# Patient Record
Sex: FEMALE | Race: ASIAN | NOT HISPANIC OR LATINO | Employment: FULL TIME | URBAN - METROPOLITAN AREA
[De-identification: names, ages, dates, MRNs, and addresses within clinical notes are randomized per-mention and may not be internally consistent; named-entity substitution may affect disease eponyms.]

---

## 2017-01-02 ENCOUNTER — THERAPY VISIT (OUTPATIENT)
Dept: PHYSICAL THERAPY | Facility: CLINIC | Age: 37
End: 2017-01-02
Payer: COMMERCIAL

## 2017-01-02 DIAGNOSIS — E10.21 TYPE 1 DIABETES MELLITUS WITH NEPHROPATHY (H): ICD-10-CM

## 2017-01-02 DIAGNOSIS — E83.51 LOW CALCIUM LEVELS: ICD-10-CM

## 2017-01-02 DIAGNOSIS — M25.561 PAIN IN BOTH KNEES, UNSPECIFIED CHRONICITY: Primary | ICD-10-CM

## 2017-01-02 DIAGNOSIS — E78.00 PURE HYPERCHOLESTEROLEMIA: ICD-10-CM

## 2017-01-02 DIAGNOSIS — M25.562 PAIN IN BOTH KNEES, UNSPECIFIED CHRONICITY: Primary | ICD-10-CM

## 2017-01-02 DIAGNOSIS — I10 ESSENTIAL HYPERTENSION: ICD-10-CM

## 2017-01-02 LAB
ANION GAP SERPL CALCULATED.3IONS-SCNC: 12 MMOL/L (ref 3–14)
BUN SERPL-MCNC: 13 MG/DL (ref 7–30)
CALCIUM SERPL-MCNC: 8.5 MG/DL (ref 8.5–10.1)
CHLORIDE SERPL-SCNC: 103 MMOL/L (ref 94–109)
CHOLEST SERPL-MCNC: 302 MG/DL
CO2 SERPL-SCNC: 19 MMOL/L (ref 20–32)
CREAT SERPL-MCNC: 0.5 MG/DL (ref 0.52–1.04)
CREAT UR-MCNC: 69 MG/DL
DEPRECATED CALCIDIOL+CALCIFEROL SERPL-MC: ABNORMAL UG/L (ref 20–75)
GFR SERPL CREATININE-BSD FRML MDRD: ABNORMAL ML/MIN/1.7M2
GLUCOSE SERPL-MCNC: 282 MG/DL (ref 70–99)
HBA1C MFR BLD: 12.8 % (ref 4.3–6)
HDLC SERPL-MCNC: 29 MG/DL
LDLC SERPL CALC-MCNC: ABNORMAL MG/DL
LDLC SERPL DIRECT ASSAY-MCNC: 97 MG/DL
MICROALBUMIN UR-MCNC: 522 MG/L
MICROALBUMIN/CREAT UR: 752.16 MG/G CR (ref 0–25)
NONHDLC SERPL-MCNC: 274 MG/DL
POTASSIUM SERPL-SCNC: 3.5 MMOL/L (ref 3.4–5.3)
SODIUM SERPL-SCNC: 134 MMOL/L (ref 133–144)
TRIGL SERPL-MCNC: 1656 MG/DL

## 2017-01-02 PROCEDURE — T1013 SIGN LANG/ORAL INTERPRETER: HCPCS | Mod: U3 | Performed by: PHYSICAL THERAPIST

## 2017-01-02 PROCEDURE — 97530 THERAPEUTIC ACTIVITIES: CPT | Mod: GP | Performed by: PHYSICAL THERAPIST

## 2017-01-02 PROCEDURE — 97161 PT EVAL LOW COMPLEX 20 MIN: CPT | Mod: GP | Performed by: PHYSICAL THERAPIST

## 2017-01-02 PROCEDURE — 97110 THERAPEUTIC EXERCISES: CPT | Mod: GP | Performed by: PHYSICAL THERAPIST

## 2017-01-02 ASSESSMENT — ACTIVITIES OF DAILY LIVING (ADL)
STAND: ACTIVITY IS NOT DIFFICULT
STIFFNESS: THE SYMPTOM PREVENTS ME FROM ALL DAILY ACTIVITIES
SQUAT: ACTIVITY IS SOMEWHAT DIFFICULT
SIT WITH YOUR KNEE BENT: ACTIVITY IS NOT DIFFICULT
SWELLING: THE SYMPTOM PREVENTS ME FROM ALL DAILY ACTIVITIES
KNEE_ACTIVITY_OF_DAILY_LIVING_SCORE: 34.29
PAIN: THE SYMPTOM PREVENTS ME FROM ALL DAILY ACTIVITIES
GO DOWN STAIRS: I AM UNABLE TO DO THE ACTIVITY
KNEEL ON THE FRONT OF YOUR KNEE: ACTIVITY IS SOMEWHAT DIFFICULT
RISE FROM A CHAIR: ACTIVITY IS NOT DIFFICULT
GIVING WAY, BUCKLING OR SHIFTING OF KNEE: THE SYMPTOM PREVENTS ME FROM ALL DAILY ACTIVITIES
KNEE_ACTIVITY_OF_DAILY_LIVING_SUM: 24
WEAKNESS: THE SYMPTOM PREVENTS ME FROM ALL DAILY ACTIVITIES
WALK: ACTIVITY IS SOMEWHAT DIFFICULT
GO UP STAIRS: I AM UNABLE TO DO THE ACTIVITY
LIMPING: THE SYMPTOM PREVENTS ME FROM ALL DAILY ACTIVITIES
RAW_SCORE: 24

## 2017-01-02 NOTE — PROGRESS NOTES
KEY PT FINDINGS:  1) Weakness of glut med and glut max  2) Balance deficits with ambulation, steps - questionable neurological involvement or related to deafness  3) Joint line tenderness - medial and lateral on the left     Physical Therapy Initial Evaluation: Subjective History     Injury/Condition Details:  Presenting Complaint Bilateral knee pain   Onset Timing/Date 3 years - episodic pain   Mechanism Active in high school - she questions arthritis, has history of falls onto her knees     Symptom Behavior Details    Primary Symptoms Constant symptoms; worsen with activity, pain (Location: anterior knee, bilateral aspects of the patella, reports the pain at times can be deep in the joint, denies pain medial or lateral or posterior, Quality: Sharp and Aching/Throbbing), weakness   Worst Pain 5/10 (with stairs)   Symptom Provocators Stairs, walking, sleeping    Best Pain 0/10 (with injections)   Symptom Relievers Nothing besides injections   Time of day dependent? No   Recent symptom change? symptoms worsening     Prior Testing/Intervention for current condition:  Prior Tests  x-ray and MRI   Prior Treatment Injections: Right only (very helpful but only for 6 months), physical therapy     Lifestyle & General Medical History:  Employment Unemployed   Usual physical activities  (within past year) Daily activities - does not exercise on a regular basis   Orthopaedic history See Epic Chart   Notable medical history See Epic Chart     Knee Examination:    Dynamic Movement Screen:  2 leg stance: Pes planus bilaterally, mild genu valgus bilaterally, mild off loading of left limb  2 leg squat: Poor trunk control, poor proprioception, unable to move through full depth due to pain    1 leg stance: Right: Poor proprioception, decreased trunk control, occasional UE touchdown; Left: Poor proprioception, significant dependence on UE, decreased trunk control, increased pain reported  1 leg squat: Not tested due to pain    Gait:  mild off balance tendencies towards the left with ambulation - difficulty ambulating in a straight line, antalgic gait pattern, slow gait pattern.     Range of Motion  Knee ROM  Left: 3-0-130 Degrees  Right: 4-0-128 Degrees    Hip PROM  IR (L/R): 10 deg/15 deg  ER (L/R): 35 deg/30 deg    Basic Muscle Activation  Quad Setting Ability:  Left: Poor, impaired intensity and delayed activation, co-contraction with gluts  Right: Poor (stronger than left side), impaired intensity, co-contraction with gluts    Straight leg raise:  Left: 0 Lag  Right: 0 Lag    L/E Strength  Hip Rotators: -/5 Left, -/5 Right  Glut Med: 2+/5 Left, 3+/5 Right  Glut Max: 3/5 Left, 3+/5 Right  Hamstrings: 3/5 Left, 3/5 Right    L/E Flexibility Screen  Gastroc/Soleus Complex: ++ bilaterally  Hamstrings (SLR): ++ left  Quadriceps (prone): - bilaterally  Hip Flexors (Navid): - bilaterally  ITB (Yandel): - bilaterally    Palpation  Patient is moderately tender to palpation at the following left knee structures: lateral joint line, medial joint line, inferior pole of patella, medial and lateral boarders of the patella, superior boarder of the patella, distal quadriceps - muscle belly.    Ligamentous Screen  Negative bilaterally             HPI                        System    Physical Exam    General     ROS    Assessment/Plan:      Patient is a 36 year old female with both sides knee complaints.    Patient has the following significant findings with corresponding treatment plan.                Diagnosis 1:  Bilateral knee pain - OA  Pain -  hot/cold therapy, manual therapy, STS, splint/taping/bracing/orthotics, self management and education  Decreased ROM/flexibility - manual therapy, therapeutic exercise and home program  Decreased strength - therapeutic exercise, therapeutic activities and home program  Impaired balance - neuro re-education, therapeutic activities and home program  Decreased proprioception - neuro re-education, therapeutic activities  and home program  Impaired gait - gait training and home program  Impaired muscle performance - neuro re-education and home program  Decreased function - therapeutic activities and home program    Therapy Evaluation Codes:   1) History comprised of:   Personal factors that impact the plan of care:      Cognition, Language and Time since onset of symptoms.    Comorbidity factors that impact the plan of care are:      Diabetes, Overweight, Weakness and deafness.     Medications impacting care: Muscle relaxant and Pain.  2) Examination of Body Systems comprised of:   Body structures and functions that impact the plan of care:      Knee.   Activity limitations that impact the plan of care are:      Sitting, Squatting/kneeling, Standing, Walking and Sleeping.   Clinical presentation characteristics are:    Stable/Uncomplicated.  3) Presentation comprised of:   Presentation scored as Low complexity with uncomplicated characteristics..  4) Decision-Making    Moderate complexity using standardized patient assessment instrument and/or measureable assessment of functional outcome.  Cumulative Therapy Evaluation is: Low complexity.    Previous and current functional limitations:  (See Goal Flow Sheet for this information)    Short term and Long term goals: (See Goal Flow Sheet for this information)     Communication ability:  Patient has an  for communication clarity.  Treatment Explanation - The following has been discussed with the patient:   RX ordered/plan of care  Anticipated outcomes  Possible risks and side effects  This patient would benefit from PT intervention to resume normal activities.   Rehab potential is good.    Frequency:  1 X week, once daily  Duration:  for 4 weeks then move to every other week for 4 weeks  Discharge Plan:  Achieve all LTG.  Independent in home treatment program.  Reach maximal therapeutic benefit.    Please refer to the daily flowsheet for treatment today, total treatment time  and time spent performing 1:1 timed codes.

## 2017-01-11 ENCOUNTER — THERAPY VISIT (OUTPATIENT)
Dept: PHYSICAL THERAPY | Facility: CLINIC | Age: 37
End: 2017-01-11
Payer: COMMERCIAL

## 2017-01-11 DIAGNOSIS — M25.562 PAIN IN BOTH KNEES, UNSPECIFIED CHRONICITY: Primary | ICD-10-CM

## 2017-01-11 DIAGNOSIS — M25.561 PAIN IN BOTH KNEES, UNSPECIFIED CHRONICITY: Primary | ICD-10-CM

## 2017-01-11 PROCEDURE — 97110 THERAPEUTIC EXERCISES: CPT | Mod: GP | Performed by: PHYSICAL THERAPY ASSISTANT

## 2017-01-11 PROCEDURE — 97530 THERAPEUTIC ACTIVITIES: CPT | Mod: GP | Performed by: PHYSICAL THERAPY ASSISTANT

## 2017-01-11 PROCEDURE — T1013 SIGN LANG/ORAL INTERPRETER: HCPCS | Mod: U3 | Performed by: PHYSICAL THERAPY ASSISTANT

## 2017-01-11 PROCEDURE — 97140 MANUAL THERAPY 1/> REGIONS: CPT | Mod: GP | Performed by: PHYSICAL THERAPY ASSISTANT

## 2017-01-12 DIAGNOSIS — E78.2 ELEVATED TRIGLYCERIDES WITH HIGH CHOLESTEROL: ICD-10-CM

## 2017-01-12 DIAGNOSIS — E78.00 PURE HYPERCHOLESTEROLEMIA: ICD-10-CM

## 2017-01-12 DIAGNOSIS — E56.9 VITAMIN DEFICIENCY: Primary | ICD-10-CM

## 2017-01-12 DIAGNOSIS — E10.21 TYPE 1 DIABETES MELLITUS WITH NEPHROPATHY (H): ICD-10-CM

## 2017-01-12 RX ORDER — ERGOCALCIFEROL 1.25 MG/1
50000 CAPSULE, LIQUID FILLED ORAL
Qty: 12 CAPSULE | Refills: 0 | Status: SHIPPED | OUTPATIENT
Start: 2017-01-12 | End: 2017-05-17

## 2017-01-24 ENCOUNTER — OFFICE VISIT (OUTPATIENT)
Dept: INTERNAL MEDICINE | Facility: CLINIC | Age: 37
End: 2017-01-24

## 2017-01-24 VITALS
HEART RATE: 108 BPM | BODY MASS INDEX: 31.76 KG/M2 | SYSTOLIC BLOOD PRESSURE: 130 MMHG | WEIGHT: 168 LBS | DIASTOLIC BLOOD PRESSURE: 63 MMHG

## 2017-01-24 DIAGNOSIS — Z30.430 ENCOUNTER FOR IUD INSERTION: Primary | ICD-10-CM

## 2017-01-24 DIAGNOSIS — B37.49 CANDIDIASIS OF PERINEUM: ICD-10-CM

## 2017-01-24 DIAGNOSIS — Z79.4 TYPE 2 DIABETES MELLITUS WITH COMPLICATION, WITH LONG-TERM CURRENT USE OF INSULIN (H): ICD-10-CM

## 2017-01-24 DIAGNOSIS — E11.8 TYPE 2 DIABETES MELLITUS WITH COMPLICATION, WITH LONG-TERM CURRENT USE OF INSULIN (H): ICD-10-CM

## 2017-01-24 LAB — HCG UR QL: NEGATIVE

## 2017-01-24 RX ORDER — INSULIN GLARGINE 100 [IU]/ML
INJECTION, SOLUTION SUBCUTANEOUS
Qty: 21 ML | Refills: 3 | Status: SHIPPED
Start: 2017-02-01 | End: 2017-02-04

## 2017-01-24 RX ORDER — OMEGA-3 FATTY ACIDS/FISH OIL 300-1000MG
600 CAPSULE ORAL ONCE
Qty: 3 CAPSULE | Refills: 0
Start: 2017-01-24 | End: 2017-01-24

## 2017-01-24 RX ORDER — KETOCONAZOLE 20 MG/G
CREAM TOPICAL 2 TIMES DAILY
Qty: 30 G | Refills: 1 | Status: SHIPPED | OUTPATIENT
Start: 2017-01-24 | End: 2018-02-23

## 2017-01-24 ASSESSMENT — PAIN SCALES - GENERAL: PAINLEVEL: NO PAIN (0)

## 2017-01-24 NOTE — MR AVS SNAPSHOT
After Visit Summary   1/24/2017    Nayeli Caal    MRN: [id number]           Patient Information     Date Of Birth          1980        Visit Information        Provider Department      1/24/2017 9:10 AM Sia Briceño; Mariya Mohamud, APRN CNP [facility]        Today's Diagnoses     Encounter for IUD insertion    -  1     Type 2 diabetes mellitus with complication, with long-term current use of insulin (H)         Candidiasis of perineum           Care Instructions    Primary Care Center Medication Refill Request Information:  * Please contact your pharmacy regarding ANY request for medication refills.  ** [facility] Prescription Fax = [phone]  * Please allow 3 business days for routine medication refills.  * Please allow 5 business days for controlled substance medication refills.     Primary Care Center Test Result notification information:  *You will be notified with in 7-10 days of your appointment day regarding the results of your test.  If you are on MyChart you will be notified as soon as the provider has reviewed the results and signed off on them.          Follow-ups after your visit        Additional Services     ENDOCRINOLOGY ADULT REFERRAL       Your provider has referred you to: [facility]: Endocrinology and Diabetes Clinic [facility] [phone]   [email or web address]    Poor blood sugar control.  Please consult, maybe alternative treatment?       Please be aware that coverage of these services is subject to the terms and limitations of your health insurance plan.  Call member services at your health plan with any benefit or coverage questions.      Please bring the following to your appointment:    >>   Any x-rays, CTs or MRIs which have been performed.  Contact the facility where they were done to arrange for  prior to your scheduled appointment.    >>   List of current medications   >>   This  referral request   >>   Any documents/labs given to you for this referral                  Your next 10 appointments already scheduled     Jan 25, 2017  4:00 PM   DEJAN Extremity with Tori Johnson PT, ASL IS   [facility] Physical Therapy DEJAN ([facility])    [address] [place] [address]  [address]th [facility] [id number] [phone]            Feb 01, 2017  4:00 PM   DEJAN Extremity with Tori Johnson PT, Greyson Schroeder   [facility] Physical Therapy DEJAN ([facility])    [address] [place] [address]  5th [facility] [id number] [phone]            Feb 20, 2017  9:30 AM   (Arrive by 9:15 AM)   RETURN DIABETES with Jimena Bragg MD   [facility] Endocrinology ([facility])    [address] [place] [address]  3rd [facility] [id number] [phone]            Mar 21, 2017  8:25 AM   (Arrive by 8:10 AM)   Return Visit with SABI Ceja CNP   [facility] ([facility])    [address] [place] [id number] [phone]              Who to contact     Please call your clinic at [phone] to:    Ask questions about your health    Make or cancel appointments    Discuss your medicines    Learn about your test results    Speak to your doctor   If you have compliments or concerns about an experience at your clinic, or if you wish to file a complaint, please contact [facility] Patient Relations at [phone] or email us at [email or web address]         Additional Information About Your Visit        MyChart Information     [employer] gives you secure access to your electronic health record. If you see a primary care provider, you can also send messages to your care team and make appointments. If you have questions, please call your primary care clinic.  If you do not have a primary care provider, please call [phone] and they will  assist you.      Get In is an electronic gateway that provides easy, online access to your medical records. With Get In, you can request a clinic appointment, read your test results, renew a prescription or communicate with your care team.     To access your existing account, please contact your [facility] or call [phone] for assistance.        Care EveryWhere ID     This is your Care EveryWhere ID. This could be used by other organizations to access your [facility] medical records  [id number]        Your Vitals Were     Pulse Last Period                108 01/12/2017           Blood Pressure from Last 3 Encounters:   01/24/17 130/63   12/08/16 142/93   08/11/16 137/86    Weight from Last 3 Encounters:   01/24/17 76.204 kg (168 lb)   12/08/16 78.019 kg (172 lb)   08/11/16 75.206 kg (165 lb 12.8 oz)              We Performed the Following     ENDOCRINOLOGY ADULT REFERRAL     HCG qualitative urine          Today's Medication Changes          These changes are accurate as of: 1/24/17 10:43 AM.  If you have any questions, ask your nurse or doctor.               Start taking these medicines.        Dose/Directions    ketoconazole 2 % cream   Commonly known as:  NIZORAL   Used for:  Candidiasis of perineum   Started by:  Mariya Mohamud APRN CNP        Apply topically 2 times daily Apply externally thin amt bid   Quantity:  30 g   Refills:  1         These medicines have changed or have updated prescriptions.        Dose/Directions    * ibuprofen 400 MG tablet   Commonly known as:  ADVIL/MOTRIN   This may have changed:  Another medication with the same name was added. Make sure you understand how and when to take each.   Used for:  Headache(784.0), Pain in joint, lower leg, right   Changed by:  Mariya Mohamud APRN CNP        Dose:  400 mg   Take 1 tablet (400 mg) by mouth every 6 hours as needed for pain   Quantity:  200 tablet   Refills:  1       * ibuprofen 600 MG tablet    Commonly known as:  ADVIL/MOTRIN   This may have changed:  Another medication with the same name was added. Make sure you understand how and when to take each.   Used for:  Pelvic pain in female   Changed by:  Mariya Mohamud APRN CNP        Dose:  600 mg   Take 1 tablet (600 mg) by mouth every 6 hours as needed for moderate pain   Quantity:  1 tablet   Refills:  0       * ibuprofen 200 MG capsule   This may have changed:  You were already taking a medication with the same name, and this prescription was added. Make sure you understand how and when to take each.   Used for:  Encounter for IUD insertion   Changed by:  Mariya Mohamud APRN CNP        Dose:  600 mg   Take 600 mg by mouth once for 1 dose   Quantity:  3 capsule   Refills:  0       * Notice:  This list has 3 medication(s) that are the same as other medications prescribed for you. Read the directions carefully, and ask your doctor or other care provider to review them with you.         Where to get your medicines      These medications were sent to [place], [place] - [facility] [address]  [address] [place] [address] [address] [place], [place] [address]    Hours:  TRANSPLANT PHONE NUMBER [phone] Phone:  [phone]    - ketoconazole 2 % cream      Some of these will need a paper prescription and others can be bought over the counter.  Ask your nurse if you have questions.     You don't need a prescription for these medications    - ibuprofen 200 MG capsule             Primary Care Provider Office Phone # Fax #    SABI Ceja [phone]       [facility] [address] [place] [id number]        Thank you!     Thank you for choosing [facility]  for your care. Our goal is always to provide you with excellent care. Hearing back from our patients is one way we can continue to improve our services. Please take a few minutes to complete the written  survey that you may receive in the mail after your visit with us. Thank you!             Your Updated Medication List - Protect others around you: Learn how to safely use, store and throw away your medicines at www.disposemymeds.org.          This list is accurate as of: 1/24/17 10:43 AM.  Always use your most recent med list.                   Brand Name Dispense Instructions for use    Alcohol Prep Pad 70 % Pads     100 each    1 each 3 times daily       aspirin 81 MG tablet     100 tablet    Take 1 tablet (81 mg) by mouth daily       atorvastatin 40 MG tablet    LIPITOR    90 tablet    Take 1 tablet (40 mg) by mouth daily       BD ULTRA FINE PEN NEEDLES     200 Units    Inject 1 dose * Subcutaneous 2 times daily BD ultra-fine insulin syringe, 30 ga. X 1/2'' short needle 1/2 cc. Use as directed.       blood glucose monitoring lancets     2 Box    Use to test blood sugar 6 times daily or as directed. 3 month supply       blood glucose monitoring test strip    ACCU-CHEK LAYA PLUS    600 each    Laya Plus test strips for use in Accucheck Expert meter.  Use to test blood sugar 6 times daily. 3 month supply.  Send PA's to Dr. Mohamud.       fenofibrate 160 MG tablet     90 tablet    Take 1 tablet (160 mg) by mouth daily       fluconazole 150 MG tablet    DIFLUCAN    3 tablet    Take 1 tablet per infection.       * ibuprofen 400 MG tablet    ADVIL/MOTRIN    200 tablet    Take 1 tablet (400 mg) by mouth every 6 hours as needed for pain       * ibuprofen 600 MG tablet    ADVIL/MOTRIN    1 tablet    Take 1 tablet (600 mg) by mouth every 6 hours as needed for moderate pain       * ibuprofen 200 MG capsule     3 capsule    Take 600 mg by mouth once for 1 dose       insulin aspart 100 UNIT/ML injection    NovoLOG PEN    3 Month    Admin Instructions: Before meals and bedtime correction sliding scale 120 - 160 - 1 U  161 - 200 - 2 U  201 - 240 - 3 U  241 - 280 - 4 U  281 - 320 - 5 U  321 - 360 - 6 U ...       insulin  glargine 100 UNIT/ML injection    LANTUS SOLOSTAR    15 mL    Inject 25 units under the skin at bed time  daily.       ketoconazole 2 % cream    NIZORAL    30 g    Apply topically 2 times daily Apply externally thin amt bid       levonorgestrel 20 MCG/24HR IUD    MIRENA    1 each    1 each (20 mcg) by Intrauterine route once for 1 dose       lisinopril 5 MG tablet    PRINIVIL/ZESTRIL    90 tablet    Take 1 tablet (5 mg) by mouth daily       * insulin pen needle 31G X 8 MM     200 each    Use bid       * NOVOFINE 31 31G X 6 MM   Generic drug:  insulin pen needle      Use as directed.       ondansetron 4 MG tablet    ZOFRAN    10 tablet    Take 1 tablet (4 mg) by mouth every 8 hours as needed for nausea       rizatriptan 10 MG tablet    MAXALT    30 tablet    Take 1 tab at onset of headache.  May repeat after 2 hours.       terbinafine 1 % cream    lamISIL AT    30 g    Apply topically At Bedtime For fungal infection not resolved with other antifungals (e.g. Clotrimazole)       vitamin D 15235 UNIT capsule    ERGOCALCIFEROL    12 capsule    Take 1 capsule (50,000 Units) by mouth every 7 days       * Notice:  This list has 5 medication(s) that are the same as other medications prescribed for you. Read the directions carefully, and ask your doctor or other care provider to review them with you.

## 2017-01-24 NOTE — PATIENT INSTRUCTIONS
Primary Care Center Medication Refill Request Information:  * Please contact your pharmacy regarding ANY request for medication refills.  ** [facility] Prescription Fax = [phone]  * Please allow 3 business days for routine medication refills.  * Please allow 5 business days for controlled substance medication refills.     Primary Care Center Test Result notification information:  *You will be notified with in 7-10 days of your appointment day regarding the results of your test.  If you are on MyChart you will be notified as soon as the provider has reviewed the results and signed off on them.    IUD AFTERCARE INSTRUCTIONS     1. Uterine cramping is common after IUD placement. You can help relieve the discomfort with heating pads, Tylenol (acetaminophen), Aspirin or Advil (ibuprofen). If your cramping becomes very painful, please call the clinic.     2. Irregular bleeding and spotting is normal for the first few months after the IUD is placed. In some cases, women may experience irregular bleeding or spotting for up to six months after the IUD is placed. This bleeding can be annoying at first but usually will become lighter with the Mirena IUD quickly. Call the clinic if your bleeding is excessive and not getting better.     3. Your period will likely be shorter and lighter with a Mirena IUD. Approximately 40% of women will stop having periods altogether with the Mirena IUD. Your period may be heavier and longer with the Paragard IUD.     4. IUDs do not protect against sexually transmitted infections including the AIDS virus (HIV), warts (HPV), gonorrhea, Chlamydia, and herpes. Condoms should be used to decrease the risk sexually transmitted infections. If you think that you have been exposed to a sexually transmitted infection, please call the clinic.     5. If you had the IUD placed for birth control, the Paragard IUD is effective immediately. The Mirena IUD is effective immediately if it was inserted within seven  days after the start of your period. If you have Mirena inserted at any other time during your menstrual cycle, use another method of birth control, like condoms for at least 7 days.     6. It is possible for the IUD to come out of the uterus. If it does slip out of place, it is most likely to happen in the first few months after being put in. To make sure your IUD is in place, you can feel for the IUD strings between periods. To check for strings, wash your hands. Then, sit or squat down. Place one finger into your vagina until you feel your cervix. It will feel hard and rubbery, like the end of your nose. The string ends should be coming through your cervix. Do not pull on the strings. If the strings feel much longer than before, if you feel the hard plastic part of the IUD, or if you cannot feel the strings at all, the IUD may have moved out of place. Please call the clinic and consider using a back up form of birth control until you are seen.     7. Keep your follow-up appointment for 4-6 weeks after the IUD has been placed.     8. Pregnancy is unlikely after IUD placement, but can happen. If you have early pregnancy symptoms like nausea and vomiting, breast tenderness, frequent urination or abdominal pain, you can take a pregnancy test. Please call the clinic if you have any concerns or if your pregnancy test is positive.     9. The IUD should only be removed by a healthcare provider.     The Mirena IUD should be removed and/or replaced after 5 years.     The Paragard IUD should be removed and/or replaced after 10 years.     Warning Signs   Call the clinic if any of the following occurs:       Severe abdominal pain or cramping       Unusual bleeding       Fever or chills       Foul smelling vaginal discharge       Painful intercourse       Positive pregnancy test.

## 2017-01-24 NOTE — PROGRESS NOTES
"She is concerned about her obsession with keeping her perineum clean when showering.  She thinks she might be over-cleaning exteriorly.     She has not yet scheduled an appt in Endocrine Clinic to discuss her diabetes.  She has not been successful in controlling her sugars.  She often forgets to check her blood sugars, take her insulin, and forgets to come in for tests. 1/2/2017 A1c was 12.8.    States she has had a \"sensation\" in her right pelvic area after passing urine.  Not a pain but hard to explain feeling.  Not sure if it correlates with her cycle.  On the days she takes her diabetes meds she urinates approx. 5 x a day.  On days she does not take it she voids 11-12 x a day.    Objective:  Her perineum is red with bright red creases on her inner thigh folds with some fissuring and desquamation of more posterior tissue.  Bimanual exam shows uterus mid-line and ovaries are non-tender to palpation. Nayeli was seen today for IUD.    Diagnoses and all orders for this visit:    Encounter for IUD insertion  -     HCG qualitative urine  -     ibuprofen 200 MG capsule; Take 600 mg by mouth once for 1 dose  -     INSERTION INTRAUTERINE DEVICE    Type 2 diabetes mellitus with complication, with long-term current use of insulin (H)  -     ENDOCRINOLOGY ADULT REFERRAL.  She agrees to seek consultation regarding DM meds.   -     insulin glargine (BASAGLAR KWIKPEN) 100 UNIT/ML injection; 25 units at HS.          Continue Novolog insulin before meals and at bedtime correction.    Candidiasis of perineum  -     ketoconazole (NIZORAL) 2 % cream; Apply topically 2 times daily Apply externally thin amt [facility]  IUD Insertion Note    Nayeli APURVA Caal is a patient of Mariya Mohamud here for an IUD insertion   Indication: birth control    She would desire a lighter menses or none at all.  She is also desiring the contraception aspect of it.      Counseling: Discussed potential side effects of " Paraguard, including increased bleeding and cramping, as well as the Mirena, including unpredictable spotting and amenorrhea.  Patient aware to check for strings every month.    Consent: Affirmation of informed consent was signed and scanned into the medical record. Risks, benefits and alternatives were discussed including risk of infection, bleeding and small risk of uterine perforation.  Patient's questions were elicited and answered.   Procedure safety checklist was completed:  Yes  Time Out (Pause for the Cause) completed: Yes    Labs: UPT negative    Technique:   Patient was premedicated with ibuprofen:   Yes  Uterine position was confirmed by bimanual exam: Yes   Using a sterile speculum and equipment, the cervix was examined.     The cervix was cleansed with Betadine prep. A tenaculum was applied to the cervix with tension to straighten the cervical canal.  The uterus was sounded to 6cm.  A Mirena IUD was inserted in the usual fashion and strings trimmed 2cm below the cervix.  EBL: minimal  Complications: No  Tolerance: Pt tolerated procedure well and was in stable condition. She denied charlie cramping.  She was observed in the clinic for 15 min.     Follow up: Pt was instructed to call if fever, chills, heavy bleeding, severe cramping or foul smelling discharge. May take 600 mg ibuprofen QID prn for mild cramping.  She was advised to check the IUD strings monthly. We will check for strings on her next RTC visit.     Mariya Mohamud, SABI, CNP

## 2017-01-24 NOTE — NURSING NOTE
Chief Complaint   Patient presents with     IUD     Pt is here for an IUD.      Desirae Gonzalez LPN January 24, 2017 9:16 AM

## 2017-01-25 ENCOUNTER — THERAPY VISIT (OUTPATIENT)
Dept: PHYSICAL THERAPY | Facility: CLINIC | Age: 37
End: 2017-01-25
Payer: COMMERCIAL

## 2017-01-25 DIAGNOSIS — M25.561 PAIN IN BOTH KNEES, UNSPECIFIED CHRONICITY: Primary | ICD-10-CM

## 2017-01-25 DIAGNOSIS — M25.562 PAIN IN BOTH KNEES, UNSPECIFIED CHRONICITY: Primary | ICD-10-CM

## 2017-01-25 PROCEDURE — 97110 THERAPEUTIC EXERCISES: CPT | Mod: GP | Performed by: PHYSICAL THERAPIST

## 2017-01-25 PROCEDURE — 97530 THERAPEUTIC ACTIVITIES: CPT | Mod: GP | Performed by: PHYSICAL THERAPIST

## 2017-01-25 PROCEDURE — 97140 MANUAL THERAPY 1/> REGIONS: CPT | Mod: GP | Performed by: PHYSICAL THERAPIST

## 2017-02-01 ENCOUNTER — THERAPY VISIT (OUTPATIENT)
Dept: PHYSICAL THERAPY | Facility: CLINIC | Age: 37
End: 2017-02-01
Payer: COMMERCIAL

## 2017-02-01 DIAGNOSIS — M25.561 PAIN IN BOTH KNEES, UNSPECIFIED CHRONICITY: Primary | ICD-10-CM

## 2017-02-01 DIAGNOSIS — M25.562 PAIN IN BOTH KNEES, UNSPECIFIED CHRONICITY: Primary | ICD-10-CM

## 2017-02-01 PROCEDURE — 97110 THERAPEUTIC EXERCISES: CPT | Mod: GP | Performed by: PHYSICAL THERAPIST

## 2017-02-01 PROCEDURE — 97530 THERAPEUTIC ACTIVITIES: CPT | Mod: GP | Performed by: PHYSICAL THERAPIST

## 2017-02-01 PROCEDURE — 97112 NEUROMUSCULAR REEDUCATION: CPT | Mod: GP | Performed by: PHYSICAL THERAPIST

## 2017-02-04 DIAGNOSIS — Z79.4 TYPE 2 DIABETES MELLITUS WITH COMPLICATION, WITH LONG-TERM CURRENT USE OF INSULIN (H): Primary | ICD-10-CM

## 2017-02-04 DIAGNOSIS — E11.8 TYPE 2 DIABETES MELLITUS WITH COMPLICATION, WITH LONG-TERM CURRENT USE OF INSULIN (H): Primary | ICD-10-CM

## 2017-02-04 RX ORDER — INSULIN GLARGINE 100 [IU]/ML
INJECTION, SOLUTION SUBCUTANEOUS
Qty: 21 ML | Refills: 3 | Status: SHIPPED
Start: 2017-02-04 | End: 2017-03-21

## 2017-02-14 DIAGNOSIS — E10.21 TYPE 1 DIABETES MELLITUS WITH NEPHROPATHY (H): ICD-10-CM

## 2017-02-14 NOTE — TELEPHONE ENCOUNTER
Pt calling and lost all diabetic monitor, test strips and lancets.   Sent new order to [facility].  Anushka Fraser RN 11:36 AM on 2/14/2017.

## 2017-02-20 ENCOUNTER — MEDICAL CORRESPONDENCE (OUTPATIENT)
Dept: HEALTH INFORMATION MANAGEMENT | Facility: CLINIC | Age: 37
End: 2017-02-20

## 2017-02-20 ENCOUNTER — OFFICE VISIT (OUTPATIENT)
Dept: ENDOCRINOLOGY | Facility: CLINIC | Age: 37
End: 2017-02-20

## 2017-02-20 VITALS
DIASTOLIC BLOOD PRESSURE: 86 MMHG | SYSTOLIC BLOOD PRESSURE: 132 MMHG | HEART RATE: 91 BPM | BODY MASS INDEX: 32.45 KG/M2 | WEIGHT: 171.9 LBS | HEIGHT: 61 IN

## 2017-02-20 DIAGNOSIS — E78.2 MIXED HYPERLIPIDEMIA: Primary | ICD-10-CM

## 2017-02-20 DIAGNOSIS — Z79.4 TYPE 2 DIABETES MELLITUS WITH HYPERGLYCEMIA, WITH LONG-TERM CURRENT USE OF INSULIN (H): ICD-10-CM

## 2017-02-20 DIAGNOSIS — E11.65 TYPE 2 DIABETES MELLITUS WITH HYPERGLYCEMIA, WITH LONG-TERM CURRENT USE OF INSULIN (H): ICD-10-CM

## 2017-02-20 DIAGNOSIS — I10 BENIGN ESSENTIAL HYPERTENSION: ICD-10-CM

## 2017-02-20 RX ORDER — FENOFIBRATE 160 MG/1
160 TABLET ORAL DAILY
Qty: 90 TABLET | Refills: 3 | Status: SHIPPED | OUTPATIENT
Start: 2017-02-20 | End: 2017-03-21

## 2017-02-20 ASSESSMENT — PAIN SCALES - GENERAL: PAINLEVEL: NO PAIN (0)

## 2017-02-20 NOTE — NURSING NOTE
"Chief Complaint   Patient presents with     RECHECK     f/u for DM type 1        Initial /86 (BP Location: Right arm, Patient Position: Chair, Cuff Size: Adult Regular)  Pulse 91  Ht 1.549 m (5' 1\")  Wt 78 kg (171 lb 14.4 oz)  LMP 01/12/2017  BMI 32.48 kg/m2 Estimated body mass index is 32.48 kg/(m^2) as calculated from the following:    Height as of this encounter: 1.549 m (5' 1\").    Weight as of this encounter: 78 kg (171 lb 14.4 oz).  Medication Reconciliation: complete         Jackeline Chaney, MOISE     "

## 2017-02-20 NOTE — MR AVS SNAPSHOT
After Visit Summary   2/20/2017    Nayeli Caal    MRN: [id number]           Patient Information     Date Of Birth          1980        Visit Information        Provider Department      2/20/2017 9:15 AM Bing Justice; Jimena Bragg MD  [facility] End[facility]        Today's Diagnoses     Mixed hyperlipidemia    -  1    Type 2 diabetes mellitus with hyperglycemia, with long-term current use of insulin (H)        Benign essential hypertension           Follow-ups after your visit        Follow-up notes from your care team     Return in about 3 months (around 5/20/2017).      Your next 10 appointments already scheduled     Mar 21, 2017  8:25 AM CDT   (Arrive by 8:10 AM)   Return Visit with SABI Ceja [facility] ([facility])    [address] [place] [address] [phone]            May 15, 2017  4:00 PM CDT   (Arrive by 3:45 PM)   RETURN DIABETES with Jimena Bragg MD   [facility] Endocrinology ([facility])    [address] [place] [address] [phone]              Who to contact     Please call your clinic at [phone] to:    Ask questions about your health    Make or cancel appointments    Discuss your medicines    Learn about your test results    Speak to your doctor   If you have compliments or concerns about an experience at your clinic, or if you wish to file a complaint, please contact [facility] Patient Relations at [phone] or email us at [email or web address]         Additional Information About Your Visit        MyChart Information     Avacenhart gives you secure access to your electronic health record. If you see a primary care provider, you can also send messages to your care team and make appointments. If you have questions, please call your primary care clinic.  If you  "do not have a primary care provider, please call [phone] and they will assist you.      Rocketboom is an electronic gateway that provides easy, online access to your medical records. With Rocketboom, you can request a clinic appointment, read your test results, renew a prescription or communicate with your care team.     To access your existing account, please contact your [facility] or call [phone] for assistance.        Care EveryWhere ID     This is your Care EveryWhere ID. This could be used by other organizations to access your [facility] medical records  [id number]        Your Vitals Were     Pulse Height Last Period BMI (Body Mass Index)          91 1.549 m (5' 1\") 01/12/2017 32.48 kg/m2         Blood Pressure from Last 3 Encounters:   02/20/17 132/86   01/24/17 130/63   12/08/16 (!) 142/93    Weight from Last 3 Encounters:   02/20/17 78 kg (171 lb 14.4 oz)   01/24/17 76.2 kg (168 lb)   12/08/16 78 kg (172 lb)              Today, you had the following     No orders found for display         Where to get your medicines      These medications were sent to [facility] [address] [facility] [address]  [address] [place] [address] [address] [id number]    Hours:  TRANSPLANT PHONE NUMBER [phone] Phone:  [phone]     fenofibrate 160 MG tablet          Primary Care Provider Office Phone # Fax #    Mariya SABI Moon [phone]       PRIMARY [facility]ARE CENTER [address] [place] [address] [address] [place] [id number]        Thank you!     Thank you for choosing [facility] ENDOCRINOLOG[facility]  for your care. Our goal is always to provide you with excellent care. Hearing back from our patients is one way we can continue to improve our services. Please take a few minutes to complete the written survey that you may receive in the mail after your visit with us. Thank you!             Your Updated Medication List - Protect others around you: Learn " how to safely use, store and throw away your medicines at www.disposemymeds.org.          This list is accurate as of: 2/20/17 11:43 AM.  Always use your most recent med list.                   Brand Name Dispense Instructions for use    Alcohol Prep Pad 70 % Pads     100 each    1 each 3 times daily       aspirin 81 MG tablet     100 tablet    Take 1 tablet (81 mg) by mouth daily       atorvastatin 40 MG tablet    LIPITOR    90 tablet    Take 1 tablet (40 mg) by mouth daily       BD ULTRA FINE PEN NEEDLES     200 Units    Inject 1 dose * Subcutaneous 2 times daily BD ultra-fine insulin syringe, 30 ga. X 1/2'' short needle 1/2 cc. Use as directed.       blood glucose monitoring lancets     2 Box    Use to test blood sugar 6 times daily or as directed. 3 month supply       blood glucose monitoring test strip    ACCU-CHEK LAYA PLUS    600 each    Laya Plus test strips for use in Accucheck Expert meter.  Use to test blood sugar 6 times daily. 3 month supply.  Send PA's to Dr. Mohamud.       fenofibrate 160 MG tablet     90 tablet    Take 1 tablet (160 mg) by mouth daily       fluconazole 150 MG tablet    DIFLUCAN    3 tablet    Take 1 tablet per infection.       * ibuprofen 400 MG tablet    ADVIL/MOTRIN    200 tablet    Take 1 tablet (400 mg) by mouth every 6 hours as needed for pain       * ibuprofen 600 MG tablet    ADVIL/MOTRIN    1 tablet    Take 1 tablet (600 mg) by mouth every 6 hours as needed for moderate pain       insulin aspart 100 UNIT/ML injection    NovoLOG PEN    3 Month    Admin Instructions: Before meals and bedtime correction sliding scale 120 - 160 - 1 U  161 - 200 - 2 U  201 - 240 - 3 U  241 - 280 - 4 U  281 - 320 - 5 U  321 - 360 - 6 U ...       insulin glargine 100 UNIT/ML injection     21 mL    30 units at HS.   Continue Novolog insulin before meals and at bedtime correction.       ketoconazole 2 % cream    NIZORAL    30 g    Apply topically 2 times daily Apply externally thin amt bid        levonorgestrel 20 MCG/24HR IUD    MIRENA    1 each    1 each (20 mcg) by Intrauterine route once for 1 dose       lisinopril 5 MG tablet    PRINIVIL/ZESTRIL    90 tablet    Take 1 tablet (5 mg) by mouth daily       * insulin pen needle 31G X 8 MM     200 each    Use bid       * NOVOFINE 31 31G X 6 MM   Generic drug:  insulin pen needle      Use as directed.       ondansetron 4 MG tablet    ZOFRAN    10 tablet    Take 1 tablet (4 mg) by mouth every 8 hours as needed for nausea       rizatriptan 10 MG tablet    MAXALT    30 tablet    Take 1 tab at onset of headache.  May repeat after 2 hours.       terbinafine 1 % cream    lamISIL AT    30 g    Apply topically At Bedtime For fungal infection not resolved with other antifungals (e.g. Clotrimazole)       vitamin D 58297 UNIT capsule    ERGOCALCIFEROL    12 capsule    Take 1 capsule (50,000 Units) by mouth every 7 days       * Notice:  This list has 4 medication(s) that are the same as other medications prescribed for you. Read the directions carefully, and ask your doctor or other care provider to review them with you.

## 2017-02-24 PROBLEM — E11.9 TYPE 2 DIABETES MELLITUS (H): Status: ACTIVE | Noted: 2017-02-24

## 2017-03-21 ENCOUNTER — OFFICE VISIT (OUTPATIENT)
Dept: INTERNAL MEDICINE | Facility: CLINIC | Age: 37
End: 2017-03-21

## 2017-03-21 VITALS
OXYGEN SATURATION: 97 % | WEIGHT: 172 LBS | BODY MASS INDEX: 32.5 KG/M2 | DIASTOLIC BLOOD PRESSURE: 80 MMHG | HEART RATE: 90 BPM | SYSTOLIC BLOOD PRESSURE: 120 MMHG | RESPIRATION RATE: 20 BRPM

## 2017-03-21 DIAGNOSIS — G89.29 CHRONIC PAIN OF BOTH KNEES: Primary | ICD-10-CM

## 2017-03-21 DIAGNOSIS — Z13.5 SCREENING FOR DIABETIC RETINOPATHY: ICD-10-CM

## 2017-03-21 DIAGNOSIS — E56.9 VITAMIN DEFICIENCY: ICD-10-CM

## 2017-03-21 DIAGNOSIS — E10.21 TYPE 1 DIABETES MELLITUS WITH NEPHROPATHY (H): ICD-10-CM

## 2017-03-21 DIAGNOSIS — E78.2 MIXED HYPERLIPIDEMIA: ICD-10-CM

## 2017-03-21 DIAGNOSIS — M25.562 CHRONIC PAIN OF BOTH KNEES: Primary | ICD-10-CM

## 2017-03-21 DIAGNOSIS — Z79.4 TYPE 2 DIABETES MELLITUS WITH COMPLICATION, WITH LONG-TERM CURRENT USE OF INSULIN (H): ICD-10-CM

## 2017-03-21 DIAGNOSIS — I10 ESSENTIAL HYPERTENSION, BENIGN: ICD-10-CM

## 2017-03-21 DIAGNOSIS — E11.8 TYPE 2 DIABETES MELLITUS WITH COMPLICATION, WITH LONG-TERM CURRENT USE OF INSULIN (H): ICD-10-CM

## 2017-03-21 DIAGNOSIS — M25.561 CHRONIC PAIN OF BOTH KNEES: Primary | ICD-10-CM

## 2017-03-21 DIAGNOSIS — E10.8 TYPE 1 DIABETES MELLITUS WITH COMPLICATIONS (H): ICD-10-CM

## 2017-03-21 DIAGNOSIS — E11.59 TYPE 2 DIABETES MELLITUS WITH OTHER CIRCULATORY COMPLICATIONS (H): ICD-10-CM

## 2017-03-21 DIAGNOSIS — Z13.89 SCREENING FOR DIABETIC PERIPHERAL NEUROPATHY: ICD-10-CM

## 2017-03-21 RX ORDER — INSULIN GLARGINE 100 [IU]/ML
INJECTION, SOLUTION SUBCUTANEOUS
Qty: 21 ML | Refills: 3 | Status: SHIPPED
Start: 2017-03-21 | End: 2017-03-21

## 2017-03-21 RX ORDER — INSULIN GLARGINE 100 [IU]/ML
INJECTION, SOLUTION SUBCUTANEOUS
Qty: 21 ML | Refills: 3 | Status: SHIPPED
Start: 2017-03-21 | End: 2017-05-16

## 2017-03-21 RX ORDER — LISINOPRIL 5 MG/1
5 TABLET ORAL DAILY
Qty: 90 TABLET | Refills: 3 | Status: SHIPPED | OUTPATIENT
Start: 2017-03-21 | End: 2017-10-17 | Stop reason: SINTOL

## 2017-03-21 RX ORDER — INSULIN GLARGINE 100 [IU]/ML
25 INJECTION, SOLUTION SUBCUTANEOUS DAILY
Qty: 15 ML | Refills: 3 | Status: SHIPPED | OUTPATIENT
Start: 2017-03-21 | End: 2017-03-21

## 2017-03-21 RX ORDER — ATORVASTATIN CALCIUM 40 MG/1
40 TABLET, FILM COATED ORAL DAILY
Qty: 90 TABLET | Refills: 3 | Status: SHIPPED | OUTPATIENT
Start: 2017-03-21 | End: 2018-06-20

## 2017-03-21 RX ORDER — FENOFIBRATE 160 MG/1
160 TABLET ORAL DAILY
Qty: 90 TABLET | Refills: 3 | Status: SHIPPED | OUTPATIENT
Start: 2017-03-21 | End: 2018-01-18

## 2017-03-21 ASSESSMENT — ANXIETY QUESTIONNAIRES
1. FEELING NERVOUS, ANXIOUS, OR ON EDGE: NOT AT ALL
5. BEING SO RESTLESS THAT IT IS HARD TO SIT STILL: NOT AT ALL
6. BECOMING EASILY ANNOYED OR IRRITABLE: NOT AT ALL
GAD7 TOTAL SCORE: 0
7. FEELING AFRAID AS IF SOMETHING AWFUL MIGHT HAPPEN: NOT AT ALL
3. WORRYING TOO MUCH ABOUT DIFFERENT THINGS: NOT AT ALL
2. NOT BEING ABLE TO STOP OR CONTROL WORRYING: NOT AT ALL

## 2017-03-21 ASSESSMENT — PAIN SCALES - GENERAL: PAINLEVEL: NO PAIN (0)

## 2017-03-21 ASSESSMENT — PATIENT HEALTH QUESTIONNAIRE - PHQ9: 5. POOR APPETITE OR OVEREATING: NOT AT ALL

## 2017-03-21 NOTE — NURSING NOTE
Chief Complaint   Patient presents with     Diabetes     3 month follow up on diabetes   Kelley Palacios LPN 8:09 AM on 3/21/2017

## 2017-03-21 NOTE — PATIENT INSTRUCTIONS
Primary Care Center Medication Refill Request Information:  * Please contact your pharmacy regarding ANY request for medication refills.  ** [facility] Prescription Fax = [phone]  * Please allow 3 business days for routine medication refills.  * Please allow 5 business days for controlled substance medication refills.     Primary Care Center Test Result notification information:  *You will be notified with in 7-10 days of your appointment day regarding the results of your test.  If you are on MyChart you will be notified as soon as the provider has reviewed the results and signed off on them.    Please schedule the following appointments:  Ophthalmology (Eye) [phone]  ([facility] 4th floor)  Sports Medicine [phone]  ([facility] 4th floor)

## 2017-03-21 NOTE — MR AVS SNAPSHOT
After Visit Summary   3/21/2017    Nayeli Caal    MRN: [id number]           Patient Information     Date Of Birth          1980        Visit Information        Provider Department      3/21/2017 8:10 AM Greyson Schroeder; Mariya Mohamud, SABI [facility]        Today's Diagnoses     Chronic pain of both knees    -  1    Screening for diabetic retinopathy        Screening for diabetic peripheral neuropathy        Type 2 diabetes mellitus with complication, with long-term current use of insulin (H)        Vitamin deficiency        Type 1 diabetes mellitus with complications (H)        Type 2 diabetes mellitus with other circulatory complications (H)        Type 1 diabetes mellitus with nephropathy (H)        Mixed hyperlipidemia        Essential hypertension, benign          Care Instructions    Primary Care Center Medication Refill Request Information:  * Please contact your pharmacy regarding ANY request for medication refills.  ** [facility] Prescription Fax = [phone]  * Please allow 3 business days for routine medication refills.  * Please allow 5 business days for controlled substance medication refills.     Primary Care Center Test Result notification information:  *You will be notified with in 7-10 days of your appointment day regarding the results of your test.  If you are on MyChart you will be notified as soon as the provider has reviewed the results and signed off on them.    Please schedule the following appointments:  Ophthalmology (Eye) [phone]  ([facility])  Sports Medicine [phone]  ([facility] 4th [facility])          Follow-ups after your visit        Additional Services     OPHTHALMOLOGY ADULT REFERRAL       Your provider has referred you to: [facility]: Eye Clinic [facility] [phone]   [email or web address]    Please be aware that coverage of these services is subject to the terms and limitations of your health insurance  plan.  Call member services at your health plan with any benefit or coverage questions.      Please bring the following with you to your appointment:    (1) Any X-Rays, CTs or MRIs which have been performed.  Contact the facility where they were done to arrange for  prior to your scheduled appointment.    (2) List of current medications  (3) This referral request   (4) Any documents/labs given to you for this referral            SPORTS MEDICINE REFERRAL       Your provider has referred you to: Sports Medicine  Dr. Bradley here for repeat steroid injection knee    Please be aware that coverage of these services is subject to the terms and limitations of your health insurance plan.  Call member services at your health plan with any benefit or coverage questions.      Please bring the following to your appointment:    >>   Any x-rays, CTs or MRIs which have been performed.  Contact the facility where they were done to arrange for  prior to your scheduled appointment.    >>   List of current medications   >>   This referral request   >>   Any documents/labs given to you for this referral                  Follow-up notes from your care team     Return in about 3 months (around 6/21/2017).      Your next 10 appointments already scheduled     May 15, 2017  3:00 PM CDT   LAB with  LAB   [facility] ([facility])    [address] [place] [address] [phone]           Patient must bring picture ID.  Patient should be prepared to give a urine specimen  Please do not eat 10-12 hours before your appointment if you are coming in fasting for labs on lipids, cholesterol, or glucose (sugar).  Pregnant women should follow their Care Team instructions. Water with medications is okay. Do not drink coffee or other fluids.   If you have concerns about taking  your medications, please ask at office or if scheduling via InvestingNote, send a message by clicking on Secure  Messaging, Message Your Care Team.            May 15, 2017  4:00 PM CDT   (Arrive by 3:45 PM)   RETURN DIABETES with Jimena Bragg MD   [facility] Endocrinology ([facility])    [address] [place] [address]  3rd [facility] [address]   [phone]            Jun 05, 2017  4:30 PM CDT   (Arrive by 4:15 PM)   Return Visit with Sebas Bradley MD   [facility] Sports Medicine ([facility])    [address] [place] [address]  5th [facility] [address]   [phone]            Jun 22, 2017  3:20 PM CDT   (Arrive by 3:05 PM)   Return Visit with SABI Ceja CNP   [facility] ([facility])    [address] [place] [address]  4th [facility] [id number] [phone]              Future tests that were ordered for you today     Open Future Orders        Priority Expected Expires Ordered    Vitamin D Deficiency Routine 4/24/2017 3/21/2018 3/21/2017    HEMOGLOBIN A1C -(Today) Routine 3/21/2017 3/21/2018 3/21/2017            Who to contact     Please call your clinic at [phone] to:    Ask questions about your health    Make or cancel appointments    Discuss your medicines    Learn about your test results    Speak to your doctor   If you have compliments or concerns about an experience at your clinic, or if you wish to file a complaint, please contact [facility] Patient Relations at [phone] or email us at [email or web address]         Additional Information About Your Visit        JetSuitehart Information     Ninja Blocks gives you secure access to your electronic health record. If you see a primary care provider, you can also send messages to your care team and make appointments. If you have questions, please call your primary care clinic.  If you do not have a primary care provider, please call [phone] and they will assist you.      Ninja Blocks is an electronic gateway that  provides easy, online access to your medical records. With Beezik, you can request a clinic appointment, read your test results, renew a prescription or communicate with your care team.     To access your existing account, please contact your [facility] or call [phone] for assistance.        Care EveryWhere ID     This is your Care EveryWhere ID. This could be used by other organizations to access your [facility] medical records  [id number]        Your Vitals Were     Pulse Respirations Pulse Oximetry BMI (Body Mass Index)          90 20 97% 32.5 kg/m2         Blood Pressure from Last 3 Encounters:   03/21/17 120/80   02/20/17 132/86   01/24/17 130/63    Weight from Last 3 Encounters:   03/21/17 78 kg (172 lb)   02/20/17 78 kg (171 lb 14.4 oz)   01/24/17 76.2 kg (168 lb)              We Performed the Following     OPHTHALMOLOGY ADULT REFERRAL     SPORTS MEDICINE REFERRAL          Today's Medication Changes          These changes are accurate as of: 3/21/17  9:10 AM.  If you have any questions, ask your nurse or doctor.               These medicines have changed or have updated prescriptions.        Dose/Directions    insulin glargine 100 UNIT/ML injection   This may have changed:  additional instructions   Used for:  Type 2 diabetes mellitus with complication, with long-term current use of insulin (H)   Changed by:  Mariya Mohamud APRN CNP        25 units at HS.   Continue Basaglar insulin before meals and at bedtime correction.   Quantity:  21 mL   Refills:  3            Where to get your medicines      These medications were sent to [facility] - [place], [place] - [address] AT [facility] to [facility] Sites  [address], [facility] [address]     Phone:  [phone]     insulin glargine 100 UNIT/ML injection         These medications were sent to [phone]  [facility] [address], [facility] [address]    Hours:  TRANSPLANT PHONE NUMBER [phone] Phone:  [phone]     atorvastatin 40 MG tablet    fenofibrate 160 MG tablet    lisinopril 5 MG tablet                Primary Care Provider Office Phone # Fax #    Mariya GRACE OviSABI benavidez [phone]       PRIMARY CARE CENTER [address] [place] [address] [address]  [facility] [address]        Thank you!     Thank you for choosing [facility] PRIMARY CARE CLINIC  for your care. Our goal is always to provide you with excellent care. Hearing back from our patients is one way we can continue to improve our services. Please take a few minutes to complete the written survey that you may receive in the mail after your visit with us. Thank you!             Your Updated Medication List - Protect others around you: Learn how to safely use, store and throw away your medicines at www.disposemymeds.org.          This list is accurate as of: 3/21/17  9:10 AM.  Always use your most recent med list.                   Brand Name Dispense Instructions for use    Alcohol Prep Pad 70 % Pads     100 each    1 each 3 times daily       aspirin 81 MG tablet     100 tablet    Take 1 tablet (81 mg) by mouth daily       atorvastatin 40 MG tablet    LIPITOR    90 tablet    Take 1 tablet (40 mg) by mouth daily       BD ULTRA FINE PEN NEEDLES     200 Units    Inject 1 dose * Subcutaneous 2 times daily BD ultra-fine insulin syringe, 30 ga. X 1/2'' short needle 1/2 cc. Use as directed.       blood glucose monitoring lancets     2 Box    Use to test blood sugar 6 times daily or as directed. 3 month supply       blood glucose monitoring test strip    ACCU-CHEK ANALI PLUS    600 each    Anali Plus test strips for use in Accucheck Expert meter.  Use to test blood sugar 6 times daily. 3 month supply.  Send PA's to Dr. Mohamud.       fenofibrate 160 MG tablet     90 tablet    Take 1 tablet (160 mg) by mouth daily       * ibuprofen 400 MG tablet     ADVIL/MOTRIN    200 tablet    Take 1 tablet (400 mg) by mouth every 6 hours as needed for pain       * ibuprofen 600 MG tablet    ADVIL/MOTRIN    1 tablet    Take 1 tablet (600 mg) by mouth every 6 hours as needed for moderate pain       insulin glargine 100 UNIT/ML injection     21 mL    25 units at HS.   Continue Basaglar insulin before meals and at bedtime correction.       ketoconazole 2 % cream    NIZORAL    30 g    Apply topically 2 times daily Apply externally thin amt bid       levonorgestrel 20 MCG/24HR IUD    MIRENA    1 each    1 each (20 mcg) by Intrauterine route once for 1 dose       lisinopril 5 MG tablet    PRINIVIL/ZESTRIL    90 tablet    Take 1 tablet (5 mg) by mouth daily       rizatriptan 10 MG tablet    MAXALT    30 tablet    Take 1 tab at onset of headache.  May repeat after 2 hours.       vitamin D 35166 UNIT capsule    ERGOCALCIFEROL    12 capsule    Take 1 capsule (50,000 Units) by mouth every 7 days       * Notice:  This list has 2 medication(s) that are the same as other medications prescribed for you. Read the directions carefully, and ask your doctor or other care provider to review them with you.

## 2017-03-21 NOTE — PROGRESS NOTES
HPI: Nayeli Caal is a 36 year old female who comes in with  for follow-up of her diabetes and string check of her Mirena IUD.   SHe has been having dysfunctional vaginal uterine bleeding and some cramping but understands this is to be expected for some months after IUD insertion.      She was seen in Endocrine clinic and was advised to keep her daily insulin at 25 units. She sent a download of her glucose readings and is waiting to hear.  Her averages are still high but around 250 now.     She is having bilateral knee pain and is planning on visiting family in [place] in June so would like to see Dr. Bradley for possible cortisone injections prior to that trip.  She does a lot of stair climbing and walking when she goes there.   She believes the last time she had an injection was 12/2015.    She knows she needs to schedule with Opthalmology.       She has four weeks left of Vitamin D 50,000 units a week.      Patient Active Problem List   Diagnosis     Pure hypercholesterolemia     Headache     Cervicalgia     Essential hypertension     Hypersomnia, organic     Other chronic nonalcoholic liver disease     Diabetic retinopathy of both eyes (H)     Xerosis cutis     Obesity     Usher Syndrome: congenital deafness, retinitis pigmentosa     Macular degeneration, age related, nonexudative     Benign neoplasm of iris     Dry eye syndrome     Pemphigus erythematosus     Tenosynovitis of ankle     Pain in joint, shoulder region, impingment     Chondromalacia of patella, right, steroid injection 6/12/2015     Bilateral knee pain     Type 2 diabetes mellitus (H)         Current Outpatient Prescriptions   Medication Sig Dispense Refill     insulin glargine (BASAGLAR KWIKPEN) 100 UNIT/ML injection 25 units at HS.    Continue Basaglar insulin before meals and at bedtime correction. 21 mL 3     fenofibrate 160 MG tablet Take 1 tablet (160 mg) by mouth daily 90 tablet 3     atorvastatin  (LIPITOR) 40 MG tablet Take 1 tablet (40 mg) by mouth daily 90 tablet 3     lisinopril (PRINIVIL/ZESTRIL) 5 MG tablet Take 1 tablet (5 mg) by mouth daily 90 tablet 3     insulin aspart (NOVOLOG PEN) 100 UNIT/ML injection Admin Instructions: Before meals and bedtime correction sliding scale  120 - 160 - 1 U   161 - 200 - 2 U   201 - 240 - 3 U   241 - 280 - 4 U   281 - 320 - 5 U   321 - 360 - 6 U ... 21 mL 3     blood glucose monitoring (ACCU-CHEK LAYA PLUS) test strip Laya Plus test strips for use in Accucheck Expert meter.  Use to test blood sugar 6 times daily. 3 month supply.  Send PA's to Dr. Mohamud. 600 each 3     ketoconazole (NIZORAL) 2 % cream Apply topically 2 times daily Apply externally thin amt bid 30 g 1     vitamin D (ERGOCALCIFEROL) 79139 UNIT capsule Take 1 capsule (50,000 Units) by mouth every 7 days 12 capsule 0     ibuprofen (ADVIL,MOTRIN) 600 MG tablet Take 1 tablet (600 mg) by mouth every 6 hours as needed for moderate pain 1 tablet 0     BD ULTRA FINE PEN NEEDLES Inject 1 dose * Subcutaneous 2 times daily BD ultra-fine insulin syringe, 30 ga. X 1/2'' short needle 1/2 cc. Use as directed. 200 Units 11     blood glucose monitoring (ACCU-CHEK FASTCLIX) lancets Use to test blood sugar 6 times daily or as directed. 3 month supply 2 Box 3     aspirin 81 MG tablet Take 1 tablet (81 mg) by mouth daily 100 tablet 3     ibuprofen (ADVIL,MOTRIN) 400 MG tablet Take 1 tablet (400 mg) by mouth every 6 hours as needed for pain 200 tablet 1     rizatriptan (MAXALT) 10 MG tablet Take 1 tab at onset of headache.  May repeat after 2 hours. 30 tablet 3     Alcohol Swabs (ALCOHOL PREP PAD) 70 % PADS 1 each 3 times daily 100 each 3     [DISCONTINUED] insulin glargine (BASAGLAR KWIKPEN) 100 UNIT/ML injection Admin Instructions: Before meals and bedtime correction sliding scale  120 - 160 - 1 U   161 - 200 - 2 U   201 - 240 - 3 U   241 - 280 - 4 U   281 - 320 - 5 U   321 - 360 - 6 U ... 21 mL 3     [DISCONTINUED]  insulin glargine (BASAGLAR KWIKPEN) 100 UNIT/ML injection Inject 25 Units Subcutaneous daily 15 mL 3     [DISCONTINUED] fenofibrate 160 MG tablet Take 1 tablet (160 mg) by mouth daily 90 tablet 3     [DISCONTINUED] insulin glargine (BASAGLAR KWIKPEN) 100 UNIT/ML injection 30 units at HS.    Continue Novolog insulin before meals and at bedtime correction. 21 mL 3     levonorgestrel (MIRENA) 20 MCG/24HR IUD 1 each (20 mcg) by Intrauterine route once for 1 dose 1 each 0     [DISCONTINUED] insulin aspart (NOVOLOG PEN) 100 UNIT/ML soln Admin Instructions: Before meals and bedtime correction sliding scale  120 - 160 - 1 U   161 - 200 - 2 U   201 - 240 - 3 U   241 - 280 - 4 U   281 - 320 - 5 U   321 - 360 - 6 U ... 3 Month 3     [DISCONTINUED] atorvastatin (LIPITOR) 40 MG tablet Take 1 tablet (40 mg) by mouth daily 90 tablet 3     [DISCONTINUED] lisinopril (PRINIVIL,ZESTRIL) 5 MG tablet Take 1 tablet (5 mg) by mouth daily 90 tablet 3     [DISCONTINUED] BD ULTRA FINE PEN NEEDLES Inject 1 dose Subcutaneous 2 times daily BD ultra-fine insulin syringe, 30 ga. X 1/2'' short needle 1/2 cc. Use as directed. (Patient taking differently: Inject 1 dose * Subcutaneous 2 times daily BD ultra-fine insulin syringe, 30 ga. X 1/2'' short needle 1/2 cc. Use as directed.) 200 Units 11         ALLERGIES: Review of patient's allergies indicates no known allergies.    PAST MEDICAL HX:   Past Medical History   Diagnosis Date     Diabetes mellitus (H)      Hypertension      Migraines        PAST SURGICAL HX: No past surgical history on file.    IMMUNIZATION HX:   Immunization History   Administered Date(s) Administered     Hepatitis A Vac Ped/Adol-2 Dose 05/18/2007, 10/07/2008     Hepatitis B 08/06/2001, 01/03/2003, 04/09/2013     Influenza (IIV3) 10/09/2011, 11/12/2013, 10/23/2014, 11/19/2015     MMR 05/21/2007     Pneumococcal (PCV 13) 10/23/2014     Pneumococcal 23 valent 07/18/2006     TD (ADULT, 7+) 11/01/1999     Tdap (Adacel,Boostrix)  01/09/2009       SOCIAL HX:   Social History     Social History Narrative     ROS: 7 system ROS reviewed w/o changes except for above      OBJECTIVE:  /80 (BP Location: Right arm, Patient Position: Chair, Cuff Size: Adult Regular)  Pulse 90  Resp 20  Wt 78 kg (172 lb)  SpO2 97%  BMI 32.5 kg/m2   Wt Readings from Last 1 Encounters:   03/21/17 78 kg (172 lb)     Constitutional: no distress, comfortable, pleasant   Eyes: anicteric  Neck: no thyromegaly.   Cardiovascular: regular rate and rhythm, normal S1 and S2, no murmurs, rubs or gallops   Respiratory: clear to auscultation, no wheezes or crackles, normal breath sounds   Musculoskeletal: full range of motion, no edema   Skin: no concerning lesions, no jaundice, temp normal   GYN:  Pelvic exam reveals blood in vaginal vault and presence of IUD strings.  Neurological:  normal gait,normal speech, no tremor   Psychological: appropriate mood       ASSESSMENT/PLAN:    Diagnoses and all orders for this visit:    Chronic pain of both knees  -     SPORTS MEDICINE REFERRAL Bilateral osteoarthritis of the knees.    Screening for diabetic retinopathy  -     OPHTHALMOLOGY ADULT REFERRAL    Screening for diabetic peripheral neuropathy  -     HEMOGLOBIN A1C -(Today); Future    Type 2 diabetes mellitus with complication, with long-term current use of insulin (H)  We discussed continuous glucose monitoring and she is interested.   -     insulin glargine (BASAGLAR KWIKPEN) 100 UNIT/ML injection; 25 units at HS.      -     HEMOGLOBIN A1C -(Today); Future        Urine albumin today    -     Continue Novolog insulin before meals and at bedtime correction.  120 - 160 - 1 U   161 - 200 - 2 U   201 - 240 - 3 U   241 - 280 - 4 U   281 - 320 - 5 U   321 - 360 - 6 U ...      Vitamin deficiency  -     Vitamin D Deficiency; Future Check in 4 weeks.    Mixed hyperlipidemia  -     fenofibrate 160 MG tablet; Take 1 tablet (160 mg) by mouth daily              Lipids    Essential hypertension,  benign  -     lisinopril (PRINIVIL/ZESTRIL) 5 MG tablet; Take 1 tablet (5 mg) by mouth daily               BMP    RTC 3 months.     Total time spent 30 minute.  More than 50% of the time spent with MsMatthew Stantonjo ann Cordovaon on counseling / coordinating her care    Mariya CELESTIN, CNP

## 2017-03-22 ASSESSMENT — ANXIETY QUESTIONNAIRES: GAD7 TOTAL SCORE: 0

## 2017-03-22 ASSESSMENT — PATIENT HEALTH QUESTIONNAIRE - PHQ9: SUM OF ALL RESPONSES TO PHQ QUESTIONS 1-9: 5

## 2017-03-27 NOTE — PROGRESS NOTES
Reviewed the meter records - downloaded in the clinic. She has been checking her blood sugar more frequently, 1-3 times daily. She has been bolusing with dosages variable between 6 and 10 units, mainly for breakfast and lunch, occasional before dinner. Average blood glucose is 269 with a standard deviation of 66 and a range variable between 106 and 397. Even during the days when she does takes 30 units and NovoLog, her blood sugar is persistently elevated, above 200. Occasionally, her blood sugar spontaneously decreases in the morning and I suspect this may be associated with increased physical activity.  Please see my chart message for recommendations.

## 2017-03-30 DIAGNOSIS — E10.21 TYPE 1 DIABETES MELLITUS WITH NEPHROPATHY (H): ICD-10-CM

## 2017-03-30 RX ORDER — LANCETS
EACH MISCELLANEOUS
Qty: 6 BOX | Refills: 3 | Status: SHIPPED | OUTPATIENT
Start: 2017-03-30 | End: 2021-08-17

## 2017-04-01 DIAGNOSIS — E11.3319: Primary | ICD-10-CM

## 2017-04-01 NOTE — TELEPHONE ENCOUNTER
aspirin 81 MG tablet  Last Written Prescription Date:  11/19/15  Last Fill Quantity: 100,   # refills: 3  Last Office Visit with G, P or [facility] prescribing provider: 3/21/17  Future Office visit:   6/22/17    BP Readings from Last 3 Encounters:   03/21/17 120/80   02/20/17 132/86   01/24/17 130/63     Lab Results   Component Value Date    AST 15 07/25/2016     Lab Results   Component Value Date    ALT 29 07/25/2016

## 2017-05-15 ENCOUNTER — OFFICE VISIT (OUTPATIENT)
Dept: ENDOCRINOLOGY | Facility: CLINIC | Age: 37
End: 2017-05-15
Attending: NURSE PRACTITIONER

## 2017-05-15 ENCOUNTER — MYC MEDICAL ADVICE (OUTPATIENT)
Dept: INTERNAL MEDICINE | Facility: CLINIC | Age: 37
End: 2017-05-15

## 2017-05-15 VITALS
HEIGHT: 61 IN | BODY MASS INDEX: 33.46 KG/M2 | HEART RATE: 84 BPM | WEIGHT: 177.2 LBS | DIASTOLIC BLOOD PRESSURE: 95 MMHG | SYSTOLIC BLOOD PRESSURE: 151 MMHG

## 2017-05-15 DIAGNOSIS — R11.0 NAUSEA: ICD-10-CM

## 2017-05-15 DIAGNOSIS — E78.2 MIXED HYPERLIPIDEMIA: ICD-10-CM

## 2017-05-15 DIAGNOSIS — E56.9 VITAMIN DEFICIENCY: ICD-10-CM

## 2017-05-15 DIAGNOSIS — E55.9 VITAMIN D DEFICIENCY: Primary | ICD-10-CM

## 2017-05-15 DIAGNOSIS — Z13.5 SCREENING FOR DIABETIC RETINOPATHY: ICD-10-CM

## 2017-05-15 DIAGNOSIS — E11.65 UNCONTROLLED TYPE 2 DIABETES MELLITUS WITH HYPERGLYCEMIA, WITH LONG-TERM CURRENT USE OF INSULIN (H): ICD-10-CM

## 2017-05-15 DIAGNOSIS — Z79.4 UNCONTROLLED TYPE 2 DIABETES MELLITUS WITH HYPERGLYCEMIA, WITH LONG-TERM CURRENT USE OF INSULIN (H): ICD-10-CM

## 2017-05-15 DIAGNOSIS — Z13.89 SCREENING FOR DIABETIC PERIPHERAL NEUROPATHY: ICD-10-CM

## 2017-05-15 DIAGNOSIS — R10.13 ABDOMINAL PAIN, EPIGASTRIC: ICD-10-CM

## 2017-05-15 PROBLEM — E11.9 TYPE 2 DIABETES MELLITUS (H): Status: RESOLVED | Noted: 2017-02-24 | Resolved: 2017-05-15

## 2017-05-15 LAB
AMYLASE SERPL-CCNC: 16 U/L (ref 30–110)
DEPRECATED CALCIDIOL+CALCIFEROL SERPL-MC: 13 UG/L (ref 20–75)
HBA1C MFR BLD: 10.9 % (ref 4.3–6)
LIPASE SERPL-CCNC: 118 U/L (ref 73–393)

## 2017-05-15 ASSESSMENT — PAIN SCALES - GENERAL: PAINLEVEL: NO PAIN (0)

## 2017-05-15 NOTE — PROGRESS NOTES
The patient is seen for evaluation of diabetes. She is seen with the help of a .     Nayeli Caal is 36 year old female diagnosed with diabetes in the fall of the year 2000, while being investigated for headaches and increased urination.  The patient is adopted and she doesn't know her parents. She was treated with metformin until 2003, when she was started on insulin. In [id number], she was treated with Bydureon for a period of time. Over the years, she complied poorly with the recommended insulin regimen.   She had no prior episodes of diabetes ketoacidosis, despite completely discontinuing insulin for months at a time. Hemoglobin A1c has been variable between 8 and 12% over the years. In 2005, she had a C-peptide of 1.3 for a glucose level of 221. Over the last 2 years, her hemoglobin A1c has been around 12%.     She is now using an Accucheck Expert meter to help her taking Novolog for food intake and correction. I reviewed the meter settings:  Time Block: 12:00am to 12:00am   Insulin to Carb Ratio: 15  Correction Factor: 50  BG Target: 110-150     Insulin units for calcuation: 1  Offset Time: 2 hours  Acting Time: 3 hours  Snack Size: 15 grams  Meal rise: 50 mg/dL     Since her last visit here, she's been doing a better job checking her blood sugar and administering insulin. Hemoglobin A1c today was 10.9%, down from 12.8% at her last visit here.    The glucometer readings reveal that she checks her blood sugar 2 times daily. Average blood glucose is 247 with a standard deviation of 67 and a range variable between 95 and 415. As per meter records, she administers approximately 6-7 units of NovoLog per meal. Most of the days, she does take short-acting insulin once or twice. There are periods of a few consecutive days when she doesn't check her blood sugar at all or take insulin. Intermittently, she reports taking 2 units NovoLog before snacks like [employer] coffee.    Current  dose of Basaglar is 35 units at bedtime.   At her last appointment here, I recommended an insulin to carbohydrate ratio of 1 unit per 10 g but the patient was not able to change the meter settings. As a result, she continues to take 1 unit NovoLog per 15 g carbohydrates. The correction factor is still 50, with a target of 100-150.     In a regular day, she has 2-3 meals (2 days a week she skips breakfast), and an evening snack, approximately 2 hours after dinner.    Diabetes complications:  Retinopathy: last eye exam - 2016; no DR, no treatments as per patient   Nephropathy: h/o proteinuria (most recent urine microalbumin positive in January 2017); normal GFR  Neuropathy: no numbness or tingling sensation in her feet, no pain   She is symptomatic for hypoglycemia and she developed symptoms when her blood glucose is below 80 ( shakiness, dizziness).  The lowest blood glucose numbers that she remembers is 59.  She denies prior episodes of loss of consciousness due to hypoglycemia. She does have glucagon at home.    Nayeli has a history of dyslipidemia, currently medicated with 40 mg atorvastatin and 160 mg fenofibrate daily (added in 2015). The most recent triglycerides level was elevated at 1656 (she wasn't taking the meds at the time she had the labwork done). This is the highest triglycerides levels she has ever had. She has no prior history of pancreatitis.  Vitamin D3 level today was less than 13. High-dose vitamin D2 at 50,000 units weekly was prescribed by her primary care provider, for 3 months, and she completed the treatment the second week of April.     Today, she complains of recurrent episodes of nausea, associated with stomach pain and, sometimes diarrhea. Recently, she's been having loose or watery bowel movements on a daily basis. The nausea is long-standing, for as long as she can remember. Prior screening test for celiac disease in 2007 was negative.    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    Current Medications     Current Outpatient Prescriptions:      aspirin 81 MG tablet, Take 1 tablet (81 mg) by mouth daily, Disp: 100 tablet, Rfl: 3     blood glucose monitoring (ACCU-CHEK FASTCLIX) lancets, Use to test blood sugar 6 times daily or as directed, Disp: 6 Box, Rfl: 3     insulin glargine (BASAGLAR KWIKPEN) 100 UNIT/ML injection, 25 units at HS.   Continue Basaglar insulin before meals and at bedtime correction. (Patient taking differently: 35 units at HS.   Continue Basaglar insulin before meals and at bedtime correction.), Disp: 21 mL, Rfl: 3     fenofibrate 160 MG tablet, Take 1 tablet (160 mg) by mouth daily, Disp: 90 tablet, Rfl: 3     atorvastatin (LIPITOR) 40 MG tablet, Take 1 tablet (40 mg) by mouth daily, Disp: 90 tablet, Rfl: 3     lisinopril (PRINIVIL/ZESTRIL) 5 MG tablet, Take 1 tablet (5 mg) by mouth daily, Disp: 90 tablet, Rfl: 3     insulin aspart (NOVOLOG PEN) 100 UNIT/ML injection, Admin Instructions: Before meals and bedtime correction sliding scale 120 - 160 - 1 U  161 - 200 - 2 U  201 - 240 - 3 U  241 - 280 - 4 U  281 - 320 - 5 U  321 - 360 - 6 U ..., Disp: 21 mL, Rfl: 3     blood glucose monitoring (ACCU-CHEK LAYA PLUS) test strip, Laya Plus test strips for use in Accucheck Expert meter.  Use to test blood sugar 6 times daily. 3 month supply.  Send PA's to Dr. Mohamud., Disp: 600 each, Rfl: 3     ketoconazole (NIZORAL) 2 % cream, Apply topically 2 times daily Apply externally thin amt bid, Disp: 30 g, Rfl: 1     vitamin D (ERGOCALCIFEROL) 75153 UNIT capsule, Take 1 capsule (50,000 Units) by mouth every 7 days, Disp: 12 capsule, Rfl: 0     ibuprofen (ADVIL,MOTRIN) 600 MG tablet, Take 1 tablet (600 mg) by mouth every 6 hours as needed for moderate pain, Disp: 1 tablet, Rfl:  0     BD ULTRA FINE PEN NEEDLES, Inject 1 dose * Subcutaneous 2 times daily BD ultra-fine insulin syringe, 30 ga. X 1/2'' short needle 1/2 cc. Use as directed., Disp: 200 Units, Rfl: 11     ibuprofen (ADVIL,MOTRIN) 400 MG tablet, Take 1 tablet (400 mg) by mouth every 6 hours as needed for pain, Disp: 200 tablet, Rfl: 1     rizatriptan (MAXALT) 10 MG tablet, Take 1 tab at onset of headache.  May repeat after 2 hours., Disp: 30 tablet, Rfl: 3     Alcohol Swabs (ALCOHOL PREP PAD) 70 % PADS, 1 each 3 times daily, Disp: 100 each, Rfl: 3     levonorgestrel (MIRENA) 20 MCG/24HR IUD, 1 each (20 mcg) by Intrauterine route once for 1 dose, Disp: 1 each, Rfl: 0     [DISCONTINUED] BD ULTRA FINE PEN NEEDLES, Inject 1 dose Subcutaneous 2 times daily BD ultra-fine insulin syringe, 30 ga. X 1/2'' short needle 1/2 cc. Use as directed. (Patient taking differently: Inject 1 dose * Subcutaneous 2 times daily BD ultra-fine insulin syringe, 30 ga. X 1/2'' short needle 1/2 cc. Use as directed.), Disp: 200 Units, Rfl: 11    Family History   Problem Relation Age of Onset     Unknown/Adopted Other      Social History  Single. No children. Lives with a roommate. She smoked for almost 10 years, on and off, up to 1 PPD; quit smoking 2000. She only drinks alcohol occasionally. Denies using illicit drugs. Occupation: engineering department.      Review of Systems   12 point review of systems negative unless specified   Occasional fatigue ; sleeps 5-8 hrs a night   Menstrual periods, irregular, 3-5 weeks apart - IUD 1/2017  B/L knee pain     Vital Signs     Previous Weights:    Wt Readings from Last 5 Encounters:   05/15/17 80.4 kg (177 lb 3.2 oz)   03/21/17 78 kg (172 lb)   02/20/17 78 kg (171 lb 14.4 oz)   01/24/17 76.2 kg (168 lb)   12/08/16 78 kg (172 lb)                   BP Readings from Last 3 Encounters:   05/15/17 (!) 151/95   03/21/17 120/80   02/20/17 132/86     Vitals:    05/15/17 1626   BP: (!) 151/95   Pulse: 84   Weight: 80.4 kg  "(177 lb 3.2 oz)   Height: 1.549 m (5' 1\")       Physical Exam  General Appearance:  she is well developed, well nourished and in no distress     HEENT:   oropharynx clear and moist, no JVD, no bruits      no thyromegaly, no palpable nodules   Cardiovascular:  regular rhythm, systolic murmur, distal pulse palpable, no edema  Respiratory:       chest clear, no rales, no rhonchi   Cardiovascular:  regular rhythm, no murmurs, distal pulse palpable, no edema  Respiratory:       chest clear, no rales, no rhonchi   Gastrointestinal: abdomen soft, non-tender, non-distended, normal bowel sounds,   no organomegaly   Musculoskeletal: normal tone and strength  Psychiatric: affect and judgment normal  Skin: warm, no lesions   Neurological: reflexes normal and symmetric, no resting tremor    Lab Results  Lab Results   Component Value Date    A1C 10.9 (H) 05/15/2017    A1C 12.8 (H) 01/02/2017    A1C 13.0 (H) 05/11/2016    A1C 12.7 (H) 02/11/2016    A1C 13.5 (H) 11/19/2015    HEMOGLOBINA1 12.0 (A) 02/03/2014    HEMOGLOBINA1 10.3 (A) 10/28/2013    HEMOGLOBINA1 11.3 (A) 08/06/2013    HEMOGLOBINA1 13.1 (H) 08/12/2011    HEMOGLOBINA1 8.9 (H) 05/21/2010       Hemoglobin   Date Value Ref Range Status   07/25/2016 14.0 11.7 - 15.7 g/dL Final     Hematocrit   Date Value Ref Range Status   07/25/2016 42.4 35.0 - 47.0 % Final     Cholesterol   Date Value Ref Range Status   01/02/2017 302 (H) <200 mg/dL Final     Comment:     Desirable:       <200 mg/dl     Cholesterol/HDL Ratio   Date Value Ref Range Status   09/16/2013 5.8 (H) 0.0 - 5.0 Final     HDL Cholesterol   Date Value Ref Range Status   01/02/2017 29 (L) >49 mg/dL Final     LDL Cholesterol Calculated   Date Value Ref Range Status   01/02/2017  <100 mg/dL Final    Cannot estimate LDL when triglyceride exceeds 400 mg/dL     LDL Cholesterol Direct   Date Value Ref Range Status   01/02/2017 97 <100 mg/dL Final     Comment:     Desirable:       <100 mg/dl     VLDL-Cholesterol   Date Value " Ref Range Status   09/16/2013 44 (H) 0 - 30 mg/dL Final     Triglycerides   Date Value Ref Range Status   01/02/2017 1656 (H) <150 mg/dL Final     Comment:     Borderline high:  150-199 mg/dl   High:             200-499 mg/dl   Very high:       >499 mg/dl       Albumin Urine mg/L   Date Value Ref Range Status   01/02/2017 522 mg/L Final     TSH   Date Value Ref Range Status   05/11/2016 1.85 0.40 - 4.00 mU/L Final         Last Basic Metabolic Panel:    Sodium   Date Value Ref Range Status   01/02/2017 134 133 - 144 mmol/L Final     Potassium   Date Value Ref Range Status   01/02/2017 3.5 3.4 - 5.3 mmol/L Final     Chloride   Date Value Ref Range Status   01/02/2017 103 94 - 109 mmol/L Final     Calcium   Date Value Ref Range Status   01/02/2017 8.5 8.5 - 10.1 mg/dL Final     Carbon Dioxide   Date Value Ref Range Status   01/02/2017 19 (L) 20 - 32 mmol/L Final     Urea Nitrogen   Date Value Ref Range Status   01/02/2017 13 7 - 30 mg/dL Final     Creatinine   Date Value Ref Range Status   01/02/2017 0.50 (L) 0.52 - 1.04 mg/dL Final     GFR Estimate   Date Value Ref Range Status   01/02/2017 >90  Non  GFR Calc   >60 mL/min/1.7m2 Final     Glucose   Date Value Ref Range Status   01/02/2017 282 (H) 70 - 99 mg/dL Final       AST   Date Value Ref Range Status   07/25/2016 15 0 - 45 U/L Final     ALT   Date Value Ref Range Status   07/25/2016 29 0 - 50 U/L Final     Albumin   Date Value Ref Range Status   07/25/2016 3.5 3.4 - 5.0 g/dL Final     Assessment     1. Diabetes, complicated by diabetic nephropathy, with poor glycemic control, due to noncompliance. I suspect she has type 2 diabetes or PRAVIN.   Recommendations:  Check blood sugar more consistently, 4 times daily, before meals and at bedtime  Use an insulin to carbohydrate ratio of 1 unit per 10 g for all meals and snacks - the AccuCheck meter was set up in the clinic.   Try to cover all of meals and snacks with short-acting insulin   Have the meter  downloaded in our clinic in 2 weeks     2. Dyslipidemia   Expect the TG level to improve with better glycemic control. Counseled the patient on the importance of taking both atorvastatin and fenofibrate as instructed. Once the A1c is well controlled, we can consider discontinuing the fenofibrate. F/up lipid panel, fasting.     3. Vitamin D deficiency  Recommended to have the vitamin D2 level checked today, as she was treated with ergocalciferol.     4. Nausea and abd pain   GO consult.     RTC 3 months.     Orders Placed This Encounter   Procedures     25 Hydroxyvitamin D2 and D3     GASTROENTEROLOGY ADULT REF CONSULT ONLY

## 2017-05-15 NOTE — LETTER
5/15/2017       RE: Nayeli Caal  [address]  [facility] [place] [address]     Dear Colleague,    Thank you for referring your patient, Nayeli Caal, to the [facility] ENDOCRINOLOGY at [facility]. Please see a copy of my visit note below.      The patient is seen for evaluation of diabetes. She is seen with the help of a .     Nayeli Caal is 36 year old female diagnosed with diabetes in the fall of the year 2000, while being investigated for headaches and increased urination.  The patient is adopted and she doesn't know her parents. She was treated with metformin until 2003, when she was started on insulin. In [id number], she was treated with Bydureon for a period of time. Over the years, she complied poorly with the recommended insulin regimen.   She had no prior episodes of diabetes ketoacidosis, despite completely discontinuing insulin for months at a time. Hemoglobin A1c has been variable between 8 and 12% over the years. In 2005, she had a C-peptide of 1.3 for a glucose level of 221. Over the last 2 years, her hemoglobin A1c has been around 12%.     She is now using an Accucheck Expert meter to help her taking Novolog for food intake and correction. I reviewed the meter settings:  Time Block: 12:00am to 12:00am   Insulin to Carb Ratio: 15  Correction Factor: 50  BG Target: 110-150     Insulin units for calcuation: 1  Offset Time: 2 hours  Acting Time: 3 hours  Snack Size: 15 grams  Meal rise: 50 mg/dL     Since her last visit here, she's been doing a better job checking her blood sugar and administering insulin. Hemoglobin A1c today was 10.9%, down from 12.8% at her last visit here.    The glucometer readings reveal that she checks her blood sugar 2 times daily. Average blood glucose is 247 with a standard deviation of 67 and a range variable between 95 and 415. As per meter records, she administers approximately 6-7  units of NovoLog per meal. Most of the days, she does take short-acting insulin once or twice. There are periods of a few consecutive days when she doesn't check her blood sugar at all or take insulin. Intermittently, she reports taking 2 units NovoLog before snacks like [employer] coffee.    Current dose of Basaglar is 35 units at bedtime.   At her last appointment here, I recommended an insulin to carbohydrate ratio of 1 unit per 10 g but the patient was not able to change the meter settings. As a result, she continues to take 1 unit NovoLog per 15 g carbohydrates. The correction factor is still 50, with a target of 100-150.     In a regular day, she has 2-3 meals (2 days a week she skips breakfast), and an evening snack, approximately 2 hours after dinner.    Diabetes complications:  Retinopathy: last eye exam - 2016; no DR, no treatments as per patient   Nephropathy: h/o proteinuria (most recent urine microalbumin positive in January 2017); normal GFR  Neuropathy: no numbness or tingling sensation in her feet, no pain   She is symptomatic for hypoglycemia and she developed symptoms when her blood glucose is below 80 ( shakiness, dizziness).  The lowest blood glucose numbers that she remembers is 59.  She denies prior episodes of loss of consciousness due to hypoglycemia. She does have glucagon at home.    Nayeli has a history of dyslipidemia, currently medicated with 40 mg atorvastatin and 160 mg fenofibrate daily (added in 2015). The most recent triglycerides level was elevated at 1656 (she wasn't taking the meds at the time she had the labwork done). This is the highest triglycerides levels she has ever had. She has no prior history of pancreatitis.  Vitamin D3 level today was less than 13. High-dose vitamin D2 at 50,000 units weekly was prescribed by her primary care provider, for 3 months, and she completed the treatment the second week of April.     Today, she complains of recurrent episodes of nausea,  associated with stomach pain and, sometimes diarrhea. Recently, she's been having loose or watery bowel movements on a daily basis. The nausea is long-standing, for as long as she can remember. Prior screening test for celiac disease in 2007 was negative.    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    Current Medications     Current Outpatient Prescriptions:      aspirin 81 MG tablet, Take 1 tablet (81 mg) by mouth daily, Disp: 100 tablet, Rfl: 3     blood glucose monitoring (ACCU-CHEK FASTCLIX) lancets, Use to test blood sugar 6 times daily or as directed, Disp: 6 Box, Rfl: 3     insulin glargine (BASAGLAR KWIKPEN) 100 UNIT/ML injection, 25 units at HS.   Continue Basaglar insulin before meals and at bedtime correction. (Patient taking differently: 35 units at HS.   Continue Basaglar insulin before meals and at bedtime correction.), Disp: 21 mL, Rfl: 3     fenofibrate 160 MG tablet, Take 1 tablet (160 mg) by mouth daily, Disp: 90 tablet, Rfl: 3     atorvastatin (LIPITOR) 40 MG tablet, Take 1 tablet (40 mg) by mouth daily, Disp: 90 tablet, Rfl: 3     lisinopril (PRINIVIL/ZESTRIL) 5 MG tablet, Take 1 tablet (5 mg) by mouth daily, Disp: 90 tablet, Rfl: 3     insulin aspart (NOVOLOG PEN) 100 UNIT/ML injection, Admin Instructions: Before meals and bedtime correction sliding scale 120 - 160 - 1 U  161 - 200 - 2 U  201 - 240 - 3 U  241 - 280 - 4 U  281 - 320 - 5 U  321 - 360 - 6 U ..., Disp: 21 mL, Rfl: 3     blood glucose monitoring (ACCU-CHEK LAYA PLUS) test strip, Laya Plus test strips for use in Accucheck Expert meter.  Use to test blood sugar 6 times daily. 3 month supply.  Send PA's to Dr. Mohamud., Disp: 600 each, Rfl: 3     ketoconazole (NIZORAL) 2 % cream, Apply topically 2 times daily Apply externally  thin amt bid, Disp: 30 g, Rfl: 1     vitamin D (ERGOCALCIFEROL) 29822 UNIT capsule, Take 1 capsule (50,000 Units) by mouth every 7 days, Disp: 12 capsule, Rfl: 0     ibuprofen (ADVIL,MOTRIN) 600 MG tablet, Take 1 tablet (600 mg) by mouth every 6 hours as needed for moderate pain, Disp: 1 tablet, Rfl: 0     BD ULTRA FINE PEN NEEDLES, Inject 1 dose * Subcutaneous 2 times daily BD ultra-fine insulin syringe, 30 ga. X 1/2'' short needle 1/2 cc. Use as directed., Disp: 200 Units, Rfl: 11     ibuprofen (ADVIL,MOTRIN) 400 MG tablet, Take 1 tablet (400 mg) by mouth every 6 hours as needed for pain, Disp: 200 tablet, Rfl: 1     rizatriptan (MAXALT) 10 MG tablet, Take 1 tab at onset of headache.  May repeat after 2 hours., Disp: 30 tablet, Rfl: 3     Alcohol Swabs (ALCOHOL PREP PAD) 70 % PADS, 1 each 3 times daily, Disp: 100 each, Rfl: 3     levonorgestrel (MIRENA) 20 MCG/24HR IUD, 1 each (20 mcg) by Intrauterine route once for 1 dose, Disp: 1 each, Rfl: 0     [DISCONTINUED] BD ULTRA FINE PEN NEEDLES, Inject 1 dose Subcutaneous 2 times daily BD ultra-fine insulin syringe, 30 ga. X 1/2'' short needle 1/2 cc. Use as directed. (Patient taking differently: Inject 1 dose * Subcutaneous 2 times daily BD ultra-fine insulin syringe, 30 ga. X 1/2'' short needle 1/2 cc. Use as directed.), Disp: 200 Units, Rfl: 11    Family History   Problem Relation Age of Onset     Unknown/Adopted Other      Social History  Single. No children. Lives with a roommate. She smoked for almost 10 years, on and off, up to 1 PPD; quit smoking 2000. She only drinks alcohol occasionally. Denies using illicit drugs. Occupation: engineering department.      Review of Systems   12 point review of systems negative unless specified   Occasional fatigue ; sleeps 5-8 hrs a night   Menstrual periods, irregular, 3-5 weeks apart - IUD 1/2017  B/L knee pain     Vital Signs     Previous Weights:    Wt Readings from Last 5 Encounters:   05/15/17 80.4 kg (177 lb 3.2 oz)  "  03/21/17 78 kg (172 lb)   02/20/17 78 kg (171 lb 14.4 oz)   01/24/17 76.2 kg (168 lb)   12/08/16 78 kg (172 lb)                   BP Readings from Last 3 Encounters:   05/15/17 (!) 151/95   03/21/17 120/80   02/20/17 132/86     Vitals:    05/15/17 1626   BP: (!) 151/95   Pulse: 84   Weight: 80.4 kg (177 lb 3.2 oz)   Height: 1.549 m (5' 1\")       Physical Exam  General Appearance:  she is well developed, well nourished and in no distress     HEENT:   oropharynx clear and moist, no JVD, no bruits      no thyromegaly, no palpable nodules   Cardiovascular:  regular rhythm, systolic murmur, distal pulse palpable, no edema  Respiratory:       chest clear, no rales, no rhonchi   Cardiovascular:  regular rhythm, no murmurs, distal pulse palpable, no edema  Respiratory:       chest clear, no rales, no rhonchi   Gastrointestinal: abdomen soft, non-tender, non-distended, normal bowel sounds,   no organomegaly   Musculoskeletal: normal tone and strength  Psychiatric: affect and judgment normal  Skin: warm, no lesions   Neurological: reflexes normal and symmetric, no resting tremor    Lab Results  Lab Results   Component Value Date    A1C 10.9 (H) 05/15/2017    A1C 12.8 (H) 01/02/2017    A1C 13.0 (H) 05/11/2016    A1C 12.7 (H) 02/11/2016    A1C 13.5 (H) 11/19/2015    HEMOGLOBINA1 12.0 (A) 02/03/2014    HEMOGLOBINA1 10.3 (A) 10/28/2013    HEMOGLOBINA1 11.3 (A) 08/06/2013    HEMOGLOBINA1 13.1 (H) 08/12/2011    HEMOGLOBINA1 8.9 (H) 05/21/2010       Hemoglobin   Date Value Ref Range Status   07/25/2016 14.0 11.7 - 15.7 g/dL Final     Hematocrit   Date Value Ref Range Status   07/25/2016 42.4 35.0 - 47.0 % Final     Cholesterol   Date Value Ref Range Status   01/02/2017 302 (H) <200 mg/dL Final     Comment:     Desirable:       <200 mg/dl     Cholesterol/HDL Ratio   Date Value Ref Range Status   09/16/2013 5.8 (H) 0.0 - 5.0 Final     HDL Cholesterol   Date Value Ref Range Status   01/02/2017 29 (L) >49 mg/dL Final     LDL " Cholesterol Calculated   Date Value Ref Range Status   01/02/2017  <100 mg/dL Final    Cannot estimate LDL when triglyceride exceeds 400 mg/dL     LDL Cholesterol Direct   Date Value Ref Range Status   01/02/2017 97 <100 mg/dL Final     Comment:     Desirable:       <100 mg/dl     VLDL-Cholesterol   Date Value Ref Range Status   09/16/2013 44 (H) 0 - 30 mg/dL Final     Triglycerides   Date Value Ref Range Status   01/02/2017 1656 (H) <150 mg/dL Final     Comment:     Borderline high:  150-199 mg/dl   High:             200-499 mg/dl   Very high:       >499 mg/dl       Albumin Urine mg/L   Date Value Ref Range Status   01/02/2017 522 mg/L Final     TSH   Date Value Ref Range Status   05/11/2016 1.85 0.40 - 4.00 mU/L Final         Last Basic Metabolic Panel:    Sodium   Date Value Ref Range Status   01/02/2017 134 133 - 144 mmol/L Final     Potassium   Date Value Ref Range Status   01/02/2017 3.5 3.4 - 5.3 mmol/L Final     Chloride   Date Value Ref Range Status   01/02/2017 103 94 - 109 mmol/L Final     Calcium   Date Value Ref Range Status   01/02/2017 8.5 8.5 - 10.1 mg/dL Final     Carbon Dioxide   Date Value Ref Range Status   01/02/2017 19 (L) 20 - 32 mmol/L Final     Urea Nitrogen   Date Value Ref Range Status   01/02/2017 13 7 - 30 mg/dL Final     Creatinine   Date Value Ref Range Status   01/02/2017 0.50 (L) 0.52 - 1.04 mg/dL Final     GFR Estimate   Date Value Ref Range Status   01/02/2017 >90  Non  GFR Calc   >60 mL/min/1.7m2 Final     Glucose   Date Value Ref Range Status   01/02/2017 282 (H) 70 - 99 mg/dL Final       AST   Date Value Ref Range Status   07/25/2016 15 0 - 45 U/L Final     ALT   Date Value Ref Range Status   07/25/2016 29 0 - 50 U/L Final     Albumin   Date Value Ref Range Status   07/25/2016 3.5 3.4 - 5.0 g/dL Final     Assessment     1. Diabetes, complicated by diabetic nephropathy, with poor glycemic control, due to noncompliance. I suspect she has type 2 diabetes or PRAVIN.    Recommendations:  Check blood sugar more consistently, 4 times daily, before meals and at bedtime  Use an insulin to carbohydrate ratio of 1 unit per 10 g for all meals and snacks - the AccuCheck meter was set up in the clinic.   Try to cover all of meals and snacks with short-acting insulin   Have the meter downloaded in our clinic in 2 weeks     2. Dyslipidemia   Expect the TG level to improve with better glycemic control. Counseled the patient on the importance of taking both atorvastatin and fenofibrate as instructed. Once the A1c is well controlled, we can consider discontinuing the fenofibrate. F/up lipid panel, fasting.     3. Vitamin D deficiency  Recommended to have the vitamin D2 level checked today, as she was treated with ergocalciferol.     4. Nausea and abd pain   GO consult.     RTC 3 months.     Orders Placed This Encounter   Procedures     25 Hydroxyvitamin D2 and D3     GASTROENTEROLOGY ADULT REF CONSULT ONLY           Again, thank you for allowing me to participate in the care of your patient.      Sincerely,    Jimena Bragg MD

## 2017-05-15 NOTE — MR AVS SNAPSHOT
After Visit Summary   5/15/2017    Nayeli Caal    MRN: [id number]           Patient Information     Date Of Birth          1980        Visit Information        Provider Department      5/15/2017 3:45 PM Reshma Cerna; Jimena Bragg MD [facility]        Today's Diagnoses     Vitamin D deficiency    -  1    Uncontrolled type 2 diabetes mellitus with hyperglycemia, with long-term current use of insulin (H)        Mixed hyperlipidemia        Nausea        Abdominal pain, epigastric           Follow-ups after your visit        Additional Services     GASTROENTEROLOGY ADULT REF CONSULT ONLY       Preferred Location: [facility] [phone]      Please be aware that coverage of these services is subject to the terms and limitations of your health insurance plan.  Call member services at your health plan with any benefit or coverage questions.  Any procedures must be performed at a [facility] facility OR coordinated by your clinic's referral office.    Please bring the following with you to your appointment:    (1) Any X-Rays, CTs or MRIs which have been performed.  Contact the facility where they were done to arrange for  prior to your scheduled appointment.    (2) List of current medications   (3) This referral request   (4) Any documents/labs given to you for this referral                  Follow-up notes from your care team     Return in about 3 months (around 8/15/2017).      Your next 10 appointments already scheduled     Jun 05, 2017  4:30 PM CDT   (Arrive by 4:15 PM)   Return Visit with Sebas Bradley MD   [facility]    [address] [place] [address]  [address] [facility] [address]   [phone]            Jun 22, 2017  3:20 PM CDT   (Arrive by 3:05 PM)   Return Visit with SABI Ceja CNP   [facility] ([facility])    [address] [place] [address]  "Se  4th Floor  [facility] [address] [phone]            Aug 21, 2017  9:30 AM CDT   (Arrive by 9:15 AM)   RETURN DIABETES with Jimena Bragg MD   [facility] ([facility])    [address] [facility]  3rd [facility] [id number] [phone]              Who to contact     Please call your clinic at [phone] to:    Ask questions about your health    Make or cancel appointments    Discuss your medicines    Learn about your test results    Speak to your doctor   If you have compliments or concerns about an experience at your clinic, or if you wish to file a complaint, please contact [facility] Patient Relations at [phone] or email us at [email or web address]         Additional Information About Your Visit        [employer] Information     [employer] gives you secure access to your electronic health record. If you see a primary care provider, you can also send messages to your care team and make appointments. If you have questions, please call your primary care clinic.  If you do not have a primary care provider, please call [phone] and they will assist you.      [employer] is an electronic gateway that provides easy, online access to your medical records. With [employer], you can request a clinic appointment, read your test results, renew a prescription or communicate with your care team.     To access your existing account, please contact your [facility] Clinic or call [phone] for assistance.        Care EveryWhere ID     This is your Care EveryWhere ID. This could be used by other organizations to access your [facility] medical records  [id number]        Your Vitals Were     Pulse Height BMI (Body Mass Index)             84 1.549 m (5' 1\") 33.48 kg/m2          Blood Pressure from Last 3 Encounters:   05/15/17 (!) 151/95   03/21/17 120/80   02/20/17 132/86    Weight from Last 3 " Encounters:   05/15/17 80.4 kg (177 lb 3.2 oz)   03/21/17 78 kg (172 lb)   02/20/17 78 kg (171 lb 14.4 oz)              We Performed the Following     25 Hydroxyvitamin D2 and D3     Amylase     GASTROENTEROLOGY ADULT REF CONSULT ONLY     Lipase          Today's Medication Changes          These changes are accurate as of: 5/15/17  8:52 PM.  If you have any questions, ask your nurse or doctor.               These medicines have changed or have updated prescriptions.        Dose/Directions    insulin glargine 100 UNIT/ML injection   This may have changed:  additional instructions   Used for:  Type 2 diabetes mellitus with complication, with long-term current use of insulin (H)        25 units at HS.   Continue Basaglar insulin before meals and at bedtime correction.   Quantity:  21 mL   Refills:  3                Primary Care Provider Office Phone # Fax #    Mariya GRACE SABI Mohamud [phone]       [facility] [address] [place] [phone]  [facility] [address]        Thank you!     Thank you for choosing [facility] ENDOCRINOLOGY  for your care. Our goal is always to provide you with excellent care. Hearing back from our patients is one way we can continue to improve our services. Please take a few minutes to complete the written survey that you may receive in the mail after your visit with us. Thank you!             Your Updated Medication List - Protect others around you: Learn how to safely use, store and throw away your medicines at www.disposemymeds.org.          This list is accurate as of: 5/15/17  8:52 PM.  Always use your most recent med list.                   Brand Name Dispense Instructions for use    Alcohol Prep Pad 70 % Pads     100 each    1 each 3 times daily       aspirin 81 MG tablet     100 tablet    Take 1 tablet (81 mg) by mouth daily       atorvastatin 40 MG tablet    LIPITOR    90 tablet    Take 1 tablet (40 mg) by mouth daily       BD ULTRA FINE PEN NEEDLES     200 Units     Inject 1 dose * Subcutaneous 2 times daily BD ultra-fine insulin syringe, 30 ga. X 1/2'' short needle 1/2 cc. Use as directed.       blood glucose monitoring lancets     6 Box    Use to test blood sugar 6 times daily or as directed       blood glucose monitoring test strip    ACCU-CHEK LAYA PLUS    600 each    Laya Plus test strips for use in Accucheck Expert meter.  Use to test blood sugar 6 times daily. 3 month supply.  Send PA's to Dr. Mohamud.       fenofibrate 160 MG tablet     90 tablet    Take 1 tablet (160 mg) by mouth daily       * ibuprofen 400 MG tablet    ADVIL/MOTRIN    200 tablet    Take 1 tablet (400 mg) by mouth every 6 hours as needed for pain       * ibuprofen 600 MG tablet    ADVIL/MOTRIN    1 tablet    Take 1 tablet (600 mg) by mouth every 6 hours as needed for moderate pain       insulin aspart 100 UNIT/ML injection    NovoLOG PEN    21 mL    Admin Instructions: Before meals and bedtime correction sliding scale 120 - 160 - 1 U  161 - 200 - 2 U  201 - 240 - 3 U  241 - 280 - 4 U  281 - 320 - 5 U  321 - 360 - 6 U ...       insulin glargine 100 UNIT/ML injection     21 mL    25 units at HS.   Continue Basaglar insulin before meals and at bedtime correction.       ketoconazole 2 % cream    NIZORAL    30 g    Apply topically 2 times daily Apply externally thin amt bid       levonorgestrel 20 MCG/24HR IUD    MIRENA    1 each    1 each (20 mcg) by Intrauterine route once for 1 dose       lisinopril 5 MG tablet    PRINIVIL/ZESTRIL    90 tablet    Take 1 tablet (5 mg) by mouth daily       rizatriptan 10 MG tablet    MAXALT    30 tablet    Take 1 tab at onset of headache.  May repeat after 2 hours.       vitamin D 80911 UNIT capsule    ERGOCALCIFEROL    12 capsule    Take 1 capsule (50,000 Units) by mouth every 7 days       * Notice:  This list has 2 medication(s) that are the same as other medications prescribed for you. Read the directions carefully, and ask your doctor or other care provider to  review them with you.

## 2017-05-16 DIAGNOSIS — E11.8 TYPE 2 DIABETES MELLITUS WITH COMPLICATION, WITH LONG-TERM CURRENT USE OF INSULIN (H): ICD-10-CM

## 2017-05-16 DIAGNOSIS — Z79.4 TYPE 2 DIABETES MELLITUS WITH COMPLICATION, WITH LONG-TERM CURRENT USE OF INSULIN (H): ICD-10-CM

## 2017-05-16 LAB
DEPRECATED CALCIDIOL+CALCIFEROL SERPL-MC: 39 UG/L (ref 20–75)
VITAMIN D2 SERPL-MCNC: 34 UG/L
VITAMIN D3 SERPL-MCNC: 5 UG/L

## 2017-05-16 RX ORDER — INSULIN GLARGINE 100 [IU]/ML
38 INJECTION, SOLUTION SUBCUTANEOUS DAILY
Qty: 30 ML | Refills: 3 | Status: SHIPPED | OUTPATIENT
Start: 2017-05-16 | End: 2017-07-13

## 2017-05-17 DIAGNOSIS — E56.9 VITAMIN DEFICIENCY: ICD-10-CM

## 2017-05-17 RX ORDER — ERGOCALCIFEROL 1.25 MG/1
50000 CAPSULE, LIQUID FILLED ORAL
Qty: 12 CAPSULE | Refills: 3 | Status: SHIPPED | OUTPATIENT
Start: 2017-05-17 | End: 2017-07-13

## 2017-06-05 ENCOUNTER — OFFICE VISIT (OUTPATIENT)
Dept: ORTHOPEDICS | Facility: CLINIC | Age: 37
End: 2017-06-05
Attending: NURSE PRACTITIONER

## 2017-06-05 VITALS — BODY MASS INDEX: 33.42 KG/M2 | RESPIRATION RATE: 16 BRPM | HEIGHT: 61 IN | WEIGHT: 177 LBS

## 2017-06-05 DIAGNOSIS — M25.561 CHRONIC PAIN OF BOTH KNEES: Primary | ICD-10-CM

## 2017-06-05 DIAGNOSIS — G89.29 CHRONIC PAIN OF BOTH KNEES: Primary | ICD-10-CM

## 2017-06-05 DIAGNOSIS — M25.562 CHRONIC PAIN OF BOTH KNEES: Primary | ICD-10-CM

## 2017-06-05 DIAGNOSIS — M22.40 CHONDROMALACIA OF PATELLA, UNSPECIFIED LATERALITY: Primary | ICD-10-CM

## 2017-06-05 RX ORDER — TRIAMCINOLONE ACETONIDE 40 MG/ML
80 INJECTION, SUSPENSION INTRA-ARTICULAR; INTRAMUSCULAR ONCE
Qty: 2 ML | Refills: 0 | OUTPATIENT
Start: 2017-06-05 | End: 2017-06-05

## 2017-06-05 NOTE — NURSING NOTE
[address] [address]  Dept: [phone]  ______________________________________________________________________________    Patient: Nayeli Caal   : 1980   MRN: [id number]   2017    INVASIVE PROCEDURE SAFETY CHECKLIST    Date: 2017   Procedure:Bilateral knee injections  Patient Name: Nayeli Caal  MRN: [id number]  YOB: 1980    Action: Complete sections as appropriate. Any discrepancy results in a HARD COPY until resolved.     PRE PROCEDURE:  Patient ID verified with 2 identifiers (name and  or MRN): Yes  Procedure and site verified with patient/designee (when able): Yes  Accurate consent documentation in medical record: Yes  H&P (or appropriate assessment) documented in medical record: Yes  H&P must be up to 20 days prior to procedure and updates within 24 hours of procedure as applicable: NA  Relevant diagnostic and radiology test results appropriately labeled and displayed as applicable: Yes  Procedure site(s) marked with provider initials: NA    TIMEOUT:  Time-Out performed immediately prior to starting procedure, including verbal and active participation of all team members addressing the following:Yes  * Correct patient identify  * Confirmed that the correct side and site are marked  * An accurate procedure consent form  * Agreement on the procedure to be done  * Correct patient position  * Relevant images and results are properly labeled and appropriately displayed  * The need to administer antibiotics or fluids for irrigation purposes during the procedure as applicable   * Safety precautions based on patient history or medication use    DURING PROCEDURE: Verification of correct person, site, and procedures any time the responsibility for care of the patient is transferred to another member of the care team.

## 2017-06-05 NOTE — MR AVS SNAPSHOT
After Visit Summary   6/5/2017    Nayeli Caal    MRN: [id number]           Patient Information     Date Of Birth          1980        Visit Information        Provider Department      6/5/2017 4:15 PM Sebas Bradley MD; ASL IS [facility]        Today's Diagnoses     Chondromalacia of patella, unspecified laterality    -  1       Follow-ups after your visit        Your next 10 appointments already scheduled     Jun 22, 2017  3:20 PM CDT   (Arrive by 3:05 PM)   Return Visit with SABI Ceja CNP   [facility] ([facility])    [address] [place] [id number] [phone]            Aug 21, 2017  9:30 AM CDT   (Arrive by 9:15 AM)   RETURN DIABETES with Jimena Bragg MD   [facility] ([facility])    [address] [place] [id number] [phone]              Who to contact     Please call your clinic at [phone] to:    Ask questions about your health    Make or cancel appointments    Discuss your medicines    Learn about your test results    Speak to your doctor   If you have compliments or concerns about an experience at your clinic, or if you wish to file a complaint, please contact [facility] Patient Relations at [phone] or email us at [email or web address]         Additional Information About Your Visit        MyChart Information     Emergency [employer] gives you secure access to your electronic health record. If you see a primary care provider, you can also send messages to your care team and make appointments. If you have questions, please call your primary care clinic.  If you do not have a primary care provider, please call [phone] and they will assist you.      [employer] is an electronic gateway that provides easy, online access to your medical records. With [employer], you  "can request a clinic appointment, read your test results, renew a prescription or communicate with your care team.     To access your existing account, please contact your [facility] or call [phone] for assistance.        Care EveryWhere ID     This is your Care EveryWhere ID. This could be used by other organizations to access your [facility] medical records  [id number]        Your Vitals Were     Respirations Height BMI (Body Mass Index)             16 5' 1\" (1.549 m) 33.44 kg/m2          Blood Pressure from Last 3 Encounters:   05/15/17 (!) 151/95   03/21/17 120/80   02/20/17 132/86    Weight from Last 3 Encounters:   06/05/17 177 lb (80.3 kg)   05/15/17 177 lb 3.2 oz (80.4 kg)   03/21/17 172 lb (78 kg)              We Performed the Following     Large Joint/Bursa injection and/or drainage - Bilateral (Shoulder, Knee) [32425] [50 Mod]     TRIAMCINOLONE ACET INJ NOS          Today's Medication Changes          These changes are accurate as of: 6/5/17 11:59 PM.  If you have any questions, ask your nurse or doctor.               Start taking these medicines.        Dose/Directions    triamcinolone acetonide 40 MG/ML injection   Commonly known as:  KENALOG   Used for:  Chondromalacia of patella, unspecified laterality   Started by:  Sebas Bradley MD        Dose:  80 mg   2 mLs (80 mg) by INTRA-ARTICULAR route once for 1 dose   Quantity:  2 mL   Refills:  0            Where to get your medicines      Some of these will need a paper prescription and others can be bought over the counter.  Ask your nurse if you have questions.     You don't need a prescription for these medications     triamcinolone acetonide 40 MG/ML injection                Primary Care Provider Office Phone # Fax #    SABI Ceja [phone]       [facility] [address] [place] [address]  [facility] [address]        Thank you!     Thank you for choosing [facility]  for your care. Our goal is always to provide you with excellent care. Hearing back from our patients is one way we can continue to improve our services. Please take a few minutes to complete the written survey that you may receive in the mail after your visit with us. Thank you!             Your Updated Medication List - Protect others around you: Learn how to safely use, store and throw away your medicines at www.disposemymeds.org.          This list is accurate as of: 6/5/17 11:59 PM.  Always use your most recent med list.                   Brand Name Dispense Instructions for use    Alcohol Prep Pad 70 % Pads     100 each    1 each 3 times daily       aspirin 81 MG tablet     100 tablet    Take 1 tablet (81 mg) by mouth daily       atorvastatin 40 MG tablet    LIPITOR    90 tablet    Take 1 tablet (40 mg) by mouth daily       BD ULTRA FINE PEN NEEDLES     200 Units    Inject 1 dose * Subcutaneous 2 times daily BD ultra-fine insulin syringe, 30 ga. X 1/2'' short needle 1/2 cc. Use as directed.       blood glucose monitoring lancets     6 Box    Use to test blood sugar 6 times daily or as directed       blood glucose monitoring test strip    ACCU-CHEK LAYA PLUS    600 each    Laya Plus test strips for use in Acc[employer]ck Expert meter.  Use to test blood sugar 6 times daily. 3 month supply.  Send PA's to Dr. Mohamud.       fenofibrate 160 MG tablet     90 tablet    Take 1 tablet (160 mg) by mouth daily       * ibuprofen 400 MG tablet    ADVIL/MOTRIN    200 tablet    Take 1 tablet (400 mg) by mouth every 6 hours as needed for pain       * ibuprofen 600 MG tablet    ADVIL/MOTRIN    1 tablet    Take 1 tablet (600 mg) by mouth every 6 hours as needed for moderate pain       insulin aspart 100 UNIT/ML injection    NovoLOG PEN    21 mL    Admin Instructions: Before meals and bedtime correction sliding scale 120 - 160 - 1 U  161 - 200 - 2 U  201 - 240 - 3 U  241 - 280 - 4 U  281 - 320 - 5 U  321 - 360 - 6 U ...        insulin glargine 100 UNIT/ML injection     30 mL    Inject 38 Units Subcutaneous daily       ketoconazole 2 % cream    NIZORAL    30 g    Apply topically 2 times daily Apply externally thin amt bid       levonorgestrel 20 MCG/24HR IUD    MIRENA    1 each    1 each (20 mcg) by Intrauterine route once for 1 dose       lisinopril 5 MG tablet    PRINIVIL/ZESTRIL    90 tablet    Take 1 tablet (5 mg) by mouth daily       rizatriptan 10 MG tablet    MAXALT    30 tablet    Take 1 tab at onset of headache.  May repeat after 2 hours.       triamcinolone acetonide 40 MG/ML injection    KENALOG    2 mL    2 mLs (80 mg) by INTRA-ARTICULAR route once for 1 dose       vitamin D 98805 UNIT capsule    ERGOCALCIFEROL    12 capsule    Take 1 capsule (50,000 Units) by mouth every 7 days       * Notice:  This list has 2 medication(s) that are the same as other medications prescribed for you. Read the directions carefully, and ask your doctor or other care provider to review them with you.

## 2017-06-05 NOTE — PROGRESS NOTES
June 5, 2017: Nayeli Caal is a 36 year old female who is seen in f/u up for bilateral knee pain. She is requesting bilateral knee injections.   Last injection 12/2015.  They were helpful previously and she is taking a trip to [facility] next week and knows the walking and the stairs will bother her.   Diabetic on insulin.  PT notes from 2/2017 reviewed.  Pt says she has been doing the exercises and it did help somewhat.        PMH:  Past Medical History:   Diagnosis Date     Diabetes mellitus (H)      Hypertension      Migraines        Active problem list:  Patient Active Problem List   Diagnosis     Headache     Cervicalgia     Essential hypertension     Hypersomnia, organic     Other chronic nonalcoholic liver disease     Diabetic retinopathy of both eyes (H)     Xerosis cutis     Obesity     Usher Syndrome: congenital deafness, retinitis pigmentosa     Macular degeneration, age related, nonexudative     Benign neoplasm of iris     Dry eye syndrome     Pemphigus erythematosus     Tenosynovitis of ankle     Pain in joint, shoulder region, impingment     Chondromalacia of patella, right, steroid injection 6/12/2015     Bilateral knee pain     Uncontrolled type 2 diabetes mellitus with hyperglycemia, with long-term current use of insulin (H)       FH:  Family History   Problem Relation Age of Onset     Unknown/Adopted Other        SH:  Social History     Social History     Marital status: Single     Spouse name: N/A     Number of children: N/A     Years of education: N/A     Occupational History     Not on file.     Social History Main Topics     Smoking status: Former Smoker     Types: Cigarettes     Quit date: 12/1/2010     Smokeless tobacco: Never Used      Comment: stopped 2 yrs ago     Alcohol use No     Drug use: No     Sexual activity: Not Currently     Partners: Male     Other Topics Concern      Service No     Blood Transfusions No     Caffeine Concern No     Occupational Exposure No     Hobby  Hazards No     Sleep Concern No     Stress Concern No     Weight Concern Yes     Special Diet No     Back Care No     Exercise Yes     4-5 x a week     Bike Helmet No     Seat Belt Yes     Self-Exams Yes     Social History Narrative       MEDS:  See EMR, reviewed  ALL:  See EMR, reviewed    REVIEW OF SYSTEMS:  CONSTITUTIONAL:NEGATIVE for fever, chills, change in weight  INTEGUMENTARY/SKIN: NEGATIVE for worrisome rashes, moles or lesions  EYES: NEGATIVE for vision changes or irritation  ENT/MOUTH: NEGATIVE for ear, mouth and throat problems  RESP:NEGATIVE for significant cough or SOB  BREAST: NEGATIVE for masses, tenderness or discharge  CV: NEGATIVE for chest pain, palpitations or peripheral edema  GI: NEGATIVE for nausea, abdominal pain, heartburn, or change in bowel habits  :NEGATIVE for frequency, dysuria, or hematuria  :NEGATIVE for frequency, dysuria, or hematuria  NEURO: NEGATIVE for weakness, dizziness or paresthesias  ENDOCRINE: NEGATIVE for temperature intolerance, skin/hair changes  HEME/ALLERGY/IMMUNE: NEGATIVE for bleeding problems  PSYCHIATRIC: NEGATIVE for changes in mood or affect      ADDENDUM:  She would like a hinged knee brace to help with some of her left-sided knee discomfort.  She has minimal degenerative change.  She has tolerated the injection but had some discomfort with the left knee after the injection.  The brace seemed to help.  She was given a hinged knee brace to assist with her knee.  She will look for improvements with both injections over the next week.         OBJECTIVE:  The bilateral knees reveal no effusion.  I can flex and extend them fully.  She is nontender about the patellar facets.  Nontender over the knee or lateral joint line.  Nontender at the pes anserine bursa.  Overlying skin is intact.  She has good range of motion at the bilateral hips.  She is appropriate in conversation and affect.  Conversation was through a .        IMAGING:  Repeat  x-rays of the knees show no significant degenerative change compared to previous standing x-rays.  She has perhaps a mild amount of medial joint space narrowing and no significant patellofemoral joint DJD changes.      ASSESSMENT:  Patellofemoral chondromalacia of the bilateral knees.      PLAN:  Through the , I explained again the importance of optimizing physical therapy exercises and trying to limit these cortisone shots.  We talked again about the option of Synvisc injections.  She would need to check with her insurance to see if they would cover it.  If she has ongoing pain and would like an alternative of cortisone shots, these could be considered as long as her insurance pays for it.  A handout was given that she could call her insurance about and read about the injection.  After informed consent about bleeding, infection or steroid flare and after prepping with surgical scrub, she was injected in the left knee from a lateral approach in the seated position with 1 cc of Kenalog-40 and 5 cc of 1% lidocaine and injected in the opposite knee with the same combination of medicines.  She knows that if this is helpful for pain relief that she should try to optimize her physical therapy protocol as she has been taught.  The possibility this raise her blood sugars for 3 days was explained.  She will follow up as needed.

## 2017-06-05 NOTE — LETTER
6/5/2017      RE: Nayeli Caal  [address]  [facility] [id number] June 5, 2017: Nayeli Caal is a 36 year old female who is seen in f/u up for bilateral knee pain. She is requesting bilateral knee injections.   Last injection 12/2015.  They were helpful previously and she is taking a trip to [facility] next week and knows the walking and the stairs will bother her.   Diabetic on insulin.  PT notes from 2/2017 reviewed.  Pt says she has been doing the exercises and it did help somewhat.        PMH:  Past Medical History:   Diagnosis Date     Diabetes mellitus (H)      Hypertension      Migraines        Active problem list:  Patient Active Problem List   Diagnosis     Headache     Cervicalgia     Essential hypertension     Hypersomnia, organic     Other chronic nonalcoholic liver disease     Diabetic retinopathy of both eyes (H)     Xerosis cutis     Obesity     Usher Syndrome: congenital deafness, retinitis pigmentosa     Macular degeneration, age related, nonexudative     Benign neoplasm of iris     Dry eye syndrome     Pemphigus erythematosus     Tenosynovitis of ankle     Pain in joint, shoulder region, impingment     Chondromalacia of patella, right, steroid injection 6/12/2015     Bilateral knee pain     Uncontrolled type 2 diabetes mellitus with hyperglycemia, with long-term current use of insulin (H)       FH:  Family History   Problem Relation Age of Onset     Unknown/Adopted Other        SH:  Social History     Social History     Marital status: Single     Spouse name: N/A     Number of children: N/A     Years of education: N/A     Occupational History     Not on file.     Social History Main Topics     Smoking status: Former Smoker     Types: Cigarettes     Quit date: 12/1/2010     Smokeless tobacco: Never Used      Comment: stopped 2 yrs ago     Alcohol use No     Drug use: No     Sexual activity: Not Currently     Partners: Male     Other Topics Concern      Service  No     Blood Transfusions No     Caffeine Concern No     Occupational Exposure No     Hobby Hazards No     Sleep Concern No     Stress Concern No     Weight Concern Yes     Special Diet No     Back Care No     Exercise Yes     4-5 x a week     Bike Helmet No     Seat Belt Yes     Self-Exams Yes     Social History Narrative       MEDS:  See EMR, reviewed  ALL:  See EMR, reviewed    REVIEW OF SYSTEMS:  CONSTITUTIONAL:NEGATIVE for fever, chills, change in weight  INTEGUMENTARY/SKIN: NEGATIVE for worrisome rashes, moles or lesions  EYES: NEGATIVE for vision changes or irritation  ENT/MOUTH: NEGATIVE for ear, mouth and throat problems  RESP:NEGATIVE for significant cough or SOB  BREAST: NEGATIVE for masses, tenderness or discharge  CV: NEGATIVE for chest pain, palpitations or peripheral edema  GI: NEGATIVE for nausea, abdominal pain, heartburn, or change in bowel habits  :NEGATIVE for frequency, dysuria, or hematuria  :NEGATIVE for frequency, dysuria, or hematuria  NEURO: NEGATIVE for weakness, dizziness or paresthesias  ENDOCRINE: NEGATIVE for temperature intolerance, skin/hair changes  HEME/ALLERGY/IMMUNE: NEGATIVE for bleeding problems  PSYCHIATRIC: NEGATIVE for changes in mood or affect      ADDENDUM:  She would like a hinged knee brace to help with some of her left-sided knee discomfort.  She has minimal degenerative change.  She has tolerated the injection but had some discomfort with the left knee after the injection.  The brace seemed to help.  She was given a hinged knee brace to assist with her knee.  She will look for improvements with both injections over the next week.         OBJECTIVE:  The bilateral knees reveal no effusion.  I can flex and extend them fully.  She is nontender about the patellar facets.  Nontender over the knee or lateral joint line.  Nontender at the pes anserine bursa.  Overlying skin is intact.  She has good range of motion at the bilateral hips.  She is appropriate in conversation  and affect.  Conversation was through a .        IMAGING:  Repeat x-rays of the knees show no significant degenerative change compared to previous standing x-rays.  She has perhaps a mild amount of medial joint space narrowing and no significant patellofemoral joint DJD changes.      ASSESSMENT:  Patellofemoral chondromalacia of the bilateral knees.      PLAN:  Through the , I explained again the importance of optimizing physical therapy exercises and trying to limit these cortisone shots.  We talked again about the option of Synvisc injections.  She would need to check with her insurance to see if they would cover it.  If she has ongoing pain and would like an alternative of cortisone shots, these could be considered as long as her insurance pays for it.  A handout was given that she could call her insurance about and read about the injection.  After informed consent about bleeding, infection or steroid flare and after prepping with surgical scrub, she was injected in the left knee from a lateral approach in the seated position with 1 cc of Kenalog-40 and 5 cc of 1% lidocaine and injected in the opposite knee with the same combination of medicines.  She knows that if this is helpful for pain relief that she should try to optimize her physical therapy protocol as she has been taught.  The possibility this raise her blood sugars for 3 days was explained.  She will follow up as needed.             Sebas Bradley MD

## 2017-07-13 ENCOUNTER — MYC MEDICAL ADVICE (OUTPATIENT)
Dept: INTERNAL MEDICINE | Facility: CLINIC | Age: 37
End: 2017-07-13

## 2017-07-13 ENCOUNTER — OFFICE VISIT (OUTPATIENT)
Dept: INTERNAL MEDICINE | Facility: CLINIC | Age: 37
End: 2017-07-13

## 2017-07-13 VITALS
BODY MASS INDEX: 34.2 KG/M2 | SYSTOLIC BLOOD PRESSURE: 135 MMHG | DIASTOLIC BLOOD PRESSURE: 86 MMHG | WEIGHT: 181 LBS | HEART RATE: 79 BPM

## 2017-07-13 DIAGNOSIS — R25.2 CRAMP OF LIMB: ICD-10-CM

## 2017-07-13 DIAGNOSIS — Z79.4 TYPE 2 DIABETES MELLITUS WITH COMPLICATION, WITH LONG-TERM CURRENT USE OF INSULIN (H): ICD-10-CM

## 2017-07-13 DIAGNOSIS — E56.9 VITAMIN DEFICIENCY: ICD-10-CM

## 2017-07-13 DIAGNOSIS — R25.2 CRAMP OF LIMB: Primary | ICD-10-CM

## 2017-07-13 DIAGNOSIS — E11.8 TYPE 2 DIABETES MELLITUS WITH COMPLICATION, WITH LONG-TERM CURRENT USE OF INSULIN (H): ICD-10-CM

## 2017-07-13 PROBLEM — M25.561 BILATERAL KNEE PAIN: Status: RESOLVED | Noted: 2017-01-02 | Resolved: 2017-07-13

## 2017-07-13 PROBLEM — M25.562 BILATERAL KNEE PAIN: Status: RESOLVED | Noted: 2017-01-02 | Resolved: 2017-07-13

## 2017-07-13 LAB
DEPRECATED CALCIDIOL+CALCIFEROL SERPL-MC: 21 UG/L (ref 20–75)
HBA1C MFR BLD: 11 % (ref 4.3–6)
MAGNESIUM SERPL-MCNC: 2.2 MG/DL (ref 1.6–2.3)

## 2017-07-13 RX ORDER — ERGOCALCIFEROL 1.25 MG/1
50000 CAPSULE, LIQUID FILLED ORAL
Qty: 12 CAPSULE | Refills: 3 | Status: SHIPPED | OUTPATIENT
Start: 2017-07-13 | End: 2018-05-17

## 2017-07-13 RX ORDER — ERGOCALCIFEROL 1.25 MG/1
50000 CAPSULE, LIQUID FILLED ORAL
Qty: 12 CAPSULE | Refills: 3 | Status: SHIPPED | OUTPATIENT
Start: 2017-07-13 | End: 2017-08-21

## 2017-07-13 RX ORDER — INSULIN GLARGINE 100 [IU]/ML
42 INJECTION, SOLUTION SUBCUTANEOUS DAILY
Qty: 30 ML | Refills: 3 | Status: SHIPPED | OUTPATIENT
Start: 2017-07-13 | End: 2017-08-31

## 2017-07-13 ASSESSMENT — PAIN SCALES - GENERAL: PAINLEVEL: NO PAIN (0)

## 2017-07-13 NOTE — PATIENT INSTRUCTIONS
[facility] Medication Refill Request Information:  * Please contact your pharmacy regarding ANY request for medication refills.  ** [facility] Prescription Fax = [phone]  * Please allow 3 business days for routine medication refills.  * Please allow 5 business days for controlled substance medication refills.     [facility] Test Result notification information:  *You will be notified within 7-10 days of your appointment day regarding the results of your test.  If you are on MyChart you will be notified as soon as the provider has reviewed the results and signed off on them.  .    Increase Glargine (daily) insulin to 48 units.

## 2017-07-13 NOTE — MR AVS SNAPSHOT
After Visit Summary   7/13/2017    Nayeli Caal    MRN: [id number]           Patient Information     Date Of Birth          1980        Visit Information        Provider Department      7/13/2017 8:10 AM Greyson Schroeder; Mariya Mohamud APRN CNP [facility]        Today's Diagnoses     Cramp of limb    -  1    Vitamin deficiency        Type 2 diabetes mellitus with complication, with long-term current use of insulin (H)          Care Instructions    Primary Care Center Medication Refill Request Information:  * Please contact your pharmacy regarding ANY request for medication refills.  ** [facility] Prescription Fax = [phone]  * Please allow 3 business days for routine medication refills.  * Please allow 5 business days for controlled substance medication refills.     Primary Care Center Test Result notification information:  *You will be notified within 7-10 days of your appointment day regarding the results of your test.  If you are on MyChart you will be notified as soon as the provider has reviewed the results and signed off on them.  .    Increase Glargine (daily) insulin to 48 units.          Follow-ups after your visit        Follow-up notes from your care team     Return in about 3 months (around 10/13/2017).      Your next 10 appointments already scheduled     Aug 21, 2017  9:30 AM CDT   (Arrive by 9:15 AM)   RETURN DIABETES with Jimena Bragg MD   [facility] ([facility])    [address] [place] [id number] [phone]            Oct 12, 2017  8:25 AM CDT   (Arrive by 8:10 AM)   Return Visit with SABI Ceja CNP   [facility] ([facility])    [address] [place] [id number] [phone]              Who to contact     Please call your clinic at [phone] to:    Ask questions about your health    Make or  cancel appointments    Discuss your medicines    Learn about your test results    Speak to your doctor   If you have compliments or concerns about an experience at your clinic, or if you wish to file a complaint, please contact [facility] Patient Relations at [phone] or email us at [email or web address]         Additional Information About Your Visit        Simracewayhart Information     [employer] gives you secure access to your electronic health record. If you see a primary care provider, you can also send messages to your care team and make appointments. If you have questions, please call your primary care clinic.  If you do not have a primary care provider, please call [phone] and they will assist you.      ISORG is an electronic gateway that provides easy, online access to your medical records. With ISORG, you can request a clinic appointment, read your test results, renew a prescription or communicate with your care team.     To access your existing account, please contact your [facility] or call [phone] for assistance.        Care EveryWhere ID     This is your Care EveryWhere ID. This could be used by other organizations to access your [place] medical records  [id number]        Your Vitals Were     Pulse BMI (Body Mass Index)                79 34.2 kg/m2           Blood Pressure from Last 3 Encounters:   07/13/17 135/86   05/15/17 (!) 151/95   03/21/17 120/80    Weight from Last 3 Encounters:   07/13/17 82.1 kg (181 lb)   06/05/17 80.3 kg (177 lb)   05/15/17 80.4 kg (177 lb 3.2 oz)                 Today's Medication Changes          These changes are accurate as of: 7/13/17 11:59 PM.  If you have any questions, ask your nurse or doctor.               Start taking these medicines.        Dose/Directions    calcium-magnesium 500-250 MG Tabs per tablet   Commonly known as:  VERO-MAG   Used for:  Cramp of limb   Started by:  Overkamp,  SABI Carpenter CNP        Dose:  1 tablet   Take 1 tablet by mouth daily   Quantity:  90 tablet   Refills:  3       * vitamin D 79414 UNIT capsule   Commonly known as:  ERGOCALCIFEROL   Used for:  Vitamin deficiency   Started by:  Mariya Mohamud APRN CNP        Dose:  67105 Units   Take 1 capsule (50,000 Units) by mouth every 7 days   Quantity:  12 capsule   Refills:  3       * vitamin D 91622 UNIT capsule   Commonly known as:  ERGOCALCIFEROL   Used for:  Vitamin deficiency   Started by:  Mariya Mohamud APRN CNP        Dose:  05251 Units   Take 1 capsule (50,000 Units) by mouth every 7 days   Quantity:  12 capsule   Refills:  3       * Notice:  This list has 2 medication(s) that are the same as other medications prescribed for you. Read the directions carefully, and ask your doctor or other care provider to review them with you.      These medicines have changed or have updated prescriptions.        Dose/Directions    insulin glargine 100 UNIT/ML injection   This may have changed:  how much to take   Used for:  Type 2 diabetes mellitus with complication, with long-term current use of insulin (H)   Changed by:  Mariya Mohamud APRN CNP        Dose:  42 Units   Inject 42 Units Subcutaneous daily   Quantity:  30 mL   Refills:  3            Where to get your medicines      These medications were sent to [place] [id number] [facility] [id number]     Phone:  [phone]     calcium-magnesium 500-250 MG Tabs per tablet         These medications were sent to [address] [place] [address] [address] [place] [address]    Hours:  TRANSPLANT PHONE NUMBER [phone] Phone:  [phone]     insulin glargine 100 UNIT/ML injection    vitamin D 49858 UNIT capsule    vitamin D 77680 UNIT capsule                Primary Care Provider Office Phone # Fax #    SABI Ceja CNP  [phone]       PRIMARY CARE CENTER [address] [place] [phone]  [facility] [address]        Equal Access to Services     SULAIMAN MARTINEZ : Hadii aad ku hadsoniadarshana Gimenez, perlita dallas, melissalyndsey lemacatherinecarlee shresthateddy, marc weiner fadyjame west kyra bernal. So [facility] [phone].    ATENCIÓN: Si habla español, tiene a dykes disposición servicios gratuitos de asistencia lingüística. Llame al [phone].    We comply with applicable federal civil rights laws and [place] laws. We do not discriminate on the basis of race, color, national origin, age, disability sex, sexual orientation or gender identity.            Thank you!     Thank you for choosing [facility]  for your care. Our goal is always to provide you with excellent care. Hearing back from our patients is one way we can continue to improve our services. Please take a few minutes to complete the written survey that you may receive in the mail after your visit with us. Thank you!             Your Updated Medication List - Protect others around you: Learn how to safely use, store and throw away your medicines at www.disposemymeds.org.          This list is accurate as of: 7/13/17 11:59 PM.  Always use your most recent med list.                   Brand Name Dispense Instructions for use Diagnosis    Alcohol Prep Pad 70 % Pads     100 each    1 each 3 times daily    Type 2 diabetes mellitus with other diabetic ophthalmic complication (H)       aspirin 81 MG tablet     100 tablet    Take 1 tablet (81 mg) by mouth daily    Type 2 diabetes mellitus with moderate nonproliferative diabetic retinopathy with macular edema, unspecified eye (H)       atorvastatin 40 MG tablet    LIPITOR    90 tablet    Take 1 tablet (40 mg) by mouth daily    Type 1 diabetes mellitus with complications (H)       BD ULTRA FINE PEN NEEDLES     200 Units    Inject 1 dose * Subcutaneous 2 times daily BD ultra-fine insulin syringe, 30 ga. X 1/2'' short needle 1/2 cc.  Use as directed.    Type 2 diabetes mellitus with other circulatory complications (H)       blood glucose monitoring lancets     6 Box    Use to test blood sugar 6 times daily or as directed    Type 1 diabetes mellitus with nephropathy (H)       blood glucose monitoring test strip    ACCU-CHEK LAYA PLUS    600 each    Laya Plus test strips for use in Accucheck Expert meter.  Use to test blood sugar 6 times daily. 3 month supply.  Send PA's to Dr. Mohamud.    Type 1 diabetes mellitus with nephropathy (H)       calcium-magnesium 500-250 MG Tabs per tablet    VERO-MAG    90 tablet    Take 1 tablet by mouth daily    Cramp of limb       fenofibrate 160 MG tablet     90 tablet    Take 1 tablet (160 mg) by mouth daily    Mixed hyperlipidemia       * ibuprofen 400 MG tablet    ADVIL/MOTRIN    200 tablet    Take 1 tablet (400 mg) by mouth every 6 hours as needed for pain    Headache(784.0), Pain in joint, lower leg, right       * ibuprofen 600 MG tablet    ADVIL/MOTRIN    1 tablet    Take 1 tablet (600 mg) by mouth every 6 hours as needed for moderate pain    Pelvic pain in female       insulin aspart 100 UNIT/ML injection    NovoLOG PEN    21 mL    Admin Instructions: Before meals and bedtime correction sliding scale 120 - 160 - 1 U  161 - 200 - 2 U  201 - 240 - 3 U  241 - 280 - 4 U  281 - 320 - 5 U  321 - 360 - 6 U ...    Type 1 diabetes mellitus with complications (H)       insulin glargine 100 UNIT/ML injection     30 mL    Inject 42 Units Subcutaneous daily    Type 2 diabetes mellitus with complication, with long-term current use of insulin (H)       ketoconazole 2 % cream    NIZORAL    30 g    Apply topically 2 times daily Apply externally thin amt bid    Candidiasis of perineum       levonorgestrel 20 MCG/24HR IUD    MIRENA    1 each    1 each (20 mcg) by Intrauterine route once for 1 dose    Encounter for insertion of intrauterine contraceptive device       lisinopril 5 MG tablet    PRINIVIL/ZESTRIL    90 tablet     Take 1 tablet (5 mg) by mouth daily    Essential hypertension, benign       rizatriptan 10 MG tablet    MAXALT    30 tablet    Take 1 tab at onset of headache.  May repeat after 2 hours.    Headache(784.0)       * vitamin D 37639 UNIT capsule    ERGOCALCIFEROL    12 capsule    Take 1 capsule (50,000 Units) by mouth every 7 days    Vitamin deficiency       * vitamin D 18638 UNIT capsule    ERGOCALCIFEROL    12 capsule    Take 1 capsule (50,000 Units) by mouth every 7 days    Vitamin deficiency       * Notice:  This list has 4 medication(s) that are the same as other medications prescribed for you. Read the directions carefully, and ask your doctor or other care provider to review them with you.

## 2017-07-13 NOTE — NURSING NOTE
Chief Complaint   Patient presents with     Recheck Medication     Pt is here to follow up on her medications.      Desirae Gonzalez LPN July 13, 2017 8:09 AM

## 2017-07-13 NOTE — PROGRESS NOTES
"HPI: Nayeli Caal is a 37 year old female who comes in 7/13 with an  for  follow-up of her diabetes.     Blood sugars running\" pretty good (averaging 225)\". Steroid injections June 5 in her knees caused some elevations.       Patient Active Problem List   Diagnosis     Headache     Cervicalgia     Essential hypertension     Hypersomnia, organic     Other chronic nonalcoholic liver disease     Diabetic retinopathy of both eyes (H)     Xerosis cutis     Obesity     Usher Syndrome: congenital deafness, retinitis pigmentosa     Macular degeneration, age related, nonexudative     Benign neoplasm of iris     Dry eye syndrome     Pemphigus erythematosus     Tenosynovitis of ankle     Pain in joint, shoulder region, impingment     Chondromalacia of patella, right, steroid injection 6/12/2015     Bilateral knee pain     Uncontrolled type 2 diabetes mellitus with hyperglycemia, with long-term current use of insulin (H)       She has a medical hx of  does not have any pertinent problems on file.    Current Outpatient Prescriptions   Medication Sig Dispense Refill     vitamin D (ERGOCALCIFEROL) 61603 UNIT capsule Take 1 capsule (50,000 Units) by mouth every 7 days 12 capsule 3     insulin glargine (BASAGLAR KWIKPEN) 100 UNIT/ML injection Inject 38 Units Subcutaneous daily 30 mL 3     aspirin 81 MG tablet Take 1 tablet (81 mg) by mouth daily 100 tablet 3     blood glucose monitoring (ACCU-CHEK FASTCLIX) lancets Use to test blood sugar 6 times daily or as directed 6 Box 3     fenofibrate 160 MG tablet Take 1 tablet (160 mg) by mouth daily 90 tablet 3     atorvastatin (LIPITOR) 40 MG tablet Take 1 tablet (40 mg) by mouth daily 90 tablet 3     lisinopril (PRINIVIL/ZESTRIL) 5 MG tablet Take 1 tablet (5 mg) by mouth daily 90 tablet 3     insulin aspart (NOVOLOG PEN) 100 UNIT/ML injection Admin Instructions: Before meals and bedtime correction sliding scale  120 - 160 - 1 U   161 - 200 - 2 U   201 - 240 - 3 U "   241 - 280 - 4 U   281 - 320 - 5 U   321 - 360 - 6 U ... 21 mL 3     blood glucose monitoring (ACCU-CHEK LAYA PLUS) test strip Laya Plus test strips for use in Accucheck Expert meter.  Use to test blood sugar 6 times daily. 3 month supply.  Send PA's to Dr. Mohamud. 600 each 3     ketoconazole (NIZORAL) 2 % cream Apply topically 2 times daily Apply externally thin amt bid 30 g 1     ibuprofen (ADVIL,MOTRIN) 600 MG tablet Take 1 tablet (600 mg) by mouth every 6 hours as needed for moderate pain 1 tablet 0     BD ULTRA FINE PEN NEEDLES Inject 1 dose * Subcutaneous 2 times daily BD ultra-fine insulin syringe, 30 ga. X 1/2'' short needle 1/2 cc. Use as directed. 200 Units 11     ibuprofen (ADVIL,MOTRIN) 400 MG tablet Take 1 tablet (400 mg) by mouth every 6 hours as needed for pain 200 tablet 1     rizatriptan (MAXALT) 10 MG tablet Take 1 tab at onset of headache.  May repeat after 2 hours. 30 tablet 3     Alcohol Swabs (ALCOHOL PREP PAD) 70 % PADS 1 each 3 times daily 100 each 3     levonorgestrel (MIRENA) 20 MCG/24HR IUD 1 each (20 mcg) by Intrauterine route once for 1 dose 1 each 0     [DISCONTINUED] BD ULTRA FINE PEN NEEDLES Inject 1 dose Subcutaneous 2 times daily BD ultra-fine insulin syringe, 30 ga. X 1/2'' short needle 1/2 cc. Use as directed. (Patient taking differently: Inject 1 dose * Subcutaneous 2 times daily BD ultra-fine insulin syringe, 30 ga. X 1/2'' short needle 1/2 cc. Use as directed.) 200 Units 11         ALLERGIES: Review of patient's allergies indicates no known allergies.    PAST MEDICAL HX:   Past Medical History:   Diagnosis Date     Diabetes mellitus (H)      Hypertension      Migraines        PAST SURGICAL HX: No past surgical history on file.    FAMILY HX:  @[facility]@    IMMUNIZATION HX:   Immunization History   Administered Date(s) Administered     HepB-Peds 08/06/2001, 01/03/2003, 04/09/2013     Hepatitis A Vac Ped/Adol-2 Dose 05/18/2007, 10/07/2008     Influenza (IIV3) 10/09/2011,  11/12/2013, 10/23/2014, 11/19/2015     MMR 05/21/2007     Pneumococcal (PCV 13) 10/23/2014     Pneumococcal 23 valent 07/18/2006     TD (ADULT, 7+) 11/01/1999     Tdap (Adacel,Boostrix) 01/09/2009       SOCIAL HX:   Social History     Social History Narrative       ROS:   CONSTITUTIONAL: no fatigue, no unexpected change in weight  SKIN: no worrisome rashes, no worrisome moles, no worrisome lesions  EYES: nShe saw Dr. Dixon, her ophthalmologist a few weeks ago.   ENT: no ear problems, no mouth problems, no throat problems  RESP: no significant cough, no shortness of breath  CV: no chest pain, no palpitations, no new or worsening peripheral edema  GI: no nausea, no vomiting, no constipation, no diarrhea  : no frequency, no dysuria, no hematuria  MS: her knees feel much better after the cortisone injections.  She has been having leg cramps  NEURO: no weakness, no dizziness, no syncope, no headaches  ENDOCRINE: no temperature intolerance, no skin/hair changes  HEME: no easy bruising, no bleeding problems  PSYCHIATRIC: NEGATIVE for changes in mood or affect    OBJECTIVE:  /86  Pulse 79  Wt 82.1 kg (181 lb)  BMI 34.2 kg/m2   Wt Readings from Last 1 Encounters:   07/13/17 82.1 kg (181 lb)     Constitutional: no distress, comfortable, pleasant   Eyes: anicteric,conjunctiva pink  Ears, Nose and Throat: tympanic membranes clear, throat clear.   Neck: supple with full range of motion, no thyromegaly.   Cardiovascular: regular rate and rhythm, normal S1 and S2, no murmurs, rubs or gallops, peripheral pulses full and symmetric   Respiratory: clear to auscultation, no wheezes or crackles, normal breath sounds   Gastrointestinal: positive bowel sounds, nontender, no hepatosplenomegaly, no masses   Musculoskeletal: full range of motion, no edema   Skin: no concerning lesions, no jaundice, temp normal .  She has some abrasions on her knees from a fall she took while visiting relatives in [place].  Neurological: no  changes,  no tremor   Psychological: appropriate mood       ASSESSMENT/PLAN:  Nayeli was seen today for recheck medication.    Diagnoses and all orders for this visit:    Cramp of limb  -     Magnesium; Future  -     calcium-magnesium (VERO-MAG) 500-250 MG TABS per tablet; Take 1 tablet by mouth daily    Vitamin deficiency  -     vitamin D (ERGOCALCIFEROL) 26678 UNIT capsule; Take 1 capsule (50,000 Units) by mouth every 7 days  -     Vitamin D Deficiency; Future    Type 2 diabetes mellitus with complication, with long-term current use of insulin (H)  -     insulin glargine (BASAGLAR KWIKPEN) 100 UNIT/ML injection; Inject 42 Units Subcutaneous daily.  This is an increase from 38 units a day.   -     Hemoglobin A1c; Future    Total time spent 40 minutes.  More than 50% of the time spent with Ms. Ingrid Caal  And  on counseling / coordinating her care    Mariya CELESTIN, CNP

## 2017-08-21 ENCOUNTER — OFFICE VISIT (OUTPATIENT)
Dept: ENDOCRINOLOGY | Facility: CLINIC | Age: 37
End: 2017-08-21

## 2017-08-21 VITALS
WEIGHT: 186 LBS | HEART RATE: 87 BPM | BODY MASS INDEX: 35.12 KG/M2 | DIASTOLIC BLOOD PRESSURE: 87 MMHG | HEIGHT: 61 IN | SYSTOLIC BLOOD PRESSURE: 136 MMHG

## 2017-08-21 DIAGNOSIS — R63.5 WEIGHT GAIN: ICD-10-CM

## 2017-08-21 DIAGNOSIS — Z79.4 UNCONTROLLED TYPE 2 DIABETES MELLITUS WITH HYPERGLYCEMIA, WITH LONG-TERM CURRENT USE OF INSULIN (H): Primary | ICD-10-CM

## 2017-08-21 DIAGNOSIS — E66.09 NON MORBID OBESITY DUE TO EXCESS CALORIES: ICD-10-CM

## 2017-08-21 DIAGNOSIS — E55.9 VITAMIN D DEFICIENCY: ICD-10-CM

## 2017-08-21 DIAGNOSIS — E11.65 UNCONTROLLED TYPE 2 DIABETES MELLITUS WITH HYPERGLYCEMIA, WITH LONG-TERM CURRENT USE OF INSULIN (H): Primary | ICD-10-CM

## 2017-08-21 ASSESSMENT — PAIN SCALES - GENERAL: PAINLEVEL: NO PAIN (0)

## 2017-08-21 NOTE — Clinical Note
Please schedule the patient with a dietician. At the time of the visit, we discussed about this but I just noticed she forgot to schedule. She did see Madie in the past but I placed a new referral, since it has been a while.

## 2017-08-21 NOTE — NURSING NOTE
Chief Complaint   Patient presents with     RECHECK     F/U DIABETES     Jesika Perry, [facility]  Endocrinology & Diabetes 3G

## 2017-08-21 NOTE — MR AVS SNAPSHOT
After Visit Summary   8/21/2017    Nayeli Caal    MRN: [id number]           Patient Information     Date Of Birth          1980        Visit Information        Provider Department      8/21/2017 9:15 AM Bing Justice; Jimena Bragg MD [facility]        Today's Diagnoses     Uncontrolled type 2 diabetes mellitus with hyperglycemia, with long-term current use of insulin (H)    -  1      Care Instructions    MEDISAFE           Follow-ups after your visit        Follow-up notes from your care team     Return in about 3 months (around 11/21/2017).      Your next 10 appointments already scheduled     Oct 17, 2017  9:05 AM CDT   (Arrive by 8:50 AM)   Return Visit with SABI Ceja CNP   [facility] ([facility])    [address] [place] [address]  4th [facility] [address] [phone]            Nov 27, 2017  7:00 AM CST   LAB with  LAB   [facility] ([facility])    [address] [place] [address] [phone]           Patient must bring picture ID. Patient should be prepared to give a urine specimen  Please do not eat 10-12 hours before your appointment if you are coming in fasting for labs on lipids, cholesterol, or glucose (sugar). Pregnant women should follow their Care Team instructions. Water with medications is okay. Do not drink coffee or other fluids. If you have concerns about taking  your medications, please ask at office or if scheduling via [employer], send a message by clicking on Secure Messaging, Message Your Care Team.            Nov 27, 2017  8:00 AM CST   (Arrive by 7:45 AM)   RETURN DIABETES with Jimena Bragg MD   [facility] ([facility])    [address] [place] [address]  3rd [facility] [address] [phone]              Future tests that were ordered for you today     Open Future Orders   "      Priority Expected Expires Ordered    Comprehensive metabolic panel Routine 11/21/2017 8/21/2018 8/21/2017    TSH with free T4 reflex Routine 11/21/2017 8/21/2018 8/21/2017    Albumin Random Urine Quantitative Routine 11/21/2017 8/21/2018 8/21/2017    Hematocrit Routine 11/21/2017 8/21/2018 8/21/2017    Lipid panel reflex to direct LDL Routine 11/21/2017 8/21/2018 8/21/2017            Who to contact     Please call your clinic at [phone] to:    Ask questions about your health    Make or cancel appointments    Discuss your medicines    Learn about your test results    Speak to your doctor   If you have compliments or concerns about an experience at your clinic, or if you wish to file a complaint, please contact [facility] Patient Relations at [phone] or email us at [email or web address]         Additional Information About Your Visit        [employer] Information     [employer] gives you secure access to your electronic health record. If you see a primary care provider, you can also send messages to your care team and make appointments. If you have questions, please call your primary care clinic.  If you do not have a primary care provider, please call [phone] and they will assist you.      [employer] is an electronic gateway that provides easy, online access to your medical records. With [employer], you can request a clinic appointment, read your test results, renew a prescription or communicate with your care team.     To access your existing account, please contact your [facility] or call [phone] for assistance.        Care EveryWhere ID     This is your Care EveryWhere ID. This could be used by other organizations to access your [facility] medical records  [id number]        Your Vitals Were     Pulse Height BMI (Body Mass Index)             87 1.549 m (5' 1\") 35.14 kg/m2          Blood Pressure from Last 3 Encounters:   08/21/17 " 136/87   07/13/17 135/86   05/15/17 (!) 151/95    Weight from Last 3 Encounters:   08/21/17 84.4 kg (186 lb)   07/13/17 82.1 kg (181 lb)   06/05/17 80.3 kg (177 lb)                 Today's Medication Changes          These changes are accurate as of: 8/21/17 10:37 AM.  If you have any questions, ask your nurse or doctor.               These medicines have changed or have updated prescriptions.        Dose/Directions    vitamin D 72472 UNIT capsule   Commonly known as:  ERGOCALCIFEROL   This may have changed:  Another medication with the same name was removed. Continue taking this medication, and follow the directions you see here.   Used for:  Vitamin deficiency   Changed by:  Mariya Mohamud APRN CNP        Dose:  78098 Units   Take 1 capsule (50,000 Units) by mouth every 7 days   Quantity:  12 capsule   Refills:  3                Primary Care Provider Office Phone # Fax #    SABI Ceja [phone]       [address] [place] [address] [address] [place] [id number]        Equal Access to Services     [facility]: Hadii oneida ku hadasho Soomaali, waaxda luqadaha, qaybta kaalmada adeteddy, marc rae . So [facility] [phone].    ATENCIÓN: Si habla español, tiene a dykes disposición servicios gratuitos de asistencia lingüística. LlACMC [facility] [phone].    We comply with applicable federal civil rights laws and [place] laws. We do not discriminate on the basis of race, color, national origin, age, disability sex, sexual orientation or gender identity.            Thank you!     Thank you for choosing [facility]  for your care. Our goal is always to provide you with excellent care. Hearing back from our patients is one way we can continue to improve our services. Please take a few minutes to complete the written survey that you may receive in the mail after your visit with us. Thank you!             Your Updated Medication List - Protect others around you: Learn  how to safely use, store and throw away your medicines at www.disposemymeds.org.          This list is accurate as of: 8/21/17 10:37 AM.  Always use your most recent med list.                   Brand Name Dispense Instructions for use Diagnosis    Alcohol Prep Pad 70 % Pads     100 each    1 each 3 times daily    Type 2 diabetes mellitus with other diabetic ophthalmic complication (H)       aspirin 81 MG tablet     100 tablet    Take 1 tablet (81 mg) by mouth daily    Type 2 diabetes mellitus with moderate nonproliferative diabetic retinopathy with macular edema, unspecified eye (H)       atorvastatin 40 MG tablet    LIPITOR    90 tablet    Take 1 tablet (40 mg) by mouth daily    Type 1 diabetes mellitus with complications (H)       BD ULTRA FINE PEN NEEDLES     200 Units    Inject 1 dose * Subcutaneous 2 times daily BD ultra-fine insulin syringe, 30 ga. X 1/2'' short needle 1/2 cc. Use as directed.    Type 2 diabetes mellitus with other circulatory complications (H)       blood glucose monitoring lancets     6 Box    Use to test blood sugar 6 times daily or as directed    Type 1 diabetes mellitus with nephropathy (H)       blood glucose monitoring test strip    ACCU-CHEK LAYA PLUS    600 each    Laya Plus test strips for use in Accucheck Expert meter.  Use to test blood sugar 6 times daily. 3 month supply.  Send PA's to Dr. Mohamud.    Type 1 diabetes mellitus with nephropathy (H)       calcium-magnesium 500-250 MG Tabs per tablet    VERO-MAG    90 tablet    Take 1 tablet by mouth daily    Cramp of limb       fenofibrate 160 MG tablet     90 tablet    Take 1 tablet (160 mg) by mouth daily    Mixed hyperlipidemia       * ibuprofen 400 MG tablet    ADVIL/MOTRIN    200 tablet    Take 1 tablet (400 mg) by mouth every 6 hours as needed for pain    Headache(784.0), Pain in joint, lower leg, right       * ibuprofen 600 MG tablet    ADVIL/MOTRIN    1 tablet    Take 1 tablet (600 mg) by mouth every 6 hours as needed for  moderate pain    Pelvic pain in female       insulin aspart 100 UNIT/ML injection    NovoLOG PEN    21 mL    Admin Instructions: Before meals and bedtime correction sliding scale 120 - 160 - 1 U  161 - 200 - 2 U  201 - 240 - 3 U  241 - 280 - 4 U  281 - 320 - 5 U  321 - 360 - 6 U ...    Type 1 diabetes mellitus with complications (H)       insulin glargine 100 UNIT/ML injection     30 mL    Inject 42 Units Subcutaneous daily    Type 2 diabetes mellitus with complication, with long-term current use of insulin (H)       ketoconazole 2 % cream    NIZORAL    30 g    Apply topically 2 times daily Apply externally thin amt bid    Candidiasis of perineum       levonorgestrel 20 MCG/24HR IUD    MIRENA    1 each    1 each (20 mcg) by Intrauterine route once for 1 dose    Encounter for insertion of intrauterine contraceptive device       lisinopril 5 MG tablet    PRINIVIL/ZESTRIL    90 tablet    Take 1 tablet (5 mg) by mouth daily    Essential hypertension, benign       rizatriptan 10 MG tablet    MAXALT    30 tablet    Take 1 tab at onset of headache.  May repeat after 2 hours.    Headache(784.0)       vitamin D 80498 UNIT capsule    ERGOCALCIFEROL    12 capsule    Take 1 capsule (50,000 Units) by mouth every 7 days    Vitamin deficiency       * Notice:  This list has 2 medication(s) that are the same as other medications prescribed for you. Read the directions carefully, and ask your doctor or other care provider to review them with you.

## 2017-08-21 NOTE — LETTER
8/21/2017       RE: Nayeli Caal  [address]  [facility] [address]     Dear Colleague,    Thank you for referring your patient, Nayeli Caal, to the [place] [facility] at [facility]. Please see a copy of my visit note below.      The patient is seen for evaluation of diabetes. She is seen with the help of a .     Nayeli Caal is 37 year old female diagnosed with diabetes in the fall of the year 2000, while being investigated for headaches and increased urination.  The patient is adopted and she doesn't know her parents. She was treated with metformin until 2003, when she was started on insulin. In 4248-6976, she was treated with Bydureon for a period of time. Although she did report that the medication was effective in curbing her appetite, it didn't cause a significant improvement of her blood sugar numbers or weight loss. Over the years, she complied poorly with the recommended insulin regimen.   She had no prior episodes of diabetes ketoacidosis, despite completely discontinuing insulin for months at a time. Hemoglobin A1c has been variable between 8 and 12% over the years. In 2005, she had a C-peptide of 1.3 for a glucose level of 221. Over the last 2 years, her hemoglobin A1c has been around 12%. Most recent hemoglobin A1c was 11%, on 7/13/17.    She is now using an Accucheck Expert meter to help her taking Novolog for food intake and correction. I reviewed the meter settings:  Time Block: 12:00am to 12:00am   Insulin to Carb Ratio: 15  Correction Factor: 50  BG Target: 110-150     Insulin units for calcuation: 1  Offset Time: 2 hours  Acting Time: 3 hours  Snack Size: 15 grams  Meal rise: 50 mg/dL     I recommended via a phone call to change the insulin to carbohydrate ratio of 1 unit per 8 g.  The patient hasn't contact us to let us know that she cannot change the meter settings.  Subsequently, she had  continued to use an insulin to carbohydrate ratio of 1 unit per 10 g.    The glucometer readings reveal that she checks her blood sugar 1.7 times daily.  Average blood glucose is 216 with a standard deviation of 73 and a range variable between 79 and 433.  There are no hypoglycemic episodes documented.  The patient reports feeling symptomatic with dizziness, when her blood sugar is in the 70s or 80s. In general, this rarely happens.  Her BG has a tendency to be better controlled during daytime and it is significantly higher at bedtime, with some decrease noted during the night.  The morning blood sugar is quite variable, with an average around 180.  She boluses before breakfast with 4-8 units, almost on a daily basis ( she states that the days when she doesn't take insulin before breakfast, she doesn't have breakfast).  She misses the prelunch Novolog dose approximately 3 times a week and she only rarely administers insulin for dinner, maybe once or twice a week.  Occasionally, she checks the bedtime blood sugar and she does use of the correction appropriately.  The dose of Basaglar was increased by her primary care provider to 42 U.  She had a hard time remembering to take it at bedtime and she admits that there are days when she forgets to take it at night and she administers it in the morning, when she wakes up.  She continues to struggle remembering to take lisinopril and fenofibrate on a consistent basis.    She is concerned about the fact that she gained a significant amount of weight. Currently, she doesn't exercise and she doesn't follow a diet.  Besides meals, she also reports having a bedtime snack, for which she doesn't take insulin, consistent mainly in chips and crackers.    Diabetes complications:  Retinopathy: last eye exam - June 2017. No  Usher's syndrome.   Nephropathy: h/o proteinuria (most recent urine microalbumin positive in January 2017); normal GFR  Neuropathy: no numbness or tingling  sensation in her feet, no pain   She is symptomatic for hypoglycemia and she developed symptoms when her blood glucose is below 80 ( shakiness, dizziness).  The lowest blood glucose numbers that she remembers is 59.  She denies prior episodes of loss of consciousness due to hypoglycemia. She does have glucagon at home.    Nayeli has a history of dyslipidemia, currently medicated with 40 mg atorvastatin and 160 mg fenofibrate daily (added in 2015). The most recent triglycerides level was elevated at 1656 (she wasn't taking the meds at the time she had the labwork done). This is the highest triglycerides levels she has ever had. She has no prior history of pancreatitis.  She reports being compliant in taking 50,000 units vitamin D weekly.    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    Current Medications     Current Outpatient Prescriptions:      insulin glargine (BASAGLAR KWIKPEN) 100 UNIT/ML injection, Inject 42 Units Subcutaneous daily, Disp: 30 mL, Rfl: 3     vitamin D (ERGOCALCIFEROL) 18887 UNIT capsule, Take 1 capsule (50,000 Units) by mouth every 7 days, Disp: 12 capsule, Rfl: 3     calcium-magnesium (VERO-MAG) 500-250 MG TABS per tablet, Take 1 tablet by mouth daily, Disp: 90 tablet, Rfl: 3     aspirin 81 MG tablet, Take 1 tablet (81 mg) by mouth daily, Disp: 100 tablet, Rfl: 3     blood glucose monitoring (ACCU-CHEK FASTCLIX) lancets, Use to test blood sugar 6 times daily or as directed, Disp: 6 Box, Rfl: 3     fenofibrate 160 MG tablet, Take 1 tablet (160 mg) by mouth daily, Disp: 90 tablet, Rfl: 3     atorvastatin (LIPITOR) 40 MG tablet, Take 1 tablet (40 mg) by mouth daily, Disp: 90 tablet, Rfl: 3     lisinopril (PRINIVIL/ZESTRIL) 5 MG tablet, Take 1 tablet (5 mg) by mouth daily,  Disp: 90 tablet, Rfl: 3     insulin aspart (NOVOLOG PEN) 100 UNIT/ML injection, Admin Instructions: Before meals and bedtime correction sliding scale 120 - 160 - 1 U  161 - 200 - 2 U  201 - 240 - 3 U  241 - 280 - 4 U  281 - 320 - 5 U  321 - 360 - 6 U ..., Disp: 21 mL, Rfl: 3     blood glucose monitoring (ACCU-CHEK LAYA PLUS) test strip, Laya Plus test strips for use in Accucheck Expert meter.  Use to test blood sugar 6 times daily. 3 month supply.  Send PA's to Dr. Mohamud., Disp: 600 each, Rfl: 3     ketoconazole (NIZORAL) 2 % cream, Apply topically 2 times daily Apply externally thin amt bid, Disp: 30 g, Rfl: 1     ibuprofen (ADVIL,MOTRIN) 600 MG tablet, Take 1 tablet (600 mg) by mouth every 6 hours as needed for moderate pain, Disp: 1 tablet, Rfl: 0     BD ULTRA FINE PEN NEEDLES, Inject 1 dose * Subcutaneous 2 times daily BD ultra-fine insulin syringe, 30 ga. X 1/2'' short needle 1/2 cc. Use as directed., Disp: 200 Units, Rfl: 11     ibuprofen (ADVIL,MOTRIN) 400 MG tablet, Take 1 tablet (400 mg) by mouth every 6 hours as needed for pain, Disp: 200 tablet, Rfl: 1     rizatriptan (MAXALT) 10 MG tablet, Take 1 tab at onset of headache.  May repeat after 2 hours., Disp: 30 tablet, Rfl: 3     Alcohol Swabs (ALCOHOL PREP PAD) 70 % PADS, 1 each 3 times daily, Disp: 100 each, Rfl: 3     levonorgestrel (MIRENA) 20 MCG/24HR IUD, 1 each (20 mcg) by Intrauterine route once for 1 dose, Disp: 1 each, Rfl: 0     [DISCONTINUED] BD ULTRA FINE PEN NEEDLES, Inject 1 dose Subcutaneous 2 times daily BD ultra-fine insulin syringe, 30 ga. X 1/2'' short needle 1/2 cc. Use as directed. (Patient taking differently: Inject 1 dose * Subcutaneous 2 times daily BD ultra-fine insulin syringe, 30 ga. X 1/2'' short needle 1/2 cc. Use as directed.), Disp: 200 Units, Rfl: 11    Family History   Problem Relation Age of Onset     Unknown/Adopted Other      Social History  Single. No children. Lives with a roommate. She smoked for almost 10 years,  "on and off, up to 1 PPD; quit smoking 2000. She only drinks alcohol occasionally. Denies using illicit drugs. Occupation: engineering department.      Review of Systems   10 point review of systems negative unless specified below  Weight up 16 pounds in the last year  Occasional fatigue ; sleeps 5-8 hrs a night   Menstrual periods, irregular, and light, 3-5 weeks apart - IUD 1/2017  B/L knee pain - significantly improved since her last visit here  Long-standing episodes of nausea, presents now only occasionally.  Episodes of diarrhea, mainly triggered by coffee intake.    Vital Signs     Previous Weights:    Wt Readings from Last 10 Encounters:   08/21/17 84.4 kg (186 lb)   07/13/17 82.1 kg (181 lb)   06/05/17 80.3 kg (177 lb)   05/15/17 80.4 kg (177 lb 3.2 oz)   03/21/17 78 kg (172 lb)   02/20/17 78 kg (171 lb 14.4 oz)   01/24/17 76.2 kg (168 lb)   12/08/16 78 kg (172 lb)   08/11/16 75.2 kg (165 lb 12.8 oz)   07/28/16 76.8 kg (169 lb 6.4 oz)       Vitals:    08/21/17 0928   BP: 136/87   Pulse: 87   Weight: 84.4 kg (186 lb)   Height: 1.549 m (5' 1\")       Physical Exam  General Appearance:  she is well developed, well nourished and in no distress     HEENT:   oropharynx clear and moist, no JVD, no bruits      no thyromegaly, no palpable nodules   Cardiovascular:  regular rhythm, systolic murmur, distal pulse palpable, no edema  Respiratory:       chest clear, no rales, no rhonchi   Cardiovascular:  regular rhythm, no murmurs, distal pulse palpable, no edema  Respiratory:       chest clear, no rales, no rhonchi   Gastrointestinal: abdomen soft, non-tender, non-distended, normal bowel sounds,   no organomegaly   Musculoskeletal: normal tone and strength  Psychiatric: affect and judgment normal  Skin: warm, no lesions   Neurological: reflexes normal and symmetric, no resting tremor  Feet:                         Sensation preserved to monofilament testing, skin intact    Lab Results  Lab Results   Component Value Date "    A1C 11.0 (H) 07/13/2017    A1C 10.9 (H) 05/15/2017    A1C 12.8 (H) 01/02/2017    A1C 13.0 (H) 05/11/2016    A1C 12.7 (H) 02/11/2016    HEMOGLOBINA1 12.0 (A) 02/03/2014    HEMOGLOBINA1 10.3 (A) 10/28/2013    HEMOGLOBINA1 11.3 (A) 08/06/2013    HEMOGLOBINA1 13.1 (H) 08/12/2011    HEMOGLOBINA1 8.9 (H) 05/21/2010       Hemoglobin   Date Value Ref Range Status   07/25/2016 14.0 11.7 - 15.7 g/dL Final     Hematocrit   Date Value Ref Range Status   07/25/2016 42.4 35.0 - 47.0 % Final     Cholesterol   Date Value Ref Range Status   01/02/2017 302 (H) <200 mg/dL Final     Comment:     Desirable:       <200 mg/dl     Cholesterol/HDL Ratio   Date Value Ref Range Status   09/16/2013 5.8 (H) 0.0 - 5.0 Final     HDL Cholesterol   Date Value Ref Range Status   01/02/2017 29 (L) >49 mg/dL Final     LDL Cholesterol Calculated   Date Value Ref Range Status   01/02/2017  <100 mg/dL Final    Cannot estimate LDL when triglyceride exceeds 400 mg/dL     LDL Cholesterol Direct   Date Value Ref Range Status   01/02/2017 97 <100 mg/dL Final     Comment:     Desirable:       <100 mg/dl     VLDL-Cholesterol   Date Value Ref Range Status   09/16/2013 44 (H) 0 - 30 mg/dL Final     Triglycerides   Date Value Ref Range Status   01/02/2017 1656 (H) <150 mg/dL Final     Comment:     Borderline high:  150-199 mg/dl   High:             200-499 mg/dl   Very high:       >499 mg/dl       Albumin Urine mg/L   Date Value Ref Range Status   01/02/2017 522 mg/L Final     TSH   Date Value Ref Range Status   05/11/2016 1.85 0.40 - 4.00 mU/L Final         Last Basic Metabolic Panel:    Sodium   Date Value Ref Range Status   01/02/2017 134 133 - 144 mmol/L Final     Potassium   Date Value Ref Range Status   01/02/2017 3.5 3.4 - 5.3 mmol/L Final     Chloride   Date Value Ref Range Status   01/02/2017 103 94 - 109 mmol/L Final     Calcium   Date Value Ref Range Status   01/02/2017 8.5 8.5 - 10.1 mg/dL Final     Carbon Dioxide   Date Value Ref Range Status    01/02/2017 19 (L) 20 - 32 mmol/L Final     Urea Nitrogen   Date Value Ref Range Status   01/02/2017 13 7 - 30 mg/dL Final     Creatinine   Date Value Ref Range Status   01/02/2017 0.50 (L) 0.52 - 1.04 mg/dL Final     GFR Estimate   Date Value Ref Range Status   01/02/2017 >90  Non  GFR Calc   >60 mL/min/1.7m2 Final     Glucose   Date Value Ref Range Status   01/02/2017 282 (H) 70 - 99 mg/dL Final       AST   Date Value Ref Range Status   07/25/2016 15 0 - 45 U/L Final     ALT   Date Value Ref Range Status   07/25/2016 29 0 - 50 U/L Final     Albumin   Date Value Ref Range Status   07/25/2016 3.5 3.4 - 5.0 g/dL Final     Assessment     1. Diabetes, complicated by diabetic nephropathy and retinopathy, with poor glycemic control, due to noncompliance. I suspect she has type 2 diabetes or PRAVIN.   Recommendations:  Check blood sugar more consistently, 4 times daily, before meals and at bedtime  Use an insulin to carbohydrate ratio of 1 unit per 8 g for all meals and snacks - the AccuCheck meter was set up in the clinic. I also changed the correction to 1 U per 40 mg above 150.   Decrease the dose of Basaglar to 40 U and administer it at the same time of the day. She prefers to take it in the morning, when it's easier for her to remember.   Always checked blood glucose at bedtime and use the correction as indicated by the meter.  Have the meter downloaded in our clinic in 2 weeks   Avoid snacking in the evening    In the future, we may discuss trying a different GLP-1 analog, in view of the weight gain over the last year.  I also had a long discussion with the patient regarding the importance of trying to reduce the overall caloric intake in order to lose weight, and also, establish a regular exercise routine.  I recommended to try to restrict her caloric intake to less than 1400 cecilia a day.  I encouraged her to see a dietitian and discussed this in detail and she was very open to schedule an  appointment.    2. Dyslipidemia   Expect the TG level to improve with better glycemic control. Counseled the patient on the importance of taking both atorvastatin and fenofibrate as instructed, on a daily basis. I recommended to use the Syrmo yamileth on her phone, to remind her to check BG, take insulin and the oral medications.  F/up lipid panel, fasting.     3. Vitamin [place], [place] [address] U QW   F/up vitamin D level.     4. Proteinuria  The patient was counseled on the importance of taking the lisinopril on a daily basis, in order to prevent further deterioration in kidney function.    RTC 3 months with labs.     Orders Placed This Encounter   Procedures     Comprehensive metabolic panel     TSH with free T4 reflex     Albumin Random Urine Quantitative     Hematocrit     Lipid panel reflex to direct LDL

## 2017-08-21 NOTE — PROGRESS NOTES
The patient is seen for evaluation of diabetes. She is seen with the help of a .     Nayeli Caal is 37 year old female diagnosed with diabetes in the fall of the year 2000, while being investigated for headaches and increased urination.  The patient is adopted and she doesn't know her parents. She was treated with metformin until 2003, when she was started on insulin. In [id number], she was treated with Bydureon for a period of time. Although she did report that the medication was effective in curbing her appetite, it didn't cause a significant improvement of her blood sugar numbers or weight loss. Over the years, she complied poorly with the recommended insulin regimen.   She had no prior episodes of diabetes ketoacidosis, despite completely discontinuing insulin for months at a time. Hemoglobin A1c has been variable between 8 and 12% over the years. In 2005, she had a C-peptide of 1.3 for a glucose level of 221. Over the last 2 years, her hemoglobin A1c has been around 12%. Most recent hemoglobin A1c was 11%, on 7/13/17.    She is now using an Accucheck Expert meter to help her taking Novolog for food intake and correction. I reviewed the meter settings:  Time Block: 12:00am to 12:00am   Insulin to Carb Ratio: 15  Correction Factor: 50  BG Target: 110-150     Insulin units for calcuation: 1  Offset Time: 2 hours  Acting Time: 3 hours  Snack Size: 15 grams  Meal rise: 50 mg/dL     I recommended via a phone call to change the insulin to carbohydrate ratio of 1 unit per 8 g.  The patient hasn't contact us to let us know that she cannot change the meter settings.  Subsequently, she had continued to use an insulin to carbohydrate ratio of 1 unit per 10 g.    The glucometer readings reveal that she checks her blood sugar 1.7 times daily.  Average blood glucose is 216 with a standard deviation of 73 and a range variable between 79 and 433.  There are no hypoglycemic episodes  documented.  The patient reports feeling symptomatic with dizziness, when her blood sugar is in the 70s or 80s. In general, this rarely happens.  Her BG has a tendency to be better controlled during daytime and it is significantly higher at bedtime, with some decrease noted during the night.  The morning blood sugar is quite variable, with an average around 180.  She boluses before breakfast with 4-8 units, almost on a daily basis ( she states that the days when she doesn't take insulin before breakfast, she doesn't have breakfast).  She misses the prelunch Novolog dose approximately 3 times a week and she only rarely administers insulin for dinner, maybe once or twice a week.  Occasionally, she checks the bedtime blood sugar and she does use of the correction appropriately.  The dose of Basaglar was increased by her primary care provider to 42 U.  She had a hard time remembering to take it at bedtime and she admits that there are days when she forgets to take it at night and she administers it in the morning, when she wakes up.  She continues to struggle remembering to take lisinopril and fenofibrate on a consistent basis.    She is concerned about the fact that she gained a significant amount of weight. Currently, she doesn't exercise and she doesn't follow a diet.  Besides meals, she also reports having a bedtime snack, for which she doesn't take insulin, consistent mainly in chips and crackers.    Diabetes complications:  Retinopathy: last eye exam - June 2017. 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   Nephropathy: h/o proteinuria (most recent urine microalbumin positive in January 2017); normal GFR  Neuropathy: no numbness or tingling sensation in her feet, no pain   She is symptomatic for hypoglycemia and she developed symptoms when her blood glucose is below 80 ( shakiness, dizziness).  The lowest blood glucose numbers that she remembers is 59.  She denies prior episodes of loss of consciousness due to hypoglycemia. She  does have glucagon at home.    Nayeli has a history of dyslipidemia, currently medicated with 40 mg atorvastatin and 160 mg fenofibrate daily (added in 2015). The most recent triglycerides level was elevated at 1656 (she wasn't taking the meds at the time she had the labwork done). This is the highest triglycerides levels she has ever had. She has no prior history of pancreatitis.  She reports being compliant in taking 50,000 units vitamin D weekly.    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    Current Medications     Current Outpatient Prescriptions:      insulin glargine (BASAGLAR KWIKPEN) 100 UNIT/ML injection, Inject 42 Units Subcutaneous daily, Disp: 30 mL, Rfl: 3     vitamin D (ERGOCALCIFEROL) 10359 UNIT capsule, Take 1 capsule (50,000 Units) by mouth every 7 days, Disp: 12 capsule, Rfl: 3     calcium-magnesium (VERO-MAG) 500-250 MG TABS per tablet, Take 1 tablet by mouth daily, Disp: 90 tablet, Rfl: 3     aspirin 81 MG tablet, Take 1 tablet (81 mg) by mouth daily, Disp: 100 tablet, Rfl: 3     blood glucose monitoring (ACCU-CHEK FASTCLIX) lancets, Use to test blood sugar 6 times daily or as directed, Disp: 6 Box, Rfl: 3     fenofibrate 160 MG tablet, Take 1 tablet (160 mg) by mouth daily, Disp: 90 tablet, Rfl: 3     atorvastatin (LIPITOR) 40 MG tablet, Take 1 tablet (40 mg) by mouth daily, Disp: 90 tablet, Rfl: 3     lisinopril (PRINIVIL/ZESTRIL) 5 MG tablet, Take 1 tablet (5 mg) by mouth daily, Disp: 90 tablet, Rfl: 3     insulin aspart (NOVOLOG PEN) 100 UNIT/ML injection, Admin Instructions: Before meals and bedtime correction sliding scale 120 - 160 - 1 U  161 - 200 - 2 U  201 - 240 - 3 U  241 - 280 - 4 U  281 - 320 - 5 U  321 - 360 - 6 U ..., Disp: 21 mL, Rfl: 3     blood glucose  monitoring (ACCU-CHEK LAYA PLUS) test strip, Laya Plus test strips for use in Accucheck Expert meter.  Use to test blood sugar 6 times daily. 3 month supply.  Send PA's to Dr. Mohamud., Disp: 600 each, Rfl: 3     ketoconazole (NIZORAL) 2 % cream, Apply topically 2 times daily Apply externally thin amt bid, Disp: 30 g, Rfl: 1     ibuprofen (ADVIL,MOTRIN) 600 MG tablet, Take 1 tablet (600 mg) by mouth every 6 hours as needed for moderate pain, Disp: 1 tablet, Rfl: 0     BD ULTRA FINE PEN NEEDLES, Inject 1 dose * Subcutaneous 2 times daily BD ultra-fine insulin syringe, 30 ga. X 1/2'' short needle 1/2 cc. Use as directed., Disp: 200 Units, Rfl: 11     ibuprofen (ADVIL,MOTRIN) 400 MG tablet, Take 1 tablet (400 mg) by mouth every 6 hours as needed for pain, Disp: 200 tablet, Rfl: 1     rizatriptan (MAXALT) 10 MG tablet, Take 1 tab at onset of headache.  May repeat after 2 hours., Disp: 30 tablet, Rfl: 3     Alcohol Swabs (ALCOHOL PREP PAD) 70 % PADS, 1 each 3 times daily, Disp: 100 each, Rfl: 3     levonorgestrel (MIRENA) 20 MCG/24HR IUD, 1 each (20 mcg) by Intrauterine route once for 1 dose, Disp: 1 each, Rfl: 0     [DISCONTINUED] BD ULTRA FINE PEN NEEDLES, Inject 1 dose Subcutaneous 2 times daily BD ultra-fine insulin syringe, 30 ga. X 1/2'' short needle 1/2 cc. Use as directed. (Patient taking differently: Inject 1 dose * Subcutaneous 2 times daily BD ultra-fine insulin syringe, 30 ga. X 1/2'' short needle 1/2 cc. Use as directed.), Disp: 200 Units, Rfl: 11    Family History   Problem Relation Age of Onset     Unknown/Adopted Other      Social History  Single. No children. Lives with a roommate. She smoked for almost 10 years, on and off, up to 1 PPD; quit smoking 2000. She only drinks alcohol occasionally. Denies using illicit drugs. Occupation: engineering department.      Review of Systems   10 point review of systems negative unless specified below  Weight up 16 pounds in the last year  Occasional fatigue ;  "sleeps 5-8 hrs a night   Menstrual periods, irregular, and light, 3-5 weeks apart - IUD 1/2017  B/L knee pain - significantly improved since her last visit here  Long-standing episodes of nausea, presents now only occasionally.  Episodes of diarrhea, mainly triggered by coffee intake.    Vital Signs     Previous Weights:    Wt Readings from Last 10 Encounters:   08/21/17 84.4 kg (186 lb)   07/13/17 82.1 kg (181 lb)   06/05/17 80.3 kg (177 lb)   05/15/17 80.4 kg (177 lb 3.2 oz)   03/21/17 78 kg (172 lb)   02/20/17 78 kg (171 lb 14.4 oz)   01/24/17 76.2 kg (168 lb)   12/08/16 78 kg (172 lb)   08/11/16 75.2 kg (165 lb 12.8 oz)   07/28/16 76.8 kg (169 lb 6.4 oz)       Vitals:    08/21/17 0928   BP: 136/87   Pulse: 87   Weight: 84.4 kg (186 lb)   Height: 1.549 m (5' 1\")       Physical Exam  General Appearance:  she is well developed, well nourished and in no distress     HEENT:   oropharynx clear and moist, no JVD, no bruits      no thyromegaly, no palpable nodules   Cardiovascular:  regular rhythm, systolic murmur, distal pulse palpable, no edema  Respiratory:       chest clear, no rales, no rhonchi   Cardiovascular:  regular rhythm, no murmurs, distal pulse palpable, no edema  Respiratory:       chest clear, no rales, no rhonchi   Gastrointestinal: abdomen soft, non-tender, non-distended, normal bowel sounds,   no organomegaly   Musculoskeletal: normal tone and strength  Psychiatric: affect and judgment normal  Skin: warm, no lesions   Neurological: reflexes normal and symmetric, no resting tremor  Feet:                         Sensation preserved to monofilament testing, skin intact    Lab Results  Lab Results   Component Value Date    A1C 11.0 (H) 07/13/2017    A1C 10.9 (H) 05/15/2017    A1C 12.8 (H) 01/02/2017    A1C 13.0 (H) 05/11/2016    A1C 12.7 (H) 02/11/2016    HEMOGLOBINA1 12.0 (A) 02/03/2014    HEMOGLOBINA1 10.3 (A) 10/28/2013    HEMOGLOBINA1 11.3 (A) 08/06/2013    HEMOGLOBINA1 13.1 (H) 08/12/2011    " HEMOGLOBINA1 8.9 (H) 05/21/2010       Hemoglobin   Date Value Ref Range Status   07/25/2016 14.0 11.7 - 15.7 g/dL Final     Hematocrit   Date Value Ref Range Status   07/25/2016 42.4 35.0 - 47.0 % Final     Cholesterol   Date Value Ref Range Status   01/02/2017 302 (H) <200 mg/dL Final     Comment:     Desirable:       <200 mg/dl     Cholesterol/HDL Ratio   Date Value Ref Range Status   09/16/2013 5.8 (H) 0.0 - 5.0 Final     HDL Cholesterol   Date Value Ref Range Status   01/02/2017 29 (L) >49 mg/dL Final     LDL Cholesterol Calculated   Date Value Ref Range Status   01/02/2017  <100 mg/dL Final    Cannot estimate LDL when triglyceride exceeds 400 mg/dL     LDL Cholesterol Direct   Date Value Ref Range Status   01/02/2017 97 <100 mg/dL Final     Comment:     Desirable:       <100 mg/dl     VLDL-Cholesterol   Date Value Ref Range Status   09/16/2013 44 (H) 0 - 30 mg/dL Final     Triglycerides   Date Value Ref Range Status   01/02/2017 1656 (H) <150 mg/dL Final     Comment:     Borderline high:  150-199 mg/dl   High:             200-499 mg/dl   Very high:       >499 mg/dl       Albumin Urine mg/L   Date Value Ref Range Status   01/02/2017 522 mg/L Final     TSH   Date Value Ref Range Status   05/11/2016 1.85 0.40 - 4.00 mU/L Final         Last Basic Metabolic Panel:    Sodium   Date Value Ref Range Status   01/02/2017 134 133 - 144 mmol/L Final     Potassium   Date Value Ref Range Status   01/02/2017 3.5 3.4 - 5.3 mmol/L Final     Chloride   Date Value Ref Range Status   01/02/2017 103 94 - 109 mmol/L Final     Calcium   Date Value Ref Range Status   01/02/2017 8.5 8.5 - 10.1 mg/dL Final     Carbon Dioxide   Date Value Ref Range Status   01/02/2017 19 (L) 20 - 32 mmol/L Final     Urea Nitrogen   Date Value Ref Range Status   01/02/2017 13 7 - 30 mg/dL Final     Creatinine   Date Value Ref Range Status   01/02/2017 0.50 (L) 0.52 - 1.04 mg/dL Final     GFR Estimate   Date Value Ref Range Status   01/02/2017 >90  Non   GFR Calc   >60 mL/min/1.7m2 Final     Glucose   Date Value Ref Range Status   01/02/2017 282 (H) 70 - 99 mg/dL Final       AST   Date Value Ref Range Status   07/25/2016 15 0 - 45 U/L Final     ALT   Date Value Ref Range Status   07/25/2016 29 0 - 50 U/L Final     Albumin   Date Value Ref Range Status   07/25/2016 3.5 3.4 - 5.0 g/dL Final     Assessment     1. Diabetes, complicated by diabetic nephropathy and retinopathy, with poor glycemic control, due to noncompliance. I suspect she has type 2 diabetes or PRAVIN.   Recommendations:  Check blood sugar more consistently, 4 times daily, before meals and at bedtime  Use an insulin to carbohydrate ratio of 1 unit per 8 g for all meals and snacks - the AccuCheck meter was set up in the clinic. I also changed the correction to 1 U per 40 mg above 150.   Decrease the dose of Basaglar to 40 U and administer it at the same time of the day. She prefers to take it in the morning, when it's easier for her to remember.   Always checked blood glucose at bedtime and use the correction as indicated by the meter.  Have the meter downloaded in our clinic in 2 weeks   Avoid snacking in the evening    In the future, we may discuss trying a different GLP-1 analog, in view of the weight gain over the last year.  I also had a long discussion with the patient regarding the importance of trying to reduce the overall caloric intake in order to lose weight, and also, establish a regular exercise routine.  I recommended to try to restrict her caloric intake to less than 1400 cecilia a day.  I encouraged her to see a dietitian and discussed this in detail and she was very open to schedule an appointment.    2. Dyslipidemia   Expect the TG level to improve with better glycemic control. Counseled the patient on the importance of taking both atorvastatin and fenofibrate as instructed, on a daily basis. I recommended to use the [employer] yamileth on her phone, to remind her to check BG, take  insulin and the oral medications.  F/up lipid panel, fasting.     3. Vitamin D deficiency, on 65266 U QW   F/up vitamin D level.     4. Proteinuria  The patient was counseled on the importance of taking the lisinopril on a daily basis, in order to prevent further deterioration in kidney function.    RTC 3 months with labs.     Orders Placed This Encounter   Procedures     Comprehensive metabolic panel     TSH with free T4 reflex     Albumin Random Urine Quantitative     Hematocrit     Lipid panel reflex to direct LDL

## 2017-08-28 ENCOUNTER — MYC MEDICAL ADVICE (OUTPATIENT)
Dept: INTERNAL MEDICINE | Facility: CLINIC | Age: 37
End: 2017-08-28

## 2017-08-28 DIAGNOSIS — Z79.4 TYPE 2 DIABETES MELLITUS WITH COMPLICATION, WITH LONG-TERM CURRENT USE OF INSULIN (H): ICD-10-CM

## 2017-08-28 DIAGNOSIS — E11.8 TYPE 2 DIABETES MELLITUS WITH COMPLICATION, WITH LONG-TERM CURRENT USE OF INSULIN (H): ICD-10-CM

## 2017-08-28 DIAGNOSIS — E10.8 TYPE 1 DIABETES MELLITUS WITH COMPLICATIONS (H): ICD-10-CM

## 2017-08-31 RX ORDER — INSULIN GLARGINE 100 [IU]/ML
42 INJECTION, SOLUTION SUBCUTANEOUS DAILY
Qty: 30 ML | Refills: 3 | Status: SHIPPED | OUTPATIENT
Start: 2017-08-31 | End: 2018-01-18 | Stop reason: DRUGHIGH

## 2017-10-17 ENCOUNTER — OFFICE VISIT (OUTPATIENT)
Dept: INTERNAL MEDICINE | Facility: CLINIC | Age: 37
End: 2017-10-17

## 2017-10-17 VITALS
SYSTOLIC BLOOD PRESSURE: 110 MMHG | RESPIRATION RATE: 16 BRPM | DIASTOLIC BLOOD PRESSURE: 75 MMHG | BODY MASS INDEX: 34.77 KG/M2 | WEIGHT: 184 LBS | HEART RATE: 88 BPM

## 2017-10-17 DIAGNOSIS — I10 ESSENTIAL HYPERTENSION: Primary | ICD-10-CM

## 2017-10-17 DIAGNOSIS — R35.0 URINARY FREQUENCY: ICD-10-CM

## 2017-10-17 DIAGNOSIS — Z23 NEED FOR PROPHYLACTIC VACCINATION AND INOCULATION AGAINST INFLUENZA: ICD-10-CM

## 2017-10-17 DIAGNOSIS — E10.319 DIABETIC RETINOPATHY OF BOTH EYES ASSOCIATED WITH TYPE 1 DIABETES MELLITUS, MACULAR EDEMA PRESENCE UNSPECIFIED, UNSPECIFIED RETINOPATHY SEVERITY (H): ICD-10-CM

## 2017-10-17 RX ORDER — DILTIAZEM HYDROCHLORIDE 120 MG/1
120 CAPSULE, EXTENDED RELEASE ORAL DAILY
Qty: 90 CAPSULE | Refills: 1 | Status: SHIPPED | OUTPATIENT
Start: 2017-10-17 | End: 2017-11-27

## 2017-10-17 ASSESSMENT — PAIN SCALES - GENERAL: PAINLEVEL: NO PAIN (0)

## 2017-10-17 NOTE — PROGRESS NOTES
HPI: Nayeli Caal is a 37 year old female who comes in with  for her 3 month  Follow-up.  She has been very busy at work and stressed although she likes her work, and has not been checking her blood sugars regularly.  The last time she checked was either yesterday or the day before and her BS was 210.  She is taking her insulin and carb counting for the Novolog before meals.   She saw an Opthalmologist summer 2017.     She has noted some random coughing without being sick and wonders if it is from her medication. States the cough arises from the throat area and is non-productive.     She has a Endocrine appt 11/27/2017 and wants to do her labs then.    She has noted a light pink on her panty liner and she thinks it is from her vagina.  It is not her period.  This has occurred since she had an IUD placed. No urinary dysuria.    She is sleeping well.     Patient Active Problem List   Diagnosis     Headache     Cervicalgia     Essential hypertension     Hypersomnia, organic     Other chronic nonalcoholic liver disease     Diabetic retinopathy of both eyes (H)     Xerosis cutis     Obesity     Usher Syndrome: congenital deafness, retinitis pigmentosa     Macular degeneration, age related, nonexudative     Benign neoplasm of iris     Dry eye syndrome     Pemphigus erythematosus     Tenosynovitis of ankle     Pain in joint, shoulder region, impingment     Chondromalacia of patella, right, steroid injection 6/12/2015     Uncontrolled type 2 diabetes mellitus with hyperglycemia, with long-term current use of insulin (H)     Non morbid obesity due to excess calories         Current Outpatient Prescriptions   Medication Sig Dispense Refill     insulin glargine (BASAGLAR KWIKPEN) 100 UNIT/ML injection Inject 42 Units Subcutaneous daily 30 mL 3     insulin aspart (NOVOLOG PEN) 100 UNIT/ML injection Admin Instructions: Before meals and bedtime correction sliding scale  120 - 160 - 1 U   161 -  200 - 2 U   201 - 240 - 3 U   241 - 280 - 4 U   281 - 320 - 5 U   321 - 360 - 6 U ... 21 mL 3     vitamin D (ERGOCALCIFEROL) 35342 UNIT capsule Take 1 capsule (50,000 Units) by mouth every 7 days 12 capsule 3     aspirin 81 MG tablet Take 1 tablet (81 mg) by mouth daily 100 tablet 3     blood glucose monitoring (ACCU-CHEK FASTCLIX) lancets Use to test blood sugar 6 times daily or as directed 6 Box 3     fenofibrate 160 MG tablet Take 1 tablet (160 mg) by mouth daily 90 tablet 3     atorvastatin (LIPITOR) 40 MG tablet Take 1 tablet (40 mg) by mouth daily 90 tablet 3     lisinopril (PRINIVIL/ZESTRIL) 5 MG tablet Take 1 tablet (5 mg) by mouth daily 90 tablet 3     blood glucose monitoring (ACCU-CHEK LAYA PLUS) test strip Laya Plus test strips for use in Accucheck Expert meter.  Use to test blood sugar 6 times daily. 3 month supply.  Send PA's to Dr. Mohamud. 600 each 3     ibuprofen (ADVIL,MOTRIN) 600 MG tablet Take 1 tablet (600 mg) by mouth every 6 hours as needed for moderate pain 1 tablet 0     BD ULTRA FINE PEN NEEDLES Inject 1 dose * Subcutaneous 2 times daily BD ultra-fine insulin syringe, 30 ga. X 1/2'' short needle 1/2 cc. Use as directed. 200 Units 11     ibuprofen (ADVIL,MOTRIN) 400 MG tablet Take 1 tablet (400 mg) by mouth every 6 hours as needed for pain 200 tablet 1     rizatriptan (MAXALT) 10 MG tablet Take 1 tab at onset of headache.  May repeat after 2 hours. 30 tablet 3     Alcohol Swabs (ALCOHOL PREP PAD) 70 % PADS 1 each 3 times daily 100 each 3     calcium-magnesium (VERO-MAG) 500-250 MG TABS per tablet Take 1 tablet by mouth daily (Patient not taking: Reported on 10/17/2017) 90 tablet 3     ketoconazole (NIZORAL) 2 % cream Apply topically 2 times daily Apply externally thin amt bid (Patient not taking: Reported on 10/17/2017) 30 g 1     levonorgestrel (MIRENA) 20 MCG/24HR IUD 1 each (20 mcg) by Intrauterine route once for 1 dose 1 each 0     [DISCONTINUED] BD ULTRA FINE PEN NEEDLES Inject 1 dose  Subcutaneous 2 times daily BD ultra-fine insulin syringe, 30 ga. X 1/2'' short needle 1/2 cc. Use as directed. (Patient taking differently: Inject 1 dose * Subcutaneous 2 times daily BD ultra-fine insulin syringe, 30 ga. X 1/2'' short needle 1/2 cc. Use as directed.) 200 Units 11         ALLERGIES: Review of patient's allergies indicates no known allergies.    PAST MEDICAL HX:   Past Medical History:   Diagnosis Date     Diabetes mellitus (H)      Hypertension      Migraines        PAST SURGICAL HX: No past surgical history on file.    FAMILY HX:    Family History   Problem Relation Age of Onset     Unknown/Adopted Other        IMMUNIZATION HX:   Immunization History   Administered Date(s) Administered     HEPA 05/18/2007, 10/07/2008     HepB 08/06/2001, 01/03/2003, 04/09/2013     Influenza (IIV3) 10/09/2011, 11/12/2013, 10/23/2014, 11/19/2015     MMR 05/21/2007     Pneumococcal (PCV 13) 10/23/2014     Pneumococcal 23 valent 07/18/2006     TD (ADULT, 7+) 11/01/1999     Tdap (Adacel,Boostrix) 01/09/2009       SOCIAL HX:   Social History     Social History Narrative       ROS: 7 system ROS reviewed w/o changes except for above      OBJECTIVE:  /75 (BP Location: Left arm, Patient Position: Chair, Cuff Size: Adult Regular)  Pulse 88  Resp 16  Wt 83.5 kg (184 lb)  Breastfeeding? No  BMI 34.77 kg/m2   Wt Readings from Last 1 Encounters:   10/17/17 83.5 kg (184 lb)     Constitutional: no distress, comfortable, pleasant   Eyes: anicteric  Cardiovascular: regular rate and rhythm, normal S1 and S2, no murmurs, rubs or gallops, peripheral pulses full and symmetric   Respiratory: clear to auscultation, no wheezes or crackles, normal breath sounds   Musculoskeletal: ambulatory, no edema   Skin: no concerning lesions, no jaundice, temp normal   Neurological: normal gait,normal speech, no tremor   Psychological: appropriate mood     ASSESSMENT/PLAN:  Nayeli was seen today for diabetes.    Diagnoses and all orders for  this visit:    Essential hypertension  -     diltiazem (DILACOR XR) 120 MG 24 hr capsule; Take 1 capsule (120 mg) by mouth daily        - stopped  Lisinopril.    Diabetic retinopathy of both eyes associated with type 1 diabetes mellitus, macular edema presence unspecified, unspecified retinopathy severity (H)  -     Hemoglobin A1c; Future  -     Vitamin D Deficiency; Future    Urinary frequency  -     UA with Micro reflex to Culture; Future    Need for prophylactic vaccination and inoculation against influenza  -     FLU VACCINE, AGE >= 3 YR    Total time spent 25 minutes.  More than 50% of the time spent with Ms. Ingrid Caal on counseling / coordinating her care    Mariya CELESTIN, CNP

## 2017-10-17 NOTE — NURSING NOTE
"Injectable Influenza Immunization Documentation    1.  Has the patient received the information for the injectable influenza vaccine? YES     2. Is the patient 6 months of age or older? YES     3. Does the patient have any of the following contraindications?         Severe allergy to eggs? No     Severe allergic reaction to previous influenza vaccines? No   Severe allergy to latex? No       History of Guillain-[place] syndrome? No     Currently have a temperature greater than 100.4F? No        4.  Severely egg allergic patients should have flu vaccine eligibility assessed by an MD, RN, or pharmacist, and those who received flu vaccine should be observed for 15 min by an MD, RN, Pharmacist, Medical Technician, or member of clinic staff.\": YES    5. Latex-allergic patients should be given latex-free influenza vaccine Yes. Please reference the Vaccine latex table to determine if your clinic s product is latex-containing.       Administered Influenza Fluzone Quadrivalent (see Immunizations in Chart Review). Patient tolerated well.        Dilan Conn CMA at 10:03 AM on 10/17/2017         "

## 2017-10-17 NOTE — MR AVS SNAPSHOT
After Visit Summary   10/17/2017    Nayeli Caal    MRN: [id number]           Patient Information     Date Of Birth          1980        Visit Information        Provider Department      10/17/2017 8:50 AM Greyson Schroeder; Mariya Mohamud APRN CNP [facility]        Today's Diagnoses     Essential hypertension    -  1    Diabetic retinopathy of both eyes associated with type 1 diabetes mellitus, macular edema presence unspecified, unspecified retinopathy severity (H)        Urinary frequency           Follow-ups after your visit        Follow-up notes from your care team     Return in about 3 months (around 1/17/2018) for Lab Work, Routine Visit.      Your next 10 appointments already scheduled     Nov 27, 2017  7:00 AM CST   LAB with  LAB   [facility] ([facility])    [address] [place] [address] [phone]           Patient must bring picture ID. Patient should be prepared to give a urine specimen  Please do not eat 10-12 hours before your appointment if you are coming in fasting for labs on lipids, cholesterol, or glucose (sugar). Pregnant women should follow their Care Team instructions. Water with medications is okay. Do not drink coffee or other fluids. If you have concerns about taking  your medications, please ask at office or if scheduling via Yekrahart, send a message by clicking on Secure Messaging, Message Your Care Team.            Nov 27, 2017  8:00 AM CST   (Arrive by 7:45 AM)   RETURN DIABETES with Jimena Bragg MD   [facility] ([facility])    [address] [place] [address]  3rd [facility] [address] [phone]            Jan 18, 2018 11:00 AM CST   (Arrive by 10:45 AM)   Return Visit with SABI Ceja CNP   [facility] ([facility])    [address] [place] [address]  4th [facility]  [address]   [phone]              Future tests that were ordered for you today     Open Future Orders        Priority Expected Expires Ordered    UA with Micro reflex to Culture Routine 10/17/2017 10/17/2018 10/17/2017    Vitamin D Deficiency Routine 10/17/2017 10/17/2018 10/17/2017    Hemoglobin A1c Routine  10/17/2018 10/17/2017            Who to contact     Please call your clinic at [phone] to:    Ask questions about your health    Make or cancel appointments    Discuss your medicines    Learn about your test results    Speak to your doctor   If you have compliments or concerns about an experience at your clinic, or if you wish to file a complaint, please contact [facility] Patient Relations at [phone] or email us at [email or web address]         Additional Information About Your Visit        ClasesDhart Information     Omn[employer] gives you secure access to your electronic health record. If you see a primary care provider, you can also send messages to your care team and make appointments. If you have questions, please call your primary care clinic.  If you do not have a primary care provider, please call [phone] and they will assist you.      Mobilisafe is an electronic gateway that provides easy, online access to your medical records. With [employer], you can request a clinic appointment, read your test results, renew a prescription or communicate with your care team.     To access your existing account, please contact your [facility] or call [phone] for assistance.        Care EveryWhere ID     This is your Care EveryWhere ID. This could be used by other organizations to access your [facility] medical records  [id number]        Your Vitals Were     Pulse Respirations Breastfeeding? BMI (Body Mass Index)          88 16 No 34.77 kg/m2         Blood Pressure from Last 3 Encounters:   10/17/17 110/75   08/21/17 136/87   07/13/17  135/86    Weight from Last 3 Encounters:   10/17/17 83.5 kg (184 lb)   08/21/17 84.4 kg (186 lb)   07/13/17 82.1 kg (181 lb)                 Today's Medication Changes          These changes are accurate as of: 10/17/17  9:56 AM.  If you have any questions, ask your nurse or doctor.               Start taking these medicines.        Dose/Directions    diltiazem 120 MG 24 hr capsule   Commonly known as:  DILACOR XR   Used for:  Essential hypertension   Started by:  Mariya Mohamud APRN CNP        Dose:  120 mg   Take 1 capsule (120 mg) by mouth daily   Quantity:  90 capsule   Refills:  1         These medicines have changed or have updated prescriptions.        Dose/Directions    ibuprofen 400 MG tablet   Commonly known as:  ADVIL/MOTRIN   This may have changed:  Another medication with the same name was removed. Continue taking this medication, and follow the directions you see here.   Used for:  Headache(784.0), Pain in joint, lower leg, right   Changed by:  Mariya Mohamud APRN CNP        Dose:  400 mg   Take 1 tablet (400 mg) by mouth every 6 hours as needed for pain   Quantity:  200 tablet   Refills:  1         Stop taking these medicines if you haven't already. Please contact your care team if you have questions.     lisinopril 5 MG tablet   Commonly known as:  PRINIVIL/ZESTRIL   Stopped by:  Mariya Mohamud APRN CNP                Where to get your medicines      These medications were sent to [facility] [phone]  [address] [address]    Hours:  TRANSPLANT PHONE NUMBER [phone] Phone:  [phone]     diltiazem 120 MG 24 hr capsule                Primary Care Provider Office Phone # Fax #    SABI Ceja [phone]       [address] [place] [address] [address] [place] [address]        Equal Access to Services     SULAIMAN MARTINEZ AH: perlita Shah qaybta  marc lobato pedrodavid shresthajame butleraadavid ah. [facility] [phone].    ATENCIÓN: Si boy esparza, tiene a dykes disposición servicios gratuitos de asistencia lingüística. Hector al [phone].    We comply with applicable federal civil rights laws and [place] laws. We do not discriminate on the basis of race, color, national origin, age, disability, sex, sexual orientation, or gender identity.            Thank you!     Thank you for choosing [facility]  for your care. Our goal is always to provide you with excellent care. Hearing back from our patients is one way we can continue to improve our services. Please take a few minutes to complete the written survey that you may receive in the mail after your visit with us. Thank you!             Your Updated Medication List - Protect others around you: Learn how to safely use, store and throw away your medicines at www.disposemymeds.org.          This list is accurate as of: 10/17/17  9:56 AM.  Always use your most recent med list.                   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 X 1/2'' short needle 1/2 cc. Use as directed.    Type 2  diabetes mellitus with other circulatory complications       blood glucose monitoring lancets     6 Box    Use to test blood sugar 6 times daily or as directed    Type 1 diabetes mellitus with nephropathy (H)       blood glucose monitoring test strip    ACCU-CHEK LAYA PLUS    600 each    Laya Plus test strips for use in Accucheck Expert meter.  Use to test blood sugar 6 times daily. 3 month supply.  Send PA's to Dr. Mohamud.    Type 1 diabetes mellitus with nephropathy (H)       calcium-magnesium 500-250 MG Tabs per tablet    VERO-MAG    90 tablet    Take 1 tablet by mouth daily    Cramp of limb       diltiazem 120 MG 24 hr capsule    DILACOR XR    90 capsule    Take 1 capsule (120 mg) by mouth daily    Essential hypertension       fenofibrate 160 MG tablet     90 tablet    Take 1 tablet (160 mg) by mouth daily    Mixed hyperlipidemia       ibuprofen 400 MG tablet    ADVIL/MOTRIN    200 tablet    Take 1 tablet (400 mg) by mouth every 6 hours as needed for pain    Headache(784.0), Pain in joint, lower leg, right       insulin aspart 100 UNIT/ML injection    NovoLOG PEN    21 mL    Admin Instructions: Before meals and bedtime correction sliding scale 120 - 160 - 1 U  161 - 200 - 2 U  201 - 240 - 3 U  241 - 280 - 4 U  281 - 320 - 5 U  321 - 360 - 6 U ...    Type 1 diabetes mellitus with complications (H)       ketoconazole 2 % cream    NIZORAL    30 g    Apply topically 2 times daily Apply externally thin amt bid    Candidiasis of perineum       levonorgestrel 20 MCG/24HR IUD    MIRENA    1 each    1 each (20 mcg) by Intrauterine route once for 1 dose    Encounter for insertion of intrauterine contraceptive device       rizatriptan 10 MG tablet    MAXALT    30 tablet    Take 1 tab at onset of headache.  May repeat after 2 hours.    Headache(784.0)       vitamin D 52167 UNIT capsule    ERGOCALCIFEROL    12 capsule    Take 1 capsule (50,000 Units) by mouth every 7 days    Vitamin deficiency

## 2017-10-17 NOTE — NURSING NOTE
Chief Complaint   Patient presents with     Diabetes     Here for diabetes follow up     Dilan Conn CMA (AAMA) at 9:32 AM on 10/17/2017

## 2017-10-26 ENCOUNTER — HOSPITAL ENCOUNTER (EMERGENCY)
Facility: CLINIC | Age: 37
Discharge: HOME | End: 2017-10-26
Attending: EMERGENCY MEDICINE | Admitting: EMERGENCY MEDICINE
Payer: COMMERCIAL

## 2017-10-26 ENCOUNTER — APPOINTMENT (OUTPATIENT)
Dept: GENERAL RADIOLOGY | Facility: CLINIC | Age: 37
End: 2017-10-26
Attending: EMERGENCY MEDICINE
Payer: COMMERCIAL

## 2017-10-26 VITALS
OXYGEN SATURATION: 100 % | RESPIRATION RATE: 16 BRPM | TEMPERATURE: 98.2 F | HEART RATE: 90 BPM | DIASTOLIC BLOOD PRESSURE: 88 MMHG | SYSTOLIC BLOOD PRESSURE: 136 MMHG
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
## 2017-10-26 DIAGNOSIS — M25.561 ACUTE PAIN OF RIGHT KNEE: ICD-10-CM

## 2017-10-26 DIAGNOSIS — S46.911A RIGHT SHOULDER STRAIN, INITIAL ENCOUNTER: ICD-10-CM

## 2017-10-26 PROCEDURE — 25000132 ZZH RX MED GY IP 250 OP 250 PS 637: Performed by: EMERGENCY MEDICINE

## 2017-10-26 PROCEDURE — 73560 X-RAY EXAM OF KNEE 1 OR 2: CPT | Mod: RT

## 2017-10-26 PROCEDURE — 99283 EMERGENCY DEPT VISIT LOW MDM: CPT | Mod: Z6 | Performed by: EMERGENCY MEDICINE

## 2017-10-26 PROCEDURE — 99283 EMERGENCY DEPT VISIT LOW MDM: CPT | Performed by: EMERGENCY MEDICINE

## 2017-10-26 RX ORDER — IBUPROFEN 600 MG/1
600 TABLET, FILM COATED ORAL ONCE
Status: COMPLETED | OUTPATIENT
Start: 2017-10-26 | End: 2017-10-26

## 2017-10-26 RX ORDER — ACETAMINOPHEN 325 MG/1
975 TABLET ORAL ONCE
Status: DISCONTINUED | OUTPATIENT
Start: 2017-10-26 | End: 2017-10-26 | Stop reason: HOSPADM

## 2017-10-26 RX ADMIN — IBUPROFEN 600 MG: 600 TABLET ORAL at 16:55

## 2017-10-26 NOTE — LETTER
To Whom it may concern:      Nayeli Caal was seen in our Emergency Department today, 10/26/17.  I expect her condition to improve over the next 2 days.  She may return to work/school when improved on Monday, 10/30.    Sincerely,        Claude Montague MD

## 2017-10-26 NOTE — DISCHARGE INSTRUCTIONS
Knee Pain  Knee pain is very common. It s especially common in active people who put a lot of pressure on their knees, like runners. It affects women more often than men.  Your kneecap (patella) is a thick, round bone. It covers and protects the front portion of your knee joint. It moves along a groove in your thighbone (femur) as part of the patellofemoral joint. A layer of cartilage surrounds the underside of your kneecap. This layer protects it from grinding against your femur.  When this cartilage softens and breaks down, it can cause knee pain. This is partly because of repetitive stress. The stress irritates the lining of the joint. This causes pain in the underlying bone.  What causes knee pain?  Many things can cause knee pain. You may have more than one cause. Some of these include:    Overuse of the knee joint    The kneecap doesn t line up with the tissue around it    Damage to small nerves in the area    Damage to the ligament-like structure that holds the kneecap in place (retinaculum)    Breakdown of the bone under the cartilage    Swelling in the soft tissues around the kneecap    Injury  You might be more likely to have knee pain if you:    Exercise a lot    Recently increased the intensity of your workouts    Have a body mass index (BMI) greater than 25    Have poor alignment of your kneecap    Walk with your feet turned overly outward or inward    Have weakness in surrounding muscle groups (inner quad or hip adductor muscles)    Have too much tightness in surrounding muscle groups (hamstrings or iliotibial band)    Have a recent history of injury to the area    Are female  Symptoms of knee pain  This type of knee pain is a dull, aching pain in the front of the knee in the area under and around the kneecap. This pain may start quickly or slowly. Your pain might be worse when you squat, run, or sit for a long time. You might also sometimes feel like your knee is giving out. You may have symptoms in  one or both of your knees.  Diagnosing knee pain  Your healthcare provider will ask about your medical history and your symptoms. Be sure to describe any activities that make your knee pain worse. He or she will look at your knee. This will include tests of your range of motion, strength, and areas of pain of your knee. Your knee alignment will be checked.  Your healthcare provider will need to rule out other causes of your knee pain, such as arthritis. You may need an imaging test, such as an X-ray or MRI.  Treatment for knee pain  Treatments that can help ease your symptoms may include:    Avoiding activities for a while that make your pain worse, returning to activity over time    Icing the outside of your knee when it causes you pain    Taking over-the-counter pain medicine    Wearing a knee brace or taping your knee to support it    Wearing special shoe inserts to help keep your feet in the proper alignment    Doing special exercises to stretch and strengthen the muscles around your hip and your knee  These steps help most people manage knee pain. But some cases of knee pain need to be treated with surgery. You may need surgery right away. Or you may need it later if other treatments don t work. Your healthcare provider may refer you to an orthopedic surgeon. He or she will talk with you about your choices.  Preventing knee pain  Losing weight and correcting excess muscle tightness or muscle weakness may help lower your risk.  In some cases, you can prevent knee pain. To help prevent a flare-up of knee pain, you do these things:    Regularly do all the exercises your doctor or physical therapist advises    Support your knee as advised by your doctor or physical therapist    Increase training gradually, and ease up on training when needed    Have an expert check your gait for running or other sporting activities    Stretch properly before and after exercise    Replace your running shoes regularly    Lose excess  weight     When to call your healthcare provider  Call your healthcare provider right away if:    Your symptoms don t get better after a few weeks of treatment    You have any new symptoms   Date Last Reviewed: 4/1/2017 2000-2017 [employer]. [address] [place] [address], [place] [address]. All rights reserved. This information is not intended as a substitute for professional medical care. Always follow your healthcare professional's instructions.          Muscle Strain in the Extremities  A muscle strain is a stretching and tearing of muscle fibers. This causes pain, especially when you move that muscle. There may also be some swelling and bruising.  Home care    Keep the hurt area raised to reduce pain and swelling. This is especially important during the first 48 hours.    Apply an ice pack over the injured area for 15 to 20 minutes every 3 to 6 hours. You should do this for the first 24 to 48 hours. You can make an ice pack by filling a plastic bag that seals at the top with ice cubes and then wrapping it with a thin towel. Be careful not to injure your skin with the ice treatments. Ice should never be applied directly to skin. Continue the use of ice packs for relief of pain and swelling as needed. After 48 hours, apply heat (warm shower or warm bath) for 15 to 20 minutes several times a day, or alternate ice and heat.    You may use over-the-counter pain medicine to control pain, unless another medicine was prescribed. If you have chronic liver or kidney disease or ever had a stomach ulcer or GI bleeding, talk with your healthcare provider before using these medicines.    For leg strains: If crutches have been recommended, don t put full weight on the hurt leg until you can do so without pain. You can return to sports when you are able to hop and run on the injured leg without pain.  Follow-up care  Follow up with your healthcare provider, or as advised.  When to seek medical advice  Call your  healthcare provider right away if any of these occur:    The toes of the injured leg become swollen, cold, blue, numb, or tingly    Pain or swelling increases  Date Last Reviewed: 11/19/2015 2000-2017 [employer]. [address] [place] [address], [place] [address]. All rights reserved. This information is not intended as a substitute for professional medical care. Always follow your healthcare professional's instructions.          Self-Care for Strains and Sprains  Most minor strains and sprains can be treated with self-care. Recovering from a strain or sprain may take 6 to 8 weeks. Your self-care goal is to reduce pain and immobilize the injury to speed healing.     A sprain injures ligaments (tissue that connects bones to bones).        A strain injures muscles or tendons (tissue that connects muscles to bones).   Support the injured area  Wrapping the injured area provides support for short, necessary activities. Be careful not to wrap the area too tightly. This could cut off the blood supply.    Support a wrist, elbow, or shoulder with a sling.    Wrap an ankle or knee with an elastic bandage.    Tape a finger or toe to the one next to it.  Use cold and heat  Cold reduces swelling. Both cold and heat reduce pain. Heat should not be used in the initial treatment of the injury. When using cold or heat, always place a towel between the pack and your skin.    Apply ice or a cold pack 10 to 15 minutes every hour you re awake for the first 2 days.    After the swelling goes down, use cold or heat to control pain. Don t use heat late in the day, since it can cause swelling when you re not active.  Rest and elevate  Rest and elevation help your injury heal faster.    Raise the injured area above your heart level.    Keep the injured area from moving.    Limit the use of the joint or limb.  Use medicine    Aspirin reduces pain and swelling. (Note: Don t give aspirin to a child 18 or younger unless prescribed by  the doctor.)    Aspirin substitutes, such as ibuprofen, can reduce pain. Some substitutes reduce swelling, too. Ask your pharmacist which substitutes you can use.  Call your doctor if:    The injured joint won t move, or bones make a grating sound when they move.    You can t put weight on the injured area, even after 24 hours.    The injured body part is cold, blue, or numb.    The joint or limb appears bent or crooked.    Pain increases or doesn t improve in 4 days.    When pressing along the injured area, you notice a spot that is especially painful.   Date Last Reviewed: 9/29/2015 2000-2017 [employer] [address] [place] [address], [place] [address]. All rights reserved. This information is not intended as a substitute for professional medical care. Always follow your healthcare professional's instructions.
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
## 2017-10-26 NOTE — ED AVS SNAPSHOT
[facility], Emergency Department    [address] [facility] [address]    Phone:  [phone]                                       Nayeli Caal   MRN: [id number]    Department:  [facility], Emergency Department   Date of Visit:  10/26/2017           Patient Information     Date Of Birth          1980        Your diagnoses for this visit were:     Acute pain of right knee     Right shoulder strain, initial encounter        You were seen by Vamshi Arce MD.        Discharge Instructions         Knee Pain  Knee pain is very common. It s especially common in active people who put a lot of pressure on their knees, like runners. It affects women more often than men.  Your kneecap (patella) is a thick, round bone. It covers and protects the front portion of your knee joint. It moves along a groove in your thighbone (femur) as part of the patellofemoral joint. A layer of cartilage surrounds the underside of your kneecap. This layer protects it from grinding against your femur.  When this cartilage softens and breaks down, it can cause knee pain. This is partly because of repetitive stress. The stress irritates the lining of the joint. This causes pain in the underlying bone.  What causes knee pain?  Many things can cause knee pain. You may have more than one cause. Some of these include:    Overuse of the knee joint    The kneecap doesn t line up with the tissue around it    Damage to small nerves in the area    Damage to the ligament-like structure that holds the kneecap in place (retinaculum)    Breakdown of the bone under the cartilage    Swelling in the soft tissues around the kneecap    Injury  You might be more likely to have knee pain if you:    Exercise a lot    Recently increased the intensity of your workouts    Have a body mass index (BMI) greater than 25    Have poor alignment of your kneecap    Walk with your feet turned overly outward or inward    Have weakness in surrounding  muscle groups (inner quad or hip adductor muscles)    Have too much tightness in surrounding muscle groups (hamstrings or iliotibial band)    Have a recent history of injury to the area    Are female  Symptoms of knee pain  This type of knee pain is a dull, aching pain in the front of the knee in the area under and around the kneecap. This pain may start quickly or slowly. Your pain might be worse when you squat, run, or sit for a long time. You might also sometimes feel like your knee is giving out. You may have symptoms in one or both of your knees.  Diagnosing knee pain  Your healthcare provider will ask about your medical history and your symptoms. Be sure to describe any activities that make your knee pain worse. He or she will look at your knee. This will include tests of your range of motion, strength, and areas of pain of your knee. Your knee alignment will be checked.  Your healthcare provider will need to rule out other causes of your knee pain, such as arthritis. You may need an imaging test, such as an X-ray or MRI.  Treatment for knee pain  Treatments that can help ease your symptoms may include:    Avoiding activities for a while that make your pain worse, returning to activity over time    Icing the outside of your knee when it causes you pain    Taking over-the-counter pain medicine    Wearing a knee brace or taping your knee to support it    Wearing special shoe inserts to help keep your feet in the proper alignment    Doing special exercises to stretch and strengthen the muscles around your hip and your knee  These steps help most people manage knee pain. But some cases of knee pain need to be treated with surgery. You may need surgery right away. Or you may need it later if other treatments don t work. Your healthcare provider may refer you to an orthopedic surgeon. He or she will talk with you about your choices.  Preventing knee pain  Losing weight and correcting excess muscle tightness or  muscle weakness may help lower your risk.  In some cases, you can prevent knee pain. To help prevent a flare-up of knee pain, you do these things:    Regularly do all the exercises your doctor or physical therapist advises    Support your knee as advised by your doctor or physical therapist    Increase training gradually, and ease up on training when needed    Have an expert check your gait for running or other sporting activities    Stretch properly before and after exercise    Replace your running shoes regularly    Lose excess weight     When to call your healthcare provider  Call your healthcare provider right away if:    Your symptoms don t get better after a few weeks of treatment    You have any new symptoms   Date Last Reviewed: 4/1/2017 2000-2017 [employer]. [address] [place] [address], [place] [id number]. All rights reserved. This information is not intended as a substitute for professional medical care. Always follow your healthcare professional's instructions.          Muscle Strain in the Extremities  A muscle strain is a stretching and tearing of muscle fibers. This causes pain, especially when you move that muscle. There may also be some swelling and bruising.  Home care    Keep the hurt area raised to reduce pain and swelling. This is especially important during the first 48 hours.    Apply an ice pack over the injured area for 15 to 20 minutes every 3 to 6 hours. You should do this for the first 24 to 48 hours. You can make an ice pack by filling a plastic bag that seals at the top with ice cubes and then wrapping it with a thin towel. Be careful not to injure your skin with the ice treatments. Ice should never be applied directly to skin. Continue the use of ice packs for relief of pain and swelling as needed. After 48 hours, apply heat (warm shower or warm bath) for 15 to 20 minutes several times a day, or alternate ice and heat.    You may use over-the-counter pain medicine to control  pain, unless another medicine was prescribed. If you have chronic liver or kidney disease or ever had a stomach ulcer or GI bleeding, talk with your healthcare provider before using these medicines.    For leg strains: If crutches have been recommended, don t put full weight on the hurt leg until you can do so without pain. You can return to sports when you are able to hop and run on the injured leg without pain.  Follow-up care  Follow up with your healthcare provider, or as advised.  When to seek medical advice  Call your healthcare provider right away if any of these occur:    The toes of the injured leg become swollen, cold, blue, numb, or tingly    Pain or swelling increases  Date Last Reviewed: 11/19/2015 2000-2017 [employer]. [address] [place] [address]. All rights reserved. This information is not intended as a substitute for professional medical care. Always follow your healthcare professional's instructions.          Self-Care for Strains and Sprains  Most minor strains and sprains can be treated with self-care. Recovering from a strain or sprain may take 6 to 8 weeks. Your self-care goal is to reduce pain and immobilize the injury to speed healing.     A sprain injures ligaments (tissue that connects bones to bones).        A strain injures muscles or tendons (tissue that connects muscles to bones).   Support the injured area  Wrapping the injured area provides support for short, necessary activities. Be careful not to wrap the area too tightly. This could cut off the blood supply.    Support a wrist, elbow, or shoulder with a sling.    Wrap an ankle or knee with an elastic bandage.    Tape a finger or toe to the one next to it.  Use cold and heat  Cold reduces swelling. Both cold and heat reduce pain. Heat should not be used in the initial treatment of the injury. When using cold or heat, always place a towel between the pack and your skin.    Apply ice or a cold pack 10  to 15 minutes every hour you re awake for the first 2 days.    After the swelling goes down, use cold or heat to control pain. Don t use heat late in the day, since it can cause swelling when you re not active.  Rest and elevate  Rest and elevation help your injury heal faster.    Raise the injured area above your heart level.    Keep the injured area from moving.    Limit the use of the joint or limb.  Use medicine    Aspirin reduces pain and swelling. (Note: Don t give aspirin to a child 18 or younger unless prescribed by the doctor.)    Aspirin substitutes, such as ibuprofen, can reduce pain. Some substitutes reduce swelling, too. Ask your pharmacist which substitutes you can use.  Call your doctor if:    The injured joint won t move, or bones make a grating sound when they move.    You can t put weight on the injured area, even after 24 hours.    The injured body part is cold, blue, or numb.    The joint or limb appears bent or crooked.    Pain increases or doesn t improve in 4 days.    When pressing along the injured area, you notice a spot that is especially painful.   Date Last Reviewed: 9/29/2015 2000-2017 [employer]. [address] [place] [address]. All rights reserved. This information is not intended as a substitute for professional medical care. Always follow your healthcare professional's instructions.          Future Appointments        Provider Department Dept Phone Center    11/27/2017 7:00 AM Lab [facility] Lab [phone] [facility]    11/27/2017 8:00 AM Jimena Bragg MD [facility] Endocrinology [phone] [facility]    1/18/2018 11:00 AM SABI Morrison [facility] [phone] [facility]      24 Hour Appointment Hotline       To make an appointment at any [facility], call [phone] ([phone]). If you don't have a family doctor or clinic, we will help you find one. [facility] are conveniently located to serve the needs  of you and your family.             Review of your medicines      Our records show that you are taking the medicines listed below. If these are incorrect, please call your family doctor or clinic.        Dose / Directions Last dose taken    Alcohol Prep Pad 70 % Pads   Dose:  1 each   Quantity:  100 each        1 each 3 times daily   Refills:  3        aspirin 81 MG tablet   Dose:  81 mg   Quantity:  100 tablet        Take 1 tablet (81 mg) by mouth daily   Refills:  3        atorvastatin 40 MG tablet   Commonly known as:  LIPITOR   Dose:  40 mg   Quantity:  90 tablet        Take 1 tablet (40 mg) by mouth daily   Refills:  3        BASAGLAR 100 UNIT/ML injection   Dose:  42 Units   Quantity:  30 mL        Inject 42 Units Subcutaneous daily   Refills:  3        BD ULTRA FINE PEN NEEDLES   Dose:  1 dose.   Quantity:  200 Units        Inject 1 dose * Subcutaneous 2 times daily BD ultra-fine insulin syringe, 30 ga. X 1/2'' short needle 1/2 cc. Use as directed.   Refills:  11        blood glucose monitoring lancets   Quantity:  6 Box        Use to test blood sugar 6 times daily or as directed   Refills:  3        blood glucose monitoring test strip   Commonly known as:  ACCU-CHEK LAYA PLUS   Quantity:  600 each        Laya Plus test strips for use in [employer] Expert meter.  Use to test blood sugar 6 times daily. 3 month supply.  Send PA's to Dr. Mohamud.   Refills:  3        calcium-magnesium 500-250 MG Tabs per tablet   Commonly known as:  VERO-MAG   Dose:  1 tablet   Quantity:  90 tablet        Take 1 tablet by mouth daily   Refills:  3        diltiazem 120 MG 24 hr capsule   Commonly known as:  DILACOR XR   Dose:  120 mg   Quantity:  90 capsule        Take 1 capsule (120 mg) by mouth daily   Refills:  1        fenofibrate 160 MG tablet   Dose:  160 mg   Quantity:  90 tablet        Take 1 tablet (160 mg) by mouth daily   Refills:  3        ibuprofen 400 MG tablet   Commonly known as:  ADVIL/MOTRIN   Dose:  400 mg    Quantity:  200 tablet        Take 1 tablet (400 mg) by mouth every 6 hours as needed for pain   Refills:  1        insulin aspart 100 UNIT/ML injection   Commonly known as:  NovoLOG PEN   Quantity:  21 mL        Admin Instructions: Before meals and bedtime correction sliding scale 120 - 160 - 1 U  161 - 200 - 2 U  201 - 240 - 3 U  241 - 280 - 4 U  281 - 320 - 5 U  321 - 360 - 6 U ...   Refills:  3        ketoconazole 2 % cream   Commonly known as:  NIZORAL   Quantity:  30 g        Apply topically 2 times daily Apply externally thin amt bid   Refills:  1        levonorgestrel 20 MCG/24HR IUD   Commonly known as:  MIRENA   Dose:  1 each   Quantity:  1 each        1 each (20 mcg) by Intrauterine route once for 1 dose   Refills:  0        rizatriptan 10 MG tablet   Commonly known as:  MAXALT   Quantity:  30 tablet        Take 1 tab at onset of headache.  May repeat after 2 hours.   Refills:  3        vitamin D 51112 UNIT capsule   Commonly known as:  ERGOCALCIFEROL   Dose:  44754 Units   Quantity:  12 capsule        Take 1 capsule (50,000 Units) by mouth every 7 days   Refills:  3                Procedures and tests performed during your visit     XR Knee Right 1/2 Views      Orders Needing Specimen Collection     None      Pending Results     Date and Time Order Name Status Description    10/26/2017 1555 XR Knee Right 1/2 Views Preliminary             Pending Culture Results     No orders found from 10/24/2017 to 10/27/2017.            Pending Results Instructions     If you had any lab results that were not finalized at the time of your Discharge, you can call the ED Lab Result RN at [phone]. You will be contacted by this team for any positive Lab results or changes in treatment. The nurses are available 7 days a week from 10A to 6:30P.  You can leave a message 24 hours per day and they will return your call.        Thank you for choosing [facility]       Thank you for choosing [facility] for your care. Our goal is  always to provide you with excellent care. Hearing back from our patients is one way we can continue to improve our services. Please take a few minutes to complete the written survey that you may receive in the mail after you visit with us. Thank you!        [employer] Information     [employer] gives you secure access to your electronic health record. If you see a primary care provider, you can also send messages to your care team and make appointments. If you have questions, please call your primary care clinic.  If you do not have a primary care provider, please call [phone] and they will assist you.        Care EveryWhere ID     This is your Care EveryWhere ID. This could be used by other organizations to access your [facility] medical records  [id number]        Equal Access to Services     SULAIMAN MARTINEZ : Chica Gimenez, perlita dallas, zack hurd, marc bernal. So [facility] [phone].    ATENCIÓN: Si habla español, tiene a dykes disposición servicios gratuitos de asistencia lingüística. Llame al [phone].    We comply with applicable federal civil rights laws and [place] laws. We do not discriminate on the basis of race, color, national origin, age, disability, sex, sexual orientation, or gender identity.            After Visit Summary       This is your record. Keep this with you and show to your community pharmacist(s) and doctor(s) at your next visit.

## 2017-10-26 NOTE — ED AVS SNAPSHOT
[facility], [place], Emergency Department    [address] [place] [address]    Phone:  [phone]                                       Nayeli Caal   MRN: [id number]    Department:  [facility], Emergency Department   Date of Visit:  10/26/2017           After Visit Summary Signature Page     I have received my discharge instructions, and my questions have been answered. I have discussed any challenges I see with this plan with the nurse or doctor.    ..........................................................................................................................................  Patient/Patient Representative Signature      ..........................................................................................................................................  Patient Representative Print Name and Relationship to Patient    ..................................................               ................................................  Date                                            Time    ..........................................................................................................................................  Reviewed by Signature/Title    ...................................................              ..............................................  Date                                                            Time

## 2017-11-02 DIAGNOSIS — E10.8 TYPE 1 DIABETES MELLITUS WITH COMPLICATIONS (H): ICD-10-CM

## 2017-11-02 DIAGNOSIS — Z79.4 TYPE 2 DIABETES MELLITUS WITH COMPLICATION, WITH LONG-TERM CURRENT USE OF INSULIN (H): ICD-10-CM

## 2017-11-02 DIAGNOSIS — E11.8 TYPE 2 DIABETES MELLITUS WITH COMPLICATION, WITH LONG-TERM CURRENT USE OF INSULIN (H): ICD-10-CM

## 2017-11-27 ENCOUNTER — OFFICE VISIT (OUTPATIENT)
Dept: ENDOCRINOLOGY | Facility: CLINIC | Age: 37
End: 2017-11-27

## 2017-11-27 VITALS
BODY MASS INDEX: 34.44 KG/M2 | HEART RATE: 69 BPM | WEIGHT: 182.4 LBS | HEIGHT: 61 IN | DIASTOLIC BLOOD PRESSURE: 84 MMHG | SYSTOLIC BLOOD PRESSURE: 129 MMHG

## 2017-11-27 DIAGNOSIS — E10.319 DIABETIC RETINOPATHY OF BOTH EYES ASSOCIATED WITH TYPE 1 DIABETES MELLITUS, MACULAR EDEMA PRESENCE UNSPECIFIED, UNSPECIFIED RETINOPATHY SEVERITY (H): ICD-10-CM

## 2017-11-27 DIAGNOSIS — E55.9 VITAMIN D DEFICIENCY: ICD-10-CM

## 2017-11-27 DIAGNOSIS — R35.0 URINARY FREQUENCY: ICD-10-CM

## 2017-11-27 DIAGNOSIS — E78.2 MIXED HYPERLIPIDEMIA: ICD-10-CM

## 2017-11-27 DIAGNOSIS — I10 BENIGN ESSENTIAL HYPERTENSION: ICD-10-CM

## 2017-11-27 DIAGNOSIS — E11.65 UNCONTROLLED TYPE 2 DIABETES MELLITUS WITH HYPERGLYCEMIA, WITH LONG-TERM CURRENT USE OF INSULIN (H): ICD-10-CM

## 2017-11-27 DIAGNOSIS — Z79.4 UNCONTROLLED TYPE 2 DIABETES MELLITUS WITH HYPERGLYCEMIA, WITH LONG-TERM CURRENT USE OF INSULIN (H): ICD-10-CM

## 2017-11-27 DIAGNOSIS — Z79.4 UNCONTROLLED TYPE 2 DIABETES MELLITUS WITH HYPERGLYCEMIA, WITH LONG-TERM CURRENT USE OF INSULIN (H): Primary | ICD-10-CM

## 2017-11-27 DIAGNOSIS — E11.65 UNCONTROLLED TYPE 2 DIABETES MELLITUS WITH HYPERGLYCEMIA, WITH LONG-TERM CURRENT USE OF INSULIN (H): Primary | ICD-10-CM

## 2017-11-27 LAB
ALBUMIN SERPL-MCNC: 3.5 G/DL (ref 3.4–5)
ALBUMIN UR-MCNC: 30 MG/DL
ALP SERPL-CCNC: 114 U/L (ref 40–150)
ALT SERPL W P-5'-P-CCNC: 47 U/L (ref 0–50)
ANION GAP SERPL CALCULATED.3IONS-SCNC: 8 MMOL/L (ref 3–14)
APPEARANCE UR: CLEAR
AST SERPL W P-5'-P-CCNC: 21 U/L (ref 0–45)
BILIRUB SERPL-MCNC: 0.3 MG/DL (ref 0.2–1.3)
BILIRUB UR QL STRIP: NEGATIVE
BUN SERPL-MCNC: 12 MG/DL (ref 7–30)
CALCIUM SERPL-MCNC: 8.2 MG/DL (ref 8.5–10.1)
CHLORIDE SERPL-SCNC: 105 MMOL/L (ref 94–109)
CHOLEST SERPL-MCNC: 216 MG/DL
CO2 SERPL-SCNC: 23 MMOL/L (ref 20–32)
COLOR UR AUTO: YELLOW
CREAT SERPL-MCNC: 0.51 MG/DL (ref 0.52–1.04)
CREAT UR-MCNC: 85 MG/DL
DEPRECATED CALCIDIOL+CALCIFEROL SERPL-MC: 17 UG/L (ref 20–75)
GFR SERPL CREATININE-BSD FRML MDRD: >90 ML/MIN/1.7M2
GLUCOSE SERPL-MCNC: 270 MG/DL (ref 70–99)
GLUCOSE UR STRIP-MCNC: >499 MG/DL
HBA1C MFR BLD: 13.4 % (ref 4.3–6)
HCT VFR BLD AUTO: 39.6 % (ref 35–47)
HDLC SERPL-MCNC: 29 MG/DL
HGB UR QL STRIP: ABNORMAL
KETONES UR STRIP-MCNC: NEGATIVE MG/DL
LDLC SERPL CALC-MCNC: 114 MG/DL
LEUKOCYTE ESTERASE UR QL STRIP: NEGATIVE
MICROALBUMIN UR-MCNC: 217 MG/L
MICROALBUMIN/CREAT UR: 255.29 MG/G CR (ref 0–25)
MUCOUS THREADS #/AREA URNS LPF: PRESENT /LPF
NITRATE UR QL: NEGATIVE
NONHDLC SERPL-MCNC: 187 MG/DL
PH UR STRIP: 5 PH (ref 5–7)
POTASSIUM SERPL-SCNC: 3.7 MMOL/L (ref 3.4–5.3)
PROT SERPL-MCNC: 7.2 G/DL (ref 6.8–8.8)
RBC #/AREA URNS AUTO: 2 /HPF (ref 0–2)
SODIUM SERPL-SCNC: 136 MMOL/L (ref 133–144)
SOURCE: ABNORMAL
SP GR UR STRIP: 1.03 (ref 1–1.03)
SQUAMOUS #/AREA URNS AUTO: 1 /HPF (ref 0–1)
TRIGL SERPL-MCNC: 367 MG/DL
TSH SERPL DL<=0.005 MIU/L-ACNC: 1.92 MU/L (ref 0.4–4)
UROBILINOGEN UR STRIP-MCNC: 0 MG/DL (ref 0–2)
WBC #/AREA URNS AUTO: 5 /HPF (ref 0–2)

## 2017-11-27 RX ORDER — LOSARTAN POTASSIUM 25 MG/1
25 TABLET ORAL DAILY
Qty: 90 TABLET | Refills: 3 | Status: SHIPPED | OUTPATIENT
Start: 2017-11-27 | End: 2018-10-15

## 2017-11-27 ASSESSMENT — PAIN SCALES - GENERAL: PAINLEVEL: NO PAIN (0)

## 2017-11-27 NOTE — PROGRESS NOTES
The patient is seen for evaluation of diabetes. She is seen with the help of a .     Nayeli Caal is 37 year old female diagnosed with diabetes in the fall of the year 2000, while being investigated for headaches and increased urination.  The patient is adopted and she doesn't know her parents. She was treated with metformin until 2003, when she was started on insulin. In [id number], she was treated with Bydureon for a period of time. Although she did report that the medication was effective in curbing her appetite, it didn't cause a significant improvement of her blood sugar numbers or weight loss. Over the years, she complied poorly with the recommended insulin regimen.   She had no prior episodes of diabetes ketoacidosis, despite completely discontinuing insulin for months at a time. Hemoglobin A1c has been variable between 8 and 12% over the years. In 2005, she had a C-peptide of 1.3 for a glucose level of 221. Over the last 2 years, her hemoglobin A1c has been around 12%. Most recent hemoglobin A1c was 11%, on 7/13/17. Today, it was 13.4%.    She is using an Accucheck Expert meter to help her taking Novolog for food intake and correction. I reviewed the meter settings:  Time Block: 12:00am to 12:00am   Insulin to Carb Ratio: 10  Correction Factor: 50  BG Target: 110-150     Insulin units for calcuation: 1  Offset Time: 2 hours  Acting Time: 3 hours  Snack Size: 15 grams  Meal rise: 50 mg/dL    The glucometer readings revealed that she rarely checks her blood sugar, 4 times in the last month. Average blood glucose is 253 with a standard deviation of 31 and a range variable between 292 and 280. All the BG numbers are checked in the morning, prior to breakfast, when she enters a CHO intake of 60 grams, for which the pump recommends 10 U.   She states that, for a while, she couldn't find the meter. She reports being compliant in taking insulin: 42 units of Basaglar daily and ~10  U Novolog with each meal. On questioning, she cannot state how does she determine the dose of NovoLog she should take for meals, since she hasn't been entering the carbohydrate intake in the meter, to determine the correct amount. She states she has been eating less and she frequently skips breakfast, mainly if she feels nauseated in the morning.  In the beginning of November, she had persistent morning nausea, for one week. She associates the nausea with symptoms of GERD.    Diabetes complications:  Retinopathy: last eye exam - 2017. 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   Nephropathy: h/o proteinuria (most recent urine microalbumin positive in 2017); normal GFR  Neuropathy: occasional numbness or tingling sensation in her hands, no pain   She is symptomatic for hypoglycemia and she developed symptoms when her blood glucose is below 80 ( shakiness, dizziness).  The lowest blood glucose numbers that she remembers is 59.  She denies prior episodes of loss of consciousness due to hypoglycemia. She does have glucagon at home.    Nayeli has a history of dyslipidemia, currently medicated with 40 mg atorvastatin and 160 mg fenofibrate daily (added in ). In the past, the triglycerides were as high as 1656 (she wasn't taking the meds at the time she had the labwork done). She has no prior history of pancreatitis.    Since her last visit here, on 10/17/17, lisinopril was changed to diltiazem, due to a persistent cough, thought to represent a side effect. In September, she ran out of vitamin D. She states that her prescription , although she did have refills.    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    Current Medications     Current Outpatient Prescriptions:      insulin aspart (NOVOLOG  PEN) 100 UNIT/ML injection, Admin Instructions: Before meals and bedtime correction sliding scale 120 - 160 - 1 U  161 - 200 - 2 U  201 - 240 - 3 U  241 - 280 - 4 U  281 - 320 - 5 U  321 - 360 - 6 U ..., Disp: 63 mL, Rfl: 3     diltiazem (DILACOR XR) 120 MG 24 hr capsule, Take 1 capsule (120 mg) by mouth daily, Disp: 90 capsule, Rfl: 1     insulin glargine (BASAGLAR KWIKPEN) 100 UNIT/ML injection, Inject 42 Units Subcutaneous daily, Disp: 30 mL, Rfl: 3     vitamin D (ERGOCALCIFEROL) 60156 UNIT capsule, Take 1 capsule (50,000 Units) by mouth every 7 days, Disp: 12 capsule, Rfl: 3     calcium-magnesium (VERO-MAG) 500-250 MG TABS per tablet, Take 1 tablet by mouth daily (Patient not taking: Reported on 10/17/2017), Disp: 90 tablet, Rfl: 3     aspirin 81 MG tablet, Take 1 tablet (81 mg) by mouth daily, Disp: 100 tablet, Rfl: 3     blood glucose monitoring (ACCU-CHEK FASTCLIX) lancets, Use to test blood sugar 6 times daily or as directed, Disp: 6 Box, Rfl: 3     fenofibrate 160 MG tablet, Take 1 tablet (160 mg) by mouth daily, Disp: 90 tablet, Rfl: 3     atorvastatin (LIPITOR) 40 MG tablet, Take 1 tablet (40 mg) by mouth daily, Disp: 90 tablet, Rfl: 3     blood glucose monitoring (ACCU-CHEK ANALI PLUS) test strip, Anali Plus test strips for use in Accucheck Expert meter.  Use to test blood sugar 6 times daily. 3 month supply.  Send PA's to Dr. Mohamud., Disp: 600 each, Rfl: 3     ketoconazole (NIZORAL) 2 % cream, Apply topically 2 times daily Apply externally thin amt bid (Patient not taking: Reported on 10/17/2017), Disp: 30 g, Rfl: 1     levonorgestrel (MIRENA) 20 MCG/24HR IUD, 1 each (20 mcg) by Intrauterine route once for 1 dose, Disp: 1 each, Rfl: 0     BD ULTRA FINE PEN NEEDLES, Inject 1 dose * Subcutaneous 2 times daily BD ultra-fine insulin syringe, 30 ga. X 1/2'' short needle 1/2 cc. Use as directed., Disp: 200 Units, Rfl: 11     ibuprofen (ADVIL,MOTRIN) 400 MG tablet, Take 1 tablet (400 mg) by mouth every 6  hours as needed for pain, Disp: 200 tablet, Rfl: 1     rizatriptan (MAXALT) 10 MG tablet, Take 1 tab at onset of headache.  May repeat after 2 hours., Disp: 30 tablet, Rfl: 3     Alcohol Swabs (ALCOHOL PREP PAD) 70 % PADS, 1 each 3 times daily, Disp: 100 each, Rfl: 3     [DISCONTINUED] BD ULTRA FINE PEN NEEDLES, Inject 1 dose Subcutaneous 2 times daily BD ultra-fine insulin syringe, 30 ga. X 1/2'' short needle 1/2 cc. Use as directed. (Patient taking differently: Inject 1 dose * Subcutaneous 2 times daily BD ultra-fine insulin syringe, 30 ga. X 1/2'' short needle 1/2 cc. Use as directed.), Disp: 200 Units, Rfl: 11    Family History   Problem Relation Age of Onset     Unknown/Adopted Other      Social History  Single. No children. Lives with a roommate. She smoked for almost 10 years, on and off, up to 1 PPD; quit smoking 2000. She only drinks alcohol occasionally. Denies using illicit drugs. Occupation: engineering department.      Review of Systems   Systemic symptoms: weight up 10 lbs in the last year   Eye symptoms: No eye symptoms.  Otolaryngeal symptoms: deaf   Breast symptoms: No breast symptoms.  Cardiovascular symptoms: No cardiovascular symptoms.    Pulmonary symptoms: No pulmonary symptoms.  Gastrointestinal symptoms: worsening GERD symptoms noticed with certain foods; long-standing episodes of nausea    Genitourinary symptoms: No genitourinary symptoms.  Endocrine symptoms:Menstrual periods, irregular, and light, 3-5 weeks apart - IUD 1/2017   Hematologic symptoms: No hematologic symptoms.  Musculoskeletal symptoms: bilateral knee pain, intermittently  Neurological symptoms: as above  Psychological symptoms: No psychological symptoms.    Skin symptoms: No skin symptoms    Vital Signs     Previous Weights:    Wt Readings from Last 10 Encounters:   11/27/17 82.7 kg (182 lb 6.4 oz)   10/17/17 83.5 kg (184 lb)   08/21/17 84.4 kg (186 lb)   07/13/17 82.1 kg (181 lb)   06/05/17 80.3 kg (177 lb)   05/15/17 80.4  "kg (177 lb 3.2 oz)   03/21/17 78 kg (172 lb)   02/20/17 78 kg (171 lb 14.4 oz)   01/24/17 76.2 kg (168 lb)   12/08/16 78 kg (172 lb)       Vitals:    11/27/17 0753   BP: 129/84   Pulse: 69   Weight: 82.7 kg (182 lb 6.4 oz)   Height: 1.549 m (5' 1\")       Physical Exam  General Appearance:  she is well developed, well nourished and in no distress     HEENT:   oropharynx clear and moist, no JVD, no bruits      no thyromegaly, no palpable nodules   Cardiovascular:  regular rhythm, systolic murmur, distal pulse palpable, no edema  Respiratory:       chest clear, no rales, no rhonchi   Cardiovascular:  regular rhythm, no murmurs, distal pulse palpable, no edema  Respiratory:       chest clear, no rales, no rhonchi   Gastrointestinal: abdomen soft, non-tender, non-distended, normal bowel sounds,   no organomegaly   Musculoskeletal: normal tone and strength  Psychiatric: affect and judgment normal  Skin: warm, no lesions   Neurological: reflexes normal and symmetric, no resting tremor    Lab Results:  I reviewed prior lab results documented in Epic  Lab Results   Component Value Date    A1C 13.4 (H) 11/27/2017    A1C 11.0 (H) 07/13/2017    A1C 10.9 (H) 05/15/2017    A1C 12.8 (H) 01/02/2017    A1C 13.0 (H) 05/11/2016    HEMOGLOBINA1 12.0 (A) 02/03/2014    HEMOGLOBINA1 10.3 (A) 10/28/2013    HEMOGLOBINA1 11.3 (A) 08/06/2013    HEMOGLOBINA1 13.1 (H) 08/12/2011    HEMOGLOBINA1 8.9 (H) 05/21/2010       Hemoglobin   Date Value Ref Range Status   07/25/2016 14.0 11.7 - 15.7 g/dL Final     Hematocrit   Date Value Ref Range Status   11/27/2017 39.6 35.0 - 47.0 % Final     Cholesterol   Date Value Ref Range Status   11/27/2017 216 (H) <200 mg/dL Final     Comment:     Desirable:       <200 mg/dl     Cholesterol/HDL Ratio   Date Value Ref Range Status   09/16/2013 5.8 (H) 0.0 - 5.0 Final     HDL Cholesterol   Date Value Ref Range Status   11/27/2017 29 (L) >49 mg/dL Final     LDL Cholesterol Calculated   Date Value Ref Range Status "   11/27/2017 114 (H) <100 mg/dL Final     Comment:     Above desirable:  100-129 mg/dl  Borderline High:  130-159 mg/dL  High:             160-189 mg/dL  Very high:       >189 mg/dl       VLDL-Cholesterol   Date Value Ref Range Status   09/16/2013 44 (H) 0 - 30 mg/dL Final     Triglycerides   Date Value Ref Range Status   11/27/2017 367 (H) <150 mg/dL Final     Comment:     Borderline high:  150-199 mg/dl  High:             200-499 mg/dl  Very high:       >499 mg/dl       Albumin Urine mg/L   Date Value Ref Range Status   11/27/2017 217 mg/L Final     TSH   Date Value Ref Range Status   11/27/2017 1.92 0.40 - 4.00 mU/L Final         Last Basic Metabolic Panel:    Sodium   Date Value Ref Range Status   11/27/2017 136 133 - 144 mmol/L Final     Potassium   Date Value Ref Range Status   01/02/2017 3.5 3.4 - 5.3 mmol/L Final     Chloride   Date Value Ref Range Status   11/27/2017 105 94 - 109 mmol/L Final     Calcium   Date Value Ref Range Status   11/27/2017 8.2 (L) 8.5 - 10.1 mg/dL Final     Carbon Dioxide   Date Value Ref Range Status   11/27/2017 23 20 - 32 mmol/L Final     Urea Nitrogen   Date Value Ref Range Status   11/27/2017 12 7 - 30 mg/dL Final     Creatinine   Date Value Ref Range Status   11/27/2017 0.51 (L) 0.52 - 1.04 mg/dL Final     GFR Estimate   Date Value Ref Range Status   11/27/2017 >90 >60 mL/min/1.7m2 Final     Comment:     Non  GFR Calc     Glucose   Date Value Ref Range Status   01/02/2017 282 (H) 70 - 99 mg/dL Final       AST   Date Value Ref Range Status   11/27/2017 21 0 - 45 U/L Final     ALT   Date Value Ref Range Status   11/27/2017 47 0 - 50 U/L Final     Albumin   Date Value Ref Range Status   11/27/2017 3.5 3.4 - 5.0 g/dL Final     Assessment     1. Diabetes, complicated by diabetic nephropathy and retinopathy, with poor glycemic control, due to noncompliance. I suspect she has type 2 diabetes or PRAVIN.   Recommendations:  Check blood sugar more consistently, 4 times  daily, before meals and at bedtime  Use an insulin to carbohydrate ratio of 1 unit per 5 g for all meals and snacks - the AccuCheck meter was set up in the clinic.   Increase the dose of Basaglar to 50 U and administer it at the same time of the day. She prefers to take it in the morning, when it's easier for her to remember.   Use the meter to calculate the NovoLog dose she should take for meals and correction  Have the meter downloaded in our clinic in 2 weeks   Start Trulicity, at 0.75 mg weekly. The patient was instructed to let us know if the nausea gets worse with this medication.    2. Dyslipidemia   The triglycerides level has significantly improved on the most recent lab. Counseled the patient on the importance of taking both atorvastatin and fenofibrate as instructed, on a daily basis.    3. Vitamin D deficiency, resume taking 56426 U QW   Suspect the lowish calcium level might be due to vitamin D deficiency.  Advised to take TUMS for the occasional episodes of GERD.    4. Proteinuria/Hypertension  I recommended to discontinue diltiazem and start taking Losartan, at 25 mg daily.    No orders of the defined types were placed in this encounter.

## 2017-11-27 NOTE — LETTER
11/27/2017       RE: Nayeli Caal  [address]  [facility] [address]     Dear Colleague,    Thank you for referring your patient, Nayeli Caal, to the [facility] ENDOCRINOLOGY at [facility]. Please see a copy of my visit note below.      The patient is seen for evaluation of diabetes. She is seen with the help of a .     Nayeli Caal is 37 year old female diagnosed with diabetes in the fall of the year 2000, while being investigated for headaches and increased urination.  The patient is adopted and she doesn't know her parents. She was treated with metformin until 2003, when she was started on insulin. In 3817-9436, she was treated with Bydureon for a period of time. Although she did report that the medication was effective in curbing her appetite, it didn't cause a significant improvement of her blood sugar numbers or weight loss. Over the years, she complied poorly with the recommended insulin regimen.   She had no prior episodes of diabetes ketoacidosis, despite completely discontinuing insulin for months at a time. Hemoglobin A1c has been variable between 8 and 12% over the years. In 2005, she had a C-peptide of 1.3 for a glucose level of 221. Over the last 2 years, her hemoglobin A1c has been around 12%. Most recent hemoglobin A1c was 11%, on 7/13/17. Today, it was 13.4%.    She is using an Accucheck Expert meter to help her taking Novolog for food intake and correction. I reviewed the meter settings:  Time Block: 12:00am to 12:00am   Insulin to Carb Ratio: 10  Correction Factor: 50  BG Target: 110-150     Insulin units for calcuation: 1  Offset Time: 2 hours  Acting Time: 3 hours  Snack Size: 15 grams  Meal rise: 50 mg/dL    The glucometer readings revealed that she rarely checks her blood sugar, 4 times in the last month. Average blood glucose is 253 with a standard deviation of 31 and a range variable between  292 and 280. All the BG numbers are checked in the morning, prior to breakfast, when she enters a CHO intake of 60 grams, for which the pump recommends 10 U.   She states that, for a while, she couldn't find the meter. She reports being compliant in taking insulin: 42 units of Basaglar daily and ~10 U Novolog with each meal. On questioning, she cannot state how does she determine the dose of NovoLog she should take for meals, since she hasn't been entering the carbohydrate intake in the meter, to determine the correct amount. She states she has been eating less and she frequently skips breakfast, mainly if she feels nauseated in the morning.  In the beginning of November, she had persistent morning nausea, for one week. She associates the nausea with symptoms of GERD.    Diabetes complications:  Retinopathy: last eye exam - 2017. 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   Nephropathy: h/o proteinuria (most recent urine microalbumin positive in 2017); normal GFR  Neuropathy: occasional numbness or tingling sensation in her hands, no pain   She is symptomatic for hypoglycemia and she developed symptoms when her blood glucose is below 80 ( shakiness, dizziness).  The lowest blood glucose numbers that she remembers is 59.  She denies prior episodes of loss of consciousness due to hypoglycemia. She does have glucagon at home.    Nayeli has a history of dyslipidemia, currently medicated with 40 mg atorvastatin and 160 mg fenofibrate daily (added in ). In the past, the triglycerides were as high as 1656 (she wasn't taking the meds at the time she had the labwork done). She has no prior history of pancreatitis.    Since her last visit here, on 10/17/17, lisinopril was changed to diltiazem, due to a persistent cough, thought to represent a side effect. In September, she ran out of vitamin D. She states that her prescription , although she did have refills.    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    Current Medications     Current Outpatient Prescriptions:      insulin aspart (NOVOLOG PEN) 100 UNIT/ML injection, Admin Instructions: Before meals and bedtime correction sliding scale 120 - 160 - 1 U  161 - 200 - 2 U  201 - 240 - 3 U  241 - 280 - 4 U  281 - 320 - 5 U  321 - 360 - 6 U ..., Disp: 63 mL, Rfl: 3     diltiazem (DILACOR XR) 120 MG 24 hr capsule, Take 1 capsule (120 mg) by mouth daily, Disp: 90 capsule, Rfl: 1     insulin glargine (BASAGLAR KWIKPEN) 100 UNIT/ML injection, Inject 42 Units Subcutaneous daily, Disp: 30 mL, Rfl: 3     vitamin D (ERGOCALCIFEROL) 47568 UNIT capsule, Take 1 capsule (50,000 Units) by mouth every 7 days, Disp: 12 capsule, Rfl: 3     calcium-magnesium (VERO-MAG) 500-250 MG TABS per tablet, Take 1 tablet by mouth daily (Patient not taking: Reported on 10/17/2017), Disp: 90 tablet, Rfl: 3     aspirin 81 MG tablet, Take 1 tablet (81 mg) by mouth daily, Disp: 100 tablet, Rfl: 3     blood glucose monitoring (ACCU-CHEK FASTCLIX) lancets, Use to test blood sugar 6 times daily or as directed, Disp: 6 Box, Rfl: 3     fenofibrate 160 MG tablet, Take 1 tablet (160 mg) by mouth daily, Disp: 90 tablet, Rfl: 3     atorvastatin (LIPITOR) 40 MG tablet, Take 1 tablet (40 mg) by mouth daily, Disp: 90 tablet, Rfl: 3     blood glucose monitoring (ACCU-CHEK ANALI PLUS) test strip, Anali Plus test strips for use in Accucheck Expert meter.  Use to test blood sugar 6 times daily. 3 month supply.  Send PA's to Dr. Mohamud., Disp: 600 each, Rfl: 3     ketoconazole (NIZORAL) 2 % cream, Apply topically 2 times daily Apply externally thin amt bid (Patient not taking: Reported on 10/17/2017), Disp: 30 g, Rfl: 1     levonorgestrel (MIRENA) 20 MCG/24HR IUD, 1 each (20 mcg) by Intrauterine route once  for 1 dose, Disp: 1 each, Rfl: 0     BD ULTRA FINE PEN NEEDLES, Inject 1 dose * Subcutaneous 2 times daily BD ultra-fine insulin syringe, 30 ga. X 1/2'' short needle 1/2 cc. Use as directed., Disp: 200 Units, Rfl: 11     ibuprofen (ADVIL,MOTRIN) 400 MG tablet, Take 1 tablet (400 mg) by mouth every 6 hours as needed for pain, Disp: 200 tablet, Rfl: 1     rizatriptan (MAXALT) 10 MG tablet, Take 1 tab at onset of headache.  May repeat after 2 hours., Disp: 30 tablet, Rfl: 3     Alcohol Swabs (ALCOHOL PREP PAD) 70 % PADS, 1 each 3 times daily, Disp: 100 each, Rfl: 3     [DISCONTINUED] BD ULTRA FINE PEN NEEDLES, Inject 1 dose Subcutaneous 2 times daily BD ultra-fine insulin syringe, 30 ga. X 1/2'' short needle 1/2 cc. Use as directed. (Patient taking differently: Inject 1 dose * Subcutaneous 2 times daily BD ultra-fine insulin syringe, 30 ga. X 1/2'' short needle 1/2 cc. Use as directed.), Disp: 200 Units, Rfl: 11    Family History   Problem Relation Age of Onset     Unknown/Adopted Other      Social History  Single. No children. Lives with a roommate. She smoked for almost 10 years, on and off, up to 1 PPD; quit smoking 2000. She only drinks alcohol occasionally. Denies using illicit drugs. Occupation: engineering department.      Review of Systems   Systemic symptoms: weight up 10 lbs in the last year   Eye symptoms: No eye symptoms.  Otolaryngeal symptoms: deaf   Breast symptoms: No breast symptoms.  Cardiovascular symptoms: No cardiovascular symptoms.    Pulmonary symptoms: No pulmonary symptoms.  Gastrointestinal symptoms: worsening GERD symptoms noticed with certain foods; long-standing episodes of nausea    Genitourinary symptoms: No genitourinary symptoms.  Endocrine symptoms:Menstrual periods, irregular, and light, 3-5 weeks apart - IUD 1/2017   Hematologic symptoms: No hematologic symptoms.  Musculoskeletal symptoms: bilateral knee pain, intermittently  Neurological symptoms: as above  Psychological symptoms:  "No psychological symptoms.    Skin symptoms: No skin symptoms    Vital Signs     Previous Weights:    Wt Readings from Last 10 Encounters:   11/27/17 82.7 kg (182 lb 6.4 oz)   10/17/17 83.5 kg (184 lb)   08/21/17 84.4 kg (186 lb)   07/13/17 82.1 kg (181 lb)   06/05/17 80.3 kg (177 lb)   05/15/17 80.4 kg (177 lb 3.2 oz)   03/21/17 78 kg (172 lb)   02/20/17 78 kg (171 lb 14.4 oz)   01/24/17 76.2 kg (168 lb)   12/08/16 78 kg (172 lb)       Vitals:    11/27/17 0753   BP: 129/84   Pulse: 69   Weight: 82.7 kg (182 lb 6.4 oz)   Height: 1.549 m (5' 1\")       Physical Exam  General Appearance:  she is well developed, well nourished and in no distress     HEENT:   oropharynx clear and moist, no JVD, no bruits      no thyromegaly, no palpable nodules   Cardiovascular:  regular rhythm, systolic murmur, distal pulse palpable, no edema  Respiratory:       chest clear, no rales, no rhonchi   Cardiovascular:  regular rhythm, no murmurs, distal pulse palpable, no edema  Respiratory:       chest clear, no rales, no rhonchi   Gastrointestinal: abdomen soft, non-tender, non-distended, normal bowel sounds,   no organomegaly   Musculoskeletal: normal tone and strength  Psychiatric: affect and judgment normal  Skin: warm, no lesions   Neurological: reflexes normal and symmetric, no resting tremor    Lab Results:  I reviewed prior lab results documented in Epic  Lab Results   Component Value Date    A1C 13.4 (H) 11/27/2017    A1C 11.0 (H) 07/13/2017    A1C 10.9 (H) 05/15/2017    A1C 12.8 (H) 01/02/2017    A1C 13.0 (H) 05/11/2016    HEMOGLOBINA1 12.0 (A) 02/03/2014    HEMOGLOBINA1 10.3 (A) 10/28/2013    HEMOGLOBINA1 11.3 (A) 08/06/2013    HEMOGLOBINA1 13.1 (H) 08/12/2011    HEMOGLOBINA1 8.9 (H) 05/21/2010       Hemoglobin   Date Value Ref Range Status   07/25/2016 14.0 11.7 - 15.7 g/dL Final     Hematocrit   Date Value Ref Range Status   11/27/2017 39.6 35.0 - 47.0 % Final     Cholesterol   Date Value Ref Range Status   11/27/2017 216 (H) " <200 mg/dL Final     Comment:     Desirable:       <200 mg/dl     Cholesterol/HDL Ratio   Date Value Ref Range Status   09/16/2013 5.8 (H) 0.0 - 5.0 Final     HDL Cholesterol   Date Value Ref Range Status   11/27/2017 29 (L) >49 mg/dL Final     LDL Cholesterol Calculated   Date Value Ref Range Status   11/27/2017 114 (H) <100 mg/dL Final     Comment:     Above desirable:  100-129 mg/dl  Borderline High:  130-159 mg/dL  High:             160-189 mg/dL  Very high:       >189 mg/dl       VLDL-Cholesterol   Date Value Ref Range Status   09/16/2013 44 (H) 0 - 30 mg/dL Final     Triglycerides   Date Value Ref Range Status   11/27/2017 367 (H) <150 mg/dL Final     Comment:     Borderline high:  150-199 mg/dl  High:             200-499 mg/dl  Very high:       >499 mg/dl       Albumin Urine mg/L   Date Value Ref Range Status   11/27/2017 217 mg/L Final     TSH   Date Value Ref Range Status   11/27/2017 1.92 0.40 - 4.00 mU/L Final         Last Basic Metabolic Panel:    Sodium   Date Value Ref Range Status   11/27/2017 136 133 - 144 mmol/L Final     Potassium   Date Value Ref Range Status   01/02/2017 3.5 3.4 - 5.3 mmol/L Final     Chloride   Date Value Ref Range Status   11/27/2017 105 94 - 109 mmol/L Final     Calcium   Date Value Ref Range Status   11/27/2017 8.2 (L) 8.5 - 10.1 mg/dL Final     Carbon Dioxide   Date Value Ref Range Status   11/27/2017 23 20 - 32 mmol/L Final     Urea Nitrogen   Date Value Ref Range Status   11/27/2017 12 7 - 30 mg/dL Final     Creatinine   Date Value Ref Range Status   11/27/2017 0.51 (L) 0.52 - 1.04 mg/dL Final     GFR Estimate   Date Value Ref Range Status   11/27/2017 >90 >60 mL/min/1.7m2 Final     Comment:     Non  GFR Calc     Glucose   Date Value Ref Range Status   01/02/2017 282 (H) 70 - 99 mg/dL Final       AST   Date Value Ref Range Status   11/27/2017 21 0 - 45 U/L Final     ALT   Date Value Ref Range Status   11/27/2017 47 0 - 50 U/L Final     Albumin   Date Value  Ref Range Status   11/27/2017 3.5 3.4 - 5.0 g/dL Final     Assessment     1. Diabetes, complicated by diabetic nephropathy and retinopathy, with poor glycemic control, due to noncompliance. I suspect she has type 2 diabetes or PRAVIN.   Recommendations:  Check blood sugar more consistently, 4 times daily, before meals and at bedtime  Use an insulin to carbohydrate ratio of 1 unit per 5 g for all meals and snacks - the AccuCheck meter was set up in the clinic.   Increase the dose of Basaglar to 50 U and administer it at the same time of the day. She prefers to take it in the morning, when it's easier for her to remember.   Use the meter to calculate the NovoLog dose she should take for meals and correction  Have the meter downloaded in our clinic in 2 weeks   Start Trulicity, at 0.75 mg weekly. The patient was instructed to let us know if the nausea gets worse with this medication.    2. Dyslipidemia   The triglycerides level has significantly improved on the most recent lab. Counseled the patient on the importance of taking both atorvastatin and fenofibrate as instructed, on a daily basis.    3. Vitamin D deficiency, resume taking 41658 U QW   Suspect the lowish calcium level might be due to vitamin D deficiency.  Advised to take TUMS for the occasional episodes of GERD.    4. Proteinuria/Hypertension  I recommended to discontinue diltiazem and start taking Losartan, at 25 mg daily.    No orders of the defined types were placed in this encounter.      Sincerely,    Jimena Bragg MD

## 2017-11-27 NOTE — MR AVS SNAPSHOT
After Visit Summary   11/27/2017    Nayeli Caal    MRN: [id number]           Patient Information     Date Of Birth          1980        Visit Information        Provider Department      11/27/2017 7:45 AM Reshma Cerna Angela Ionela, MD [facility]        Today's Diagnoses     Uncontrolled type 2 diabetes mellitus with hyperglycemia, with long-term current use of insulin (H)    -  1      Care Instructions    Trulicity 0.75 mg weekly   Increase the dose of Basaglar to 50 U   Increase the dose of Novolog to 1 U per 5 grams carbohydrates   Check BG before meals and at bedtime and document the amount of insulin you take   Discontinue diltiazem and lisinopril   Start Losartan at 25 mg daily             Follow-ups after your visit        Follow-up notes from your care team     Return in about 4 weeks (around 12/25/2017).      Your next 10 appointments already scheduled     Jan 02, 2018  8:00 AM CST   (Arrive by 7:45 AM)   RETURN DIABETES with HITESH Morris [facility] ([facility])    [address] [place] [id number] [phone]            Jan 18, 2018 11:00 AM CST   (Arrive by 10:45 AM)   Return Visit with SABI Ceja CNP   [facility] ([facility])    [address] [place] [id number] [phone]            Feb 05, 2018  7:00 AM CST   (Arrive by 6:45 AM)   RETURN DIABETES with Anne Marie Medina PA-C   [facility] Endocrinology ([facility])    [address] [place] [id number] [phone]            Mar 12, 2018  8:00 AM CDT   (Arrive by 7:45 AM)   RETURN DIABETES with Jimena Bragg MD   [facility] End[facility] ([facility])    [address] [place] [id number] [phone]              Who to contact   "   Please call your clinic at [phone] to:    Ask questions about your health    Make or cancel appointments    Discuss your medicines    Learn about your test results    Speak to your doctor   If you have compliments or concerns about an experience at your clinic, or if you wish to file a complaint, please contact [facility] Patient Relations at [phone] or email us at [email or web address]         Additional Information About Your Visit        [employer] Information     [employer] gives you secure access to your electronic health record. If you see a primary care provider, you can also send messages to your care team and make appointments. If you have questions, please call your primary care clinic.  If you do not have a primary care provider, please call [phone] and they will assist you.      Jobaline is an electronic gateway that provides easy, online access to your medical records. With [employer]obaline, you can request a clinic appointment, read your test results, renew a prescription or communicate with your care team.     To access your existing account, please contact your [facility] Clinic or call [phone] for assistance.        Care EveryWhere ID     This is your Care EveryWhere ID. This could be used by other organizations to access your [facility] medical records  [id number]        Your Vitals Were     Pulse Height BMI (Body Mass Index)             69 1.549 m (5' 1\") 34.46 kg/m2          Blood Pressure from Last 3 Encounters:   11/27/17 129/84   10/26/17 136/88   10/17/17 110/75    Weight from Last 3 Encounters:   11/27/17 82.7 kg (182 lb 6.4 oz)   10/17/17 83.5 kg (184 lb)   08/21/17 84.4 kg (186 lb)              Today, you had the following     No orders found for display         Today's Medication Changes          These changes are accurate as of: 11/27/17  9:10 AM.  If you have any questions, ask your nurse or doctor.               Start " taking these medicines.        Dose/Directions    losartan 25 MG tablet   Commonly known as:  COZAAR   Used for:  Uncontrolled type 2 diabetes mellitus with hyperglycemia, with long-term current use of insulin (H)   Started by:  Jimena Bragg MD        Dose:  25 mg   Take 1 tablet (25 mg) by mouth daily   Quantity:  90 tablet   Refills:  3         Stop taking these medicines if you haven't already. Please contact your care team if you have questions.     diltiazem 120 MG 24 hr capsule   Commonly known as:  DILACOR XR   Stopped by:  Jimena Bragg MD                Where to get your medicines      These medications were sent to [place], [place] - [address]  [address], [facility] [address]    Hours:  TRANSPLANT PHONE NUMBER [phone] Phone:  [phone]     losartan 25 MG tablet                Primary Care Provider Office Phone # Fax #    Mariya Mohamud, APRN [phone]       [address] [place] [address]  [facility] [address]        Equal Access to Services     [facility]: Hadii oneida ku hadasho Soomaali, waaxda luqadaha, qaybta kaalmada adeegyada, waxcaleb rae . So [facility] [place] [phone].    ATENCIÓN: Si habla español, tiene a dykes disposición servicios gratuitos de asistencia lingüística. Llame al [phone].    We comply with applicable federal civil rights laws and [place] laws. We do not discriminate on the basis of race, color, national origin, age, disability, sex, sexual orientation, or gender identity.            Thank you!     Thank you for choosing [facility] ENDOCRINOLOGY  for your care. Our goal is always to provide you with excellent care. Hearing back from our patients is one way we can continue to improve our services. Please take a few minutes to complete the written survey that you may receive in the mail after your visit with us. Thank you!             Your  Updated Medication List - Protect others around you: Learn how to safely use, store and throw away your medicines at www.disposemymeds.org.          This list is accurate as of: 11/27/17  9:10 AM.  Always use your most recent med list.                   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 X 1/2'' short needle 1/2 cc. Use as directed.    Type 2 diabetes mellitus with other circulatory complications       blood glucose monitoring lancets     6 Box    Use to test blood sugar 6 times daily or as directed    Type 1 diabetes mellitus with nephropathy (H)       blood glucose monitoring test strip    ACCU-CHEK LAYA PLUS    600 each    Laya Plus test strips for use in Accucheck Expert meter.  Use to test blood sugar 6 times daily. 3 month supply.  Send PA's to Dr. Mohamud.    Type 1 diabetes mellitus with nephropathy (H)       calcium-magnesium 500-250 MG Tabs per tablet    VERO-MAG    90 tablet    Take 1 tablet by mouth daily    Cramp of limb       fenofibrate 160 MG tablet     90 tablet    Take 1 tablet (160 mg) by mouth daily    Mixed hyperlipidemia       ibuprofen 400 MG tablet    ADVIL/MOTRIN    200 tablet    Take 1 tablet  (400 mg) by mouth every 6 hours as needed for pain    Headache(784.0), Pain in joint, lower leg, right       insulin aspart 100 UNIT/ML injection    NovoLOG PEN    63 mL    Admin Instructions: Before meals and bedtime correction sliding scale 120 - 160 - 1 U  161 - 200 - 2 U  201 - 240 - 3 U  241 - 280 - 4 U  281 - 320 - 5 U  321 - 360 - 6 U ...    Type 1 diabetes mellitus with complications (H)       ketoconazole 2 % cream    NIZORAL    30 g    Apply topically 2 times daily Apply externally thin amt bid    Candidiasis of perineum       levonorgestrel 20 MCG/24HR IUD    MIRENA    1 each    1 each (20 mcg) by Intrauterine route once for 1 dose    Encounter for insertion of intrauterine contraceptive device       losartan 25 MG tablet    COZAAR    90 tablet    Take 1 tablet (25 mg) by mouth daily    Uncontrolled type 2 diabetes mellitus with hyperglycemia, with long-term current use of insulin (H)       rizatriptan 10 MG tablet    MAXALT    30 tablet    Take 1 tab at onset of headache.  May repeat after 2 hours.    Headache(784.0)       vitamin D 27111 UNIT capsule    ERGOCALCIFEROL    12 capsule    Take 1 capsule (50,000 Units) by mouth every 7 days    Vitamin deficiency

## 2017-11-27 NOTE — PATIENT INSTRUCTIONS
Trulicity 0.75 mg weekly   Increase the dose of Basaglar to 50 U   Increase the dose of Novolog to 1 U per 5 grams carbohydrates   Check BG before meals and at bedtime and document the amount of insulin you take   Discontinue diltiazem and lisinopril   Start Losartan at 25 mg daily

## 2017-12-14 ENCOUNTER — OFFICE VISIT (OUTPATIENT)
Dept: ORTHOPEDICS | Facility: CLINIC | Age: 37
End: 2017-12-14
Payer: COMMERCIAL

## 2017-12-14 VITALS
HEIGHT: 61 IN | HEART RATE: 87 BPM | DIASTOLIC BLOOD PRESSURE: 84 MMHG | BODY MASS INDEX: 34.55 KG/M2 | WEIGHT: 183 LBS | SYSTOLIC BLOOD PRESSURE: 136 MMHG | RESPIRATION RATE: 18 BRPM

## 2017-12-14 DIAGNOSIS — M25.461 KNEE EFFUSION, RIGHT: Primary | ICD-10-CM

## 2017-12-14 DIAGNOSIS — R29.898 WEAKNESS OF RIGHT HIP: ICD-10-CM

## 2017-12-14 NOTE — LETTER
12/14/2017       RE: Nayeli Caal  [address]  [facility] [address]     Dear Colleague,    Thank you for referring your patient, Nayeli Caal, to the [facility]INIC at [facility]. Please see a copy of my visit note below.     Subjective:   Nayeli Caal is a 37 year old female who is here complaining of right knee pain. She denies any BONNIE. She reports that her knee feels swollen  Hurts with walking, knee saeed.  Worse with walking.  Hurts so much.  Thought she should be seen because a lot worse over past 2 days.  Not exercise.  suprapatella and anterior knee pain- feels bone on bone.  Seen today with signing     Background:   Date of injury: 12/12/14   Duration of symptoms: 2 days  Mechanism of Injury: Insidious Onset; Unknown   Aggravating factors: Walking, stairs  Relieving Factors: rest  Prior Evaluation: None    PAST MEDICAL, SOCIAL, SURGICAL AND FAMILY HISTORY: She  has a past medical history of Diabetes mellitus (H); Hypertension; and Migraines. She also has no past medical history of Amblyopia or Retinal detachment.  She  has no past surgical history on file.  Her family history includes Unknown/Adopted in an other family member.  She reports that she quit smoking about 7 years ago. Her smoking use included Cigarettes. She has never used smokeless tobacco. She reports that she does not drink alcohol or use illicit drugs.      ALLERGIES: She has No Known Allergies.    CURRENT MEDICATIONS: She has a current medication list which includes the following prescription(s): losartan, dulaglutide, insulin aspart, basaglar, vitamin d, calcium-magnesium, aspirin, blood glucose monitoring, fenofibrate, atorvastatin, blood glucose monitoring, ketoconazole, BD ULTRA FINE PEN NEEDLES, ibuprofen, rizatriptan, alcohol prep pad, and levonorgestrel.     REVIEW OF SYSTEMS: 10 point review of systems is  "negative except as noted above.     Exam:   /84 (BP Location: Right arm, Patient Position: Sitting, Cuff Size: Adult Regular)  Pulse 87  Resp 18  Ht 1.549 m (5' 1\")  Wt 83 kg (183 lb)  BMI 34.58 kg/m2           CONSTITUTIONAL: alert, mild distress, cooperative and obese  HEAD: Normocephalic. No masses, lesions, tenderness or abnormalities  SKIN: no suspicious lesions or rashes  GAIT: antalgic and obese pattern  NEUROLOGIC: Non-focal, Normal muscle tone and strength, reflexes normal, sensation grossly normal.  PSYCHIATRIC: affect normal/bright and mentation appears normal.    MUSCULOSKELETAL: right knee pain      Inspection:       AP/lateral alignment normal  Poor balance  Tender: suprapatella, medial aspect, effusion present      Non-tender: patellar facets, MCL, LCL, lateral joint line, medial joint line, IT band, posterior knee   Active Range of Motion: painful in deep flexion  Strength: quad  5-/5, Hamstrings  5-/5, Gastroc  5/5, Tibialis anterior  5-/5, Peroneals  5-/5 and core strength  4/5 hip abductors and other core muscles   Special tests: normal Valgus stress test, normal Varus, negative Lachman's test, no posterolateral corner signs, negative Markie's, no apprehension with lateral stress of the patella.         Assessment/Plan:   Pt is a 36 yo  female with PMHx of migraines, HTN, DM, deafness presenting with right knee swelling  1. Right knee suprapatella bursitis, weakness hip abductors, likely PFS component as well- ice, elevation, has crutches and brace at home, needs strengthening and will likely send to PT  Consider MRI if not improving  Pt to RTC 1 week to see if we can decrease the swelling present      X-RAY INTERPRETATION:   X-Ray of the Right Knee: 3-view, Friend, lateral, sunrise --interpreted in the office today was positive for Impression:   1. No fracture or dislocation in the right knee.  2. Mild joint effusion    Again, thank you for allowing me to participate in the " care of your patient.      Sincerely,    Reshma Ragland MD

## 2017-12-14 NOTE — MR AVS SNAPSHOT
After Visit Summary   12/14/2017    Nayeli Caal    MRN: [id number]           Patient Information     Date Of Birth          1980        Visit Information        Provider Department      12/14/2017 10:30 AM Reshma Ragland MD [facility]         Follow-ups after your visit        Your next 10 appointments already scheduled     Dec 20, 2017  2:45 PM CST   (Arrive by 2:30 PM)   Return Walk In Ortho with Reshma Ragland MD   [facility] ([facility])    [address] [place] [address]  5th [facility] [address]   [phone]            Jan 02, 2018  7:45 AM CST   RETURN DIABETES with Danitza Rios PA-C   [facility] ([facility])    [address] [place] [id number] [phone]            Jan 18, 2018 11:00 AM CST   (Arrive by 10:45 AM)   Return Visit with SABI Ceja CNP   [facility] ([facility])    [address] [place] [id number] [phone]            Feb 05, 2018  7:00 AM CST   (Arrive by 6:45 AM)   RETURN DIABETES with Anne Marie Medina PA-C   [facility] ([facility])    [address] [place] [id number] [phone]            Mar 12, 2018  8:00 AM CDT   (Arrive by 7:45 AM)   RETURN DIABETES with Jimena Bragg MD   [facility] ([facility])    [address] [place] [id number] [phone]              Who to contact     Please call your clinic at [phone] to:    Ask questions about your health    Make or cancel appointments    Discuss your medicines    Learn about your test results    Speak to your doctor   If you have compliments or concerns about an experience at your clinic, or if you wish to file a  "complaint, please contact [facility] Patient Relations at [phone] or email us at [email or web address]         Additional Information About Your Visit        Com[employer] Information     [employer] gives you secure access to your electronic health record. If you see a primary care provider, you can also send messages to your care team and make appointments. If you have questions, please call your primary care clinic.  If you do not have a primary care provider, please call [phone] and they will assist you.      [employer] is an electronic gateway that provides easy, online access to your medical records. With [employer], you can request a clinic appointment, read your test results, renew a prescription or communicate with your care team.     To access your existing account, please contact your [facility] or call [phone] for assistance.        Care EveryWhere ID     This is your Care EveryWhere ID. This could be used by other organizations to access your [facility] medical records  [id number]        Your Vitals Were     Pulse Respirations Height BMI (Body Mass Index)          87 18 1.549 m (5' 1\") 34.58 kg/m2         Blood Pressure from Last 3 Encounters:   12/14/17 136/84   11/27/17 129/84   10/26/17 136/88    Weight from Last 3 Encounters:   12/14/17 83 kg (183 lb)   11/27/17 82.7 kg (182 lb 6.4 oz)   10/17/17 83.5 kg (184 lb)              Today, you had the following     No orders found for display       Primary Care Provider Office Phone # Fax #    Mariya GRACE Oneida, APRN [phone]       [address] [place] [address]  [facility] [address]        Equal Access to Services     SULAIMAN MARTINEZ : Hadii aad carey alvarado Socj, waaxda luqadaha, qaybta kaalmada adeegyada, marc bernal. So [facility] [phone].    ATENCIÓN: Si habla español, tiene a dykes disposición servicios gratuitos de asistencia lingüística. Llame " al [phone].    We comply with applicable federal civil rights laws and [place] laws. We do not discriminate on the basis of race, color, national origin, age, disability, sex, sexual orientation, or gender identity.            Thank you!     Thank you for choosing [facility] ORTH[facility] WALK IN CLINIC  for your care. Our goal is always to provide you with excellent care. Hearing back from our patients is one way we can continue to improve our services. Please take a few minutes to complete the written survey that you may receive in the mail after your visit with us. Thank you!             Your Updated Medication List - Protect others around you: Learn how to safely use, store and throw away your medicines at www.disposemymeds.org.          This list is accurate as of: 12/14/17 11:08 AM.  Always use your most recent med list.                   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 X 1/2'' short needle 1/2 cc. Use as directed.    Type 2 diabetes mellitus with other circulatory complications       blood glucose monitoring lancets     6 Box    Use to test blood sugar 6 times daily or as directed    Type 1 diabetes  mellitus with nephropathy (H)       blood glucose monitoring test strip    ACCU-CHEK LAYA PLUS    600 each    Laya Plus test strips for use in Accucheck Expert meter.  Use to test blood sugar 6 times daily. 3 month supply.  Send PA's to Dr. Mohamud.    Type 1 diabetes mellitus with nephropathy (H)       calcium-magnesium 500-250 MG Tabs per tablet    VERO-MAG    90 tablet    Take 1 tablet by mouth daily    Cramp of limb       dulaglutide 0.75 MG/0.5ML pen    TRULICITY    6 mL    Inject 0.75 mg Subcutaneous every 7 days    Uncontrolled type 2 diabetes mellitus with hyperglycemia, with long-term current use of insulin (H)       fenofibrate 160 MG tablet     90 tablet    Take 1 tablet (160 mg) by mouth daily    Mixed hyperlipidemia       ibuprofen 400 MG tablet    ADVIL/MOTRIN    200 tablet    Take 1 tablet (400 mg) by mouth every 6 hours as needed for pain    Headache(784.0), Pain in joint, lower leg, right       insulin aspart 100 UNIT/ML injection    NovoLOG PEN    63 mL    Admin Instructions: Before meals and bedtime correction sliding scale 120 - 160 - 1 U  161 - 200 - 2 U  201 - 240 - 3 U  241 - 280 - 4 U  281 - 320 - 5 U  321 - 360 - 6 U ...    Type 1 diabetes mellitus with complications (H)       ketoconazole 2 % cream    NIZORAL    30 g    Apply topically 2 times daily Apply externally thin amt bid    Candidiasis of perineum       levonorgestrel 20 MCG/24HR IUD    MIRENA    1 each    1 each (20 mcg) by Intrauterine route once for 1 dose    Encounter for insertion of intrauterine contraceptive device       losartan 25 MG tablet    COZAAR    90 tablet    Take 1 tablet (25 mg) by mouth daily    Uncontrolled type 2 diabetes mellitus with hyperglycemia, with long-term current use of insulin (H)       rizatriptan 10 MG tablet    MAXALT    30 tablet    Take 1 tab at onset of headache.  May repeat after 2 hours.    Headache(784.0)       vitamin D 24892 UNIT capsule    ERGOCALCIFEROL    12 capsule    Take 1 capsule  (50,000 Units) by mouth every 7 days    Vitamin deficiency

## 2017-12-14 NOTE — PROGRESS NOTES
" Subjective:   Nayeli Caal is a 37 year old female who is here complaining of right knee pain. She denies any BONNIE. She reports that her knee feels swollen  Hurts with walking, knee saeed.  Worse with walking.  Hurts so much.  Thought she should be seen because a lot worse over past 2 days.  Not exercise.  suprapatella and anterior knee pain- feels bone on bone.  Seen today with signing     Background:   Date of injury: 12/12/14   Duration of symptoms: 2 days  Mechanism of Injury: Insidious Onset; Unknown   Aggravating factors: Walking, stairs  Relieving Factors: rest  Prior Evaluation: None    PAST MEDICAL, SOCIAL, SURGICAL AND FAMILY HISTORY: She  has a past medical history of Diabetes mellitus (H); Hypertension; and Migraines. She also has no past medical history of Amblyopia or Retinal detachment.  She  has no past surgical history on file.  Her family history includes Unknown/Adopted in an other family member.  She reports that she quit smoking about 7 years ago. Her smoking use included Cigarettes. She has never used smokeless tobacco. She reports that she does not drink alcohol or use illicit drugs.      ALLERGIES: She has No Known Allergies.    CURRENT MEDICATIONS: She has a current medication list which includes the following prescription(s): losartan, dulaglutide, insulin aspart, basaglar, vitamin d, calcium-magnesium, aspirin, blood glucose monitoring, fenofibrate, atorvastatin, blood glucose monitoring, ketoconazole, BD ULTRA FINE PEN NEEDLES, ibuprofen, rizatriptan, alcohol prep pad, and levonorgestrel.     REVIEW OF SYSTEMS: 10 point review of systems is negative except as noted above.     Exam:   /84 (BP Location: Right arm, Patient Position: Sitting, Cuff Size: Adult Regular)  Pulse 87  Resp 18  Ht 1.549 m (5' 1\")  Wt 83 kg (183 lb)  BMI 34.58 kg/m2           CONSTITUTIONAL: alert, mild distress, cooperative and obese  HEAD: Normocephalic. No masses, lesions, " tenderness or abnormalities  SKIN: no suspicious lesions or rashes  GAIT: antalgic and obese pattern  NEUROLOGIC: Non-focal, Normal muscle tone and strength, reflexes normal, sensation grossly normal.  PSYCHIATRIC: affect normal/bright and mentation appears normal.    MUSCULOSKELETAL: right knee pain      Inspection:       AP/lateral alignment normal  Poor balance  Tender: suprapatella, medial aspect, effusion present      Non-tender: patellar facets, MCL, LCL, lateral joint line, medial joint line, IT band, posterior knee   Active Range of Motion: painful in deep flexion  Strength: quad  5-/5, Hamstrings  5-/5, Gastroc  5/5, Tibialis anterior  5-/5, Peroneals  5-/5 and core strength  4/5 hip abductors and other core muscles   Special tests: normal Valgus stress test, normal Varus, negative Lachman's test, no posterolateral corner signs, negative Markie's, no apprehension with lateral stress of the patella.         Assessment/Plan:   Pt is a 36 yo  female with PMHx of migraines, HTN, DM, deafness presenting with right knee swelling  1. Right knee suprapatella bursitis, weakness hip abductors, likely PFS component as well- ice, elevation, has crutches and brace at home, needs strengthening and will likely send to PT  Consider MRI if not improving  Pt to RTC 1 week to see if we can decrease the swelling present      X-RAY INTERPRETATION:   X-Ray of the Right Knee: 3-view, Friend, lateral, sunrise --interpreted in the office today was positive for Impression:   1. No fracture or dislocation in the right knee.  2. Mild joint effusion

## 2017-12-18 DIAGNOSIS — E11.9 DIABETES (H): Primary | ICD-10-CM

## 2017-12-20 NOTE — TELEPHONE ENCOUNTER
Last Clinic Visit: 10/17/2017  [facility]

## 2018-01-02 ENCOUNTER — OFFICE VISIT (OUTPATIENT)
Dept: ENDOCRINOLOGY | Facility: CLINIC | Age: 38
End: 2018-01-02
Payer: COMMERCIAL

## 2018-01-02 VITALS
HEART RATE: 91 BPM | DIASTOLIC BLOOD PRESSURE: 80 MMHG | SYSTOLIC BLOOD PRESSURE: 122 MMHG | WEIGHT: 185 LBS | HEIGHT: 61 IN | BODY MASS INDEX: 34.93 KG/M2

## 2018-01-02 DIAGNOSIS — Z79.4 UNCONTROLLED TYPE 2 DIABETES MELLITUS WITH HYPERGLYCEMIA, WITH LONG-TERM CURRENT USE OF INSULIN (H): Primary | ICD-10-CM

## 2018-01-02 DIAGNOSIS — E11.65 UNCONTROLLED TYPE 2 DIABETES MELLITUS WITH HYPERGLYCEMIA, WITH LONG-TERM CURRENT USE OF INSULIN (H): Primary | ICD-10-CM

## 2018-01-02 ASSESSMENT — PAIN SCALES - GENERAL: PAINLEVEL: NO PAIN (0)

## 2018-01-02 NOTE — MR AVS SNAPSHOT
After Visit Summary   1/2/2018    Nayeli Caal    MRN: [id number]           Patient Information     Date Of Birth          1980        Visit Information        Provider Department      1/2/2018 7:45 AM Greyson Schroeder; Danitza Rios PA-C [facility] Endocrinology        Care Instructions    It is good to meet you!    Today we changed your Novolog dose with meals from 1 unit per 5 grams of carbohydrate to just 1 unit per 8 grams carbohydrate.  Hopefully you will not be low after meals now.  Please continue to check your blood glucose before each meal, so we can be sure this is the right dose.     Also, please check your blood sugar fasting each morning, even if you feel well, so we can see if Basaglar dose is correct.      Please call right away if you are having numbers <70 still.    My best wishes,    Danitza Rios PA-C, [facility]  [facility]  Diabetes, Endocrinology, and Metabolism  [phone] Appointments/Nurse  [phone] nurse line  [phone] URGENTafter hours/weekend Endocrinologist on call              Follow-ups after your visit        Your next 10 appointments already scheduled     Jan 18, 2018 10:45 AM CST   Return Visit with SABI Ceja CNP   [facility] ([facility])    [address] [place] [id number] [phone]            Feb 05, 2018  7:00 AM CST   (Arrive by 6:45 AM)   RETURN DIABETES with Anne Marie Medina PA-C   [facility] ([facility])    [address] [place] [id number] [phone]            Mar 12, 2018  8:00 AM CDT   (Arrive by 7:45 AM)   RETURN DIABETES with Jimena Bragg MD   [facility] ([facility] [place])    [address] [place] [id number] [phone]              Who to contact     Please call your clinic at [phone]  "to:    Ask questions about your health    Make or cancel appointments    Discuss your medicines    Learn about your test results    Speak to your doctor   If you have compliments or concerns about an experience at your clinic, or if you wish to file a complaint, please contact [facility] Patient Relations at [phone] or email us at [email or web address]         Additional Information About Your Visit        [employer] Information     [employer] gives you secure access to your electronic health record. If you see a primary care provider, you can also send messages to your care team and make appointments. If you have questions, please call your primary care clinic.  If you do not have a primary care provider, please call [phone] and they will assist you.      [employer] is an electronic gateway that provides easy, online access to your medical records. With [employer], you can request a clinic appointment, read your test results, renew a prescription or communicate with your care team.     To access your existing account, please contact your [facility] or call [phone] for assistance.        Care EveryWhere ID     This is your Care EveryWhere ID. This could be used by other organizations to access your [place] medical records  [id number]        Your Vitals Were     Pulse Height BMI (Body Mass Index)             91 1.549 m (5' 1\") 34.96 kg/m2          Blood Pressure from Last 3 Encounters:   01/02/18 122/80   12/14/17 136/84   11/27/17 129/84    Weight from Last 3 Encounters:   01/02/18 83.9 kg (185 lb)   12/14/17 83 kg (183 lb)   11/27/17 82.7 kg (182 lb 6.4 oz)              Today, you had the following     No orders found for display         Today's Medication Changes          These changes are accurate as of: 1/2/18  9:37 AM.  If you have any questions, ask your nurse or doctor.               These medicines have changed or have updated " prescriptions.        Dose/Directions    BASAGLAR 100 UNIT/ML injection   This may have changed:  how much to take   Used for:  Type 2 diabetes mellitus with complication, with long-term current use of insulin (H)        Dose:  42 Units   Inject 42 Units Subcutaneous daily   Quantity:  30 mL   Refills:  3                Primary Care Provider Office Phone # Fax SABI Hodges [phone]       [address] [place] [address] [phone]  [facility] [address]        Equal Access to Services     SULAIMAN MARTINEZ AH: Hadii aad ku hadasho Soomaali, waaxda luqadaha, qaybta kaalmada adeegyada, waxay idiin hayaan adeeg kharash la'jordy . So [facility] [phone].    ATENCIÓN: Si habla español, tiene a dykes disposición servicios gratuitos de asistencia lingüística. [facility] [phone].    We comply with applicable federal civil rights laws and [place] laws. We do not discriminate on the basis of race, color, national origin, age, disability, sex, sexual orientation, or gender identity.            Thank you!     Thank you for choosing [facility] ENDOCRINOLOGY  for your care. Our goal is always to provide you with excellent care. Hearing back from our patients is one way we can continue to improve our services. Please take a few minutes to complete the written survey that you may receive in the mail after your visit with us. Thank you!             Your Updated Medication List - Protect others around you: Learn how to safely use, store and throw away your medicines at www.disposemymeds.org.          This list is accurate as of: 1/2/18  9:37 AM.  Always use your most recent med list.                   Brand Name Dispense Instructions for use Diagnosis    Alcohol Prep Pad 70 % Pads     100 each    1 each 3 times daily    Type 2 diabetes mellitus with other diabetic ophthalmic complication       aspirin 81 MG tablet     100 tablet    Take 1 tablet (81 mg) by mouth daily    Type 2 diabetes mellitus with moderate nonproliferative  diabetic retinopathy with macular edema, unspecified eye (H)       atorvastatin 40 MG tablet    LIPITOR    90 tablet    Take 1 tablet (40 mg) by mouth daily    Type 1 diabetes mellitus with complications (H)       BASAGLAR 100 UNIT/ML injection     30 mL    Inject 42 Units Subcutaneous daily    Type 2 diabetes mellitus with complication, with long-term current use of insulin (H)       BD ULTRA FINE PEN NEEDLES     200 Units    Inject 1 dose * Subcutaneous 2 times daily BD ultra-fine insulin syringe, 30 ga. X 1/2'' short needle 1/2 cc. Use as directed.    Type 2 diabetes mellitus with other circulatory complications       blood glucose monitoring lancets     6 Box    Use to test blood sugar 6 times daily or as directed    Type 1 diabetes mellitus with nephropathy (H)       blood glucose monitoring test strip    ACCU-CHEK LAYA PLUS    600 each    Laya Plus test strips for use in [employer] Expert meter.  Use to test blood sugar 6 times daily. 3 month supply.  Send PA's to Dr. Mohamud.    Type 1 diabetes mellitus with nephropathy (H)       calcium-magnesium 500-250 MG Tabs per tablet    VERO-MAG    90 tablet    Take 1 tablet by mouth daily    Cramp of limb       dulaglutide 0.75 MG/0.5ML pen    TRULICITY    6 mL    Inject 0.75 mg Subcutaneous every 7 days    Uncontrolled type 2 diabetes mellitus with hyperglycemia, with long-term current use of insulin (H)       fenofibrate 160 MG tablet     90 tablet    Take 1 tablet (160 mg) by mouth daily    Mixed hyperlipidemia       ibuprofen 400 MG tablet    ADVIL/MOTRIN    200 tablet    Take 1 tablet (400 mg) by mouth every 6 hours as needed for pain    Headache(784.0), Pain in joint, lower leg, right       insulin aspart 100 UNIT/ML injection    NovoLOG PEN    63 mL    Admin Instructions: Before meals and bedtime correction sliding scale 120 - 160 - 1 U  161 - 200 - 2 U  201 - 240 - 3 U  241 - 280 - 4 U  281 - 320 - 5 U  321 - 360 - 6 U ...    Type 1 diabetes mellitus with  complications (H)       insulin pen needle 31G X 8 MM    B-D U/F    200 each    Use 2 times daily or as directed    Diabetes (H)       ketoconazole 2 % cream    NIZORAL    30 g    Apply topically 2 times daily Apply externally thin amt bid    Candidiasis of perineum       levonorgestrel 20 MCG/24HR IUD    MIRENA    1 each    1 each (20 mcg) by Intrauterine route once for 1 dose    Encounter for insertion of intrauterine contraceptive device       losartan 25 MG tablet    COZAAR    90 tablet    Take 1 tablet (25 mg) by mouth daily    Uncontrolled type 2 diabetes mellitus with hyperglycemia, with long-term current use of insulin (H)       rizatriptan 10 MG tablet    MAXALT    30 tablet    Take 1 tab at onset of headache.  May repeat after 2 hours.    Headache(784.0)       vitamin D 87582 UNIT capsule    ERGOCALCIFEROL    12 capsule    Take 1 capsule (50,000 Units) by mouth every 7 days    Vitamin deficiency

## 2018-01-02 NOTE — NURSING NOTE
Chief Complaint   Patient presents with     RECHECK     F/U TYPE I DM     Jesika Perry, [facility]  Endocrinology & Diabetes 3G

## 2018-01-02 NOTE — PATIENT INSTRUCTIONS
It is good to meet you!    Today we changed your Novolog dose with meals from 1 unit per 5 grams of carbohydrate to just 1 unit per 8 grams carbohydrate.  Hopefully you will not be low after meals now.  Please continue to check your blood glucose before each meal, so we can be sure this is the right dose.     Also, please check your blood sugar fasting each morning, even if you feel well, so we can see if Basaglar dose is correct.      Please call right away if you are having numbers <70 still.    My best wishes,    Danitza Rios PA-C, MPAS  [facility]  Diabetes, Endocrinology, and Metabolism  [phone] Appointments/Nurse  [phone] nurse line  [phone] URGENTafter hours/weekend Endocrinologist on call

## 2018-01-02 NOTE — LETTER
1/2/2018       RE: Nayeli Caal  [address]  [facility] [address]     Dear Colleague,    Thank you for referring your patient, Nayeli Caal, to the [facility] ENDOCRINOLOGY at [facility]. Please see a copy of my visit note below.      Nayeli Caal is 37 year old female diagnosed with diabetes in the fall of the year 2000, while being investigated for headaches and increased urination.  The patient is adopted and she doesn't know her parents. She was treated with metformin until 2003, when she was started on insulin. In 9022-1466, she was treated with Bydureon for a period of time. Although she did report that the medication was effective in curbing her appetite, it didn't cause a significant improvement of her blood sugar numbers or weight loss. Over the years, she complied poorly with the recommended insulin regimen.   She had no prior episodes of diabetes ketoacidosis, despite completely discontinuing insulin for months at a time. Hemoglobin A1c has been variable between 8 and 12% over the years. In 2005, she had a C-peptide of 1.3 for a glucose level of 221. Over the last 2 years, her hemoglobin A1c has been around 12%. Most recent hemoglobin A1c was 11%, on 7/13/17. She is here to f/u after seeing Dr Bragg in November when her A1C was 13.4%.    Dr Bragg recommended:   - Check blood sugar consistently, 4 times daily, before meals and at bedtime  Use an insulin to carbohydrate ratio of 1 unit per 5 g for all meals and snacks - the AccuCheck meter was set up in the clinic.    - Increase the dose of Basaglar to 50 U and administer it at the same time of the day in the morning.   - Use the meter to calculate the NovoLog dose she should take for meals and correction  Have the meter downloaded in our clinic in 2 weeks    - Start Trulicity, at 0.75 mg weekly.    -  take both atorvastatin and fenofibrate  Daily.   -  resume taking 88943 U  QW  Vitamin D for deficiency.  Suspect the lowish calcium level might be due to vitamin D deficiency.  Advised to take TUMS for the occasional episodes of GERD.  - discontinue diltiazem and start taking Losartan, at 25 mg daily.      The patient is seen today for f/u of diabetes. She is seen with the help of a .     She report she is taking Trulicity daily without any noted nausea or other stomach upset.  She appreciates the medication and feels that has really limited her appetite which has helped her tremendously over the holidays.  She feels she is eating an appropriate amount and is not feeling urges to overeat and eat a lot of sweets outside of mealtime.   He is hopeful of losing weight in the coming year.      Although initially after seeing Dr. Garsia last 2 she was using her Accu-Chek meter to check her blood sugar 2-4 times a day she is now checking it just once daily in the last week.  She has continued to do carbohydrate counting and any correction mentally.  She does not know how to use the bolus advice.  She feels like the current carbohydrate ratio of 1 unit per 5 g of carb is too much insulin as if she typically has low blood sugar after meals even though she notes that when she was doing a 1-10 ratio her blood sugar was always high.      I reviewed the meter settings:  Time Block: 12:00am to 12:00am   Insulin to Carb Ratio: 5  (changed to 8 )  Correction Factor: 50  BG Target: 110-150     Insulin units for calculation: 1  Offset Time: 2 hours  Acting Time: 3 hours  Snack Size: 15 grams  Meal rise: 50 mg/dL    Review of her glucometer revealed that she 29 tests in the last month with an average blood glucose of 177 and a standard deviation of 68.  Her blood sugars ranged from .    Her fasting blood glucose average 213.  Late morning blood sugars average 140 included a hypoglycemia at 44 and several in the 60's. Blood sugar at the noon hour averaged 139 and she twice  checked blood sugar after dinner and both times it was high 218, to 341.    Diabetes complications:  Retinopathy: last eye exam - June 2017. 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   Nephropathy: H/o proteinuria (most recent urine microalbumin positive in January 2017); normal GFR  Neuropathy: occasional numbness or tingling sensation in her hands, no pain   She is symptomatic for hypoglycemia and she continues to have symptoms when her blood glucose is below 80 ( shakiness, dizziness).  She does have glucagon at home.    Nayeli has a history of dyslipidemia, currently medicated with 40 mg atorvastatin and 160 mg fenofibrate daily (added in 2015).  She has no prior history of pancreatitis.        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    Current Medications     Current Outpatient Prescriptions:      insulin pen needle (B-D U/F) 31G X 8 MM, Use 2 times daily or as directed, Disp: 200 each, Rfl: 3     losartan (COZAAR) 25 MG tablet, Take 1 tablet (25 mg) by mouth daily, Disp: 90 tablet, Rfl: 3     dulaglutide (TRULICITY) 0.75 MG/0.5ML pen, Inject 0.75 mg Subcutaneous every 7 days, Disp: 6 mL, Rfl: 3     insulin aspart (NOVOLOG PEN) 100 UNIT/ML injection, Admin Instructions: Before meals and bedtime correction sliding scale 120 - 160 - 1 U  161 - 200 - 2 U  201 - 240 - 3 U  241 - 280 - 4 U  281 - 320 - 5 U  321 - 360 - 6 U ..., Disp: 63 mL, Rfl: 3     insulin glargine (BASAGLAR KWIKPEN) 100 UNIT/ML injection, Inject 42 Units Subcutaneous daily (Patient taking differently: Inject 50 Units Subcutaneous daily ), Disp: 30 mL, Rfl: 3     vitamin D (ERGOCALCIFEROL) 98969 UNIT capsule, Take 1 capsule (50,000 Units) by mouth every 7 days, Disp: 12 capsule, Rfl: 3     calcium-magnesium (VERO-MAG) 500-250 MG TABS per  tablet, Take 1 tablet by mouth daily, Disp: 90 tablet, Rfl: 3     aspirin 81 MG tablet, Take 1 tablet (81 mg) by mouth daily, Disp: 100 tablet, Rfl: 3     blood glucose monitoring (ACCU-CHEK FASTCLIX) lancets, Use to test blood sugar 6 times daily or as directed, Disp: 6 Box, Rfl: 3     fenofibrate 160 MG tablet, Take 1 tablet (160 mg) by mouth daily, Disp: 90 tablet, Rfl: 3     atorvastatin (LIPITOR) 40 MG tablet, Take 1 tablet (40 mg) by mouth daily, Disp: 90 tablet, Rfl: 3     blood glucose monitoring (ACCU-CHEK LAYA PLUS) test strip, Laya Plus test strips for use in Accucheck Expert meter.  Use to test blood sugar 6 times daily. 3 month supply.  Send PA's to Dr. Mohamud., Disp: 600 each, Rfl: 3     ketoconazole (NIZORAL) 2 % cream, Apply topically 2 times daily Apply externally thin amt bid, Disp: 30 g, Rfl: 1     BD ULTRA FINE PEN NEEDLES, Inject 1 dose * Subcutaneous 2 times daily BD ultra-fine insulin syringe, 30 ga. X 1/2'' short needle 1/2 cc. Use as directed., Disp: 200 Units, Rfl: 11     ibuprofen (ADVIL,MOTRIN) 400 MG tablet, Take 1 tablet (400 mg) by mouth every 6 hours as needed for pain, Disp: 200 tablet, Rfl: 1     rizatriptan (MAXALT) 10 MG tablet, Take 1 tab at onset of headache.  May repeat after 2 hours., Disp: 30 tablet, Rfl: 3     Alcohol Swabs (ALCOHOL PREP PAD) 70 % PADS, 1 each 3 times daily, Disp: 100 each, Rfl: 3     levonorgestrel (MIRENA) 20 MCG/24HR IUD, 1 each (20 mcg) by Intrauterine route once for 1 dose, Disp: 1 each, Rfl: 0     [DISCONTINUED] BD ULTRA FINE PEN NEEDLES, Inject 1 dose Subcutaneous 2 times daily BD ultra-fine insulin syringe, 30 ga. X 1/2'' short needle 1/2 cc. Use as directed. (Patient taking differently: Inject 1 dose * Subcutaneous 2 times daily BD ultra-fine insulin syringe, 30 ga. X 1/2'' short needle 1/2 cc. Use as directed.), Disp: 200 Units, Rfl: 11    Family History   Problem Relation Age of Onset     Unknown/Adopted Other      Social History  Single. No  "children.  She smoked for almost 10 years, on and off, up to 1 PPD; quit smoking 2000. She only drinks alcohol occasionally.      Review of Systems   ROS:   Patient denies any fevers, chills or sweats as well as any changes in vision, problems with floaters or field cuts in vision, pain or problems with dentition, new or different headaches.  Patients denies marked fatigue, cough, shortness of breath, chest pain or pressure.  There has been no pain with or other changes in urination or  itching or pain in genital areas.  Patient denies any noted swelling in feet, ankles or otherwise, loss of sensation or pain  in feet or other areas.    Patient also denies current difficulties with depressed mood, anhedonia or worrying too much.        Vital Signs     Previous Weights:    Wt Readings from Last 10 Encounters:   01/02/18 83.9 kg (185 lb)   12/14/17 83 kg (183 lb)   11/27/17 82.7 kg (182 lb 6.4 oz)   10/17/17 83.5 kg (184 lb)   08/21/17 84.4 kg (186 lb)   07/13/17 82.1 kg (181 lb)   06/05/17 80.3 kg (177 lb)   05/15/17 80.4 kg (177 lb 3.2 oz)   03/21/17 78 kg (172 lb)   02/20/17 78 kg (171 lb 14.4 oz)       Vitals:    01/02/18 0825   BP: 122/80   Pulse: 91   Weight: 83.9 kg (185 lb)   Height: 1.549 m (5' 1\")       Physical Exam  General Appearance:  she is well developed, overweight female in no distress seen with .  HEENT:   oropharynx clear and moist, no JVD or visible masses   Respiratory: Respirations are easy and unlabored  Musculoskeletal: normal tone and strength  Psychiatric: affect and judgment normal.  Thought form and content of fluid and coherent  Skin: warm, no lesions   Neurological: reflexes normal and symmetric, no resting tremor    Lab Results:  I reviewed prior lab results documented in Epic  Lab Results   Component Value Date    A1C 13.4 (H) 11/27/2017    A1C 11.0 (H) 07/13/2017    A1C 10.9 (H) 05/15/2017    A1C 12.8 (H) 01/02/2017    A1C 13.0 (H) 05/11/2016    HEMOGLOBINA1 " 12.0 (A) 02/03/2014    HEMOGLOBINA1 10.3 (A) 10/28/2013    HEMOGLOBINA1 11.3 (A) 08/06/2013    HEMOGLOBINA1 13.1 (H) 08/12/2011    HEMOGLOBINA1 8.9 (H) 05/21/2010       Hemoglobin   Date Value Ref Range Status   07/25/2016 14.0 11.7 - 15.7 g/dL Final     Hematocrit   Date Value Ref Range Status   11/27/2017 39.6 35.0 - 47.0 % Final     Cholesterol   Date Value Ref Range Status   11/27/2017 216 (H) <200 mg/dL Final     Comment:     Desirable:       <200 mg/dl     Cholesterol/HDL Ratio   Date Value Ref Range Status   09/16/2013 5.8 (H) 0.0 - 5.0 Final     HDL Cholesterol   Date Value Ref Range Status   11/27/2017 29 (L) >49 mg/dL Final     LDL Cholesterol Calculated   Date Value Ref Range Status   11/27/2017 114 (H) <100 mg/dL Final     Comment:     Above desirable:  100-129 mg/dl  Borderline High:  130-159 mg/dL  High:             160-189 mg/dL  Very high:       >189 mg/dl       VLDL-Cholesterol   Date Value Ref Range Status   09/16/2013 44 (H) 0 - 30 mg/dL Final     Triglycerides   Date Value Ref Range Status   11/27/2017 367 (H) <150 mg/dL Final     Comment:     Borderline high:  150-199 mg/dl  High:             200-499 mg/dl  Very high:       >499 mg/dl       Albumin Urine mg/L   Date Value Ref Range Status   11/27/2017 217 mg/L Final     TSH   Date Value Ref Range Status   11/27/2017 1.92 0.40 - 4.00 mU/L Final         Last Basic Metabolic Panel:    Sodium   Date Value Ref Range Status   11/27/2017 136 133 - 144 mmol/L Final     Potassium   Date Value Ref Range Status   11/27/2017 3.7 3.4 - 5.3 mmol/L Final     Chloride   Date Value Ref Range Status   11/27/2017 105 94 - 109 mmol/L Final     Calcium   Date Value Ref Range Status   11/27/2017 8.2 (L) 8.5 - 10.1 mg/dL Final     Carbon Dioxide   Date Value Ref Range Status   11/27/2017 23 20 - 32 mmol/L Final     Urea Nitrogen   Date Value Ref Range Status   11/27/2017 12 7 - 30 mg/dL Final     Creatinine   Date Value Ref Range Status   11/27/2017 0.51 (L) 0.52 -  1.04 mg/dL Final     GFR Estimate   Date Value Ref Range Status   11/27/2017 >90 >60 mL/min/1.7m2 Final     Comment:     Non  GFR Calc     Glucose   Date Value Ref Range Status   11/27/2017 270 (H) 70 - 99 mg/dL Final       AST   Date Value Ref Range Status   11/27/2017 21 0 - 45 U/L Final     ALT   Date Value Ref Range Status   11/27/2017 47 0 - 50 U/L Final     Albumin   Date Value Ref Range Status   11/27/2017 3.5 3.4 - 5.0 g/dL Final     Lab Results   Component Value Date    MICROL 217 11/27/2017     No results found for: MICROALBUMIN      Assessment and plan:  Mildly is a 37-year-old poorly controlled type II diabetic who is now attempting to be on multiple daily insulin injections together with recently initiated Trulicity.    1. 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   We spent most of today's visit reviewing how to use bolus advice on her Accu-Chek meter.  We agreed to try a 1 unit per 8 g of carbohydrate ratio and spent a good deal of time reviewing how this would work and should be calculated again with and without bolus advice calculator from her meter.  We also reviewed recognition and treatment of hypoglycemia reviewing how and why 4 times daily checking and correcting blood sugar works well and very briefly reviewed correction.  I do think she will benefit from further education.    At this point we will continue 50 units daily of Basaglar and she agrees to check her fasting blood sugar regularly and call clinic if it is regularly outside of the 100-160 range.  She will review its effectiveness when she sees Ms. Medina early next month.    She will continue Trulicity, at 0.75 mg weekly.     She reported understanding today's instructions and denied having any further questions at the end of the visit whereupon she was able to verbalize how to calculate carbohydrate coverage for meals with the new 1 unit per 8 g of carb ratio.   She agrees to call clinic if she continues to have any low blood sugars.    2. Dyslipidemia   Suggest review adherence to therapy at return to clinic.    3. Vitamin D deficiency.:  Medication adherence can be reviewed and levels rechecked at her return to clinic next month.    4. Proteinuria/Hypertension  Her microalbuminuria was elevated in November and she is now taking Losartan, at 25 mg daily.  Blood pressure is at goal GFR and creatinine are both stable.  One might consider recheck of microalbuminuria return to clinic.    5.  Weight:   Patient is encouraged in being mindful about her eating over eating less and maintaining her weight over the holidays.  Continue to follow and offer support.    >50% of 45 minute visit today was spent in counseling and education as described above.    It is my privilege to be involved in the care of the above patient.     Danitza Rios PA-C, MPAS  [facility]  Diabetes, Endocrinology, and Metabolism  [phone] Appointments/Nurse  [phone] pager  [phone] nurse line    This note was completed in part using Dragon voice recognition, and may contain word and grammatical errors.

## 2018-01-02 NOTE — PROGRESS NOTES
Nayeli Caal is 37 year old female diagnosed with diabetes in the fall of the year 2000, while being investigated for headaches and increased urination.  The patient is adopted and she doesn't know her parents. She was treated with metformin until 2003, when she was started on insulin. In 2623-3904, she was treated with Bydureon for a period of time. Although she did report that the medication was effective in curbing her appetite, it didn't cause a significant improvement of her blood sugar numbers or weight loss. Over the years, she complied poorly with the recommended insulin regimen.   She had no prior episodes of diabetes ketoacidosis, despite completely discontinuing insulin for months at a time. Hemoglobin A1c has been variable between 8 and 12% over the years. In 2005, she had a C-peptide of 1.3 for a glucose level of 221. Over the last 2 years, her hemoglobin A1c has been around 12%. Most recent hemoglobin A1c was 11%, on 7/13/17. She is here to f/u after seeing Dr Bragg in November when her A1C was 13.4%.    Dr Bragg recommended:   - Check blood sugar consistently, 4 times daily, before meals and at bedtime  Use an insulin to carbohydrate ratio of 1 unit per 5 g for all meals and snacks - the AccuCheck meter was set up in the clinic.    - Increase the dose of Basaglar to 50 U and administer it at the same time of the day in the morning.   - Use the meter to calculate the NovoLog dose she should take for meals and correction  Have the meter downloaded in our clinic in 2 weeks    - Start Trulicity, at 0.75 mg weekly.    -  take both atorvastatin and fenofibrate  Daily.   -  resume taking 42979 U QW  Vitamin D for deficiency.  Suspect the lowish calcium level might be due to vitamin D deficiency.  Advised to take TUMS for the occasional episodes of GERD.  - discontinue diltiazem and start taking Losartan, at 25 mg daily.      The patient is seen today for f/u of diabetes. She is seen with  the help of a .     She report she is taking Trulicity daily without any noted nausea or other stomach upset.  She appreciates the medication and feels that has really limited her appetite which has helped her tremendously over the holidays.  She feels she is eating an appropriate amount and is not feeling urges to overeat and eat a lot of sweets outside of mealtime.   He is hopeful of losing weight in the coming year.      Although initially after seeing Dr. Garsia last 2 she was using her Accu-Chek meter to check her blood sugar 2-4 times a day she is now checking it just once daily in the last week.  She has continued to do carbohydrate counting and any correction mentally.  She does not know how to use the bolus advice.  She feels like the current carbohydrate ratio of 1 unit per 5 g of carb is too much insulin as if she typically has low blood sugar after meals even though she notes that when she was doing a 1-10 ratio her blood sugar was always high.      I reviewed the meter settings:  Time Block: 12:00am to 12:00am   Insulin to Carb Ratio: 5  (changed to 8 )  Correction Factor: 50  BG Target: 110-150     Insulin units for calculation: 1  Offset Time: 2 hours  Acting Time: 3 hours  Snack Size: 15 grams  Meal rise: 50 mg/dL    Review of her glucometer revealed that she 29 tests in the last month with an average blood glucose of 177 and a standard deviation of 68.  Her blood sugars ranged from .    Her fasting blood glucose average 213.  Late morning blood sugars average 140 included a hypoglycemia at 44 and several in the 60's. Blood sugar at the noon hour averaged 139 and she twice checked blood sugar after dinner and both times it was high 218, to 341.    Diabetes complications:  Retinopathy: last eye exam - June 2017. 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   Nephropathy: H/o proteinuria (most recent urine microalbumin positive in January 2017); normal GFR  Neuropathy: occasional numbness  or tingling sensation in her hands, no pain   She is symptomatic for hypoglycemia and she continues to have symptoms when her blood glucose is below 80 ( shakiness, dizziness).  She does have glucagon at home.    Nayeli has a history of dyslipidemia, currently medicated with 40 mg atorvastatin and 160 mg fenofibrate daily (added in 2015).  She has no prior history of pancreatitis.        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    Current Medications     Current Outpatient Prescriptions:      insulin pen needle (B-D U/F) 31G X 8 MM, Use 2 times daily or as directed, Disp: 200 each, Rfl: 3     losartan (COZAAR) 25 MG tablet, Take 1 tablet (25 mg) by mouth daily, Disp: 90 tablet, Rfl: 3     dulaglutide (TRULICITY) 0.75 MG/0.5ML pen, Inject 0.75 mg Subcutaneous every 7 days, Disp: 6 mL, Rfl: 3     insulin aspart (NOVOLOG PEN) 100 UNIT/ML injection, Admin Instructions: Before meals and bedtime correction sliding scale 120 - 160 - 1 U  161 - 200 - 2 U  201 - 240 - 3 U  241 - 280 - 4 U  281 - 320 - 5 U  321 - 360 - 6 U ..., Disp: 63 mL, Rfl: 3     insulin glargine (BASAGLAR KWIKPEN) 100 UNIT/ML injection, Inject 42 Units Subcutaneous daily (Patient taking differently: Inject 50 Units Subcutaneous daily ), Disp: 30 mL, Rfl: 3     vitamin D (ERGOCALCIFEROL) 47239 UNIT capsule, Take 1 capsule (50,000 Units) by mouth every 7 days, Disp: 12 capsule, Rfl: 3     calcium-magnesium (VERO-MAG) 500-250 MG TABS per tablet, Take 1 tablet by mouth daily, Disp: 90 tablet, Rfl: 3     aspirin 81 MG tablet, Take 1 tablet (81 mg) by mouth daily, Disp: 100 tablet, Rfl: 3     blood glucose monitoring (ACCU-CHEK FASTCLIX) lancets, Use to test blood sugar 6 times daily or as directed, Disp: 6 Box, Rfl: 3     fenofibrate  160 MG tablet, Take 1 tablet (160 mg) by mouth daily, Disp: 90 tablet, Rfl: 3     atorvastatin (LIPITOR) 40 MG tablet, Take 1 tablet (40 mg) by mouth daily, Disp: 90 tablet, Rfl: 3     blood glucose monitoring (ACCU-CHEK LAYA PLUS) test strip, Laya Plus test strips for use in Accucheck Expert meter.  Use to test blood sugar 6 times daily. 3 month supply.  Send PA's to Dr. Mohamud., Disp: 600 each, Rfl: 3     ketoconazole (NIZORAL) 2 % cream, Apply topically 2 times daily Apply externally thin amt bid, Disp: 30 g, Rfl: 1     BD ULTRA FINE PEN NEEDLES, Inject 1 dose * Subcutaneous 2 times daily BD ultra-fine insulin syringe, 30 ga. X 1/2'' short needle 1/2 cc. Use as directed., Disp: 200 Units, Rfl: 11     ibuprofen (ADVIL,MOTRIN) 400 MG tablet, Take 1 tablet (400 mg) by mouth every 6 hours as needed for pain, Disp: 200 tablet, Rfl: 1     rizatriptan (MAXALT) 10 MG tablet, Take 1 tab at onset of headache.  May repeat after 2 hours., Disp: 30 tablet, Rfl: 3     Alcohol Swabs (ALCOHOL PREP PAD) 70 % PADS, 1 each 3 times daily, Disp: 100 each, Rfl: 3     levonorgestrel (MIRENA) 20 MCG/24HR IUD, 1 each (20 mcg) by Intrauterine route once for 1 dose, Disp: 1 each, Rfl: 0     [DISCONTINUED] BD ULTRA FINE PEN NEEDLES, Inject 1 dose Subcutaneous 2 times daily BD ultra-fine insulin syringe, 30 ga. X 1/2'' short needle 1/2 cc. Use as directed. (Patient taking differently: Inject 1 dose * Subcutaneous 2 times daily BD ultra-fine insulin syringe, 30 ga. X 1/2'' short needle 1/2 cc. Use as directed.), Disp: 200 Units, Rfl: 11    Family History   Problem Relation Age of Onset     Unknown/Adopted Other      Social History  Single. No children.  She smoked for almost 10 years, on and off, up to 1 PPD; quit smoking 2000. She only drinks alcohol occasionally.      Review of Systems   ROS:   Patient denies any fevers, chills or sweats as well as any changes in vision, problems with floaters or field cuts in vision, pain or problems  "with dentition, new or different headaches.  Patients denies marked fatigue, cough, shortness of breath, chest pain or pressure.  There has been no pain with or other changes in urination or  itching or pain in genital areas.  Patient denies any noted swelling in feet, ankles or otherwise, loss of sensation or pain  in feet or other areas.    Patient also denies current difficulties with depressed mood, anhedonia or worrying too much.        Vital Signs     Previous Weights:    Wt Readings from Last 10 Encounters:   01/02/18 83.9 kg (185 lb)   12/14/17 83 kg (183 lb)   11/27/17 82.7 kg (182 lb 6.4 oz)   10/17/17 83.5 kg (184 lb)   08/21/17 84.4 kg (186 lb)   07/13/17 82.1 kg (181 lb)   06/05/17 80.3 kg (177 lb)   05/15/17 80.4 kg (177 lb 3.2 oz)   03/21/17 78 kg (172 lb)   02/20/17 78 kg (171 lb 14.4 oz)       Vitals:    01/02/18 0825   BP: 122/80   Pulse: 91   Weight: 83.9 kg (185 lb)   Height: 1.549 m (5' 1\")       Physical Exam  General Appearance:  she is well developed, overweight female in no distress seen with .  HEENT:   oropharynx clear and moist, no JVD or visible masses   Respiratory: Respirations are easy and unlabored  Musculoskeletal: normal tone and strength  Psychiatric: affect and judgment normal.  Thought form and content of fluid and coherent  Skin: warm, no lesions   Neurological: reflexes normal and symmetric, no resting tremor    Lab Results:  I reviewed prior lab results documented in Epic  Lab Results   Component Value Date    A1C 13.4 (H) 11/27/2017    A1C 11.0 (H) 07/13/2017    A1C 10.9 (H) 05/15/2017    A1C 12.8 (H) 01/02/2017    A1C 13.0 (H) 05/11/2016    HEMOGLOBINA1 12.0 (A) 02/03/2014    HEMOGLOBINA1 10.3 (A) 10/28/2013    HEMOGLOBINA1 11.3 (A) 08/06/2013    HEMOGLOBINA1 13.1 (H) 08/12/2011    HEMOGLOBINA1 8.9 (H) 05/21/2010       Hemoglobin   Date Value Ref Range Status   07/25/2016 14.0 11.7 - 15.7 g/dL Final     Hematocrit   Date Value Ref Range Status "   11/27/2017 39.6 35.0 - 47.0 % Final     Cholesterol   Date Value Ref Range Status   11/27/2017 216 (H) <200 mg/dL Final     Comment:     Desirable:       <200 mg/dl     Cholesterol/HDL Ratio   Date Value Ref Range Status   09/16/2013 5.8 (H) 0.0 - 5.0 Final     HDL Cholesterol   Date Value Ref Range Status   11/27/2017 29 (L) >49 mg/dL Final     LDL Cholesterol Calculated   Date Value Ref Range Status   11/27/2017 114 (H) <100 mg/dL Final     Comment:     Above desirable:  100-129 mg/dl  Borderline High:  130-159 mg/dL  High:             160-189 mg/dL  Very high:       >189 mg/dl       VLDL-Cholesterol   Date Value Ref Range Status   09/16/2013 44 (H) 0 - 30 mg/dL Final     Triglycerides   Date Value Ref Range Status   11/27/2017 367 (H) <150 mg/dL Final     Comment:     Borderline high:  150-199 mg/dl  High:             200-499 mg/dl  Very high:       >499 mg/dl       Albumin Urine mg/L   Date Value Ref Range Status   11/27/2017 217 mg/L Final     TSH   Date Value Ref Range Status   11/27/2017 1.92 0.40 - 4.00 mU/L Final         Last Basic Metabolic Panel:    Sodium   Date Value Ref Range Status   11/27/2017 136 133 - 144 mmol/L Final     Potassium   Date Value Ref Range Status   11/27/2017 3.7 3.4 - 5.3 mmol/L Final     Chloride   Date Value Ref Range Status   11/27/2017 105 94 - 109 mmol/L Final     Calcium   Date Value Ref Range Status   11/27/2017 8.2 (L) 8.5 - 10.1 mg/dL Final     Carbon Dioxide   Date Value Ref Range Status   11/27/2017 23 20 - 32 mmol/L Final     Urea Nitrogen   Date Value Ref Range Status   11/27/2017 12 7 - 30 mg/dL Final     Creatinine   Date Value Ref Range Status   11/27/2017 0.51 (L) 0.52 - 1.04 mg/dL Final     GFR Estimate   Date Value Ref Range Status   11/27/2017 >90 >60 mL/min/1.7m2 Final     Comment:     Non  GFR Calc     Glucose   Date Value Ref Range Status   11/27/2017 270 (H) 70 - 99 mg/dL Final       AST   Date Value Ref Range Status   11/27/2017 21 0 - 45  U/L Final     ALT   Date Value Ref Range Status   11/27/2017 47 0 - 50 U/L Final     Albumin   Date Value Ref Range Status   11/27/2017 3.5 3.4 - 5.0 g/dL Final     Lab Results   Component Value Date    MICROL 217 11/27/2017     No results found for: MICROALBUMIN      Assessment and plan:  Mildly is a 37-year-old poorly controlled type II diabetic who is now attempting to be on multiple daily insulin injections together with recently initiated Trulicity.    1. 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   We spent most of today's visit reviewing how to use bolus advice on her Accu-Chek meter.  We agreed to try a 1 unit per 8 g of carbohydrate ratio and spent a good deal of time reviewing how this would work and should be calculated again with and without bolus advice calculator from her meter.  We also reviewed recognition and treatment of hypoglycemia reviewing how and why 4 times daily checking and correcting blood sugar works well and very briefly reviewed correction.  I do think she will benefit from further education.    At this point we will continue 50 units daily of Basaglar and she agrees to check her fasting blood sugar regularly and call clinic if it is regularly outside of the 100-160 range.  She will review its effectiveness when she sees Ms. Medina early next month.    She will continue Trulicity, at 0.75 mg weekly.     She reported understanding today's instructions and denied having any further questions at the end of the visit whereupon she was able to verbalize how to calculate carbohydrate coverage for meals with the new 1 unit per 8 g of carb ratio.  She agrees to call clinic if she continues to have any low blood sugars.    2. Dyslipidemia   Suggest review adherence to therapy at return to clinic.    3. Vitamin D deficiency.:  Medication adherence can be reviewed and levels rechecked at her return to clinic next month.    4.  Proteinuria/Hypertension  Her microalbuminuria was elevated in November and she is now taking Losartan, at 25 mg daily.  Blood pressure is at goal GFR and creatinine are both stable.  One might consider recheck of microalbuminuria return to clinic.    5.  Weight:   Patient is encouraged in being mindful about her eating over eating less and maintaining her weight over the holidays.  Continue to follow and offer support.    >50% of 45 minute visit today was spent in counseling and education as described above.    It is my privilege to be involved in the care of the above patient.     Danitza Rios PA-C, MPAS  [facility]  Diabetes, Endocrinology, and Metabolism  [phone] Appointments/Nurse  [phone] pager  [phone] nurse line    This note was completed in part using Dragon voice recognition, and may contain word and grammatical errors.

## 2018-01-18 ENCOUNTER — OFFICE VISIT (OUTPATIENT)
Dept: INTERNAL MEDICINE | Facility: CLINIC | Age: 38
End: 2018-01-18
Payer: COMMERCIAL

## 2018-01-18 VITALS
WEIGHT: 183.9 LBS | HEART RATE: 80 BPM | SYSTOLIC BLOOD PRESSURE: 117 MMHG | DIASTOLIC BLOOD PRESSURE: 79 MMHG | BODY MASS INDEX: 34.75 KG/M2

## 2018-01-18 DIAGNOSIS — E10.319 DIABETIC RETINOPATHY OF BOTH EYES ASSOCIATED WITH TYPE 1 DIABETES MELLITUS, MACULAR EDEMA PRESENCE UNSPECIFIED, UNSPECIFIED RETINOPATHY SEVERITY (H): ICD-10-CM

## 2018-01-18 DIAGNOSIS — E78.2 MIXED HYPERLIPIDEMIA: ICD-10-CM

## 2018-01-18 DIAGNOSIS — R31.9 HEMATURIA, UNSPECIFIED TYPE: ICD-10-CM

## 2018-01-18 DIAGNOSIS — N76.0 VAGINOSIS: ICD-10-CM

## 2018-01-18 DIAGNOSIS — E11.8 TYPE 2 DIABETES MELLITUS WITH COMPLICATION, WITH LONG-TERM CURRENT USE OF INSULIN (H): ICD-10-CM

## 2018-01-18 DIAGNOSIS — Z79.4 TYPE 2 DIABETES MELLITUS WITH COMPLICATION, WITH LONG-TERM CURRENT USE OF INSULIN (H): ICD-10-CM

## 2018-01-18 DIAGNOSIS — I10 ESSENTIAL HYPERTENSION: Primary | ICD-10-CM

## 2018-01-18 LAB
ALBUMIN UR-MCNC: NEGATIVE MG/DL
APPEARANCE UR: CLEAR
BILIRUB UR QL STRIP: NEGATIVE
COLOR UR AUTO: ABNORMAL
GLUCOSE UR STRIP-MCNC: NEGATIVE MG/DL
HGB UR QL STRIP: ABNORMAL
KETONES UR STRIP-MCNC: NEGATIVE MG/DL
LEUKOCYTE ESTERASE UR QL STRIP: NEGATIVE
M PNEUMO DNA SPEC QL NAA+PROBE: NORMAL
NITRATE UR QL: NEGATIVE
PH UR STRIP: 5 PH (ref 5–7)
RBC #/AREA URNS AUTO: 1 /HPF (ref 0–2)
SOURCE: ABNORMAL
SP GR UR STRIP: 1.01 (ref 1–1.03)
SPECIMEN SOURCE: NORMAL
SPECIMEN SOURCE: NORMAL
SQUAMOUS #/AREA URNS AUTO: <1 /HPF (ref 0–1)
UROBILINOGEN UR STRIP-MCNC: 0 MG/DL (ref 0–2)
WBC #/AREA URNS AUTO: 1 /HPF (ref 0–2)
WET PREP SPEC: NORMAL

## 2018-01-18 RX ORDER — FENOFIBRATE 160 MG/1
160 TABLET ORAL DAILY
Qty: 90 TABLET | Refills: 3 | Status: SHIPPED | OUTPATIENT
Start: 2018-01-18 | End: 2019-04-22

## 2018-01-18 RX ORDER — INSULIN GLARGINE 100 [IU]/ML
50 INJECTION, SOLUTION SUBCUTANEOUS DAILY
Qty: 15 ML | Refills: 3 | Status: SHIPPED | OUTPATIENT
Start: 2018-01-18 | End: 2018-03-02

## 2018-01-18 ASSESSMENT — PAIN SCALES - GENERAL: PAINLEVEL: NO PAIN (0)

## 2018-01-18 NOTE — NURSING NOTE
Chief Complaint   Patient presents with     Hypertension     Follow up.      Diabetes     Follow up.        Rooming Note  Health Maintenance   There are no preventive care reminders to display for this patient. All health maintenance items UP TO DATE

## 2018-01-18 NOTE — MR AVS SNAPSHOT
After Visit Summary   1/18/2018    Nayeli Caal    MRN: [id number]           Patient Information     Date Of Birth          1980        Visit Information        Provider Department      1/18/2018 10:45 AM Mariya Mohamud APRN CNP; ASL IS [facility]        Today's Diagnoses     Essential hypertension    -  1    Type 2 diabetes mellitus with complication, with long-term current use of insulin (H)        Diabetic retinopathy of both eyes associated with type 1 diabetes mellitus, macular edema presence unspecified, unspecified retinopathy severity (H)        Hematuria, unspecified type        Vaginosis          Care Instructions    I will send you a My Chart message with results and whether you need treatment.          Follow-ups after your visit        Follow-up notes from your care team     Return in about 4 months (around 5/18/2018).      Your next 10 appointments already scheduled     Feb 05, 2018  6:45 AM CST   RETURN DIABETES with Anne Marie Medina PA-C   [facility] ([facility])    [address] [place] [address]  3rd [facility] [id number] [phone]            Mar 12, 2018  8:00 AM CDT   (Arrive by 7:45 AM)   RETURN DIABETES with Jimena Bragg MD   [facility] ([facility])    [address] [place] [id number] [phone]            May 17, 2018  2:40 PM CDT   (Arrive by 2:25 PM)   Return Visit with SABI Ceja CNP   [facility] ([facility])    [address] [place] [id number]   [phone]              Future tests that were ordered for you today     Open Future Orders        Priority Expected Expires Ordered    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 Patient Relations at [phone] or email us at [email or web address]         Additional Information About Your Visit        [employer] Information     [employer] gives you secure access to your electronic health record. If you see a primary care provider, you can also send messages to your care team and make appointments. If you have questions, please call your primary care clinic.  If you do not have a primary care provider, please call [phone] and they will assist you.      [employer] is an electronic gateway that provides easy, online access to your medical records. With [employer], you can request a clinic appointment, read your test results, renew a prescription or communicate with your care team.     To access your existing account, please contact your [facility] or call [phone] for assistance.        Care EveryWhere ID     This is your Care EveryWhere ID. This could be used by other organizations to access your [place] medical records  [id number]        Your Vitals Were     Pulse BMI (Body Mass Index)                80 34.75 kg/m2           Blood Pressure from Last 3 Encounters:   01/18/18 117/79   01/02/18 122/80   12/14/17 136/84    Weight from Last 3 Encounters:   01/18/18 83.4 kg (183 lb 14.4 oz)   01/02/18 83.9 kg (185 lb)   12/14/17 83 kg (183 lb)              We Performed the Following     C. trachomatis PCR - Endocervical Swab, Clinic Collect     N. gonorrhea PCR - Endocervical Swab, Clinic Collect     Wet prep          Today's Medication Changes          These changes are accurate as of: 1/18/18 12:00 PM.   If you have any questions, ask your nurse or doctor.               These medicines have changed or have updated prescriptions.        Dose/Directions    BASAGLAR 100 UNIT/ML injection   This may have changed:  how much to take   Used for:  Type 2 diabetes mellitus with complication, with long-term current use of insulin (H)        Dose:  42 Units   Inject 42 Units Subcutaneous daily   Quantity:  30 mL   Refills:  3            Where to get your medicines      These medications were sent to [place], [place] - [address]  [address], [facility] [address]    Hours:  TRANSPLANT PHONE NUMBER [phone] Phone:  [phone]     BD ULTRA FINE PEN NEEDLES                Primary Care Provider Office Phone # Fax #    Mariya Mohamud, APRN [phone]       [address] [place] [address] [address]  [facility] [address]        Equal Access to Services     SULAIMAN MARTINEZ : Hadii oneida patino hadasho Socj, waaxda luqadaha, qaybta kaalmada adeegyada, marc fullerin hayjordy rae . So [facility] [phone].    ATENCIÓN: Si habla español, tiene a dykes disposición servicios gratuitos de asistencia lingüística. Llame al [phone].    We comply with applicable federal civil rights laws and [place] laws. We do not discriminate on the basis of race, color, national origin, age, disability, sex, sexual orientation, or gender identity.            Thank you!     Thank you for choosing [facility]  for your care. Our goal is always to provide you with excellent care. Hearing back from our patients is one way we can continue to improve our services. Please take a few minutes to complete the written survey that you may receive in the mail after your visit with us. Thank you!             Your Updated Medication List - Protect others around you: Learn how to safely use, store and throw away your medicines at www.disposemymeds.org.          This list  is accurate as of: 1/18/18 12:00 PM.  Always use your most recent med list.                   Brand Name Dispense Instructions for use Diagnosis    Alcohol Prep Pad 70 % Pads     100 each    1 each 3 times daily    Type 2 diabetes mellitus with other diabetic ophthalmic complication       aspirin 81 MG tablet     100 tablet    Take 1 tablet (81 mg) by mouth daily    Type 2 diabetes mellitus with moderate nonproliferative diabetic retinopathy with macular edema, unspecified eye (H)       atorvastatin 40 MG tablet    LIPITOR    90 tablet    Take 1 tablet (40 mg) by mouth daily    Type 1 diabetes mellitus with complications (H)       BASAGLAR 100 UNIT/ML injection     30 mL    Inject 42 Units Subcutaneous daily    Type 2 diabetes mellitus with complication, with long-term current use of insulin (H)       BD ULTRA FINE PEN NEEDLES     200 Units    Inject 1 dose. Subcutaneous 2 times daily BD ultra-fine insulin syringe, 30 ga. X 1/2'' short needle 1/2 cc. Use as directed.    Type 2 diabetes mellitus with complication, with long-term current use of insulin (H)       blood glucose monitoring lancets     6 Box    Use to test blood sugar 6 times daily or as directed    Type 1 diabetes mellitus with nephropathy (H)       blood glucose monitoring test strip    ACCU-CHEK LAYA PLUS    600 each    Laya Plus test strips for use in Accucheck Expert meter.  Use to test blood sugar 6 times daily. 3 month supply.  Send PA's to Dr. Mohamud.    Type 1 diabetes mellitus with nephropathy (H)       calcium-magnesium 500-250 MG Tabs per tablet    VERO-MAG    90 tablet    Take 1 tablet by mouth daily    Cramp of limb       dulaglutide 0.75 MG/0.5ML pen    TRULICITY    6 mL    Inject 0.75 mg Subcutaneous every 7 days    Uncontrolled type 2 diabetes mellitus with hyperglycemia, with long-term current use of insulin (H)       fenofibrate 160 MG tablet     90 tablet    Take 1 tablet (160 mg) by mouth daily    Mixed hyperlipidemia        ibuprofen 400 MG tablet    ADVIL/MOTRIN    200 tablet    Take 1 tablet (400 mg) by mouth every 6 hours as needed for pain    Headache(784.0), Pain in joint, lower leg, right       insulin aspart 100 UNIT/ML injection    NovoLOG PEN    63 mL    Admin Instructions: Before meals and bedtime correction sliding scale 120 - 160 - 1 U  161 - 200 - 2 U  201 - 240 - 3 U  241 - 280 - 4 U  281 - 320 - 5 U  321 - 360 - 6 U ...    Type 1 diabetes mellitus with complications (H)       insulin pen needle 31G X 8 MM    B-D U/F    200 each    Use 2 times daily or as directed    Diabetes (H)       ketoconazole 2 % cream    NIZORAL    30 g    Apply topically 2 times daily Apply externally thin amt bid    Candidiasis of perineum       levonorgestrel 20 MCG/24HR IUD    MIRENA    1 each    1 each (20 mcg) by Intrauterine route once for 1 dose    Encounter for insertion of intrauterine contraceptive device       losartan 25 MG tablet    COZAAR    90 tablet    Take 1 tablet (25 mg) by mouth daily    Uncontrolled type 2 diabetes mellitus with hyperglycemia, with long-term current use of insulin (H)       rizatriptan 10 MG tablet    MAXALT    30 tablet    Take 1 tab at onset of headache.  May repeat after 2 hours.    Headache(784.0)       vitamin D 02735 UNIT capsule    ERGOCALCIFEROL    12 capsule    Take 1 capsule (50,000 Units) by mouth every 7 days    Vitamin deficiency

## 2018-01-18 NOTE — PROGRESS NOTES
[facility]  Primary Care Center   Mariya Mohamud APRMARCY CNP  1/18/2018     Chief Complaint:   Hypertension (Follow up. Lisinopril was stopped and Diltiazem started.) and Diabetes (Follow up.)       History of Present Illness:   Nayeli Caal is a 37 year old female with a history of uncontrolled type 2 diabetes, diagnosed in Fall 2000, who presents for evaluation of multiple concerns. She is seen today with the help of an .     The patient was last seen in the Endocrinology clinic on 1/2/2018 with Danitza Rios PA-C, MPAS, at which time they discussed insulin and bolus treatment strategies. The patient was recommended to use a 1 Unit Novolog per 8 g carbohydrate ratio. They reviewed how to treat hypoglycemia and plan to check four times daily. She was continued on 50 Units Basaglar daily, Trulicity 0.75 mg weekly, and call the clinic if she has any fasting blood sugars outside the 100-160 range or any low blood sugars. She has a follow up appointment scheduled in Endocrinology with Anne Marie Medina PA-C, on 2/5/2018. Today, she reports that she feels much better today. She has been taking her medications the way she is supposed to and reports that she has been checking her blood sugars more frequently. She notes that now her fasting blood sugars have been ranging either 150 or under. Reviewed the risks and benefits of trying a continuous glucose monitor, but the patient reports that she has been very good about checking her blood sugars regularly and feels like this has been working very well for her. She is confident that her next A1c test will be lower than previously.     Regarding her hypertension, she reports that she has been compliant on her medications. She is currently taking Losartan 25 mg daily. BP today is 117/79 mmHg. She reports that seeing her laboratory results and how out of range they were from what the normal numbers are and that it was explained to  her that she was at increased risk of stroke and heart attack. She states that she wants to take care of herself and she has felt better since she has been more compliant on her medications. She reports compliance on her cholesterol medications.     Other concerns discussed:  Patient is requesting medication review and refills.     She reports that she is sleeping well, but she normally only averages around six hours and finds it hard to sleep a complete eight hours due to work and other activities.     She is currently working full time and reports that she has been unable to begin any regular exercise routine. She notes that she sits all day long and her knees hurt from this. She spoke to her supervisor who allows her to raise and lower the desk throughout the day so that she can switch how she sits and stands, so that she is changing positions throughout the day. She feels like this helps her knee pain. She was last seen by Dr. Ragland from Sports Medicine on 12/14/2017 and was noted to have a right knee suprapatella bursitis and weakness. A right knee x-ray showed mild joint effusion. She was recommended ice and elevation with plan to send to Physical Therapy if not improving. The patient reports that her knee pain is much improved compared to when she was seen by Sports Medicine and states that the pain is intermittent since she was able to make the changes at work.     She is concerned about her IUD and is complaining if vaginal odor. She is concerned that the IUD is causing this odor. She denies any vaginal pain. She also endorses some vaginal discharge and has been wearing a pad for this. She last had a urinalysis on 11/27/2017 which showed some glucose overflow and proteinuria, as well as a small amount of blood and WBC's. She feels that since the IUD was placed, she has noticed increased vaginal odor.   She has been sexually active in the past few months.     Review of Systems:   Pertinent items are noted  in HPI.  All other systems are negative.    Active Medications:   Current Outpatient Prescriptions:      losartan (COZAAR) 25 MG tablet, Take 1 tablet (25 mg) by mouth daily, Disp: 90 tablet, Rfl: 3     dulaglutide (TRULICITY) 0.75 MG/0.5ML pen, Inject 0.75 mg Subcutaneous every 7 days, Disp: 6 mL, Rfl: 3     insulin aspart (NOVOLOG PEN) 100 UNIT/ML injection, Admin Instructions: Before meals and bedtime correction sliding scale 120 - 160 - 1 U  161 - 200 - 2 U  201 - 240 - 3 U  241 - 280 - 4 U  281 - 320 - 5 U  321 - 360 - 6 U ..., Disp: 63 mL, Rfl: 3     insulin glargine (BASAGLAR KWIKPEN) 100 UNIT/ML injection, Inject 42 Units Subcutaneous daily (Patient taking differently: Inject 50 Units Subcutaneous daily ), Disp: 30 mL, Rfl: 3     vitamin D (ERGOCALCIFEROL) 40463 UNIT capsule, Take 1 capsule (50,000 Units) by mouth every 7 days, Disp: 12 capsule, Rfl: 3     calcium-magnesium (VERO-MAG) 500-250 MG TABS per tablet, Take 1 tablet by mouth daily, Disp: 90 tablet, Rfl: 3     aspirin 81 MG tablet, Take 1 tablet (81 mg) by mouth daily, Disp: 100 tablet, Rfl: 3     fenofibrate 160 MG tablet, Take 1 tablet (160 mg) by mouth daily, Disp: 90 tablet, Rfl: 3     atorvastatin (LIPITOR) 40 MG tablet, Take 1 tablet (40 mg) by mouth daily, Disp: 90 tablet, Rfl: 3     ketoconazole (NIZORAL) 2 % cream, Apply topically 2 times daily Apply externally thin amt bid, Disp: 30 g, Rfl: 1     levonorgestrel (MIRENA) 20 MCG/24HR IUD, 1 each (20 mcg) by Intrauterine route once for 1 dose, Disp: 1 each, Rfl: 0     ibuprofen (ADVIL,MOTRIN) 400 MG tablet, Take 1 tablet (400 mg) by mouth every 6 hours as needed for pain, Disp: 200 tablet, Rfl: 1     rizatriptan (MAXALT) 10 MG tablet, Take 1 tab at onset of headache.  May repeat after 2 hours., Disp: 30 tablet, Rfl: 3     Allergies:   Review of patient's allergies indicates no known allergies.      Past Medical History:  Deaf  Diabetes  mellitus  Hypertension  Migraines  Headache  Cervicalgia  Organic hypersomnia  Other chronic nonalcoholic liver disease  Diabetic retinopathy of both eyes  Xerosis cutis  Obesity  Usher syndrome, congenital deafness, retinitis pigmentosa  Macular degeneration, age related, nonexudative  Benign neoplasm of iris  Dry eye syndrome  Pemphigus erythematosus  Tenosynovitis of ankle  Pain in joint, shoulder region, impingement  Chondromalacia of patella, right, steroid injection 6/12/2015  Uncontrolled type 2 diabetes mellitus with hyperglycemia, with long-term current use of insulin  Non morbid obesity due to excess calories     Past Surgical History:  The patient does not have any past pertinent surgical history.     Family History:   Family history is unknown. The patient is adopted.       Social History:   Marital Status: single  The patient uses an .  Presents to the clinic alone.  Tobacco Use: Former smoker, cigarettes, quit 12/1/2010.  Alcohol Use: No  PCP: Mariya Mohamud    Physical Exam:   /79  Pulse 80  Wt 83.4 kg (183 lb 14.4 oz)  BMI 34.75 kg/m2     Wt Readings from Last 1 Encounters:   01/18/18 83.4 kg (183 lb 14.4 oz)     Constitutional: no distress, comfortable, pleasant   Eyes: anicteric, conjunctiva pink,.  Ears, Nose and Throat: tympanic membranes clear,  throat clear.   Neck: supple with full range of motion, no thyromegaly.   Cardiovascular: regular rate and rhythm, normal S1 and S2, no murmurs, rubs or gallops, peripheral pulses full and symmetric   Respiratory: clear to auscultation, no wheezes or crackles, normal breath sounds   Gastrointestinal: positive bowel sounds, nontender, no hepatosplenomegaly, no masses   Pelvic:Pelvic Exam:  Vulva: No external lesions, normal hair distribution, no adenopathy  Vagina: Moist, pink, no abnormal discharge, well rugated, no lesions  Cervix:  smooth, pink, no visible lesions  Musculoskeletal: full range of motion, no edema   Skin: no  concerning lesions, no jaundice, temp normal   Neurological:  normal gait, normal speech, no tremor. A and O x 3, good historian.  Psychological: appropriate mood, good eye contact, normal affect   Lymph: no axillary, cervical,  supraclavicular, infraclavicular or inguinal nodes.       Assessment and Plan:  Type 2 diabetes mellitus with complication, with long-term current use of insulin (H)  Refills provided for pen needles. Encouraged the patient to keep her appointment on 2/5/2018 with Endocrinology.   - BD ULTRA FINE PEN NEEDLES  Dispense: 200 Units; Refill: 11    Essential hypertension  Controlled. Continue current medications.    Hematuria, unspecified type  The patient's last urinalysis showed mild hematuria. Repeat urinalysis done today and the patient will be contacted with results.   - UA with Micro reflex to Culture  Results for orders placed or performed in visit on 01/18/18   UA with Microscopic reflex to Culture   Result Value Ref Range    Color Urine Straw     Appearance Urine Clear     Glucose Urine Negative NEG^Negative mg/dL    Bilirubin Urine Negative NEG^Negative    Ketones Urine Negative NEG^Negative mg/dL    Specific Gravity Urine 1.008 1.003 - 1.035    Blood Urine Moderate (A) NEG^Negative    pH Urine 5.0 5.0 - 7.0 pH    Protein Albumin Urine Negative NEG^Negative mg/dL    Urobilinogen mg/dL 0.0 0.0 - 2.0 mg/dL    Nitrite Urine Negative NEG^Negative    Leukocyte Esterase Urine Negative NEG^Negative    Source Midstream Urine     WBC Urine 1 0 - 2 /HPF    RBC Urine 1 0 - 2 /HPF    Squamous Epithelial /HPF Urine <1 0 - 1 /HPF   Wet prep   Result Value Ref Range    Specimen Description Vagina     Wet Prep PMNs seen  Few       Wet Prep No yeast seen     Wet Prep No Trichomonas seen     Wet Prep No clue cells seen    N. gonorrhea PCR - Endocervical Swab, Clinic Collect   Result Value Ref Range    Specimen Descrip Vagina     N Gonorrhea PCR Negative NEG^Negative   C. trachomatis PCR - Endocervical  Swab, Clinic Collect   Result Value Ref Range    Specimen Description Vagina     Chlamydia Trachomatis PCR Negative NEG^Negative   Mycoplasma pheumoniae by PCR   Result Value Ref Range    Myco pneumo PCR Specimen Vagina     MPPC Canceled via EPIC interface    Mycoplasma large colony culture   Result Value Ref Range    Specimen Description Vagina     Culture Micro Culture negative monitoring continues          Vaginosis  Given her vaginal symptoms, samples were sent for Mycoplasma, Wet Prep, as well as Gonorrhea and Chlamydia testing. Patient will be contacted with results and recommended plan for treatment.    - Mycoplasma pheumoniae by PCR  - Wet prep  - N. gonorrhea PCR - Endocervical Swab, Clinic Collect  - C. trachomatis PCR - Endocervical Swab, Clinic Collect        Follow-up: Return in about 4 months (around 5/18/2018).         Scribe Disclosure:   I, Ann-Marie Cifuentes, am serving as a scribe to document services personally performed by SABI Morrison CNP at this visit, based upon the provider's statements to me. All documentation has been reviewed by the aforementioned provider prior to being entered into the official medical record.     Portions of this medical record were completed by a scribe. UPON MY REVIEW AND AUTHENTICATION BY ELECTRONIC SIGNATURE, this confirms (a) I performed the applicable clinical services, and (b) the record is accurate.

## 2018-01-19 LAB
C TRACH DNA SPEC QL NAA+PROBE: NEGATIVE
N GONORRHOEA DNA SPEC QL NAA+PROBE: NEGATIVE
SPECIMEN SOURCE: NORMAL
SPECIMEN SOURCE: NORMAL

## 2018-01-20 ENCOUNTER — MYC MEDICAL ADVICE (OUTPATIENT)
Dept: INTERNAL MEDICINE | Facility: CLINIC | Age: 38
End: 2018-01-20

## 2018-01-24 ENCOUNTER — ALLIED HEALTH/NURSE VISIT (OUTPATIENT)
Dept: INTERPRETER SERVICES | Facility: CLINIC | Age: 38
End: 2018-01-24

## 2018-01-26 LAB
BACTERIA SPEC CULT: ABNORMAL
BACTERIA SPEC CULT: ABNORMAL
SPECIMEN SOURCE: ABNORMAL

## 2018-01-31 ENCOUNTER — MYC MEDICAL ADVICE (OUTPATIENT)
Dept: INTERNAL MEDICINE | Facility: CLINIC | Age: 38
End: 2018-01-31

## 2018-02-02 ENCOUNTER — MYC MEDICAL ADVICE (OUTPATIENT)
Dept: INTERNAL MEDICINE | Facility: CLINIC | Age: 38
End: 2018-02-02

## 2018-02-05 ENCOUNTER — OFFICE VISIT (OUTPATIENT)
Dept: ENDOCRINOLOGY | Facility: CLINIC | Age: 38
End: 2018-02-05
Payer: COMMERCIAL

## 2018-02-05 ENCOUNTER — TELEPHONE (OUTPATIENT)
Dept: ENDOCRINOLOGY | Facility: CLINIC | Age: 38
End: 2018-02-05

## 2018-02-05 ENCOUNTER — MYC MEDICAL ADVICE (OUTPATIENT)
Dept: ENDOCRINOLOGY | Facility: CLINIC | Age: 38
End: 2018-02-05

## 2018-02-05 VITALS
BODY MASS INDEX: 34.82 KG/M2 | WEIGHT: 184.3 LBS | SYSTOLIC BLOOD PRESSURE: 113 MMHG | DIASTOLIC BLOOD PRESSURE: 77 MMHG | OXYGEN SATURATION: 97 % | HEART RATE: 79 BPM

## 2018-02-05 DIAGNOSIS — E11.65 POORLY CONTROLLED TYPE 2 DIABETES MELLITUS (H): Primary | ICD-10-CM

## 2018-02-05 ASSESSMENT — PAIN SCALES - GENERAL: PAINLEVEL: NO PAIN (0)

## 2018-02-05 NOTE — MR AVS SNAPSHOT
After Visit Summary   2/5/2018    Nayeli Caal    MRN: [id number]           Patient Information     Date Of Birth          1980        Visit Information        Provider Department      2/5/2018 6:45 AM Amarilis Solano; Anne Marie Medina PA-C [facility]        Care Instructions    Test your blood sugar before each meal and bedtime x 1 week.      Send Anne Marie Medina an update in 1 week, sooner if you have more than 2 readings less than 70 in a week.      Goal is to have most of your readings under 140 mg/dL.     Keep up the great work!     Try stretching before bed.             Follow-ups after your visit        Your next 10 appointments already scheduled     Mar 12, 2018  8:00 AM CDT   (Arrive by 7:45 AM)   RETURN DIABETES with Jimena Bragg MD   [facility] ([facility])    [address] [place] [address] [phone]            May 17, 2018  2:40 PM CDT   (Arrive by 2:25 PM)   Return Visit with SABI Ceja [facility] ([facility])    [address] [place] [address] [phone]              Who to contact     Please call your clinic at [phone] to:    Ask questions about your health    Make or cancel appointments    Discuss your medicines    Learn about your test results    Speak to your doctor   If you have compliments or concerns about an experience at your clinic, or if you wish to file a complaint, please contact [facility] Patient Relations at [phone] or email us at [email or web address]         Additional Information About Your Visit        MyChart Information     [employer] gives you secure access to your electronic health record. If you see a primary care provider, you can also send messages to your care team and make appointments. If you have questions, please call  your primary care clinic.  If you do not have a primary care provider, please call [phone] and they will assist you.      [employer] is an electronic gateway that provides easy, online access to your medical records. With [employer], you can request a clinic appointment, read your test results, renew a prescription or communicate with your care team.     To access your existing account, please contact your [facility] or call [phone] for assistance.        Care EveryWhere ID     This is your Care EveryWhere ID. This could be used by other organizations to access your [facility] medical records  [id number]        Your Vitals Were     Pulse Pulse Oximetry BMI (Body Mass Index)             79 97% 34.82 kg/m2          Blood Pressure from Last 3 Encounters:   02/05/18 113/77   01/18/18 117/79   01/02/18 122/80    Weight from Last 3 Encounters:   02/05/18 83.6 kg (184 lb 4.8 oz)   01/18/18 83.4 kg (183 lb 14.4 oz)   01/02/18 83.9 kg (185 lb)              Today, you had the following     No orders found for display       Primary Care Provider Office Phone # Fax #    Mariya Mohamud, APRN [phone]       [address] [place] [address] [address] [place] [address]        Equal Access to Services     SULAIMAN MARTINEZ : Hadii aad ku hadasho Soomaali, waaxda luqadaha, qaybta kaalmada adeegyada, waxay idiin hayaan radha rae . So [facility] [phone].    ATENCIÓN: Si habla español, tiene a dykes disposición servicios gratuitos de asistencia lingüística. Llame al [phone].    We comply with applicable federal civil rights laws and [place] laws. We do not discriminate on the basis of race, color, national origin, age, disability, sex, sexual orientation, or gender identity.            Thank you!     Thank you for choosing [facility]  for your care. Our goal is always to provide you with excellent care. Hearing back from our patients is one way we can continue to improve  our services. Please take a few minutes to complete the written survey that you may receive in the mail after your visit with us. Thank you!             Your Updated Medication List - Protect others around you: Learn how to safely use, store and throw away your medicines at www.disposemymeds.org.          This list is accurate as of 2/5/18  7:55 AM.  Always use your most recent med list.                   Brand Name Dispense Instructions for use Diagnosis    Alcohol Prep Pad 70 % Pads     100 each    1 each 3 times daily    Type 2 diabetes mellitus with other diabetic ophthalmic complication       aspirin 81 MG tablet     100 tablet    Take 1 tablet (81 mg) by mouth daily    Type 2 diabetes mellitus with moderate nonproliferative diabetic retinopathy with macular edema, unspecified eye (H)       atorvastatin 40 MG tablet    LIPITOR    90 tablet    Take 1 tablet (40 mg) by mouth daily    Type 1 diabetes mellitus with complications (H)       BASAGLAR 100 UNIT/ML injection     15 mL    Inject 50 Units Subcutaneous daily    Type 2 diabetes mellitus with complication, with long-term current use of insulin (H)       BD ULTRA FINE PEN NEEDLES     200 Units    Inject 1 dose. Subcutaneous 2 times daily BD ultra-fine insulin syringe, 30 ga. X 1/2'' short needle 1/2 cc. Use as directed.    Type 2 diabetes mellitus with complication, with long-term current use of insulin (H)       blood glucose monitoring lancets     6 Box    Use to test blood sugar 6 times daily or as directed    Type 1 diabetes mellitus with nephropathy (H)       blood glucose monitoring test strip    ACCU-CHEK LAYA PLUS    600 each    Laya Plus test strips for use in [employer] Expert meter.  Use to test blood sugar 6 times daily. 3 month supply.  Send PA's to Dr. Mohamud.    Type 1 diabetes mellitus with nephropathy (H)       calcium-magnesium 500-250 MG Tabs per tablet    VERO-MAG    90 tablet    Take 1 tablet by mouth daily    Cramp of limb        dulaglutide 0.75 MG/0.5ML pen    TRULICITY    6 mL    Inject 0.75 mg Subcutaneous every 7 days    Uncontrolled type 2 diabetes mellitus with hyperglycemia, with long-term current use of insulin (H)       fenofibrate 160 MG tablet     90 tablet    Take 1 tablet (160 mg) by mouth daily    Mixed hyperlipidemia       ibuprofen 400 MG tablet    ADVIL/MOTRIN    200 tablet    Take 1 tablet (400 mg) by mouth every 6 hours as needed for pain    Headache(784.0), Pain in joint, lower leg, right       insulin aspart 100 UNIT/ML injection    NovoLOG PEN    63 mL    Admin Instructions: Before meals and bedtime correction sliding scale 120 - 160 - 1 U  161 - 200 - 2 U  201 - 240 - 3 U  241 - 280 - 4 U  281 - 320 - 5 U  321 - 360 - 6 U ...    Type 1 diabetes mellitus with complications (H)       insulin pen needle 31G X 8 MM    B-D U/F    200 each    Use 2 times daily or as directed    Diabetes (H)       ketoconazole 2 % cream    NIZORAL    30 g    Apply topically 2 times daily Apply externally thin amt bid    Candidiasis of perineum       levonorgestrel 20 MCG/24HR IUD    MIRENA    1 each    1 each (20 mcg) by Intrauterine route once for 1 dose    Encounter for insertion of intrauterine contraceptive device       losartan 25 MG tablet    COZAAR    90 tablet    Take 1 tablet (25 mg) by mouth daily    Uncontrolled type 2 diabetes mellitus with hyperglycemia, with long-term current use of insulin (H)       rizatriptan 10 MG tablet    MAXALT    30 tablet    Take 1 tab at onset of headache.  May repeat after 2 hours.    Headache(784.0)       vitamin D 95386 UNIT capsule    ERGOCALCIFEROL    12 capsule    Take 1 capsule (50,000 Units) by mouth every 7 days    Vitamin deficiency

## 2018-02-05 NOTE — NURSING NOTE
"Chief Complaint   Patient presents with     Diabetes     Diabetes Type 2       Initial /77 (BP Location: Right arm, Patient Position: Sitting)  Pulse 79  Wt 83.6 kg (184 lb 4.8 oz)  SpO2 97%  BMI 34.82 kg/m2 Estimated body mass index is 34.82 kg/(m^2) as calculated from the following:    Height as of 1/2/18: 1.549 m (5' 1\").    Weight as of this encounter: 83.6 kg (184 lb 4.8 oz).  Medication Reconciliation: complete    "

## 2018-02-05 NOTE — LETTER
2/5/2018       RE: Nayeli Caal  [address]  [facility] [address]     Dear Colleague,    Thank you for referring your patient, Nayeli Caal, to the [facility] ENDO[facility] at [facility]. Please see a copy of my visit note below.    HPI:   Nayeli is a 38 yo woman here with a  for follow up of type 2 diabetes since the early 2000's.  She usually sees Dr. Bragg.  She started on insulin in 2003 after failing Metformin treatment.  She has struggled with high blood sugars for many years and is now highly motivated to improve her control.  She has been having a lot of tooth pain and her dentist will not work on her until her a1c is less than 8.5%.  She is frustrated by this because in the dental office, her a1c had come down from 13.4% to 10.5% and her recent blood sugars have been quite good.  She is testing twice daily with an overall average of 168 mg/dL this month.  She has been working on more walking and eating less carbohydrates.  She is feeling much better now that her sugars are lower.   She is taking Lantus 50 units and Novolog according to the Accu-chek expert meter below:    Insulin to Carb Ratio: 8  Correction Factor: 50  BG Target: 110-150    If she does not test, she does the carb counting herself and takes her Novolog.  She has no other concerns today.       Past Medical History:   Diagnosis Date     Deaf      Diabetes mellitus (H)      Hypertension      Migraines        No past surgical history on file.    Family History   Problem Relation Age of Onset     Unknown/Adopted Other        Social History     Social Hx: She is adopted.  Works for [employer].   Social History     Marital status: Single     Spouse name: N/A     Number of children: N/A     Years of education: N/A     Social History Main Topics     Smoking status: Former Smoker     Types: Cigarettes     Quit date: 12/1/2010     Smokeless  tobacco: Never Used      Comment: stopped 2 yrs ago     Alcohol use No     Drug use: No     Sexual activity: Not Currently     Partners: Male     Other Topics Concern      Service No     Blood Transfusions No     Caffeine Concern No     Occupational Exposure No     Hobby Hazards No     Sleep Concern No     Stress Concern No     Weight Concern Yes     Special Diet No     Back Care No     Exercise Yes     4-5 x a week     Bike Helmet No     Seat Belt Yes     Self-Exams Yes     Social History Narrative       Current Outpatient Prescriptions   Medication     losartan (COZAAR) 25 MG tablet     dulaglutide (TRULICITY) 0.75 MG/0.5ML pen     insulin aspart (NOVOLOG PEN) 100 UNIT/ML injection     vitamin D (ERGOCALCIFEROL) 31154 UNIT capsule     aspirin 81 MG tablet     atorvastatin (LIPITOR) 40 MG tablet     ibuprofen (ADVIL,MOTRIN) 400 MG tablet     rizatriptan (MAXALT) 10 MG tablet     BD ULTRA FINE PEN NEEDLES     BASAGLAR 100 UNIT/ML injection     fenofibrate 160 MG tablet     insulin pen needle (B-D U/F) 31G X 8 MM     calcium-magnesium (VERO-MAG) 500-250 MG TABS per tablet     blood glucose monitoring (ACCU-CHEK FASTCLIX) lancets     blood glucose monitoring (ACCU-CHEK LAYA PLUS) test strip     ketoconazole (NIZORAL) 2 % cream     levonorgestrel (MIRENA) 20 MCG/24HR IUD     Alcohol Swabs (ALCOHOL PREP PAD) 70 % PADS     [DISCONTINUED] BD ULTRA FINE PEN NEEDLES     No current facility-administered medications for this visit.         No Known Allergies    Physical Exam  /77 (BP Location: Right arm, Patient Position: Sitting)  Pulse 79  Wt 83.6 kg (184 lb 4.8 oz)  SpO2 97%  BMI 34.82 kg/m2  GENERAL:  Alert and oriented X3, NAD, well dressed, answering questions appropriately, appears stated age.    RESULTS  Lab Results   Component Value Date    A1C 13.4 (H) 11/27/2017    A1C 11.0 (H) 07/13/2017    A1C 10.9 (H) 05/15/2017    A1C 12.8 (H) 01/02/2017    A1C 13.0 (H) 05/11/2016    HEMOGLOBINA1 12.0 (A)  02/03/2014    HEMOGLOBINA1 10.3 (A) 10/28/2013    HEMOGLOBINA1 11.3 (A) 08/06/2013    HEMOGLOBINA1 13.1 (H) 08/12/2011    HEMOGLOBINA1 8.9 (H) 05/21/2010       TSH   Date Value Ref Range Status   11/27/2017 1.92 0.40 - 4.00 mU/L Final   05/11/2016 1.85 0.40 - 4.00 mU/L Final   02/11/2016 1.14 0.40 - 4.00 mU/L Final   04/10/2012 1.35 0.4 - 5.0 mU/L Final   03/12/2010 0.92 0.4 - 5.0 mU/L Final       ALT   Date Value Ref Range Status   11/27/2017 47 0 - 50 U/L Final   07/25/2016 29 0 - 50 U/L Final   ]    Recent Labs   Lab Test  11/27/17   0725  01/02/17   0838   09/16/13   0753  08/14/12   0739   CHOL  216*  302*   < >  197  262*   HDL  29*  29*   < >  34*  38*   LDL  114*  Cannot estimate LDL when triglyceride exceeds 400 mg/dL  97   < >  119  185*   TRIG  367*  1656*   < >  221*  191*   CHOLHDLRATIO   --    --    --   5.8*  6.9*    < > = values in this interval not displayed.       Lab Results   Component Value Date     11/27/2017      Lab Results   Component Value Date    POTASSIUM 3.7 11/27/2017     Lab Results   Component Value Date    CHLORIDE 105 11/27/2017     Lab Results   Component Value Date    VERO 8.2 11/27/2017     Lab Results   Component Value Date    CO2 23 11/27/2017     Lab Results   Component Value Date    BUN 12 11/27/2017     Lab Results   Component Value Date    CR 0.51 11/27/2017       GFR Estimate   Date Value Ref Range Status   11/27/2017 >90 >60 mL/min/1.7m2 Final     Comment:     Non  GFR Calc   01/02/2017 >90  Non  GFR Calc   >60 mL/min/1.7m2 Final   07/26/2016 >90  Non  GFR Calc   >60 mL/min/1.7m2 Final     GFR Estimate If Black   Date Value Ref Range Status   11/27/2017 >90 >60 mL/min/1.7m2 Final     Comment:      GFR Calc   01/02/2017 >90   GFR Calc   >60 mL/min/1.7m2 Final   07/26/2016 >90   GFR Calc   >60 mL/min/1.7m2 Final       Lab Results   Component Value Date    MICROL 217  11/27/2017     No results found for: MICROALBUMIN  Lab Results   Component Value Date    CPEPT 1.3 03/25/2005       No results found for: B12]    Most recent eye exam date: : 10/17/2017     Assessment/Plan:     1.  Type 2 diabetes-  Blood sugars have dramatically improved and her overall average is 168 mg/dL.  Asked her to test before each meal and HS x 1 week and send an update next week. I anticipate that she may need a tighter IC ratio.  I will send a letter to her dentist stating that her diabetes is currently under good control and she can proceed with her dental work.  Much encouragement given for lots of improvement.     2.  F/U in 1 month as planned with Dr. Bragg.  I would also be happy to see her in the future as needed.     I spent 45 minutes with this patient face to face and explained the conditions and plans (more than 50% of time was counseling/coordination of care, diabetes management, follow up plan for worsening hyper and hypoglycemia) . The patient understood and is satisfied with today's visit.      Anne Marie Medina PA-C, MPAS   [facility]  Department of Medicine  Division of Endocrinology and Diabetes

## 2018-02-05 NOTE — PATIENT INSTRUCTIONS
Test your blood sugar before each meal and bedtime x 1 week.      Send Anne Marie Medina an update in 1 week, sooner if you have more than 2 readings less than 70 in a week.      Goal is to have most of your readings under 140 mg/dL.     Keep up the great work!     Try stretching before bed.

## 2018-02-05 NOTE — PROGRESS NOTES
HPI:   Nayeli is a 36 yo woman here with a  for follow up of type 2 diabetes since the early 2000's.  She usually sees Dr. Bragg.  She started on insulin in 2003 after failing Metformin treatment.  She has struggled with high blood sugars for many years and is now highly motivated to improve her control.  She has been having a lot of tooth pain and her dentist will not work on her until her a1c is less than 8.5%.  She is frustrated by this because in the dental office, her a1c had come down from 13.4% to 10.5% and her recent blood sugars have been quite good.  She is testing twice daily with an overall average of 168 mg/dL this month.  She has been working on more walking and eating less carbohydrates.  She is feeling much better now that her sugars are lower.   She is taking Lantus 50 units and Novolog according to the Accu-chek expert meter below:    Insulin to Carb Ratio: 8  Correction Factor: 50  BG Target: 110-150    If she does not test, she does the carb counting herself and takes her Novolog.  She has no other concerns today.       Past Medical History:   Diagnosis Date     Deaf      Diabetes mellitus (H)      Hypertension      Migraines        No past surgical history on file.    Family History   Problem Relation Age of Onset     Unknown/Adopted Other        Social History     Social Hx: She is adopted.  Works for [employer].   Social History     Marital status: Single     Spouse name: N/A     Number of children: N/A     Years of education: N/A     Social History Main Topics     Smoking status: Former Smoker     Types: Cigarettes     Quit date: 12/1/2010     Smokeless tobacco: Never Used      Comment: stopped 2 yrs ago     Alcohol use No     Drug use: No     Sexual activity: Not Currently     Partners: Male     Other Topics Concern      Service No     Blood Transfusions No     Caffeine Concern No     Occupational Exposure No     Hobby Hazards No     Sleep  Concern No     Stress Concern No     Weight Concern Yes     Special Diet No     Back Care No     Exercise Yes     4-5 x a week     Bike Helmet No     Seat Belt Yes     Self-Exams Yes     Social History Narrative       Current Outpatient Prescriptions   Medication     losartan (COZAAR) 25 MG tablet     dulaglutide (TRULICITY) 0.75 MG/0.5ML pen     insulin aspart (NOVOLOG PEN) 100 UNIT/ML injection     vitamin D (ERGOCALCIFEROL) 89408 UNIT capsule     aspirin 81 MG tablet     atorvastatin (LIPITOR) 40 MG tablet     ibuprofen (ADVIL,MOTRIN) 400 MG tablet     rizatriptan (MAXALT) 10 MG tablet     BD ULTRA FINE PEN NEEDLES     BASAGLAR 100 UNIT/ML injection     fenofibrate 160 MG tablet     insulin pen needle (B-D U/F) 31G X 8 MM     calcium-magnesium (VERO-MAG) 500-250 MG TABS per tablet     blood glucose monitoring (ACCU-CHEK FASTCLIX) lancets     blood glucose monitoring (ACCU-CHEK LAYA PLUS) test strip     ketoconazole (NIZORAL) 2 % cream     levonorgestrel (MIRENA) 20 MCG/24HR IUD     Alcohol Swabs (ALCOHOL PREP PAD) 70 % PADS     [DISCONTINUED] BD ULTRA FINE PEN NEEDLES     No current facility-administered medications for this visit.         No Known Allergies    Physical Exam  /77 (BP Location: Right arm, Patient Position: Sitting)  Pulse 79  Wt 83.6 kg (184 lb 4.8 oz)  SpO2 97%  BMI 34.82 kg/m2  GENERAL:  Alert and oriented X3, NAD, well dressed, answering questions appropriately, appears stated age.    RESULTS  Lab Results   Component Value Date    A1C 13.4 (H) 11/27/2017    A1C 11.0 (H) 07/13/2017    A1C 10.9 (H) 05/15/2017    A1C 12.8 (H) 01/02/2017    A1C 13.0 (H) 05/11/2016    HEMOGLOBINA1 12.0 (A) 02/03/2014    HEMOGLOBINA1 10.3 (A) 10/28/2013    HEMOGLOBINA1 11.3 (A) 08/06/2013    HEMOGLOBINA1 13.1 (H) 08/12/2011    HEMOGLOBINA1 8.9 (H) 05/21/2010       TSH   Date Value Ref Range Status   11/27/2017 1.92 0.40 - 4.00 mU/L Final   05/11/2016 1.85 0.40 - 4.00 mU/L Final   02/11/2016 1.14 0.40 - 4.00  mU/L Final   04/10/2012 1.35 0.4 - 5.0 mU/L Final   03/12/2010 0.92 0.4 - 5.0 mU/L Final       ALT   Date Value Ref Range Status   11/27/2017 47 0 - 50 U/L Final   07/25/2016 29 0 - 50 U/L Final   ]    Recent Labs   Lab Test  11/27/17   0725  01/02/17   0838   09/16/13   0753  08/14/12   0739   CHOL  216*  302*   < >  197  262*   HDL  29*  29*   < >  34*  38*   LDL  114*  Cannot estimate LDL when triglyceride exceeds 400 mg/dL  97   < >  119  185*   TRIG  367*  1656*   < >  221*  191*   CHOLHDLRATIO   --    --    --   5.8*  6.9*    < > = values in this interval not displayed.       Lab Results   Component Value Date     11/27/2017      Lab Results   Component Value Date    POTASSIUM 3.7 11/27/2017     Lab Results   Component Value Date    CHLORIDE 105 11/27/2017     Lab Results   Component Value Date    VERO 8.2 11/27/2017     Lab Results   Component Value Date    CO2 23 11/27/2017     Lab Results   Component Value Date    BUN 12 11/27/2017     Lab Results   Component Value Date    CR 0.51 11/27/2017       GFR Estimate   Date Value Ref Range Status   11/27/2017 >90 >60 mL/min/1.7m2 Final     Comment:     Non  GFR Calc   01/02/2017 >90  Non  GFR Calc   >60 mL/min/1.7m2 Final   07/26/2016 >90  Non  GFR Calc   >60 mL/min/1.7m2 Final     GFR Estimate If Black   Date Value Ref Range Status   11/27/2017 >90 >60 mL/min/1.7m2 Final     Comment:      GFR Calc   01/02/2017 >90   GFR Calc   >60 mL/min/1.7m2 Final   07/26/2016 >90   GFR Calc   >60 mL/min/1.7m2 Final       Lab Results   Component Value Date    MICROL 217 11/27/2017     No results found for: MICROALBUMIN  Lab Results   Component Value Date    CPEPT 1.3 03/25/2005       No results found for: B12]    Most recent eye exam date: : 10/17/2017     Assessment/Plan:     1.  Type 2 diabetes-  Blood sugars have dramatically improved and her overall average is 168 mg/dL.   Asked her to test before each meal and HS x 1 week and send an update next week. I anticipate that she may need a tighter IC ratio.  I will send a letter to her dentist stating that her diabetes is currently under good control and she can proceed with her dental work.  Much encouragement given for lots of improvement.     2.  F/U in 1 month as planned with Dr. Bragg.  I would also be happy to see her in the future as needed.     I spent 45 minutes with this patient face to face and explained the conditions and plans (more than 50% of time was counseling/coordination of care, diabetes management, follow up plan for worsening hyper and hypoglycemia) . The patient understood and is satisfied with today's visit.      Anne Marie Medina PA-C, MPAS   [facility]  Department of Medicine  Division of Endocrinology and Diabetes

## 2018-02-05 NOTE — TELEPHONE ENCOUNTER
Spoke w/ Smita at dental clinic. Stated A1c needs to come to 8.5 or lower. Dental Clinic does not have a form to complete - just need a letter stating it is okay to proceed at this point.     Please address letter to Odessa Alexander DDS

## 2018-02-05 NOTE — LETTER
Patient:  Nayeli Caal  :   1980  MRN:     [id number]        Odessa Alexander, [place]      2018    Dear Dr. Alexander,     I am writing in regard to my patient, Nayeli Caal.  Nayeli is under my care for type 2 diabetes and she also requires dental work under your care.  Although her a1c remains elevated, her diabetes control has improved considerably over the past month and her overall average glucose has come down to 168 mg/dL this month, which is excellent.  I believe this level of control is acceptable for dental procedures.  Please proceed with her care.      Please do not hesitate to contact me if you have any questions.     Sincerely,       Anne Marie Medina PA-C

## 2018-02-21 ENCOUNTER — MYC MEDICAL ADVICE (OUTPATIENT)
Dept: ORTHOPEDICS | Facility: CLINIC | Age: 38
End: 2018-02-21

## 2018-02-23 ENCOUNTER — MYC MEDICAL ADVICE (OUTPATIENT)
Dept: INTERNAL MEDICINE | Facility: CLINIC | Age: 38
End: 2018-02-23

## 2018-02-23 DIAGNOSIS — E10.21 TYPE 1 DIABETES MELLITUS WITH NEPHROPATHY (H): ICD-10-CM

## 2018-02-23 NOTE — TELEPHONE ENCOUNTER
Patient send [employer] message about calling insurance to get approval for synvisc. [employer] was called and they said it is covered and recommended we complete and fax pre-authorization request form which was completed. This message was sent back to patient via [employer] and let us know when she wants to come in to see Dr. Bradley again for synvisc injection in knees.

## 2018-03-01 ENCOUNTER — MYC MEDICAL ADVICE (OUTPATIENT)
Dept: INTERNAL MEDICINE | Facility: CLINIC | Age: 38
End: 2018-03-01

## 2018-03-01 DIAGNOSIS — Z79.4 TYPE 2 DIABETES MELLITUS WITH COMPLICATION, WITH LONG-TERM CURRENT USE OF INSULIN (H): ICD-10-CM

## 2018-03-01 DIAGNOSIS — E11.8 TYPE 2 DIABETES MELLITUS WITH COMPLICATION, WITH LONG-TERM CURRENT USE OF INSULIN (H): ICD-10-CM

## 2018-03-02 RX ORDER — INSULIN GLARGINE 100 [IU]/ML
50 INJECTION, SOLUTION SUBCUTANEOUS DAILY
Qty: 12 ML | Refills: 1 | Status: SHIPPED | OUTPATIENT
Start: 2018-03-02 | End: 2018-05-17

## 2018-03-08 ENCOUNTER — TELEPHONE (OUTPATIENT)
Dept: INTERNAL MEDICINE | Facility: CLINIC | Age: 38
End: 2018-03-08

## 2018-03-08 DIAGNOSIS — R51.9 HEADACHE: ICD-10-CM

## 2018-03-08 DIAGNOSIS — M54.2 CERVICALGIA: Primary | ICD-10-CM

## 2018-03-08 DIAGNOSIS — M25.519 PAIN IN JOINT, SHOULDER REGION: ICD-10-CM

## 2018-03-08 RX ORDER — IBUPROFEN 600 MG/1
600 TABLET, FILM COATED ORAL EVERY 6 HOURS PRN
Qty: 120 TABLET | Refills: 1 | Status: SHIPPED | OUTPATIENT
Start: 2018-03-08 | End: 2023-04-04

## 2018-03-08 NOTE — TELEPHONE ENCOUNTER
----- Message from Jayy Soto RN sent at 3/7/2018  3:51 PM CST -----  Pt is requesting refill of ibuprofen 600 mg

## 2018-03-09 ENCOUNTER — APPOINTMENT (OUTPATIENT)
Dept: ULTRASOUND IMAGING | Facility: CLINIC | Age: 38
End: 2018-03-09
Attending: EMERGENCY MEDICINE
Payer: COMMERCIAL

## 2018-03-09 ENCOUNTER — HOSPITAL ENCOUNTER (OUTPATIENT)
Facility: CLINIC | Age: 38
Setting detail: OBSERVATION
Discharge: HOME | End: 2018-03-10
Attending: EMERGENCY MEDICINE | Admitting: SURGERY
Payer: COMMERCIAL

## 2018-03-09 ENCOUNTER — HOSPITAL ENCOUNTER (EMERGENCY)
Facility: CLINIC | Age: 38
Discharge: STILL ADMITTED OR ADMINISTRATIVE | End: 2018-03-09
Attending: EMERGENCY MEDICINE
Payer: COMMERCIAL

## 2018-03-09 DIAGNOSIS — K83.8 BILIARY SLUDGE DETERMINED BY ULTRASOUND: ICD-10-CM

## 2018-03-09 DIAGNOSIS — R10.13 ABDOMINAL PAIN, EPIGASTRIC: ICD-10-CM

## 2018-03-09 PROBLEM — K80.20 SYMPTOMATIC CHOLELITHIASIS: Status: ACTIVE | Noted: 2018-03-09

## 2018-03-09 LAB
ABO + RH BLD: NORMAL
ABO + RH BLD: NORMAL
ALBUMIN SERPL-MCNC: 3.4 G/DL (ref 3.4–5)
ALBUMIN UR-MCNC: 10 MG/DL
ALP SERPL-CCNC: 69 U/L (ref 40–150)
ALT SERPL W P-5'-P-CCNC: 49 U/L (ref 0–50)
ANION GAP SERPL CALCULATED.3IONS-SCNC: 13 MMOL/L (ref 3–14)
APPEARANCE UR: CLEAR
AST SERPL W P-5'-P-CCNC: 34 U/L (ref 0–45)
BASOPHILS # BLD AUTO: 0 10E9/L (ref 0–0.2)
BASOPHILS NFR BLD AUTO: 0.2 %
BILIRUB SERPL-MCNC: 0.3 MG/DL (ref 0.2–1.3)
BILIRUB UR QL STRIP: NEGATIVE
BLD GP AB SCN SERPL QL: NORMAL
BLOOD BANK CMNT PATIENT-IMP: NORMAL
BUN SERPL-MCNC: 17 MG/DL (ref 7–30)
CALCIUM SERPL-MCNC: 7.9 MG/DL (ref 8.5–10.1)
CHLORIDE SERPL-SCNC: 107 MMOL/L (ref 94–109)
CO2 SERPL-SCNC: 21 MMOL/L (ref 20–32)
COLOR UR AUTO: YELLOW
CREAT SERPL-MCNC: 0.57 MG/DL (ref 0.52–1.04)
DIFFERENTIAL METHOD BLD: NORMAL
EOSINOPHIL # BLD AUTO: 0.3 10E9/L (ref 0–0.7)
EOSINOPHIL NFR BLD AUTO: 3.7 %
ERYTHROCYTE [DISTWIDTH] IN BLOOD BY AUTOMATED COUNT: 13 % (ref 10–15)
GFR SERPL CREATININE-BSD FRML MDRD: >90 ML/MIN/1.7M2
GLUCOSE BLDC GLUCOMTR-MCNC: 121 MG/DL (ref 70–99)
GLUCOSE BLDC GLUCOMTR-MCNC: 122 MG/DL (ref 70–99)
GLUCOSE SERPL-MCNC: 127 MG/DL (ref 70–99)
GLUCOSE UR STRIP-MCNC: 30 MG/DL
HCG UR QL: NEGATIVE
HCT VFR BLD AUTO: 38.1 % (ref 35–47)
HGB BLD-MCNC: 12.5 G/DL (ref 11.7–15.7)
HGB UR QL STRIP: NEGATIVE
IMM GRANULOCYTES # BLD: 0 10E9/L (ref 0–0.4)
IMM GRANULOCYTES NFR BLD: 0.2 %
INTERNAL QC OK POCT: YES
KETONES UR STRIP-MCNC: NEGATIVE MG/DL
LACTATE BLD-SCNC: 1.3 MMOL/L (ref 0.7–2)
LEUKOCYTE ESTERASE UR QL STRIP: ABNORMAL
LIPASE SERPL-CCNC: 104 U/L (ref 73–393)
LYMPHOCYTES # BLD AUTO: 2.7 10E9/L (ref 0.8–5.3)
LYMPHOCYTES NFR BLD AUTO: 31.1 %
MCH RBC QN AUTO: 28.6 PG (ref 26.5–33)
MCHC RBC AUTO-ENTMCNC: 32.8 G/DL (ref 31.5–36.5)
MCV RBC AUTO: 87 FL (ref 78–100)
MONOCYTES # BLD AUTO: 0.5 10E9/L (ref 0–1.3)
MONOCYTES NFR BLD AUTO: 5.9 %
MUCOUS THREADS #/AREA URNS LPF: PRESENT /LPF
NEUTROPHILS # BLD AUTO: 5.2 10E9/L (ref 1.6–8.3)
NEUTROPHILS NFR BLD AUTO: 58.9 %
NITRATE UR QL: NEGATIVE
NRBC # BLD AUTO: 0 10*3/UL
NRBC BLD AUTO-RTO: 0 /100
PH UR STRIP: 6 PH (ref 5–7)
PLATELET # BLD AUTO: 371 10E9/L (ref 150–450)
POTASSIUM SERPL-SCNC: 3.9 MMOL/L (ref 3.4–5.3)
PROT SERPL-MCNC: 7 G/DL (ref 6.8–8.8)
RBC # BLD AUTO: 4.37 10E12/L (ref 3.8–5.2)
RBC #/AREA URNS AUTO: 2 /HPF (ref 0–2)
SODIUM SERPL-SCNC: 141 MMOL/L (ref 133–144)
SOURCE: ABNORMAL
SP GR UR STRIP: 1.02 (ref 1–1.03)
SPECIMEN EXP DATE BLD: NORMAL
SQUAMOUS #/AREA URNS AUTO: 2 /HPF (ref 0–1)
TROPONIN I SERPL-MCNC: <0.015 UG/L (ref 0–0.04)
UROBILINOGEN UR STRIP-MCNC: 2 MG/DL (ref 0–2)
WBC # BLD AUTO: 8.8 10E9/L (ref 4–11)
WBC #/AREA URNS AUTO: 6 /HPF (ref 0–5)

## 2018-03-09 PROCEDURE — 25000128 H RX IP 250 OP 636: Performed by: SURGERY

## 2018-03-09 PROCEDURE — 86901 BLOOD TYPING SEROLOGIC RH(D): CPT | Performed by: STUDENT IN AN ORGANIZED HEALTH CARE EDUCATION/TRAINING PROGRAM

## 2018-03-09 PROCEDURE — 80053 COMPREHEN METABOLIC PANEL: CPT | Performed by: EMERGENCY MEDICINE

## 2018-03-09 PROCEDURE — 96365 THER/PROPH/DIAG IV INF INIT: CPT

## 2018-03-09 PROCEDURE — 96367 TX/PROPH/DG ADDL SEQ IV INF: CPT

## 2018-03-09 PROCEDURE — 81001 URINALYSIS AUTO W/SCOPE: CPT | Performed by: EMERGENCY MEDICINE

## 2018-03-09 PROCEDURE — 83690 ASSAY OF LIPASE: CPT | Performed by: EMERGENCY MEDICINE

## 2018-03-09 PROCEDURE — 93005 ELECTROCARDIOGRAM TRACING: CPT | Performed by: EMERGENCY MEDICINE

## 2018-03-09 PROCEDURE — 81025 URINE PREGNANCY TEST: CPT | Performed by: EMERGENCY MEDICINE

## 2018-03-09 PROCEDURE — 93010 ELECTROCARDIOGRAM REPORT: CPT | Mod: Z6 | Performed by: EMERGENCY MEDICINE

## 2018-03-09 PROCEDURE — 25000128 H RX IP 250 OP 636: Performed by: STUDENT IN AN ORGANIZED HEALTH CARE EDUCATION/TRAINING PROGRAM

## 2018-03-09 PROCEDURE — 25000132 ZZH RX MED GY IP 250 OP 250 PS 637: Performed by: EMERGENCY MEDICINE

## 2018-03-09 PROCEDURE — 25000125 ZZHC RX 250: Performed by: STUDENT IN AN ORGANIZED HEALTH CARE EDUCATION/TRAINING PROGRAM

## 2018-03-09 PROCEDURE — 87086 URINE CULTURE/COLONY COUNT: CPT | Performed by: EMERGENCY MEDICINE

## 2018-03-09 PROCEDURE — G0378 HOSPITAL OBSERVATION PER HR: HCPCS

## 2018-03-09 PROCEDURE — 25000125 ZZHC RX 250: Performed by: EMERGENCY MEDICINE

## 2018-03-09 PROCEDURE — 25000128 H RX IP 250 OP 636: Performed by: EMERGENCY MEDICINE

## 2018-03-09 PROCEDURE — 96375 TX/PRO/DX INJ NEW DRUG ADDON: CPT

## 2018-03-09 PROCEDURE — 96375 TX/PRO/DX INJ NEW DRUG ADDON: CPT | Performed by: EMERGENCY MEDICINE

## 2018-03-09 PROCEDURE — 85025 COMPLETE CBC W/AUTO DIFF WBC: CPT | Performed by: EMERGENCY MEDICINE

## 2018-03-09 PROCEDURE — 76705 ECHO EXAM OF ABDOMEN: CPT

## 2018-03-09 PROCEDURE — 86850 RBC ANTIBODY SCREEN: CPT | Performed by: STUDENT IN AN ORGANIZED HEALTH CARE EDUCATION/TRAINING PROGRAM

## 2018-03-09 PROCEDURE — 84484 ASSAY OF TROPONIN QUANT: CPT | Performed by: EMERGENCY MEDICINE

## 2018-03-09 PROCEDURE — 99285 EMERGENCY DEPT VISIT HI MDM: CPT | Mod: 25 | Performed by: EMERGENCY MEDICINE

## 2018-03-09 PROCEDURE — 96374 THER/PROPH/DIAG INJ IV PUSH: CPT | Performed by: EMERGENCY MEDICINE

## 2018-03-09 PROCEDURE — 96361 HYDRATE IV INFUSION ADD-ON: CPT | Performed by: EMERGENCY MEDICINE

## 2018-03-09 PROCEDURE — 83605 ASSAY OF LACTIC ACID: CPT | Performed by: EMERGENCY MEDICINE

## 2018-03-09 PROCEDURE — 00000146 ZZHCL STATISTIC GLUCOSE BY METER IP

## 2018-03-09 PROCEDURE — 86900 BLOOD TYPING SEROLOGIC ABO: CPT | Performed by: STUDENT IN AN ORGANIZED HEALTH CARE EDUCATION/TRAINING PROGRAM

## 2018-03-09 RX ORDER — SODIUM CHLORIDE 9 MG/ML
INJECTION, SOLUTION INTRAVENOUS CONTINUOUS
Status: ACTIVE | OUTPATIENT
Start: 2018-03-09 | End: 2018-03-10

## 2018-03-09 RX ORDER — MORPHINE SULFATE 4 MG/ML
4 INJECTION, SOLUTION INTRAMUSCULAR; INTRAVENOUS
Status: COMPLETED | OUTPATIENT
Start: 2018-03-09 | End: 2018-03-09

## 2018-03-09 RX ORDER — ACETAMINOPHEN 325 MG/1
650 TABLET ORAL EVERY 4 HOURS PRN
Status: DISCONTINUED | OUTPATIENT
Start: 2018-03-09 | End: 2018-03-09

## 2018-03-09 RX ORDER — NICOTINE POLACRILEX 4 MG
15-30 LOZENGE BUCCAL
Status: DISCONTINUED | OUTPATIENT
Start: 2018-03-09 | End: 2018-03-11 | Stop reason: HOSPADM

## 2018-03-09 RX ORDER — DEXTROSE MONOHYDRATE 25 G/50ML
25-50 INJECTION, SOLUTION INTRAVENOUS
Status: DISCONTINUED | OUTPATIENT
Start: 2018-03-09 | End: 2018-03-11 | Stop reason: HOSPADM

## 2018-03-09 RX ORDER — CIPROFLOXACIN 2 MG/ML
400 INJECTION, SOLUTION INTRAVENOUS EVERY 12 HOURS
Status: DISCONTINUED | OUTPATIENT
Start: 2018-03-09 | End: 2018-03-11 | Stop reason: HOSPADM

## 2018-03-09 RX ORDER — HYDROMORPHONE HCL/0.9% NACL/PF 0.2MG/0.2
0.2 SYRINGE (ML) INTRAVENOUS
Status: DISCONTINUED | OUTPATIENT
Start: 2018-03-09 | End: 2018-03-09

## 2018-03-09 RX ORDER — ONDANSETRON 4 MG/1
4 TABLET, ORALLY DISINTEGRATING ORAL EVERY 8 HOURS PRN
Qty: 6 TABLET | Refills: 0 | Status: SHIPPED | OUTPATIENT
Start: 2018-03-09 | End: 2018-03-12

## 2018-03-09 RX ORDER — ONDANSETRON 4 MG/1
4 TABLET, ORALLY DISINTEGRATING ORAL ONCE
Status: COMPLETED | OUTPATIENT
Start: 2018-03-09 | End: 2018-03-09

## 2018-03-09 RX ORDER — SODIUM CHLORIDE 9 MG/ML
INJECTION, SOLUTION INTRAVENOUS CONTINUOUS
Status: DISCONTINUED | OUTPATIENT
Start: 2018-03-09 | End: 2018-03-09

## 2018-03-09 RX ORDER — ONDANSETRON 4 MG/1
4 TABLET, ORALLY DISINTEGRATING ORAL ONCE
Status: DISCONTINUED | OUTPATIENT
Start: 2018-03-09 | End: 2018-03-11 | Stop reason: HOSPADM

## 2018-03-09 RX ORDER — ACETAMINOPHEN 325 MG/1
650 TABLET ORAL EVERY 4 HOURS PRN
Status: DISCONTINUED | OUTPATIENT
Start: 2018-03-09 | End: 2018-03-11 | Stop reason: HOSPADM

## 2018-03-09 RX ORDER — HYDROMORPHONE HCL/0.9% NACL/PF 0.2MG/0.2
0.2 SYRINGE (ML) INTRAVENOUS
Status: DISCONTINUED | OUTPATIENT
Start: 2018-03-09 | End: 2018-03-11 | Stop reason: HOSPADM

## 2018-03-09 RX ORDER — CEFAZOLIN SODIUM 1 G
1 VIAL (EA) INJECTION SEE ADMIN INSTRUCTIONS
Status: CANCELLED | OUTPATIENT
Start: 2018-03-09

## 2018-03-09 RX ORDER — OXYCODONE HYDROCHLORIDE 5 MG/1
5 TABLET ORAL EVERY 6 HOURS PRN
Qty: 10 TABLET | Refills: 0 | Status: SHIPPED | OUTPATIENT
Start: 2018-03-09 | End: 2018-05-17

## 2018-03-09 RX ORDER — ONDANSETRON 4 MG/1
4 TABLET, ORALLY DISINTEGRATING ORAL EVERY 6 HOURS PRN
Status: DISCONTINUED | OUTPATIENT
Start: 2018-03-09 | End: 2018-03-11 | Stop reason: HOSPADM

## 2018-03-09 RX ORDER — NALOXONE HYDROCHLORIDE 0.4 MG/ML
.1-.4 INJECTION, SOLUTION INTRAMUSCULAR; INTRAVENOUS; SUBCUTANEOUS
Status: DISCONTINUED | OUTPATIENT
Start: 2018-03-09 | End: 2018-03-11 | Stop reason: HOSPADM

## 2018-03-09 RX ORDER — ONDANSETRON 2 MG/ML
4 INJECTION INTRAMUSCULAR; INTRAVENOUS EVERY 6 HOURS PRN
Status: DISCONTINUED | OUTPATIENT
Start: 2018-03-09 | End: 2018-03-11 | Stop reason: HOSPADM

## 2018-03-09 RX ORDER — SODIUM CHLORIDE 9 MG/ML
1000 INJECTION, SOLUTION INTRAVENOUS CONTINUOUS
Status: DISCONTINUED | OUTPATIENT
Start: 2018-03-09 | End: 2018-03-09 | Stop reason: HOSPADM

## 2018-03-09 RX ORDER — DEXTROSE MONOHYDRATE, SODIUM CHLORIDE, AND POTASSIUM CHLORIDE 50; 1.49; 9 G/1000ML; G/1000ML; G/1000ML
INJECTION, SOLUTION INTRAVENOUS CONTINUOUS
Status: DISCONTINUED | OUTPATIENT
Start: 2018-03-09 | End: 2018-03-11 | Stop reason: HOSPADM

## 2018-03-09 RX ORDER — HYDROMORPHONE HYDROCHLORIDE 1 MG/ML
0.2 INJECTION, SOLUTION INTRAMUSCULAR; INTRAVENOUS; SUBCUTANEOUS
Status: DISCONTINUED | OUTPATIENT
Start: 2018-03-09 | End: 2018-03-09

## 2018-03-09 RX ORDER — CEFAZOLIN SODIUM 2 G/100ML
2 INJECTION, SOLUTION INTRAVENOUS
Status: CANCELLED | OUTPATIENT
Start: 2018-03-09

## 2018-03-09 RX ORDER — ONDANSETRON 2 MG/ML
4 INJECTION INTRAMUSCULAR; INTRAVENOUS ONCE
Status: COMPLETED | OUTPATIENT
Start: 2018-03-09 | End: 2018-03-09

## 2018-03-09 RX ORDER — INSULIN GLARGINE 100 [IU]/ML
25 INJECTION, SOLUTION SUBCUTANEOUS
Status: DISCONTINUED | OUTPATIENT
Start: 2018-03-10 | End: 2018-03-11 | Stop reason: HOSPADM

## 2018-03-09 RX ADMIN — CIPROFLOXACIN 400 MG: 2 INJECTION, SOLUTION INTRAVENOUS at 19:01

## 2018-03-09 RX ADMIN — METRONIDAZOLE 500 MG: 500 INJECTION, SOLUTION INTRAVENOUS at 22:27

## 2018-03-09 RX ADMIN — SODIUM CHLORIDE 1000 ML: 9 INJECTION, SOLUTION INTRAVENOUS at 05:43

## 2018-03-09 RX ADMIN — MORPHINE SULFATE 4 MG: 4 INJECTION, SOLUTION INTRAMUSCULAR; INTRAVENOUS at 05:43

## 2018-03-09 RX ADMIN — SODIUM CHLORIDE: 9 INJECTION, SOLUTION INTRAVENOUS at 18:59

## 2018-03-09 RX ADMIN — Medication 0.2 MG: at 18:52

## 2018-03-09 RX ADMIN — ONDANSETRON 4 MG: 4 TABLET, ORALLY DISINTEGRATING ORAL at 10:35

## 2018-03-09 RX ADMIN — ACETAMINOPHEN 650 MG: 325 TABLET, FILM COATED ORAL at 12:26

## 2018-03-09 RX ADMIN — ACETAMINOPHEN 650 MG: 325 TABLET, FILM COATED ORAL at 23:08

## 2018-03-09 RX ADMIN — ONDANSETRON 4 MG: 2 INJECTION INTRAMUSCULAR; INTRAVENOUS at 05:46

## 2018-03-09 RX ADMIN — LIDOCAINE HYDROCHLORIDE 30 ML: 20 SOLUTION ORAL; TOPICAL at 06:04

## 2018-03-09 ASSESSMENT — ENCOUNTER SYMPTOMS
DIFFICULTY URINATING: 0
DYSURIA: 0
DIARRHEA: 0
FLANK PAIN: 0
CONFUSION: 0
BACK PAIN: 0
EYE REDNESS: 0
CHILLS: 0
SHORTNESS OF BREATH: 0
FEVER: 0
ABDOMINAL PAIN: 1
COLOR CHANGE: 0
HEADACHES: 0
ARTHRALGIAS: 0
VOMITING: 0
NECK STIFFNESS: 0
NAUSEA: 1
FREQUENCY: 0
BLOOD IN STOOL: 0
COUGH: 0

## 2018-03-09 ASSESSMENT — PAIN DESCRIPTION - DESCRIPTORS
DESCRIPTORS: ACHING
DESCRIPTORS: CRAMPING;SQUEEZING

## 2018-03-09 NOTE — ED NOTES
Patient woke up this morning at 0320 with extreme abdominal pain. She is a diabetic and checked her sugar. It was 120 which is normal for her. This is not the first time this has happened this week. She says it will not go away.

## 2018-03-09 NOTE — ED PROVIDER NOTES
History     Chief Complaint   Patient presents with     Abdominal Pain     HPI  Nayeli Caal is a 37 year old female with a history of diabetes mellitus, hypertension, migraines and deafness who presents with epigastric abdominal pain. The patient's history is provided via an ALS . The patient was seen in the ED earlier this morning by Dr. Acevedo for severe epigastric abdominal pain. Gallbladder ultrasound showed biliary sludge and her pain thought secondary to biliary colic resolved with IV fluids, antiemetics, GI cocktail, and analgesics. She was discharged with prescriptions for Zofran and oxycodone along with being initiated on ranitidine and with instructions to follow up with surgery clinic to discuss cholecystectomy. The patient returns stating after leaving the ED she went to the pharmacy to  her prescriptions, but en route ate part of a breakfast burrito and experienced acute reoccurrence of severe abdominal pain/cramping with nausea causing her to return to the ED. She states her pain has improved somewhat, but she continues to have cramping discomfort. She did not take any of the prescribed medications.     Past Medical History:   Diagnosis Date     Deaf      Diabetes mellitus (H)      Hypertension      Migraines        History reviewed. No pertinent surgical history.    Family History   Problem Relation Age of Onset     Unknown/Adopted Other        Social History   Substance Use Topics     Smoking status: Former Smoker     Types: Cigarettes     Quit date: 12/1/2010     Smokeless tobacco: Never Used      Comment: stopped 2 yrs ago     Alcohol use No       Current Facility-Administered Medications   Medication     acetaminophen (TYLENOL) tablet 650 mg     ondansetron (ZOFRAN-ODT) ODT tab 4 mg     sodium chloride 0.9% infusion     Current Outpatient Prescriptions   Medication     ondansetron (ZOFRAN ODT) 4 MG ODT tab     oxyCODONE IR (ROXICODONE) 5 MG tablet     ranitidine  (ZANTAC) 150 MG tablet     ibuprofen (ADVIL/MOTRIN) 600 MG tablet     BASAGLAR 100 UNIT/ML injection     blood glucose monitoring (ACCU-CHEK LAYA PLUS) test strip     BD ULTRA FINE PEN NEEDLES     fenofibrate 160 MG tablet     losartan (COZAAR) 25 MG tablet     dulaglutide (TRULICITY) 0.75 MG/0.5ML pen     insulin aspart (NOVOLOG PEN) 100 UNIT/ML injection     vitamin D (ERGOCALCIFEROL) 57338 UNIT capsule     aspirin 81 MG tablet     blood glucose monitoring (ACCU-CHEK FASTCLIX) lancets     atorvastatin (LIPITOR) 40 MG tablet     levonorgestrel (MIRENA) 20 MCG/24HR IUD     Alcohol Swabs (ALCOHOL PREP PAD) 70 % PADS     [DISCONTINUED] BD ULTRA FINE PEN NEEDLES      No Known Allergies      I have reviewed the Medications, Allergies, Past Medical and Surgical History, and Social History in the Epic system.    Review of Systems   Constitutional: Negative for fever.   HENT: Negative for congestion.    Eyes: Negative for redness.   Respiratory: Negative for shortness of breath.    Cardiovascular: Negative for chest pain.   Gastrointestinal: Positive for abdominal pain (epigastric cramping) and nausea. Negative for vomiting.   Genitourinary: Negative for difficulty urinating.   Musculoskeletal: Negative for arthralgias and neck stiffness.   Skin: Negative for color change.   Neurological: Negative for headaches.   Psychiatric/Behavioral: Negative for confusion.   All other systems reviewed and are negative.      Physical Exam   BP: 174/82  Pulse: 80  Temp: 97.6  F (36.4  C)  Resp: 16  Weight: 83.6 kg (184 lb 4.9 oz)  SpO2: 98 %      Physical Exam   Constitutional: No distress.   HENT:   Head: Atraumatic.   Mouth/Throat: Oropharynx is clear and moist. No oropharyngeal exudate.   Eyes: Pupils are equal, round, and reactive to light. No scleral icterus.   Cardiovascular: Normal heart sounds and intact distal pulses.    Pulmonary/Chest: Breath sounds normal. No respiratory distress.   Abdominal: Soft. Bowel sounds are normal.  There is tenderness in the right upper quadrant.   Musculoskeletal: She exhibits no edema or tenderness.   Skin: Skin is warm. No rash noted. She is not diaphoretic.       ED Course     ED Course     Procedures        Results for orders placed or performed during the hospital encounter of 03/09/18 (from the past 24 hour(s))   hCG qual urine POCT   Result Value Ref Range    HCG Qual Urine Negative neg    Internal QC OK Yes    UA with Microscopic   Result Value Ref Range    Color Urine Yellow     Appearance Urine Clear     Glucose Urine 30 (A) NEG^Negative mg/dL    Bilirubin Urine Negative NEG^Negative    Ketones Urine Negative NEG^Negative mg/dL    Specific Gravity Urine 1.025 1.003 - 1.035    Blood Urine Negative NEG^Negative    pH Urine 6.0 5.0 - 7.0 pH    Protein Albumin Urine 10 (A) NEG^Negative mg/dL    Urobilinogen mg/dL 2.0 0.0 - 2.0 mg/dL    Nitrite Urine Negative NEG^Negative    Leukocyte Esterase Urine Small (A) NEG^Negative    Source Midstream Urine     WBC Urine 6 (H) 0 - 5 /HPF    RBC Urine 2 0 - 2 /HPF    Squamous Epithelial /HPF Urine 2 (H) 0 - 1 /HPF    Mucous Urine Present (A) NEG^Negative /LPF   CBC with platelets differential   Result Value Ref Range    WBC 8.8 4.0 - 11.0 10e9/L    RBC Count 4.37 3.8 - 5.2 10e12/L    Hemoglobin 12.5 11.7 - 15.7 g/dL    Hematocrit 38.1 35.0 - 47.0 %    MCV 87 78 - 100 fl    MCH 28.6 26.5 - 33.0 pg    MCHC 32.8 31.5 - 36.5 g/dL    RDW 13.0 10.0 - 15.0 %    Platelet Count 371 150 - 450 10e9/L    Diff Method Automated Method     % Neutrophils 58.9 %    % Lymphocytes 31.1 %    % Monocytes 5.9 %    % Eosinophils 3.7 %    % Basophils 0.2 %    % Immature Granulocytes 0.2 %    Nucleated RBCs 0 0 /100    Absolute Neutrophil 5.2 1.6 - 8.3 10e9/L    Absolute Lymphocytes 2.7 0.8 - 5.3 10e9/L    Absolute Monocytes 0.5 0.0 - 1.3 10e9/L    Absolute Eosinophils 0.3 0.0 - 0.7 10e9/L    Absolute Basophils 0.0 0.0 - 0.2 10e9/L    Abs Immature Granulocytes 0.0 0 - 0.4 10e9/L     Absolute Nucleated RBC 0.0    Comprehensive metabolic panel   Result Value Ref Range    Sodium 141 133 - 144 mmol/L    Potassium 3.9 3.4 - 5.3 mmol/L    Chloride 107 94 - 109 mmol/L    Carbon Dioxide 21 20 - 32 mmol/L    Anion Gap 13 3 - 14 mmol/L    Glucose 127 (H) 70 - 99 mg/dL    Urea Nitrogen 17 7 - 30 mg/dL    Creatinine 0.57 0.52 - 1.04 mg/dL    GFR Estimate >90 >60 mL/min/1.7m2    GFR Estimate If Black >90 >60 mL/min/1.7m2    Calcium 7.9 (L) 8.5 - 10.1 mg/dL    Bilirubin Total 0.3 0.2 - 1.3 mg/dL    Albumin 3.4 3.4 - 5.0 g/dL    Protein Total 7.0 6.8 - 8.8 g/dL    Alkaline Phosphatase 69 40 - 150 U/L    ALT 49 0 - 50 U/L    AST 34 0 - 45 U/L   Lipase   Result Value Ref Range    Lipase 104 73 - 393 U/L   Lactic acid whole blood   Result Value Ref Range    Lactic Acid 1.3 0.7 - 2.0 mmol/L   Troponin I   Result Value Ref Range    Troponin I ES <0.015 0.000 - 0.045 ug/L   US Abdomen Limited    Narrative    EXAMINATION: Limited Abdominal Ultrasound, 3/9/2018 6:07 AM     COMPARISON: 5/4/2007    HISTORY: Right upper quadrant pain    FINDINGS: Limited evaluation due to overlying bowel gas and patient  habitus.    Fluid: No evidence of ascites or pleural effusions.    Liver: The liver demonstrates normal increased echogenicity, measuring  11.8 cm in craniocaudal dimension. There is no focal mass.     Gallbladder: Sludge within the gallbladder. There is no wall  thickening or pericholecystic fluid. Negative sonographic Gutierres's  sign however the patient was recently medicated.    Bile Ducts: Both the intra- and extrahepatic biliary system are of  normal caliber.  The common bile duct is not identified.    Pancreas: Obscured.    Kidney: The right kidney measures 10.0 cm long. There is no  hydronephrosis or hydroureter, no shadowing renal calculi, cystic  lesion or mass.       Impression    IMPRESSION:   1.  Hepatic steatosis.  2.  Biliary sludge with no sonographic evidence for acute  cholecystitis.    I have personally  reviewed the examination and initial interpretation  and I agree with the findings.    COLE COATS MD   EKG 12-lead, tracing only   Result Value Ref Range    Interpretation ECG Click View Image link to view waveform and result                  Assessments & Plan (with Medical Decision Making)   37-year-old female with history of diabetes presents for evaluation of epigastric and right upper quadrant pain.  Differential included gastritis, gastroparesis, cholecystitis, other.  Patient had been seen earlier today with evaluation including laboratories and ultrasound revealing biliary sludge.  She had been discharged and ate a breakfast burrito with recurrence of pain within 5 minutes of ingestion.  In the emergency room she was noted to have right upper quadrant tenderness.  She was given Zofran and Tylenol with subsequent reduction in her pain.  The case was discussed with general surgery who advised admission for elective cholecystectomy in the next 24 hours.  Patient was agreeable to this plan.    I have reviewed the nursing notes.    I have reviewed the findings, diagnosis, plan and need for follow up with the patient.    I, Zev Rodriguez, am serving as a trained medical scribe to document services personally performed by Drake Vazquez MD, based on the provider's statements to me.      IDrake MD, was physically present and have reviewed and verified the accuracy of this note documented by Zev Rodriguez.   3/9/2018   [facility], [place], EMERGENCY DEPARTMENT     Paul Vazquez MD  03/09/18 1242

## 2018-03-09 NOTE — ED AVS SNAPSHOT
[facility], [place], Emergency Department    [address] [place] [address]    Phone:  [phone]                                       Nayeli Caal   MRN: [id number]    Department:  [facility], Emergency Department   Date of Visit:  3/9/2018           After Visit Summary Signature Page     I have received my discharge instructions, and my questions have been answered. I have discussed any challenges I see with this plan with the nurse or doctor.    ..........................................................................................................................................  Patient/Patient Representative Signature      ..........................................................................................................................................  Patient Representative Print Name and Relationship to Patient    ..................................................               ................................................  Date                                            Time    ..........................................................................................................................................  Reviewed by Signature/Title    ...................................................              ..............................................  Date                                                            Time

## 2018-03-09 NOTE — LETTER
March 9, 2018      To Whom It May Concern:      Nayeli Caal was seen in our Emergency Department today, 03/09/18.  She may return to work  On 3/10/18.    Sincerely,        Suyapa Acevedo MD

## 2018-03-09 NOTE — IP AVS SNAPSHOT
Unit [address] Observation [address] [address]    Phone:  [phone]    Fax:  [phone]                                       After Visit Summary   3/9/2018    Nayeli Caal    MRN: [id number]           After Visit Summary Signature Page     I have received my discharge instructions, and my questions have been answered. I have discussed any challenges I see with this plan with the nurse or doctor.    ..........................................................................................................................................  Patient/Patient Representative Signature      ..........................................................................................................................................  Patient Representative Print Name and Relationship to Patient    ..................................................               ................................................  Date                                            Time    ..........................................................................................................................................  Reviewed by Signature/Title    ...................................................              ..............................................  Date                                                            Time

## 2018-03-09 NOTE — LETTER
Nayeli Caal  [address] [facility] [address]      March 10, 2018    Date of Exam: [unfilled]      To Whom It May Concern,     Ms Ingrid Caal was treated at [facility] on 3.10 and will require several days of rest, at her discretion. It would be reasonable for her to return to light duty by 3.18. She should not lift more than 10-15 pounds for 4 weeks.    Sincerely,    Ana Lilia Khanna MD  PGY 1

## 2018-03-09 NOTE — PHARMACY
Admission medication history interview status for the 3/9/2018 admission is complete. See Epic admission navigator for allergy information, pharmacy, prior to admission medications and immunization status.     Medication history interview sources:  Patient, Med list from ED this morning (3/9) and Clinic note from 2/5/18    Changes made to PTA medication list (reason)  Added: ondansetron, oxycodone, ranitidine  Deleted: none  Changed: none    Additional medication history information (including reliability of information, actions taken by pharmacist):  - Patient and medication list from this morning were reliable sources.  was not present but patient and I communicated via written messages.  - Patient communicated that the only medication taken this morning was her basaglar. All other daily medications were taken yesterday (3/8) in the morning.  - Patient's last dose of dulaglutide and vitamin D were this past Monday (3/5).  - Patient was prescribed ondansetron, oxycodone, and ranitidine in the ED this morning but has not started taking them.  - Was unable to confirm is if she is still using the Mirena IUD.   - Patient received an influenza vaccine this season (10/17/17).      Prior to Admission medications    Medication Sig Last Dose Taking? Auth Provider   ondansetron (ZOFRAN ODT) 4 MG ODT tab Take 1 tablet (4 mg) by mouth every 8 hours as needed for nausea  Yes Suyapa Acevedo MD   oxyCODONE IR (ROXICODONE) 5 MG tablet Take 1 tablet (5 mg) by mouth every 6 hours as needed for pain  Yes Suyapa Acevedo MD   ranitidine (ZANTAC) 150 MG tablet Take 1 tablet (150 mg) by mouth 2 times daily for 15 days  Yes Suyapa Acevedo MD   ibuprofen (ADVIL/MOTRIN) 600 MG tablet Take 1 tablet (600 mg) by mouth every 6 hours as needed for moderate pain PRN at PRN Yes Mariya Mohamud APRN CNP   BASAGLAR 100 UNIT/ML injection Inject 50 Units Subcutaneous daily Please provide 3 months supply 3/9/2018  at am Yes Mariya Mohamud APRN CNP   fenofibrate 160 MG tablet Take 1 tablet (160 mg) by mouth daily 3/8/2018 at am Yes Mariya Mohamud APRN CNP   losartan (COZAAR) 25 MG tablet Take 1 tablet (25 mg) by mouth daily 3/8/2018 at am Yes Jimena Bragg MD   dulaglutide (TRULICITY) 0.75 MG/0.5ML pen Inject 0.75 mg Subcutaneous every 7 days 3/5/2018 at Unknown time Yes Jimena Bragg MD   insulin aspart (NOVOLOG PEN) 100 UNIT/ML injection Admin Instructions: Before meals and bedtime correction sliding scale  120 - 160 - 1 U   161 - 200 - 2 U   201 - 240 - 3 U   241 - 280 - 4 U   281 - 320 - 5 U   321 - 360 - 6 U ... 3/8/2018 at pm Yes Mariya Mohamud APRN CNP   vitamin D (ERGOCALCIFEROL) 92081 UNIT capsule Take 1 capsule (50,000 Units) by mouth every 7 days 3/5/2018 at Unknown time Yes Mariya Mohamud APRN CNP   aspirin 81 MG tablet Take 1 tablet (81 mg) by mouth daily 3/8/2018 at am Yes Mariya Mohamud APRN CNP   atorvastatin (LIPITOR) 40 MG tablet Take 1 tablet (40 mg) by mouth daily 3/8/2018 at am Yes Mariya Mohamud APRN CNP   ONDANSETRON PO Take 4 mg by mouth every 8 hours as needed for nausea Not taken yet  Unknown, Entered By History   OXYCODONE HCL PO Take 5 mg by mouth every 6 hours as needed (moderate to severe pain) Not taken yet  Unknown, Entered By History   RANITIDINE HCL PO Take 150 mg by mouth 2 times daily Not taken yet  Unknown, Entered By History   blood glucose monitoring (ACCU-CHEK LAYA PLUS) test strip Laya Plus test strips for use in Accucheck Expert meter.  Use to test blood sugar 6 times daily. 3 month supply.  Send PA's to Dr. Mohamud. DME at [facility]  Mariya Mohamud APRN CNP   BD ULTRA FINE PEN NEEDLES Inject 1 dose. Subcutaneous 2 times daily BD ultra-fine insulin syringe, 30 ga. X 1/2'' short needle 1/2 cc. Use as directed. DME at [facility]  Mariya Mohamud APRN CNP   blood glucose monitoring (ACCU-CHEK FASTCLIX) lancets Use to test blood sugar 6  times daily or as directed DME at [facility]  Mariya Mohamud APRN CNP   levonorgestrel (MIRENA) 20 MCG/24HR IUD 1 each (20 mcg) by Intrauterine route once for 1 dose   Mariya Mohamud APRN CNP   Alcohol Swabs (ALCOHOL PREP PAD) 70 % PADS 1 each 3 times daily DME at [facility]  Mariya Mohamud APRN CNP         Medication history completed by: Sergei Espana, Student Pharmacist

## 2018-03-09 NOTE — ED PROVIDER NOTES
History     Chief Complaint   Patient presents with     Abdominal Pain     HPI  Nayeli Caal is a 37 year old female with a history of diabetes mellitus, hypertension, migraines and deafness who presents with epigastric abdominal pain.  She reports having epigastric pain approximately 1 week ago for a few hours which subsequently resolved on its own.  This evening around 3 AM she woke with recurrent severe epigastric and right upper quadrant pain.  She denies any radiation of her pain.  It has been fairly constant since onset.  She does have associated nausea but denies any vomiting.  She reports her last bowel movement was this evening and was normal without any melena or hematochezia.  She denies any recent fevers.  She has not had any chest pain, shortness of breath or cough.  She denies significant NSAID use, alcohol use or recent ill contacts.  She has no previous abdominal surgeries.  She denies any urinary symptoms including dysuria or urinary frequency.  Last menstrual cycle was about a week ago.  She is diabetic and reports that her sugars have been in the mid 100s which is her baseline.    I have reviewed the Medications, Allergies, Past Medical and Surgical History, and Social History in the Epic system.    Review of Systems   Constitutional: Negative for chills and fever.   HENT: Negative for congestion.    Respiratory: Negative for cough and shortness of breath.    Cardiovascular: Negative for chest pain.   Gastrointestinal: Positive for abdominal pain and nausea. Negative for blood in stool, diarrhea and vomiting.   Genitourinary: Negative for dysuria, flank pain and frequency.   Musculoskeletal: Negative for back pain.   Skin: Negative for rash.   Neurological: Negative for headaches.   All other systems reviewed and are negative.      Physical Exam   BP: 174/82  Pulse: 80  Temp: 97.6  F (36.4  C)  Weight: 83.6 kg (184 lb 4.9 oz)  SpO2: 98 %      Physical Exam   General: patient is alert  and oriented and in no acute distress, uncomfortable appearing  Head: atraumatic and normocephalic   EENT: moist mucus membranes without tonsillar erythema or exudates, pupils round and reactive, sclera anicteric  Neck: supple   Cardiovascular: regular rate and rhythm, extremities warm and well perfused, no lower extremity edema  Pulmonary: lungs clear to auscultation bilaterally   Abdomen: soft, epigastric and right upper quadrant tenderness to palpation without guarding, no CVA tenderness  Musculoskeletal: normal range of motion   Neurological: alert and oriented, moving all extremities symmetrically, gait normal   Skin: warm, dry       ED Course     ED Course     Procedures                EKG Interpretation:      Interpreted by Suyapa Acevedo  Time reviewed:0715   Symptoms at time of EKG: epigastric pain   Rhythm: Normal sinus   Rate: Normal  Axis: Normal  Ectopy: None  Conduction: Normal  ST Segments/ T Waves: No acute ischemic changes  Q Waves: None and Nonspecific  Comparison to prior: Unchanged    Clinical Impression: no acute changes        Critical Care time:  none             Labs Ordered and Resulted from Time of ED Arrival Up to the Time of Departure from the ED   HCG QUAL URINE POCT - Normal   CBC WITH PLATELETS DIFFERENTIAL   COMPREHENSIVE METABOLIC PANEL   LIPASE   LACTIC ACID WHOLE BLOOD   ROUTINE UA WITH MICROSCOPIC            Assessments & Plan (with Medical Decision Making)   Ms. Ingrid Caal is a 37 year old female with a history of diabetes mellitus, hypertension, migraines and deafness who presents with epigastric abdominal pain.  She is initially hypertensive but not tachycardic and afebrile.  Differential diagnosis includes but is not limited to: Gastritis, peptic ulcer disease, pancreatitis, biliary colic, cholelithiasis, cholecystitis, hepatitis, early gastroenteritis, less likely DKA.  An IV was established and she was given IV fluids, antiemetics and analgesics as well as a GI  cocktail.  Low suspicion for ACS and both EKG and troponin were within normal limits.  Labs including CBC, CMP, lipase and lactate were obtained.  She has no leukocytosis with a white count of 8.8.  Hemoglobin is normal at 12.5.  LFTs as well as lipase are all within normal limits.  She has no electrolyte abnormalities.  Glucose is 127 without any evidence of DKA.  Urine shows a small amount of leukocyte esterase and 6 white blood cells.  This was sent for culture.  Low suspicion for UTI currently based on clinical exam and will wait for culture to return.  Urine pregnancy is negative.  She was taken for an ultrasound of the gallbladder which shows biliary sludge.  On reevaluation her pain has resolved and tolerating PO.  Suspect that her symptoms are most likely due to biliary colic as she did eat Chinese last night and then had worsening epigastric pain.  She does not have any evidence of cholecystitis or obstruction.  At this point will plan to have patient follow-up in surgery clinic.  She was given Zofran and a small dose of oxycodone to use at home as needed.  She is also initiated on ranitidine for possible gastritis component.  She was instructed to avoid fatty foods and other triggers and given close return instructions for the emergency department if she should develop any worsening abdominal pain, fevers, intractable vomiting or other concerns and voiced understanding.    I have reviewed the nursing notes.    I have reviewed the findings, diagnosis, plan and need for follow up with the patient.    New Prescriptions    ONDANSETRON (ZOFRAN ODT) 4 MG ODT TAB    Take 1 tablet (4 mg) by mouth every 8 hours as needed for nausea    OXYCODONE IR (ROXICODONE) 5 MG TABLET    Take 1 tablet (5 mg) by mouth every 6 hours as needed for pain    RANITIDINE (ZANTAC) 150 MG TABLET    Take 1 tablet (150 mg) by mouth 2 times daily for 15 days       Final diagnoses:   Abdominal pain, epigastric       3/9/2018   [facility]     Suyapa Acevedo MD  03/09/18 0703       Suyapa Acevedo MD  03/09/18 5323

## 2018-03-09 NOTE — IP AVS SNAPSHOT
MRN:[id number]                      After Visit Summary   3/9/2018    Nayeli Caal    MRN: [id number]           Thank you!     Thank you for choosing [facility] for your care. Our goal is always to provide you with excellent care. Hearing back from our patients is one way we can continue to improve our services. Please take a few minutes to complete the written survey that you may receive in the mail after you visit with us. Thank you!        Patient Information     Date Of Birth          1980        About your hospital stay     You were admitted on:  March 9, 2018 You last received care in the:  Unit [facility] Observation [facility]    You were discharged on:  March 10, 2018        Reason for your hospital stay       Gall bladder removed                  Who to Call     For medical emergencies, please call 911.  For non-urgent questions about your medical care, please call your primary care provider or clinic, [phone]  For questions related to your surgery, please call your surgery clinic        Attending Provider     Provider Specialty    Suyapa Acevedo MD Emergency Medicine    George, Paul FISHMAN MD Emergency Medicine    Jovon, Kuldeep Jimenes MD General Surgery       Primary Care Provider Office Phone # Fax #    Mariya Mohamud, APRMARCY [phone]       When to contact your care team       Fevers over 101. Chills. Nausea/vomiting. Increasing pain. Drainage or redness worsening at incisions                  After Care Instructions     Activity       No lifting more than 10 pounds for four weeks            Diet       Follow this diet upon discharge: Orders Placed This Encounter      Advance Diet as Tolerated: Regular Diet Adult    Low fat diets can be helpful for reducing gas after cholecystectomy            Wound care and dressings       Shower, pat dry. No tubs/soaking for 4 weeks                  Follow-up Appointments     Adult [facility] Follow-up and  recommended labs and tests       Follow up with Dr. Ibanez , at (location with clinic name or city) [facility], 2-3 weeks    Appointments on [place] and/or [facility] (with [facility] or [facility] provider or service). Call [phone] if you haven't heard regarding these appointments within 7 days of discharge.                  Your next 10 appointments already scheduled     Mar 12, 2018  7:45 AM CDT   RETURN DIABETES with Jimena Bragg MD   [facility] ([facility])    [address] [place] [id number] [phone]            May 17, 2018  2:40 PM CDT   (Arrive by 2:25 PM)   Return Visit with SABI Ceja CNP   [facility] ([facility])    [address] [place] [id number] [phone]              Additional Information     If you use hormonal birth control (such as the pill, patch, ring or implants): You'll need a second form of birth control for 7 days (condoms, a diaphragm or contraceptive foam). While in the hospital, you received a medicine called Bridion. Your normal birth control will not work as well for a week after taking this medicine.          Pending Results     Date and Time Order Name Status Description    3/9/2018 0649 EKG 12-lead, tracing only Preliminary             Statement of Approval     Ordered          03/10/18 2120  I have reviewed and agree with all the recommendations and orders detailed in this document.  EFFECTIVE NOW     Approved and electronically signed by:  Ana Lilia Khanna MD             Admission Information     Date & Time Provider Department Dept. Phone    3/9/2018 Kuldeep Sigala MD Unit 6D Observation [facility] [place] [phone]      Your Vitals Were     Blood Pressure Pulse Temperature Respirations Weight       150/72 88 99.4  F (37.4  C) (Oral) 24 83.6 kg (184 lb 4.9 oz)     Pulse Oximetry BMI (Body Mass Index)                 98% 34.82 kg/m2          [employer] Information     [employer] gives you secure access to your electronic health record. If you see a primary care provider, you can also send messages to your care team and make appointments. If you have questions, please call your primary care clinic.  If you do not have a primary care provider, please call [phone] and they will assist you.        Care EveryWhere ID     This is your Care EveryWhere ID. This could be used by other organizations to access your [facility] medical records  [id number]        Equal Access to Services     SULAIMAN MARTINEZ : Hadii oneida patino hadasho Soomaali, waaxda luqadaha, qaybta kaalmada adejameyacarlee, marc rae . So [facility] [phone].    ATENCIÓN: Si habla español, tiene a dykes disposición servicios gratuitos de asistencia lingüística. Llame al [phone].    We comply with applicable federal civil rights laws and [place] laws. We do not discriminate on the basis of race, color, national origin, age, disability, sex, sexual orientation, or gender identity.               Review of your medicines      START taking        Dose / Directions    docusate sodium 100 MG tablet   Commonly known as:  COLACE   Used for:  Abdominal pain, epigastric        Stool softener for use while taking oxycodone   Quantity:  30 tablet   Refills:  1         CONTINUE these medicines which may have CHANGED, or have new prescriptions. If we are uncertain of the size of tablets/capsules you have at home, strength may be listed as something that might have changed.        Dose / Directions    * ONDANSETRON PO   This may have changed:  Another medication with the same name was added. Make sure you understand how and when to take each.        Dose:  4 mg   Take 4 mg by mouth every 8 hours as needed for nausea   Refills:  0       * ondansetron 4 MG ODT tab   Commonly known as:  ZOFRAN ODT   This may have changed:  You were already taking a medication with  the same name, and this prescription was added. Make sure you understand how and when to take each.        Dose:  4 mg   Take 1 tablet (4 mg) by mouth every 8 hours as needed for nausea   Quantity:  6 tablet   Refills:  0       * OXYCODONE HCL PO   This may have changed:  Another medication with the same name was added. Make sure you understand how and when to take each.        Dose:  5 mg   Take 5 mg by mouth every 6 hours as needed (moderate to severe pain)   Refills:  0       * oxyCODONE IR 5 MG tablet   Commonly known as:  ROXICODONE   This may have changed:  You were already taking a medication with the same name, and this prescription was added. Make sure you understand how and when to take each.        Dose:  5 mg   Take 1 tablet (5 mg) by mouth every 6 hours as needed for pain   Quantity:  10 tablet   Refills:  0       * oxyCODONE IR 10 MG tablet   Commonly known as:  ROXICODONE   This may have changed:  You were already taking a medication with the same name, and this prescription was added. Make sure you understand how and when to take each.   Used for:  Abdominal pain, epigastric        Dose:  5 mg   Take 0.5 tablets (5 mg) by mouth every 4 hours as needed for moderate to severe pain   Quantity:  20 tablet   Refills:  0       * RANITIDINE HCL PO   This may have changed:  Another medication with the same name was added. Make sure you understand how and when to take each.        Dose:  150 mg   Take 150 mg by mouth 2 times daily   Refills:  0       * ranitidine 150 MG tablet   Commonly known as:  ZANTAC   This may have changed:  You were already taking a medication with the same name, and this prescription was added. Make sure you understand how and when to take each.        Dose:  150 mg   Take 1 tablet (150 mg) by mouth 2 times daily for 15 days   Quantity:  30 tablet   Refills:  0       * Notice:  This list has 7 medication(s) that are the same as other medications prescribed for you. Read the directions  carefully, and ask your doctor or other care provider to review them with you.      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, unspecified eye (H)        Dose:  81 mg   Take 1 tablet (81 mg) by mouth daily   Quantity:  100 tablet   Refills:  3       atorvastatin 40 MG tablet   Commonly known as:  LIPITOR   Used for:  Type 1 diabetes mellitus with complications (H)        Dose:  40 mg   Take 1 tablet (40 mg) by mouth daily   Quantity:  90 tablet   Refills:  3       BASAGLAR 100 UNIT/ML injection   Used for:  Type 2 diabetes mellitus with complication, with long-term current use of insulin (H)        Dose:  50 Units   Inject 50 Units Subcutaneous daily Please provide 3 months supply   Quantity:  12 mL   Refills:  1       BD ULTRA FINE PEN NEEDLES   Used for:  Type 2 diabetes mellitus with complication, with long-term current use of insulin (H)        Dose:  1 dose.   Inject 1 dose. Subcutaneous 2 times daily BD ultra-fine insulin syringe, 30 ga. X 1/2'' short needle 1/2 cc. Use as directed.   Quantity:  200 Units   Refills:  11       blood glucose monitoring lancets   Used for:  Type 1 diabetes mellitus with nephropathy (H)        Use to test blood sugar 6 times daily or as directed   Quantity:  6 Box   Refills:  3       blood glucose monitoring test strip   Commonly known as:  ACCU-CHEK LAYA PLUS   Used for:  Type 1 diabetes mellitus with nephropathy (H)        Laya Plus test strips for use in Accucheck Expert meter.  Use to test blood sugar 6 times daily. 3 month supply.  Send PA's to Dr. Mohamud.   Quantity:  600 each   Refills:  3       dulaglutide 0.75  MG/0.5ML pen   Commonly known as:  TRULICITY   Used for:  Uncontrolled type 2 diabetes mellitus with hyperglycemia, with long-term current use of insulin (H)        Dose:  0.75 mg   Inject 0.75 mg Subcutaneous every 7 days   Quantity:  6 mL   Refills:  3       fenofibrate 160 MG tablet   Used for:  Mixed hyperlipidemia        Dose:  160 mg   Take 1 tablet (160 mg) by mouth daily   Quantity:  90 tablet   Refills:  3       ibuprofen 600 MG tablet   Commonly known as:  ADVIL/MOTRIN   Used for:  Cervicalgia, Headache, Pain in joint, shoulder region        Dose:  600 mg   Take 1 tablet (600 mg) by mouth every 6 hours as needed for moderate pain   Quantity:  120 tablet   Refills:  1       insulin aspart 100 UNIT/ML injection   Commonly known as:  NovoLOG PEN   Used for:  Type 1 diabetes mellitus with complications (H)        Admin Instructions: Before meals and bedtime correction sliding scale 120 - 160 - 1 U  161 - 200 - 2 U  201 - 240 - 3 U  241 - 280 - 4 U  281 - 320 - 5 U  321 - 360 - 6 U ...   Quantity:  63 mL   Refills:  3       levonorgestrel 20 MCG/24HR IUD   Commonly known as:  MIRENA   Used for:  Encounter for insertion of intrauterine contraceptive device        Dose:  1 each   1 each (20 mcg) by Intrauterine route once for 1 dose   Quantity:  1 each   Refills:  0       losartan 25 MG tablet   Commonly known as:  COZAAR   Used for:  Uncontrolled type 2 diabetes mellitus with hyperglycemia, with long-term current use of insulin (H)        Dose:  25 mg   Take 1 tablet (25 mg) by mouth daily   Quantity:  90 tablet   Refills:  3       vitamin D 85402 UNIT capsule   Commonly known as:  ERGOCALCIFEROL   Used for:  Vitamin deficiency        Dose:  15843 Units   Take 1 capsule (50,000 Units) by mouth every 7 days   Quantity:  12 capsule   Refills:  3            Where to get your medicines      These medications were sent to [place], [place] - [address] [place] [address] [phone]  [address] [place] [address]  [address], [facility] [address]    Hours:  TRANSPLANT PHONE NUMBER [phone] Phone:  [phone]     docusate sodium 100 MG tablet         Some of these will need a paper prescription and others can be bought over the counter. Ask your nurse if you have questions.     Bring a paper prescription for each of these medications     ondansetron 4 MG ODT tab    oxyCODONE IR 10 MG tablet    oxyCODONE IR 5 MG tablet    ranitidine 150 MG tablet                Protect others around you: Learn how to safely use, store and throw away your medicines at www.disposemymeds.org.        Information about OPIOIDS     PRESCRIPTION OPIOIDS: WHAT YOU NEED TO KNOW    Prescription opioids can be used to help relieve moderate to severe pain and are often prescribed following a surgery or injury, or for certain health conditions. These medications can be an important part of treatment but also come with serious risks. It is important to work with your health care provider to make sure you are getting the safest, most effective care.    WHAT ARE THE RISKS AND SIDE EFFECTS OF OPIOID USE?  Prescription opioids carry serious risks of addiction and overdose, especially with prolonged use. An opioid overdose, often marked by slowed breathing can cause sudden death. The use of prescription opioids can have a number of side effects as well, even when taken as directed:      Tolerance - meaning you might need to take more of a medication for the same pain relief    Physical dependence - meaning you have symptoms of withdrawal when a medication is stopped    Increased sensitivity to pain    Constipation    Nausea, vomiting, and dry mouth    Sleepiness and dizziness    Confusion    Depression    Low levels of testosterone that can result in lower sex drive, energy, and strength    Itching and sweating    RISKS ARE GREATER WITH:    History of drug misuse, substance use disorder, or overdose    Mental health conditions (such as depression or  anxiety)    Sleep apnea    Older age (65 years or older)    Pregnancy    Avoid alcohol while taking prescription opioids.   Also, unless specifically advised by your health care provider, medications to avoid include:    Benzodiazepines (such as Xanax or Valium)    Muscle relaxants (such as Soma or Flexeril)    Hypnotics (such as Ambien or Lunesta)    Other prescription opioids    KNOW YOUR OPTIONS:  Talk to your health care provider about ways to manage your pain that do not involve prescription opioids. Some of these options may actually work better and have fewer risks and side effects:    Pain relievers such as acetaminophen, ibuprofen, and naproxen    Some medications that are also used for depression or seizures    Physical therapy and exercise    Cognitive behavioral therapy, a psychological, goal-directed approach, in which patients learn how to modify physical, behavioral, and emotional triggers of pain and stress    IF YOU ARE PRESCRIBED OPIOIDS FOR PAIN:    Never take opioids in greater amounts or more often than prescribed    Follow up with your primary health care provider and work together to create a plan on how to manage your pain.    Talk about ways to help manage your pain that do not involve prescription opioids    Talk about all concerns and side effects    Help prevent misuse and abuse    Never sell or share prescription opioids    Never use another person's prescription opioids    Store prescription opioids in a secure place and out of reach of others (this may include visitors, children, friends, and family)    Visit www.cdc.gov/drugoverdose to learn about risks of opioid abuse and overdose    If you believe you may be struggling with addiction, tell your health care provider and ask for guidance or call [facility] National Helpline at 4-585-643-HELP    LEARN MORE / www.cdc.gov/drugoverdose/prescribing/guideline.html    Safely dispose of unused prescription opioids: Find your local drug  take-back programs and more information about the importance of safe disposal at www.doseofreality.[place].gov             Medication List: This is a list of all your medications and when to take them. Check marks below indicate your daily home schedule. Keep this list as a reference.      Medications           Morning Afternoon Evening Bedtime As Needed    Alcohol Prep Pad 70 % Pads   1 each 3 times daily                                aspirin 81 MG tablet   Take 1 tablet (81 mg) by mouth daily                                atorvastatin 40 MG tablet   Commonly known as:  LIPITOR   Take 1 tablet (40 mg) by mouth daily                                BASAGLAR 100 UNIT/ML injection   Inject 50 Units Subcutaneous daily Please provide 3 months supply   Last time this was given:  25 Units on 3/10/2018  1:31 PM                                BD ULTRA FINE PEN NEEDLES   Inject 1 dose. Subcutaneous 2 times daily BD ultra-fine insulin syringe, 30 ga. X 1/2'' short needle 1/2 cc. Use as directed.                                blood glucose monitoring lancets   Use to test blood sugar 6 times daily or as directed                                blood glucose monitoring test strip   Commonly known as:  ACCU-CHEK LAYA PLUS   Laya Plus test strips for use in Accucheck Expert meter.  Use to test blood sugar 6 times daily. 3 month supply.  Send PA's to Dr. Mohamud.                                docusate sodium 100 MG tablet   Commonly known as:  COLACE   Stool softener for use while taking oxycodone                                dulaglutide 0.75 MG/0.5ML pen   Commonly known as:  TRULICITY   Inject 0.75 mg Subcutaneous every 7 days                                fenofibrate 160 MG tablet   Take 1 tablet (160 mg) by mouth daily                                ibuprofen 600 MG tablet   Commonly known as:  ADVIL/MOTRIN   Take 1 tablet (600 mg) by mouth every 6 hours as needed for moderate pain                                insulin  aspart 100 UNIT/ML injection   Commonly known as:  NovoLOG PEN   Admin Instructions: Before meals and bedtime correction sliding scale 120 - 160 - 1 U  161 - 200 - 2 U  201 - 240 - 3 U  241 - 280 - 4 U  281 - 320 - 5 U  321 - 360 - 6 U ...   Last time this was given:  3 Units on 3/10/2018  4:46 PM                                levonorgestrel 20 MCG/24HR IUD   Commonly known as:  MIRENA   1 each (20 mcg) by Intrauterine route once for 1 dose                                losartan 25 MG tablet   Commonly known as:  COZAAR   Take 1 tablet (25 mg) by mouth daily                                * ONDANSETRON PO   Take 4 mg by mouth every 8 hours as needed for nausea   Last time this was given:  4 mg on 3/9/2018 10:35 AM                                * ondansetron 4 MG ODT tab   Commonly known as:  ZOFRAN ODT   Take 1 tablet (4 mg) by mouth every 8 hours as needed for nausea   Last time this was given:  4 mg on 3/9/2018 10:35 AM                                * OXYCODONE HCL PO   Take 5 mg by mouth every 6 hours as needed (moderate to severe pain)   Last time this was given:  10 mg on 3/10/2018  7:30 PM                                * oxyCODONE IR 5 MG tablet   Commonly known as:  ROXICODONE   Take 1 tablet (5 mg) by mouth every 6 hours as needed for pain   Last time this was given:  10 mg on 3/10/2018  7:30 PM                                * oxyCODONE IR 10 MG tablet   Commonly known as:  ROXICODONE   Take 0.5 tablets (5 mg) by mouth every 4 hours as needed for moderate to severe pain   Last time this was given:  10 mg on 3/10/2018  7:30 PM                                * RANITIDINE HCL PO   Take 150 mg by mouth 2 times daily                                * ranitidine 150 MG tablet   Commonly known as:  ZANTAC   Take 1 tablet (150 mg) by mouth 2 times daily for 15 days                                vitamin D 46589 UNIT capsule   Commonly known as:  ERGOCALCIFEROL   Take 1 capsule (50,000 Units) by mouth every 7  days                                * Notice:  This list has 7 medication(s) that are the same as other medications prescribed for you. Read the directions carefully, and ask your doctor or other care provider to review them with you.

## 2018-03-09 NOTE — CONSULTS
General Surgery Consult    Nayeli Caal MRN# [id number]   YOB: 1980 Age: 37 year old      Date of Admission: 3/9/2018        Consult for:    Consulting physician/team: ED        Assessment:      37 year old female who presents with PMH of DM, HTN, Migraines and deafness who presents with 1 week of episodic epigastric pain 2/2 symptomatic cholelithiasis         Plan:        Admit to Surgery  (obs)    Reg diet ,NPO after midnight, MIVF, PRN pain medications    IV cipro/flagyl ( discussed with pharmacist), will give preop abx tomorrow prior to procedue    Will give 1/2 dose of glargine in AM as per Dr. Sigala , on SSI and hypoglycemia protocol     OR on 3/9 for laparoscopic cholecystectomy, possible open         History of Present Illness:     37 year old female who presents with PMH of DM, HTN, Migraines and deafness who presents with 1 week of episodic epigastric pain.  She reports having epigastric pain approximately 1 week ago for a few hours which subsequently resolved on its own.  She now has had around 12 hours severe epigastric and right upper quadrant pain, without any radiation, fairly constant since onset.with  associated nausea. She denies any vomiting, melena or hematochezia,  Fevers, chest pain, shortness of breath or cough, altered bowel or bladder habits.  She denies significant NSAID use, alcohol use or recent ill contacts. She denies any FH of cancer or bleeding disorders     Workup in ED revealed a WNL WBC, Hb 12.5, Cr 0.57, WNL troponin , UA with small LE (Ucx pending) and US demonstrating biliary sludge without cholecystitis or CBD obstruction    Past Medical History:  Past Medical History:   Diagnosis Date     Deaf      Diabetes mellitus (H)      Hypertension      Migraines        Past Surgical History:  History reviewed. No pertinent surgical history.    Allergies:   No Known Allergies    Medications:    No current facility-administered medications on file prior to  encounter.   Current Outpatient Prescriptions on File Prior to Encounter:  ibuprofen (ADVIL/MOTRIN) 600 MG tablet Take 1 tablet (600 mg) by mouth every 6 hours as needed for moderate pain   BASAGLAR 100 UNIT/ML injection Inject 50 Units Subcutaneous daily Please provide 3 months supply   fenofibrate 160 MG tablet Take 1 tablet (160 mg) by mouth daily   losartan (COZAAR) 25 MG tablet Take 1 tablet (25 mg) by mouth daily   dulaglutide (TRULICITY) 0.75 MG/0.5ML pen Inject 0.75 mg Subcutaneous every 7 days   insulin aspart (NOVOLOG PEN) 100 UNIT/ML injection Admin Instructions: Before meals and bedtime correction sliding [id number] - 160 - 1 U 161 - 200 - 2 U 201 - 240 - 3 U 241 - 280 - 4 U 281 - 320 - 5 U 321 - 360 - 6 U ...   vitamin D (ERGOCALCIFEROL) 20738 UNIT capsule Take 1 capsule (50,000 Units) by mouth every 7 days   aspirin 81 MG tablet Take 1 tablet (81 mg) by mouth daily   atorvastatin (LIPITOR) 40 MG tablet Take 1 tablet (40 mg) by mouth daily   blood glucose monitoring (ACCU-CHEK LAYA PLUS) test strip Laya Plus test strips for use in Accucheck Expert meter.  Use to test blood sugar 6 times daily. 3 month supply.  Send PA's to Dr. Mohamud.   BD ULTRA FINE PEN NEEDLES Inject 1 dose. Subcutaneous 2 times daily BD ultra-fine insulin syringe, 30 ga. X 1/2'' short needle 1/2 cc. Use as directed.   blood glucose monitoring (ACCU-CHEK FASTCLIX) lancets Use to test blood sugar 6 times daily or as directed   levonorgestrel (MIRENA) 20 MCG/24HR IUD 1 each (20 mcg) by Intrauterine route once for 1 dose   Alcohol Swabs (ALCOHOL PREP PAD) 70 % PADS 1 each 3 times daily   [DISCONTINUED] BD ULTRA FINE PEN NEEDLES Inject 1 dose Subcutaneous 2 times daily BD ultra-fine insulin syringe, 30 ga. X 1/2'' short needle 1/2 cc. Use as directed. (Patient taking differently: Inject 1 dose * Subcutaneous 2 times daily BD ultra-fine insulin syringe, 30 ga. X 1/2'' short needle 1/2 cc. Use as directed.)       Social History:  Social  History     Social History     Marital status: Single     Spouse name: N/A     Number of children: N/A     Years of education: N/A     Occupational History     Not on file.     Social History Main Topics     Smoking status: Former Smoker     Types: Cigarettes     Quit date: 12/1/2010     Smokeless tobacco: Never Used      Comment: stopped 2 yrs ago     Alcohol use No     Drug use: No     Sexual activity: Not Currently     Partners: Male     Other Topics Concern      Service No     Blood Transfusions No     Caffeine Concern No     Occupational Exposure No     Hobby Hazards No     Sleep Concern No     Stress Concern No     Weight Concern Yes     Special Diet No     Back Care No     Exercise Yes     4-5 x a week     Bike Helmet No     Seat Belt Yes     Self-Exams Yes     Social History Narrative       Family History:  Family History   Problem Relation Age of Onset     Unknown/Adopted Other        ROS:  C: NEGATIVE for fever, chills, change in weight, vomiting, diarrhea, constipation.   E/M: NEGATIVE for changes in vision, hearing, voice, or swallowing. No visual irritation, epistaxis, rhinorrhea, or other ear, mouth and throat problems.  R: NEGATIVE for significant cough, SOB, difficulty breathing.  CV: NEGATIVE for chest pain, palpitations or peripheral edema   GI: NEGATIVE for melena, hematochezia, heartburn  : NEGATIVE for frequency, dysuria, or hematuria   M: NEGATIVE for significant arthralgias or myalgia.  N: NEGATIVE for weakness, dizziness or paresthesias   H/I: NEGATIVE for bleeding problems, worrisome rashes, moles or lesions.  P: NEGATIVE for changes in mood or affect  The remainder of the complete ROS was negative unless noted in the HPI.    Exam:  /80  Pulse 68  Temp 97.6  F (36.4  C) (Oral)  Resp 16  Wt 83.6 kg (184 lb 4.9 oz)  SpO2 98%  BMI 34.82 kg/m2  General: Alert and oriented with appropriate responses to questions, in NAD.  HEENT: NC/AT  Neck: Supple  Resp: non labored  breathing   Cardiac: regular rate and rhythm, no murmur.  Abdomen: Soft, tender to palpation over RUQ, nondistended. No rebound or guarding.  Extremities: No LE edema, 5/5 strength, 2+ radial and dp pulses.   Skin: Warm and dry, no jaundice or rash  Neuro: Cn II-XII intact, moves all extremities equally    Labs:  Most Recent CBC:   Recent Labs   Lab Test  03/09/18   0536   WBC  8.8   RBC  4.37   HGB  12.5   HCT  38.1   MCV  87   MCH  28.6   MCHC  32.8   RDW  13.0   PLT  371     Most Recent BMP:   Recent Labs   Lab Test  03/09/18   0536   07/13/17   0921   NA  141   < >   --    POTASSIUM  3.9   < >   --    CHLORIDE  107   < >   --    CO2  21   < >   --    BUN  17   < >   --    CR  0.57   < >   --    GLC  127*   < >   --    MAG   --    --   2.2    < > = values in this interval not displayed.           Imaging:  Recent Results (from the past 24 hour(s))   US Abdomen Limited    Narrative    EXAMINATION: Limited Abdominal Ultrasound, 3/9/2018 6:07 AM     COMPARISON: 5/4/2007    HISTORY: Right upper quadrant pain    FINDINGS: Limited evaluation due to overlying bowel gas and patient  habitus.    Fluid: No evidence of ascites or pleural effusions.    Liver: The liver demonstrates normal increased echogenicity, measuring  11.8 cm in craniocaudal dimension. There is no focal mass.     Gallbladder: Sludge within the gallbladder. There is no wall  thickening or pericholecystic fluid. Negative sonographic Gutierres's  sign however the patient was recently medicated.    Bile Ducts: Both the intra- and extrahepatic biliary system are of  normal caliber.  The common bile duct is not identified.    Pancreas: Obscured.    Kidney: The right kidney measures 10.0 cm long. There is no  hydronephrosis or hydroureter, no shadowing renal calculi, cystic  lesion or mass.       Impression    IMPRESSION:   1.  Hepatic steatosis.  2.  Biliary sludge with no sonographic evidence for acute  cholecystitis.    I have personally reviewed the  examination and initial interpretation  and I agree with the findings.    COLE COATS MD       Assessment/ Plan: See above.     Kerry Loera MD   General Surgery Resident PGY-2  Pager: [phone]    Pt reviewed with Dr. Sigala.

## 2018-03-10 ENCOUNTER — ANESTHESIA EVENT (OUTPATIENT)
Dept: SURGERY | Facility: CLINIC | Age: 38
End: 2018-03-10
Payer: COMMERCIAL

## 2018-03-10 VITALS
DIASTOLIC BLOOD PRESSURE: 65 MMHG | HEART RATE: 94 BPM | WEIGHT: 184.3 LBS | BODY MASS INDEX: 34.82 KG/M2 | RESPIRATION RATE: 24 BRPM | TEMPERATURE: 99.4 F | OXYGEN SATURATION: 97 % | SYSTOLIC BLOOD PRESSURE: 122 MMHG

## 2018-03-10 LAB
BACTERIA SPEC CULT: NORMAL
GLUCOSE BLDC GLUCOMTR-MCNC: 104 MG/DL (ref 70–99)
GLUCOSE BLDC GLUCOMTR-MCNC: 107 MG/DL (ref 70–99)
GLUCOSE BLDC GLUCOMTR-MCNC: 166 MG/DL (ref 70–99)
GLUCOSE BLDC GLUCOMTR-MCNC: 269 MG/DL (ref 70–99)
GLUCOSE BLDC GLUCOMTR-MCNC: 286 MG/DL (ref 70–99)
GLUCOSE BLDC GLUCOMTR-MCNC: 79 MG/DL (ref 70–99)
GLUCOSE BLDC GLUCOMTR-MCNC: 90 MG/DL (ref 70–99)
GLUCOSE BLDC GLUCOMTR-MCNC: 96 MG/DL (ref 70–99)
Lab: NORMAL
SPECIMEN SOURCE: NORMAL

## 2018-03-10 PROCEDURE — 25000125 ZZHC RX 250: Performed by: STUDENT IN AN ORGANIZED HEALTH CARE EDUCATION/TRAINING PROGRAM

## 2018-03-10 PROCEDURE — 25000128 H RX IP 250 OP 636: Performed by: SURGERY

## 2018-03-10 PROCEDURE — 25000128 H RX IP 250 OP 636: Performed by: STUDENT IN AN ORGANIZED HEALTH CARE EDUCATION/TRAINING PROGRAM

## 2018-03-10 PROCEDURE — 25000132 ZZH RX MED GY IP 250 OP 250 PS 637: Performed by: STUDENT IN AN ORGANIZED HEALTH CARE EDUCATION/TRAINING PROGRAM

## 2018-03-10 PROCEDURE — 96376 TX/PRO/DX INJ SAME DRUG ADON: CPT

## 2018-03-10 PROCEDURE — 96366 THER/PROPH/DIAG IV INF ADDON: CPT

## 2018-03-10 PROCEDURE — 88304 TISSUE EXAM BY PATHOLOGIST: CPT | Performed by: SURGERY

## 2018-03-10 PROCEDURE — 40000170 ZZH STATISTIC PRE-PROCEDURE ASSESSMENT II: Performed by: SURGERY

## 2018-03-10 PROCEDURE — 96361 HYDRATE IV INFUSION ADD-ON: CPT

## 2018-03-10 PROCEDURE — 37000008 ZZH ANESTHESIA TECHNICAL FEE, 1ST 30 MIN: Performed by: SURGERY

## 2018-03-10 PROCEDURE — 96372 THER/PROPH/DIAG INJ SC/IM: CPT

## 2018-03-10 PROCEDURE — 25800025 ZZH RX 258: Performed by: STUDENT IN AN ORGANIZED HEALTH CARE EDUCATION/TRAINING PROGRAM

## 2018-03-10 PROCEDURE — 25000132 ZZH RX MED GY IP 250 OP 250 PS 637: Performed by: EMERGENCY MEDICINE

## 2018-03-10 PROCEDURE — 36000059 ZZH SURGERY LEVEL 3 EA 15 ADDTL MIN UMMC: Performed by: SURGERY

## 2018-03-10 PROCEDURE — 37000009 ZZH ANESTHESIA TECHNICAL FEE, EACH ADDTL 15 MIN: Performed by: SURGERY

## 2018-03-10 PROCEDURE — 36000057 ZZH SURGERY LEVEL 3 1ST 30 MIN - UMMC: Performed by: SURGERY

## 2018-03-10 PROCEDURE — G0378 HOSPITAL OBSERVATION PER HR: HCPCS

## 2018-03-10 PROCEDURE — 27210794 ZZH OR GENERAL SUPPLY STERILE: Performed by: SURGERY

## 2018-03-10 PROCEDURE — 00000146 ZZHCL STATISTIC GLUCOSE BY METER IP

## 2018-03-10 PROCEDURE — C9399 UNCLASSIFIED DRUGS OR BIOLOG: HCPCS | Performed by: STUDENT IN AN ORGANIZED HEALTH CARE EDUCATION/TRAINING PROGRAM

## 2018-03-10 PROCEDURE — 25000125 ZZHC RX 250: Performed by: SURGERY

## 2018-03-10 PROCEDURE — 25000566 ZZH SEVOFLURANE, EA 15 MIN: Performed by: SURGERY

## 2018-03-10 PROCEDURE — 71000014 ZZH RECOVERY PHASE 1 LEVEL 2 FIRST HR: Performed by: SURGERY

## 2018-03-10 RX ORDER — DEXAMETHASONE SODIUM PHOSPHATE 4 MG/ML
INJECTION, SOLUTION INTRA-ARTICULAR; INTRALESIONAL; INTRAMUSCULAR; INTRAVENOUS; SOFT TISSUE PRN
Status: DISCONTINUED | OUTPATIENT
Start: 2018-03-10 | End: 2018-03-10

## 2018-03-10 RX ORDER — ONDANSETRON 4 MG/1
4 TABLET, ORALLY DISINTEGRATING ORAL EVERY 30 MIN PRN
Status: DISCONTINUED | OUTPATIENT
Start: 2018-03-10 | End: 2018-03-10 | Stop reason: HOSPADM

## 2018-03-10 RX ORDER — KETAMINE HYDROCHLORIDE 10 MG/ML
50 INJECTION, SOLUTION INTRAMUSCULAR; INTRAVENOUS ONCE
Status: COMPLETED | OUTPATIENT
Start: 2018-03-10 | End: 2018-03-10

## 2018-03-10 RX ORDER — PROPOFOL 10 MG/ML
INJECTION, EMULSION INTRAVENOUS CONTINUOUS PRN
Status: DISCONTINUED | OUTPATIENT
Start: 2018-03-10 | End: 2018-03-10

## 2018-03-10 RX ORDER — BUPIVACAINE HYDROCHLORIDE 5 MG/ML
INJECTION, SOLUTION PERINEURAL PRN
Status: DISCONTINUED | OUTPATIENT
Start: 2018-03-10 | End: 2018-03-10 | Stop reason: HOSPADM

## 2018-03-10 RX ORDER — METOPROLOL TARTRATE 1 MG/ML
INJECTION, SOLUTION INTRAVENOUS PRN
Status: DISCONTINUED | OUTPATIENT
Start: 2018-03-10 | End: 2018-03-10

## 2018-03-10 RX ORDER — LABETALOL HYDROCHLORIDE 5 MG/ML
10 INJECTION, SOLUTION INTRAVENOUS
Status: DISCONTINUED | OUTPATIENT
Start: 2018-03-10 | End: 2018-03-10 | Stop reason: HOSPADM

## 2018-03-10 RX ORDER — FENTANYL CITRATE 50 UG/ML
25-50 INJECTION, SOLUTION INTRAMUSCULAR; INTRAVENOUS
Status: DISCONTINUED | OUTPATIENT
Start: 2018-03-10 | End: 2018-03-10 | Stop reason: HOSPADM

## 2018-03-10 RX ORDER — EPHEDRINE SULFATE 50 MG/ML
INJECTION, SOLUTION INTRAMUSCULAR; INTRAVENOUS; SUBCUTANEOUS PRN
Status: DISCONTINUED | OUTPATIENT
Start: 2018-03-10 | End: 2018-03-10

## 2018-03-10 RX ORDER — ASPIRIN 81 MG
TABLET, DELAYED RELEASE (ENTERIC COATED) ORAL
Qty: 30 TABLET | Refills: 1 | Status: SHIPPED | OUTPATIENT
Start: 2018-03-10 | End: 2018-10-02

## 2018-03-10 RX ORDER — SODIUM CHLORIDE, SODIUM LACTATE, POTASSIUM CHLORIDE, CALCIUM CHLORIDE 600; 310; 30; 20 MG/100ML; MG/100ML; MG/100ML; MG/100ML
INJECTION, SOLUTION INTRAVENOUS CONTINUOUS
Status: DISCONTINUED | OUTPATIENT
Start: 2018-03-10 | End: 2018-03-10 | Stop reason: HOSPADM

## 2018-03-10 RX ORDER — ONDANSETRON 2 MG/ML
4 INJECTION INTRAMUSCULAR; INTRAVENOUS EVERY 30 MIN PRN
Status: DISCONTINUED | OUTPATIENT
Start: 2018-03-10 | End: 2018-03-10 | Stop reason: HOSPADM

## 2018-03-10 RX ORDER — CEFAZOLIN SODIUM 1 G/3ML
INJECTION, POWDER, FOR SOLUTION INTRAMUSCULAR; INTRAVENOUS PRN
Status: DISCONTINUED | OUTPATIENT
Start: 2018-03-10 | End: 2018-03-10

## 2018-03-10 RX ORDER — METOCLOPRAMIDE HYDROCHLORIDE 5 MG/ML
INJECTION INTRAMUSCULAR; INTRAVENOUS PRN
Status: DISCONTINUED | OUTPATIENT
Start: 2018-03-10 | End: 2018-03-10

## 2018-03-10 RX ORDER — OXYCODONE HYDROCHLORIDE 10 MG/1
5 TABLET ORAL EVERY 4 HOURS PRN
Qty: 20 TABLET | Refills: 0 | Status: SHIPPED | OUTPATIENT
Start: 2018-03-10 | End: 2018-05-17

## 2018-03-10 RX ORDER — FENTANYL CITRATE 50 UG/ML
INJECTION, SOLUTION INTRAMUSCULAR; INTRAVENOUS PRN
Status: DISCONTINUED | OUTPATIENT
Start: 2018-03-10 | End: 2018-03-10

## 2018-03-10 RX ORDER — NALOXONE HYDROCHLORIDE 0.4 MG/ML
.1-.4 INJECTION, SOLUTION INTRAMUSCULAR; INTRAVENOUS; SUBCUTANEOUS
Status: DISCONTINUED | OUTPATIENT
Start: 2018-03-10 | End: 2018-03-10

## 2018-03-10 RX ORDER — PROPOFOL 10 MG/ML
INJECTION, EMULSION INTRAVENOUS PRN
Status: DISCONTINUED | OUTPATIENT
Start: 2018-03-10 | End: 2018-03-10

## 2018-03-10 RX ORDER — OXYCODONE HYDROCHLORIDE 10 MG/1
10 TABLET ORAL EVERY 4 HOURS PRN
Status: DISCONTINUED | OUTPATIENT
Start: 2018-03-10 | End: 2018-03-11 | Stop reason: HOSPADM

## 2018-03-10 RX ORDER — ONDANSETRON 2 MG/ML
INJECTION INTRAMUSCULAR; INTRAVENOUS PRN
Status: DISCONTINUED | OUTPATIENT
Start: 2018-03-10 | End: 2018-03-10

## 2018-03-10 RX ORDER — LIDOCAINE HYDROCHLORIDE 20 MG/ML
INJECTION, SOLUTION INFILTRATION; PERINEURAL PRN
Status: DISCONTINUED | OUTPATIENT
Start: 2018-03-10 | End: 2018-03-10

## 2018-03-10 RX ORDER — SODIUM CHLORIDE, SODIUM LACTATE, POTASSIUM CHLORIDE, CALCIUM CHLORIDE 600; 310; 30; 20 MG/100ML; MG/100ML; MG/100ML; MG/100ML
INJECTION, SOLUTION INTRAVENOUS CONTINUOUS PRN
Status: DISCONTINUED | OUTPATIENT
Start: 2018-03-10 | End: 2018-03-10

## 2018-03-10 RX ADMIN — Medication 50 MG: at 08:52

## 2018-03-10 RX ADMIN — METOPROLOL TARTRATE 5 MG: 5 INJECTION INTRAVENOUS at 09:41

## 2018-03-10 RX ADMIN — METOPROLOL TARTRATE 5 MG: 5 INJECTION INTRAVENOUS at 09:34

## 2018-03-10 RX ADMIN — SUGAMMADEX 200 MG: 100 INJECTION, SOLUTION INTRAVENOUS at 09:51

## 2018-03-10 RX ADMIN — METRONIDAZOLE 500 MG: 500 INJECTION, SOLUTION INTRAVENOUS at 04:21

## 2018-03-10 RX ADMIN — ONDANSETRON 4 MG: 2 INJECTION INTRAMUSCULAR; INTRAVENOUS at 13:09

## 2018-03-10 RX ADMIN — POTASSIUM CHLORIDE, DEXTROSE MONOHYDRATE AND SODIUM CHLORIDE: 150; 5; 900 INJECTION, SOLUTION INTRAVENOUS at 12:08

## 2018-03-10 RX ADMIN — ROCURONIUM BROMIDE 40 MG: 10 INJECTION INTRAVENOUS at 08:59

## 2018-03-10 RX ADMIN — ACETAMINOPHEN 650 MG: 325 TABLET, FILM COATED ORAL at 17:43

## 2018-03-10 RX ADMIN — FENTANYL CITRATE 50 MCG: 50 INJECTION, SOLUTION INTRAMUSCULAR; INTRAVENOUS at 09:29

## 2018-03-10 RX ADMIN — DEXAMETHASONE SODIUM PHOSPHATE 4 MG: 4 INJECTION, SOLUTION INTRA-ARTICULAR; INTRALESIONAL; INTRAMUSCULAR; INTRAVENOUS; SOFT TISSUE at 08:59

## 2018-03-10 RX ADMIN — CIPROFLOXACIN 400 MG: 2 INJECTION, SOLUTION INTRAVENOUS at 05:44

## 2018-03-10 RX ADMIN — FENTANYL CITRATE 25 MCG: 50 INJECTION, SOLUTION INTRAMUSCULAR; INTRAVENOUS at 11:09

## 2018-03-10 RX ADMIN — CIPROFLOXACIN 400 MG: 2 INJECTION, SOLUTION INTRAVENOUS at 16:51

## 2018-03-10 RX ADMIN — POTASSIUM CHLORIDE, DEXTROSE MONOHYDRATE AND SODIUM CHLORIDE: 150; 5; 900 INJECTION, SOLUTION INTRAVENOUS at 00:35

## 2018-03-10 RX ADMIN — Medication 0.2 MG: at 04:21

## 2018-03-10 RX ADMIN — FENTANYL CITRATE 75 MCG: 50 INJECTION, SOLUTION INTRAMUSCULAR; INTRAVENOUS at 08:52

## 2018-03-10 RX ADMIN — SODIUM CHLORIDE, POTASSIUM CHLORIDE, SODIUM LACTATE AND CALCIUM CHLORIDE: 600; 310; 30; 20 INJECTION, SOLUTION INTRAVENOUS at 08:46

## 2018-03-10 RX ADMIN — OXYCODONE HYDROCHLORIDE 10 MG: 10 TABLET ORAL at 19:30

## 2018-03-10 RX ADMIN — CEFAZOLIN 2 G: 1 INJECTION, POWDER, FOR SOLUTION INTRAMUSCULAR; INTRAVENOUS at 08:59

## 2018-03-10 RX ADMIN — LIDOCAINE HYDROCHLORIDE 100 MG: 20 INJECTION, SOLUTION INFILTRATION; PERINEURAL at 08:52

## 2018-03-10 RX ADMIN — PROPOFOL 80 MG: 10 INJECTION, EMULSION INTRAVENOUS at 08:52

## 2018-03-10 RX ADMIN — Medication 5 MG: at 09:09

## 2018-03-10 RX ADMIN — FENTANYL CITRATE 25 MCG: 50 INJECTION, SOLUTION INTRAMUSCULAR; INTRAVENOUS at 10:33

## 2018-03-10 RX ADMIN — ONDANSETRON 4 MG: 2 INJECTION INTRAMUSCULAR; INTRAVENOUS at 09:54

## 2018-03-10 RX ADMIN — PROPOFOL 75 MCG/KG/MIN: 10 INJECTION, EMULSION INTRAVENOUS at 09:20

## 2018-03-10 RX ADMIN — FENTANYL CITRATE 25 MCG: 50 INJECTION, SOLUTION INTRAMUSCULAR; INTRAVENOUS at 08:46

## 2018-03-10 RX ADMIN — MIDAZOLAM 0.5 MG: 1 INJECTION INTRAMUSCULAR; INTRAVENOUS at 08:52

## 2018-03-10 RX ADMIN — KETAMINE HCL-NACL SOLN PREF SY 50 MG/5ML-0.9% (10MG/ML) 20 MG: 10 SOLUTION PREFILLED SYRINGE at 08:52

## 2018-03-10 RX ADMIN — KETAMINE HCL-NACL SOLN PREF SY 50 MG/5ML-0.9% (10MG/ML) 10 MG: 10 SOLUTION PREFILLED SYRINGE at 09:29

## 2018-03-10 RX ADMIN — PHENYLEPHRINE HYDROCHLORIDE 100 MCG: 10 INJECTION, SOLUTION INTRAMUSCULAR; INTRAVENOUS; SUBCUTANEOUS at 09:08

## 2018-03-10 RX ADMIN — Medication 0.2 MG: at 01:31

## 2018-03-10 RX ADMIN — INSULIN GLARGINE 25 UNITS: 100 INJECTION, SOLUTION SUBCUTANEOUS at 13:31

## 2018-03-10 RX ADMIN — FAMOTIDINE 20 MG: 10 INJECTION, SOLUTION INTRAVENOUS at 09:00

## 2018-03-10 RX ADMIN — METOCLOPRAMIDE HYDROCHLORIDE 10 MG: 5 INJECTION INTRAMUSCULAR; INTRAVENOUS at 09:39

## 2018-03-10 RX ADMIN — MIDAZOLAM 0.5 MG: 1 INJECTION INTRAMUSCULAR; INTRAVENOUS at 08:46

## 2018-03-10 ASSESSMENT — PAIN DESCRIPTION - DESCRIPTORS
DESCRIPTORS: ACHING
DESCRIPTORS: TENDER
DESCRIPTORS: TENDER
DESCRIPTORS: OTHER (COMMENT)
DESCRIPTORS: ACHING;DISCOMFORT

## 2018-03-10 NOTE — PROVIDER NOTIFICATION
Text paged Gen Surg provider #[phone] with requests: Patient only has Tylenol or IV Dilaudid. Can we get an order for stronger oral medication? Pt also counts carbs, no current order for Novolog coverage for carbs. Pls advise.  Thanks, Corina DEVINE [phone]

## 2018-03-10 NOTE — ANESTHESIA CARE TRANSFER NOTE
Patient: Nayeli Caal    Procedure(s):  Laparoscopic Cholecystectomy  - Wound Class: II-Clean Contaminated    Diagnosis: symtamatic cholelithisis  Diagnosis Additional Information: No value filed.    Anesthesia Type:   General, ETT     Note:  Airway :Oral Airway and Face Mask  Patient transferred to:PACU  Comments: Vss, sats 100% on facemask, No pain on extubation, No adverse anesthesia events.          Vitals: (Last set prior to Anesthesia Care Transfer)    CRNA VITALS  3/10/2018 0933 - 3/10/2018 1014      3/10/2018             Resp Rate (observed): (!)  6                Electronically Signed By: Latasha Arnold MD  March 10, 2018  10:14 AM

## 2018-03-10 NOTE — PROVIDER NOTIFICATION
Text Paged Gen Surg  Please claify IVF NS @ 75/hr or D5 NS (rec'd stat fluid bag with no order)  Thanks, Corina DEVINE [phone]

## 2018-03-10 NOTE — PLAN OF CARE
Problem: Patient Care Overview  Goal: Plan of Care/Patient Progress Review  Outcome: No Change  Outpatient/Observation goals to be met before discharge home:    - s/p laparoscopic cholecystectomy - NO, Surgery schedule in am  - Tolerating regular diet - NO, pt remains NPO for surgery tomorrow am, took Tylenol with sip of water at 2300  - Pain controlled with oral pain medications - NO, pt received prn IV Dilaudid as well as Tylenol po for HA  - Voiding spontaneously - YES    Patient ambulated to bathroom with SBA. Voiding spontaneously. Shower and shampoo completed this evening. PIV infusing NS @ 75/hr, pt received IV Cipro and now Flaggly  BP (!) 141/103  Pulse 68  Temp 97.6  F (36.4  C) (Oral)  Resp 16  Wt 83.6 kg (184 lb 4.9 oz)  SpO2 (!) 87%  BMI 34.82 kg/m2

## 2018-03-10 NOTE — PROVIDER NOTIFICATION
Text paged and called (3 stars, [phone]) for 3 pagers: [phone], [phone], [phone] over the past hour, awaiting response. Nothing updated in Provider Sticky notes.

## 2018-03-10 NOTE — ANESTHESIA POSTPROCEDURE EVALUATION
Patient: Nayeli Caal    Procedure(s):  Laparoscopic Cholecystectomy  - Wound Class: II-Clean Contaminated    Diagnosis:symtamatic cholelithisis  Diagnosis Additional Information: No value filed.    Anesthesia Type:  General, ETT, RSI    Note:  Anesthesia Post Evaluation    Patient location during evaluation: PACU  Patient participation: Able to fully participate in evaluation  Level of consciousness: awake and alert  Pain management: adequate  Airway patency: patent  Cardiovascular status: acceptable  Respiratory status: acceptable  Hydration status: acceptable  PONV: none     Anesthetic complications: None          Last vitals:  Vitals:    03/10/18 0405 03/10/18 0822 03/10/18 1000   BP: 138/81     Pulse:   70   Resp: 16 22 18   Temp: 36.6  C (97.9  F)  36  C (96.8  F)   SpO2: 98% 94%          Electronically Signed By: Kofi Hernández MD  March 10, 2018  10:23 AM

## 2018-03-10 NOTE — PLAN OF CARE
Problem: Patient Care Overview  Goal: Plan of Care/Patient Progress Review  Outcome: No Change  - s/p laparoscopic cholecystectomy - NO, Surgery schedule in am  - Tolerating regular diet - NO, pt remains NPO for surgery tomorrow AM  - Pain controlled with oral pain medications - NO, pt received prn IV Dilaudid  - Voiding spontaneously - YES

## 2018-03-10 NOTE — PLAN OF CARE
Problem: Patient Care Overview  Goal: Plan of Care/Patient Progress Review  Outcome: No Change  - s/p laparoscopic cholecystectomy - NO, Surgery scheduled at 9 am, transport on their way  - Tolerating regular diet - NO, pt remains NPO for surgery this AM  - Pain controlled with oral pain medications - NO, pt received prn IV Dilaudid overnight  - Voiding spontaneously - YES

## 2018-03-10 NOTE — PLAN OF CARE
Problem: Patient Care Overview  Goal: Plan of Care/Patient Progress Review  Outcome: No Change  - s/p laparoscopic cholecystectomy - YES, Surgery at 9 am, pt returned to unit, 3 lap sites CDI  - Tolerating regular diet - NO, pt remains returned from surgery, on clear liquids, offered water and apple juice, will advance as tolerated  - Pain controlled with oral pain medications - NO, pt received prn IV Dilaudid with relief  - Voiding spontaneously - YES, patient ambulating to bathroom with SBA, requires A1 to get out of bed. Voiding without difficulty.

## 2018-03-10 NOTE — PLAN OF CARE
Problem: Patient Care Overview  Goal: Plan of Care/Patient Progress Review  Outcome: No Change  - s/p laparoscopic cholecystectomy - NO, Surgery schedule in am  - Tolerating regular diet - NO, pt remains NPO for surgery tomorrow AM  - Pain controlled with oral pain medications - NO, pt received prn IV Dilaudid  - Voiding spontaneously - YES    Pt is A&Ox4, VSS, and is NPO prior to procedure in the AM. Pt ambulates to the bathroom independently and has D5NS with KCl running through a patent IV at 50 mL/hr. Pt is deaf and legally blind (blurred vision) and has a 24/7 . Pt able to make needs known. Pt has had 1 pre-procedure shower prior to my shift and will get one again in the AM. Nurse will continue to monitor.

## 2018-03-10 NOTE — OR NURSING
Limited pre-op contact secondary to team at bedside when patient arrived to pre-op area. Unable to fully complete pre-op assessment.  at bedside throughout pre-op time. Pain medication and anti-anxiety medication given per anesthesia. Attached to monitoring.      Patient arrived to PACU with oral airway. Removed when patient was able to maintain own airway effectively.  at bedside throughout PACU stay. Patient needs were communicated and met. Incision sites CDI. Covered with small Primapores. No output thus far into PACU stay. AVSS. Pain tolerable.

## 2018-03-10 NOTE — PROGRESS NOTES
Interpretor at bedside, patient showered & shampooed this am, friend arrived, patient off to surgery. Interpretor services called for replacement at 0930

## 2018-03-10 NOTE — ANESTHESIA PREPROCEDURE EVALUATION
Anesthesia Evaluation     .             ROS/MED HX    ENT/Pulmonary:     (+)other ENT- Deaf, , . .    Neurologic:     (+)migraines,     Cardiovascular:     (+) hypertension----. : . . . :. . Previous cardiac testing date:results:Stress Testdate:3/9/2018 results:Normal sinus date: results: date: results:          METS/Exercise Tolerance:     Hematologic:         Musculoskeletal:   (+) , , other musculoskeletal (Tenosynovitis of ankle, Pain in joint, shoulder region, impingment )-       GI/Hepatic:     (+) cholecystitis/cholelithiasis, liver disease (chronic nonalcoholic liver disease),       Renal/Genitourinary:         Endo:     (+) type II DM Diabetic complications: retinopathy, Obesity, .   (-) Type I DM   Psychiatric:         Infectious Disease:         Malignancy:         Other:                     Physical Exam  Normal systems: cardiovascular and pulmonary    Airway   Mallampati: III  TM distance: >3 FB  Neck ROM: full    Dental     Cardiovascular   Rhythm and rate: regular and normal      Pulmonary    breath sounds clear to auscultation                    Anesthesia Plan      History & Physical Review  History and physical reviewed and following examination; no interval change.    ASA Status:  2 emergent.    NPO Status:  > 8 hours    Plan for General, ETT and RSI with Propofol and Intravenous induction. Maintenance will be Balanced.    PONV prophylaxis:  Ondansetron (or other 5HT-3) and Dexamethasone or Solumedrol  Additional equipment: Videolaryngoscope and 2nd IV      Postoperative Care  Postoperative pain management:  Multi-modal analgesia and IV analgesics.  Plan for postoperative opioid use.    Consents  Anesthetic plan, risks, benefits and alternatives discussed with:  Patient.  Use of blood products discussed: Yes.   Use of blood products discussed with Patient.  Consented to blood products.  .        Ozzy Benitez Jr., MD  Anesthesia Resident - CA2  Pager: [phone]  3/10/2018  7:00 AM

## 2018-03-10 NOTE — PLAN OF CARE
Problem: Patient Care Overview  Goal: Plan of Care/Patient Progress Review  Outcome: No Change  - s/p laparoscopic cholecystectomy - YES, Surgery at 9 am, pt returned to unit, 3 lap sites CDI  - Tolerating regular diet - NO, pt remains returned from surgery, on clear liquids, offered water and apple juice, will advance as tolerated  - Pain controlled with oral pain medications - NO, pt received prn IV Dilaudid upon returning to unit  - Voiding spontaneously - NO, patient requested bedside commode

## 2018-03-10 NOTE — PLAN OF CARE
Problem: Patient Care Overview  Goal: Plan of Care/Patient Progress Review  Outcome: No Change  Outpatient/Observation goals to be met before discharge home:     - s/p laparoscopic cholecystectomy - NO, Surgery schedule in am  - Tolerating regular diet - NO, pt remains NPO for surgery tomorrow am  - Pain controlled with oral pain medications - NO, pt received prn IV Dilaudid with improvement.  - Voiding spontaneously - YES

## 2018-03-11 ENCOUNTER — ANESTHESIA (OUTPATIENT)
Dept: SURGERY | Facility: CLINIC | Age: 38
End: 2018-03-11
Payer: COMMERCIAL

## 2018-03-11 NOTE — PROGRESS NOTES
Pt given her discharge instructions and answered her questions.Her novolog pen was missing and she will cover her insulin when she gets home tonight.Pharmacy here would not send because she is observation pt and it was high cost.Pt was fine with this.

## 2018-03-11 NOTE — PROGRESS NOTES
/65 (BP Location: Left arm)  Pulse 94  Temp 99.4  F (37.4  C) (Oral)  Resp 24  Wt 83.6 kg (184 lb 4.9 oz)  LMP   SpO2 97%  BMI 34.82 kg/m2RA.Pt is ready for discharge and has her instructions ,discharge perscription for pain and note from MD for work.Pt had  here until she left with her friend that gave her a ride home.She has her novolog pen at home to cover her blood glucose and knows how to manage her diabetes well.Asked if she wanted any pain meds before she left but didn't need any.Lap sites D&I.Up moving well in room.Left in wheelchair with her belongings and instructions.

## 2018-03-11 NOTE — DISCHARGE SUMMARY
[facility]   Surgery Discharge Summary    Date of Admission: 3/9/2018  Date of Discharge: 3/11/2018  Attending Physician: Dr Ibanez  Discharging Physician: Same    Admission Diagnosis:  Biliary colic    Discharge Diagnosis:  Acute cholecystitis    Consultations:  None    Procedures:  Laparoscopic cholecystectomy by Dr Jaspreet Ibanez    Brief HPI:  37 year old female with symptoms of biliary colic in past who presented to the ED with ongoing pain, inability to tolerate a meal.     Hospital Course:  The patient was admitted to the surgery service. She underwent uncomplicated laparoscopic cholecystectomy the following morning, 3/10/2018. The procedure was uncomplicated and she tolerated the procedure well. Her post operative course was unremarkable. Later on the day of surgery she was able to ambulate, void, tolerate a diet, and had adequate pain control on oral analgesics. Accordingly she was discharged to home.     Meds:  Discharge Medication List as of 3/10/2018  9:37 PM      START taking these medications    Details   !! oxyCODONE IR (ROXICODONE) 10 MG tablet Take 0.5 tablets (5 mg) by mouth every 4 hours as needed for moderate to severe pain, Disp-20 tablet, R-0, Local Print      docusate sodium (COLACE) 100 MG tablet Stool softener for use while taking oxycodone, Disp-30 tablet, R-1, E-Prescribe      !! ondansetron (ZOFRAN ODT) 4 MG ODT tab Take 1 tablet (4 mg) by mouth every 8 hours as needed for nausea, Disp-6 tablet, R-0, Local Print      !! oxyCODONE IR (ROXICODONE) 5 MG tablet Take 1 tablet (5 mg) by mouth every 6 hours as needed for pain, Disp-10 tablet, R-0, Local Print      !! ranitidine (ZANTAC) 150 MG tablet Take 1 tablet (150 mg) by mouth 2 times daily for 15 days, Disp-30 tablet, R-0, Local Print       !! - Potential duplicate medications found. Please discuss with provider.      CONTINUE these medications which have NOT CHANGED    Details   !! ONDANSETRON PO Take 4  mg by mouth every 8 hours as needed for nausea, Historical      !! OXYCODONE HCL PO Take 5 mg by mouth every 6 hours as needed (moderate to severe pain), Historical      !! RANITIDINE HCL PO Take 150 mg by mouth 2 times daily, Historical      ibuprofen (ADVIL/MOTRIN) 600 MG tablet Take 1 tablet (600 mg) by mouth every 6 hours as needed for moderate pain, Disp-120 tablet, R-1, E-Prescribe      BASAGLAR 100 UNIT/ML injection Inject 50 Units Subcutaneous daily Please provide 3 months supply, Disp-12 mL, R-1, E-Prescribe      blood glucose monitoring (ACCU-CHEK LAYA PLUS) test strip Laya Plus test strips for use in Accucheck Expert meter.  Use to test blood sugar 6 times daily. 3 month supply.  Send PA's to Dr. Mohamud., Disp-600 each, R-3, Local Print      BD ULTRA FINE PEN NEEDLES Inject 1 dose. Subcutaneous 2 times daily BD ultra-fine insulin syringe, 30 ga. X 1/2'' short needle 1/2 cc. Use as directed., Disp-200 Units, R-11, E-Prescribe      fenofibrate 160 MG tablet Take 1 tablet (160 mg) by mouth daily, Disp-90 tablet, R-3, E-Prescribe      losartan (COZAAR) 25 MG tablet Take 1 tablet (25 mg) by mouth daily, Disp-90 tablet, R-3, E-Prescribe      dulaglutide (TRULICITY) 0.75 MG/0.5ML pen Inject 0.75 mg Subcutaneous every 7 days, Disp-6 mL, R-3, E-Prescribe      insulin aspart (NOVOLOG PEN) 100 UNIT/ML injection Admin Instructions: Before meals and bedtime correction sliding scale  120 - 160 - 1 U   161 - 200 - 2 U   201 - 240 - 3 U   241 - 280 - 4 U   281 - 320 - 5 U   321 - 360 - 6 U ..., Disp-63 mL, R-3, Local Print3 month supply      vitamin D (ERGOCALCIFEROL) 83580 UNIT capsule Take 1 capsule (50,000 Units) by mouth every 7 days, Disp-12 capsule, R-3, E-Prescribe      aspirin 81 MG tablet Take 1 tablet (81 mg) by mouth daily, Disp-100 tablet, R-3, E-Prescribe      blood glucose monitoring (ACCU-CHEK FASTCLIX) lancets Use to test blood sugar 6 times daily or as directed, Disp-6 Box, R-3, E-Prescribe       atorvastatin (LIPITOR) 40 MG tablet Take 1 tablet (40 mg) by mouth daily, Disp-90 tablet, R-3, E-Prescribe      levonorgestrel (MIRENA) 20 MCG/24HR IUD 1 each (20 mcg) by Intrauterine route once for 1 doseDisp-1 each, R-0Injection      Alcohol Swabs (ALCOHOL PREP PAD) 70 % PADS 1 each 3 times daily, Disp-100 each, R-3, E-Prescribe       !! - Potential duplicate medications found. Please discuss with provider.          Discharge Instructions and Follow up:    Discharge Procedure Orders  Reason for your hospital stay   Order Comments: Gall bladder removed     Adult [facility] Follow-up and recommended labs and tests   Order Comments: Follow up with Dr. Ibanez , at (location with clinic name or city) [facility], 2-3 weeks    Appointments on [place] and/or [facility] (with [facility] or [facility] provider or service). Call [phone] if you haven't heard regarding these appointments within 7 days of discharge.     Activity   Order Comments: No lifting more than 10 pounds for four weeks   Order Specific Question Answer Comments   Is discharge order? Yes      When to contact your care team   Order Comments: Fevers over 101. Chills. Nausea/vomiting. Increasing pain. Drainage or redness worsening at incisions     Wound care and dressings   Order Comments: Shower, pat dry. No tubs/soaking for 4 weeks     Full Code     Diet   Order Comments: Follow this diet upon discharge: Orders Placed This Encounter     Advance Diet as Tolerated: Regular Diet Adult    Low fat diets can be helpful for reducing gas after cholecystectomy   Order Specific Question Answer Comments   Is discharge order? Yes        Karl Rea MD, PhD  PGY-8, [facility] Surgery  [phone]

## 2018-03-11 NOTE — PROGRESS NOTES
MD was notified as pt requested a note for her work and restrictions.Dr Khanna  [phone] wrote note for pt before discharge.

## 2018-03-11 NOTE — PLAN OF CARE
Problem: Patient Care Overview  Goal: Plan of Care/Patient Progress Review  Outcome: Improving  s/p laparoscopic cholecystectomy - YES, Surgery at 9 am, pt returned to unit, 3 lap sites CDI  - Tolerating regular diet - NO, pt remains returned from surgery, on clear liquids, offered water and apple juice, will advance as tolerated  - Pain controlled with oral pain medications - NO, pt received prn IV Dilaudid with relief  - Voiding spontaneously - YES, patient ambulating to bathroom with SBA, requires A1 to get out of bed. Voiding without difficulty.

## 2018-03-11 NOTE — PROGRESS NOTES
Discharge instruction reviewed.  Patient verbalized understanding. PIV removed, patient transported to the main lobby. Family awaiting at main lobby. Patient discharged

## 2018-03-11 NOTE — PROVIDER NOTIFICATION
Text paged Gen Surg  Re: Pt would like to be d/c tonight and would like to seen by provider before d/c. Pt has low grade temp and tachy. Please advise.

## 2018-03-11 NOTE — OP NOTE
Procedure Date: 03/10/2018      PREOPERATIVE DIAGNOSIS:  Biliary colic with biliary sludge on ultrasound.      POSTOPERATIVE DIAGNOSIS:  Biliary colic with biliary sludge on ultrasound.      PROCEDURE:  Laparoscopic cholecystectomy.      SURGEON:  Jaspreet Ibanez MD      FIRST ASSISTANT:  Karl Rea MD, surgery chief resident.      ANESTHESIA:  General endotracheal.      INDICATIONS FOR PROCEDURE:  The patient presents with continuing biliary colic and ultrasound showing biliary sludge.  Informed consent was obtained.      OPERATIVE FINDINGS:  Normal cystic duct, cystic artery anatomy.      DESCRIPTION OF PROCEDURE:  The patient was brought to the operating room, put under general anesthesia, abdomen widely prepped and draped in the usual sterile fashion.  Infraumbilical skin incision made, open technique used, Lu port placed, abdomen insufflated, 5 ports placed in routine position and technique.  There were adhesions to the gallbladder and liver, and these were taken down.  The gallbladder was then pulled up.  Cystic duct was readily identified.  It was cleared anteriorly, and during dissection, there was a bit of bleeding behind, but once we placed 5 mm clips proximally and distally, there was no further bleeding.  The cystic duct was then cut.  We then removed the gallbladder from the liver bed using electrocautery.  Good hemostasis was noted throughout.  The gallbladder was then grasped at its duct end, and 20 mL of 0.5% Marcaine was placed in the liver bed with postop pain control.  The gallbladder was delivered through the infraumbilical port site without spillage of bile or stones.  The abdomen was deflated.  Ports removed.  Fascial defect closed with 0 Vicryl in skin and subcuticular.  Steri-Strips were applied.  Estimated blood loss was minimal.  The patient tolerated the procedure well and was taken to the recovery room without difficulty or apparent complication.         JASPREET IBANEZ MD              D: 03/10/2018   T: 03/10/2018   MT: NTS      Name:     JUANA KIM   MRN:      [id number]        Account:        [id number]   :      1980           Procedure Date: 03/10/2018      Document: [id number]       cc: [facility] Surgery Billing

## 2018-03-11 NOTE — PLAN OF CARE
Problem: Patient Care Overview  Goal: Plan of Care/Patient Progress Review  Outcome: No Change  s/p laparoscopic cholecystectomy - YES, Surgery at 9 am, pt returned to unit, 3 lap sites CDI  - Tolerating regular diet - , pt remains returned from surgery, on clear liquids, offered water and apple juice, will advance as tolerated.Drinking well ate sandwich now.  - Pain controlled with oral pain medications - , pt received prn  with relief.  - Voiding spontaneously - YES, patient ambulating to bathroom with SBA, requires A1 to get out of bed. Voiding without difficulty.

## 2018-03-12 ENCOUNTER — CARE COORDINATION (OUTPATIENT)
Dept: SURGERY | Facility: CLINIC | Age: 38
End: 2018-03-12

## 2018-03-12 ENCOUNTER — OFFICE VISIT (OUTPATIENT)
Dept: ENDOCRINOLOGY | Facility: CLINIC | Age: 38
End: 2018-03-12
Payer: COMMERCIAL

## 2018-03-12 VITALS
SYSTOLIC BLOOD PRESSURE: 124 MMHG | HEART RATE: 69 BPM | WEIGHT: 182.7 LBS | BODY MASS INDEX: 34.52 KG/M2 | DIASTOLIC BLOOD PRESSURE: 82 MMHG

## 2018-03-12 DIAGNOSIS — E83.51 HYPOCALCEMIA: ICD-10-CM

## 2018-03-12 DIAGNOSIS — I10 ESSENTIAL HYPERTENSION: ICD-10-CM

## 2018-03-12 DIAGNOSIS — Z79.4 UNCONTROLLED TYPE 2 DIABETES MELLITUS WITH HYPERGLYCEMIA, WITH LONG-TERM CURRENT USE OF INSULIN (H): Primary | ICD-10-CM

## 2018-03-12 DIAGNOSIS — E78.2 MIXED HYPERLIPIDEMIA: ICD-10-CM

## 2018-03-12 DIAGNOSIS — E11.65 UNCONTROLLED TYPE 2 DIABETES MELLITUS WITH HYPERGLYCEMIA, WITH LONG-TERM CURRENT USE OF INSULIN (H): Primary | ICD-10-CM

## 2018-03-12 LAB
HBA1C MFR BLD: 8.7 % (ref 4.3–6)
INTERPRETATION ECG - MUSE: NORMAL

## 2018-03-12 ASSESSMENT — PAIN SCALES - GENERAL: PAINLEVEL: SEVERE PAIN (6)

## 2018-03-12 NOTE — PROGRESS NOTES
Nayeli APURVA Ingrid Caal is a patient of Dr. Jaspreet Ibanez that underwent a laparoscopic cholecystectomy with recent discharge from the hospital.  Attempted to contact patient via telephone for a status update and review post op teaching (sign language).  LM on VM to call office.  Await return call.      Of note:  Pathology:  Pending  Wound:  Steri-strips  Follow-up:  Does not meet protocol (elevated A1c).  Will need post op appointment.  Restrictions:  - No strenuous exercise for 3-4 weeks  New medications:  Oxycodone, Colace    Met with Diabetes clinic today regarding elevated A1c, BS

## 2018-03-12 NOTE — MR AVS SNAPSHOT
After Visit Summary   3/12/2018    Nayeli Caal    MRN: [id number]           Patient Information     Date Of Birth          1980        Visit Information        Provider Department      3/12/2018 7:45 AM Reshma Cerna Angela Ionela, MD [facility]        Today's Diagnoses     Uncontrolled type 2 diabetes mellitus with hyperglycemia, with long-term current use of insulin (H)    -  1    Hypocalcemia        Essential hypertension          Care Instructions    Decrease the dose of Basaglar to 46 U   Take Novolog for all meals and snacks  Start taking a calcium supplement daily, 500 - 1000 mg daily   Take vitamin D consistently, once a week   Once you finish the Trulicity pens,  the new prescription for Trulicity 1.5 mg weekly           Follow-ups after your visit        Follow-up notes from your care team     Return in about 3 months (around 6/12/2018) for fasting labs in 3 months.      Your next 10 appointments already scheduled     May 17, 2018  7:00 AM CDT   LAB with [facility] ([facility])    [address] [place] [address] [phone]           Please do not eat 10-12 hours before your appointment if you are coming in fasting for labs on lipids, cholesterol, or glucose (sugar). This does not apply to pregnant women. Water, hot tea and black coffee (with nothing added) are okay. Do not drink other fluids, diet soda or chew gum.            May 17, 2018  2:40 PM CDT   (Arrive by 2:25 PM)   Return Visit with SABI Ceja [facility] ([facility])    [address] [place] [address]  [address] [facility] [address] [phone]            Jul 16, 2018  7:30 AM CDT   (Arrive by 7:15 AM)   RETURN DIABETES with Jimena Bragg MD   [facility] ([facility])    [address] [place] [address]  [facility] [address] [phone]              Future tests that were ordered for you today     Open Future Orders        Priority Expected Expires Ordered    Albumin Random Urine Quantitative with Creat Ratio Routine 6/10/2018 3/12/2019 3/12/2018    Comprehensive metabolic panel Routine 6/10/2018 3/12/2019 3/12/2018    CK total Routine 6/10/2018 3/12/2019 3/12/2018    Lipid panel reflex to direct LDL Fasting Routine 6/10/2018 3/12/2019 3/12/2018    TSH with free T4 reflex Routine 6/10/2018 3/12/2019 3/12/2018    25 Hydroxyvitamin D2 and D3 Routine 6/12/2018 3/12/2019 3/12/2018            Who to contact     Please call your clinic at [phone] to:    Ask questions about your health    Make or cancel appointments    Discuss your medicines    Learn about your test results    Speak to your doctor            Additional Information About Your Visit        [employer] Information     [employer] gives you secure access to your electronic health record. If you see a primary care provider, you can also send messages to your care team and make appointments. If you have questions, please call your primary care clinic.  If you do not have a primary care provider, please call [phone] and they will assist you.      [employer] is an electronic gateway that provides easy, online access to your medical records. With [employer], you can request a clinic appointment, read your test results, renew a prescription or communicate with your care team.     To access your existing account, please contact your [facility] or call [phone] for assistance.        Care EveryWhere ID     This is your Care EveryWhere ID. This could be used by other organizations to access your [facility] medical records  [id number]        Your Vitals Were     Pulse BMI (Body Mass Index)                69 34.52 kg/m2           Blood Pressure from Last 3 Encounters:   03/12/18 124/82   03/10/18 122/65   02/05/18 113/77     Weight from Last 3 Encounters:   03/12/18 82.9 kg (182 lb 11.2 oz)   03/09/18 83.6 kg (184 lb 4.9 oz)   02/05/18 83.6 kg (184 lb 4.8 oz)                 Today's Medication Changes          These changes are accurate as of 3/12/18  8:43 AM.  If you have any questions, ask your nurse or doctor.               Start taking these medicines.        Dose/Directions    dulaglutide 1.5 MG/0.5ML pen   Commonly known as:  TRULICITY   Used for:  Uncontrolled type 2 diabetes mellitus with hyperglycemia, with long-term current use of insulin (H)   Replaces:  dulaglutide 0.75 MG/0.5ML pen   Started by:  Jimena Bragg MD        Dose:  1.5 mg   Inject 1.5 mg Subcutaneous every 7 days   Quantity:  6 mL   Refills:  3         Stop taking these medicines if you haven't already. Please contact your care team if you have questions.     dulaglutide 0.75 MG/0.5ML pen   Commonly known as:  TRULICITY   Replaced by:  dulaglutide 1.5 MG/0.5ML pen   Stopped by:  Jimena Bragg MD                Where to get your medicines      These medications were sent to [place] [id number] [facility] [address]  [address] [place] [address] [address] [place], [place] [id number]    Hours:  TRANSPLANT PHONE NUMBER [phone] Phone:  [phone]     dulaglutide 1.5 MG/0.5ML pen                Primary Care Provider Office Phone # Fax #    Mariya GRACE Oneida, APRN [phone]       [address] [place] [address]  [facility] [address]        Equal Access to Services     SULAIMAN MARTINEZ AH: Chica Gimenez, wacortneyda luqadaha, qaybta kaalmacarlee hurd, marc bernal. So [facility] [phone].    ATENCIÓN: Si habla español, tiene a dykes disposición servicios gratuitos de asistencia lingüística. Llame al [phone].    We comply with applicable federal civil rights laws and [place] laws. We do not discriminate on the basis of race, color, national origin, age, disability, sex,  sexual orientation, or gender identity.            Thank you!     Thank you for choosing [facility]  for your care. Our goal is always to provide you with excellent care. Hearing back from our patients is one way we can continue to improve our services. Please take a few minutes to complete the written survey that you may receive in the mail after your visit with us. Thank you!             Your Updated Medication List - Protect others around you: Learn how to safely use, store and throw away your medicines at www.disposemymeds.org.          This list is accurate as of 3/12/18  8:43 AM.  Always use your most recent med list.                   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 Subcutaneous 2 times daily BD ultra-fine insulin syringe, 30 ga. X 1/2'' short needle 1/2 cc. Use as directed.    Type 2 diabetes mellitus with complication, with long-term current use of insulin (H)       blood glucose monitoring lancets     6 Box    Use to test blood sugar 6 times daily or as directed    Type 1 diabetes mellitus with nephropathy (H)       blood glucose monitoring test strip    ACCU-CHEK LAYA PLUS    600 each    Laya Plus test strips for use in Accucheck  Expert meter.  Use to test blood sugar 6 times daily. 3 month supply.  Send PA's to Dr. Mohamud.    Type 1 diabetes mellitus with nephropathy (H)       docusate sodium 100 MG tablet    COLACE    30 tablet    Stool softener for use while taking oxycodone    Abdominal pain, epigastric       dulaglutide 1.5 MG/0.5ML pen    TRULICITY    6 mL    Inject 1.5 mg Subcutaneous every 7 days    Uncontrolled type 2 diabetes mellitus with hyperglycemia, with long-term current use of insulin (H)       fenofibrate 160 MG tablet     90 tablet    Take 1 tablet (160 mg) by mouth daily    Mixed hyperlipidemia       ibuprofen 600 MG tablet    ADVIL/MOTRIN    120 tablet    Take 1 tablet (600 mg) by mouth every 6 hours as needed for moderate pain    Cervicalgia, Headache, Pain in joint, shoulder region       insulin aspart 100 UNIT/ML injection    NovoLOG PEN    63 mL    Admin Instructions: Before meals and bedtime correction sliding scale 120 - 160 - 1 U  161 - 200 - 2 U  201 - 240 - 3 U  241 - 280 - 4 U  281 - 320 - 5 U  321 - 360 - 6 U ...    Type 1 diabetes mellitus with complications (H)       levonorgestrel 20 MCG/24HR IUD    MIRENA    1 each    1 each (20 mcg) by Intrauterine route once for 1 dose    Encounter for insertion of intrauterine contraceptive device       losartan 25 MG tablet    COZAAR    90 tablet    Take 1 tablet (25 mg) by mouth daily    Uncontrolled type 2 diabetes mellitus with hyperglycemia, with long-term current use of insulin (H)       * ONDANSETRON PO      Take 4 mg by mouth every 8 hours as needed for nausea        * ondansetron 4 MG ODT tab    ZOFRAN ODT    6 tablet    Take 1 tablet (4 mg) by mouth every 8 hours as needed for nausea        * OXYCODONE HCL PO      Take 5 mg by mouth every 6 hours as needed (moderate to severe pain)        * oxyCODONE IR 5 MG tablet    ROXICODONE    10 tablet    Take 1 tablet (5 mg) by mouth every 6 hours as needed for pain        * oxyCODONE IR 10 MG tablet    ROXICODONE     20 tablet    Take 0.5 tablets (5 mg) by mouth every 4 hours as needed for moderate to severe pain    Abdominal pain, epigastric       * RANITIDINE HCL PO      Take 150 mg by mouth 2 times daily        * ranitidine 150 MG tablet    ZANTAC    30 tablet    Take 1 tablet (150 mg) by mouth 2 times daily for 15 days        vitamin D 57924 UNIT capsule    ERGOCALCIFEROL    12 capsule    Take 1 capsule (50,000 Units) by mouth every 7 days    Vitamin deficiency       * Notice:  This list has 7 medication(s) that are the same as other medications prescribed for you. Read the directions carefully, and ask your doctor or other care provider to review them with you.

## 2018-03-12 NOTE — NURSING NOTE
"No chief complaint on file.      Initial There were no vitals taken for this visit. Estimated body mass index is 34.82 kg/(m^2) as calculated from the following:    Height as of 1/2/18: 1.549 m (5' 1\").    Weight as of 3/9/18: 83.6 kg (184 lb 4.9 oz).  Medication Reconciliation: complete]  "

## 2018-03-12 NOTE — LETTER
3/12/2018       RE: Nayeli Caal  [address]  [facility] [address]     Dear Colleague,    Thank you for referring your patient, Nayeli Caal, to the [facility] ENDOCRINOLOGY at [facility]. Please see a copy of my visit note below.       The patient is seen for evaluation of diabetes. She is seen with the help of a .     Nayeli Caal is 37 year old female diagnosed with diabetes in the fall of the year 2000, while being investigated for headaches and increased urination.  The patient is adopted and she doesn't know her parents. She was treated with metformin until 2003, when she was started on insulin. In [id number], she was treated with Bydureon for a period of time. Although she did report that the medication was effective in curbing her appetite, it didn't cause a significant improvement of her blood sugar numbers or weight loss. Over the years, she complied poorly with the recommended insulin regimen.   She had no prior episodes of diabetes ketoacidosis, despite completely discontinuing insulin for months at a time. Hemoglobin A1c has been variable between 8 and 12% over the years. In 2005, she had a C-peptide of 1.3 for a glucose level of 221. Over the last 2 years, her hemoglobin A1c has been around 12%. Most recent hemoglobin A1c was 13.4%, on 11/27/17.    Since her last visit here, she has been paying close attention to blood glucose control.  She has been checking her blood sugar at least twice daily and taking insulin for food intake.  Hemoglobin A1c today was 8.7%.    She is taking Lantus 50 units and Novolog according to the Accu-chek Expert meter below (I reviewed the meter settings):  Insulin to Carb Ratio: 8  Correction Factor: 50  BG Target: 110-150  Offset time 2 hrs   Acting time 3 hrs     The glucometer readings reveal that she checks her blood glucose 2.2 times daily, mainly prior to dinner, in the  morning and during the night (around 2-3 AM).  The average blood glucose is 153 with a standard deviation of 57 and a range variable between 59 and 287.  Quite frequently, she skips breakfast.  For lunch, she tries to have a no or low carbohydrate meal (frequently lettuce wrap).  During the night, she frequently wakes up hungry, around 2 or 3 AM, and she has a snack of 60 g, for which she does take the right amount of insulin.  Dinner is around 8 PM.  Average dose of NovoLog documented by the meter is around 20 units, variable between 7 and 35 units.  Most of the days, she ends up taking 10-15 units for food intake and correction.  The lowest blood glucose documented by the meter in the last month was 59 and occurred at 2 AM, after taking 5 units NovoLog for dinner.  This is the only hypoglycemic episodes documented during the night.    In December 2017, she was started on Trulicity, at 0.75 mg daily. Her weight is stable. The patient has noticed a decrease in appetite since starting Trulicity.   2 days ago, she underwent uncomplicated laparoscopic cholecystectomy. With the exception of the pain at the site of surgery, the nausea resolved.     She reports being compliant in taking the vitamin D. The dental work was completed since her last visit here.     Diabetes complications:  Retinopathy: last eye exam - June 2017. 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   Nephropathy: h/o proteinuria (most recent urine microalbumin positive in January 2017); normal GFR. On 25 mg losartan daily (tx with lisinopril was associated with a dry cough).   Neuropathy: occasional numbness or tingling sensation in her hands, no pain   She is symptomatic for hypoglycemia and she developed symptoms when her blood glucose is below 80 ( shakiness, dizziness).  The lowest blood glucose numbers that she remembers is 59.  She denies prior episodes of loss of consciousness due to hypoglycemia. She does have glucagon at home.    Nayeli has a history of  dyslipidemia, currently medicated with 40 mg atorvastatin and 160 mg fenofibrate daily (added in 2015). In the past, the triglycerides were as high as 1656 (she wasn't taking the meds at the time she had the labwork done). She has no prior history of pancreatitis.    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    Current Medications     Current Outpatient Prescriptions:      oxyCODONE IR (ROXICODONE) 10 MG tablet, Take 0.5 tablets (5 mg) by mouth every 4 hours as needed for moderate to severe pain, Disp: 20 tablet, Rfl: 0     docusate sodium (COLACE) 100 MG tablet, Stool softener for use while taking oxycodone, Disp: 30 tablet, Rfl: 1     ondansetron (ZOFRAN ODT) 4 MG ODT tab, Take 1 tablet (4 mg) by mouth every 8 hours as needed for nausea, Disp: 6 tablet, Rfl: 0     oxyCODONE IR (ROXICODONE) 5 MG tablet, Take 1 tablet (5 mg) by mouth every 6 hours as needed for pain, Disp: 10 tablet, Rfl: 0     ranitidine (ZANTAC) 150 MG tablet, Take 1 tablet (150 mg) by mouth 2 times daily for 15 days, Disp: 30 tablet, Rfl: 0     ONDANSETRON PO, Take 4 mg by mouth every 8 hours as needed for nausea, Disp: , Rfl:      OXYCODONE HCL PO, Take 5 mg by mouth every 6 hours as needed (moderate to severe pain), Disp: , Rfl:      RANITIDINE HCL PO, Take 150 mg by mouth 2 times daily, Disp: , Rfl:      ibuprofen (ADVIL/MOTRIN) 600 MG tablet, Take 1 tablet (600 mg) by mouth every 6 hours as needed for moderate pain, Disp: 120 tablet, Rfl: 1     BASAGLAR 100 UNIT/ML injection, Inject 50 Units Subcutaneous daily Please provide 3 months supply, Disp: 12 mL, Rfl: 1     blood glucose monitoring (ACCU-CHEK LAYA PLUS) test strip, Laya Plus test strips for use in Accucheck Expert meter.  Use to test blood sugar 6 times  daily. 3 month supply.  Send PA's to Dr. Mohamud., Disp: 600 each, Rfl: 3     BD ULTRA FINE PEN NEEDLES, Inject 1 dose. Subcutaneous 2 times daily BD ultra-fine insulin syringe, 30 ga. X 1/2'' short needle 1/2 cc. Use as directed., Disp: 200 Units, Rfl: 11     fenofibrate 160 MG tablet, Take 1 tablet (160 mg) by mouth daily, Disp: 90 tablet, Rfl: 3     losartan (COZAAR) 25 MG tablet, Take 1 tablet (25 mg) by mouth daily, Disp: 90 tablet, Rfl: 3     dulaglutide (TRULICITY) 0.75 MG/0.5ML pen, Inject 0.75 mg Subcutaneous every 7 days, Disp: 6 mL, Rfl: 3     insulin aspart (NOVOLOG PEN) 100 UNIT/ML injection, Admin Instructions: Before meals and bedtime correction sliding scale 120 - 160 - 1 U  161 - 200 - 2 U  201 - 240 - 3 U  241 - 280 - 4 U  281 - 320 - 5 U  321 - 360 - 6 U ..., Disp: 63 mL, Rfl: 3     vitamin D (ERGOCALCIFEROL) 68427 UNIT capsule, Take 1 capsule (50,000 Units) by mouth every 7 days, Disp: 12 capsule, Rfl: 3     aspirin 81 MG tablet, Take 1 tablet (81 mg) by mouth daily, Disp: 100 tablet, Rfl: 3     blood glucose monitoring (ACCU-CHEK FASTCLIX) lancets, Use to test blood sugar 6 times daily or as directed, Disp: 6 Box, Rfl: 3     atorvastatin (LIPITOR) 40 MG tablet, Take 1 tablet (40 mg) by mouth daily, Disp: 90 tablet, Rfl: 3     levonorgestrel (MIRENA) 20 MCG/24HR IUD, 1 each (20 mcg) by Intrauterine route once for 1 dose, Disp: 1 each, Rfl: 0     Alcohol Swabs (ALCOHOL PREP PAD) 70 % PADS, 1 each 3 times daily, Disp: 100 each, Rfl: 3     [DISCONTINUED] BD ULTRA FINE PEN NEEDLES, Inject 1 dose Subcutaneous 2 times daily BD ultra-fine insulin syringe, 30 ga. X 1/2'' short needle 1/2 cc. Use as directed. (Patient taking differently: Inject 1 dose * Subcutaneous 2 times daily BD ultra-fine insulin syringe, 30 ga. X 1/2'' short needle 1/2 cc. Use as directed.), Disp: 200 Units, Rfl: 11    Family History   Problem Relation Age of Onset     Unknown/Adopted Other      Social History  Single. No children.  Lives with a roommate. She smoked for almost 10 years, on and off, up to 1 PPD; quit smoking 2000. She only drinks alcohol occasionally. Denies using illicit drugs. Occupation: engineering department.      Review of Systems   Systemic symptoms: weight stable  Eye symptoms: No eye symptoms.  Otolaryngeal symptoms: deaf   Breast symptoms: No breast symptoms.  Cardiovascular symptoms: No cardiovascular symptoms.    Pulmonary symptoms: No pulmonary symptoms.  Gastrointestinal symptoms: worsening GERD symptoms noticed with certain foods; long-standing episodes of nausea    Genitourinary symptoms: No genitourinary symptoms.  Endocrine symptoms:Menstrual periods, irregular, and light, 3-5 weeks apart - IUD 1/2017   Hematologic symptoms: No hematologic symptoms.  Musculoskeletal symptoms: bilateral knee pain, intermittently  Neurological symptoms: as above  Psychological symptoms: No psychological symptoms.    Skin symptoms: No skin symptoms    Vital Signs     Previous Weights:    Wt Readings from Last 10 Encounters:   03/09/18 83.6 kg (184 lb 4.9 oz)   02/05/18 83.6 kg (184 lb 4.8 oz)   01/18/18 83.4 kg (183 lb 14.4 oz)   01/02/18 83.9 kg (185 lb)   12/14/17 83 kg (183 lb)   11/27/17 82.7 kg (182 lb 6.4 oz)   10/17/17 83.5 kg (184 lb)   08/21/17 84.4 kg (186 lb)   07/13/17 82.1 kg (181 lb)   06/05/17 80.3 kg (177 lb)     /82  Pulse 69  Wt 82.9 kg (182 lb 11.2 oz)  BMI 34.52 kg/m2    Physical Exam  General Appearance:  she is well developed, well nourished and in no distress     HEENT:   oropharynx clear and moist, no JVD, no bruits      no thyromegaly, no palpable nodules   Cardiovascular:  regular rhythm, systolic murmur, distal pulse palpable, no edema  Respiratory:       chest clear, no rales, no rhonchi   Cardiovascular:  regular rhythm, no murmurs, distal pulse palpable, no edema  Respiratory:       chest clear, no rales, no rhonchi   Gastrointestinal: abdomen soft, non-tender, non-distended, normal bowel  sounds,   no organomegaly   Musculoskeletal: normal tone and strength  Psychiatric: affect and judgment normal  Skin: warm, no lesions   Neurological: reflexes normal and symmetric, no resting tremor    Lab Results:  I reviewed prior lab results documented in Epic  Lab Results   Component Value Date    A1C 13.4 (H) 11/27/2017    A1C 11.0 (H) 07/13/2017    A1C 10.9 (H) 05/15/2017    A1C 12.8 (H) 01/02/2017    A1C 13.0 (H) 05/11/2016    HEMOGLOBINA1 12.0 (A) 02/03/2014    HEMOGLOBINA1 10.3 (A) 10/28/2013    HEMOGLOBINA1 11.3 (A) 08/06/2013    HEMOGLOBINA1 13.1 (H) 08/12/2011    HEMOGLOBINA1 8.9 (H) 05/21/2010       Hemoglobin   Date Value Ref Range Status   03/09/2018 12.5 11.7 - 15.7 g/dL Final     Hematocrit   Date Value Ref Range Status   03/09/2018 38.1 35.0 - 47.0 % Final     Cholesterol   Date Value Ref Range Status   11/27/2017 216 (H) <200 mg/dL Final     Comment:     Desirable:       <200 mg/dl     Cholesterol/HDL Ratio   Date Value Ref Range Status   09/16/2013 5.8 (H) 0.0 - 5.0 Final     HDL Cholesterol   Date Value Ref Range Status   11/27/2017 29 (L) >49 mg/dL Final     LDL Cholesterol Calculated   Date Value Ref Range Status   11/27/2017 114 (H) <100 mg/dL Final     Comment:     Above desirable:  100-129 mg/dl  Borderline High:  130-159 mg/dL  High:             160-189 mg/dL  Very high:       >189 mg/dl       VLDL-Cholesterol   Date Value Ref Range Status   09/16/2013 44 (H) 0 - 30 mg/dL Final     Triglycerides   Date Value Ref Range Status   11/27/2017 367 (H) <150 mg/dL Final     Comment:     Borderline high:  150-199 mg/dl  High:             200-499 mg/dl  Very high:       >499 mg/dl       Albumin Urine mg/L   Date Value Ref Range Status   11/27/2017 217 mg/L Final     TSH   Date Value Ref Range Status   11/27/2017 1.92 0.40 - 4.00 mU/L Final         Last Basic Metabolic Panel:    Sodium   Date Value Ref Range Status   03/09/2018 141 133 - 144 mmol/L Final     Potassium   Date Value Ref Range Status    03/09/2018 3.9 3.4 - 5.3 mmol/L Final     Chloride   Date Value Ref Range Status   03/09/2018 107 94 - 109 mmol/L Final     Calcium   Date Value Ref Range Status   03/09/2018 7.9 (L) 8.5 - 10.1 mg/dL Final     Carbon Dioxide   Date Value Ref Range Status   03/09/2018 21 20 - 32 mmol/L Final     Urea Nitrogen   Date Value Ref Range Status   03/09/2018 17 7 - 30 mg/dL Final     Creatinine   Date Value Ref Range Status   03/09/2018 0.57 0.52 - 1.04 mg/dL Final     GFR Estimate   Date Value Ref Range Status   03/09/2018 >90 >60 mL/min/1.7m2 Final     Comment:     Non  GFR Calc     Glucose   Date Value Ref Range Status   03/09/2018 127 (H) 70 - 99 mg/dL Final       AST   Date Value Ref Range Status   03/09/2018 34 0 - 45 U/L Final     ALT   Date Value Ref Range Status   03/09/2018 49 0 - 50 U/L Final     Albumin   Date Value Ref Range Status   03/09/2018 3.4 3.4 - 5.0 g/dL Final     Assessment     1. 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  Recommendations:  Decrease the dose of Basaglar to 46 U, in order to address the nocturnal lower blood glucose numbers.  Take Novolog for all meals and snacks. Continue to use the same insulin to carbohydrate ratio of 1 unit per 8 g.  Once she finishes the Trulicity pens (she just picked up the prescription), she can increase the dose to 1.5 mg weekly.    Return to clinic in 3 months with fasting lab work:  CMP, urine microalbumin, hematocrit.    Start taking a calcium supplement daily, 500 - 1000 mg daily   Take vitamin D consistently, once a week   Once you finish the Trulicity pens,  the new prescription for Trulicity 1.5 mg weekly    2. Dyslipidemia   Expect the triglycerides level to significantly improve with better blood glucose control.  Follow-up fasting lipid panel and CK.     3. Vitamin D  deficiency  Lowish calcium was noted on the most recent lab work.  I advised the patient to start taking a calcium supplement daily, anywhere between 500 and 1000 mg per day.  We are going to check her calcium and vitamin D level at her next appointment.    4. Proteinuria/Hypertension  She has been tolerating losartan with no side effects.  The blood pressure is well controlled.  Recent GFR and potassium normal.  Follow-up urine microalbumin and consider increasing the dose of losartan.    Orders Placed This Encounter   Procedures     Albumin Random Urine Quantitative with Creat Ratio     Comprehensive metabolic panel     CK total     Lipid panel reflex to direct LDL Fasting     TSH with free T4 reflex     25 Hydroxyvitamin D2 and D3       Sincerely,    Jimena Bragg MD

## 2018-03-12 NOTE — PROGRESS NOTES
The patient is seen for evaluation of diabetes. She is seen with the help of a .     Nayeli Caal is 37 year old female diagnosed with diabetes in the fall of the year 2000, while being investigated for headaches and increased urination.  The patient is adopted and she doesn't know her parents. She was treated with metformin until 2003, when she was started on insulin. In [id number], she was treated with Bydureon for a period of time. Although she did report that the medication was effective in curbing her appetite, it didn't cause a significant improvement of her blood sugar numbers or weight loss. Over the years, she complied poorly with the recommended insulin regimen.   She had no prior episodes of diabetes ketoacidosis, despite completely discontinuing insulin for months at a time. Hemoglobin A1c has been variable between 8 and 12% over the years. In 2005, she had a C-peptide of 1.3 for a glucose level of 221. Over the last 2 years, her hemoglobin A1c has been around 12%. Most recent hemoglobin A1c was 13.4%, on 11/27/17.    Since her last visit here, she has been paying close attention to blood glucose control.  She has been checking her blood sugar at least twice daily and taking insulin for food intake.  Hemoglobin A1c today was 8.7%.    She is taking Lantus 50 units and Novolog according to the Accu-chek Expert meter below (I reviewed the meter settings):  Insulin to Carb Ratio: 8  Correction Factor: 50  BG Target: 110-150  Offset time 2 hrs   Acting time 3 hrs     The glucometer readings reveal that she checks her blood glucose 2.2 times daily, mainly prior to dinner, in the morning and during the night (around 2-3 AM).  The average blood glucose is 153 with a standard deviation of 57 and a range variable between 59 and 287.  Quite frequently, she skips breakfast.  For lunch, she tries to have a no or low carbohydrate meal (frequently lettuce wrap).  During the night,  she frequently wakes up hungry, around 2 or 3 AM, and she has a snack of 60 g, for which she does take the right amount of insulin.  Dinner is around 8 PM.  Average dose of NovoLog documented by the meter is around 20 units, variable between 7 and 35 units.  Most of the days, she ends up taking 10-15 units for food intake and correction.  The lowest blood glucose documented by the meter in the last month was 59 and occurred at 2 AM, after taking 5 units NovoLog for dinner.  This is the only hypoglycemic episodes documented during the night.    In December 2017, she was started on Trulicity, at 0.75 mg daily. Her weight is stable. The patient has noticed a decrease in appetite since starting Trulicity.   2 days ago, she underwent uncomplicated laparoscopic cholecystectomy. With the exception of the pain at the site of surgery, the nausea resolved.     She reports being compliant in taking the vitamin D. The dental work was completed since her last visit here.     Diabetes complications:  Retinopathy: last eye exam - June 2017. 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   Nephropathy: h/o proteinuria (most recent urine microalbumin positive in January 2017); normal GFR. On 25 mg losartan daily (tx with lisinopril was associated with a dry cough).   Neuropathy: occasional numbness or tingling sensation in her hands, no pain   She is symptomatic for hypoglycemia and she developed symptoms when her blood glucose is below 80 ( shakiness, dizziness).  The lowest blood glucose numbers that she remembers is 59.  She denies prior episodes of loss of consciousness due to hypoglycemia. She does have glucagon at home.    Nayeli has a history of dyslipidemia, currently medicated with 40 mg atorvastatin and 160 mg fenofibrate daily (added in 2015). In the past, the triglycerides were as high as 1656 (she wasn't taking the meds at the time she had the labwork done). She has no prior history of pancreatitis.    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    Current Medications     Current Outpatient Prescriptions:      oxyCODONE IR (ROXICODONE) 10 MG tablet, Take 0.5 tablets (5 mg) by mouth every 4 hours as needed for moderate to severe pain, Disp: 20 tablet, Rfl: 0     docusate sodium (COLACE) 100 MG tablet, Stool softener for use while taking oxycodone, Disp: 30 tablet, Rfl: 1     ondansetron (ZOFRAN ODT) 4 MG ODT tab, Take 1 tablet (4 mg) by mouth every 8 hours as needed for nausea, Disp: 6 tablet, Rfl: 0     oxyCODONE IR (ROXICODONE) 5 MG tablet, Take 1 tablet (5 mg) by mouth every 6 hours as needed for pain, Disp: 10 tablet, Rfl: 0     ranitidine (ZANTAC) 150 MG tablet, Take 1 tablet (150 mg) by mouth 2 times daily for 15 days, Disp: 30 tablet, Rfl: 0     ONDANSETRON PO, Take 4 mg by mouth every 8 hours as needed for nausea, Disp: , Rfl:      OXYCODONE HCL PO, Take 5 mg by mouth every 6 hours as needed (moderate to severe pain), Disp: , Rfl:      RANITIDINE HCL PO, Take 150 mg by mouth 2 times daily, Disp: , Rfl:      ibuprofen (ADVIL/MOTRIN) 600 MG tablet, Take 1 tablet (600 mg) by mouth every 6 hours as needed for moderate pain, Disp: 120 tablet, Rfl: 1     BASAGLAR 100 UNIT/ML injection, Inject 50 Units Subcutaneous daily Please provide 3 months supply, Disp: 12 mL, Rfl: 1     blood glucose monitoring (ACCU-CHEK LAYA PLUS) test strip, Laya Plus test strips for use in Accucheck Expert meter.  Use to test blood sugar 6 times daily. 3 month supply.  Send PA's to Dr. Mohamud., Disp: 600 each, Rfl: 3     BD ULTRA FINE PEN NEEDLES, Inject 1 dose. Subcutaneous 2 times daily BD ultra-fine insulin syringe, 30 ga. X 1/2'' short needle 1/2 cc. Use as directed., Disp: 200 Units, Rfl: 11     fenofibrate 160 MG tablet, Take 1 tablet (160 mg)  by mouth daily, Disp: 90 tablet, Rfl: 3     losartan (COZAAR) 25 MG tablet, Take 1 tablet (25 mg) by mouth daily, Disp: 90 tablet, Rfl: 3     dulaglutide (TRULICITY) 0.75 MG/0.5ML pen, Inject 0.75 mg Subcutaneous every 7 days, Disp: 6 mL, Rfl: 3     insulin aspart (NOVOLOG PEN) 100 UNIT/ML injection, Admin Instructions: Before meals and bedtime correction sliding scale 120 - 160 - 1 U  161 - 200 - 2 U  201 - 240 - 3 U  241 - 280 - 4 U  281 - 320 - 5 U  321 - 360 - 6 U ..., Disp: 63 mL, Rfl: 3     vitamin D (ERGOCALCIFEROL) 92296 UNIT capsule, Take 1 capsule (50,000 Units) by mouth every 7 days, Disp: 12 capsule, Rfl: 3     aspirin 81 MG tablet, Take 1 tablet (81 mg) by mouth daily, Disp: 100 tablet, Rfl: 3     blood glucose monitoring (ACCU-CHEK FASTCLIX) lancets, Use to test blood sugar 6 times daily or as directed, Disp: 6 Box, Rfl: 3     atorvastatin (LIPITOR) 40 MG tablet, Take 1 tablet (40 mg) by mouth daily, Disp: 90 tablet, Rfl: 3     levonorgestrel (MIRENA) 20 MCG/24HR IUD, 1 each (20 mcg) by Intrauterine route once for 1 dose, Disp: 1 each, Rfl: 0     Alcohol Swabs (ALCOHOL PREP PAD) 70 % PADS, 1 each 3 times daily, Disp: 100 each, Rfl: 3     [DISCONTINUED] BD ULTRA FINE PEN NEEDLES, Inject 1 dose Subcutaneous 2 times daily BD ultra-fine insulin syringe, 30 ga. X 1/2'' short needle 1/2 cc. Use as directed. (Patient taking differently: Inject 1 dose * Subcutaneous 2 times daily BD ultra-fine insulin syringe, 30 ga. X 1/2'' short needle 1/2 cc. Use as directed.), Disp: 200 Units, Rfl: 11    Family History   Problem Relation Age of Onset     Unknown/Adopted Other      Social History  Single. No children. Lives with a roommate. She smoked for almost 10 years, on and off, up to 1 PPD; quit smoking 2000. She only drinks alcohol occasionally. Denies using illicit drugs. Occupation: engineering department.      Review of Systems   Systemic symptoms: weight stable  Eye symptoms: No eye symptoms.  Otolaryngeal  symptoms: deaf   Breast symptoms: No breast symptoms.  Cardiovascular symptoms: No cardiovascular symptoms.    Pulmonary symptoms: No pulmonary symptoms.  Gastrointestinal symptoms: worsening GERD symptoms noticed with certain foods; long-standing episodes of nausea    Genitourinary symptoms: No genitourinary symptoms.  Endocrine symptoms:Menstrual periods, irregular, and light, 3-5 weeks apart - IUD 1/2017   Hematologic symptoms: No hematologic symptoms.  Musculoskeletal symptoms: bilateral knee pain, intermittently  Neurological symptoms: as above  Psychological symptoms: No psychological symptoms.    Skin symptoms: No skin symptoms    Vital Signs     Previous Weights:    Wt Readings from Last 10 Encounters:   03/09/18 83.6 kg (184 lb 4.9 oz)   02/05/18 83.6 kg (184 lb 4.8 oz)   01/18/18 83.4 kg (183 lb 14.4 oz)   01/02/18 83.9 kg (185 lb)   12/14/17 83 kg (183 lb)   11/27/17 82.7 kg (182 lb 6.4 oz)   10/17/17 83.5 kg (184 lb)   08/21/17 84.4 kg (186 lb)   07/13/17 82.1 kg (181 lb)   06/05/17 80.3 kg (177 lb)     /82  Pulse 69  Wt 82.9 kg (182 lb 11.2 oz)  BMI 34.52 kg/m2    Physical Exam  General Appearance:  she is well developed, well nourished and in no distress     HEENT:   oropharynx clear and moist, no JVD, no bruits      no thyromegaly, no palpable nodules   Cardiovascular:  regular rhythm, systolic murmur, distal pulse palpable, no edema  Respiratory:       chest clear, no rales, no rhonchi   Cardiovascular:  regular rhythm, no murmurs, distal pulse palpable, no edema  Respiratory:       chest clear, no rales, no rhonchi   Gastrointestinal: abdomen soft, non-tender, non-distended, normal bowel sounds,   no organomegaly   Musculoskeletal: normal tone and strength  Psychiatric: affect and judgment normal  Skin: warm, no lesions   Neurological: reflexes normal and symmetric, no resting tremor    Lab Results:  I reviewed prior lab results documented in Epic  Lab Results   Component Value Date    A1C  13.4 (H) 11/27/2017    A1C 11.0 (H) 07/13/2017    A1C 10.9 (H) 05/15/2017    A1C 12.8 (H) 01/02/2017    A1C 13.0 (H) 05/11/2016    HEMOGLOBINA1 12.0 (A) 02/03/2014    HEMOGLOBINA1 10.3 (A) 10/28/2013    HEMOGLOBINA1 11.3 (A) 08/06/2013    HEMOGLOBINA1 13.1 (H) 08/12/2011    HEMOGLOBINA1 8.9 (H) 05/21/2010       Hemoglobin   Date Value Ref Range Status   03/09/2018 12.5 11.7 - 15.7 g/dL Final     Hematocrit   Date Value Ref Range Status   03/09/2018 38.1 35.0 - 47.0 % Final     Cholesterol   Date Value Ref Range Status   11/27/2017 216 (H) <200 mg/dL Final     Comment:     Desirable:       <200 mg/dl     Cholesterol/HDL Ratio   Date Value Ref Range Status   09/16/2013 5.8 (H) 0.0 - 5.0 Final     HDL Cholesterol   Date Value Ref Range Status   11/27/2017 29 (L) >49 mg/dL Final     LDL Cholesterol Calculated   Date Value Ref Range Status   11/27/2017 114 (H) <100 mg/dL Final     Comment:     Above desirable:  100-129 mg/dl  Borderline High:  130-159 mg/dL  High:             160-189 mg/dL  Very high:       >189 mg/dl       VLDL-Cholesterol   Date Value Ref Range Status   09/16/2013 44 (H) 0 - 30 mg/dL Final     Triglycerides   Date Value Ref Range Status   11/27/2017 367 (H) <150 mg/dL Final     Comment:     Borderline high:  150-199 mg/dl  High:             200-499 mg/dl  Very high:       >499 mg/dl       Albumin Urine mg/L   Date Value Ref Range Status   11/27/2017 217 mg/L Final     TSH   Date Value Ref Range Status   11/27/2017 1.92 0.40 - 4.00 mU/L Final         Last Basic Metabolic Panel:    Sodium   Date Value Ref Range Status   03/09/2018 141 133 - 144 mmol/L Final     Potassium   Date Value Ref Range Status   03/09/2018 3.9 3.4 - 5.3 mmol/L Final     Chloride   Date Value Ref Range Status   03/09/2018 107 94 - 109 mmol/L Final     Calcium   Date Value Ref Range Status   03/09/2018 7.9 (L) 8.5 - 10.1 mg/dL Final     Carbon Dioxide   Date Value Ref Range Status   03/09/2018 21 20 - 32 mmol/L Final     Urea  Nitrogen   Date Value Ref Range Status   03/09/2018 17 7 - 30 mg/dL Final     Creatinine   Date Value Ref Range Status   03/09/2018 0.57 0.52 - 1.04 mg/dL Final     GFR Estimate   Date Value Ref Range Status   03/09/2018 >90 >60 mL/min/1.7m2 Final     Comment:     Non  GFR Calc     Glucose   Date Value Ref Range Status   03/09/2018 127 (H) 70 - 99 mg/dL Final       AST   Date Value Ref Range Status   03/09/2018 34 0 - 45 U/L Final     ALT   Date Value Ref Range Status   03/09/2018 49 0 - 50 U/L Final     Albumin   Date Value Ref Range Status   03/09/2018 3.4 3.4 - 5.0 g/dL Final     Assessment     1. 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  Recommendations:  Decrease the dose of Basaglar to 46 U, in order to address the nocturnal lower blood glucose numbers.  Take Novolog for all meals and snacks. Continue to use the same insulin to carbohydrate ratio of 1 unit per 8 g.  Once she finishes the Trulicity pens (she just picked up the prescription), she can increase the dose to 1.5 mg weekly.    Return to clinic in 3 months with fasting lab work:  CMP, urine microalbumin, hematocrit.    Start taking a calcium supplement daily, 500 - 1000 mg daily   Take vitamin D consistently, once a week   Once you finish the Trulicity pens,  the new prescription for Trulicity 1.5 mg weekly    2. Dyslipidemia   Expect the triglycerides level to significantly improve with better blood glucose control.  Follow-up fasting lipid panel and CK.     3. Vitamin D deficiency  Lowish calcium was noted on the most recent lab work.  I advised the patient to start taking a calcium supplement daily, anywhere between 500 and 1000 mg per day.  We are going to check her calcium and vitamin D level at her next appointment.    4. Proteinuria/Hypertension  She has been tolerating  losartan with no side effects.  The blood pressure is well controlled.  Recent GFR and potassium normal.  Follow-up urine microalbumin and consider increasing the dose of losartan.    Orders Placed This Encounter   Procedures     Albumin Random Urine Quantitative with Creat Ratio     Comprehensive metabolic panel     CK total     Lipid panel reflex to direct LDL Fasting     TSH with free T4 reflex     25 Hydroxyvitamin D2 and D3

## 2018-03-12 NOTE — PATIENT INSTRUCTIONS
Decrease the dose of Basaglar to 46 U   Take Novolog for all meals and snacks  Start taking a calcium supplement daily, 500 - 1000 mg daily   Take vitamin D consistently, once a week   Once you finish the Trulicity pens,  the new prescription for Trulicity 1.5 mg weekly

## 2018-03-13 ENCOUNTER — APPOINTMENT (OUTPATIENT)
Dept: ULTRASOUND IMAGING | Facility: CLINIC | Age: 38
End: 2018-03-13
Attending: EMERGENCY MEDICINE
Payer: COMMERCIAL

## 2018-03-13 ENCOUNTER — HOSPITAL ENCOUNTER (EMERGENCY)
Facility: CLINIC | Age: 38
Discharge: HOME | End: 2018-03-14
Attending: EMERGENCY MEDICINE | Admitting: EMERGENCY MEDICINE
Payer: COMMERCIAL

## 2018-03-13 ENCOUNTER — APPOINTMENT (OUTPATIENT)
Dept: GENERAL RADIOLOGY | Facility: CLINIC | Age: 38
End: 2018-03-13
Payer: COMMERCIAL

## 2018-03-13 DIAGNOSIS — G89.18 ACUTE POST-OPERATIVE PAIN: ICD-10-CM

## 2018-03-13 LAB
ALBUMIN SERPL-MCNC: 3.6 G/DL (ref 3.4–5)
ALP SERPL-CCNC: 98 U/L (ref 40–150)
ALT SERPL W P-5'-P-CCNC: 127 U/L (ref 0–50)
ANION GAP SERPL CALCULATED.3IONS-SCNC: 6 MMOL/L (ref 3–14)
AST SERPL W P-5'-P-CCNC: 86 U/L (ref 0–45)
BASOPHILS # BLD AUTO: 0 10E9/L (ref 0–0.2)
BASOPHILS NFR BLD AUTO: 0.3 %
BILIRUB SERPL-MCNC: 0.3 MG/DL (ref 0.2–1.3)
BUN SERPL-MCNC: 18 MG/DL (ref 7–30)
CALCIUM SERPL-MCNC: 8.9 MG/DL (ref 8.5–10.1)
CHLORIDE SERPL-SCNC: 104 MMOL/L (ref 94–109)
CO2 SERPL-SCNC: 27 MMOL/L (ref 20–32)
CREAT SERPL-MCNC: 0.85 MG/DL (ref 0.52–1.04)
DIFFERENTIAL METHOD BLD: ABNORMAL
EOSINOPHIL # BLD AUTO: 0.3 10E9/L (ref 0–0.7)
EOSINOPHIL NFR BLD AUTO: 2.5 %
ERYTHROCYTE [DISTWIDTH] IN BLOOD BY AUTOMATED COUNT: 12.8 % (ref 10–15)
GFR SERPL CREATININE-BSD FRML MDRD: 75 ML/MIN/1.7M2
GLUCOSE SERPL-MCNC: 171 MG/DL (ref 70–99)
HCT VFR BLD AUTO: 42.3 % (ref 35–47)
HGB BLD-MCNC: 14.2 G/DL (ref 11.7–15.7)
IMM GRANULOCYTES # BLD: 0.1 10E9/L (ref 0–0.4)
IMM GRANULOCYTES NFR BLD: 0.4 %
LIPASE SERPL-CCNC: 147 U/L (ref 73–393)
LYMPHOCYTES # BLD AUTO: 3.8 10E9/L (ref 0.8–5.3)
LYMPHOCYTES NFR BLD AUTO: 30.1 %
MCH RBC QN AUTO: 29 PG (ref 26.5–33)
MCHC RBC AUTO-ENTMCNC: 33.6 G/DL (ref 31.5–36.5)
MCV RBC AUTO: 87 FL (ref 78–100)
MONOCYTES # BLD AUTO: 0.7 10E9/L (ref 0–1.3)
MONOCYTES NFR BLD AUTO: 5.5 %
NEUTROPHILS # BLD AUTO: 7.7 10E9/L (ref 1.6–8.3)
NEUTROPHILS NFR BLD AUTO: 61.2 %
NRBC # BLD AUTO: 0 10*3/UL
NRBC BLD AUTO-RTO: 0 /100
PLATELET # BLD AUTO: 421 10E9/L (ref 150–450)
POTASSIUM SERPL-SCNC: 4.3 MMOL/L (ref 3.4–5.3)
PROT SERPL-MCNC: 7.8 G/DL (ref 6.8–8.8)
RBC # BLD AUTO: 4.89 10E12/L (ref 3.8–5.2)
SODIUM SERPL-SCNC: 137 MMOL/L (ref 133–144)
WBC # BLD AUTO: 12.6 10E9/L (ref 4–11)

## 2018-03-13 PROCEDURE — 80053 COMPREHEN METABOLIC PANEL: CPT | Performed by: EMERGENCY MEDICINE

## 2018-03-13 PROCEDURE — 85025 COMPLETE CBC W/AUTO DIFF WBC: CPT | Performed by: EMERGENCY MEDICINE

## 2018-03-13 PROCEDURE — 99285 EMERGENCY DEPT VISIT HI MDM: CPT | Mod: 25 | Performed by: EMERGENCY MEDICINE

## 2018-03-13 PROCEDURE — 76705 ECHO EXAM OF ABDOMEN: CPT

## 2018-03-13 PROCEDURE — 96375 TX/PRO/DX INJ NEW DRUG ADDON: CPT | Performed by: EMERGENCY MEDICINE

## 2018-03-13 PROCEDURE — 25000128 H RX IP 250 OP 636: Performed by: EMERGENCY MEDICINE

## 2018-03-13 PROCEDURE — 99285 EMERGENCY DEPT VISIT HI MDM: CPT | Mod: Z6 | Performed by: EMERGENCY MEDICINE

## 2018-03-13 PROCEDURE — 83690 ASSAY OF LIPASE: CPT | Performed by: EMERGENCY MEDICINE

## 2018-03-13 PROCEDURE — 81025 URINE PREGNANCY TEST: CPT | Performed by: EMERGENCY MEDICINE

## 2018-03-13 PROCEDURE — 96374 THER/PROPH/DIAG INJ IV PUSH: CPT | Performed by: EMERGENCY MEDICINE

## 2018-03-13 PROCEDURE — 74018 RADEX ABDOMEN 1 VIEW: CPT

## 2018-03-13 RX ORDER — ONDANSETRON 2 MG/ML
4 INJECTION INTRAMUSCULAR; INTRAVENOUS ONCE
Status: COMPLETED | OUTPATIENT
Start: 2018-03-13 | End: 2018-03-13

## 2018-03-13 RX ORDER — MORPHINE SULFATE 4 MG/ML
4 INJECTION, SOLUTION INTRAMUSCULAR; INTRAVENOUS ONCE
Status: COMPLETED | OUTPATIENT
Start: 2018-03-13 | End: 2018-03-13

## 2018-03-13 RX ADMIN — MORPHINE SULFATE 4 MG: 4 INJECTION, SOLUTION INTRAMUSCULAR; INTRAVENOUS at 22:26

## 2018-03-13 RX ADMIN — ONDANSETRON 4 MG: 2 INJECTION INTRAMUSCULAR; INTRAVENOUS at 22:24

## 2018-03-13 ASSESSMENT — ENCOUNTER SYMPTOMS
CHILLS: 0
VOMITING: 0
NAUSEA: 1
DYSURIA: 0
ABDOMINAL PAIN: 1
FEVER: 0
FLANK PAIN: 1
ABDOMINAL DISTENTION: 1

## 2018-03-13 NOTE — ED AVS SNAPSHOT
[facility], Emergency Department    [address] [facility] [address]    Phone:  [phone]                                       Nayeli Caal   MRN: [id number]    Department:  [facility], Emergency Department   Date of Visit:  3/13/2018           Patient Information     Date Of Birth          1980        Your diagnoses for this visit were:     Acute post-operative pain        You were seen by Florence Pearce MD.      Follow-up Information     Follow up with [facility], Emergency Department.    Specialty:  EMERGENCY MEDICINE    Why:  If symptoms worsen    Contact information:    [address] [address] [phone]    Additional information:    The [facility] is located on the corner of [facility] and [address] on the [facility]. It is easily accessible from virtually any point in the [facility] area, via [address].        Discharge Instructions       Please make an appointment to follow up with your surgeon as previously recommended.        Future Appointments        Provider Department Dept Phone Center    3/14/2018 1:00 PM Greyson Schroeder  Services Department [phone] [place]    3/20/2018 4:00 PM anamaria Lutz MD  Services Department [phone] [place]    5/17/2018 7:00 AM Lab [facility] Lab [phone] [facility]    5/17/2018 2:40 PM SABI Morrison [facility] [phone] [facility]    7/16/2018 7:30 AM Jimena Bragg MD [facility] Endocrinology [phone] [facility]      24 Hour Appointment Hotline       To make an appointment at any [facility], call [phone] ([phone]). If you don't have a family doctor or clinic, we will help you find one. [facility] are conveniently located to serve the needs of you and your family.             Review of your medicines      START taking        Dose / Directions Last dose taken     senna-docusate 8.6-50 MG per tablet   Commonly known as:  SENOKOT-S;PERICOLACE   Dose:  2 tablet   Quantity:  120 tablet        Take 2 tablets by mouth 2 times daily as needed for constipation   Refills:  0          Our records show that you are taking the medicines listed below. If these are incorrect, please call your family doctor or clinic.        Dose / Directions Last dose taken    Alcohol Prep Pad 70 % Pads   Dose:  1 each   Quantity:  100 each        1 each 3 times daily   Refills:  3        aspirin 81 MG tablet   Dose:  81 mg   Quantity:  100 tablet        Take 1 tablet (81 mg) by mouth daily   Refills:  3        atorvastatin 40 MG tablet   Commonly known as:  LIPITOR   Dose:  40 mg   Quantity:  90 tablet        Take 1 tablet (40 mg) by mouth daily   Refills:  3        BASAGLAR 100 UNIT/ML injection   Dose:  50 Units   Quantity:  12 mL        Inject 50 Units Subcutaneous daily Please provide 3 months supply   Refills:  1        BD ULTRA FINE PEN NEEDLES   Dose:  1 dose.   Quantity:  200 Units        Inject 1 dose. Subcutaneous 2 times daily BD ultra-fine insulin syringe, 30 ga. X 1/2'' short needle 1/2 cc. Use as directed.   Refills:  11        blood glucose monitoring lancets   Quantity:  6 Box        Use to test blood sugar 6 times daily or as directed   Refills:  3        blood glucose monitoring test strip   Commonly known as:  ACCU-CHEK LAYA PLUS   Quantity:  600 each        Laya Plus test strips for use in Accucheck Expert meter.  Use to test blood sugar 6 times daily. 3 month supply.  Send PA's to Dr. Mohamud.   Refills:  3        docusate sodium 100 MG tablet   Commonly known as:  COLACE   Quantity:  30 tablet        Stool softener for use while taking oxycodone   Refills:  1        dulaglutide 1.5 MG/0.5ML pen   Commonly known as:  TRULICITY   Dose:  1.5 mg   Quantity:  6 mL        Inject 1.5 mg Subcutaneous every 7 days   Refills:  3        fenofibrate 160 MG tablet   Dose:  160 mg   Quantity:   90 tablet        Take 1 tablet (160 mg) by mouth daily   Refills:  3        ibuprofen 600 MG tablet   Commonly known as:  ADVIL/MOTRIN   Dose:  600 mg   Quantity:  120 tablet        Take 1 tablet (600 mg) by mouth every 6 hours as needed for moderate pain   Refills:  1        insulin aspart 100 UNIT/ML injection   Commonly known as:  NovoLOG PEN   Quantity:  63 mL        Admin Instructions: Before meals and bedtime correction sliding scale 120 - 160 - 1 U  161 - 200 - 2 U  201 - 240 - 3 U  241 - 280 - 4 U  281 - 320 - 5 U  321 - 360 - 6 U ...   Refills:  3        levonorgestrel 20 MCG/24HR IUD   Commonly known as:  MIRENA   Dose:  1 each   Quantity:  1 each        1 each (20 mcg) by Intrauterine route once for 1 dose   Refills:  0        losartan 25 MG tablet   Commonly known as:  COZAAR   Dose:  25 mg   Quantity:  90 tablet        Take 1 tablet (25 mg) by mouth daily   Refills:  3        * ONDANSETRON PO   Dose:  4 mg        Take 4 mg by mouth every 8 hours as needed for nausea   Refills:  0        * OXYCODONE HCL PO   Dose:  5 mg        Take 5 mg by mouth every 6 hours as needed (moderate to severe pain)   Refills:  0        * oxyCODONE IR 5 MG tablet   Commonly known as:  ROXICODONE   Dose:  5 mg   Quantity:  10 tablet        Take 1 tablet (5 mg) by mouth every 6 hours as needed for pain   Refills:  0        * oxyCODONE IR 10 MG tablet   Commonly known as:  ROXICODONE   Dose:  5 mg   Quantity:  20 tablet        Take 0.5 tablets (5 mg) by mouth every 4 hours as needed for moderate to severe pain   Refills:  0        * RANITIDINE HCL PO   Dose:  150 mg        Take 150 mg by mouth 2 times daily   Refills:  0        * ranitidine 150 MG tablet   Commonly known as:  ZANTAC   Dose:  150 mg   Quantity:  30 tablet        Take 1 tablet (150 mg) by mouth 2 times daily for 15 days   Refills:  0        vitamin D 26578 UNIT capsule   Commonly known as:  ERGOCALCIFEROL   Dose:  30675 Units   Quantity:  12 capsule        Take 1  capsule (50,000 Units) by mouth every 7 days   Refills:  3        * Notice:  This list has 6 medication(s) that are the same as other medications prescribed for you. Read the directions carefully, and ask your doctor or other care provider to review them with you.      ASK your doctor about these medications        Dose / Directions Last dose taken    * ondansetron 4 MG ODT tab   Commonly known as:  ZOFRAN ODT   Dose:  4 mg   Quantity:  6 tablet   Ask about: Should I take this medication?        Take 1 tablet (4 mg) by mouth every 8 hours as needed for nausea   Refills:  0        * Notice:  This list has 1 medication(s) that are the same as other medications prescribed for you. Read the directions carefully, and ask your doctor or other care provider to review them with you.            Prescriptions were sent or printed at these locations (1 Prescription)                   [place], [place] - [address] [place] [address] [address] [address]    Telephone:  [phone]   Fax:  [phone]   Hours:  TRANSPLANT PHONE NUMBER [phone]                E-Prescribed (1 of 1)         senna-docusate (SENOKOT-S;PERICOLACE) 8.6-50 MG per tablet                Procedures and tests performed during your visit     Abdomen US, limited (RUQ only)    CBC with platelets differential    CT Abdomen Pelvis w/o Contrast    Comprehensive metabolic panel    Lipase    Peripheral IV catheter    UA with Microscopic reflex to Culture    XR Abdomen 1 View    hCG qual urine POCT      Orders Needing Specimen Collection     None      Pending Results     Date and Time Order Name Status Description    3/14/2018 0207 CT Abdomen Pelvis w/o Contrast Preliminary     3/13/2018 2347 XR Abdomen 1 View Preliminary     3/13/2018 2206 Abdomen US, limited (RUQ only) Preliminary             Pending Culture Results     No orders found for last 3 day(s).            Pending Results Instructions      If you had any lab results that were not finalized at the time of your Discharge, you can call the ED Lab Result RN at [phone]. You will be contacted by this team for any positive Lab results or changes in treatment. The nurses are available 7 days a week from 10A to 6:30P.  You can leave a message 24 hours per day and they will return your call.        Thank you for choosing [facility]       Thank you for choosing [facility] for your care. Our goal is always to provide you with excellent care. Hearing back from our patients is one way we can continue to improve our services. Please take a few minutes to complete the written survey that you may receive in the mail after you visit with us. Thank you!        [employer] Information     [employer] gives you secure access to your electronic health record. If you see a primary care provider, you can also send messages to your care team and make appointments. If you have questions, please call your primary care clinic.  If you do not have a primary care provider, please call [phone] and they will assist you.        Care EveryWhere ID     This is your Care EveryWhere ID. This could be used by other organizations to access your [facility] medical records  [id number]        Equal Access to Services     SULAIMAN MARTINEZ : Hadmaribell Gimenez, perlita dallas, zack hurd, marc bernal. So [facility] [phone].    ATENCIÓN: Si habla español, tiene a dykes disposición servicios gratuitos de asistencia lingüística. Llame al [phone].    We comply with applicable federal civil rights laws and [place] laws. We do not discriminate on the basis of race, color, national origin, age, disability, sex, sexual orientation, or gender identity.            After Visit Summary       This is your record. Keep this with you and show to your community pharmacist(s) and doctor(s) at your next visit.

## 2018-03-13 NOTE — ED AVS SNAPSHOT
[facility] [place], Emergency Department    [address] [place] [address]    Phone:  [phone]                                       Nayeli Caal   MRN: [id number]    Department:  [facility], Emergency Department   Date of Visit:  3/13/2018           After Visit Summary Signature Page     I have received my discharge instructions, and my questions have been answered. I have discussed any challenges I see with this plan with the nurse or doctor.    ..........................................................................................................................................  Patient/Patient Representative Signature      ..........................................................................................................................................  Patient Representative Print Name and Relationship to Patient    ..................................................               ................................................  Date                                            Time    ..........................................................................................................................................  Reviewed by Signature/Title    ...................................................              ..............................................  Date                                                            Time

## 2018-03-14 ENCOUNTER — CARE COORDINATION (OUTPATIENT)
Dept: SURGERY | Facility: CLINIC | Age: 38
End: 2018-03-14

## 2018-03-14 ENCOUNTER — APPOINTMENT (OUTPATIENT)
Dept: CT IMAGING | Facility: CLINIC | Age: 38
End: 2018-03-14
Attending: EMERGENCY MEDICINE
Payer: COMMERCIAL

## 2018-03-14 VITALS
BODY MASS INDEX: 34.62 KG/M2 | SYSTOLIC BLOOD PRESSURE: 95 MMHG | WEIGHT: 183.2 LBS | DIASTOLIC BLOOD PRESSURE: 66 MMHG | TEMPERATURE: 97.6 F | OXYGEN SATURATION: 97 % | RESPIRATION RATE: 18 BRPM | HEART RATE: 75 BPM

## 2018-03-14 LAB
ALBUMIN UR-MCNC: 10 MG/DL
APPEARANCE UR: CLEAR
BILIRUB UR QL STRIP: NEGATIVE
COLOR UR AUTO: YELLOW
GLUCOSE UR STRIP-MCNC: >1000 MG/DL
HCG UR QL: NEGATIVE
HGB UR QL STRIP: NEGATIVE
INTERNAL QC OK POCT: YES
KETONES UR STRIP-MCNC: NEGATIVE MG/DL
LEUKOCYTE ESTERASE UR QL STRIP: NEGATIVE
MUCOUS THREADS #/AREA URNS LPF: PRESENT /LPF
NITRATE UR QL: NEGATIVE
PH UR STRIP: 6 PH (ref 5–7)
RBC #/AREA URNS AUTO: 1 /HPF (ref 0–2)
SOURCE: ABNORMAL
SP GR UR STRIP: 1.02 (ref 1–1.03)
SQUAMOUS #/AREA URNS AUTO: 1 /HPF (ref 0–1)
UROBILINOGEN UR STRIP-MCNC: NORMAL MG/DL (ref 0–2)
WBC #/AREA URNS AUTO: 2 /HPF (ref 0–5)

## 2018-03-14 PROCEDURE — 25000128 H RX IP 250 OP 636: Performed by: EMERGENCY MEDICINE

## 2018-03-14 PROCEDURE — 96376 TX/PRO/DX INJ SAME DRUG ADON: CPT | Performed by: EMERGENCY MEDICINE

## 2018-03-14 PROCEDURE — 81001 URINALYSIS AUTO W/SCOPE: CPT | Performed by: EMERGENCY MEDICINE

## 2018-03-14 PROCEDURE — 74176 CT ABD & PELVIS W/O CONTRAST: CPT

## 2018-03-14 RX ORDER — IOPAMIDOL 755 MG/ML
112 INJECTION, SOLUTION INTRAVASCULAR ONCE
Status: DISCONTINUED | OUTPATIENT
Start: 2018-03-14 | End: 2018-03-14

## 2018-03-14 RX ORDER — MORPHINE SULFATE 4 MG/ML
4 INJECTION, SOLUTION INTRAMUSCULAR; INTRAVENOUS ONCE
Status: COMPLETED | OUTPATIENT
Start: 2018-03-14 | End: 2018-03-14

## 2018-03-14 RX ORDER — AMOXICILLIN 250 MG
2 CAPSULE ORAL 2 TIMES DAILY PRN
Qty: 120 TABLET | Refills: 0 | Status: SHIPPED | OUTPATIENT
Start: 2018-03-14 | End: 2018-10-15

## 2018-03-14 RX ADMIN — MORPHINE SULFATE 4 MG: 4 INJECTION, SOLUTION INTRAMUSCULAR; INTRAVENOUS at 00:54

## 2018-03-14 NOTE — ED NOTES
Patient had her gallbladder removed on Saturday. Today presents with increased right side pain. Patient is deaf,  paged.

## 2018-03-14 NOTE — ED PROVIDER NOTES
History     Chief Complaint   Patient presents with     Abdominal Pain     HPI  Nayeli Caal is a 37 year old female with a history of Usher syndrome, chronic nonalcoholic liver disease, hypertension, and cholelithiasis, who is day 4 status post laparoscopic cholecystectomy performed here, presenting to the ER now for evaluation of abdominal pain. The patient states that she was doing well since her recent surgery and essentially pain-free until today, when after eating she acutely developed epigastric abdominal pain, which has since migrated into the right upper quadrant and right side. The patient reports associated nausea but denies vomiting. She also feels distended. She denies fevers but states that she has had chills. The patient reports that she has not utilized her prescribed oxycodone for postprocedural pain in the past few days, including today, as she has been constipated in that time period; she has been taking Milk of Magnesium and ultimately did pass a normal bowel movement today.     I have reviewed the Medications, Allergies, Past Medical and Surgical History, and Social History in the [employer] system.    Past Medical History:   Diagnosis Date     Deaf      Diabetes mellitus (H)      Hypertension      Migraines        Past Surgical History:   Procedure Laterality Date     LAPAROSCOPIC CHOLECYSTECTOMY N/A 3/10/2018    Procedure: LAPAROSCOPIC CHOLECYSTECTOMY;  Laparoscopic Cholecystectomy ;  Surgeon: Kuldeep Sigala MD;  Location: [facility] OR       Family History   Problem Relation Age of Onset     Unknown/Adopted Other        Social History   Substance Use Topics     Smoking status: Former Smoker     Types: Cigarettes     Quit date: 12/1/2010     Smokeless tobacco: Never Used      Comment: stopped 2 yrs ago     Alcohol use No         Review of Systems   Constitutional: Negative for chills and fever.   HENT: Positive for hearing loss.    Gastrointestinal: Positive for abdominal distention,  abdominal pain and nausea. Negative for vomiting.   Genitourinary: Positive for flank pain. Negative for dysuria.   All other systems reviewed and are negative.         Physical Exam   BP: 139/87  Pulse: 81  Heart Rate: 73  Temp: 97.6  F (36.4  C)  Resp: 18  Weight: 83.1 kg (183 lb 3.2 oz)  SpO2: 99 %      Physical Exam   Gen:A&Ox3, uncomfortable.   HEENT:PERRL, no facial tenderness or wounds, head atraumatic, oropharynx clear, mucous membranes moist  Back: right CVA tenderness, no midline bony tenderness  CV:RRR without murmurs  PULM:Clear to auscultation bilaterally  Abd:soft, mildly distended, incisions clean and dry, without erythema. Tender in epigastrium, RUQ and right flank.   UE:No traumatic injuries, skin normal  LE:no traumatic injuries, skin normal, no LE edema  Neuro:CN II-XII intact, strength 5/5 throughout, gait stable.   Skin: no rashes or ecchymoses      ED Course     ED Course     Procedures    Critical Care time:  none       Labs Ordered and Resulted from Time of ED Arrival Up to the Time of Departure from the ED   CBC WITH PLATELETS DIFFERENTIAL - Abnormal; Notable for the following:        Result Value    WBC 12.6 (*)     All other components within normal limits   COMPREHENSIVE METABOLIC PANEL - Abnormal; Notable for the following:     Glucose 171 (*)      (*)     AST 86 (*)     All other components within normal limits   ROUTINE UA WITH MICROSCOPIC REFLEX TO CULTURE - Abnormal; Notable for the following:     Glucose Urine >1000 (*)     Protein Albumin Urine 10 (*)     Mucous Urine Present (*)     All other components within normal limits   HCG QUAL URINE POCT - Normal   LIPASE   PERIPHERAL IV CATHETER            Assessments & Plan (with Medical Decision Making)   37-year-old female with a history of deafness, DM presenting with postoperative pain.  She is postop day #3 from a lap scopic cholecystectomy for biliary colic.   Presenting with epigastric and right flank pain.  Vital  stable.  Increased pain today after not taking any oral opiates.  Patient is reticent to continue taking opiates because it has been causing constipation.  She did pass a bowel movement today but this with the first first time since surgery.    Differential diagnosis includes, bile duct injury with bile leak, pyelonephritis, expected postoperative pain under treated with pain medication, choledocholithiasis or pancreatitis.  The access obtained laboratory testing done.    10:08 PM   Discussed with the surgery resident on-call who recommended performing right upper quadrant ultrasound to assess for signs of fluid collection or common bile duct abnormalities.     CBC with increased WBC of 12.6. Stable hgb and plts.   CMP with new increase in LFTs with AST 86 and .   Lipase normal.   UA negative for UTI.      Treated with IV zofran for nausea and morphine for pain.     RUQ US performed and provided minimal additional information. Poor visualization due to bowel gas.      Surgery consulted. Awaiting recommendations. Signed out to oncoming provider.     I have reviewed the nursing notes.    I have reviewed the findings, diagnosis, plan and need for follow up with the patient.    Discharge Medication List as of 3/14/2018  4:53 AM      START taking these medications    Details   senna-docusate (SENOKOT-S;PERICOLACE) 8.6-50 MG per tablet Take 2 tablets by mouth 2 times daily as needed for constipation, Disp-120 tablet, R-0, E-Prescribe             Final diagnoses:   Acute post-operative pain     IVin, am serving as a trained medical scribe to document services personally performed by Florence Pearce MD, based on the provider's statements to me.      Florence MONREAL MD, was physically present and have reviewed and verified the accuracy of this note documented by Vin Woods.    3/13/2018   [facility], EMERGENCY DEPARTMENT    MD LASHAUN Butler Katrina Anne, MD  03/20/18 0120

## 2018-03-14 NOTE — CONSULTS
General Surgery Consult    Nayeli Caal MRN# [id number]   YOB: 1980 Age: 37 year old      Date of Admission:  3/13/2018        Consult for:    Consulting physician/team: ED        Assessment:      37 year old female s/p laparoscopic cholecystectomy for acute cholecystitis now POD 4 who presents with RUQ pain , nausea and slightly elevated WBC. Differentials include renal calculus, postoperative pain, constipation (AXR shows moderate stool burden), biloma vs hematoma . CT abdomen/pelvis shows no fluid collection around gallbladder fossa or signs of renal calculi.         Plan:        CT abdomen and pelvis with IV contrast to rule out intra-abdominal (including biliary ) pathology    If WNL, discharge with pain meds and stool softeners         History of Present Illness:     Ms. Nayeli Caal is a37 year old female s/p laparoscopic cholecystectomy for acute cholecystitis now POD 4 who presents with RUQ pain , nausea and slightly elevated WBC.     She reports well controlled pain after her procedure but reports noticing RUQ and right flank pain aroun d7 pm on 3.13. She reports sharp pain in RLQ and flank with no clear inciting, aggravating or relieving factors. She reports some nausea and chills  but denies any emesis, fevers, chest pain , SOB, dysuria or diarrhea. She was constipated for first couple of days after procedure but her last BM was yesterday (she took stool softeners yesterday) and she reports passing gas.     Workup in ED revealed WBC 12.6, Bilirubin 0.3, Alkp 98, ALt 127, ASt 86, Cr 0.85, Hb 14.2 and plts 421.RUQ US was incolclusive due to poor visualization. AXR showed non obstructive pattern with moderate stool burden in colon     Past Medical History:  Past Medical History:   Diagnosis Date     Deaf      Diabetes mellitus (H)      Hypertension      Migraines        Past Surgical History:  Past Surgical History:   Procedure Laterality Date     LAPAROSCOPIC  CHOLECYSTECTOMY N/A 3/10/2018    Procedure: LAPAROSCOPIC CHOLECYSTECTOMY;  Laparoscopic Cholecystectomy ;  Surgeon: Kuldeep Sigala MD;  Location: [facility] OR       Allergies:   No Known Allergies    Medications:    No current facility-administered medications on file prior to encounter.   Current Outpatient Prescriptions on File Prior to Encounter:  dulaglutide (TRULICITY) 1.5 MG/0.5ML pen Inject 1.5 mg Subcutaneous every 7 days   oxyCODONE IR (ROXICODONE) 10 MG tablet Take 0.5 tablets (5 mg) by mouth every 4 hours as needed for moderate to severe pain   docusate sodium (COLACE) 100 MG tablet Stool softener for use while taking oxycodone   oxyCODONE IR (ROXICODONE) 5 MG tablet Take 1 tablet (5 mg) by mouth every 6 hours as needed for pain   ranitidine (ZANTAC) 150 MG tablet Take 1 tablet (150 mg) by mouth 2 times daily for 15 days (Patient not taking: Reported on 3/12/2018)   ONDANSETRON PO Take 4 mg by mouth every 8 hours as needed for nausea   OXYCODONE HCL PO Take 5 mg by mouth every 6 hours as needed (moderate to severe pain)   RANITIDINE HCL PO Take 150 mg by mouth 2 times daily   ibuprofen (ADVIL/MOTRIN) 600 MG tablet Take 1 tablet (600 mg) by mouth every 6 hours as needed for moderate pain   BASAGLAR 100 UNIT/ML injection Inject 50 Units Subcutaneous daily Please provide 3 months supply   blood glucose monitoring (ACCU-CHEK LAYA PLUS) test strip Laya Plus test strips for use in Accucheck Expert meter.  Use to test blood sugar 6 times daily. 3 month supply.  Send PA's to Dr. Mohamud.   BD ULTRA FINE PEN NEEDLES Inject 1 dose. Subcutaneous 2 times daily BD ultra-fine insulin syringe, 30 ga. X 1/2'' short needle 1/2 cc. Use as directed.   fenofibrate 160 MG tablet Take 1 tablet (160 mg) by mouth daily   losartan (COZAAR) 25 MG tablet Take 1 tablet (25 mg) by mouth daily   insulin aspart (NOVOLOG PEN) 100 UNIT/ML injection Admin Instructions: Before meals and bedtime correction sliding [id number] - 160 - 1 U 161  - 200 - 2 U 201 - 240 - 3 U 241 - 280 - 4 U 281 - 320 - 5 U 321 - 360 - 6 U ...   vitamin D (ERGOCALCIFEROL) 80230 UNIT capsule Take 1 capsule (50,000 Units) by mouth every 7 days   aspirin 81 MG tablet Take 1 tablet (81 mg) by mouth daily   blood glucose monitoring (ACCU-CHEK FASTCLIX) lancets Use to test blood sugar 6 times daily or as directed   atorvastatin (LIPITOR) 40 MG tablet Take 1 tablet (40 mg) by mouth daily   levonorgestrel (MIRENA) 20 MCG/24HR IUD 1 each (20 mcg) by Intrauterine route once for 1 dose   Alcohol Swabs (ALCOHOL PREP PAD) 70 % PADS 1 each 3 times daily   [DISCONTINUED] BD ULTRA FINE PEN NEEDLES Inject 1 dose Subcutaneous 2 times daily BD ultra-fine insulin syringe, 30 ga. X 1/2'' short needle 1/2 cc. Use as directed. (Patient taking differently: Inject 1 dose * Subcutaneous 2 times daily BD ultra-fine insulin syringe, 30 ga. X 1/2'' short needle 1/2 cc. Use as directed.)       Social History:  Social History     Social History     Marital status: Single     Spouse name: N/A     Number of children: N/A     Years of education: N/A     Occupational History     Not on file.     Social History Main Topics     Smoking status: Former Smoker     Types: Cigarettes     Quit date: 12/1/2010     Smokeless tobacco: Never Used      Comment: stopped 2 yrs ago     Alcohol use No     Drug use: No     Sexual activity: Not Currently     Partners: Male     Other Topics Concern      Service No     Blood Transfusions No     Caffeine Concern No     Occupational Exposure No     Hobby Hazards No     Sleep Concern No     Stress Concern No     Weight Concern Yes     Special Diet No     Back Care No     Exercise Yes     4-5 x a week     Bike Helmet No     Seat Belt Yes     Self-Exams Yes     Social History Narrative       Family History:  Family History   Problem Relation Age of Onset     Unknown/Adopted Other        ROS:  C: NEGATIVE for fever, chills, change in weight, nausea, vomiting, diarrhea,  constipation.   E/M: NEGATIVE for changes in vision, hearing, voice, or swallowing. No visual irritation, epistaxis, rhinorrhea, or other ear, mouth and throat problems.  R: NEGATIVE for significant cough, SOB, difficulty breathing.  CV: NEGATIVE for chest pain, palpitations or peripheral edema   GI: NEGATIVE for abdominal pain, melena, hematochezia, heartburn, or other changes in bowel habits.  : NEGATIVE for frequency, dysuria, or hematuria   M: NEGATIVE for significant arthralgias or myalgia.  N: NEGATIVE for weakness, dizziness or paresthesias   H/I: NEGATIVE for bleeding problems, worrisome rashes, moles or lesions.  P: NEGATIVE for changes in mood or affect  The remainder of the complete ROS was negative unless noted in the HPI.    Exam:  /75  Pulse 75  Temp 97.6  F (36.4  C) (Oral)  Resp 18  Wt 83.1 kg (183 lb 3.2 oz)  SpO2 96%  BMI 34.62 kg/m2  General: * Alert and oriented with appropriate responses to questions, in NAD.  HEENT: NC/AT, sclera anicteric  Resp: NLB  Cardiac: regular rate and rhythm, no murmur.  Abdomen: soft, tender to palpation over right subcostal region (extending to lateral aspect of RUQ) with no evidence of guarding or rebound tenderness. Has some pain on palpation around incision but incisions are C/D.  Extremities: No LE edema  Skin: Warm and dry, no jaundice or rash  Neuro:  moves all extremities equally    Labs:  Most Recent CBC:   Recent Labs   Lab Test  03/13/18 2210   WBC  12.6*   RBC  4.89   HGB  14.2   HCT  42.3   MCV  87   MCH  29.0   MCHC  33.6   RDW  12.8   PLT  421     Most Recent BMP:   Recent Labs   Lab Test  03/13/18 2210   07/13/17   0921   NA  137   < >   --    POTASSIUM  4.3   < >   --    CHLORIDE  104   < >   --    CO2  27   < >   --    BUN  18   < >   --    CR  0.85   < >   --    GLC  171*   < >   --    MAG   --    --   2.2    < > = values in this interval not displayed.         Imaging:  Recent Results (from the past 24 hour(s))   Abdomen US,  limited (RUQ only)    Narrative    PRELIMINARY REPORT - The following report is a preliminary  interpretation.   1. Limited ultrasound evaluation of the right upper quadrant secondary  to body habitus and possibly from residual intraperitoneal gas from  recent laparoscopic surgery. No postoperative fluid collection or  biloma identified.  2. Hepatic steatosis.    Findings of limited evaluation not only due to body habitus and  echogenic hepatic parenchyma, but also possibly from intraperitoneal  gas related to recent arthroscopic surgery were discussed with Dr. Pearce by telephone at3/13/2018 11:11 PMby Dr. Lai.    XR Abdomen 1 View    Narrative    PRELIMINARY REPORT - The following report is a preliminary  interpretation.   Nonobstructive bowel gas pattern with moderate stool burden.       Assessment/ Plan: See above.     Kerry Loera MD   General Surgery Resident PGY-2   Pager: [phone]    Pt reviewed with Dr. Brito

## 2018-03-14 NOTE — PROGRESS NOTES
Attempted to contact patient x 2 for post discharge telephone call/status update.  Of note, patient was seen in the ED yesterday for post op pain- see Surgery Consult.  LM on VM (with the assistance of an ) to call office-contact information provided.

## 2018-03-14 NOTE — PROGRESS NOTES
RN Post-Op/Post-Discharge Care Coordination Note    Ms. Nayeli Caal is a 37 year old female who underwent laparoscopic cholecystectomy on 3/10 with  Dr. Kuldeep Sigala.  Spoke with Patient with use of a .    Support  Patient able to care for self independently     Health Status  Fevers/chills: Patient denies any fever or chills.  Nausea/Vomiting: Patient denies nausea/vomiting.  Eating/drinking: Patient is able to eat and drink without any complaints.  Bowel habits: Patient reports having a normal bowel movement. Discussed to continue to use the stool softener until she is having normal bowel movements.  Urinary: Voiding normally  Drains (CHERRIE): N/A  Incisions: Patient denies any signs and symptoms of infection..  Wound closure:  Steri-strips   Pain: patient is using Tylenol Q4-6H and Oxycodone prn. Discussed that she can alternate between Tylenol and Ibuprofen Q6H.    Activity/Restrictions  No lifting in excess of 15-20 pounds for 3-4 weeks    Equipment  None    Pathology reviewed with patient:  No: pending    All of her questions were answered including reviewing restrictions, pathology, and wound care.  She will call this office if she has any further questions and/or concerns.      Patient is scheduled for f/u with Dr. Ibanez 3/22 at 8 am.    Whom and When to Call  Patient acknowledges understanding of how to manage any medication changes and   when to seek medical care.     Patient advised that if after hour medical concerns arise to please call [phone] and choose option 4 to speak to the physician on call.     A copy of this note was routed to the primary surgeon.

## 2018-03-16 LAB — COPATH REPORT: NORMAL

## 2018-03-22 ENCOUNTER — OFFICE VISIT (OUTPATIENT)
Dept: SURGERY | Facility: CLINIC | Age: 38
End: 2018-03-22
Payer: COMMERCIAL

## 2018-03-22 VITALS
SYSTOLIC BLOOD PRESSURE: 113 MMHG | HEART RATE: 86 BPM | OXYGEN SATURATION: 97 % | WEIGHT: 183.6 LBS | BODY MASS INDEX: 33.79 KG/M2 | DIASTOLIC BLOOD PRESSURE: 68 MMHG | TEMPERATURE: 98.4 F | HEIGHT: 62 IN

## 2018-03-22 DIAGNOSIS — Z98.890 POSTOPERATIVE STATE: Primary | ICD-10-CM

## 2018-03-22 ASSESSMENT — PAIN SCALES - GENERAL: PAINLEVEL: NO PAIN (0)

## 2018-03-22 NOTE — PATIENT INSTRUCTIONS
Return to the Surgery Clinic on an as needed basis.  Please call [phone], option #3, with questions.

## 2018-03-22 NOTE — MR AVS SNAPSHOT
After Visit Summary   3/22/2018    Nayeli Caal    MRN: [id number]           Patient Information     Date Of Birth          1980        Visit Information        Provider Department      3/22/2018 7:45 AM Le Schroeder Jorge Alfredo, MD [facility] General Surgery        Today's Diagnoses     Postoperative state    -  1       Follow-ups after your visit        Your next 10 appointments already scheduled     May 17, 2018  7:00 AM CDT   LAB with  LAB   [facility] ([facility])    [address] [place] [address]  [address] [facility] [address] [phone]           Please do not eat 10-12 hours before your appointment if you are coming in fasting for labs on lipids, cholesterol, or glucose (sugar). This does not apply to pregnant women. Water, hot tea and black coffee (with nothing added) are okay. Do not drink other fluids, diet soda or chew gum.            May 17, 2018  2:40 PM CDT   (Arrive by 2:25 PM)   Return Visit with SABI Ceja [facility] ([facility])    [address] [place] [address]  [address] [facility] [address] [phone]            Jul 16, 2018  7:30 AM CDT   (Arrive by 7:15 AM)   RETURN DIABETES with Jimena Bragg MD   [facility] ([facility])    [address] [place] [address] [phone]              Who to contact     Please call your clinic at [phone] to:    Ask questions about your health    Make or cancel appointments    Discuss your medicines    Learn about your test results    Speak to your doctor            Additional Information About Your Visit        MyChart Information     [employer] gives you secure access to your electronic health record. If you see a primary care provider, you can also send messages to your care team and make appointments. If you have questions, please call your primary  "care clinic.  If you do not have a primary care provider, please call [phone] and they will assist you.      [employer] is an electronic gateway that provides easy, online access to your medical records. With [employer], you can request a clinic appointment, read your test results, renew a prescription or communicate with your care team.     To access your existing account, please contact your [facility] or call [phone] for assistance.        Care EveryWhere ID     This is your Care EveryWhere ID. This could be used by other organizations to access your [facility] medical records  [id number]        Your Vitals Were     Pulse Temperature Height Pulse Oximetry BMI (Body Mass Index)       86 98.4  F (36.9  C) (Oral) 5' 1.75\" 97% 33.85 kg/m2        Blood Pressure from Last 3 Encounters:   03/22/18 113/68   03/14/18 95/66   03/12/18 124/82    Weight from Last 3 Encounters:   03/22/18 183 lb 9.6 oz   03/13/18 183 lb 3.2 oz   03/12/18 182 lb 11.2 oz              Today, you had the following     No orders found for display       Primary Care Provider Office Phone # Fax #    Mariya GRACE Oneida, APRN [phone]       [address] [place] [address]  [facility] [address]        Equal Access to Services     SULAIMAN MARTINEZ : Hadii aad ku hadasho Soomaali, waaxda luqadaha, qaybta kaalmada adeegyada, waxay servando hayjordy rae . So [facility] [phone].    ATENCIÓN: Si habla español, tiene a dykes disposición servicios gratuitos de asistencia lingüística. Llame al [phone].    We comply with applicable federal civil rights laws and [place] laws. We do not discriminate on the basis of race, color, national origin, age, disability, sex, sexual orientation, or gender identity.            Thank you!     Thank you for choosing [facility]  for your care. Our goal is always to provide you with excellent care. Hearing back from our patients is one way we can continue " to improve our services. Please take a few minutes to complete the written survey that you may receive in the mail after your visit with us. Thank you!             Your Updated Medication List - Protect others around you: Learn how to safely use, store and throw away your medicines at www.disposemymeds.org.          This list is accurate as of 3/22/18  8:24 AM.  Always use your most recent med list.                   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 Subcutaneous 2 times daily BD ultra-fine insulin syringe, 30 ga. X 1/2'' short needle 1/2 cc. Use as directed.    Type 2 diabetes mellitus with complication, with long-term current use of insulin (H)       blood glucose monitoring lancets     6 Box    Use to test blood sugar 6 times daily or as directed    Type 1 diabetes mellitus with nephropathy (H)       blood glucose monitoring test strip    ACCU-CHEK LAYA PLUS    600 each    Laya Plus test strips for use in Accucheck Expert meter.  Use to test blood sugar 6 times daily. 3 month supply.  Send PA's to Dr. Mohamud.    Type 1 diabetes mellitus with nephropathy (H)       docusate sodium 100 MG tablet    COLACE    30 tablet    Stool softener for use while  taking oxycodone    Abdominal pain, epigastric       dulaglutide 1.5 MG/0.5ML pen    TRULICITY    6 mL    Inject 1.5 mg Subcutaneous every 7 days    Uncontrolled type 2 diabetes mellitus with hyperglycemia, with long-term current use of insulin (H)       fenofibrate 160 MG tablet     90 tablet    Take 1 tablet (160 mg) by mouth daily    Mixed hyperlipidemia       ibuprofen 600 MG tablet    ADVIL/MOTRIN    120 tablet    Take 1 tablet (600 mg) by mouth every 6 hours as needed for moderate pain    Cervicalgia, Headache, Pain in joint, shoulder region       insulin aspart 100 UNIT/ML injection    NovoLOG PEN    63 mL    Admin Instructions: Before meals and bedtime correction sliding scale 120 - 160 - 1 U  161 - 200 - 2 U  201 - 240 - 3 U  241 - 280 - 4 U  281 - 320 - 5 U  321 - 360 - 6 U ...    Type 1 diabetes mellitus with complications (H)       levonorgestrel 20 MCG/24HR IUD    MIRENA    1 each    1 each (20 mcg) by Intrauterine route once for 1 dose    Encounter for insertion of intrauterine contraceptive device       losartan 25 MG tablet    COZAAR    90 tablet    Take 1 tablet (25 mg) by mouth daily    Uncontrolled type 2 diabetes mellitus with hyperglycemia, with long-term current use of insulin (H)       ONDANSETRON PO      Take 4 mg by mouth every 8 hours as needed for nausea        * OXYCODONE HCL PO      Take 5 mg by mouth every 6 hours as needed (moderate to severe pain)        * oxyCODONE IR 5 MG tablet    ROXICODONE    10 tablet    Take 1 tablet (5 mg) by mouth every 6 hours as needed for pain        * oxyCODONE IR 10 MG tablet    ROXICODONE    20 tablet    Take 0.5 tablets (5 mg) by mouth every 4 hours as needed for moderate to severe pain    Abdominal pain, epigastric       * RANITIDINE HCL PO      Take 150 mg by mouth 2 times daily        * ranitidine 150 MG tablet    ZANTAC    30 tablet    Take 1 tablet (150 mg) by mouth 2 times daily for 15 days        senna-docusate 8.6-50 MG per tablet     SENOKOT-S;PERICOLACE    120 tablet    Take 2 tablets by mouth 2 times daily as needed for constipation        vitamin D 47068 UNIT capsule    ERGOCALCIFEROL    12 capsule    Take 1 capsule (50,000 Units) by mouth every 7 days    Vitamin deficiency       * Notice:  This list has 5 medication(s) that are the same as other medications prescribed for you. Read the directions carefully, and ask your doctor or other care provider to review them with you.

## 2018-03-22 NOTE — LETTER
3/22/2018       RE: Nayeli Caal  [address]  [facility] [address]     Dear Colleague,    Thank you for referring your patient, Nayeli Caal, to the [facility] GENERAL SURGERY at [facility]. Please see a copy of my visit note below.    Nayeli Caal is status post:  Lap ashley  Surgery without complications.  Post-op course without complications.  Incisions examined, no signs of infection.  All of the patients questions were answered.  Standard post-op instructions and restrictions given.  Follow-up with me on a PRN basis.      Again, thank you for allowing me to participate in the care of your patient.      Sincerely,    Jaspreet Ibanez MD

## 2018-03-22 NOTE — NURSING NOTE
"Chief Complaint   Patient presents with     Surgical Followup     Post op visit.       Vitals:    03/22/18 0815   BP: 113/68   BP Location: Left arm   Patient Position: Chair   Cuff Size: Adult Large   Pulse: 86   Temp: 98.4  F (36.9  C)   TempSrc: Oral   SpO2: 97%   Weight: 183 lb 9.6 oz   Height: 5' 1.75\"       Body mass index is 33.85 kg/(m^2).    Alex HAYWOOD LPN                  "

## 2018-03-22 NOTE — PROGRESS NOTES
Nayeli Caal is status post:  Lap sahley  Surgery without complications.  Post-op course without complications.  Incisions examined, no signs of infection.  All of the patients questions were answered.  Standard post-op instructions and restrictions given.  Follow-up with me on a PRN basis.

## 2018-04-18 DIAGNOSIS — E11.3319: ICD-10-CM

## 2018-04-18 RX ORDER — ASPIRIN 81 MG/1
TABLET, CHEWABLE ORAL
Qty: 90 TABLET | Refills: 2 | Status: SHIPPED | OUTPATIENT
Start: 2018-04-18 | End: 2018-05-17

## 2018-05-17 ENCOUNTER — OFFICE VISIT (OUTPATIENT)
Dept: INTERNAL MEDICINE | Facility: CLINIC | Age: 38
End: 2018-05-17
Payer: COMMERCIAL

## 2018-05-17 VITALS
RESPIRATION RATE: 20 BRPM | DIASTOLIC BLOOD PRESSURE: 85 MMHG | WEIGHT: 180.7 LBS | BODY MASS INDEX: 33.32 KG/M2 | OXYGEN SATURATION: 20 % | SYSTOLIC BLOOD PRESSURE: 136 MMHG | HEART RATE: 92 BPM

## 2018-05-17 DIAGNOSIS — Z79.4 UNCONTROLLED TYPE 2 DIABETES MELLITUS WITH HYPERGLYCEMIA, WITH LONG-TERM CURRENT USE OF INSULIN (H): ICD-10-CM

## 2018-05-17 DIAGNOSIS — Z79.4 TYPE 2 DIABETES MELLITUS WITH COMPLICATION, WITH LONG-TERM CURRENT USE OF INSULIN (H): ICD-10-CM

## 2018-05-17 DIAGNOSIS — E83.51 HYPOCALCEMIA: ICD-10-CM

## 2018-05-17 DIAGNOSIS — E11.8 TYPE 2 DIABETES MELLITUS WITH COMPLICATION, WITH LONG-TERM CURRENT USE OF INSULIN (H): ICD-10-CM

## 2018-05-17 DIAGNOSIS — E11.65 UNCONTROLLED TYPE 2 DIABETES MELLITUS WITH HYPERGLYCEMIA, WITH LONG-TERM CURRENT USE OF INSULIN (H): ICD-10-CM

## 2018-05-17 DIAGNOSIS — E10.8 TYPE 1 DIABETES MELLITUS WITH COMPLICATIONS (H): ICD-10-CM

## 2018-05-17 LAB
ALBUMIN SERPL-MCNC: 3.9 G/DL (ref 3.4–5)
ALP SERPL-CCNC: 109 U/L (ref 40–150)
ALT SERPL W P-5'-P-CCNC: 56 U/L (ref 0–50)
ANION GAP SERPL CALCULATED.3IONS-SCNC: 7 MMOL/L (ref 3–14)
AST SERPL W P-5'-P-CCNC: 30 U/L (ref 0–45)
BILIRUB SERPL-MCNC: 0.3 MG/DL (ref 0.2–1.3)
BUN SERPL-MCNC: 13 MG/DL (ref 7–30)
CALCIUM SERPL-MCNC: 8.7 MG/DL (ref 8.5–10.1)
CHLORIDE SERPL-SCNC: 106 MMOL/L (ref 94–109)
CHOLEST SERPL-MCNC: 212 MG/DL
CK SERPL-CCNC: 98 U/L (ref 30–225)
CO2 SERPL-SCNC: 25 MMOL/L (ref 20–32)
CREAT SERPL-MCNC: 0.56 MG/DL (ref 0.52–1.04)
CREAT UR-MCNC: 60 MG/DL
GFR SERPL CREATININE-BSD FRML MDRD: >90 ML/MIN/1.7M2
GLUCOSE SERPL-MCNC: 286 MG/DL (ref 70–99)
HDLC SERPL-MCNC: 25 MG/DL
LDLC SERPL CALC-MCNC: 117 MG/DL
MICROALBUMIN UR-MCNC: 134 MG/L
MICROALBUMIN/CREAT UR: 225.21 MG/G CR (ref 0–25)
NONHDLC SERPL-MCNC: 187 MG/DL
POTASSIUM SERPL-SCNC: 4.3 MMOL/L (ref 3.4–5.3)
PROT SERPL-MCNC: 7.6 G/DL (ref 6.8–8.8)
SODIUM SERPL-SCNC: 138 MMOL/L (ref 133–144)
TRIGL SERPL-MCNC: 354 MG/DL
TSH SERPL DL<=0.005 MIU/L-ACNC: 1.84 MU/L (ref 0.4–4)

## 2018-05-17 RX ORDER — INSULIN GLARGINE 100 [IU]/ML
50 INJECTION, SOLUTION SUBCUTANEOUS DAILY
Qty: 12 ML | Refills: 1 | Status: SHIPPED | OUTPATIENT
Start: 2018-05-17 | End: 2018-10-02

## 2018-05-17 RX ORDER — ASPIRIN 81 MG/1
TABLET, CHEWABLE ORAL
Qty: 90 TABLET | Refills: 2 | Status: SHIPPED | OUTPATIENT
Start: 2018-05-17 | End: 2018-10-02

## 2018-05-17 RX ORDER — INSULIN GLARGINE 100 [IU]/ML
50 INJECTION, SOLUTION SUBCUTANEOUS DAILY
Qty: 12 ML | Refills: 1 | Status: SHIPPED | OUTPATIENT
Start: 2018-05-17 | End: 2018-05-17

## 2018-05-17 ASSESSMENT — PAIN SCALES - GENERAL: PAINLEVEL: MODERATE PAIN (4)

## 2018-05-17 NOTE — NURSING NOTE
Chief Complaint   Patient presents with     Diabetes     follow up diabetes     Pain     pain in both hands   Kelley Palacios LPN 2:53 PM on 5/17/2018

## 2018-05-17 NOTE — PROGRESS NOTES
"[facility] [place]   Mariya GRACEMatthew Ovipramod, SABI CNP  05/17/2018      Chief Complaint:   Diabetes and Pain       History of Present Illness:   Nayeli Caal is a 37 year old female with a history of recent laparoscopic cholecystectomy on 3/10/2018 who presents with an [facility]  for evaluation of diabetes and pain. She just came back from [place], [place], and was visiting family. She was there for 5 days and was visiting her mother for Mother's Day. She endorses fatigue, but states this is from her travelling. Today she endorses occasional hand pain that can make it hard to sign. She is especially concerned about pain to her right thumb joint. The hand pain has been present off and on for the last 4 months. Every morning she feels as if her hands are stiff with increased difficulty to close them. This stiffness makes it difficult to write or sign.    Regarding her diabetes, Nayeli feels that she has been too stressed to really take care of her diabetes. She has not been checking her blood sugars since then, but does report compliance on her medications. She did undergo fasting labs today at 7 am and would like to go over these results. She has a feeling that her blood sugars have been high, but probably \"not nathan high.\" She has been trying to eat less food since her cholecystectomy. She reports that she has been avoiding frozen foods, such as pizza, and has also been avoiding fatty foods. She is eating less fried foods and has been avoiding french fries. She does love to eat cheese, but wonders if she should cut down on this. She does sometimes eat ice cream and yogurt, but does not drink milk because she does not like the taste. She has also been working to remove fat from he meats she eats and is not eating the skin on the chicken. She is happy that she is losing weight and is down 6 pounds.      Review of Systems:   Pertinent items are noted in HPI, remainder of complete ROS is negative.      Active " Medications:    Current Outpatient Prescriptions:      Alcohol Swabs (ALCOHOL PREP PAD) 70 % PADS, 1 each 3 times daily, Disp: 100 each, Rfl: 3     aspirin 81 MG chewable tablet, CHEW AND SWALLOW ONE TABLET BY MOUTH EVERY DAY, Disp: 90 tablet, Rfl: 2     atorvastatin (LIPITOR) 40 MG tablet, Take 1 tablet (40 mg) by mouth daily, Disp: 90 tablet, Rfl: 3     BASAGLAR 100 UNIT/ML injection, Inject 50 Units Subcutaneous daily Please provide 3 months supply, Disp: 12 mL, Rfl: 1     BD ULTRA FINE PEN NEEDLES, Inject 1 dose. Subcutaneous 2 times daily BD ultra-fine insulin syringe, 30 ga. X 1/2'' short needle 1/2 cc. Use as directed., Disp: 200 Units, Rfl: 11     blood glucose monitoring (ACCU-CHEK LAYA PLUS) test strip, Laya Plus test strips for use in Accucheck Expert meter.  Use to test blood sugar 6 times daily. 3 month supply.  Send PA's to Dr. Mohamud., Disp: 600 each, Rfl: 3     blood glucose monitoring (ACCU-CHEK FASTCLIX) lancets, Use to test blood sugar 6 times daily or as directed, Disp: 6 Box, Rfl: 3     docusate sodium (COLACE) 100 MG tablet, Stool softener for use while taking oxycodone, Disp: 30 tablet, Rfl: 1     dulaglutide (TRULICITY) 1.5 MG/0.5ML pen, Inject 1.5 mg Subcutaneous every 7 days, Disp: 6 mL, Rfl: 3     fenofibrate 160 MG tablet, Take 1 tablet (160 mg) by mouth daily, Disp: 90 tablet, Rfl: 3     ibuprofen (ADVIL/MOTRIN) 600 MG tablet, Take 1 tablet (600 mg) by mouth every 6 hours as needed for moderate pain, Disp: 120 tablet, Rfl: 1     insulin aspart (NOVOLOG PEN) 100 UNIT/ML injection, Before meals and bedtime correction sliding scale 120 - 160 - 1 U  161 - 200 - 2 U  201 - 240 - 3 U  241 - 280 - 4 U   281 - 320 - 5 U  321 - 360 - 6 U ..., Disp: 63 mL, Rfl: 3     losartan (COZAAR) 25 MG tablet, Take 1 tablet (25 mg) by mouth daily, Disp: 90 tablet, Rfl: 3     RANITIDINE HCL PO, Take 150 mg by mouth 2 times daily, Disp: , Rfl:      senna-docusate (SENOKOT-S;PERICOLACE) 8.6-50 MG per tablet,  Take 2 tablets by mouth 2 times daily as needed for constipation, Disp: 120 tablet, Rfl: 0     vitamin D (ERGOCALCIFEROL) 65902 UNIT capsule, Take 1 capsule (50,000 Units) by mouth every 7 days, Disp: 12 capsule, Rfl: 3     levonorgestrel (MIRENA) 20 MCG/24HR IUD, 1 each (20 mcg) by Intrauterine route once for 1 dose, Disp: 1 each, Rfl: 0     [DISCONTINUED] BASAGLAR 100 UNIT/ML injection, Inject 50 Units Subcutaneous daily Please provide 3 months supply, Disp: 12 mL, Rfl: 1     [DISCONTINUED] BD ULTRA FINE PEN NEEDLES, Inject 1 dose Subcutaneous 2 times daily BD ultra-fine insulin syringe, 30 ga. X 1/2'' short needle 1/2 cc. Use as directed. (Patient taking differently: Inject 1 dose * Subcutaneous 2 times daily BD ultra-fine insulin syringe, 30 ga. X 1/2'' short needle 1/2 cc. Use as directed.), Disp: 200 Units, Rfl: 11     [DISCONTINUED] insulin aspart (NOVOLOG PEN) 100 UNIT/ML injection, Admin Instructions: Before meals and bedtime correction sliding scale 120 - 160 - 1 U  161 - 200 - 2 U  201 - 240 - 3 U  241 - 280 - 4 U  281 - 320 - 5 U  321 - 360 - 6 U ..., Disp: 63 mL, Rfl: 3      Allergies:   No known drug allergies       Past Medical History:  Usher Syndrome: congenital deafness, retinitis pigmentosa  Macular degeneration, age related, nonexudative  Benign neoplasm of iris  Dry eye syndrome   Uncontrolled type 2 diabetes mellitus with bilateral diabetic retinopathy and with long-term current use of insulin  Essential hypertension  Other chronic nonalcoholic liver disease  Symptomatic cholelithiasis  Migraines   Organic hypersomnia   Cervicalgia  Xerosis cutis  Non morbid obesity due to excess calories  Tenosynovitis of ankle  Pain in joint, shoulder region, impingement  Chondromalacia of patella, right     Past Surgical History:  Laparoscopic cholecystectomy     Family History:   Unknown       Social History:   Marital Status: single  Presents to the clinic with a professional ALS .  Tobacco Use:  former smoker, quit 12/1/2010  Alcohol Use: no  PCP: Mariya Mohamud      Physical Exam:   /85 (BP Location: Right arm, Patient Position: Sitting, Cuff Size: Adult Regular)  Pulse 92  Resp 20  Wt 82 kg (180 lb 11.2 oz)  SpO2 (!) 20%  BMI 33.32 kg/m2   Wt Readings from Last 1 Encounters:   05/17/18 82 kg (180 lb 11.2 oz)     Constitutional: no distress, comfortable, pleasant   Eyes: anicteric, conjunctiva pink  Neck: supple with full range of motion, no thyromegaly.   Cardiovascular: regular rate and rhythm, normal S1 and S2, no murmurs, rubs or gallops, peripheral pulses full and symmetric   Respiratory: clear to auscultation, no wheezes or crackles, normal breath sounds   Musculoskeletal: full range of motion, no edema.  She has pain with palpation of her right MCP joint.   Skin: no concerning lesions, no jaundice, temp normal   Neurological:  She signs, no tremor. A and O x 3, good historian.  Psychological: appropriate mood, good eye contact, normal affect     Laboratory results reviewed today:  Component      Latest Ref Rng & Units 11/27/2017 5/17/2018   Sodium      133 - 144 mmol/L  138   Potassium      3.4 - 5.3 mmol/L  4.3   Chloride      94 - 109 mmol/L  106   Carbon Dioxide      20 - 32 mmol/L  25   Anion Gap      3 - 14 mmol/L  7   Glucose      70 - 99 mg/dL  286 (H)   Urea Nitrogen      7 - 30 mg/dL  13   Creatinine      0.52 - 1.04 mg/dL  0.56   GFR Estimate      >60 mL/min/1.7m2  >90   GFR Estimate If Black      >60 mL/min/1.7m2  >90   Calcium      8.5 - 10.1 mg/dL  8.7   Bilirubin Total      0.2 - 1.3 mg/dL  0.3   Albumin      3.4 - 5.0 g/dL  3.9   Protein Total      6.8 - 8.8 g/dL  7.6   Alkaline Phosphatase      40 - 150 U/L  109   ALT      0 - 50 U/L  56 (H)   AST      0 - 45 U/L  30   Cholesterol      <200 mg/dL 216 (H) 212 (H)   Triglycerides      <150 mg/dL 367 (H) 354 (H)   HDL Cholesterol      >49 mg/dL 29 (L) 25 (L)   LDL Cholesterol Calculated      <100 mg/dL 114 (H) 117 (H)    Non HDL Cholesterol      <130 mg/dL 187 (H) 187 (H)   Creatinine Urine      mg/dL  60   Albumin Urine mg/L      mg/L  134   Albumin Urine mg/g Cr      0 - 25 mg/g Cr  225.21 (H)   CK Total      30 - 225 U/L  98   TSH      0.40 - 4.00 mU/L  1.84       Vitamin D level and TSH level are still pending.    Assessment and Plan:  Uncontrolled type 2 diabetes mellitus with hyperglycemia, with long-term current use of insulin   Type 2 diabetes mellitus with complication, with long-term current use of insulin   She will start to work on checking her blood sugars again. Her medication list was reviewed and refills provided as needed. She has an upcoming appointment in Endocrinology on 7/16/2018, at which time they will likely check her HGB A1c.   - insulin aspart (NOVOLOG PEN) 100 UNIT/ML injection  Dispense: 63 mL; Refill: 3  - BASAGLAR 100 UNIT/ML injection  Dispense: 12 mL; Refill: 1  - dulaglutide (TRULICITY) 1.5 MG/0.5ML pen  Dispense: 6 mL; Refill: 3  - aspirin 81 MG chewable tablet  Dispense: 90 tablet; Refill: 2    Hypertriglyceridemia   A list of foods to avoid was included in her AVS to help decrease Triglycerides. Will work on progressive exercise.     Thumb Pain  She declines an xray at this time.      Follow-up: Return in about 3 months (around 8/17/2018).         Scribe Disclosure:   I, Ann-Marie Cifuentes, am serving as a scribe to document services personally performed by SABI Morrison CNP at this visit, based upon the provider's statements to me. All documentation has been reviewed by the aforementioned provider prior to being entered into the official medical record.     Portions of this medical record were completed by a scribe. UPON MY REVIEW AND AUTHENTICATION BY ELECTRONIC SIGNATURE, this confirms (a) I performed the applicable clinical services, and (b) the record is accurate.  Total time spent 25 minutes.  More than 50% of the time spent with Ms. Ingrid Caal on counseling / coordinating  her care        Mariya CELESTIN, CNP

## 2018-05-17 NOTE — PATIENT INSTRUCTIONS
[facility]: [phone]     [facility] Medication Refill Request Information:  * Please contact your pharmacy regarding ANY request for medication refills.  ** [facility] Prescription Fax = [phone]  * Please allow 3 business days for routine medication refills.  * Please allow 5 business days for controlled substance medication refills.     [facility] Test Result notification information:  *You will be notified with in 7-10 days of your appointment day regarding the results of your test.  If you are on MyChart you will be notified as soon as the provider has reviewed the results and signed off on them.

## 2018-05-17 NOTE — MR AVS SNAPSHOT
After Visit Summary   5/17/2018    Nayeli Caal    MRN: [id number]           Patient Information     Date Of Birth          1980        Visit Information        Provider Department      5/17/2018 2:25 PM Mariya Mohamud APRN CNP; ASL IS [facility]        Today's Diagnoses     Type 1 diabetes mellitus with complications (H)        Type 2 diabetes mellitus with complication, with long-term current use of insulin (H)        Uncontrolled type 2 diabetes mellitus with hyperglycemia, with long-term current use of insulin (H)          Care Instructions    Primary Care Center: [phone]     [facility] Center Medication Refill Request Information:  * Please contact your pharmacy regarding ANY request for medication refills.  ** [facility] Prescription Fax = [phone]  * Please allow 3 business days for routine medication refills.  * Please allow 5 business days for controlled substance medication refills.     Primary [facility] Center Test Result notification information:  *You will be notified with in 7-10 days of your appointment day regarding the results of your test.  If you are on MyChart you will be notified as soon as the provider has reviewed the results and signed off on them.          Follow-ups after your visit        Follow-up notes from your care team     Return in about 3 months (around 8/17/2018).      Your next 10 appointments already scheduled     Jul 16, 2018  7:30 AM CDT   (Arrive by 7:15 AM)   RETURN DIABETES with Jimena Bragg MD   [facility] ([facility] [place])    [address]  [address] [facility] [address] [phone]            Aug 21, 2018  9:00 AM CDT   (Arrive by 8:45 AM)   Return Visit with SABI Ceja CNP   [facility] ([facility])    [address]  [address] [facility] [address] [phone]              Who to contact      Please call your clinic at [phone] to:    Ask questions about your health    Make or cancel appointments    Discuss your medicines    Learn about your test results    Speak to your doctor            Additional Information About Your Visit        [employer] Information     [employer] gives you secure access to your electronic health record. If you see a primary care provider, you can also send messages to your care team and make appointments. If you have questions, please call your primary care clinic.  If you do not have a primary care provider, please call [phone] and they will assist you.      [employer] is an electronic gateway that provides easy, online access to your medical records. With [employer], you can request a clinic appointment, read your test results, renew a prescription or communicate with your care team.     To access your existing account, please contact your [facility] or call [phone] for assistance.        Care EveryWhere ID     This is your Care EveryWhere ID. This could be used by other organizations to access your [place] medical records  [id number]        Your Vitals Were     Pulse Respirations Pulse Oximetry BMI (Body Mass Index)          92 20 20% 33.32 kg/m2         Blood Pressure from Last 3 Encounters:   05/17/18 136/85   03/22/18 113/68   03/14/18 95/66    Weight from Last 3 Encounters:   05/17/18 82 kg (180 lb 11.2 oz)   03/22/18 83.3 kg (183 lb 9.6 oz)   03/13/18 83.1 kg (183 lb 3.2 oz)              Today, you had the following     No orders found for display         Today's Medication Changes          These changes are accurate as of 5/17/18  3:52 PM.  If you have any questions, ask your nurse or doctor.               These medicines have changed or have updated prescriptions.        Dose/Directions    aspirin 81 MG chewable tablet   This may have changed:  See the new instructions.   Used for:  Type 1 diabetes mellitus with complications (H)    Changed by:  Mariya Mohamud APRN CNP        CHEW AND SWALLOW ONE TABLET BY MOUTH EVERY DAY   Quantity:  90 tablet   Refills:  2       insulin aspart 100 UNIT/ML injection   Commonly known as:  NovoLOG PEN   This may have changed:  additional instructions   Used for:  Type 1 diabetes mellitus with complications (H)   Changed by:  Mariya Mohamud APRN CNP        Before meals and bedtime correction sliding scale 120 - 160 - 1 U  161 - 200 - 2 U  201 - 240 - 3 U  241 - 280 - 4 U   281 - 320 - 5 U  321 - 360 - 6 U ...   Quantity:  63 mL   Refills:  3            Where to get your medicines      These medications were sent to [facility] [address] [facility] [address]     Phone:  [phone]     aspirin 81 MG chewable tablet    BASAGLAR 100 UNIT/ML injection    dulaglutide 1.5 MG/0.5ML pen         Some of these will need a paper prescription and others can be bought over the counter.  Ask your nurse if you have questions.     Bring a paper prescription for each of these medications     insulin aspart 100 UNIT/ML injection                Primary Care Provider Office Phone # Fax #    SABI Ceja [phone]       [address] [place] [id number]        Equal Access to Services     SULAIMAN MARTINEZ : Hadii oneida ku hadasho Soomaali, waaxda luqadaha, qaybta kaalmada adeegyada, waxcaleb bernal. So [facility] [phone].    ATENCIÓN: Si habla español, tiene a dykes disposición servicios gratuitos de asistencia lingüística. Hector al [phone].    We comply with applicable federal civil rights laws and [place] laws. We do not discriminate on the basis of race, color, national origin, age, disability, sex, sexual orientation, or gender identity.            Thank you!     Thank you for choosing [facility]  for your care. Our goal is always  to provide you with excellent care. Hearing back from our patients is one way we can continue to improve our services. Please take a few minutes to complete the written survey that you may receive in the mail after your visit with us. Thank you!             Your Updated Medication List - Protect others around you: Learn how to safely use, store and throw away your medicines at www.disposemymeds.org.          This list is accurate as of 5/17/18  3:52 PM.  Always use your most recent med list.                   Brand Name Dispense Instructions for use Diagnosis    Alcohol Prep Pad 70 % Pads     100 each    1 each 3 times daily    Type 2 diabetes mellitus with other diabetic ophthalmic complication       aspirin 81 MG chewable tablet     90 tablet    CHEW AND SWALLOW ONE TABLET BY MOUTH EVERY DAY    Type 1 diabetes mellitus with complications (H)       atorvastatin 40 MG tablet    LIPITOR    90 tablet    Take 1 tablet (40 mg) by mouth daily    Type 1 diabetes mellitus with complications (H)       BASAGLAR 100 UNIT/ML injection     12 mL    Inject 50 Units Subcutaneous daily Please provide 3 months supply    Type 1 diabetes mellitus with complications (H)       BD ULTRA FINE PEN NEEDLES     200 Units    Inject 1 dose. Subcutaneous 2 times daily BD ultra-fine insulin syringe, 30 ga. X 1/2'' short needle 1/2 cc. Use as directed.    Type 2 diabetes mellitus with complication, with long-term current use of insulin (H)       blood glucose monitoring lancets     6 Box    Use to test blood sugar 6 times daily or as directed    Type 1 diabetes mellitus with nephropathy (H)       blood glucose monitoring test strip    ACCU-CHEK LAYA PLUS    600 each    Laya Plus test strips for use in Accucheck Expert meter.  Use to test blood sugar 6 times daily. 3 month supply.  Send PA's to Dr. Mohamud.    Type 1 diabetes mellitus with nephropathy (H)       docusate sodium 100 MG tablet    COLACE    30 tablet    Stool softener for use  while taking oxycodone    Abdominal pain, epigastric       dulaglutide 1.5 MG/0.5ML pen    TRULICITY    6 mL    Inject 1.5 mg Subcutaneous every 7 days    Type 1 diabetes mellitus with complications (H)       fenofibrate 160 MG tablet     90 tablet    Take 1 tablet (160 mg) by mouth daily    Mixed hyperlipidemia       ibuprofen 600 MG tablet    ADVIL/MOTRIN    120 tablet    Take 1 tablet (600 mg) by mouth every 6 hours as needed for moderate pain    Cervicalgia, Headache, Pain in joint, shoulder region       insulin aspart 100 UNIT/ML injection    NovoLOG PEN    63 mL    Before meals and bedtime correction sliding scale 120 - 160 - 1 U  161 - 200 - 2 U  201 - 240 - 3 U  241 - 280 - 4 U   281 - 320 - 5 U  321 - 360 - 6 U ...    Type 1 diabetes mellitus with complications (H)       levonorgestrel 20 MCG/24HR IUD    MIRENA    1 each    1 each (20 mcg) by Intrauterine route once for 1 dose    Encounter for insertion of intrauterine contraceptive device       losartan 25 MG tablet    COZAAR    90 tablet    Take 1 tablet (25 mg) by mouth daily    Uncontrolled type 2 diabetes mellitus with hyperglycemia, with long-term current use of insulin (H)       RANITIDINE HCL PO      Take 150 mg by mouth 2 times daily        senna-docusate 8.6-50 MG per tablet    SENOKOT-S;PERICOLACE    120 tablet    Take 2 tablets by mouth 2 times daily as needed for constipation        vitamin D 70420 UNIT capsule    ERGOCALCIFEROL    12 capsule    Take 1 capsule (50,000 Units) by mouth every 7 days    Vitamin deficiency

## 2018-05-18 ENCOUNTER — TELEPHONE (OUTPATIENT)
Dept: INTERNAL MEDICINE | Facility: CLINIC | Age: 38
End: 2018-05-18

## 2018-05-18 NOTE — TELEPHONE ENCOUNTER
[facility] Call Center    Phone Message    May a detailed message be left on voicemail: yes    Reason for Call: Medication Question or concern regarding medication   Prescription Clarification  Name of Medication: BD Ultra Fine Pen  Prescribing Provider: Mariya Mohamud   Pharmacy: [facility]   What on the order needs clarification? Clarification on directions and if it should be syringe or pens? Please follow up at phone [phone] reference #[id number]      Action Taken: Message routed to:  [facility] EMEKA

## 2018-05-18 NOTE — TELEPHONE ENCOUNTER
I called and verified needles per Mariya Mohamud instructions.  MARCOS EUCEDA at 1:52 PM on 5/18/2018.

## 2018-05-21 LAB
DEPRECATED CALCIDIOL+CALCIFEROL SERPL-MC: 48 UG/L (ref 20–75)
VITAMIN D2 SERPL-MCNC: 41 UG/L
VITAMIN D3 SERPL-MCNC: 7 UG/L

## 2018-06-20 ENCOUNTER — TELEPHONE (OUTPATIENT)
Dept: INTERNAL MEDICINE | Facility: CLINIC | Age: 38
End: 2018-06-20

## 2018-06-20 DIAGNOSIS — E10.8 TYPE 1 DIABETES MELLITUS WITH COMPLICATIONS (H): ICD-10-CM

## 2018-06-20 RX ORDER — ATORVASTATIN CALCIUM 40 MG/1
40 TABLET, FILM COATED ORAL DAILY
Qty: 90 TABLET | Refills: 3 | Status: SHIPPED | OUTPATIENT
Start: 2018-06-20 | End: 2019-08-16

## 2018-06-20 RX ORDER — ATORVASTATIN CALCIUM 40 MG/1
TABLET, FILM COATED ORAL
Qty: 90 TABLET | Refills: 3 | Status: CANCELLED | OUTPATIENT
Start: 2018-06-20

## 2018-06-20 NOTE — TELEPHONE ENCOUNTER
[facility] Call Center    Phone Message    May a detailed message be left on voicemail: yes    Reason for Call: Other: RX: atorvastatin (LIPITOR) 40 MG tablet.  Per Pharmacy, this med is out of refills.  Please have doctor send new RX to [facility] at [facility] and then follow up with pt.  Pt is going out of town on 6/26/18 and needs meds by then.      Action Taken: Other: P Primary Care [facility]

## 2018-06-26 ENCOUNTER — OFFICE VISIT (OUTPATIENT)
Dept: ORTHOPEDICS | Facility: CLINIC | Age: 38
End: 2018-06-26
Payer: COMMERCIAL

## 2018-06-26 VITALS
DIASTOLIC BLOOD PRESSURE: 82 MMHG | HEART RATE: 85 BPM | SYSTOLIC BLOOD PRESSURE: 127 MMHG | HEIGHT: 61 IN | BODY MASS INDEX: 33.99 KG/M2 | WEIGHT: 180 LBS

## 2018-06-26 DIAGNOSIS — M22.40 CHONDROMALACIA OF PATELLA, UNSPECIFIED LATERALITY: Primary | ICD-10-CM

## 2018-06-26 NOTE — MR AVS SNAPSHOT
After Visit Summary   6/26/2018    Nayeli Caal    MRN: [id number]           Patient Information     Date Of Birth          1980        Visit Information        Provider Department      6/26/2018 7:10 AM Rosas Rodriguez DO [facility]         Follow-ups after your visit        Your next 10 appointments already scheduled     Jul 16, 2018  7:30 AM CDT   (Arrive by 7:15 AM)   RETURN DIABETES with Jimena Bragg MD   [facility] ([facility])    [address] [place] [id number] [phone]            Aug 21, 2018  9:00 AM CDT   (Arrive by 8:45 AM)   Return Visit with SABI Ceja CNP   [facility] ([facility])    [address] [place] [id number] [phone]              Who to contact     Please call your clinic at [phone] to:    Ask questions about your health    Make or cancel appointments    Discuss your medicines    Learn about your test results    Speak to your doctor            Additional Information About Your Visit        MyChart Information     Captimo gives you secure access to your electronic health record. If you see a primary care provider, you can also send messages to your care team and make appointments. If you have questions, please call your primary care clinic.  If you do not have a primary care provider, please call [phone] and they will assist you.      Captimo is an electronic gateway that provides easy, online access to your medical records. With Captimo, you can request a clinic appointment, read your test results, renew a prescription or communicate with your care team.     To access your existing account, please contact your [facility] or call [phone] for assistance.        Care EveryWhere ID     This is your Care EveryWhere ID. This  "could be used by other organizations to access your [place] medical records  [id number]        Your Vitals Were     Pulse Height BMI (Body Mass Index)             85 1.549 m (5' 1\") 34.01 kg/m2          Blood Pressure from Last 3 Encounters:   06/26/18 127/82   05/17/18 136/85   03/22/18 113/68    Weight from Last 3 Encounters:   06/26/18 81.6 kg (180 lb)   05/17/18 82 kg (180 lb 11.2 oz)   03/22/18 83.3 kg (183 lb 9.6 oz)              Today, you had the following     No orders found for display       Primary Care Provider Office Phone # Fax #    Mariya Mohamud, APRN [phone]       [address] [place] [phone]  [facility] [id number]        Equal Access to Services     SULAIMAN MARTINEZ : Hadii aad ku hadasho Socj, waaxda luqadaha, qaybta kaalmada adejameyada, marc rae . So [facility] [phone].    ATENCIÓN: Si habla español, tiene a dykes disposición servicios gratuitos de asistencia lingüística. Hector al [phone].    We comply with applicable federal civil rights laws and [place] laws. We do not discriminate on the basis of race, color, national origin, age, disability, sex, sexual orientation, or gender identity.            Thank you!     Thank you for choosing [facility]  for your care. Our goal is always to provide you with excellent care. Hearing back from our patients is one way we can continue to improve our services. Please take a few minutes to complete the written survey that you may receive in the mail after your visit with us. Thank you!             Your Updated Medication List - Protect others around you: Learn how to safely use, store and throw away your medicines at www.disposemymeds.org.          This list is accurate as of 6/26/18  8:06 AM.  Always use your most recent med list.                   Brand Name Dispense Instructions for use Diagnosis    Alcohol Prep Pad 70 % Pads     100 each    1 each 3 times daily    " Type 2 diabetes mellitus with other diabetic ophthalmic complication       aspirin 81 MG chewable tablet     90 tablet    CHEW AND SWALLOW ONE TABLET BY MOUTH EVERY DAY    Type 1 diabetes mellitus with complications (H)       atorvastatin 40 MG tablet    LIPITOR    90 tablet    Take 1 tablet (40 mg) by mouth daily    Type 1 diabetes mellitus with complications (H)       BASAGLAR 100 UNIT/ML injection     12 mL    Inject 50 Units Subcutaneous daily Please provide 3 months supply    Type 1 diabetes mellitus with complications (H)       BD ULTRA FINE PEN NEEDLES     400 Units    Inject 1 dose. Subcutaneous 2 times daily BD ultra-fine insulin syringe, 30 ga. X 1/2'' short needle 1/2 cc. Use as directed.    Type 2 diabetes mellitus with complication, with long-term current use of insulin (H)       blood glucose monitoring lancets     6 Box    Use to test blood sugar 6 times daily or as directed    Type 1 diabetes mellitus with nephropathy (H)       blood glucose monitoring test strip    ACCU-CHEK LAYA PLUS    600 each    Laya Plus test strips for use in Accucheck Expert meter.  Use to test blood sugar 6 times daily. 3 month supply.  Send PA's to Dr. Mohamud.    Type 1 diabetes mellitus with nephropathy (H)       docusate sodium 100 MG tablet    COLACE    30 tablet    Stool softener for use while taking oxycodone    Abdominal pain, epigastric       dulaglutide 1.5 MG/0.5ML pen    TRULICITY    6 mL    Inject 1.5 mg Subcutaneous every 7 days    Type 1 diabetes mellitus with complications (H)       fenofibrate 160 MG tablet     90 tablet    Take 1 tablet (160 mg) by mouth daily    Mixed hyperlipidemia       ibuprofen 600 MG tablet    ADVIL/MOTRIN    120 tablet    Take 1 tablet (600 mg) by mouth every 6 hours as needed for moderate pain    Cervicalgia, Headache, Pain in joint, shoulder region       insulin aspart 100 UNIT/ML injection    NovoLOG PEN    63 mL    Before meals and bedtime correction sliding scale 120 - 160 -  1 U  161 - 200 - 2 U  201 - 240 - 3 U  241 - 280 - 4 U   281 - 320 - 5 U  321 - 360 - 6 U ...    Type 1 diabetes mellitus with complications (H)       levonorgestrel 20 MCG/24HR IUD    MIRENA    1 each    1 each (20 mcg) by Intrauterine route once for 1 dose    Encounter for insertion of intrauterine contraceptive device       losartan 25 MG tablet    COZAAR    90 tablet    Take 1 tablet (25 mg) by mouth daily    Uncontrolled type 2 diabetes mellitus with hyperglycemia, with long-term current use of insulin (H)       RANITIDINE HCL PO      Take 150 mg by mouth 2 times daily        senna-docusate 8.6-50 MG per tablet    SENOKOT-S;PERICOLACE    120 tablet    Take 2 tablets by mouth 2 times daily as needed for constipation

## 2018-06-26 NOTE — PROGRESS NOTES
SPORTS & ORTHOPEDIC WALK-IN VISIT 6/26/2018    Primary Care Physician: Dr. Mohamud  B/L knee pain since about Feb. 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 Would like to discuss Synvisc injection today as that was discussed in visit with . Has not had one before. Leaving for [facility] tonight and will be doing a lot of walking.  Reason for visit:     What part of your body is injured / painful?  bilateral knee    What caused the injury /pain? No inciting event     How long ago did your injury occur or pain begin? problem is longstanding    What are your most bothersome symptoms? Pain    How would you characterize your symptom?  aching and dull    What makes your symptoms better? Rest    What makes your symptoms worse? Standing and Walking    Have you been previously seen for this problem? Yes,     Medical History:    Any recent changes to your medical history? No    Any new medication prescribed since last visit? No    Have you had surgery on this body part before? No    Social History:    Occupation:     Handedness: Left    Exercise: Walking    Review of Systems:    Do you have fever, chills, weight loss? No    Do you have any vision problems? No    Do you have any chest pain or edema? No    Do you have any shortness of breath or wheezing?  No    Do you have stomach problems? No    Do you have any numbness or focal weakness? No    Do you have diabetes? Yes, Type 2    Do you have problems with bleeding or clotting? No    Do you have an rashes or other skin lesions? No

## 2018-06-26 NOTE — PROGRESS NOTES
CHIEF COMPLAINT:  Pain of the Left Knee and Pain of the Right Knee       HISTORY OF PRESENT ILLNESS  Ms. Ingrid Caal is a pleasant 38 year old year old female who presents to clinic today with persisting bilateral knee pain.  Nayeli has been treated by Dr. Bradley and Dr. Ragland in the past for bilateral patellofemoral pain.  S/p PT with little improvement. Synvisc process initiated at discretion of Dr. Bradley.  Patient is here today with approval for bilateral HA injections from insurance.  She was unable to f/u Dr. Bradley in time, as she will be leaving for [place] to visit family this week.    Onset: chronic  Location: bilateral knee anterior  Quality:  dull and sharp  Duration: 1 years   Severity: moderate/10 at worst  Timing:constant  Modifying factors:  resting and non-use makes it better, movement and use makes it worse  Associated signs & symptoms: none  Previous similar pain: Yes  Treatments to date: PT, corticosteroid injections 6/2017 with good improvement    Additional history: as documented    MEDICAL HISTORY  Patient Active Problem List   Diagnosis     Headache     Cervicalgia     Essential hypertension     Hypersomnia, organic     Other chronic nonalcoholic liver disease     Diabetic retinopathy of both eyes (H)     Xerosis cutis     Obesity     Usher Syndrome: congenital deafness, retinitis pigmentosa     Macular degeneration, age related, nonexudative     Benign neoplasm of iris     Dry eye syndrome     Pemphigus erythematosus     Tenosynovitis of ankle     Pain in joint, shoulder region, impingment     Chondromalacia of patella, right, steroid injection 6/12/2015     Uncontrolled type 2 diabetes mellitus with hyperglycemia, with long-term current use of insulin (H)     Non morbid obesity due to excess calories     Symptomatic cholelithiasis       Current Outpatient Prescriptions   Medication Sig Dispense Refill     Alcohol Swabs (ALCOHOL PREP PAD) 70 % PADS 1 each 3 times daily 100 each 3      aspirin 81 MG chewable tablet CHEW AND SWALLOW ONE TABLET BY MOUTH EVERY DAY 90 tablet 2     atorvastatin (LIPITOR) 40 MG tablet Take 1 tablet (40 mg) by mouth daily 90 tablet 3     BASAGLAR 100 UNIT/ML injection Inject 50 Units Subcutaneous daily Please provide 3 months supply 12 mL 1     BD ULTRA FINE PEN NEEDLES Inject 1 dose. Subcutaneous 2 times daily BD ultra-fine insulin syringe, 30 ga. X 1/2'' short needle 1/2 cc. Use as directed. 400 Units 11     blood glucose monitoring (ACCU-CHEK LAYA PLUS) test strip Laya Plus test strips for use in Accucheck Expert meter.  Use to test blood sugar 6 times daily. 3 month supply.  Send PA's to Dr. Mohamud. 600 each 3     blood glucose monitoring (ACCU-CHEK FASTCLIX) lancets Use to test blood sugar 6 times daily or as directed 6 Box 3     docusate sodium (COLACE) 100 MG tablet Stool softener for use while taking oxycodone 30 tablet 1     dulaglutide (TRULICITY) 1.5 MG/0.5ML pen Inject 1.5 mg Subcutaneous every 7 days 6 mL 3     fenofibrate 160 MG tablet Take 1 tablet (160 mg) by mouth daily 90 tablet 3     ibuprofen (ADVIL/MOTRIN) 600 MG tablet Take 1 tablet (600 mg) by mouth every 6 hours as needed for moderate pain 120 tablet 1     insulin aspart (NOVOLOG PEN) 100 UNIT/ML injection Before meals and bedtime correction sliding scale  120 - 160 - 1 U   161 - 200 - 2 U   201 - 240 - 3 U   241 - 280 - 4 U    281 - 320 - 5 U   321 - 360 - 6 U ... 63 mL 3     levonorgestrel (MIRENA) 20 MCG/24HR IUD 1 each (20 mcg) by Intrauterine route once for 1 dose 1 each 0     losartan (COZAAR) 25 MG tablet Take 1 tablet (25 mg) by mouth daily 90 tablet 3     RANITIDINE HCL PO Take 150 mg by mouth 2 times daily       senna-docusate (SENOKOT-S;PERICOLACE) 8.6-50 MG per tablet Take 2 tablets by mouth 2 times daily as needed for constipation 120 tablet 0     [DISCONTINUED] BD ULTRA FINE PEN NEEDLES Inject 1 dose Subcutaneous 2 times daily BD ultra-fine insulin syringe, 30 ga. X 1/2'' short  "needle 1/2 cc. Use as directed. (Patient taking differently: Inject 1 dose * Subcutaneous 2 times daily BD ultra-fine insulin syringe, 30 ga. X 1/2'' short needle 1/2 cc. Use as directed.) 200 Units 11       No Known Allergies    Family History   Problem Relation Age of Onset     Unknown/Adopted Other        Additional medical/Social/Surgical histories reviewed in [facility] and updated as appropriate.     REVIEW OF SYSTEMS (6/26/2018)  CONSTITUTIONAL: Denies fever and weight loss  EYES: Denies acute vision changes  ENT: Denies hearing changes or difficulty swallowing  CARDIAC: Denies chest pain or edema  RESPIRATORY: Denies dyspnea, cough or wheeze  GASTROINTESTINAL: Denies abdominal pain, nausea, vomiting  MUSCULOSKELETAL: See HPI  SKIN: Denies any recent rash or lesion  NEUROLOGICAL: Denies numbness or focal weakness  PSYCHIATRIC: No history of psychiatric symptoms or problems  ENDOCRINE: Diagnosis of diabetes:DM 2 poorly controlled per labs in chart  HEMATOLOGY: Denies episodes of easy bleeding      PHYSICAL EXAM  /82  Pulse 85  Ht 1.549 m (5' 1\")  Wt 81.6 kg (180 lb)  BMI 34.01 kg/m2    General  - normal appearance, in no obvious distress  CV  - normal popliteal pulse  Pulm  - normal respiratory pattern, non-labored  Musculoskeletal - bilateral knee  - stance: normal gait without limp, no obvious leg length discrepancy, single-leg squat exhibits knee valgus, internal rotation of the hip, contralateral hip drop bilaterally  - inspection: no swelling or effusion, normal muscle tone, normal bone and joint alignment, no obvious deformity  - palpation: no joint line tenderness, patellar tendon non-tender, tender medial patellar facets bilaterally  - ROM: 135 degrees flexion, -5 degrees extension, not painful, crepitus with weight-bearing flexion bilaterally  - strength: 5/5 in flexion, 5/5 in extension  - neuro: no sensory or motor deficit  - special tests:  (-) Lachman  (-) anterior drawer  (-) Markie  (-) " Thessaly  (-) varus at 0 and 30 degrees flexion  (-) valgus at 0 and 30 degrees flexion  (+) Trent s compression test bilaterally  (+) patellar grind bilaterally  (-) patellar apprehension  Neuro  - no sensory or motor deficit, grossly normal coordination, normal muscle tone  Skin  - no ecchymosis, erythema, warmth, or induration, no obvious rash  Psych  - interactive, appropriate, normal mood and affect     ASSESSMENT & PLAN  Ms. Ingrid Caal is a 38 year old year old female who presents to clinic today with persisting patellofemoral pain and likely chondromalacia/cartilage wear not visible on XR.    Diagnosis: Bilateral anterior knee pain, patellofemoral pain syndrome.    -Hyaluronic acid injections  -Continue HEP  -Ice/analgesics/rest next 24-48h  -Follow up 6 weeks Dr. Bradley    Procedure:  Large Joint Injection/Arthocentesis  Date/Time: 6/26/2018 9:00 AM  Performed by: ISIDORO TORRES  Authorized by: ISIDORO TORRES     Indications:  Pain and osteoarthritis  Needle Size:  22 G  Location:  Knee  Laterality:  Bilateral  Site:  Bilateral knee  Medications (Right):  48 mg hylan 48 MG/6ML  Medications (Left):  48 mg hylan 48 MG/6ML  Procedure discussed: discussed risks, benefits, and alternatives    Consent Given by:  Patient        PROCEDURE    Synvisc One Knee Injection - Intraarticular  The patient was informed of the risks and the benefits of the procedure and a written consent was signed.  The patient s left knee was prepped with chlorhexidine in sterile fashion.   Synvisc One syringe on 22 gauge needle.  Injection was performed using sterile technique.  A 1.5-inch 22-gauge needle was used to enter the lateral aspect of the left knee.  Injection performed successfully without difficulty.  There were no complications. The patient tolerated the procedure well. There was negligible bleeding.   Precise procedure above repeated for right knee without complication. Negligible bleeding.  The patient was instructed to  ice the knee upon leaving clinic and refrain from overuse over the next 3 days.   The patient was instructed to call or go to the emergency room with any unusual pain, swelling, redness, or if otherwise concerned.  It was a pleasure seeing Nayeli today.    Rosas Rodriguez DO, [facility]  Primary Care Sports Medicine

## 2018-06-26 NOTE — LETTER
6/26/2018       RE: Nayeli Caal  [address]  [facility] [address]     Dear Colleague,    Thank you for referring your patient, Nayeli Caal, to the [facility] SPORTS AND ORTHOPAEDIC WALK IN CLINIC at [facility]. Please see a copy of my visit note below.          SPORTS & ORTHOPEDIC WALK-IN VISIT 6/26/2018    Primary Care Physician: Dr. Mohamud  B/L knee pain since about Feb. 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 Would like to discuss Synvisc injection today as that was discussed in visit with . Has not had one before. Leaving for [facility] and will be doing a lot of walking.  Reason for visit:     What part of your body is injured / painful?  bilateral knee    What caused the injury /pain? No inciting event     How long ago did your injury occur or pain begin? problem is longstanding    What are your most bothersome symptoms? Pain    How would you characterize your symptom?  aching and dull    What makes your symptoms better? Rest    What makes your symptoms worse? Standing and Walking    Have you been previously seen for this problem? Yes,     Medical History:    Any recent changes to your medical history? No    Any new medication prescribed since last visit? No    Have you had surgery on this body part before? No    Social History:    Occupation:     Handedness: Left    Exercise: Walking    Review of Systems:    Do you have fever, chills, weight loss? No    Do you have any vision problems? No    Do you have any chest pain or edema? No    Do you have any shortness of breath or wheezing?  No    Do you have stomach problems? No    Do you have any numbness or focal weakness? No    Do you have diabetes? Yes, Type 2    Do you have problems with bleeding or clotting? No    Do you have an rashes or other skin lesions? No           CHIEF COMPLAINT:  Pain of the Left Knee and Pain of the Right Knee        HISTORY OF PRESENT ILLNESS  Ms. Ingrid Caal is a pleasant 38 year old year old female who presents to clinic today with persisting bilateral knee pain.  Nayeli has been treated by Dr. Bradley and Dr. Ragland in the past for bilateral patellofemoral pain.  S/p PT with little improvement. Synvisc process initiated at discretion of Dr. Bradley.  Patient is here today with approval for bilateral HA injections from insurance.  She was unable to f/u Dr. Bradley in time, as she will be leaving for [place] to visit family this week.    Onset: chronic  Location: bilateral knee anterior  Quality:  dull and sharp  Duration: 1 years   Severity: moderate/10 at worst  Timing:constant  Modifying factors:  resting and non-use makes it better, movement and use makes it worse  Associated signs & symptoms: none  Previous similar pain: Yes  Treatments to date: PT, corticosteroid injections 6/2017 with good improvement    Additional history: as documented    MEDICAL HISTORY  Patient Active Problem List   Diagnosis     Headache     Cervicalgia     Essential hypertension     Hypersomnia, organic     Other chronic nonalcoholic liver disease     Diabetic retinopathy of both eyes (H)     Xerosis cutis     Obesity     Usher Syndrome: congenital deafness, retinitis pigmentosa     Macular degeneration, age related, nonexudative     Benign neoplasm of iris     Dry eye syndrome     Pemphigus erythematosus     Tenosynovitis of ankle     Pain in joint, shoulder region, impingment     Chondromalacia of patella, right, steroid injection 6/12/2015     Uncontrolled type 2 diabetes mellitus with hyperglycemia, with long-term current use of insulin (H)     Non morbid obesity due to excess calories     Symptomatic cholelithiasis       Current Outpatient Prescriptions   Medication Sig Dispense Refill     Alcohol Swabs (ALCOHOL PREP PAD) 70 % PADS 1 each 3 times daily 100 each 3     aspirin 81 MG chewable tablet CHEW AND SWALLOW ONE TABLET BY MOUTH  EVERY DAY 90 tablet 2     atorvastatin (LIPITOR) 40 MG tablet Take 1 tablet (40 mg) by mouth daily 90 tablet 3     BASAGLAR 100 UNIT/ML injection Inject 50 Units Subcutaneous daily Please provide 3 months supply 12 mL 1     BD ULTRA FINE PEN NEEDLES Inject 1 dose. Subcutaneous 2 times daily BD ultra-fine insulin syringe, 30 ga. X 1/2'' short needle 1/2 cc. Use as directed. 400 Units 11     blood glucose monitoring (ACCU-CHEK LAYA PLUS) test strip Laya Plus test strips for use in Accucheck Expert meter.  Use to test blood sugar 6 times daily. 3 month supply.  Send PA's to Dr. Mohamud. 600 each 3     blood glucose monitoring (ACCU-CHEK FASTCLIX) lancets Use to test blood sugar 6 times daily or as directed 6 Box 3     docusate sodium (COLACE) 100 MG tablet Stool softener for use while taking oxycodone 30 tablet 1     dulaglutide (TRULICITY) 1.5 MG/0.5ML pen Inject 1.5 mg Subcutaneous every 7 days 6 mL 3     fenofibrate 160 MG tablet Take 1 tablet (160 mg) by mouth daily 90 tablet 3     ibuprofen (ADVIL/MOTRIN) 600 MG tablet Take 1 tablet (600 mg) by mouth every 6 hours as needed for moderate pain 120 tablet 1     insulin aspart (NOVOLOG PEN) 100 UNIT/ML injection Before meals and bedtime correction sliding scale  120 - 160 - 1 U   161 - 200 - 2 U   201 - 240 - 3 U   241 - 280 - 4 U    281 - 320 - 5 U   321 - 360 - 6 U ... 63 mL 3     levonorgestrel (MIRENA) 20 MCG/24HR IUD 1 each (20 mcg) by Intrauterine route once for 1 dose 1 each 0     losartan (COZAAR) 25 MG tablet Take 1 tablet (25 mg) by mouth daily 90 tablet 3     RANITIDINE HCL PO Take 150 mg by mouth 2 times daily       senna-docusate (SENOKOT-S;PERICOLACE) 8.6-50 MG per tablet Take 2 tablets by mouth 2 times daily as needed for constipation 120 tablet 0     [DISCONTINUED] BD ULTRA FINE PEN NEEDLES Inject 1 dose Subcutaneous 2 times daily BD ultra-fine insulin syringe, 30 ga. X 1/2'' short needle 1/2 cc. Use as directed. (Patient taking differently: Inject  "1 dose * Subcutaneous 2 times daily BD ultra-fine insulin syringe, 30 ga. X 1/2'' short needle 1/2 cc. Use as directed.) 200 Units 11       No Known Allergies    Family History   Problem Relation Age of Onset     Unknown/Adopted Other        Additional medical/Social/Surgical histories reviewed in [facility] and updated as appropriate.     REVIEW OF SYSTEMS (6/26/2018)  CONSTITUTIONAL: Denies fever and weight loss  EYES: Denies acute vision changes  ENT: Denies hearing changes or difficulty swallowing  CARDIAC: Denies chest pain or edema  RESPIRATORY: Denies dyspnea, cough or wheeze  GASTROINTESTINAL: Denies abdominal pain, nausea, vomiting  MUSCULOSKELETAL: See HPI  SKIN: Denies any recent rash or lesion  NEUROLOGICAL: Denies numbness or focal weakness  PSYCHIATRIC: No history of psychiatric symptoms or problems  ENDOCRINE: Diagnosis of diabetes:DM 2 poorly controlled per labs in chart  HEMATOLOGY: Denies episodes of easy bleeding      PHYSICAL EXAM  /82  Pulse 85  Ht 1.549 m (5' 1\")  Wt 81.6 kg (180 lb)  BMI 34.01 kg/m2    General  - normal appearance, in no obvious distress  CV  - normal popliteal pulse  Pulm  - normal respiratory pattern, non-labored  Musculoskeletal - bilateral knee  - stance: normal gait without limp, no obvious leg length discrepancy, single-leg squat exhibits knee valgus, internal rotation of the hip, contralateral hip drop bilaterally  - inspection: no swelling or effusion, normal muscle tone, normal bone and joint alignment, no obvious deformity  - palpation: no joint line tenderness, patellar tendon non-tender, tender medial patellar facets bilaterally  - ROM: 135 degrees flexion, -5 degrees extension, not painful, crepitus with weight-bearing flexion bilaterally  - strength: 5/5 in flexion, 5/5 in extension  - neuro: no sensory or motor deficit  - special tests:  (-) Lachman  (-) anterior drawer  (-) Markie  (-) Thessaly  (-) varus at 0 and 30 degrees flexion  (-) valgus at 0 and " 30 degrees flexion  (+) Trent s compression test bilaterally  (+) patellar grind bilaterally  (-) patellar apprehension  Neuro  - no sensory or motor deficit, grossly normal coordination, normal muscle tone  Skin  - no ecchymosis, erythema, warmth, or induration, no obvious rash  Psych  - interactive, appropriate, normal mood and affect     ASSESSMENT & PLAN  Ms. Ingrid Caal is a 38 year old year old female who presents to clinic today with persisting patellofemoral pain and likely chondromalacia/cartilage wear not visible on XR.    Diagnosis: Bilateral anterior knee pain, patellofemoral pain syndrome.    -Hyaluronic acid injections  -Continue HEP  -Ice/analgesics/rest next 24-48h  -Follow up 6 weeks Dr. Bradley    Procedure:  Large Joint Injection/Arthocentesis  Date/Time: 6/26/2018 9:00 AM  Performed by: ISIDORO TORRES  Authorized by: ISIDORO TORRES     Indications:  Pain and osteoarthritis  Needle Size:  22 G  Location:  Knee  Laterality:  Bilateral  Site:  Bilateral knee  Medications (Right):  48 mg hylan 48 MG/6ML  Medications (Left):  48 mg hylan 48 MG/6ML  Procedure discussed: discussed risks, benefits, and alternatives    Consent Given by:  Patient        PROCEDURE    Synvisc One Knee Injection - Intraarticular  The patient was informed of the risks and the benefits of the procedure and a written consent was signed.  The patient s left knee was prepped with chlorhexidine in sterile fashion.   Synvisc One syringe on 22 gauge needle.  Injection was performed using sterile technique.  A 1.5-inch 22-gauge needle was used to enter the lateral aspect of the left knee.  Injection performed successfully without difficulty.  There were no complications. The patient tolerated the procedure well. There was negligible bleeding.   Precise procedure above repeated for right knee without complication. Negligible bleeding.  The patient was instructed to ice the knee upon leaving clinic and refrain from overuse over the  next 3 days.   The patient was instructed to call or go to the emergency room with any unusual pain, swelling, redness, or if otherwise concerned.  It was a pleasure seeing Nayeli today.    Rosas Rodriguez DO, AVTARM  Primary Care Sports Medicine

## 2018-07-16 ENCOUNTER — MYC MEDICAL ADVICE (OUTPATIENT)
Dept: INTERNAL MEDICINE | Facility: CLINIC | Age: 38
End: 2018-07-16

## 2018-07-16 DIAGNOSIS — M79.644 THUMB PAIN, RIGHT: Primary | ICD-10-CM

## 2018-07-23 DIAGNOSIS — M79.642 BILATERAL HAND PAIN: Primary | ICD-10-CM

## 2018-07-23 DIAGNOSIS — M79.641 BILATERAL HAND PAIN: Primary | ICD-10-CM

## 2018-07-23 NOTE — TELEPHONE ENCOUNTER
FUTURE VISIT INFORMATION      FUTURE VISIT INFORMATION:    Date: 7/24/18    Time: 6:15    Location:   REFERRAL INFORMATION:    Referring provider:  Mariya Mohamud    Referring providers clinic: [facility]    Reason for visit/diagnosis: Thumb pain, right    RECORDS STATUS      ALL RECORDS AND IMAGING INTERNAL

## 2018-07-24 ENCOUNTER — PRE VISIT (OUTPATIENT)
Dept: ORTHOPEDICS | Facility: CLINIC | Age: 38
End: 2018-07-24

## 2018-07-24 ENCOUNTER — RADIANT APPOINTMENT (OUTPATIENT)
Dept: GENERAL RADIOLOGY | Facility: CLINIC | Age: 38
End: 2018-07-24
Payer: COMMERCIAL

## 2018-07-24 ENCOUNTER — OFFICE VISIT (OUTPATIENT)
Dept: ORTHOPEDICS | Facility: CLINIC | Age: 38
End: 2018-07-24
Payer: COMMERCIAL

## 2018-07-24 VITALS
SYSTOLIC BLOOD PRESSURE: 127 MMHG | BODY MASS INDEX: 33.99 KG/M2 | WEIGHT: 180 LBS | DIASTOLIC BLOOD PRESSURE: 82 MMHG | HEIGHT: 61 IN

## 2018-07-24 DIAGNOSIS — M79.642 BILATERAL HAND PAIN: ICD-10-CM

## 2018-07-24 DIAGNOSIS — M65.311 TRIGGER FINGER OF RIGHT THUMB: Primary | ICD-10-CM

## 2018-07-24 DIAGNOSIS — Z79.4 UNCONTROLLED TYPE 2 DIABETES MELLITUS WITH HYPERGLYCEMIA, WITH LONG-TERM CURRENT USE OF INSULIN (H): ICD-10-CM

## 2018-07-24 DIAGNOSIS — E11.65 UNCONTROLLED TYPE 2 DIABETES MELLITUS WITH HYPERGLYCEMIA, WITH LONG-TERM CURRENT USE OF INSULIN (H): ICD-10-CM

## 2018-07-24 DIAGNOSIS — M79.641 BILATERAL HAND PAIN: ICD-10-CM

## 2018-07-24 DIAGNOSIS — M65.342 TRIGGER RING FINGER OF LEFT HAND: ICD-10-CM

## 2018-07-24 NOTE — PROGRESS NOTES
Subjective:   Nayeli Caal is a 38 year old female who complains of bilateral hand pain. She states that the pain in the right is along the base of her thumb that has been bothersome for 6 months. The pain in the left is along the base of the 4th finger that started last week. She complains of a small bump on the left hand.     As above, she notes the right thumb and left ring finger have been painful.  She notes that the left ring finger also occasionally will lock in a flexed position and she has to help unlock it.  She is describing a classic trigger thumb and trigger finger.  She does have diabetes mellitus.  She has not checked her blood glucose in the past 2 weeks, but she states it is generally running in the 150-170 range fasting.  She does take her medications.  She notes that her thumb and ring finger pain are inhibiting her from signing.  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     Bilateral hand radiographs, 3 views including AP, oblique and lateral, were obtained today and demonstrate no significant bony change.     Background:   Date of injury: None   Duration of symptoms: 6 months  Mechanism of Injury: Insidious Onset; Unknown   Aggravating factors: Signing, sleeping   Relieving Factors: Nothing   Prior Evaluation: Prior Physician Evalutation: Dr. Mohamud    PAST MEDICAL, SOCIAL, SURGICAL AND FAMILY HISTORY: She  has a past medical history of Chondromalacia of patella, right, steroid injection 6/12/2015 (11/24/2015); Deaf; Diabetes mellitus (H); Diabetic retinopathy of both eyes (H) (8/19/2011); Dry eye syndrome (11/22/2011); Hypertension; Macular degeneration, age related, nonexudative (11/22/2011); Migraines; Other chronic nonalcoholic liver disease (8/19/2011); Pemphigus erythematosus (11/22/2011); Uncontrolled type 2 diabetes mellitus with hyperglycemia, with long-term current use of insulin (H) (5/15/2017); and Usher Syndrome:  "congenital deafness, 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 She also has no past medical history of Amblyopia or Retinal detachment.  She  has a past surgical history that includes Laparoscopic cholecystectomy (N/A, 3/10/2018).  Her family history includes Unknown/Adopted in an other family member.  She reports that she quit smoking about 7 years ago. Her smoking use included Cigarettes. She has never used smokeless tobacco. She reports that she does not drink alcohol or use illicit drugs.    ALLERGIES: She has No Known Allergies.    CURRENT MEDICATIONS: She has a current medication list which includes the following prescription(s): alcohol prep pad, aspirin, atorvastatin, basaglar, BD ULTRA FINE PEN NEEDLES, blood glucose monitoring, blood glucose monitoring, docusate sodium, dulaglutide, fenofibrate, ibuprofen, insulin aspart, losartan, ranitidine hcl, senna-docusate, and levonorgestrel, and the following Facility-Administered Medications: hylan and hylan.     REVIEW OF SYSTEMS: 10 point review of systems is negative except as noted above.     Exam:   /82  Ht 5' 1\" (1.549 m)  Wt 180 lb (81.6 kg)  BMI 34.01 kg/m2      CONSTITUTIONAL: healthy, alert and no distress  HEAD: Normocephalic. No masses, lesions, tenderness or abnormalities  SKIN: no suspicious lesions or rashes  NEUROLOGIC: Non-focal, patient communicates through an interpretor as she is deaf and performs ASL.  PSYCHIATRIC: mentation appears normal. ASL is understood and responses relayed which are coherent and reasonable.    MUSCULOSKELETAL:   RIGHT THUMB:  She is nontender over the CMC or the MCP or IP articulation.  She has a tender nodule of the flexor tendon at the level of the A1 pulley.     LEFT RING FINGER:  She has full range of motion.  She has a tender nodule over the flexor tendon at the level of the A1 pulley.        Assessment/Plan:   (M65.311) Trigger finger of right thumb  (primary encounter diagnosis)  Plan: HAND THERAPY Occupational " Therapy or Physical         Therapy    (M65.342) Trigger ring finger of left hand  Plan: HAND THERAPY Occupational Therapy or Physical         Therapy        Nayeli Caal is a 38-year-old female with a right trigger thumb and left trigger finger.  A relative contraindication to injection therapy is her diabetes mellitus which is insulin-dependent and she is having unknown control, but generally blood sugars above 155 are not going to do well with trigger finger injection of corticosteroid.  To that end, I offered her hand therapy versus surgical release.  She would like to start with hand therapy.  I am also going to place the right thumb in a gamekeeper splint for use during the day and night which might be helpful for her to allow this to calm down a bit.  If she does not have adequate response with hand therapy, and that is certainly possible, then she can proceed directly to Hand Surgery to discuss trigger finger release.       (E11.65,  Z79.4) Uncontrolled type 2 diabetes mellitus with hyperglycemia, with long-term current use of insulin (H)      All questions were answered. Thank you for allowing me to participate in her care.

## 2018-07-24 NOTE — MR AVS SNAPSHOT
After Visit Summary   7/24/2018    Nayeli Caal    MRN: [id number]           Patient Information     Date Of Birth          1980        Visit Information        Provider Department      7/24/2018 6:15 PM Oleg Dias MD; ASL IS [facility]        Today's Diagnoses     Trigger finger of right thumb    -  1    Trigger ring finger of left hand        Uncontrolled type 2 diabetes mellitus with hyperglycemia, with long-term current use of insulin (H)           Follow-ups after your visit        Additional Services     HAND THERAPY Occupational Therapy or Physical Therapy       Hand Therapy Referral                  Your next 10 appointments already scheduled     Aug 21, 2018  9:00 AM CDT   (Arrive by 8:45 AM)   Return Visit with SABI Ceja CNP   [facility] ([facility])    [address] [place] [address] [phone]            Oct 15, 2018  8:00 AM CDT   (Arrive by 7:45 AM)   RETURN DIABETES with Jimena Bragg MD   [facility] ([facility])    [address] [place] [id number] [phone]              Who to contact     Please call your clinic at [phone] to:    Ask questions about your health    Make or cancel appointments    Discuss your medicines    Learn about your test results    Speak to your doctor            Additional Information About Your Visit        MyChart Information     [employer] gives you secure access to your electronic health record. If you see a primary care provider, you can also send messages to your care team and make appointments. If you have questions, please call your primary care clinic.  If you do not have a primary care provider, please call [phone] and they will assist you.      [employer] is an electronic gateway that provides easy, online access to your medical records. With  "MyChart, you can request a clinic appointment, read your test results, renew a prescription or communicate with your care team.     To access your existing account, please contact your [facility] or call [phone] for assistance.        Care EveryWhere ID     This is your Care EveryWhere ID. This could be used by other organizations to access your [facility] medical records  [id number]        Your Vitals Were     Height BMI (Body Mass Index)                5' 1\" (1.549 m) 34.01 kg/m2           Blood Pressure from Last 3 Encounters:   07/24/18 127/82   06/26/18 127/82   05/17/18 136/85    Weight from Last 3 Encounters:   07/24/18 180 lb (81.6 kg)   06/26/18 180 lb (81.6 kg)   05/17/18 180 lb 11.2 oz (82 kg)              We Performed the Following     HAND THERAPY Occupational Therapy or Physical Therapy        Primary Care Provider Office Phone # Fax #    Mariya Diorpramod, APRN [phone]       [address] [place] [address] [address] [place] [address]        Equal Access to Services     [facility] : Hadii oneida ku hadasho Socj, waaxda luqadaha, qaybta kaalmada vickey, marc rae . So [facility] [phone].    ATENCIÓN: Si habla español, tiene a dykes disposición servicios gratuitos de asistencia lingüística. Hector al [phone].    We comply with applicable federal civil rights laws and [place] laws. We do not discriminate on the basis of race, color, national origin, age, disability, sex, sexual orientation, or gender identity.            Thank you!     Thank you for choosing [facility] SPORTS MEDICINE  for your care. Our goal is always to provide you with excellent care. Hearing back from our patients is one way we can continue to improve our services. Please take a few minutes to complete the written survey that you may receive in the mail after your visit with us. Thank you!             Your Updated Medication List - Protect others around " you: Learn how to safely use, store and throw away your medicines at www.disposemymeds.org.          This list is accurate as of 7/24/18 11:59 PM.  Always use your most recent med list.                   Brand Name Dispense Instructions for use Diagnosis    Alcohol Prep Pad 70 % Pads     100 each    1 each 3 times daily    Type 2 diabetes mellitus with other diabetic ophthalmic complication       aspirin 81 MG chewable tablet     90 tablet    CHEW AND SWALLOW ONE TABLET BY MOUTH EVERY DAY    Type 1 diabetes mellitus with complications (H)       atorvastatin 40 MG tablet    LIPITOR    90 tablet    Take 1 tablet (40 mg) by mouth daily    Type 1 diabetes mellitus with complications (H)       BASAGLAR 100 UNIT/ML injection     12 mL    Inject 50 Units Subcutaneous daily Please provide 3 months supply    Type 1 diabetes mellitus with complications (H)       BD ULTRA FINE PEN NEEDLES     400 Units    Inject 1 dose. Subcutaneous 2 times daily BD ultra-fine insulin syringe, 30 ga. X 1/2'' short needle 1/2 cc. Use as directed.    Type 2 diabetes mellitus with complication, with long-term current use of insulin (H)       blood glucose monitoring lancets     6 Box    Use to test blood sugar 6 times daily or as directed    Type 1 diabetes mellitus with nephropathy (H)       blood glucose monitoring test strip    ACCU-CHEK LAYA PLUS    600 each    Laya Plus test strips for use in Accucheck Expert meter.  Use to test blood sugar 6 times daily. 3 month supply.  Send PA's to Dr. Mohamud.    Type 1 diabetes mellitus with nephropathy (H)       docusate sodium 100 MG tablet    COLACE    30 tablet    Stool softener for use while taking oxycodone    Abdominal pain, epigastric       dulaglutide 1.5 MG/0.5ML pen    TRULICITY    6 mL    Inject 1.5 mg Subcutaneous every 7 days    Type 1 diabetes mellitus with complications (H)       fenofibrate 160 MG tablet     90 tablet    Take 1 tablet (160 mg) by mouth daily    Mixed hyperlipidemia        ibuprofen 600 MG tablet    ADVIL/MOTRIN    120 tablet    Take 1 tablet (600 mg) by mouth every 6 hours as needed for moderate pain    Cervicalgia, Headache, Pain in joint, shoulder region       insulin aspart 100 UNIT/ML injection    NovoLOG PEN    63 mL    Before meals and bedtime correction sliding scale 120 - 160 - 1 U  161 - 200 - 2 U  201 - 240 - 3 U  241 - 280 - 4 U   281 - 320 - 5 U  321 - 360 - 6 U ...    Type 1 diabetes mellitus with complications (H)       levonorgestrel 20 MCG/24HR IUD    MIRENA    1 each    1 each (20 mcg) by Intrauterine route once for 1 dose    Encounter for insertion of intrauterine contraceptive device       losartan 25 MG tablet    COZAAR    90 tablet    Take 1 tablet (25 mg) by mouth daily    Uncontrolled type 2 diabetes mellitus with hyperglycemia, with long-term current use of insulin (H)       RANITIDINE HCL PO      Take 150 mg by mouth 2 times daily        senna-docusate 8.6-50 MG per tablet    SENOKOT-S;PERICOLACE    120 tablet    Take 2 tablets by mouth 2 times daily as needed for constipation

## 2018-07-24 NOTE — LETTER
7/24/2018      RE: Nayeli Caal  [address]  [facility] [address]        Subjective:   Nayeli Caal is a 38 year old female who complains of bilateral hand pain. She states that the pain in the right is along the base of her thumb that has been bothersome for 6 months. The pain in the left is along the base of the 4th finger that started last week. She complains of a small bump on the left hand.     As above, she notes the right thumb and left ring finger have been painful.  She notes that the left ring finger also occasionally will lock in a flexed position and she has to help unlock it.  She is describing a classic trigger thumb and trigger finger.  She does have diabetes mellitus.  She has not checked her blood glucose in the past 2 weeks, but she states it is generally running in the 150-170 range fasting.  She does take her medications.  She notes that her thumb and ring finger pain are inhibiting her from signing.  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     Bilateral hand radiographs, 3 views including AP, oblique and lateral, were obtained today and demonstrate no significant bony change.     Background:   Date of injury: None   Duration of symptoms: 6 months  Mechanism of Injury: Insidious Onset; Unknown   Aggravating factors: Signing, sleeping   Relieving Factors: Nothing   Prior Evaluation: Prior Physician Evalutation: Dr. Mohamud    PAST MEDICAL, SOCIAL, SURGICAL AND FAMILY HISTORY: She  has a past medical history of Chondromalacia of patella, right, steroid injection 6/12/2015 (11/24/2015); Deaf; Diabetes mellitus (H); Diabetic retinopathy of both eyes (H) (8/19/2011); Dry eye syndrome (11/22/2011); Hypertension; Macular degeneration, age related, nonexudative (11/22/2011); Migraines; Other chronic nonalcoholic liver disease (8/19/2011); Pemphigus erythematosus (11/22/2011); Uncontrolled type 2 diabetes mellitus with  "hyperglycemia, with long-term current use of insulin (H) (5/15/2017); and Usher Syndrome: congenital deafness, 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 She also has no past medical history of Amblyopia or Retinal detachment.  She  has a past surgical history that includes Laparoscopic cholecystectomy (N/A, 3/10/2018).  Her family history includes Unknown/Adopted in an other family member.  She reports that she quit smoking about 7 years ago. Her smoking use included Cigarettes. She has never used smokeless tobacco. She reports that she does not drink alcohol or use illicit drugs.    ALLERGIES: She has No Known Allergies.    CURRENT MEDICATIONS: She has a current medication list which includes the following prescription(s): alcohol prep pad, aspirin, atorvastatin, basaglar, BD ULTRA FINE PEN NEEDLES, blood glucose monitoring, blood glucose monitoring, docusate sodium, dulaglutide, fenofibrate, ibuprofen, insulin aspart, losartan, ranitidine hcl, senna-docusate, and levonorgestrel, and the following Facility-Administered Medications: hylan and hylan.     REVIEW OF SYSTEMS: 10 point review of systems is negative except as noted above.     Exam:   /82  Ht 5' 1\" (1.549 m)  Wt 180 lb (81.6 kg)  BMI 34.01 kg/m2      CONSTITUTIONAL: healthy, alert and no distress  HEAD: Normocephalic. No masses, lesions, tenderness or abnormalities  SKIN: no suspicious lesions or rashes  NEUROLOGIC: Non-focal, patient communicates through an interpretor as she is deaf and performs ASL.  PSYCHIATRIC: mentation appears normal. ASL is understood and responses relayed which are coherent and reasonable.    MUSCULOSKELETAL:   RIGHT THUMB:  She is nontender over the CMC or the MCP or IP articulation.  She has a tender nodule of the flexor tendon at the level of the A1 pulley.     LEFT RING FINGER:  She has full range of motion.  She has a tender nodule over the flexor tendon at the level of the A1 pulley.        Assessment/Plan:   (M65.311) " Trigger finger of right thumb  (primary encounter diagnosis)  Plan: HAND THERAPY Occupational Therapy or Physical         Therapy    (M65.342) Trigger ring finger of left hand  Plan: HAND THERAPY Occupational Therapy or Physical         Therapy        Nayeli Caal is a 38-year-old female with a right trigger thumb and left trigger finger.  A relative contraindication to injection therapy is her diabetes mellitus which is insulin-dependent and she is having unknown control, but generally blood sugars above 155 are not going to do well with trigger finger injection of corticosteroid.  To that end, I offered her hand therapy versus surgical release.  She would like to start with hand therapy.  I am also going to place the right thumb in a gamekeeper splint for use during the day and night which might be helpful for her to allow this to calm down a bit.  If she does not have adequate response with hand therapy, and that is certainly possible, then she can proceed directly to Hand Surgery to discuss trigger finger release.       (E11.65,  Z79.4) Uncontrolled type 2 diabetes mellitus with hyperglycemia, with long-term current use of insulin (H)      All questions were answered. Thank you for allowing me to participate in her care.      Oleg Dias MD

## 2018-07-30 ENCOUNTER — THERAPY VISIT (OUTPATIENT)
Dept: OCCUPATIONAL THERAPY | Facility: CLINIC | Age: 38
End: 2018-07-30
Attending: FAMILY MEDICINE
Payer: COMMERCIAL

## 2018-07-30 DIAGNOSIS — M65.311 TRIGGER THUMB OF RIGHT HAND: ICD-10-CM

## 2018-07-30 DIAGNOSIS — M79.644 PAIN IN FINGER OF BOTH HANDS: Primary | ICD-10-CM

## 2018-07-30 DIAGNOSIS — M65.342 TRIGGER RING FINGER OF LEFT HAND: ICD-10-CM

## 2018-07-30 DIAGNOSIS — M79.645 PAIN IN FINGER OF BOTH HANDS: Primary | ICD-10-CM

## 2018-07-30 PROCEDURE — 97165 OT EVAL LOW COMPLEX 30 MIN: CPT | Mod: GO | Performed by: OCCUPATIONAL THERAPIST

## 2018-07-30 PROCEDURE — 97140 MANUAL THERAPY 1/> REGIONS: CPT | Mod: GO | Performed by: OCCUPATIONAL THERAPIST

## 2018-07-30 NOTE — MR AVS SNAPSHOT
After Visit Summary   7/30/2018    Nayeli Caal    MRN: [id number]           Patient Information     Date Of Birth          1980        Visit Information        Provider Department      7/30/2018 4:30 PM Tammy Xavier OT; ASL [facility]        Today's Diagnoses     Pain in finger of both hands    -  1    Trigger thumb of right hand        Trigger ring finger of left hand           Follow-ups after your visit        Your next 10 appointments already scheduled     Aug 06, 2018  4:00 PM CDT   DEJAN Hand with Tammy Xavier OT   [facility] ([facility])    [address] [place] [address] [id number] [phone]            Aug 14, 2018  4:30 PM CDT   DEJAN Hand with Maria C Diaz OT   [facility] ([facility])    [address] [place] [address] [id number] [phone]            Aug 20, 2018  3:30 PM CDT   DEJAN Hand with Reshma Atwood OT   [facility] ([facility])    [address] [place] [address] [id number] [phone]            Aug 21, 2018  9:00 AM CDT   (Arrive by 8:45 AM)   Return Visit with SABI Ceja CNP   [facility] ([facility])    [address] [place] [address] [id number] [phone]            Oct 15, 2018  8:00 AM CDT   (Arrive by 7:45 AM)   RETURN DIABETES with Jimena Bragg MD   [facility] ([facility])    [address] [place] [address] [id number] [phone]              Who to contact     If you have questions or need follow up information about today's clinic visit or your schedule please contact [facility] directly at [phone].  Normal or non-critical lab and imaging results will be communicated to you by MyChart, letter or phone within 4 business  days after the clinic has received the results. If you do not hear from us within 7 days, please contact the clinic through [employer] or phone. If you have a critical or abnormal lab result, we will notify you by phone as soon as possible.  Submit refill requests through [employer] or call your pharmacy and they will forward the refill request to us. Please allow 3 business days for your refill to be completed.          Additional Information About Your Visit        Notify [employer] Information     [employer] gives you secure access to your electronic health record. If you see a primary care provider, you can also send messages to your care team and make appointments. If you have questions, please call your primary care clinic.  If you do not have a primary care provider, please call [phone] and they will assist you.        Care EveryWhere ID     This is your Care EveryWhere ID. This could be used by other organizations to access your [place] medical records  [id number]         Blood Pressure from Last 3 Encounters:   07/24/18 127/82   06/26/18 127/82   05/17/18 136/85    Weight from Last 3 Encounters:   07/24/18 81.6 kg (180 lb)   06/26/18 81.6 kg (180 lb)   05/17/18 82 kg (180 lb 11.2 oz)              We Performed the Following     HC OT EVAL, LOW COMPLEXITY     MANUAL THER TECH,1+REGIONS,EA 15 MIN        Primary Care Provider Office Phone # Fax #    SABI Ceja [phone]       [address] [facility] [address]  [facility] [address]        Equal Access to Services     SULAIMAN MARTINEZ : Hadii aad ku hadasho Soomaali, waaxda luqadaha, qaybta kaalmada adeegyada, marc fullerin regine rae . So [facility] [phone].    ATENCIÓN: Si habla español, tiene a dykes disposición servicios gratuitos de asistencia lingüística. Llame al [phone].    We comply with applicable federal civil rights laws and [place] laws. We do not discriminate on the basis of race, color, national origin, age, disability,  sex, sexual orientation, or gender identity.            Thank you!     Thank you for choosing [facility]  for your care. Our goal is always to provide you with excellent care. Hearing back from our patients is one way we can continue to improve our services. Please take a few minutes to complete the written survey that you may receive in the mail after your visit with us. Thank you!             Your Updated Medication List - Protect others around you: Learn how to safely use, store and throw away your medicines at www.disposemymeds.org.          This list is accurate as of 7/30/18  6:22 PM.  Always use your most recent med list.                   Brand Name Dispense Instructions for use Diagnosis    Alcohol Prep Pad 70 % Pads     100 each    1 each 3 times daily    Type 2 diabetes mellitus with other diabetic ophthalmic complication       aspirin 81 MG chewable tablet     90 tablet    CHEW AND SWALLOW ONE TABLET BY MOUTH EVERY DAY    Type 1 diabetes mellitus with complications (H)       atorvastatin 40 MG tablet    LIPITOR    90 tablet    Take 1 tablet (40 mg) by mouth daily    Type 1 diabetes mellitus with complications (H)       BASAGLAR 100 UNIT/ML injection     12 mL    Inject 50 Units Subcutaneous daily Please provide 3 months supply    Type 1 diabetes mellitus with complications (H)       BD ULTRA FINE PEN NEEDLES     400 Units    Inject 1 dose. Subcutaneous 2 times daily BD ultra-fine insulin syringe, 30 ga. X 1/2'' short needle 1/2 cc. Use as directed.    Type 2 diabetes mellitus with complication, with long-term current use of insulin (H)       blood glucose monitoring lancets     6 Box    Use to test blood sugar 6 times daily or as directed    Type 1 diabetes mellitus with nephropathy (H)       blood glucose monitoring test strip    ACCU-CHEK LAYA PLUS    600 each    Laya Plus test strips for use in Accucheck Expert meter.  Use to test blood sugar 6 times daily. 3 month supply.  Send PA's to  Dr. Mohamud.    Type 1 diabetes mellitus with nephropathy (H)       docusate sodium 100 MG tablet    COLACE    30 tablet    Stool softener for use while taking oxycodone    Abdominal pain, epigastric       dulaglutide 1.5 MG/0.5ML pen    TRULICITY    6 mL    Inject 1.5 mg Subcutaneous every 7 days    Type 1 diabetes mellitus with complications (H)       fenofibrate 160 MG tablet     90 tablet    Take 1 tablet (160 mg) by mouth daily    Mixed hyperlipidemia       ibuprofen 600 MG tablet    ADVIL/MOTRIN    120 tablet    Take 1 tablet (600 mg) by mouth every 6 hours as needed for moderate pain    Cervicalgia, Headache, Pain in joint, shoulder region       insulin aspart 100 UNIT/ML injection    NovoLOG PEN    63 mL    Before meals and bedtime correction sliding scale 120 - 160 - 1 U  161 - 200 - 2 U  201 - 240 - 3 U  241 - 280 - 4 U   281 - 320 - 5 U  321 - 360 - 6 U ...    Type 1 diabetes mellitus with complications (H)       levonorgestrel 20 MCG/24HR IUD    MIRENA    1 each    1 each (20 mcg) by Intrauterine route once for 1 dose    Encounter for insertion of intrauterine contraceptive device       losartan 25 MG tablet    COZAAR    90 tablet    Take 1 tablet (25 mg) by mouth daily    Uncontrolled type 2 diabetes mellitus with hyperglycemia, with long-term current use of insulin (H)       RANITIDINE HCL PO      Take 150 mg by mouth 2 times daily        senna-docusate 8.6-50 MG per tablet    SENOKOT-S;PERICOLACE    120 tablet    Take 2 tablets by mouth 2 times daily as needed for constipation

## 2018-07-30 NOTE — PROGRESS NOTES
Hand Therapy Initial Evaluation    Current Date:  7/30/2018    Diagnosis:  R thumb and L RF triggers  Onset:  > 6 months  MD order:  7/24/18    Patient has  present at time of evaluation.    Subjective:  Nayeli Caal is a 38 year old Left hand dominant female.    Patient reports symptoms of pain, stiffness/loss of motion, weakness/loss of strength, and edema  of the bilateral hands which occurred due to unknown etiology. Since onset symptoms are changing, goes up and down.  Special tests:  x-ray.  Previous treatment: none.    General health as reported by patient is fair.  Pertinent medical history includes:Diabetes, High Blood Pressure, Overweight.  Medical allergies:none.  Surgical history: other: gallbladder.  Medication history: High Blood Pressure, diabetes, cholesterol.    Occupational Profile Information:  Current occupation is   Currently working in normal job without restrictions  Job Tasks: Computer Work.  Does have sit/stand desk  Prior functional level:  independent-shared household chores  Barriers include:transportation  Mobility: No difficulty  Transportation: public transportation  Leisure activities/hobbies: running, being outdoors    Identified Performance Deficits:  bathing/showering, feeding, functional mobility, communication management, home establishment and management, meal preparation and cleanup, shopping, sleep, work and leisure activities    Red flags:  none    Functional Outcome Measure:  See flow sheet    Objective:  Pain Report:  VAS(0-10) 7/30/2018   At Rest: 3/10   With Use: 6/10   Location:  Left ring finger-A1, R thumb A1  Pain Quality:  Aching  Frequency: constant    Pain is worst:  daytime or nighttime  Exacerbated by:  Pressure to the area, working-computer  Relieved by:  none    ROM:    7/30/2018   AROM(PROM) R thumb L RF   MCP 40 41   PIP 20*/61 92   DIP NA 60   *pain with passive ext, unable to achieve HE    Palpation:  Stage  of Stenosing Tenosynovitis (SST): 7/30/2018   Triggering of Thumb 6 at IP joint   Triggering of Left Ring finger 2-patient states 4 last week   Stage 1:  Normal  Stage 2:  Uneven motion of tendon  Stage 3:  Triggering, clicking, catching  Stage 4:  Locking in extension or flexion; unlocked by active motion  Stage 5:  Locking in extension or flexion; unlocked by passive motion  Stage 6:  Finger locked in extension or flexion    Tenderness:   7/30/2018   A1 pulley of R thumb 7   A1 pulley of L RF 9     Sensation:  WNL throughout all nerve distributions; per patient report will have tingling when blood sugars are low    Strength: (Measured in pounds, pain scale 0-10/10)    Date 7/30/2018     Trials Left Right Left Right Left Right   1 35 51       2         3         Avg         Pain 6 4         3 Point Pinch  Date 7/30/2018     Trials Left Right Left Right Left Right   1 13 3       2         3         Avg         Pain 8 7         Lateral Pinch  Date 7/30/2018     Trials Left Right Left Right Left Right   1 14 12       2         3         Avg         Pain 0 5         Assessment:  Patient presents with symptoms consistent with diagnosis of R thumb and Left RF triggers,  with conservative intervention.     Patient's limitations or Problem List includes:  Pain, Decreased ROM/motion, Increased edema, Weakness, Decreased , Decreased pinch, Tightness in musculature and Adherence in connective tissue of the bilateral hands which interferes with the patient's ability to perform Self Care Tasks, Work Tasks, Sleep Patterns, Recreational Activities and Household Chores as compared to previous level of function.    Rehab Potential:  Good - Return to full activity, some limitations    Patient will benefit from skilled Occupational Therapy to increase ROM, flexibility, motion, overall strength, coordination and dexterity and decrease pain, edema and muscle tension to return to previous activity level and resume normal daily  tasks and to reach their rehab potential.    Assessment of Occupational Performance:  5 or more Performance Deficits  Identified Performance Deficits:  bathing/showering, feeding, functional mobility, communication management, home establishment and management, meal preparation and cleanup, shopping, sleep, work and leisure activities     Clinical Decision Making (Complexity): Low complexity    Barriers to Learning:  Hearing    Communication Issues:  Patient appears to be able to clearly communicate and understand verbal and written communication and follow directions correctly.  Patient has an  for communication clarity.    Treatment Explanation:  The following has been discussed with the patient:  RX ordered/plan of care  Anticipated outcomes  Possible risks and side effects    Plan:  Frequency:  2 X week, once daily  Duration:  for 2 weeks tapering to 1 X a week for 6 weeks    Treatment Plan:    Modalities:  US and Paraffin  Therapeutic Exercise:  AROM, AAROM, PROM, Tendon Gliding, Blocking, Isotonics, Isometrics and Stabilization  Neuromuscular re-education:  Coordination/Dexterity, Kinesthetic Training, Proprioceptive Training, Posture, Kinesiotaping and Stabilization  Manual Techniques:  Joint mobilization, Friction massage, Myofascial release and Manual edema mobilization  Orthotic Fabrication:  Static orthosis  Self Care:  Self Care Tasks, Ergonomic Considerations, Work Tasks and Education    Discharge Plan:  Achieve all LTG.  Independent in home treatment program.  Reach maximal therapeutic benefit.    Home Exercise Program:  Warmth for stiffness  Ice to A1 pulley/palm for inflammation  Decongestive and TFM to palm and A1 pulley  AROM fingers E/F, avoid triggering  Oval 8 orthosis at night and daytime PRN    Next Visit:  US  Progress HEP-motion

## 2018-08-06 ENCOUNTER — THERAPY VISIT (OUTPATIENT)
Dept: OCCUPATIONAL THERAPY | Facility: CLINIC | Age: 38
End: 2018-08-06
Payer: COMMERCIAL

## 2018-08-06 DIAGNOSIS — M65.342 TRIGGER RING FINGER OF LEFT HAND: ICD-10-CM

## 2018-08-06 DIAGNOSIS — M79.644 PAIN IN FINGER OF BOTH HANDS: Primary | ICD-10-CM

## 2018-08-06 DIAGNOSIS — M65.311 TRIGGER THUMB OF RIGHT HAND: ICD-10-CM

## 2018-08-06 DIAGNOSIS — M79.645 PAIN IN FINGER OF BOTH HANDS: Primary | ICD-10-CM

## 2018-08-06 PROCEDURE — 97140 MANUAL THERAPY 1/> REGIONS: CPT | Mod: GO | Performed by: OCCUPATIONAL THERAPIST

## 2018-08-06 PROCEDURE — 97035 APP MDLTY 1+ULTRASOUND EA 15: CPT | Mod: GO | Performed by: OCCUPATIONAL THERAPIST

## 2018-08-06 PROCEDURE — 97110 THERAPEUTIC EXERCISES: CPT | Mod: GO | Performed by: OCCUPATIONAL THERAPIST

## 2018-08-06 PROCEDURE — 97760 ORTHOTIC MGMT&TRAING 1ST ENC: CPT | Mod: GO | Performed by: OCCUPATIONAL THERAPIST

## 2018-08-06 NOTE — MR AVS SNAPSHOT
After Visit Summary   8/6/2018    Nayeli Caal    MRN: [id number]           Patient Information     Date Of Birth          1980        Visit Information        Provider Department      8/6/2018 4:00 PM Tammy Xavier OT; ASL IS [facility]        Today's Diagnoses     Pain in finger of both hands    -  1    Trigger ring finger of left hand        Trigger thumb of right hand           Follow-ups after your visit        Your next 10 appointments already scheduled     Aug 14, 2018  4:30 PM CDT   DEJAN Hand with Maria C Diaz OT   [facility] ([facility])    [address] [place] [address] [id number] [phone]            Aug 20, 2018  3:30 PM CDT   DEJAN Hand with Reshma Atwood OT   [facility] ([facility])    [address] [place] [address] [id number] [phone]            Aug 21, 2018  9:00 AM CDT   (Arrive by 8:45 AM)   Return Visit with SABI Ceja CNP   [facility] ([facility])    [address] [place] [address] [id number] [phone]            Oct 15, 2018  8:00 AM CDT   (Arrive by 7:45 AM)   RETURN DIABETES with Jimena Bragg MD   [facility] ([facility])    [address] [place] [address] [id number] [phone]              Who to contact     If you have questions or need follow up information about today's clinic visit or your schedule please contact [facility] directly at [phone].  Normal or non-critical lab and imaging results will be communicated to you by MyChart, letter or phone within 4 business days after the clinic has received the results. If you do not hear from us within 7 days, please contact the clinic through MyChart or phone. If you have a critical or abnormal lab result, we will notify you by  phone as soon as possible.  Submit refill requests through [employer] or call your pharmacy and they will forward the refill request to us. Please allow 3 business days for your refill to be completed.          Additional Information About Your Visit        [employer] Information     [employer] gives you secure access to your electronic health record. If you see a primary care provider, you can also send messages to your care team and make appointments. If you have questions, please call your primary care clinic.  If you do not have a primary care provider, please call [phone] and they will assist you.        Care EveryWhere ID     This is your Care EveryWhere ID. This could be used by other organizations to access your [facility] [facility] medical records  [id number]         Blood Pressure from Last 3 Encounters:   07/24/18 127/82   06/26/18 127/82   05/17/18 136/85    Weight from Last 3 Encounters:   07/24/18 81.6 kg (180 lb)   06/26/18 81.6 kg (180 lb)   05/17/18 82 kg (180 lb 11.2 oz)              We Performed the Following     MANUAL THER TECH,1+REGIONS,EA 15 MIN     ORTHOTIC MGMT AND TRAINING, EACH 15 MIN     THERAPEUTIC EXERCISES     ULTRASOUND THERAPY        Primary Care Provider Office Phone # Fax #    Mariya Mohamud SABI [phone]       [address] [place] [address] [address] [place] [phone]        Equal Access to Services     SULAIMAN MARTINEZ : Hadii aad ku hadasho Soomaali, waaxda luqadaha, qaybta kaalmada adeegyada, marc bernal. So [facility] [phone].    ATENCIÓN: Si habla español, tiene a dykes disposición servicios gratuitos de asistencia lingüística. Llame al [phone].    We comply with applicable federal civil rights laws and [place] laws. We do not discriminate on the basis of race, color, national origin, age, disability, sex, sexual orientation, or gender identity.            Thank you!     Thank you for choosing [facility]  for your care. Our goal is always  to provide you with excellent care. Hearing back from our patients is one way we can continue to improve our services. Please take a few minutes to complete the written survey that you may receive in the mail after your visit with us. Thank you!             Your Updated Medication List - Protect others around you: Learn how to safely use, store and throw away your medicines at www.disposemymeds.org.          This list is accurate as of 8/6/18  4:59 PM.  Always use your most recent med list.                   Brand Name Dispense Instructions for use Diagnosis    Alcohol Prep Pad 70 % Pads     100 each    1 each 3 times daily    Type 2 diabetes mellitus with other diabetic ophthalmic complication       aspirin 81 MG chewable tablet     90 tablet    CHEW AND SWALLOW ONE TABLET BY MOUTH EVERY DAY    Type 1 diabetes mellitus with complications (H)       atorvastatin 40 MG tablet    LIPITOR    90 tablet    Take 1 tablet (40 mg) by mouth daily    Type 1 diabetes mellitus with complications (H)       BASAGLAR 100 UNIT/ML injection     12 mL    Inject 50 Units Subcutaneous daily Please provide 3 months supply    Type 1 diabetes mellitus with complications (H)       BD ULTRA FINE PEN NEEDLES     400 Units    Inject 1 dose. Subcutaneous 2 times daily BD ultra-fine insulin syringe, 30 ga. X 1/2'' short needle 1/2 cc. Use as directed.    Type 2 diabetes mellitus with complication, with long-term current use of insulin (H)       blood glucose monitoring lancets     6 Box    Use to test blood sugar 6 times daily or as directed    Type 1 diabetes mellitus with nephropathy (H)       blood glucose monitoring test strip    ACCU-CHEK LAYA PLUS    600 each    Laya Plus test strips for use in Accucheck Expert meter.  Use to test blood sugar 6 times daily. 3 month supply.  Send PA's to Dr. Mohamud.    Type 1 diabetes mellitus with nephropathy (H)       docusate sodium 100 MG tablet    COLACE    30 tablet    Stool softener for use while  taking oxycodone    Abdominal pain, epigastric       dulaglutide 1.5 MG/0.5ML pen    TRULICITY    6 mL    Inject 1.5 mg Subcutaneous every 7 days    Type 1 diabetes mellitus with complications (H)       fenofibrate 160 MG tablet     90 tablet    Take 1 tablet (160 mg) by mouth daily    Mixed hyperlipidemia       ibuprofen 600 MG tablet    ADVIL/MOTRIN    120 tablet    Take 1 tablet (600 mg) by mouth every 6 hours as needed for moderate pain    Cervicalgia, Headache, Pain in joint, shoulder region       insulin aspart 100 UNIT/ML injection    NovoLOG PEN    63 mL    Before meals and bedtime correction sliding scale 120 - 160 - 1 U  161 - 200 - 2 U  201 - 240 - 3 U  241 - 280 - 4 U   281 - 320 - 5 U  321 - 360 - 6 U ...    Type 1 diabetes mellitus with complications (H)       levonorgestrel 20 MCG/24HR IUD    MIRENA    1 each    1 each (20 mcg) by Intrauterine route once for 1 dose    Encounter for insertion of intrauterine contraceptive device       losartan 25 MG tablet    COZAAR    90 tablet    Take 1 tablet (25 mg) by mouth daily    Uncontrolled type 2 diabetes mellitus with hyperglycemia, with long-term current use of insulin (H)       RANITIDINE HCL PO      Take 150 mg by mouth 2 times daily        senna-docusate 8.6-50 MG per tablet    SENOKOT-S;PERICOLACE    120 tablet    Take 2 tablets by mouth 2 times daily as needed for constipation

## 2018-08-13 ENCOUNTER — PRE VISIT (OUTPATIENT)
Dept: ORTHOPEDICS | Facility: CLINIC | Age: 38
End: 2018-08-13

## 2018-08-13 NOTE — TELEPHONE ENCOUNTER
FUTURE VISIT INFORMATION      FUTURE VISIT INFORMATION:    Date: 8/15    Time: 10:15    Location: [facility]  REFERRAL INFORMATION:    Referring provider:  Oleg Dias    Referring providers clinic:   [facility]    Reason for visit/diagnosis  Trigger finger, rt thumb, lt ring finger    RECORDS REQUESTED FROM:       Clinic name Comments Records Status Imaging Status                                         RECORDS STATUS      All records internal

## 2018-08-15 ENCOUNTER — HOSPITAL ENCOUNTER (OUTPATIENT)
Facility: AMBULATORY SURGERY CENTER | Age: 38
End: 2018-08-15
Attending: ORTHOPAEDIC SURGERY
Payer: COMMERCIAL

## 2018-08-15 ENCOUNTER — OFFICE VISIT (OUTPATIENT)
Dept: ORTHOPEDICS | Facility: CLINIC | Age: 38
End: 2018-08-15
Payer: COMMERCIAL

## 2018-08-15 VITALS — WEIGHT: 180 LBS | HEIGHT: 61 IN | BODY MASS INDEX: 33.99 KG/M2

## 2018-08-15 DIAGNOSIS — M65.311 TRIGGER FINGER OF RIGHT THUMB: Primary | ICD-10-CM

## 2018-08-15 NOTE — PROGRESS NOTES
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  She thinks that approximately 6 months ago her right thumb started to become stiffer and more painful with movement. Cannot recall any clicking, locking, or other preceding symptoms in her right thumb as she never paid close attention to it before, but she thinks it feels as her left ring finger feels now.  She specifically recalls her left ring finger starting to lock and become painful at the MCP joint on 2018.  This caused her to notice her right thumb more.  Since then, she has had significant difficulty with communicating and using American sign language.  This is very frustrating for her.  Denies any numbness or tingling or motor weakness in her bilateral upper extremities.  Denies any preceding injury or trauma.  Never had difficulty with her hands before 6 months ago.     The patient is very aware of her diabetes.  Her primary care provider has warned her in the past to avoid any corticosteroid injections.    Review of Systems:   A 14-point review of systems was obtained and is negative except for as noted in the HPI.     Medications:   Current Outpatient Prescriptions:      Alcohol Swabs (ALCOHOL PREP PAD) 70 % PADS, 1 each 3 times daily, Disp: 100 each, Rfl: 3     aspirin 81 MG chewable tablet, CHEW AND SWALLOW ONE TABLET BY MOUTH " EVERY DAY, Disp: 90 tablet, Rfl: 2     atorvastatin (LIPITOR) 40 MG tablet, Take 1 tablet (40 mg) by mouth daily, Disp: 90 tablet, Rfl: 3     BASAGLAR 100 UNIT/ML injection, Inject 50 Units Subcutaneous daily Please provide 3 months supply, Disp: 12 mL, Rfl: 1     BD ULTRA FINE PEN NEEDLES, Inject 1 dose. Subcutaneous 2 times daily BD ultra-fine insulin syringe, 30 ga. X 1/2'' short needle 1/2 cc. Use as directed., Disp: 400 Units, Rfl: 11     blood glucose monitoring (ACCU-CHEK LAYA PLUS) test strip, Laya Plus test strips for use in Accucheck Expert meter.  Use to test blood sugar 6 times daily. 3 month supply.  Send PA's to Dr. Mohamud., Disp: 600 each, Rfl: 3     blood glucose monitoring (ACCU-CHEK FASTCLIX) lancets, Use to test blood sugar 6 times daily or as directed, Disp: 6 Box, Rfl: 3     docusate sodium (COLACE) 100 MG tablet, Stool softener for use while taking oxycodone, Disp: 30 tablet, Rfl: 1     dulaglutide (TRULICITY) 1.5 MG/0.5ML pen, Inject 1.5 mg Subcutaneous every 7 days, Disp: 6 mL, Rfl: 3     fenofibrate 160 MG tablet, Take 1 tablet (160 mg) by mouth daily, Disp: 90 tablet, Rfl: 3     ibuprofen (ADVIL/MOTRIN) 600 MG tablet, Take 1 tablet (600 mg) by mouth every 6 hours as needed for moderate pain, Disp: 120 tablet, Rfl: 1     insulin aspart (NOVOLOG PEN) 100 UNIT/ML injection, Before meals and bedtime correction sliding scale 120 - 160 - 1 U  161 - 200 - 2 U  201 - 240 - 3 U  241 - 280 - 4 U   281 - 320 - 5 U  321 - 360 - 6 U ..., Disp: 63 mL, Rfl: 3     levonorgestrel (MIRENA) 20 MCG/24HR IUD, 1 each (20 mcg) by Intrauterine route once for 1 dose, Disp: 1 each, Rfl: 0     losartan (COZAAR) 25 MG tablet, Take 1 tablet (25 mg) by mouth daily, Disp: 90 tablet, Rfl: 3     RANITIDINE HCL PO, Take 150 mg by mouth 2 times daily, Disp: , Rfl:      senna-docusate (SENOKOT-S;PERICOLACE) 8.6-50 MG per tablet, Take 2 tablets by mouth 2 times daily as needed for constipation, Disp: 120 tablet, Rfl: 0      [DISCONTINUED] BD ULTRA FINE PEN NEEDLES, Inject 1 dose Subcutaneous 2 times daily BD ultra-fine insulin syringe, 30 ga. X 1/2'' short needle 1/2 cc. Use as directed. (Patient taking differently: Inject 1 dose * Subcutaneous 2 times daily BD ultra-fine insulin syringe, 30 ga. X 1/2'' short needle 1/2 cc. Use as directed.), Disp: 200 Units, Rfl: 11    Current Facility-Administered Medications:      hylan (SYNVISC ONE) injection 48 mg, 48 mg, , , Supik, Jacob, DO, 48 mg at 06/26/18 0900     hylan (SYNVISC ONE) injection 48 mg, 48 mg, , , Supik, Jacob, DO, 48 mg at 06/26/18 0900    Allergies:  The patient reports no known allergies.    Past Medical History:  Chondromalacia of patella, right, steroid injection   Usher syndrome-congenital deafness, retinitis pigmentosa  Insulin-dependent diabetes    Past Surgical History:  Laparoscopic Cholecystectomy    Social History:  She admits to history of tobacco use, quit date 12/01/2010, and denies alcohol use except for occasional social occasions.  Quit smoking marijuana 10 years ago.  [place] ethnicity.  Adopted by a family in the [place].  Lives with roommates in [place].  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    Family History:  Patient is adopted.     Physical Examination:  GENERAL: Well-developed, well-appearing 38-year-old female.  Alert and oriented appropriately.  No apparent distress.  HEENT: Atraumatic, normocephalic.  RESPIRATORY: Breathing unlabored on room air.  CARDIOVASCULAR: Extremities normal perfused throughout.  2+ radial pulses bilaterally.  MUSCULOSKELETAL: Focused examination of the right upper extremity demonstrates a mildly swollen right thumb held with the IP joint in flexion at 15 degrees.  Unable to fully extend her thumb because of significant pain at the A1 pulley area when she attempts to extend past this point.  She is Able to flex her thumb at the MCP joint with some discomfort.   Palpable tender nodule along the flexor tendon of the thumb in the A1 pulley area.  Focal tenderness over the A1 pulley.  Unable to elicit locking of the right thumb, but motion is quite limited.  Focused examination of the left upper extremity also demonstrates a tender, palpable nodule in the area of the A1 pulley at the base of the left ring finger.  She is able to demonstrate active locking of the left ring finger and can fully flex and extend it, albeit with pain.  Sensation is intact to light touch in the median, radial, and ulnar nerve distributions of both hands.  Except for the right thumb which has pain with active extension, she fIres EPL, FPL, and interossei with 5/5 strength.      Imaging: AP, lateral, and oblique views of the bilateral hands were obtained today and demonstrate no acute bony abnormalities.  Mild degenerative changes within the first CMC joint bilaterally.    I have independently reviewed the above imaging studies; the results were discussed with the patient.     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    Plan:   We discussed at length with Nayeli the diagnoses and treatment options, including observation, steroid injection and surgery.  She does not wish to have any corticosteroid injections because of her diabetes. She would like to go ahead with surgical release of the trigger fingers. She inquired about the possibility of performing bilateral trigger finger releases at the same time, but we explained that this is not recommended because they would prevent her from using both upper extremities for hygiene and self-care.  Instead, we would recommend  a trigger release on her right thumb first followed by her left ring finger shortly  thereafter (or longer if she prefers). She may return to her clerical work as soon as she feels ready. We discussed risks of surgery including but not limited to: Infection, neurovascular damage, recurrence of triggering, pain, scarring, bowstringing, and failure of symptoms to improve. After discussing the risks, benefits, and alternatives, she elected to proceed with surgery. We will work on finding a date that fits her schedule. She describes herself as very anxious and would like to have her procedure done under local with sedation, if possible. She does understand that we'd like her to be awake at the end of the procedure and motion, and she was agreeable to this. In the meantime, she may wear her custom molded thumb spica brace as needed on her right side.  All of her questions were answered in full.    This patient was seen and discussed with Dr. Streeter, agrees with the assessment and plan as above.    [facility]  Orthopaedic PGY4    UPON MY REVIEW AND AUTHENTICATION BY ELECTRONIC SIGNATURE, this confirms (a) I performed the applicable clinical services, and (b) the record is accurate.      Greg Streeter MD

## 2018-08-15 NOTE — NURSING NOTE
"Reason For Visit:   Chief Complaint   Patient presents with     Right Hand - Pain     Thumb  Ongoing       Left Hand - Pain     Ring finger. 7/20 onset         Primary MD: Mraiya Mohamud  Ref. MD: Self     ?  Yes, specify language: American Sign Language    Age: 38 year old      Date of surgery: NA  Type of surgery: NA        Ht 1.549 m (5' 1\")  Wt 81.6 kg (180 lb)  BMI 34.01 kg/m2      Pain Assessment  Patient Currently in Pain: Yes  0-10 Pain Scale: 5  Primary Pain Location: Hand  Pain Orientation: Right, Left  Pain Descriptors:  (Locking)  Alleviating Factors:  (Splint, immobilize)  Aggravating Factors:  (Using hands)    Hand Dominance Evaluation  Hand Dominance: Left          QuickDASH Assessment  No flowsheet data found.       No Known Allergies    Gregor Kwon ATC  "

## 2018-08-15 NOTE — MR AVS SNAPSHOT
After Visit Summary   8/15/2018    Nayeli Caal    MRN: [id number]           Patient Information     Date Of Birth          1980        Visit Information        Provider Department      8/15/2018 10:00 AM Greg Streeter MD; [facility]        Today's Diagnoses     Trigger finger of right thumb    -  1       Follow-ups after your visit        Your next 10 appointments already scheduled     Oct 02, 2018 10:30 AM CDT   (Arrive by 10:15 AM)   Return Visit with SABI Ceja [facility] ([facility])    [address] [place] [id number] [phone]            Oct 15, 2018  8:00 AM CDT   (Arrive by 7:45 AM)   RETURN DIABETES with Jimena Bragg MD   [facility] ([facility])    [address] [place] [id number] [phone]              Who to contact     Please call your clinic at [phone] to:    Ask questions about your health    Make or cancel appointments    Discuss your medicines    Learn about your test results    Speak to your doctor            Additional Information About Your Visit        [employer] Information     [employer] gives you secure access to your electronic health record. If you see a primary care provider, you can also send messages to your care team and make appointments. If you have questions, please call your primary care clinic.  If you do not have a primary care provider, please call [phone] and they will assist you.      [employer] is an electronic gateway that provides easy, online access to your medical records. With [employer], you can request a clinic appointment, read your test results, renew a prescription or communicate with your care team.     To access your existing account, please contact your [facility] or call [phone] for assistance.       "  Care EveryWhere ID     This is your Care EveryWhere ID. This could be used by other organizations to access your [place] medical records  [id number]        Your Vitals Were     Height BMI (Body Mass Index)                1.549 m (5' 1\") 34.01 kg/m2           Blood Pressure from Last 3 Encounters:   07/24/18 127/82   06/26/18 127/82   05/17/18 136/85    Weight from Last 3 Encounters:   08/15/18 81.6 kg (180 lb)   07/24/18 81.6 kg (180 lb)   06/26/18 81.6 kg (180 lb)              We Performed the Following     Radha-Operative Worksheet (Hand)     Radha-Operative Worksheet (Hand)        Primary Care Provider Office Phone # Fax #    Mariya Mohamud, APRN [phone]       [address] [place] [address]  [facility] [address]        Equal Access to Services     SULAIMAN MARTINEZ : Hadii aad ku hadasho Soomaali, waaxda luqadaha, qaybta kaalmada adeegyada, waxay idiin hayrigon radha rae . So [facility] [phone].    ATENCIÓN: Si habla español, tiene a dykes disposición servicios gratuitos de asistencia lingüística. Lopezmarielena al [phone].    We comply with applicable federal civil rights laws and [place] laws. We do not discriminate on the basis of race, color, national origin, age, disability, sex, sexual orientation, or gender identity.            Thank you!     Thank you for choosing [facility]  for your care. Our goal is always to provide you with excellent care. Hearing back from our patients is one way we can continue to improve our services. Please take a few minutes to complete the written survey that you may receive in the mail after your visit with us. Thank you!             Your Updated Medication List - Protect others around you: Learn how to safely use, store and throw away your medicines at www.disposemymeds.org.          This list is accurate as of 8/15/18  2:32 PM.  Always use your most recent med list.                   Brand Name Dispense Instructions for use Diagnosis    " Alcohol Prep Pad 70 % Pads     100 each    1 each 3 times daily    Type 2 diabetes mellitus with other diabetic ophthalmic complication       aspirin 81 MG chewable tablet     90 tablet    CHEW AND SWALLOW ONE TABLET BY MOUTH EVERY DAY    Type 1 diabetes mellitus with complications (H)       atorvastatin 40 MG tablet    LIPITOR    90 tablet    Take 1 tablet (40 mg) by mouth daily    Type 1 diabetes mellitus with complications (H)       BASAGLAR 100 UNIT/ML injection     12 mL    Inject 50 Units Subcutaneous daily Please provide 3 months supply    Type 1 diabetes mellitus with complications (H)       BD ULTRA FINE PEN NEEDLES     400 Units    Inject 1 dose. Subcutaneous 2 times daily BD ultra-fine insulin syringe, 30 ga. X 1/2'' short needle 1/2 cc. Use as directed.    Type 2 diabetes mellitus with complication, with long-term current use of insulin (H)       blood glucose monitoring lancets     6 Box    Use to test blood sugar 6 times daily or as directed    Type 1 diabetes mellitus with nephropathy (H)       blood glucose monitoring test strip    ACCU-CHEK LAYA PLUS    600 each    Laya Plus test strips for use in Accucheck Expert meter.  Use to test blood sugar 6 times daily. 3 month supply.  Send PA's to Dr. Mohamud.    Type 1 diabetes mellitus with nephropathy (H)       docusate sodium 100 MG tablet    COLACE    30 tablet    Stool softener for use while taking oxycodone    Abdominal pain, epigastric       dulaglutide 1.5 MG/0.5ML pen    TRULICITY    6 mL    Inject 1.5 mg Subcutaneous every 7 days    Type 1 diabetes mellitus with complications (H)       fenofibrate 160 MG tablet     90 tablet    Take 1 tablet (160 mg) by mouth daily    Mixed hyperlipidemia       ibuprofen 600 MG tablet    ADVIL/MOTRIN    120 tablet    Take 1 tablet (600 mg) by mouth every 6 hours as needed for moderate pain    Cervicalgia, Headache, Pain in joint, shoulder region       insulin aspart 100 UNIT/ML injection    NovoLOG PEN    63  mL    Before meals and bedtime correction sliding scale 120 - 160 - 1 U  161 - 200 - 2 U  201 - 240 - 3 U  241 - 280 - 4 U   281 - 320 - 5 U  321 - 360 - 6 U ...    Type 1 diabetes mellitus with complications (H)       levonorgestrel 20 MCG/24HR IUD    MIRENA    1 each    1 each (20 mcg) by Intrauterine route once for 1 dose    Encounter for insertion of intrauterine contraceptive device       losartan 25 MG tablet    COZAAR    90 tablet    Take 1 tablet (25 mg) by mouth daily    Uncontrolled type 2 diabetes mellitus with hyperglycemia, with long-term current use of insulin (H)       RANITIDINE HCL PO      Take 150 mg by mouth 2 times daily        senna-docusate 8.6-50 MG per tablet    SENOKOT-S;PERICOLACE    120 tablet    Take 2 tablets by mouth 2 times daily as needed for constipation

## 2018-08-15 NOTE — LETTER
"8/15/2018       RE: Nayeli Caal  [address]  [facility] [address]     Dear Colleague,    Thank you for referring your patient, Nayeli Caal, to [facility]. Please see a copy of my visit note below.    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  She thinks that approximately 6 months ago her right thumb started to become stiffer and more painful with movement. Cannot recall any clicking, locking, or other preceding symptoms in her right thumb as she never paid close attention to it before, but she thinks it feels as her left ring finger feels now.  She specifically recalls her left ring finger starting to lock and become painful at the MCP joint on 2018.  This caused her to notice her right thumb more.  Since then, she has had significant difficulty with communicating and using American sign language.  This is very frustrating for her.  Denies any numbness or tingling or motor weakness in her bilateral upper extremities.  Denies any preceding injury or trauma.  Never had difficulty with her hands before 6 months ago.     The patient is very aware of her diabetes.  Her primary care provider has warned her in the past to avoid any corticosteroid injections.    Review of Systems:   A " 14-point review of systems was obtained and is negative except for as noted in the HPI.     Medications:   Current Outpatient Prescriptions:      Alcohol Swabs (ALCOHOL PREP PAD) 70 % PADS, 1 each 3 times daily, Disp: 100 each, Rfl: 3     aspirin 81 MG chewable tablet, CHEW AND SWALLOW ONE TABLET BY MOUTH EVERY DAY, Disp: 90 tablet, Rfl: 2     atorvastatin (LIPITOR) 40 MG tablet, Take 1 tablet (40 mg) by mouth daily, Disp: 90 tablet, Rfl: 3     BASAGLAR 100 UNIT/ML injection, Inject 50 Units Subcutaneous daily Please provide 3 months supply, Disp: 12 mL, Rfl: 1     BD ULTRA FINE PEN NEEDLES, Inject 1 dose. Subcutaneous 2 times daily BD ultra-fine insulin syringe, 30 ga. X 1/2'' short needle 1/2 cc. Use as directed., Disp: 400 Units, Rfl: 11     blood glucose monitoring (ACCU-CHEK LAYA PLUS) test strip, Laya Plus test strips for use in Accucheck Expert meter.  Use to test blood sugar 6 times daily. 3 month supply.  Send PA's to Dr. Mohamud., Disp: 600 each, Rfl: 3     blood glucose monitoring (ACCU-CHEK FASTCLIX) lancets, Use to test blood sugar 6 times daily or as directed, Disp: 6 Box, Rfl: 3     docusate sodium (COLACE) 100 MG tablet, Stool softener for use while taking oxycodone, Disp: 30 tablet, Rfl: 1     dulaglutide (TRULICITY) 1.5 MG/0.5ML pen, Inject 1.5 mg Subcutaneous every 7 days, Disp: 6 mL, Rfl: 3     fenofibrate 160 MG tablet, Take 1 tablet (160 mg) by mouth daily, Disp: 90 tablet, Rfl: 3     ibuprofen (ADVIL/MOTRIN) 600 MG tablet, Take 1 tablet (600 mg) by mouth every 6 hours as needed for moderate pain, Disp: 120 tablet, Rfl: 1     insulin aspart (NOVOLOG PEN) 100 UNIT/ML injection, Before meals and bedtime correction sliding scale 120 - 160 - 1 U  161 - 200 - 2 U  201 - 240 - 3 U  241 - 280 - 4 U   281 - 320 - 5 U  321 - 360 - 6 U ..., Disp: 63 mL, Rfl: 3     levonorgestrel (MIRENA) 20 MCG/24HR IUD, 1 each (20 mcg) by Intrauterine route once for 1 dose, Disp: 1 each, Rfl: 0     losartan (COZAAR)  25 MG tablet, Take 1 tablet (25 mg) by mouth daily, Disp: 90 tablet, Rfl: 3     RANITIDINE HCL PO, Take 150 mg by mouth 2 times daily, Disp: , Rfl:      senna-docusate (SENOKOT-S;PERICOLACE) 8.6-50 MG per tablet, Take 2 tablets by mouth 2 times daily as needed for constipation, Disp: 120 tablet, Rfl: 0     [DISCONTINUED] BD ULTRA FINE PEN NEEDLES, Inject 1 dose Subcutaneous 2 times daily BD ultra-fine insulin syringe, 30 ga. X 1/2'' short needle 1/2 cc. Use as directed. (Patient taking differently: Inject 1 dose * Subcutaneous 2 times daily BD ultra-fine insulin syringe, 30 ga. X 1/2'' short needle 1/2 cc. Use as directed.), Disp: 200 Units, Rfl: 11    Current Facility-Administered Medications:      hylan (SYNVISC ONE) injection 48 mg, 48 mg, , , Supik, Jacob, DO, 48 mg at 06/26/18 0900     hylan (SYNVISC ONE) injection 48 mg, 48 mg, , , Supik, Jacob, DO, 48 mg at 06/26/18 0900    Allergies:  The patient reports no known allergies.    Past Medical History:  Chondromalacia of patella, right, steroid injection   Usher syndrome-congenital deafness, retinitis pigmentosa  Insulin-dependent diabetes    Past Surgical History:  Laparoscopic Cholecystectomy    Social History:  She admits to history of tobacco use, quit date 12/01/2010, and denies alcohol use except for occasional social occasions.  Quit smoking marijuana 10 years ago.  Sinhala ethnicity.  Adopted by a family in the [place].  Lives with roommates in [place].  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    Family History:  Patient is adopted.     Physical Examination:  GENERAL: Well-developed, well-appearing 38-year-old female.  Alert and oriented appropriately.  No apparent distress.  HEENT: Atraumatic, normocephalic.  RESPIRATORY: Breathing unlabored on room air.  CARDIOVASCULAR: Extremities normal perfused throughout.  2+ radial pulses bilaterally.  MUSCULOSKELETAL: Focused examination of the right  upper extremity demonstrates a mildly swollen right thumb held with the IP joint in flexion at 15 degrees.  Unable to fully extend her thumb because of significant pain at the A1 pulley area when she attempts to extend past this point.  She is Able to flex her thumb at the MCP joint with some discomfort.  Palpable tender nodule along the flexor tendon of the thumb in the A1 pulley area.  Focal tenderness over the A1 pulley.  Unable to elicit locking of the right thumb, but motion is quite limited.  Focused examination of the left upper extremity also demonstrates a tender, palpable nodule in the area of the A1 pulley at the base of the left ring finger.  She is able to demonstrate active locking of the left ring finger and can fully flex and extend it, albeit with pain.  Sensation is intact to light touch in the median, radial, and ulnar nerve distributions of both hands.  Except for the right thumb which has pain with active extension, she fIres EPL, FPL, and interossei with 5/5 strength.      Imaging: AP, lateral, and oblique views of the bilateral hands were obtained today and demonstrate no acute bony abnormalities.  Mild degenerative changes within the first CMC joint bilaterally.    I have independently reviewed the above imaging studies; the results were discussed with the patient.     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    Plan:   We discussed at length with Nayeli the diagnoses and treatment options, including observation, steroid injection and surgery.  She does not wish to have any corticosteroid injections because of her diabetes. She would like to go ahead with surgical release of the trigger fingers. She inquired about the possibility of performing  bilateral trigger finger releases at the same time, but we explained that this is not recommended because they would prevent her from using both upper extremities for hygiene and self-care.  Instead, we would recommend  a trigger release on her right thumb first followed by her left ring finger shortly thereafter (or longer if she prefers). She may return to her clerical work as soon as she feels ready. We discussed risks of surgery including but not limited to: Infection, neurovascular damage, recurrence of triggering, pain, scarring, bowstringing, and failure of symptoms to improve. After discussing the risks, benefits, and alternatives, she elected to proceed with surgery. We will work on finding a date that fits her schedule. She describes herself as very anxious and would like to have her procedure done under local with sedation, if possible. She does understand that we'd like her to be awake at the end of the procedure and motion, and she was agreeable to this. In the meantime, she may wear her custom molded thumb spica brace as needed on her right side.  All of her questions were answered in full.    This patient was seen and discussed with Dr. Streeter, agrees with the assessment and plan as above.    Jack Gottlieb [facility]  Orthopaedic PGY4    UPON MY REVIEW AND AUTHENTICATION BY ELECTRONIC SIGNATURE, this confirms (a) I performed the applicable clinical services, and (b) the record is accurate.      Greg Streeter MD

## 2018-08-15 NOTE — NURSING NOTE
Teaching Flowsheet   Relevant Diagnosis: Right thumb trigger and left ring trigger finger  Teaching Topic: Right thumb trigger release and left ring trigger release     Person(s) involved in teaching:   Patient and , american sign language     Motivation Level:  Asks Questions: Yes  Eager to Learn: Yes  Cooperative: Yes  Receptive (willing/able to accept information): Yes  Any cultural factors/Muslim beliefs that may influence understanding or compliance? No     Patient demonstrates understanding of the following:  Reason for the appointment, diagnosis and treatment plan: Yes  Knowledge of proper use of medications and conditions for which they are ordered (with special attention to potential side effects or drug interactions): Yes  Which situations necessitate calling provider and whom to contact: Yes     Teaching Concerns Addressed:   Comments: Patient understands she will need a  on the day of surgery.        Nutritional needs and diet plan: Yes  Pain management techniques: Yes  Wound Care: Yes  How and/when to access community resources: Yes     Instructional Materials Used/Given: Pre-op packet given and reviewed with patient.  Antiseptic soap given.  She was given tentative dates for right thumb release on 10/4/18 and left ring finger on 11/15/18.  This will be confirmed by the surgery scheduler through [facility].  She has our contact information for any further questions or concerns.      Time spent with patient: 15 minutes.

## 2018-09-28 ENCOUNTER — TELEPHONE (OUTPATIENT)
Dept: ENDOCRINOLOGY | Facility: CLINIC | Age: 38
End: 2018-09-28

## 2018-09-28 DIAGNOSIS — E55.9 VITAMIN D DEFICIENCY: Primary | ICD-10-CM

## 2018-09-28 RX ORDER — ERGOCALCIFEROL 1.25 MG/1
50000 CAPSULE ORAL WEEKLY
Qty: 14 CAPSULE | Refills: 3 | Status: SHIPPED | OUTPATIENT
Start: 2018-09-28 | End: 2019-11-20

## 2018-09-28 NOTE — TELEPHONE ENCOUNTER
[facility] patient.       Luica Tim, RN  Endocrine Care Coordinator  [facility]

## 2018-09-28 NOTE — TELEPHONE ENCOUNTER
Faxed refill request received from [facility] .   Medication Requested: Vitamin D 66855 units  Directions: Take one capsule by mouth every seven days  Quantity: 12  Last Office Visit: 3/12/18  Next Appointment Scheduled for: 10/15/18

## 2018-09-28 NOTE — TELEPHONE ENCOUNTER
Requesting VItamin D 80975 units but I do  not see this on  her medication list. IS she suppose to still be on  This? If so new orders needed

## 2018-10-02 ENCOUNTER — OFFICE VISIT (OUTPATIENT)
Dept: INTERNAL MEDICINE | Facility: CLINIC | Age: 38
End: 2018-10-02
Payer: COMMERCIAL

## 2018-10-02 VITALS
WEIGHT: 176.4 LBS | BODY MASS INDEX: 33.33 KG/M2 | DIASTOLIC BLOOD PRESSURE: 80 MMHG | SYSTOLIC BLOOD PRESSURE: 116 MMHG | HEART RATE: 90 BPM

## 2018-10-02 DIAGNOSIS — E10.8 TYPE 1 DIABETES MELLITUS WITH COMPLICATIONS (H): ICD-10-CM

## 2018-10-02 DIAGNOSIS — Z79.4 UNCONTROLLED TYPE 2 DIABETES MELLITUS WITH HYPERGLYCEMIA, WITH LONG-TERM CURRENT USE OF INSULIN (H): ICD-10-CM

## 2018-10-02 DIAGNOSIS — E78.5 HYPERLIPIDEMIA, UNSPECIFIED HYPERLIPIDEMIA TYPE: ICD-10-CM

## 2018-10-02 DIAGNOSIS — Z23 NEED FOR PROPHYLACTIC VACCINATION AND INOCULATION AGAINST INFLUENZA: ICD-10-CM

## 2018-10-02 DIAGNOSIS — Z13.89 SCREENING FOR DIABETIC PERIPHERAL NEUROPATHY: Primary | ICD-10-CM

## 2018-10-02 DIAGNOSIS — E11.65 UNCONTROLLED TYPE 2 DIABETES MELLITUS WITH HYPERGLYCEMIA, WITH LONG-TERM CURRENT USE OF INSULIN (H): ICD-10-CM

## 2018-10-02 RX ORDER — ASPIRIN 81 MG/1
TABLET, CHEWABLE ORAL
Qty: 90 TABLET | Refills: 3 | Status: SHIPPED | OUTPATIENT
Start: 2018-10-02 | End: 2019-10-17

## 2018-10-02 RX ORDER — FLURBIPROFEN SODIUM 0.3 MG/ML
SOLUTION/ DROPS OPHTHALMIC
COMMUNITY
Start: 2018-05-18 | End: 2024-03-12

## 2018-10-02 RX ORDER — DULAGLUTIDE 0.75 MG/.5ML
INJECTION, SOLUTION SUBCUTANEOUS
COMMUNITY
Start: 2018-02-19 | End: 2020-01-13

## 2018-10-02 RX ORDER — INSULIN GLARGINE 100 [IU]/ML
50 INJECTION, SOLUTION SUBCUTANEOUS DAILY
Qty: 12 ML | Refills: 3 | Status: SHIPPED | OUTPATIENT
Start: 2018-10-02 | End: 2019-01-14

## 2018-10-02 ASSESSMENT — PAIN SCALES - GENERAL: PAINLEVEL: NO PAIN (0)

## 2018-10-02 NOTE — NURSING NOTE
Nayeli Caal      1.  Has the patient received the information for the influenza vaccine? YES    2.  Does the patient have any of the following contraindications?     Allergy to eggs? No     Allergic reaction to previous influenza vaccines? No     Any other problems to previous influenza vaccines? No     Paralyzed by Guillain-[place] syndrome? No     Currently pregnant? NO     Current moderate or severe illness? No     Allergy to contact lens solution? No    3.  The vaccine has been administered in the usual fashion and the patient was instructed to wait 20 minutes before leaving the building in the event of an allergic reaction: YES    Vaccination given by ALEX Bourne 10:54 AM on 10/2/2018.  Recorded by Sebas Woodard

## 2018-10-02 NOTE — MR AVS SNAPSHOT
After Visit Summary   10/2/2018    Nayeli Caal    MRN: [id number]           Patient Information     Date Of Birth          1980        Visit Information        Provider Department      10/2/2018 10:15 AM Reshma Gentile; Mariya Mohamud, APRN CNP [facility]        Today's Diagnoses     Screening for diabetic peripheral neuropathy    -  1    Need for prophylactic vaccination and inoculation against influenza        Hyperlipidemia, unspecified hyperlipidemia type        Uncontrolled type 2 diabetes mellitus with hyperglycemia, with long-term current use of insulin (H)        Type 1 diabetes mellitus with complications (H)          Care Instructions    Primary Care Center Medication Refill Request Information:  * Please contact your pharmacy regarding ANY request for medication refills.  ** [facility] Prescription Fax = [phone]  * Please allow 3 business days for routine medication refills.  * Please allow 5 business days for controlled substance medication refills.     Primary Care Center Test Result notification information:  *You will be notified with in 7-10 days of your appointment day regarding the results of your test.  If you are on MyChart you will be notified as soon as the provider has reviewed the results and signed off on them.    [facility] [place]: [phone]       Please call [phone] to schedule future fasting lab appointment.             Follow-ups after your visit        Follow-up notes from your care team     Return in about 3 months (around 1/2/2019).      Your next 10 appointments already scheduled     Oct 15, 2018  8:00 AM CDT   (Arrive by 7:45 AM)   RETURN DIABETES with Jimena Bragg MD   [facility] End[facility] ([facility])    [address] [place] [address] [phone]            Nov 15, 2018   Procedure with Greg Streeter MD   [facility]  ([facility])    [address] [place] [address]  5th [facility] [address]   [phone]           Located in the [facility] at [address] [place] [address].   parking is very convenient and highly recommended.  is a $6 flat rate fee.  Both  and self parkers should enter the main arrival plaza from [facility]; parking attendants will direct you based on your parking preference.            Nov 28, 2018 11:15 AM CST   (Arrive by 11:00 AM)   RETURN HAND with Greg Streeter MD   [facility] ([facility])    [address] [place] [address]  4th [facility] [address]   [phone]            Dec 06, 2018   Procedure with Greg Streeter MD   [facility] ([facility])    [address] [place] [address]   [phone]           Located in the [facility] at [address] [place] [address].   parking is very convenient and highly recommended.  is a $6 flat rate fee.  Both  and self parkers should enter the main arrival plaza from [facility]; parking attendants will direct you based on your parking preference.            Dec 19, 2018 11:15 AM CST   (Arrive by 11:00 AM)   RETURN HAND with Greg Streeter MD   [facility] ([facility])    [address] [place] [address]   [phone]              Future tests that were ordered for you today     Open Future Orders        Priority Expected Expires Ordered    Hemoglobin A1c Routine  10/2/2019 10/2/2018    Lipid panel reflex to direct LDL Fasting Routine 10/3/2018 10/16/2018 10/2/2018            Who to contact     Please call your clinic at [phone] to:    Ask questions about your health    Make or cancel appointments    Discuss your medicines    Learn about your test  results    Speak to your doctor            Additional Information About Your Visit        [employer] Information     [employer] gives you secure access to your electronic health record. If you see a primary care provider, you can also send messages to your care team and make appointments. If you have questions, please call your primary care clinic.  If you do not have a primary care provider, please call [phone] and they will assist you.      [employer] is an electronic gateway that provides easy, online access to your medical records. With [employer], you can request a clinic appointment, read your test results, renew a prescription or communicate with your care team.     To access your existing account, please contact your [facility] or call [phone] for assistance.        Care EveryWhere ID     This is your Care EveryWhere ID. This could be used by other organizations to access your [facility] medical records  [id number]        Your Vitals Were     Pulse BMI (Body Mass Index)                90 33.33 kg/m2           Blood Pressure from Last 3 Encounters:   10/02/18 116/80   07/24/18 127/82   06/26/18 127/82    Weight from Last 3 Encounters:   10/02/18 80 kg (176 lb 6.4 oz)   08/15/18 81.6 kg (180 lb)   07/24/18 81.6 kg (180 lb)              We Performed the Following     HC FLU VAC PRESRV FREE QUAD SPLIT VIR 3+YRS IM          Where to get your medicines      These medications were sent to [facility] [place], [place] - [phone]  [id number] [place] [address]    Hours:  TRANSPLANT PHONE NUMBER [phone] Phone:  [phone]     aspirin 81 MG chewable tablet    BASAGLAR 100 UNIT/ML injection          Primary Care Provider Office Phone # Fax #    Mariya SABI Moon [phone]       [address] [place] [address] [address] [place] [id number]        Equal Access to Services     SULAIMAN MARTINEZ AH: Chica alvarado  Yeyoali, gigida luqadaha, qalibbyta kaportillo hurd, marc garcia jacoboart laAnajordy dolores. So [facility] [phone].    ATENCIÓN: Si boy esparza, tiene a dykes disposición servicios gratuitos de asistencia lingüística. Hector al [phone].    We comply with applicable federal civil rights laws and [place] laws. We do not discriminate on the basis of race, color, national origin, age, disability, sex, sexual orientation, or gender identity.            Thank you!     Thank you for choosing [facility]  for your care. Our goal is always to provide you with excellent care. Hearing back from our patients is one way we can continue to improve our services. Please take a few minutes to complete the written survey that you may receive in the mail after your visit with us. Thank you!             Your Updated Medication List - Protect others around you: Learn how to safely use, store and throw away your medicines at www.disposemymeds.org.          This list is accurate as of 10/2/18 11:27 AM.  Always use your most recent med list.                   Brand Name Dispense Instructions for use Diagnosis    Alcohol Prep Pad 70 % Pads     100 each    1 each 3 times daily    Type 2 diabetes mellitus with other diabetic ophthalmic complication (H)       aspirin 81 MG chewable tablet     90 tablet    CHEW AND SWALLOW ONE TABLET BY MOUTH EVERY DAY    Type 1 diabetes mellitus with complications (H)       atorvastatin 40 MG tablet    LIPITOR    90 tablet    Take 1 tablet (40 mg) by mouth daily    Type 1 diabetes mellitus with complications (H)       B-D U/F 31G X 5 MM   Generic drug:  insulin pen needle           BASAGLAR 100 UNIT/ML injection     12 mL    Inject 50 Units Subcutaneous daily Please provide 3 months supply    Type 1 diabetes mellitus with complications (H)       BD ULTRA FINE PEN NEEDLES     400 Units    Inject 1 dose. Subcutaneous 2 times daily BD ultra-fine insulin syringe, 30 ga. X 1/2'' short  needle 1/2 cc. Use as directed.    Type 2 diabetes mellitus with complication, with long-term current use of insulin (H)       blood glucose monitoring lancets     6 Box    Use to test blood sugar 6 times daily or as directed    Type 1 diabetes mellitus with nephropathy (H)       blood glucose monitoring test strip    ACCU-CHEK LAYA PLUS    600 each    Laya Plus test strips for use in Accucheck Expert meter.  Use to test blood sugar 6 times daily. 3 month supply.  Send PA's to Dr. Mohamud.    Type 1 diabetes mellitus with nephropathy (H)       * TRULICITY 0.75 MG/0.5ML pen   Generic drug:  dulaglutide           * dulaglutide 1.5 MG/0.5ML pen    TRULICITY    6 mL    Inject 1.5 mg Subcutaneous every 7 days    Type 1 diabetes mellitus with complications (H)       ergocalciferol 12842 units capsule    ERGOCALCIFEROL    14 capsule    Take 1 capsule (50,000 Units) by mouth once a week    Vitamin D deficiency       fenofibrate 160 MG tablet     90 tablet    Take 1 tablet (160 mg) by mouth daily    Mixed hyperlipidemia       ibuprofen 600 MG tablet    ADVIL/MOTRIN    120 tablet    Take 1 tablet (600 mg) by mouth every 6 hours as needed for moderate pain    Cervicalgia, Headache, Pain in joint, shoulder region       insulin aspart 100 UNIT/ML injection    NovoLOG PEN    63 mL    Before meals and bedtime correction sliding scale 120 - 160 - 1 U  161 - 200 - 2 U  201 - 240 - 3 U  241 - 280 - 4 U   281 - 320 - 5 U  321 - 360 - 6 U ...    Type 1 diabetes mellitus with complications (H)       levonorgestrel 20 MCG/24HR IUD    MIRENA    1 each    1 each (20 mcg) by Intrauterine route once for 1 dose    Encounter for insertion of intrauterine contraceptive device       losartan 25 MG tablet    COZAAR    90 tablet    Take 1 tablet (25 mg) by mouth daily    Uncontrolled type 2 diabetes mellitus with hyperglycemia, with long-term current use of insulin (H)       RANITIDINE HCL PO      Take 150 mg by mouth 2 times daily         senna-docusate 8.6-50 MG per tablet    SENOKOT-S;PERICOLACE    120 tablet    Take 2 tablets by mouth 2 times daily as needed for constipation        * Notice:  This list has 2 medication(s) that are the same as other medications prescribed for you. Read the directions carefully, and ask your doctor or other care provider to review them with you.

## 2018-10-02 NOTE — NURSING NOTE
Chief Complaint   Patient presents with     Recheck Medication     diabetes follow up     Burn     left hand, burns on the knuckles, blisters started about two weeks ago       Sebas Woodard, EMT 10:53 AM on 10/2/2018.

## 2018-10-02 NOTE — PROGRESS NOTES
HPI: Nayeli Caal is a 38 year old female who comes in accompanied by   For diabetes follow-up.  She has been working, recently bought and moved into a condo, has been traveling some and has not been checking her blood sugars although she has been diligent about taking her medication. She feels well.  Work is good. She is currently living alone and likes it.       Patient Active Problem List   Diagnosis     Headache     Cervicalgia     Essential hypertension     Hypersomnia, organic     Other chronic nonalcoholic liver disease     Diabetic retinopathy of both eyes (H)     Xerosis cutis     Obesity     Usher Syndrome: congenital deafness, retinitis pigmentosa     Macular degeneration, age related, nonexudative     Benign neoplasm of iris     Dry eye syndrome     Pemphigus erythematosus     Tenosynovitis of ankle     Pain in joint, shoulder region, impingment     Chondromalacia of patella, right, steroid injection 6/12/2015     Uncontrolled type 2 diabetes mellitus with hyperglycemia, with long-term current use of insulin (H)     Non morbid obesity due to excess calories     Symptomatic cholelithiasis     Pain in finger of both hands     Trigger thumb of right hand     Trigger ring finger of left hand         Current Outpatient Prescriptions   Medication Sig Dispense Refill     Alcohol Swabs (ALCOHOL PREP PAD) 70 % PADS 1 each 3 times daily 100 each 3     aspirin 81 MG chewable tablet CHEW AND SWALLOW ONE TABLET BY MOUTH EVERY DAY 90 tablet 3     atorvastatin (LIPITOR) 40 MG tablet Take 1 tablet (40 mg) by mouth daily 90 tablet 3     B-D U/F insulin pen needle        BASAGLAR 100 UNIT/ML injection Inject 50 Units Subcutaneous daily Please provide 3 months supply 12 mL 3     BD ULTRA FINE PEN NEEDLES Inject 1 dose. Subcutaneous 2 times daily BD ultra-fine insulin syringe, 30 ga. X 1/2'' short needle 1/2 cc. Use as directed. 400 Units 11     blood glucose monitoring (ACCU-CHEK LAYA  PLUS) test strip Anali Plus test strips for use in Accucheck Expert meter.  Use to test blood sugar 6 times daily. 3 month supply.  Send PA's to Dr. Mohamud. 600 each 3     blood glucose monitoring (ACCU-CHEK FASTCLIX) lancets Use to test blood sugar 6 times daily or as directed 6 Box 3     dulaglutide (TRULICITY) 1.5 MG/0.5ML pen Inject 1.5 mg Subcutaneous every 7 days 6 mL 3     ergocalciferol (ERGOCALCIFEROL) 46463 units capsule Take 1 capsule (50,000 Units) by mouth once a week 14 capsule 3     fenofibrate 160 MG tablet Take 1 tablet (160 mg) by mouth daily 90 tablet 3     ibuprofen (ADVIL/MOTRIN) 600 MG tablet Take 1 tablet (600 mg) by mouth every 6 hours as needed for moderate pain 120 tablet 1     insulin aspart (NOVOLOG PEN) 100 UNIT/ML injection Before meals and bedtime correction sliding scale  120 - 160 - 1 U   161 - 200 - 2 U   201 - 240 - 3 U   241 - 280 - 4 U    281 - 320 - 5 U   321 - 360 - 6 U ... 63 mL 3     losartan (COZAAR) 25 MG tablet Take 1 tablet (25 mg) by mouth daily 90 tablet 3     RANITIDINE HCL PO Take 150 mg by mouth 2 times daily       senna-docusate (SENOKOT-S;PERICOLACE) 8.6-50 MG per tablet Take 2 tablets by mouth 2 times daily as needed for constipation 120 tablet 0     TRULICITY 0.75 MG/0.5ML pen        levonorgestrel (MIRENA) 20 MCG/24HR IUD 1 each (20 mcg) by Intrauterine route once for 1 dose 1 each 0     [DISCONTINUED] BD ULTRA FINE PEN NEEDLES Inject 1 dose Subcutaneous 2 times daily BD ultra-fine insulin syringe, 30 ga. X 1/2'' short needle 1/2 cc. Use as directed. (Patient taking differently: Inject 1 dose * Subcutaneous 2 times daily BD ultra-fine insulin syringe, 30 ga. X 1/2'' short needle 1/2 cc. Use as directed.) 200 Units 11         ALLERGIES: Review of patient's allergies indicates no known allergies.    PAST MEDICAL HX:   Past Medical History:   Diagnosis Date     Chondromalacia of patella, right, steroid injection 6/12/2015 11/24/2015     Deaf      Diabetes  mellitus (H)      Diabetic retinopathy of both eyes (H) 8/19/2011     Dry eye syndrome 11/22/2011     Hypertension      Macular degeneration, age related, nonexudative 11/22/2011     Migraines      Other chronic nonalcoholic liver disease 8/19/2011     Pemphigus erythematosus 11/22/2011     Uncontrolled type 2 diabetes mellitus with hyperglycemia, with long-term current use of insulin (H) 5/15/2017     Usher Syndrome: congenital deafness, retinitis pigmentosa 8/19/2011       PAST SURGICAL HX:   Past Surgical History:   Procedure Laterality Date     LAPAROSCOPIC CHOLECYSTECTOMY N/A 3/10/2018    Procedure: LAPAROSCOPIC CHOLECYSTECTOMY;  Laparoscopic Cholecystectomy ;  Surgeon: Kuldeep Sigala MD;  Location: [facility]       FAMILY HX:    Family History   Problem Relation Age of Onset     Unknown/Adopted Other        IMMUNIZATION HX:   Immunization History   Administered Date(s) Administered     HEPA 05/18/2007, 10/07/2008     HepB 08/06/2001, 01/03/2003, 04/09/2013     Influenza (IIV3) PF 10/09/2011, 11/12/2013, 10/23/2014, 11/19/2015     Influenza Vaccine IM 3yrs+ 4 Valent IIV4 10/17/2017     MMR 05/21/2007     Pneumo Conj 13-V (2010&after) 10/23/2014     Pneumococcal 23 valent 07/18/2006     TD (ADULT, 7+) 11/01/1999     Tdap (Adacel,Boostrix) 01/09/2009       SOCIAL HX: Living alone [place]       ROS  CONSTITUTIONAL:normal.  SKIN: no worrisome rashes, no worrisome moles, no worrisome lesions  SKIN:no rashes or lesions  EYES: no acute vision problems or changes.  She will call and schedule eye exam.  She does this outside of the .  ENT: no ear problems, no mouth problems, no throat problems.  Dntal UTD.   RESP: no significant cough, no shortness of breath  CV: no chest pain, no palpitations, no new or worsening peripheral edema  GI: no nausea, no vomiting, no constipation, no diarrhea  : nl  Breast exam: no masses.  MS: feet are improved.   Neuro: nl  Psyche: nl    OBJECTIVE:  BP  116/80 (BP Location: Right arm, Patient Position: Sitting, Cuff Size: Adult Large)  Pulse 90  Wt 80 kg (176 lb 6.4 oz)  BMI 33.33 kg/m2   Wt Readings from Last 1 Encounters:   10/02/18 80 kg (176 lb 6.4 oz)     Constitutional: no distress, comfortable, pleasant   Eyes: anicteric  Cardiovascular: regular rate and rhythm, normal S1 and S2, no murmurs, rubs or gallops, peripheral pulses full and symmetric   Respiratory: clear to auscultation, no wheezes or crackles, normal breath sounds   Musculoskeletal: ambulatory, no edema   Skin: no concerning lesions, no jaundice, temp normal   Neurological: normal gait,normal speech, no tremor   Psychological: appropriate mood   ASSESSMENT/PLAN:  Nayeli was seen today for recheck medication and burn.    Diagnoses and all orders for this visit:    Need for prophylactic vaccination and inoculation against influenza  -     HC FLU VAC PRESRV FREE QUAD SPLIT VIR 3+YRS IM    Hyperlipidemia, unspecified hyperlipidemia type  -     Lipid panel reflex to direct LDL Fasting; Future    Uncontrolled type 2 diabetes mellitus with hyperglycemia, with long-term current use of insulin (H)  -     Hemoglobin A1c; Future (before Diabetes Clinic appt.    Type 2 DM        She is still non-compliant regarding testing but has improved in taking her daily meds.  -     aspirin 81 MG chewable tablet; CHEW AND SWALLOW ONE TABLET BY MOUTH EVERY DAY  -     BASAGLAR 100 UNIT/ML injection; Inject 50 Units Subcutaneous daily Please provide 3 months supply.      Total time spent 25 minutes.  More than 50% of the time spent with Ms. Ingrid Caal on counseling / coordinating her care.    Mariya CELESTIN, CNP

## 2018-10-02 NOTE — PATIENT INSTRUCTIONS
[facility] Medication Refill Request Information:  * Please contact your pharmacy regarding ANY request for medication refills.  ** [facility] Prescription Fax = [phone]  * Please allow 3 business days for routine medication refills.  * Please allow 5 business days for controlled substance medication refills.     [facility] Test Result notification information:  *You will be notified with in 7-10 days of your appointment day regarding the results of your test.  If you are on MyChart you will be notified as soon as the provider has reviewed the results and signed off on them.    [facility]): [phone]       Please call [phone] to schedule future fasting lab appointment.

## 2018-10-15 ENCOUNTER — TELEPHONE (OUTPATIENT)
Dept: SURGERY | Facility: CLINIC | Age: 38
End: 2018-10-15

## 2018-10-15 ENCOUNTER — OFFICE VISIT (OUTPATIENT)
Dept: ENDOCRINOLOGY | Facility: CLINIC | Age: 38
End: 2018-10-15
Payer: COMMERCIAL

## 2018-10-15 VITALS
BODY MASS INDEX: 32.91 KG/M2 | WEIGHT: 174.3 LBS | SYSTOLIC BLOOD PRESSURE: 147 MMHG | DIASTOLIC BLOOD PRESSURE: 85 MMHG | HEART RATE: 90 BPM | HEIGHT: 61 IN

## 2018-10-15 DIAGNOSIS — R80.9 PROTEINURIA, UNSPECIFIED TYPE: ICD-10-CM

## 2018-10-15 DIAGNOSIS — E78.1 HYPERTRIGLYCERIDEMIA: ICD-10-CM

## 2018-10-15 DIAGNOSIS — E11.65 UNCONTROLLED TYPE 2 DIABETES MELLITUS WITH HYPERGLYCEMIA, WITH LONG-TERM CURRENT USE OF INSULIN (H): Primary | ICD-10-CM

## 2018-10-15 DIAGNOSIS — Z79.4 UNCONTROLLED TYPE 2 DIABETES MELLITUS WITH HYPERGLYCEMIA, WITH LONG-TERM CURRENT USE OF INSULIN (H): Primary | ICD-10-CM

## 2018-10-15 DIAGNOSIS — E78.5 HYPERLIPIDEMIA, UNSPECIFIED HYPERLIPIDEMIA TYPE: ICD-10-CM

## 2018-10-15 DIAGNOSIS — I10 ESSENTIAL HYPERTENSION: ICD-10-CM

## 2018-10-15 DIAGNOSIS — E11.65 UNCONTROLLED TYPE 2 DIABETES MELLITUS WITH HYPERGLYCEMIA, WITH LONG-TERM CURRENT USE OF INSULIN (H): ICD-10-CM

## 2018-10-15 DIAGNOSIS — Z79.4 UNCONTROLLED TYPE 2 DIABETES MELLITUS WITH HYPERGLYCEMIA, WITH LONG-TERM CURRENT USE OF INSULIN (H): ICD-10-CM

## 2018-10-15 LAB
CHOLEST SERPL-MCNC: 263 MG/DL
HBA1C MFR BLD: 10.3 % (ref 0–5.6)
HDLC SERPL-MCNC: 29 MG/DL
LDLC SERPL CALC-MCNC: ABNORMAL MG/DL
LDLC SERPL DIRECT ASSAY-MCNC: 136 MG/DL
NONHDLC SERPL-MCNC: 234 MG/DL
TRIGL SERPL-MCNC: 560 MG/DL

## 2018-10-15 RX ORDER — LOSARTAN POTASSIUM 50 MG/1
50 TABLET ORAL DAILY
Qty: 90 TABLET | Refills: 3 | Status: SHIPPED | OUTPATIENT
Start: 2018-10-15 | End: 2019-10-17

## 2018-10-15 RX ORDER — FLASH GLUCOSE SENSOR
KIT MISCELLANEOUS
Qty: 3 EACH | Refills: 11 | Status: SHIPPED | OUTPATIENT
Start: 2018-10-15 | End: 2018-11-26

## 2018-10-15 RX ORDER — FLASH GLUCOSE SENSOR
1 KIT MISCELLANEOUS DAILY
Qty: 1 DEVICE | Refills: 0 | Status: SHIPPED | OUTPATIENT
Start: 2018-10-15 | End: 2018-11-20

## 2018-10-15 NOTE — LETTER
10/15/2018       RE: Nayeli Caal  [address]  [facility] [address]     Dear Colleague,    Thank you for referring your patient, Nayeli Caal, to the [facility] ENDOCRINOLOGY at [facility]. Please see a copy of my visit note below.      The patient is seen for evaluation of diabetes. She is seen with the help of a .     Nayeli Caal is 38 year old female diagnosed with diabetes in the fall of the year 2000, while being investigated for headaches and increased urination.  The patient is adopted and she doesn't know her parents.     She was treated with metformin until 2003, when she was started on insulin. In 2396-2357, she was treated with Bydureon for a period of time.  Trulicity was started in December 2017 and the dose was increased to 1.5 mg daily in March 2018.      She had no prior episodes of diabetes ketoacidosis, despite completely discontinuing insulin for months at a time. Hemoglobin A1c has been variable between 8 and 12% over the years. In 2005, she had a C-peptide of 1.3 for a glucose level of 221. Over the last 2 years, her hemoglobin A1c has been around 12%.  A1c today was 10.3%.    As per patient, Trulicity has been effective in controlling her appetite.  The glucometer readings revealed that she checks her blood glucose 0.2 times daily.  Average blood glucose is 261, with a standard deviation of 25 and a range variable between 233 and 299.  She only checks her blood sugar in the morning, rarely before lunch.  Whenever she checks, she does document the carbohydrate intake and insulin dose administered, variable between 5 and 11 units.    She reports going through significant stress, [facility].  She has also been traveling and forgetting to bring her meter or NovoLog insulin with her.  In general, she tries to have 3 meals a day and she denies frequent snacking.  For quite some  time, she did not take any NovoLog and she just resumed taking it, a couple of weeks ago.    She is taking 46 units Basaglar and Novolog according to the Accu-chek Expert meter below (I reviewed the meter settings):  Insulin to Carb Ratio: 8  Correction Factor: 50  BG Target: 110-150  Offset time 2 hrs   Acting time 3 hrs     Diabetes complications:  Retinopathy: last eye exam - June 2017. 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   Nephropathy: h/o proteinuria (most recent urine microalbumin positive in January 2017); normal GFR. On 25 mg losartan daily (tx with lisinopril was associated with a dry cough).   Neuropathy: occasional numbness or tingling sensation in her hands, no pain   She is symptomatic for hypoglycemia and she developed symptoms when her blood glucose is below 80 ( shakiness, dizziness).  The lowest blood glucose numbers that she remembers is 59.  She denies prior episodes of loss of consciousness due to hypoglycemia. She does have glucagon at home.    Nayeli has a history of dyslipidemia, currently medicated with 40 mg atorvastatin and 160 mg fenofibrate daily (added in 2015). In the past, the triglycerides were as high as 1656 (she wasn't taking the meds at the time she had the labwork done). She has no prior history of pancreatitis.  1 week prior to recent lab work, she states she did not take fenofibrate, as she did not have time to  the prescription.    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  Hepatic steatosis   Prior screen for celiac disease negative    Past Surgical History:   Procedure Laterality Date     LAPAROSCOPIC CHOLECYSTECTOMY N/A 3/10/2018    Procedure: LAPAROSCOPIC CHOLECYSTECTOMY;  Laparoscopic Cholecystectomy ;  Surgeon: Kuldeep Sigala MD;  Location: [facility] OR       Current Medications     Current Outpatient Prescriptions:      Alcohol Swabs (ALCOHOL PREP  PAD) 70 % PADS, 1 each 3 times daily, Disp: 100 each, Rfl: 3     aspirin 81 MG chewable tablet, CHEW AND SWALLOW ONE TABLET BY MOUTH EVERY DAY, Disp: 90 tablet, Rfl: 3     atorvastatin (LIPITOR) 40 MG tablet, Take 1 tablet (40 mg) by mouth daily, Disp: 90 tablet, Rfl: 3     B-D U/F insulin pen needle, , Disp: , Rfl:      BASAGLAR 100 UNIT/ML injection, Inject 50 Units Subcutaneous daily Please provide 3 months supply, Disp: 12 mL, Rfl: 3     BD ULTRA FINE PEN NEEDLES, Inject 1 dose. Subcutaneous 2 times daily BD ultra-fine insulin syringe, 30 ga. X 1/2'' short needle 1/2 cc. Use as directed., Disp: 400 Units, Rfl: 11     blood glucose monitoring (ACCU-CHEK LAYA PLUS) test strip, Laya Plus test strips for use in Accucheck Expert meter.  Use to test blood sugar 6 times daily. 3 month supply.  Send PA's to Dr. Mohamud., Disp: 600 each, Rfl: 3     blood glucose monitoring (ACCU-CHEK FASTCLIX) lancets, Use to test blood sugar 6 times daily or as directed, Disp: 6 Box, Rfl: 3     dulaglutide (TRULICITY) 1.5 MG/0.5ML pen, Inject 1.5 mg Subcutaneous every 7 days, Disp: 6 mL, Rfl: 3     ergocalciferol (ERGOCALCIFEROL) 82503 units capsule, Take 1 capsule (50,000 Units) by mouth once a week, Disp: 14 capsule, Rfl: 3     fenofibrate 160 MG tablet, Take 1 tablet (160 mg) by mouth daily, Disp: 90 tablet, Rfl: 3     ibuprofen (ADVIL/MOTRIN) 600 MG tablet, Take 1 tablet (600 mg) by mouth every 6 hours as needed for moderate pain, Disp: 120 tablet, Rfl: 1     insulin aspart (NOVOLOG PEN) 100 UNIT/ML injection, Before meals and bedtime correction sliding scale 120 - 160 - 1 U  161 - 200 - 2 U  201 - 240 - 3 U  241 - 280 - 4 U   281 - 320 - 5 U  321 - 360 - 6 U ..., Disp: 63 mL, Rfl: 3     losartan (COZAAR) 25 MG tablet, Take 1 tablet (25 mg) by mouth daily, Disp: 90 tablet, Rfl: 3     TRULICITY 0.75 MG/0.5ML pen, , Disp: , Rfl:      levonorgestrel (MIRENA) 20 MCG/24HR IUD, 1 each (20 mcg) by Intrauterine route once for 1 dose,  Disp: 1 each, Rfl: 0     [DISCONTINUED] BD ULTRA FINE PEN NEEDLES, Inject 1 dose Subcutaneous 2 times daily BD ultra-fine insulin syringe, 30 ga. X 1/2'' short needle 1/2 cc. Use as directed. (Patient taking differently: Inject 1 dose * Subcutaneous 2 times daily BD ultra-fine insulin syringe, 30 ga. X 1/2'' short needle 1/2 cc. Use as directed.), Disp: 200 Units, Rfl: 11    Current Facility-Administered Medications:      hylan (SYNVISC ONE) injection 48 mg, 48 mg, , , Supik, Jacob, DO, 48 mg at 06/26/18 0900     hylan (SYNVISC ONE) injection 48 mg, 48 mg, , , Supik, Jacob, DO, 48 mg at 06/26/18 0900    Family History   Problem Relation Age of Onset     Unknown/Adopted Other      Social History  Single. No children. Lives with a roommate. She smoked for almost 10 years, on and off, up to 1 PPD; quit smoking 2000. She only drinks alcohol occasionally. Denies using illicit drugs. Occupation: engineering department.      Review of Systems   Systemic symptoms: weight stable  Eye symptoms: No eye symptoms.  Otolaryngeal symptoms: deaf   Breast symptoms: No breast symptoms.  Cardiovascular symptoms: No cardiovascular symptoms.    Pulmonary symptoms: No pulmonary symptoms.  Gastrointestinal symptoms: worsening GERD symptoms noticed with certain foods; long-standing episodes of nausea    Genitourinary symptoms: No genitourinary symptoms.  Endocrine symptoms:Menstrual periods, irregular, and light, 3-5 weeks apart - IUD 1/2017   Hematologic symptoms: No hematologic symptoms.  Musculoskeletal symptoms: bilateral knee pain, intermittently  Neurological symptoms: as above  Psychological symptoms: No psychological symptoms.    Skin symptoms: No skin symptoms    Vital Signs     Previous Weights:    Wt Readings from Last 10 Encounters:   10/15/18 79.1 kg (174 lb 4.8 oz)   10/02/18 80 kg (176 lb 6.4 oz)   08/15/18 81.6 kg (180 lb)   07/24/18 81.6 kg (180 lb)   06/26/18 81.6 kg (180 lb)   05/17/18 82 kg (180 lb 11.2 oz)  "  03/22/18 83.3 kg (183 lb 9.6 oz)   03/13/18 83.1 kg (183 lb 3.2 oz)   03/12/18 82.9 kg (182 lb 11.2 oz)   03/09/18 83.6 kg (184 lb 4.9 oz)     /85  Pulse 90  Ht 1.549 m (5' 1\")  Wt 79.1 kg (174 lb 4.8 oz)  BMI 32.93 kg/m2    Physical Exam  General Appearance:  she is well developed, well nourished and in no distress     HEENT:   oropharynx clear and moist, no JVD, no bruits      no thyromegaly, no palpable nodules   Cardiovascular:  regular rhythm, systolic murmur, distal pulse palpable, no edema  Respiratory:       chest clear, no rales, no rhonchi   Cardiovascular:  regular rhythm, no murmurs, distal pulse palpable, no edema  Respiratory:       chest clear, no rales, no rhonchi   Gastrointestinal: abdomen soft, non-tender, non-distended, normal bowel sounds,   no organomegaly   Musculoskeletal: normal tone and strength  Psychiatric: affect and judgment normal  Skin: warm, no lesions   Neurological: reflexes normal and symmetric, no resting tremor    Lab Results:  I reviewed prior lab results documented in Epic  Lab Results   Component Value Date    A1C 10.3 (H) 10/15/2018    A1C 13.4 (H) 11/27/2017    A1C 11.0 (H) 07/13/2017    A1C 10.9 (H) 05/15/2017    A1C 12.8 (H) 01/02/2017    HEMOGLOBINA1 8.7 (A) 03/12/2018    HEMOGLOBINA1 12.0 (A) 02/03/2014    HEMOGLOBINA1 10.3 (A) 10/28/2013    HEMOGLOBINA1 11.3 (A) 08/06/2013    HEMOGLOBINA1 13.1 (H) 08/12/2011       Hemoglobin   Date Value Ref Range Status   03/13/2018 14.2 11.7 - 15.7 g/dL Final     Hematocrit   Date Value Ref Range Status   03/13/2018 42.3 35.0 - 47.0 % Final     Cholesterol   Date Value Ref Range Status   10/15/2018 263 (H) <200 mg/dL Final     Comment:     Desirable:       <200 mg/dl     Cholesterol/HDL Ratio   Date Value Ref Range Status   09/16/2013 5.8 (H) 0.0 - 5.0 Final     HDL Cholesterol   Date Value Ref Range Status   10/15/2018 29 (L) >49 mg/dL Final     LDL Cholesterol Calculated   Date Value Ref Range Status   10/15/2018  <100 " mg/dL Final    Cannot estimate LDL when triglyceride exceeds 400 mg/dL     VLDL-Cholesterol   Date Value Ref Range Status   09/16/2013 44 (H) 0 - 30 mg/dL Final     Triglycerides   Date Value Ref Range Status   10/15/2018 560 (H) <150 mg/dL Final     Comment:     Borderline high:  150-199 mg/dl  High:             200-499 mg/dl  Very high:       >499 mg/dl  Fasting specimen       Albumin Urine mg/L   Date Value Ref Range Status   05/17/2018 134 mg/L Final     TSH   Date Value Ref Range Status   05/17/2018 1.84 0.40 - 4.00 mU/L Final         Last Basic Metabolic Panel:    Sodium   Date Value Ref Range Status   05/17/2018 138 133 - 144 mmol/L Final     Potassium   Date Value Ref Range Status   05/17/2018 4.3 3.4 - 5.3 mmol/L Final     Chloride   Date Value Ref Range Status   05/17/2018 106 94 - 109 mmol/L Final     Calcium   Date Value Ref Range Status   05/17/2018 8.7 8.5 - 10.1 mg/dL Final     Carbon Dioxide   Date Value Ref Range Status   05/17/2018 25 20 - 32 mmol/L Final     Urea Nitrogen   Date Value Ref Range Status   05/17/2018 13 7 - 30 mg/dL Final     Creatinine   Date Value Ref Range Status   05/17/2018 0.56 0.52 - 1.04 mg/dL Final     GFR Estimate   Date Value Ref Range Status   05/17/2018 >90 >60 mL/min/1.7m2 Final     Comment:     Non  GFR Calc     Glucose   Date Value Ref Range Status   05/17/2018 286 (H) 70 - 99 mg/dL Final       AST   Date Value Ref Range Status   05/17/2018 30 0 - 45 U/L Final     ALT   Date Value Ref Range Status   05/17/2018 56 (H) 0 - 50 U/L Final     Albumin   Date Value Ref Range Status   05/17/2018 3.9 3.4 - 5.0 g/dL Final     Assessment     1. Diabetes, type 2 vs. PRAVIN, complicated by diabetic nephropathy and retinopathy, uncontrolled, mainly due to noncompliance with the prescribed regimen.  Recommendations:  Start empagliflozin, at 10 mg daily.  The patient was counseled on side effects: Genital fungal infection, hyperkalemia, increased cholesterol, polyuria,  the rare risk of DKA and osteoporosis.  The polyuria is transient.  The medication might help with weight loss and improve the blood pressure.  Consider a glucose sensor.  I entered the prescription for freestyle flash mian.  Counseled the patient on the use of the sensor.  I am hopeful that, by using the sensor, she is going to enter the blood glucose numbers in the meter and administer insulin according to the meter settings.  Also, the sensor might help her change her behavior regarding the administration of short-acting insulin, is it makes her aware of postprandial hyperglycemia.  If the sensor is approved by her insurance, the plan is to schedule an appointment with the diabetes educator, to review its use in detail.    2. Dyslipidemia   The triglycerides have been higher on recent lab work, most likely a consequence of not taking fenofibrate.  Counseled on the importance of taking all her medications as instructed.    3.  Proteinuria/Hypertension  She reports being compliant in taking losartan, 25 mg daily.  Advised to increase the dose to 50 mg daily, in view of nephropathy.    No orders of the defined types were placed in this encounter.    Total visit time 40 min/counceling and coordination of care 20 min.      Jimena Bragg MD

## 2018-10-15 NOTE — TELEPHONE ENCOUNTER
Central Prior Authorization Team   Phone: [phone]      PA Initiation    Medication: Jardiance 10MG-PA initiated  Insurance Company: [employer] - Phone [phone] Fax [phone]  Pharmacy Filling the Rx: [facility] [place], [place] - [address] [place] [address] [phone]  Filling Pharmacy Phone: [phone]  Filling Pharmacy Fax:    Start Date: 10/15/2018

## 2018-10-15 NOTE — MR AVS SNAPSHOT
After Visit Summary   10/15/2018    Nayeli Caal    MRN: [id number]           Patient Information     Date Of Birth          1980        Visit Information        Provider Department      10/15/2018 7:45 AM Reshma Cerna Angela Ionela, MD [facility]        Today's Diagnoses     Uncontrolled type 2 diabetes mellitus with hyperglycemia, with long-term current use of insulin (H)    -  1       Follow-ups after your visit        Follow-up notes from your care team     Return in about 1 month (around 11/15/2018) for diabetes educator apt.      Your next 10 appointments already scheduled     Nov 15, 2018   Procedure with Greg Streeter MD   [facility] ([facility])    [address] [place] [address] [id number] [phone]           Located in the Clinics and Surgery Center at [address] [place] [address].   parking is very convenient and highly recommended.  is a $6 flat rate fee.  Both  and self parkers should enter the main arrival plaza from [facility]; parking attendants will direct you based on your parking preference.            Nov 19, 2018 11:00 AM CST   (Arrive by 10:45 AM)   RETURN DIABETES with Jimena Bragg MD   [facility] ([facility])    [address] [place] [address] [id number] [phone]            Nov 19, 2018 12:30 PM CST   (Arrive by 12:15 PM)   Diabetes Education with Leigh Ann Escoto RN   [facility] ([facility])    [address] [place] [address] [id number] [phone]           Please bring to your appointment: 1) 2 - 3 day food journal. Write down everything you eat and drink for 2 - 3 days prior to your appointment. 2) Your insurance card. 3) A blood glucose meter. If you do not have one, contact your pharmacy or clinic  prior to your appointment. Your pharmacist can assist you with choosing a meter suitable to your insurance needs. 4)  A list of your medications, including prescription, over the counter and herbal. Include dosage and frequency where appropriate.            Nov 28, 2018 11:15 AM CST   (Arrive by 11:00 AM)   RETURN HAND with Greg Streeter MD   [facility] ([facility])    [address] [place] [address]  [facility] [id number] [phone]            Dec 06, 2018   Procedure with Greg Streeter MD   [facility] ([facility])    [address] [place] [address]  [facility] [id number] [phone]           Located in the Clinics and Surgery Center at [address] [place] [address].   parking is very convenient and highly recommended.  is a $6 flat rate fee.  Both  and self parkers should enter the main arrival plaza from [facility]; parking attendants will direct you based on your parking preference.            Dec 19, 2018 11:15 AM CST   (Arrive by 11:00 AM)   RETURN HAND with Greg Streeter MD   [facility] ([facility])    [address] [place] [address]  [facility] [id number] [phone]              Who to contact     Please call your clinic at [phone] to:    Ask questions about your health    Make or cancel appointments    Discuss your medicines    Learn about your test results    Speak to your doctor            Additional Information About Your Visit        Tegile System[employer]art Information     [employer]at gives you secure access to your electronic health record. If you see a primary care provider, you can also send messages to your care team and make appointments. If you have questions, please call your primary care clinic.  If you do not have a primary care provider, please call [phone] and they will assist you.      Snapjoy is an  "electronic gateway that provides easy, online access to your medical records. With Dar[employer], you can request a clinic appointment, read your test results, renew a prescription or communicate with your care team.     To access your existing account, please contact your [facility] or call [phone] for assistance.        Care EveryWhere ID     This is your Care EveryWhere ID. This could be used by other organizations to access your [facility] medical records  [id number]        Your Vitals Were     Pulse Height BMI (Body Mass Index)             90 1.549 m (5' 1\") 32.93 kg/m2          Blood Pressure from Last 3 Encounters:   10/15/18 147/85   10/02/18 116/80   07/24/18 127/82    Weight from Last 3 Encounters:   10/15/18 79.1 kg (174 lb 4.8 oz)   10/02/18 80 kg (176 lb 6.4 oz)   08/15/18 81.6 kg (180 lb)              Today, you had the following     No orders found for display         Today's Medication Changes          These changes are accurate as of 10/15/18  8:42 AM.  If you have any questions, ask your nurse or doctor.               Start taking these medicines.        Dose/Directions    continuous blood glucose monitoring sensor   Used for:  Uncontrolled type 2 diabetes mellitus with hyperglycemia, with long-term current use of insulin (H)   Started by:  Jimena Bragg MD        For use with Freestyle Evelia Flash  for continuous monitioring of blood glucose levels. Replace sensor every 10 days.   Quantity:  3 each   Refills:  11       empagliflozin 10 MG Tabs tablet   Commonly known as:  JARDIANCE   Used for:  Uncontrolled type 2 diabetes mellitus with hyperglycemia, with long-term current use of insulin (H)   Started by:  Jimena Bragg MD        Dose:  10 mg   Take 1 tablet (10 mg) by mouth daily   Quantity:  90 tablet   Refills:  3       FREESTYLE EVELIA READER Miriam   Used for:  Uncontrolled type 2 diabetes mellitus with hyperglycemia, with long-term " current use of insulin (H)   Started by:  Jimena Bragg MD        Dose:  1 Device   1 Device daily   Quantity:  1 Device   Refills:  0         These medicines have changed or have updated prescriptions.        Dose/Directions    losartan 50 MG tablet   Commonly known as:  COZAAR   This may have changed:    - medication strength  - how much to take   Used for:  Uncontrolled type 2 diabetes mellitus with hyperglycemia, with long-term current use of insulin (H)   Changed by:  Jimena Bragg MD        Dose:  50 mg   Take 1 tablet (50 mg) by mouth daily   Quantity:  90 tablet   Refills:  3            Where to get your medicines      These medications were sent to [place], [place] - [phone]  [address], [facility] [address]    Hours:  TRANSPLANT PHONE NUMBER [phone] Phone:  [phone]     continuous blood glucose monitoring sensor    empagliflozin 10 MG Tabs tablet    FREESTYLE ZORAIDA READER Miriam    losartan 50 MG tablet                Primary Care Provider Office Phone # Fax #    Mariya GRACE Oneida, APRN [phone]       [address] [place] [address] [phone]  [facility] [address]        Equal Access to Services     MUNDO MARTINEZ AH: Hadii aad carey hadasho Socj, waaxda luqadaha, qaybta kaalmada adeegyada, marc rae . So [facility] [phone].    ATENCIÓN: Si habla español, tiene a dykes disposición servicios gratuitos de asistencia lingüística. Llame al [phone].    We comply with applicable federal civil rights laws and [place] laws. We do not discriminate on the basis of race, color, national origin, age, disability, sex, sexual orientation, or gender identity.            Thank you!     Thank you for choosing [facility]  for your care. Our goal is always to provide you with excellent care. Hearing back from our patients is one way we can continue to improve our services.  Please take a few minutes to complete the written survey that you may receive in the mail after your visit with us. Thank you!             Your Updated Medication List - Protect others around you: Learn how to safely use, store and throw away your medicines at www.disposemymeds.org.          This list is accurate as of 10/15/18  8:42 AM.  Always use your most recent med list.                   Brand Name Dispense Instructions for use Diagnosis    Alcohol Prep Pad 70 % Pads     100 each    1 each 3 times daily    Type 2 diabetes mellitus with other diabetic ophthalmic complication (H)       aspirin 81 MG chewable tablet     90 tablet    CHEW AND SWALLOW ONE TABLET BY MOUTH EVERY DAY    Type 1 diabetes mellitus with complications (H)       atorvastatin 40 MG tablet    LIPITOR    90 tablet    Take 1 tablet (40 mg) by mouth daily    Type 1 diabetes mellitus with complications (H)       B-D U/F 31G X 5 MM   Generic drug:  insulin pen needle           BASAGLAR 100 UNIT/ML injection     12 mL    Inject 50 Units Subcutaneous daily Please provide 3 months supply    Type 1 diabetes mellitus with complications (H)       BD ULTRA FINE PEN NEEDLES     400 Units    Inject 1 dose. Subcutaneous 2 times daily BD ultra-fine insulin syringe, 30 ga. X 1/2'' short needle 1/2 cc. Use as directed.    Type 2 diabetes mellitus with complication, with long-term current use of insulin (H)       blood glucose monitoring lancets     6 Box    Use to test blood sugar 6 times daily or as directed    Type 1 diabetes mellitus with nephropathy (H)       blood glucose monitoring test strip    ACCU-CHEK LAYA PLUS    600 each    Laya Plus test strips for use in Accucheck Expert meter.  Use to test blood sugar 6 times daily. 3 month supply.  Send PA's to Dr. Mohamud.    Type 1 diabetes mellitus with nephropathy (H)       continuous blood glucose monitoring sensor     3 each    For use with Freestyle Evelia Flash  for continuous monitioring of  blood glucose levels. Replace sensor every 10 days.    Uncontrolled type 2 diabetes mellitus with hyperglycemia, with long-term current use of insulin (H)       * TRULICITY 0.75 MG/0.5ML pen   Generic drug:  dulaglutide           * dulaglutide 1.5 MG/0.5ML pen    TRULICITY    6 mL    Inject 1.5 mg Subcutaneous every 7 days    Type 1 diabetes mellitus with complications (H)       empagliflozin 10 MG Tabs tablet    JARDIANCE    90 tablet    Take 1 tablet (10 mg) by mouth daily    Uncontrolled type 2 diabetes mellitus with hyperglycemia, with long-term current use of insulin (H)       ergocalciferol 14192 units capsule    ERGOCALCIFEROL    14 capsule    Take 1 capsule (50,000 Units) by mouth once a week    Vitamin D deficiency       fenofibrate 160 MG tablet     90 tablet    Take 1 tablet (160 mg) by mouth daily    Mixed hyperlipidemia       FREESTYLE ZORAIDA READER Miriam     1 Device    1 Device daily    Uncontrolled type 2 diabetes mellitus with hyperglycemia, with long-term current use of insulin (H)       ibuprofen 600 MG tablet    ADVIL/MOTRIN    120 tablet    Take 1 tablet (600 mg) by mouth every 6 hours as needed for moderate pain    Cervicalgia, Headache, Pain in joint, shoulder region       insulin aspart 100 UNIT/ML injection    NovoLOG PEN    63 mL    Before meals and bedtime correction sliding scale 120 - 160 - 1 U  161 - 200 - 2 U  201 - 240 - 3 U  241 - 280 - 4 U   281 - 320 - 5 U  321 - 360 - 6 U ...    Type 1 diabetes mellitus with complications (H)       levonorgestrel 20 MCG/24HR IUD    MIRENA    1 each    1 each (20 mcg) by Intrauterine route once for 1 dose    Encounter for insertion of intrauterine contraceptive device       losartan 50 MG tablet    COZAAR    90 tablet    Take 1 tablet (50 mg) by mouth daily    Uncontrolled type 2 diabetes mellitus with hyperglycemia, with long-term current use of insulin (H)       * Notice:  This list has 2 medication(s) that are the same as other medications  prescribed for you. Read the directions carefully, and ask your doctor or other care provider to review them with you.

## 2018-10-15 NOTE — TELEPHONE ENCOUNTER
[facility] Prior Authorization Team Request    Medication: Jardiance 10MG  Dosing: Take 1 tablet by mouth every day  NDC (required for Medicaid members):     Insurance   BIN: [id number]  PCN: [id number]  Grp: [id number]  ID: [id number]    CoverMyMeds Key (if applicable):     Additional documentation:     Filling Pharmacy: [facility]  Phone Number: [phone]  Contact: [facility] [employer] DAVID SHORT ([id number]) please send all responses to this pool.  Pharmacy NPI (required for Medicaid members):

## 2018-10-15 NOTE — NURSING NOTE
Chief Complaint   Patient presents with     RECHECK     Type II Diabetes     Brenda Alvarez, CMA

## 2018-10-15 NOTE — PROGRESS NOTES
The patient is seen for evaluation of diabetes. She is seen with the help of a .     Nayeli Caal is 38 year old female diagnosed with diabetes in the fall of the year 2000, while being investigated for headaches and increased urination.  The patient is adopted and she doesn't know her parents.     She was treated with metformin until 2003, when she was started on insulin. In [id number], she was treated with Bydureon for a period of time.  Trulicity was started in December 2017 and the dose was increased to 1.5 mg daily in March 2018.      She had no prior episodes of diabetes ketoacidosis, despite completely discontinuing insulin for months at a time. Hemoglobin A1c has been variable between 8 and 12% over the years. In 2005, she had a C-peptide of 1.3 for a glucose level of 221. Over the last 2 years, her hemoglobin A1c has been around 12%.  A1c today was 10.3%.    As per patient, Trulicity has been effective in controlling her appetite.  The glucometer readings revealed that she checks her blood glucose 0.2 times daily.  Average blood glucose is 261, with a standard deviation of 25 and a range variable between 233 and 299.  She only checks her blood sugar in the morning, rarely before lunch.  Whenever she checks, she does document the carbohydrate intake and insulin dose administered, variable between 5 and 11 units.    She reports going through significant stress, she moved to a new [facility].  She has also been traveling and forgetting to bring her meter or NovoLog insulin with her.  In general, she tries to have 3 meals a day and she denies frequent snacking.  For quite some time, she did not take any NovoLog and she just resumed taking it, a couple of weeks ago.    She is taking 46 units Basaglar and Novolog according to the Accu-chek Expert meter below (I reviewed the meter settings):  Insulin to Carb Ratio: 8  Correction Factor: 50  BG Target: 110-150  Offset time 2 hrs    Acting time 3 hrs     Diabetes complications:  Retinopathy: last eye exam - June 2017. No DRMatthew Pimentel's syndrome.   Nephropathy: h/o proteinuria (most recent urine microalbumin positive in January 2017); normal GFR. On 25 mg losartan daily (tx with lisinopril was associated with a dry cough).   Neuropathy: occasional numbness or tingling sensation in her hands, no pain   She is symptomatic for hypoglycemia and she developed symptoms when her blood glucose is below 80 ( shakiness, dizziness).  The lowest blood glucose numbers that she remembers is 59.  She denies prior episodes of loss of consciousness due to hypoglycemia. She does have glucagon at home.    Nayeli has a history of dyslipidemia, currently medicated with 40 mg atorvastatin and 160 mg fenofibrate daily (added in 2015). In the past, the triglycerides were as high as 1656 (she wasn't taking the meds at the time she had the labwork done). She has no prior history of pancreatitis.  1 week prior to recent lab work, she states she did not take fenofibrate, as she did not have time to  the prescription.    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  Hepatic steatosis   Prior screen for celiac disease negative    Past Surgical History:   Procedure Laterality Date     LAPAROSCOPIC CHOLECYSTECTOMY N/A 3/10/2018    Procedure: LAPAROSCOPIC CHOLECYSTECTOMY;  Laparoscopic Cholecystectomy ;  Surgeon: Kuldeep Sigala MD;  Location:  OR       Current Medications     Current Outpatient Prescriptions:      Alcohol Swabs (ALCOHOL PREP PAD) 70 % PADS, 1 each 3 times daily, Disp: 100 each, Rfl: 3     aspirin 81 MG chewable tablet, CHEW AND SWALLOW ONE TABLET BY MOUTH EVERY DAY, Disp: 90 tablet, Rfl: 3     atorvastatin (LIPITOR) 40 MG tablet, Take 1 tablet (40 mg) by mouth daily, Disp: 90 tablet, Rfl: 3     B-D U/F insulin pen needle, ,  Disp: , Rfl:      BASAGLAR 100 UNIT/ML injection, Inject 50 Units Subcutaneous daily Please provide 3 months supply, Disp: 12 mL, Rfl: 3     BD ULTRA FINE PEN NEEDLES, Inject 1 dose. Subcutaneous 2 times daily BD ultra-fine insulin syringe, 30 ga. X 1/2'' short needle 1/2 cc. Use as directed., Disp: 400 Units, Rfl: 11     blood glucose monitoring (ACCU-CHEK LAYA PLUS) test strip, Laya Plus test strips for use in Accucheck Expert meter.  Use to test blood sugar 6 times daily. 3 month supply.  Send PA's to Dr. Mohamud., Disp: 600 each, Rfl: 3     blood glucose monitoring (ACCU-CHEK FASTCLIX) lancets, Use to test blood sugar 6 times daily or as directed, Disp: 6 Box, Rfl: 3     dulaglutide (TRULICITY) 1.5 MG/0.5ML pen, Inject 1.5 mg Subcutaneous every 7 days, Disp: 6 mL, Rfl: 3     ergocalciferol (ERGOCALCIFEROL) 58645 units capsule, Take 1 capsule (50,000 Units) by mouth once a week, Disp: 14 capsule, Rfl: 3     fenofibrate 160 MG tablet, Take 1 tablet (160 mg) by mouth daily, Disp: 90 tablet, Rfl: 3     ibuprofen (ADVIL/MOTRIN) 600 MG tablet, Take 1 tablet (600 mg) by mouth every 6 hours as needed for moderate pain, Disp: 120 tablet, Rfl: 1     insulin aspart (NOVOLOG PEN) 100 UNIT/ML injection, Before meals and bedtime correction sliding scale 120 - 160 - 1 U  161 - 200 - 2 U  201 - 240 - 3 U  241 - 280 - 4 U   281 - 320 - 5 U  321 - 360 - 6 U ..., Disp: 63 mL, Rfl: 3     losartan (COZAAR) 25 MG tablet, Take 1 tablet (25 mg) by mouth daily, Disp: 90 tablet, Rfl: 3     TRULICITY 0.75 MG/0.5ML pen, , Disp: , Rfl:      levonorgestrel (MIRENA) 20 MCG/24HR IUD, 1 each (20 mcg) by Intrauterine route once for 1 dose, Disp: 1 each, Rfl: 0     [DISCONTINUED] BD ULTRA FINE PEN NEEDLES, Inject 1 dose Subcutaneous 2 times daily BD ultra-fine insulin syringe, 30 ga. X 1/2'' short needle 1/2 cc. Use as directed. (Patient taking differently: Inject 1 dose * Subcutaneous 2 times daily BD ultra-fine insulin syringe, 30 ga. X  "1/2'' short needle 1/2 cc. Use as directed.), Disp: 200 Units, Rfl: 11    Current Facility-Administered Medications:      hylan (SYNVISC ONE) injection 48 mg, 48 mg, , , Supik, Jacob, DO, 48 mg at 06/26/18 0900     hylan (SYNVISC ONE) injection 48 mg, 48 mg, , , Supik, Jacob, DO, 48 mg at 06/26/18 0900    Family History   Problem Relation Age of Onset     Unknown/Adopted Other      Social History  Single. No children. Lives with a roommate. She smoked for almost 10 years, on and off, up to 1 PPD; quit smoking 2000. She only drinks alcohol occasionally. Denies using illicit drugs. Occupation: engineering department.      Review of Systems   Systemic symptoms: weight stable  Eye symptoms: No eye symptoms.  Otolaryngeal symptoms: deaf   Breast symptoms: No breast symptoms.  Cardiovascular symptoms: No cardiovascular symptoms.    Pulmonary symptoms: No pulmonary symptoms.  Gastrointestinal symptoms: worsening GERD symptoms noticed with certain foods; long-standing episodes of nausea    Genitourinary symptoms: No genitourinary symptoms.  Endocrine symptoms:Menstrual periods, irregular, and light, 3-5 weeks apart - IUD 1/2017   Hematologic symptoms: No hematologic symptoms.  Musculoskeletal symptoms: bilateral knee pain, intermittently  Neurological symptoms: as above  Psychological symptoms: No psychological symptoms.    Skin symptoms: No skin symptoms    Vital Signs     Previous Weights:    Wt Readings from Last 10 Encounters:   10/15/18 79.1 kg (174 lb 4.8 oz)   10/02/18 80 kg (176 lb 6.4 oz)   08/15/18 81.6 kg (180 lb)   07/24/18 81.6 kg (180 lb)   06/26/18 81.6 kg (180 lb)   05/17/18 82 kg (180 lb 11.2 oz)   03/22/18 83.3 kg (183 lb 9.6 oz)   03/13/18 83.1 kg (183 lb 3.2 oz)   03/12/18 82.9 kg (182 lb 11.2 oz)   03/09/18 83.6 kg (184 lb 4.9 oz)     /85  Pulse 90  Ht 1.549 m (5' 1\")  Wt 79.1 kg (174 lb 4.8 oz)  BMI 32.93 kg/m2    Physical Exam  General Appearance:  she is well developed, well nourished " and in no distress     HEENT:   oropharynx clear and moist, no JVD, no bruits      no thyromegaly, no palpable nodules   Cardiovascular:  regular rhythm, systolic murmur, distal pulse palpable, no edema  Respiratory:       chest clear, no rales, no rhonchi   Cardiovascular:  regular rhythm, no murmurs, distal pulse palpable, no edema  Respiratory:       chest clear, no rales, no rhonchi   Gastrointestinal: abdomen soft, non-tender, non-distended, normal bowel sounds,   no organomegaly   Musculoskeletal: normal tone and strength  Psychiatric: affect and judgment normal  Skin: warm, no lesions   Neurological: reflexes normal and symmetric, no resting tremor    Lab Results:  I reviewed prior lab results documented in Epic  Lab Results   Component Value Date    A1C 10.3 (H) 10/15/2018    A1C 13.4 (H) 11/27/2017    A1C 11.0 (H) 07/13/2017    A1C 10.9 (H) 05/15/2017    A1C 12.8 (H) 01/02/2017    HEMOGLOBINA1 8.7 (A) 03/12/2018    HEMOGLOBINA1 12.0 (A) 02/03/2014    HEMOGLOBINA1 10.3 (A) 10/28/2013    HEMOGLOBINA1 11.3 (A) 08/06/2013    HEMOGLOBINA1 13.1 (H) 08/12/2011       Hemoglobin   Date Value Ref Range Status   03/13/2018 14.2 11.7 - 15.7 g/dL Final     Hematocrit   Date Value Ref Range Status   03/13/2018 42.3 35.0 - 47.0 % Final     Cholesterol   Date Value Ref Range Status   10/15/2018 263 (H) <200 mg/dL Final     Comment:     Desirable:       <200 mg/dl     Cholesterol/HDL Ratio   Date Value Ref Range Status   09/16/2013 5.8 (H) 0.0 - 5.0 Final     HDL Cholesterol   Date Value Ref Range Status   10/15/2018 29 (L) >49 mg/dL Final     LDL Cholesterol Calculated   Date Value Ref Range Status   10/15/2018  <100 mg/dL Final    Cannot estimate LDL when triglyceride exceeds 400 mg/dL     VLDL-Cholesterol   Date Value Ref Range Status   09/16/2013 44 (H) 0 - 30 mg/dL Final     Triglycerides   Date Value Ref Range Status   10/15/2018 560 (H) <150 mg/dL Final     Comment:     Borderline high:  150-199 mg/dl  High:              200-499 mg/dl  Very high:       >499 mg/dl  Fasting specimen       Albumin Urine mg/L   Date Value Ref Range Status   05/17/2018 134 mg/L Final     TSH   Date Value Ref Range Status   05/17/2018 1.84 0.40 - 4.00 mU/L Final         Last Basic Metabolic Panel:    Sodium   Date Value Ref Range Status   05/17/2018 138 133 - 144 mmol/L Final     Potassium   Date Value Ref Range Status   05/17/2018 4.3 3.4 - 5.3 mmol/L Final     Chloride   Date Value Ref Range Status   05/17/2018 106 94 - 109 mmol/L Final     Calcium   Date Value Ref Range Status   05/17/2018 8.7 8.5 - 10.1 mg/dL Final     Carbon Dioxide   Date Value Ref Range Status   05/17/2018 25 20 - 32 mmol/L Final     Urea Nitrogen   Date Value Ref Range Status   05/17/2018 13 7 - 30 mg/dL Final     Creatinine   Date Value Ref Range Status   05/17/2018 0.56 0.52 - 1.04 mg/dL Final     GFR Estimate   Date Value Ref Range Status   05/17/2018 >90 >60 mL/min/1.7m2 Final     Comment:     Non  GFR Calc     Glucose   Date Value Ref Range Status   05/17/2018 286 (H) 70 - 99 mg/dL Final       AST   Date Value Ref Range Status   05/17/2018 30 0 - 45 U/L Final     ALT   Date Value Ref Range Status   05/17/2018 56 (H) 0 - 50 U/L Final     Albumin   Date Value Ref Range Status   05/17/2018 3.9 3.4 - 5.0 g/dL Final     Assessment     1. Diabetes, type 2 vs. PRAVIN, complicated by diabetic nephropathy and retinopathy, uncontrolled, mainly due to noncompliance with the prescribed regimen.  Recommendations:  Start empagliflozin, at 10 mg daily.  The patient was counseled on side effects: Genital fungal infection, hyperkalemia, increased cholesterol, polyuria, the rare risk of DKA and osteoporosis.  The polyuria is transient.  The medication might help with weight loss and improve the blood pressure.  Consider a glucose sensor.  I entered the prescription for freestyle flash mian.  Counseled the patient on the use of the sensor.  I am hopeful that, by using the  sensor, she is going to enter the blood glucose numbers in the meter and administer insulin according to the meter settings.  Also, the sensor might help her change her behavior regarding the administration of short-acting insulin, is it makes her aware of postprandial hyperglycemia.  If the sensor is approved by her insurance, the plan is to schedule an appointment with the diabetes educator, to review its use in detail.    2. Dyslipidemia   The triglycerides have been higher on recent lab work, most likely a consequence of not taking fenofibrate.  Counseled on the importance of taking all her medications as instructed.    3.  Proteinuria/Hypertension  She reports being compliant in taking losartan, 25 mg daily.  Advised to increase the dose to 50 mg daily, in view of nephropathy.    No orders of the defined types were placed in this encounter.    Total visit time 40 min/counceling and coordination of care 20 min.

## 2018-10-16 DIAGNOSIS — Z79.4 UNCONTROLLED TYPE 2 DIABETES MELLITUS WITH HYPERGLYCEMIA, WITH LONG-TERM CURRENT USE OF INSULIN (H): Primary | ICD-10-CM

## 2018-10-16 DIAGNOSIS — E11.65 UNCONTROLLED TYPE 2 DIABETES MELLITUS WITH HYPERGLYCEMIA, WITH LONG-TERM CURRENT USE OF INSULIN (H): Primary | ICD-10-CM

## 2018-10-16 NOTE — TELEPHONE ENCOUNTER
PA for Jardiance  denied because she does not have a GFR greater than 45 ml per minute. ? If you want to appeal  A letter will be needed on why you feel this drug will benefit her.

## 2018-10-16 NOTE — TELEPHONE ENCOUNTER
PRIOR AUTHORIZATION DENIED    Medication: Jardiance 10MG-PA denied    Denial Date: 10/15/2018    Denial Rational:          Appeal Information: MUST BE INITIATED BY MEMBER ONLY

## 2018-10-17 ENCOUNTER — TELEPHONE (OUTPATIENT)
Dept: SURGERY | Facility: CLINIC | Age: 38
End: 2018-10-17

## 2018-10-17 NOTE — TELEPHONE ENCOUNTER
[facility] Prior Authorization Team Request    Medication: Invokana 100MG  Dosing: Take one tablet by mouth every morning before breakfast  NDC (required for Medicaid members):     Insurance   BIN: [id number]  PCN: [id number]  Grp: [id number]  ID: [id number]    CoverMyMeds Key (if applicable):     Additional documentation:     Filling [facility]: [facility]  Phone Number: [phone]  Contact: [facility] ([id number]) please send all responses to this pool.  Pharmacy NPI (required for Medicaid members):

## 2018-10-17 NOTE — TELEPHONE ENCOUNTER
I left a message per [facility] that Jariiance was not approved and invokana was sent to the pharmacy to replace it. I did leave my return number if she has any questions.

## 2018-10-18 NOTE — TELEPHONE ENCOUNTER
DEJA valdez asks the following question:       I don't find any reference to this in the chart. Routing back to nurse for assistance.

## 2018-10-18 NOTE — TELEPHONE ENCOUNTER
Central Prior Authorization Team   Phone: [phone]      PA Initiation    Medication: Invokana 100MG-PA initiated  Insurance Company: [employer] - Phone [phone] Fax [phone]  Pharmacy Filling the Rx: [facility] [place], [place] - [address] [place] [address] [id number]  Filling Pharmacy Phone: [phone]  Filling Pharmacy Fax:    Start Date: 10/18/2018

## 2018-10-19 NOTE — TELEPHONE ENCOUNTER
Prior Authorization Approval    Authorization Effective Date: 9/18/2018  Authorization Expiration Date: 10/18/2019  Medication: Invokana 100MG-PA approved  Approved Dose/Quantity:   Reference #:     Insurance Company: [employer] - Phone [phone] Fax [phone]  Expected CoPay:       CoPay Card Available:      Foundation Assistance Needed:    Which Pharmacy is filling the prescription (Not needed for infusion/clinic administered): [facility] [address] 9-020  Pharmacy Notified: Yes  Patient Notified: Yes-Left msg through . If patient fills at , her cost is $682/3 mo. If she fills through her mail order pharmacy, as she does with her insulin, her cost is $125/3 months. Instructed patient to contact her mail order pharmacy and transfer her Rx to mail order.

## 2018-10-25 ENCOUNTER — RADIANT APPOINTMENT (OUTPATIENT)
Dept: GENERAL RADIOLOGY | Facility: CLINIC | Age: 38
End: 2018-10-25
Payer: COMMERCIAL

## 2018-10-25 ENCOUNTER — OFFICE VISIT (OUTPATIENT)
Dept: INTERNAL MEDICINE | Facility: CLINIC | Age: 38
End: 2018-10-25
Payer: COMMERCIAL

## 2018-10-25 VITALS
BODY MASS INDEX: 34.05 KG/M2 | HEART RATE: 74 BPM | WEIGHT: 180.2 LBS | DIASTOLIC BLOOD PRESSURE: 77 MMHG | SYSTOLIC BLOOD PRESSURE: 115 MMHG

## 2018-10-25 DIAGNOSIS — R07.81 RIB PAIN ON RIGHT SIDE: Primary | ICD-10-CM
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
## 2018-10-25 DIAGNOSIS — R07.81 RIB PAIN ON RIGHT SIDE: ICD-10-CM

## 2018-10-25 DIAGNOSIS — M54.50 ACUTE LEFT-SIDED LOW BACK PAIN WITHOUT SCIATICA: ICD-10-CM

## 2018-10-25 RX ORDER — NAPROXEN 500 MG/1
500 TABLET ORAL 2 TIMES DAILY PRN
Qty: 30 TABLET | Refills: 1 | Status: SHIPPED | OUTPATIENT
Start: 2018-10-25 | End: 2021-01-21

## 2018-10-25 RX ORDER — CYCLOBENZAPRINE HCL 5 MG
5-10 TABLET ORAL 3 TIMES DAILY PRN
Qty: 60 TABLET | Refills: 1 | Status: SHIPPED | OUTPATIENT
Start: 2018-10-25 | End: 2021-06-15

## 2018-10-25 ASSESSMENT — PAIN SCALES - GENERAL: PAINLEVEL: SEVERE PAIN (6)

## 2018-10-25 NOTE — MR AVS SNAPSHOT
After Visit Summary   10/25/2018    Nayeli Caal    MRN: [id number]           Patient Information     Date Of Birth          1980        Visit Information        Provider Department      10/25/2018 12:15 PM Reshma Gentile Jill Marie, MD [facility]        Today's Diagnoses     Rib pain on right side    -  1    Fall, initial encounter        Acute left-sided low back pain without sciatica          Care Instructions    Primary Care Center Medication Refill Request Information:  * Please contact your pharmacy regarding ANY request for medication refills.  ** [facility] Prescription Fax = [phone]  * Please allow 3 business days for routine medication refills.  * Please allow 5 business days for controlled substance medication refills.     Primary Care Center Test Result notification information:  *You will be notified with in 7-10 days of your appointment day regarding the results of your test.  If you are on MyChart you will be notified as soon as the provider has reviewed the results and signed off on them.    Primary [facility]: [phone]             Follow-ups after your visit        Your next 10 appointments already scheduled     Nov 15, 2018   Procedure with Greg Streeter MD   [facility] ([facility])    [address] [place], [place] [id number] [phone]           Located in the Clinics and Surgery Center at [address] [place] [address].   parking is very convenient and highly recommended.  is a $6 flat rate fee.  Both  and self parkers should enter the main arrival plaza from [facility]; parking attendants will direct you based on your parking preference.            Nov 19, 2018 11:00 AM CST   (Arrive by 10:45 AM)   RETURN DIABETES with Jimena Bragg MD   [facility] Endocrinology ([facility])    [facility]  [place] [id number]   [phone]            Nov 19, 2018 12:30 PM CST   (Arrive by 12:15 PM)   Diabetes Education with Leigh Ann Escoto RN   [facility] Diabetes ([facility])    [address] [place], [place] [id number] [phone]           Please bring to your appointment: 1) 2 - 3 day food journal. Write down everything you eat and drink for 2 - 3 days prior to your appointment. 2) Your insurance card. 3) A blood glucose meter. If you do not have one, contact your pharmacy or clinic prior to your appointment. Your pharmacist can assist you with choosing a meter suitable to your insurance needs. 4)  A list of your medications, including prescription, over the counter and herbal. Include dosage and frequency where appropriate.            Nov 28, 2018 11:15 AM CST   (Arrive by 11:00 AM)   RETURN HAND with Greg Streeter MD   [facility] ([facility] [place])    [address] [place] [id number] [phone]            Dec 06, 2018   Procedure with Greg Streeter MD   [facility] ([facility])    [address] [place] [id number] [phone]           Located in the Clinics and Surgery Center at [address] [place] [address].   parking is very convenient and highly recommended.  is a $6 flat rate fee.  Both  and self parkers should enter the main arrival plaza from [facility]; parking attendants will direct you based on your parking preference.            Dec 19, 2018 11:15 AM CST   (Arrive by 11:00 AM)   RETURN HAND with Greg Streeter MD   [facility] ([facility])    [address] [place] [id number] [phone]              Future tests that were ordered for you today     Open Future Orders         Priority Expected Expires Ordered    XR Ribs Right 2 Views Routine 10/25/2018 10/25/2019 10/25/2018            Who to contact     Please call your clinic at [phone] to:    Ask questions about your health    Make or cancel appointments    Discuss your medicines    Learn about your test results    Speak to your doctor            Additional Information About Your Visit        [employer] Information     [employer] gives you secure access to your electronic health record. If you see a primary care provider, you can also send messages to your care team and make appointments. If you have questions, please call your primary care clinic.  If you do not have a primary care provider, please call [phone] and they will assist you.      [employer] is an electronic gateway that provides easy, online access to your medical records. With [employer], you can request a clinic appointment, read your test results, renew a prescription or communicate with your care team.     To access your existing account, please contact your [facility] or call [phone] for assistance.        Care EveryWhere ID     This is your Care EveryWhere ID. This could be used by other organizations to access your [facility] medical records  [id number]        Your Vitals Were     Pulse BMI (Body Mass Index)                74 34.05 kg/m2           Blood Pressure from Last 3 Encounters:   10/25/18 115/77   10/15/18 147/85   10/02/18 116/80    Weight from Last 3 Encounters:   10/25/18 81.7 kg (180 lb 3.2 oz)   10/15/18 79.1 kg (174 lb 4.8 oz)   10/02/18 80 kg (176 lb 6.4 oz)                 Today's Medication Changes          These changes are accurate as of 10/25/18  1:06 PM.  If you have any questions, ask your nurse or doctor.               Start taking these medicines.        Dose/Directions    cyclobenzaprine 5 MG tablet   Commonly known as:  FLEXERIL   Used for:  Acute left-sided low back pain without sciatica, Fall,  initial encounter, Rib pain on right side   Started by:  Latricia Rodriguez MD        Dose:  5-10 mg   Take 1-2 tablets (5-10 mg) by mouth 3 times daily as needed for muscle spasms   Quantity:  60 tablet   Refills:  1       naproxen 500 MG tablet   Commonly known as:  NAPROSYN   Used for:  Acute left-sided low back pain without sciatica, Fall, initial encounter, Rib pain on right side   Started by:  Latricia Rodriguez MD        Dose:  500 mg   Take 1 tablet (500 mg) by mouth 2 times daily as needed for moderate pain   Quantity:  30 tablet   Refills:  1            Where to get your medicines      These medications were sent to [facility] [address]  [address], [facility] [address]    Hours:  TRANSPLANT PHONE NUMBER [phone] Phone:  [phone]     cyclobenzaprine 5 MG tablet    naproxen 500 MG tablet                Primary Care Provider Office Phone # Fax #    Mariya GRACE Oneida, APRN [phone]       [address] [place] [address]  [facility] [address]        Equal Access to Services     [facility]DO [facility] : Hadii aad ku hadasho Sojoeali, waaxda luqadaha, qaybta kaalmada adeegyada, marc rae . So [facility] [phone].    ATENCIÓN: Si habla español, tiene a dykes disposición servicios gratuitos de asistencia lingüística. Llame al [phone].    We comply with applicable federal civil rights laws and [place] laws. We do not discriminate on the basis of race, color, national origin, age, disability, sex, sexual orientation, or gender identity.            Thank you!     Thank you for choosing [facility]  for your care. Our goal is always to provide you with excellent care. Hearing back from our patients is one way we can continue to improve our services. Please take a few minutes to complete the written survey that you may receive in the mail after your visit with us.  Thank you!             Your Updated Medication List - Protect others around you: Learn how to safely use, store and throw away your medicines at www.disposemymeds.org.          This list is accurate as of 10/25/18  1:06 PM.  Always use your most recent med list.                   Brand Name Dispense Instructions for use Diagnosis    Alcohol Prep Pad 70 % Pads     100 each    1 each 3 times daily    Type 2 diabetes mellitus with other diabetic ophthalmic complication (H)       aspirin 81 MG chewable tablet     90 tablet    CHEW AND SWALLOW ONE TABLET BY MOUTH EVERY DAY    Type 1 diabetes mellitus with complications (H)       atorvastatin 40 MG tablet    LIPITOR    90 tablet    Take 1 tablet (40 mg) by mouth daily    Type 1 diabetes mellitus with complications (H)       B-D U/F 31G X 5 MM   Generic drug:  insulin pen needle           BASAGLAR 100 UNIT/ML injection     12 mL    Inject 50 Units Subcutaneous daily Please provide 3 months supply    Type 1 diabetes mellitus with complications (H)       BD ULTRA FINE PEN NEEDLES     400 Units    Inject 1 dose. Subcutaneous 2 times daily BD ultra-fine insulin syringe, 30 ga. X 1/2'' short needle 1/2 cc. Use as directed.    Type 2 diabetes mellitus with complication, with long-term current use of insulin (H)       blood glucose monitoring lancets     6 Box    Use to test blood sugar 6 times daily or as directed    Type 1 diabetes mellitus with nephropathy (H)       blood glucose monitoring test strip    ACCU-CHEK LAYA PLUS    600 each    Laya Plus test strips for use in Accucheck Expert meter.  Use to test blood sugar 6 times daily. 3 month supply.  Send PA's to Dr. Mohamud.    Type 1 diabetes mellitus with nephropathy (H)       canagliflozin 100 MG tablet    INVOKANA    90 tablet    Take 1 tablet (100 mg) by mouth every morning (before breakfast)    Uncontrolled type 2 diabetes mellitus with hyperglycemia, with long-term current use of insulin (H)       continuous blood  glucose monitoring sensor     3 each    For use with Freestyle Evelia Flash  for continuous monitioring of blood glucose levels. Replace sensor every 10 days.    Uncontrolled type 2 diabetes mellitus with hyperglycemia, with long-term current use of insulin (H)       cyclobenzaprine 5 MG tablet    FLEXERIL    60 tablet    Take 1-2 tablets (5-10 mg) by mouth 3 times daily as needed for muscle spasms    Acute left-sided low back pain without sciatica, Fall, initial encounter, Rib pain on right side       * TRULICITY 0.75 MG/0.5ML pen   Generic drug:  dulaglutide           * dulaglutide 1.5 MG/0.5ML pen    TRULICITY    6 mL    Inject 1.5 mg Subcutaneous every 7 days    Type 1 diabetes mellitus with complications (H)       ergocalciferol 56167 units capsule    ERGOCALCIFEROL    14 capsule    Take 1 capsule (50,000 Units) by mouth once a week    Vitamin D deficiency       fenofibrate 160 MG tablet     90 tablet    Take 1 tablet (160 mg) by mouth daily    Mixed hyperlipidemia       FREESTYLE EVELIA READER Miriam     1 Device    1 Device daily    Uncontrolled type 2 diabetes mellitus with hyperglycemia, with long-term current use of insulin (H)       ibuprofen 600 MG tablet    ADVIL/MOTRIN    120 tablet    Take 1 tablet (600 mg) by mouth every 6 hours as needed for moderate pain    Cervicalgia, Headache, Pain in joint, shoulder region       insulin aspart 100 UNIT/ML injection    NovoLOG PEN    63 mL    Before meals and bedtime correction sliding scale 120 - 160 - 1 U  161 - 200 - 2 U  201 - 240 - 3 U  241 - 280 - 4 U   281 - 320 - 5 U  321 - 360 - 6 U ...    Type 1 diabetes mellitus with complications (H)       levonorgestrel 20 MCG/24HR IUD    MIRENA    1 each    1 each (20 mcg) by Intrauterine route once for 1 dose    Encounter for insertion of intrauterine contraceptive device       losartan 50 MG tablet    COZAAR    90 tablet    Take 1 tablet (50 mg) by mouth daily    Uncontrolled type 2 diabetes mellitus with  hyperglycemia, with long-term current use of insulin (H)       naproxen 500 MG tablet    NAPROSYN    30 tablet    Take 1 tablet (500 mg) by mouth 2 times daily as needed for moderate pain    Acute left-sided low back pain without sciatica, Fall, initial encounter, Rib pain on right side       * Notice:  This list has 2 medication(s) that are the same as other medications prescribed for you. Read the directions carefully, and ask your doctor or other care provider to review them with you.

## 2018-10-25 NOTE — PROGRESS NOTES
PRIMARY CARE CENTER       SUBJECTIVE:  Nayeli Caal is a 38 year old female who comes in for evaluation of rib pain and back pain after a fall 4 days ago.  Nayeli reports that she was in [place] when she missed a step.  Her right elbow got pressed into her right rib cage where she had instant pain.  Following her flight home 2 days later, she developed pain in her left lower back.  It is nonradiating, she has no bowel or bladder changes.    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     ROS:   Constitutional, HEENT, cardiovascular, pulmonary, gi and gu systems are negative, except as otherwise noted.    OBJECTIVE:    /77  Pulse 74  Wt 81.7 kg (180 lb 3.2 oz)  BMI 34.05 kg/m2   Wt Readings from Last 1 Encounters:   10/25/18 81.7 kg (180 lb 3.2 oz)     General: Pleasant female, no apparent distress  MSK: Pain with gaining the exam table, tender to palpation over right lower rib cage, tender to palpation over left lumbar paraspinal muscles, no midline tenderness, straight leg raise negative bilaterally.     ASSESSMENT/PLAN:    Nayeli was seen today for fall.  Do not suspect any significant injury following this fall.  We will get a rib x-ray to rule out fracture.  Back pain is likely musculoskeletal in nature.  We will go ahead and give her Flexeril and naproxen for  pain relief.  She can also apply ice as needed.  She has a trip next week that she is not sure if she will be able to make due to the pain.  I advised her to wait a couple days before canceling the trip she may feel better soon.  However if she does need to certification for me saying that she cannot travel, and more than happy to supply this.    Diagnoses and all orders for this visit:    Rib pain on right side  -     XR Ribs Right 2 Views; Future  -     cyclobenzaprine (FLEXERIL) 5 MG tablet; Take 1-2 tablets (5-10 mg) by mouth 3 times daily as needed for muscle spasms  -     naproxen (NAPROSYN) 500 MG tablet; Take 1 tablet (500 mg) by mouth 2 times daily as needed for moderate pain    Fall, initial encounter  Acute left-sided low back pain without sciatica  -     cyclobenzaprine (FLEXERIL) 5 MG tablet; Take 1-2 tablets (5-10 mg) by mouth 3 times daily as needed for muscle spasms  -     naproxen (NAPROSYN) 500 MG tablet; Take 1 tablet (500 mg) by mouth 2 times daily as needed for moderate pain         Pt should return to clinic for f/u with me in PRN      Latricia Caal MD  10/25/18

## 2018-10-25 NOTE — NURSING NOTE
Chief Complaint   Patient presents with     Fall     pt states she fell and hurt her right knee and ribs and lower back        Farhana Sheehan CMA at 12:34 PM on 10/25/2018.

## 2018-10-25 NOTE — PATIENT INSTRUCTIONS
[facility] Medication Refill Request Information:  * Please contact your pharmacy regarding ANY request for medication refills.  ** [facility] Prescription Fax = [phone]  * Please allow 3 business days for routine medication refills.  * Please allow 5 business days for controlled substance medication refills.     [facility] Test Result notification information:  *You will be notified with in 7-10 days of your appointment day regarding the results of your test.  If you are on MyChart you will be notified as soon as the provider has reviewed the results and signed off on them.    [facility]: [phone]

## 2018-11-07 ENCOUNTER — MYC MEDICAL ADVICE (OUTPATIENT)
Dept: ORTHOPEDICS | Facility: CLINIC | Age: 38
End: 2018-11-07

## 2018-11-14 ENCOUNTER — TELEPHONE (OUTPATIENT)
Dept: INTERNAL MEDICINE | Facility: CLINIC | Age: 38
End: 2018-11-14

## 2018-11-14 ENCOUNTER — TELEPHONE (OUTPATIENT)
Dept: ORTHOPEDICS | Facility: CLINIC | Age: 38
End: 2018-11-14

## 2018-11-14 ENCOUNTER — ANESTHESIA EVENT (OUTPATIENT)
Dept: SURGERY | Facility: AMBULATORY SURGERY CENTER | Age: 38
End: 2018-11-14

## 2018-11-14 DIAGNOSIS — E11.8 TYPE 2 DIABETES MELLITUS WITH COMPLICATION, WITHOUT LONG-TERM CURRENT USE OF INSULIN (H): Primary | ICD-10-CM

## 2018-11-14 DIAGNOSIS — M65.30 TRIGGER FINGER, ACQUIRED: Primary | ICD-10-CM

## 2018-11-14 PROBLEM — K80.20 SYMPTOMATIC CHOLELITHIASIS: Status: RESOLVED | Noted: 2018-03-09 | Resolved: 2018-11-14

## 2018-11-14 ASSESSMENT — LIFESTYLE VARIABLES: TOBACCO_USE: 1

## 2018-11-14 NOTE — ANESTHESIA PREPROCEDURE EVALUATION
Anesthesia Pre-Procedure Evaluation    Patient: Nayeli Caal   MRN:     [id number] Gender:   female   Age:    38 year old :      1980        Preoperative Diagnosis: Right Thumb Trigger   Procedure(s):  Right Thumb Trigger Release     Past Medical History:   Diagnosis Date     Combined visual and hearing impairment      Deaf      Diabetic retinopathy of both eyes (H) 2011     Hepatic steatosis 2011     History of tobacco use      Hyperlipidemia      Hypertension      Hypertriglyceridemia      Migraines      Obesity 2011     Problem list name updated by automated process. Provider to review     Uncontrolled type 2 diabetes mellitus with hyperglycemia, with long-term current use of insulin (H) 5/15/2017     Usher Syndrome: congenital deafness, retinitis pigmentosa 2011      Past Surgical History:   Procedure Laterality Date     LAPAROSCOPIC CHOLECYSTECTOMY N/A 3/10/2018    Procedure: LAPAROSCOPIC CHOLECYSTECTOMY;  Laparoscopic Cholecystectomy ;  Surgeon: Kuldeep Sigala MD;  Location: [facility] OR          Anesthesia Evaluation     . Pt has had prior anesthetic. Type: General    No history of anesthetic complications          ROS/MED HX    ENT/Pulmonary:     (+)tobacco use, Past use , . .   (-) recent URI   Neurologic:     (+)migraines,     Cardiovascular:     (+) Dyslipidemia, hypertension----. : . . . :. .      (-) taking anticoagulants/antiplatelets   METS/Exercise Tolerance:     Hematologic:  - neg hematologic  ROS       Musculoskeletal:   (+) , , other musculoskeletal- Right thumb trigger and left ring finger trigger.      GI/Hepatic:     (+) liver disease,       Renal/Genitourinary:  - ROS Renal section negative       Endo:     (+) type II DM Last HgA1c: 10.3 date: 10/15/18 Obesity, .   (-) Type I DM   Psychiatric:  - neg psychiatric ROS       Infectious Disease:  - neg infectious disease ROS       Malignancy:      - no malignancy   Other:    (+) other significant disability  "Deaf and Other (comment)                   [facility] AN PHYSICAL EXAM    Lab Results   Component Value Date    WBC 12.6 (H) 03/13/2018    HGB 14.2 03/13/2018    HCT 42.3 03/13/2018     03/13/2018    CRP 12.0 (H) 03/24/2016    SED 19 03/24/2016     05/17/2018    POTASSIUM 4.3 05/17/2018    CHLORIDE 106 05/17/2018    CO2 25 05/17/2018    BUN 13 05/17/2018    CR 0.56 05/17/2018     (H) 05/17/2018    VERO 8.7 05/17/2018    PHOS 4.2 03/12/2010    MAG 2.2 07/13/2017    ALBUMIN 3.9 05/17/2018    PROTTOTAL 7.6 05/17/2018    ALT 56 (H) 05/17/2018    AST 30 05/17/2018    ALKPHOS 109 05/17/2018    BILITOTAL 0.3 05/17/2018    LIPASE 147 03/13/2018    AMYLASE 16 (L) 05/15/2017    INR 1.03 06/18/2010    TSH 1.84 05/17/2018    HCG Negative 03/14/2018       Preop Vitals  BP Readings from Last 3 Encounters:   10/25/18 115/77   10/15/18 147/85   10/02/18 116/80    Pulse Readings from Last 3 Encounters:   10/25/18 74   10/15/18 90   10/02/18 90      Resp Readings from Last 3 Encounters:   05/17/18 20   03/13/18 18   03/10/18 24    SpO2 Readings from Last 3 Encounters:   05/17/18 (!) 20%   03/22/18 97%   03/14/18 97%      Temp Readings from Last 1 Encounters:   03/22/18 98.4  F (36.9  C) (Oral)    Ht Readings from Last 1 Encounters:   10/15/18 1.549 m (5' 1\")      Wt Readings from Last 1 Encounters:   10/25/18 81.7 kg (180 lb 3.2 oz)    Estimated body mass index is 34.05 kg/(m^2) as calculated from the following:    Height as of 10/15/18: 1.549 m (5' 1\").    Weight as of 10/25/18: 81.7 kg (180 lb 3.2 oz).     LDA:            JSHARATHG [facility] AN PLAN NO PONV RULE                Pamela Escoto NP  "

## 2018-11-14 NOTE — TELEPHONE ENCOUNTER
Left message for patient at her work phone number as well stating that due to her last A1C we will be cancelling her procedure for tomorrow, November 15th.

## 2018-11-14 NOTE — TELEPHONE ENCOUNTER
[facility] Call Center    Phone Message    May a detailed message be left on voicemail: yes    Reason for Call: Other: Patient is calling for 's nurse. She stated they cancelled her surgery for 11/15 and she wants to know why and if she has been approved for the surgery or not. Please follow up with the patient at work at the number given. Thank you.    Action Taken: Message routed to:  [facility] ([facility]): primary care

## 2018-11-15 NOTE — TELEPHONE ENCOUNTER
Spoke to patient via ALS , patient is upset that it was not discussed with her that her A1c needed to be in a specific goal range for her to be able to get surgery, patient states that if she knew that it needed to be 8 or below she would have been more aware of it and watched her blood sugars closer and worked at it hard to keep it at 8 or below. She states that she would have come in for appointments or labs to work on getting it down, but she when she told them she was diabetic and asked about it she was told to not worry about it and that she would be fine. Patient states that she is frustrated that she was never told about this and that they just canceled her surgery and did not even consult with her and tell her why until she asked or discuss with her what she needs to do next. Patient is wondering if she will be able to get it down by 12/6/18 which is when they have her scheduled for the next surgery. Advised patient to go to diabetic Educators appointment on Monday, pt stated that she was for sure going to that appointment. Patient is wondering when she should get her next A1c lab drawn. Jemima Bay LPN 11/15/2018 9:53 AM

## 2018-11-16 NOTE — TELEPHONE ENCOUNTER
Left a message via ALS  to relay providers message that provider was also unaware of A1c needing to be 8 or under and that she can repeat A1c 12/3/18. As well as that she can follow up with diabetic educators on Monday 11/19/18. Jemima Bay LPN 11/16/2018 8:51 AM

## 2018-11-19 ENCOUNTER — ALLIED HEALTH/NURSE VISIT (OUTPATIENT)
Dept: EDUCATION SERVICES | Facility: CLINIC | Age: 38
End: 2018-11-19
Payer: COMMERCIAL

## 2018-11-19 ENCOUNTER — OFFICE VISIT (OUTPATIENT)
Dept: ENDOCRINOLOGY | Facility: CLINIC | Age: 38
End: 2018-11-19
Payer: COMMERCIAL

## 2018-11-19 ENCOUNTER — PATIENT OUTREACH (OUTPATIENT)
Dept: CARE COORDINATION | Facility: CLINIC | Age: 38
End: 2018-11-19

## 2018-11-19 VITALS — DIASTOLIC BLOOD PRESSURE: 80 MMHG | SYSTOLIC BLOOD PRESSURE: 121 MMHG | HEART RATE: 87 BPM

## 2018-11-19 DIAGNOSIS — I10 BENIGN ESSENTIAL HYPERTENSION: ICD-10-CM

## 2018-11-19 DIAGNOSIS — E78.5 DYSLIPIDEMIA: ICD-10-CM

## 2018-11-19 DIAGNOSIS — E11.65 UNCONTROLLED TYPE 2 DIABETES MELLITUS WITH HYPERGLYCEMIA, WITH LONG-TERM CURRENT USE OF INSULIN (H): Primary | ICD-10-CM

## 2018-11-19 DIAGNOSIS — Z79.4 UNCONTROLLED TYPE 2 DIABETES MELLITUS WITH HYPERGLYCEMIA, WITH LONG-TERM CURRENT USE OF INSULIN (H): Primary | ICD-10-CM

## 2018-11-19 DIAGNOSIS — E11.21 TYPE 2 DIABETES MELLITUS WITH DIABETIC NEPHROPATHY, WITH LONG-TERM CURRENT USE OF INSULIN (H): ICD-10-CM

## 2018-11-19 DIAGNOSIS — Z79.4 TYPE 2 DIABETES MELLITUS WITH DIABETIC NEPHROPATHY, WITH LONG-TERM CURRENT USE OF INSULIN (H): ICD-10-CM

## 2018-11-19 RX ORDER — METFORMIN HCL 500 MG
2000 TABLET, EXTENDED RELEASE 24 HR ORAL
Qty: 360 TABLET | Refills: 3 | Status: SHIPPED | OUTPATIENT
Start: 2018-11-19 | End: 2019-01-14

## 2018-11-19 ASSESSMENT — PAIN SCALES - GENERAL: PAINLEVEL: NO PAIN (0)

## 2018-11-19 NOTE — LETTER
11/19/2018       RE: Nayeli Caal  [address]  [facility] [address]     Dear Colleague,    Thank you for referring your patient, Nayeli Caal, to the [facility] ENDOCRINOLOGY at [facility]. Please see a copy of my visit note below.          DM follow up note:    Pt seen with the assistance of an in person .     Pt states that she is primarily here for DM ed appt to learn the Evelia. Evelia has been approved but she needs to have the teaching here before picking up the meter. She does have some questions for us though- see below.     Surgery was cancelled due to elevated A1C- surgery for trigger finger. It is currently rescheduled for early Dec. She is in a lot of pain in relation to her trigger finger this and really wants A1C to come down so that she can get the surgery.     Empaglifozin - co pay was going to be $600 dollars, was switched to canaglifozin but co-pay is still $100 would like to talk about other options. (Per pharmacy, confirmation present that the co-pay would be 125$ though that would be for a 3 month supply).     Still taking the trulicity, 1.5mg weekly.     Has been on metformin in the past- did fine with it without side effects but it was stopped with initiation of insulin. No liver problems.     Taking her insulin as follows:  Glargine 46U per day, takes in the mornings, usually pretty good about remembering, forgets about once per week   Novolog taking 1U/8g, correction. No change since last visit.   Parameters noted last visit:  Insulin to Carb Ratio: 8  Correction Factor: 50  BG Target: 110-150  Offset time 2 hrs   Acting time 3 hrs     The glucometer readings revealed that she checks her blood glucose 0.7 times daily.  Average blood glucose is 190 with a standard deviation of 66 and a range variable between 87 and 328.  In general, her morning blood sugar is consistently elevated, frequently in the 200 range.   She rarely checks her blood sugar and administers insulin for dinner, based on the meter records.    Trying to do less carbs, add vegetables  530AM Eggs/omlet or sandwich, sometimes meat, sometimes skips this meal.. Uses novolog with this meal, never forgets. Orange juice in the morning, small amt.   1130 Lunch Sandwhich with meat but no bread, salad, something small. Does use novolog.   6p Dinner: spaghetti, meat, veggies, ect. Uses novolog, sometimes misses  Doesn't have evening snack  Has a regular soda, one 12 can evenings, not always, sometimes not the whole thing. Doesn't cover the soda  Sometimes misses hs correction scale.     Went up to losartan 50mg per day about a month ago. No lightheadness or dizziness.     Diabetes complications:  Retinopathy: last eye exam - June 2017. 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 States 11/19/18 that she is going to schedule f/u appt with eye doctor.   Nephropathy: h/o proteinuria; normal GFR. Now on 50mg losartan daily (tx with lisinopril was associated with a dry cough).   Neuropathy: No N/T in feet or hands. Just the trigger finger that bothers her.     At last visit, pt reported that 1 week prior to recent lab work, she states she did not take fenofibrate, as she did not have time to  the prescription. 11/19/18 she reports taking the atorvastatin and fenofibrate.     Current Outpatient Prescriptions   Medication     Alcohol Swabs (ALCOHOL PREP PAD) 70 % PADS     aspirin 81 MG chewable tablet     atorvastatin (LIPITOR) 40 MG tablet     B-D U/F insulin pen needle     BASAGLAR 100 UNIT/ML injection     BD ULTRA FINE PEN NEEDLES     blood glucose monitoring (ACCU-CHEK LAYA PLUS) test strip     blood glucose monitoring (ACCU-CHEK FASTCLIX) lancets     canagliflozin (INVOKANA) 100 MG tablet     Continuous Blood Gluc  (FREESTYLE ZORAIDA READER) DAVID     continuous blood glucose monitoring (FREESTYLE ZORAIDA) sensor     cyclobenzaprine (FLEXERIL) 5 MG tablet     dulaglutide  "(TRULICITY) 1.5 MG/0.5ML pen     ergocalciferol (ERGOCALCIFEROL) 74783 units capsule     fenofibrate 160 MG tablet     ibuprofen (ADVIL/MOTRIN) 600 MG tablet     insulin aspart (NOVOLOG PEN) 100 UNIT/ML injection     levonorgestrel (MIRENA) 20 MCG/24HR IUD     losartan (COZAAR) 50 MG tablet     naproxen (NAPROSYN) 500 MG tablet     TRULICITY 0.75 MG/0.5ML pen     [DISCONTINUED] BD ULTRA FINE PEN NEEDLES     Current Facility-Administered Medications   Medication     hylan (SYNVISC ONE) injection 48 mg     hylan (SYNVISC ONE) injection 48 mg     Past Medical History:   Diagnosis Date     Combined visual and hearing impairment      Deaf      Diabetic retinopathy of both eyes (H) 8/19/2011     Hepatic steatosis 08/19/2011     History of tobacco use      Hyperlipidemia      Hypertension      Hypertriglyceridemia      Migraines      Obesity 8/19/2011     Problem list name updated by automated process. 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 Continues working- currently doing secretarial work.     ROS:  Negative except as noted above.     /80  Pulse 87  Ht (P) 1.549 m (5' 0.98\")  Wt (P) 79.6 kg (175 lb 6.4 oz)  BMI (P) 33.16 kg/m2  Gen: No acute distress, comfortable appearing   HEENT: No noted icterus, MMM  CV: Regular rate and rhythm.   Pulm: Breathing comfortably on room air. No discrete adventitious sounds identified.  Ext: No LE edema, no bony deficits noted    Integumentary: No rashes or other lesions identified   Neuro: No focal deficits noted, muscle bulk appears intact  Psych: Mood and affect congruent     Labs:  Reviewed.    A/P:  Diabetes, type 2 vs. PRAVIN, complicated by diabetic nephropathy and retinopathy, uncontrolled, mainly due to noncompliance with the prescribed regimen  At last visit " 10/15/18 plan was to initiate a SGLT2 inhibitor in the setting of persistent hyperglycemia and A1C of 10.3. Pt unable to start empaglifozin due to prohibitive cost. Canaglifozin was substitutied but given that co-pay would still be over $125 for 3 month supply, so she wanted to explore other options. She has been on metformin in the past and tolerated it well per her report. She does not have other contraindications to retrial thus will plan for re-initiation and uptitration to full dose.   Minimal blood glucose meter data is available, but pt appears to have persistent morning hyperglycemia with some improvement pre lunch. Morning hyperglycemia could be due to missed dinner novolog, soda intake before bed, exaggerated harshil phenomenon, long-acting insulin wearing off, etc. Suspect evelia data will assist with differentiation of potential causes and assist with targeting changes. Given significant improvement in glucoses later in the day and continued component of non adherence, will not make any changes to insulin regimen today.   -start metformin ER and titrate up to full dose   -continue glargine 46U per day, aspart 1U/8g and 1U/50 over 150   -work with pt to identify alternative way to help calculate her insulin doses (as pt previously inputting data into her meter to help calculate the aspart dose that was indicated)   -initiate Evelia monitoring (pt to have teaching with DM educator after appt)  -continue Trulicity 1.8mg per week   -pt to contact staff if needed to review glucoses and provide verification that glucoses are appropriate prior to surgery.  She has no definite contraindication to surgery, although we would prefer a tighter blood glucose control.  Counseled the patient on the importance of achieving a better blood glucose control around the time of surgery, in order to decrease the risk of infections, delayed healing.    Proteinuria/HTN  Pt now with BP at goal after losartan increased to 50mg per day  approximately one month ago. No SEs noted.   -continue losartan 50mg per day   -Follow-up urine microalbumin at her next appointment    Dyslipidemia   Suspect further elevation in triglycerides on last eval 10/15/18 was secondary to non adherence with fenofibrate. Pt now reports full adherence.  -continue atorvastatin and fenofibrate    Follow up in clinic in 2 months.     The pt was staffed with Dr. Bragg.     Mary Ann Perdomo MD  Endocrine Fellow   Pager [phone]    I have personally examined the patient, reviewed and edited the fellow's note and agree with the plan of care.    Jimena Bragg MD.

## 2018-11-19 NOTE — PATIENT INSTRUCTIONS
Patient was provided the following education regarding using the [employer] Flash Glucose Monitoring (FGM) system:    1.  Sensor is FDA approved for placement in the upper posterior arm, although people have successfully worn it in other areas, such as the abdomen, upper outer thighs and buttocks.     2.  Sensor will need to be replaced every 10 days.  [place] is warranteed for 3 years.    3.  Sensor is water-proof.  You can shower with it in and swim up to 3 feet for up to 30 minutes.      3. Currently commercial insurance plans do not cover this system, however the company has applied a savings voucher to reduce the cost.  It can only be prescribed through certain pharmacies.  The pharmacy will apply the savings voucher at the time of sale.  Medicare covers this device if certain criteria are met and only through DME providers.  This system is not covered at all by Medical Assistance and there are no savings vouchers that apply to Medical Assistance.      4.  Each time a new sensor is started, it requires a 60 minutes warm up period.  No information will be available for the 60 minute period.  After that, the sensor will update every 5 minutes.  The reader needs to be held directly over the sensor until the reader beeps to let you know it picked up the reading.  The sensor has to be read a minimum of every 8 hours in order for a continuous trend graph to be available.  In other words, if it is not read 3 times a day, there will be gaps in the trend graph or no data at all.     5.  There will be [id number] arrows with each reading that will indicate the approximate speed with which your glucose is changing and the direction that it is heading.     6.  Data from the reader can be viewed by connecting the reader to a USB port in your computer and uploading the device to the [employer] website.     7.  If the sensor is unsure of it's own accuracy a symbol will appear in the reader window cueing you to check a blood glucose.   It is a good idea to check a blood glucose before correcting a high reading or treating a low reading.       Appointment to review the Sensor Report is on Monday, December 3 at 12:30pm.  Call if any questions or Text my cell phone.     JUSTICE EspañaN, RN, CDE  Diabetes   [facility]  Clinics and Surgery Center Room [address]  [address] [place] [address]  Email:  [email or web address]  Phone:  [phone]  Cell    :  [phone]

## 2018-11-19 NOTE — PATIENT INSTRUCTIONS
Start taking metformin at 1 tablet daily for one week, then increase the dose to 2 tablets daily for 1 weeks and then to 4 tablets daily.

## 2018-11-19 NOTE — MR AVS SNAPSHOT
After Visit Summary   11/19/2018    Nayeli Caal    MRN: [id number]           Patient Information     Date Of Birth          1980        Visit Information        Provider Department      11/19/2018 10:45 AM Jimena Bragg MD; ASL IS [facility] Endocrinology        Today's Diagnoses     Uncontrolled type 2 diabetes mellitus with hyperglycemia, with long-term current use of insulin (H)    -  1      Care Instructions    Start taking metformin at 1 tablet daily for one week, then increase the dose to 2 tablets daily for 1 weeks and then to 4 tablets daily.           Follow-ups after your visit        Follow-up notes from your care team     Return in about 2 months (around 1/19/2019).      Your next 10 appointments already scheduled     Nov 28, 2018 11:15 AM CST   (Arrive by 11:00 AM)   RETURN HAND with Greg Streeter MD   [facility] ([facility] [place])    [address] [place], [place] [id number] [phone]            Dec 06, 2018   Procedure with Greg Streeter MD   [facility] ([facility] [place])    [address] [place] [address]  5th [facility] [address]   [phone]           Located in the Clinics and Surgery Center at [address] [place] [address].   parking is very convenient and highly recommended.  is a $6 flat rate fee.  Both  and self parkers should enter the main arrival plaza from [facility]; parking attendants will direct you based on your parking preference.            Dec 19, 2018 11:15 AM CST   (Arrive by 11:00 AM)   RETURN HAND with Greg Streeter MD   [facility] ([facility] [place])    [address] [place] [address]  4th [facility] [address]   [phone]            Jan 14, 2019  9:30 AM CST   (Arrive by 9:15 AM)   RETURN DIABETES with Jimena Bragg MD   [facility]  Endocrinology ([facility] [place])    [address] [facility]  3rd [facility] [address] [phone]              Who to contact     Please call your clinic at [phone] to:    Ask questions about your health    Make or cancel appointments    Discuss your medicines    Learn about your test results    Speak to your doctor            Additional Information About Your Visit        [employer] Information     [employer] gives you secure access to your electronic health record. If you see a primary care provider, you can also send messages to your care team and make appointments. If you have questions, please call your primary care clinic.  If you do not have a primary care provider, please call [phone] and they will assist you.      [employer] is an electronic gateway that provides easy, online access to your medical records. With [employer], you can request a clinic appointment, read your test results, renew a prescription or communicate with your care team.     To access your existing account, please contact your [facility] or call [phone] for assistance.        Care EveryWhere ID     This is your Care EveryWhere ID. This could be used by other organizations to access your [facility] medical records  [id number]        Your Vitals Were     Pulse                   87            Blood Pressure from Last 3 Encounters:   11/19/18 121/80   10/25/18 115/77   10/15/18 147/85    Weight from Last 3 Encounters:   11/19/18 (P) 79.6 kg (175 lb 6.4 oz)   10/25/18 81.7 kg (180 lb 3.2 oz)   10/15/18 79.1 kg (174 lb 4.8 oz)              Today, you had the following     No orders found for display         Today's Medication Changes          These changes are accurate as of 11/19/18 12:27 PM.  If you have any questions, ask your nurse or doctor.               Start taking these medicines.        Dose/Directions    metFORMIN 500 MG 24 hr tablet   Commonly known as:  GLUCOPHAGE-XR    Used for:  Uncontrolled type 2 diabetes mellitus with hyperglycemia, with long-term current use of insulin (H)   Started by:  Jimena Bragg MD        Dose:  2000 mg   Take 4 tablets (2,000 mg) by mouth daily (with dinner)   Quantity:  360 tablet   Refills:  3            Where to get your medicines      These medications were sent to [facility] [place], [place] - [address]  [address], [facility] [address]    Hours:  TRANSPLANT PHONE NUMBER [phone] Phone:  [phone]     metFORMIN 500 MG 24 hr tablet                Primary Care Provider Office Phone # Fax #    Mariya Mohamud, APRN -[phone]       [address] [place] [address] [address]  [facility] [address]        Equal Access to Services     SULAIMAN MARTINEZ AH: Hadii oneida alvarado Socj, waaxda luqadaha, qaybta kaalmada adeegyada, marc bernal. So [facility] [phone].    ATENCIÓN: Si habla español, tiene a dykes disposición servicios gratuitos de asistencia lingüística. Lopezame al [phone].    We comply with applicable federal civil rights laws and [place] laws. We do not discriminate on the basis of race, color, national origin, age, disability, sex, sexual orientation, or gender identity.            Thank you!     Thank you for choosing [facility] ENDOCRINOLOGY  for your care. Our goal is always to provide you with excellent care. Hearing back from our patients is one way we can continue to improve our services. Please take a few minutes to complete the written survey that you may receive in the mail after your visit with us. Thank you!             Your Updated Medication List - Protect others around you: Learn how to safely use, store and throw away your medicines at www.disposemymeds.org.          This list is accurate as of 11/19/18 12:27 PM.  Always use your most recent med list.                   Brand Name Dispense Instructions for use Diagnosis     Alcohol Prep Pad 70 % Pads     100 each    1 each 3 times daily    Type 2 diabetes mellitus with other diabetic ophthalmic complication (H)       aspirin 81 MG chewable tablet     90 tablet    CHEW AND SWALLOW ONE TABLET BY MOUTH EVERY DAY    Type 1 diabetes mellitus with complications (H)       atorvastatin 40 MG tablet    LIPITOR    90 tablet    Take 1 tablet (40 mg) by mouth daily    Type 1 diabetes mellitus with complications (H)       B-D U/F 31G X 5 MM miscellaneous   Generic drug:  insulin pen needle           BD ULTRA FINE PEN NEEDLES     400 Units    Inject 1 dose. Subcutaneous 2 times daily BD ultra-fine insulin syringe, 30 ga. X 1/2'' short needle 1/2 cc. Use as directed.    Type 2 diabetes mellitus with complication, with long-term current use of insulin (H)       blood glucose monitoring lancets     6 Box    Use to test blood sugar 6 times daily or as directed    Type 1 diabetes mellitus with nephropathy (H)       blood glucose monitoring test strip    ACCU-CHEK LAYA PLUS    600 each    Laya Plus test strips for use in [employer] Expert meter.  Use to test blood sugar 6 times daily. 3 month supply.  Send PA's to Dr. Mohamud.    Type 1 diabetes mellitus with nephropathy (H)       canagliflozin 100 MG tablet    INVOKANA    90 tablet    Take 1 tablet (100 mg) by mouth every morning (before breakfast)    Uncontrolled type 2 diabetes mellitus with hyperglycemia, with long-term current use of insulin (H)       continuous blood glucose monitoring sensor     3 each    For use with Freestyle Evelia Flash  for continuous monitioring of blood glucose levels. Replace sensor every 10 days.    Uncontrolled type 2 diabetes mellitus with hyperglycemia, with long-term current use of insulin (H)       cyclobenzaprine 5 MG tablet    FLEXERIL    60 tablet    Take 1-2 tablets (5-10 mg) by mouth 3 times daily as needed for muscle spasms    Acute left-sided low back pain without sciatica, Fall, initial encounter, Rib  pain on right side       * TRULICITY 0.75 MG/0.5ML pen   Generic drug:  dulaglutide           * dulaglutide 1.5 MG/0.5ML pen    TRULICITY    6 mL    Inject 1.5 mg Subcutaneous every 7 days    Type 1 diabetes mellitus with complications (H)       ergocalciferol 54851 units capsule    ERGOCALCIFEROL    14 capsule    Take 1 capsule (50,000 Units) by mouth once a week    Vitamin D deficiency       fenofibrate 160 MG tablet     90 tablet    Take 1 tablet (160 mg) by mouth daily    Mixed hyperlipidemia       FREESTYLE ZORAIDA READER Miriam     1 Device    1 Device daily    Uncontrolled type 2 diabetes mellitus with hyperglycemia, with long-term current use of insulin (H)       ibuprofen 600 MG tablet    ADVIL/MOTRIN    120 tablet    Take 1 tablet (600 mg) by mouth every 6 hours as needed for moderate pain    Cervicalgia, Headache, Pain in joint, shoulder region       insulin aspart 100 UNIT/ML injection    NovoLOG PEN    63 mL    Before meals and bedtime correction sliding scale 120 - 160 - 1 U  161 - 200 - 2 U  201 - 240 - 3 U  241 - 280 - 4 U   281 - 320 - 5 U  321 - 360 - 6 U ...    Type 1 diabetes mellitus with complications (H)       insulin glargine 100 UNIT/ML pen     12 mL    Inject 50 Units Subcutaneous daily Please provide 3 months supply    Type 1 diabetes mellitus with complications (H)       levonorgestrel 20 MCG/24HR IUD    MIRENA    1 each    1 each (20 mcg) by Intrauterine route once for 1 dose    Encounter for insertion of intrauterine contraceptive device       losartan 50 MG tablet    COZAAR    90 tablet    Take 1 tablet (50 mg) by mouth daily    Uncontrolled type 2 diabetes mellitus with hyperglycemia, with long-term current use of insulin (H)       metFORMIN 500 MG 24 hr tablet    GLUCOPHAGE-XR    360 tablet    Take 4 tablets (2,000 mg) by mouth daily (with dinner)    Uncontrolled type 2 diabetes mellitus with hyperglycemia, with long-term current use of insulin (H)       naproxen 500 MG tablet     NAPROSYN    30 tablet    Take 1 tablet (500 mg) by mouth 2 times daily as needed for moderate pain    Acute left-sided low back pain without sciatica, Fall, initial encounter, Rib pain on right side       * Notice:  This list has 2 medication(s) that are the same as other medications prescribed for you. Read the directions carefully, and ask your doctor or other care provider to review them with you.

## 2018-11-19 NOTE — MR AVS SNAPSHOT
After Visit Summary   11/19/2018    Nayeli Caal    MRN: [id number]           Patient Information     Date Of Birth          1980        Visit Information        Provider Department      11/19/2018 12:15 PM Le Schroeder Lesley K, SYBIL M [facility] Diabetes        Care Instructions    Patient was provided the following education regarding using the Evelia Flash Glucose Monitoring (FGM) system:    1.  Sensor is FDA approved for placement in the upper posterior arm, although people have successfully worn it in other areas, such as the abdomen, upper outer thighs and buttocks.     2.  Sensor will need to be replaced every 10 days.  [place] is warranteed for 3 years.    3.  Sensor is water-proof.  You can shower with it in and swim up to 3 feet for up to 30 minutes.      3. Currently commercial insurance plans do not cover this system, however the company has applied a savings voucher to reduce the cost.  It can only be prescribed through certain pharmacies.  The pharmacy will apply the savings voucher at the time of sale.  Medicare covers this device if certain criteria are met and only through DME providers.  This system is not covered at all by Medical Assistance and there are no savings vouchers that apply to Medical Assistance.      4.  Each time a new sensor is started, it requires a 60 minutes warm up period.  No information will be available for the 60 minute period.  After that, the sensor will update every 5 minutes.  The reader needs to be held directly over the sensor until the reader beeps to let you know it picked up the reading.  The sensor has to be read a minimum of every 8 hours in order for a continuous trend graph to be available.  In other words, if it is not read 3 times a day, there will be gaps in the trend graph or no data at all.     5.  There will be gmju-up-dbftqm arrows with each reading that will indicate the approximate speed with which your glucose is  changing and the direction that it is heading.     6.  Data from the reader can be viewed by connecting the reader to a USB port in your computer and uploading the device to the [employer] website.     7.  If the sensor is unsure of it's own accuracy a symbol will appear in the reader window cueing you to check a blood glucose.  It is a good idea to check a blood glucose before correcting a high reading or treating a low reading.       Appointment to review the Sensor Report is on Monday, December 3 at 12:30pm.  Call if any questions or Text my cell phone.     JUSTICE EspañaN, RN, CDE  Diabetes   [facility]  Cl[facility] Room 3-279  [address] [place] [address]  Email:  [email or web address]  Phone:  [phone]  Cell    :  [phone]                   Follow-ups after your visit        Your next 10 appointments already scheduled     Nov 28, 2018 11:15 AM CST   (Arrive by 11:00 AM)   RETURN HAND with Greg Streeter MD   [facility] ([facility])    [address] [place] [id number] [phone]            Dec 06, 2018   Procedure with Greg Streeter MD   [facility] ([facility])    [address] [place] [id number] [phone]           Located in the Clinics and Surgery Center at [address] [place] [address].   parking is very convenient and highly recommended.  is a $6 flat rate fee.  Both  and self parkers should enter the main arrival plaza from [facility]; parking attendants will direct you based on your parking preference.            Dec 19, 2018 11:15 AM CST   (Arrive by 11:00 AM)   RETURN HAND with Greg Streeter MD   [facility] ([facility])    [address] [place] [address]  [id number]   [phone]            Jan 14, 2019  9:30 AM CST   (Arrive by 9:15 AM)   RETURN DIABETES with Jimena Bragg MD   [facility] ([facility])    [address] [id number] [phone]              Who to contact     Please call your clinic at [phone] to:    Ask questions about your health    Make or cancel appointments    Discuss your medicines    Learn about your test results    Speak to your doctor            Additional Information About Your Visit        [employer] Information     [employer] gives you secure access to your electronic health record. If you see a primary care provider, you can also send messages to your care team and make appointments. If you have questions, please call your primary care clinic.  If you do not have a primary care provider, please call [phone] and they will assist you.      Next[employer] is an electronic gateway that provides easy, online access to your medical records. With Next[employer], you can request a clinic appointment, read your test results, renew a prescription or communicate with your care team.     To access your existing account, please contact your [facility] or call [phone] for assistance.        Care EveryWhere ID     This is your Care EveryWhere ID. This could be used by other organizations to access your [place] medical records  [id number]         Blood Pressure from Last 3 Encounters:   11/19/18 121/80   10/25/18 115/77   10/15/18 147/85    Weight from Last 3 Encounters:   11/19/18 (P) 79.6 kg (175 lb 6.4 oz)   10/25/18 81.7 kg (180 lb 3.2 oz)   10/15/18 79.1 kg (174 lb 4.8 oz)              Today, you had the following     No orders found for display         Today's Medication Changes          These changes are accurate as of 11/19/18  1:42 PM.  If you have any questions, ask your nurse or doctor.               Start taking these medicines.         Dose/Directions    metFORMIN 500 MG 24 hr tablet   Commonly known as:  GLUCOPHAGE-XR   Used for:  Uncontrolled type 2 diabetes mellitus with hyperglycemia, with long-term current use of insulin (H)   Started by:  Jimena Bragg MD        Dose:  2000 mg   Take 4 tablets (2,000 mg) by mouth daily (with dinner)   Quantity:  360 tablet   Refills:  3            Where to get your medicines      These medications were sent to [facility] [place], [place] - [address]  [address], [facility] [address]    Hours:  TRANSPLANT PHONE NUMBER [phone] Phone:  [phone]     metFORMIN 500 MG 24 hr tablet                Primary Care Provider Office Phone # Fax #    Mariya GRACE Ovipramod, APRN [phone]       [address] [place] [address]  [facility] [address]        Equal Access to Services     SULAIMAN MARTINEZ : Hadii aad ku hadasho Socj, waaxda luqadaha, qaybta kaalmada adeegyada, marc al hayjordy rae . So [facility] [phone].    ATENCIÓN: Si habla español, tiene a dykes disposición servicios gratuitos de asistencia lingüística. Llame al [phone].    We comply with applicable federal civil rights laws and [place] laws. We do not discriminate on the basis of race, color, national origin, age, disability, sex, sexual orientation, or gender identity.            Thank you!     Thank you for choosing [facility] DIABETES  for your care. Our goal is always to provide you with excellent care. Hearing back from our patients is one way we can continue to improve our services. Please take a few minutes to complete the written survey that you may receive in the mail after your visit with us. Thank you!             Your Updated Medication List - Protect others around you: Learn how to safely use, store and throw away your medicines at www.disposemymeds.org.          This list is accurate as of 11/19/18  1:42 PM.  Always use your most  recent med list.                   Brand Name Dispense Instructions for use Diagnosis    Alcohol Prep Pad 70 % Pads     100 each    1 each 3 times daily    Type 2 diabetes mellitus with other diabetic ophthalmic complication (H)       aspirin 81 MG chewable tablet     90 tablet    CHEW AND SWALLOW ONE TABLET BY MOUTH EVERY DAY    Type 1 diabetes mellitus with complications (H)       atorvastatin 40 MG tablet    LIPITOR    90 tablet    Take 1 tablet (40 mg) by mouth daily    Type 1 diabetes mellitus with complications (H)       B-D U/F 31G X 5 MM miscellaneous   Generic drug:  insulin pen needle           BD ULTRA FINE PEN NEEDLES     400 Units    Inject 1 dose. Subcutaneous 2 times daily BD ultra-fine insulin syringe, 30 ga. X 1/2'' short needle 1/2 cc. Use as directed.    Type 2 diabetes mellitus with complication, with long-term current use of insulin (H)       blood glucose monitoring lancets     6 Box    Use to test blood sugar 6 times daily or as directed    Type 1 diabetes mellitus with nephropathy (H)       blood glucose monitoring test strip    ACCU-CHEK LAYA PLUS    600 each    Laya Plus test strips for use in Accucheck Expert meter.  Use to test blood sugar 6 times daily. 3 month supply.  Send PA's to Dr. Mohamud.    Type 1 diabetes mellitus with nephropathy (H)       canagliflozin 100 MG tablet    INVOKANA    90 tablet    Take 1 tablet (100 mg) by mouth every morning (before breakfast)    Uncontrolled type 2 diabetes mellitus with hyperglycemia, with long-term current use of insulin (H)       continuous blood glucose monitoring sensor     3 each    For use with Freestyle Evelia Flash  for continuous monitioring of blood glucose levels. Replace sensor every 10 days.    Uncontrolled type 2 diabetes mellitus with hyperglycemia, with long-term current use of insulin (H)       cyclobenzaprine 5 MG tablet    FLEXERIL    60 tablet    Take 1-2 tablets (5-10 mg) by mouth 3 times daily as needed for  muscle spasms    Acute left-sided low back pain without sciatica, Fall, initial encounter, Rib pain on right side       * TRULICITY 0.75 MG/0.5ML pen   Generic drug:  dulaglutide           * dulaglutide 1.5 MG/0.5ML pen    TRULICITY    6 mL    Inject 1.5 mg Subcutaneous every 7 days    Type 1 diabetes mellitus with complications (H)       ergocalciferol 57138 units capsule    ERGOCALCIFEROL    14 capsule    Take 1 capsule (50,000 Units) by mouth once a week    Vitamin D deficiency       fenofibrate 160 MG tablet     90 tablet    Take 1 tablet (160 mg) by mouth daily    Mixed hyperlipidemia       FREESTYLE ZORAIDA READER Miriam     1 Device    1 Device daily    Uncontrolled type 2 diabetes mellitus with hyperglycemia, with long-term current use of insulin (H)       ibuprofen 600 MG tablet    ADVIL/MOTRIN    120 tablet    Take 1 tablet (600 mg) by mouth every 6 hours as needed for moderate pain    Cervicalgia, Headache, Pain in joint, shoulder region       insulin aspart 100 UNIT/ML injection    NovoLOG PEN    63 mL    Before meals and bedtime correction sliding scale 120 - 160 - 1 U  161 - 200 - 2 U  201 - 240 - 3 U  241 - 280 - 4 U   281 - 320 - 5 U  321 - 360 - 6 U ...    Type 1 diabetes mellitus with complications (H)       insulin glargine 100 UNIT/ML pen     12 mL    Inject 50 Units Subcutaneous daily Please provide 3 months supply    Type 1 diabetes mellitus with complications (H)       levonorgestrel 20 MCG/24HR IUD    MIRENA    1 each    1 each (20 mcg) by Intrauterine route once for 1 dose    Encounter for insertion of intrauterine contraceptive device       losartan 50 MG tablet    COZAAR    90 tablet    Take 1 tablet (50 mg) by mouth daily    Uncontrolled type 2 diabetes mellitus with hyperglycemia, with long-term current use of insulin (H)       metFORMIN 500 MG 24 hr tablet    GLUCOPHAGE-XR    360 tablet    Take 4 tablets (2,000 mg) by mouth daily (with dinner)    Uncontrolled type 2 diabetes mellitus with  hyperglycemia, with long-term current use of insulin (H)       naproxen 500 MG tablet    NAPROSYN    30 tablet    Take 1 tablet (500 mg) by mouth 2 times daily as needed for moderate pain    Acute left-sided low back pain without sciatica, Fall, initial encounter, Rib pain on right side       * Notice:  This list has 2 medication(s) that are the same as other medications prescribed for you. Read the directions carefully, and ask your doctor or other care provider to review them with you.

## 2018-11-20 RX ORDER — FLASH GLUCOSE SENSOR
1 KIT MISCELLANEOUS
Qty: 2 EACH | Refills: 11 | Status: SHIPPED | OUTPATIENT
Start: 2018-11-20 | End: 2019-04-22

## 2018-11-25 RX ORDER — FLASH GLUCOSE SCANNING READER
EACH MISCELLANEOUS
Refills: 0 | OUTPATIENT
Start: 2018-11-25

## 2018-11-26 NOTE — PROGRESS NOTES
DIABETES EDUCATION NOTE:    Nayeli Caal presents today for education related to Type 2 diabetes    Patient is being treated with:  insulin  She is accompanied by self and     PATIENT CONCERNS RELATED TO DIABETES SELF MANAGEMENT:     Nayeli has had type 2 diabetes since her early 20's.  It has never been well controlled.  She has developed complications of retinopathy.  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  She is currently working full time.  She identifies her biggest challenge as remembering to check her BG and take her Novolog.  She mostly remembers to take her long acting insulin.   She wants to learn how to use the Evelia Flash glucose monitor.  She has also been using an [employer] Expert meter that calculates her insulin dosing.  This meter is not being made any longer, and she needs a program that can help her with this.        ASSESSMENT:  Current Diabetes Management per Patient:  Taking diabetes medications?   yes:     Diabetes Medication(s)     Biguanides Sig    metFORMIN (GLUCOPHAGE-XR) 500 MG 24 hr tablet Take 4 tablets (2,000 mg) by mouth daily (with dinner)    Insulin Sig    BASAGLAR 100 UNIT/ML injection Inject 50 Units Subcutaneous daily Please provide 3 months supply    insulin aspart (NOVOLOG PEN) 100 UNIT/ML injection Before meals and bedtime correction sliding scale  120 - 160 - 1 U   161 - 200 - 2 U   201 - 240 - 3 U   241 - 280 - 4 U    281 - 320 - 5 U   321 - 360 - 6 U ...    Sodium-Glucose Co-Transporter 2 (SGLT2) Inhibitors Sig    canagliflozin (INVOKANA) 100 MG tablet Take 1 tablet (100 mg) by mouth every morning (before breakfast)    Incretin Mimetic Agents (GLP-1 Receptor Agonists) Sig    dulaglutide (TRULICITY) 1.5 MG/0.5ML pen Inject 1.5 mg Subcutaneous every 7 days    TRULICITY 0.75 MG/0.5ML pen         Monitoring  Please see Dr. Desai's note from today which details Nayeli's diabetes management.     BG values  are: Not in goal  Patient's most recent   Lab Results   Component Value Date    A1C 10.3 10/15/2018    is not meeting goal of <7.0     EDUCATION and INSTRUCTION PROVIDED AT THIS VISIT:     Patient was provided the following education regarding using the Evelia Flash Glucose Monitoring (FGM) system:    1.  Sensor is FDA approved for placement in the upper posterior arm, although people have successfully worn it in other areas, such as the abdomen, upper outer thighs and buttocks.     2.  Sensor will need to be replaced every 10 days.  [place] is warranteed for 3 years.    3.  Sensor is water-proof.  You can shower with it in and swim up to 3 feet for up to 30 minutes.      3. Currently commercial insurance plans do not cover this system, however the company has applied a savings voucher to reduce the cost.  It can only be prescribed through certain pharmacies.  The pharmacy will apply the savings voucher at the time of sale.  Medicare covers this device if certain criteria are met and only through DME providers.  This system is not covered at all by Medical Assistance and there are no savings vouchers that apply to Medical Assistance.      4.  Each time a new sensor is started, it requires a 12 hour warm up period.  No information will be available for the 12 hour period.  After that, the sensor will update every 5 minutes.  The reader needs to be held directly over the sensor until the reader beeps to let you know it picked up the reading.  The sensor has to be read a minimum of every 8 hours in order for a continuous trend graph to be available.  In other words, if it is not read 3 times a day, there will be gaps in the trend graph or no data at all.     5.  There will be wate-lh-ktmgva arrows with each reading that will indicate the approximate speed with which your glucose is changing and the direction that it is heading.     6.  Data from the reader can be viewed by connecting the reader to a USB port in your  computer and uploading the device to the Evelia website.     7.  If the sensor is unsure of it's own accuracy a symbol will appear in the reader window cueing you to check a blood glucose.  It is a good idea to check a blood glucose before correcting a high reading or treating a low reading.      There  Is a phone application that is highly rated and commonly used that calculates insulin doses without requiring that the patient use a specific meter.  We reviewed the Diabetes [employer] application, which is free and was available on her cell phone.  She had some difficulty using this.  Written instructions with pictures were provided and she wants to practice using it.  She can continue to use the Expert meter for checking her blood glucose.  Shown how to use the Evelia meter to enter values into her phone application.                Patient-stated goal written and given to Nayeli Caal.  Verbalized and demonstrated understanding of instructions.     PLAN:  See patient instructions  AVS printed and given to patient    FOLLOW-UP:    December 3 appointment to review the Evelia report and consider some other insulin calculating programs.      Time spent with patient at today's visit was 60 minutes.      Any diabetes medication dose changes were made via the CDE Protocol and Collaborative Practice Agreement [facility] [place] [facility].  A copy of this encounter was provided to patient's referring provider.

## 2018-12-03 ENCOUNTER — ALLIED HEALTH/NURSE VISIT (OUTPATIENT)
Dept: EDUCATION SERVICES | Facility: CLINIC | Age: 38
End: 2018-12-03
Payer: COMMERCIAL

## 2018-12-03 DIAGNOSIS — E11.9 DIABETES MELLITUS, TYPE 2 (H): Primary | ICD-10-CM

## 2018-12-03 RX ORDER — ADHESIVE REMOVER
1 PACKET (EA) MISCELLANEOUS
Qty: 50 EACH | Refills: 3 | Status: SHIPPED | OUTPATIENT
Start: 2018-12-03 | End: 2019-01-14

## 2018-12-03 NOTE — PROGRESS NOTES
DIABETES EDUCATION NOTE:    Nayeli Caal presents today for education related to Type 2 diabetes    Patient is being treated with:  oral agents and insulin  She is accompanied by self and     PATIENT CONCERNS RELATED TO DIABETES SELF MANAGEMENT:   The Evelia [place] that she received a couple of weeks ago when she was here to learn how to use it, fell out of her pocket about 3 days after she got it, so there is no data to review today.     I called in a prescription for a reader and gave her another one today as back up, along with a case to keep it in.  She states that she likes using it, but notes that at times the numbers are particularly close and she wonders which device, reader or her blood glucose meter, she should trust.       PROGRESS TOWARD GOALS:    Because of visual problems, Nayeli typically uses a large screen cell phone and loaded Diabetes M onto it, but hasn't used it a lot because she hasn't been able to use her Evelia FGM and she otherwise has difficulty with checking her blood glucose.  She carries her meter, but doesn't always take it out when eating to check.      Todays Blood Glucose result was 170 on Accu-Chek [employer] Expert meter         EDUCATION and INSTRUCTION PROVIDED AT THIS VISIT:       Nayeli brought a Evelia sensor with her today and was assisted with prepping the site and inserting the sensor.  She was given another reader to use, so that she can start monitoring right away.  Reviewed reasonable expectations with the device and cautioned that if her symptoms are not matching up with how she feels that she should always double check the Evelia with a blood glucose reading before correcting a high or treating a low.      We spent quite a bit of time going over the use of the Diabetes M yamileth on her tablet.  I had her do a couple of different scenarios to help her recognize where to enter carbohydrates and her sensor glucose reading in order to get carb coverage and  correction.  She was able to do this, as well as enter it in the logbook.  Explained that recording and saving it is important so that Dr Bragg has this information when she goes in for her visits.      It appears that Nayeli does a fairly good job of counting carbohydrates on recently recall.  We talked about changing her meter, but at this point she has a lot of test strips for her current meter and doesn't really want to change it out, so until she runs out of test strips she wants to continue to use it.     At the conclusion of the visit, Nayeli seemed more comfortable with using the Diabetes M yamileth, and it is fairly easy for her to access on her tablet.  She indicated understanding of the limitations of the Evelia sensor, and states that she is going to talk to her insurance company about whether or not she might be able to get the sensors cheaper at a different pharmacy.      Patient-stated goal written and given to Nayeli Caal.  Verbalized and demonstrated understanding of instructions.     PLAN    Nayeli will call or text with any specific concerns.  Otherwise she has follow up with Dr. Bragg in January.  Instructed to bring both her reader and her tablet with her to that appointment for review.      FOLLOW-UP:        Time spent with patient at today's visit was 60 minutes.      Any diabetes medication dose changes were made via the CDE Protocol and Collaborative Practice Agreement with [place] and  [facility].  A copy of this encounter was provided to patient's referring provider.

## 2019-01-13 NOTE — PROGRESS NOTES
DM follow up note:    Pt seen with the assistance of an in person .     Hemoglobin A1c today was 10.4%, stable since October last year.  She has not been wearing the sensor on a consistent basis, as is frequently falls off.  She ran out of the adhesive wipes.  Today, she is going to meet with the diabetes educator to readdress these issues.  Due to financial issues, she was not able to administer Trulicity for the last 2 weeks but she plans to pick the medication from her pharmacy today, and restart taking it regularly.    She has the FreeStyle Flash Evelia 14 days sensor.  On the sensor, the average blood glucose is 187 with a standard deviation of 98.  35% of the blood sugar numbers are within target of , 51% are above and 10% are below 70.  Some of the hypoglycemic episodes are inaccurate, as they were not confirmed with the meter and the patient was asked symptomatic.  On the sensor, the lowest blood sugar is achieved during the night and, with food intake, her blood sugar increases and remains elevated at bedtime, around 200.  On the meter, she checks her blood sugar approximately once a day.  Average blood glucose is 235, with a standard deviation of 67 and a range variable between 74 and 373.  She almost never checks her blood sugar at bedtime and very rarely prior to dinner.  She continues to take insulin for food intake inconsistently, mainly with breakfast wind lunch, less consistent with dinner.    Taking her insulin as follows:  Basaglar 46U per day, takes in the mornings, usually pretty good about remembering  Novolog taking 1U/8g, correction. No change since last visit.   Parameters noted last visit:  Insulin to Carb Ratio: 8  Correction Factor: 50  BG Target: 110-150  Offset time 2 hrs   Acting time 3 hrs     Diabetes complications:  Retinopathy: last eye exam - 2018. 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  Nephropathy: h/o proteinuria; normal GFR. Now on 50mg losartan daily (tx with lisinopril  was associated with a dry cough).   Neuropathy: No N/T in feet or hands. Just the trigger finger that bothers her.     11/19/18 she reports taking the atorvastatin and fenofibrate.     Current Outpatient Medications   Medication     Alcohol Swabs (ALCOHOL PREP PAD) 70 % PADS     aspirin 81 MG chewable tablet     atorvastatin (LIPITOR) 40 MG tablet     B-D U/F insulin pen needle     BASAGLAR 100 UNIT/ML injection     BD ULTRA FINE PEN NEEDLES     blood glucose monitoring (ACCU-CHEK LAYA PLUS) test strip     blood glucose monitoring (ACCU-CHEK FASTCLIX) lancets     canagliflozin (INVOKANA) 100 MG tablet     Continuous Blood Gluc Sensor (FREESTYLE ZORAIDA 14 DAY SENSOR) MISC     cyclobenzaprine (FLEXERIL) 5 MG tablet     dulaglutide (TRULICITY) 1.5 MG/0.5ML pen     ergocalciferol (ERGOCALCIFEROL) 62164 units capsule     fenofibrate 160 MG tablet     ibuprofen (ADVIL/MOTRIN) 600 MG tablet     insulin aspart (NOVOLOG PEN) 100 UNIT/ML injection     levonorgestrel (MIRENA) 20 MCG/24HR IUD     losartan (COZAAR) 50 MG tablet     metFORMIN (GLUCOPHAGE-XR) 500 MG 24 hr tablet     naproxen (NAPROSYN) 500 MG tablet     Ostomy Supplies (ADHESIVE REMOVER WIPES) MISC     Ostomy Supplies (SKIN TAC ADHESIVE BARRIER WIPE) MISC     TRULICITY 0.75 MG/0.5ML pen     Current Facility-Administered Medications   Medication     hylan (SYNVISC ONE) injection 48 mg     hylan (SYNVISC ONE) injection 48 mg     Past Medical History:   Diagnosis Date     Combined visual and hearing impairment      Deaf      Diabetic retinopathy of both eyes (H) 8/19/2011     Hepatic steatosis 08/19/2011     History of tobacco use      Hyperlipidemia      Hypertension      Hypertriglyceridemia      Migraines      Obesity 8/19/2011     Problem list name updated by automated process. 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 Continues working- currently doing secretarial work.     ROS:  Negative except as noted above.     /90   Pulse 85   Wt 81.2 kg (179 lb 1.6 oz)   BMI (P) 33.86 kg/m    Gen: No acute distress, comfortable appearing   HEENT: No noted icterus, MMM  CV: Regular rate and rhythm, no murmurs.   Pulm: Breathing comfortably on room air.  No rales, no wheezes  Ext: No LE edema, no bony deficits noted    Integumentary: No rashes or other lesions identified   Neuro: No focal deficits noted, muscle bulk appears intact, no tremor, normal tone  Psych: Mood and affect congruent   Feet: Sensation intact to monofilament testing    Labs:  Reviewed.  Lab Results   Component Value Date    A1C 10.3 (H) 10/15/2018    A1C 13.4 (H) 11/27/2017    A1C 11.0 (H) 07/13/2017    A1C 10.9 (H) 05/15/2017    A1C 12.8 (H) 01/02/2017    HEMOGLOBINA1 10.4 (A) 01/14/2019    HEMOGLOBINA1 8.7 (A) 03/12/2018    HEMOGLOBINA1 12.0 (A) 02/03/2014    HEMOGLOBINA1 10.3 (A) 10/28/2013    HEMOGLOBINA1 11.3 (A) 08/06/2013       Hemoglobin   Date Value Ref Range Status   03/13/2018 14.2 11.7 - 15.7 g/dL Final     Hematocrit   Date Value Ref Range Status   03/13/2018 42.3 35.0 - 47.0 % Final     Cholesterol   Date Value Ref Range Status   10/15/2018 263 (H) <200 mg/dL Final     Comment:     Desirable:       <200 mg/dl     Cholesterol/HDL Ratio   Date Value Ref Range Status   09/16/2013 5.8 (H) 0.0 - 5.0 Final     HDL Cholesterol   Date Value Ref Range Status   10/15/2018 29 (L) >49 mg/dL Final     LDL Cholesterol Calculated   Date Value Ref Range Status   10/15/2018  <100 mg/dL Final    Cannot estimate LDL when triglyceride exceeds 400 mg/dL     LDL Cholesterol Direct   Date Value Ref Range Status   10/15/2018 136 (H) <100 mg/dL Final     Comment:     Above desirable:  100-129 mg/dl  Borderline High:  130-159 mg/dL  High:             160-189 mg/dL  Very high:       >189 mg/dl        VLDL-Cholesterol   Date Value Ref Range Status   09/16/2013 44 (H) 0 - 30 mg/dL Final     Triglycerides   Date Value Ref Range Status   10/15/2018 560 (H) <150 mg/dL Final     Comment:     Borderline high:  150-199 mg/dl  High:             200-499 mg/dl  Very high:       >499 mg/dl  Fasting specimen       Albumin Urine mg/L   Date Value Ref Range Status   05/17/2018 134 mg/L Final     TSH   Date Value Ref Range Status   05/17/2018 1.84 0.40 - 4.00 mU/L Final         Last Basic Metabolic Panel:    Sodium   Date Value Ref Range Status   05/17/2018 138 133 - 144 mmol/L Final     Potassium   Date Value Ref Range Status   05/17/2018 4.3 3.4 - 5.3 mmol/L Final     Chloride   Date Value Ref Range Status   05/17/2018 106 94 - 109 mmol/L Final     Calcium   Date Value Ref Range Status   05/17/2018 8.7 8.5 - 10.1 mg/dL Final     Carbon Dioxide   Date Value Ref Range Status   05/17/2018 25 20 - 32 mmol/L Final     Urea Nitrogen   Date Value Ref Range Status   05/17/2018 13 7 - 30 mg/dL Final     Creatinine   Date Value Ref Range Status   05/17/2018 0.56 0.52 - 1.04 mg/dL Final     GFR Estimate   Date Value Ref Range Status   05/17/2018 >90 >60 mL/min/1.7m2 Final     Comment:     Non  GFR Calc     Glucose   Date Value Ref Range Status   05/17/2018 286 (H) 70 - 99 mg/dL Final       AST   Date Value Ref Range Status   05/17/2018 30 0 - 45 U/L Final     ALT   Date Value Ref Range Status   05/17/2018 56 (H) 0 - 50 U/L Final     Albumin Urine mg/g Cr   Date Value Ref Range Status   05/17/2018 225.21 (H) 0 - 25 mg/g Cr Final     A/P:  Diabetes, type 2 vs. PRAVIN, complicated by diabetic nephropathy and retinopathy, uncontrolled, mainly due to noncompliance with the prescribed regimen  Recommendations:  Continue Trulicity  Decrease the dose of Basaglar to 40 units in the morning  Administer NovoLog for all meals and snacks, according to the carbohydrate intake.  Discussed about using phone reminders, in order to take  insulin for dinner.  Use skin tac adhesive wipes  Check with insurance the coverage for either Invokana or empagliflozin.    Proteinuria/HTN  Blood pressure borderline high.  We are going to reevaluate at her next appointment.  -continue losartan 50mg per day   -Follow-up urine microalbumin at her next appointment    Dyslipidemia   Suspect further elevation in triglycerides on last eval 10/15/18 was secondary to non adherence with fenofibrate. Pt now reports full adherence.  -continue atorvastatin and fenofibrate  - F/up lipid panel    Orders Placed This Encounter   Procedures     Albumin Random Urine Quantitative with Creat Ratio     Hematocrit     Lipid panel reflex to direct LDL Fasting     Comprehensive metabolic panel     Hemoglobin A1c POCT

## 2019-01-14 ENCOUNTER — OFFICE VISIT (OUTPATIENT)
Dept: ENDOCRINOLOGY | Facility: CLINIC | Age: 39
End: 2019-01-14
Payer: COMMERCIAL

## 2019-01-14 VITALS
SYSTOLIC BLOOD PRESSURE: 145 MMHG | BODY MASS INDEX: 33.84 KG/M2 | HEART RATE: 85 BPM | DIASTOLIC BLOOD PRESSURE: 90 MMHG | WEIGHT: 179.1 LBS

## 2019-01-14 DIAGNOSIS — I10 BENIGN ESSENTIAL HYPERTENSION: ICD-10-CM

## 2019-01-14 DIAGNOSIS — E78.5 DYSLIPIDEMIA: ICD-10-CM

## 2019-01-14 DIAGNOSIS — E11.65 UNCONTROLLED TYPE 2 DIABETES MELLITUS WITH HYPERGLYCEMIA, WITH LONG-TERM CURRENT USE OF INSULIN (H): Primary | ICD-10-CM

## 2019-01-14 DIAGNOSIS — Z79.4 UNCONTROLLED TYPE 2 DIABETES MELLITUS WITH HYPERGLYCEMIA, WITH LONG-TERM CURRENT USE OF INSULIN (H): Primary | ICD-10-CM

## 2019-01-14 LAB — HBA1C MFR BLD: 10.4 % (ref 4.3–6)

## 2019-01-14 RX ORDER — INSULIN GLARGINE 100 [IU]/ML
46 INJECTION, SOLUTION SUBCUTANEOUS DAILY
Qty: 12 ML | Refills: 3 | Status: SHIPPED | OUTPATIENT
Start: 2019-01-14 | End: 2019-01-14

## 2019-01-14 RX ORDER — INSULIN GLARGINE 100 [IU]/ML
40 INJECTION, SOLUTION SUBCUTANEOUS DAILY
Qty: 12 ML | Refills: 3 | Status: SHIPPED | OUTPATIENT
Start: 2019-01-14 | End: 2019-01-14

## 2019-01-14 RX ORDER — INSULIN GLARGINE 100 [IU]/ML
40 INJECTION, SOLUTION SUBCUTANEOUS DAILY
Qty: 12 ML | Refills: 3 | Status: SHIPPED | OUTPATIENT
Start: 2019-01-14 | End: 2019-04-22

## 2019-01-14 RX ORDER — ADHESIVE REMOVER
1 PACKET (EA) MISCELLANEOUS
Qty: 50 EACH | Refills: 3 | Status: SHIPPED | OUTPATIENT
Start: 2019-01-14 | End: 2024-07-12

## 2019-01-14 NOTE — NURSING NOTE
Chief Complaint   Patient presents with     RECHECK     Type 2 Diabetes     Capillary puncture performed for Hemoglobin A1C test. Patient tolerated well.    Sara Aparicio MA

## 2019-01-14 NOTE — LETTER
1/14/2019       RE: Nayeli Caal  [address]  [place] [place] [address]     Dear Colleague,    Thank you for referring your patient, Nayeli Caal, to the [facility] ENDOCRINOLOGY at [facility]. Please see a copy of my visit note below.    DM follow up note:    Pt seen with the assistance of an in person .     Hemoglobin A1c today was 10.4%, stable since October last year.  She has not been wearing the sensor on a consistent basis, as is frequently falls off.  She ran out of the adhesive wipes.  Today, she is going to meet with the diabetes educator to readdress these issues.  Due to financial issues, she was not able to administer Trulicity for the last 2 weeks but she plans to pick the medication from her pharmacy today, and restart taking it regularly.    She has the FreeStyle Flash Evelia 14 days sensor.  On the sensor, the average blood glucose is 187 with a standard deviation of 98.  35% of the blood sugar numbers are within target of , 51% are above and 10% are below 70.  Some of the hypoglycemic episodes are inaccurate, as they were not confirmed with the meter and the patient was asked symptomatic.  On the sensor, the lowest blood sugar is achieved during the night and, with food intake, her blood sugar increases and remains elevated at bedtime, around 200.  On the meter, she checks her blood sugar approximately once a day.  Average blood glucose is 235, with a standard deviation of 67 and a range variable between 74 and 373.  She almost never checks her blood sugar at bedtime and very rarely prior to dinner.  She continues to take insulin for food intake inconsistently, mainly with breakfast wind lunch, less consistent with dinner.    Taking her insulin as follows:  Basaglar 46U per day, takes in the mornings, usually pretty good about remembering  Novolog taking 1U/8g, correction. No change since last visit.   Parameters  noted last visit:  Insulin to Carb Ratio: 8  Correction Factor: 50  BG Target: 110-150  Offset time 2 hrs   Acting time 3 hrs     Diabetes complications:  Retinopathy: last eye exam - 2018. No  Usher's syndrome.  Nephropathy: h/o proteinuria; normal GFR. Now on 50mg losartan daily (tx with lisinopril was associated with a dry cough).   Neuropathy: No N/T in feet or hands. Just the trigger finger that bothers her.     11/19/18 she reports taking the atorvastatin and fenofibrate.     Current Outpatient Medications   Medication     Alcohol Swabs (ALCOHOL PREP PAD) 70 % PADS     aspirin 81 MG chewable tablet     atorvastatin (LIPITOR) 40 MG tablet     B-D U/F insulin pen needle     BASAGLAR 100 UNIT/ML injection     BD ULTRA FINE PEN NEEDLES     blood glucose monitoring (ACCU-CHEK LAYA PLUS) test strip     blood glucose monitoring (ACCU-CHEK FASTCLIX) lancets     canagliflozin (INVOKANA) 100 MG tablet     Continuous Blood Gluc Sensor (iKure Tech[employer]STYLE ZORAIDA 14 DAY SENSOR) MISC     cyclobenzaprine (FLEXERIL) 5 MG tablet     dulaglutide (TRULICITY) 1.5 MG/0.5ML pen     ergocalciferol (ERGOCALCIFEROL) 68388 units capsule     fenofibrate 160 MG tablet     ibuprofen (ADVIL/MOTRIN) 600 MG tablet     insulin aspart (NOVOLOG PEN) 100 UNIT/ML injection     levonorgestrel (MIRENA) 20 MCG/24HR IUD     losartan (COZAAR) 50 MG tablet     metFORMIN (GLUCOPHAGE-XR) 500 MG 24 hr tablet     naproxen (NAPROSYN) 500 MG tablet     Ostomy Supplies (ADHESIVE REMOVER WIPES) MISC     Ostomy Supplies (SKIN TAC ADHESIVE BARRIER WIPE) MISC     TRULICITY 0.75 MG/0.5ML pen     Current Facility-Administered Medications   Medication     hylan (SYNVISC ONE) injection 48 mg     hylan (SYNVISC ONE) injection 48 mg     Past Medical History:   Diagnosis Date     Combined visual and hearing impairment      Deaf      Diabetic retinopathy of both eyes (H) 8/19/2011     Hepatic steatosis 08/19/2011     History of tobacco use      Hyperlipidemia      Hypertension       Hypertriglyceridemia      Migraines      Obesity 8/19/2011     Problem list name updated by automated process. 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 Continues working- currently doing secretarial work.     ROS:  Negative except as noted above.     /90   Pulse 85   Wt 81.2 kg (179 lb 1.6 oz)   BMI (P) 33.86 kg/m     Gen: No acute distress, comfortable appearing   HEENT: No noted icterus, MMM  CV: Regular rate and rhythm, no murmurs.   Pulm: Breathing comfortably on room air.  No rales, no wheezes  Ext: No LE edema, no bony deficits noted    Integumentary: No rashes or other lesions identified   Neuro: No focal deficits noted, muscle bulk appears intact, no tremor, normal tone  Psych: Mood and affect congruent   Feet: Sensation intact to monofilament testing    Labs:  Reviewed.  Lab Results   Component Value Date    A1C 10.3 (H) 10/15/2018    A1C 13.4 (H) 11/27/2017    A1C 11.0 (H) 07/13/2017    A1C 10.9 (H) 05/15/2017    A1C 12.8 (H) 01/02/2017    HEMOGLOBINA1 10.4 (A) 01/14/2019    HEMOGLOBINA1 8.7 (A) 03/12/2018    HEMOGLOBINA1 12.0 (A) 02/03/2014    HEMOGLOBINA1 10.3 (A) 10/28/2013    HEMOGLOBINA1 11.3 (A) 08/06/2013       Hemoglobin   Date Value Ref Range Status   03/13/2018 14.2 11.7 - 15.7 g/dL Final     Hematocrit   Date Value Ref Range Status   03/13/2018 42.3 35.0 - 47.0 % Final     Cholesterol   Date Value Ref Range Status   10/15/2018 263 (H) <200 mg/dL Final     Comment:     Desirable:       <200 mg/dl     Cholesterol/HDL Ratio   Date Value Ref Range Status   09/16/2013 5.8 (H) 0.0 - 5.0 Final     HDL Cholesterol   Date Value Ref Range Status   10/15/2018 29 (L) >49 mg/dL Final     LDL Cholesterol Calculated   Date Value Ref Range Status   10/15/2018  <100 mg/dL Final    Cannot  estimate LDL when triglyceride exceeds 400 mg/dL     LDL Cholesterol Direct   Date Value Ref Range Status   10/15/2018 136 (H) <100 mg/dL Final     Comment:     Above desirable:  100-129 mg/dl  Borderline High:  130-159 mg/dL  High:             160-189 mg/dL  Very high:       >189 mg/dl       VLDL-Cholesterol   Date Value Ref Range Status   09/16/2013 44 (H) 0 - 30 mg/dL Final     Triglycerides   Date Value Ref Range Status   10/15/2018 560 (H) <150 mg/dL Final     Comment:     Borderline high:  150-199 mg/dl  High:             200-499 mg/dl  Very high:       >499 mg/dl  Fasting specimen       Albumin Urine mg/L   Date Value Ref Range Status   05/17/2018 134 mg/L Final     TSH   Date Value Ref Range Status   05/17/2018 1.84 0.40 - 4.00 mU/L Final         Last Basic Metabolic Panel:    Sodium   Date Value Ref Range Status   05/17/2018 138 133 - 144 mmol/L Final     Potassium   Date Value Ref Range Status   05/17/2018 4.3 3.4 - 5.3 mmol/L Final     Chloride   Date Value Ref Range Status   05/17/2018 106 94 - 109 mmol/L Final     Calcium   Date Value Ref Range Status   05/17/2018 8.7 8.5 - 10.1 mg/dL Final     Carbon Dioxide   Date Value Ref Range Status   05/17/2018 25 20 - 32 mmol/L Final     Urea Nitrogen   Date Value Ref Range Status   05/17/2018 13 7 - 30 mg/dL Final     Creatinine   Date Value Ref Range Status   05/17/2018 0.56 0.52 - 1.04 mg/dL Final     GFR Estimate   Date Value Ref Range Status   05/17/2018 >90 >60 mL/min/1.7m2 Final     Comment:     Non  GFR Calc     Glucose   Date Value Ref Range Status   05/17/2018 286 (H) 70 - 99 mg/dL Final       AST   Date Value Ref Range Status   05/17/2018 30 0 - 45 U/L Final     ALT   Date Value Ref Range Status   05/17/2018 56 (H) 0 - 50 U/L Final     Albumin Urine mg/g Cr   Date Value Ref Range Status   05/17/2018 225.21 (H) 0 - 25 mg/g Cr Final     A/P:  Diabetes, type 2 vs. PRAVIN, complicated by diabetic nephropathy and retinopathy, uncontrolled,  mainly due to noncompliance with the prescribed regimen  Recommendations:  Continue Trulicity  Decrease the dose of Basaglar to 40 units in the morning  Administer NovoLog for all meals and snacks, according to the carbohydrate intake.  Discussed about using phone reminders, in order to take insulin for dinner.  Use skin tac adhesive wipes  Check with insurance the coverage for either Invokana or empagliflozin.    Proteinuria/HTN  Blood pressure borderline high.  We are going to reevaluate at her next appointment.  -continue losartan 50mg per day   -Follow-up urine microalbumin at her next appointment    Dyslipidemia   Suspect further elevation in triglycerides on last eval 10/15/18 was secondary to non adherence with fenofibrate. Pt now reports full adherence.  -continue atorvastatin and fenofibrate  - F/up lipid panel    Orders Placed This Encounter   Procedures     Albumin Random Urine Quantitative with Creat Ratio     Hematocrit     Lipid panel reflex to direct LDL Fasting     Comprehensive metabolic panel     Hemoglobin A1c POCT           Again, thank you for allowing me to participate in the care of your patient.      Sincerely,    Jimena Bragg MD

## 2019-01-15 ENCOUNTER — TELEPHONE (OUTPATIENT)
Dept: ENDOCRINOLOGY | Facility: CLINIC | Age: 39
End: 2019-01-15

## 2019-01-15 NOTE — TELEPHONE ENCOUNTER
[facility] Call Center    Phone Message    May a detailed message be left on voicemail: no    Reason for Call: Other: [facility] needing call back to verify directions on pt's script of novalog flexpen     Action Taken: Message routed to:  [facility] ([facility]): endo

## 2019-01-15 NOTE — TELEPHONE ENCOUNTER
[facility] [phone] Spoke w/ Nida Pharm Tech    Medication Detail      Disp Refills Start End ONOFRE   insulin aspart (NOVOLOG PEN) 100 UNIT/ML pen 40 mL 3 1/14/2019  No   Sig: Administer at 1 U per 8 grams carbohydrates. Total daily dose around 45 U.   Sent to pharmacy as: insulin aspart (NOVOLOG PEN) 100 UNIT/ML pen   Class: E-Prescribe   Order: [id number]   E-Prescribing Status: Receipt confirmed by pharmacy (1/14/2019 10:37 AM CST)   Printout Tracking     External Result Report   Medication Administration Instructions     Administer at 1 U per 8 grams carbohydrates. Total daily dose around 45 U.   [facility] [place], [place] - [address] AT PORTAL TO REGISTERED [facility] SITES     Confirmed instructions as stated above with Pharm D Jose

## 2019-01-25 ENCOUNTER — CARE COORDINATION (OUTPATIENT)
Dept: EDUCATION SERVICES | Facility: CLINIC | Age: 39
End: 2019-01-25

## 2019-01-25 DIAGNOSIS — Z79.4 UNCONTROLLED TYPE 2 DIABETES MELLITUS WITH HYPERGLYCEMIA, WITH LONG-TERM CURRENT USE OF INSULIN (H): Primary | ICD-10-CM

## 2019-01-25 DIAGNOSIS — E11.65 UNCONTROLLED TYPE 2 DIABETES MELLITUS WITH HYPERGLYCEMIA, WITH LONG-TERM CURRENT USE OF INSULIN (H): Primary | ICD-10-CM

## 2019-01-25 RX ORDER — WOUND DRESSING ADHESIVE - LIQUID
LIQUID MISCELLANEOUS
Qty: 48 ML | Refills: 3 | Status: SHIPPED | OUTPATIENT
Start: 2019-01-25 | End: 2020-01-21

## 2019-01-25 NOTE — PROGRESS NOTES
Diabetes Educator Note:    Nayeli called because her Evelia sensor is giving her false positive low readings.  Discussed that this is a known problem with this sensor.  Encouraged her to keep wearing it, however as she seems to stay on track a little better when she is wearing the sensor.  Sent an order to the [facility] for Mastisol.

## 2019-04-03 PROBLEM — M65.311 TRIGGER THUMB OF RIGHT HAND: Status: RESOLVED | Noted: 2018-07-30 | Resolved: 2019-04-03

## 2019-04-03 PROBLEM — M65.342 TRIGGER RING FINGER OF LEFT HAND: Status: RESOLVED | Noted: 2018-07-30 | Resolved: 2019-04-03

## 2019-04-22 ENCOUNTER — OFFICE VISIT (OUTPATIENT)
Dept: ENDOCRINOLOGY | Facility: CLINIC | Age: 39
End: 2019-04-22
Payer: COMMERCIAL

## 2019-04-22 VITALS
BODY MASS INDEX: 34.03 KG/M2 | WEIGHT: 180.1 LBS | DIASTOLIC BLOOD PRESSURE: 75 MMHG | SYSTOLIC BLOOD PRESSURE: 115 MMHG | HEART RATE: 75 BPM

## 2019-04-22 DIAGNOSIS — E11.21 TYPE 2 DIABETES WITH NEPHROPATHY (H): ICD-10-CM

## 2019-04-22 DIAGNOSIS — Z79.4 UNCONTROLLED TYPE 2 DIABETES MELLITUS WITH HYPERGLYCEMIA, WITH LONG-TERM CURRENT USE OF INSULIN (H): Primary | ICD-10-CM

## 2019-04-22 DIAGNOSIS — E78.2 MIXED HYPERLIPIDEMIA: ICD-10-CM

## 2019-04-22 DIAGNOSIS — E11.65 UNCONTROLLED TYPE 2 DIABETES MELLITUS WITH HYPERGLYCEMIA, WITH LONG-TERM CURRENT USE OF INSULIN (H): Primary | ICD-10-CM

## 2019-04-22 LAB — HBA1C MFR BLD: 8.1 % (ref 4.3–6)

## 2019-04-22 RX ORDER — INSULIN GLARGINE 100 [IU]/ML
45 INJECTION, SOLUTION SUBCUTANEOUS DAILY
Qty: 12 ML | Refills: 3 | COMMUNITY
Start: 2019-04-22 | End: 2019-05-11

## 2019-04-22 RX ORDER — OMEGA-3 FATTY ACIDS/FISH OIL 300-1000MG
2 CAPSULE ORAL DAILY
Qty: 180 CAPSULE | Refills: 3 | Status: SHIPPED | OUTPATIENT
Start: 2019-04-22 | End: 2020-07-13

## 2019-04-22 RX ORDER — FENOFIBRATE 160 MG/1
160 TABLET ORAL DAILY
Qty: 90 TABLET | Refills: 1 | Status: SHIPPED | OUTPATIENT
Start: 2019-04-22 | End: 2019-10-17

## 2019-04-22 RX ORDER — OMEGA-3 FATTY ACIDS/FISH OIL 300-1000MG
2 CAPSULE ORAL DAILY
Qty: 60 CAPSULE | Refills: 11 | Status: SHIPPED | OUTPATIENT
Start: 2019-04-22 | End: 2019-04-22

## 2019-04-22 RX ORDER — FLASH GLUCOSE SENSOR
1 KIT MISCELLANEOUS
Qty: 2 EACH | Refills: 11 | Status: SHIPPED | OUTPATIENT
Start: 2019-04-22 | End: 2019-10-07

## 2019-04-22 ASSESSMENT — PAIN SCALES - GENERAL: PAINLEVEL: NO PAIN (0)

## 2019-04-22 NOTE — PATIENT INSTRUCTIONS
Increase your Lantus to 45 units daily.      Consider adding just 1 tablet of Metformin if you would like to rechallenge.  No more than 500 mg.

## 2019-04-22 NOTE — LETTER
4/22/2019       RE: Nayeli Caal  [address]  [facility] [address]     Dear Colleague,    Thank you for referring your patient, Nayeli Caal, to the [facility] ENDOCRINOLOGY at [facility] Please see a copy of my visit note below.    HPI:   Nayeli is a 39 yo woman here with a  for follow up of type 2 diabetes since the early 2000's.  She usually sees Dr. Bragg.  She started on insulin in 2003 after failing Metformin treatment.  She has struggled with high blood sugars for many years and is now highly motivated to improve her control.  She was scheduled to have trigger finger surgery, but it was cancelled due to poorly controlled diabetes.  She reports that her surgeon would like her a1c less than 8% before proceeding with the surgery.  Her last a1c was 10.3%.  She has been working very hard and testing her sugars regularly.  She has been wearing the mian sensor but has been having trouble with it falling off, so she has been getting only one week of wear out of the two week sensor.  She is testing three times daily with an overall average of 134 mg/dL this month (range ).  Low sugar was after miscalculating her carbs.  She has been working on more walking and eating less carbohydrates.   She is taking Lantus 40 units and Novolog according to the Accu-chek expert meter below:    Novolog taking 1U/8g, correction. No change since last visit.   Parameters noted last visit:  Insulin to Carb Ratio: 8  Correction Factor: 50  BG Target: 110-150    She is no longer on Metformin as even the low dose caused intense diarrhea to the point where she could not go out of her home.  She had increased from 500 mg to 1000 mg when she noticed the symptoms intensify. She remains on Trulicity 1.5 mg once weekly and invokana 100 mg daily.      Diabetes complications:  Retinopathy: last eye exam - June 2017. 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  States 11/19/18 that she is going to schedule f/u appt with eye doctor.   Nephropathy: h/o proteinuria; normal GFR. Now on 50mg losartan daily (tx with lisinopril was associated with a dry cough).   Neuropathy: No N/T in feet or hands. Just the trigger finger that bothers her.     She would like me to send a prescription for fish oil and test strips to her pharmacy.  She would also like to renew the mian sensor.  She has no other concerns today.       Past Medical History:   Diagnosis Date     Combined visual and hearing impairment      Deaf      Diabetic retinopathy of both eyes (H) 8/19/2011     Hepatic steatosis 08/19/2011     History of tobacco use      Hyperlipidemia      Hypertension      Hypertriglyceridemia      Migraines      Obesity 8/19/2011     Problem list name updated by automated process. 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  Works for  [employer].   Social History     Marital status: Single     Spouse name: N/A     Number of children: N/A     Years of education: N/A     Social History Main Topics     Smoking status: Former Smoker     Types: Cigarettes     Quit date: 12/1/2010     Smokeless tobacco: Never Used      Comment: stopped 2 yrs ago     Alcohol use No     Drug use: No     Sexual activity: Not Currently     Partners: Male     Other Topics Concern      Service No     Blood Transfusions No     Caffeine Concern No     Occupational Exposure No     Hobby  Hazards No     Sleep Concern No     Stress Concern No     Weight Concern Yes     Special Diet No     Back Care No     Exercise Yes     4-5 x a week     Bike Helmet No     Seat Belt Yes     Self-Exams Yes     Social History Narrative   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     Current Outpatient Medications   Medication     aspirin 81 MG chewable tablet     atorvastatin (LIPITOR) 40 MG tablet     B-D U/F insulin pen needle     BD ULTRA FINE PEN NEEDLES     blood glucose (ACCU-CHEK LAYA PLUS) test strip     blood glucose monitoring (ACCU-CHEK FASTCLIX) lancets     canagliflozin (INVOKANA) 100 MG tablet     dulaglutide (TRULICITY) 1.5 MG/0.5ML pen     ergocalciferol (ERGOCALCIFEROL) 17357 units capsule     fenofibrate 160 MG tablet     ibuprofen (ADVIL/MOTRIN) 600 MG tablet     insulin aspart (NOVOLOG PEN) 100 UNIT/ML pen     insulin glargine (BASAGLAR KWIKPEN) 100 UNIT/ML pen     losartan (COZAAR) 50 MG tablet     Alcohol Swabs (ALCOHOL PREP PAD) 70 % PADS     Continuous Blood Gluc Sensor (GydgetSTYLE ZORAIDA 14 DAY SENSOR) MISC     cyclobenzaprine (FLEXERIL) 5 MG tablet     levonorgestrel (MIRENA) 20 MCG/24HR IUD     naproxen (NAPROSYN) 500 MG tablet     Ostomy Supplies (ADHESIVE REMOVER WIPES) MISC     Ostomy Supplies (SKIN TAC ADHESIVE BARRIER WIPE) MISC     TRULICITY 0.75 MG/0.5ML pen     Wound Dressing Adhesive (MASTISOL ADHESIVE) LIQD     Current Facility-Administered Medications   Medication     hylan (SYNVISC ONE) injection 48 mg     hylan (SYNVISC ONE) injection 48 mg        No Known Allergies    Physical Exam  /75   Pulse 75   Wt 81.7 kg (180 lb 1.6 oz)   BMI (P) 34.05 kg/m     GENERAL:  Alert and oriented X3, NAD, well dressed, answering questions appropriately, appears stated age.  RESULTS  Lab Results   Component Value Date    A1C 10.3 (H) 10/15/2018    A1C 13.4 (H) 11/27/2017    A1C 11.0 (H) 07/13/2017    A1C 10.9 (H) 05/15/2017    A1C 12.8 (H) 01/02/2017    HEMOGLOBINA1 8.1 (A)  04/22/2019    HEMOGLOBINA1 10.4 (A) 01/14/2019    HEMOGLOBINA1 8.7 (A) 03/12/2018    HEMOGLOBINA1 12.0 (A) 02/03/2014    HEMOGLOBINA1 10.3 (A) 10/28/2013       TSH   Date Value Ref Range Status   05/17/2018 1.84 0.40 - 4.00 mU/L Final   11/27/2017 1.92 0.40 - 4.00 mU/L Final   05/11/2016 1.85 0.40 - 4.00 mU/L Final   02/11/2016 1.14 0.40 - 4.00 mU/L Final   04/10/2012 1.35 0.4 - 5.0 mU/L Final       ALT   Date Value Ref Range Status   05/17/2018 56 (H) 0 - 50 U/L Final   03/13/2018 127 (H) 0 - 50 U/L Final   ]    Recent Labs   Lab Test 10/15/18  0656 05/17/18  0723  09/16/13  0753 08/14/12  0739   CHOL 263* 212*   < > 197 262*   HDL 29* 25*   < > 34* 38*   LDL Cannot estimate LDL when triglyceride exceeds 400 mg/dL  136* 117*   < > 119 185*   TRIG 560* 354*   < > 221* 191*   CHOLHDLRATIO  --   --   --  5.8* 6.9*    < > = values in this interval not displayed.       Lab Results   Component Value Date     05/17/2018      Lab Results   Component Value Date    POTASSIUM 4.3 05/17/2018     Lab Results   Component Value Date    CHLORIDE 106 05/17/2018     Lab Results   Component Value Date    VERO 8.7 05/17/2018     Lab Results   Component Value Date    CO2 25 05/17/2018     Lab Results   Component Value Date    BUN 13 05/17/2018     Lab Results   Component Value Date    CR 0.56 05/17/2018       GFR Estimate   Date Value Ref Range Status   05/17/2018 >90 >60 mL/min/1.7m2 Final     Comment:     Non  GFR Calc   03/13/2018 75 >60 mL/min/1.7m2 Final     Comment:     Non  GFR Calc   03/09/2018 >90 >60 mL/min/1.7m2 Final     Comment:     Non  GFR Calc     GFR Estimate If Black   Date Value Ref Range Status   05/17/2018 >90 >60 mL/min/1.7m2 Final     Comment:      GFR Calc   03/13/2018 >90 >60 mL/min/1.7m2 Final     Comment:      GFR Calc   03/09/2018 >90 >60 mL/min/1.7m2 Final     Comment:      GFR Calc       Lab Results    Component Value Date    MICROL 134 05/17/2018     No results found for: MICROALBUMIN  Lab Results   Component Value Date    CPEPT 1.3 03/25/2005       No results found for: B12]    Most recent eye exam date: : 10/17/2017       Assessment/Plan:     1.  Type 2 diabetes-  Blood sugars have dramatically improved and her overall average is 134 mg/dL.  A1c is down from 10.3% to 8.1%.  With her AM sugars slightly higher than target, will increase Lantus to 45 units daily. Could also consider increasing invokana, but will hold for now as she is tolerating this well.  May consider adding back Metformin just 500 mg once daily, bur she is nervous as the diarrhea worsened.  I will send a note to her surgeon stating that her diabetes is currently under good control and she can proceed with her surgery.  Much encouragement given for lots of improvement.     2.  F/U in 1 month as planned with Dr. Bragg.  I would also be happy to see her in the future as needed.     I spent 35 minutes with this patient face to face and explained the conditions and plans (more than 50% of time was counseling/coordination of care, diabetes management, follow up plan for worsening hyper and hypoglycemia) . The patient understood and is satisfied with today's visit.      Anne Marie Medina PA-C, MPAS   [facility]  Department of Medicine

## 2019-04-22 NOTE — Clinical Note
Dr. Streeter, Please note dramatic improvement in glucose control.  A1c is down from 10.3% to 8.1% today. She should be cleared for surgery from a diabetes perspective, as this is the best she has been in years.  Kind regards, Anne Marie

## 2019-04-22 NOTE — PROGRESS NOTES
HPI:   Nayeli is a 37 yo woman here with a  for follow up of type 2 diabetes since the early 2000's.  She usually sees Dr. Bragg.  She started on insulin in 2003 after failing Metformin treatment.  She has struggled with high blood sugars for many years and is now highly motivated to improve her control.  She was scheduled to have trigger finger surgery, but it was cancelled due to poorly controlled diabetes.  She reports that her surgeon would like her a1c less than 8% before proceeding with the surgery.  Her last a1c was 10.3%.  She has been working very hard and testing her sugars regularly.  She has been wearing the mian sensor but has been having trouble with it falling off, so she has been getting only one week of wear out of the two week sensor.  She is testing three times daily with an overall average of 134 mg/dL this month (range ).  Low sugar was after miscalculating her carbs.  She has been working on more walking and eating less carbohydrates.   She is taking Lantus 40 units and Novolog according to the Accu-chek expert meter below:    Novolog taking 1U/8g, correction. No change since last visit.   Parameters noted last visit:  Insulin to Carb Ratio: 8  Correction Factor: 50  BG Target: 110-150    She is no longer on Metformin as even the low dose caused intense diarrhea to the point where she could not go out of her home.  She had increased from 500 mg to 1000 mg when she noticed the symptoms intensify. She remains on Trulicity 1.5 mg once weekly and invokana 100 mg daily.      Diabetes complications:  Retinopathy: last eye exam - June 2017. 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 States 11/19/18 that she is going to schedule f/u appt with eye doctor.   Nephropathy: h/o proteinuria; normal GFR. Now on 50mg losartan daily (tx with lisinopril was associated with a dry cough).   Neuropathy: No N/T in feet or hands. Just the trigger finger that bothers her.     She would like me to send  a prescription for fish oil and test strips to her pharmacy.  She would also like to renew the mian sensor.  She has no other concerns today.       Past Medical History:   Diagnosis Date     Combined visual and hearing impairment      Deaf      Diabetic retinopathy of both eyes (H) 8/19/2011     Hepatic steatosis 08/19/2011     History of tobacco use      Hyperlipidemia      Hypertension      Hypertriglyceridemia      Migraines      Obesity 8/19/2011     Problem list name updated by automated process. 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  Works for [employer].   Social History     Marital status: Single     Spouse name: N/A     Number of children: N/A     Years of education: N/A     Social History Main Topics     Smoking status: Former Smoker     Types: Cigarettes     Quit date: 12/1/2010     Smokeless tobacco: Never Used      Comment: stopped 2 yrs ago     Alcohol use No     Drug use: No     Sexual activity: Not Currently     Partners: Male     Other Topics Concern      Service No     Blood Transfusions No     Caffeine Concern No     Occupational Exposure No     Hobby Hazards No     Sleep Concern No     Stress Concern No     Weight Concern Yes     Special Diet No     Back Care No     Exercise Yes     4-5 x a week     Bike Helmet No     Seat Belt Yes     Self-Exams Yes     Social History Narrative   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     Current  Outpatient Medications   Medication     aspirin 81 MG chewable tablet     atorvastatin (LIPITOR) 40 MG tablet     B-D U/F insulin pen needle     BD ULTRA FINE PEN NEEDLES     blood glucose (ACCU-CHEK LAYA PLUS) test strip     blood glucose monitoring (ACCU-CHEK FASTCLIX) lancets     canagliflozin (INVOKANA) 100 MG tablet     dulaglutide (TRULICITY) 1.5 MG/0.5ML pen     ergocalciferol (ERGOCALCIFEROL) 58999 units capsule     fenofibrate 160 MG tablet     ibuprofen (ADVIL/MOTRIN) 600 MG tablet     insulin aspart (NOVOLOG PEN) 100 UNIT/ML pen     insulin glargine (BASAGLAR KWIKPEN) 100 UNIT/ML pen     losartan (COZAAR) 50 MG tablet     Alcohol Swabs (ALCOHOL PREP PAD) 70 % PADS     Continuous Blood Gluc Sensor (FREESTYLE ZORAIDA 14 DAY SENSOR) MISC     cyclobenzaprine (FLEXERIL) 5 MG tablet     levonorgestrel (MIRENA) 20 MCG/24HR IUD     naproxen (NAPROSYN) 500 MG tablet     Ostomy Supplies (ADHESIVE REMOVER WIPES) MISC     Ostomy Supplies (SKIN TAC ADHESIVE BARRIER WIPE) MISC     TRULICITY 0.75 MG/0.5ML pen     Wound Dressing Adhesive (MASTISOL ADHESIVE) LIQD     Current Facility-Administered Medications   Medication     hylan (SYNVISC ONE) injection 48 mg     hylan (SYNVISC ONE) injection 48 mg        No Known Allergies    Physical Exam  /75   Pulse 75   Wt 81.7 kg (180 lb 1.6 oz)   BMI (P) 34.05 kg/m    GENERAL:  Alert and oriented X3, NAD, well dressed, answering questions appropriately, appears stated age.  RESULTS  Lab Results   Component Value Date    A1C 10.3 (H) 10/15/2018    A1C 13.4 (H) 11/27/2017    A1C 11.0 (H) 07/13/2017    A1C 10.9 (H) 05/15/2017    A1C 12.8 (H) 01/02/2017    HEMOGLOBINA1 8.1 (A) 04/22/2019    HEMOGLOBINA1 10.4 (A) 01/14/2019    HEMOGLOBINA1 8.7 (A) 03/12/2018    HEMOGLOBINA1 12.0 (A) 02/03/2014    HEMOGLOBINA1 10.3 (A) 10/28/2013       TSH   Date Value Ref Range Status   05/17/2018 1.84 0.40 - 4.00 mU/L Final   11/27/2017 1.92 0.40 - 4.00 mU/L Final   05/11/2016 1.85 0.40 - 4.00  mU/L Final   02/11/2016 1.14 0.40 - 4.00 mU/L Final   04/10/2012 1.35 0.4 - 5.0 mU/L Final       ALT   Date Value Ref Range Status   05/17/2018 56 (H) 0 - 50 U/L Final   03/13/2018 127 (H) 0 - 50 U/L Final   ]    Recent Labs   Lab Test 10/15/18  0656 05/17/18  0723  09/16/13  0753 08/14/12  0739   CHOL 263* 212*   < > 197 262*   HDL 29* 25*   < > 34* 38*   LDL Cannot estimate LDL when triglyceride exceeds 400 mg/dL  136* 117*   < > 119 185*   TRIG 560* 354*   < > 221* 191*   CHOLHDLRATIO  --   --   --  5.8* 6.9*    < > = values in this interval not displayed.       Lab Results   Component Value Date     05/17/2018      Lab Results   Component Value Date    POTASSIUM 4.3 05/17/2018     Lab Results   Component Value Date    CHLORIDE 106 05/17/2018     Lab Results   Component Value Date    VERO 8.7 05/17/2018     Lab Results   Component Value Date    CO2 25 05/17/2018     Lab Results   Component Value Date    BUN 13 05/17/2018     Lab Results   Component Value Date    CR 0.56 05/17/2018       GFR Estimate   Date Value Ref Range Status   05/17/2018 >90 >60 mL/min/1.7m2 Final     Comment:     Non  GFR Calc   03/13/2018 75 >60 mL/min/1.7m2 Final     Comment:     Non  GFR Calc   03/09/2018 >90 >60 mL/min/1.7m2 Final     Comment:     Non  GFR Calc     GFR Estimate If Black   Date Value Ref Range Status   05/17/2018 >90 >60 mL/min/1.7m2 Final     Comment:      GFR Calc   03/13/2018 >90 >60 mL/min/1.7m2 Final     Comment:      GFR Calc   03/09/2018 >90 >60 mL/min/1.7m2 Final     Comment:      GFR Calc       Lab Results   Component Value Date    MICROL 134 05/17/2018     No results found for: MICROALBUMIN  Lab Results   Component Value Date    CPEPT 1.3 03/25/2005       No results found for: B12]    Most recent eye exam date: : 10/17/2017       Assessment/Plan:     1.  Type 2 diabetes-  Blood sugars have dramatically improved  and her overall average is 134 mg/dL.  A1c is down from 10.3% to 8.1%.  With her AM sugars slightly higher than target, will increase Lantus to 45 units daily. Could also consider increasing invokana, but will hold for now as she is tolerating this well.  May consider adding back Metformin just 500 mg once daily, bur she is nervous as the diarrhea worsened.  I will send a note to her surgeon stating that her diabetes is currently under good control and she can proceed with her surgery.  Much encouragement given for lots of improvement.     2.  F/U in 1 month as planned with Dr. Bragg.  I would also be happy to see her in the future as needed.     I spent 35 minutes with this patient face to face and explained the conditions and plans (more than 50% of time was counseling/coordination of care, diabetes management, follow up plan for worsening hyper and hypoglycemia) . The patient understood and is satisfied with today's visit.      Anne Marie Medina PA-C, MPAS   [facility]  Department of Medicine

## 2019-04-22 NOTE — TELEPHONE ENCOUNTER
Last Clinic Visit: 10/25/2018  [facility]

## 2019-04-23 ENCOUNTER — TELEPHONE (OUTPATIENT)
Dept: ORTHOPEDICS | Facility: CLINIC | Age: 39
End: 2019-04-23

## 2019-04-23 NOTE — TELEPHONE ENCOUNTER
Patient is scheduled for surgery with Dr. Streeter      Spoke or left message with: Spoke with Nayeli    Date of Surgery: Right 5-2 Left 5/30    Location: ASC    Informed patient they will need an adult  Yes      H&P: Patient to schedule with PCP    Additional imaging/appointments: N/A    Surgery packet: Given in clinic    Additional comments: Patient will await pre admission phone call 1-2 days prior to surgery for arrival time

## 2019-04-23 NOTE — TELEPHONE ENCOUNTER
Attempted to reach out to patient to discuss rescheduling surgery with Dr. Streeter. Left detailed message informing patient that Dr. Streeter does these types of surgeries on Thursdays with the first available being 5/2. Left best call back number of [phone]

## 2019-04-26 ENCOUNTER — OFFICE VISIT (OUTPATIENT)
Dept: INTERNAL MEDICINE | Facility: CLINIC | Age: 39
End: 2019-04-26
Payer: COMMERCIAL

## 2019-04-26 VITALS
HEART RATE: 80 BPM | HEIGHT: 62 IN | WEIGHT: 178.9 LBS | SYSTOLIC BLOOD PRESSURE: 103 MMHG | BODY MASS INDEX: 32.92 KG/M2 | DIASTOLIC BLOOD PRESSURE: 69 MMHG

## 2019-04-26 DIAGNOSIS — E11.21 TYPE 2 DIABETES WITH NEPHROPATHY (H): ICD-10-CM

## 2019-04-26 DIAGNOSIS — E11.65 UNCONTROLLED TYPE 2 DIABETES MELLITUS WITH HYPERGLYCEMIA, WITH LONG-TERM CURRENT USE OF INSULIN (H): ICD-10-CM

## 2019-04-26 DIAGNOSIS — H35.52 USHER SYNDROME: ICD-10-CM

## 2019-04-26 DIAGNOSIS — Z01.810 PRE-OPERATIVE CARDIOVASCULAR EXAMINATION: Primary | ICD-10-CM

## 2019-04-26 DIAGNOSIS — M65.30 TRIGGER FINGER, ACQUIRED: ICD-10-CM

## 2019-04-26 DIAGNOSIS — H90.3 USHER SYNDROME: ICD-10-CM

## 2019-04-26 DIAGNOSIS — I10 ESSENTIAL HYPERTENSION: ICD-10-CM

## 2019-04-26 DIAGNOSIS — Z79.4 UNCONTROLLED TYPE 2 DIABETES MELLITUS WITH HYPERGLYCEMIA, WITH LONG-TERM CURRENT USE OF INSULIN (H): ICD-10-CM

## 2019-04-26 ASSESSMENT — PAIN SCALES - GENERAL: PAINLEVEL: NO PAIN (0)

## 2019-04-26 ASSESSMENT — MIFFLIN-ST. JEOR: SCORE: 1437.99

## 2019-04-26 NOTE — PROGRESS NOTES
CC:  Preop  PCP  Mariya Garcia, APRN, CNP  Formally vision and hearing impaired, here with     HPI:  I am amos Tyler today at the request of Dr.Ariel Al For preoperative cardiovascular and pulmonary risks for trigger finger surgeries.  States gradually worsening trigger fingers right thumb and left ring finger over the last approximately one year.  Interfering with her tactile sign language in particular.  Pain is not so much a problem.  No numbness, tingling, weakness, joint swelling.  Chronic knee pain, unrelated.  No other significant joint problems.  No chance of pregnancy.    Cardiac risks:  None  Pulmonary risks:  None  Exercise tolerance:  No concerns, can walk, do stairs, although knees hurt with walking down stairs  No prior history of anesthesia or bleeding problems.  Is on ASA and Ibuprofen which she has already been instructed to hold  No prior unexpected surgical complications  Adopted    ROS:  10 point ROS otherwise negative for new complaints today.  Can't recall recent blood sugars.  Is compliant with current diabetes regimen.    Patient Active Problem List   Diagnosis     Essential hypertension     Hypersomnia, organic     Other chronic nonalcoholic liver disease     Diabetic retinopathy of both eyes (H)     Obesity     Usher Syndrome: congenital deafness, retinitis pigmentosa     Macular degeneration, age related, nonexudative     Benign neoplasm of iris     Dry eye syndrome     Pemphigus erythematosus     Chondromalacia of patella, right, steroid injection 6/12/2015     Uncontrolled type 2 diabetes mellitus with hyperglycemia, with long-term current use of insulin (H)     Past Medical History:   Diagnosis Date     Combined visual and hearing impairment      Deaf      Diabetic retinopathy of both eyes (H) 8/19/2011     Hepatic steatosis 08/19/2011     History of tobacco use      Hyperlipidemia      Hypertension      Hypertriglyceridemia      Migraines      Obesity  2011     Problem list name updated by automated process. Provider to review     Uncontrolled type 2 diabetes mellitus with hyperglycemia, with long-term current use of insulin (H) 5/15/2017     Usher Syndrome: congenital deafness, retinitis pigmentosa 2011     Past Surgical History:   Procedure Laterality Date     LAPAROSCOPIC CHOLECYSTECTOMY N/A 3/10/2018    Procedure: LAPAROSCOPIC CHOLECYSTECTOMY;  Laparoscopic Cholecystectomy ;  Surgeon: Kuldeep Sigala MD;  Location: [facility] OR     Family History   Problem Relation Age of Onset     Unknown/Adopted Other      Social History     Socioeconomic History     Marital status: Single     Spouse name: Not on file     Number of children: Not on file     Years of education: Not on file     Highest education level: Not on file   Occupational History     Not on file   Social Needs     Financial resource strain: Not on file     Food insecurity:     Worry: Not on file     Inability: Not on file     Transportation needs:     Medical: Not on file     Non-medical: Not on file   Tobacco Use     Smoking status: Former Smoker     Types: Cigarettes     Last attempt to quit: 2010     Years since quittin.4     Smokeless tobacco: Never Used     Tobacco comment: stopped 2 yrs ago   Substance and Sexual Activity     Alcohol use: Yes     Comment: occasionally     Drug use: No     Sexual activity: Not Currently     Partners: Male   Lifestyle     Physical activity:     Days per week: Not on file     Minutes per session: Not on file     Stress: Not on file   Relationships     Social connections:     Talks on phone: Not on file     Gets together: Not on file     Attends Christian service: Not on file     Active member of club or organization: Not on file     Attends meetings of clubs or organizations: Not on file     Relationship status: Not on file     Intimate partner violence:     Fear of current or ex partner: Not on file     Emotionally abused: Not on file     Physically  abused: Not on file     Forced sexual activity: Not on file   Other Topics Concern     Parent/sibling w/ CABG, MI or angioplasty before 65F 55M? Not Asked      Service No     Blood Transfusions No     Caffeine Concern No     Occupational Exposure No     Hobby Hazards No     Sleep Concern No     Stress Concern No     Weight Concern Yes     Special Diet No     Back Care No     Exercise Yes     Comment: 4-5 x a week     Bike Helmet No     Seat Belt Yes     Self-Exams Yes   Social History Narrative     Not on file     Current Outpatient Medications   Medication Sig Dispense Refill     Alcohol Swabs (ALCOHOL PREP PAD) 70 % PADS 1 each 3 times daily 100 each 3     aspirin 81 MG chewable tablet CHEW AND SWALLOW ONE TABLET BY MOUTH EVERY DAY 90 tablet 3     atorvastatin (LIPITOR) 40 MG tablet Take 1 tablet (40 mg) by mouth daily 90 tablet 3     B-D U/F insulin pen needle        BD ULTRA FINE PEN NEEDLES Inject 1 dose. Subcutaneous 2 times daily BD ultra-fine insulin syringe, 30 ga. X 1/2'' short needle 1/2 cc. Use as directed. 400 Units 11     blood glucose (ACCU-CHEK LAYA PLUS) test strip Laya Plus test strips for use in Accucheck Expert meter.  Use to test blood sugar 6 times daily. 3 month supply.  Send PA's to Dr. Mohamud. 600 each 3     blood glucose monitoring (ACCU-CHEK FASTCLIX) lancets Use to test blood sugar 6 times daily or as directed 6 Box 3     canagliflozin (INVOKANA) 100 MG tablet Take 1 tablet (100 mg) by mouth every morning (before breakfast) 90 tablet 3     Continuous Blood Gluc Sensor (FREESTYLE ZORAIDA 14 DAY SENSOR) MISC 1 each every 14 days 2 each 11     cyclobenzaprine (FLEXERIL) 5 MG tablet Take 1-2 tablets (5-10 mg) by mouth 3 times daily as needed for muscle spasms 60 tablet 1     dulaglutide (TRULICITY) 1.5 MG/0.5ML pen Inject 1.5 mg Subcutaneous every 7 days 6 mL 3     ergocalciferol (ERGOCALCIFEROL) 04993 units capsule Take 1 capsule (50,000 Units) by mouth once a week 14 capsule 3      "fenofibrate (TRIGLIDE/LOFIBRA) 160 MG tablet Take 1 tablet (160 mg) by mouth daily 90 tablet 1     ibuprofen (ADVIL/MOTRIN) 600 MG tablet Take 1 tablet (600 mg) by mouth every 6 hours as needed for moderate pain 120 tablet 1     insulin aspart (NOVOLOG PEN) 100 UNIT/ML pen Administer at 1 U per 8 grams carbohydrates. Total daily dose around 45 U. 40 mL 3     insulin glargine (BASAGLAR KWIKPEN) 100 UNIT/ML pen Inject 45 Units Subcutaneous daily Please provide 3 months supply 12 mL 3     losartan (COZAAR) 50 MG tablet Take 1 tablet (50 mg) by mouth daily 90 tablet 3     naproxen (NAPROSYN) 500 MG tablet Take 1 tablet (500 mg) by mouth 2 times daily as needed for moderate pain 30 tablet 1     omega 3 1000 MG CAPS Take 2 g by mouth daily 180 capsule 3     Ostomy Supplies (ADHESIVE REMOVER WIPES) MISC 1 each every 14 days 50 each 3     Ostomy Supplies (SKIN TAC ADHESIVE BARRIER WIPE) MISC 1 each every 14 days 6 each 3     TRULICITY 0.75 MG/0.5ML pen        Wound Dressing Adhesive (MASTISOL ADHESIVE) LIQD Use with Evelia sensor twice monthly. 48 mL 3     levonorgestrel (MIRENA) 20 MCG/24HR IUD 1 each (20 mcg) by Intrauterine route once for 1 dose 1 each 0     No Known Allergies  /69 (BP Location: Right arm, Patient Position: Sitting, Cuff Size: Adult Large)   Pulse 80   Ht 1.564 m (5' 1.58\")   Wt 81.1 kg (178 lb 14.4 oz)   BMI 33.17 kg/m    Physical Examination:    General:  Conversant through , generally healthy appearing, no acute distress  Head: atraumatic  Eyes:  Sclera white, EOM's full, conjunctiva moist, no lid lag    Ears:  TM's normal, EAC's patent, clear of cerumen  Nose:  Nasal passages clear, turbinates not swollen  Throat/Mouth:  No pharyngeal erythema, exudate, ulcers, oral mucosa and tongue moist, normal hard and soft palate  Neck:  Trachea midline, Full AROM, supple, thyroid smooth, symmetric, not enlarged, no nodules, no neck lymphadenopathy  Lungs:  Clear to auscultation " throughout, no wheezes, rhonchi or rales. Normal respiratory effort and no intercostal retractions.  C/V:  Regular rate and rhythm, no murmurs, rubs or gallops.  No JVD, normal carotid upstroke and amplitude, no carotid bruits.  Abdomen:  Not distended.  Bowel sounds active.  No tenderness, no hepatosplenomegaly or masses.  No CVA tenderness or masses.  Lymph:  No cervical lymph nodes.  Neuro: Alert and oriented, face symmetric. No tremor.  M/S: Triger fingers not locking today.  No red, warm or swollen joints  Skin:   Normal temperature., turgor and texture. No rashes, suspicious lesions,  jaundice or ulcers    Extremities:  No peripheral edema, no digital cyanosis  Psych:  Not psychomotor slowed.  No signs of anxiety or agitation.    Nayeli was seen today for pre-op exam.    Diagnoses and all orders for this visit:    Pre-operative cardiovascular examination  Low risk for this low risk surgery.  No contraindications identified for procedure  I instructed her through the  and in writing instructions for perioperative medications    She is scheduled for right thumb followed by left ring finger surgery later in May.  I do not feel it is necessary for her to have another preop for the finger surgery.  Same instructions as today will apply.  She will not have to repeat blood work.    Trigger finger, acquired    Usher Syndrome: congenital deafness, retinitis pigmentosa    Uncontrolled type 2 diabetes mellitus with hyperglycemia, with long-term current use of insulin (H)    Essential hypertension  -     Basic metabolic panel; Future    F/U with PCP, Mariya Garcia per routine.    Bria Sifuentes M.D.  Internal Medicine  [facility]   pager [phone]

## 2019-04-26 NOTE — PATIENT INSTRUCTIONS
[facility] Medication Refill Request Information:  * Please contact your pharmacy regarding ANY request for medication refills.  ** [facility] Prescription Fax = [phone]  * Please allow 3 business days for routine medication refills.  * Please allow 5 business days for controlled substance medication refills.     [facility] Test Result notification information:  *You will be notified with in 7-10 days of your appointment day regarding the results of your test.  If you are on MyChart you will be notified as soon as the provider has reviewed the results and signed off on them.    [facility]: [phone]

## 2019-04-26 NOTE — NURSING NOTE
Chief Complaint   Patient presents with     Pre-Op Exam     pt having trigger finger surgery on 05/02/19 by Dr Greg Streeter at [facility]       Farhana Sheehan CMA at 7:03 AM on 4/26/2019.

## 2019-04-26 NOTE — PROGRESS NOTES
Surgeon (please enter first and last name): Dr Greg Streeter  Fax number for Preop Evaluation:   Location of Surgery: [facility]  Date of Surgery: 05/02/19  Procedure: trigger finger  History of reaction to anesthesia? NO

## 2019-04-26 NOTE — LETTER
4/26/2019    RE: Nayeli Caal  [address]  [facility] [place] [address]     Surgeon (please enter first and last name): Dr Greg Streeter  Fax number for Preop Evaluation:   Location of Surgery: [facility]  Date of Surgery: 05/02/19  Procedure: trigger finger  History of reaction to anesthesia? NO          CC:  Preop  PCP  Mariya Garcia, APRN, CNP  Formally vision and hearing impaired, here with     HPI:  I am amos Tyler today at the request of Dr.Ariel Al For preoperative cardiovascular and pulmonary risks for trigger finger surgeries.  States gradually worsening trigger fingers right thumb and left ring finger over the last approximately one year.  Interfering with her tactile sign language in particular.  Pain is not so much a problem.  No numbness, tingling, weakness, joint swelling.  Chronic knee pain, unrelated.  No other significant joint problems.  No chance of pregnancy.    Cardiac risks:  None  Pulmonary risks:  None  Exercise tolerance:  No concerns, can walk, do stairs, although knees hurt with walking down stairs  No prior history of anesthesia or bleeding problems.  Is on ASA and Ibuprofen which she has already been instructed to hold  No prior unexpected surgical complications  Adopted    ROS:  10 point ROS otherwise negative for new complaints today.  Can't recall recent blood sugars.  Is compliant with current diabetes regimen.    Patient Active Problem List   Diagnosis     Essential hypertension     Hypersomnia, organic     Other chronic nonalcoholic liver disease     Diabetic retinopathy of both eyes (H)     Obesity     Usher Syndrome: congenital deafness, retinitis pigmentosa     Macular degeneration, age related, nonexudative     Benign neoplasm of iris     Dry eye syndrome     Pemphigus erythematosus     Chondromalacia of patella, right, steroid injection 6/12/2015     Uncontrolled type 2 diabetes mellitus with hyperglycemia, with long-term current  use of insulin (H)     Past Medical History:   Diagnosis Date     Combined visual and hearing impairment      Deaf      Diabetic retinopathy of both eyes (H) 2011     Hepatic steatosis 2011     History of tobacco use      Hyperlipidemia      Hypertension      Hypertriglyceridemia      Migraines      Obesity 2011     Problem list name updated by automated process. Provider to review     Uncontrolled type 2 diabetes mellitus with hyperglycemia, with long-term current use of insulin (H) 5/15/2017     Usher Syndrome: congenital deafness, retinitis pigmentosa 2011     Past Surgical History:   Procedure Laterality Date     LAPAROSCOPIC CHOLECYSTECTOMY N/A 3/10/2018    Procedure: LAPAROSCOPIC CHOLECYSTECTOMY;  Laparoscopic Cholecystectomy ;  Surgeon: Kuldeep Sigala MD;  Location:  OR     Family History   Problem Relation Age of Onset     Unknown/Adopted Other      Social History     Socioeconomic History     Marital status: Single     Spouse name: Not on file     Number of children: Not on file     Years of education: Not on file     Highest education level: Not on file   Occupational History     Not on file   Social Needs     Financial resource strain: Not on file     Food insecurity:     Worry: Not on file     Inability: Not on file     Transportation needs:     Medical: Not on file     Non-medical: Not on file   Tobacco Use     Smoking status: Former Smoker     Types: Cigarettes     Last attempt to quit: 2010     Years since quittin.4     Smokeless tobacco: Never Used     Tobacco comment: stopped 2 yrs ago   Substance and Sexual Activity     Alcohol use: Yes     Comment: occasionally     Drug use: No     Sexual activity: Not Currently     Partners: Male   Lifestyle     Physical activity:     Days per week: Not on file     Minutes per session: Not on file     Stress: Not on file   Relationships     Social connections:     Talks on phone: Not on file     Gets together: Not on file      Attends Scientologist service: Not on file     Active member of club or organization: Not on file     Attends meetings of clubs or organizations: Not on file     Relationship status: Not on file     Intimate partner violence:     Fear of current or ex partner: Not on file     Emotionally abused: Not on file     Physically abused: Not on file     Forced sexual activity: Not on file   Other Topics Concern     Parent/sibling w/ CABG, MI or angioplasty before 65F 55M? Not Asked      Service No     Blood Transfusions No     Caffeine Concern No     Occupational Exposure No     Hobby Hazards No     Sleep Concern No     Stress Concern No     Weight Concern Yes     Special Diet No     Back Care No     Exercise Yes     Comment: 4-5 x a week     Bike Helmet No     Seat Belt Yes     Self-Exams Yes   Social History Narrative     Not on file     Current Outpatient Medications   Medication Sig Dispense Refill     Alcohol Swabs (ALCOHOL PREP PAD) 70 % PADS 1 each 3 times daily 100 each 3     aspirin 81 MG chewable tablet CHEW AND SWALLOW ONE TABLET BY MOUTH EVERY DAY 90 tablet 3     atorvastatin (LIPITOR) 40 MG tablet Take 1 tablet (40 mg) by mouth daily 90 tablet 3     B-D U/F insulin pen needle        BD ULTRA FINE PEN NEEDLES Inject 1 dose. Subcutaneous 2 times daily BD ultra-fine insulin syringe, 30 ga. X 1/2'' short needle 1/2 cc. Use as directed. 400 Units 11     blood glucose (ACCU-CHEK LAYA PLUS) test strip Laya Plus test strips for use in Accucheck Expert meter.  Use to test blood sugar 6 times daily. 3 month supply.  Send PA's to Dr. Mohamud. 600 each 3     blood glucose monitoring (ACCU-CHEK FASTCLIX) lancets Use to test blood sugar 6 times daily or as directed 6 Box 3     canagliflozin (INVOKANA) 100 MG tablet Take 1 tablet (100 mg) by mouth every morning (before breakfast) 90 tablet 3     Continuous Blood Gluc Sensor (FREESTYLE ZORAIDA 14 DAY SENSOR) MISC 1 each every 14 days 2 each 11     cyclobenzaprine  "(FLEXERIL) 5 MG tablet Take 1-2 tablets (5-10 mg) by mouth 3 times daily as needed for muscle spasms 60 tablet 1     dulaglutide (TRULICITY) 1.5 MG/0.5ML pen Inject 1.5 mg Subcutaneous every 7 days 6 mL 3     ergocalciferol (ERGOCALCIFEROL) 76630 units capsule Take 1 capsule (50,000 Units) by mouth once a week 14 capsule 3     fenofibrate (TRIGLIDE/LOFIBRA) 160 MG tablet Take 1 tablet (160 mg) by mouth daily 90 tablet 1     ibuprofen (ADVIL/MOTRIN) 600 MG tablet Take 1 tablet (600 mg) by mouth every 6 hours as needed for moderate pain 120 tablet 1     insulin aspart (NOVOLOG PEN) 100 UNIT/ML pen Administer at 1 U per 8 grams carbohydrates. Total daily dose around 45 U. 40 mL 3     insulin glargine (BASAGLAR KWIKPEN) 100 UNIT/ML pen Inject 45 Units Subcutaneous daily Please provide 3 months supply 12 mL 3     losartan (COZAAR) 50 MG tablet Take 1 tablet (50 mg) by mouth daily 90 tablet 3     naproxen (NAPROSYN) 500 MG tablet Take 1 tablet (500 mg) by mouth 2 times daily as needed for moderate pain 30 tablet 1     omega 3 1000 MG CAPS Take 2 g by mouth daily 180 capsule 3     Ostomy Supplies (ADHESIVE REMOVER WIPES) MISC 1 each every 14 days 50 each 3     Ostomy Supplies (SKIN TAC ADHESIVE BARRIER WIPE) MISC 1 each every 14 days 6 each 3     TRULICITY 0.75 MG/0.5ML pen        Wound Dressing Adhesive (MASTISOL ADHESIVE) LIQD Use with Evelia sensor twice monthly. 48 mL 3     levonorgestrel (MIRENA) 20 MCG/24HR IUD 1 each (20 mcg) by Intrauterine route once for 1 dose 1 each 0     No Known Allergies  /69 (BP Location: Right arm, Patient Position: Sitting, Cuff Size: Adult Large)   Pulse 80   Ht 1.564 m (5' 1.58\")   Wt 81.1 kg (178 lb 14.4 oz)   BMI 33.17 kg/m     Physical Examination:    General:  Conversant through , generally healthy appearing, no acute distress  Head: atraumatic  Eyes:  Sclera white, EOM's full, conjunctiva moist, no lid lag    Ears:  TM's normal, EAC's patent, clear " of cerumen  Nose:  Nasal passages clear, turbinates not swollen  Throat/Mouth:  No pharyngeal erythema, exudate, ulcers, oral mucosa and tongue moist, normal hard and soft palate  Neck:  Trachea midline, Full AROM, supple, thyroid smooth, symmetric, not enlarged, no nodules, no neck lymphadenopathy  Lungs:  Clear to auscultation throughout, no wheezes, rhonchi or rales. Normal respiratory effort and no intercostal retractions.  C/V:  Regular rate and rhythm, no murmurs, rubs or gallops.  No JVD, normal carotid upstroke and amplitude, no carotid bruits.  Abdomen:  Not distended.  Bowel sounds active.  No tenderness, no hepatosplenomegaly or masses.  No CVA tenderness or masses.  Lymph:  No cervical lymph nodes.  Neuro: Alert and oriented, face symmetric. No tremor.  M/S: Triger fingers not locking today.  No red, warm or swollen joints  Skin:   Normal temperature., turgor and texture. No rashes, suspicious lesions,  jaundice or ulcers    Extremities:  No peripheral edema, no digital cyanosis  Psych:  Not psychomotor slowed.  No signs of anxiety or agitation.    Nayeli was seen today for pre-op exam.    Diagnoses and all orders for this visit:    Pre-operative cardiovascular examination  Low risk for this low risk surgery.  No contraindications identified for procedure  I instructed her through the  and in writing instructions for perioperative medications    She is scheduled for right thumb followed by left ring finger surgery later in May.  I do not feel it is necessary for her to have another preop for the finger surgery.  Same instructions as today will apply.  She will not have to repeat blood work.    Trigger finger, acquired    Usher Syndrome: congenital deafness, retinitis pigmentosa    Uncontrolled type 2 diabetes mellitus with hyperglycemia, with long-term current use of insulin (H)    Essential hypertension  -     Basic metabolic panel; Future    F/U with PCPMariya per routine.    Bria  GORDO Sifuentes.  Internal Medicine  [facility]   pager [phone]

## 2019-05-01 ENCOUNTER — ANESTHESIA EVENT (OUTPATIENT)
Dept: SURGERY | Facility: AMBULATORY SURGERY CENTER | Age: 39
End: 2019-05-01

## 2019-05-01 ASSESSMENT — LIFESTYLE VARIABLES: TOBACCO_USE: 1

## 2019-05-02 ENCOUNTER — OFFICE VISIT (OUTPATIENT)
Dept: INTERPRETER SERVICES | Facility: CLINIC | Age: 39
End: 2019-05-02
Payer: COMMERCIAL

## 2019-05-02 ENCOUNTER — ANESTHESIA (OUTPATIENT)
Dept: SURGERY | Facility: AMBULATORY SURGERY CENTER | Age: 39
End: 2019-05-02

## 2019-05-02 ENCOUNTER — HOSPITAL ENCOUNTER (OUTPATIENT)
Facility: AMBULATORY SURGERY CENTER | Age: 39
End: 2019-05-02
Attending: ORTHOPAEDIC SURGERY
Payer: COMMERCIAL

## 2019-05-02 VITALS
RESPIRATION RATE: 16 BRPM | TEMPERATURE: 97.8 F | BODY MASS INDEX: 32.78 KG/M2 | WEIGHT: 178.14 LBS | OXYGEN SATURATION: 98 % | SYSTOLIC BLOOD PRESSURE: 103 MMHG | HEIGHT: 62 IN | DIASTOLIC BLOOD PRESSURE: 61 MMHG

## 2019-05-02 DIAGNOSIS — E11.8 TYPE 2 DIABETES MELLITUS WITH COMPLICATION, WITHOUT LONG-TERM CURRENT USE OF INSULIN (H): ICD-10-CM

## 2019-05-02 DIAGNOSIS — Z98.890 POST-OPERATIVE STATE: Primary | ICD-10-CM

## 2019-05-02 DIAGNOSIS — I10 ESSENTIAL HYPERTENSION: ICD-10-CM

## 2019-05-02 DIAGNOSIS — Z79.4 UNCONTROLLED TYPE 2 DIABETES MELLITUS WITH HYPERGLYCEMIA, WITH LONG-TERM CURRENT USE OF INSULIN (H): ICD-10-CM

## 2019-05-02 DIAGNOSIS — E11.65 UNCONTROLLED TYPE 2 DIABETES MELLITUS WITH HYPERGLYCEMIA, WITH LONG-TERM CURRENT USE OF INSULIN (H): ICD-10-CM

## 2019-05-02 DIAGNOSIS — E11.21 TYPE 2 DIABETES WITH NEPHROPATHY (H): ICD-10-CM

## 2019-05-02 LAB
ALBUMIN SERPL-MCNC: 4 G/DL (ref 3.4–5)
ALP SERPL-CCNC: 71 U/L (ref 40–150)
ALT SERPL W P-5'-P-CCNC: 43 U/L (ref 0–50)
ANION GAP SERPL CALCULATED.3IONS-SCNC: 7 MMOL/L (ref 3–14)
AST SERPL W P-5'-P-CCNC: 26 U/L (ref 0–45)
BILIRUB SERPL-MCNC: 0.4 MG/DL (ref 0.2–1.3)
BUN SERPL-MCNC: 13 MG/DL (ref 7–30)
CALCIUM SERPL-MCNC: 8.8 MG/DL (ref 8.5–10.1)
CHLORIDE SERPL-SCNC: 105 MMOL/L (ref 94–109)
CHOLEST SERPL-MCNC: 266 MG/DL
CO2 SERPL-SCNC: 26 MMOL/L (ref 20–32)
CREAT SERPL-MCNC: 0.66 MG/DL (ref 0.52–1.04)
CREAT UR-MCNC: 223 MG/DL
GFR SERPL CREATININE-BSD FRML MDRD: >90 ML/MIN/{1.73_M2}
GLUCOSE SERPL-MCNC: 138 MG/DL (ref 70–99)
HBA1C MFR BLD: 8.2 % (ref 0–5.6)
HCG SERPL QL: NEGATIVE
HCT VFR BLD AUTO: 44.2 % (ref 35–47)
HDLC SERPL-MCNC: 40 MG/DL
LDLC SERPL CALC-MCNC: 183 MG/DL
MICROALBUMIN UR-MCNC: 196 MG/L
MICROALBUMIN/CREAT UR: 87.89 MG/G CR (ref 0–25)
NONHDLC SERPL-MCNC: 226 MG/DL
POTASSIUM SERPL-SCNC: 4 MMOL/L (ref 3.4–5.3)
PROT SERPL-MCNC: 7.8 G/DL (ref 6.8–8.8)
SODIUM SERPL-SCNC: 138 MMOL/L (ref 133–144)
TRIGL SERPL-MCNC: 219 MG/DL

## 2019-05-02 RX ORDER — HYDROCODONE BITARTRATE AND ACETAMINOPHEN 5; 325 MG/1; MG/1
1 TABLET ORAL EVERY 6 HOURS PRN
Qty: 10 TABLET | Refills: 0 | Status: SHIPPED | OUTPATIENT
Start: 2019-05-02 | End: 2019-05-30

## 2019-05-02 RX ORDER — CEFAZOLIN SODIUM 1 G/50ML
1 SOLUTION INTRAVENOUS SEE ADMIN INSTRUCTIONS
Status: DISCONTINUED | OUTPATIENT
Start: 2019-05-02 | End: 2019-05-03 | Stop reason: HOSPADM

## 2019-05-02 RX ORDER — OXYCODONE HYDROCHLORIDE 5 MG/1
5 TABLET ORAL EVERY 4 HOURS PRN
Status: DISCONTINUED | OUTPATIENT
Start: 2019-05-02 | End: 2019-05-03 | Stop reason: HOSPADM

## 2019-05-02 RX ORDER — FENTANYL CITRATE 50 UG/ML
25-50 INJECTION, SOLUTION INTRAMUSCULAR; INTRAVENOUS
Status: DISCONTINUED | OUTPATIENT
Start: 2019-05-02 | End: 2019-05-03 | Stop reason: HOSPADM

## 2019-05-02 RX ORDER — ACETAMINOPHEN 325 MG/1
975 TABLET ORAL ONCE
Status: COMPLETED | OUTPATIENT
Start: 2019-05-02 | End: 2019-05-02

## 2019-05-02 RX ORDER — SODIUM CHLORIDE, SODIUM LACTATE, POTASSIUM CHLORIDE, CALCIUM CHLORIDE 600; 310; 30; 20 MG/100ML; MG/100ML; MG/100ML; MG/100ML
INJECTION, SOLUTION INTRAVENOUS CONTINUOUS
Status: DISCONTINUED | OUTPATIENT
Start: 2019-05-02 | End: 2019-05-03 | Stop reason: HOSPADM

## 2019-05-02 RX ORDER — LIDOCAINE HYDROCHLORIDE AND EPINEPHRINE 10; 10 MG/ML; UG/ML
10 INJECTION, SOLUTION INFILTRATION; PERINEURAL ONCE
Status: COMPLETED | OUTPATIENT
Start: 2019-05-02 | End: 2019-05-02

## 2019-05-02 RX ORDER — CEFAZOLIN SODIUM 2 G/50ML
2 SOLUTION INTRAVENOUS
Status: COMPLETED | OUTPATIENT
Start: 2019-05-02 | End: 2019-05-02

## 2019-05-02 RX ORDER — ONDANSETRON 4 MG/1
4 TABLET, ORALLY DISINTEGRATING ORAL EVERY 30 MIN PRN
Status: DISCONTINUED | OUTPATIENT
Start: 2019-05-02 | End: 2019-05-03 | Stop reason: HOSPADM

## 2019-05-02 RX ORDER — NALOXONE HYDROCHLORIDE 0.4 MG/ML
.1-.4 INJECTION, SOLUTION INTRAMUSCULAR; INTRAVENOUS; SUBCUTANEOUS
Status: DISCONTINUED | OUTPATIENT
Start: 2019-05-02 | End: 2019-05-03 | Stop reason: HOSPADM

## 2019-05-02 RX ORDER — ONDANSETRON 2 MG/ML
4 INJECTION INTRAMUSCULAR; INTRAVENOUS EVERY 30 MIN PRN
Status: DISCONTINUED | OUTPATIENT
Start: 2019-05-02 | End: 2019-05-03 | Stop reason: HOSPADM

## 2019-05-02 RX ORDER — MEPERIDINE HYDROCHLORIDE 25 MG/ML
12.5 INJECTION INTRAMUSCULAR; INTRAVENOUS; SUBCUTANEOUS
Status: DISCONTINUED | OUTPATIENT
Start: 2019-05-02 | End: 2019-05-03 | Stop reason: HOSPADM

## 2019-05-02 RX ORDER — FENTANYL CITRATE 50 UG/ML
INJECTION, SOLUTION INTRAMUSCULAR; INTRAVENOUS PRN
Status: DISCONTINUED | OUTPATIENT
Start: 2019-05-02 | End: 2019-05-02

## 2019-05-02 RX ORDER — ONDANSETRON 2 MG/ML
INJECTION INTRAMUSCULAR; INTRAVENOUS PRN
Status: DISCONTINUED | OUTPATIENT
Start: 2019-05-02 | End: 2019-05-02

## 2019-05-02 RX ORDER — GABAPENTIN 300 MG/1
300 CAPSULE ORAL ONCE
Status: COMPLETED | OUTPATIENT
Start: 2019-05-02 | End: 2019-05-02

## 2019-05-02 RX ADMIN — CEFAZOLIN SODIUM 2 G: 2 SOLUTION INTRAVENOUS at 12:02

## 2019-05-02 RX ADMIN — SODIUM CHLORIDE, SODIUM LACTATE, POTASSIUM CHLORIDE, CALCIUM CHLORIDE: 600; 310; 30; 20 INJECTION, SOLUTION INTRAVENOUS at 11:17

## 2019-05-02 RX ADMIN — FENTANYL CITRATE 25 MCG: 50 INJECTION, SOLUTION INTRAMUSCULAR; INTRAVENOUS at 11:59

## 2019-05-02 RX ADMIN — FENTANYL CITRATE 25 MCG: 50 INJECTION, SOLUTION INTRAMUSCULAR; INTRAVENOUS at 12:15

## 2019-05-02 RX ADMIN — GABAPENTIN 300 MG: 300 CAPSULE ORAL at 11:14

## 2019-05-02 RX ADMIN — ACETAMINOPHEN 975 MG: 325 TABLET ORAL at 11:14

## 2019-05-02 RX ADMIN — LIDOCAINE HYDROCHLORIDE AND EPINEPHRINE 4 ML: 10; 10 INJECTION, SOLUTION INFILTRATION; PERINEURAL at 11:17

## 2019-05-02 RX ADMIN — ONDANSETRON 4 MG: 2 INJECTION INTRAMUSCULAR; INTRAVENOUS at 12:16

## 2019-05-02 ASSESSMENT — MIFFLIN-ST. JEOR: SCORE: 1434.62

## 2019-05-02 NOTE — OP NOTE
PREOPERATIVE DIAGNOSIS: right trigger thumb      POSTOPERATIVE DIAGNOSIS:   right trigger thumb      PROCEDURE PERFORMED:   right trigger thumb      ATTENDING PHYSICIAN:  Greg Streeter MD        ASSISTANT: None      ANESTHESIA:  MAC + Local anesthesia consisting of 4 mL of 1% lidocaine with epinephrine 1:100,000       ESTIMATED BLOOD LOSS:  1 mL        TOURNIQUET TIME: 6 minutes      IMPLANTS:  None.         SPECIMENS: None.      FINDINGS:  Thickened A1 pulley. Substantial longitudinal tearing of FPL tendon with regional tenosynovitis.       INDICATIONS: This patient is a  38 year old year old female who presented to clinic with a chief complaint of right thumb catching and locking. The patient elected to proceed with surgery. Risks of surgery were discussed including but not limited to infection, bleeding, wound healing problems, damage to surrounding structures including nerves, blood vessels, and tendon, recurrence, stiffness, and persistent pain. The patient expressed understanding and wished to proceed with the above surgery. Informed consent was obtained.       PROCEDURE:   The patient was identified in the preoperative area. The surgical site was marked. 4 mL of 1% lidocaine with 1:100,000 epinephrine was injected into the subcutaneous tissues at the surgical site. The patient was brought back to the operating room and positioned with the operative extremity over the hand table. Sedation was administered. A forearm tourniquet was placed. The site was prepped and draped in the usual sterile fashion. A timeout was performed confirming patient, site of surgery, and procedure to be performed. The arm was exsanguinated and the tourniquet inflated. An incision was made overlying the proximal thumb crease. The radial digital nerve to the thumb was identified and protected. Dissection was taken down to the A1 pulley. The A1 pulley exposed and then was released with a knife. Care was taken to divide it fully  including its most proximal and distal extents without getting into the oblique pulley. Following the release, a right angle retractor was used to bring the flexor pollicis longus tendon up into the wound for inspection. It did not catch or lock as this was done. The tendon was released and the patient was awoken and was asked to flex and extend the thumb which she was able to do without triggering or locking. The tourniquet was taken down and hemostasis achieved. The wound was irrigated copiously. Hemostasis was excellent. The wound was closed with 4-0 nylon horizontal mattress sutures. A sterile dressing was applied of Xeroform, 4 x 4's, fluffs, Kerlix, and Coban. Thumb perfusion was excellent with good capillary refill. The patient was brought to the recovery room in stable condition.  All sponge, needle and instrument counts were correct at the end of the case.       PLAN: The dressing will remain clean, dry and intact for 5 days.  After that time the dressing may be removed, the incision may get wet in the shower and a Band-Aid may be used for coverage of the wound.  The patient should return to clinic for suture removal in 10-14 days. The patient should not lift anything heavier than the weight of a coffee cup before this time.

## 2019-05-02 NOTE — ANESTHESIA POSTPROCEDURE EVALUATION
Anesthesia POST Procedure Evaluation    Patient: Nayeli Caal   MRN:     [id number] Gender:   female   Age:    38 year old :      1980        Preoperative Diagnosis: Right Thumb Trigger   Procedure(s):  Right Thumb Trigger Release   Postop Comments: No value filed.       Anesthesia Type:  MAC  No value filed.    Reportable Event: NO     PAIN: Uncomplicated   Sign Out status: Comfortable, Well controlled pain     PONV: No PONV   Sign Out status:  No Nausea or Vomiting     Neuro/Psych: Uneventful perioperative course   Sign Out Status: Preoperative baseline; Age appropriate mentation     Airway/Resp.: Uneventful perioperative course   Sign Out Status: Non labored breathing, age appropriate RR; Resp. Status within EXPECTED Parameters     CV: Uneventful perioperative course   Sign Out status: Appropriate BP and perfusion indices; Appropriate HR/Rhythm     Disposition:   Sign Out in:  Phase II  Disposition:  Home  Recovery Course: Uneventful  Follow-Up: Not required           Last Anesthesia Record Vitals:  CRNA VITALS  2019 1158 - 2019 1257      2019             Ht Rate:  85    Resp Rate (set):  10          Last PACU Vitals:  Vitals Value Taken Time   /59 2019 12:30 PM   Temp 36.5  C (97.7  F) 2019 12:30 PM   Pulse     Resp 16 2019 12:30 PM   SpO2 98 % 2019 12:30 PM   Temp src     NIBP 153/65 2019 12:21 PM   Pulse 80 2019 12:27 PM   SpO2 100 % 2019 12:27 PM   Resp     Temp     Ht Rate 85 2019 12:28 PM   Temp 2           Electronically Signed By: Jarad Durham MD, May 2, 2019, 12:57 PM

## 2019-05-02 NOTE — ANESTHESIA CARE TRANSFER NOTE
Patient: Nayeli Caal    Procedure(s):  Right Thumb Trigger Release    Diagnosis: Right Thumb Trigger  Diagnosis Additional Information: No value filed.    Anesthesia Type:   No value filed.     Note:  Airway :Room Air  Patient transferred to:Phase II  Handoff Report: Identifed the Patient, Identified the Reponsible Provider, Reviewed the pertinent medical history, Discussed the surgical course, Reviewed Intra-OP anesthesia mangement and issues during anesthesia, Set expectations for post-procedure period and Allowed opportunity for questions and acknowledgement of understanding      Vitals: (Last set prior to Anesthesia Care Transfer)    CRNA VITALS  5/2/2019 1158 - 5/2/2019 1232      5/2/2019             Ht Rate:  85    Resp Rate (set):  10                Electronically Signed By: SABI Pascual CRNA  May 2, 2019  12:32 PM

## 2019-05-02 NOTE — DISCHARGE INSTRUCTIONS
Instructions for after your surgery    Remove your dressing after 5 days. At that point, can wash incision with soap and water daily and then pat it dry. Then cover incision with bandaid or tegaderm and gauze. Avoid immersion of incision in water until skin is healed (3-4 weeks)     Keep your operative arm elevated as much as possible. This will help with pain and swelling.     Do not lift anything heavier than a coffee cup with your operative hand. You can use it for light activities like eating and brushing your teeth.    You will have a follow-up appointment scheduled with a nurse or  for stitch removal prior to your 6 week post-op appointment with Dr. Streeter. If you do not know when this appointment is, please call Dr. Streeter's office to find out.     Call Dr. Streeter's office if you experience any of the following:   Fevers, chills, increasing wound drainage, pain that is not controlled with the medications you have been prescribing, swelling that is not controlled with elevation, problems with your splint, or any other questions or concerns.     [facility]  Home Care Following Anesthesia  For 24 hours after surgery:  1. Get plenty of rest.  A responsible adult must stay with you for at least 24 hours after you leave the surgery center.  2. Do not drive or use heavy equipment.  If you have weakness or tingling, don't drive or use heavy equipment until this feeling goes away.   3. Do not drink alcohol.   4. Avoid strenuous or risky activities.  Ask for help when climbing stairs.  5. You may feel lightheaded.  IF so, sit for a few minutes before standing.  Have someone help you get up.   6. If you have nausea (feel sick to your stomach): Drink only clear liquids such as apple juice, ginger ale, broth or 7-Up.  Rest may also help.  Be sure to drink enough fluids.  Move to a regular diet as you feel able.   7. You may have a slight fever.  Call the doctor  "if your fever is over 100 F (37.7 C) (taken under the tongue) or lasts longer than 24 hours.  8. You may have a dry mouth, a sore throat, muscle aches or trouble sleeping. These should go away after 24 hours.  9. Do not make important or legal decisions.        Today you received a Marcaine or bupivacaine block to numb the nerves near your surgery site.  This is a block using local anesthetic or \"numbing\" medication injected around the nerves to anesthetize or \"numb\" the area supplied by those nerves.  This block is injected into the muscle layer near your surgical site.  The medication may numb the location where you had surgery for 6-18 hours, but may last up to 24 hours.  If your surgical site is an arm or leg you should be careful with your affected limb, since it is possible to injure your limb without being aware of it due to the numbing.  Until full feeling returns, you should guard against bumping or hitting your limb, and avoid extreme hot or cold temperatures on the skin.  As the block wears off, the feeling will return as a tingling or prickly sensation near your surgical site.  You will experience more discomfort from your incision as the feeling returns.  You may want to take a pain pill (a narcotic or Tylenol if this was prescribed by your surgeon) when you start to experience mild pain before the pain beccomes more severe.  If your pain medications do not control your pain you should notifiy your surgeon.    Tips for taking pain medications  To get the best pain relief possible, remember these points:    Take pain medications as directed, before pain becomes severe.    Pain medication can upset your stomach: taking it with food may help.    Constipation is a common side effect of pain medication. Drink plenty of  fluids.    Eat foods high in fiber. Take a stool softener if recommended by your doctor or pharmacist.    Do not drink alcohol, drive or operate machinery while taking pain " medications.    Ask about other ways to control pain, such as with heat, ice or relaxation.    Tylenol/Acetaminophen Consumption  To help encourage the safe use of acetaminophen, the makers of TYLENOL  have lowered the maximum daily dose for single-ingredient Extra Strength TYLENOL  (acetaminophen) products sold in the [place] from 8 pills per day (4,000 mg) to 6 pills per day (3,000 mg). The dosing interval has also changed from 2 pills every 4-6 hours to 2 pills every 6 hours.    If you feel your pain relief is insufficient, you may take Tylenol/Acetaminophen in addition to your narcotic pain medication.     Be careful not to exceed 3,000 mg of Tylenol/Acetaminophen in a 24 hour period from all sources.    If you are taking extra strength Tylenol/acetaminophen (500 mg), the maximum dose is 6 tablets in 24 hours.    If you are taking regular strength acetaminophen (325 mg), the maximum dose is 9 tablets in 24 hours.    Call a doctor for any of the followin. Signs of infection (fever, growing tenderness at the surgery site, a large amount of drainage or bleeding, severe pain, foul-smelling drainage, redness, swelling).  2. It has been over 8 to 10 hours since surgery and you are still not able to urinate (pass water).  3. Headache for over 24 hours.  4. Numbness, tingling or weakness the day after surgery (if you had spinal anesthesia).  Your doctor is:       Dr. Greg Streeter, Orthopaedics: [phone]               Or dial [phone] and ask for the resident on call for:  Orthopaedics  For emergency care, call the:  [facility] Emergency Department: [phone] (TTY for hearing impaired: [phone])

## 2019-05-02 NOTE — ANESTHESIA PREPROCEDURE EVALUATION
Anesthesia Pre-Procedure Evaluation    Patient: Nayeli Caal   MRN:     [id number] Gender:   female   Age:    38 year old :      1980        Preoperative Diagnosis: Right Thumb Trigger   Procedure(s):  Right Thumb Trigger Release     Past Medical History:   Diagnosis Date     Combined visual and hearing impairment      Deaf      Diabetic retinopathy of both eyes (H) 2011     Hepatic steatosis 2011     History of tobacco use      Hyperlipidemia      Hypertension      Hypertriglyceridemia      Migraines      Obesity 2011     Problem list name updated by automated process. Provider to review     Uncontrolled type 2 diabetes mellitus with hyperglycemia, with long-term current use of insulin (H) 5/15/2017     Usher Syndrome: congenital deafness, retinitis pigmentosa 2011      Past Surgical History:   Procedure Laterality Date     LAPAROSCOPIC CHOLECYSTECTOMY N/A 3/10/2018    Procedure: LAPAROSCOPIC CHOLECYSTECTOMY;  Laparoscopic Cholecystectomy ;  Surgeon: Kuldeep Sigala MD;  Location: [facility] OR          Anesthesia Evaluation     . Pt has had prior anesthetic. Type: General    No history of anesthetic complications          ROS/MED HX    ENT/Pulmonary:     (+)tobacco use, Past use , . .    Neurologic: Comment: Usher syndrome    (+)migraines,     Cardiovascular:     (+) Dyslipidemia, hypertension----. : . . . :. .       METS/Exercise Tolerance:  >4 METS   Hematologic:  - neg hematologic  ROS       Musculoskeletal:  - neg musculoskeletal ROS       GI/Hepatic:     (+) liver disease,       Renal/Genitourinary:  - ROS Renal section negative       Endo:     (+) type II DM Diabetic complications: retinopathy, Obesity, .      Psychiatric:  - neg psychiatric ROS       Infectious Disease:  - neg infectious disease ROS       Malignancy:         Other:                     [facility] AN PHYSICAL EXAM    Lab Results   Component Value Date    WBC 12.6 (H) 2018    HGB 14.2 2018    HCT 42.3  "03/13/2018     03/13/2018    CRP 12.0 (H) 03/24/2016    SED 19 03/24/2016     05/17/2018    POTASSIUM 4.3 05/17/2018    CHLORIDE 106 05/17/2018    CO2 25 05/17/2018    BUN 13 05/17/2018    CR 0.56 05/17/2018     (H) 05/17/2018    VERO 8.7 05/17/2018    PHOS 4.2 03/12/2010    MAG 2.2 07/13/2017    ALBUMIN 3.9 05/17/2018    PROTTOTAL 7.6 05/17/2018    ALT 56 (H) 05/17/2018    AST 30 05/17/2018    ALKPHOS 109 05/17/2018    BILITOTAL 0.3 05/17/2018    LIPASE 147 03/13/2018    AMYLASE 16 (L) 05/15/2017    INR 1.03 06/18/2010    TSH 1.84 05/17/2018    HCG Negative 03/14/2018       Preop Vitals  BP Readings from Last 3 Encounters:   04/26/19 103/69   04/22/19 115/75   01/14/19 145/90    Pulse Readings from Last 3 Encounters:   04/26/19 80   04/22/19 75   01/14/19 85      Resp Readings from Last 3 Encounters:   05/17/18 20   03/13/18 18   03/10/18 24    SpO2 Readings from Last 3 Encounters:   05/17/18 (!) 20%   03/22/18 97%   03/14/18 97%      Temp Readings from Last 1 Encounters:   03/22/18 36.9  C (98.4  F) (Oral)    Ht Readings from Last 1 Encounters:   04/26/19 1.564 m (5' 1.58\")      Wt Readings from Last 1 Encounters:   04/26/19 81.1 kg (178 lb 14.4 oz)    Estimated body mass index is 33.17 kg/m  as calculated from the following:    Height as of 4/26/19: 1.564 m (5' 1.58\").    Weight as of 4/26/19: 81.1 kg (178 lb 14.4 oz).     LDA:            Assessment:   ASA SCORE: 3    NPO Status: > 6 hours since completed Solid [employer]   Documentation: H&P complete; Preop Testing complete; Consents complete   Proceeding: Proceed without further delay  Tobacco Use:  NO Active use of Tobacco/UNKNOWN Tobacco use status     Plan:   Anes. Type:  MAC   Pre-Induction: Midazolam IV; Acetaminophen PO   Induction:  IV (Standard)   Airway: Native Airway   Access/Monitoring: PIV   Maintenance: Propofol; IV   Emergence: Recovery Site (PACU/ICU)   Logistics: Same Day Surgery     Postop Pain/Sedation " Strategy:  Standard-Options: Opioids PRN     PONV Management:  Adult Risk Factors: Female, Non-Smoker, Postop Opioids  Prevention: Propofol Infusion; Ondansetron     CONSENT: Direct conversation;  Phone   Plan and risks discussed with: Patient   Blood Products: Consent Deferred (Minimal Blood Loss)                         Ozzy Benitez MD

## 2019-05-11 DIAGNOSIS — Z79.4 TYPE 2 DIABETES MELLITUS WITH COMPLICATION, WITH LONG-TERM CURRENT USE OF INSULIN (H): Primary | ICD-10-CM

## 2019-05-11 DIAGNOSIS — E11.8 TYPE 2 DIABETES MELLITUS WITH COMPLICATION, WITH LONG-TERM CURRENT USE OF INSULIN (H): Primary | ICD-10-CM

## 2019-05-11 RX ORDER — INSULIN GLARGINE 100 [IU]/ML
48 INJECTION, SOLUTION SUBCUTANEOUS DAILY
Qty: 12 ML | Refills: 3 | Status: SHIPPED | OUTPATIENT
Start: 2019-05-11 | End: 2019-10-17

## 2019-05-16 ENCOUNTER — ALLIED HEALTH/NURSE VISIT (OUTPATIENT)
Dept: ORTHOPEDICS | Facility: CLINIC | Age: 39
End: 2019-05-16
Payer: COMMERCIAL

## 2019-05-16 DIAGNOSIS — M65.311 TRIGGER FINGER OF RIGHT THUMB: Primary | ICD-10-CM

## 2019-05-16 NOTE — PROGRESS NOTES
Reason for visit:    Nayeli Caal came in to the clinic for a two week post op check.    Her surgery was done 5/2/19 by Dr Streeter.  She had right thumb trigger release.     Assessment:    Nayeli came into the clinic with nothing covering her incision site.     The Surgical wounds were exposed and found to be well-healed; so the sutures were removed.    Plan:     She was placed in band aid.  She was told to keep the area clean and dry, not to soak/submerge her hand in water, not to apply any lotions, ointments, or creams over the area, and not to lift anymore than 5 lbs.     She has an appointment to see Dr. Streeter at that time Dr. Streeter will determine further restrictions.    She has our phone number and will call with questions or problems.

## 2019-05-29 ENCOUNTER — ANESTHESIA EVENT (OUTPATIENT)
Dept: SURGERY | Facility: AMBULATORY SURGERY CENTER | Age: 39
End: 2019-05-29

## 2019-05-30 ENCOUNTER — ANESTHESIA (OUTPATIENT)
Dept: SURGERY | Facility: AMBULATORY SURGERY CENTER | Age: 39
End: 2019-05-30

## 2019-05-30 ENCOUNTER — HOSPITAL ENCOUNTER (OUTPATIENT)
Facility: AMBULATORY SURGERY CENTER | Age: 39
End: 2019-05-30
Attending: ORTHOPAEDIC SURGERY
Payer: COMMERCIAL

## 2019-05-30 VITALS
WEIGHT: 178 LBS | RESPIRATION RATE: 14 BRPM | DIASTOLIC BLOOD PRESSURE: 69 MMHG | HEART RATE: 81 BPM | SYSTOLIC BLOOD PRESSURE: 96 MMHG | OXYGEN SATURATION: 98 % | HEIGHT: 62 IN | TEMPERATURE: 98.3 F | BODY MASS INDEX: 32.76 KG/M2

## 2019-05-30 DIAGNOSIS — Z98.890 POST-OPERATIVE STATE: ICD-10-CM

## 2019-05-30 LAB
GLUCOSE BLDC GLUCOMTR-MCNC: 142 MG/DL (ref 70–99)
GLUCOSE BLDC GLUCOMTR-MCNC: 145 MG/DL (ref 70–99)
HCG UR QL: NEGATIVE
INTERNAL QC OK POCT: YES

## 2019-05-30 RX ORDER — ONDANSETRON 2 MG/ML
INJECTION INTRAMUSCULAR; INTRAVENOUS PRN
Status: DISCONTINUED | OUTPATIENT
Start: 2019-05-30 | End: 2019-05-30

## 2019-05-30 RX ORDER — SODIUM CHLORIDE, SODIUM LACTATE, POTASSIUM CHLORIDE, CALCIUM CHLORIDE 600; 310; 30; 20 MG/100ML; MG/100ML; MG/100ML; MG/100ML
INJECTION, SOLUTION INTRAVENOUS CONTINUOUS
Status: DISCONTINUED | OUTPATIENT
Start: 2019-05-30 | End: 2019-05-31 | Stop reason: HOSPADM

## 2019-05-30 RX ORDER — ONDANSETRON 2 MG/ML
4 INJECTION INTRAMUSCULAR; INTRAVENOUS EVERY 30 MIN PRN
Status: DISCONTINUED | OUTPATIENT
Start: 2019-05-30 | End: 2019-05-31 | Stop reason: HOSPADM

## 2019-05-30 RX ORDER — GABAPENTIN 300 MG/1
300 CAPSULE ORAL ONCE
Status: COMPLETED | OUTPATIENT
Start: 2019-05-30 | End: 2019-05-30

## 2019-05-30 RX ORDER — LIDOCAINE HYDROCHLORIDE AND EPINEPHRINE 10; 10 MG/ML; UG/ML
INJECTION, SOLUTION INFILTRATION; PERINEURAL PRN
Status: DISCONTINUED | OUTPATIENT
Start: 2019-05-30 | End: 2019-05-30 | Stop reason: HOSPADM

## 2019-05-30 RX ORDER — NALOXONE HYDROCHLORIDE 0.4 MG/ML
.1-.4 INJECTION, SOLUTION INTRAMUSCULAR; INTRAVENOUS; SUBCUTANEOUS
Status: DISCONTINUED | OUTPATIENT
Start: 2019-05-30 | End: 2019-05-31 | Stop reason: HOSPADM

## 2019-05-30 RX ORDER — CEFAZOLIN SODIUM 2 G/50ML
2 SOLUTION INTRAVENOUS
Status: COMPLETED | OUTPATIENT
Start: 2019-05-30 | End: 2019-05-30

## 2019-05-30 RX ORDER — HYDROCODONE BITARTRATE AND ACETAMINOPHEN 5; 325 MG/1; MG/1
1 TABLET ORAL EVERY 6 HOURS PRN
Qty: 10 TABLET | Refills: 0 | Status: SHIPPED | OUTPATIENT
Start: 2019-05-30 | End: 2021-01-21

## 2019-05-30 RX ORDER — FLUMAZENIL 0.1 MG/ML
0.2 INJECTION, SOLUTION INTRAVENOUS
Status: DISCONTINUED | OUTPATIENT
Start: 2019-05-30 | End: 2019-05-31 | Stop reason: HOSPADM

## 2019-05-30 RX ORDER — ACETAMINOPHEN 325 MG/1
975 TABLET ORAL ONCE
Status: COMPLETED | OUTPATIENT
Start: 2019-05-30 | End: 2019-05-30

## 2019-05-30 RX ORDER — OXYCODONE HYDROCHLORIDE 5 MG/1
5 TABLET ORAL EVERY 4 HOURS PRN
Status: DISCONTINUED | OUTPATIENT
Start: 2019-05-30 | End: 2019-05-31 | Stop reason: HOSPADM

## 2019-05-30 RX ORDER — FENTANYL CITRATE 50 UG/ML
25-50 INJECTION, SOLUTION INTRAMUSCULAR; INTRAVENOUS
Status: DISCONTINUED | OUTPATIENT
Start: 2019-05-30 | End: 2019-05-31 | Stop reason: HOSPADM

## 2019-05-30 RX ORDER — DEXAMETHASONE SODIUM PHOSPHATE 4 MG/ML
INJECTION, SOLUTION INTRA-ARTICULAR; INTRALESIONAL; INTRAMUSCULAR; INTRAVENOUS; SOFT TISSUE PRN
Status: DISCONTINUED | OUTPATIENT
Start: 2019-05-30 | End: 2019-05-30

## 2019-05-30 RX ORDER — CEFAZOLIN SODIUM 1 G/50ML
1 SOLUTION INTRAVENOUS SEE ADMIN INSTRUCTIONS
Status: DISCONTINUED | OUTPATIENT
Start: 2019-05-30 | End: 2019-05-31 | Stop reason: HOSPADM

## 2019-05-30 RX ORDER — PROPOFOL 10 MG/ML
INJECTION, EMULSION INTRAVENOUS CONTINUOUS PRN
Status: DISCONTINUED | OUTPATIENT
Start: 2019-05-30 | End: 2019-05-30

## 2019-05-30 RX ORDER — MEPERIDINE HYDROCHLORIDE 25 MG/ML
12.5 INJECTION INTRAMUSCULAR; INTRAVENOUS; SUBCUTANEOUS
Status: DISCONTINUED | OUTPATIENT
Start: 2019-05-30 | End: 2019-05-31 | Stop reason: HOSPADM

## 2019-05-30 RX ORDER — ONDANSETRON 4 MG/1
4 TABLET, ORALLY DISINTEGRATING ORAL EVERY 30 MIN PRN
Status: DISCONTINUED | OUTPATIENT
Start: 2019-05-30 | End: 2019-05-31 | Stop reason: HOSPADM

## 2019-05-30 RX ORDER — KETOROLAC TROMETHAMINE 30 MG/ML
INJECTION, SOLUTION INTRAMUSCULAR; INTRAVENOUS PRN
Status: DISCONTINUED | OUTPATIENT
Start: 2019-05-30 | End: 2019-05-30

## 2019-05-30 RX ORDER — LIDOCAINE 40 MG/G
CREAM TOPICAL
Status: DISCONTINUED | OUTPATIENT
Start: 2019-05-30 | End: 2019-05-31 | Stop reason: HOSPADM

## 2019-05-30 RX ADMIN — GABAPENTIN 300 MG: 300 CAPSULE ORAL at 09:31

## 2019-05-30 RX ADMIN — ACETAMINOPHEN 975 MG: 325 TABLET ORAL at 09:31

## 2019-05-30 RX ADMIN — SODIUM CHLORIDE, SODIUM LACTATE, POTASSIUM CHLORIDE, CALCIUM CHLORIDE: 600; 310; 30; 20 INJECTION, SOLUTION INTRAVENOUS at 10:44

## 2019-05-30 RX ADMIN — KETOROLAC TROMETHAMINE 30 MG: 30 INJECTION, SOLUTION INTRAMUSCULAR; INTRAVENOUS at 11:17

## 2019-05-30 RX ADMIN — PROPOFOL 150 MCG/KG/MIN: 10 INJECTION, EMULSION INTRAVENOUS at 10:47

## 2019-05-30 RX ADMIN — DEXAMETHASONE SODIUM PHOSPHATE 4 MG: 4 INJECTION, SOLUTION INTRA-ARTICULAR; INTRALESIONAL; INTRAMUSCULAR; INTRAVENOUS; SOFT TISSUE at 10:48

## 2019-05-30 RX ADMIN — SODIUM CHLORIDE, SODIUM LACTATE, POTASSIUM CHLORIDE, CALCIUM CHLORIDE: 600; 310; 30; 20 INJECTION, SOLUTION INTRAVENOUS at 09:31

## 2019-05-30 RX ADMIN — ONDANSETRON 4 MG: 2 INJECTION INTRAMUSCULAR; INTRAVENOUS at 10:48

## 2019-05-30 RX ADMIN — CEFAZOLIN SODIUM 2 G: 2 SOLUTION INTRAVENOUS at 10:44

## 2019-05-30 ASSESSMENT — MIFFLIN-ST. JEOR: SCORE: 1440.65

## 2019-05-30 NOTE — OP NOTE
PREOPERATIVE DIAGNOSIS: left ring finger trigger finger, retinacular ganglion cyst      POSTOPERATIVE DIAGNOSIS:  left ring finger trigger finger, retinacular ganglion cyst      PROCEDURE PERFORMED:   left ring finger trigger  Release, retinacular ganglion cyst excision      ATTENDING PHYSICIAN:  Greg Streeter MD        ASSISTANT: None      ANESTHESIA:  MAC + local anesthesia consisting of 4 mL of 1% lidocaine with epinephrine 1:100,000       ESTIMATED BLOOD LOSS:  1 mL        TOURNIQUET TIME: 8 minutes      IMPLANTS:  None.         SPECIMENS: None.      FINDINGS:  1 cm multilobulated ganglion cyst arising from A1 pulley.     INDICATIONS: This patient is a  38 year old year old female who presented to clinic with a chief complaint of left ring finger catching and locking. In preop I also noted a palpable mass over the A1 pulley.  She previously underwent a right trigger thumb release with excellent results. For the left ring finger, treatment options were discussed including continued observation, injection, and surgical A1 pulley release. The patient elected to proceed with surgery. Risks of surgery were discussed including but not limited to infection, bleeding, wound healing problems, damage to surrounding structures including nerves, blood vessels, and tendon, recurrence, stiffness, and persistent pain. The patient expressed understanding and wished to proceed with the above surgery. Informed consent was obtained.       PROCEDURE:   The patient was identified in the preoperative area. The surgical site was marked. The patient was brought back to the operating room and positioned with the operative extremity over the hand table. Sedation was administered.  4 mL of 1% lidocaine with 1:100,000 epinephrine was injected into the subcutaneous tissues at the surgical site. A forearm tourniquet was placed. The site was prepped and draped in the usual sterile fashion. A timeout was performed confirming patient, site of  surgery, and procedure to be performed. The arm was exsanguinated and the tourniquet inflated to 250 mmHg. A longitudinal incision was made overlying the A1 pulley. Blunt dissection was performed down to the flexor tendon sheath. Right angle retractors were placed on either side of the flexor sheath throughout the procedure to protect the neurovascular bundles. All soft tissue overlying the flexor tendon sheath was gently freed and retracted.  The ganglion cyst was excised and sent for pathology. The A1 pulley was identified and was longitudinally incised. Tenotomy scissors were used to complete the release proximally and distally to the level of the A2 pulley. Following the release, a right angle retractor was used to deliver the FDS and FDP tendons out of the flexor sheath for inspection. They did not catch or lock as this was done. The tendon was released. We attempted to awaken the patient and ask her to flex and extend the digit but she was not yet sufficiently alert to follow commands. The wound was irrigated copiously. Tourniquet was taken down and hemostasis was achieved. The wound was closed with 4-0 nylon horizontal mattress sutures. A sterile dressing was applied of adaptic, 4 x 4's, fluffs, vijay, and Coban. Digital perfusion was excellent with good capillary refill. The patient was fully awokened and brought to the recovery room in stable condition.  All sponge, needle and instrument counts were correct at the end of the case.       PLAN: The dressing will remain clean, dry and intact for 3 days.  After that time the dressing may be removed, the incision may get wet in the shower and a Band-Aid may be used for coverage of the wound.  The patient should return to clinic for suture removal in 10-14 days. The patient should not lift anything heavier than the weight of a coffee cup before this time.

## 2019-05-30 NOTE — ANESTHESIA PREPROCEDURE EVALUATION
Anesthesia Pre-Procedure Evaluation    Patient: Nayeli Caal   MRN:     [id number] Gender:   female   Age:    38 year old :      1980        Preoperative Diagnosis: Left Ring Trigger Finger   Procedure(s):  Left Ring Trigger Finger Release     Past Medical History:   Diagnosis Date     Combined visual and hearing impairment      Deaf      Diabetic retinopathy of both eyes (H) 2011     Hepatic steatosis 2011     History of tobacco use      Hyperlipidemia      Hypertension      Hypertriglyceridemia      Migraines      Obesity 2011     Problem list name updated by automated process. Provider to review     Uncontrolled type 2 diabetes mellitus with hyperglycemia, with long-term current use of insulin (H) 5/15/2017     Usher Syndrome: congenital deafness, retinitis pigmentosa 2011      Past Surgical History:   Procedure Laterality Date     LAPAROSCOPIC CHOLECYSTECTOMY N/A 3/10/2018    Procedure: LAPAROSCOPIC CHOLECYSTECTOMY;  Laparoscopic Cholecystectomy ;  Surgeon: Kuldeep Sigala MD;  Location: [facility] OR     RELEASE TRIGGER FINGER Right 2019    Procedure: Right Thumb Trigger Release;  Surgeon: Greg Streeter MD;  Location: [facility] OR          Anesthesia Evaluation     . Pt has had prior anesthetic. Type: General           ROS/MED HX    ENT/Pulmonary:  - neg pulmonary ROS     Neurologic:  - neg neurologic ROS     Cardiovascular:     (+) hypertension----. : . . . :. .       METS/Exercise Tolerance:  4 - Raking leaves, gardening   Hematologic:  - neg hematologic  ROS       Musculoskeletal:  - neg musculoskeletal ROS       GI/Hepatic:     (+) liver disease,       Renal/Genitourinary:         Endo:     (+) type II DM .      Psychiatric:  - neg psychiatric ROS       Infectious Disease:         Malignancy:         Other:                         PHYSICAL EXAM:   Mental Status/Neuro: A/A/O   Airway: Facies: Feasible  Mallampati: I  Mouth/Opening: Full  TM distance: > 6 cm  Neck ROM:  "Full   Respiratory: Auscultation: CTAB     Resp. Rate: Normal     Resp. Effort: Normal      CV: Rhythm: Regular  Rate: Age appropriate  Heart: Normal Sounds   Comments:      Dental: Normal                  Lab Results   Component Value Date    WBC 12.6 (H) 03/13/2018    HGB 14.2 03/13/2018    HCT 44.2 05/02/2019     03/13/2018    CRP 12.0 (H) 03/24/2016    SED 19 03/24/2016     05/02/2019    POTASSIUM 4.0 05/02/2019    CHLORIDE 105 05/02/2019    CO2 26 05/02/2019    BUN 13 05/02/2019    CR 0.66 05/02/2019     (H) 05/02/2019    VERO 8.8 05/02/2019    PHOS 4.2 03/12/2010    MAG 2.2 07/13/2017    ALBUMIN 4.0 05/02/2019    PROTTOTAL 7.8 05/02/2019    ALT 43 05/02/2019    AST 26 05/02/2019    ALKPHOS 71 05/02/2019    BILITOTAL 0.4 05/02/2019    LIPASE 147 03/13/2018    AMYLASE 16 (L) 05/15/2017    INR 1.03 06/18/2010    TSH 1.84 05/17/2018    HCG Negative 05/30/2019    HCGS Negative 05/02/2019       Preop Vitals  BP Readings from Last 3 Encounters:   05/30/19 111/77   05/02/19 103/61   04/26/19 103/69    Pulse Readings from Last 3 Encounters:   05/30/19 81   04/26/19 80   04/22/19 75      Resp Readings from Last 3 Encounters:   05/30/19 16   05/02/19 16   05/17/18 20    SpO2 Readings from Last 3 Encounters:   05/30/19 97%   05/02/19 98%   05/17/18 (!) 20%      Temp Readings from Last 1 Encounters:   05/30/19 36.8  C (98.3  F) (Oral)    Ht Readings from Last 1 Encounters:   05/30/19 1.575 m (5' 2\")      Wt Readings from Last 1 Encounters:   05/30/19 80.7 kg (178 lb)    Estimated body mass index is 32.56 kg/m  as calculated from the following:    Height as of this encounter: 1.575 m (5' 2\").    Weight as of this encounter: 80.7 kg (178 lb).     LDA:  Peripheral IV 05/30/19 Right Hand (Active)   Site Assessment WDL 5/30/2019  9:37 AM   Line Status Infusing 5/30/2019  9:37 AM   Phlebitis Scale 0-->no symptoms 5/30/2019  9:37 AM   Number of days: 0            Assessment:   ASA SCORE: 2       Documentation: " H&P complete; Preop Testing complete; Consents complete   Proceeding: Proceed without further delay  Tobacco Use:  NO Active use of Tobacco/UNKNOWN Tobacco use status     Plan:   Anes. Type:  MAC   Pre-Induction: Midazolam IV   Induction:  Not applicable   Airway: Native Airway   Access/Monitoring: PIV   Maintenance: N/a   Emergence: N/a   Logistics: Same Day Surgery     Postop Pain/Sedation Strategy:  Standard-Options: Opioids PRN     PONV Management:  Adult Risk Factors: Female, Non-Smoker, Postop Opioids  Prevention: Ondansetron; Dexamethasone     CONSENT: Direct conversation;  in Room   Plan and risks discussed with: Patient                            Jonny Espana MD, MD

## 2019-05-30 NOTE — ANESTHESIA POSTPROCEDURE EVALUATION
Anesthesia POST Procedure Evaluation    Patient: Nayeli Caal   MRN:     [id number] Gender:   female   Age:    38 year old :      1980        Preoperative Diagnosis: Left Ring Trigger Finger   Procedure(s):  Left Ring Trigger Finger Release.  Ganglion cyst excision.   Postop Comments: No value filed.       Anesthesia Type:  MAC  No value filed.    Reportable Event: NO     PAIN: Uncomplicated   Sign Out status: Comfortable, Well controlled pain     PONV: No PONV   Sign Out status:  No Nausea or Vomiting     Neuro/Psych: Uneventful perioperative course   Sign Out Status: Preoperative baseline; Age appropriate mentation     Airway/Resp.: Uneventful perioperative course   Sign Out Status: Non labored breathing, age appropriate RR; Resp. Status within EXPECTED Parameters     CV: Uneventful perioperative course   Sign Out status: Appropriate BP and perfusion indices; Appropriate HR/Rhythm     Disposition:   Sign Out in:  PACU  Disposition:  Phase II; Home  Recovery Course: Uneventful  Follow-Up: Not required           Last Anesthesia Record Vitals:  CRNA VITALS  2019 1053 - 2019 1153      2019             Pulse:  94    SpO2:  100 %    Resp Rate (set):  10          Last PACU Vitals:  Vitals Value Taken Time   BP 96/69 2019 11:32 AM   Temp     Pulse     Resp 14 2019 11:32 AM   SpO2 98 % 2019 11:32 AM   Temp src Skin 2019 11:18 AM   NIBP 95/56 2019 11:19 AM   Pulse 94 2019 11:22 AM   SpO2 100 % 2019 11:22 AM   Resp     Temp 34.5  C (94.1  F) 2019 11:21 AM   Ht Rate 95 2019 11:20 AM   Temp 2           Electronically Signed By: Jonny Espana MD, MD, May 30, 2019, 12:56 PM

## 2019-05-30 NOTE — DISCHARGE INSTRUCTIONS
"Instructions for after your surgery    Keep the surgical dressing on and dry for 3 days. After 3 days, you may remove the surgical dressing and begin getting the wound wet in the shower and washing it gently with soap and water on a daily basis. Please keep wound covered until your follow-up visit. After the surgical dressing comes off, you may use a bandaid or a \"Coban\"-type dressing with gauze for this.  Avoid prolonged immersion of incision in water (such as tub baths, swimming, hot tubs, and dish washing without gloves) until skin is healed (about 3 weeks)     Keep your operative arm elevated as much as possible. This will help with pain and swelling.     Do not lift anything heavier than a coffee cup with your operative hand. You can use it for light activities like eating and brushing your teeth.    You will have a follow-up appointment scheduled with a nurse or  for stitch removal prior to your 6 week post-op appointment with Dr. Streeter. If you do not know when this appointment is, please call Dr. Streeter's office to find out.   Call Dr. Streeter's office if you experience any of the following:   Fevers, chills, increasing wound drainage, pain that is not controlled with the medications you have been prescribing, swelling that is not controlled with elevation, problems with your splint, or any other questions or concerns.         [facility]  Home Care Following Anesthesia  For 24 hours after surgery:  1. Get plenty of rest.  A responsible adult must stay with you for at least 24 hours after you leave the surgery center.  2. Do not drive or use heavy equipment.  If you have weakness or tingling, don't drive or use heavy equipment until this feeling goes away.   3. Do not drink alcohol.   4. Avoid strenuous or risky activities.  Ask for help when climbing stairs.  5. You may feel lightheaded.  IF so, sit for a few minutes before standing.  Have someone " "help you get up.   6. If you have nausea (feel sick to your stomach): Drink only clear liquids such as apple juice, ginger ale, broth or 7-Up.  Rest may also help.  Be sure to drink enough fluids.  Move to a regular diet as you feel able.   7. You may have a slight fever.  Call the doctor if your fever is over 100 F (37.7 C) (taken under the tongue) or lasts longer than 24 hours.  8. You may have a dry mouth, a sore throat, muscle aches or trouble sleeping. These should go away after 24 hours.  9. Do not make important or legal decisions.        Today you received a Marcaine or bupivacaine block to numb the nerves near your surgery site.  This is a block using local anesthetic or \"numbing\" medication injected around the nerves to anesthetize or \"numb\" the area supplied by those nerves.  This block is injected into the muscle layer near your surgical site.  The medication may numb the location where you had surgery for 6-18 hours, but may last up to 24 hours.  If your surgical site is an arm or leg you should be careful with your affected limb, since it is possible to injure your limb without being aware of it due to the numbing.  Until full feeling returns, you should guard against bumping or hitting your limb, and avoid extreme hot or cold temperatures on the skin.  As the block wears off, the feeling will return as a tingling or prickly sensation near your surgical site.  You will experience more discomfort from your incision as the feeling returns.  You may want to take a pain pill (a narcotic or Tylenol if this was prescribed by your surgeon) when you start to experience mild pain before the pain beccomes more severe.  If your pain medications do not control your pain you should notifiy your surgeon.    Tips for taking pain medications  To get the best pain relief possible, remember these points:    Take pain medications as directed, before pain becomes severe.    Pain medication can upset your stomach: taking " it with food may help.    Constipation is a common side effect of pain medication. Drink plenty of  fluids.    Eat foods high in fiber. Take a stool softener if recommended by your doctor or pharmacist.    Do not drink alcohol, drive or operate machinery while taking pain medications.    Ask about other ways to control pain, such as with heat, ice or relaxation.    Tylenol/Acetaminophen Consumption  To help encourage the safe use of acetaminophen, the makers of TYLENOL  have lowered the maximum daily dose for single-ingredient Extra Strength TYLENOL  (acetaminophen) products sold in the [place] from 8 pills per day (4,000 mg) to 6 pills per day (3,000 mg). The dosing interval has also changed from 2 pills every 4-6 hours to 2 pills every 6 hours.    If you feel your pain relief is insufficient, you may take Tylenol/Acetaminophen in addition to your narcotic pain medication.     Be careful not to exceed 3,000 mg of Tylenol/Acetaminophen in a 24 hour period from all sources.    If you are taking extra strength Tylenol/acetaminophen (500 mg), the maximum dose is 6 tablets in 24 hours.    If you are taking regular strength acetaminophen (325 mg), the maximum dose is 9 tablets in 24 hours.    Call a doctor for any of the followin. Signs of infection (fever, growing tenderness at the surgery site, a large amount of drainage or bleeding, severe pain, foul-smelling drainage, redness, swelling).  2. It has been over 8 to 10 hours since surgery and you are still not able to urinate (pass water).  3. Headache for over 24 hours.  4. Numbness, tingling or weakness the day after surgery (if you had spinal anesthesia).  Your doctor is:       Dr. Greg Streeter, Orthopaedics: [phone]               Or dial [phone] and ask for the resident on call for:  Orthopaedics  For emergency care, call the:  [facility] [place] Emergency Department: [phone] (TTY for hearing impaired: [phone])

## 2019-05-30 NOTE — ANESTHESIA CARE TRANSFER NOTE
Patient: Nayeli Caal    Procedure(s):  Left Ring Trigger Finger Release.  Ganglion cyst excision.    Diagnosis: Left Ring Trigger Finger  Diagnosis Additional Information: No value filed.    Anesthesia Type:   No value filed.     Note:  Airway :Room Air  Patient transferred to:Phase II  Handoff Report: Identifed the Patient, Identified the Reponsible Provider, Reviewed the pertinent medical history, Discussed the surgical course, Reviewed Intra-OP anesthesia mangement and issues during anesthesia, Set expectations for post-procedure period and Allowed opportunity for questions and acknowledgement of understanding      Vitals: (Last set prior to Anesthesia Care Transfer)    CRNA VITALS  5/30/2019 1053 - 5/30/2019 1132      5/30/2019             Pulse:  94    SpO2:  100 %    Resp Rate (set):  10                Electronically Signed By: SABI Childress CRNA  May 30, 2019  11:32 AM

## 2019-05-31 LAB — COPATH REPORT: NORMAL

## 2019-06-04 NOTE — H&P
No changes in sxs. Very happy with results from last surgery. Here for trigger on opposite hand.   NAD  A and o  Reg rate  Br comf.  Plan trigger erlase today

## 2019-06-13 ENCOUNTER — ALLIED HEALTH/NURSE VISIT (OUTPATIENT)
Dept: ORTHOPEDICS | Facility: CLINIC | Age: 39
End: 2019-06-13
Payer: COMMERCIAL

## 2019-06-13 DIAGNOSIS — M65.30 TRIGGER FINGER, ACQUIRED: Primary | ICD-10-CM

## 2019-06-13 NOTE — PROGRESS NOTES
Reason for visit:    Nayeli Caal came in to the clinic for a two week post op check.    Her surgery was done 5/30/19 by Dr Streeter.  She had left ring finger trigger release and ganglion cyst excision.     Assessment:    Nayeli came into the clinic with nothing covering her surgical wounds.     The Surgical wounds were exposed and found to be well-healed; so the sutures were removed.    Plan:     She was placed in a band aid.  She was told to keep the area clean and dry, not to lift any more than 5 lbs, not to soak or submerge her hand in water, and not to apply any lotions, ointments, or creams over the area.      She has an appointment to see Dr. Streeter at that time Dr. Streeter will determine further restrictions.    She has our phone number and will call with questions or problems.

## 2019-07-10 ENCOUNTER — OFFICE VISIT (OUTPATIENT)
Dept: ORTHOPEDICS | Facility: CLINIC | Age: 39
End: 2019-07-10
Payer: COMMERCIAL

## 2019-07-10 DIAGNOSIS — M65.30 TRIGGER FINGER, ACQUIRED: ICD-10-CM

## 2019-07-10 DIAGNOSIS — M65.311 TRIGGER FINGER OF RIGHT THUMB: Primary | ICD-10-CM

## 2019-07-10 NOTE — PROGRESS NOTES
[facility]  Orthopedics  Greg Streeter MD  07/10/2019     Name: Nayeli Caal  MRN: [id number]  Age: 39 year old  : 1980  Referring provider: Greg Streeter     Chief Complaint: RECHECK (DOS 19 right thumb trigger release, DOS 19 left ring finger trigger release)       Date of Surgery: 19    Procedure: left ring finger trigger  Release, retinacular ganglion cyst excision    History of Present Illness:   Nayeli Caal is a 39 year old female 2 weeks status-post left ring trigger release who presents for postoperative evaluation. Today, she reports that she is doing well overall with minimal pain. For the most part, her hands are back to normal function. Triggering has completely resolved and she is able to sign without problems. She does note that her left ring finger the PIP joint cannot extend fully, but this does not bother her. Denies numbness and tingling. She states that her diabetes is well-controlled. No other concerns.    In regards to the previously removed cyst, she does feel a bump over her left palm, but cannot locate a visible mass.     An [facility] interpretor was present throughout today's visit.     Physical Examination:   Well developed, well nourished, in no acute distress. Follow instructions appropriately. Alert and oriented to surroundings.   On examination of bilateral upper extremities:  Both incisions well-healed.  Able to make a full composite fist on the left without triggering.   Can extend the ring finger fully with exception of about a 5 degree flexion contracture of PIP.  Some scar hypertrophy present but no palpable ganglion     On the right upper extremity she has excellent ROM of the thumb.  She is able to flex and extend digit fully except for about 10 degrees less hyperextension at IP joint compared to contralateral side.       Assessment:   39 year old female s/p left ring trigger release on 19. Progressing appropriately. Some scar  formation, discussed this should improve with time, discussed scar massage      Plan:     I offered for the patient to proceed with hand therapy, but she declined noting that signing provides adequate exercise.     Discussed scar massage    She may follow-up prn.     Greg Streeter MD      Scribe Disclosure:  I, Jamir Jaya, am serving as a scribe to document services personally performed by Greg Streeter MD at this visit, based upon the provider's statements to me. All documentation has been reviewed by the aforementioned provider prior to being entered into the official medical record.

## 2019-07-10 NOTE — NURSING NOTE
Reason For Visit:   Chief Complaint   Patient presents with     RECHECK     DOS 5/2/19 right thumb trigger release, DOS 5/30/19 left ring finger trigger release       Primary MD: Mariya Moahmud    ?  No    Age: 39 year old      Date of surgery: 5/2/19, 5/30/19  Type of surgery: Right thumb trigger release, left ring finger trigger release.        There were no vitals taken for this visit.      Pain Assessment  Patient Currently in Pain: Denies               QuickDASH Assessment  No flowsheet data found.       No Known Allergies    Milagro Merrill, ATC

## 2019-07-10 NOTE — LETTER
7/10/2019       RE: Nayeli Caal  [address]  [place] [address]     Dear Colleague,    Thank you for referring your patient, Nayeli Caal, to the [facility] at [facility]. Please see a copy of my visit note below.    [facility]  Orthopedics  Greg Streeter MD  07/10/2019     Name: Nayeli Caal  MRN: [id number]  Age: 39 year old  : 1980  Referring provider: Greg Streeter     Chief Complaint: RECHECK (DOS 19 right thumb trigger release, DOS 19 left ring finger trigger release)       Date of Surgery: 19    Procedure: left ring finger trigger  Release, retinacular ganglion cyst excision    History of Present Illness:   Nayeli Caal is a 39 year old female 2 weeks status-post left ring trigger release who presents for postoperative evaluation. Today, she reports that she is doing well overall with minimal pain. For the most part, her hands are back to normal function. Triggering has completely resolved and she is able to sign without problems. She does note that her left ring finger the PIP joint cannot extend fully, but this does not bother her. Denies numbness and tingling. She states that her diabetes is well-controlled. No other concerns.    In regards to the previously removed cyst, she does feel a bump over her left palm, but cannot locate a visible mass.     An [facility] interpretor was present throughout today's visit.     Physical Examination:   Well developed, well nourished, in no acute distress. Follow instructions appropriately. Alert and oriented to surroundings.   On examination of bilateral upper extremities:  Both incisions well-healed.  Able to make a full composite fist on the left without triggering.   Can extend the ring finger fully with exception of about a 5 degree flexion contracture of PIP.  Some scar hypertrophy present but no palpable ganglion     On the right  upper extremity she has excellent ROM of the thumb.  She is able to flex and extend digit fully except for about 10 degrees less hyperextension at IP joint compared to contralateral side.       Assessment:   39 year old female s/p left ring trigger release on 5/30/19. Progressing appropriately. Some scar formation, discussed this should improve with time, discussed scar massage      Plan:     I offered for the patient to proceed with hand therapy, but she declined noting that signing provides adequate exercise.     Discussed scar massage    She may follow-up prn.     Greg Streeter MD      Scribe Disclosure:  I, Jamir Lake, am serving as a scribe to document services personally performed by Greg Streeter MD at this visit, based upon the provider's statements to me. All documentation has been reviewed by the aforementioned provider prior to being entered into the official medical record.

## 2019-07-15 ENCOUNTER — TELEPHONE (OUTPATIENT)
Dept: ENDOCRINOLOGY | Facility: CLINIC | Age: 39
End: 2019-07-15

## 2019-07-15 ENCOUNTER — OFFICE VISIT (OUTPATIENT)
Dept: ENDOCRINOLOGY | Facility: CLINIC | Age: 39
End: 2019-07-15
Payer: COMMERCIAL

## 2019-07-15 VITALS
BODY MASS INDEX: 31.83 KG/M2 | SYSTOLIC BLOOD PRESSURE: 126 MMHG | WEIGHT: 174 LBS | DIASTOLIC BLOOD PRESSURE: 82 MMHG | HEART RATE: 73 BPM

## 2019-07-15 DIAGNOSIS — Z79.4 UNCONTROLLED TYPE 2 DIABETES MELLITUS WITH HYPERGLYCEMIA, WITH LONG-TERM CURRENT USE OF INSULIN (H): Primary | ICD-10-CM

## 2019-07-15 DIAGNOSIS — E11.65 UNCONTROLLED TYPE 2 DIABETES MELLITUS WITH HYPERGLYCEMIA, WITH LONG-TERM CURRENT USE OF INSULIN (H): Primary | ICD-10-CM

## 2019-07-15 DIAGNOSIS — E78.2 MIXED HYPERLIPIDEMIA: ICD-10-CM

## 2019-07-15 DIAGNOSIS — I10 BENIGN ESSENTIAL HYPERTENSION: ICD-10-CM

## 2019-07-15 RX ORDER — FLASH GLUCOSE SENSOR
KIT MISCELLANEOUS
Qty: 6 EACH | Refills: 3 | Status: SHIPPED | OUTPATIENT
Start: 2019-07-15 | End: 2019-10-07

## 2019-07-15 ASSESSMENT — PAIN SCALES - GENERAL: PAINLEVEL: NO PAIN (0)

## 2019-07-15 NOTE — LETTER
7/15/2019       RE: Nayeli Caal  [address]  [place] [facility] [address]     Dear Colleague,    Thank you for referring your patient, Nayeli Caal, to the [facility] ENDOCRINOLOGY at [facility]. Please see a copy of my visit note below.      Pt seen with the assistance of an in person .     Hemoglobin A1c was 8.2% on 5/2/19.     The trigger finger surgery went well.   Recently, she has not been wearing the mian sensor as it continues to fall off and, at most, she has been wearing it for 6 or 7 days.  She has been trying the Skin Tac wipes but they are fairly expensive.  She reports being compliant in taking Lipitor, fenofibrate, Invokana and Trulicity.     She continues to use the Accu-Chek expert to help her determine the NovoLog dose.   Meter settings:   Insulin to Carb Ratio: 8  Correction Factor: 35  BG Target: 100-150  Offset time 2 hrs   Acting time 3:30 hrs     At her last visit here, with Anne Marie Medina, the dose of Basaglar was increased to 45 units in the morning.  The glucometer readings revealed that she checks her blood glucose sporadically, once a week or so.  At the time she checks her blood sugar, she also administers NovoLog insulin using the meter settings, within average dose of insulin of 4 to 9 units per meal.  On questioning, she admits she continues to struggle in remembering to take NovoLog prior to meals.  She has not been able to tolerate metformin, as even 1 tablet causes same significant diarrhea.  On the glucometer, the average blood glucose is 188, with a standard deviation of 67 and a range variable between 82 and 250.    Diabetes complications:  Retinopathy: last eye exam - 2018; scheduled for 8/2019. 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  Nephropathy: h/o proteinuria; normal GFR. Now on 50mg losartan daily (tx with lisinopril was associated with a dry cough).   Neuropathy: No N/T in feet or hands. Just the trigger  finger that bothers her.     Current Outpatient Medications   Medication     Alcohol Swabs (ALCOHOL PREP PAD) 70 % PADS     aspirin 81 MG chewable tablet     atorvastatin (LIPITOR) 40 MG tablet     B-D U/F insulin pen needle     BD ULTRA FINE PEN NEEDLES     blood glucose (ACCU-CHEK LAYA PLUS) test strip     blood glucose monitoring (ACCU-CHEK FASTCLIX) lancets     canagliflozin (INVOKANA) 100 MG tablet     cyclobenzaprine (FLEXERIL) 5 MG tablet     dulaglutide (TRULICITY) 1.5 MG/0.5ML pen     ergocalciferol (ERGOCALCIFEROL) 11853 units capsule     fenofibrate (TRIGLIDE/LOFIBRA) 160 MG tablet     HYDROcodone-acetaminophen (NORCO) 5-325 MG tablet     ibuprofen (ADVIL/MOTRIN) 600 MG tablet     insulin aspart (NOVOLOG PEN) 100 UNIT/ML pen     insulin glargine (BASAGLAR KWIKPEN) 100 UNIT/ML pen     losartan (COZAAR) 50 MG tablet     naproxen (NAPROSYN) 500 MG tablet     omega 3 1000 MG CAPS     Continuous Blood Gluc Sensor (FREESTYLE ZORAIDA 14 DAY SENSOR) MISC     levonorgestrel (MIRENA) 20 MCG/24HR IUD     Ostomy Supplies (ADHESIVE REMOVER WIPES) MISC     Ostomy Supplies (SKIN TAC ADHESIVE BARRIER WIPE) MISC     TRULICITY 0.75 MG/0.5ML pen     Wound Dressing Adhesive (MASTISOL ADHESIVE) LIQD     Current Facility-Administered Medications   Medication     hylan (SYNVISC ONE) injection 48 mg     hylan (SYNVISC ONE) injection 48 mg     Past Medical History:   Diagnosis Date     Combined visual and hearing impairment      Deaf      Diabetic retinopathy of both eyes (H) 8/19/2011     Hepatic steatosis 08/19/2011     History of tobacco use      Hyperlipidemia      Hypertension      Hypertriglyceridemia      Migraines      Obesity 8/19/2011     Problem list name updated by automated process. 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 She denies  smoking, drinking alcohol or using illicit drugs. Continues working- currently doing secretarial work.     ROS:  Negative except as noted above.     /82   Pulse 73   Wt 78.9 kg (174 lb)   BMI 31.83 kg/m     Gen: No acute distress, comfortable appearing   HEENT: No noted icterus, MMM  CV: Regular rate and rhythm, no murmurs.   Pulm: Breathing comfortably on room air.  No rales, no wheezes  Ext: No LE edema, no bony deficits noted    Integumentary: No rashes or other lesions identified   Neuro: No focal deficits noted, muscle bulk appears intact, no tremor, normal tone  Psych: Mood and affect congruent     Labs:  Reviewed.  Lab Results   Component Value Date    A1C 8.2 (H) 05/02/2019    A1C 10.3 (H) 10/15/2018    A1C 13.4 (H) 11/27/2017    A1C 11.0 (H) 07/13/2017    A1C 10.9 (H) 05/15/2017    HEMOGLOBINA1 8.1 (A) 04/22/2019    HEMOGLOBINA1 10.4 (A) 01/14/2019    HEMOGLOBINA1 8.7 (A) 03/12/2018    HEMOGLOBINA1 12.0 (A) 02/03/2014    HEMOGLOBINA1 10.3 (A) 10/28/2013       Hemoglobin   Date Value Ref Range Status   03/13/2018 14.2 11.7 - 15.7 g/dL Final     Hematocrit   Date Value Ref Range Status   05/02/2019 44.2 35.0 - 47.0 % Final     Cholesterol   Date Value Ref Range Status   05/02/2019 266 (H) <200 mg/dL Final     Comment:     Desirable:       <200 mg/dl     Cholesterol/HDL Ratio   Date Value Ref Range Status   09/16/2013 5.8 (H) 0.0 - 5.0 Final     HDL Cholesterol   Date Value Ref Range Status   05/02/2019 40 (L) >49 mg/dL Final     LDL Cholesterol Calculated   Date Value Ref Range Status   05/02/2019 183 (H) <100 mg/dL Final     Comment:     Above desirable:  100-129 mg/dl  Borderline High:  130-159 mg/dL  High:             160-189 mg/dL  Very high:       >189 mg/dl       VLDL-Cholesterol   Date Value Ref Range Status   09/16/2013 44 (H) 0 - 30 mg/dL Final     Triglycerides   Date Value Ref Range Status   05/02/2019 219 (H) <150 mg/dL Final     Comment:     Borderline high:  150-199 mg/dl  High:              200-499 mg/dl  Very high:       >499 mg/dl       Albumin Urine mg/L   Date Value Ref Range Status   05/02/2019 196 mg/L Final     TSH   Date Value Ref Range Status   05/17/2018 1.84 0.40 - 4.00 mU/L Final         Last Basic Metabolic Panel:    Sodium   Date Value Ref Range Status   05/02/2019 138 133 - 144 mmol/L Final     Potassium   Date Value Ref Range Status   05/02/2019 4.0 3.4 - 5.3 mmol/L Final     Chloride   Date Value Ref Range Status   05/02/2019 105 94 - 109 mmol/L Final     Calcium   Date Value Ref Range Status   05/02/2019 8.8 8.5 - 10.1 mg/dL Final     Carbon Dioxide   Date Value Ref Range Status   05/02/2019 26 20 - 32 mmol/L Final     Urea Nitrogen   Date Value Ref Range Status   05/02/2019 13 7 - 30 mg/dL Final     Creatinine   Date Value Ref Range Status   05/02/2019 0.66 0.52 - 1.04 mg/dL Final     GFR Estimate   Date Value Ref Range Status   05/02/2019 >90 >60 mL/min/[1.73_m2] Final     Comment:     Non  GFR Calc  Starting 12/18/2018, serum creatinine based estimated GFR (eGFR) will be   calculated using the Chronic Kidney Disease Epidemiology Collaboration   (CKD-EPI) equation.       Glucose   Date Value Ref Range Status   05/02/2019 138 (H) 70 - 99 mg/dL Final       AST   Date Value Ref Range Status   05/02/2019 26 0 - 45 U/L Final     ALT   Date Value Ref Range Status   05/02/2019 43 0 - 50 U/L Final     Albumin Urine mg/g Cr   Date Value Ref Range Status   05/02/2019 87.89 (H) 0 - 25 mg/g Cr Final     A/P:    Diabetes, type 2 vs. PRAVIN, complicated by diabetic nephropathy and retinopathy, uncontrolled, mainly due to noncompliance with the prescribed regimen  Recommendations:  Continue Trulicity  Increase the dose of Invokana to 300 mg daily  Administer NovoLog for all meals and snacks, according to the carbohydrate intake.  Reminded the patient about using phone reminders, in order to take insulin prior to meals.  Use Simpatch adhesive bandages which can be applied over the  sensor, and try to use the sensor consistently.  If the morning blood sugar is lowish, she was instructed to decrease the dose of Basaglar to 40 units    Proteinuria/HTN  Blood pressure controlled.   -continue losartan 50mg per day   -Follow-up urine microalbumin at her next appointment    Dyslipidemia   The LDL cholesterol was higher on recent lab.   - F/up lipid panel. If LDL consistently elevated, consider increasing Lipitor dose.     Orders Placed This Encounter   Procedures     Albumin Random Urine Quantitative with Creat Ratio     Lipid panel reflex to direct LDL Fasting           Again, thank you for allowing me to participate in the care of your patient.      Sincerely,    Jimena Bragg MD

## 2019-07-15 NOTE — TELEPHONE ENCOUNTER
[facility] Prior Authorization Team Request    Medication: Invokana 300 mg Tablets  Dosing: Take one tablet by mouth every morning before breakfast  Qty: 90  Day Supply: 90  NDC (required for Medicaid members): [address]-59     Insurance   BIN: [id number]  PCN: [id number]  Grp: [id number]  ID: [id number]    CoverMyMeds Key (if applicable):     Additional documentation:       Filling Pharmacy: [facility]  Phone Number: [phone]  Contact:    Pharmacy NPI (required for Medicaid members): [id number]

## 2019-07-15 NOTE — PATIENT INSTRUCTIONS
SIMPATCH - Evelia, Enlite, Guardian Adhesive Patch (25-Pack) - Waterproof Adhesive, CGM Tape - Multiple Color Options    You might have to decrease the dose of Basaglar to 40 U if the morning BG is lowish   Try to use the Accu Check meter and administer Novolog for all meals and snacks, based on the carbohydrate intake

## 2019-07-19 NOTE — TELEPHONE ENCOUNTER
Central Prior Authorization Team   Phone: [phone]      PA Initiation    Medication: Invokana 300 mg Tablets-PA initiated  Insurance Company: [employer] - Phone [phone] Fax [phone]  Pharmacy Filling the Rx: [facility] [place], [place] - [address] [place] [address] [phone]  Filling Pharmacy Phone: [phone]  Filling Pharmacy Fax:    Start Date: 7/19/2019

## 2019-07-24 NOTE — TELEPHONE ENCOUNTER
I left a message for Nayeli that Jardiance  was sent to the [address] [place] [address] pharmacy to replace the Invokana that was not covered by insurance  Although I see now that  A PA for invokanna was not needed but it may have been at a high giraldo to fill. Olga Lidia Kirk, RN on 7/24/2019 at 3:46 PM  .

## 2019-07-24 NOTE — TELEPHONE ENCOUNTER
Prior Authorization Not Needed per physician. I spoke to Thelma at [facility] and withdrew the PA request.        Medication: Invokana 300 mg Tablets-PA Not Needed  Insurance Company: [employer] - Phone [phone] Fax [phone]  Expected CoPay:      Pharmacy Filling the Rx: [facility] [place], [place] - [id number]  Pharmacy Notified: Yes-Pharmacy has profiled old Rx  Patient Notified: No

## 2019-07-30 ENCOUNTER — ANCILLARY PROCEDURE (OUTPATIENT)
Dept: GENERAL RADIOLOGY | Facility: CLINIC | Age: 39
End: 2019-07-30
Attending: FAMILY MEDICINE
Payer: COMMERCIAL

## 2019-07-30 ENCOUNTER — OFFICE VISIT (OUTPATIENT)
Dept: ORTHOPEDICS | Facility: CLINIC | Age: 39
End: 2019-07-30
Payer: COMMERCIAL

## 2019-07-30 VITALS
SYSTOLIC BLOOD PRESSURE: 126 MMHG | DIASTOLIC BLOOD PRESSURE: 82 MMHG | WEIGHT: 174 LBS | HEIGHT: 62 IN | HEART RATE: 73 BPM | BODY MASS INDEX: 32.02 KG/M2

## 2019-07-30 DIAGNOSIS — M79.674 GREAT TOE PAIN, RIGHT: ICD-10-CM

## 2019-07-30 DIAGNOSIS — M79.674 GREAT TOE PAIN, RIGHT: Primary | ICD-10-CM

## 2019-07-30 ASSESSMENT — MIFFLIN-ST. JEOR: SCORE: 1417.51

## 2019-07-30 NOTE — LETTER
7/30/2019       RE: Nayeli Caal  [address]  [facility] [address]     Dear Colleague,    Thank you for referring your patient, Nayeli Caal, to the [facility] SPORTS AND ORTHOPAEDIC WALK IN CLINIC at [facility]. Please see a copy of my visit note below.    CHIEF COMPLAINT:  No chief complaint on file.     HISTORY OF PRESENT ILLNESS  Ms. Ingrid Caal is a pleasant 39 year old year old female who presents to clinic today with pain of right great toe.  Nayeli explains that she tripped on the stairs yesterday, jamming her right great toe.     She has a history of similar toe pain/injury 2 years ago.  Did not seek care for this at that time.  Pain is worse today and throbs, unable to ambulate d/t pain.    Onset: sudden  Location: right great toe  Quality:  aching and sharp  Duration: 1 days   Severity: moderate at worst  Timing:constant  Modifying factors:  resting and non-use makes it better, movement and use makes it worse  Associated signs & symptoms: pain, tenderness and swelling  Previous similar pain: Yes  Treatments to date: Ice, tylenol    Additional history: as documented    MEDICAL HISTORY  Patient Active Problem List   Diagnosis     Essential hypertension     Hypersomnia, organic     Other chronic nonalcoholic liver disease     Diabetic retinopathy of both eyes (H)     Obesity     Usher Syndrome: congenital deafness, retinitis pigmentosa     Macular degeneration, age related, nonexudative     Benign neoplasm of iris     Dry eye syndrome     Pemphigus erythematosus     Chondromalacia of patella, right, steroid injection 6/12/2015     Uncontrolled type 2 diabetes mellitus with hyperglycemia, with long-term current use of insulin (H)       Current Outpatient Medications   Medication Sig Dispense Refill     Alcohol Swabs (ALCOHOL PREP PAD) 70 % PADS 1 each 3 times daily 100 each 3     aspirin 81 MG chewable tablet CHEW AND SWALLOW ONE TABLET  BY MOUTH EVERY DAY 90 tablet 3     atorvastatin (LIPITOR) 40 MG tablet Take 1 tablet (40 mg) by mouth daily 90 tablet 3     B-D U/F insulin pen needle        BD ULTRA FINE PEN NEEDLES Inject 1 dose. Subcutaneous 2 times daily BD ultra-fine insulin syringe, 30 ga. X 1/2'' short needle 1/2 cc. Use as directed. 400 Units 11     blood glucose (ACCU-CHEK LAYA PLUS) test strip Use with  Accucheck Expert meter.  Test blood sugar 6 times daily. 600 each 3     blood glucose monitoring (ACCU-CHEK FASTCLIX) lancets Use to test blood sugar 6 times daily or as directed 6 Box 3     Continuous Blood Gluc Sensor (FREESTYLE ZORAIDA 14 DAY SENSOR) [facility] 1 each every 14 days (Patient not taking: Reported on 7/15/2019) 2 each 11     continuous blood glucose monitoring (FREESTYLE ZORAIDA) sensor For use with Freestyle Zoraida Flash  for continuous monitioring of blood glucose levels. Replace sensor every 10 days. 6 each 3     cyclobenzaprine (FLEXERIL) 5 MG tablet Take 1-2 tablets (5-10 mg) by mouth 3 times daily as needed for muscle spasms 60 tablet 1     dulaglutide (TRULICITY) 1.5 MG/0.5ML pen Inject 1.5 mg Subcutaneous every 7 days 6 mL 3     empagliflozin (JARDIANCE) 25 MG TABS tablet Take 1 tablet (25 mg) by mouth daily 90 tablet 3     ergocalciferol (ERGOCALCIFEROL) 11584 units capsule Take 1 capsule (50,000 Units) by mouth once a week 14 capsule 3     fenofibrate (TRIGLIDE/LOFIBRA) 160 MG tablet Take 1 tablet (160 mg) by mouth daily 90 tablet 1     HYDROcodone-acetaminophen (NORCO) 5-325 MG tablet Take 1 tablet by mouth every 6 hours as needed for severe pain 10 tablet 0     ibuprofen (ADVIL/MOTRIN) 600 MG tablet Take 1 tablet (600 mg) by mouth every 6 hours as needed for moderate pain 120 tablet 1     insulin aspart (NOVOLOG PEN) 100 UNIT/ML pen Administer at 1 U per 8 grams carbohydrates. Total daily dose around 45 U. 40 mL 3     insulin glargine (BASAGLAR KWIKPEN) 100 UNIT/ML pen Inject 48 Units Subcutaneous daily Please  provide 3 months supply 12 mL 3     levonorgestrel (MIRENA) 20 MCG/24HR IUD 1 each (20 mcg) by Intrauterine route once for 1 dose 1 each 0     losartan (COZAAR) 50 MG tablet Take 1 tablet (50 mg) by mouth daily 90 tablet 3     naproxen (NAPROSYN) 500 MG tablet Take 1 tablet (500 mg) by mouth 2 times daily as needed for moderate pain 30 tablet 1     omega 3 1000 MG CAPS Take 2 g by mouth daily 180 capsule 3     Ostomy Supplies (ADHESIVE REMOVER WIPES) MISC 1 each every 14 days 50 each 3     Ostomy Supplies (SKIN TAC ADHESIVE BARRIER WIPE) MISC 1 each every 14 days 6 each 3     TRULICITY 0.75 MG/0.5ML pen        Wound Dressing Adhesive (MASTISOL ADHESIVE) LIQD Use with Evelia sensor twice monthly. 48 mL 3       No Known Allergies    Family History   Problem Relation Age of Onset     Unknown/Adopted Other        Additional medical/Social/Surgical histories [facility] and updated as appropriate.     REVIEW OF SYSTEMS (7/30/2019)  A 10-point review of systems was obtained and is negative except for as noted in the HPI.      PHYSICAL EXAM  There were no vitals taken for this visit.    General  - normal appearance, in no obvious distress  CV  - normal radial pulse  Pulm  - normal respiratory pattern, non-labored  Musculoskeletal -right great toe  - inspection: Swelling at 1st IP joint and great toe.  Ecchymosis of IP joint dorsally.  - palpation: TTP dorsally, medial and lateral aspect of base of distal phalanx.  TTP proximal phalanx.  - ROM:  MTP Painful active and passively   IP Painful passively and actively, limited  - strength: 5/5 toe flexion, 5/5 toe extension, 5/5 dorsiflexion, 5/5 plantar flexion  - special tests:  (-) varus at IP joints  (-) valgus at IP joints  Neuro  - no numbness, no motor deficit, grossly normal coordination, normal muscle tone  Skin  - no ecchymosis, erythema, warmth, or induration, no obvious rash  Psych  - interactive, appropriate, normal mood and affect    IMAGING : XR right foot.  Final results and radiologist's interpretation, available in the [facility] health record. Images were reviewed with the patient/family members in the office today. My personal interpretation of the performed imaging is irregular articular surface at base of distal phalanx, consistent with toe injury 2 years ago.  Additional fracture seen, comminuted at distal phalanx.     ASSESSMENT & PLAN  Ms. Ingrid Caal is a 39 year old year old female who presents to clinic today with history of great toe injury 2 years ago presenting with acute pain since tripping on stairs last night.  Comminuted distal phalanx fracture of right great toe.    Diagnosis:   Comminuted fracture of distal phalanx of right great toe    -Ice, elevation  -Tylenol and Ibuprofen use  -Post-operative shoe and Crutches  -May WBAT once pain subsides  -Follow up 2 weeks    It was a pleasure seeing Nayeli today.    Rosas Rodriguez DO, [facility]  Primary Care Sports Medicine            SPORTS & ORTHOPEDIC WALK-IN VISIT 7/30/2019    Primary Care Physician: Dr. Mohamud    Tripped on the stairs yesterday and hit her toe.  Was able to use ice and tylenol to help with pain.    Reason for visit:     What part of your body is injured / painful?  right great toe    What caused the injury /pain? Fall    How long ago did your injury occur or pain begin? Yesterday 7/30/19    What are your most bothersome symptoms? Pain and Swelling, tingling    How would you characterize your symptom?  aching, dull and throbing    What makes your symptoms better? Rest, Ice and Tylenol    What makes your symptoms worse? Standing, Walking and Movement    Have you been previously seen for this problem? No    Medical History:    Any recent changes to your medical history? No    Any new medication prescribed since last visit? No    Have you had surgery on this body part before? No    Social History:    Occupation: disabled    Handedness: Left    Exercise: None    Review of Systems:    Do you have  fever, chills, weight loss? No    Do you have any vision problems? No    Do you have any chest pain or edema? No    Do you have any shortness of breath or wheezing?  No    Do you have stomach problems? No    Do you have any numbness or focal weakness? No    Do you have diabetes? Type 2    Do you have problems with bleeding or clotting? No    Do you have an rashes or other skin lesions? No

## 2019-07-30 NOTE — PROGRESS NOTES
SPORTS & ORTHOPEDIC WALK-IN VISIT 7/30/2019    Primary Care Physician: Dr. Mohamud    Tripped on the stairs yesterday and hit her toe.  Was able to use ice and tylenol to help with pain.    Reason for visit:     What part of your body is injured / painful?  right great toe    What caused the injury /pain? Fall    How long ago did your injury occur or pain begin? Yesterday 7/30/19    What are your most bothersome symptoms? Pain and Swelling, tingling    How would you characterize your symptom?  aching, dull and throbing    What makes your symptoms better? Rest, Ice and Tylenol    What makes your symptoms worse? Standing, Walking and Movement    Have you been previously seen for this problem? No    Medical History:    Any recent changes to your medical history? No    Any new medication prescribed since last visit? No    Have you had surgery on this body part before? No    Social History:    Occupation: disabled    Handedness: Left    Exercise: None    Review of Systems:    Do you have fever, chills, weight loss? No    Do you have any vision problems? No    Do you have any chest pain or edema? No    Do you have any shortness of breath or wheezing?  No    Do you have stomach problems? No    Do you have any numbness or focal weakness? No    Do you have diabetes? Type 2    Do you have problems with bleeding or clotting? No    Do you have an rashes or other skin lesions? No

## 2019-07-30 NOTE — PROGRESS NOTES
CHIEF COMPLAINT:  No chief complaint on file.       HISTORY OF PRESENT ILLNESS  Ms. Ingrid Caal is a pleasant 39 year old year old female who presents to clinic today with pain of right great toe.  Nayeli explains that she tripped on the stairs yesterday, jamming her right great toe.     She has a history of similar toe pain/injury 2 years ago.  Did not seek care for this at that time.  Pain is worse today and throbs, unable to ambulate d/t pain.    Onset: sudden  Location: right great toe  Quality:  aching and sharp  Duration: 1 days   Severity: moderate at worst  Timing:constant  Modifying factors:  resting and non-use makes it better, movement and use makes it worse  Associated signs & symptoms: pain, tenderness and swelling  Previous similar pain: Yes  Treatments to date: Ice, tylenol    Additional history: as documented    MEDICAL HISTORY  Patient Active Problem List   Diagnosis     Essential hypertension     Hypersomnia, organic     Other chronic nonalcoholic liver disease     Diabetic retinopathy of both eyes (H)     Obesity     Usher Syndrome: congenital deafness, retinitis pigmentosa     Macular degeneration, age related, nonexudative     Benign neoplasm of iris     Dry eye syndrome     Pemphigus erythematosus     Chondromalacia of patella, right, steroid injection 6/12/2015     Uncontrolled type 2 diabetes mellitus with hyperglycemia, with long-term current use of insulin (H)       Current Outpatient Medications   Medication Sig Dispense Refill     Alcohol Swabs (ALCOHOL PREP PAD) 70 % PADS 1 each 3 times daily 100 each 3     aspirin 81 MG chewable tablet CHEW AND SWALLOW ONE TABLET BY MOUTH EVERY DAY 90 tablet 3     atorvastatin (LIPITOR) 40 MG tablet Take 1 tablet (40 mg) by mouth daily 90 tablet 3     B-D U/F insulin pen needle        BD ULTRA FINE PEN NEEDLES Inject 1 dose. Subcutaneous 2 times daily BD ultra-fine insulin syringe, 30 ga. X 1/2'' short needle 1/2 cc. Use as directed. 400 Units 11      blood glucose (ACCU-CHEK LAYA PLUS) test strip Use with  Accucheck Expert meter.  Test blood sugar 6 times daily. 600 each 3     blood glucose monitoring (ACCU-CHEK FASTCLIX) lancets Use to test blood sugar 6 times daily or as directed 6 Box 3     Continuous Blood Gluc Sensor (FREESTYLE ZORAIDA 14 DAY SENSOR) [facility] 1 each every 14 days (Patient not taking: Reported on 7/15/2019) 2 each 11     continuous blood glucose monitoring (FREESTYLE ZORAIDA) sensor For use with Freestyle Zoraida Flash  for continuous monitioring of blood glucose levels. Replace sensor every 10 days. 6 each 3     cyclobenzaprine (FLEXERIL) 5 MG tablet Take 1-2 tablets (5-10 mg) by mouth 3 times daily as needed for muscle spasms 60 tablet 1     dulaglutide (TRULICITY) 1.5 MG/0.5ML pen Inject 1.5 mg Subcutaneous every 7 days 6 mL 3     empagliflozin (JARDIANCE) 25 MG TABS tablet Take 1 tablet (25 mg) by mouth daily 90 tablet 3     ergocalciferol (ERGOCALCIFEROL) 65806 units capsule Take 1 capsule (50,000 Units) by mouth once a week 14 capsule 3     fenofibrate (TRIGLIDE/LOFIBRA) 160 MG tablet Take 1 tablet (160 mg) by mouth daily 90 tablet 1     HYDROcodone-acetaminophen (NORCO) 5-325 MG tablet Take 1 tablet by mouth every 6 hours as needed for severe pain 10 tablet 0     ibuprofen (ADVIL/MOTRIN) 600 MG tablet Take 1 tablet (600 mg) by mouth every 6 hours as needed for moderate pain 120 tablet 1     insulin aspart (NOVOLOG PEN) 100 UNIT/ML pen Administer at 1 U per 8 grams carbohydrates. Total daily dose around 45 U. 40 mL 3     insulin glargine (BASAGLAR KWIKPEN) 100 UNIT/ML pen Inject 48 Units Subcutaneous daily Please provide 3 months supply 12 mL 3     levonorgestrel (MIRENA) 20 MCG/24HR IUD 1 each (20 mcg) by Intrauterine route once for 1 dose 1 each 0     losartan (COZAAR) 50 MG tablet Take 1 tablet (50 mg) by mouth daily 90 tablet 3     naproxen (NAPROSYN) 500 MG tablet Take 1 tablet (500 mg) by mouth 2 times daily as needed for moderate  pain 30 tablet 1     omega 3 1000 MG CAPS Take 2 g by mouth daily 180 capsule 3     Ostomy Supplies (ADHESIVE REMOVER WIPES) MISC 1 each every 14 days 50 each 3     Ostomy Supplies (SKIN TAC ADHESIVE BARRIER WIPE) MISC 1 each every 14 days 6 each 3     TRULICITY 0.75 MG/0.5ML pen        Wound Dressing Adhesive (MASTISOL ADHESIVE) LIQD Use with Evelia sensor twice monthly. 48 mL 3       No Known Allergies    Family History   Problem Relation Age of Onset     Unknown/Adopted Other        Additional medical/Social/Surgical histories reviewed in [facility] and updated as appropriate.     REVIEW OF SYSTEMS (7/30/2019)  A 10-point review of systems was obtained and is negative except for as noted in the HPI.      PHYSICAL EXAM  There were no vitals taken for this visit.    General  - normal appearance, in no obvious distress  CV  - normal radial pulse  Pulm  - normal respiratory pattern, non-labored  Musculoskeletal -right great toe  - inspection: Swelling at 1st IP joint and great toe.  Ecchymosis of IP joint dorsally.  - palpation: TTP dorsally, medial and lateral aspect of base of distal phalanx.  TTP proximal phalanx.  - ROM:  MTP Painful active and passively   IP Painful passively and actively, limited  - strength: 5/5 toe flexion, 5/5 toe extension, 5/5 dorsiflexion, 5/5 plantar flexion  - special tests:  (-) varus at IP joints  (-) valgus at IP joints  Neuro  - no numbness, no motor deficit, grossly normal coordination, normal muscle tone  Skin  - no ecchymosis, erythema, warmth, or induration, no obvious rash  Psych  - interactive, appropriate, normal mood and affect    IMAGING : XR right foot. Final results and radiologist's interpretation, available in the [facility] health record. Images were reviewed with the patient/family members in the office today. My personal interpretation of the performed imaging is irregular articular surface at base of distal phalanx, consistent with toe injury 2 years ago.  Additional fracture  seen, comminuted at distal phalanx.     ASSESSMENT & PLAN  Ms. Ingrid Caal is a 39 year old year old female who presents to clinic today with history of great toe injury 2 years ago presenting with acute pain since tripping on stairs last night.  Comminuted distal phalanx fracture of right great toe.    Diagnosis:   Comminuted fracture of distal phalanx of right great toe    -Ice, elevation  -Tylenol and Ibuprofen use  -Post-operative shoe and Crutches  -May WBAT once pain subsides  -Follow up 2 weeks    It was a pleasure seeing Nayeli today.    Rosas Rodriguez DO, CAQSM  Primary Care Sports Medicine

## 2019-07-30 NOTE — LETTER
[facility] SPORTS AND ORTHOPAEDIC WALK IN CLINIC  [address]  [address]  [facility] [place] [id number] [phone]          July 30, 2019    RE:  Nayeli Caal                                                                                                                                                       [address]   [place] [facility] [address]            To whom it may concern:    Nayeli Caal is under my professional care for a right foot injury. She should be excused from work for today, 7/30/19. She will be able to return to work tomorrow 7/31/19 without restrictions, although she may require the use of crutches. Please contact our clinic with questions or concerns.       Sincerely,        Rosas Rodriguez DO

## 2019-08-16 DIAGNOSIS — E10.8 TYPE 1 DIABETES MELLITUS WITH COMPLICATIONS (H): ICD-10-CM

## 2019-08-18 RX ORDER — ATORVASTATIN CALCIUM 40 MG/1
40 TABLET, FILM COATED ORAL DAILY
Qty: 90 TABLET | Refills: 2 | Status: SHIPPED | OUTPATIENT
Start: 2019-08-18 | End: 2020-10-29

## 2019-08-21 ENCOUNTER — OFFICE VISIT (OUTPATIENT)
Dept: ORTHOPEDICS | Facility: CLINIC | Age: 39
End: 2019-08-21
Payer: COMMERCIAL

## 2019-08-21 ENCOUNTER — ANCILLARY PROCEDURE (OUTPATIENT)
Dept: GENERAL RADIOLOGY | Facility: CLINIC | Age: 39
End: 2019-08-21
Attending: FAMILY MEDICINE
Payer: COMMERCIAL

## 2019-08-21 VITALS
SYSTOLIC BLOOD PRESSURE: 126 MMHG | HEART RATE: 73 BPM | HEIGHT: 62 IN | DIASTOLIC BLOOD PRESSURE: 82 MMHG | BODY MASS INDEX: 32.02 KG/M2 | WEIGHT: 174 LBS

## 2019-08-21 DIAGNOSIS — S92.421D CLOSED DISPLACED FRACTURE OF DISTAL PHALANX OF RIGHT GREAT TOE WITH ROUTINE HEALING, SUBSEQUENT ENCOUNTER: Primary | ICD-10-CM

## 2019-08-21 DIAGNOSIS — M79.674 GREAT TOE PAIN, RIGHT: Primary | ICD-10-CM

## 2019-08-21 DIAGNOSIS — M79.674 GREAT TOE PAIN, RIGHT: ICD-10-CM

## 2019-08-21 ASSESSMENT — MIFFLIN-ST. JEOR: SCORE: 1417.51

## 2019-08-21 NOTE — LETTER
8/21/2019       RE: Nayeli Caal  [address]  [facility] [facility] [address]     Dear Colleague,    Thank you for referring your patient, Nayeli Caal, to the [facility] SPORTS AND ORTHOPAEDIC WALK IN CLINIC at [facility]. Please see a copy of my visit note below.          SPORTS & ORTHOPEDIC WALK-IN FOLLOW-UP VISIT 8/21/2019    Interval History:     Follow up reason: 2 wk f/u    Date of injury: 7/30/19    Date last seen: 7/30/19    Following Therapeutic Plan: Yes     Pain: Improving    Function: Improving      Medical History:    Any recent changes to your medical history? No    Any new medication prescribed since last visit? No    Review of Systems:    Do you have fever, chills, weight loss? No    Do you have any vision problems? No    Do you have any chest pain or edema? No    Do you have any shortness of breath or wheezing?  No    Do you have stomach problems? No    Do you have any numbness or focal weakness? No    Do you have diabetes? Type 2    Do you have problems with bleeding or clotting? No    Do you have an rashes or other skin lesions? No       ESTABLISHED PATIENT FOLLOW-UP:  Follow Up of the Right Foot     HISTORY OF PRESENT ILLNESS  Ms. Ingrid Caal is a pleasant 39 year old year old female who presents to clinic today for follow-up of right foot pain and distal phalanx fracture of right great toe    Follow up reason: 2 wk f/u  Date of injury: 7/30/19  Date last seen: 7/30/19  Following Therapeutic Plan: Yes   Pain: Improving  Function: Improving    Interval History: Pain has improved greatly. She has weaned her postop shoe and now wearing athletic shoe/sandal.  She states swelling has also improved.      Additional medical/Social/Surgical histories reviewed in [facility] and updated as appropriate.    REVIEW OF SYSTEMS (8/21/2019)  CONSTITUTIONAL: Denies fever and weight loss  GASTROINTESTINAL: Denies abdominal pain, nausea, vomiting  MUSCULOSKELETAL: See  "HPI  SKIN: Denies any recent rash or lesion  NEUROLOGICAL: Denies numbness or focal weakness     PHYSICAL EXAM  /82   Pulse 73   Ht 1.575 m (5' 2\")   Wt 78.9 kg (174 lb)   BMI 31.83 kg/m       General  - normal appearance, in no obvious distress  CV  - normal radial pulse  Pulm  - normal respiratory pattern, non-labored  Musculoskeletal -right great toe  - inspection: Swelling at 1st IP joint and great toe.  Ecchymosis of IP joint dorsally.  - palpation: TTP dorsally, medial and lateral aspect of base of distal phalanx.    - ROM:  MTP Full painless   IP Painful passively and actively, limited  - strength: 5/5 toe flexion, 5/5 toe extension, 5/5 dorsiflexion, 5/5 plantar flexion  - special tests:  (-) varus at IP joints  (-) valgus at IP joints  Neuro  - no numbness, no motor deficit, grossly normal coordination, normal muscle tone  Skin  - no ecchymosis, erythema, warmth, or induration, no obvious rash  Psych  - interactive, appropriate, normal mood and affect    IMAGING : XR right great toe 3V. Final results and radiologist's interpretation, available in the [facility] health record. Images were reviewed with the patient/family members in the office today. My personal interpretation of the performed imaging is redemonstration of comminuted distal phalanx fracture.     ASSESSMENT & PLAN  Ms. Ingrid Caal is a 39 year old year old female who presents to clinic today for follow-up of distal phalanx fracture of right great toe.  Clinically she continues to improve and is no longer using her postoperative shoe.  Radiographs are stable in appearance.    -Encouraged her to continue her firm soled shoe  -Continue ice, Tylenol as needed  -Protective of this digit over the next 2 weeks  -Follow up if pain does not subside in the next 4 weeks    It was a pleasure seeing Nayeli.    Rosas Rodriguez DO, [facility]  Primary Care Sports Medicine        "

## 2019-08-21 NOTE — PROGRESS NOTES
SPORTS & ORTHOPEDIC WALK-IN FOLLOW-UP VISIT 8/21/2019    Interval History:     Follow up reason: 2 wk f/u    Date of injury: 7/30/19    Date last seen: 7/30/19    Following Therapeutic Plan: Yes     Pain: Improving    Function: Improving      Medical History:    Any recent changes to your medical history? No    Any new medication prescribed since last visit? No    Review of Systems:    Do you have fever, chills, weight loss? No    Do you have any vision problems? No    Do you have any chest pain or edema? No    Do you have any shortness of breath or wheezing?  No    Do you have stomach problems? No    Do you have any numbness or focal weakness? No    Do you have diabetes? Type 2    Do you have problems with bleeding or clotting? No    Do you have an rashes or other skin lesions? No

## 2019-08-21 NOTE — PROGRESS NOTES
"ESTABLISHED PATIENT FOLLOW-UP:  Follow Up of the Right Foot       HISTORY OF PRESENT ILLNESS  Ms. Ingrid Caal is a pleasant 39 year old year old female who presents to clinic today for follow-up of right foot pain and distal phalanx fracture of right great toe    Follow up reason: 2 wk f/u  Date of injury: 7/30/19  Date last seen: 7/30/19  Following Therapeutic Plan: Yes   Pain: Improving  Function: Improving    Interval History: Pain has improved greatly. She has weaned her postop shoe and now wearing athletic shoe/sandal.  She states swelling has also improved.      Additional medical/Social/Surgical histories reviewed in [facility] and updated as appropriate.    REVIEW OF SYSTEMS (8/21/2019)  CONSTITUTIONAL: Denies fever and weight loss  GASTROINTESTINAL: Denies abdominal pain, nausea, vomiting  MUSCULOSKELETAL: See HPI  SKIN: Denies any recent rash or lesion  NEUROLOGICAL: Denies numbness or focal weakness     PHYSICAL EXAM  /82   Pulse 73   Ht 1.575 m (5' 2\")   Wt 78.9 kg (174 lb)   BMI 31.83 kg/m        General  - normal appearance, in no obvious distress  CV  - normal radial pulse  Pulm  - normal respiratory pattern, non-labored  Musculoskeletal -right great toe  - inspection: Swelling at 1st IP joint and great toe.  Ecchymosis of IP joint dorsally.  - palpation: TTP dorsally, medial and lateral aspect of base of distal phalanx.    - ROM:  MTP Full painless   IP Painful passively and actively, limited  - strength: 5/5 toe flexion, 5/5 toe extension, 5/5 dorsiflexion, 5/5 plantar flexion  - special tests:  (-) varus at IP joints  (-) valgus at IP joints  Neuro  - no numbness, no motor deficit, grossly normal coordination, normal muscle tone  Skin  - no ecchymosis, erythema, warmth, or induration, no obvious rash  Psych  - interactive, appropriate, normal mood and affect    IMAGING : XR right great toe 3V. Final results and radiologist's interpretation, available in the [facility] health record. Images were " reviewed with the patient/family members in the office today. My personal interpretation of the performed imaging is redemonstration of comminuted distal phalanx fracture.     ASSESSMENT & PLAN  Ms. Ingrid Caal is a 39 year old year old female who presents to clinic today for follow-up of distal phalanx fracture of right great toe.  Clinically she continues to improve and is no longer using her postoperative shoe.  Radiographs are stable in appearance.    -Encouraged her to continue her firm soled shoe  -Continue ice, Tylenol as needed  -Protective of this digit over the next 2 weeks  -Follow up if pain does not subside in the next 4 weeks    It was a pleasure seeing Nayeli.    Rosas Rodriguez DO, CAQSM  Primary Care Sports Medicine

## 2019-09-29 ENCOUNTER — HEALTH MAINTENANCE LETTER (OUTPATIENT)
Age: 39
End: 2019-09-29

## 2019-09-30 ENCOUNTER — TELEPHONE (OUTPATIENT)
Dept: ORTHOPEDICS | Facility: CLINIC | Age: 39
End: 2019-09-30

## 2019-09-30 NOTE — TELEPHONE ENCOUNTER
----- Message from Laura Armstrong sent at 9/30/2019  1:46 PM CDT -----  Regarding: RE: DEJA Alemanvisc One done 6/26/18  Dorian Lombardi,    I touched base with Maureen Beebe, financial counselor.  Unfortunately, she is not sure who would appeal in this situation either.  She did suggest the patient call who is billing her or Patient Relations ([facility] at [phone]) and state she was told prior auth had been obtained and she moved forward in good claudio with the injections based on that.      I spoke with my lead to see if there was anything our department could do but, unfortunately we are too far out from date of service.    I'm sorry I couldn't be of more help,   Laura Armstrong  ----- Message -----  From: Laura Armstrong  Sent: 9/30/2019  12:12 PM CDT  To: Maria C Minaya ATC, Cam   Subject: RE: DEJA Alemanvisc One done 6/26/18                  Dorian Lombardi,    I am going to touch base with the financial counselor see if we can get some help on this.  As far as my department goes, we would only be able to help in the future if the patient is going to receive this moving forward.  I will let you know when I have more information/.    Thank you and have a great day,  Laura Armstrong  ----- Message -----  From: Maria C Minaya ATC  Sent: 9/27/2019  10:37 AM CDT  To: Cam   Subject: PA Synvisc One done 6/26/18                      Hello,     I'm not sure if this is something you can help me with, but I thought I'd ask. Dr. Rodriguez did bilateral Synvisc One injections on 6/26/18 because there was a note from [place] LPN from 2/21/18 saying it was covered. The patient is now calling in stating she was charged for the full amount of the injections because her insurance didn't cover it after all. Is this something you guys can look into for us or is there someone else I should ask?    Maria C Minaya ATC

## 2019-10-01 ENCOUNTER — PRE VISIT (OUTPATIENT)
Dept: INTERNAL MEDICINE | Facility: CLINIC | Age: 39
End: 2019-10-01

## 2019-10-01 DIAGNOSIS — Z79.4 UNCONTROLLED TYPE 2 DIABETES MELLITUS WITH HYPERGLYCEMIA, WITH LONG-TERM CURRENT USE OF INSULIN (H): Primary | ICD-10-CM

## 2019-10-01 DIAGNOSIS — E11.65 UNCONTROLLED TYPE 2 DIABETES MELLITUS WITH HYPERGLYCEMIA, WITH LONG-TERM CURRENT USE OF INSULIN (H): Primary | ICD-10-CM

## 2019-10-01 NOTE — TELEPHONE ENCOUNTER
[facility]  Dept: [phone]     Patient: Nayeli Caal   : 1980  MRN: [id number]  Encounter: 10/01/19       Pre Visit Assessment    Health Maintenance Due   Topic Date Due     PNEUMOCOCCAL IMMUNIZATION 19-64 HIGHEST RISK (3 of 3 - PPSV23) 2014     DIABETIC FOOT EXAM  2018     CARISA ASSESSMENT  2018     PHQ-9  2018     MEDICARE ANNUAL WELLNESS VISIT  2018     EYE EXAM  10/17/2018     DTAP/TDAP/TD IMMUNIZATION (3 - Td) 2019     A1C  2019     INFLUENZA VACCINE (1) 2019     Chart Reviewed for Labs needed/Health Maintenance: Yes   If labs needed, orders placed:  No, Lipid, albumin, and A1C orders were already placed by another provider. TSH was ordered.   Lab appointment scheduled:  No. Patient will get labs done following appointment with provider.     Notes:  Patient confirmed they will attend appointment:  N/A  Appointment rescheduled?  N/A    Cannot contact patient due to her being visually and hearing impaired. We will have her get labs done following the appointment.   Bess Morales at 12:49 PM on 10/1/2019

## 2019-10-07 ENCOUNTER — OFFICE VISIT (OUTPATIENT)
Dept: ENDOCRINOLOGY | Facility: CLINIC | Age: 39
End: 2019-10-07
Payer: COMMERCIAL

## 2019-10-07 VITALS
HEART RATE: 98 BPM | SYSTOLIC BLOOD PRESSURE: 128 MMHG | HEIGHT: 62 IN | BODY MASS INDEX: 32.04 KG/M2 | DIASTOLIC BLOOD PRESSURE: 81 MMHG | WEIGHT: 174.1 LBS

## 2019-10-07 DIAGNOSIS — E78.5 DYSLIPIDEMIA: ICD-10-CM

## 2019-10-07 DIAGNOSIS — E11.65 UNCONTROLLED TYPE 2 DIABETES MELLITUS WITH HYPERGLYCEMIA, WITH LONG-TERM CURRENT USE OF INSULIN (H): Primary | ICD-10-CM

## 2019-10-07 DIAGNOSIS — I10 BENIGN ESSENTIAL HYPERTENSION: ICD-10-CM

## 2019-10-07 DIAGNOSIS — Z79.4 UNCONTROLLED TYPE 2 DIABETES MELLITUS WITH HYPERGLYCEMIA, WITH LONG-TERM CURRENT USE OF INSULIN (H): Primary | ICD-10-CM

## 2019-10-07 LAB — HBA1C MFR BLD: 9.9 % (ref 4.3–6)

## 2019-10-07 RX ORDER — FLASH GLUCOSE SENSOR
KIT MISCELLANEOUS
Qty: 6 EACH | Refills: 3 | Status: SHIPPED | OUTPATIENT
Start: 2019-10-07 | End: 2021-06-15

## 2019-10-07 ASSESSMENT — PAIN SCALES - GENERAL: PAINLEVEL: NO PAIN (0)

## 2019-10-07 ASSESSMENT — MIFFLIN-ST. JEOR: SCORE: 1417.96

## 2019-10-07 NOTE — PATIENT INSTRUCTIONS
Discontinue Novolog   Continue to take Basaglar at 35 U in the morning, at 8 or 9 am (set up an alarm)   Administer Humulin N insulin at 15 U in the morning, 30 minutes before breakfast   Use the sensor Evelia or check BG before meals and at bedtime

## 2019-10-07 NOTE — PROGRESS NOTES
Pt seen with the assistance of an in person .     Hemoglobin A1c was 8.2% on 5/2/19.     The trigger finger surgery went well.   Recently, she has not been wearing the mian sensor as it continues to fall off and, at most, she has been wearing it for 6 or 7 days.  She has been trying the Skin Tac wipes but they are fairly expensive.  She reports being compliant in taking Lipitor, fenofibrate, Invokana and Trulicity.     She continues to use the Accu-Chek expert to help her determine the NovoLog dose.   Meter settings:   Insulin to Carb Ratio: 8  Correction Factor: 35  BG Target: 100-150  Offset time 2 hrs   Acting time 3:30 hrs     At her last visit here, with Anne Marie Medina, the dose of Basaglar was increased to 45 units in the morning.  The glucometer readings revealed that she checks her blood glucose sporadically, once a week or so.  At the time she checks her blood sugar, she also administers NovoLog insulin using the meter settings, within average dose of insulin of 4 to 9 units per meal.  On questioning, she admits she continues to struggle in remembering to take NovoLog prior to meals.  She has not been able to tolerate metformin, as even 1 tablet causes same significant diarrhea.  On the glucometer, the average blood glucose is 188, with a standard deviation of 67 and a range variable between 82 and 250.    Diabetes complications:  Retinopathy: last eye exam - 2018; scheduled for 8/2019. No  Usher's syndrome.  Nephropathy: h/o proteinuria; normal GFR. Now on 50mg losartan daily (tx with lisinopril was associated with a dry cough).   Neuropathy: No N/T in feet or hands. Just the trigger finger that bothers her.     Current Outpatient Medications   Medication     Alcohol Swabs (ALCOHOL PREP PAD) 70 % PADS     aspirin 81 MG chewable tablet     atorvastatin (LIPITOR) 40 MG tablet     B-D U/F insulin pen needle     BD ULTRA FINE PEN NEEDLES     blood glucose (ACCU-CHEK LAYA PLUS) test strip      blood glucose monitoring (ACCU-CHEK FASTCLIX) lancets     Continuous Blood Gluc Sensor (FREESTYLE ZORAIDA 14 DAY SENSOR) MISC     continuous blood glucose monitoring (FREESTYLE ZORAIDA) sensor     cyclobenzaprine (FLEXERIL) 5 MG tablet     dulaglutide (TRULICITY) 1.5 MG/0.5ML pen     empagliflozin (JARDIANCE) 25 MG TABS tablet     ergocalciferol (ERGOCALCIFEROL) 21543 units capsule     fenofibrate (TRIGLIDE/LOFIBRA) 160 MG tablet     HYDROcodone-acetaminophen (NORCO) 5-325 MG tablet     ibuprofen (ADVIL/MOTRIN) 600 MG tablet     insulin aspart (NOVOLOG PEN) 100 UNIT/ML pen     insulin glargine (BASAGLAR KWIKPEN) 100 UNIT/ML pen     levonorgestrel (MIRENA) 20 MCG/24HR IUD     losartan (COZAAR) 50 MG tablet     naproxen (NAPROSYN) 500 MG tablet     omega 3 1000 MG CAPS     Ostomy Supplies (ADHESIVE REMOVER WIPES) MISC     Ostomy Supplies (SKIN TAC ADHESIVE BARRIER WIPE) MISC     TRULICITY 0.75 MG/0.5ML pen     Wound Dressing Adhesive (MASTISOL ADHESIVE) LIQD     Current Facility-Administered Medications   Medication     hylan (SYNVISC ONE) injection 48 mg     hylan (SYNVISC ONE) injection 48 mg     Past Medical History:   Diagnosis Date     Combined visual and hearing impairment      Deaf      Diabetic retinopathy of both eyes (H) 8/19/2011     Hepatic steatosis 08/19/2011     History of tobacco use      Hyperlipidemia      Hypertension      Hypertriglyceridemia      Migraines      Obesity 8/19/2011     Problem list name updated by automated process. 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 She denies smoking, drinking alcohol or using illicit drugs. Continues working- currently doing secretarial work.     ROS:  Negative except as noted above.     There were no vitals taken for this visit.  Gen: No acute distress, comfortable  appearing   HEENT: No noted icterus, MMM  CV: Regular rate and rhythm, no murmurs.   Pulm: Breathing comfortably on room air.  No rales, no wheezes  Ext: No LE edema, no bony deficits noted    Integumentary: No rashes or other lesions identified   Neuro: No focal deficits noted, muscle bulk appears intact, no tremor, normal tone  Psych: Mood and affect congruent     Labs:  Reviewed.  Lab Results   Component Value Date    A1C 8.2 (H) 05/02/2019    A1C 10.3 (H) 10/15/2018    A1C 13.4 (H) 11/27/2017    A1C 11.0 (H) 07/13/2017    A1C 10.9 (H) 05/15/2017    HEMOGLOBINA1 8.1 (A) 04/22/2019    HEMOGLOBINA1 10.4 (A) 01/14/2019    HEMOGLOBINA1 8.7 (A) 03/12/2018    HEMOGLOBINA1 12.0 (A) 02/03/2014    HEMOGLOBINA1 10.3 (A) 10/28/2013       Hemoglobin   Date Value Ref Range Status   03/13/2018 14.2 11.7 - 15.7 g/dL Final     Hematocrit   Date Value Ref Range Status   05/02/2019 44.2 35.0 - 47.0 % Final     Cholesterol   Date Value Ref Range Status   05/02/2019 266 (H) <200 mg/dL Final     Comment:     Desirable:       <200 mg/dl     Cholesterol/HDL Ratio   Date Value Ref Range Status   09/16/2013 5.8 (H) 0.0 - 5.0 Final     HDL Cholesterol   Date Value Ref Range Status   05/02/2019 40 (L) >49 mg/dL Final     LDL Cholesterol Calculated   Date Value Ref Range Status   05/02/2019 183 (H) <100 mg/dL Final     Comment:     Above desirable:  100-129 mg/dl  Borderline High:  130-159 mg/dL  High:             160-189 mg/dL  Very high:       >189 mg/dl       VLDL-Cholesterol   Date Value Ref Range Status   09/16/2013 44 (H) 0 - 30 mg/dL Final     Triglycerides   Date Value Ref Range Status   05/02/2019 219 (H) <150 mg/dL Final     Comment:     Borderline high:  150-199 mg/dl  High:             200-499 mg/dl  Very high:       >499 mg/dl       Albumin Urine mg/L   Date Value Ref Range Status   05/02/2019 196 mg/L Final     TSH   Date Value Ref Range Status   05/17/2018 1.84 0.40 - 4.00 mU/L Final         Last Basic Metabolic  Panel:    Sodium   Date Value Ref Range Status   05/02/2019 138 133 - 144 mmol/L Final     Potassium   Date Value Ref Range Status   05/02/2019 4.0 3.4 - 5.3 mmol/L Final     Chloride   Date Value Ref Range Status   05/02/2019 105 94 - 109 mmol/L Final     Calcium   Date Value Ref Range Status   05/02/2019 8.8 8.5 - 10.1 mg/dL Final     Carbon Dioxide   Date Value Ref Range Status   05/02/2019 26 20 - 32 mmol/L Final     Urea Nitrogen   Date Value Ref Range Status   05/02/2019 13 7 - 30 mg/dL Final     Creatinine   Date Value Ref Range Status   05/02/2019 0.66 0.52 - 1.04 mg/dL Final     GFR Estimate   Date Value Ref Range Status   05/02/2019 >90 >60 mL/min/[1.73_m2] Final     Comment:     Non  GFR Calc  Starting 12/18/2018, serum creatinine based estimated GFR (eGFR) will be   calculated using the Chronic Kidney Disease Epidemiology Collaboration   (CKD-EPI) equation.       Glucose   Date Value Ref Range Status   05/02/2019 138 (H) 70 - 99 mg/dL Final       AST   Date Value Ref Range Status   05/02/2019 26 0 - 45 U/L Final     ALT   Date Value Ref Range Status   05/02/2019 43 0 - 50 U/L Final     Albumin Urine mg/g Cr   Date Value Ref Range Status   05/02/2019 87.89 (H) 0 - 25 mg/g Cr Final     A/P:    Diabetes, type 2 vs. PRAVIN, complicated by diabetic nephropathy and retinopathy, uncontrolled, mainly due to noncompliance with the prescribed regimen  Recommendations:  Continue Trulicity  Increase the dose of Invokana to 300 mg daily  Administer NovoLog for all meals and snacks, according to the carbohydrate intake.  Reminded the patient about using phone reminders, in order to take insulin prior to meals.  Use Simpatch adhesive bandages which can be applied over the sensor, and try to use the sensor consistently.  If the morning blood sugar is lowish, she was instructed to decrease the dose of Basaglar to 40 units    Proteinuria/HTN  Blood pressure controlled.   -continue losartan 50mg per day    -Follow-up urine microalbumin at her next appointment    Dyslipidemia   The LDL cholesterol was higher on recent lab.   - F/up lipid panel. If LDL consistently elevated, consider increasing Lipitor dose.     No orders of the defined types were placed in this encounter.

## 2019-10-07 NOTE — LETTER
10/7/2019       RE: Nayeli Caal  [address]  [facility] [address]     Dear Colleague,    Thank you for referring your patient, Nayeli Caal, to the [facility] ENDOCRINOLOGY at [facility]. Please see a copy of my visit note below.      Pt seen with the assistance of an in person .     Hemoglobin A1c was 8.2% on 5/2/19.     The trigger finger surgery went well.   Recently, she has not been wearing the mian sensor as it continues to fall off and, at most, she has been wearing it for 6 or 7 days.  She has been trying the Skin Tac wipes but they are fairly expensive.  She reports being compliant in taking Lipitor, fenofibrate, Invokana and Trulicity.     She continues to use the Accu-Chek expert to help her determine the NovoLog dose.   Meter settings:   Insulin to Carb Ratio: 8  Correction Factor: 35  BG Target: 100-150  Offset time 2 hrs   Acting time 3:30 hrs     At her last visit here, with Anne Marie Medina, the dose of Basaglar was increased to 45 units in the morning.  The glucometer readings revealed that she checks her blood glucose sporadically, once a week or so.  At the time she checks her blood sugar, she also administers NovoLog insulin using the meter settings, within average dose of insulin of 4 to 9 units per meal.  On questioning, she admits she continues to struggle in remembering to take NovoLog prior to meals.  She has not been able to tolerate metformin, as even 1 tablet causes same significant diarrhea.  On the glucometer, the average blood glucose is 188, with a standard deviation of 67 and a range variable between 82 and 250.    Diabetes complications:  Retinopathy: last eye exam - 2018; scheduled for 8/2019. 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  Nephropathy: h/o proteinuria; normal GFR. Now on 50mg losartan daily (tx with lisinopril was associated with a dry cough).   Neuropathy: No N/T in feet or hands. Just the trigger finger that  bothers her.     Current Outpatient Medications   Medication     Alcohol Swabs (ALCOHOL PREP PAD) 70 % PADS     aspirin 81 MG chewable tablet     atorvastatin (LIPITOR) 40 MG tablet     B-D U/F insulin pen needle     BD ULTRA FINE PEN NEEDLES     blood glucose (ACCU-CHEK LAYA PLUS) test strip     blood glucose monitoring (ACCU-CHEK FASTCLIX) lancets     Continuous Blood Gluc Sensor (FREESTYLE ZORAIDA 14 DAY SENSOR) MISC     continuous blood glucose monitoring (FREESTYLE ZORAIDA) sensor     cyclobenzaprine (FLEXERIL) 5 MG tablet     dulaglutide (TRULICITY) 1.5 MG/0.5ML pen     empagliflozin (JARDIANCE) 25 MG TABS tablet     ergocalciferol (ERGOCALCIFEROL) 28620 units capsule     fenofibrate (TRIGLIDE/LOFIBRA) 160 MG tablet     HYDROcodone-acetaminophen (NORCO) 5-325 MG tablet     ibuprofen (ADVIL/MOTRIN) 600 MG tablet     insulin aspart (NOVOLOG PEN) 100 UNIT/ML pen     insulin glargine (BASAGLAR KWIKPEN) 100 UNIT/ML pen     levonorgestrel (MIRENA) 20 MCG/24HR IUD     losartan (COZAAR) 50 MG tablet     naproxen (NAPROSYN) 500 MG tablet     omega 3 1000 MG CAPS     Ostomy Supplies (ADHESIVE REMOVER WIPES) MISC     Ostomy Supplies (SKIN TAC ADHESIVE BARRIER WIPE) MISC     TRULICITY 0.75 MG/0.5ML pen     Wound Dressing Adhesive (MASTISOL ADHESIVE) LIQD     Current Facility-Administered Medications   Medication     hylan (SYNVISC ONE) injection 48 mg     hylan (SYNVISC ONE) injection 48 mg     Past Medical History:   Diagnosis Date     Combined visual and hearing impairment      Deaf      Diabetic retinopathy of both eyes (H) 8/19/2011     Hepatic steatosis 08/19/2011     History of tobacco use      Hyperlipidemia      Hypertension      Hypertriglyceridemia      Migraines      Obesity 8/19/2011     Problem list name updated by automated process. 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 She denies smoking, drinking alcohol or using illicit drugs. Continues working- currently doing secretarial work.     ROS:  Negative except as noted above.     There were no vitals taken for this visit.  Gen: No acute distress, comfortable appearing   HEENT: No noted icterus, MMM  CV: Regular rate and rhythm, no murmurs.   Pulm: Breathing comfortably on room air.  No rales, no wheezes  Ext: No LE edema, no bony deficits noted    Integumentary: No rashes or other lesions identified   Neuro: No focal deficits noted, muscle bulk appears intact, no tremor, normal tone  Psych: Mood and affect congruent     Labs:  Reviewed.  Lab Results   Component Value Date    A1C 8.2 (H) 05/02/2019    A1C 10.3 (H) 10/15/2018    A1C 13.4 (H) 11/27/2017    A1C 11.0 (H) 07/13/2017    A1C 10.9 (H) 05/15/2017    HEMOGLOBINA1 8.1 (A) 04/22/2019    HEMOGLOBINA1 10.4 (A) 01/14/2019    HEMOGLOBINA1 8.7 (A) 03/12/2018    HEMOGLOBINA1 12.0 (A) 02/03/2014    HEMOGLOBINA1 10.3 (A) 10/28/2013       Hemoglobin   Date Value Ref Range Status   03/13/2018 14.2 11.7 - 15.7 g/dL Final     Hematocrit   Date Value Ref Range Status   05/02/2019 44.2 35.0 - 47.0 % Final     Cholesterol   Date Value Ref Range Status   05/02/2019 266 (H) <200 mg/dL Final     Comment:     Desirable:       <200 mg/dl     Cholesterol/HDL Ratio   Date Value Ref Range Status   09/16/2013 5.8 (H) 0.0 - 5.0 Final     HDL Cholesterol   Date Value Ref Range Status   05/02/2019 40 (L) >49 mg/dL Final     LDL Cholesterol Calculated   Date Value Ref Range Status   05/02/2019 183 (H) <100 mg/dL Final     Comment:     Above desirable:  100-129 mg/dl  Borderline High:  130-159 mg/dL  High:             160-189 mg/dL  Very high:       >189 mg/dl       VLDL-Cholesterol   Date Value Ref Range Status   09/16/2013 44 (H) 0 - 30 mg/dL Final     Triglycerides   Date Value Ref Range Status   05/02/2019 219 (H) <150 mg/dL Final     Comment:      Borderline high:  150-199 mg/dl  High:             200-499 mg/dl  Very high:       >499 mg/dl       Albumin Urine mg/L   Date Value Ref Range Status   05/02/2019 196 mg/L Final     TSH   Date Value Ref Range Status   05/17/2018 1.84 0.40 - 4.00 mU/L Final         Last Basic Metabolic Panel:    Sodium   Date Value Ref Range Status   05/02/2019 138 133 - 144 mmol/L Final     Potassium   Date Value Ref Range Status   05/02/2019 4.0 3.4 - 5.3 mmol/L Final     Chloride   Date Value Ref Range Status   05/02/2019 105 94 - 109 mmol/L Final     Calcium   Date Value Ref Range Status   05/02/2019 8.8 8.5 - 10.1 mg/dL Final     Carbon Dioxide   Date Value Ref Range Status   05/02/2019 26 20 - 32 mmol/L Final     Urea Nitrogen   Date Value Ref Range Status   05/02/2019 13 7 - 30 mg/dL Final     Creatinine   Date Value Ref Range Status   05/02/2019 0.66 0.52 - 1.04 mg/dL Final     GFR Estimate   Date Value Ref Range Status   05/02/2019 >90 >60 mL/min/[1.73_m2] Final     Comment:     Non  GFR Calc  Starting 12/18/2018, serum creatinine based estimated GFR (eGFR) will be   calculated using the Chronic Kidney Disease Epidemiology Collaboration   (CKD-EPI) equation.       Glucose   Date Value Ref Range Status   05/02/2019 138 (H) 70 - 99 mg/dL Final       AST   Date Value Ref Range Status   05/02/2019 26 0 - 45 U/L Final     ALT   Date Value Ref Range Status   05/02/2019 43 0 - 50 U/L Final     Albumin Urine mg/g Cr   Date Value Ref Range Status   05/02/2019 87.89 (H) 0 - 25 mg/g Cr Final     A/P:    Diabetes, type 2 vs. PRAVIN, complicated by diabetic nephropathy and retinopathy, uncontrolled, mainly due to noncompliance with the prescribed regimen  Recommendations:  Continue Trulicity  Increase the dose of Invokana to 300 mg daily  Administer NovoLog for all meals and snacks, according to the carbohydrate intake.  Reminded the patient about using phone reminders, in order to take insulin prior to meals.  Use Simpatch  adhesive bandages which can be applied over the sensor, and try to use the sensor consistently.  If the morning blood sugar is lowish, she was instructed to decrease the dose of Basaglar to 40 units    Proteinuria/HTN  Blood pressure controlled.   -continue losartan 50mg per day   -Follow-up urine microalbumin at her next appointment    Dyslipidemia   The LDL cholesterol was higher on recent lab.   - F/up lipid panel. If LDL consistently elevated, consider increasing Lipitor dose.     No orders of the defined types were placed in this encounter.            Pt seen with the assistance of an in person .     Hemoglobin A1c was 8.2% on 5/2/19.  It was 9.9% today.    The patient states that she has been overwhelmed at work and she does not pay attention to her glucose control.  Current dose of Basaglar is 40 units and she takes it around 8 AM.  She forgets to take Basaglar twice a week.  The option of setting up a reminder alarm was discussed with the patient at the prior visit but she has not done this.    The glucometer readings revealed that she checks her blood sugar once every other day, always before breakfast, rarely before lunch and never before dinner or at bedtime.  On the glucometer, the average blood glucose is 190 with a standard deviation of 47 and a range variable between 117 and 270.  She occasionally enters the carbohydrate intake for breakfast and lunch, generally 30 to 45 g of carbohydrates.  She continues to use the glucometer to administer insulin for both carb intake and correction.  In the evening, she frequently eats out.  With breakfast, she drinks regular Dr. Pepper.    During weekdays, she wakes up around 4:30 in the morning and she has breakfast around 5:15-5:30.  During weekends, she wakes up at 6 or 6:30 in the morning and breakfast is later, around 7-8 AM.    She reports being compliant in taking Lipitor, fenofibrate, Jardiance and Trulicity. She was not been able to tolerate  metformin, as even 1 tablet causes same significant diarrhea.    She continues to use the Accu-Chek expert to help her determine the NovoLog dose.   Meter settings:   Insulin to Carb Ratio: 8  Correction Factor: 35  BG Target: 100-150  Offset time 2 hrs   Acting time 3:30 hrs     Diabetes complications:  Retinopathy: last eye exam - 9/19. 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  Nephropathy: h/o proteinuria; normal GFR. Now on 50mg losartan daily (tx with lisinopril was associated with a dry cough).   Neuropathy: No N/T in feet.     Current Outpatient Medications   Medication     Alcohol Swabs (ALCOHOL PREP PAD) 70 % PADS     aspirin 81 MG chewable tablet     atorvastatin (LIPITOR) 40 MG tablet     B-D U/F insulin pen needle     BD ULTRA FINE PEN NEEDLES     blood glucose (ACCU-CHEK LAYA PLUS) test strip     blood glucose monitoring (ACCU-CHEK FASTCLIX) lancets     continuous blood glucose monitoring (FREESTYLE ZORAIDA) sensor     cyclobenzaprine (FLEXERIL) 5 MG tablet     dulaglutide (TRULICITY) 1.5 MG/0.5ML pen     empagliflozin (JARDIANCE) 25 MG TABS tablet     ergocalciferol (ERGOCALCIFEROL) 43967 units capsule     fenofibrate (TRIGLIDE/LOFIBRA) 160 MG tablet     HYDROcodone-acetaminophen (NORCO) 5-325 MG tablet     ibuprofen (ADVIL/MOTRIN) 600 MG tablet     insulin aspart (NOVOLOG PEN) 100 UNIT/ML pen     insulin glargine (BASAGLAR KWIKPEN) 100 UNIT/ML pen     losartan (COZAAR) 50 MG tablet     naproxen (NAPROSYN) 500 MG tablet     omega 3 1000 MG CAPS     Ostomy Supplies (ADHESIVE REMOVER WIPES) MISC     Ostomy Supplies (SKIN TAC ADHESIVE BARRIER WIPE) MISC     TRULICITY 0.75 MG/0.5ML pen     Wound Dressing Adhesive (MASTISOL ADHESIVE) LIQD     Continuous Blood Gluc Sensor (FREESTYLE ZORAIDA 14 DAY SENSOR) MISC     levonorgestrel (MIRENA) 20 MCG/24HR IUD     Current Facility-Administered Medications   Medication     hylan (SYNVISC ONE) injection 48 mg     hylan (SYNVISC ONE) injection 48 mg     Past Medical History:   Diagnosis  "Date     Combined visual and hearing impairment      Deaf      Diabetic retinopathy of both eyes (H) 8/19/2011     Hepatic steatosis 08/19/2011     History of tobacco use      Hyperlipidemia      Hypertension      Hypertriglyceridemia      Migraines      Obesity 8/19/2011     Problem list name updated by automated process. 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 She denies smoking, drinking alcohol or using illicit drugs. Continues working- currently doing secretarial work.     ROS:  Negative except as noted above.     /81   Pulse 98   Ht 1.575 m (5' 2\")   Wt 79 kg (174 lb 1.6 oz)   BMI 31.84 kg/m     Gen: No acute distress, comfortable appearing   HEENT: No noted icterus, MMM    Labs:  Reviewed.  Lab Results   Component Value Date    A1C 8.2 (H) 05/02/2019    A1C 10.3 (H) 10/15/2018    A1C 13.4 (H) 11/27/2017    A1C 11.0 (H) 07/13/2017    A1C 10.9 (H) 05/15/2017    HEMOGLOBINA1 9.9 (A) 10/07/2019    HEMOGLOBINA1 8.1 (A) 04/22/2019    HEMOGLOBINA1 10.4 (A) 01/14/2019    HEMOGLOBINA1 8.7 (A) 03/12/2018    HEMOGLOBINA1 12.0 (A) 02/03/2014       Hemoglobin   Date Value Ref Range Status   03/13/2018 14.2 11.7 - 15.7 g/dL Final     Hematocrit   Date Value Ref Range Status   05/02/2019 44.2 35.0 - 47.0 % Final     Cholesterol   Date Value Ref Range Status   05/02/2019 266 (H) <200 mg/dL Final     Comment:     Desirable:       <200 mg/dl     Cholesterol/HDL Ratio   Date Value Ref Range Status   09/16/2013 5.8 (H) 0.0 - 5.0 Final     HDL Cholesterol   Date Value Ref Range Status   05/02/2019 40 (L) >49 mg/dL Final     LDL Cholesterol Calculated   Date Value Ref Range Status   05/02/2019 183 (H) <100 mg/dL Final     Comment:     Above desirable:  100-129 mg/dl  Borderline High:  130-159 mg/dL  High:             160-189 " mg/dL  Very high:       >189 mg/dl       VLDL-Cholesterol   Date Value Ref Range Status   09/16/2013 44 (H) 0 - 30 mg/dL Final     Triglycerides   Date Value Ref Range Status   05/02/2019 219 (H) <150 mg/dL Final     Comment:     Borderline high:  150-199 mg/dl  High:             200-499 mg/dl  Very high:       >499 mg/dl       Albumin Urine mg/L   Date Value Ref Range Status   05/02/2019 196 mg/L Final     TSH   Date Value Ref Range Status   05/17/2018 1.84 0.40 - 4.00 mU/L Final         Last Basic Metabolic Panel:    Sodium   Date Value Ref Range Status   05/02/2019 138 133 - 144 mmol/L Final     Potassium   Date Value Ref Range Status   05/02/2019 4.0 3.4 - 5.3 mmol/L Final     Chloride   Date Value Ref Range Status   05/02/2019 105 94 - 109 mmol/L Final     Calcium   Date Value Ref Range Status   05/02/2019 8.8 8.5 - 10.1 mg/dL Final     Carbon Dioxide   Date Value Ref Range Status   05/02/2019 26 20 - 32 mmol/L Final     Urea Nitrogen   Date Value Ref Range Status   05/02/2019 13 7 - 30 mg/dL Final     Creatinine   Date Value Ref Range Status   05/02/2019 0.66 0.52 - 1.04 mg/dL Final     GFR Estimate   Date Value Ref Range Status   05/02/2019 >90 >60 mL/min/[1.73_m2] Final     Comment:     Non  GFR Calc  Starting 12/18/2018, serum creatinine based estimated GFR (eGFR) will be   calculated using the Chronic Kidney Disease Epidemiology Collaboration   (CKD-EPI) equation.       Glucose   Date Value Ref Range Status   05/02/2019 138 (H) 70 - 99 mg/dL Final       AST   Date Value Ref Range Status   05/02/2019 26 0 - 45 U/L Final     ALT   Date Value Ref Range Status   05/02/2019 43 0 - 50 U/L Final     Albumin Urine mg/g Cr   Date Value Ref Range Status   05/02/2019 87.89 (H) 0 - 25 mg/g Cr Final     A/P:    Diabetes, type 2 vs. PRAVIN, complicated by diabetic nephropathy and retinopathy, uncontrolled, mainly due to noncompliance with the prescribed regimen.  Since noncompliance has been a major issue  for years, and the administration of NovoLog remains cumbersome, I recommended to simplify her insulin regimen by taking 1 dose of Humulin N in the morning, instead of NovoLog.  I would be happy to get an A1c below 8% in her case.    Recommendations:  Continue Trulicity and empagliflozin  Continue to take Basaglar 35 units in the morning.  Patient was instructed to set up an alarm for either 8 or 9 AM.  Discontinue NovoLog  Administer Humulin N insulin at 15 units in the morning, 30 minutes prior to breakfast.  Use the sensor mian and have it downloaded in our clinic as needed  Use Simpatch adhesive bandages which can be applied over the sensor, and try to use the sensor consistently.  Schedule an appointment with the PA in 1 month and with me in 3 months    Proteinuria/HTN  Blood pressure controlled.   -continue losartan 50mg per day   -Follow-up urine microalbumin at her next appointment    Dyslipidemia   The LDL cholesterol was higher on recent lab.   - F/up lipid panel.     Orders Placed This Encounter   Procedures     Albumin Random Urine Quantitative with Creat Ratio     Basic metabolic panel     Hematocrit     Lipid panel reflex to direct LDL Fasting     Hemoglobin A1c POCT     Total visit time 25 min/counceling and coordination of care 20 min.        Again, thank you for allowing me to participate in the care of your patient.      Sincerely,    Jimena Bragg MD

## 2019-10-17 ENCOUNTER — OFFICE VISIT (OUTPATIENT)
Dept: INTERNAL MEDICINE | Facility: CLINIC | Age: 39
End: 2019-10-17
Payer: COMMERCIAL

## 2019-10-17 VITALS
WEIGHT: 179 LBS | HEART RATE: 83 BPM | BODY MASS INDEX: 32.74 KG/M2 | SYSTOLIC BLOOD PRESSURE: 119 MMHG | OXYGEN SATURATION: 98 % | DIASTOLIC BLOOD PRESSURE: 76 MMHG

## 2019-10-17 DIAGNOSIS — E11.65 UNCONTROLLED TYPE 2 DIABETES MELLITUS WITH HYPERGLYCEMIA, WITH LONG-TERM CURRENT USE OF INSULIN (H): ICD-10-CM

## 2019-10-17 DIAGNOSIS — Z23 NEED FOR INFLUENZA VACCINATION: Primary | ICD-10-CM

## 2019-10-17 DIAGNOSIS — E11.8 TYPE 2 DIABETES MELLITUS WITH COMPLICATION, WITH LONG-TERM CURRENT USE OF INSULIN (H): ICD-10-CM

## 2019-10-17 DIAGNOSIS — Z79.4 TYPE 2 DIABETES MELLITUS WITH COMPLICATION, WITH LONG-TERM CURRENT USE OF INSULIN (H): ICD-10-CM

## 2019-10-17 DIAGNOSIS — E78.2 MIXED HYPERLIPIDEMIA: ICD-10-CM

## 2019-10-17 DIAGNOSIS — E10.8 TYPE 1 DIABETES MELLITUS WITH COMPLICATIONS (H): ICD-10-CM

## 2019-10-17 DIAGNOSIS — Z79.4 UNCONTROLLED TYPE 2 DIABETES MELLITUS WITH HYPERGLYCEMIA, WITH LONG-TERM CURRENT USE OF INSULIN (H): ICD-10-CM

## 2019-10-17 RX ORDER — INSULIN GLARGINE 100 [IU]/ML
48 INJECTION, SOLUTION SUBCUTANEOUS DAILY
Qty: 12 ML | Refills: 3 | Status: SHIPPED | OUTPATIENT
Start: 2019-10-17 | End: 2019-10-17

## 2019-10-17 RX ORDER — INSULIN GLARGINE 100 [IU]/ML
48 INJECTION, SOLUTION SUBCUTANEOUS DAILY
Qty: 12 ML | Refills: 3 | Status: SHIPPED | OUTPATIENT
Start: 2019-10-17 | End: 2020-01-30

## 2019-10-17 RX ORDER — ASPIRIN 81 MG/1
TABLET, CHEWABLE ORAL
Qty: 90 TABLET | Refills: 3 | Status: SHIPPED | OUTPATIENT
Start: 2019-10-17 | End: 2021-01-05

## 2019-10-17 RX ORDER — FENOFIBRATE 160 MG/1
160 TABLET ORAL DAILY
Qty: 90 TABLET | Refills: 1 | Status: SHIPPED | OUTPATIENT
Start: 2019-10-17 | End: 2020-07-13

## 2019-10-17 RX ORDER — LOSARTAN POTASSIUM 50 MG/1
50 TABLET ORAL DAILY
Qty: 90 TABLET | Refills: 3 | Status: SHIPPED | OUTPATIENT
Start: 2019-10-17 | End: 2020-10-29

## 2019-10-17 ASSESSMENT — ANXIETY QUESTIONNAIRES
3. WORRYING TOO MUCH ABOUT DIFFERENT THINGS: NOT AT ALL
6. BECOMING EASILY ANNOYED OR IRRITABLE: NOT AT ALL
1. FEELING NERVOUS, ANXIOUS, OR ON EDGE: NOT AT ALL
GAD7 TOTAL SCORE: 0
5. BEING SO RESTLESS THAT IT IS HARD TO SIT STILL: NOT AT ALL
IF YOU CHECKED OFF ANY PROBLEMS ON THIS QUESTIONNAIRE, HOW DIFFICULT HAVE THESE PROBLEMS MADE IT FOR YOU TO DO YOUR WORK, TAKE CARE OF THINGS AT HOME, OR GET ALONG WITH OTHER PEOPLE: NOT DIFFICULT AT ALL
7. FEELING AFRAID AS IF SOMETHING AWFUL MIGHT HAPPEN: NOT AT ALL
2. NOT BEING ABLE TO STOP OR CONTROL WORRYING: NOT AT ALL

## 2019-10-17 ASSESSMENT — PAIN SCALES - GENERAL: PAINLEVEL: NO PAIN (0)

## 2019-10-17 ASSESSMENT — PATIENT HEALTH QUESTIONNAIRE - PHQ9
SUM OF ALL RESPONSES TO PHQ QUESTIONS 1-9: 0
5. POOR APPETITE OR OVEREATING: NOT AT ALL

## 2019-10-17 NOTE — NURSING NOTE
Chief Complaint   Patient presents with     Recheck Medication     Pt would like to refill medications      Cecilia Martel, EMT at 5:05 PM sign on 10/17/2019    Pt would like flu vaccine. -RS

## 2019-10-17 NOTE — PROGRESS NOTES
[facility]  Primary Care Center   Mariya GRACEMatthew Carolalyse, SABI CNP  10/17/2019      Chief Complaint:   Recheck Medication     History of Present Illness:   Nayeli Caal is a 39 year old female with a history of Usher syndrome, hypertension, hyperlipidemia, type 2 diabetes, and trigger finger who presents with a  for medication recheck. She has no acute concerns today.     Type 2 Diabetes   She saw Dr. Bragg in Endocrinology on 10/7/19 for follow-up on type 2 diabetes. She is not fasting today and will complete the labs ordered by her next week. She thinks her glucose is elevated and she knows from this point on to measure her BG more often at home and start her new medication regimen, per Dr. Bragg's plan. She had lost her glucometer, but is motivated to find it and restart. She reports her Invokana being denied around 3 months ago by her insurance, so she was started on Jardiance instead. She picked up Humulin yesterday, but has not started this yet. She is still using Trulicity. She feels she does not need any refills on lancets or test strips at this time. Of note, she is following with endocrinology clinic on 11/14/19.     Other concerns discussed:  1. Flu Shot - She is open to receiving this today.   2. Swollen Finger - The pointer finger on her right hand is tender and was swollen and stiff on 10/13/19. It is gradually resolving without intervention.   3. Weight - She is frustrated with her weight, as she gains and loses weight alternatively, despite watching her diet.      Review of Systems:   Pertinent items are noted in HPI or as in patient entered ROS below, remainder of complete ROS is negative.     Active Medications:      aspirin (ASA) 81 MG chewable tablet, CHEW AND SWALLOW ONE TABLET BY MOUTH EVERY DAY, Disp: 90 tablet, Rfl: 3     atorvastatin (LIPITOR) 40 MG tablet, Take 1 tablet (40 mg) by mouth daily, Disp: 90 tablet, Rfl: 2     cyclobenzaprine (FLEXERIL) 5 MG  tablet, Take 1-2 tablets (5-10 mg) by mouth 3 times daily as needed for muscle spasms, Disp: 60 tablet, Rfl: 1     dulaglutide (TRULICITY) 1.5 MG/0.5ML pen, Inject 1.5 mg Subcutaneous every 7 days, Disp: 6 mL, Rfl: 3     empagliflozin (JARDIANCE) 25 MG TABS tablet, Take 1 tablet (25 mg) by mouth daily, Disp: 90 tablet, Rfl: 3     ergocalciferol (ERGOCALCIFEROL) 60384 units capsule, Take 1 capsule (50,000 Units) by mouth once a week, Disp: 14 capsule, Rfl: 3     fenofibrate (TRIGLIDE/LOFIBRA) 160 MG tablet, Take 1 tablet (160 mg) by mouth daily, Disp: 90 tablet, Rfl: 1     HYDROcodone-acetaminophen (NORCO) 5-325 MG tablet, Take 1 tablet by mouth every 6 hours as needed for severe pain, Disp: 10 tablet, Rfl: 0     ibuprofen (ADVIL/MOTRIN) 600 MG tablet, Take 1 tablet (600 mg) by mouth every 6 hours as needed for moderate pain, Disp: 120 tablet, Rfl: 1     insulin glargine (BASAGLAR KWIKPEN) 100 UNIT/ML pen, Inject 48 Units Subcutaneous daily Please provide 3 months supply, Disp: 12 mL, Rfl: 3     insulin isophane human (HUMULIN N KWIKPEN) 100 UNIT/ML injection, Inject 15 Units Subcutaneous every morning (before breakfast), Disp: 20 mL, Rfl: 3     losartan (COZAAR) 50 MG tablet, Take 1 tablet (50 mg) by mouth daily, Disp: 90 tablet, Rfl: 3     naproxen (NAPROSYN) 500 MG tablet, Take 1 tablet (500 mg) by mouth 2 times daily as needed for moderate pain, Disp: 30 tablet, Rfl: 1     omega 3 1000 MG CAPS, Take 2 g by mouth daily, Disp: 180 capsule, Rfl: 3     TRULICITY 0.75 MG/0.5ML pen, , Disp: , Rfl:      levonorgestrel (MIRENA) 20 MCG/24HR IUD, 1 each (20 mcg) by Intrauterine route once for 1 dose, Disp: 1 each, Rfl: 0     Allergies:   Patient has no known allergies.      Past Medical History:  Combined visual and hearing impairment   Diabetic retinopathy of both eyes   Hepatic steatosis   Tobacco use   Hyperlipidemia   Hypertension   Hypertriglyceridemia   Migraines   Obesity   Uncontrolled type 2 diabetes   Usher  syndrome: congenital deafness, retinitis pigmentosa   Benign neoplasm of iris   Pemphigus erythematosus   Chondromalacia of patella, right   Obesity   Dry eye syndrome  Hypersomnia       Past Surgical History:  Laparoscopic cholecystostomy   Release trigger finger x2, bilateral     Family History:    History reviewed. No pertinent family history.     Immunizations:  Immunization History   Administered Date(s) Administered     HEPA 05/18/2007, 10/07/2008     HepB 08/06/2001, 01/03/2003, 04/09/2013     Influenza (IIV3) PF 10/09/2011, 11/12/2013, 10/23/2014, 11/19/2015     Influenza Vaccine IM > 6 months Valent IIV4 10/17/2017, 10/02/2018     MMR 05/21/2007     Pneumo Conj 13-V (2010&after) 10/23/2014     Pneumococcal 23 valent 07/18/2006     TD (ADULT, 7+) 11/01/1999     Tdap (Adacel,Boostrix) 01/09/2009     Social History:   The patient was accompanied to the appointment by:    Smoking Status: Former smoker, quit 2010  Smokeless Tobacco: Never used   Alcohol Use: Yes   Employment status: Employed      Physical Exam:   /76 (BP Location: Right arm, Patient Position: Sitting, Cuff Size: Adult Regular)   Pulse 83   Wt 81.2 kg (179 lb)   SpO2 98%   Breastfeeding? No   BMI 32.74 kg/m       Wt Readings from Last 1 Encounters:   10/17/19 81.2 kg (179 lb)     Constitutional: no distress, comfortable, pleasant   Eyes: anicteric, conjunctiva pink. .   Cardiovascular: regular rate and rhythm, normal S1 and S2, no murmurs, rubs or gallops.   Respiratory: clear to auscultation, no wheezes or crackles, normal breath sounds   Gastrointestinal: positive bowel sounds, nontender, no hepatosplenomegaly, no masses   Musculoskeletal: full range of motion, no edema   Skin: no concerning lesions, no jaundice, temp normal   Neurological: . A and O x 3, good historian.  Psychological: appropriate mood, good eye contact, normal affect      Assessment and Plan:    We reviewed and updated her medications as  appropriate. She will complete labs next week when fasting. Encouraged her to measure BG at home and continue with healthy lifestyle modifications.     Need for influenza vaccination  - FLU VAC PRESRV FREE QUAD SPLIT VIR 3+YRS IM    Uncontrolled type 2 diabetes mellitus with hyperglycemia, with long-term current use of insulin (H)  - losartan (COZAAR) 50 MG tablet  Dispense: 90 tablet; Refill: 3    Type 1 diabetes mellitus with complications (H)  - aspirin (ASA) 81 MG chewable tablet  Dispense: 90 tablet; Refill: 3    Mixed hyperlipidemia  - fenofibrate (TRIGLIDE/LOFIBRA) 160 MG tablet  Dispense: 90 tablet; Refill: 1    Type 2 diabetes mellitus with complication, with long-term current use of insulin (H)  - insulin glargine (BASAGLAR KWIKPEN) 100 UNIT/ML pen  Dispense: 12 mL; Refill: 3     Follow-up: Return in about 3 months (around 1/17/2020).      Total time spent 25 minutes.  More than 50% of the time spent with Ms. Ingrid Caal on counseling / coordinating her care.    Mariya CELESTIN CNP    Scribe Disclosure:  I, Francisca Flores, am serving as a scribe to document services personally performed by SABI Morrison CNP at this visit, based upon the provider's statements to me. All documentation has been reviewed by the aforementioned provider prior to being entered into the official medical record.     Portions of this medical record were completed by a scribe. UPON MY REVIEW AND AUTHENTICATION BY ELECTRONIC SIGNATURE, this confirms (a) I performed the applicable clinical services, and (b) the record is accurate.

## 2019-10-18 ASSESSMENT — ANXIETY QUESTIONNAIRES: GAD7 TOTAL SCORE: 0

## 2019-10-21 RX ORDER — ASPIRIN 81 MG/1
TABLET, CHEWABLE ORAL
Qty: 90 TABLET | Refills: 1 | OUTPATIENT
Start: 2019-10-21

## 2019-11-20 ENCOUNTER — OFFICE VISIT (OUTPATIENT)
Dept: INTERNAL MEDICINE | Facility: CLINIC | Age: 39
End: 2019-11-20
Payer: COMMERCIAL

## 2019-11-20 VITALS
DIASTOLIC BLOOD PRESSURE: 71 MMHG | SYSTOLIC BLOOD PRESSURE: 131 MMHG | BODY MASS INDEX: 33.29 KG/M2 | OXYGEN SATURATION: 98 % | WEIGHT: 182 LBS | HEART RATE: 71 BPM | RESPIRATION RATE: 16 BRPM

## 2019-11-20 DIAGNOSIS — L30.9 DERMATITIS: Primary | ICD-10-CM

## 2019-11-20 DIAGNOSIS — E55.9 VITAMIN D DEFICIENCY: ICD-10-CM

## 2019-11-20 DIAGNOSIS — E11.8 TYPE 2 DIABETES MELLITUS WITH COMPLICATION, WITH LONG-TERM CURRENT USE OF INSULIN (H): Primary | ICD-10-CM

## 2019-11-20 DIAGNOSIS — Z79.4 TYPE 2 DIABETES MELLITUS WITH COMPLICATION, WITH LONG-TERM CURRENT USE OF INSULIN (H): Primary | ICD-10-CM

## 2019-11-20 RX ORDER — ERGOCALCIFEROL 1.25 MG/1
50000 CAPSULE ORAL WEEKLY
Qty: 14 CAPSULE | Refills: 3 | Status: SHIPPED | OUTPATIENT
Start: 2019-11-20 | End: 2021-01-08

## 2019-11-20 RX ORDER — TRIAMCINOLONE ACETONIDE 1 MG/G
CREAM TOPICAL 3 TIMES DAILY
Qty: 60 G | Refills: 0 | Status: SHIPPED | OUTPATIENT
Start: 2019-11-20 | End: 2022-02-08

## 2019-11-20 ASSESSMENT — PAIN SCALES - GENERAL: PAINLEVEL: NO PAIN (0)

## 2019-11-20 NOTE — TELEPHONE ENCOUNTER
[facility] patient. Will send to clinic to review.       Lucia Tim RN  Endocrine Care Coordinator  [facility]

## 2019-11-20 NOTE — TELEPHONE ENCOUNTER
Faxed refill request received from [place], [place] - [address] [place] [address] [phone]     ergocalciferol (ERGOCALCIFEROL) 16971 units capsule 14 capsule 3 9/28/2018  --   Sig - Route: Take 1 capsule (50,000 Units) by mouth once a week - Oral       Last Office Visit: 10/7/19  Next Appointment Scheduled for: 1/13/20    Kirsten Gomez CMA  Adult Endocrinology  [facility]

## 2019-11-21 RX ORDER — PEN NEEDLE, DIABETIC 31 GX5/16"
NEEDLE, DISPOSABLE MISCELLANEOUS
Qty: 100 EACH | Refills: 3 | Status: SHIPPED | OUTPATIENT
Start: 2019-11-21 | End: 2020-12-05

## 2019-11-21 NOTE — TELEPHONE ENCOUNTER
BD ULTRA FINE PEN NEEDLES.     Last Written Prescription Date:  5/17/18  Last Fill Quantity: 400 units,   # refills: 11  Last Office Visit : 10/7/19  Future Office visit:  1/13/20    Refill to pharmacy.

## 2019-11-21 NOTE — PATIENT INSTRUCTIONS
Primary Care Center Medication Refill Request Information:  * Please contact your pharmacy regarding ANY request for medication refills.  ** [facility] Prescription Fax = [phone]  * Please allow 3 business days for routine medication refills.  * Please allow 5 business days for controlled substance medication refills.     [facility] Center Test Result notification information:  *You will be notified with in 7-10 days of your appointment day regarding the results of your test.  If you are on MyChart you will be notified as soon as the provider has reviewed the results and signed off on them.

## 2019-11-21 NOTE — PROGRESS NOTES
HPI  Patient seen today with the assistance of a .  She reports onset today of a scattered red irregular rash on her forearm left hand and a little bit on 1 of her right knuckles.  Does not itch is not associated with pain.  Couple years ago she had a severe rash that involved the entire body.  She also noted some lesions on her buttocks as well.  She is concerned about so she is concerned about some diffuse itching but reports that the rash itself does not itch.  She is never had similar rash in the past.  She has no known allergies at all.  Otherwise she is feeling well  Past Medical History:   Diagnosis Date     Combined visual and hearing impairment      Deaf      Diabetic retinopathy of both eyes (H) 8/19/2011     Hepatic steatosis 08/19/2011     History of tobacco use      Hyperlipidemia      Hypertension      Hypertriglyceridemia      Migraines      Obesity 8/19/2011     Problem list name updated by automated process. 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     Past Surgical History:   Procedure Laterality Date     LAPAROSCOPIC CHOLECYSTECTOMY N/A 3/10/2018    Procedure: LAPAROSCOPIC CHOLECYSTECTOMY;  Laparoscopic Cholecystectomy ;  Surgeon: Kuldeep Sigala MD;  Location: [facility] OR     RELEASE TRIGGER FINGER Right 5/2/2019    Procedure: Right Thumb Trigger Release;  Surgeon: Greg Streeter MD;  Location: [facility] OR     RELEASE TRIGGER FINGER Left 5/30/2019    Procedure: Left Ring Trigger Finger Release.  Ganglion cyst excision.;  Surgeon: Greg Streeter MD;  Location: [facility] OR     Family History   Problem Relation Age of Onset     Unknown/Adopted Other      Social History     Socioeconomic History     Marital status: Single     Spouse name: None     Number of children: None     Years of education: None     Highest  education level: None   Occupational History     None   Social Needs     Financial resource strain: None     Food insecurity:     Worry: None     Inability: None     Transportation needs:     Medical: None     Non-medical: None   Tobacco Use     Smoking status: Former Smoker     Types: Cigarettes     Last attempt to quit: 2010     Years since quittin.9     Smokeless tobacco: Never Used     Tobacco comment: stopped 2 yrs ago   Substance and Sexual Activity     Alcohol use: Yes     Comment: occasionally     Drug use: No     Sexual activity: Not Currently     Partners: Male   Lifestyle     Physical activity:     Days per week: None     Minutes per session: None     Stress: None   Relationships     Social connections:     Talks on phone: None     Gets together: None     Attends Bahai service: None     Active member of club or organization: None     Attends meetings of clubs or organizations: None     Relationship status: None     Intimate partner violence:     Fear of current or ex partner: None     Emotionally abused: None     Physically abused: None     Forced sexual activity: None   Other Topics Concern     Parent/sibling w/ CABG, MI or angioplasty before 65F 55M? Not Asked      Service No     Blood Transfusions No     Caffeine Concern No     Occupational Exposure No     Hobby Hazards No     Sleep Concern No     Stress Concern No     Weight Concern Yes     Special Diet No     Back Care No     Exercise Yes     Comment: 4-5 x a week     Bike Helmet No     Seat Belt Yes     Self-Exams Yes   Social History Narrative     None       Exam:  /71   Pulse 71   Resp 16   Wt 82.6 kg (182 lb)   LMP  (LMP Unknown)   SpO2 98%   BMI 33.29 kg/m    182 lbs 0 oz  Patient alert oriented with a clear sensorium her skin appears to be dry diffusely she has some excoriations on her right buttock and she has some irregular erythematous not raised lesions on her forearm and a little bit of dry scaling over the  knuckles bilaterally    ASSESSMENT  1 xerosis  2 eczema of the left forearm    Plan never use triamcinolone for the forearm have her use lotion for the itching and the dry skin follow-up with dermatology next week if symptoms are not improved    This note was completed using Dragon voice recognition software.  Although reviewed after completion, some word and grammatical errors may occur.    Kuldeep Lakhani MD  General Internal Medicine  [facility] Center  [phone]

## 2019-11-21 NOTE — NURSING NOTE
Chief Complaint   Patient presents with     Derm Problem     Left arm rash.        eBss Morales, EMT

## 2019-12-09 ENCOUNTER — MYC MEDICAL ADVICE (OUTPATIENT)
Dept: INTERNAL MEDICINE | Facility: CLINIC | Age: 39
End: 2019-12-09

## 2019-12-09 DIAGNOSIS — M79.644 PAIN OF FINGER OF RIGHT HAND: Primary | ICD-10-CM

## 2019-12-10 NOTE — TELEPHONE ENCOUNTER
Dhaval Meraz is requesting orthopedic referral for hand surgeon due to the recurring finger problem. OK to place the referral ?    [facility]

## 2019-12-30 ENCOUNTER — OFFICE VISIT (OUTPATIENT)
Dept: ORTHOPEDICS | Facility: CLINIC | Age: 39
End: 2019-12-30
Attending: NURSE PRACTITIONER
Payer: COMMERCIAL

## 2019-12-30 DIAGNOSIS — M65.30 TRIGGER FINGER OF RIGHT HAND, UNSPECIFIED FINGER: Primary | ICD-10-CM

## 2019-12-30 RX ADMIN — BETAMETHASONE SODIUM PHOSPHATE AND BETAMETHASONE ACETATE 6 MG: 3; 3 INJECTION, SUSPENSION INTRA-ARTICULAR; INTRALESIONAL; INTRAMUSCULAR; SOFT TISSUE at 15:09

## 2019-12-30 RX ADMIN — LIDOCAINE HYDROCHLORIDE 1 ML: 10 INJECTION, SOLUTION EPIDURAL; INFILTRATION; INTRACAUDAL; PERINEURAL at 15:00

## 2019-12-30 RX ADMIN — LIDOCAINE HYDROCHLORIDE 1 ML: 10 INJECTION, SOLUTION EPIDURAL; INFILTRATION; INTRACAUDAL; PERINEURAL at 15:09

## 2019-12-30 RX ADMIN — BETAMETHASONE SODIUM PHOSPHATE AND BETAMETHASONE ACETATE 6 MG: 3; 3 INJECTION, SUSPENSION INTRA-ARTICULAR; INTRALESIONAL; INTRAMUSCULAR; SOFT TISSUE at 15:00

## 2019-12-30 NOTE — PROGRESS NOTES
[facility]  Orthopedics  Greg Streeter MD  2019     Name: Nayeli Caal  MRN: [id number]  Age: 39 year old  : 1980  Referring provider: Greg Streeter     Chief Complaint: RECHECK (DOS 19 R thumb trigger release, DOS L 19 L Ring finger trigger release . Discuss L middle and R pointer finger  issues)      Date of Surgery:  19, 19    Procedure: Right thumb trigger release; Left ring finger trigger  Release, retinacular ganglion cyst excision    History of Present Illness:   Nayeli Caal is a 39 year old female with a history of diabetes who is 7 months status-post the above procedure who presents for postoperative evaluation.  Her right thumb and left ring finger are doing great.  No triggering here.  However, patient reports a new onset of pain, swelling, and triggering in her right index finger that began on October. She also reports a more recent onset of left middle finger pain and triggering. She has particular difficulty opening her fingers in the morning. Patient reports that she has difficulty performing signs due to her persistent trigger fingers. She notes that her current A1C is 9.9.     History obtain from ASL interpretor.     Physical Examination:  Well developed, well nourished, in no acute distress. Follow instructions appropriately. Alert and oriented to surroundings.  Communicates via American sign language.  On examination of bilateral upper extremities:.   Skin is intact.  Previous wounds are well-healed.  Fingers are well-perfused.  Right hand:  Tenderness over index A1 pulley. Mild swelling of the digit. No masses or other deformity. Palpable triggering with flexion and extension.  No triggering of right ring finger.  No contracture.    Left upper extremity:  Tenderness over middle finger A1 pulley. Mild swelling of the digit. No masses or other deformity. Palpable triggering with flexion and extension.  No triggering of right thumb.  No  contracture.        Assessment:   39 year old female s/p the above surgeries with new trigger fingers in her right index and left middle fingers.  We had a long discussion.  She is wondering why she keeps getting trigger fingers.  I discussed with her that her poorly controlled diabetes is likely a major contributor to this.  She expressed understanding.  She is familiar with the different treatment options for trigger finger.  We reviewed these.  We discussed observation, steroid injection, and surgery.  Ultimately, she would like to undergo surgery on the right index finger given the duration of her symptoms here.  However, she also understands that we will not be able to do this immediately given her high A1c.  She will work on controlling her A1c and we will plan to go ahead with injections today.  If these are not effective, we will plan for a right index finger trigger release once her A1c is better controlled and at a minimum of 6 weeks from today.  We discussed the effect of her blood on her blood sugar of doing 2 injections at the same time and possibly stating the injections.  SHe says she has had this done with 2 injections on Monday before without problems and so prefers to do everything today.  I did advise her to keep a close watch on her blood sugar today and tomorrow and call her primary if they rise excessively.     Plan:        Patient elects to proceed with a right index and left middle finger corticosteroid injections at this time.    Plan for right index finger trigger release if this is ineffective once her A1c is better controlled, in 6 weeks or greater from today.    Procedure:   After written informed consent was obtained, the patient's left middle finger was prepped with chloraprep.  2 ccs of a 1-1 mixture of 1% lidocaine and 30 mg/5mL betamethasone were injected into the left middle finger at the A1 pulley.  There were no immediate complications.    Procedure:   After written informed  consent was obtained, the patient's right index finger was prepped with chloraprep.  2 ccs of a 1-1 mixture of 1% lidocaine and 30 mg/5mL betamethasone were injected into the right index finger at the A1 pulley.  There were no immediate complications.        Scribe Disclosure:  I, Jamir Lake, am serving as a scribe to document services personally performed by Greg Streeter MD at this visit, based upon the provider's statements to me. All documentation has been reviewed by the aforementioned provider prior to being entered into the official medical record.

## 2019-12-30 NOTE — LETTER
2019       RE: Nayeli Caal  [address]  [facility] [address]     Dear Colleague,    Thank you for referring your patient, Nayeli Caal, to the [facility] at [facility]. Please see a copy of my visit note below.    [facility]  Orthopedics  Greg Streeter MD  2019     Name: Nayeli Caal  MRN: [id number]  Age: 39 year old  : 1980  Referring provider: Greg Streeter     Chief Complaint: RECHECK (DOS 19 R thumb trigger release, DOS L 19 L Ring finger trigger release . Discuss L middle and R pointer finger  issues)      Date of Surgery:   19, 19    Procedure: Right thumb trigger release; Left ring finger trigger  Release, retinacular ganglion cyst excision    History of Present Illness:   Nayeli Caal is a 39 year old female with a history of diabetes who is 7 months status-post the above procedure who presents for postoperative evaluation.  Her right thumb and left ring finger are doing great.  No triggering here.  However, patient reports a new onset of pain, swelling, and triggering in her right index finger that began on October. She also reports a more recent onset of left middle finger pain and triggering. She has particular difficulty opening her fingers in the morning. Patient reports that she has difficulty performing signs due to her persistent trigger fingers. She notes that her current A1C is 9.9.     History obtain from ASL interpretor.     Physical Examination:  Well developed, well nourished, in no acute distress. Follow instructions appropriately. Alert and oriented to surroundings.  Communicates via American sign language.  On examination of bilateral upper extremities:.   Skin is intact.  Previous wounds are well-healed.  Fingers are well-perfused.  Right hand:  Tenderness over index A1 pulley. Mild swelling of the digit. No masses or other deformity. Palpable  triggering with flexion and extension.  No triggering of right ring finger.  No contracture.    Left upper extremity:  Tenderness over middle finger A1 pulley. Mild swelling of the digit. No masses or other deformity. Palpable triggering with flexion and extension.  No triggering of right thumb.  No contracture.      Assessment:   39 year old female s/p  the above surgeries with new trigger fingers in her right index and left middle fingers.  We had a long discussion.  She is wondering why she keeps getting trigger fingers.  I discussed with her that her poorly controlled diabetes is likely a major contributor to this.  She expressed understanding.  She is familiar with the different treatment options for trigger finger.  We reviewed these.  We discussed observation, steroid injection, and surgery.  Ultimately, she would like to undergo surgery on the right index finger given the duration of her symptoms here.  However, she also understands that we will not be able to do this immediately given her high A1c.  She will work on controlling her A1c and we will plan to go ahead with injections today.  If these are not effective, we will plan for a right index finger trigger release once her A1c is better controlled and at a minimum of 6 weeks from today.  We discussed the effect of her blood on her blood sugar of doing 2 injections at the same time and possibly stating the injections.  SHe says she has had this done with 2 injections on Monday before without problems and so prefers to do everything today.  I did advise her to keep a close watch on her blood sugar today and tomorrow and call her primary if they rise excessively.     Plan:        Patient elects to proceed with a right index and left middle finger corticosteroid injections at this time.    Plan for right index finger trigger release if this is ineffective once her A1c is better controlled, in 6 weeks or greater from today.    Procedure:   After written  informed consent was obtained, the patient's left middle finger was prepped with chloraprep.  2 ccs of a 1-1 mixture of 1% lidocaine and 30 mg/5mL betamethasone were injected into the left middle finger at the A1 pulley.  There were no immediate complications.    Procedure:   After written informed consent was obtained, the patient's right index finger was prepped with chloraprep.  2 ccs of a 1-1 mixture of 1% lidocaine and 30 mg/5mL betamethasone were injected into the right index finger at the A1 pulley.  There were no immediate complications.    Scribe Disclosure:  Jamir MONREAL, am serving as a scribe to document services personally performed by Greg Streeter MD at this visit, based upon the provider's statements to me. All documentation has been reviewed by the aforementioned provider prior to being entered into the official medical record.    Hand / Upper Extremity Injection/Arthrocentesis: R index A1  Date/Time: 12/30/2019 3:00 PM  Performed by: Greg Streeter MD  Authorized by: Greg Streeter MD     Needle Size:  27 G  Guidance: landmark    Condition: trigger finger    Location:  Index finger    Site:  R index A1  Medications:  6 mg betamethasone acet & sod phos 6 (3-3) MG/ML; 1 mL lidocaine (PF) 1 %  Outcome:  Tolerated well, no immediate complications  Procedure discussed: discussed risks, benefits, and alternatives    Consent Given by:  Patient  Timeout: timeout called immediately prior to procedure    Prep: patient was prepped and draped in usual sterile fashion      Hand / Upper Extremity Injection/Arthrocentesis: L long A1  Date/Time: 12/30/2019 3:09 PM  Performed by: Greg Streeter MD  Authorized by: Greg Streeter MD     Condition: trigger finger    Location:  Long finger    Site:  L long A1  Medications:  6 mg betamethasone acet & sod phos 6 (3-3) MG/ML; 1 mL lidocaine (PF) 1 %  Outcome:  Tolerated well, no immediate complications  Procedure discussed: discussed  risks, benefits, and alternatives    Consent Given by:  Patient  Timeout: timeout called immediately prior to procedure    Prep: patient was prepped and draped in usual sterile fashion        [facility]  [address] [id number] [phone]  Dept: [phone]  ______________________________________________________________________________    Patient: Nayeli Caal   : 1980   MRN: [id number]   2019    INVASIVE PROCEDURE SAFETY CHECKLIST    Date: 2019   Procedure: Right index and left middle finger steroid injections  Patient Name: Nayeli Caal  MRN: [id number]  YOB: 1980    Action: Complete sections as appropriate. Any discrepancy results in a HARD COPY until resolved.     PRE PROCEDURE:  Patient ID verified with 2 identifiers (name and  or MRN): Yes  Procedure and site verified with patient/designee (when able): Yes  Accurate consent documentation in medical record: Yes  H&P (or appropriate assessment) documented in medical record: Yes  H&P must be up to 20 days prior to procedure and updates within 24 hours of procedure as applicable: Yes  Relevant diagnostic and radiology test results appropriately labeled and displayed as applicable: Yes  Procedure site(s) marked with provider initials: Yes    TIMEOUT:  Time-Out performed immediately prior to starting procedure, including verbal and active participation of all team members addressing the following:Yes  * Correct patient identify  * Confirmed that the correct side and site are marked  * An accurate procedure consent form  * Agreement on the procedure to be done  * Correct patient position  * Relevant images and results are properly labeled and appropriately displayed  * The need to administer antibiotics or fluids for irrigation purposes during the procedure as applicable   * Safety precautions based on patient history or medication use    DURING  PROCEDURE: Verification of correct person, site, and procedures any time the responsibility for care of the patient is transferred to another member of the care team.     The following medications were given:     Prior to injection, verified patient identity using patient's name and date of birth.  Due to injection administration, patient instructed to remain in clinic for 15 minutes  afterwards, and to report any adverse reaction to me immediately.    Tendon sheath injection was performed.   Medication Name: Lidocaine NDC: 31460-993-79  Drug Amount Wasted:  Yes: 3 mg/ml   Vial/Syringe: Single dose vial  Expiration Date:  10/23/19    Medication Name: Celestone NDC: 2750-1126-22  Drug Amount Wasted:  Yes: 3 mg/ml   Vial/Syringe: Single dose vial  Expiration Date:  8/1/20    Milagro Merrill ATC    Again, thank you for allowing me to participate in the care of your patient.      Sincerely,    Greg Streeter MD

## 2019-12-30 NOTE — PROGRESS NOTES
Hand / Upper Extremity Injection/Arthrocentesis: R index A1  Date/Time: 2019 3:00 PM  Performed by: Greg Streeter MD  Authorized by: Greg Streeter MD     Needle Size:  27 G  Guidance: landmark    Condition: trigger finger    Location:  Index finger    Site:  R index A1  Medications:  6 mg betamethasone acet & sod phos 6 (3-3) MG/ML; 1 mL lidocaine (PF) 1 %  Outcome:  Tolerated well, no immediate complications  Procedure discussed: discussed risks, benefits, and alternatives    Consent Given by:  Patient  Timeout: timeout called immediately prior to procedure    Prep: patient was prepped and draped in usual sterile fashion      Hand / Upper Extremity Injection/Arthrocentesis: L long A1  Date/Time: 2019 3:09 PM  Performed by: Greg Streeter MD  Authorized by: Greg Streeter MD     Condition: trigger finger    Location:  Long finger    Site:  L long A1  Medications:  6 mg betamethasone acet & sod phos 6 (3-3) MG/ML; 1 mL lidocaine (PF) 1 %  Outcome:  Tolerated well, no immediate complications  Procedure discussed: discussed risks, benefits, and alternatives    Consent Given by:  Patient  Timeout: timeout called immediately prior to procedure    Prep: patient was prepped and draped in usual sterile fashion        [facility]  [address] [place] [id number]  [phone]  Dept: [phone]  ______________________________________________________________________________    Patient: Nayeli Caal   : 1980   MRN: [id number]   2019    INVASIVE PROCEDURE SAFETY CHECKLIST    Date: 2019   Procedure: Right index and left middle finger steroid injections  Patient Name: Nayeli Caal  MRN: [id number]  YOB: 1980    Action: Complete sections as appropriate. Any discrepancy results in a HARD COPY until resolved.     PRE PROCEDURE:  Patient ID verified with 2 identifiers (name and  or  KYLAH): Yes  Procedure and site verified with patient/designee (when able): Yes  Accurate consent documentation in medical record: Yes  H&P (or appropriate assessment) documented in medical record: Yes  H&P must be up to 20 days prior to procedure and updates within 24 hours of procedure as applicable: Yes  Relevant diagnostic and radiology test results appropriately labeled and displayed as applicable: Yes  Procedure site(s) marked with provider initials: Yes    TIMEOUT:  Time-Out performed immediately prior to starting procedure, including verbal and active participation of all team members addressing the following:Yes  * Correct patient identify  * Confirmed that the correct side and site are marked  * An accurate procedure consent form  * Agreement on the procedure to be done  * Correct patient position  * Relevant images and results are properly labeled and appropriately displayed  * The need to administer antibiotics or fluids for irrigation purposes during the procedure as applicable   * Safety precautions based on patient history or medication use    DURING PROCEDURE: Verification of correct person, site, and procedures any time the responsibility for care of the patient is transferred to another member of the care team.       The following medications were given:         Prior to injection, verified patient identity using patient's name and date of birth.  Due to injection administration, patient instructed to remain in clinic for 15 minutes  afterwards, and to report any adverse reaction to me immediately.    Tendon sheath injection was performed.   Medication Name: Lidocaine NDC: 46476-354-44  Drug Amount Wasted:  Yes: 3 mg/ml   Vial/Syringe: Single dose vial  Expiration Date:  10/23/19    Medication Name: Celestone NDC: 6381-4009-52  Drug Amount Wasted:  Yes: 3 mg/ml   Vial/Syringe: Single dose vial  Expiration Date:  8/1/20    Milagro Merrill ATC

## 2019-12-30 NOTE — NURSING NOTE
Reason For Visit:   Chief Complaint   Patient presents with     RECHECK     DOS 5/02/19 R thumb trigger release, DOS L 5/30/19 L Ring finger trigger release . Discuss L middle and R pointer finger  issues     Primary MD: Mariya Mohamud    Age: 39 year old    ?  Yes, specify language: American Sign Language    There were no vitals taken for this visit.    Pain Assessment  Patient Currently in Pain: Yes  0-10 Pain Scale: 5(5 is L. 6 in R.)    QuickDASH Assessment  No flowsheet data found.     Current Outpatient Medications   Medication Sig Dispense Refill     Alcohol Swabs (ALCOHOL PREP PAD) 70 % PADS 1 each 3 times daily 100 each 3     aspirin (ASA) 81 MG chewable tablet CHEW AND SWALLOW ONE TABLET BY MOUTH EVERY DAY 90 tablet 3     atorvastatin (LIPITOR) 40 MG tablet Take 1 tablet (40 mg) by mouth daily 90 tablet 2     B-D U/F 31G X 8 MM insulin pen needle USE AS DIRECTED 100 each 3     B-D U/F insulin pen needle        BD ULTRA FINE PEN NEEDLES Inject 1 dose. Subcutaneous 2 times daily BD ultra-fine insulin syringe, 30 ga. X 1/2'' short needle 1/2 cc. Use as directed. 400 Units 11     blood glucose (ACCU-CHEK LAYA PLUS) test strip Use with  Accucheck Expert meter.  Test blood sugar 6 times daily. 600 each 3     blood glucose monitoring (ACCU-CHEK FASTCLIX) lancets Use to test blood sugar 6 times daily or as directed 6 Box 3     continuous blood glucose monitoring (FREESTYLE ZORAIDA) sensor For use with Freestyle Zoraida Flash  for continuous monitioring of blood glucose levels. Replace sensor every 14 days. 6 each 3     cyclobenzaprine (FLEXERIL) 5 MG tablet Take 1-2 tablets (5-10 mg) by mouth 3 times daily as needed for muscle spasms 60 tablet 1     dulaglutide (TRULICITY) 1.5 MG/0.5ML pen Inject 1.5 mg Subcutaneous every 7 days 6 mL 3     empagliflozin (JARDIANCE) 25 MG TABS tablet Take 1 tablet (25 mg) by mouth daily 90 tablet 3     ergocalciferol (ERGOCALCIFEROL) 1.25 MG (99425 UT) capsule  Take 1 capsule (50,000 Units) by mouth once a week 14 capsule 3     fenofibrate (TRIGLIDE/LOFIBRA) 160 MG tablet Take 1 tablet (160 mg) by mouth daily 90 tablet 1     HYDROcodone-acetaminophen (NORCO) 5-325 MG tablet Take 1 tablet by mouth every 6 hours as needed for severe pain 10 tablet 0     ibuprofen (ADVIL/MOTRIN) 600 MG tablet Take 1 tablet (600 mg) by mouth every 6 hours as needed for moderate pain 120 tablet 1     insulin glargine (BASAGLAR KWIKPEN) 100 UNIT/ML pen Inject 48 Units Subcutaneous daily Please provide 3 months supply 12 mL 3     insulin isophane human (HUMULIN N KWIKPEN) 100 UNIT/ML injection Inject 15 Units Subcutaneous every morning (before breakfast) 20 mL 3     losartan (COZAAR) 50 MG tablet Take 1 tablet (50 mg) by mouth daily 90 tablet 3     naproxen (NAPROSYN) 500 MG tablet Take 1 tablet (500 mg) by mouth 2 times daily as needed for moderate pain 30 tablet 1     omega 3 1000 MG CAPS Take 2 g by mouth daily 180 capsule 3     Ostomy Supplies (ADHESIVE REMOVER WIPES) MISC 1 each every 14 days 50 each 3     Ostomy Supplies (SKIN TAC ADHESIVE BARRIER WIPE) MISC 1 each every 14 days 6 each 3     triamcinolone (KENALOG) 0.1 % external cream Apply topically 3 times daily To affected areas 60 g 0     TRULICITY 0.75 MG/0.5ML pen        Wound Dressing Adhesive (MASTISOL ADHESIVE) LIQD Use with Evelia sensor twice monthly. 48 mL 3     levonorgestrel (MIRENA) 20 MCG/24HR IUD 1 each (20 mcg) by Intrauterine route once for 1 dose 1 each 0     No Known Allergies    Sara Gutierrez ATC

## 2019-12-30 NOTE — NURSING NOTE
Teaching Flowsheet   Relevant Diagnosis: Index trigger finger of right hand   Teaching Topic: Right index finger trigger release    CSC under local anesthetic with Dr Greg Streeter  BMI 33.29  DM Type 2,  A1C 9.9% on 10-7-19.    Patient hearing impaired, uses ASL/ .  Patient is going to work hard on her diabetes control and call to schedule her surgery after her next A1C in approximately April of 2020.    Person(s) involved in teaching:   Patient and      Motivation Level:  Asks Questions: Yes  Eager to Learn: Yes  Cooperative: Yes  Receptive (willing/able to accept information): Yes  Any cultural factors/Orthodox beliefs that may influence understanding or compliance? No, other than hearing impaired which may impact ability to communicate.     Patient demonstrates understanding of the following:  Reason for the appointment, diagnosis and treatment plan: Yes  Knowledge of proper use of medications and conditions for which they are ordered (with special attention to potential side effects or drug interactions): Yes  Which situations necessitate calling provider and whom to contact: Yes       Teaching Concerns Addressed:        Proper use and care of  (medical equip, care aids, etc.): Yes  Nutritional needs and diet plan: Yes  Pain management techniques: Yes  Wound Care: Yes  How and/when to access community resources: Yes     Instructional Materials Used/Given: Preoperative teaching packet, antibacterial soap  Kristy Scott RN

## 2020-01-01 RX ORDER — LIDOCAINE HYDROCHLORIDE 10 MG/ML
1 INJECTION, SOLUTION EPIDURAL; INFILTRATION; INTRACAUDAL; PERINEURAL
Status: DISCONTINUED | OUTPATIENT
Start: 2019-12-30 | End: 2021-04-05

## 2020-01-01 RX ORDER — BETAMETHASONE SODIUM PHOSPHATE AND BETAMETHASONE ACETATE 3; 3 MG/ML; MG/ML
6 INJECTION, SUSPENSION INTRA-ARTICULAR; INTRALESIONAL; INTRAMUSCULAR; SOFT TISSUE
Status: DISCONTINUED | OUTPATIENT
Start: 2019-12-30 | End: 2021-04-05

## 2020-01-13 ENCOUNTER — OFFICE VISIT (OUTPATIENT)
Dept: ENDOCRINOLOGY | Facility: CLINIC | Age: 40
End: 2020-01-13
Payer: COMMERCIAL

## 2020-01-13 VITALS
SYSTOLIC BLOOD PRESSURE: 116 MMHG | DIASTOLIC BLOOD PRESSURE: 76 MMHG | HEART RATE: 69 BPM | BODY MASS INDEX: 33 KG/M2 | WEIGHT: 180.4 LBS

## 2020-01-13 DIAGNOSIS — E78.5 DYSLIPIDEMIA: ICD-10-CM

## 2020-01-13 DIAGNOSIS — I10 BENIGN ESSENTIAL HYPERTENSION: ICD-10-CM

## 2020-01-13 DIAGNOSIS — E11.65 UNCONTROLLED TYPE 2 DIABETES MELLITUS WITH HYPERGLYCEMIA, WITH LONG-TERM CURRENT USE OF INSULIN (H): Primary | ICD-10-CM

## 2020-01-13 DIAGNOSIS — Z79.4 UNCONTROLLED TYPE 2 DIABETES MELLITUS WITH HYPERGLYCEMIA, WITH LONG-TERM CURRENT USE OF INSULIN (H): Primary | ICD-10-CM

## 2020-01-13 LAB — HBA1C MFR BLD: 10 % (ref 4.3–6)

## 2020-01-13 ASSESSMENT — PAIN SCALES - GENERAL: PAINLEVEL: NO PAIN (0)

## 2020-01-13 NOTE — LETTER
1/13/2020       RE: Nayeli Caal  [address]  [facility] [address]     Dear Colleague,    Thank you for referring your patient, Nayeli Caal, to the [facility] ENDOCRINOLOGY at [facility]. Please see a copy of my visit note below.      Pt seen with the assistance of an in person .     Hemoglobin A1c was 10% today, unchanged since her last visit here.    At her last visit, she was started on treatment with Humulin N, 15 units before breakfast.  Humulin and was added to her regular dose of Basaglar, 36 units at bedtime.  She administers Humulin before breakfast, around 5 to 5:30 AM and Basaglar at bedtime (9-10 PM).  During weekends, she has a later breakfast, around 6 AM.  In general, dinner is around 5 PM and the patient denies having bedtime snacks.  Since her last visit here, she quit drinking Dr. Pepper.  She reports being compliant in taking Lipitor, fenofibrate, Jardiance and Trulicity. She was not been able to tolerate metformin, as even 1 tablet causes same significant diarrhea. On average, she misses the Basaglar dose 2-3 times a month.  Denies hypoglycemic symptoms although last night, she remembers waking up feeling dizzy.  She did not check her blood sugar and she just had some cheese.    The glucometer readings revealed that she checks her blood sugar 1-3 times daily, almost always before breakfast and less frequent before lunch, before dinner or at bedtime.  On the glucometer, the average blood glucose over the last month is 153, with a standard deviation of 45.  52% of the blood sugar numbers are within target and 32% are above.  The patient continues to enter the carbohydrate intake in the glucometer, although inconsistently.  On average, she has 30 to 40 g of carbohydrates with her meals.  The lowest documented blood sugar by her meter is 70.  The evening blood sugar numbers tend to be higher, the morning blood sugar is well  controlled.  She continues to use the Accu-Chek expert to help her determine the NovoLog dose.   Meter settings:   Insulin to Carb Ratio: 8  Correction Factor: 50  BG Target: 100-150  Offset time 2 hrs   Acting time 3:30 hrs     Diabetes complications:  Retinopathy: last eye exam - 9/19. 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  Nephropathy: h/o proteinuria; normal GFR. Now on 50mg losartan daily (tx with lisinopril was associated with a dry cough).   Neuropathy: No N/T in feet.     Current Outpatient Medications   Medication     aspirin (ASA) 81 MG chewable tablet     atorvastatin (LIPITOR) 40 MG tablet     blood glucose (ACCU-CHEK LAYA PLUS) test strip     blood glucose monitoring (ACCU-CHEK FASTCLIX) lancets     dulaglutide (TRULICITY) 1.5 MG/0.5ML pen     empagliflozin (JARDIANCE) 25 MG TABS tablet     ergocalciferol (ERGOCALCIFEROL) 1.25 MG (06268 UT) capsule     fenofibrate (TRIGLIDE/LOFIBRA) 160 MG tablet     ibuprofen (ADVIL/MOTRIN) 600 MG tablet     insulin glargine (BASAGLAR KWIKPEN) 100 UNIT/ML pen     insulin isophane human (HUMULIN N KWIKPEN) 100 UNIT/ML injection     losartan (COZAAR) 50 MG tablet     omega 3 1000 MG CAPS     Alcohol Swabs (ALCOHOL PREP PAD) 70 % PADS     B-D U/F 31G X 8 MM insulin pen needle     B-D U/F insulin pen needle     BD ULTRA FINE PEN NEEDLES     continuous blood glucose monitoring (FREESTYLE ZORAIDA) sensor     cyclobenzaprine (FLEXERIL) 5 MG tablet     HYDROcodone-acetaminophen (NORCO) 5-325 MG tablet     levonorgestrel (MIRENA) 20 MCG/24HR IUD     naproxen (NAPROSYN) 500 MG tablet     Ostomy Supplies (ADHESIVE REMOVER WIPES) MISC     Ostomy Supplies (SKIN TAC ADHESIVE BARRIER WIPE) MISC     triamcinolone (KENALOG) 0.1 % external cream     Wound Dressing Adhesive (MASTISOL ADHESIVE) LIQD     Current Facility-Administered Medications   Medication     betamethasone acet & sod phos (CELESTONE) injection 6 mg     betamethasone acet & sod phos (CELESTONE) injection 6 mg     hylan (SYNVISC ONE)  injection 48 mg     hylan (SYNVISC ONE) injection 48 mg     lidocaine (PF) (XYLOCAINE) 1 % injection 1 mL     lidocaine (PF) (XYLOCAINE) 1 % injection 1 mL     Past Medical History:   Diagnosis Date     Combined visual and hearing impairment      Deaf      Diabetic retinopathy of both eyes (H) 8/19/2011     Hepatic steatosis 08/19/2011     History of tobacco use      Hyperlipidemia      Hypertension      Hypertriglyceridemia      Migraines      Obesity 8/19/2011     Problem list name updated by automated process. 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 She denies smoking, drinking alcohol or using illicit drugs. Continues working- currently doing secretarial work.     ROS:  Review of Systems   Systemic symptoms: weight stable  Eye symptoms: No eye symptoms.  Otolaryngeal symptoms: deaf   Breast symptoms: No breast symptoms.  Cardiovascular symptoms: No cardiovascular symptoms.    Pulmonary symptoms: No pulmonary symptoms.  Gastrointestinal symptoms: no GERD or nausea, no diarrhea or constipation   Genitourinary symptoms: No genitourinary symptoms.  Endocrine symptoms:Menstrual periods, irregular, and light, 3-5 weeks apart - IUD 1/2017   Hematologic symptoms: No hematologic symptoms.  Musculoskeletal symptoms: bilateral knee pain, intermittently  Neurological symptoms: no headaches, no tremor, no dizziness   Psychological symptoms: No psychological symptoms.    Skin symptoms: No skin symptoms    /76   Pulse 69   Wt 81.8 kg (180 lb 6.4 oz)   BMI 33.00 kg/m       Gen: No acute distress, comfortable appearing   HEENT: No noted icterus, MMM  CV: Regular rate and rhythm, no murmurs.   Pulm: Breathing comfortably on room air.  No rales, no wheezes  Ext: No LE edema, no bony deficits noted    Integumentary: No rashes or other  lesions identified   Neuro: No focal deficits noted, muscle bulk appears intact, no tremor, normal tone  Psych: Mood and affect congruent     Labs:  Reviewed.  Lab Results   Component Value Date    A1C 8.2 (H) 05/02/2019    A1C 10.3 (H) 10/15/2018    A1C 13.4 (H) 11/27/2017    A1C 11.0 (H) 07/13/2017    A1C 10.9 (H) 05/15/2017    HEMOGLOBINA1 10.0 (A) 01/13/2020    HEMOGLOBINA1 9.9 (A) 10/07/2019    HEMOGLOBINA1 8.1 (A) 04/22/2019    HEMOGLOBINA1 10.4 (A) 01/14/2019    HEMOGLOBINA1 8.7 (A) 03/12/2018       Hemoglobin   Date Value Ref Range Status   03/13/2018 14.2 11.7 - 15.7 g/dL Final     Hematocrit   Date Value Ref Range Status   05/02/2019 44.2 35.0 - 47.0 % Final     Cholesterol   Date Value Ref Range Status   05/02/2019 266 (H) <200 mg/dL Final     Comment:     Desirable:       <200 mg/dl     Cholesterol/HDL Ratio   Date Value Ref Range Status   09/16/2013 5.8 (H) 0.0 - 5.0 Final     HDL Cholesterol   Date Value Ref Range Status   05/02/2019 40 (L) >49 mg/dL Final     LDL Cholesterol Calculated   Date Value Ref Range Status   05/02/2019 183 (H) <100 mg/dL Final     Comment:     Above desirable:  100-129 mg/dl  Borderline High:  130-159 mg/dL  High:             160-189 mg/dL  Very high:       >189 mg/dl       VLDL-Cholesterol   Date Value Ref Range Status   09/16/2013 44 (H) 0 - 30 mg/dL Final     Triglycerides   Date Value Ref Range Status   05/02/2019 219 (H) <150 mg/dL Final     Comment:     Borderline high:  150-199 mg/dl  High:             200-499 mg/dl  Very high:       >499 mg/dl       Albumin Urine mg/L   Date Value Ref Range Status   05/02/2019 196 mg/L Final     TSH   Date Value Ref Range Status   05/17/2018 1.84 0.40 - 4.00 mU/L Final         Last Basic Metabolic Panel:    Sodium   Date Value Ref Range Status   05/02/2019 138 133 - 144 mmol/L Final     Potassium   Date Value Ref Range Status   05/02/2019 4.0 3.4 - 5.3 mmol/L Final     Chloride   Date Value Ref Range Status   05/02/2019 105 94 - 109  mmol/L Final     Calcium   Date Value Ref Range Status   05/02/2019 8.8 8.5 - 10.1 mg/dL Final     Carbon Dioxide   Date Value Ref Range Status   05/02/2019 26 20 - 32 mmol/L Final     Urea Nitrogen   Date Value Ref Range Status   05/02/2019 13 7 - 30 mg/dL Final     Creatinine   Date Value Ref Range Status   05/02/2019 0.66 0.52 - 1.04 mg/dL Final     GFR Estimate   Date Value Ref Range Status   05/02/2019 >90 >60 mL/min/[1.73_m2] Final     Comment:     Non  GFR Calc  Starting 12/18/2018, serum creatinine based estimated GFR (eGFR) will be   calculated using the Chronic Kidney Disease Epidemiology Collaboration   (CKD-EPI) equation.       Glucose   Date Value Ref Range Status   05/02/2019 138 (H) 70 - 99 mg/dL Final       AST   Date Value Ref Range Status   05/02/2019 26 0 - 45 U/L Final     ALT   Date Value Ref Range Status   05/02/2019 43 0 - 50 U/L Final     Albumin Urine mg/g Cr   Date Value Ref Range Status   05/02/2019 87.89 (H) 0 - 25 mg/g Cr Final     A/P:    Diabetes, type 2 vs. PRAVIN, complicated by diabetic nephropathy and retinopathy, uncontrolled, mainly due to noncompliance with the prescribed regimen.  Recommendations:  Continue Trulicity and empagliflozin  Increase the dose of Humulin N to 20 units  Can administer both Basaglar and Humalog and in the morning  Check blood sugar before meals and at bedtime and have the glucometer downloaded in our clinic in 2 or 3 weeks  Alternatively, she can restart the sensor mian, if approved by her insurance.  Use Simpatch adhesive bandages which can be applied over the sensor  Follow-up lab work: CMP, hematocrit, urine microalbumin    Proteinuria/HTN  Blood pressure controlled.   -continue losartan 50mg per day   -Follow-up urine microalbumin     Dyslipidemia   The LDL cholesterol was higher last lab result.   - F/up lipid panel.     Orders Placed This Encounter   Procedures     Albumin Random Urine Quantitative with Creat Ratio     Comprehensive  metabolic panel     Hematocrit     Lipid panel reflex to direct LDL Fasting     Hemoglobin A1c POCT     Total visit time 25 min/counceling and coordination of care 20 min    Again, thank you for allowing me to participate in the care of your patient.      Sincerely,    Jimena Bragg MD

## 2020-01-13 NOTE — PROGRESS NOTES
Pt seen with the assistance of an in person .     Hemoglobin A1c was 10% today, unchanged since her last visit here.    At her last visit, she was started on treatment with Humulin N, 15 units before breakfast.  Humulin and was added to her regular dose of Basaglar, 36 units at bedtime.  She administers Humulin before breakfast, around 5 to 5:30 AM and Basaglar at bedtime (9-10 PM).  During weekends, she has a later breakfast, around 6 AM.  In general, dinner is around 5 PM and the patient denies having bedtime snacks.  Since her last visit here, she quit drinking Dr. Pepper.  She reports being compliant in taking Lipitor, fenofibrate, Jardiance and Trulicity. She was not been able to tolerate metformin, as even 1 tablet causes same significant diarrhea. On average, she misses the Basaglar dose 2-3 times a month.  Denies hypoglycemic symptoms although last night, she remembers waking up feeling dizzy.  She did not check her blood sugar and she just had some cheese.    The glucometer readings revealed that she checks her blood sugar 1-3 times daily, almost always before breakfast and less frequent before lunch, before dinner or at bedtime.  On the glucometer, the average blood glucose over the last month is 153, with a standard deviation of 45.  52% of the blood sugar numbers are within target and 32% are above.  The patient continues to enter the carbohydrate intake in the glucometer, although inconsistently.  On average, she has 30 to 40 g of carbohydrates with her meals.  The lowest documented blood sugar by her meter is 70.  The evening blood sugar numbers tend to be higher, the morning blood sugar is well controlled.  She continues to use the Accu-Chek expert to help her determine the NovoLog dose.   Meter settings:   Insulin to Carb Ratio: 8  Correction Factor: 50  BG Target: 100-150  Offset time 2 hrs   Acting time 3:30 hrs     Diabetes complications:  Retinopathy: last eye exam - 9/19. No   Usher's syndrome.  Nephropathy: h/o proteinuria; normal GFR. Now on 50mg losartan daily (tx with lisinopril was associated with a dry cough).   Neuropathy: No N/T in feet.     Current Outpatient Medications   Medication     aspirin (ASA) 81 MG chewable tablet     atorvastatin (LIPITOR) 40 MG tablet     blood glucose (ACCU-CHEK LAYA PLUS) test strip     blood glucose monitoring (ACCU-CHEK FASTCLIX) lancets     dulaglutide (TRULICITY) 1.5 MG/0.5ML pen     empagliflozin (JARDIANCE) 25 MG TABS tablet     ergocalciferol (ERGOCALCIFEROL) 1.25 MG (23748 UT) capsule     fenofibrate (TRIGLIDE/LOFIBRA) 160 MG tablet     ibuprofen (ADVIL/MOTRIN) 600 MG tablet     insulin glargine (BASAGLAR KWIKPEN) 100 UNIT/ML pen     insulin isophane human (HUMULIN N KWIKPEN) 100 UNIT/ML injection     losartan (COZAAR) 50 MG tablet     omega 3 1000 MG CAPS     Alcohol Swabs (ALCOHOL PREP PAD) 70 % PADS     B-D U/F 31G X 8 MM insulin pen needle     B-D U/F insulin pen needle     BD ULTRA FINE PEN NEEDLES     continuous blood glucose monitoring (FREESTYLE ZORAIDA) sensor     cyclobenzaprine (FLEXERIL) 5 MG tablet     HYDROcodone-acetaminophen (NORCO) 5-325 MG tablet     levonorgestrel (MIRENA) 20 MCG/24HR IUD     naproxen (NAPROSYN) 500 MG tablet     Ostomy Supplies (ADHESIVE REMOVER WIPES) MISC     Ostomy Supplies (SKIN TAC ADHESIVE BARRIER WIPE) MISC     triamcinolone (KENALOG) 0.1 % external cream     Wound Dressing Adhesive (MASTISOL ADHESIVE) LIQD     Current Facility-Administered Medications   Medication     betamethasone acet & sod phos (CELESTONE) injection 6 mg     betamethasone acet & sod phos (CELESTONE) injection 6 mg     hylan (SYNVISC ONE) injection 48 mg     hylan (SYNVISC ONE) injection 48 mg     lidocaine (PF) (XYLOCAINE) 1 % injection 1 mL     lidocaine (PF) (XYLOCAINE) 1 % injection 1 mL     Past Medical History:   Diagnosis Date     Combined visual and hearing impairment      Deaf      Diabetic retinopathy of both eyes (H)  8/19/2011     Hepatic steatosis 08/19/2011     History of tobacco use      Hyperlipidemia      Hypertension      Hypertriglyceridemia      Migraines      Obesity 8/19/2011     Problem list name updated by automated process. 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 She denies smoking, drinking alcohol or using illicit drugs. Continues working- currently doing secretarial work.     ROS:  Review of Systems   Systemic symptoms: weight stable  Eye symptoms: No eye symptoms.  Otolaryngeal symptoms: deaf   Breast symptoms: No breast symptoms.  Cardiovascular symptoms: No cardiovascular symptoms.    Pulmonary symptoms: No pulmonary symptoms.  Gastrointestinal symptoms: no GERD or nausea, no diarrhea or constipation   Genitourinary symptoms: No genitourinary symptoms.  Endocrine symptoms:Menstrual periods, irregular, and light, 3-5 weeks apart - IUD 1/2017   Hematologic symptoms: No hematologic symptoms.  Musculoskeletal symptoms: bilateral knee pain, intermittently  Neurological symptoms: no headaches, no tremor, no dizziness   Psychological symptoms: No psychological symptoms.    Skin symptoms: No skin symptoms    /76   Pulse 69   Wt 81.8 kg (180 lb 6.4 oz)   BMI 33.00 kg/m      Gen: No acute distress, comfortable appearing   HEENT: No noted icterus, MMM  CV: Regular rate and rhythm, no murmurs.   Pulm: Breathing comfortably on room air.  No rales, no wheezes  Ext: No LE edema, no bony deficits noted    Integumentary: No rashes or other lesions identified   Neuro: No focal deficits noted, muscle bulk appears intact, no tremor, normal tone  Psych: Mood and affect congruent     Labs:  Reviewed.  Lab Results   Component Value Date    A1C 8.2 (H) 05/02/2019    A1C 10.3 (H) 10/15/2018    A1C 13.4 (H) 11/27/2017    A1C 11.0 (H)  07/13/2017    A1C 10.9 (H) 05/15/2017    HEMOGLOBINA1 10.0 (A) 01/13/2020    HEMOGLOBINA1 9.9 (A) 10/07/2019    HEMOGLOBINA1 8.1 (A) 04/22/2019    HEMOGLOBINA1 10.4 (A) 01/14/2019    HEMOGLOBINA1 8.7 (A) 03/12/2018       Hemoglobin   Date Value Ref Range Status   03/13/2018 14.2 11.7 - 15.7 g/dL Final     Hematocrit   Date Value Ref Range Status   05/02/2019 44.2 35.0 - 47.0 % Final     Cholesterol   Date Value Ref Range Status   05/02/2019 266 (H) <200 mg/dL Final     Comment:     Desirable:       <200 mg/dl     Cholesterol/HDL Ratio   Date Value Ref Range Status   09/16/2013 5.8 (H) 0.0 - 5.0 Final     HDL Cholesterol   Date Value Ref Range Status   05/02/2019 40 (L) >49 mg/dL Final     LDL Cholesterol Calculated   Date Value Ref Range Status   05/02/2019 183 (H) <100 mg/dL Final     Comment:     Above desirable:  100-129 mg/dl  Borderline High:  130-159 mg/dL  High:             160-189 mg/dL  Very high:       >189 mg/dl       VLDL-Cholesterol   Date Value Ref Range Status   09/16/2013 44 (H) 0 - 30 mg/dL Final     Triglycerides   Date Value Ref Range Status   05/02/2019 219 (H) <150 mg/dL Final     Comment:     Borderline high:  150-199 mg/dl  High:             200-499 mg/dl  Very high:       >499 mg/dl       Albumin Urine mg/L   Date Value Ref Range Status   05/02/2019 196 mg/L Final     TSH   Date Value Ref Range Status   05/17/2018 1.84 0.40 - 4.00 mU/L Final         Last Basic Metabolic Panel:    Sodium   Date Value Ref Range Status   05/02/2019 138 133 - 144 mmol/L Final     Potassium   Date Value Ref Range Status   05/02/2019 4.0 3.4 - 5.3 mmol/L Final     Chloride   Date Value Ref Range Status   05/02/2019 105 94 - 109 mmol/L Final     Calcium   Date Value Ref Range Status   05/02/2019 8.8 8.5 - 10.1 mg/dL Final     Carbon Dioxide   Date Value Ref Range Status   05/02/2019 26 20 - 32 mmol/L Final     Urea Nitrogen   Date Value Ref Range Status   05/02/2019 13 7 - 30 mg/dL Final     Creatinine   Date Value  Ref Range Status   05/02/2019 0.66 0.52 - 1.04 mg/dL Final     GFR Estimate   Date Value Ref Range Status   05/02/2019 >90 >60 mL/min/[1.73_m2] Final     Comment:     Non  GFR Calc  Starting 12/18/2018, serum creatinine based estimated GFR (eGFR) will be   calculated using the Chronic Kidney Disease Epidemiology Collaboration   (CKD-EPI) equation.       Glucose   Date Value Ref Range Status   05/02/2019 138 (H) 70 - 99 mg/dL Final       AST   Date Value Ref Range Status   05/02/2019 26 0 - 45 U/L Final     ALT   Date Value Ref Range Status   05/02/2019 43 0 - 50 U/L Final     Albumin Urine mg/g Cr   Date Value Ref Range Status   05/02/2019 87.89 (H) 0 - 25 mg/g Cr Final     A/P:    Diabetes, type 2 vs. PRAVIN, complicated by diabetic nephropathy and retinopathy, uncontrolled, mainly due to noncompliance with the prescribed regimen.  Recommendations:  Continue Trulicity and empagliflozin  Increase the dose of Humulin N to 20 units  Can administer both Basaglar and Humalog and in the morning  Check blood sugar before meals and at bedtime and have the glucometer downloaded in our clinic in 2 or 3 weeks  Alternatively, she can restart the sensor mian, if approved by her insurance.  Use Simpatch adhesive bandages which can be applied over the sensor  Follow-up lab work: CMP, hematocrit, urine microalbumin    Proteinuria/HTN  Blood pressure controlled.   -continue losartan 50mg per day   -Follow-up urine microalbumin     Dyslipidemia   The LDL cholesterol was higher last lab result.   - F/up lipid panel.     Orders Placed This Encounter   Procedures     Albumin Random Urine Quantitative with Creat Ratio     Comprehensive metabolic panel     Hematocrit     Lipid panel reflex to direct LDL Fasting     Hemoglobin A1c POCT     Total visit time 25 min/counceling and coordination of care 20 min.

## 2020-01-13 NOTE — PATIENT INSTRUCTIONS
Increase the dose of Humulin N to 20 U in the morning   Have the meter downloaded in our clinic in 3 weeks

## 2020-01-15 ENCOUNTER — PRE VISIT (OUTPATIENT)
Dept: INTERNAL MEDICINE | Facility: CLINIC | Age: 40
End: 2020-01-15

## 2020-01-15 NOTE — TELEPHONE ENCOUNTER
[facility]  Dept: [phone]     Patient: Nayeli Caal   : 1980  MRN: [id number]  Encounter: 1/15/20       Pre Visit Assessment    Health Maintenance Due   Topic Date Due     DIABETIC FOOT EXAM  2018     MEDICARE ANNUAL WELLNESS VISIT  2018     EYE EXAM  10/17/2018     DTAP/TDAP/TD IMMUNIZATION (3 - Td) 2019     Chart Reviewed for Labs needed/Health Maintenance: Yes   If labs needed, orders placed:  No,   Lab appointment scheduled:  N/A    Notes: future labs placed by Dr Bragg  Patient confirmed they will attend appointment:  N/A  Appointment rescheduled?  N/A      Farhana Sheehan at 3:17 PM on 1/15/2020

## 2020-01-21 ENCOUNTER — OFFICE VISIT (OUTPATIENT)
Dept: INTERNAL MEDICINE | Facility: CLINIC | Age: 40
End: 2020-01-21
Payer: COMMERCIAL

## 2020-01-21 VITALS
HEART RATE: 84 BPM | SYSTOLIC BLOOD PRESSURE: 109 MMHG | WEIGHT: 178.7 LBS | DIASTOLIC BLOOD PRESSURE: 73 MMHG | BODY MASS INDEX: 32.68 KG/M2

## 2020-01-21 DIAGNOSIS — E11.21 TYPE 2 DIABETES WITH NEPHROPATHY (H): ICD-10-CM

## 2020-01-21 DIAGNOSIS — Z23 NEED FOR VACCINATION: Primary | ICD-10-CM

## 2020-01-21 ASSESSMENT — PAIN SCALES - GENERAL: PAINLEVEL: NO PAIN (0)

## 2020-01-21 NOTE — PROGRESS NOTES
[facility]  Primary Care Center   Mariya GRACEMatthew Ovipramod, SABI CNP  01/21/2020      Chief Complaint:   Recheck Medication       History of Present Illness:   Nayeli Caal is a 39 year old female with a history of Usher syndrome and type 2 diabetes mellitus who presents with an  for evaluation of follow-up .    Type 2 diabetes mellitus: She saw endocrine last week who increased her dose of Humulin N to 20 unit(s) in the morning. She feels this change has improved her control and fasting blood glucose have been better, around 119. A foot exam was done during that visit and eye exams are up to date. Evelia CGM sensors were falling off early and gave her a rash. It was becoming expensive so she stopped using it. Also she doesn't mind finger sticks. On top of medication management she is working on reducing carb intake by cutting down on sweets.     Hand pain: she tried cortisone which seemed to have worked well. The provider informed her that she may require surgery if the problem comes back or cortisone is not helping. She was told she had to keep blood glucose stable to prevent further hand damage.    Headaches: her headaches have improved, she is unsure what the difference is that led to improvement.     Healthcare maintenance topics:  Pap (females age 21 - 65): every 3 years, every 5 years after age 35 if cotesting done -   Lab Results   Component Value Date    PAP NIL 12/08/2016   Glucose: every 5 years, yearly if risk factors? - Up to Date   Lipid panel: every 5 years, yearly if risk factors - Up to Date, due in May  Immunizations (Tetanus every 10 years, Influenza yearly, Pneumonia PPV-23 and PCV-13 age ? 65 or sooner if risk factors) - due for Tetanus booster   Immunization History   Administered Date(s) Administered     HEPA 05/18/2007, 10/07/2008     HepB 08/06/2001, 01/03/2003, 04/09/2013     Influenza (IIV3) PF 10/09/2011, 11/12/2013, 10/23/2014, 11/19/2015     Influenza Vaccine IM > 6  months Valent IIV4 10/17/2017, 10/02/2018, 10/17/2019     MMR 05/21/2007     Pneumo Conj 13-V (2010&after) 10/23/2014     Pneumococcal 23 valent 07/18/2006     TD (ADULT, 7+) 11/01/1999     TDAP Vaccine (Boostrix) 01/21/2020     Tdap (Adacel,Boostrix) 01/09/2009        The following health maintenance issues were also reviewed and addressed as needed:  Blood pressure (>18 years annually)  Depression - PHQ-2 Score:   PHQ-2 ( 1999 Pfizer) 1/13/2020   Q1: Little interest or pleasure in doing things 0   Q2: Feeling down, depressed or hopeless 0   PHQ-2 Score 0   Lifestyle habits (including exercise, diet, weight)  Health risk behaviors (sexual history, alcohol, tobacco, drug use, partner violence)  Routine eye, dental, hearing, and skin exams    Review of Systems:   Pertinent items are noted in HPI, remainder of complete ROS is negative.    Denies cardiac issues, urinary issues, and upper respiratory symptoms     Active Medications:     Current Outpatient Medications:      Alcohol Swabs (ALCOHOL PREP PAD) 70 % PADS, 1 each 3 times daily, Disp: 100 each, Rfl: 3     aspirin (ASA) 81 MG chewable tablet, CHEW AND SWALLOW ONE TABLET BY MOUTH EVERY DAY, Disp: 90 tablet, Rfl: 3     atorvastatin (LIPITOR) 40 MG tablet, Take 1 tablet (40 mg) by mouth daily, Disp: 90 tablet, Rfl: 2     B-D U/F 31G X 8 MM insulin pen needle, USE AS DIRECTED, Disp: 100 each, Rfl: 3     B-D U/F insulin pen needle, , Disp: , Rfl:      BD ULTRA FINE PEN NEEDLES, Inject 1 dose. Subcutaneous 2 times daily BD ultra-fine insulin syringe, 30 ga. X 1/2'' short needle 1/2 cc. Use as directed., Disp: 400 Units, Rfl: 11     blood glucose (ACCU-CHEK LAYA PLUS) test strip, Use with  Accucheck Expert meter.  Test blood sugar 6 times daily., Disp: 600 each, Rfl: 3     blood glucose monitoring (ACCU-CHEK FASTCLIX) lancets, Use to test blood sugar 6 times daily or as directed, Disp: 6 Box, Rfl: 3     continuous blood glucose monitoring (FREESTYLE ZORAIDA) sensor, For  use with Freestyle Evelia Flash  for continuous monitioring of blood glucose levels. Replace sensor every 14 days., Disp: 6 each, Rfl: 3     cyclobenzaprine (FLEXERIL) 5 MG tablet, Take 1-2 tablets (5-10 mg) by mouth 3 times daily as needed for muscle spasms, Disp: 60 tablet, Rfl: 1     dulaglutide (TRULICITY) 1.5 MG/0.5ML pen, Inject 1.5 mg Subcutaneous every 7 days, Disp: 6 mL, Rfl: 3     empagliflozin (JARDIANCE) 25 MG TABS tablet, Take 1 tablet (25 mg) by mouth daily, Disp: 90 tablet, Rfl: 3     ergocalciferol (ERGOCALCIFEROL) 1.25 MG (32792 UT) capsule, Take 1 capsule (50,000 Units) by mouth once a week, Disp: 14 capsule, Rfl: 3     fenofibrate (TRIGLIDE/LOFIBRA) 160 MG tablet, Take 1 tablet (160 mg) by mouth daily, Disp: 90 tablet, Rfl: 1     ibuprofen (ADVIL/MOTRIN) 600 MG tablet, Take 1 tablet (600 mg) by mouth every 6 hours as needed for moderate pain, Disp: 120 tablet, Rfl: 1     insulin glargine (BASAGLAR KWIKPEN) 100 UNIT/ML pen, Inject 48 Units Subcutaneous daily Please provide 3 months supply, Disp: 12 mL, Rfl: 3     insulin isophane human (HUMULIN N KWIKPEN) 100 UNIT/ML injection, Inject 20 Units Subcutaneous every morning (before breakfast), Disp: 20 mL, Rfl: 3     losartan (COZAAR) 50 MG tablet, Take 1 tablet (50 mg) by mouth daily, Disp: 90 tablet, Rfl: 3     omega 3 1000 MG CAPS, Take 2 g by mouth daily, Disp: 180 capsule, Rfl: 3     Ostomy Supplies (ADHESIVE REMOVER WIPES) MISC, 1 each every 14 days, Disp: 50 each, Rfl: 3     Ostomy Supplies (SKIN TAC ADHESIVE BARRIER WIPE) MISC, 1 each every 14 days, Disp: 6 each, Rfl: 3     HYDROcodone-acetaminophen (NORCO) 5-325 MG tablet, Take 1 tablet by mouth every 6 hours as needed for severe pain (Patient not taking: Reported on 1/13/2020), Disp: 10 tablet, Rfl: 0     levonorgestrel (MIRENA) 20 MCG/24HR IUD, 1 each (20 mcg) by Intrauterine route once for 1 dose, Disp: 1 each, Rfl: 0     naproxen (NAPROSYN) 500 MG tablet, Take 1 tablet (500 mg) by  mouth 2 times daily as needed for moderate pain (Patient not taking: Reported on 1/13/2020), Disp: 30 tablet, Rfl: 1     triamcinolone (KENALOG) 0.1 % external cream, Apply topically 3 times daily To affected areas (Patient not taking: Reported on 1/13/2020), Disp: 60 g, Rfl: 0    Current Facility-Administered Medications:      betamethasone acet & sod phos (CELESTONE) injection 6 mg, 6 mg, , , Greg Streeter MD, 6 mg at 12/30/19 1500     betamethasone acet & sod phos (CELESTONE) injection 6 mg, 6 mg, , , Greg Streeter MD, 6 mg at 12/30/19 1509     hylan (SYNVISC ONE) injection 48 mg, 48 mg, , , Rosas Rodriguez A, DO, 48 mg at 06/26/18 0900     hylan (SYNVISC ONE) injection 48 mg, 48 mg, , , Rosas Rodriguez A, DO, 48 mg at 06/26/18 0900     lidocaine (PF) (XYLOCAINE) 1 % injection 1 mL, 1 mL, , , Greg Streeter MD, 1 mL at 12/30/19 1500     lidocaine (PF) (XYLOCAINE) 1 % injection 1 mL, 1 mL, , , Greg Streeter MD, 1 mL at 12/30/19 1509       Allergies:   Patient has no known allergies.      Past Medical History:  Combined visual and hearing impairment  Diabetic retinopathy of both eyes  Hyperlipidemia   Hypertension  Migraines  Type 2 diabetes mellitus  Usher syndrome  Benign neoplasm of iris     Past Surgical History:  Cholecystectomy   Release right trigger finger, bilateral     Social History:   Unmarried  Works for [employer] as an    Non smoker.  She has a roommate.    Physical Exam:   /73 (BP Location: Right arm, Patient Position: Sitting, Cuff Size: Adult Regular)   Pulse 84   Wt 81.1 kg (178 lb 11.2 oz)   Breastfeeding No   BMI 32.68 kg/m        Wt Readings from Last 1 Encounters:   01/21/20 81.1 kg (178 lb 11.2 oz)     Constitutional: no distress, comfortable, pleasant   Eyes: anicteric.    Cardiovascular: regular rate and rhythm, normal S1 and S2, no murmurs, rubs or gallops.    Respiratory: clear to auscultation, no wheezes or crackles, normal  breath sounds   Gastrointestinal: positive bowel sounds, nontender, no hepatosplenomegaly, no masses   Musculoskeletal: full range of motion, no edema   Skin: no concerning lesions, no jaundice, temp normal   Neurological: Diabetic foot exam performed 1/13/2020.  A and O x 3, good historian.  Psychological: appropriate mood, good eye contact, normal affect       Assessment and Plan:  Need for vaccination  - TDAP VACCINE (BOOSTRIX)    Type 2 diabetes with nephropathy (H)  On an increased dose of insulin. She reports better fasting glucose readings since this change.   - blood glucose (ACCU-CHEK LAYA PLUS) test strip  Dispense: 600 each; Refill: 3    Hand pain  Improved after treated with steroids steroids.      Follow-up: Return in about 6 months (around 7/21/2020).      Total time spent 25 minutes.  More than 50% of the time spent with Ms. Ingrid Caal on counseling / coordinating her care.    Mariya CELESTIN CNP         Scribe Disclosure:  I, Dinesh Mccloud, am serving as a scribe to document services personally performed by SABI Morrison CNP at this visit, based upon the provider's statements to me. All documentation has been reviewed by the aforementioned provider prior to being entered into the official medical record.    Portions of this medical record were completed by a scribe. UPON MY REVIEW AND AUTHENTICATION BY ELECTRONIC SIGNATURE, this confirms (a) I performed the applicable clinical services, and (b) the record is accurate.

## 2020-01-21 NOTE — NURSING NOTE
Chief Complaint   Patient presents with     Recheck Medication     Patient is here for follow up       Dilan Conn CMA ([facility]) at 4:09 PM on 1/21/2020

## 2020-01-27 NOTE — PROGRESS NOTES
I reviewed the glucometer readings. Overall, the BG is well controlled. Average BG is 146, with a SD of 50 and a range variable between 62 and 283. There is only one hypoglycemic episode documented, of 62, at 9 pm. Some of the higher BG numbers occur before lunch but this is not a definite pattern.

## 2020-01-30 DIAGNOSIS — E11.8 TYPE 2 DIABETES MELLITUS WITH COMPLICATION, WITH LONG-TERM CURRENT USE OF INSULIN (H): ICD-10-CM

## 2020-01-30 DIAGNOSIS — Z79.4 UNCONTROLLED TYPE 2 DIABETES MELLITUS WITH HYPERGLYCEMIA, WITH LONG-TERM CURRENT USE OF INSULIN (H): ICD-10-CM

## 2020-01-30 DIAGNOSIS — E11.65 UNCONTROLLED TYPE 2 DIABETES MELLITUS WITH HYPERGLYCEMIA, WITH LONG-TERM CURRENT USE OF INSULIN (H): ICD-10-CM

## 2020-01-30 DIAGNOSIS — Z79.4 TYPE 2 DIABETES MELLITUS WITH COMPLICATION, WITH LONG-TERM CURRENT USE OF INSULIN (H): ICD-10-CM

## 2020-01-30 RX ORDER — INSULIN GLARGINE 100 [IU]/ML
35 INJECTION, SOLUTION SUBCUTANEOUS DAILY
Qty: 40 ML | Refills: 3 | Status: SHIPPED | OUTPATIENT
Start: 2020-01-30 | End: 2020-10-29

## 2020-01-30 NOTE — TELEPHONE ENCOUNTER
"Nayeli Carolina Angela Ionela, MD 59 minutes ago (8:39 AM)    Nayeli Lam!     I am not sure I understood your message...   You should take Novolog using 1 U per 8 grams carbs. If you use the meter and enter the amount of carbs, the meter will calculate the dose for you. If you have a hard time figuring out the amount of carbs for restaurant meals, the best approach is to schedule an apt with the dietician, who should be able to help you count carbs. The dietician can also make suggestions regarding healthy food choices.   Let me know if this answers your question.     Sincerely,   Jimena Bragg      Good morning Dr. Bragg,     I don't like \"MyChart\" I can't find a new message to doctor.     IRefills all my medicines are .     Need to refull     Basaglar 35Unit for 90 days   Trulicity 1.5U for 90 Days   Humanilin 20U for 90 days     Those medicines need to going [employer] mail service (Co-poy cheaper)     I need it ASAP      2020 clinic notes: A/P:     Diabetes, type 2 vs. PRAVIN, complicated by diabetic nephropathy and retinopathy, uncontrolled, mainly due to noncompliance with the prescribed regimen.  Recommendations:  Continue Trulicity and empagliflozin  Increase the dose of Humulin N to 20 units  Can administer both Basaglar and Humalog and in the morning  Check blood sugar before meals and at bedtime and have the glucometer downloaded in our clinic in 2 or 3 weeks  Alternatively, she can restart the sensor mian, if approved by her insurance.  Use Simpatch adhesive bandages which can be applied over the sensor  Follow-up lab work: CMP, hematocrit, urine microalbumin     "

## 2020-01-30 NOTE — TELEPHONE ENCOUNTER
PLEASE HELP SCHEDULE THE FOLLOWING APPT(S): Diabetes Education Appointment with Dietician ONLY    TIME FRAME NEEDED BY: First available.       SCHEDULING COMMENTS (optional):per Dr Bragg.  Hafsa Gutierrez, RN on 1/30/2020 at 9:48 AM

## 2020-03-03 ENCOUNTER — NURSE TRIAGE (OUTPATIENT)
Dept: NURSING | Facility: CLINIC | Age: 40
End: 2020-03-03

## 2020-03-03 ENCOUNTER — OFFICE VISIT (OUTPATIENT)
Dept: FAMILY MEDICINE | Facility: CLINIC | Age: 40
End: 2020-03-03
Payer: COMMERCIAL

## 2020-03-03 VITALS
DIASTOLIC BLOOD PRESSURE: 80 MMHG | HEART RATE: 82 BPM | BODY MASS INDEX: 31.88 KG/M2 | WEIGHT: 174.3 LBS | SYSTOLIC BLOOD PRESSURE: 120 MMHG | OXYGEN SATURATION: 96 %

## 2020-03-03 DIAGNOSIS — G43.909 MIGRAINE WITHOUT STATUS MIGRAINOSUS, NOT INTRACTABLE, UNSPECIFIED MIGRAINE TYPE: Primary | ICD-10-CM

## 2020-03-03 RX ORDER — RIZATRIPTAN BENZOATE 10 MG/1
10 TABLET ORAL
Qty: 30 TABLET | Refills: 1 | Status: SHIPPED | OUTPATIENT
Start: 2020-03-03 | End: 2020-04-02

## 2020-03-03 ASSESSMENT — PAIN SCALES - GENERAL: PAINLEVEL: SEVERE PAIN (7)

## 2020-03-03 NOTE — PROGRESS NOTES
Ms. Ingrid Caal is a 39 year old female with history of Usher syndrome and type 2 diabetes mellitus who presents with an  for evaluation of persistent headache, low energy.    History of Present Illness:    Terrible headache X 2 days, low energy.  Throbbing pain.  Located frontal area, behind her eyes.  Also has ear R pressure.  Nausea yesterday afternoon and evening.  Episode of mild SOB yesterday evening lasting 5-10 minutes, resolved spontaneously and feeling fine with breathing today.  Small amount nasal secretions in the AM, clear.  No sick contacts. Took ibuprofen 600 mg this AM - did not help.  Has had migraine headaches previously, last was about 6 years ago. They have resolved and headaches not been as severe.       Eating and drinking without difficulty.  Works as .  No missed work.  Rare caffeine use.  Last use  2 days ago.        Checks BG 4X daily.  States stable , no hypoglycemic episodes.      Periods intermittent and mild with IUD contraception.        Past Medical History:  Past Medical History:   Diagnosis Date     Combined visual and hearing impairment      Deaf      Diabetic retinopathy of both eyes (H) 8/19/2011     Hepatic steatosis 08/19/2011     History of tobacco use      Hyperlipidemia      Hypertension      Hypertriglyceridemia      Migraines      Obesity 8/19/2011     Problem list name updated by automated process. 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       Active Meds:  Current Outpatient Medications   Medication     Alcohol Swabs (ALCOHOL PREP PAD) 70 % PADS     aspirin (ASA) 81 MG chewable tablet     atorvastatin (LIPITOR) 40 MG tablet     B-D U/F 31G X 8 MM insulin pen needle     B-D U/F insulin pen needle     BD ULTRA FINE PEN NEEDLES     blood glucose (ACCU-CHEK LAYA PLUS) test  strip     blood glucose monitoring (ACCU-CHEK FASTCLIX) lancets     continuous blood glucose monitoring (FREESTYLE ZORAIDA) sensor     cyclobenzaprine (FLEXERIL) 5 MG tablet     dulaglutide (TRULICITY) 1.5 MG/0.5ML pen     empagliflozin (JARDIANCE) 25 MG TABS tablet     ergocalciferol (ERGOCALCIFEROL) 1.25 MG (08198 UT) capsule     fenofibrate (TRIGLIDE/LOFIBRA) 160 MG tablet     HYDROcodone-acetaminophen (NORCO) 5-325 MG tablet     ibuprofen (ADVIL/MOTRIN) 600 MG tablet     insulin glargine (BASAGLAR KWIKPEN) 100 UNIT/ML pen     insulin isophane human (HUMULIN N KWIKPEN) 100 UNIT/ML injection     levonorgestrel (MIRENA) 20 MCG/24HR IUD     losartan (COZAAR) 50 MG tablet     naproxen (NAPROSYN) 500 MG tablet     omega 3 1000 MG CAPS     Ostomy Supplies (ADHESIVE REMOVER WIPES) MISC     Ostomy Supplies (SKIN TAC ADHESIVE BARRIER WIPE) MISC     triamcinolone (KENALOG) 0.1 % external cream     Current Facility-Administered Medications   Medication     betamethasone acet & sod phos (CELESTONE) injection 6 mg     betamethasone acet & sod phos (CELESTONE) injection 6 mg     hylan (SYNVISC ONE) injection 48 mg     hylan (SYNVISC ONE) injection 48 mg     lidocaine (PF) (XYLOCAINE) 1 % injection 1 mL     lidocaine (PF) (XYLOCAINE) 1 % injection 1 mL        Allergies:  Reviewed, refer to EMR    Family History:  Unknown, adopted.      Relevant Social History:  Employment:    Sexual Health:  IUD for contracepption  Diet/Nutrition:  Manages carefully with DM  Caffeine:  Occasional coffee, 1-2/week.  No sodas      A full 10-pt Review of Systems was performed, verified and is negative except as documented in the HPI.  All health questionnaires were reviewed, verified and relevant information documented above.      Physical Exam:  Vitals: /80   Pulse 82   Wt 79.1 kg (174 lb 4.8 oz)   SpO2 96%   BMI 31.88 kg/m    Constitutional: Alert, oriented, pleasant, no acute distress  Head: Normocephalic,  atraumatic  Eyes: Extra-ocular movements intact, pupils equally round and reactive bilaterally, no scleral icterus  ENT: Oropharynx clear, moist mucus membranes, good dentition  Neck: Supple, no lymphadenopathy, no thyromegaly, no JVD  Cardiovascular: Regular rate and rhythm, no murmurs, rubs or gallops, peripheral pulses full/symmetric  Respiratory: Good air movement bilaterally, lungs clear, no wheezes/rales/rhonchi  GI: Abdomen soft, bowel sounds present, nondistended, nontender, no organomegaly or masses, no rebound/guarding  Musculoskeletal: No edema, normal muscle tone, normal gait  Neurologic: Alert and oriented, cranial nerves 2-12 intact, strength 5/5 throughout, sensation to light touch intact, reflexes 2+ bilaterally  Skin: No rashes/lesions  Psychiatric: normal mentation, affect and mood      Assessment and Plan:    1. Migraine without status migrainosus, not intractable, unspecified migraine type  Migraine most likely due to past history.  Exam negative for sinus congestion, hypoglycemia  - rizatriptan (MAXALT) 10 MG tablet; Take 1 tablet (10 mg) by mouth at onset of headache for migraine May repeat in 2 hours. Max 3 tablets/24 hours.  Dispense: 30 tablet; Refill: 1      #Routine Health Maintenence:  Immunizations (zoster, pneumovax, flu, Tdap, Hep A/B):   Most Recent Immunizations   Administered Date(s) Administered     HEPA 10/07/2008     HepB 04/09/2013     Influenza (IIV3) PF 11/19/2015     Influenza Vaccine IM > 6 months Valent IIV4 10/17/2019     MMR 05/21/2007     Pneumo Conj 13-V (2010&after) 10/23/2014     Pneumococcal 23 valent 07/18/2006     TD (ADULT, 7+) 11/01/1999     TDAP Vaccine (Boostrix) 01/21/2020     Tdap (Adacel,Boostrix) 01/09/2009     Lipids:   Recent Labs   Lab Test 05/02/19  0949 10/15/18  0656  09/16/13  0753 08/14/12  0739   CHOL 266* 263*   < > 197 262*   HDL 40* 29*   < > 34* 38*   * Cannot estimate LDL when triglyceride exceeds 400 mg/dL  136*   < > 119 185*   TRIG  219* 560*   < > 221* 191*   CHOLHDLRATIO  --   --   --  5.8* 6.9*    < > = values in this interval not displayed.       Return to clinic:  As needed and with no improvement, worsening, or new symptom development.      Anushka Horan ANP, [facility]  Nurse Practioner

## 2020-03-03 NOTE — PATIENT INSTRUCTIONS

## 2020-03-03 NOTE — TELEPHONE ENCOUNTER
Service contacted:P[facility]( [facility] Mariya Medina 1/21/20)  * REQUIRES , LIVE IS BETTER DUE TO LOW VISION  PMH: Usher syndrome and type 2 diabetes mellitus   Today's concern :  headache x 1.5 day,   fever,Congestion in chest, nasal congestion    Fever: I don't know high,chills.No thermometer  Blood sugar: unknown- states her lancing device  is broken, won't advance.  Using her Basaglar 35 at 8 PM u and humulin 20 unit(s) in AM. She doesn't think she is high or low but acn't check.       Dizziness, negative for weakness unilaterally.  Weakness all over, feels ill.  No visual changes.( low vision secondary to Usher)  Headache: feels like it is squeezing.She has hx of headaches but this feels worse than usual in conjunction with nasal congestion and cough.  Flu shot in October.  Coughing, warm/ chills    Pain:  headache, squeezing, x 1-2 days    Chest pain, Shortness of breath, palpitations:no      [facility] Endocrine 1/13/20>Increase the dose of Humulin N to 20 U in the morning   Have the meter downloaded in our clinic in 3 weeks          Plan: APPT today at 12 N NP clinic with , if sx escalate rapidly meanwhile she knows she may go to ED, however current sx not not require this.         2. Also to Endocrine: patient requests message to Leigh Ann Escoto as she does not have working accucheck pen( per chart accucheck fastclix)- states she has lots of cartridges but doesn't click forward.  Stopped using Evelia CGM as per last Endocrine note    MYChart:     Follow up/ next appt:       Additional Information    Negative: Difficult to awaken or acting confused (e.g., disoriented, slurred speech)    Negative: Pale cold skin and very weak (can't stand)    Negative: Difficulty breathing and bluish (or gray) lips or face    Negative: New onset rash with purple (or blood-colored) spots or dots    Negative: Fever onset within 24 hours of receiving vaccine    Negative: Fever within 21 days of Ebola EXPOSURE     Negative: Headache and stiff neck (can't touch chin to chest)    Negative: Difficulty breathing    Fever > 100.0 F (37.8 C) and diabetes mellitus or a weak immune system (e.g., HIV positive, chemotherapy, splenectomy)    Negative: Difficult to awaken or acting confused (e.g., disoriented, slurred speech)    Negative: Weakness of the face, arm or leg on one side of the body and new onset    Negative: Numbness of the face, arm or leg on one side of the body and new onset    Negative: Passed out (i.e., fainted, collapsed and was not responding)    Negative: Unable to walk without falling    Negative: Stiff neck (can't touch chin to chest)    Negative: Loss of vision or double vision    Negative: Severe pain in one eye    Unexplained headache that is present > 24 hours    Protocols used: FEVER-A-OH, HEADACHE-A-OH

## 2020-03-15 ENCOUNTER — HEALTH MAINTENANCE LETTER (OUTPATIENT)
Age: 40
End: 2020-03-15

## 2020-07-08 ENCOUNTER — OFFICE VISIT (OUTPATIENT)
Dept: ORTHOPEDICS | Facility: CLINIC | Age: 40
End: 2020-07-08
Attending: NURSE PRACTITIONER
Payer: COMMERCIAL

## 2020-07-08 ENCOUNTER — OFFICE VISIT (OUTPATIENT)
Dept: OBGYN | Facility: CLINIC | Age: 40
End: 2020-07-08
Attending: NURSE PRACTITIONER
Payer: COMMERCIAL

## 2020-07-08 ENCOUNTER — ANCILLARY PROCEDURE (OUTPATIENT)
Dept: ULTRASOUND IMAGING | Facility: CLINIC | Age: 40
End: 2020-07-08
Attending: OBSTETRICS & GYNECOLOGY
Payer: COMMERCIAL

## 2020-07-08 VITALS — BODY MASS INDEX: 29.37 KG/M2 | HEIGHT: 62 IN | WEIGHT: 159.6 LBS

## 2020-07-08 DIAGNOSIS — Z79.4 UNCONTROLLED TYPE 2 DIABETES MELLITUS WITH HYPERGLYCEMIA, WITH LONG-TERM CURRENT USE OF INSULIN (H): ICD-10-CM

## 2020-07-08 DIAGNOSIS — E78.2 MIXED HYPERLIPIDEMIA: ICD-10-CM

## 2020-07-08 DIAGNOSIS — M65.30 TRIGGER FINGER OF RIGHT HAND, UNSPECIFIED FINGER: ICD-10-CM

## 2020-07-08 DIAGNOSIS — M65.30 TRIGGER FINGER OF RIGHT HAND, UNSPECIFIED FINGER: Primary | ICD-10-CM

## 2020-07-08 DIAGNOSIS — Z97.5 IUD (INTRAUTERINE DEVICE) IN PLACE: Primary | ICD-10-CM

## 2020-07-08 DIAGNOSIS — Z97.5 IUD (INTRAUTERINE DEVICE) IN PLACE: ICD-10-CM

## 2020-07-08 DIAGNOSIS — E11.65 UNCONTROLLED TYPE 2 DIABETES MELLITUS WITH HYPERGLYCEMIA, WITH LONG-TERM CURRENT USE OF INSULIN (H): ICD-10-CM

## 2020-07-08 LAB
ALBUMIN SERPL-MCNC: 3.9 G/DL (ref 3.4–5)
ALP SERPL-CCNC: 122 U/L (ref 40–150)
ALT SERPL W P-5'-P-CCNC: 29 U/L (ref 0–50)
ANION GAP SERPL CALCULATED.3IONS-SCNC: 5 MMOL/L (ref 3–14)
AST SERPL W P-5'-P-CCNC: 11 U/L (ref 0–45)
BILIRUB SERPL-MCNC: 0.3 MG/DL (ref 0.2–1.3)
BUN SERPL-MCNC: 13 MG/DL (ref 7–30)
CALCIUM SERPL-MCNC: 8.6 MG/DL (ref 8.5–10.1)
CHLORIDE SERPL-SCNC: 101 MMOL/L (ref 94–109)
CHOLEST SERPL-MCNC: 179 MG/DL
CO2 SERPL-SCNC: 30 MMOL/L (ref 20–32)
CREAT SERPL-MCNC: 0.74 MG/DL (ref 0.52–1.04)
GFR SERPL CREATININE-BSD FRML MDRD: >90 ML/MIN/{1.73_M2}
GLUCOSE SERPL-MCNC: 417 MG/DL (ref 70–99)
HBA1C MFR BLD: 12.9 % (ref 0–5.6)
HCT VFR BLD AUTO: 44.2 % (ref 35–47)
HDLC SERPL-MCNC: 41 MG/DL
LDLC SERPL CALC-MCNC: 72 MG/DL
NONHDLC SERPL-MCNC: 139 MG/DL
POTASSIUM SERPL-SCNC: 4 MMOL/L (ref 3.4–5.3)
PROT SERPL-MCNC: 7.7 G/DL (ref 6.8–8.8)
SODIUM SERPL-SCNC: 136 MMOL/L (ref 133–144)
TRIGL SERPL-MCNC: 331 MG/DL
TSH SERPL DL<=0.005 MIU/L-ACNC: 1.34 MU/L (ref 0.4–4)

## 2020-07-08 PROCEDURE — T1013 SIGN LANG/ORAL INTERPRETER: HCPCS | Mod: U3,ZF

## 2020-07-08 ASSESSMENT — MIFFLIN-ST. JEOR: SCORE: 1347.19

## 2020-07-08 NOTE — PROGRESS NOTES
"S: 41 yo P0 ASL using patient is presenting today with an  for a sensation of increased pelvic pressure and new onset vaginal bleeding. She had previously not had any menses since her mirena was placed in January 2017. Last month she experienced bleeding for one week with bleeding that required 6-8 pads per day. She states that she is not currently sexually active. Concurrently she has been experiencing a sensation of right sided pelvic pressure. She describes it as a heaviness that has increased over the last 3 months. She states that this pain is similar to a feeling she had 6 years ago that was worked up by her PCP without resolution. The feeling is beginning to appear on the left side as well. The sensation is global without radiation. She denies vaginal discharge, urinary frequency, dysuria, hematuria, constipation, diarrhea, or other abdominal pain.    O:  Ht 1.575 m (5' 2\")   Wt 72.4 kg (159 lb 9.6 oz)   LMP 06/22/2020   BMI 29.19 kg/m       A/P: Ms. Ingrid Caal is a 40 year old female with new onset vaginal bleeding and pelvic pain.    Transvaginal U/S scheduled for today for investigation of anatomy and Mirena placement. Send results via My Chart    Gregor Garcia MD  OBGYN PGY-1  July 8, 2020 12:45 PM    I have seen and examined the patient with the resident. I have reviewed, edited, and agree with the note.   My findings are:appears well. Needs ultrasound. States understanding.     Total visit time was 10 minutes with 10 minutes spent in counseling and coordination of care for onset of menses with presumed IUD in place. New right sided pain.    Anne Hodge MD      "

## 2020-07-08 NOTE — LETTER
"7/8/2020     RE: Nayeli Caal  [address]  [facility] [address]     Dear Colleague,    Thank you for referring your patient, Nayeli Caal, to the [facility] at [facility]. Please see a copy of my visit note below.    S: 39 yo P0 ASL using patient is presenting today with an  for a sensation of increased pelvic pressure and new onset vaginal bleeding. She had previously not had any menses since her mirena was placed in January 2017. Last month she experienced bleeding for one week with bleeding that required 6-8 pads per day. She states that she is not currently sexually active. Concurrently she has been experiencing a sensation of right sided pelvic pressure. She describes it as a heaviness that has increased over the last 3 months. She states that this pain is similar to a feeling she had 6 years ago that was worked up by her PCP without resolution. The feeling is beginning to appear on the left side as well. The sensation is global without radiation. She denies vaginal discharge, urinary frequency, dysuria, hematuria, constipation, diarrhea, or other abdominal pain.    O:  Ht 1.575 m (5' 2\")   Wt 72.4 kg (159 lb 9.6 oz)   LMP 06/22/2020   BMI 29.19 kg/m       A/P: Ms. Ingrid Caal is a 40 year old female with new onset vaginal bleeding and pelvic pain.    Transvaginal U/S scheduled for today for investigation of anatomy and Mirena placement. Send results via My Chart    Gregor Garcia MD  OBGYN PGY-1  July 8, 2020 12:45 PM    I have seen and examined the patient with the resident. I have reviewed, edited, and agree with the note.   My findings are:appears well. Needs ultrasound. States understanding.     Total visit time was 10 minutes with 10 minutes spent in counseling and coordination of care for onset of menses with presumed IUD in place. New right sided pain.  Anne Hodge MD  "

## 2020-07-08 NOTE — PROGRESS NOTES
Right index left middle   Right index already injected       Left midddl etrigger injection   Right index when hgb a1c done.       Date of Service: Jul 8, 2020    Chief Complaint:   Chief Complaint   Patient presents with     RECHECK     Pain of finger of right hand  & left hand        History of Present Illness: Nayeli Caal is a 40 year old today for follow-up evaluation of the above problem. At the last visit she underwent steroid injection into right index and left middle trigger fingers.  Reports that injections were effective but pain has since come back.  Reports painful clicking and morning locking of right index and left middle finger.  Pain localizes to the A1 pulley area.     Physical examination:  The patient is well-developed, well nourished and in on acute distress. The patient is alert and oriented to the surroundings. Behavior is appropriate to the surroundings. Extra-ocular motions are intact. Respirations appear unlabored.     Examination of the right and left upper extremity reveals skin to be clean, dry and intact. There are no wounds.  Prior surgical incisions well-healed.  Mild swelling of right index and left middle finger.  Tender at respective A1 pulleys.  Palpable clicking.  No paul catching.  Sensation intact throughout.  Fingers well-perfused.      Assessment: 40 year old female with history of poorly controlled diabetes with right index and left middle recurrent trigger fingers following prior injection.    Plan:   Discussed with patient that in light of her diabetes, triggering has high likelihood of recurrence with repeat injection.  She would prefer to proceed with definitive management in the form of right index and left middle trigger releases.  Her last hemoglobin A1c is 10.  We discussed it does need to be lowered to proceed surgically.  We will recheck today.  If it is lower, we will make plans to proceed with surgical release per her preference.  If it is not, she  will come back for injections and we will plan for likely surgery once her A1c is better controlled.

## 2020-07-08 NOTE — LETTER
7/8/2020         RE: Nayeli Caal  [address]  [facility] [address]        Dear Colleague,    Thank you for referring your patient, Nayeli Caal, to the [facility]. Please see a copy of my visit note below.    Right index left middle   Right index already injected       Left midddl etrigger injection   Right index when hgb a1c done.       Date of Service: Jul 8, 2020    Chief Complaint:   Chief Complaint   Patient presents with     RECHECK     Pain of finger of right hand  & left hand        History of Present Illness: Nayeli Caal is a 40 year old today for follow-up evaluation of the above problem. At the last visit she underwent steroid injection into right index and left middle trigger fingers.  Reports that injections were effective but pain has since come back.  Reports painful clicking and morning locking of right index and left middle finger.  Pain localizes to the A1 pulley area.     Physical examination:  The patient is well-developed, well nourished and in on acute distress. The patient is alert and oriented to the surroundings. Behavior is appropriate to the surroundings. Extra-ocular motions are intact. Respirations appear unlabored.     Examination of the right and left upper extremity reveals skin to be clean, dry and intact. There are no wounds.  Prior surgical incisions well-healed.  Mild swelling of right index and left middle finger.  Tender at respective A1 pulleys.  Palpable clicking.  No paul catching.  Sensation intact throughout.  Fingers well-perfused.      Assessment: 40 year old female with history of poorly controlled diabetes with right index and left middle recurrent trigger fingers following prior injection.    Plan:   Discussed with patient that in light of her diabetes, triggering has high likelihood of recurrence with repeat injection.  She would prefer to proceed with definitive management in the form of right index and left  middle trigger releases.  Her last hemoglobin A1c is 10.  We discussed it does need to be lowered to proceed surgically.  We will recheck today.  If it is lower, we will make plans to proceed with surgical release per her preference.  If it is not, she will come back for injections and we will plan for likely surgery once her A1c is better controlled.

## 2020-07-08 NOTE — NURSING NOTE
Reason For Visit:   Chief Complaint   Patient presents with     RECHECK     Pain of finger of right hand  & left hand      Primary MD: Mariya Mohamud    Age: 40 year old    ?  Yes, specify language: American Sign Language    [facility] 06/22/2020     Pain Assessment  Patient Currently in Pain: Yes  0-10 Pain Scale: 7  Primary Pain Location: Hand  Other Pain Locations: hard for pt to use Sign Language or Open Jars    QuickDASH Assessment  No flowsheet data found.     Current Outpatient Medications   Medication Sig Dispense Refill     Alcohol Swabs (ALCOHOL PREP PAD) 70 % PADS 1 each 3 times daily 100 each 3     aspirin (ASA) 81 MG chewable tablet CHEW AND SWALLOW ONE TABLET BY MOUTH EVERY DAY 90 tablet 3     atorvastatin (LIPITOR) 40 MG tablet Take 1 tablet (40 mg) by mouth daily 90 tablet 2     B-D U/F 31G X 8 MM insulin pen needle USE AS DIRECTED 100 each 3     B-D U/F insulin pen needle        BD ULTRA FINE PEN NEEDLES Inject 1 dose. Subcutaneous 2 times daily BD ultra-fine insulin syringe, 30 ga. X 1/2'' short needle 1/2 cc. Use as directed. 400 Units 11     blood glucose (ACCU-CHEK LAYA PLUS) test strip Use with  Accucheck Expert meter.  Test blood sugar 6 times daily. 600 each 3     blood glucose monitoring (ACCU-CHEK FASTCLIX) lancets Use to test blood sugar 6 times daily or as directed 6 Box 3     continuous blood glucose monitoring (FREESTYLE ZORAIDA) sensor For use with Freestyle Zoraida Flash  for continuous monitioring of blood glucose levels. Replace sensor every 14 days. 6 each 3     cyclobenzaprine (FLEXERIL) 5 MG tablet Take 1-2 tablets (5-10 mg) by mouth 3 times daily as needed for muscle spasms 60 tablet 1     dulaglutide (TRULICITY) 1.5 MG/0.5ML pen Inject 1.5 mg Subcutaneous every 7 days 6 mL 3     empagliflozin (JARDIANCE) 25 MG TABS tablet Take 1 tablet (25 mg) by mouth daily 90 tablet 3     ergocalciferol (ERGOCALCIFEROL) 1.25 MG (38557 UT) capsule Take 1 capsule (50,000 Units) by  mouth once a week 14 capsule 3     fenofibrate (TRIGLIDE/LOFIBRA) 160 MG tablet Take 1 tablet (160 mg) by mouth daily 90 tablet 1     HYDROcodone-acetaminophen (NORCO) 5-325 MG tablet Take 1 tablet by mouth every 6 hours as needed for severe pain 10 tablet 0     ibuprofen (ADVIL/MOTRIN) 600 MG tablet Take 1 tablet (600 mg) by mouth every 6 hours as needed for moderate pain 120 tablet 1     insulin glargine (BASAGLAR KWIKPEN) 100 UNIT/ML pen Inject 35 Units Subcutaneous daily Please provide 3 months supply 40 mL 3     insulin isophane human (HUMULIN N KWIKPEN) 100 UNIT/ML injection Inject 20 Units Subcutaneous every morning (before breakfast) 20 mL 3     losartan (COZAAR) 50 MG tablet Take 1 tablet (50 mg) by mouth daily 90 tablet 3     naproxen (NAPROSYN) 500 MG tablet Take 1 tablet (500 mg) by mouth 2 times daily as needed for moderate pain 30 tablet 1     omega 3 1000 MG CAPS Take 2 g by mouth daily 180 capsule 3     Ostomy Supplies (ADHESIVE REMOVER WIPES) MISC 1 each every 14 days 50 each 3     Ostomy Supplies (SKIN TAC ADHESIVE BARRIER WIPE) MISC 1 each every 14 days 6 each 3     triamcinolone (KENALOG) 0.1 % external cream Apply topically 3 times daily To affected areas 60 g 0     levonorgestrel (MIRENA) 20 MCG/24HR IUD 1 each (20 mcg) by Intrauterine route once for 1 dose 1 each 0     No Known Allergies    Sara Gutierrez ATC

## 2020-07-08 NOTE — PROGRESS NOTES
Hand / Upper Extremity Injection/Arthrocentesis: L long A1    Date/Time: 2020 3:16 PM  Performed by: Greg Streeter MD  Authorized by: Greg Streeter MD     Condition: trigger finger    Location:  Long finger    Site:  L long A1  Medications:  6 mg betamethasone acet & sod phos 6 (3-3) MG/ML; 1 mL lidocaine (PF) 1 %  Outcome:  Tolerated well, no immediate complications  Procedure discussed: discussed risks, benefits, and alternatives    Consent Given by:  Patient  Timeout: timeout called immediately prior to procedure    Prep: patient was prepped and draped in usual sterile fashion        [facility] [place] [id number] [address] [id number] [phone]  Dept: [phone]  ______________________________________________________________________________    Patient: Nayeli Caal   : 1980   MRN: [id number]   2020    INVASIVE PROCEDURE SAFETY CHECKLIST    Date: 2020   Procedure: Left middle finger steroid injection  Patient Name: Nayeli Caal  MRN: [id number]  YOB: 1980    Action: Complete sections as appropriate. Any discrepancy results in a HARD COPY until resolved.     PRE PROCEDURE:  Patient ID verified with 2 identifiers (name and  or MRN): Yes  Procedure and site verified with patient/designee (when able): Yes  Accurate consent documentation in medical record: Yes  H&P (or appropriate assessment) documented in medical record: Yes  H&P must be up to 20 days prior to procedure and updates within 24 hours of procedure as applicable: Yes  Relevant diagnostic and radiology test results appropriately labeled and displayed as applicable: Yes  Procedure site(s) marked with provider initials: Yes    TIMEOUT:  Time-Out performed immediately prior to starting procedure, including verbal and active participation of all team members addressing the following:Yes  * Correct patient identify  * Confirmed that the correct  side and site are marked  * An accurate procedure consent form  * Agreement on the procedure to be done  * Correct patient position  * Relevant images and results are properly labeled and appropriately displayed  * The need to administer antibiotics or fluids for irrigation purposes during the procedure as applicable   * Safety precautions based on patient history or medication use    DURING PROCEDURE: Verification of correct person, site, and procedures any time the responsibility for care of the patient is transferred to another member of the care team.       The following medications were given:         Prior to injection, verified patient identity using patient's name and date of birth.  Due to injection administration, patient instructed to remain in clinic for 15 minutes  afterwards, and to report any adverse reaction to me immediately.    Tendon sheath injection was performed.   Medication Name: Celestone NDC: 4526-2942-71  Drug Amount Wasted:  Yes: 4 mg/ml   Vial/Syringe: Single dose vial  Expiration Date:  10/1/2020    Medication Name: Lidocaine NDC: 31500-532-29    Scribed by Milagro Merrill ATC for Dr. Streeter on July 8, 2020 at 3:15 pm based on the provider's statements to me.     Milagro Merrill ATC

## 2020-07-09 ENCOUNTER — TELEPHONE (OUTPATIENT)
Dept: OBGYN | Facility: CLINIC | Age: 40
End: 2020-07-09

## 2020-07-09 NOTE — TELEPHONE ENCOUNTER
Telephone call from Nayeli     Concerned as about a month ago and was seen in clinic 7/8/2020 and wondering about next steps in regards to the uterine fibroid? At some point she is thinking she would like to move forward with surgery to remove the fibroid however has other health concerns that need to be managed before moving forward.  (A1c was 12 and working now with endocrine)     LMP 2 weeks ago normal menses, the menses before this was very heavy    Has a follow up appt next Wed 7/15/2020, wonders if she should wait until then to discuss     Forwarding to Dr. Hodge.

## 2020-07-10 NOTE — TELEPHONE ENCOUNTER
Reviewed by Dr. Hodge will discuss at upcoming appt    Nayeli called with interp and left message will discuss at upcoming appt and call if continues with additional questions at [phone].

## 2020-07-13 RX ORDER — CHLORAL HYDRATE 500 MG
CAPSULE ORAL
Qty: 180 CAPSULE | Refills: 3 | Status: SHIPPED | OUTPATIENT
Start: 2020-07-13

## 2020-07-13 RX ORDER — FENOFIBRATE 160 MG/1
160 TABLET ORAL DAILY
Qty: 90 TABLET | Refills: 1 | Status: SHIPPED | OUTPATIENT
Start: 2020-07-13 | End: 2020-10-29

## 2020-07-13 NOTE — TELEPHONE ENCOUNTER
fenofibrate (TRIGLIDE/LOFIBRA) 160 MG tablet    Take 1 tablet (160 mg) by mouth daily     Last Written Prescription Date:  10/17/19  Last Fill Quantity: 90,   # refills: 1  Last Office Visit : 1/21/20  Future Office visit:  10/27/20    Refill to pharmacy.

## 2020-07-13 NOTE — TELEPHONE ENCOUNTER
omega 3 1000 MG CAPS   Take 2 g by mouth daily       Last Written Prescription Date:  4/22/19  Last Fill Quantity: 180,   # refills: 3  Last Office Visit : 1/13/20  Future Office visit:  none    Routing refill request to provider for review/approval because:  Drug not on the refill protocol.

## 2020-07-15 ENCOUNTER — OFFICE VISIT (OUTPATIENT)
Dept: OBGYN | Facility: CLINIC | Age: 40
End: 2020-07-15
Attending: OBSTETRICS & GYNECOLOGY
Payer: COMMERCIAL

## 2020-07-15 VITALS
HEART RATE: 76 BPM | HEIGHT: 62 IN | DIASTOLIC BLOOD PRESSURE: 81 MMHG | SYSTOLIC BLOOD PRESSURE: 129 MMHG | BODY MASS INDEX: 29.4 KG/M2 | WEIGHT: 159.8 LBS

## 2020-07-15 DIAGNOSIS — D25.2 SUBSEROUS LEIOMYOMA OF UTERUS: Primary | ICD-10-CM

## 2020-07-15 DIAGNOSIS — E78.2 MIXED HYPERLIPIDEMIA: ICD-10-CM

## 2020-07-15 PROCEDURE — G0463 HOSPITAL OUTPT CLINIC VISIT: HCPCS | Mod: ZF

## 2020-07-15 ASSESSMENT — MIFFLIN-ST. JEOR: SCORE: 1348.1

## 2020-07-15 NOTE — LETTER
"7/15/2020       RE: Nayeli Caal  [address]  [facility] [address]     Dear Colleague,    Thank you for referring your patient, Nayeli Caal, to the [facility] at [facility] Please see a copy of my visit note below.    Gyn Clinic Visit Note  7/15/2020    S: Nayeli Caal is a 40 year old  here today for uterine fibroid, pelvic pain, and abnormal vaginal bleeding. She states that her bleeding ended approximately 3 weeks ago but she anticipates her next menstrual cycle starting soon. Otherwise, her pelvic pain has continued and she has noted that the pressure sensation is radiating over to the left side. She was recently told that her HgbA1C was too high for her orthopedic surgery. She otherwise feels well and denies any fever, cough, shortness of breath, nausea, vomiting, constipation, diarrhea, vaginal discharge, dysuria, or lower extremity edema.    Gyn Hx:   Patient's last menstrual period was 2020.  Menses:   Reports prior amenorrhea with Mirena. Recent onset of vaginal bleeding on 2020.  Contraception:   Mirena IUD  Sexual Activity  not at present  Sexually transmitted disease history:  none  PAP smear history:  PAP   Date Value Ref Range Status   2016 NIL  Final   2012 NIL  Final     Last PAP was normal; .         O:   /81   Pulse 76   Ht 1.575 m (5' 2\")   Wt 72.5 kg (159 lb 12.8 oz)   LMP 2020   BMI 29.23 kg/m    LMP 2020      Imaging:  EXAMINATION: US PELVIC COMPLETE WITH TRANSVAGINAL, 2020 4:11 PM      COMPARISON: Ultrasound dated 3/24/2016.     HISTORY: IUD placement.     TECHNIQUE: The pelvis was scanned in standard fashion with  transabdominal and transvaginal transducer(s) using both grey scale  and color Doppler techniques.     FINDINGS:  The uterus measures 10.0 x 4.6 x 5.7 cm. There is a right-sided  subserosal uterine fibroid measuring 5.1 " x 3.9 x 4.0 cm, previously  3.8 x 3.5 x 4.3 cm in 2016 . Intrauterine device positioned within the  fundus of the endometrial canal. The endometrium is within normal  limits and measures 5 mm. No free fluid in the pelvis.     The right ovary measures 3.2 x 2.5 x 3.2 cm and the left ovary  measures 2.9 x 2.1 x 2.9 cm. No adnexal mass. There is normal blood  flow to the ovaries.                                                                      IMPRESSION:   1.  The intrauterine device appears well-positioned within the  endometrial canal.  2.  Right-sided subserosal uterine fibroid measuring 5.1 x 3.9 x 4.0  cm increased from 2016.     I have personally reviewed the examination and initial interpretation  and I agree with the findings.     COLE COATS MD      A:  Nayeli Caal is a 40 year old y/o  here today for abnormal vaginal bleeding and pelvic pain secondary to fibroids.     P:  Subserosal Leiomyoma:  - increased in size  - symptoms refractive to Mirena IUD  - discussed options, risks, benefits  - patient desires to proceed with laparoscopic hysterectomy with bilateral salpingectomy  - discussed need for endometrial biopsy prior to surgery    DM:  -discussed need hgvA1C to be <9 for surgery    Follow up: , 10:30 am    Gregor Garcia MD  OBGYN PGY-1  July 15, 2020 1:44 PM    I have seen and examined the patient with the resident. I have reviewed, edited, and agree with the note.     My findings are:appears well. Understands current condition, options, preoperative goals.   Total visit time was 25 minutes with 15 minutes spent in counseling and coordination of care for options for fibroid management.        Anne Hodge MD

## 2020-07-15 NOTE — PROGRESS NOTES
"Gyn Clinic Visit Note  7/15/2020    S: Nayeli Caal is a 40 year old  here today for uterine fibroid, pelvic pain, and abnormal vaginal bleeding. She states that her bleeding ended approximately 3 weeks ago but she anticipates her next menstrual cycle starting soon. Otherwise, her pelvic pain has continued and she has noted that the pressure sensation is radiating over to the left side. She was recently told that her HgbA1C was too high for her orthopedic surgery. She otherwise feels well and denies any fever, cough, shortness of breath, nausea, vomiting, constipation, diarrhea, vaginal discharge, dysuria, or lower extremity edema.    Gyn Hx:   Patient's last menstrual period was 2020.  Menses:   Reports prior amenorrhea with Mirena. Recent onset of vaginal bleeding on 2020.  Contraception:   Mirena IUD  Sexual Activity  not at present  Sexually transmitted disease history:  none  PAP smear history:  PAP   Date Value Ref Range Status   2016 NIL  Final   2012 NIL  Final     Last PAP was normal; .         O:   /81   Pulse 76   Ht 1.575 m (5' 2\")   Wt 72.5 kg (159 lb 12.8 oz)   LMP 2020   BMI 29.23 kg/m    LMP 2020      Imaging:  EXAMINATION: US PELVIC COMPLETE WITH TRANSVAGINAL, 2020 4:11 PM      COMPARISON: Ultrasound dated 3/24/2016.     HISTORY: IUD placement.     TECHNIQUE: The pelvis was scanned in standard fashion with  transabdominal and transvaginal transducer(s) using both grey scale  and color Doppler techniques.     FINDINGS:  The uterus measures 10.0 x 4.6 x 5.7 cm. There is a right-sided  subserosal uterine fibroid measuring 5.1 x 3.9 x 4.0 cm, previously  3.8 x 3.5 x 4.3 cm in 2016 . Intrauterine device positioned within the  fundus of the endometrial canal. The endometrium is within normal  limits and measures 5 mm. No free fluid in the pelvis.     The right ovary measures 3.2 x 2.5 x 3.2 cm and the left ovary  measures 2.9 x " 2.1 x 2.9 cm. No adnexal mass. There is normal blood  flow to the ovaries.                                                                      IMPRESSION:   1.  The intrauterine device appears well-positioned within the  endometrial canal.  2.  Right-sided subserosal uterine fibroid measuring 5.1 x 3.9 x 4.0  cm increased from 2016.     I have personally reviewed the examination and initial interpretation  and I agree with the findings.     COLE COATS MD      A:  Nayeli Caal is a 40 year old y/o  here today for abnormal vaginal bleeding and pelvic pain secondary to fibroids.     P:  Subserosal Leiomyoma:  - increased in size  - symptoms refractive to Mirena IUD  - discussed options, risks, benefits  - patient desires to proceed with laparoscopic hysterectomy with bilateral salpingectomy  - discussed need for endometrial biopsy prior to surgery    DM:  -discussed need hgvA1C to be <9 for surgery    Follow up: , 10:30 am    Gregor Garcia MD  OBGYN PGY-1  July 15, 2020 1:44 PM    I have seen and examined the patient with the resident. I have reviewed, edited, and agree with the note.     My findings are:appears well. Understands current condition, options, preoperative goals.   Total visit time was 25 minutes with 15 minutes spent in counseling and coordination of care for options for fibroid management.        Anne Hodge MD

## 2020-07-17 RX ORDER — OMEGA-3 FATTY ACIDS/FISH OIL 300-1000MG
2 CAPSULE ORAL DAILY
Qty: 180 CAPSULE | Refills: 3 | Status: SHIPPED | OUTPATIENT
Start: 2020-07-17 | End: 2022-02-08

## 2020-07-17 RX ORDER — CHLORAL HYDRATE 500 MG
2 CAPSULE ORAL DAILY
Qty: 180 CAPSULE | Refills: 3 | OUTPATIENT
Start: 2020-07-17

## 2020-07-20 ENCOUNTER — OFFICE VISIT (OUTPATIENT)
Dept: ORTHOPEDICS | Facility: CLINIC | Age: 40
End: 2020-07-20
Payer: COMMERCIAL

## 2020-07-20 DIAGNOSIS — M65.30 TRIGGER FINGER OF RIGHT HAND, UNSPECIFIED FINGER: Primary | ICD-10-CM

## 2020-07-20 DIAGNOSIS — E11.65 UNCONTROLLED TYPE 2 DIABETES MELLITUS WITH HYPERGLYCEMIA, WITH LONG-TERM CURRENT USE OF INSULIN (H): ICD-10-CM

## 2020-07-20 DIAGNOSIS — Z79.4 UNCONTROLLED TYPE 2 DIABETES MELLITUS WITH HYPERGLYCEMIA, WITH LONG-TERM CURRENT USE OF INSULIN (H): ICD-10-CM

## 2020-07-20 LAB
CREAT UR-MCNC: 56 MG/DL
MICROALBUMIN UR-MCNC: 31 MG/L
MICROALBUMIN/CREAT UR: 54.71 MG/G CR (ref 0–25)

## 2020-07-20 RX ORDER — BETAMETHASONE SODIUM PHOSPHATE AND BETAMETHASONE ACETATE 3; 3 MG/ML; MG/ML
6 INJECTION, SUSPENSION INTRA-ARTICULAR; INTRALESIONAL; INTRAMUSCULAR; SOFT TISSUE
Status: DISCONTINUED | OUTPATIENT
Start: 2020-07-20 | End: 2021-04-05

## 2020-07-20 RX ORDER — LIDOCAINE HYDROCHLORIDE 10 MG/ML
1 INJECTION, SOLUTION EPIDURAL; INFILTRATION; INTRACAUDAL; PERINEURAL
Status: DISCONTINUED | OUTPATIENT
Start: 2020-07-20 | End: 2021-04-05

## 2020-07-20 RX ADMIN — BETAMETHASONE SODIUM PHOSPHATE AND BETAMETHASONE ACETATE 6 MG: 3; 3 INJECTION, SUSPENSION INTRA-ARTICULAR; INTRALESIONAL; INTRAMUSCULAR; SOFT TISSUE at 08:12

## 2020-07-20 RX ADMIN — LIDOCAINE HYDROCHLORIDE 1 ML: 10 INJECTION, SOLUTION EPIDURAL; INFILTRATION; INTRACAUDAL; PERINEURAL at 08:12

## 2020-07-20 NOTE — LETTER
2020         RE: Nayeli Caal  [address]  [facility] [place] [address]        Dear Colleague,    Thank you for referring your patient, Nayeli Caal, to the [facility]. Please see a copy of my visit note below.    Hand / Upper Extremity Injection/Arthrocentesis: R index A1    Date/Time: 2020 8:12 AM  Performed by: Greg Streeter MD  Authorized by: Greg Streeter MD     Needle Size:  27 G  Guidance: landmark    Condition: trigger finger    Location:  Index finger    Site:  R index A1  Medications:  6 mg betamethasone acet & sod phos 6 (3-3) MG/ML; 1 mL lidocaine (PF) 1 %  Outcome:  Tolerated well, no immediate complications  Procedure discussed: discussed risks, benefits, and alternatives    Consent Given by:  Patient  Timeout: timeout called immediately prior to procedure    Prep: patient was prepped and draped in usual sterile fashion    Hand / Upper Extremity Injection/Arthrocentesis: L long A1    Date/Time: 2020 8:12 AM  Performed by: Greg Streeter MD  Authorized by: Greg Streeter MD     Needle Size:  27 G  Guidance: landmark    Condition: trigger finger    Location:  Long finger    Site:  L long A1  Medications:  6 mg betamethasone acet & sod phos 6 (3-3) MG/ML; 1 mL lidocaine (PF) 1 %  Outcome:  Tolerated well, no immediate complications  Procedure discussed: discussed risks, benefits, and alternatives    Consent Given by:  Patient  Timeout: timeout called immediately prior to procedure    Prep: patient was prepped and draped in usual sterile fashion        [facility]  [address] [facility]  [address] [facility] [id number] [phone]  Dept: [phone]  ______________________________________________________________________________    Patient: Nayeli Caal   : 1980   MRN: [id number]   2020    INVASIVE PROCEDURE SAFETY CHECKLIST    Date: 2020   Procedure: Right index and left  long finger steroid injections  Patient Name: Nayeli Caal  MRN: [id number]  YOB: 1980    Action: Complete sections as appropriate. Any discrepancy results in a HARD COPY until resolved.     PRE PROCEDURE:  Patient ID verified with 2 identifiers (name and  or MRN): Yes  Procedure and site verified with patient/designee (when able): Yes  Accurate consent documentation in medical record: Yes  H&P (or appropriate assessment) documented in medical record: Yes  H&P must be up to 20 days prior to procedure and updates within 24 hours of procedure as applicable: Yes  Relevant diagnostic and radiology test results appropriately labeled and displayed as applicable: Yes  Procedure site(s) marked with provider initials: Yes    TIMEOUT:  Time-Out performed immediately prior to starting procedure, including verbal and active participation of all team members addressing the following:Yes  * Correct patient identify  * Confirmed that the correct side and site are marked  * An accurate procedure consent form  * Agreement on the procedure to be done  * Correct patient position  * Relevant images and results are properly labeled and appropriately displayed  * The need to administer antibiotics or fluids for irrigation purposes during the procedure as applicable   * Safety precautions based on patient history or medication use    DURING PROCEDURE: Verification of correct person, site, and procedures any time the responsibility for care of the patient is transferred to another member of the care team.       The following medications were given:         Prior to injection, verified patient identity using patient's name and date of birth.  Due to injection administration, patient instructed to remain in clinic for 15 minutes  afterwards, and to report any adverse reaction to me immediately.    Tendon sheath injection was performed.   Medication Name: Celestone NDC: 1863-0971-19  Drug Amount Wasted:  Yes: 3  mg/ml   Vial/Syringe: Single dose vial  Expiration Date:  3/1/21    Medication Name: Lidocaine NDC: 61104-049-80    Scribed by Milagro Merrill ATC for Dr. Streeter on July 20, 2020 at 8:15 am based on the provider's statements to me.     Milagro Merrill ATC          Procedure note  After informed consent was obtained, the skin was prepped with chloraprep. A mixture of 1 mL of 1% lidocaine and 1 mL (6 mg) of betamethasone was injected into the patients right index finger at the A1 pulley. A bandaid was applied. The skin of the other side was then prepped and a mixture of 1 mL of 1% lidocaine and 1 mL (6 mg) of betamethasone was injected into the patients left middle finger at the A1 pulley. A bandaid was applied.     Post-injection care instructions were given. The patient tolerated the procedure well.     Patient was notified of risk of hyperglycemia and need to monitor sugars closely    SHe is aware risk of recurrence very high in light of her history and diabetes    However she is not surgical candidate right now due to a1c    She will work on a1c and notify us when it is better    Planning on gyn surgery when a1c better- we will try to coordinate per her preference

## 2020-07-20 NOTE — PROGRESS NOTES
Procedure note  After informed consent was obtained, the skin was prepped with chloraprep. A mixture of 1 mL of 1% lidocaine and 1 mL (6 mg) of betamethasone was injected into the patients right index finger at the A1 pulley. A bandaid was applied. The skin of the other side was then prepped and a mixture of 1 mL of 1% lidocaine and 1 mL (6 mg) of betamethasone was injected into the patients left middle finger at the A1 pulley. A bandaid was applied.     Post-injection care instructions were given. The patient tolerated the procedure well.     Patient was notified of risk of hyperglycemia and need to monitor sugars closely    SHe is aware risk of recurrence very high in light of her history and diabetes    However she is not surgical candidate right now due to a1c    She will work on a1c and notify us when it is better    Planning on gyn surgery when a1c better- we will try to coordinate per her preference

## 2020-07-20 NOTE — NURSING NOTE
Reason For Visit:   Chief Complaint   Patient presents with     RECHECK     pt requesting injection     Primary MD: Mariya Mohamud    Age: 40 year old    ?  Yes, American Sign Language    LMP 06/22/2020     Pain Assessment  Patient Currently in Pain: Yes  0-10 Pain Scale: 8  Primary Pain Location: Hand  Other Pain Locations: pt reported it's getting more painful    QuickDASH Assessment  No flowsheet data found.     Current Outpatient Medications   Medication Sig Dispense Refill     Alcohol Swabs (ALCOHOL PREP PAD) 70 % PADS 1 each 3 times daily 100 each 3     aspirin (ASA) 81 MG chewable tablet CHEW AND SWALLOW ONE TABLET BY MOUTH EVERY DAY 90 tablet 3     atorvastatin (LIPITOR) 40 MG tablet Take 1 tablet (40 mg) by mouth daily 90 tablet 2     B-D U/F 31G X 8 MM insulin pen needle USE AS DIRECTED 100 each 3     B-D U/F insulin pen needle        BD ULTRA FINE PEN NEEDLES Inject 1 dose. Subcutaneous 2 times daily BD ultra-fine insulin syringe, 30 ga. X 1/2'' short needle 1/2 cc. Use as directed. 400 Units 11     blood glucose (ACCU-CHEK LAYA PLUS) test strip Use with  Accucheck Expert meter.  Test blood sugar 6 times daily. 600 each 3     blood glucose monitoring (ACCU-CHEK FASTCLIX) lancets Use to test blood sugar 6 times daily or as directed 6 Box 3     continuous blood glucose monitoring (FREESTYLE ZORAIDA) sensor For use with Freestyle Zoraida Flash  for continuous monitioring of blood glucose levels. Replace sensor every 14 days. 6 each 3     cyclobenzaprine (FLEXERIL) 5 MG tablet Take 1-2 tablets (5-10 mg) by mouth 3 times daily as needed for muscle spasms 60 tablet 1     dulaglutide (TRULICITY) 1.5 MG/0.5ML pen Inject 1.5 mg Subcutaneous every 7 days 6 mL 3     empagliflozin (JARDIANCE) 25 MG TABS tablet Take 1 tablet (25 mg) by mouth daily 90 tablet 3     ergocalciferol (ERGOCALCIFEROL) 1.25 MG (37546 UT) capsule Take 1 capsule (50,000 Units) by mouth once a week 14 capsule 3      fenofibrate (TRIGLIDE/LOFIBRA) 160 MG tablet Take 1 tablet (160 mg) by mouth daily 90 tablet 1     fish oil-omega-3 fatty acids 1000 MG capsule TAKE TWO CAPSULES (2 GM) BY MOUTH ONCE DAILY 180 capsule 3     HYDROcodone-acetaminophen (NORCO) 5-325 MG tablet Take 1 tablet by mouth every 6 hours as needed for severe pain 10 tablet 0     ibuprofen (ADVIL/MOTRIN) 600 MG tablet Take 1 tablet (600 mg) by mouth every 6 hours as needed for moderate pain 120 tablet 1     insulin glargine (BASAGLAR KWIKPEN) 100 UNIT/ML pen Inject 35 Units Subcutaneous daily Please provide 3 months supply 40 mL 3     insulin isophane human (HUMULIN N KWIKPEN) 100 UNIT/ML injection Inject 20 Units Subcutaneous every morning (before breakfast) 20 mL 3     losartan (COZAAR) 50 MG tablet Take 1 tablet (50 mg) by mouth daily 90 tablet 3     naproxen (NAPROSYN) 500 MG tablet Take 1 tablet (500 mg) by mouth 2 times daily as needed for moderate pain 30 tablet 1     omega 3 1000 MG CAPS Take 2 g by mouth daily 180 capsule 3     Ostomy Supplies (ADHESIVE REMOVER WIPES) MISC 1 each every 14 days 50 each 3     Ostomy Supplies (SKIN TAC ADHESIVE BARRIER WIPE) MISC 1 each every 14 days 6 each 3     triamcinolone (KENALOG) 0.1 % external cream Apply topically 3 times daily To affected areas 60 g 0     levonorgestrel (MIRENA) 20 MCG/24HR IUD 1 each (20 mcg) by Intrauterine route once for 1 dose 1 each 0     No Known Allergies    Sara Gutierrez ATC

## 2020-07-20 NOTE — PROGRESS NOTES
Hand / Upper Extremity Injection/Arthrocentesis: R index A1    Date/Time: 2020 8:12 AM  Performed by: Greg Streeter MD  Authorized by: Greg Streeter MD     Needle Size:  27 G  Guidance: landmark    Condition: trigger finger    Location:  Index finger    Site:  R index A1  Medications:  6 mg betamethasone acet & sod phos 6 (3-3) MG/ML; 1 mL lidocaine (PF) 1 %  Outcome:  Tolerated well, no immediate complications  Procedure discussed: discussed risks, benefits, and alternatives    Consent Given by:  Patient  Timeout: timeout called immediately prior to procedure    Prep: patient was prepped and draped in usual sterile fashion    Hand / Upper Extremity Injection/Arthrocentesis: L long A1    Date/Time: 2020 8:12 AM  Performed by: Greg Streeter MD  Authorized by: Greg Streeter MD     Needle Size:  27 G  Guidance: landmark    Condition: trigger finger    Location:  Long finger    Site:  L long A1  Medications:  6 mg betamethasone acet & sod phos 6 (3-3) MG/ML; 1 mL lidocaine (PF) 1 %  Outcome:  Tolerated well, no immediate complications  Procedure discussed: discussed risks, benefits, and alternatives    Consent Given by:  Patient  Timeout: timeout called immediately prior to procedure    Prep: patient was prepped and draped in usual sterile fashion        [facility]  [address] [place] [id number] [phone]  Dept: [phone]  ______________________________________________________________________________    Patient: Nayeli Caal   : 1980   MRN: [id number]   2020    INVASIVE PROCEDURE SAFETY CHECKLIST    Date: 2020   Procedure: Right index and left long finger steroid injections  Patient Name: Nayeli Caal  MRN: [id number]  YOB: 1980    Action: Complete sections as appropriate. Any discrepancy results in a HARD COPY until resolved.     PRE PROCEDURE:  Patient ID verified with  2 identifiers (name and  or MRN): Yes  Procedure and site verified with patient/designee (when able): Yes  Accurate consent documentation in medical record: Yes  H&P (or appropriate assessment) documented in medical record: Yes  H&P must be up to 20 days prior to procedure and updates within 24 hours of procedure as applicable: Yes  Relevant diagnostic and radiology test results appropriately labeled and displayed as applicable: Yes  Procedure site(s) marked with provider initials: Yes    TIMEOUT:  Time-Out performed immediately prior to starting procedure, including verbal and active participation of all team members addressing the following:Yes  * Correct patient identify  * Confirmed that the correct side and site are marked  * An accurate procedure consent form  * Agreement on the procedure to be done  * Correct patient position  * Relevant images and results are properly labeled and appropriately displayed  * The need to administer antibiotics or fluids for irrigation purposes during the procedure as applicable   * Safety precautions based on patient history or medication use    DURING PROCEDURE: Verification of correct person, site, and procedures any time the responsibility for care of the patient is transferred to another member of the care team.       The following medications were given:         Prior to injection, verified patient identity using patient's name and date of birth.  Due to injection administration, patient instructed to remain in clinic for 15 minutes  afterwards, and to report any adverse reaction to me immediately.    Tendon sheath injection was performed.   Medication Name: Celestone NDC: 7582-4297-62  Drug Amount Wasted:  Yes: 3 mg/ml   Vial/Syringe: Single dose vial  Expiration Date:  3/1/21    Medication Name: Lidocaine NDC: 75335-218-39    Scribed by Milagro Merrill ATC for Dr. Streeter on 2020 at 8:15 am based on the provider's statements to me.     Milagro Merrill  ATC

## 2020-10-29 ENCOUNTER — OFFICE VISIT (OUTPATIENT)
Dept: INTERNAL MEDICINE | Facility: CLINIC | Age: 40
End: 2020-10-29
Payer: COMMERCIAL

## 2020-10-29 VITALS
SYSTOLIC BLOOD PRESSURE: 123 MMHG | OXYGEN SATURATION: 96 % | DIASTOLIC BLOOD PRESSURE: 84 MMHG | WEIGHT: 161 LBS | BODY MASS INDEX: 29.45 KG/M2 | HEART RATE: 70 BPM

## 2020-10-29 DIAGNOSIS — E10.8 TYPE 1 DIABETES MELLITUS WITH COMPLICATIONS (H): ICD-10-CM

## 2020-10-29 DIAGNOSIS — Z12.31 ENCOUNTER FOR SCREENING MAMMOGRAM FOR BREAST CANCER: ICD-10-CM

## 2020-10-29 DIAGNOSIS — E11.21 TYPE 2 DIABETES WITH NEPHROPATHY (H): ICD-10-CM

## 2020-10-29 DIAGNOSIS — E11.65 UNCONTROLLED TYPE 2 DIABETES MELLITUS WITH HYPERGLYCEMIA, WITH LONG-TERM CURRENT USE OF INSULIN (H): Primary | ICD-10-CM

## 2020-10-29 DIAGNOSIS — E11.8 TYPE 2 DIABETES MELLITUS WITH COMPLICATION, WITH LONG-TERM CURRENT USE OF INSULIN (H): ICD-10-CM

## 2020-10-29 DIAGNOSIS — Z79.4 UNCONTROLLED TYPE 2 DIABETES MELLITUS WITH HYPERGLYCEMIA, WITH LONG-TERM CURRENT USE OF INSULIN (H): Primary | ICD-10-CM

## 2020-10-29 DIAGNOSIS — E11.65 UNCONTROLLED TYPE 2 DIABETES MELLITUS WITH HYPERGLYCEMIA, WITH LONG-TERM CURRENT USE OF INSULIN (H): ICD-10-CM

## 2020-10-29 DIAGNOSIS — Z79.4 UNCONTROLLED TYPE 2 DIABETES MELLITUS WITH HYPERGLYCEMIA, WITH LONG-TERM CURRENT USE OF INSULIN (H): ICD-10-CM

## 2020-10-29 DIAGNOSIS — Z79.4 TYPE 2 DIABETES MELLITUS WITH COMPLICATION, WITH LONG-TERM CURRENT USE OF INSULIN (H): ICD-10-CM

## 2020-10-29 DIAGNOSIS — E78.2 MIXED HYPERLIPIDEMIA: ICD-10-CM

## 2020-10-29 LAB — HBA1C MFR BLD: 12.4 % (ref 0–5.6)

## 2020-10-29 PROCEDURE — 83036 HEMOGLOBIN GLYCOSYLATED A1C: CPT | Performed by: PATHOLOGY

## 2020-10-29 PROCEDURE — 99214 OFFICE O/P EST MOD 30 MIN: CPT | Performed by: NURSE PRACTITIONER

## 2020-10-29 PROCEDURE — 36415 COLL VENOUS BLD VENIPUNCTURE: CPT | Performed by: PATHOLOGY

## 2020-10-29 RX ORDER — INSULIN HUMAN 100 [IU]/ML
20 INJECTION, SUSPENSION SUBCUTANEOUS
Qty: 20 ML | Refills: 3 | Status: SHIPPED | OUTPATIENT
Start: 2020-10-29 | End: 2021-04-05

## 2020-10-29 RX ORDER — FENOFIBRATE 160 MG/1
160 TABLET ORAL DAILY
Qty: 90 TABLET | Refills: 1 | Status: SHIPPED | OUTPATIENT
Start: 2020-10-29 | End: 2020-10-29

## 2020-10-29 RX ORDER — ATORVASTATIN CALCIUM 40 MG/1
40 TABLET, FILM COATED ORAL DAILY
Qty: 90 TABLET | Refills: 2 | Status: SHIPPED | OUTPATIENT
Start: 2020-10-29 | End: 2021-07-22

## 2020-10-29 RX ORDER — BLOOD SUGAR DIAGNOSTIC
STRIP MISCELLANEOUS
Qty: 600 EACH | Refills: 3 | Status: SHIPPED | OUTPATIENT
Start: 2020-10-29

## 2020-10-29 RX ORDER — INSULIN GLARGINE 100 [IU]/ML
35 INJECTION, SOLUTION SUBCUTANEOUS DAILY
Qty: 40 ML | Refills: 3 | Status: SHIPPED | OUTPATIENT
Start: 2020-10-29 | End: 2021-04-05

## 2020-10-29 RX ORDER — FENOFIBRATE 160 MG/1
160 TABLET ORAL DAILY
Qty: 90 TABLET | Refills: 3 | Status: SHIPPED | OUTPATIENT
Start: 2020-10-29 | End: 2021-07-22

## 2020-10-29 RX ORDER — LOSARTAN POTASSIUM 50 MG/1
50 TABLET ORAL DAILY
Qty: 90 TABLET | Refills: 3 | Status: SHIPPED | OUTPATIENT
Start: 2020-10-29 | End: 2021-07-22

## 2020-10-29 ASSESSMENT — PAIN SCALES - GENERAL: PAINLEVEL: NO PAIN (0)

## 2020-10-29 NOTE — NURSING NOTE
Chief Complaint   Patient presents with     Recheck Medication     pt here to follow up       Sloan Aguilar CMA, EMT at 7:58 AM on 10/29/2020.

## 2020-10-30 DIAGNOSIS — E11.65 UNCONTROLLED TYPE 2 DIABETES MELLITUS WITH HYPERGLYCEMIA, WITH LONG-TERM CURRENT USE OF INSULIN (H): Primary | ICD-10-CM

## 2020-10-30 DIAGNOSIS — Z79.4 UNCONTROLLED TYPE 2 DIABETES MELLITUS WITH HYPERGLYCEMIA, WITH LONG-TERM CURRENT USE OF INSULIN (H): Primary | ICD-10-CM

## 2020-10-31 NOTE — PROGRESS NOTES
S:     Nayeli Caal is seen today with  for follow-up for her diabetes.  She states she has not been checking her blood sugars except she did check it today and it was 163.  She has, however, been taking her medication reliably.  She stopped using the Phyllis glucose meter. She is taking Humalin N 20 units in the a.m.    Her dental appt. Was cancelled in April and she hasn't re-scheduled.     She adds that she has been losing hair for the past 2 years.      Patient Active Problem List   Diagnosis     Essential hypertension     Hypersomnia, organic     Other chronic nonalcoholic liver disease     Diabetic retinopathy of both eyes (H)     Obesity     Usher Syndrome: congenital deafness, retinitis pigmentosa     Macular degeneration, age related, nonexudative     Benign neoplasm of iris     Dry eye syndrome     Pemphigus erythematosus     Chondromalacia of patella, right, steroid injection 6/12/2015     Uncontrolled type 2 diabetes mellitus with hyperglycemia, with long-term current use of insulin (H)            Past Medical History:   Diagnosis Date     Combined visual and hearing impairment      Deaf      Diabetic retinopathy of both eyes (H) 8/19/2011     Hepatic steatosis 08/19/2011     History of tobacco use      Hyperlipidemia      Hypertension      Hypertriglyceridemia      Migraines      Obesity 8/19/2011     Problem list name updated by automated process. 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            Past Surgical History:   Procedure Laterality Date     LAPAROSCOPIC CHOLECYSTECTOMY N/A 3/10/2018    Procedure: LAPAROSCOPIC CHOLECYSTECTOMY;  Laparoscopic Cholecystectomy ;  Surgeon: Kuldeep Sigala MD;  Location: [facility] OR     RELEASE TRIGGER FINGER Right 5/2/2019    Procedure: Right Thumb Trigger Release;   Surgeon: Greg Streeter MD;  Location: [facility] OR     RELEASE TRIGGER FINGER Left 2019    Procedure: Left Ring Trigger Finger Release.  Ganglion cyst excision.;  Surgeon: Greg Streeter MD;  Location: [facility] OR     Immunization History   Administered Date(s) Administered     HEPA 2007, 10/07/2008     HepB 2001, 2003, 2013     Influenza (IIV3) PF 10/09/2011, 2013, 10/23/2014, 2015     Influenza Vaccine IM > 6 months Valent IIV4 10/17/2017, 10/02/2018, 10/17/2019, 2020     MMR 2007     Pneumo Conj 13-V (2010&after) 10/23/2014     Pneumococcal 23 valent 2006     TD (ADULT, 7+) 1999     TDAP Vaccine (Boostrix) 2020     Tdap (Adacel,Boostrix) 2009          Social History     Tobacco Use     Smoking status: Former Smoker     Types: Cigarettes     Quit date: 2010     Years since quittin.9     Smokeless tobacco: Never Used     Tobacco comment: stopped 2 yrs ago   Substance Use Topics     Alcohol use: Yes     Comment: occasionally            Family History   Problem Relation Age of Onset     Unknown/Adopted Other              No Known Allergies         Current Outpatient Medications   Medication Sig Dispense Refill     Alcohol Swabs (ALCOHOL PREP PAD) 70 % PADS 1 each 3 times daily 100 each 3     aspirin (ASA) 81 MG chewable tablet CHEW AND SWALLOW ONE TABLET BY MOUTH EVERY DAY 90 tablet 3     atorvastatin (LIPITOR) 40 MG tablet Take 1 tablet (40 mg) by mouth daily 90 tablet 2     B-D U/F 31G X 8 MM insulin pen needle USE AS DIRECTED 100 each 3     B-D U/F insulin pen needle        BD ULTRA FINE PEN NEEDLES Inject 1 dose. Subcutaneous 2 times daily BD ultra-fine insulin syringe, 30 ga. X 1/2'' short needle 1/2 cc. Use as directed. 400 Units 11     blood glucose (ACCU-CHEK LAYA PLUS) test strip Use with  Accucheck Expert meter.  Test blood sugar 6 times daily. 600 each 3     blood glucose monitoring (ACCU-CHEK FASTCLIX) lancets Use to  test blood sugar 6 times daily or as directed 6 Box 3     continuous blood glucose monitoring (FREESTYLE ZORAIDA) sensor For use with Freestyle Zoraida Flash  for continuous monitioring of blood glucose levels. Replace sensor every 14 days. 6 each 3     cyclobenzaprine (FLEXERIL) 5 MG tablet Take 1-2 tablets (5-10 mg) by mouth 3 times daily as needed for muscle spasms 60 tablet 1     empagliflozin (JARDIANCE) 25 MG TABS tablet Take 1 tablet (25 mg) by mouth daily 90 tablet 3     ergocalciferol (ERGOCALCIFEROL) 1.25 MG (98084 UT) capsule Take 1 capsule (50,000 Units) by mouth once a week 14 capsule 3     fenofibrate (TRIGLIDE/LOFIBRA) 160 MG tablet Take 1 tablet (160 mg) by mouth daily 90 tablet 3     fish oil-omega-3 fatty acids 1000 MG capsule TAKE TWO CAPSULES (2 GM) BY MOUTH ONCE DAILY 180 capsule 3     HYDROcodone-acetaminophen (NORCO) 5-325 MG tablet Take 1 tablet by mouth every 6 hours as needed for severe pain 10 tablet 0     ibuprofen (ADVIL/MOTRIN) 600 MG tablet Take 1 tablet (600 mg) by mouth every 6 hours as needed for moderate pain 120 tablet 1     insulin glargine (BASAGLAR KWIKPEN) 100 UNIT/ML pen Inject 35 Units Subcutaneous daily Please provide 3 months supply 40 mL 3     insulin isophane human (HUMULIN N KWIKPEN) 100 UNIT/ML injection Inject 20 Units Subcutaneous every morning (before breakfast) 20 mL 3     losartan (COZAAR) 50 MG tablet Take 1 tablet (50 mg) by mouth daily 90 tablet 3     naproxen (NAPROSYN) 500 MG tablet Take 1 tablet (500 mg) by mouth 2 times daily as needed for moderate pain 30 tablet 1     omega 3 1000 MG CAPS Take 2 g by mouth daily 180 capsule 3     Ostomy Supplies (ADHESIVE REMOVER WIPES) MISC 1 each every 14 days 50 each 3     Ostomy Supplies (SKIN TAC ADHESIVE BARRIER WIPE) MISC 1 each every 14 days 6 each 3     triamcinolone (KENALOG) 0.1 % external cream Apply topically 3 times daily To affected areas 60 g 0     levonorgestrel (MIRENA) 20 MCG/24HR IUD 1 each (20 mcg)  by Intrauterine route once for 1 dose 1 each 0        REVIEW OF SYSTEMS:    ROS x 10 is negative.     O:   /84 (BP Location: Right arm, Patient Position: Sitting, Cuff Size: Adult Regular)   Pulse 70   Wt 73 kg (161 lb)   SpO2 96%   BMI 29.45 kg/m    GENERAL APPEARANCE: healthy, alert and no distress  EYES: EOMI,  PERRL.  RESP: lungs clear to auscultation - no rales, rhonchi or wheezes  CV: regular rates and rhythm, normal S1 S2, no S3 or S4 and no murmur, click or rub   ABDOMEN:  soft, nontender, no HSM or masses and bowel sounds normal  NEURO: grossly normal  MUSK:normal  SKIN: normal  LYMPH:normal  EXT: warm.  Edema NO   Breasts: neg breast exam  PSYCHE: normal.    A/P:  Nayeli was seen today for recheck medication.    Diagnoses and all orders for this visit:    Uncontrolled type 2 diabetes mellitus with hyperglycemia, with long-term current use of insulin (H)  -     Hemoglobin A1c; Future  -     empagliflozin (JARDIANCE) 25 MG TABS tablet; Take 1 tablet (25 mg) by mouth daily  -     insulin isophane human (HUMULIN N KWIKPEN) 100 UNIT/ML injection; Inject 20 Units Subcutaneous every morning (before breakfast)  -     losartan (COZAAR) 50 MG tablet; Take 1 tablet (50 mg) by mouth daily    Encounter for screening mammogram for breast cancer  -     Mammogram, routine screening; Future    Type 1 diabetes mellitus with complications (H)  -     atorvastatin (LIPITOR) 40 MG tablet; Take 1 tablet (40 mg) by mouth daily    Type 2 diabetes mellitus with complication, with long-term current use of insulin (H)  -     insulin glargine (BASAGLAR KWIKPEN) 100 UNIT/ML pen; Inject 35 Units Subcutaneous daily Please provide 3 months supply    Type 2 diabetes with nephropathy (H)  -     blood glucose (ACCU-CHEK LAYA PLUS) test strip; Use with  Accucheck Expert meter.  Test blood sugar 6 times daily.        Mixed hyperlipidemia  -     fenofibrate (TRIGLIDE/LOFIBRA) 160 MG tablet; Take 1 tablet (160 mg) by mouth  daily    Total time spent 25 minutes.  More than 50% of the time spent with Ms. Stantonjo ann Caal on counseling / coordinating her care.    Mariya CELESTIN, CNP

## 2020-11-02 ENCOUNTER — TELEPHONE (OUTPATIENT)
Dept: ENDOCRINOLOGY | Facility: CLINIC | Age: 40
End: 2020-11-02

## 2020-11-02 NOTE — TELEPHONE ENCOUNTER
LVM with video relay  for pt to c/b to schedule return diabetes appt with Dr Bragg, or any of the PAs.

## 2020-11-09 ENCOUNTER — TELEPHONE (OUTPATIENT)
Dept: ENDOCRINOLOGY | Facility: CLINIC | Age: 40
End: 2020-11-09

## 2020-11-09 NOTE — TELEPHONE ENCOUNTER
[facility] Call Center    Phone Message    May a detailed message be left on voicemail: yes     Reason for Call:     Pt has zoom appt tomorrow with S Zheng. Pt called because the instructions for zoom meeting is confusing.     Action Taken: Message routed to:  [facility] endo    Travel Screening: Not Applicable

## 2020-11-13 ENCOUNTER — OFFICE VISIT (OUTPATIENT)
Dept: INTERNAL MEDICINE | Facility: CLINIC | Age: 40
End: 2020-11-13
Payer: COMMERCIAL

## 2020-11-13 ENCOUNTER — TELEPHONE (OUTPATIENT)
Dept: ENDOCRINOLOGY | Facility: CLINIC | Age: 40
End: 2020-11-13

## 2020-11-13 VITALS
DIASTOLIC BLOOD PRESSURE: 77 MMHG | OXYGEN SATURATION: 99 % | HEART RATE: 74 BPM | SYSTOLIC BLOOD PRESSURE: 121 MMHG | BODY MASS INDEX: 29.26 KG/M2 | WEIGHT: 160 LBS

## 2020-11-13 DIAGNOSIS — R05.9 COUGH: Primary | ICD-10-CM

## 2020-11-13 DIAGNOSIS — R05.9 COUGH: ICD-10-CM

## 2020-11-13 PROCEDURE — U0003 INFECTIOUS AGENT DETECTION BY NUCLEIC ACID (DNA OR RNA); SEVERE ACUTE RESPIRATORY SYNDROME CORONAVIRUS 2 (SARS-COV-2) (CORONAVIRUS DISEASE [COVID-19]), AMPLIFIED PROBE TECHNIQUE, MAKING USE OF HIGH THROUGHPUT TECHNOLOGIES AS DESCRIBED BY CMS-2020-01-R: HCPCS | Mod: 90 | Performed by: INTERNAL MEDICINE

## 2020-11-13 PROCEDURE — 99000 SPECIMEN HANDLING OFFICE-LAB: CPT | Performed by: INTERNAL MEDICINE

## 2020-11-13 PROCEDURE — 99214 OFFICE O/P EST MOD 30 MIN: CPT | Mod: CS | Performed by: INTERNAL MEDICINE

## 2020-11-13 RX ORDER — CETIRIZINE HYDROCHLORIDE 10 MG/1
10 TABLET ORAL DAILY
Qty: 30 TABLET | Refills: 1 | Status: SHIPPED | OUTPATIENT
Start: 2020-11-13 | End: 2022-02-08

## 2020-11-13 RX ORDER — FLUTICASONE PROPIONATE 50 MCG
1 SPRAY, SUSPENSION (ML) NASAL DAILY
Qty: 10 ML | Refills: 0 | Status: SHIPPED | OUTPATIENT
Start: 2020-11-13 | End: 2021-06-15

## 2020-11-13 NOTE — NURSING NOTE
Chief Complaint   Patient presents with     Cough     Pt has had a cough. No other symptoms. Pt reports off and on cough since May     Kimberly Nissen, EMT at 8:59 AM on 11/13/2020

## 2020-11-13 NOTE — TELEPHONE ENCOUNTER
Laila Durbin-    Looks like Nayeli wants to reschedule. I will forward to schedulers to do this.    And I will cancel her from your schedule.    Sara SÁNCHEZ MA

## 2020-11-13 NOTE — PROGRESS NOTES
History of Present Illness:  Ms. Ingrid Caal is a 40 year old female here for cough.    Cough ongoing since May.  Roommate was worried.  Wonders about allergies.  Coughing daily. No fevers, no mucus, no loss taste/smell, no diarrhea.  No dypsnea, wheezing. No history of asthma.  Allergic to dust, mold. Some nasal congestion, post nasal gtt.  No itchy eyes or sneezing.  Occasional GERD symptoms.  Nonsmoker.  No COVID in roommates but has 5 roommates.  Works from home. Goes out socialize.    A full 10-pt Review of Systems was performed, verified and is negative except as documented in the HPI.  All health questionnaires were reviewed, verified and relevant information documented above.      Past Medical History:  Past Medical History:   Diagnosis Date     Combined visual and hearing impairment      Deaf      Diabetic retinopathy of both eyes (H) 8/19/2011     Hepatic steatosis 08/19/2011     History of tobacco use      Hyperlipidemia      Hypertension      Hypertriglyceridemia      Migraines      Obesity 8/19/2011     Problem list name updated by automated process. 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       Active Meds:  Current Outpatient Medications   Medication     Alcohol Swabs (ALCOHOL PREP PAD) 70 % PADS     aspirin (ASA) 81 MG chewable tablet     atorvastatin (LIPITOR) 40 MG tablet     B-D U/F 31G X 8 MM insulin pen needle     B-D U/F insulin pen needle     BD ULTRA FINE PEN NEEDLES     blood glucose (ACCU-CHEK LAYA PLUS) test strip     blood glucose monitoring (ACCU-CHEK FASTCLIX) lancets     continuous blood glucose monitoring (FREESTYLE ZORAIDA) sensor     cyclobenzaprine (FLEXERIL) 5 MG tablet     empagliflozin (JARDIANCE) 25 MG TABS tablet     ergocalciferol (ERGOCALCIFEROL) 1.25 MG (66087 UT) capsule     fenofibrate (TRIGLIDE/LOFIBRA) 160  MG tablet     fish oil-omega-3 fatty acids 1000 MG capsule     HYDROcodone-acetaminophen (NORCO) 5-325 MG tablet     ibuprofen (ADVIL/MOTRIN) 600 MG tablet     insulin glargine (BASAGLAR KWIKPEN) 100 UNIT/ML pen     insulin isophane human (HUMULIN N KWIKPEN) 100 UNIT/ML injection     losartan (COZAAR) 50 MG tablet     naproxen (NAPROSYN) 500 MG tablet     omega 3 1000 MG CAPS     Ostomy Supplies (ADHESIVE REMOVER WIPES) MISC     Ostomy Supplies (SKIN TAC ADHESIVE BARRIER WIPE) MISC     triamcinolone (KENALOG) 0.1 % external cream     levonorgestrel (MIRENA) 20 MCG/24HR IUD     Current Facility-Administered Medications   Medication     betamethasone acet & sod phos (CELESTONE) injection 6 mg     betamethasone acet & sod phos (CELESTONE) injection 6 mg     betamethasone acet & sod phos (CELESTONE) injection 6 mg     betamethasone acet & sod phos (CELESTONE) injection 6 mg     hylan (SYNVISC ONE) injection 48 mg     hylan (SYNVISC ONE) injection 48 mg     lidocaine (PF) (XYLOCAINE) 1 % injection 1 mL     lidocaine (PF) (XYLOCAINE) 1 % injection 1 mL     lidocaine (PF) (XYLOCAINE) 1 % injection 1 mL     lidocaine (PF) (XYLOCAINE) 1 % injection 1 mL        Allergies:  Reviewed, refer to EMR    Relevant Social History:  Social History     Tobacco Use     Smoking status: Former Smoker     Types: Cigarettes     Quit date: 2010     Years since quittin.9     Smokeless tobacco: Never Used     Tobacco comment: stopped 2 yrs ago   Substance Use Topics     Alcohol use: Yes     Comment: occasionally     Drug use: No       Physical Exam:  Vitals: /77   Pulse 74   Wt 72.6 kg (160 lb)   SpO2 99%   BMI 29.26 kg/m    Constitutional: Alert, oriented, pleasant, no acute distress, deaf, uses sign   Head: Normocephalic, atraumatic  Eyes: Extra-ocular movements intact, pupils equally round and reactive bilaterally, no scleral icterus  ENT: Oropharynx clear, moist mucus membranes, good dentition  Neck: Supple,  no lymphadenopathy  Cardiovascular: Regular rate and rhythm, no murmurs, rubs or gallops, peripheral pulses full/symmetric  Respiratory: Good air movement bilaterally, lungs clear, no wheezes/rales/rhonchi  GI: Abdomen soft, bowel sounds present, nondistended, nontender, no organomegaly or masses, no rebound/guarding  Musculoskeletal: No edema, normal muscle tone, normal gait  Neurologic: Alert and oriented, cranial nerves 2-12 intact, grossly non-focal  Skin: No rashes/lesions  Psychiatric: normal mentation, affect and mood        Assessment and Plan:  Nayeli was seen today for cough.    Diagnoses and all orders for this visit:    Cough  Chronic, doesn't seem to have symptoms c/w covid. However, given state of pandemic and possible contacts, not unreasonable to screen.  Advised trial of medications below to treat possible allergies/post nasal gtt.  -     fluticasone (FLONASE) 50 MCG/ACT nasal spray; Spray 1 spray into both nostrils daily  -     cetirizine (ZYRTEC) 10 MG tablet; Take 1 tablet (10 mg) by mouth daily  -     Asymptomatic COVID-19 Virus (Coronavirus) by PCR; Future      Return to clinic: if symptoms worsen or do not improve    Jhoana Power MD  Internal Medicine

## 2020-11-14 LAB
SARS-COV-2 RNA SPEC QL NAA+PROBE: NOT DETECTED
SPECIMEN SOURCE: NORMAL

## 2020-11-24 ENCOUNTER — ANCILLARY PROCEDURE (OUTPATIENT)
Dept: MAMMOGRAPHY | Facility: CLINIC | Age: 40
End: 2020-11-24
Attending: NURSE PRACTITIONER
Payer: COMMERCIAL

## 2020-11-24 DIAGNOSIS — Z12.31 ENCOUNTER FOR SCREENING MAMMOGRAM FOR BREAST CANCER: ICD-10-CM

## 2020-11-24 PROCEDURE — 77067 SCR MAMMO BI INCL CAD: CPT | Mod: GC | Performed by: STUDENT IN AN ORGANIZED HEALTH CARE EDUCATION/TRAINING PROGRAM

## 2020-11-24 PROCEDURE — 77063 BREAST TOMOSYNTHESIS BI: CPT | Mod: GC | Performed by: STUDENT IN AN ORGANIZED HEALTH CARE EDUCATION/TRAINING PROGRAM

## 2020-12-02 DIAGNOSIS — Z79.4 TYPE 2 DIABETES MELLITUS WITH COMPLICATION, WITH LONG-TERM CURRENT USE OF INSULIN (H): ICD-10-CM

## 2020-12-02 DIAGNOSIS — E11.8 TYPE 2 DIABETES MELLITUS WITH COMPLICATION, WITH LONG-TERM CURRENT USE OF INSULIN (H): ICD-10-CM

## 2020-12-05 RX ORDER — PEN NEEDLE, DIABETIC 31 GX5/16"
NEEDLE, DISPOSABLE MISCELLANEOUS
Qty: 200 EACH | Refills: 1 | Status: SHIPPED | OUTPATIENT
Start: 2020-12-05 | End: 2021-01-21

## 2020-12-05 NOTE — TELEPHONE ENCOUNTER
1/13/2020  [facility] Endocrinology   Multiple Providers  Endocrinology, Diabetes, and Metabolism  nv: 12/21/20    B-D U/F 31G X 8 MM insulin pen needle

## 2020-12-07 ENCOUNTER — ANCILLARY PROCEDURE (OUTPATIENT)
Dept: MAMMOGRAPHY | Facility: CLINIC | Age: 40
End: 2020-12-07
Attending: NURSE PRACTITIONER
Payer: COMMERCIAL

## 2020-12-07 DIAGNOSIS — R92.8 ABNORMAL FINDING ON BREAST IMAGING: Primary | ICD-10-CM

## 2020-12-07 DIAGNOSIS — R92.8 ABNORMAL MAMMOGRAM OF LEFT BREAST: ICD-10-CM

## 2020-12-07 PROCEDURE — 77065 DX MAMMO INCL CAD UNI: CPT | Mod: LT | Performed by: RADIOLOGY

## 2020-12-08 DIAGNOSIS — R92.8 ABNORMAL FINDING ON BREAST IMAGING: ICD-10-CM

## 2020-12-08 LAB
LABORATORY COMMENT REPORT: NORMAL
SARS-COV-2 RNA SPEC QL NAA+PROBE: NEGATIVE
SARS-COV-2 RNA SPEC QL NAA+PROBE: NORMAL
SPECIMEN SOURCE: NORMAL
SPECIMEN SOURCE: NORMAL

## 2020-12-08 PROCEDURE — U0003 INFECTIOUS AGENT DETECTION BY NUCLEIC ACID (DNA OR RNA); SEVERE ACUTE RESPIRATORY SYNDROME CORONAVIRUS 2 (SARS-COV-2) (CORONAVIRUS DISEASE [COVID-19]), AMPLIFIED PROBE TECHNIQUE, MAKING USE OF HIGH THROUGHPUT TECHNOLOGIES AS DESCRIBED BY CMS-2020-01-R: HCPCS | Mod: 90 | Performed by: PATHOLOGY

## 2020-12-08 PROCEDURE — 99000 SPECIMEN HANDLING OFFICE-LAB: CPT | Performed by: PATHOLOGY

## 2020-12-10 ENCOUNTER — ANCILLARY PROCEDURE (OUTPATIENT)
Dept: MAMMOGRAPHY | Facility: CLINIC | Age: 40
End: 2020-12-10
Attending: NURSE PRACTITIONER
Payer: COMMERCIAL

## 2020-12-10 DIAGNOSIS — R92.8 ABNORMAL MAMMOGRAM OF LEFT BREAST: ICD-10-CM

## 2020-12-10 DIAGNOSIS — R92.1 BREAST CALCIFICATIONS: Primary | ICD-10-CM

## 2020-12-10 PROCEDURE — T1013 SIGN LANG/ORAL INTERPRETER: HCPCS | Mod: U3 | Performed by: STUDENT IN AN ORGANIZED HEALTH CARE EDUCATION/TRAINING PROGRAM

## 2020-12-10 PROCEDURE — 77065 DX MAMMO INCL CAD UNI: CPT | Mod: LT | Performed by: STUDENT IN AN ORGANIZED HEALTH CARE EDUCATION/TRAINING PROGRAM

## 2020-12-10 PROCEDURE — 19081 BX BREAST 1ST LESION STRTCTC: CPT | Mod: LT | Performed by: STUDENT IN AN ORGANIZED HEALTH CARE EDUCATION/TRAINING PROGRAM

## 2020-12-10 PROCEDURE — 88305 TISSUE EXAM BY PATHOLOGIST: CPT | Mod: GC | Performed by: PATHOLOGY

## 2020-12-10 RX ORDER — LIDOCAINE HYDROCHLORIDE AND EPINEPHRINE 10; 10 MG/ML; UG/ML
10 INJECTION, SOLUTION INFILTRATION; PERINEURAL ONCE
Status: COMPLETED | OUTPATIENT
Start: 2020-12-10 | End: 2020-12-10

## 2020-12-10 RX ADMIN — LIDOCAINE HYDROCHLORIDE AND EPINEPHRINE 10 ML: 10; 10 INJECTION, SOLUTION INFILTRATION; PERINEURAL at 13:10

## 2020-12-14 LAB — COPATH REPORT: NORMAL

## 2020-12-16 ENCOUNTER — TELEPHONE (OUTPATIENT)
Dept: MAMMOGRAPHY | Facility: CLINIC | Age: 40
End: 2020-12-16

## 2020-12-16 NOTE — TELEPHONE ENCOUNTER
Spoke to Nayeli via an  about her benign finding of a fibroadenoma found during her breast biopsy done last week.  We discussed the Radiologist's recommendation of continuing on with her yearly screening mammograms.  Nayeli verbalized understanding and all questions and concerns were answered at this time.

## 2020-12-21 ENCOUNTER — VIRTUAL VISIT (OUTPATIENT)
Dept: ENDOCRINOLOGY | Facility: CLINIC | Age: 40
End: 2020-12-21
Payer: COMMERCIAL

## 2020-12-21 DIAGNOSIS — E11.65 POORLY CONTROLLED TYPE 2 DIABETES MELLITUS (H): Primary | ICD-10-CM

## 2020-12-21 PROCEDURE — 99207 PR NO BILLABLE SERVICE THIS VISIT: CPT | Performed by: PHYSICIAN ASSISTANT

## 2020-12-21 NOTE — PROGRESS NOTES
"Nayeli Caal is a 40 year old female who is being evaluated via a billable video visit.      The patient has been notified of following:     \"This video visit will be conducted via a call between you and your physician/provider. We have found that certain health care needs can be provided without the need for an in-person physical exam.  This service lets us provide the care you need with a video conversation.  If a prescription is necessary we can send it directly to your pharmacy.  If lab work is needed we can place an order for that and you can then stop by our lab to have the test done at a later time.    Video visits are billed at different rates depending on your insurance coverage.  Please reach out to your insurance provider with any questions.    If during the course of the call the physician/provider feels a video visit is not appropriate, you will not be charged for this service.\"    Patient has given verbal consent for Video visit? yes  How would you like to obtain your AVS? mychart  If you are dropped from the video visit, the video invite should be resent to: zoom  Will anyone else be joining your video visit?         Video-Visit Details    Type of service:  Video Visit    Video Start Time: 1430-  and I were on the zoom video.  Invitiation was sent to patient via email and [employer].  NO SHOW  Video End Time: 1452    Originating Location (pt. Location): home    Distant Location (provider location):  [facility] END[facility]CRIN[facility] [place]     Platform used for Video Visit: Zoom video    No show  "

## 2020-12-21 NOTE — LETTER
"12/21/2020       RE: Nayeli Caal  [address]  [facility] [address]     Dear Colleague,    Thank you for referring your patient, Nayeli Caal, to the [facility] ENDOCRINOLOGY [facility] [place] at [facility]. Please see a copy of my visit note below.    Nayeli Caal is a 40 year old female who is being evaluated via a billable video visit.      The patient has been notified of following:     \"This video visit will be conducted via a call between you and your physician/provider. We have found that certain health care needs can be provided without the need for an in-person physical exam.  This service lets us provide the care you need with a video conversation.  If a prescription is necessary we can send it directly to your pharmacy.  If lab work is needed we can place an order for that and you can then stop by our lab to have the test done at a later time.    Video visits are billed at different rates depending on your insurance coverage.  Please reach out to your insurance provider with any questions.    If during the course of the call the physician/provider feels a video visit is not appropriate, you will not be charged for this service.\"    Patient has given verbal consent for Video visit? yes  How would you like to obtain your AVS? [facility]  If you are dropped from the video visit, the video invite should be resent to: zoom  Will anyone else be joining your video visit?         Video-Visit Details    Type of service:  Video Visit    Video Start Time: 1430-  and I were on the zoom video.  Invitiation was sent to patient via email and [employer].  NO SHOW  Video End Time: 1452    Originating Location (pt. Location): home    Distant Location (provider location):  [facility] ENDOCRINOLOGY [facility]     Platform used for Video Visit: Zoom video    No show      Again, thank you for allowing me to " participate in the care of your patient.      Sincerely,    Anne Marie Medina PA-C

## 2020-12-21 NOTE — LETTER
Date:December 30, 2020      Provider requested that no letter be sent. Do not send.       [facility] Information

## 2021-01-05 DIAGNOSIS — Z79.4 TYPE 2 DIABETES MELLITUS WITH COMPLICATION, WITH LONG-TERM CURRENT USE OF INSULIN (H): ICD-10-CM

## 2021-01-05 DIAGNOSIS — E11.8 TYPE 2 DIABETES MELLITUS WITH COMPLICATION, WITH LONG-TERM CURRENT USE OF INSULIN (H): ICD-10-CM

## 2021-01-05 DIAGNOSIS — E10.8 TYPE 1 DIABETES MELLITUS WITH COMPLICATIONS (H): ICD-10-CM

## 2021-01-05 RX ORDER — ASPIRIN 81 MG/1
81 TABLET, CHEWABLE ORAL DAILY
Qty: 90 TABLET | Refills: 3 | Status: SHIPPED | OUTPATIENT
Start: 2021-01-05 | End: 2023-02-20

## 2021-01-05 RX ORDER — PEN NEEDLE, DIABETIC 31 GX5/16"
NEEDLE, DISPOSABLE MISCELLANEOUS
Qty: 100 EACH | Refills: 3 | OUTPATIENT
Start: 2021-01-05

## 2021-01-06 DIAGNOSIS — E55.9 VITAMIN D DEFICIENCY: ICD-10-CM

## 2021-01-08 RX ORDER — ERGOCALCIFEROL 1.25 MG/1
50000 CAPSULE ORAL WEEKLY
Qty: 12 CAPSULE | Refills: 3 | Status: SHIPPED | OUTPATIENT
Start: 2021-01-08 | End: 2023-02-20

## 2021-01-08 NOTE — TELEPHONE ENCOUNTER
ergocalciferol (ERGOCALCIFEROL) 1.25 MG (95999 UT) capsule      Last Written Prescription Date:  11/20/19  Last Fill Quantity: 14,   # refills: 3  Last Office Visit : 1/13/20  Future Office visit:  None scheduled  No showed 11/13 and 12/21 virtual visits    Routing refill request to provider for review/approval because:  Drug not on the [facility], P or [facility] refill protocol

## 2021-01-11 NOTE — TELEPHONE ENCOUNTER
Nayeli called writer back to discuss her surgery that was cancelled for 11/15/18.  She is frustrated that it is being cancelled so late.  I explained to her that we were not aware of her A1C being elevated until the PAC center reviewed her chart for surgery.  We are asking for patient to meet with the diabetic education (previously scheduled) on Monday 11/19 and to talk about ways to control her diabetes and decrease A1C.  The goal is for the A1C to be 8 or less for an elective surgery.    I will write her PCP an inbox message to notify her of the situation.  Once A1C is stabilized we will reschedule her procedure and she will need a new pre-op H&P within 30 days of surgery.   
yes...

## 2021-01-14 ENCOUNTER — DOCUMENTATION ONLY (OUTPATIENT)
Dept: CARE COORDINATION | Facility: CLINIC | Age: 41
End: 2021-01-14

## 2021-01-14 ENCOUNTER — HEALTH MAINTENANCE LETTER (OUTPATIENT)
Age: 41
End: 2021-01-14

## 2021-01-21 ENCOUNTER — OFFICE VISIT (OUTPATIENT)
Dept: INTERNAL MEDICINE | Facility: CLINIC | Age: 41
End: 2021-01-21
Payer: COMMERCIAL

## 2021-01-21 VITALS
SYSTOLIC BLOOD PRESSURE: 128 MMHG | HEART RATE: 82 BPM | BODY MASS INDEX: 28.53 KG/M2 | OXYGEN SATURATION: 97 % | WEIGHT: 156 LBS | DIASTOLIC BLOOD PRESSURE: 88 MMHG

## 2021-01-21 DIAGNOSIS — L98.9 FINGER LESION: ICD-10-CM

## 2021-01-21 DIAGNOSIS — G89.29 CHRONIC BILATERAL LOW BACK PAIN WITHOUT SCIATICA: Primary | ICD-10-CM

## 2021-01-21 DIAGNOSIS — M54.50 CHRONIC BILATERAL LOW BACK PAIN WITHOUT SCIATICA: Primary | ICD-10-CM

## 2021-01-21 DIAGNOSIS — E11.8 TYPE 2 DIABETES MELLITUS WITH COMPLICATION, WITH LONG-TERM CURRENT USE OF INSULIN (H): ICD-10-CM

## 2021-01-21 DIAGNOSIS — E78.2 MIXED HYPERLIPIDEMIA: ICD-10-CM

## 2021-01-21 DIAGNOSIS — Z79.4 TYPE 2 DIABETES MELLITUS WITH COMPLICATION, WITH LONG-TERM CURRENT USE OF INSULIN (H): ICD-10-CM

## 2021-01-21 PROCEDURE — T1013 SIGN LANG/ORAL INTERPRETER: HCPCS | Mod: U3

## 2021-01-21 PROCEDURE — 99214 OFFICE O/P EST MOD 30 MIN: CPT | Performed by: NURSE PRACTITIONER

## 2021-01-21 RX ORDER — PEN NEEDLE, DIABETIC 31 GX5/16"
NEEDLE, DISPOSABLE MISCELLANEOUS
Qty: 200 EACH | Refills: 1 | Status: SHIPPED | OUTPATIENT
Start: 2021-01-21 | End: 2022-03-05

## 2021-01-21 RX ORDER — BENZOCAINE/MENTHOL 6 MG-10 MG
LOZENGE MUCOUS MEMBRANE 3 TIMES DAILY
Status: ACTIVE | OUTPATIENT
Start: 2021-01-21

## 2021-01-21 RX ORDER — ACETAMINOPHEN 500 MG
1000 TABLET ORAL EVERY 6 HOURS PRN
Qty: 100 TABLET | Refills: 3 | Status: SHIPPED | OUTPATIENT
Start: 2021-01-21 | End: 2022-03-08

## 2021-01-21 ASSESSMENT — PAIN SCALES - GENERAL: PAINLEVEL: MODERATE PAIN (5)

## 2021-01-21 NOTE — NURSING NOTE
Chief Complaint   Patient presents with     Recheck Medication     pt here to follow up from a few  months ago        Sloan Aguilar CMA, EMT at 1:50 PM on 1/21/2021.

## 2021-01-22 NOTE — PROGRESS NOTES
S:  Nayeli is seen today with her sign . She has had very tumultuous past month with roommates and she and her partner moved out and are living just the two of them so she thinks alot of her stress with resolve. She hasn't been checking her blood glucose levels, and has not been faithfully taking her medications. She missed her appt. with Endocrinology.Dr. Medina on 12/21/20.  She thinks her labs would be very bad if checked right now. She is asking to delay her labs for 3 months and then meet with her Endocrinologist.      She has had some low back pain since all this stress began. She thinks it might be getting better.     Immunization History   Administered Date(s) Administered     HEPA 05/18/2007, 10/07/2008     HepB 08/06/2001, 01/03/2003, 04/09/2013     Influenza (IIV3) PF 10/09/2011, 11/12/2013, 10/23/2014, 11/19/2015     Influenza Vaccine IM > 6 months Valent IIV4 10/17/2017, 10/02/2018, 10/17/2019, 09/19/2020     MMR 05/21/2007     Pneumo Conj 13-V (2010&after) 10/23/2014     Pneumococcal 23 valent 07/18/2006     TD (ADULT, 7+) 11/01/1999     TDAP Vaccine (Boostrix) 01/21/2020     Tdap (Adacel,Boostrix) 01/09/2009            Patient Active Problem List   Diagnosis     Essential hypertension     Hypersomnia, organic     Other chronic nonalcoholic liver disease     Diabetic retinopathy of both eyes (H)     Obesity     Usher Syndrome: congenital deafness, retinitis pigmentosa     Macular degeneration, age related, nonexudative     Benign neoplasm of iris     Dry eye syndrome     Pemphigus erythematosus     Chondromalacia of patella, right, steroid injection 6/12/2015     Uncontrolled type 2 diabetes mellitus with hyperglycemia, with long-term current use of insulin (H)            Past Medical History:   Diagnosis Date     Combined visual and hearing impairment      Deaf      Diabetic retinopathy of both eyes (H) 8/19/2011     Hepatic steatosis 08/19/2011     History of tobacco use      Hyperlipidemia       Hypertension      Hypertriglyceridemia      Migraines      Obesity 8/19/2011     Problem list name updated by automated process. 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          Social History     Tobacco Use     Smoking status: Former Smoker     Types: Cigarettes     Quit date: 12/1/2010     Years since quitting: 10.1     Smokeless tobacco: Never Used     Tobacco comment: stopped 2 yrs ago   Substance Use Topics     Alcohol use: Yes     Comment: occasionally            Family History   Problem Relation Age of Onset     Unknown/Adopted Other              No Known Allergies         Current Outpatient Medications   Medication Sig Dispense Refill     acetaminophen (TYLENOL) 500 MG tablet Take 2 tablets (1,000 mg) by mouth every 6 hours as needed for mild pain 100 tablet 3     Alcohol Swabs (ALCOHOL PREP PAD) 70 % PADS 1 each 3 times daily 100 each 3     aspirin (ASA) 81 MG chewable tablet Take 1 tablet (81 mg) by mouth daily CHEW AND SWALLOW 90 tablet 3     atorvastatin (LIPITOR) 40 MG tablet Take 1 tablet (40 mg) by mouth daily 90 tablet 2     B-D U/F insulin pen needle        BD ULTRA FINE PEN NEEDLES Inject 1 dose. Subcutaneous 2 times daily BD ultra-fine insulin syringe, 30 ga. X 1/2'' short needle 1/2 cc. Use as directed. 400 Units 11     blood glucose (ACCU-CHEK LAYA PLUS) test strip Use with  Accucheck Expert meter.  Test blood sugar 6 times daily. 600 each 3     blood glucose monitoring (ACCU-CHEK FASTCLIX) lancets Use to test blood sugar 6 times daily or as directed 6 Box 3     cetirizine (ZYRTEC) 10 MG tablet Take 1 tablet (10 mg) by mouth daily 30 tablet 1     continuous blood glucose monitoring (FREESTYLE ZORAIDA) sensor For use with Freestyle Zoraida Flash  for continuous monitioring of blood glucose levels. Replace sensor every 14  days. 6 each 3     cyclobenzaprine (FLEXERIL) 5 MG tablet Take 1-2 tablets (5-10 mg) by mouth 3 times daily as needed for muscle spasms 60 tablet 1     empagliflozin (JARDIANCE) 25 MG TABS tablet Take 1 tablet (25 mg) by mouth daily 90 tablet 3     ergocalciferol (ERGOCALCIFEROL) 1.25 MG (54976 UT) capsule Take 1 capsule (50,000 Units) by mouth once a week For additional refills, please schedule a follow-up appointment at [phone] 12 capsule 3     fenofibrate (TRIGLIDE/LOFIBRA) 160 MG tablet Take 1 tablet (160 mg) by mouth daily 90 tablet 3     fish oil-omega-3 fatty acids 1000 MG capsule TAKE TWO CAPSULES (2 GM) BY MOUTH ONCE DAILY 180 capsule 3     fluticasone (FLONASE) 50 MCG/ACT nasal spray Spray 1 spray into both nostrils daily 10 mL 0     HYDROcodone-acetaminophen (NORCO) 5-325 MG tablet Take 1 tablet by mouth every 6 hours as needed for severe pain 10 tablet 0     ibuprofen (ADVIL/MOTRIN) 600 MG tablet Take 1 tablet (600 mg) by mouth every 6 hours as needed for moderate pain 120 tablet 1     insulin glargine (BASAGLAR KWIKPEN) 100 UNIT/ML pen Inject 35 Units Subcutaneous daily Please provide 3 months supply 40 mL 3     insulin isophane human (HUMULIN N KWIKPEN) 100 UNIT/ML injection Inject 20 Units Subcutaneous every morning (before breakfast) 20 mL 3     insulin pen needle (B-D U/F) 31G X 8 MM miscellaneous USE AS DIRECTED.  Please keep appt 12/21/20. 200 each 1     losartan (COZAAR) 50 MG tablet Take 1 tablet (50 mg) by mouth daily 90 tablet 3     naproxen (NAPROSYN) 375 MG tablet Take 1 tablet (375 mg) by mouth 2 times daily (with meals) 60 tablet 3     naproxen (NAPROSYN) 500 MG tablet Take 1 tablet (500 mg) by mouth 2 times daily as needed for moderate pain 30 tablet 1     omega 3 1000 MG CAPS Take 2 g by mouth daily 180 capsule 3     Ostomy Supplies (ADHESIVE REMOVER WIPES) MISC 1 each every 14 days 50 each 3     Ostomy Supplies (SKIN TAC ADHESIVE BARRIER WIPE) MISC 1 each every 14 days 6 each 3      triamcinolone (KENALOG) 0.1 % external cream Apply topically 3 times daily To affected areas 60 g 0     levonorgestrel (MIRENA) 20 MCG/24HR IUD 1 each (20 mcg) by Intrauterine route once for 1 dose 1 each 0        REVIEW OF SYSTEMS:  Neg except for above.   .    O:   /88 (BP Location: Right arm, Patient Position: Sitting, Cuff Size: Adult Regular)   Pulse 82   Wt 70.8 kg (156 lb)   SpO2 97%   BMI 28.53 kg/m    GENERAL APPEARANCE: healthy, alert and no distress  RESP: lungs clear to auscultation - no rales, rhonchi or wheezes  CV: regular rates and rhythm, normal S1 S2, no S3 or S4 and no murmur, click or rub   NEURO: grossly normal.   MUSK: normal.  SKIN:normal. Distal finger pads of thumb, 2nd and 3rd digits have fissures of the skin w/o discharge, swelling  or warmth.   EXT: warm.  Edema NO   PSYCHE: She is somewhat anxious.    A/P:  Nayeli was seen today for recheck medication.    Diagnoses and all orders for this visit:    Chronic bilateral low back pain without sciatica  -     naproxen (NAPROSYN) 375 MG tablet; Take 1 tablet (375 mg) by mouth 2 times daily (with meals)  -     acetaminophen (TYLENOL) 500 MG tablet; Take 2 tablets (1,000 mg) by mouth every 6 hours as needed for mild pain    Type 2 diabetes mellitus with complication, with long-term current use of insulin (H)  -     insulin pen needle (B-D U/F) 31G X 8 MM miscellaneous; USE AS DIRECTED.  Please keep appt 12/21/20.  -     Hemoglobin A1c (3 MO); Future  -     Protein  random urine with Creat Ratio; Future    Finger lesions due to xerosis  -     hydrocortisone (CORTAID) 1 % cream tid/qid.  She was given latex gloves to ear after applying HCT cream.     Mixed hyperlipidemia  -     Lipid panel reflex to direct LDL Fasting; Future  -     Protein  random urine with Creat Ratio; Future    On the day of service 10 min.were spent reviewing records, face to face time spent with patient history and exam 20  minutes and 10  minutes spent on  documentation and further activities as noted above.   Total 35 min.        Mariya Mohamud APRN, CNP

## 2021-03-14 ENCOUNTER — HEALTH MAINTENANCE LETTER (OUTPATIENT)
Age: 41
End: 2021-03-14

## 2021-04-05 ENCOUNTER — OFFICE VISIT (OUTPATIENT)
Dept: ENDOCRINOLOGY | Facility: CLINIC | Age: 41
End: 2021-04-05
Payer: COMMERCIAL

## 2021-04-05 VITALS
BODY MASS INDEX: 29.89 KG/M2 | DIASTOLIC BLOOD PRESSURE: 73 MMHG | SYSTOLIC BLOOD PRESSURE: 109 MMHG | WEIGHT: 163.4 LBS | HEART RATE: 79 BPM

## 2021-04-05 DIAGNOSIS — E78.2 MIXED HYPERLIPIDEMIA: ICD-10-CM

## 2021-04-05 DIAGNOSIS — I10 BENIGN ESSENTIAL HYPERTENSION: ICD-10-CM

## 2021-04-05 DIAGNOSIS — Z79.4 TYPE 2 DIABETES MELLITUS WITH COMPLICATION, WITH LONG-TERM CURRENT USE OF INSULIN (H): ICD-10-CM

## 2021-04-05 DIAGNOSIS — E11.8 TYPE 2 DIABETES MELLITUS WITH COMPLICATION, WITH LONG-TERM CURRENT USE OF INSULIN (H): ICD-10-CM

## 2021-04-05 DIAGNOSIS — E11.65 UNCONTROLLED TYPE 2 DIABETES MELLITUS WITH HYPERGLYCEMIA, WITH LONG-TERM CURRENT USE OF INSULIN (H): Primary | ICD-10-CM

## 2021-04-05 DIAGNOSIS — Z79.4 UNCONTROLLED TYPE 2 DIABETES MELLITUS WITH HYPERGLYCEMIA, WITH LONG-TERM CURRENT USE OF INSULIN (H): Primary | ICD-10-CM

## 2021-04-05 LAB
CREAT UR-MCNC: 48 MG/DL
HBA1C MFR BLD: 11.6 % (ref 0–5.6)
PROT UR-MCNC: 0.1 G/L
PROT/CREAT 24H UR: 0.21 G/G CR (ref 0–0.2)

## 2021-04-05 PROCEDURE — 84156 ASSAY OF PROTEIN URINE: CPT | Performed by: PATHOLOGY

## 2021-04-05 PROCEDURE — 83036 HEMOGLOBIN GLYCOSYLATED A1C: CPT | Performed by: PATHOLOGY

## 2021-04-05 PROCEDURE — 36415 COLL VENOUS BLD VENIPUNCTURE: CPT | Performed by: PATHOLOGY

## 2021-04-05 PROCEDURE — 99214 OFFICE O/P EST MOD 30 MIN: CPT | Performed by: INTERNAL MEDICINE

## 2021-04-05 PROCEDURE — T1013 SIGN LANG/ORAL INTERPRETER: HCPCS | Mod: U3

## 2021-04-05 RX ORDER — INSULIN HUMAN 100 [IU]/ML
40 INJECTION, SUSPENSION SUBCUTANEOUS
Qty: 20 ML | Refills: 3 | Status: SHIPPED | OUTPATIENT
Start: 2021-04-05 | End: 2021-07-19

## 2021-04-05 RX ORDER — INSULIN GLARGINE 100 [IU]/ML
40 INJECTION, SOLUTION SUBCUTANEOUS DAILY
Qty: 40 ML | Refills: 3 | Status: SHIPPED | OUTPATIENT
Start: 2021-04-05 | End: 2021-07-22

## 2021-04-05 ASSESSMENT — PAIN SCALES - GENERAL: PAINLEVEL: NO PAIN (0)

## 2021-04-05 NOTE — PROGRESS NOTES
Pt seen with the assistance of an in person .     Hemoglobin A1c was 11.6% today, down from 12.4% in October 2020.     She reports being compliant in taking Jardiance, Lipitor, fenofibrate.  Trulicity was discontinued, due to high copayment.  Over the last year, she reports missing a routine in terms of food intake and work schedule.  In a regular day, she has 3 meals and 1 snack in the evening, generally before 8 PM.  Denies drinking sweetened beverages.  In the past, she was not able to tolerate the mian sensor due to rash at the application site.  Current insulin regimen consists of 40 units of Basaglar at 8 in the morning and 35 units Humulin before breakfast.  Recently, she has been having breakfast more consistently around 8 or 8:30 in the morning.  On average, she forgets to take both Basaglar and Humulin 2-3 times a week.  Since her last visit here, she reports experiencing 1 possible hypoglycemic episode last week, when she felt dizzy during the night but she did not check her blood sugar using the glucometer, she does had something to eat.  Her weight has remained stable.    On the glucometer, the average glucose is 180, with a range variable between 95 and 294.  She only checks her blood sugar in the morning, prior to breakfast.    She continues to use the Accu-Chek expert meter, which helped her in the past to determine the NovoLog dose.   Meter settings:   Insulin to Carb Ratio: 8  Correction Factor: 50  BG Target: 100-150  Offset time 2 hrs   Acting time 3:30 hrs     Diabetes complications:  Retinopathy: last eye exam - 3/21. 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  Nephropathy: h/o proteinuria; normal GFR. Now on 50mg losartan daily (tx with lisinopril was associated with a dry cough).   Neuropathy: No numbness or tingling sensation in her feet.     Current Outpatient Medications   Medication     acetaminophen (TYLENOL) 500 MG tablet     Alcohol Swabs (ALCOHOL PREP PAD) 70 % PADS     aspirin (ASA)  81 MG chewable tablet     atorvastatin (LIPITOR) 40 MG tablet     B-D U/F insulin pen needle     BD ULTRA FINE PEN NEEDLES     blood glucose (ACCU-CHEK LAYA PLUS) test strip     blood glucose monitoring (ACCU-CHEK FASTCLIX) lancets     cetirizine (ZYRTEC) 10 MG tablet     continuous blood glucose monitoring (FREESTYLE ZORAIDA) sensor     cyclobenzaprine (FLEXERIL) 5 MG tablet     empagliflozin (JARDIANCE) 25 MG TABS tablet     ergocalciferol (ERGOCALCIFEROL) 1.25 MG (54990 UT) capsule     fenofibrate (TRIGLIDE/LOFIBRA) 160 MG tablet     fish oil-omega-3 fatty acids 1000 MG capsule     fluticasone (FLONASE) 50 MCG/ACT nasal spray     ibuprofen (ADVIL/MOTRIN) 600 MG tablet     insulin glargine (BASAGLAR KWIKPEN) 100 UNIT/ML pen     insulin isophane human (HUMULIN N KWIKPEN) 100 UNIT/ML injection     insulin pen needle (B-D U/F) 31G X 8 MM miscellaneous     levonorgestrel (MIRENA) 20 MCG/24HR IUD     losartan (COZAAR) 50 MG tablet     naproxen (NAPROSYN) 375 MG tablet     omega 3 1000 MG CAPS     Ostomy Supplies (ADHESIVE REMOVER WIPES) MISC     Ostomy Supplies (SKIN TAC ADHESIVE BARRIER WIPE) MISC     triamcinolone (KENALOG) 0.1 % external cream     Current Facility-Administered Medications   Medication     betamethasone acet & sod phos (CELESTONE) injection 6 mg     betamethasone acet & sod phos (CELESTONE) injection 6 mg     betamethasone acet & sod phos (CELESTONE) injection 6 mg     betamethasone acet & sod phos (CELESTONE) injection 6 mg     hydrocortisone (CORTAID) 1 % cream     hylan (SYNVISC ONE) injection 48 mg     hylan (SYNVISC ONE) injection 48 mg     lidocaine (PF) (XYLOCAINE) 1 % injection 1 mL     lidocaine (PF) (XYLOCAINE) 1 % injection 1 mL     lidocaine (PF) (XYLOCAINE) 1 % injection 1 mL     lidocaine (PF) (XYLOCAINE) 1 % injection 1 mL     Past Medical History:   Diagnosis Date     Combined visual and hearing impairment      Deaf      Diabetic retinopathy of both eyes (H) 8/19/2011     Hepatic  steatosis 08/19/2011     History of tobacco use      Hyperlipidemia      Hypertension      Hypertriglyceridemia      Migraines      Obesity 8/19/2011     Problem list name updated by automated process. 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 She denies smoking, drinking alcohol or using illicit drugs. Continues working- currently doing secretarial work.     ROS:  Review of Systems   Systemic symptoms: weight stable  Eye symptoms: No eye symptoms.  Otolaryngeal symptoms: deaf   Breast symptoms: No breast symptoms.  Cardiovascular symptoms: No cardiovascular symptoms.    Pulmonary symptoms: No pulmonary symptoms.  Gastrointestinal symptoms: no GERD or nausea, no diarrhea or constipation   Genitourinary symptoms: No genitourinary symptoms.  Endocrine symptoms:Menstrual periods, irregular, and light, 3-5 weeks apart - IUD 1/2017   Hematologic symptoms: No hematologic symptoms.  Musculoskeletal symptoms: bilateral knee pain, intermittently  Neurological symptoms: no headaches, no tremor, no dizziness   Psychological symptoms: No psychological symptoms.    Skin symptoms: No skin symptoms    Wt Readings from Last 10 Encounters:   04/05/21 74.1 kg (163 lb 6.4 oz)   01/21/21 70.8 kg (156 lb)   11/13/20 72.6 kg (160 lb)   10/29/20 73 kg (161 lb)   07/15/20 72.5 kg (159 lb 12.8 oz)   07/08/20 72.4 kg (159 lb 9.6 oz)   03/03/20 79.1 kg (174 lb 4.8 oz)   01/21/20 81.1 kg (178 lb 11.2 oz)   01/13/20 81.8 kg (180 lb 6.4 oz)   11/20/19 82.6 kg (182 lb)     /73   Pulse 79   Wt 74.1 kg (163 lb 6.4 oz)   BMI 29.89 kg/m      General appearance: she is well-developed, well-nourished, and in no distress.  Eyes: conjunctivae and extraocular motions are normal. Pupils are equal, round, and reactive to light. No lid lag, no  luz.  HENT: oropharynx clear and moist; neck no JVD, no bruits, no thyromegaly, no palpable nodules  Cardiovascular: regular rhythm, no murmurs, distal pulses palpable, no edema  Respiratory: chest clear, no rales, no rhonchi  Gastrointestinal: abdomen soft, nontender, nondistended, +BS, no organomegaly  Musculoskeletal: normal tone and strength   Neurologic: reflexes normal and symmetric, no resting tremor  Psychiatric: affect and judgment normal   Skin: warm, no lesions  Feet: Sensation intact to monofilament testing    Labs:  Reviewed.  Lab Results   Component Value Date    A1C 11.6 (H) 04/05/2021    A1C 12.4 (H) 10/29/2020    A1C 12.9 (H) 07/08/2020    A1C 8.2 (H) 05/02/2019    A1C 10.3 (H) 10/15/2018    HEMOGLOBINA1 10.0 (A) 01/13/2020    HEMOGLOBINA1 9.9 (A) 10/07/2019    HEMOGLOBINA1 8.1 (A) 04/22/2019    HEMOGLOBINA1 10.4 (A) 01/14/2019    HEMOGLOBINA1 8.7 (A) 03/12/2018       Hemoglobin   Date Value Ref Range Status   03/13/2018 14.2 11.7 - 15.7 g/dL Final     Hematocrit   Date Value Ref Range Status   07/08/2020 44.2 35.0 - 47.0 % Final     Cholesterol   Date Value Ref Range Status   07/08/2020 179 <200 mg/dL Final     Cholesterol/HDL Ratio   Date Value Ref Range Status   09/16/2013 5.8 (H) 0.0 - 5.0 Final     HDL Cholesterol   Date Value Ref Range Status   07/08/2020 41 (L) >49 mg/dL Final     LDL Cholesterol Calculated   Date Value Ref Range Status   07/08/2020 72 <100 mg/dL Final     Comment:     Desirable:       <100 mg/dl     VLDL-Cholesterol   Date Value Ref Range Status   09/16/2013 44 (H) 0 - 30 mg/dL Final     Triglycerides   Date Value Ref Range Status   07/08/2020 331 (H) <150 mg/dL Final     Comment:     Borderline high:  150-199 mg/dl  High:             200-499 mg/dl  Very high:       >499 mg/dl       Albumin Urine mg/L   Date Value Ref Range Status   07/20/2020 31 mg/L Final     TSH   Date Value Ref Range Status   07/08/2020 1.34 0.40 - 4.00 mU/L Final         Last Basic Metabolic  Panel:    Sodium   Date Value Ref Range Status   07/08/2020 136 133 - 144 mmol/L Final     Potassium   Date Value Ref Range Status   07/08/2020 4.0 3.4 - 5.3 mmol/L Final     Chloride   Date Value Ref Range Status   07/08/2020 101 94 - 109 mmol/L Final     Calcium   Date Value Ref Range Status   07/08/2020 8.6 8.5 - 10.1 mg/dL Final     Carbon Dioxide   Date Value Ref Range Status   07/08/2020 30 20 - 32 mmol/L Final     Urea Nitrogen   Date Value Ref Range Status   07/08/2020 13 7 - 30 mg/dL Final     Creatinine   Date Value Ref Range Status   07/08/2020 0.74 0.52 - 1.04 mg/dL Final     GFR Estimate   Date Value Ref Range Status   07/08/2020 >90 >60 mL/min/[1.73_m2] Final     Comment:     Non  GFR Calc  Starting 12/18/2018, serum creatinine based estimated GFR (eGFR) will be   calculated using the Chronic Kidney Disease Epidemiology Collaboration   (CKD-EPI) equation.       Glucose   Date Value Ref Range Status   07/08/2020 417 (H) 70 - 99 mg/dL Final       AST   Date Value Ref Range Status   07/08/2020 11 0 - 45 U/L Final     ALT   Date Value Ref Range Status   07/08/2020 29 0 - 50 U/L Final     Albumin Urine mg/g Cr   Date Value Ref Range Status   07/20/2020 54.71 (H) 0 - 25 mg/g Cr Final     A/P:    Diabetes, type 2 vs. PRAVIN, complicated by diabetic nephropathy and retinopathy, uncontrolled, mainly due to noncompliance with the prescribed regimen.  In an effort to improve compliance, the insulin regimen was simplified to Basaglar and Humulin.  Treatment with GLP-1 agonist is not an option due to the high copayments.  Recommendations:  Continue empagliflozin  Increase the dose of Humulin N to 40 units  Can administer both Basaglar and Humulin at the same time in the morning, preferably 30 minutes prior to breakfast   Recommended to check the blood sugar more frequently.  The patient agreed to check before breakfast and at bedtime, twice daily.  Have the glucometer downloaded in our clinic in 2 or  3 weeks     Proteinuria/HTN  Blood pressure controlled.   -continue losartan 50mg per day   -Follow-up urine microalbumin when the glycemic control improves    Orders Placed This Encounter   Procedures     Hemoglobin A1c

## 2021-04-05 NOTE — PATIENT INSTRUCTIONS
Administer 40 U Humulin N and 40 U Basaglar in the morning, prior to breakfast (ideally, 30 minutes before breakfast).   Check BG before breakfast and at bedtime   Always check BG with a glucometer at the time you experience symptoms of a low BG   Have the meter downloaded in our clinic in 2-3 weeks.

## 2021-04-05 NOTE — LETTER
4/5/2021       RE: Nayeli Caal  [address]  [facility] [address]     Dear Colleague,    Thank you for referring your patient, Nayeli Caal, to the [facility] [place] [facility]. Please see a copy of my visit note below.      Pt seen with the assistance of an in person .     Hemoglobin A1c was 11.6% today, down from 12.4% in October 2020.     She reports being compliant in taking Jardiance, Lipitor, fenofibrate.  Trulicity was discontinued, due to high copayment.  Over the last year, she reports missing a routine in terms of food intake and work schedule.  In a regular day, she has 3 meals and 1 snack in the evening, generally before 8 PM.  Denies drinking sweetened beverages.  In the past, she was not able to tolerate the mian sensor due to rash at the application site.  Current insulin regimen consists of 40 units of Basaglar at 8 in the morning and 35 units Humulin before breakfast.  Recently, she has been having breakfast more consistently around 8 or 8:30 in the morning.  On average, she forgets to take both Basaglar and Humulin 2-3 times a week.  Since her last visit here, she reports experiencing 1 possible hypoglycemic episode last week, when she felt dizzy during the night but she did not check her blood sugar using the glucometer, she does had something to eat.  Her weight has remained stable.    On the glucometer, the average glucose is 180, with a range variable between 95 and 294.  She only checks her blood sugar in the morning, prior to breakfast.    She continues to use the Accu-Chek expert meter, which helped her in the past to determine the NovoLog dose.   Meter settings:   Insulin to Carb Ratio: 8  Correction Factor: 50  BG Target: 100-150  Offset time 2 hrs   Acting time 3:30 hrs     Diabetes complications:  Retinopathy: last eye exam - 3/21. No DR. Pimentel's  syndrome.  Nephropathy: h/o proteinuria; normal GFR. Now on 50mg losartan daily (tx with lisinopril was associated with a dry cough).   Neuropathy: No numbness or tingling sensation in her feet.     Current Outpatient Medications   Medication     acetaminophen (TYLENOL) 500 MG tablet     Alcohol Swabs (ALCOHOL PREP PAD) 70 % PADS     aspirin (ASA) 81 MG chewable tablet     atorvastatin (LIPITOR) 40 MG tablet     B-D U/F insulin pen needle     BD ULTRA FINE PEN NEEDLES     blood glucose (ACCU-CHEK LAYA PLUS) test strip     blood glucose monitoring (ACCU-CHEK FASTCLIX) lancets     cetirizine (ZYRTEC) 10 MG tablet     continuous blood glucose monitoring (FREESTYLE ZORAIDA) sensor     cyclobenzaprine (FLEXERIL) 5 MG tablet     empagliflozin (JARDIANCE) 25 MG TABS tablet     ergocalciferol (ERGOCALCIFEROL) 1.25 MG (12529 UT) capsule     fenofibrate (TRIGLIDE/LOFIBRA) 160 MG tablet     fish oil-omega-3 fatty acids 1000 MG capsule     fluticasone (FLONASE) 50 MCG/ACT nasal spray     ibuprofen (ADVIL/MOTRIN) 600 MG tablet     insulin glargine (BASAGLAR KWIKPEN) 100 UNIT/ML pen     insulin isophane human (HUMULIN N KWIKPEN) 100 UNIT/ML injection     insulin pen needle (B-D U/F) 31G X 8 MM miscellaneous     levonorgestrel (MIRENA) 20 MCG/24HR IUD     losartan (COZAAR) 50 MG tablet     naproxen (NAPROSYN) 375 MG tablet     omega 3 1000 MG CAPS     Ostomy Supplies (ADHESIVE REMOVER WIPES) MISC     Ostomy Supplies (SKIN TAC ADHESIVE BARRIER WIPE) MISC     triamcinolone (KENALOG) 0.1 % external cream     Current Facility-Administered Medications   Medication     betamethasone acet & sod phos (CELESTONE) injection 6 mg     betamethasone acet & sod phos (CELESTONE) injection 6 mg     betamethasone acet & sod phos (CELESTONE) injection 6 mg     betamethasone acet & sod phos (CELESTONE) injection 6 mg     hydrocortisone (CORTAID) 1 % cream     hylan (SYNVISC ONE) injection 48 mg     hylan (SYNVISC ONE) injection 48 mg     lidocaine  (PF) (XYLOCAINE) 1 % injection 1 mL     lidocaine (PF) (XYLOCAINE) 1 % injection 1 mL     lidocaine (PF) (XYLOCAINE) 1 % injection 1 mL     lidocaine (PF) (XYLOCAINE) 1 % injection 1 mL     Past Medical History:   Diagnosis Date     Combined visual and hearing impairment      Deaf      Diabetic retinopathy of both eyes (H) 8/19/2011     Hepatic steatosis 08/19/2011     History of tobacco use      Hyperlipidemia      Hypertension      Hypertriglyceridemia      Migraines      Obesity 8/19/2011     Problem list name updated by automated process. 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 She denies smoking, drinking alcohol or using illicit drugs. Continues working- currently doing secretarial work.     ROS:  Review of Systems   Systemic symptoms: weight stable  Eye symptoms: No eye symptoms.  Otolaryngeal symptoms: deaf   Breast symptoms: No breast symptoms.  Cardiovascular symptoms: No cardiovascular symptoms.    Pulmonary symptoms: No pulmonary symptoms.  Gastrointestinal symptoms: no GERD or nausea, no diarrhea or constipation   Genitourinary symptoms: No genitourinary symptoms.  Endocrine symptoms:Menstrual periods, irregular, and light, 3-5 weeks apart - IUD 1/2017   Hematologic symptoms: No hematologic symptoms.  Musculoskeletal symptoms: bilateral knee pain, intermittently  Neurological symptoms: no headaches, no tremor, no dizziness   Psychological symptoms: No psychological symptoms.    Skin symptoms: No skin symptoms    Wt Readings from Last 10 Encounters:   04/05/21 74.1 kg (163 lb 6.4 oz)   01/21/21 70.8 kg (156 lb)   11/13/20 72.6 kg (160 lb)   10/29/20 73 kg (161 lb)   07/15/20 72.5 kg (159 lb 12.8 oz)   07/08/20 72.4 kg (159 lb 9.6 oz)   03/03/20 79.1 kg (174 lb 4.8 oz)   01/21/20 81.1 kg (178 lb 11.2  oz)   01/13/20 81.8 kg (180 lb 6.4 oz)   11/20/19 82.6 kg (182 lb)     /73   Pulse 79   Wt 74.1 kg (163 lb 6.4 oz)   BMI 29.89 kg/m      General appearance: she is well-developed, well-nourished, and in no distress.  Eyes: conjunctivae and extraocular motions are normal. Pupils are equal, round, and reactive to light. No lid lag, no stare.  HENT: oropharynx clear and moist; neck no JVD, no bruits, no thyromegaly, no palpable nodules  Cardiovascular: regular rhythm, no murmurs, distal pulses palpable, no edema  Respiratory: chest clear, no rales, no rhonchi  Gastrointestinal: abdomen soft, nontender, nondistended, +BS, no organomegaly  Musculoskeletal: normal tone and strength   Neurologic: reflexes normal and symmetric, no resting tremor  Psychiatric: affect and judgment normal   Skin: warm, no lesions  Feet: Sensation intact to monofilament testing    Labs:  Reviewed.  Lab Results   Component Value Date    A1C 11.6 (H) 04/05/2021    A1C 12.4 (H) 10/29/2020    A1C 12.9 (H) 07/08/2020    A1C 8.2 (H) 05/02/2019    A1C 10.3 (H) 10/15/2018    HEMOGLOBINA1 10.0 (A) 01/13/2020    HEMOGLOBINA1 9.9 (A) 10/07/2019    HEMOGLOBINA1 8.1 (A) 04/22/2019    HEMOGLOBINA1 10.4 (A) 01/14/2019    HEMOGLOBINA1 8.7 (A) 03/12/2018       Hemoglobin   Date Value Ref Range Status   03/13/2018 14.2 11.7 - 15.7 g/dL Final     Hematocrit   Date Value Ref Range Status   07/08/2020 44.2 35.0 - 47.0 % Final     Cholesterol   Date Value Ref Range Status   07/08/2020 179 <200 mg/dL Final     Cholesterol/HDL Ratio   Date Value Ref Range Status   09/16/2013 5.8 (H) 0.0 - 5.0 Final     HDL Cholesterol   Date Value Ref Range Status   07/08/2020 41 (L) >49 mg/dL Final     LDL Cholesterol Calculated   Date Value Ref Range Status   07/08/2020 72 <100 mg/dL Final     Comment:     Desirable:       <100 mg/dl     VLDL-Cholesterol   Date Value Ref Range Status   09/16/2013 44 (H) 0 - 30 mg/dL Final     Triglycerides   Date Value Ref Range Status    07/08/2020 331 (H) <150 mg/dL Final     Comment:     Borderline high:  150-199 mg/dl  High:             200-499 mg/dl  Very high:       >499 mg/dl       Albumin Urine mg/L   Date Value Ref Range Status   07/20/2020 31 mg/L Final     TSH   Date Value Ref Range Status   07/08/2020 1.34 0.40 - 4.00 mU/L Final         Last Basic Metabolic Panel:    Sodium   Date Value Ref Range Status   07/08/2020 136 133 - 144 mmol/L Final     Potassium   Date Value Ref Range Status   07/08/2020 4.0 3.4 - 5.3 mmol/L Final     Chloride   Date Value Ref Range Status   07/08/2020 101 94 - 109 mmol/L Final     Calcium   Date Value Ref Range Status   07/08/2020 8.6 8.5 - 10.1 mg/dL Final     Carbon Dioxide   Date Value Ref Range Status   07/08/2020 30 20 - 32 mmol/L Final     Urea Nitrogen   Date Value Ref Range Status   07/08/2020 13 7 - 30 mg/dL Final     Creatinine   Date Value Ref Range Status   07/08/2020 0.74 0.52 - 1.04 mg/dL Final     GFR Estimate   Date Value Ref Range Status   07/08/2020 >90 >60 mL/min/[1.73_m2] Final     Comment:     Non  GFR Calc  Starting 12/18/2018, serum creatinine based estimated GFR (eGFR) will be   calculated using the Chronic Kidney Disease Epidemiology Collaboration   (CKD-EPI) equation.       Glucose   Date Value Ref Range Status   07/08/2020 417 (H) 70 - 99 mg/dL Final       AST   Date Value Ref Range Status   07/08/2020 11 0 - 45 U/L Final     ALT   Date Value Ref Range Status   07/08/2020 29 0 - 50 U/L Final     Albumin Urine mg/g Cr   Date Value Ref Range Status   07/20/2020 54.71 (H) 0 - 25 mg/g Cr Final     A/P:    Diabetes, type 2 vs. PRAVIN, complicated by diabetic nephropathy and retinopathy, uncontrolled, mainly due to noncompliance with the prescribed regimen.  In an effort to improve compliance, the insulin regimen was simplified to Basaglar and Humulin.  Treatment with GLP-1 agonist is not an option due to the high copayments.  Recommendations:  Continue  empagliflozin  Increase the dose of Humulin N to 40 units  Can administer both Basaglar and Humulin at the same time in the morning, preferably 30 minutes prior to breakfast   Recommended to check the blood sugar more frequently.  The patient agreed to check before breakfast and at bedtime, twice daily.  Have the glucometer downloaded in our clinic in 2 or 3 weeks     Proteinuria/HTN  Blood pressure controlled.   -continue losartan 50mg per day   -Follow-up urine microalbumin when the glycemic control improves    Orders Placed This Encounter   Procedures     Hemoglobin A1c             Chief Complaint   Patient presents with     RECHECK     Type 2 Diabetes     Sara Aparicio MA

## 2021-04-08 ASSESSMENT — ANXIETY QUESTIONNAIRES
4. TROUBLE RELAXING: NOT AT ALL
GAD7 TOTAL SCORE: 0
2. NOT BEING ABLE TO STOP OR CONTROL WORRYING: NOT AT ALL
6. BECOMING EASILY ANNOYED OR IRRITABLE: NOT AT ALL
7. FEELING AFRAID AS IF SOMETHING AWFUL MIGHT HAPPEN: NOT AT ALL
GAD7 TOTAL SCORE: 0
7. FEELING AFRAID AS IF SOMETHING AWFUL MIGHT HAPPEN: NOT AT ALL
5. BEING SO RESTLESS THAT IT IS HARD TO SIT STILL: NOT AT ALL
1. FEELING NERVOUS, ANXIOUS, OR ON EDGE: NOT AT ALL
3. WORRYING TOO MUCH ABOUT DIFFERENT THINGS: NOT AT ALL

## 2021-04-09 ASSESSMENT — ANXIETY QUESTIONNAIRES: GAD7 TOTAL SCORE: 0

## 2021-04-11 DIAGNOSIS — R73.09 ELEVATED HEMOGLOBIN A1C: Primary | ICD-10-CM

## 2021-04-13 ENCOUNTER — OFFICE VISIT (OUTPATIENT)
Dept: FAMILY MEDICINE | Facility: CLINIC | Age: 41
End: 2021-04-13
Payer: COMMERCIAL

## 2021-04-13 VITALS
TEMPERATURE: 98 F | DIASTOLIC BLOOD PRESSURE: 69 MMHG | OXYGEN SATURATION: 98 % | WEIGHT: 163 LBS | SYSTOLIC BLOOD PRESSURE: 109 MMHG | HEART RATE: 83 BPM | BODY MASS INDEX: 29.81 KG/M2

## 2021-04-13 DIAGNOSIS — H90.0 CONDUCTIVE HEARING LOSS, BILATERAL: ICD-10-CM

## 2021-04-13 DIAGNOSIS — Z79.4 TYPE 2 DIABETES MELLITUS TREATED WITH INSULIN (H): Primary | ICD-10-CM

## 2021-04-13 DIAGNOSIS — E11.9 TYPE 2 DIABETES MELLITUS TREATED WITH INSULIN (H): Primary | ICD-10-CM

## 2021-04-13 DIAGNOSIS — H54.8 LEGAL BLINDNESS: ICD-10-CM

## 2021-04-13 DIAGNOSIS — Z91.89 AT RISK FOR FERTILITY PROBLEMS: ICD-10-CM

## 2021-04-13 PROCEDURE — 99213 OFFICE O/P EST LOW 20 MIN: CPT | Performed by: NURSE PRACTITIONER

## 2021-04-13 ASSESSMENT — ENCOUNTER SYMPTOMS
CHILLS: 0
FEVER: 0
FATIGUE: 0

## 2021-04-13 ASSESSMENT — PAIN SCALES - GENERAL: PAINLEVEL: NO PAIN (0)

## 2021-04-13 NOTE — NURSING NOTE
Chief Complaint   Patient presents with     Forms     Discuss disability form     Patient Request     Discuss pregnancy concerns.     SERENITY Marin 7:28 AM  4/13/2021

## 2021-04-13 NOTE — PATIENT INSTRUCTIONS
[facility] Medication Refill Request Information:  * Please contact your pharmacy regarding ANY request for medication refills.  ** [facility] Clinic Prescription Fax = [phone]  * Please allow 3 business days for routine medication refills.  * Please allow 5 business days for controlled substance medication refills.     [facility] Test Result notification information:  *You will be notified with in 7-10 days of your appointment day regarding the results of your test.  If you are on MyChart you will be notified as soon as the provider has reviewed the results and signed off on them.    [facility]: [phone]     If you have questions regarding Covid-19 and the Covid-19 vaccine, please visit this website.    [email or web address]

## 2021-06-04 ENCOUNTER — NURSE TRIAGE (OUTPATIENT)
Dept: NURSING | Facility: CLINIC | Age: 41
End: 2021-06-04

## 2021-06-04 NOTE — TELEPHONE ENCOUNTER
"Agrees to be seen at urgent care today    Reason for Disposition    [1] Vaginal FB AND [2] can't remove at home    Additional Information    Negative: Shock suspected (e.g., cold/pale/clammy skin, too weak to stand, low BP, rapid pulse)    Negative: Sounds like a life-threatening emergency to the triager    Negative: FB is sharp    Negative: Bleeding from the vagina  [Exception: patient denies injury and knows that the blood is from menstrual period]    Negative: FB causes pain or discomfort    Negative: Unable to urinate (can't pass urine)    Negative: Possibility of sexual assault    Negative: [1] Retained tampon AND [2] fever    Negative: [1] Retained tampon AND [2] new rash    Negative: Patient sounds very sick or weak to the triager    Answer Assessment - Initial Assessment Questions  1. OBJECT: \"What do you think it is?\"       tampon  2. SIZE: \"How large is it?\"       Size small  3. PAIN: \"Are you having any pain?\" If so, ask: \"How bad is it?\"  (Scale 1-10; or mild, moderate, severe)      denies  4. ONSET: \"How long do you think the foreign body has been there?\"       Since yesterday  5. MECHANISM: \"Tell me how it happened.\"      Placed tampon thursday  6. OTHER SYMPTOMS: \"Do you have any other symptoms?\" (e.g., vaginal discharge, fever, rash)      Denies fever, having bleeding around tampon  7. PREGNANCY: \"Is there any chance you are pregnant?\" \"When was your last menstrual period?\"     Has period now    Protocols used: VAGINAL FOREIGN BODY-A-AH      "

## 2021-06-08 ENCOUNTER — OFFICE VISIT (OUTPATIENT)
Dept: FAMILY MEDICINE | Facility: CLINIC | Age: 41
End: 2021-06-08
Payer: COMMERCIAL

## 2021-06-08 VITALS
HEART RATE: 83 BPM | OXYGEN SATURATION: 97 % | DIASTOLIC BLOOD PRESSURE: 80 MMHG | TEMPERATURE: 98.4 F | SYSTOLIC BLOOD PRESSURE: 138 MMHG
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
## 2021-06-08 DIAGNOSIS — Z11.51 SCREENING FOR HUMAN PAPILLOMAVIRUS: ICD-10-CM

## 2021-06-08 DIAGNOSIS — T19.2XXD RETAINED TAMPON, SUBSEQUENT ENCOUNTER: Primary | ICD-10-CM

## 2021-06-08 DIAGNOSIS — R10.2 PELVIC PAIN IN FEMALE: ICD-10-CM

## 2021-06-08 PROCEDURE — G0145 SCR C/V CYTO,THINLAYER,RESCR: HCPCS | Mod: 90 | Performed by: PATHOLOGY

## 2021-06-08 PROCEDURE — 87624 HPV HI-RISK TYP POOLED RSLT: CPT | Mod: 90 | Performed by: PATHOLOGY

## 2021-06-08 PROCEDURE — 99214 OFFICE O/P EST MOD 30 MIN: CPT | Performed by: NURSE PRACTITIONER

## 2021-06-08 ASSESSMENT — ANXIETY QUESTIONNAIRES
GAD7 TOTAL SCORE: 0
GAD7 TOTAL SCORE: 0
6. BECOMING EASILY ANNOYED OR IRRITABLE: NOT AT ALL
2. NOT BEING ABLE TO STOP OR CONTROL WORRYING: NOT AT ALL
7. FEELING AFRAID AS IF SOMETHING AWFUL MIGHT HAPPEN: NOT AT ALL
7. FEELING AFRAID AS IF SOMETHING AWFUL MIGHT HAPPEN: NOT AT ALL
5. BEING SO RESTLESS THAT IT IS HARD TO SIT STILL: NOT AT ALL
3. WORRYING TOO MUCH ABOUT DIFFERENT THINGS: NOT AT ALL
4. TROUBLE RELAXING: NOT AT ALL
1. FEELING NERVOUS, ANXIOUS, OR ON EDGE: NOT AT ALL

## 2021-06-08 ASSESSMENT — PAIN SCALES - GENERAL: PAINLEVEL: NO PAIN (0)

## 2021-06-08 NOTE — PATIENT INSTRUCTIONS
[facility] Medication Refill Request Information:  * Please contact your pharmacy regarding ANY request for medication refills.  ** NP Clinic Prescription Fax = [phone]  * Please allow 3 business days for routine medication refills.  * Please allow 5 business days for controlled substance medication refills.     [facility] Test Result notification information:  *You will be notified with in 7-10 days of your appointment day regarding the results of your test.  If you are on MyChart you will be notified as soon as the provider has reviewed the results and signed off on them.    [facility]: [phone]     If you have questions regarding Covid-19 and the Covid-19 vaccine, please visit this website.    [email or web address]

## 2021-06-08 NOTE — NURSING NOTE
40 year old  Chief Complaint   Patient presents with     Vaginal Problem     Discuss tampon stuck in vaginal area x 3 days        Blood pressure 138/80, pulse 83, temperature 98.4  F (36.9  C), SpO2 97 %, not currently breastfeeding. There is no height or weight on file to calculate BMI.  BP completed using cuff size:      Sara Katz, SERENITY  June 8, 2021 3:23 PM

## 2021-06-08 NOTE — PROGRESS NOTES
HPI       Nayeli Caal is a 40 year old woman who presents with a possible retained tampon.    is present with patient as patient is deaf.   Chief Complaint   Patient presents with     Vaginal Problem     Discuss tampon stuck in vaginal area x 3 days    Possible retained tampon/pelvic discomfort: Nayeli was in [place] this past weekend, when she placed a tampon vaginally Friday, June 4 2021. She woke up the next morning and wanted to remove this, however she was unable to locate the strings. She tried again later that day and was unable to locate the tampon so she presented to the nearest . They performed a pelvic exam and told her they did not see anything. She had intermittent pressure and light cramping over the next few days. She presents today for examination and a 2nd opinion.     Problem, Medication and Allergy Lists were reviewed and updated if needed.    Patient is an established patient of this clinic.         Review of Systems:   Review of Systems     Constitutional:  Negative for fever, chills and fatigue.               Physical Exam:     Vitals:    06/08/21 1518   BP: 138/80   Pulse: 83   Temp: 98.4  F (36.9  C)   SpO2: 97%     There is no height or weight on file to calculate BMI.     Vitals were reviewed and were normal.     Physical Exam  Vitals signs reviewed.   Constitutional:       Appearance: Normal appearance.   HENT:      Head: Normocephalic.   Genitourinary:     General: Normal vulva.      Vagina: No vaginal discharge.      Rectum: Normal.      Comments: Upon exam, cervix is abnormal in appearance, bulbous on upper right, from 9 o'clock to 12 o'clock. IUD strings are not visualized. There is a granuloma present at the cervical os. No tampon observed.   Musculoskeletal: Normal range of motion.   Skin:     General: Skin is warm and dry.   Neurological:      General: No focal deficit present.      Mental Status: She is alert and oriented to person, place, and  time.   Psychiatric:         Mood and Affect: Mood normal.         Behavior: Behavior normal.         Results:   Pap/HPV pending.   Assessment and Plan     1. Retained tampon, subsequent encounter      2. Pelvic pain in female    - OB/GYN REFERRAL    3. Screening for human papillomavirus    - Pap imaged thin layer screen with HPV - recommended age 30 - 65 years (select HPV order below)  - HPV High Risk Types DNA Cervical    I spent a total of 30 minutes face-to-face with Nayeli during today's office visit. Over 50% of this time was spent counseling the patient and/or coordinating care regarding their care.  See note for details. Collaboration with Midwife on call regarding appearance of cervix and next steps. An appt with OB/GYN is scheduled.  No further instruction at this time. Pap and HPV pending. Encouraged to call with any questions prior to next appt. Options for treatment and follow-up care were reviewed with the patient. Nayeli Caal  engaged in the decision making process and verbalized understanding of the options discussed and agreed with the final plan.  Astrid Osorio, APRN, CNP

## 2021-06-09 ASSESSMENT — ANXIETY QUESTIONNAIRES: GAD7 TOTAL SCORE: 0

## 2021-06-09 ASSESSMENT — ENCOUNTER SYMPTOMS
CHILLS: 0
FEVER: 0
FATIGUE: 0

## 2021-06-11 LAB
COPATH REPORT: NORMAL
PAP: NORMAL

## 2021-06-14 LAB
FINAL DIAGNOSIS: NORMAL
HPV HR 12 DNA CVX QL NAA+PROBE: NEGATIVE
HPV16 DNA SPEC QL NAA+PROBE: NEGATIVE
HPV18 DNA SPEC QL NAA+PROBE: NEGATIVE
SPECIMEN DESCRIPTION: NORMAL
SPECIMEN SOURCE CVX/VAG CYTO: NORMAL

## 2021-06-15 ENCOUNTER — OFFICE VISIT (OUTPATIENT)
Dept: OBGYN | Facility: CLINIC | Age: 41
End: 2021-06-15
Attending: NURSE PRACTITIONER
Payer: COMMERCIAL

## 2021-06-15 VITALS
WEIGHT: 171 LBS | BODY MASS INDEX: 31.28 KG/M2 | HEART RATE: 88 BPM | DIASTOLIC BLOOD PRESSURE: 72 MMHG | SYSTOLIC BLOOD PRESSURE: 106 MMHG

## 2021-06-15 DIAGNOSIS — N84.1 CERVICAL POLYP: Primary | ICD-10-CM

## 2021-06-15 DIAGNOSIS — Z11.3 SCREENING EXAMINATION FOR SEXUALLY TRANSMITTED DISEASE: ICD-10-CM

## 2021-06-15 PROCEDURE — 87491 CHLMYD TRACH DNA AMP PROBE: CPT | Performed by: NURSE PRACTITIONER

## 2021-06-15 PROCEDURE — 88305 TISSUE EXAM BY PATHOLOGIST: CPT | Mod: TC | Performed by: NURSE PRACTITIONER

## 2021-06-15 PROCEDURE — 87591 N.GONORRHOEAE DNA AMP PROB: CPT | Performed by: NURSE PRACTITIONER

## 2021-06-15 PROCEDURE — T1013 SIGN LANG/ORAL INTERPRETER: HCPCS | Mod: U3

## 2021-06-15 PROCEDURE — G0463 HOSPITAL OUTPT CLINIC VISIT: HCPCS

## 2021-06-15 PROCEDURE — 88305 TISSUE EXAM BY PATHOLOGIST: CPT | Mod: 26 | Performed by: PATHOLOGY

## 2021-06-15 PROCEDURE — 57500 BIOPSY OF CERVIX: CPT | Performed by: NURSE PRACTITIONER

## 2021-06-15 PROCEDURE — 99214 OFFICE O/P EST MOD 30 MIN: CPT | Mod: 25 | Performed by: NURSE PRACTITIONER

## 2021-06-15 ASSESSMENT — PATIENT HEALTH QUESTIONNAIRE - PHQ9
5. POOR APPETITE OR OVEREATING: NOT AT ALL
SUM OF ALL RESPONSES TO PHQ QUESTIONS 1-9: 0

## 2021-06-15 ASSESSMENT — ANXIETY QUESTIONNAIRES
1. FEELING NERVOUS, ANXIOUS, OR ON EDGE: NOT AT ALL
5. BEING SO RESTLESS THAT IT IS HARD TO SIT STILL: NOT AT ALL
6. BECOMING EASILY ANNOYED OR IRRITABLE: NOT AT ALL
GAD7 TOTAL SCORE: 0
7. FEELING AFRAID AS IF SOMETHING AWFUL MIGHT HAPPEN: NOT AT ALL
3. WORRYING TOO MUCH ABOUT DIFFERENT THINGS: NOT AT ALL
IF YOU CHECKED OFF ANY PROBLEMS ON THIS QUESTIONNAIRE, HOW DIFFICULT HAVE THESE PROBLEMS MADE IT FOR YOU TO DO YOUR WORK, TAKE CARE OF THINGS AT HOME, OR GET ALONG WITH OTHER PEOPLE: NOT DIFFICULT AT ALL
2. NOT BEING ABLE TO STOP OR CONTROL WORRYING: NOT AT ALL

## 2021-06-15 ASSESSMENT — PAIN SCALES - GENERAL: PAINLEVEL: NO PAIN (0)

## 2021-06-15 NOTE — PROGRESS NOTES
Subjective:  Nayeli Caal is a 40 y.o. female who presents for evaluation of her cervix after a visit to urgent care. Patient presented to urgent care to discuss a possible retained tampon in the vagina. A tampon was not found, but cervical concerns were noted. Patient was referred for follow-up.    Pap smear done 6/8/2021: NIL, HPV negative  Patient reports all pap results have been normal in the past  Patient has a history of uterine fibroids; pt had a consultation for these with Dr. Hodge in Fall 2020.    Patient denies vaginal symptoms and urinary symptoms. Patient is concerned since she has not had any symptoms and the urgent care provider did not explain what was happening very well per patient.     Menstrual hx:   LMP 6/3/2021  Patient reports a regular monthly cycle with light menstrual flow and no cramping during menses.    Sexual hx: not currently sexually active, last sexually active in February 2021    Contraception: Mirena IUD    Patient does not have any further concerns during this visit. Sign- present for patient visit.    Past Medical History:   Diagnosis Date     Combined visual and hearing impairment      Deaf      Diabetic retinopathy of both eyes (H) 8/19/2011     Hepatic steatosis 08/19/2011     History of tobacco use      Hyperlipidemia      Hypertension      Hypertriglyceridemia      Migraines      Obesity 8/19/2011     Problem list name updated by automated process. 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  Ganglion cyst excision.;  Surgeon: Greg Streeter MD;  Location: [facility] OR     Family History   Problem Relation Age of Onset     Unknown/Adopted Other       No Known Allergies     Current Outpatient Medications   Medication     acetaminophen (TYLENOL) 500 MG tablet     Alcohol Swabs (ALCOHOL PREP PAD) 70 % PADS     aspirin (ASA) 81 MG chewable tablet     atorvastatin (LIPITOR) 40 MG tablet     cetirizine (ZYRTEC) 10 MG tablet     empagliflozin (JARDIANCE) 25 MG TABS tablet     ergocalciferol (ERGOCALCIFEROL) 1.25 MG (81212 UT) capsule     fenofibrate (TRIGLIDE/LOFIBRA) 160 MG tablet     fish oil-omega-3 fatty acids 1000 MG capsule     ibuprofen (ADVIL/MOTRIN) 600 MG tablet     losartan (COZAAR) 50 MG tablet     naproxen (NAPROSYN) 375 MG tablet     omega 3 1000 MG CAPS     triamcinolone (KENALOG) 0.1 % external cream     B-D U/F insulin pen needle     BD ULTRA FINE PEN NEEDLES     blood glucose (ACCU-CHEK LAYA PLUS) test strip     blood glucose monitoring (ACCU-CHEK FASTCLIX) lancets     insulin glargine (BASAGLAR KWIKPEN) 100 UNIT/ML pen     insulin NPH (HUMULIN N KWIKPEN) 100 UNIT/ML injection     insulin pen needle (B-D U/F) 31G X 8 MM miscellaneous     levonorgestrel (MIRENA) 20 MCG/24HR IUD     Ostomy Supplies (ADHESIVE REMOVER WIPES) MISC     Ostomy Supplies (SKIN TAC ADHESIVE BARRIER WIPE) MISC     Current Facility-Administered Medications   Medication     hydrocortisone (CORTAID) 1 % cream       Objective:  /72   Pulse 88   Wt 77.6 kg (171 lb)   Breastfeeding No   BMI 31.28 kg/m    General: well-appearing female in no acute distress  Respiratory: non-labored breathing  HEENT: Patient is deaf    Pelvic Exam:  Vulva: No external lesions, normal hair distribution, no adenopathy  Vagina: Bloody discharge present. Moist, pink, well rugated,  no lesions  Cervix: Cervix is abnormal in appearance, bulbous on upper right, from 9 o'clock to 12 o'clock. IUD strings are visualized. There is a cervical polyp present at the cervical os at the 5 o'clock position. Polyp stalk is not visualized. Cervix is hypervascular and reddened.     Wet prep: no signs of packed WBCs    Assessment:  Encounter Diagnoses   Name Primary?     Cervical polyp Yes     Screening examination for sexually transmitted disease        Plan/ Procedure:  Dr. Torres, ObGyn, was brought into exam room for consultation. Confirmed most likely diagnosis of cervical polyp. Verbal consent was obtained from patient for removal of cervical polyp.  Polyp was visualized, grasped with ring forceps, and easily removed with torsion. Pressure and Silver nitrate applied to the cervix to stop bleeding. EBL <5cc. Patient tolerated the procedure well.     Patient instructed that she may have spotting, and grey discharge from the silver nitrate.   Polyp sent to pathology. Patient informed that results may take 7 days. Patient prefers detailed MyChart message with the results.   Gonorrhea and chlamydia testing. Patient informed that results typically  take 3-5 days.     If testing is negative, patient informed there is no additional follow-up unless she has concerns or develops AUB. Patient accepting of plan and has no additional questions or concerns at conclusion of visit.    I, Janet Rowland, completed the PFSH and ROS. I then acted as a scribe for RAMIRO Ortega, for the remainder of the visit.  Janet BARKLEY, RN, N  [facility] DNP student  Women's Health and Gender-Related specialty    I agree with the PFSH and ROS as completed by the RAMIRO Student, except for changes made by me.  The remainder of the encounter was performed by me and scribed by the RAMIRO Student. The scribed note accurately reflects my personal services and decisions made by me.  Negar Chen DNP, APRN,  WHNP

## 2021-06-15 NOTE — LETTER
6/15/2021       RE: Nayeli Caal  [address]  [facility] [address]     Dear Colleague,    Thank you for referring your patient, Nayeli Caal, to the [facility] [place] [facility]. Please see a copy of my visit note below.    Subjective:  Nayeli Caal is a 40 y.o. female who presents for evaluation of her cervix after a visit to urgent care. Patient presented to urgent care to discuss a possible retained tampon in the vagina. A tampon was not found, but cervical concerns were noted. Patient was referred for follow-up.    Pap smear done 6/8/2021: NIL, HPV negative  Patient reports all pap results have been normal in the past  Patient has a history of uterine fibroids; pt had a consultation for these with Dr. Hodge in Fall 2020.    Patient denies vaginal symptoms and urinary symptoms. Patient is concerned since she has not had any symptoms and the urgent care provider did not explain what was happening very well per patient.     Menstrual hx:   LMP 6/3/2021  Patient reports a regular monthly cycle with light menstrual flow and no cramping during menses.    Sexual hx: not currently sexually active, last sexually active in February 2021    Contraception: Mirena IUD    Patient does not have any further concerns during this visit. Sign- present for patient visit.    Past Medical History:   Diagnosis Date     Combined visual and hearing impairment      Deaf      Diabetic retinopathy of both eyes (H) 8/19/2011     Hepatic steatosis 08/19/2011     History of tobacco use      Hyperlipidemia      Hypertension      Hypertriglyceridemia      Migraines      Obesity 8/19/2011     Problem list name updated by automated process. Provider to review     Uncontrolled type 2 diabetes mellitus with hyperglycemia, with long-term current use of insulin (H) 5/15/2017     Usher Syndrome: congenital deafness,  retinitis pigmentosa 8/19/2011     Past Surgical History:   Procedure Laterality Date     LAPAROSCOPIC CHOLECYSTECTOMY N/A 3/10/2018    Procedure: LAPAROSCOPIC CHOLECYSTECTOMY;  Laparoscopic Cholecystectomy ;  Surgeon: Kuldeep Sigala MD;  Location: [facility] OR     RELEASE TRIGGER FINGER Right 5/2/2019    Procedure: Right Thumb Trigger Release;  Surgeon: Greg Streeter MD;  [facility]     RELEASE TRIGGER FINGER Left 5/30/2019    Procedure: Left Ring Trigger Finger Release.  Ganglion cyst excision.;  Surgeon: Greg Streeter MD;  [facility]     Family History   Problem Relation Age of Onset     Unknown/Adopted Other       No Known Allergies     Current Outpatient Medications   Medication     acetaminophen (TYLENOL) 500 MG tablet     Alcohol Swabs (ALCOHOL PREP PAD) 70 % PADS     aspirin (ASA) 81 MG chewable tablet     atorvastatin (LIPITOR) 40 MG tablet     cetirizine (ZYRTEC) 10 MG tablet     empagliflozin (JARDIANCE) 25 MG TABS tablet     ergocalciferol (ERGOCALCIFEROL) 1.25 MG (70812 UT) capsule     fenofibrate (TRIGLIDE/LOFIBRA) 160 MG tablet     fish oil-omega-3 fatty acids 1000 MG capsule     ibuprofen (ADVIL/MOTRIN) 600 MG tablet     losartan (COZAAR) 50 MG tablet     naproxen (NAPROSYN) 375 MG tablet     omega 3 1000 MG CAPS     triamcinolone (KENALOG) 0.1 % external cream     B-D U/F insulin pen needle     BD ULTRA FINE PEN NEEDLES     blood glucose (ACCU-CHEK LAYA PLUS) test strip     blood glucose monitoring (ACCU-CHEK FASTCLIX) lancets     insulin glargine (BASAGLAR KWIKPEN) 100 UNIT/ML pen     insulin NPH (HUMULIN N KWIKPEN) 100 UNIT/ML injection     insulin pen needle (B-D U/F) 31G X 8 MM miscellaneous     levonorgestrel (MIRENA) 20 MCG/24HR IUD     Ostomy Supplies (ADHESIVE REMOVER WIPES) MISC     Ostomy Supplies (SKIN TAC ADHESIVE BARRIER WIPE) MISC     Current Facility-Administered Medications   Medication     hydrocortisone (CORTAID) 1 % cream       Objective:  /72    Pulse 88   Wt 77.6 kg (171 lb)   Breastfeeding No   BMI 31.28 kg/m    General: well-appearing female in no acute distress  Respiratory: non-labored breathing  HEENT: Patient is deaf    Pelvic Exam:  Vulva: No external lesions, normal hair distribution, no adenopathy  Vagina: Bloody discharge present. Moist, pink, well rugated, no lesions  Cervix: Cervix is abnormal in appearance, bulbous on upper right, from 9 o'clock to 12 o'clock. IUD strings are visualized. There is a cervical polyp present at the cervical os at the 5 o'clock position. Polyp stalk is not visualized. Cervix is hypervascular and reddened.     Wet prep: no signs of packed WBCs    Assessment:  Encounter Diagnoses   Name Primary?     Cervical polyp Yes     Screening examination for sexually transmitted disease        Plan/ Procedure:  Dr. Torres, ObGyn, was brought into exam room for consultation. Confirmed most likely diagnosis of cervical polyp. Verbal consent was obtained from patient for removal of cervical polyp.  Polyp was visualized, grasped with ring forceps, and easily removed with torsion. Pressure and Silver nitrate applied to the cervix to stop bleeding. EBL <5cc. Patient tolerated the procedure well.     Patient instructed that she may have spotting, and grey discharge from the silver nitrate.   Polyp sent to pathology. Patient informed that results may take 7 days. Patient prefers detailed MyChart message with the results.   Gonorrhea and chlamydia testing. Patient informed that results typically  take 3-5 days.     If testing is negative, patient informed there is no additional follow-up unless she has concerns or develops AUB. Patient accepting of plan and has no additional questions or concerns at conclusion of visit.    I, Janet Rowland, completed the PFSH and ROS. I then acted as a scribe for RAMIRO Ortega, for the remainder of the visit.  Janet Rowland BSN, RN, PHN  SANE  [facility] DNP student  Women's  Health and Gender-Related specialty    I agree with the PFSH and ROS as completed by the WHNP Student, except for changes made by me.  The remainder of the encounter was performed by me and scribed by the WHNP Student. The scribed note accurately reflects my personal services and decisions made by me.  Negar Chen, DNP, APRN, WHNP

## 2021-06-16 ASSESSMENT — ANXIETY QUESTIONNAIRES: GAD7 TOTAL SCORE: 0

## 2021-06-17 LAB — COPATH REPORT: NORMAL

## 2021-07-07 ENCOUNTER — OFFICE VISIT (OUTPATIENT)
Dept: FAMILY MEDICINE | Facility: CLINIC | Age: 41
End: 2021-07-07
Payer: COMMERCIAL

## 2021-07-07 ENCOUNTER — ANCILLARY PROCEDURE (OUTPATIENT)
Dept: ULTRASOUND IMAGING | Facility: CLINIC | Age: 41
End: 2021-07-07
Attending: NURSE PRACTITIONER
Payer: COMMERCIAL

## 2021-07-07 ENCOUNTER — ANCILLARY PROCEDURE (OUTPATIENT)
Dept: GENERAL RADIOLOGY | Facility: CLINIC | Age: 41
End: 2021-07-07
Attending: NURSE PRACTITIONER
Payer: COMMERCIAL

## 2021-07-07 VITALS
OXYGEN SATURATION: 96 % | BODY MASS INDEX: 31.28 KG/M2 | DIASTOLIC BLOOD PRESSURE: 81 MMHG | HEART RATE: 75 BPM | TEMPERATURE: 98 F | WEIGHT: 171 LBS | SYSTOLIC BLOOD PRESSURE: 122 MMHG

## 2021-07-07 DIAGNOSIS — R10.2 PELVIC PAIN IN FEMALE: ICD-10-CM

## 2021-07-07 DIAGNOSIS — R10.2 PELVIC PAIN IN FEMALE: Primary | ICD-10-CM

## 2021-07-07 LAB — RADIOLOGIST FLAGS: NORMAL

## 2021-07-07 PROCEDURE — 76856 US EXAM PELVIC COMPLETE: CPT | Mod: GC | Performed by: RADIOLOGY

## 2021-07-07 PROCEDURE — 74019 RADEX ABDOMEN 2 VIEWS: CPT | Mod: GC | Performed by: RADIOLOGY

## 2021-07-07 PROCEDURE — 99213 OFFICE O/P EST LOW 20 MIN: CPT | Performed by: NURSE PRACTITIONER

## 2021-07-07 ASSESSMENT — PAIN SCALES - GENERAL: PAINLEVEL: SEVERE PAIN (6)

## 2021-07-07 NOTE — NURSING NOTE
41 year old  Chief Complaint   Patient presents with     Pain     Discuss pain on low abdomen.       Blood pressure 122/81, pulse 75, temperature 98  F (36.7  C), weight 77.6 kg (171 lb), SpO2 96 %, not currently breastfeeding. Body mass index is 31.28 kg/m .  BP completed using cuff size:      SERENITY Marin  July 7, 2021 2:55 PM

## 2021-07-07 NOTE — PATIENT INSTRUCTIONS
[facility] Medication Refill Request Information:  * Please contact your pharmacy regarding ANY request for medication refills.  ** NP Clinic Prescription Fax = [phone]  * Please allow 3 business days for routine medication refills.  * Please allow 5 business days for controlled substance medication refills.     [facility] Test Result notification information:  *You will be notified with in 7-10 days of your appointment day regarding the results of your test.  If you are on MyChart you will be notified as soon as the provider has reviewed the results and signed off on them.    [facility]: [phone]     If you have questions regarding Covid-19 and the Covid-19 vaccine, please visit this website.    [email or web address]

## 2021-07-09 ASSESSMENT — ENCOUNTER SYMPTOMS
ABDOMINAL PAIN: 1
FLANK PAIN: 0
FATIGUE: 0
FEVER: 0
CHILLS: 0

## 2021-07-09 NOTE — PROGRESS NOTES
HPI       Nayelimari Caal is a 41 year old woman who presents with pelvic pain.   Chief Complaint   Patient presents with     Pain     Discuss pain on low abdomen.   Pelvic pain: The pain started 3 days ago.  The pain is in her lower abdomen/pelvic area.  She denies any urinary symptoms such as burning on urination or flank pain.  She denies fever.  She reports that the pain comes and goes.  She was recently traveling, and did report some constipation while traveling.  She states that since she has been home, for the last 4 to 5 days, she has had daily bowel movements.  She does not feel that she is constipated.  She presents for examination and evaluation.    Problem, Medication and Allergy Lists were reviewed and updated if needed.    Patient is an established patient of this clinic.         Review of Systems:   Review of Systems     Constitutional:  Negative for fever, chills and fatigue.   Gastrointestinal:  Positive for abdominal pain.   Genitourinary:  Negative for urgency and flank pain.               Physical Exam:     Vitals:    07/07/21 1451   BP: 122/81   Pulse: 75   Temp: 98  F (36.7  C)   SpO2: 96%   Weight: 77.6 kg (171 lb)     Body mass index is 31.28 kg/m .  Vitals were reviewed and were normal.     Physical Exam  Vitals signs reviewed.   Constitutional:       Appearance: Normal appearance.   HENT:      Head: Normocephalic.   Abdominal:      General: There is distension.      Palpations: There is no mass.      Tenderness: There is abdominal tenderness.      Hernia: No hernia is present.   Musculoskeletal: Normal range of motion.   Skin:     General: Skin is warm and dry.   Neurological:      General: No focal deficit present.      Mental Status: She is alert and oriented to person, place, and time.   Psychiatric:         Mood and Affect: Mood normal.         Behavior: Behavior normal.         Results:   Pelvic ultrasound and abdominal x-ray pending  Assessment and Plan     1. Pelvic  pain in female    - US Pelvis complete; Future  - XR Abdomen 2 Views; Future  - US Pelvis complete; Future    First, I have abdominal x-ray and pelvic ultrasound.  Next, I will send you a message with the results.  Next, follow-up with worsening or persisting symptoms.  Options for treatment and follow-up care were reviewed with the patient. Nayeli Caal  engaged in the decision making process and verbalized understanding of the options discussed and agreed with the final plan.  SABI Bess, CNP  Addendum 7/9/2021 at 1336:  Information shared via [employer] message.  Recommend follow-up on pelvic ultrasound in 6 weeks per radiology.  The ultrasound is ordered.  IMPRESSION:   Nonobstructive bowel gas pattern. Moderate to large colonic stool  burden.                                                                   IMPRESSION:   1. No evidence of ovarian torsion.  2. Redemonstrated approximately 4.8 cm subserosal fibroid at the  uterine fundus.   3. Approximately 1.7 cm heterogeneous focus in the left ovary,  possibly small hemorrhagic cyst. Consider short-term follow-up in 6-12  weeks given history of pelvic pain.  4. IUD in appropriate position.  SABI Bess, CNP

## 2021-07-15 PROCEDURE — 84156 ASSAY OF PROTEIN URINE: CPT | Performed by: PATHOLOGY

## 2021-07-19 ENCOUNTER — LAB (OUTPATIENT)
Dept: LAB | Facility: CLINIC | Age: 41
End: 2021-07-19
Payer: COMMERCIAL

## 2021-07-19 ENCOUNTER — OFFICE VISIT (OUTPATIENT)
Dept: ENDOCRINOLOGY | Facility: CLINIC | Age: 41
End: 2021-07-19
Attending: NURSE PRACTITIONER
Payer: COMMERCIAL

## 2021-07-19 VITALS
HEIGHT: 62 IN | SYSTOLIC BLOOD PRESSURE: 112 MMHG | BODY MASS INDEX: 31.21 KG/M2 | HEART RATE: 86 BPM | WEIGHT: 169.6 LBS | DIASTOLIC BLOOD PRESSURE: 67 MMHG

## 2021-07-19 DIAGNOSIS — E11.65 UNCONTROLLED TYPE 2 DIABETES MELLITUS WITH HYPERGLYCEMIA, WITH LONG-TERM CURRENT USE OF INSULIN (H): ICD-10-CM

## 2021-07-19 DIAGNOSIS — E78.2 MIXED HYPERLIPIDEMIA: ICD-10-CM

## 2021-07-19 DIAGNOSIS — Z79.4 TYPE 2 DIABETES MELLITUS WITH HYPERGLYCEMIA, WITH LONG-TERM CURRENT USE OF INSULIN (H): Primary | ICD-10-CM

## 2021-07-19 DIAGNOSIS — Z79.4 UNCONTROLLED TYPE 2 DIABETES MELLITUS WITH HYPERGLYCEMIA, WITH LONG-TERM CURRENT USE OF INSULIN (H): ICD-10-CM

## 2021-07-19 DIAGNOSIS — E11.65 TYPE 2 DIABETES MELLITUS WITH HYPERGLYCEMIA, WITH LONG-TERM CURRENT USE OF INSULIN (H): Primary | ICD-10-CM

## 2021-07-19 LAB
CHOLEST SERPL-MCNC: 194 MG/DL
FASTING STATUS PATIENT QL REPORTED: NO
HBA1C MFR BLD: 10.2 % (ref 0–5.6)
HDLC SERPL-MCNC: 43 MG/DL
LDLC SERPL CALC-MCNC: 124 MG/DL
NONHDLC SERPL-MCNC: 151 MG/DL
TRIGL SERPL-MCNC: 133 MG/DL

## 2021-07-19 PROCEDURE — 99215 OFFICE O/P EST HI 40 MIN: CPT | Performed by: PHYSICIAN ASSISTANT

## 2021-07-19 PROCEDURE — 36415 COLL VENOUS BLD VENIPUNCTURE: CPT | Performed by: PATHOLOGY

## 2021-07-19 PROCEDURE — 80061 LIPID PANEL: CPT | Performed by: PATHOLOGY

## 2021-07-19 PROCEDURE — T1013 SIGN LANG/ORAL INTERPRETER: HCPCS | Mod: U3

## 2021-07-19 PROCEDURE — 83036 HEMOGLOBIN GLYCOSYLATED A1C: CPT | Performed by: PATHOLOGY

## 2021-07-19 RX ORDER — INSULIN HUMAN 100 [IU]/ML
50 INJECTION, SUSPENSION SUBCUTANEOUS
Qty: 50 ML | Refills: 3 | Status: SHIPPED | OUTPATIENT
Start: 2021-07-19 | End: 2021-07-22

## 2021-07-19 ASSESSMENT — PAIN SCALES - GENERAL: PAINLEVEL: NO PAIN (0)

## 2021-07-19 ASSESSMENT — MIFFLIN-ST. JEOR: SCORE: 1379.61

## 2021-07-19 NOTE — PROGRESS NOTES
dm 2  Jeniffer Salazar, [facility]      HPI:   Nayeli is a 40 yo woman here with a  for follow up of type 2 diabetes since the early 2000's.  She usually sees Dr. Bragg, last visit was in April, 2021.  I last saw her in 2019.  She started on insulin in 2003 after failing Metformin treatment.  She has struggled with high blood sugars for many years.   She has been working on more walking and eating less carbohydrates.  She feels she is doing better.     At her last visit, Dr. Bragg simplified her insulin regimen as follows:     Continue empagliflozin 25 mg daily.   Increase the dose of Humulin N to 40 units- She reports that she never misses it.   Basaglar- 40 units every AM.  Never misses it.   Can administer both Basaglar and Humulin at the same time in the morning, preferably 30 minutes prior to breakfast.  She has been taking it at the same time, which has been easier for her.     She is no longer on Metformin as even the low dose caused intense diarrhea to the point where she could not go out of her home.     She did not bring her meter for download.  She stopped at the lab this morning, but her a1c was not back for our appointment.  She reports glucose values in the 160's most of the time.  She had a blood sugar of 66, 70 a few times in the morning.  This is rare.   She thinks her glucose goes up after she eats.      Diabetes complications:  Retinopathy: last eye exam - 3/21. 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  Nephropathy: h/o proteinuria; normal GFR. Now on 50mg losartan daily (tx with lisinopril was associated with a dry cough).   Neuropathy: No numbness or tingling sensation in her feet.     Typical diet: Eggs, sandwich, cereal, fruit, salad, vegetables.  She is avoiding processed foods, cutting these out.  Coffee with sweetened creamer. This is hard for her to give up.     She has no other concerns today.       Past Medical History:   Diagnosis Date     Combined visual and hearing impairment       Deaf      Diabetic retinopathy of both eyes (H) 8/19/2011     Hepatic steatosis 08/19/2011     History of tobacco use      Hyperlipidemia      Hypertension      Hypertriglyceridemia      Migraines      Obesity 8/19/2011     Problem list name updated by automated process. 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  Ganglion cyst excision.;  Surgeon: Greg Streeter MD;  Location[facility] OR       Family History   Problem Relation Age of Onset     Unknown/Adopted Other        Social History     Social Hx: She is adopted.  Works for [employer].   Social History     Marital status: Single     Spouse name: N/A     Number of children: N/A     Years of education: N/A     Social History Main Topics     Smoking status: Former Smoker     Types: Cigarettes     Quit date: 12/1/2010     Smokeless tobacco: Never Used      Comment: stopped 2 yrs ago     Alcohol use No     Drug use: No     Sexual activity: Not Currently     Partners: Male     Other Topics Concern      Service No     Blood Transfusions No     Caffeine Concern No     Occupational Exposure No     Hobby Hazards No     Sleep Concern No     Stress Concern No     Weight Concern Yes     Special Diet No     Back Care No     Exercise Yes     4-5 x a week     Bike Helmet No     Seat Belt Yes     Self-Exams Yes     Social History Narrative  "  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     Current Outpatient Medications   Medication     acetaminophen (TYLENOL) 500 MG tablet     Alcohol Swabs (ALCOHOL PREP PAD) 70 % PADS     aspirin (ASA) 81 MG chewable tablet     atorvastatin (LIPITOR) 40 MG tablet     B-D U/F insulin pen needle     BD ULTRA FINE PEN NEEDLES     blood glucose (ACCU-CHEK LAYA PLUS) test strip     blood glucose monitoring (ACCU-CHEK FASTCLIX) lancets     cetirizine (ZYRTEC) 10 MG tablet     empagliflozin (JARDIANCE) 25 MG TABS tablet     ergocalciferol (ERGOCALCIFEROL) 1.25 MG (52057 UT) capsule     fenofibrate (TRIGLIDE/LOFIBRA) 160 MG tablet     fish oil-omega-3 fatty acids 1000 MG capsule     ibuprofen (ADVIL/MOTRIN) 600 MG tablet     insulin glargine (BASAGLAR KWIKPEN) 100 UNIT/ML pen     insulin NPH (HUMULIN N KWIKPEN) 100 UNIT/ML injection     insulin pen needle (B-D U/F) 31G X 8 MM miscellaneous     levonorgestrel (MIRENA) 20 MCG/24HR IUD     losartan (COZAAR) 50 MG tablet     naproxen (NAPROSYN) 375 MG tablet     omega 3 1000 MG CAPS     Ostomy Supplies (ADHESIVE REMOVER WIPES) MISC     Ostomy Supplies (SKIN TAC ADHESIVE BARRIER WIPE) MISC     triamcinolone (KENALOG) 0.1 % external cream     Current Facility-Administered Medications   Medication     hydrocortisone (CORTAID) 1 % cream        No Known Allergies    Physical Exam  /67   Pulse 86   Ht 1.562 m (5' 1.5\")   Wt 76.9 kg (169 lb 9.6 oz)   BMI 31.53 kg/m    GENERAL:  Alert and oriented X3, NAD, well dressed, answering questions appropriately, appears stated age.  HEENT: OP clear, no lymphadenopathy, no thyromegaly, non-tender, no exophthalmus, no proptosis, EOMI, no lid lag, no retraction  EXTREMITIES: no edema, +pulses, no rashes, no lesions  NEUROLOGY: + monofilament, +vibratory sensation, + DTR upper and lower extremity  RESULTS  Lab Results   Component Value Date    A1C 10.2 (H) 07/19/2021    A1C 11.6 (H) 04/05/2021    A1C 12.4 (H) 10/29/2020    A1C " 12.9 (H) 07/08/2020    A1C 8.2 (H) 05/02/2019    A1C 10.3 (H) 10/15/2018    HEMOGLOBINA1 10.0 (A) 01/13/2020    HEMOGLOBINA1 9.9 (A) 10/07/2019    HEMOGLOBINA1 8.1 (A) 04/22/2019    HEMOGLOBINA1 10.4 (A) 01/14/2019    HEMOGLOBINA1 8.7 (A) 03/12/2018       TSH   Date Value Ref Range Status   07/08/2020 1.34 0.40 - 4.00 mU/L Final   05/17/2018 1.84 0.40 - 4.00 mU/L Final   11/27/2017 1.92 0.40 - 4.00 mU/L Final   05/11/2016 1.85 0.40 - 4.00 mU/L Final   02/11/2016 1.14 0.40 - 4.00 mU/L Final       ALT   Date Value Ref Range Status   07/08/2020 29 0 - 50 U/L Final   05/02/2019 43 0 - 50 U/L Final   ]    Recent Labs   Lab Test 07/19/21  0626 07/08/20  1623 09/16/13  0753   CHOL 194 179 197   HDL 43* 41* 34*   * 72 119   TRIG 133 331* 221*   CHOLHDLRATIO  --   --  5.8*       Lab Results   Component Value Date     07/08/2020      Lab Results   Component Value Date    POTASSIUM 4.0 07/08/2020     Lab Results   Component Value Date    CHLORIDE 101 07/08/2020     Lab Results   Component Value Date    VERO 8.6 07/08/2020     Lab Results   Component Value Date    CO2 30 07/08/2020     Lab Results   Component Value Date    BUN 13 07/08/2020     Lab Results   Component Value Date    CR 0.74 07/08/2020       GFR Estimate   Date Value Ref Range Status   07/08/2020 >90 >60 mL/min/[1.73_m2] Final     Comment:     Non  GFR Calc  Starting 12/18/2018, serum creatinine based estimated GFR (eGFR) will be   calculated using the Chronic Kidney Disease Epidemiology Collaboration   (CKD-EPI) equation.     05/02/2019 >90 >60 mL/min/[1.73_m2] Final     Comment:     Non  GFR Calc  Starting 12/18/2018, serum creatinine based estimated GFR (eGFR) will be   calculated using the Chronic Kidney Disease Epidemiology Collaboration   (CKD-EPI) equation.     05/17/2018 >90 >60 mL/min/1.7m2 Final     Comment:     Non  GFR Calc     GFR Estimate If Black   Date Value Ref Range Status   07/08/2020  >90 >60 mL/min/[1.73_m2] Final     Comment:      GFR Calc  Starting 12/18/2018, serum creatinine based estimated GFR (eGFR) will be   calculated using the Chronic Kidney Disease Epidemiology Collaboration   (CKD-EPI) equation.     05/02/2019 >90 >60 mL/min/[1.73_m2] Final     Comment:      GFR Calc  Starting 12/18/2018, serum creatinine based estimated GFR (eGFR) will be   calculated using the Chronic Kidney Disease Epidemiology Collaboration   (CKD-EPI) equation.     05/17/2018 >90 >60 mL/min/1.7m2 Final     Comment:      GFR Calc       Lab Results   Component Value Date    MICROL 31 07/20/2020     No results found for: MICROALBUMIN  Lab Results   Component Value Date    CPEPT 1.3 03/25/2005       No results found for: B12]    Most recent eye exam date: : Not Found     Assessment/Plan:     1.  Type 2 diabetes-  A1c is very high at 10.2%.  She is taking her insulin regularly every day, but I suspect she is going quite high post-prandially.  She agrees.  We spent much time discussing new technology, which might be helpful for her such as the In-pen bluetooth insulin pen for dosing calculations (I suspect she needs to resume Novolog for prandial coverage) and Dexcom G6 sensor for easier glucose monitoring.  Nayeli had skin irritation with mian in the past and she is concerned about the cost of dexcom.  She agreed to schedule with Leigh Ann Escoto in diabetes education to wear a Dexcom diagnostic sensor as a trial, and to also learn about the In-pen.  She is interested in this.  In the meantime, we will increase her Humulin to 50 units every morning.  She may do well in the future on an Omnipod pump, however cost may be prohibitive.  Could discuss in the future.  GLP-1 agonist would be helpful, but is not affordable.     2.  Risk factors-     Retinopathy:  No.  Had eye exam within last year.   Nephropathy:  BP well controlled. No microalbuminuria.  Creatinine stable.    Neuropathy: No.    Feet: OK, no ulcers.   Taking ASA: yes  Lipids:  LDL at target.  Patient taking statin    3.  F/U in 3 months with me, in 6 months with Dr. Bragg, in the next 2-3 weeks in diabetes education.  We will follow with her closely, however she does strongly prefer in-person appointments.      49 minutes spent on the date of the encounter doing chart review, review of test results, patient visit and documentation, counseling/coordination of care, and discussion of follow up plan for worsening hyper and hypoglycemia.  The patient understood and is satisfied with today's visit.        Anne Marie Medina PA-C, MPAS   [facility]  Department of Medicine

## 2021-07-19 NOTE — PATIENT INSTRUCTIONS
Meet with diabetes educator to discuss Dexcom sensor trial and In-pen.      Your a1c came back very high at 10.2%.  I suspect this is because your glucose is climbing after you eat.      Please increase Humulin to 50 units every morning.  Please continue basaglar 40 units in the AM.                  We appreciate your assistance in coordinating your healthcare.     Please upload your insulin pump, blood sugar meter and/or continuous glucose monitor at home 1-2 days before your next diabetes-related appointment.   This will allow your provider to review your  data before your scheduled virtual visit.    To ask a question to your Endocrine care team, please send them a [employer] [employer] message, or reach them by phone at [phone]     To expedite your medication refill(s), please contact your pharmacy and have them   fax a refill request to: [phone].  *Please allow 3 business days for routine medication refills.  *Please allow 5 business days for controlled substance medication refills.    For after-hours urgent Endocrine issues, that do not require 911, please dial [phone], and ask to speak with the Endocrinologist On-Call

## 2021-07-19 NOTE — Clinical Note
Please schedule in 6 months with Dr. Bragg in person (in addition to visit with me in November). Also, please schedule in diabetes education. Thanks!  Anne Marie

## 2021-07-19 NOTE — LETTER
7/19/2021       RE: Nayeli Caal  [address]  [facility] [address]     Dear Colleague,    Thank you for referring your patient, Nayeli Caal, to the [facility] ENDOCRINOLOGY CLINIC [place] at [facility]. Please see a copy of my visit note below.    dm 2  Jeniffer Salazar, [facility]      HPI:   Nayeli is a 40 yo woman here with a  for follow up of type 2 diabetes since the early 2000's.  She usually sees Dr. Bragg, last visit was in April, 2021.  I last saw her in 2019.  She started on insulin in 2003 after failing Metformin treatment.  She has struggled with high blood sugars for many years.   She has been working on more walking and eating less carbohydrates.  She feels she is doing better.     At her last visit, Dr. Bragg simplified her insulin regimen as follows:     Continue empagliflozin 25 mg daily.   Increase the dose of Humulin N to 40 units- She reports that she never misses it.   Basaglar- 40 units every AM.  Never misses it.   Can administer both Basaglar and Humulin at the same time in the morning, preferably 30 minutes prior to breakfast.  She has been taking it at the same time, which has been easier for her.     She is no longer on Metformin as even the low dose caused intense diarrhea to the point where she could not go out of her home.     She did not bring her meter for download.  She stopped at the lab this morning, but her a1c was not back for our appointment.  She reports glucose values in the 160's most of the time.  She had a blood sugar of 66, 70 a few times in the morning.  This is rare.   She thinks her glucose goes up after she eats.      Diabetes complications:  Retinopathy: last eye exam - 3/21. 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  Nephropathy: h/o proteinuria; normal GFR. Now on 50mg losartan daily (tx with lisinopril was associated with a dry cough).   Neuropathy: No numbness or tingling  sensation in her feet.     Typical diet: Eggs, sandwich, cereal, fruit, salad, vegetables.  She is avoiding processed foods, cutting these out.  Coffee with sweetened creamer. This is hard for her to give up.     She has no other concerns today.       Past Medical History:   Diagnosis Date     Combined visual and hearing impairment      Deaf      Diabetic retinopathy of both eyes (H) 8/19/2011     Hepatic steatosis 08/19/2011     History of tobacco use      Hyperlipidemia      Hypertension      Hypertriglyceridemia      Migraines      Obesity 8/19/2011     Problem list name updated by automated process. 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  Ganglion cyst excision.;  Surgeon: Greg Streeter MD;  Location[facility] OR       Family History   Problem Relation Age of Onset     Unknown/Adopted Other        Social History     Social Hx: She is adopted.  Works for [employer].   Social History     Marital status: Single     Spouse name: N/A     Number of children: N/A     Years of education: N/A     Social History Main Topics     Smoking status: Former Smoker     Types: Cigarettes     Quit date: 12/1/2010     Smokeless tobacco: Never Used      Comment: stopped 2 yrs ago     Alcohol use No     Drug use: No     Sexual activity: Not Currently     Partners: Male     Other Topics Concern      " Service No     Blood Transfusions No     Caffeine Concern No     Occupational Exposure No     Hobby Hazards No     Sleep Concern No     Stress Concern No     Weight Concern Yes     Special Diet No     Back Care No     Exercise Yes     4-5 x a week     Bike Helmet No     Seat Belt Yes     Self-Exams Yes     Social History Narrative   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     Current Outpatient Medications   Medication     acetaminophen (TYLENOL) 500 MG tablet     Alcohol Swabs (ALCOHOL PREP PAD) 70 % PADS     aspirin (ASA) 81 MG chewable tablet     atorvastatin (LIPITOR) 40 MG tablet     B-D U/F insulin pen needle     BD ULTRA FINE PEN NEEDLES     blood glucose (ACCU-CHEK LAYA PLUS) test strip     blood glucose monitoring (ACCU-CHEK FASTCLIX) lancets     cetirizine (ZYRTEC) 10 MG tablet     empagliflozin (JARDIANCE) 25 MG TABS tablet     ergocalciferol (ERGOCALCIFEROL) 1.25 MG (73267 UT) capsule     fenofibrate (TRIGLIDE/LOFIBRA) 160 MG tablet     fish oil-omega-3 fatty acids 1000 MG capsule     ibuprofen (ADVIL/MOTRIN) 600 MG tablet     insulin glargine (BASAGLAR KWIKPEN) 100 UNIT/ML pen     insulin NPH (HUMULIN N KWIKPEN) 100 UNIT/ML injection     insulin pen needle (B-D U/F) 31G X 8 MM miscellaneous     levonorgestrel (MIRENA) 20 MCG/24HR IUD     losartan (COZAAR) 50 MG tablet     naproxen (NAPROSYN) 375 MG tablet     omega 3 1000 MG CAPS     Ostomy Supplies (ADHESIVE REMOVER WIPES) MISC     Ostomy Supplies (SKIN TAC ADHESIVE BARRIER WIPE) MISC     triamcinolone (KENALOG) 0.1 % external cream     Current Facility-Administered Medications   Medication     hydrocortisone (CORTAID) 1 % cream        No Known Allergies    Physical Exam  /67   Pulse 86   Ht 1.562 m (5' 1.5\")   Wt 76.9 kg (169 lb 9.6 oz)   BMI 31.53 kg/m    GENERAL:  Alert and oriented X3, NAD, well dressed, answering questions appropriately, appears stated age.  HEENT: OP clear, no lymphadenopathy, no thyromegaly, " non-tender, no exophthalmus, no proptosis, EOMI, no lid lag, no retraction  EXTREMITIES: no edema, +pulses, no rashes, no lesions  NEUROLOGY: + monofilament, +vibratory sensation, + DTR upper and lower extremity  RESULTS  Lab Results   Component Value Date    A1C 10.2 (H) 07/19/2021    A1C 11.6 (H) 04/05/2021    A1C 12.4 (H) 10/29/2020    A1C 12.9 (H) 07/08/2020    A1C 8.2 (H) 05/02/2019    A1C 10.3 (H) 10/15/2018    HEMOGLOBINA1 10.0 (A) 01/13/2020    HEMOGLOBINA1 9.9 (A) 10/07/2019    HEMOGLOBINA1 8.1 (A) 04/22/2019    HEMOGLOBINA1 10.4 (A) 01/14/2019    HEMOGLOBINA1 8.7 (A) 03/12/2018       TSH   Date Value Ref Range Status   07/08/2020 1.34 0.40 - 4.00 mU/L Final   05/17/2018 1.84 0.40 - 4.00 mU/L Final   11/27/2017 1.92 0.40 - 4.00 mU/L Final   05/11/2016 1.85 0.40 - 4.00 mU/L Final   02/11/2016 1.14 0.40 - 4.00 mU/L Final       ALT   Date Value Ref Range Status   07/08/2020 29 0 - 50 U/L Final   05/02/2019 43 0 - 50 U/L Final   ]    Recent Labs   Lab Test 07/19/21  0626 07/08/20  1623 09/16/13  0753   CHOL 194 179 197   HDL 43* 41* 34*   * 72 119   TRIG 133 331* 221*   CHOLHDLRATIO  --   --  5.8*       Lab Results   Component Value Date     07/08/2020      Lab Results   Component Value Date    POTASSIUM 4.0 07/08/2020     Lab Results   Component Value Date    CHLORIDE 101 07/08/2020     Lab Results   Component Value Date    VERO 8.6 07/08/2020     Lab Results   Component Value Date    CO2 30 07/08/2020     Lab Results   Component Value Date    BUN 13 07/08/2020     Lab Results   Component Value Date    CR 0.74 07/08/2020       GFR Estimate   Date Value Ref Range Status   07/08/2020 >90 >60 mL/min/[1.73_m2] Final     Comment:     Non  GFR Calc  Starting 12/18/2018, serum creatinine based estimated GFR (eGFR) will be   calculated using the Chronic Kidney Disease Epidemiology Collaboration   (CKD-EPI) equation.     05/02/2019 >90 >60 mL/min/[1.73_m2] Final     Comment:     Non   American GFR Calc  Starting 12/18/2018, serum creatinine based estimated GFR (eGFR) will be   calculated using the Chronic Kidney Disease Epidemiology Collaboration   (CKD-EPI) equation.     05/17/2018 >90 >60 mL/min/1.7m2 Final     Comment:     Non  GFR Calc     GFR Estimate If Black   Date Value Ref Range Status   07/08/2020 >90 >60 mL/min/[1.73_m2] Final     Comment:      GFR Calc  Starting 12/18/2018, serum creatinine based estimated GFR (eGFR) will be   calculated using the Chronic Kidney Disease Epidemiology Collaboration   (CKD-EPI) equation.     05/02/2019 >90 >60 mL/min/[1.73_m2] Final     Comment:      GFR Calc  Starting 12/18/2018, serum creatinine based estimated GFR (eGFR) will be   calculated using the Chronic Kidney Disease Epidemiology Collaboration   (CKD-EPI) equation.     05/17/2018 >90 >60 mL/min/1.7m2 Final     Comment:      GFR Calc       Lab Results   Component Value Date    MICROL 31 07/20/2020     No results found for: MICROALBUMIN  Lab Results   Component Value Date    CPEPT 1.3 03/25/2005       No results found for: B12]    Most recent eye exam date: : Not Found     Assessment/Plan:     1.  Type 2 diabetes-  A1c is very high at 10.2%.  She is taking her insulin regularly every day, but I suspect she is going quite high post-prandially.  She agrees.  We spent much time discussing new technology, which might be helpful for her such as the In-pen bluetooth insulin pen for dosing calculations (I suspect she needs to resume Novolog for prandial coverage) and Dexcom G6 sensor for easier glucose monitoring.  Nayeli had skin irritation with mian in the past and she is concerned about the cost of dexcom.  She agreed to schedule with Leigh Ann Escoto in diabetes education to wear a Dexcom diagnostic sensor as a trial, and to also learn about the In-pen.  She is interested in this.  In the meantime, we will increase her Humulin to 50 units  every morning.  She may do well in the future on an Omnipod pump, however cost may be prohibitive.  Could discuss in the future.  GLP-1 agonist would be helpful, but is not affordable.     2.  Risk factors-     Retinopathy:  No.  Had eye exam within last year.   Nephropathy:  BP well controlled. No microalbuminuria.  Creatinine stable.   Neuropathy: No.    Feet: OK, no ulcers.   Taking ASA: yes  Lipids:  LDL at target.  Patient taking statin    3.  F/U in 3 months with me, in 6 months with Dr. Bragg, in the next 2-3 weeks in diabetes education.  We will follow with her closely, however she does strongly prefer in-person appointments.      49 minutes spent on the date of the encounter doing chart review, review of test results, patient visit and documentation, counseling/coordination of care, and discussion of follow up plan for worsening hyper and hypoglycemia.  The patient understood and is satisfied with today's visit.        Anne Marie Medina PA-C, MPAS   [facility]  Department of Medicine

## 2021-07-22 ENCOUNTER — LAB (OUTPATIENT)
Dept: LAB | Facility: CLINIC | Age: 41
End: 2021-07-22
Payer: COMMERCIAL

## 2021-07-22 ENCOUNTER — OFFICE VISIT (OUTPATIENT)
Dept: INTERNAL MEDICINE | Facility: CLINIC | Age: 41
End: 2021-07-22
Payer: COMMERCIAL

## 2021-07-22 VITALS
WEIGHT: 170 LBS | HEART RATE: 74 BPM | SYSTOLIC BLOOD PRESSURE: 112 MMHG | OXYGEN SATURATION: 98 % | BODY MASS INDEX: 31.6 KG/M2 | DIASTOLIC BLOOD PRESSURE: 75 MMHG

## 2021-07-22 DIAGNOSIS — B35.3 TINEA PEDIS OF RIGHT FOOT: ICD-10-CM

## 2021-07-22 DIAGNOSIS — Z79.4 TYPE 2 DIABETES MELLITUS WITH COMPLICATION, WITH LONG-TERM CURRENT USE OF INSULIN (H): Primary | ICD-10-CM

## 2021-07-22 DIAGNOSIS — E10.8 TYPE 1 DIABETES MELLITUS WITH COMPLICATIONS (H): ICD-10-CM

## 2021-07-22 DIAGNOSIS — M54.50 CHRONIC BILATERAL LOW BACK PAIN WITHOUT SCIATICA: ICD-10-CM

## 2021-07-22 DIAGNOSIS — E11.65 UNCONTROLLED TYPE 2 DIABETES MELLITUS WITH HYPERGLYCEMIA, WITH LONG-TERM CURRENT USE OF INSULIN (H): ICD-10-CM

## 2021-07-22 DIAGNOSIS — Z79.4 TYPE 2 DIABETES MELLITUS WITH COMPLICATION, WITH LONG-TERM CURRENT USE OF INSULIN (H): ICD-10-CM

## 2021-07-22 DIAGNOSIS — Z79.4 UNCONTROLLED TYPE 2 DIABETES MELLITUS WITH HYPERGLYCEMIA, WITH LONG-TERM CURRENT USE OF INSULIN (H): ICD-10-CM

## 2021-07-22 DIAGNOSIS — L98.9 SKIN LESION OF FOOT: ICD-10-CM

## 2021-07-22 DIAGNOSIS — Z79.4 TYPE 2 DIABETES MELLITUS WITH HYPERGLYCEMIA, WITH LONG-TERM CURRENT USE OF INSULIN (H): ICD-10-CM

## 2021-07-22 DIAGNOSIS — E11.8 TYPE 2 DIABETES MELLITUS WITH COMPLICATION, WITH LONG-TERM CURRENT USE OF INSULIN (H): Primary | ICD-10-CM

## 2021-07-22 DIAGNOSIS — E11.8 TYPE 2 DIABETES MELLITUS WITH COMPLICATION, WITH LONG-TERM CURRENT USE OF INSULIN (H): ICD-10-CM

## 2021-07-22 DIAGNOSIS — E78.2 MIXED HYPERLIPIDEMIA: ICD-10-CM

## 2021-07-22 DIAGNOSIS — G89.29 CHRONIC BILATERAL LOW BACK PAIN WITHOUT SCIATICA: ICD-10-CM

## 2021-07-22 DIAGNOSIS — E11.65 TYPE 2 DIABETES MELLITUS WITH HYPERGLYCEMIA, WITH LONG-TERM CURRENT USE OF INSULIN (H): ICD-10-CM

## 2021-07-22 DIAGNOSIS — B35.3 TINEA PEDIS OF BOTH FEET: ICD-10-CM

## 2021-07-22 LAB
ANION GAP SERPL CALCULATED.3IONS-SCNC: 5 MMOL/L (ref 3–14)
BUN SERPL-MCNC: 17 MG/DL (ref 7–30)
CALCIUM SERPL-MCNC: 8.9 MG/DL (ref 8.5–10.1)
CHLORIDE BLD-SCNC: 105 MMOL/L (ref 94–109)
CO2 SERPL-SCNC: 28 MMOL/L (ref 20–32)
CREAT SERPL-MCNC: 0.72 MG/DL (ref 0.52–1.04)
CREAT UR-MCNC: 18 MG/DL
GFR SERPL CREATININE-BSD FRML MDRD: >90 ML/MIN/1.73M2
GLUCOSE BLD-MCNC: 283 MG/DL (ref 70–99)
POTASSIUM BLD-SCNC: 4.4 MMOL/L (ref 3.4–5.3)
PROT UR-MCNC: <0.05 G/L
PROT/CREAT 24H UR: NORMAL MG/G{CREAT}
SODIUM SERPL-SCNC: 138 MMOL/L (ref 133–144)

## 2021-07-22 PROCEDURE — 36415 COLL VENOUS BLD VENIPUNCTURE: CPT | Performed by: PATHOLOGY

## 2021-07-22 PROCEDURE — 80048 BASIC METABOLIC PNL TOTAL CA: CPT | Performed by: PATHOLOGY

## 2021-07-22 PROCEDURE — 99396 PREV VISIT EST AGE 40-64: CPT | Performed by: NURSE PRACTITIONER

## 2021-07-22 RX ORDER — INSULIN HUMAN 100 [IU]/ML
50 INJECTION, SUSPENSION SUBCUTANEOUS
Qty: 50 ML | Refills: 3 | Status: SHIPPED | OUTPATIENT
Start: 2021-07-22 | End: 2021-09-03

## 2021-07-22 RX ORDER — PRENATAL VIT 91/IRON/FOLIC/DHA 28-975-200
COMBINATION PACKAGE (EA) ORAL AT BEDTIME
Qty: 36 G | Refills: 1 | Status: SHIPPED | OUTPATIENT
Start: 2021-07-22 | End: 2022-02-08

## 2021-07-22 RX ORDER — INSULIN HUMAN 100 [IU]/ML
50 INJECTION, SUSPENSION SUBCUTANEOUS
Qty: 50 ML | Refills: 3 | Status: SHIPPED | OUTPATIENT
Start: 2021-07-22 | End: 2021-07-22

## 2021-07-22 RX ORDER — ATORVASTATIN CALCIUM 40 MG/1
40 TABLET, FILM COATED ORAL DAILY
Qty: 90 TABLET | Refills: 2 | Status: SHIPPED | OUTPATIENT
Start: 2021-07-22 | End: 2023-02-16

## 2021-07-22 RX ORDER — FENOFIBRATE 160 MG/1
160 TABLET ORAL DAILY
Qty: 90 TABLET | Refills: 3 | Status: SHIPPED | OUTPATIENT
Start: 2021-07-22 | End: 2021-12-10

## 2021-07-22 RX ORDER — LOSARTAN POTASSIUM 50 MG/1
50 TABLET ORAL DAILY
Qty: 90 TABLET | Refills: 3 | Status: SHIPPED | OUTPATIENT
Start: 2021-07-22 | End: 2022-03-10

## 2021-07-22 RX ORDER — LOSARTAN POTASSIUM 50 MG/1
50 TABLET ORAL DAILY
Qty: 90 TABLET | Refills: 3 | Status: SHIPPED | OUTPATIENT
Start: 2021-07-22 | End: 2021-07-22

## 2021-07-22 RX ORDER — ATORVASTATIN CALCIUM 40 MG/1
40 TABLET, FILM COATED ORAL DAILY
Qty: 90 TABLET | Refills: 2 | Status: SHIPPED | OUTPATIENT
Start: 2021-07-22 | End: 2021-07-22

## 2021-07-22 ASSESSMENT — PAIN SCALES - GENERAL: PAINLEVEL: NO PAIN (0)

## 2021-07-23 NOTE — PROGRESS NOTES
S:  Nayeli is seen today with the assistance of a virtual .  Nayeli was seen by Dr. Medina in [facility] on 7/19/21.  Her understanding is to have some labs drawn and to see the Diabetic Educator and then increase her Humulin to 50 units/day.    She has been working alot and has not been exercising. Her living situation is improved in that she bought a townhouse and has one roommate with whom she gets along with.     He has had a painful left shoulder for 2 months.  Not sure how it became sore. She denies any activity such as throwing objects of swinging sports equipment.    She has also had pain in the right posterior neck which radiates down the right trapezius.       Patient Active Problem List   Diagnosis     Essential hypertension     Hypersomnia, organic     Other chronic nonalcoholic liver disease     Diabetic retinopathy of both eyes (H)     Obesity     Usher Syndrome: congenital deafness, retinitis pigmentosa     Macular degeneration, age related, nonexudative     Benign neoplasm of iris     Dry eye syndrome     Pemphigus erythematosus     Chondromalacia of patella, right, steroid injection 6/12/2015     Uncontrolled type 2 diabetes mellitus with hyperglycemia, with long-term current use of insulin (H)            Past Medical History:   Diagnosis Date     Combined visual and hearing impairment      Deaf      Diabetic retinopathy of both eyes (H) 8/19/2011     Hepatic steatosis 08/19/2011     History of tobacco use      Hyperlipidemia      Hypertension      Hypertriglyceridemia      Migraines      Obesity 8/19/2011     Problem list name updated by automated process. 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            Past Surgical History:   Procedure Laterality Date     LAPAROSCOPIC CHOLECYSTECTOMY N/A 3/10/2018    Procedure:  LAPAROSCOPIC CHOLECYSTECTOMY;  Laparoscopic Cholecystectomy ;  Surgeon: Kuldeep Sigala MD;  Location: [facility] OR     RELEASE TRIGGER FINGER Right 5/2/2019    Procedure: Right Thumb Trigger Release;  Surgeon: Greg Streeter MD;  Location: [facility] OR     RELEASE TRIGGER FINGER Left 5/30/2019    Procedure: Left Ring Trigger Finger Release.  Ganglion cyst excision.;  Surgeon: Greg Streeter MD;  Location: [facility] OR            Social History     Tobacco Use     Smoking status: Former Smoker     Types: Cigarettes     Quit date: 12/1/2010     Years since quitting: 10.6     Smokeless tobacco: Never Used     Tobacco comment: stopped 10 yrs ago   Substance Use Topics     Alcohol use: Yes     Comment: socially            Family History   Problem Relation Age of Onset     Unknown/Adopted Other              No Known Allergies         Current Outpatient Medications   Medication Sig Dispense Refill     acetaminophen (TYLENOL) 500 MG tablet Take 2 tablets (1,000 mg) by mouth every 6 hours as needed for mild pain 100 tablet 3     Alcohol Swabs (ALCOHOL PREP PAD) 70 % PADS 1 each 3 times daily 100 each 3     aspirin (ASA) 81 MG chewable tablet Take 1 tablet (81 mg) by mouth daily CHEW AND SWALLOW 90 tablet 3     atorvastatin (LIPITOR) 40 MG tablet Take 1 tablet (40 mg) by mouth daily 90 tablet 2     B-D U/F insulin pen needle        BD ULTRA FINE PEN NEEDLES Inject 1 dose. Subcutaneous 2 times daily BD ultra-fine insulin syringe, 30 ga. X 1/2'' short needle 1/2 cc. Use as directed. 400 Units 11     blood glucose (ACCU-CHEK LAYA PLUS) test strip Use with  Accucheck Expert meter.  Test blood sugar 6 times daily. 600 each 3     blood glucose monitoring (ACCU-CHEK FASTCLIX) lancets Use to test blood sugar 6 times daily or as directed 6 Box 3     cetirizine (ZYRTEC) 10 MG tablet Take 1 tablet (10 mg) by mouth daily 30 tablet 1     empagliflozin (JARDIANCE) 25 MG TABS tablet Take 1 tablet (25 mg) by mouth daily 90 tablet 3      ergocalciferol (ERGOCALCIFEROL) 1.25 MG (92806 UT) capsule Take 1 capsule (50,000 Units) by mouth once a week For additional refills, please schedule a follow-up appointment at [phone] 12 capsule 3     fenofibrate (TRIGLIDE/LOFIBRA) 160 MG tablet Take 1 tablet (160 mg) by mouth daily 90 tablet 3     fish oil-omega-3 fatty acids 1000 MG capsule TAKE TWO CAPSULES (2 GM) BY MOUTH ONCE DAILY 180 capsule 3     ibuprofen (ADVIL/MOTRIN) 600 MG tablet Take 1 tablet (600 mg) by mouth every 6 hours as needed for moderate pain 120 tablet 1     insulin NPH (HUMULIN N KWIKPEN) 100 UNIT/ML injection Inject 40 Units Subcutaneous every morning (before breakfast) 50 mL 3     insulin pen needle (B-D U/F) 31G X 8 MM miscellaneous USE AS DIRECTED.  Please keep appt 12/21/20. 200 each 1     losartan (COZAAR) 50 MG tablet Take 1 tablet (50 mg) by mouth daily 90 tablet 3     naproxen (NAPROSYN) 375 MG tablet Take 1 tablet (375 mg) by mouth 2 times daily (with meals) 60 tablet 3     omega 3 1000 MG CAPS Take 2 g by mouth daily 180 capsule 3     Ostomy Supplies (ADHESIVE REMOVER WIPES) MISC 1 each every 14 days 50 each 3     Ostomy Supplies (SKIN TAC ADHESIVE BARRIER WIPE) MISC 1 each every 14 days 6 each 3     terbinafine (LAMISIL) 1 % external cream Apply topically At Bedtime 36 g 1     triamcinolone (KENALOG) 0.1 % external cream Apply topically 3 times daily To affected areas 60 g 0     levonorgestrel (MIRENA) 20 MCG/24HR IUD 1 each (20 mcg) by Intrauterine route once for 1 dose 1 each 0        REVIEW OF SYSTEMS:    See above.      She has Opthalmology exams done at [facility].3/2021.    O:   /75 (BP Location: Right arm, Patient Position: Sitting, Cuff Size: Adult Regular)   Pulse 74   Wt 77.1 kg (170 lb)   SpO2 98%   BMI 31.60 kg/m    GENERAL APPEARANCE: healthy, alert and no distress  EYES: EOMI,  PERRL, conjunctiva pink.  HENT: ear canals and TM's normal  and mouth without ulcers or lesions  RESP: lungs clear  to auscultation - no rales, rhonchi or wheezes  CV: regular rates and rhythm, normal S1 S2, no S3 or S4 and no murmur, click or rub.   BREAST: no masses.  ABDOMEN:  soft, nontender, no HSM or masses and bowel sounds normal  NEURO: Grossly normal. Diabetic foot exam intact to light touch. She has maceration between the toes of the right feet toes two through four. Skin on the soles of her feet is peeling, cracked.   MUSK: left anterior shoulder pain to palpation over the rotator cuff. Limited ROM of the left arm.  Neck tenderness to palpation over the right trapezius insertion site, and trapezius.    SKIN: She has a dark skin lesion measuring 5 mm x 5 mm with uneven borders  .LYMPH: negative  EXT: warm.  Edema NO   PSYCHE: normal.    A/P:  Nayeli was seen today for recheck medication.    Diagnoses and all orders for this visit:    Type 2 diabetes mellitus with complication, with long-term current use of insulin (H)  -     Protein  random urine with Creat Ratio; Future    Uncontrolled type 2 diabetes mellitus with hyperglycemia, with long-term current use of insulin (H)  -     empagliflozin (JARDIANCE) 25 MG TABS tablet; Take 1 tablet (25 mg) by mouth daily    -     losartan (COZAAR) 50 MG tablet; Take 1 tablet (50 mg) by mouth daily        -     The note from Dr. Medina states the plan was to         increase Humalin insulin to 50 units before breakfast.      She is to follow-up with Diabetes Education and obtain        repeat labs.    Mixed hyperlipidemia  -     fenofibrate (TRIGLIDE/LOFIBRA) 160 MG tablet; Take 1 tablet (160 mg) by mouth daily    Type 1 diabetes mellitus with complications (H)  -     atorvastatin (LIPITOR) 40 MG tablet; Take 1 tablet (40 mg) by mouth daily    Skin lesion of foot  -     Adult Dermatology Referral for evaluation of left medial skin lesion.    Chronic bilateral low back pain without sciatica  -     naproxen (NAPROSYN) 375 MG tablet; Take 1 tablet (375 mg) by mouth 2 times daily (with  meals)    Tinea pedis of both feet  -     terbinafine (LAMISIL) 1 % external cream; Apply topically At Bedtime    Left rotator cuff pain  She declines a referral to P.T. but would like to try a home exercise program.    Right trapezius neck painShe declines a referral to P.T. but would like to try a home exercise program.    Total time spent today with this patient including chart review, exam time with patient and documentation :       Mariya CELESTIN, CNP         Stretching and range of motion exercises -- Range of motion exercises are recommended early in the recovery period to help maintain joint mobility and flexibility of the muscles and tendons in the shoulder. Stretching exercises should generally be performed once per day every day.  Exercises should not cause more than a mild level of pain. If you experience pain, decrease the intensity of the stretch or the number of repetitions; anyone who feels sharp or tearing pain should stop exercising immediately and consult with their health care provider      Posterior shoulder capsule stretch    With the elbow bent to 90 degrees, extend your shoulder until your forearm is parallel to the ground and directly in front of you. Using the opposite hand, grasp behind your elbow and gradually pull your arm across your body.  Courtesy of Fritz Kolb MS, PT and the [employer].       Shoulder External Rotation (Flexibility)    1. Lie on your back on a bed or the floor, with your arms at your side. Bend your arms at a 90-degree angle. Hold a stick or cane in front of you with both hands, palms down. Keep your upper arms close to your body.  2. Slowly push the stick or cane to the right with your left hand. Keep holding onto the stick or cane with both hands. Keep your elbows close to your body. Feel your right shoulder stretch. Stop at the point of discomfort. Hold for 5 seconds.  3. Slowly move your arms back. Relax.  4. Repeat on left side if  "instructed.  5. Repeat 5 times, or as instructed.  [employer] last reviewed this educational content on 2/1/2020 2000-2021 [employer]. All rights reserved. This information is not intended as a substitute for professional medical care. Always follow your healthcare professional's instructions.     Pendulum (Flexibility)    6. Lean over next to a table, with your left arm supporting your weight on the table.  7. Relax your right arm and let it hang straight down.  8. Slowly begin to swing your right arm in a small [place]. Gradually make the [place] bigger if you can. Change direction after 1 minute of motion.  9. Next, swing your right arm backward and forward. Then move it side to side. Change direction after 1 minute of motion.  10. Repeat these movements for about 5 minutes.  11. Switch sides and repeat if instructed.  12. Do this exercise 3 times a day, or as often as instructed.  [employer] last reviewed this educational content on 2/1/2020      Hold a dumbbell using a \"full can\" position. Then, over a 2 second count, gradually lift your arms in the plane of the scapula, keeping the elbows straight, until your arms are parallel to the ground. Hold for a second, and then slowly lower your arms to the starting position over a 4 second count.  "

## 2021-08-09 ENCOUNTER — ALLIED HEALTH/NURSE VISIT (OUTPATIENT)
Dept: EDUCATION SERVICES | Facility: CLINIC | Age: 41
End: 2021-08-09
Attending: PHYSICIAN ASSISTANT
Payer: COMMERCIAL

## 2021-08-09 VITALS — WEIGHT: 169.8 LBS | BODY MASS INDEX: 31.56 KG/M2

## 2021-08-09 DIAGNOSIS — E11.65 TYPE 2 DIABETES MELLITUS WITH HYPERGLYCEMIA, WITH LONG-TERM CURRENT USE OF INSULIN (H): ICD-10-CM

## 2021-08-09 DIAGNOSIS — Z79.4 TYPE 2 DIABETES MELLITUS WITH HYPERGLYCEMIA, WITH LONG-TERM CURRENT USE OF INSULIN (H): ICD-10-CM

## 2021-08-09 PROCEDURE — 99207 PR NO BILLABLE SERVICE THIS VISIT: CPT | Mod: U3

## 2021-08-09 PROCEDURE — G0108 DIAB MANAGE TRN  PER INDIV: HCPCS | Performed by: REGISTERED NURSE

## 2021-08-09 NOTE — PROGRESS NOTES
Diabetes Self-Management Education & Support    Nayeli Caal presents today for education related to Type 2 diabetes    Patient is being treated with:  insulin  She is accompanied by self    Nayeli is deaf and also has significant visual deficit.  She attends today with an .    PATIENT CONCERNS RELATED TO DIABETES SELF MANAGEMENT:     It was recommended that Nayeli attend CDE in order to see if the Dexcom CGM causes problems with her skin, which is the reason she can't wear the Evelia sensor.  She is currently not using any mealtime insulin, but is taking NPH insulin in addition to her Lantus insulin in the morning.  Her A1C has dropped from 11.6% to 10.2% but her weight has increased from 156 lbs to 169 lbs over the past six months.        ASSESSMENT:    Taking Medication:     Current Diabetes Management per Patient:  Taking diabetes medications?   yes:     Diabetes Medication(s)     Insulin       insulin NPH (HUMULIN N KWIKPEN) 100 UNIT/ML injection    Inject 50 Units Subcutaneous every morning (before breakfast)    Sodium-Glucose Co-Transporter 2 (SGLT2) Inhibitors       empagliflozin (JARDIANCE) 25 MG TABS tablet    Take 1 tablet (25 mg) by mouth daily          Monitoring    Patient glucose self monitoring as follows: four times daily  BG meter:  Accucheck Expert meter.       Patient's most recent   Lab Results   Component Value Date    A1C 10.2 07/19/2021    A1C 11.6 04/05/2021      Patient's A1C goal: <7.0     EDUCATION and INSTRUCTION PROVIDED AT THIS VISIT:       We attempted to have her download the Dexcom G6 yamileth on her phone so that we can have her try the CGM, however for some reason, we were not able to do this.  She has a very new I-phone and we received a message when trying to download it that the yamileth had not been tested on this phone, and an attempt to move forward with it was unsuccessful.  We went ahead and put a sensor on her with instructions to leave it on for 10 days so we  can determine if she has the same sort of skin reaction that she had with the Evelia sensor.  Gave her the transmitter ID and sensor code so she could enter these if she was somehow able to get the yamileth to work once she got home and was using her own WIFI.      Showed her the InPen and the InPen yamileth for the phone.  She is currently not using any mealtime insulin but it will probably need to be prescribed at some point.  She has been using the Accucheck Expert meter for Novolog dosing, but this meter is no longer being manufactured.  She liked the yamileth and the idea of using the In-pen but has a lot of concern about coverage for both the Dexcom and the In-Pen.  I explained that the In-Pen will likely cost her no more than $35.00 and the cost of the cartridges shouldn't be any more expensive than her current Novolog pens.      Sensor code for sensor placed today:  9311  Transmitter ID for sensor placed today: [id number]    Will go ahead and order the Novolog In-Pen from [facility], [place] and cartridges from her current pharmacy.     Patient-stated goal written and given to Nayeli Caal.  Verbalized and demonstrated understanding of instructions.     PLAN:  See patient instructions  AVS printed and given to patient    FOLLOW-UP:        Time spent with patient at today's visit was 60 minutes.      Any diabetes medication dose changes were made via the CDE Protocol and Collaborative Practice Agreement with Josefa and  Mana.  A copy of this encounter was provided to patient's referring provider.

## 2021-08-17 DIAGNOSIS — E10.21 TYPE 1 DIABETES MELLITUS WITH NEPHROPATHY (H): ICD-10-CM

## 2021-08-17 RX ORDER — LANCETS
EACH MISCELLANEOUS
Qty: 510 EACH | Refills: 3 | Status: SHIPPED | OUTPATIENT
Start: 2021-08-17

## 2021-08-17 NOTE — CONFIDENTIAL NOTE
Lancets    7/22/2021  [facility] [place]     Mariya Mohamud, APRN CNP  Internal Medicine

## 2021-08-20 ENCOUNTER — CARE COORDINATION (OUTPATIENT)
Dept: EDUCATION SERVICES | Facility: CLINIC | Age: 41
End: 2021-08-20

## 2021-08-20 DIAGNOSIS — Z79.4 UNCONTROLLED TYPE 2 DIABETES MELLITUS WITH HYPERGLYCEMIA, WITH LONG-TERM CURRENT USE OF INSULIN (H): Primary | ICD-10-CM

## 2021-08-20 DIAGNOSIS — E11.65 UNCONTROLLED TYPE 2 DIABETES MELLITUS WITH HYPERGLYCEMIA, WITH LONG-TERM CURRENT USE OF INSULIN (H): Primary | ICD-10-CM

## 2021-08-24 ENCOUNTER — TELEPHONE (OUTPATIENT)
Dept: ENDOCRINOLOGY | Facility: CLINIC | Age: 41
End: 2021-08-24

## 2021-08-24 NOTE — TELEPHONE ENCOUNTER
Christine can you help with this request ? I see a in pen was also requested .Olga Lidia Kirk RN on 8/24/2021 at 3:13 PM

## 2021-08-24 NOTE — TELEPHONE ENCOUNTER
Phone call to [facility] to clarify orders.  Advised that Nayeli does not need an Echo device.  The Novolog cartridges are to be used with the Inpen yamileth. Christine Plata RN,CDE

## 2021-08-31 ENCOUNTER — APPOINTMENT (OUTPATIENT)
Dept: GENERAL RADIOLOGY | Facility: CLINIC | Age: 41
End: 2021-08-31
Attending: EMERGENCY MEDICINE
Payer: COMMERCIAL

## 2021-08-31 ENCOUNTER — OFFICE VISIT (OUTPATIENT)
Dept: INTERPRETER SERVICES | Facility: CLINIC | Age: 41
End: 2021-08-31
Payer: COMMERCIAL

## 2021-08-31 ENCOUNTER — APPOINTMENT (OUTPATIENT)
Dept: CT IMAGING | Facility: CLINIC | Age: 41
End: 2021-08-31
Attending: EMERGENCY MEDICINE
Payer: COMMERCIAL

## 2021-08-31 ENCOUNTER — HOSPITAL ENCOUNTER (EMERGENCY)
Facility: CLINIC | Age: 41
Discharge: HOME | End: 2021-08-31
Attending: EMERGENCY MEDICINE | Admitting: EMERGENCY MEDICINE
Payer: COMMERCIAL

## 2021-08-31 VITALS
RESPIRATION RATE: 21 BRPM | OXYGEN SATURATION: 98 % | HEART RATE: 100 BPM | DIASTOLIC BLOOD PRESSURE: 66 MMHG | TEMPERATURE: 98.4 F | SYSTOLIC BLOOD PRESSURE: 113 MMHG

## 2021-08-31 DIAGNOSIS — K52.9 GASTROENTERITIS: ICD-10-CM

## 2021-08-31 DIAGNOSIS — R10.13 ABDOMINAL PAIN, EPIGASTRIC: ICD-10-CM

## 2021-08-31 DIAGNOSIS — N83.201 CYST OF RIGHT OVARY: ICD-10-CM

## 2021-08-31 DIAGNOSIS — Z20.822 COVID-19 RULED OUT BY LABORATORY TESTING: ICD-10-CM

## 2021-08-31 LAB
ALBUMIN SERPL-MCNC: 3.7 G/DL (ref 3.4–5)
ALBUMIN UR-MCNC: 30 MG/DL
ALP SERPL-CCNC: 79 U/L (ref 40–150)
ALT SERPL W P-5'-P-CCNC: 31 U/L (ref 0–50)
ANION GAP SERPL CALCULATED.3IONS-SCNC: 6 MMOL/L (ref 3–14)
APPEARANCE UR: CLEAR
AST SERPL W P-5'-P-CCNC: 13 U/L (ref 0–45)
ATRIAL RATE - MUSE: 89 BPM
BASOPHILS # BLD AUTO: 0.1 10E3/UL (ref 0–0.2)
BASOPHILS NFR BLD AUTO: 0 %
BILIRUB SERPL-MCNC: 0.3 MG/DL (ref 0.2–1.3)
BILIRUB UR QL STRIP: NEGATIVE
BUN SERPL-MCNC: 19 MG/DL (ref 7–30)
CALCIUM SERPL-MCNC: 8.6 MG/DL (ref 8.5–10.1)
CHLORIDE BLD-SCNC: 112 MMOL/L (ref 94–109)
CO2 SERPL-SCNC: 20 MMOL/L (ref 20–32)
COLOR UR AUTO: ABNORMAL
CREAT SERPL-MCNC: 0.7 MG/DL (ref 0.52–1.04)
DIASTOLIC BLOOD PRESSURE - MUSE: NORMAL MMHG
EOSINOPHIL # BLD AUTO: 0.4 10E3/UL (ref 0–0.7)
EOSINOPHIL NFR BLD AUTO: 2 %
ERYTHROCYTE [DISTWIDTH] IN BLOOD BY AUTOMATED COUNT: 12.4 % (ref 10–15)
GFR SERPL CREATININE-BSD FRML MDRD: >90 ML/MIN/1.73M2
GLUCOSE BLD-MCNC: 143 MG/DL (ref 70–99)
GLUCOSE BLDC GLUCOMTR-MCNC: 138 MG/DL (ref 70–99)
GLUCOSE UR STRIP-MCNC: >=1000 MG/DL
HCG UR QL: NEGATIVE
HCO3 BLDV-SCNC: 24 MMOL/L (ref 21–28)
HCT VFR BLD AUTO: 45.2 % (ref 35–47)
HGB BLD-MCNC: 14.9 G/DL (ref 11.7–15.7)
HGB UR QL STRIP: NEGATIVE
HYALINE CASTS: 1 /LPF
IMM GRANULOCYTES # BLD: 0.2 10E3/UL
IMM GRANULOCYTES NFR BLD: 1 %
INTERPRETATION ECG - MUSE: NORMAL
KETONES UR STRIP-MCNC: NEGATIVE MG/DL
LACTATE BLD-SCNC: 0.8 MMOL/L
LEUKOCYTE ESTERASE UR QL STRIP: NEGATIVE
LIPASE SERPL-CCNC: 94 U/L (ref 73–393)
LYMPHOCYTES # BLD AUTO: 1.4 10E3/UL (ref 0.8–5.3)
LYMPHOCYTES NFR BLD AUTO: 6 %
MCH RBC QN AUTO: 29.1 PG (ref 26.5–33)
MCHC RBC AUTO-ENTMCNC: 33 G/DL (ref 31.5–36.5)
MCV RBC AUTO: 88 FL (ref 78–100)
MONOCYTES # BLD AUTO: 0.9 10E3/UL (ref 0–1.3)
MONOCYTES NFR BLD AUTO: 4 %
MUCOUS THREADS #/AREA URNS LPF: PRESENT /LPF
NEUTROPHILS # BLD AUTO: 19.6 10E3/UL (ref 1.6–8.3)
NEUTROPHILS NFR BLD AUTO: 87 %
NITRATE UR QL: NEGATIVE
NRBC # BLD AUTO: 0 10E3/UL
NRBC BLD AUTO-RTO: 0 /100
P AXIS - MUSE: 46 DEGREES
PCO2 BLDV: 40 MM HG (ref 40–50)
PH BLDV: 7.39 [PH] (ref 7.32–7.43)
PH UR STRIP: 5 [PH] (ref 5–7)
PLATELET # BLD AUTO: 348 10E3/UL (ref 150–450)
PO2 BLDV: 43 MM HG (ref 25–47)
POTASSIUM BLD-SCNC: 4 MMOL/L (ref 3.4–5.3)
PR INTERVAL - MUSE: 138 MS
PROT SERPL-MCNC: 7.9 G/DL (ref 6.8–8.8)
QRS DURATION - MUSE: 80 MS
QT - MUSE: 390 MS
QTC - MUSE: 474 MS
R AXIS - MUSE: -28 DEGREES
RBC # BLD AUTO: 5.12 10E6/UL (ref 3.8–5.2)
RBC URINE: 1 /HPF
SAO2 % BLDV: 78 % (ref 94–100)
SARS-COV-2 RNA RESP QL NAA+PROBE: NEGATIVE
SODIUM SERPL-SCNC: 138 MMOL/L (ref 133–144)
SP GR UR STRIP: 1.03 (ref 1–1.03)
SQUAMOUS EPITHELIAL: <1 /HPF
SYSTOLIC BLOOD PRESSURE - MUSE: NORMAL MMHG
T AXIS - MUSE: 17 DEGREES
TROPONIN I SERPL-MCNC: <0.015 UG/L (ref 0–0.04)
UROBILINOGEN UR STRIP-MCNC: NORMAL MG/DL
VENTRICULAR RATE- MUSE: 89 BPM
WBC # BLD AUTO: 22.4 10E3/UL (ref 4–11)
WBC URINE: 1 /HPF

## 2021-08-31 PROCEDURE — 81001 URINALYSIS AUTO W/SCOPE: CPT | Performed by: EMERGENCY MEDICINE

## 2021-08-31 PROCEDURE — 82803 BLOOD GASES ANY COMBINATION: CPT

## 2021-08-31 PROCEDURE — 82040 ASSAY OF SERUM ALBUMIN: CPT | Performed by: EMERGENCY MEDICINE

## 2021-08-31 PROCEDURE — 36415 COLL VENOUS BLD VENIPUNCTURE: CPT | Performed by: EMERGENCY MEDICINE

## 2021-08-31 PROCEDURE — 71046 X-RAY EXAM CHEST 2 VIEWS: CPT

## 2021-08-31 PROCEDURE — 93005 ELECTROCARDIOGRAM TRACING: CPT | Performed by: EMERGENCY MEDICINE

## 2021-08-31 PROCEDURE — 74177 CT ABD & PELVIS W/CONTRAST: CPT | Mod: 26 | Performed by: RADIOLOGY

## 2021-08-31 PROCEDURE — 71046 X-RAY EXAM CHEST 2 VIEWS: CPT | Mod: 26 | Performed by: RADIOLOGY

## 2021-08-31 PROCEDURE — 74177 CT ABD & PELVIS W/CONTRAST: CPT

## 2021-08-31 PROCEDURE — 96361 HYDRATE IV INFUSION ADD-ON: CPT

## 2021-08-31 PROCEDURE — 258N000003 HC RX IP 258 OP 636: Performed by: EMERGENCY MEDICINE

## 2021-08-31 PROCEDURE — 96375 TX/PRO/DX INJ NEW DRUG ADDON: CPT | Performed by: EMERGENCY MEDICINE

## 2021-08-31 PROCEDURE — 81025 URINE PREGNANCY TEST: CPT | Performed by: EMERGENCY MEDICINE

## 2021-08-31 PROCEDURE — 96374 THER/PROPH/DIAG INJ IV PUSH: CPT | Performed by: EMERGENCY MEDICINE

## 2021-08-31 PROCEDURE — 250N000011 HC RX IP 250 OP 636: Performed by: STUDENT IN AN ORGANIZED HEALTH CARE EDUCATION/TRAINING PROGRAM

## 2021-08-31 PROCEDURE — C9803 HOPD COVID-19 SPEC COLLECT: HCPCS | Performed by: EMERGENCY MEDICINE

## 2021-08-31 PROCEDURE — 93010 ELECTROCARDIOGRAM REPORT: CPT | Performed by: EMERGENCY MEDICINE

## 2021-08-31 PROCEDURE — 99285 EMERGENCY DEPT VISIT HI MDM: CPT | Mod: 25 | Performed by: EMERGENCY MEDICINE

## 2021-08-31 PROCEDURE — 250N000013 HC RX MED GY IP 250 OP 250 PS 637: Performed by: EMERGENCY MEDICINE

## 2021-08-31 PROCEDURE — T1013 SIGN LANG/ORAL INTERPRETER: HCPCS | Mod: U3

## 2021-08-31 PROCEDURE — 250N000009 HC RX 250: Performed by: EMERGENCY MEDICINE

## 2021-08-31 PROCEDURE — 84484 ASSAY OF TROPONIN QUANT: CPT | Performed by: EMERGENCY MEDICINE

## 2021-08-31 PROCEDURE — 83690 ASSAY OF LIPASE: CPT | Performed by: EMERGENCY MEDICINE

## 2021-08-31 PROCEDURE — 250N000011 HC RX IP 250 OP 636: Performed by: EMERGENCY MEDICINE

## 2021-08-31 PROCEDURE — 85025 COMPLETE CBC W/AUTO DIFF WBC: CPT | Performed by: EMERGENCY MEDICINE

## 2021-08-31 PROCEDURE — U0003 INFECTIOUS AGENT DETECTION BY NUCLEIC ACID (DNA OR RNA); SEVERE ACUTE RESPIRATORY SYNDROME CORONAVIRUS 2 (SARS-COV-2) (CORONAVIRUS DISEASE [COVID-19]), AMPLIFIED PROBE TECHNIQUE, MAKING USE OF HIGH THROUGHPUT TECHNOLOGIES AS DESCRIBED BY CMS-2020-01-R: HCPCS | Performed by: EMERGENCY MEDICINE

## 2021-08-31 RX ORDER — ONDANSETRON 4 MG/1
4 TABLET, ORALLY DISINTEGRATING ORAL EVERY 8 HOURS PRN
Qty: 10 TABLET | Refills: 0 | Status: SHIPPED | OUTPATIENT
Start: 2021-08-31 | End: 2021-09-03

## 2021-08-31 RX ORDER — KETOROLAC TROMETHAMINE 15 MG/ML
15 INJECTION, SOLUTION INTRAMUSCULAR; INTRAVENOUS ONCE
Status: COMPLETED | OUTPATIENT
Start: 2021-08-31 | End: 2021-08-31

## 2021-08-31 RX ORDER — ASPIRIN 81 MG/1
324 TABLET, CHEWABLE ORAL ONCE
Status: COMPLETED | OUTPATIENT
Start: 2021-08-31 | End: 2021-08-31

## 2021-08-31 RX ORDER — IOPAMIDOL 755 MG/ML
104 INJECTION, SOLUTION INTRAVASCULAR ONCE
Status: COMPLETED | OUTPATIENT
Start: 2021-08-31 | End: 2021-08-31

## 2021-08-31 RX ORDER — ONDANSETRON 2 MG/ML
4 INJECTION INTRAMUSCULAR; INTRAVENOUS ONCE
Status: COMPLETED | OUTPATIENT
Start: 2021-08-31 | End: 2021-08-31

## 2021-08-31 RX ADMIN — IOPAMIDOL 104 ML: 755 INJECTION, SOLUTION INTRAVENOUS at 12:56

## 2021-08-31 RX ADMIN — LIDOCAINE HYDROCHLORIDE 30 ML: 20 SOLUTION ORAL; TOPICAL at 09:53

## 2021-08-31 RX ADMIN — KETOROLAC TROMETHAMINE 15 MG: 15 INJECTION, SOLUTION INTRAMUSCULAR; INTRAVENOUS at 11:29

## 2021-08-31 RX ADMIN — ASPIRIN 324 MG: 81 TABLET, CHEWABLE ORAL at 10:00

## 2021-08-31 RX ADMIN — ONDANSETRON 4 MG: 2 INJECTION INTRAMUSCULAR; INTRAVENOUS at 11:29

## 2021-08-31 RX ADMIN — PROCHLORPERAZINE EDISYLATE 10 MG: 5 INJECTION INTRAMUSCULAR; INTRAVENOUS at 12:19

## 2021-08-31 RX ADMIN — SODIUM CHLORIDE 1000 ML: 9 INJECTION, SOLUTION INTRAVENOUS at 09:49

## 2021-08-31 ASSESSMENT — ENCOUNTER SYMPTOMS
DIARRHEA: 1
ABDOMINAL PAIN: 0
VOMITING: 1
NAUSEA: 1
FEVER: 0
BLOOD IN STOOL: 0

## 2021-08-31 NOTE — ED NOTES
Bed: ED28  Expected date:   Expected time:   Means of arrival:   Comments:  [facility] [place] [facility] with a 40 yo F reports of vomiting. Triaged yellow in 10 mins

## 2021-08-31 NOTE — ED PROVIDER NOTES
[place] EMERGENCY DEPARTMENT ([facility])  8/31/21  History     Chief Complaint   Patient presents with     Nausea, Vomiting, & Diarrhea     The history is provided by the patient and the EMS personnel. The history is limited by a language barrier. A  was used (ASL).     Nayeli Caal is a 41 year old female with a history notable for diabetes, hearing loss requiring  and low vision. She presents to the ED for 2 days of nausea and vomiting as well as diarrhea.  Patient states that her roommate has also been sick with similar symptoms.  She is diabetic but was having hard time using her home monitor so did not know if she is hypoglycemic or not.  Blood sugar was in the 150s when EMS arrived. She describes to me discomfort in her esophagus, some mild left-sided chest pain, no significant abdominal pain and no pelvic pain. She has not noticed blood in her stool or emesis; no reported fever. Has a sick roommate at home. No diarrhea. Last stool was today and formed. LMP normal. Pt has IUD in place. Has a hx of uterine fibroid. Has had previous cholecystectomy. No hx of pancreatitis. No other past abdominal surgeries.     This part of the document was transcribed by Graham Baker Medical Scribe.    Past Medical History:   Diagnosis Date     Combined visual and hearing impairment      Deaf      Diabetic retinopathy of both eyes (H) 8/19/2011     Hepatic steatosis 08/19/2011     History of tobacco use      Hyperlipidemia      Hypertension      Hypertriglyceridemia      Migraines      Obesity 8/19/2011     Problem list name updated by automated process. 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  Ganglion cyst excision.;  Surgeon: Greg Streeter MD;  Location: [facility] OR       Family History   Problem Relation Age of Onset     Unknown/Adopted Other        Social History     Tobacco Use     Smoking status: Former Smoker     Types: Cigarettes     Quit date: 12/1/2010     Years since quitting: 10.7     Smokeless tobacco: Never Used     Tobacco comment: stopped 10 yrs ago   Substance Use Topics     Alcohol use: Yes     Comment: socially     Current Facility-Administered Medications   Medication     hydrocortisone (CORTAID) 1 % cream     Current Outpatient Medications   Medication     ondansetron (ZOFRAN ODT) 4 MG ODT tab     acetaminophen (TYLENOL) 500 MG tablet     Alcohol Swabs (ALCOHOL PREP PAD) 70 % PADS     aspirin (ASA) 81 MG chewable tablet     atorvastatin (LIPITOR) 40 MG tablet     B-D U/F insulin pen needle     BD ULTRA FINE PEN NEEDLES     blood glucose (ACCU-CHEK LAYA PLUS) test strip     blood glucose monitoring (ACCU-CHEK FASTCLIX) lancets     cetirizine (ZYRTEC) 10 MG tablet     empagliflozin (JARDIANCE) 25 MG TABS tablet     ergocalciferol (ERGOCALCIFEROL) 1.25 MG (85368 UT) capsule     fenofibrate (TRIGLIDE/LOFIBRA) 160 MG tablet     fish oil-omega-3 fatty acids 1000 MG capsule     ibuprofen (ADVIL/MOTRIN) 600 MG tablet     Injection Device for insulin (INPEN 100-PINK-SHAYNE) DAVID     insulin aspart (NOVOPEN ECHO) 100 UNIT/ML cartridge     insulin NPH (HUMULIN N KWIKPEN) 100 UNIT/ML injection     insulin pen needle (B-D U/F) 31G X 8 MM miscellaneous     levonorgestrel (MIRENA) 20 MCG/24HR IUD     losartan (COZAAR) 50 MG tablet     naproxen  (NAPROSYN) 375 MG tablet     omega 3 1000 MG CAPS     Ostomy Supplies (ADHESIVE REMOVER WIPES) MISC     Ostomy Supplies (SKIN TAC ADHESIVE BARRIER WIPE) MISC     terbinafine (LAMISIL) 1 % external cream     triamcinolone (KENALOG) 0.1 % external cream      No Known Allergies      Review of Systems   Constitutional: Negative for fever.   Cardiovascular: Positive for chest pain.   Gastrointestinal: Positive for diarrhea, nausea and vomiting. Negative for abdominal pain and blood in stool.     A complete review of systems was performed with pertinent positives and negatives noted in the HPI, and all other systems negative.    Physical Exam   BP: 130/77  Pulse: 86  Temp: 98.4  F (36.9  C)  Resp: 18  SpO2: 98 %  Physical Exam  Gen:A&Ox3, no acute distress  HEENT:PERRL, no facial tenderness or wounds, head atraumatic, oropharynx clear, mucous membranes moist, TMs clear bilaterally  Neck:no bony tenderness or step offs, no JVD, trachea midline  Back: no CVA tenderness, no midline bony tenderness  CV:RRR without murmurs  PULM:Clear to auscultation bilaterally  Abd:soft, minimal epigastric discomfort, mild abdominal distention.   UE:No traumatic injuries, skin normal  LE:no traumatic injuries, skin normal, no LE edema.   Neuro: strength 5/5 throughout, gait stable.   Skin: no rashes or ecchymoses    ED Course     9:34 AM  The patient was seen and examined by Dr. Florence Pearce in Room ED 28.   Procedures            EKG Interpretation:      Interpreted by Florence Pearce MD  Time reviewed: 10:01pm  Symptoms at time of EKG: epigastric discomfort.   Rhythm: normal sinus   Rate: 89  Axis: normal  Ectopy: none  Conduction: normal  ST Segments/ T Waves: No ST-T wave changes  Q Waves: none  Comparison to prior: Unchanged from March 09, 2018    Clinical Impression: normal EKG       Results for orders placed or performed during the hospital encounter of 08/31/21   Chest XR,  PA & LAT     Status: None    Narrative    XR CHEST 2 VW   8/31/2021 11:09 AM      HISTORY: chest pain    COMPARISON: 10/25/2018, 10/20/2011    FINDINGS: PA and lateral views of the chest. The cardiac silhouette is  within normal limits. No acute airspace opacities. No pleural effusion  or pneumothorax. Cholecystectomy clips      Impression    IMPRESSION: No acute airspace opacities.    I have personally reviewed the examination and initial interpretation  and I agree with the findings.    MANDIE LINDSEY MD         SYSTEM ID:  [id number]   CT Abdomen Pelvis w Contrast     Status: None    Narrative    EXAMINATION: CT ABDOMEN PELVIS W CONTRAST, 8/31/2021 1:26 PM    INDICATION: Bowel obstruction suspected; vomiting, abdominal cramping,  eval for SBO    COMPARISON STUDY: Pelvic ultrasound dated 7/7/2021, radiograph of the  abdomen dated 7/7/2021, CT of the abdomen and pelvis dated 3/14/2021    TECHNIQUE: CT scan of the abdomen and pelvis was performed on  multidetector CT scanner using volumetric acquisition technique and  images were reconstructed in multiple planes with variable thickness  and reviewed on dedicated workstations.     CONTRAST: 104 cc of isovue 370 injected IV without oral contrast    CT scan radiation dose is optimized to minimum requisite dose using  automated dose modulation techniques.    FINDINGS:    Lower thorax: No consolidative pulmonary opacities. 3 mm and 2 mm  right lower lobe solid pulmonary nodules (series 5, images 21, 25, and  32), newly appreciated from previous study, which time there was  subsegmental atelectasis in this location. 4 mm subpleural solid  pulmonary nodule of the left lower lobe (series 5, image 32), stable  from at least 3/14/2018. Stable 4 mm right middle lobe nodule (series  5 image 12). No pleural effusions.    Liver: Normal appearance of the hepatic parenchyma. There is a 7 mm  hypodense lesion of segment 7/8, too small to characterize. Focal  fatty infiltration adjacent to the gallbladder fossa. No intrahepatic  biliary  ductal dilation.    Biliary System: Status post cholecystectomy.    Pancreas: No mass or pancreatic ductal dilation.    Adrenal glands: Mild nodular thickening of the left adrenal gland.  Right adrenal gland is within normal limits.    Spleen: Normal.    Kidneys: No suspicious mass, obstructing calculus or hydronephrosis.    Gastrointestinal tract :Normal appendix. Normal caliber small bowel.    Mesentery/peritoneum/retroperitoneum: No mass. No free fluid or air.    Lymph nodes: Prominent mesenteric and inguinal lymph nodes without  pathologic enlargement.    Vasculature: Patent major abdominal vasculature.    Pelvis: Urinary bladder is normal.  Intramural fibroid measuring 4.9 x  4.5 cm arising from the anterior uterine body/fundus. Anterior  cervical fibroid, also seen on recent ultrasound. IUD well positioned  in the endometrial canal. Cystic structure in the right adnexa  measuring 4.2 x 3.8 cm with density measurements of 16 Hounsfield  units.    Osseous structures: No aggressive or acute osseous lesion.      Soft tissues: Within normal limits.      Impression    IMPRESSION:   1. No abnormally dilated loops of bowel to suggest SBO.  2. Right ovarian cyst measuring 4.2 x 3.8 cm. This is new since pelvic  ultrasound dated 7/7/2021. If there is clinical concern for ovarian  torsion, consider further evaluation with ultrasound.  3. 7 mm hypodense lesion of hepatic segment 7/8, likely a cyst but  technically too small to fully characterize.  4. Uterine and cervical fibroids.    I have personally reviewed the examination and initial interpretation  and I agree with the findings.    SHREE TALLEY DO         SYSTEM ID:  [id number]   CBC with platelets differential     Status: Abnormal    Narrative    The following orders were created for panel order CBC with platelets differential.  Procedure                               Abnormality         Status                     ---------                               -----------          ------                     CBC with platelets and d...[[id number]]  Abnormal            Final result                 Please view results for these tests on the individual orders.   Comprehensive metabolic panel     Status: Abnormal   Result Value Ref Range    Sodium 138 133 - 144 mmol/L    Potassium 4.0 3.4 - 5.3 mmol/L    Chloride 112 (H) 94 - 109 mmol/L    Carbon Dioxide (CO2) 20 20 - 32 mmol/L    Anion Gap 6 3 - 14 mmol/L    Urea Nitrogen 19 7 - 30 mg/dL    Creatinine 0.70 0.52 - 1.04 mg/dL    Calcium 8.6 8.5 - 10.1 mg/dL    Glucose 143 (H) 70 - 99 mg/dL    Alkaline Phosphatase 79 40 - 150 U/L    AST 13 0 - 45 U/L    ALT 31 0 - 50 U/L    Protein Total 7.9 6.8 - 8.8 g/dL    Albumin 3.7 3.4 - 5.0 g/dL    Bilirubin Total 0.3 0.2 - 1.3 mg/dL    GFR Estimate >90 >60 mL/min/1.73m2   Lipase     Status: Normal   Result Value Ref Range    Lipase 94 73 - 393 U/L   HCG qualitative urine     Status: Normal   Result Value Ref Range    hCG Urine Qualitative Negative Negative   Troponin I     Status: Normal   Result Value Ref Range    Troponin I <0.015 0.000 - 0.045 ug/L   CBC with platelets and differential     Status: Abnormal   Result Value Ref Range    WBC Count 22.4 (H) 4.0 - 11.0 10e3/uL    RBC Count 5.12 3.80 - 5.20 10e6/uL    Hemoglobin 14.9 11.7 - 15.7 g/dL    Hematocrit 45.2 35.0 - 47.0 %    MCV 88 78 - 100 fL    MCH 29.1 26.5 - 33.0 pg    MCHC 33.0 31.5 - 36.5 g/dL    RDW 12.4 10.0 - 15.0 %    Platelet Count 348 150 - 450 10e3/uL    % Neutrophils 87 %    % Lymphocytes 6 %    % Monocytes 4 %    % Eosinophils 2 %    % Basophils 0 %    % Immature Granulocytes 1 %    NRBCs per 100 WBC 0 <1 /100    Absolute Neutrophils 19.6 (H) 1.6 - 8.3 10e3/uL    Absolute Lymphocytes 1.4 0.8 - 5.3 10e3/uL    Absolute Monocytes 0.9 0.0 - 1.3 10e3/uL    Absolute Eosinophils 0.4 0.0 - 0.7 10e3/uL    Absolute Basophils 0.1 0.0 - 0.2 10e3/uL    Absolute Immature Granulocytes 0.2 (H) <=0.0 10e3/uL    Absolute NRBCs 0.0 10e3/uL   iStat  Gases (lactate) venous, POCT     Status: Abnormal   Result Value Ref Range    Lactic Acid POCT 0.8 <=2.0 mmol/L    Bicarbonate Venous POCT 24 21 - 28 mmol/L    O2 Sat, Venous POCT 78 (L) 94 - 100 %    pCO2V Venous POCT 40 40 - 50 mm Hg    pH Venous POCT 7.39 7.32 - 7.43    pO2 Venous POCT 43 25 - 47 mm Hg   Glucose by meter     Status: Abnormal   Result Value Ref Range    GLUCOSE BY METER POCT 138 (H) 70 - 99 mg/dL   Symptomatic COVID-19 Virus (Coronavirus) by PCR Nasopharyngeal     Status: Normal    Specimen: Nasopharyngeal; Swab   Result Value Ref Range    SARS CoV2 PCR Negative Negative    Narrative    Testing was performed using the [employer] Xpress SARS-CoV-2 Assay on the  [employer]. Additional information about  this Emergency Use Authorization (EUA) assay can be found via the Lab  Guide. This test should be ordered for the detection of SARS-CoV-2 in  individuals who meet SARS-CoV-2 clinical and/or epidemiological  criteria. Test performance is unknown in asymptomatic patients. This  test is for in vitro diagnostic use under the FDA EUA for  laboratories certified under CLIA to perform high complexity testing.  This test has not been FDA cleared or approved. A negative result  does not rule out the presence of PCR inhibitors in the specimen or  target RNA in concentration below the limit of detection for the  assay. The possibility of a false negative should be considered if  the patient's recent exposure or clinical presentation suggests  COVID-19. This test was validated by the [facility]. This laboratory is certified under  the Clinical Laboratory Improvement Amendments of 1988 (CLIA-88) as  qualified to perform high complexity laboratory testing.     UA reflex to Microscopic     Status: Abnormal   Result Value Ref Range    Color Urine Light Yellow Colorless, Straw, Light Yellow, Yellow    Appearance Urine Clear Clear    Glucose Urine  >=1000 (A) Negative mg/dL    Bilirubin Urine Negative Negative    Ketones Urine Negative Negative mg/dL    Specific Gravity Urine 1.034 1.003 - 1.035    Blood Urine Negative Negative    pH Urine 5.0 5.0 - 7.0    Protein Albumin Urine 30  (A) Negative mg/dL    Urobilinogen Urine Normal Normal, 2.0 mg/dL    Nitrite Urine Negative Negative    Leukocyte Esterase Urine Negative Negative    RBC Urine 1 <=2 /HPF    WBC Urine 1 <=5 /HPF    Squamous Epithelials Urine <1 <=1 /HPF    Mucus Urine Present (A) None Seen /LPF    Hyaline Casts Urine 1 <=2 /LPF   EKG 12 lead     Status: None   Result Value Ref Range    Systolic Blood Pressure  mmHg    Diastolic Blood Pressure  mmHg    Ventricular Rate 89 BPM    Atrial Rate 89 BPM    OK Interval 138 ms    QRS Duration 80 ms     ms    QTc 474 ms    P Axis 46 degrees    R AXIS -28 degrees    T Axis 17 degrees    Interpretation ECG       Sinus rhythm  Normal ECG  Unconfirmed report - interpretation of this ECG is computer generated - see medical record for final interpretation  Confirmed by - EMERGENCY ROOM, PHYSICIAN ([id number]),  MANUEL BOBO ([id number]) on 8/31/2021 10:30:24 AM       Medications   0.9% sodium chloride BOLUS (0 mLs Intravenous Stopped 8/31/21 1217)   lidocaine (XYLOCAINE) 2 % 15 mL, alum & mag hydroxide-simethicone (MAALOX) 15 mL GI Cocktail (30 mLs Oral Given 8/31/21 0953)   aspirin (ASA) chewable tablet 324 mg (324 mg Oral Given 8/31/21 1000)   ketorolac (TORADOL) injection 15 mg (15 mg Intravenous Given 8/31/21 1129)   ondansetron (ZOFRAN) injection 4 mg (4 mg Intravenous Given 8/31/21 1129)   prochlorperazine (COMPAZINE) injection 10 mg (10 mg Intravenous Given 8/31/21 1219)   iopamidol (ISOVUE-370) solution 104 mL (104 mLs Intravenous Given 8/31/21 1256)   sodium chloride (PF) 0.9% PF flush 76 mL (76 mLs Intravenous Given 8/31/21 1256)        Assessments & Plan (with Medical Decision Making)   20-year-old female presenting with nausea and vomiting in the  setting of having a sick contact with similar symptoms.  Ddx included gastritis, acid reflux, viral gastroenteritis, pancreatitis, SBO.     Abdominal exam without focal tenderness.  Complains of mild acid reflux symptoms and chest pain.    IV access and laboratory testing done. Vital signs stable.  Laboratory testing included a CBC, CMP, lipase, troponin, and UA. CXR ordered. Pt requested COVID-19 testing, but tiburcio is without fever or respiratory symptoms.     She was monitored on telemetry during her time in the ED and no arrhythmias were noted.      She was treated with 1 L of normal saline, IV zofran, IV compazine, and GI cocktail.    This part of the document was transcribed by Graham Baker Medical Scribe.    HCG negative.   UA without UTI or hematuria.   CMP unremarkable and lipase negative  CBC with significant ABC of 22.4, attributable to acute infectious etiology of today's symptoms.   Troponin negative.   Lactic acid 0.8.   Glucose 143.   CXR with clear lungs, no mediastinal abnormalities.   CT A/P negative for SBO but did show incidental right ovarian cyst of 4.2 x 3.8cm. Stable fibroid.   Pt was re-examined. She continues to be without pelvic pain or tenderness on exam, and has not had these symptoms prior to arrival. We discussed the CT finding, and return precautions in the event of development of symptoms of torsions. Will have her follow up with primary care for US follow up.     Pt is feeling improved after hydration and antiemetics. Tolerated trial of PO liquids.   Discharged home with plan for clear liquid diet, advancing diet as tolerated and Zofran PRN.         I have reviewed the nursing notes. I have reviewed the findings, diagnosis, plan and need for follow up with the patient.    Discharge Medication List as of 8/31/2021  3:53 PM      START taking these medications    Details   ondansetron (ZOFRAN ODT) 4 MG ODT tab Take 1 tablet (4 mg) by mouth every 8 hours as needed for nausea, Disp-10  tablet, R-0, E-Prescribe             Final diagnoses:   Gastroenteritis   Cyst of right ovary       --  Florence Pearce MD [facility] EMERGENCY DEPARTMENT  8/31/2021     Florence Pearce MD  09/02/21 1123

## 2021-08-31 NOTE — DISCHARGE INSTRUCTIONS
Thank you for coming to the [facility] Emergency Department.     COVID-19 test was negative.    Please take a clear liquid diet tonight, then start bland foods tomorrow if you are feeling hungry. Advance to a normal diet when you are feeling better. You may use the medication zofran as needed for nausea.     On CT we incidentally found a large right ovarian cyst. This will need follow up with an Ultrasound. It is important to return to the ER immediately if you have severe pain in the right pelvis. This could be sign of a twisted ovary (ovarian torsion) that is an emergency.

## 2021-08-31 NOTE — ED TRIAGE NOTES
Pt presents via EMS from home with concerns of a 2 day history of nausea, vomitting, diarrhea. Pt's roommate has been sick with same symptoms. Pt is a diabetic and has not been able to check her blood sugar at home due to not being able to find her meter. Pt is deaf and is requesting an ; paged.  by EMS. VSS.

## 2021-09-01 ENCOUNTER — ALLIED HEALTH/NURSE VISIT (OUTPATIENT)
Dept: EDUCATION SERVICES | Facility: CLINIC | Age: 41
End: 2021-09-01
Payer: COMMERCIAL

## 2021-09-01 DIAGNOSIS — E11.65 UNCONTROLLED TYPE 2 DIABETES MELLITUS WITH HYPERGLYCEMIA, WITH LONG-TERM CURRENT USE OF INSULIN (H): Primary | ICD-10-CM

## 2021-09-01 DIAGNOSIS — Z79.4 UNCONTROLLED TYPE 2 DIABETES MELLITUS WITH HYPERGLYCEMIA, WITH LONG-TERM CURRENT USE OF INSULIN (H): Primary | ICD-10-CM

## 2021-09-01 PROCEDURE — T1013 SIGN LANG/ORAL INTERPRETER: HCPCS | Mod: U3 | Performed by: REGISTERED NURSE

## 2021-09-01 PROCEDURE — G0108 DIAB MANAGE TRN  PER INDIV: HCPCS | Performed by: REGISTERED NURSE

## 2021-09-03 RX ORDER — PROCHLORPERAZINE 25 MG/1
SUPPOSITORY RECTAL
Qty: 3 EACH | Refills: 11 | Status: SHIPPED | OUTPATIENT
Start: 2021-09-03 | End: 2021-09-23

## 2021-09-03 RX ORDER — PROCHLORPERAZINE 25 MG/1
SUPPOSITORY RECTAL
Qty: 1 EACH | Refills: 3 | Status: SHIPPED | OUTPATIENT
Start: 2021-09-03 | End: 2021-09-23

## 2021-09-03 RX ORDER — INSULIN GLARGINE 100 [IU]/ML
50 INJECTION, SOLUTION SUBCUTANEOUS DAILY
Qty: 45 ML | Refills: 3 | Status: SHIPPED | OUTPATIENT
Start: 2021-09-03 | End: 2021-09-16

## 2021-09-03 NOTE — PROGRESS NOTES
Diabetes Self-Management Education & Support    Nayeli Caal presents today for education related to Type 2 diabetes    Patient is being treated with:  intensive insulin program  She is accompanied by self    Year of diagnosis:  Diagnosed in 2000, started insulin 2003.  Seen initially at [facility] endocrinology in 2013.   Referring provider:  Anne Marie Medina PA-C, Dr. Bragg.  Living Situation: Lives alone  Employment: Works for the [employer] in [employer].    Nayeli is deaf and also has significant visual deficit.  She attends today with an .    PATIENT CONCERNS RELATED TO DIABETES SELF MANAGEMENT: Here today to start using the In-Pen.  She has been wearing the Dexcom CGM sample from previous visit and not having any problems with skin irritation.  She likes getting the data from it.  We will need to process the order for the Dexcom through DME benefit rather than prescription.  A CMN needs to be signed and the progress notes need to reflect that she is meeting their criteria for coverage, which includes four times daily testing and administering insulin three times daily.    ASSESSMENT:    Taking Medication:     Current Diabetes Management per Patient:  Taking diabetes medications?   yes:     Diabetes Medication(s)     Insulin       insulin aspart (NOVOPEN ECHO) 100 UNIT/ML cartridge    For use with the In-Pen device.  Give 1 unit per 5 grams CHO with meals and snacks as well as correction.  Average daily use is 80 units daily.     insulin NPH (HUMULIN N KWIKPEN) 100 UNIT/ML injection    Inject 50 Units Subcutaneous every morning (before breakfast)    Sodium-Glucose Co-Transporter 2 (SGLT2) Inhibitors       empagliflozin (JARDIANCE) 25 MG TABS tablet    Take 1 tablet (25 mg) by mouth daily          Monitoring    Patient glucose self monitoring as follows: continuously using a continuous glucose monitor (CGM)  BG meter: Dexcom       Patient's most recent   Lab Results    Component Value Date    A1C 10.2 07/19/2021    A1C 11.6 04/05/2021      Patient's A1C goal: <7.0    Activity: not assessed    Healthy Eating:  Not assessed today.  She has gained weight over the past year or so.  She counts carbohydrates, but in choices (15 gram chunks), She thinks that her previous ICR was 1 to 15 grams.      Problem Solving:      Patient is at risk of hypoglycemia?: YES and Frequency is one to two times per month  Hospitalizations for hyper or hypoglycemia: No    EDUCATION and INSTRUCTION PROVIDED AT THIS VISIT:       Explained how the In Pen application works to calculate more accurately the dose of rapid acting insulin to cover carbohydrates eaten and to correct blood glucoses that are over-target.    Explained the following terms:     Insulin to Carbohydrate ratio  Insulin sensitivity factor  Active insulin time  Blood glucose target   Maximum Calculated bolus    Explained the difference between carb counting, meal estimation and fixed dosing.     The following therapy settings were entered:     Start time ICR  1 unit/gm Insulin Sensitivity Factor Target BG    06:00 AM 10 40 120   11:00 AM 10 40 120   05:00 PM 10 40 120   09:30 PM 10 50 140         Active Insulin Time:  3 hours, 30 minutes.  Maximum Bolus:  25  Reminders:  Meal reminders not set up.   Reviewed how to load the cartridge in the In Pen, and how to pair it with the phone application.    Discussed that the pen has a memory and will download data to the phone yamileth whenever it is in pairing range.   Had patient practice doing a meal bolus calculation, a blood glucose correction and a combined calculation.    Discussed how to generate a report to share with health care providers.    We set her up with Dexcom Clarity today and gave her another Dexcom sensor which we got started.  She would like to move forward with getting the Dexcom personal CGM and thinks the In-pen yamileth will be most helpful.   I asked her today to stop the NPH  insulin and increase her Basaglar to 50 units daily.  She'll be doing Novolog injections at meals.    I think that if Nayeli is able to use this yamileth successfully, we should discuss possibly using an Omnipod insulin pump.  I think she would be a good candidate and I don't believe it's really been a viable option prior to the past couple of years.  Her insulin requirements would allow her to go a couple of days on each pod.      Patient-stated goal written and given to Nayeli Caal.  Verbalized and demonstrated understanding of instructions.     PLAN:  See patient instructions  AVS printed and given to patient    FOLLOW-UP:    Appointment scheduled for in person follow up in two weeks.    Time spent with patient at today's visit was 60 minutes.      Any diabetes medication dose changes were made via the CDE Protocol and Collaborative Practice Agreement with Josefa and  Mana.  A copy of this encounter was provided to patient's referring provider.

## 2021-09-15 ENCOUNTER — ALLIED HEALTH/NURSE VISIT (OUTPATIENT)
Dept: EDUCATION SERVICES | Facility: CLINIC | Age: 41
End: 2021-09-15
Payer: COMMERCIAL

## 2021-09-15 DIAGNOSIS — Z79.4 UNCONTROLLED TYPE 2 DIABETES MELLITUS WITH HYPERGLYCEMIA, WITH LONG-TERM CURRENT USE OF INSULIN (H): Primary | ICD-10-CM

## 2021-09-15 DIAGNOSIS — E11.65 UNCONTROLLED TYPE 2 DIABETES MELLITUS WITH HYPERGLYCEMIA, WITH LONG-TERM CURRENT USE OF INSULIN (H): Primary | ICD-10-CM

## 2021-09-15 PROCEDURE — G0108 DIAB MANAGE TRN  PER INDIV: HCPCS | Performed by: REGISTERED NURSE

## 2021-09-15 NOTE — PROGRESS NOTES
Diabetes Self-Management Education & Support    Nayeli Caal presents today for education related to Type 2 diabetes    Patient is being treated with:  oral agents, diet, exercise and intensive insulin program  She is accompanied by self and     Year of diagnosis: 2013 or 2014  Referring provider:  Anne Marie Medina PA-C  Living Situation: Lives with a room mate  Employment: Administrative work for [employer]    PATIENT CONCERNS RELATED TO DIABETES SELF MANAGEMENT:     Nayeli comes in for follow up today after starting the In-Pen system for rapid acting insulin dosing.      ASSESSMENT:    Taking Medication:     Current Diabetes Management per Patient:  Taking diabetes medications?   yes:     Diabetes Medication(s)     Insulin       insulin aspart (NOVOPEN ECHO) 100 UNIT/ML cartridge    For use with the In-Pen device.  Give 1 unit per 5 grams CHO with meals and snacks as well as correction.  Average daily use is 80 units daily.     insulin glargine (BASAGLAR KWIKPEN) 100 UNIT/ML pen    Inject 50 Units Subcutaneous daily    Sodium-Glucose Co-Transporter 2 (SGLT2) Inhibitors       empagliflozin (JARDIANCE) 25 MG TABS tablet    Take 1 tablet (25 mg) by mouth daily          Monitoring    Patient glucose self monitoring as follows: continuously using a continuous glucose monitor (CGM)  BG meter: Dexcom   meter  BG results:        In Pen Report appears below:        Start time ICR  1 unit/gm Insulin Sensitivity Factor Target BG    06:00 AM 10 40 120   11:00 AM 10 40 120   05:00 PM 10 40 120   09:30 PM 10 50 140             Active Insulin Time:  3 hours, 30 minutes.  Maximum Bolus:  25    Patient's most recent   Lab Results   Component Value Date    A1C 10.2 07/19/2021    A1C 11.6 04/05/2021      Patient's A1C goal: <7.0    EDUCATION and INSTRUCTION PROVIDED AT THIS VISIT:       Reviewed her In-Pen report with her today.  She is concerned about her post dinner/overnight glucose, observing  that she is frequently too high when going to bed, and overnight.  Otherwise, she really likes using the Dexcom, is not having skin irritation from the sensor, and is using the In-Pen yamileth appropriately, making carb entries.  She has not done any correcting outside of meal-time.  It looks as if her glucose is rising significantly after dinner in particular.  She feels that her carb counting is not perfect, but fairly accurate.      She has had some symptoms of hypoglycemia, however it doesn't appear that she has experienced any glucoses less than 70 mg/dL.  I suspect that she is experiencing relative hypoglycemia, and I explained this term to her.  We also reviewed how to recognize and treat actual hypoglycemia.     Discussed that she would probably benefit from intensifying her ICR at dinnertime and explained this to her.  Changed the ICR at dinnertime from 10 to 8.  Changed her target at bedtime from 140 to 150 and suggested that if glucose is elevated when she goes to bed, that she consider giving herself a correction using the In-Pen application.      She has an appointment with Anne Marie Medina PA-C tomorrow to review her glucoses.  Nayeli is concerned about the cost of the Dexcom CGM as she has had a lot of out of pocket medical expenses this year.  We will be ordering the Dexcom CGM for her following her visit with Anne Marie tomorrow.  Discussed that because she has [employer], the order will need to be processed through a DME.  She had wanted to have it filled through [facility] but I explained that they likely would not be able to fill the order.  In the end she was OK with sending the order to [facility], as long as they call her first to confirm the price of the order and are able to ship it to her.      In Pen settings following this visit:      Start time ICR  1 unit/gm Insulin Sensitivity Factor Target BG    06:00 AM 10 40 120   11:00 AM 10 40 120   05:00 PM 8 (10) 40 120   09:30 PM 10 50 150 (140)              Active Insulin Time:  3 hours, 30 minutes.  Maximum Bolus:  25    Patient-stated goal written and given to Nayeli Caal.  Verbalized and demonstrated understanding of instructions.     PLAN:  See patient instructions  AVS printed and given to patient.    She is quite concerned about getting her trigger finger surgery and her hysterectomy done as soon as possible.  In order to do this, her A1C needs to be in an acceptable range.  She had a little difficulty understanding that the A1C predicted on her Dexcom report does not mean that if she had an A1C drawn today it would be the same.  Discussion about what the A1C represents ensued.  Suggested that she needs to wait until mid-October to get an accurate A1C.  Encouraged her to continue the good work.  She verbalizes understanding that she is insulin-dependent and it is unlikely that she would be able to be well controlled on oral medications.     I think, with enough training and education, that Nayeli might benefit from an insulin pump.  Suggested that if she is interested in this idea, that we should re-visit again.  Suggested follow up in about a month.      Gave Nayeli two more sensors and transmitters for her Dexcom.      FOLLOW-UP:          Time spent with patient at today's visit was 60 minutes.      Any diabetes medication dose changes were made via the CDE Protocol and Collaborative Practice Agreement with [place] and [facility].  A copy of this encounter was provided to patient's referring provider.

## 2021-09-16 ENCOUNTER — OFFICE VISIT (OUTPATIENT)
Dept: ENDOCRINOLOGY | Facility: CLINIC | Age: 41
End: 2021-09-16
Payer: COMMERCIAL

## 2021-09-16 VITALS
WEIGHT: 172.4 LBS | DIASTOLIC BLOOD PRESSURE: 74 MMHG | SYSTOLIC BLOOD PRESSURE: 116 MMHG | BODY MASS INDEX: 31.73 KG/M2 | HEIGHT: 62 IN

## 2021-09-16 DIAGNOSIS — Z79.4 TYPE 2 DIABETES MELLITUS WITH HYPERGLYCEMIA, WITH LONG-TERM CURRENT USE OF INSULIN (H): Primary | ICD-10-CM

## 2021-09-16 DIAGNOSIS — E11.65 TYPE 2 DIABETES MELLITUS WITH HYPERGLYCEMIA, WITH LONG-TERM CURRENT USE OF INSULIN (H): Primary | ICD-10-CM

## 2021-09-16 DIAGNOSIS — Z79.4 UNCONTROLLED TYPE 2 DIABETES MELLITUS WITH HYPERGLYCEMIA, WITH LONG-TERM CURRENT USE OF INSULIN (H): ICD-10-CM

## 2021-09-16 DIAGNOSIS — E11.65 UNCONTROLLED TYPE 2 DIABETES MELLITUS WITH HYPERGLYCEMIA, WITH LONG-TERM CURRENT USE OF INSULIN (H): ICD-10-CM

## 2021-09-16 PROCEDURE — T1013 SIGN LANG/ORAL INTERPRETER: HCPCS | Mod: U3

## 2021-09-16 PROCEDURE — 99215 OFFICE O/P EST HI 40 MIN: CPT | Performed by: PHYSICIAN ASSISTANT

## 2021-09-16 RX ORDER — INSULIN GLARGINE 100 [IU]/ML
45 INJECTION, SOLUTION SUBCUTANEOUS DAILY
Qty: 45 ML | Refills: 3 | Status: SHIPPED | OUTPATIENT
Start: 2021-09-16 | End: 2022-08-15

## 2021-09-16 ASSESSMENT — MIFFLIN-ST. JEOR: SCORE: 1400.25

## 2021-09-16 NOTE — PATIENT INSTRUCTIONS
Lower basaglar from 50 units to 45 units daily. Start recording this on the In-pen yamileth when you take it.     Change your carb ratio to 1/8g at breakfast (was 1/10g) and at dinner (we made this change today).      Please let me know if you start having low blood sugars.  I anticipate we may need to lower your basaglar further once your post-meal glucose values come down more.     Keep up the great work, Nayeli!  You have made a lot of progress!    Lab [phone].                                            Heart Healthy Diet and Lifestyle    - Eat your veggies and fruits -- and switch to whole grains. An abundance and variety of plant foods should make up the majority of your meals. Strive for seven to 10 servings a day of veggies and fruits. Add in beans and lentils.  Switch to whole-grain bread and cereal, and begin to eat more whole-grain rice and pasta products.    - Go nuts. Keep almonds, cashews, pistachios and walnuts on hand for a quick snack. Choose natural peanut butter or almond butter, rather than the kind with hydrogenated fat added. Try hummus as a dip or spread for bread.    - Pass on the butter. Try olive or canola oil as a healthy replacement for butter or margarine. Use it in cooking. Dip bread in flavored olive oil or lightly spread it on whole-grain bread for a tasty alternative to butter.     - Spice it up. Herbs and spices make food tasty and are also rich in health-promoting substances. Season your meals with herbs and spices rather than salt.    - Go fish. Eat fish twice a week. Fresh or water-packed tuna, salmon, trout, mackerel and herring are healthy choices. Grilled fish tastes good and requires little cleanup. Avoid fried fish, unless it's sauteed in a small amount of canola oil.    - Rein in the red meat. Substitute fish and poultry for red meat. When eaten, make sure it's lean and keep portions small (about the size of a deck of cards). Try to limit sausage, webb and other  high-fat or processed meats.    - Avoid being sedentary.  Decrease the amount of time spent in daily sedentary behavior.  Prolonged sitting should be interrupted every 30 min for blood glucose benefits.     - Be active.  30 minutes of moderate-to-vigorous intensity aerobic exercise at least 5 days a week or a total of 150 minutes spread over 3 days per week.  2-3 sessions/week of resistance exercise on nonconsecutive days. Flexibility training and balance training 2-3 times/week for older adults with diabetes.     Make an appointment with a dietitian for a personalized meal plan/nutrition review.

## 2021-09-16 NOTE — Clinical Note
Genaro Beltrán.  Please let me know if my note is sufficient for the dexcom.  She is doing amazingly well!  Anne Marie

## 2021-09-16 NOTE — LETTER
9/16/2021       RE: Naylei Caal  [address]  [facility] [facility] [address]     Dear Colleague,    Thank you for referring your patient, Nayeli Caal, to the [facility] [place] [facility]. Please see a copy of my visit note below.    dm 2  Jeniffer Salazar, MOISE    HPI:   Nayeli is a 42 yo woman here with a  for follow up of type 2 diabetes since the early 2000's.  She also sees Dr. Bragg, last visit was in April, 2021. She started on insulin in 2003 after failing Metformin treatment.  She has struggled with high blood sugars for many years.   At her last visit, we discussed going back to basal/bolus insulin and stopping the NPH.  We also started her on an In-pen for dosing adjustments.   She also started on a dexcom sensor. She has met with Leigh Ann Escoto and feels like she has made a great improvement in her control. She has adapted well to carb counting.     Her current regimen is:   empagliflozin 25 mg daily.   Basaglar 50 units.  Novolog (dosing on In-pen):  Carb ratio: 1/10g (Leigh Ann just changed this to 1/8g at dinner at her visit yesterday).   Sensitivity: Mid- 50, 6am- 40, 10:30pm- 50      Her overall average glucose is 171 mg/dL (CV 42%), over the past 2 weeks.  Her recent glucose is as follows:             She is checking her glucose 5-7 times a day on her dexcom and is taking bolus insulin 3-5 times a day.      She reports that her glucose routinely climbs after she drinks coffee.  She adds a little soy creamer and sugar. She has been trying to limit carbs and fruit.  She is eating eggs, not buying bread, trying to increase vegetables.  She does eat nuts. Small OJ with medication. Lunch- salad, lunch meat no bread, reducing carbs.  Dinner- trying to avoid processed. She has not been exercising at the gym because of COVID.     She is no longer on Metformin as even the low dose caused  intense diarrhea to the point where she could not go out of her home.     Diabetes complications:  Retinopathy: last eye exam - 3/21. 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  Nephropathy: h/o proteinuria; normal GFR. Now on 50mg losartan daily (tx with lisinopril was associated with a dry cough).   Neuropathy: No numbness or tingling sensation in her feet.     She has no other concerns today.       Past Medical History:   Diagnosis Date     Combined visual and hearing impairment      Deaf      Diabetic retinopathy of both eyes (H) 8/19/2011     Hepatic steatosis 08/19/2011     History of tobacco use      Hyperlipidemia      Hypertension      Hypertriglyceridemia      Migraines      Obesity 8/19/2011     Problem list name updated by automated process. 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  Ganglion cyst excision.;  Surgeon: Greg Streeter MD;  Location: [facility] OR       Family History   Problem Relation Age of Onset     Unknown/Adopted Other        Social History     Social Hx: She is adopted.  Works for [employer].   Social History     Marital status: Single     Spouse name: N/A     Number of children: N/A     Years of education: N/A     Social History Main Topics     Smoking status: Former Smoker     Types: Cigarettes     Quit date: 12/1/2010     Smokeless tobacco: Never Used      Comment: stopped  2 yrs ago     Alcohol use No     Drug use: No     Sexual activity: Not Currently     Partners: Male     Other Topics Concern      Service No     Blood Transfusions No     Caffeine Concern No     Occupational Exposure No     Hobby Hazards No     Sleep Concern No     Stress Concern No     Weight Concern Yes     Special Diet No     Back Care No     Exercise Yes     4-5 x a week     Bike Helmet No     Seat Belt Yes     Self-Exams Yes     Social History Narrative   Social Hx: Lives in a [facility].  Working- secretarial.  Boyfriend is in [place].     Current Outpatient Medications   Medication     acetaminophen (TYLENOL) 500 MG tablet     Alcohol Swabs (ALCOHOL PREP PAD) 70 % PADS     aspirin (ASA) 81 MG chewable tablet     atorvastatin (LIPITOR) 40 MG tablet     B-D U/F insulin pen needle     BD ULTRA FINE PEN NEEDLES     blood glucose (ACCU-CHEK LAYA PLUS) test strip     blood glucose monitoring (ACCU-CHEK FASTCLIX) lancets     cetirizine (ZYRTEC) 10 MG tablet     Continuous Blood Gluc Sensor (DEXCOM G6 SENSOR) MISC     Continuous Blood Gluc Transmit (DEXCOM G6 TRANSMITTER) MISC     empagliflozin (JARDIANCE) 25 MG TABS tablet     ergocalciferol (ERGOCALCIFEROL) 1.25 MG (81548 UT) capsule     fenofibrate (TRIGLIDE/LOFIBRA) 160 MG tablet     fish oil-omega-3 fatty acids 1000 MG capsule     ibuprofen (ADVIL/MOTRIN) 600 MG tablet     Injection Device for insulin (INPEN 100-PINK-SHAYNE) DAVID     insulin aspart (NOVOPEN ECHO) 100 UNIT/ML cartridge     insulin glargine (BASAGLAR KWIKPEN) 100 UNIT/ML pen     insulin pen needle (B-D U/F) 31G X 8 MM miscellaneous     levonorgestrel (MIRENA) 20 MCG/24HR IUD     losartan (COZAAR) 50 MG tablet     naproxen (NAPROSYN) 375 MG tablet     omega 3 1000 MG CAPS     Ostomy Supplies (ADHESIVE REMOVER WIPES) MISC     Ostomy Supplies (SKIN TAC ADHESIVE BARRIER WIPE) MISC     terbinafine (LAMISIL) 1 % external cream     triamcinolone (KENALOG) 0.1 % external cream     Current  "Facility-Administered Medications   Medication     hydrocortisone (CORTAID) 1 % cream        No Known Allergies    Physical Exam  /74   Ht 1.575 m (5' 2\")   Wt 78.2 kg (172 lb 6.4 oz)   BMI 31.53 kg/m    GENERAL:  Alert and oriented X3, NAD, well dressed, answering questions appropriately, appears stated age.  HEENT: OP clear, no lymphadenopathy, no thyromegaly, non-tender, no exophthalmus, no proptosis, EOMI, no lid lag, no retraction  EXTREMITIES: no edema, +pulses, no rashes, no lesions  RESULTS  Lab Results   Component Value Date    A1C 10.2 (H) 07/19/2021    A1C 11.6 (H) 04/05/2021    A1C 12.4 (H) 10/29/2020    A1C 12.9 (H) 07/08/2020    A1C 8.2 (H) 05/02/2019    A1C 10.3 (H) 10/15/2018    HEMOGLOBINA1 10.0 (A) 01/13/2020    HEMOGLOBINA1 9.9 (A) 10/07/2019    HEMOGLOBINA1 8.1 (A) 04/22/2019    HEMOGLOBINA1 10.4 (A) 01/14/2019    HEMOGLOBINA1 8.7 (A) 03/12/2018       TSH   Date Value Ref Range Status   07/08/2020 1.34 0.40 - 4.00 mU/L Final   05/17/2018 1.84 0.40 - 4.00 mU/L Final   11/27/2017 1.92 0.40 - 4.00 mU/L Final   05/11/2016 1.85 0.40 - 4.00 mU/L Final   02/11/2016 1.14 0.40 - 4.00 mU/L Final       ALT   Date Value Ref Range Status   08/31/2021 31 0 - 50 U/L Final   07/08/2020 29 0 - 50 U/L Final   05/02/2019 43 0 - 50 U/L Final   ]    Recent Labs   Lab Test 07/19/21  0626 07/08/20  1623 11/19/15  1330 09/16/13  0753   CHOL 194 179   < > 197   HDL 43* 41*   < > 34*   * 72   < > 119   TRIG 133 331*   < > 221*   CHOLHDLRATIO  --   --   --  5.8*    < > = values in this interval not displayed.       Lab Results   Component Value Date     08/31/2021     07/08/2020      Lab Results   Component Value Date    POTASSIUM 4.0 08/31/2021    POTASSIUM 4.0 07/08/2020     Lab Results   Component Value Date    CHLORIDE 112 08/31/2021    CHLORIDE 101 07/08/2020     Lab Results   Component Value Date    VERO 8.6 08/31/2021    VERO 8.6 07/08/2020     Lab Results   Component Value Date    CO2 20 " 08/31/2021    CO2 30 07/08/2020     Lab Results   Component Value Date    BUN 19 08/31/2021    BUN 13 07/08/2020     Lab Results   Component Value Date    CR 0.70 08/31/2021    CR 0.74 07/08/2020       GFR Estimate   Date Value Ref Range Status   08/31/2021 >90 >60 mL/min/1.73m2 Final     Comment:     As of July 11, 2021, eGFR is calculated by the CKD-EPI creatinine equation, without race adjustment. eGFR can be influenced by muscle mass, exercise, and diet. The reported eGFR is an estimation only and is only applicable if the renal function is stable.   07/22/2021 >90 >60 mL/min/1.73m2 Final     Comment:     As of July 11, 2021, eGFR is calculated by the CKD-EPI creatinine equation, without race adjustment. eGFR can be influenced by muscle mass, exercise, and diet. The reported eGFR is an estimation only and is only applicable if the renal function is stable.   07/08/2020 >90 >60 mL/min/[1.73_m2] Final     Comment:     Non  GFR Calc  Starting 12/18/2018, serum creatinine based estimated GFR (eGFR) will be   calculated using the Chronic Kidney Disease Epidemiology Collaboration   (CKD-EPI) equation.     05/02/2019 >90 >60 mL/min/[1.73_m2] Final     Comment:     Non  GFR Calc  Starting 12/18/2018, serum creatinine based estimated GFR (eGFR) will be   calculated using the Chronic Kidney Disease Epidemiology Collaboration   (CKD-EPI) equation.     05/17/2018 >90 >60 mL/min/1.7m2 Final     Comment:     Non  GFR Calc     GFR Estimate If Black   Date Value Ref Range Status   07/08/2020 >90 >60 mL/min/[1.73_m2] Final     Comment:      GFR Calc  Starting 12/18/2018, serum creatinine based estimated GFR (eGFR) will be   calculated using the Chronic Kidney Disease Epidemiology Collaboration   (CKD-EPI) equation.     05/02/2019 >90 >60 mL/min/[1.73_m2] Final     Comment:      GFR Calc  Starting 12/18/2018, serum creatinine based estimated GFR  (eGFR) will be   calculated using the Chronic Kidney Disease Epidemiology Collaboration   (CKD-EPI) equation.     05/17/2018 >90 >60 mL/min/1.7m2 Final     Comment:      GFR Calc       Lab Results   Component Value Date    MICROL 31 07/20/2020     No results found for: MICROALBUMIN  Lab Results   Component Value Date    CPEPT 1.3 03/25/2005       No results found for: B12]    Most recent eye exam date: : Not Found     Assessment/Plan:     1.  Type 2 diabetes-  Nayeli's glucose control has improved significantly since her last visit.  She has started using the In-pen, Dexcom and has learned carb counting.  Her last a1c was 10.2%, but I suspect this will improve significantly at her next check.  For now, her glucose is a bit high post-breakfast and dinner, but is drifting down overnight.  We made the following plan today (instructions given to patient):     Lower basaglar from 50 units to 45 units daily. Start recording this on the In-pen yamileth when you take it.     Change your carb ratio to 1/8g at breakfast (was 1/10g) and at dinner (we made this change today).      Please let me know if you start having low blood sugars.  I anticipate we may need to lower your basaglar further once your post-meal glucose values come down more.     We also briefly discussed her diet and interest in low carb/keto diet.  I advised her that any diet will cause weight loss, but it must be a diet that is sustainable.  Discussed heart healthy eating ideas and encouraged her to increase consumption of fruits, vegetables and whole grains. I have scheduled her to meet with Jhoana Acuna to review.     I have renewed her Dexcom prescription.     Nayeli meets guidelines for Dexcom G6 of:    The patient has diabetes (T2DM complicated by hyperglycemia);    The patient has been using a home blood glucose monitor (BGM) and performing frequent (four or more times a day) BGM testing;    The patient is insulin-treated with three or more  daily injections (MDI) of insulin ;    The patient's insulin treatment regimen requires frequent adjustments based on therapeutic CGM testing results;    Within six months prior to ordering the CGM, the patient had an in-person visit with the treating practitioner to evaluate their diabetes control and determine that the above criteria have been met; and    Every six months following the initial prescription of the CGM, the patient has an in-person visit with the treating practitioner to assess adherence to their CGM regimen and diabetes treatment plan.      2.  Risk factors-     Retinopathy:  No.  Had eye exam within last year.   Nephropathy:  BP well controlled. No microalbuminuria.  Creatinine stable.   Neuropathy: No.    Feet: OK, no ulcers.   Taking ASA: yes  Lipids:  LDL above target.  Patient taking statin and fibrate. Discussed diet and limiting saturated fats/processed meats. Will review with dietitian.     3.  F/U in 3 months with me, in 6 months with Dr. Bragg, in the next 2-3 months diabetes education to see our dietitan.  We will follow with her closely, however she does strongly prefer in-person appointments.      49 minutes spent on the date of the encounter doing chart review, review of test results, patient visit and documentation, counseling/coordination of care, and discussion of follow up plan for worsening hyper and hypoglycemia.  The patient understood and is satisfied with today's visit.        Anne Marie Medina PA-C, MPAS   [facility]  Department of Medicine

## 2021-09-23 ENCOUNTER — OFFICE VISIT (OUTPATIENT)
Dept: INTERNAL MEDICINE | Facility: CLINIC | Age: 41
End: 2021-09-23
Payer: COMMERCIAL

## 2021-09-23 ENCOUNTER — CARE COORDINATION (OUTPATIENT)
Dept: EDUCATION SERVICES | Facility: CLINIC | Age: 41
End: 2021-09-23

## 2021-09-23 ENCOUNTER — LAB (OUTPATIENT)
Dept: LAB | Facility: CLINIC | Age: 41
End: 2021-09-23
Payer: COMMERCIAL

## 2021-09-23 VITALS
OXYGEN SATURATION: 96 % | HEART RATE: 79 BPM | DIASTOLIC BLOOD PRESSURE: 81 MMHG | SYSTOLIC BLOOD PRESSURE: 127 MMHG | BODY MASS INDEX: 31.28 KG/M2 | WEIGHT: 171 LBS

## 2021-09-23 DIAGNOSIS — E11.65 UNCONTROLLED TYPE 2 DIABETES MELLITUS WITH HYPERGLYCEMIA, WITH LONG-TERM CURRENT USE OF INSULIN (H): Primary | ICD-10-CM

## 2021-09-23 DIAGNOSIS — D72.820 LYMPHOCYTOSIS: ICD-10-CM

## 2021-09-23 DIAGNOSIS — Z23 NEED FOR VACCINATION: ICD-10-CM

## 2021-09-23 DIAGNOSIS — Z79.4 UNCONTROLLED TYPE 2 DIABETES MELLITUS WITH HYPERGLYCEMIA, WITH LONG-TERM CURRENT USE OF INSULIN (H): Primary | ICD-10-CM

## 2021-09-23 DIAGNOSIS — N83.201 CYST OF RIGHT OVARY: Primary | ICD-10-CM

## 2021-09-23 LAB
ERYTHROCYTE [DISTWIDTH] IN BLOOD BY AUTOMATED COUNT: 12.6 % (ref 10–15)
HCT VFR BLD AUTO: 40.8 % (ref 35–47)
HGB BLD-MCNC: 13.1 G/DL (ref 11.7–15.7)
MCH RBC QN AUTO: 28.7 PG (ref 26.5–33)
MCHC RBC AUTO-ENTMCNC: 32.1 G/DL (ref 31.5–36.5)
MCV RBC AUTO: 89 FL (ref 78–100)
PLATELET # BLD AUTO: 358 10E3/UL (ref 150–450)
RBC # BLD AUTO: 4.57 10E6/UL (ref 3.8–5.2)
WBC # BLD AUTO: 10.7 10E3/UL (ref 4–11)

## 2021-09-23 PROCEDURE — 90686 IIV4 VACC NO PRSV 0.5 ML IM: CPT | Performed by: NURSE PRACTITIONER

## 2021-09-23 PROCEDURE — 85027 COMPLETE CBC AUTOMATED: CPT | Performed by: PATHOLOGY

## 2021-09-23 PROCEDURE — 36415 COLL VENOUS BLD VENIPUNCTURE: CPT | Performed by: PATHOLOGY

## 2021-09-23 PROCEDURE — 90471 IMMUNIZATION ADMIN: CPT | Performed by: NURSE PRACTITIONER

## 2021-09-23 PROCEDURE — 99214 OFFICE O/P EST MOD 30 MIN: CPT | Mod: 25 | Performed by: NURSE PRACTITIONER

## 2021-09-23 RX ORDER — PROCHLORPERAZINE 25 MG/1
SUPPOSITORY RECTAL
Qty: 1 EACH | Refills: 3 | Status: SHIPPED | OUTPATIENT
Start: 2021-09-23 | End: 2022-09-30

## 2021-09-23 RX ORDER — PROCHLORPERAZINE 25 MG/1
SUPPOSITORY RECTAL
Qty: 9 EACH | Refills: 3 | Status: SHIPPED | OUTPATIENT
Start: 2021-09-23 | End: 2022-09-30

## 2021-09-23 ASSESSMENT — PAIN SCALES - GENERAL: PAINLEVEL: MILD PAIN (3)

## 2021-09-23 NOTE — PROGRESS NOTES
Diabetes Education Note:    Nayeli is trying to get the Dexcom G6 CGM system.    [facility] but Nayeli requests that we send it to her [facility] pharmacy.  Apparently because the original order was written for a one month suppy instead of 3 months, [facility] would not accept the transfer of the sensor order, and only the transmitter order got transferred.  Call [facility] to clarify and re-sent order for sensors to them for a 3 month supply.  Nayeli informed of this.      Leigh Ann Escoto, JUSTICEN, RN, [facility]  Certified Diabetes Care and   [facility] Endocrinology and Diabetes   [facility]  Clinic [phone]  Phone [phone]

## 2021-09-23 NOTE — NURSING NOTE
Chief Complaint   Patient presents with     Hospital F/U     pt here to follow up for ER       Sloan Aguilar CMA, EMT at 8:55 AM on 9/23/2021.

## 2021-09-24 ENCOUNTER — ANCILLARY PROCEDURE (OUTPATIENT)
Dept: ULTRASOUND IMAGING | Facility: CLINIC | Age: 41
End: 2021-09-24
Attending: NURSE PRACTITIONER
Payer: COMMERCIAL

## 2021-09-24 DIAGNOSIS — N83.201 CYST OF RIGHT OVARY: ICD-10-CM

## 2021-09-24 PROCEDURE — 76830 TRANSVAGINAL US NON-OB: CPT | Mod: GC | Performed by: RADIOLOGY

## 2021-09-24 PROCEDURE — 76856 US EXAM PELVIC COMPLETE: CPT | Mod: GC | Performed by: RADIOLOGY
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
## 2021-10-13 ENCOUNTER — LAB (OUTPATIENT)
Dept: LAB | Facility: CLINIC | Age: 41
End: 2021-10-13
Payer: COMMERCIAL

## 2021-10-13 ENCOUNTER — MYC MEDICAL ADVICE (OUTPATIENT)
Dept: INTERNAL MEDICINE | Facility: CLINIC | Age: 41
End: 2021-10-13

## 2021-10-13 DIAGNOSIS — Z79.4 TYPE 2 DIABETES MELLITUS WITH HYPERGLYCEMIA, WITH LONG-TERM CURRENT USE OF INSULIN (H): ICD-10-CM

## 2021-10-13 DIAGNOSIS — E11.65 TYPE 2 DIABETES MELLITUS WITH HYPERGLYCEMIA, WITH LONG-TERM CURRENT USE OF INSULIN (H): ICD-10-CM

## 2021-10-13 LAB — HBA1C MFR BLD: 9.8 % (ref 0–5.6)

## 2021-10-13 PROCEDURE — 36415 COLL VENOUS BLD VENIPUNCTURE: CPT | Performed by: PATHOLOGY

## 2021-10-13 PROCEDURE — 83036 HEMOGLOBIN GLYCOSYLATED A1C: CPT | Performed by: PATHOLOGY

## 2021-10-19 ENCOUNTER — TELEPHONE (OUTPATIENT)
Dept: OBGYN | Facility: CLINIC | Age: 41
End: 2021-10-19

## 2021-10-19 ENCOUNTER — MYC MEDICAL ADVICE (OUTPATIENT)
Dept: OBGYN | Facility: CLINIC | Age: 41
End: 2021-10-19

## 2021-10-19 DIAGNOSIS — B37.31 YEAST INFECTION OF THE VAGINA: Primary | ICD-10-CM

## 2021-10-19 RX ORDER — FLUCONAZOLE 150 MG/1
150 TABLET ORAL ONCE
Qty: 1 TABLET | Refills: 1 | Status: SHIPPED | OUTPATIENT
Start: 2021-10-19 | End: 2021-10-19

## 2021-10-19 NOTE — TELEPHONE ENCOUNTER
[facility] Call Center    Phone Message    May a detailed message be left on voicemail: yes     Reason for Call: Symptoms or Concerns     If patient has red-flag symptoms, warm transfer to triage line    Current symptom or concern: pt stated she has symptoms of a yeast infection, there are no appts for 2 weeks, pt is wondering if a medication can be sent to [address] [place] [address], she stated she has had this many times in the past, please send pop a [employer] message, thank you    Symptoms have been present for: a few days    Has patient previously been seen for this? Yes    By : Negar    Date: 6/15/2021    Are there any new or worsening symptoms? Yes: worsening       Action Taken: Message routed to:  Other: sonu rn    Travel Screening: Not Applicable

## 2021-10-19 NOTE — TELEPHONE ENCOUNTER
MyChart message received from patient reporting symptoms of vulvar irritation, redness, itching x 3 days. States has been treated for yeast infection in the past, has been over 5 years since last infection.     Prescription for Diflucan sent to preferred pharmacy.     MyChart message sent to patient informing her this was done and administration instructions and instructing her to reach out if symptoms do not improve.

## 2021-10-21 ENCOUNTER — TELEPHONE (OUTPATIENT)
Dept: OBGYN | Facility: CLINIC | Age: 41
End: 2021-10-21

## 2021-10-21 NOTE — TELEPHONE ENCOUNTER
[facility] Call Center    Phone Message    May a detailed message be left on voicemail: no     Reason for Call: Other: Nayeli Puentes calling with - Santos MIXON to call her back     Action Taken: Message routed to:  Other: [facility]    Travel Screening: Not Applicable

## 2021-10-21 NOTE — TELEPHONE ENCOUNTER
[facility] Call Center    Phone Message    May a detailed message be left on voicemail: yes     Reason for Call: Patient would prefer to be contact through [employer].  Patient states she would like to request additional fluconazole and cream.  Patient states she took her last medication yesterday and feels like it's working but needs more.  Please reach out to patient by [employer] if possible.    Action Taken: Message routed to:  [facility]    Travel Screening: Not Applicable

## 2021-11-01 ENCOUNTER — ALLIED HEALTH/NURSE VISIT (OUTPATIENT)
Dept: EDUCATION SERVICES | Facility: CLINIC | Age: 41
End: 2021-11-01
Attending: PHYSICIAN ASSISTANT
Payer: COMMERCIAL

## 2021-11-01 DIAGNOSIS — Z79.4 TYPE 2 DIABETES MELLITUS WITH HYPERGLYCEMIA, WITH LONG-TERM CURRENT USE OF INSULIN (H): ICD-10-CM

## 2021-11-01 DIAGNOSIS — E11.65 TYPE 2 DIABETES MELLITUS WITH HYPERGLYCEMIA, WITH LONG-TERM CURRENT USE OF INSULIN (H): ICD-10-CM

## 2021-11-01 PROCEDURE — T1013 SIGN LANG/ORAL INTERPRETER: HCPCS | Mod: U3

## 2021-11-01 PROCEDURE — 97803 MED NUTRITION INDIV SUBSEQ: CPT | Performed by: NUTRITIONIST

## 2021-11-02 NOTE — PROGRESS NOTES
"Diabetes Self-Management Education & Support    Presents for:  MNT for Type 2 diabetes  Patient is accompanied by a      SUBJECTIVE/OBJECTIVE:  Diabetes education in the past 24mo: (P) Yes  Diabetes type: (P) Type 2  Disease course: (P) Improving  Cultural Influences/Ethnic Background:  Estuardo Tyler has just started using the In Pen and counting carbohydrates. She isn't sure she is doing it correctly.  She also wants to lower her carb intake and needs some suggestions and ideas for how to do that.  She feels lower her carb intake will help improve her glucose.   She is taking 45 units of basaglar and insulin to carb ratio 1:7g     Diabetes Symptoms & Complications:     Autonomic neuropathy: (P) No  CVA: (P) No  Heart disease: (P) No  Nephropathy: (P) No  Peripheral neuropathy: (P) No  Peripheral Vascular Disease: (P) No  Retinopathy: (P) No  Sexual dysfunction: (P) No    Patient Problem List and Family Medical History reviewed for relevant medical history, current medical status, and diabetes risk factors.    Vitals:  There were no vitals taken for this visit.  Estimated body mass index is 31.28 kg/m  as calculated from the following:    Height as of 9/16/21: 1.575 m (5' 2\").    Weight as of 9/23/21: 77.6 kg (171 lb).   Last 3 BP:   BP Readings from Last 3 Encounters:   09/23/21 127/81   09/16/21 116/74   08/31/21 113/66       History   Smoking Status     Former Smoker     Types: Cigarettes     Quit date: 12/1/2010   Smokeless Tobacco     Never Used     Comment: stopped 10 yrs ago       Labs:  Lab Results   Component Value Date    A1C 9.8 10/13/2021    A1C 11.6 04/05/2021     Lab Results   Component Value Date     08/31/2021     08/31/2021     07/08/2020     Lab Results   Component Value Date     07/19/2021    LDL 72 07/08/2020     HDL Cholesterol   Date Value Ref Range Status   07/08/2020 41 (L) >49 mg/dL Final     Direct Measure HDL   Date Value Ref Range " Status   07/19/2021 43 (L) >=50 mg/dL Final     Comment:     0-19 years:       Greater than or equal to 45 mg/dL   Low: Less than 40 mg/dL   Borderline low: 40-44 mg/dL     20 years and older:   Female: Greater than or equal to 50 mg/dL   Male:   Greater than or equal to 40 mg/dL        ]  GFR Estimate   Date Value Ref Range Status   08/31/2021 >90 >60 mL/min/1.73m2 Final     Comment:     As of July 11, 2021, eGFR is calculated by the CKD-EPI creatinine equation, without race adjustment. eGFR can be influenced by muscle mass, exercise, and diet. The reported eGFR is an estimation only and is only applicable if the renal function is stable.   07/08/2020 >90 >60 mL/min/[1.73_m2] Final     Comment:     Non  GFR Calc  Starting 12/18/2018, serum creatinine based estimated GFR (eGFR) will be   calculated using the Chronic Kidney Disease Epidemiology Collaboration   (CKD-EPI) equation.       GFR Estimate If Black   Date Value Ref Range Status   07/08/2020 >90 >60 mL/min/[1.73_m2] Final     Comment:      GFR Calc  Starting 12/18/2018, serum creatinine based estimated GFR (eGFR) will be   calculated using the Chronic Kidney Disease Epidemiology Collaboration   (CKD-EPI) equation.       Lab Results   Component Value Date    CR 0.70 08/31/2021    CR 0.74 07/08/2020     No results found for: MICROALBUMIN    Healthy Eating:  Beverages: (P) Water, Tea, Coffee, Juice  Breakfast is old fashioned dry oats 1 cup with 1/4 cup raisins or eggs with vegetables including peppers onion mushrooms webb, sometimes with 1/2 cup either strawberries, blueberries, raspberries.   To drink she has coffee with 1 tbsp sweetened cream (4g carb)  Patient notices coffee raises her glucose despite decreasing the sweet cream in it drastically.   Lunch - she is trying to eat low carb. A lettuce wrap sandwich with deli meat and cheese and a sandwich with veg and dressing. Or she might have leftovers.  She doesn't really miss  bread and tortilla.  For dinner she has meat, veg and will include carb like rice (rice a luis) or noodle for example.     Being Active:  Barrier to exercise: (P) Time  Not discussed today    Monitoring:  Blood Glucose Meter: (P) CGM  Times checking blood sugar at home (number): (P) 5+  Times checking blood sugar at home (per): (P) Day  Blood glucose trend: (P) Increasing  I was unable to log into [employer] today to review the reports but did view reports on patients phone.  She is having elevated glucose after eating and above goal most of the day.     Taking Medications:  Diabetes Medication(s)     Insulin       insulin aspart (NOVOPEN ECHO) 100 UNIT/ML cartridge    For use with the In-Pen device.  Give 1 unit per 5 grams CHO with meals and snacks as well as correction.  Average daily use is 80 units daily.     insulin glargine (BASAGLAR KWIKPEN) 100 UNIT/ML pen    Inject 45 Units Subcutaneous daily    Sodium-Glucose Co-Transporter 2 (SGLT2) Inhibitors       empagliflozin (JARDIANCE) 25 MG TABS tablet    Take 1 tablet (25 mg) by mouth daily          Current Treatments: (P) Insulin Injections  Dose schedule: (P) Pre-breakfast, Pre-lunch, Pre-dinner, At bedtime  Given by: (P) Patient  Injection/Infusion sites: (P) Abdomen    Reducing Risks:  CAD Risks: (P) No known risk factors  Has dilated eye exam at least once a year?: (P) Yes  Sees dentist every 6 months?: (P) No  Feet checked by healthcare provider in the last year?: (P) Yes    Healthy Coping:  Informal Support system:: (P) Friends  Patient Activation Measure Survey Score:  No flowsheet data found.    Diabetes knowledge and skills assessment:     Patient needs further education on the following diabetes management concepts: Healthy Eating, Carb Counting, Carbohydrate  Based on learning assessment above, most appropriate setting for further diabetes education would be: Individual setting.      INTERVENTIONS:    Education provided today on:  AADE Self-Care  Behaviors:  Healthy Eating: carbohydrate counting and consistency in amount, composition, and timing of food intake  We started by reviewing patients carb counting and we did find errors today.  We went over how to use the nutrition facts label in details. She uses her phone as a magnifier, which seems to work fine for her.   We looked through her common foods and looked up the labels and nutrition facts. We made a list of her common foods with typical serving sizes and carb amounts. She took this sheet home with her today. I also provided her with a book with carb amounts which we also used in education.  Food models were used and measuring cups were also used today.  Patient tells me she has measuring cups and spoons to use at home.   She is mostly underestimating the carbs in her meals. She will make adjustments to improve the accuracy of her carb counting after meeting today.  We also discussed some food items she may want to consider trying such as chickpea pasta which is lower carb, higher fiber, higher protein and some other products to help her avoid high carb meals.  Encouraged her to keep her carb intake at meals under 60 grams and at snacks under 30 grams.     Opportunities for ongoing education and support in diabetes-self management were discussed.    Pt verbalized understanding of concepts discussed and recommendations provided today.       Education Materials Provided:  Carbohydrate Counting      ASSESSMENT:  Patient's most recent   Lab Results   Component Value Date    A1C 9.8 10/13/2021    A1C 11.6 04/05/2021    is not meeting goal of <7.0    PLAN  AVS printed and provided to patient today. See Follow-Up section for recommended follow-up.    We went over carb counting in detail today.  We also discussed some food products she may want to try as well as lower carb diet.  Encouraged to keep meals under 60 grams and snacks under 30 grams.  Patient will follow up on January 4th in person and will  contact me via [employer] if she has any questions before then.     Time Spent: 60 minutes  Encounter Type: Individual    Any diabetes medication dose changes were made via the CDE Protocol and Collaborative Practice Agreement with the patient's referring provider. A copy of this encounter was shared with the provider.

## 2021-12-08 DIAGNOSIS — E78.2 MIXED HYPERLIPIDEMIA: ICD-10-CM

## 2021-12-09 ENCOUNTER — MYC MEDICAL ADVICE (OUTPATIENT)
Dept: OBGYN | Facility: CLINIC | Age: 41
End: 2021-12-09
Payer: COMMERCIAL

## 2021-12-09 DIAGNOSIS — B37.31 YEAST INFECTION OF THE VAGINA: Primary | ICD-10-CM

## 2021-12-09 RX ORDER — FLUCONAZOLE 150 MG/1
150 TABLET ORAL ONCE
Qty: 1 TABLET | Refills: 0 | Status: SHIPPED | OUTPATIENT
Start: 2021-12-09 | End: 2021-12-09

## 2021-12-10 RX ORDER — FENOFIBRATE 160 MG/1
160 TABLET ORAL DAILY
Qty: 90 TABLET | Refills: 3 | Status: SHIPPED | OUTPATIENT
Start: 2021-12-10 | End: 2023-02-16

## 2021-12-10 NOTE — TELEPHONE ENCOUNTER
FENOFIBRATE 160MG TABS  90 Tabs, 3 Refills sent to pharm 12/10/2021      Jimena Balderas RN  Central Triage Red Flags/Med Refills

## 2021-12-15 ENCOUNTER — MYC MEDICAL ADVICE (OUTPATIENT)
Dept: INTERNAL MEDICINE | Facility: CLINIC | Age: 41
End: 2021-12-15
Payer: COMMERCIAL

## 2021-12-21 ENCOUNTER — TELEPHONE (OUTPATIENT)
Dept: INTERNAL MEDICINE | Facility: CLINIC | Age: 41
End: 2021-12-21
Payer: COMMERCIAL

## 2021-12-21 DIAGNOSIS — Z30.433 ENCOUNTER FOR REMOVAL AND REINSERTION OF INTRAUTERINE CONTRACEPTIVE DEVICE (IUD): Primary | ICD-10-CM

## 2021-12-21 NOTE — TELEPHONE ENCOUNTER
Patient is scheduled for an IUD Mirena replacement.    Patient Mirena insertion date: 01/24/17  Due for replacement by 01/24/22      Mirena order pended for provider to sign.  Will get a prior-auth check at earliest January 1st due to insurance.        Dilan Conn CMA ([facility]) at 6:55 AM on 12/21/2021

## 2021-12-29 ENCOUNTER — MYC MEDICAL ADVICE (OUTPATIENT)
Dept: INTERNAL MEDICINE | Facility: CLINIC | Age: 41
End: 2021-12-29
Payer: COMMERCIAL

## 2022-01-04 ENCOUNTER — ALLIED HEALTH/NURSE VISIT (OUTPATIENT)
Dept: EDUCATION SERVICES | Facility: CLINIC | Age: 42
End: 2022-01-04
Payer: COMMERCIAL

## 2022-01-04 DIAGNOSIS — E11.65 UNCONTROLLED TYPE 2 DIABETES MELLITUS WITH HYPERGLYCEMIA, WITH LONG-TERM CURRENT USE OF INSULIN (H): Primary | ICD-10-CM

## 2022-01-04 DIAGNOSIS — Z79.4 UNCONTROLLED TYPE 2 DIABETES MELLITUS WITH HYPERGLYCEMIA, WITH LONG-TERM CURRENT USE OF INSULIN (H): Primary | ICD-10-CM

## 2022-01-04 PROCEDURE — 97803 MED NUTRITION INDIV SUBSEQ: CPT | Performed by: NUTRITIONIST

## 2022-01-04 PROCEDURE — 99207 PR NO BILLABLE SERVICE THIS VISIT: CPT | Mod: U3

## 2022-01-04 NOTE — PROGRESS NOTES
"Diabetes Self-Management Education & Support    Presents for:  Follow up, type 2 diabetes  Referred by Anne Marie SHORT    SUBJECTIVE/OBJECTIVE:     Cultural Influences/Ethnic Background:  Nepali  Patient concerns/statements: Carb counting is going much better, feeling much better, glucose is not to goal but improved    Diabetes Symptoms & Complications:          Patient Problem List and Family Medical History reviewed for relevant medical history, current medical status, and diabetes risk factors.    Vitals:  There were no vitals taken for this visit.  Estimated body mass index is 31.28 kg/m  as calculated from the following:    Height as of 9/16/21: 1.575 m (5' 2\").    Weight as of 9/23/21: 77.6 kg (171 lb).   Last 3 BP:   BP Readings from Last 3 Encounters:   09/23/21 127/81   09/16/21 116/74   08/31/21 113/66       History   Smoking Status     Former Smoker     Types: Cigarettes     Quit date: 12/1/2010   Smokeless Tobacco     Never Used     Comment: stopped 10 yrs ago       Labs:  Lab Results   Component Value Date    A1C 9.8 10/13/2021    A1C 11.6 04/05/2021     Lab Results   Component Value Date     08/31/2021     08/31/2021     07/08/2020     Lab Results   Component Value Date     07/19/2021    LDL 72 07/08/2020     HDL Cholesterol   Date Value Ref Range Status   07/08/2020 41 (L) >49 mg/dL Final     Direct Measure HDL   Date Value Ref Range Status   07/19/2021 43 (L) >=50 mg/dL Final     Comment:     0-19 years:       Greater than or equal to 45 mg/dL   Low: Less than 40 mg/dL   Borderline low: 40-44 mg/dL     20 years and older:   Female: Greater than or equal to 50 mg/dL   Male:   Greater than or equal to 40 mg/dL        ]  GFR Estimate   Date Value Ref Range Status   08/31/2021 >90 >60 mL/min/1.73m2 Final     Comment:     As of July 11, 2021, eGFR is calculated by the CKD-EPI creatinine equation, without race adjustment. eGFR can be influenced by muscle mass, exercise, and " diet. The reported eGFR is an estimation only and is only applicable if the renal function is stable.   07/08/2020 >90 >60 mL/min/[1.73_m2] Final     Comment:     Non  GFR Calc  Starting 12/18/2018, serum creatinine based estimated GFR (eGFR) will be   calculated using the Chronic Kidney Disease Epidemiology Collaboration   (CKD-EPI) equation.       GFR Estimate If Black   Date Value Ref Range Status   07/08/2020 >90 >60 mL/min/[1.73_m2] Final     Comment:      GFR Calc  Starting 12/18/2018, serum creatinine based estimated GFR (eGFR) will be   calculated using the Chronic Kidney Disease Epidemiology Collaboration   (CKD-EPI) equation.       Lab Results   Component Value Date    CR 0.70 08/31/2021    CR 0.74 07/08/2020     No results found for: MICROALBUMIN    Healthy Eating:  Reviewed carb counting in November with patient and found she was often underestimating carbs.  She is counting more accurately now.  She has some specific questions about certain foods, which we went over today (black bean burger, starchy veg, miracle noodles)  She eats two meals and a snack daily  Portion of meat, small serving potato and salad kit portion  Serving chips  Protein and veg  States she is feeling much better    Monitoring:  Unfortunately we were not able to review patient's In Pen report today.  Dexcom Report:   10% very high  28% high  61% in target range    Taking Medications:  Diabetes Medication(s)     Insulin       insulin aspart (NOVOPEN ECHO) 100 UNIT/ML cartridge    For use with the In-Pen device.  Give 1 unit per 5 grams CHO with meals and snacks as well as correction.  Average daily use is 80 units daily.     insulin glargine (BASAGLAR KWIKPEN) 100 UNIT/ML pen    Inject 45 Units Subcutaneous daily    Sodium-Glucose Co-Transporter 2 (SGLT2) Inhibitors       empagliflozin (JARDIANCE) 25 MG TABS tablet    Take 1 tablet (25 mg) by mouth daily          Healthy Coping:     Patient Activation  Measure Survey Score:  No flowsheet data found.    Diabetes knowledge and skills assessment:   Patient is knowledgeable in diabetes management concepts related to: Healthy Eating    Patient needs further education on the following diabetes management concepts: Healthy Eating    Based on learning assessment above, most appropriate setting for further diabetes education would be: Individual setting.      INTERVENTIONS:    Education provided today on:  AADE Self-Care Behaviors:  Healthy Eating: carbohydrate counting and dexcom report    Opportunities for ongoing education and support in diabetes-self management were discussed.    Pt verbalized understanding of concepts discussed and recommendations provided today.       Education Materials Provided:  No new materials provided today      ASSESSMENT:  Nayeli is doing better with carb counting. Her glucose has improved. There is a pattern of high glucose after dinner. Patient tells me she sometimes has a smirnoff ice raspberry flavor which has about 38 g carb. Discussed alternatives versus cutting back. She is very open to doing both. May be having some uncovered snacks at that time too. She takes insulin 10 minutes before eating.  We will plan to review the In Pen report at follow up, she had a lot of nutrition questions and we did not have time today.      Patient's most recent   Lab Results   Component Value Date    A1C 9.8 10/13/2021    A1C 11.6 04/05/2021    is not meeting goal of <7.0    PLAN  Follow up scheduled in four weeks  Time Spent: 60 minutes  Encounter Type: Individual    Any diabetes medication dose changes were made via the CDE Protocol and Collaborative Practice Agreement with the patient's referring provider. A copy of this encounter was shared with the provider.

## 2022-01-10 NOTE — TELEPHONE ENCOUNTER
[facility] Call [facility]    Phone Message    May a detailed message be left on voicemail: yes     Reason for Call: Other: Per Patient is wanting to get a call back in regards to rescheduling the apt today by zoom. Patient states can give a call back to schedule with Patient or a Mychart message, please advise.       Patient is wanting to have the apt today, 11/13/2020 cancelled.     Action Taken: Message routed to:  [facility] End[facility]    Travel Screening: Not Applicable                                                                       Pt provided with address and phone number to 3 different testing sites.

## 2022-01-17 ENCOUNTER — NURSE TRIAGE (OUTPATIENT)
Dept: NURSING | Facility: CLINIC | Age: 42
End: 2022-01-17

## 2022-01-17 ENCOUNTER — LAB (OUTPATIENT)
Dept: URGENT CARE | Facility: URGENT CARE | Age: 42
End: 2022-01-17
Attending: FAMILY MEDICINE
Payer: COMMERCIAL

## 2022-01-17 ENCOUNTER — TELEPHONE (OUTPATIENT)
Dept: INTERNAL MEDICINE | Facility: CLINIC | Age: 42
End: 2022-01-17

## 2022-01-17 DIAGNOSIS — Z20.822 SUSPECTED COVID-19 VIRUS INFECTION: ICD-10-CM

## 2022-01-17 PROCEDURE — U0005 INFEC AGEN DETEC AMPLI PROBE: HCPCS | Mod: 90

## 2022-01-17 PROCEDURE — 99000 SPECIMEN HANDLING OFFICE-LAB: CPT

## 2022-01-17 PROCEDURE — U0003 INFECTIOUS AGENT DETECTION BY NUCLEIC ACID (DNA OR RNA); SEVERE ACUTE RESPIRATORY SYNDROME CORONAVIRUS 2 (SARS-COV-2) (CORONAVIRUS DISEASE [COVID-19]), AMPLIFIED PROBE TECHNIQUE, MAKING USE OF HIGH THROUGHPUT TECHNOLOGIES AS DESCRIBED BY CMS-2020-01-R: HCPCS | Mod: 90

## 2022-01-17 NOTE — TELEPHONE ENCOUNTER
See nurse triage encounter.      Dilan Conn CMA ([facility]) at 1:49 PM on 1/17/2022

## 2022-01-17 NOTE — TELEPHONE ENCOUNTER
[facility] Call Center    Phone Message    May a detailed message be left on voicemail: yes     Reason for Call: Other: Patient calling to report she got positive results from a COVID test last week. Patient was wondering when she should get another COVID test and what is recommended for next steps. Writer gave patient COVID Nurse Line PH# and routed patient to COVID Nurse Line to discuss further.     Action Taken: Message routed to:  [facility]    Travel Screening: Not Applicable

## 2022-01-17 NOTE — TELEPHONE ENCOUNTER
S-(situation): Call from patient with questions about COVID-19    Roommate tested positive but patient tested negative.    Patient asking about isolation and re-testing      B-(background):   Patient was vaccinated and received her booster in earlier this month    A-(assessment): reviewed COVID-19 testing and recommendation      R-(recommendations): Home Care  Reviewed current recommendations.    Caller verbalized understanding.  Patient request transfer to schedule upcoming labs.        Griselda Godoy RN/[facility]    Reason for Disposition    COVID-19 vaccine, fully vaccinated, and exposure to COVID-19, Frequently Asked Questions (FAQs)    Protocols used: CORONAVIRUS (COVID-19) VACCINE QUESTIONS AND [id number] 8.25.2021

## 2022-01-18 LAB — SARS-COV-2 RNA RESP QL NAA+PROBE: NOT DETECTED

## 2022-01-27 ENCOUNTER — LAB (OUTPATIENT)
Dept: LAB | Facility: CLINIC | Age: 42
End: 2022-01-27
Attending: PHYSICIAN ASSISTANT
Payer: COMMERCIAL

## 2022-01-27 DIAGNOSIS — Z79.4 TYPE 2 DIABETES MELLITUS WITH HYPERGLYCEMIA, WITH LONG-TERM CURRENT USE OF INSULIN (H): ICD-10-CM

## 2022-01-27 DIAGNOSIS — E11.65 TYPE 2 DIABETES MELLITUS WITH HYPERGLYCEMIA, WITH LONG-TERM CURRENT USE OF INSULIN (H): ICD-10-CM

## 2022-01-27 LAB — HBA1C MFR BLD: 9.3 % (ref 0–5.6)

## 2022-01-27 PROCEDURE — 36415 COLL VENOUS BLD VENIPUNCTURE: CPT | Performed by: PATHOLOGY

## 2022-01-27 PROCEDURE — 83036 HEMOGLOBIN GLYCOSYLATED A1C: CPT | Performed by: PATHOLOGY

## 2022-02-01 ENCOUNTER — OFFICE VISIT (OUTPATIENT)
Dept: INTERNAL MEDICINE | Facility: CLINIC | Age: 42
End: 2022-02-01
Payer: COMMERCIAL

## 2022-02-01 ENCOUNTER — TELEPHONE (OUTPATIENT)
Dept: INTERNAL MEDICINE | Facility: CLINIC | Age: 42
End: 2022-02-01

## 2022-02-01 ENCOUNTER — ALLIED HEALTH/NURSE VISIT (OUTPATIENT)
Dept: EDUCATION SERVICES | Facility: CLINIC | Age: 42
End: 2022-02-01
Payer: COMMERCIAL

## 2022-02-01 ENCOUNTER — LAB (OUTPATIENT)
Dept: LAB | Facility: CLINIC | Age: 42
End: 2022-02-01
Attending: NURSE PRACTITIONER
Payer: COMMERCIAL

## 2022-02-01 VITALS
OXYGEN SATURATION: 97 % | HEIGHT: 62 IN | DIASTOLIC BLOOD PRESSURE: 81 MMHG | SYSTOLIC BLOOD PRESSURE: 117 MMHG | WEIGHT: 173.4 LBS | BODY MASS INDEX: 31.91 KG/M2 | HEART RATE: 77 BPM

## 2022-02-01 DIAGNOSIS — E11.9 DM TYPE 2, GOAL: SYMPTOM MGMT (H): ICD-10-CM

## 2022-02-01 DIAGNOSIS — D25.9 UTERINE LEIOMYOMA, UNSPECIFIED LOCATION: ICD-10-CM

## 2022-02-01 DIAGNOSIS — E11.65 UNCONTROLLED TYPE 2 DIABETES MELLITUS WITH HYPERGLYCEMIA, WITH LONG-TERM CURRENT USE OF INSULIN (H): Primary | ICD-10-CM

## 2022-02-01 DIAGNOSIS — Z12.31 ENCOUNTER FOR SCREENING MAMMOGRAM FOR BREAST CANCER: ICD-10-CM

## 2022-02-01 DIAGNOSIS — Z79.4 UNCONTROLLED TYPE 2 DIABETES MELLITUS WITH HYPERGLYCEMIA, WITH LONG-TERM CURRENT USE OF INSULIN (H): Primary | ICD-10-CM

## 2022-02-01 DIAGNOSIS — E11.9 DM TYPE 2, GOAL: SYMPTOM MGMT (H): Primary | ICD-10-CM

## 2022-02-01 LAB
CREAT UR-MCNC: 74 MG/DL
PROT UR-MCNC: 0.26 G/L
PROT/CREAT 24H UR: 0.35 G/G CR (ref 0–0.2)

## 2022-02-01 PROCEDURE — 97803 MED NUTRITION INDIV SUBSEQ: CPT | Performed by: NUTRITIONIST

## 2022-02-01 PROCEDURE — 84156 ASSAY OF PROTEIN URINE: CPT | Performed by: PATHOLOGY

## 2022-02-01 PROCEDURE — 99214 OFFICE O/P EST MOD 30 MIN: CPT | Mod: 25 | Performed by: NURSE PRACTITIONER

## 2022-02-01 PROCEDURE — 99207 PR NO BILLABLE SERVICE THIS VISIT: CPT | Mod: U3

## 2022-02-01 PROCEDURE — 58301 REMOVE INTRAUTERINE DEVICE: CPT | Performed by: NURSE PRACTITIONER

## 2022-02-01 ASSESSMENT — MIFFLIN-ST. JEOR: SCORE: 1404.79

## 2022-02-01 NOTE — NURSING NOTE
Nayeli Caal is a 41 year old female patient that presents today in clinic for the following:    Chief Complaint   Patient presents with     RECHECK     having some residual soreness in groin area     The patient's allergies and medications were reviewed as noted. A set of vitals were recorded as noted without incident. The patient does not have any other questions for the provider.    Jose Dey, EMT at 8:36 AM on 2/1/2022

## 2022-02-01 NOTE — PROGRESS NOTES
S: Nayeli Caal is a 41 year old female accompanied by .  She would like to discuss possibility of having her IUD removed.  She and her partner are desiring to's a pregnancy to start a family.  Nayeli understands that her diabetes is a complication in any pregnancy and has been attempting to control her blood sugars better although she has not been able to lose any weight and is frustrated.  She asks if her fibroid will present a problem in pregnancy.  Her last hemoglobin A1c on 2022 was 9.3.         Patient Active Problem List   Diagnosis     Essential hypertension     Hypersomnia, organic     Other chronic nonalcoholic liver disease     Diabetic retinopathy of both eyes (H)     Obesity     Usher Syndrome: congenital deafness, retinitis pigmentosa     Macular degeneration, age related, nonexudative     Benign neoplasm of iris     Dry eye syndrome     Pemphigus erythematosus     Chondromalacia of patella, right, steroid injection 2015     Uncontrolled type 2 diabetes mellitus with hyperglycemia, with long-term current use of insulin (H)          Past Surgical History:   Procedure Laterality Date     LAPAROSCOPIC CHOLECYSTECTOMY N/A 3/10/2018    Procedure: LAPAROSCOPIC CHOLECYSTECTOMY;  Laparoscopic Cholecystectomy ;  Surgeon: Kuldeep Sigala MD;  Location: [facility] OR     RELEASE TRIGGER FINGER Right 2019    Procedure: Right Thumb Trigger Release;  Surgeon: Greg Streeter MD;  Location: [facility] OR     RELEASE TRIGGER FINGER Left 2019    Procedure: Left Ring Trigger Finger Release.  Ganglion cyst excision.;  Surgeon: Greg Streeter MD;  Location: [facility] OR            Social History     Tobacco Use     Smoking status: Former Smoker     Types: Cigarettes     Quit date: 2010     Years since quittin.1     Smokeless tobacco: Never Used     Tobacco comment: stopped 10 yrs ago   Substance Use Topics     Alcohol use: Yes     Comment: socially     She is  employed        Family History   Problem Relation Age of Onset     Unknown/Adopted Other              No Known Allergies         Current Outpatient Medications   Medication Sig Dispense Refill     acetaminophen (TYLENOL) 500 MG tablet Take 2 tablets (1,000 mg) by mouth every 6 hours as needed for mild pain 100 tablet 3     Alcohol Swabs (ALCOHOL PREP PAD) 70 % PADS 1 each 3 times daily 100 each 3     aspirin (ASA) 81 MG chewable tablet Take 1 tablet (81 mg) by mouth daily CHEW AND SWALLOW 90 tablet 3     atorvastatin (LIPITOR) 40 MG tablet Take 1 tablet (40 mg) by mouth daily 90 tablet 2     B-D U/F insulin pen needle        BD ULTRA FINE PEN NEEDLES Inject 1 dose. Subcutaneous 2 times daily BD ultra-fine insulin syringe, 30 ga. X 1/2'' short needle 1/2 cc. Use as directed. 400 Units 11     blood glucose (ACCU-CHEK LAYA PLUS) test strip Use with  Accucheck Expert meter.  Test blood sugar 6 times daily. 600 each 3     blood glucose monitoring (ACCU-CHEK FASTCLIX) lancets Use to test blood sugar 6 times daily or as directed 510 each 3     cetirizine (ZYRTEC) 10 MG tablet Take 1 tablet (10 mg) by mouth daily 30 tablet 1     Continuous Blood Gluc Sensor (DEXCOM G6 SENSOR) MISC Change every 10 days. 3 month supply. 9 each 3     Continuous Blood Gluc Transmit (DEXCOM G6 TRANSMITTER) MISC Change every 3 months. 1 each 3     empagliflozin (JARDIANCE) 25 MG TABS tablet Take 1 tablet (25 mg) by mouth daily 90 tablet 3     ergocalciferol (ERGOCALCIFEROL) 1.25 MG (67912 UT) capsule Take 1 capsule (50,000 Units) by mouth once a week For additional refills, please schedule a follow-up appointment at [phone] 12 capsule 3     fenofibrate (TRIGLIDE/LOFIBRA) 160 MG tablet Take 1 tablet (160 mg) by mouth daily 90 tablet 3     fish oil-omega-3 fatty acids 1000 MG capsule TAKE TWO CAPSULES (2 GM) BY MOUTH ONCE DAILY 180 capsule 3     ibuprofen (ADVIL/MOTRIN) 600 MG tablet Take 1 tablet (600 mg) by mouth every 6 hours as needed for  "moderate pain 120 tablet 1     Injection Device for insulin (INPEN 100-PINK-SHAYNE) DAVID 1 each continuous 2 each 0     insulin aspart (NOVOPEN ECHO) 100 UNIT/ML cartridge For use with the In-Pen device.  Give 1 unit per 5 grams CHO with meals and snacks as well as correction.  Average daily use is 80 units daily. 60 mL 3     insulin glargine (BASAGLAR KWIKPEN) 100 UNIT/ML pen Inject 45 Units Subcutaneous daily 45 mL 3     insulin pen needle (B-D U/F) 31G X 8 MM miscellaneous USE AS DIRECTED.  Please keep appt 12/21/20. 200 each 1     losartan (COZAAR) 50 MG tablet Take 1 tablet (50 mg) by mouth daily 90 tablet 3     naproxen (NAPROSYN) 375 MG tablet Take 1 tablet (375 mg) by mouth 2 times daily (with meals) 60 tablet 3     omega 3 1000 MG CAPS Take 2 g by mouth daily 180 capsule 3     Ostomy Supplies (ADHESIVE REMOVER WIPES) MISC 1 each every 14 days 50 each 3     Ostomy Supplies (SKIN TAC ADHESIVE BARRIER WIPE) MISC 1 each every 14 days 6 each 3     terbinafine (LAMISIL) 1 % external cream Apply topically At Bedtime 36 g 1     triamcinolone (KENALOG) 0.1 % external cream Apply topically 3 times daily To affected areas 60 g 0         REVIEW OF SYSTEMS:  See above.    O:   /81 (BP Location: Right arm, Patient Position: Sitting, Cuff Size: Adult Regular)   Pulse 77   Ht 1.575 m (5' 2\")   Wt 78.7 kg (173 lb 6.4 oz)   SpO2 97%   BMI 31.72 kg/m    GENERAL APPEARANCE: healthy, alert and no distress  RESP: lungs clear to auscultation - no rales, rhonchi or wheezes  CV: regular rates and rhythm, normal S1 S2, no S3 or S4 and no murmur, click or rub   ABDOMEN:  soft, nontender, no HSM or masses and bowel sounds normal  .PELV: Speculum was inserted and IUD was removed without any complication.  EXT: warm.  Edema NO   PSYCHE: normal    A/P:  Nayeli was seen today for recheck.    Diagnoses and all orders for this visit:    DM type 2, goal: symptom mgmt (H)  -     Protein  random urine; Future  -     Med Therapy " Management Referral.  This 42 yo would like to achieve a pregnancy.  She needs better Hemoglobin A1C management. She is willing to work with PharmD for better glucose control.    Uterine leiomyoma, unspecified location  -     Ob/Gyn Referral.   This 42 yo would like to achieve a pregnancy. She has:  Uterine adenomyosis and   2.  Myomatous uterus.    Encounter for screening mammogram for breast cancer  -     Mammogram, routine screening; Future    Other orders  -     Med Therapy Management Referral.  This 42 yo would like to achieve a pregnancy.  She needs better Hemoglobin A1C management.      The patient voiced understanding of the information discussed and all questions were answered.     Total time spent today with this patient including chart review, exam time with patient and documentation : 50 minutes.      Mariya CELESTIN, CNP

## 2022-02-07 NOTE — PROGRESS NOTES
"Diabetes Self-Management Education & Support    Presents for:  Follow up type 2 diabetes  Carb counting    SUBJECTIVE/OBJECTIVE:     Cultural Influences/Ethnic Background:  Japanese    Diabetes Symptoms & Complications:          Patient Problem List and Family Medical History reviewed for relevant medical history, current medical status, and diabetes risk factors.    Vitals:  There were no vitals taken for this visit.  Estimated body mass index is 31.72 kg/m  as calculated from the following:    Height as of an earlier encounter on 2/1/22: 1.575 m (5' 2\").    Weight as of an earlier encounter on 2/1/22: 78.7 kg (173 lb 6.4 oz).   Last 3 BP:   BP Readings from Last 3 Encounters:   02/01/22 117/81   09/23/21 127/81   09/16/21 116/74       History   Smoking Status     Former Smoker     Types: Cigarettes     Quit date: 12/1/2010   Smokeless Tobacco     Never Used     Comment: stopped 10 yrs ago       Labs:  Lab Results   Component Value Date    A1C 9.3 01/27/2022    A1C 11.6 04/05/2021     Lab Results   Component Value Date     08/31/2021     08/31/2021     07/08/2020     Lab Results   Component Value Date     07/19/2021    LDL 72 07/08/2020     HDL Cholesterol   Date Value Ref Range Status   07/08/2020 41 (L) >49 mg/dL Final     Direct Measure HDL   Date Value Ref Range Status   07/19/2021 43 (L) >=50 mg/dL Final     Comment:     0-19 years:       Greater than or equal to 45 mg/dL   Low: Less than 40 mg/dL   Borderline low: 40-44 mg/dL     20 years and older:   Female: Greater than or equal to 50 mg/dL   Male:   Greater than or equal to 40 mg/dL        ]  GFR Estimate   Date Value Ref Range Status   08/31/2021 >90 >60 mL/min/1.73m2 Final     Comment:     As of July 11, 2021, eGFR is calculated by the CKD-EPI creatinine equation, without race adjustment. eGFR can be influenced by muscle mass, exercise, and diet. The reported eGFR is an estimation only and is only applicable if the renal " function is stable.   07/08/2020 >90 >60 mL/min/[1.73_m2] Final     Comment:     Non  GFR Calc  Starting 12/18/2018, serum creatinine based estimated GFR (eGFR) will be   calculated using the Chronic Kidney Disease Epidemiology Collaboration   (CKD-EPI) equation.       GFR Estimate If Black   Date Value Ref Range Status   07/08/2020 >90 >60 mL/min/[1.73_m2] Final     Comment:      GFR Calc  Starting 12/18/2018, serum creatinine based estimated GFR (eGFR) will be   calculated using the Chronic Kidney Disease Epidemiology Collaboration   (CKD-EPI) equation.       Lab Results   Component Value Date    CR 0.70 08/31/2021    CR 0.74 07/08/2020     No results found for: MICROALBUMIN    Healthy Eating:  Carb counting is going much better now for Nayeli.  She doesn't have many questions today.  She was able to stop the sweet alcoholic drinks   Per her in pen report she often uses 60 grams of carb per meal. She is trying to follow a consistent carb diet.    She tells me she is eating more vegetables    Monitoring:  She isn't always taking correction in the evening and this is leading to overnight highs  She also sometimes takes meal insulin after eating, resulting in elevated glucose in the evening    Taking Medications:  Diabetes Medication(s)     Insulin       insulin aspart (NOVOPEN ECHO) 100 UNIT/ML cartridge    For use with the In-Pen device.  Give 1 unit per 5 grams CHO with meals and snacks as well as correction.  Average daily use is 80 units daily.     insulin glargine (BASAGLAR KWIKPEN) 100 UNIT/ML pen    Inject 45 Units Subcutaneous daily    Sodium-Glucose Co-Transporter 2 (SGLT2) Inhibitors       empagliflozin (JARDIANCE) 25 MG TABS tablet    Take 1 tablet (25 mg) by mouth daily            Healthy Coping:     Patient Activation Measure Survey Score:  No flowsheet data found.    INTERVENTIONS:    Education provided today on:  AADE Self-Care Behaviors:  Healthy Eating: carbohydrate  counting  Taking Medication: when to take medications    Opportunities for ongoing education and support in diabetes-self management were discussed.    Pt verbalized understanding of concepts discussed and recommendations provided today.       Education Materials Provided:  No new materials provided today      ASSESSMENT:  Patient's most recent   Lab Results   Component Value Date    A1C 9.3 01/27/2022    A1C 11.6 04/05/2021    is not meeting goal of <7.0    PLAN  Work on taking meal time insulin 10 minutes before the meal.   Use correction as recommended per In Pen   Continue carb counting and follow up with me if any questions or concerns arise.  Continue consistent carb diet  Referral back to Endo/PA. Follow up is scheduled.   Time Spent: 60 minutes  Encounter Type: Individual    Any diabetes medication dose changes were made via the CDE Protocol and Collaborative Practice Agreement with the patient's referring provider. A copy of this encounter was shared with the provider.

## 2022-02-08 ENCOUNTER — OFFICE VISIT (OUTPATIENT)
Dept: PHARMACY | Facility: CLINIC | Age: 42
End: 2022-02-08
Attending: NURSE PRACTITIONER
Payer: COMMERCIAL

## 2022-02-08 ENCOUNTER — ANCILLARY PROCEDURE (OUTPATIENT)
Dept: MAMMOGRAPHY | Facility: CLINIC | Age: 42
End: 2022-02-08
Attending: NURSE PRACTITIONER
Payer: COMMERCIAL

## 2022-02-08 VITALS
WEIGHT: 172.3 LBS | BODY MASS INDEX: 31.51 KG/M2 | DIASTOLIC BLOOD PRESSURE: 73 MMHG | SYSTOLIC BLOOD PRESSURE: 119 MMHG | HEART RATE: 74 BPM

## 2022-02-08 DIAGNOSIS — E55.9 VITAMIN D DEFICIENCY: Primary | ICD-10-CM

## 2022-02-08 DIAGNOSIS — E78.00 HIGH CHOLESTEROL: ICD-10-CM

## 2022-02-08 DIAGNOSIS — E11.65 UNCONTROLLED TYPE 2 DIABETES MELLITUS WITH HYPERGLYCEMIA, WITH LONG-TERM CURRENT USE OF INSULIN (H): ICD-10-CM

## 2022-02-08 DIAGNOSIS — Z79.4 UNCONTROLLED TYPE 2 DIABETES MELLITUS WITH HYPERGLYCEMIA, WITH LONG-TERM CURRENT USE OF INSULIN (H): ICD-10-CM

## 2022-02-08 DIAGNOSIS — I10 ESSENTIAL HYPERTENSION: ICD-10-CM

## 2022-02-08 DIAGNOSIS — R52 PAIN: ICD-10-CM

## 2022-02-08 DIAGNOSIS — Z12.31 VISIT FOR SCREENING MAMMOGRAM: ICD-10-CM

## 2022-02-08 DIAGNOSIS — Z12.31 ENCOUNTER FOR SCREENING MAMMOGRAM FOR BREAST CANCER: ICD-10-CM

## 2022-02-08 PROCEDURE — 77067 SCR MAMMO BI INCL CAD: CPT | Mod: GC | Performed by: RADIOLOGY

## 2022-02-08 PROCEDURE — 77063 BREAST TOMOSYNTHESIS BI: CPT | Mod: GC | Performed by: RADIOLOGY

## 2022-02-08 PROCEDURE — 99605 MTMS BY PHARM NP 15 MIN: CPT | Performed by: PHARMACIST

## 2022-02-08 PROCEDURE — 99607 MTMS BY PHARM ADDL 15 MIN: CPT | Performed by: PHARMACIST

## 2022-02-08 NOTE — PATIENT INSTRUCTIONS
Recommendations from today's MTM visit:                                                    MTM (medication therapy management) is a service provided by a clinical pharmacist designed to help you get the most of out of your medicines.   Today we reviewed what your medicines are for, how to know if they are working, that your medicines are safe and how to make your medicine regimen as easy as possible.      1. Avoid naproxen and ibuprofen if becoming pregnant and stick with Tylenol for pain.   2. Once you start trying to become pregnant stop the atorvastatin and losartan.  3. Will recommend to Mariya to stop the fenofibrate and if so increase fish oil to 4 g daily (could consider Lovaza which is a brand name Fish oil)  4. Will recommend to Mariya Carolalyse to start a folic acid supplement when you start trying to become pregnant.  5. Will also stop Jardiance when you become pregnant and focus on insulin for control.    Follow-up: Return in about 9 weeks (around 4/12/2022).    It was great to speak with you today.  I value your experience and would be very thankful for your time with providing feedback on our clinic survey. You may receive a survey via email or text message in the next few days.     To schedule another MTM appointment, please call the clinic directly or you may call the MTM scheduling line at [phone] or toll-free at [phone].     My Clinical Pharmacist's contact information:                                                      Please feel free to contact me with any questions or concerns you have.      Rosas Greene, Pharm. D., [facility]  Medication Therapy Management Pharmacist  Direct Voicemail: [phone]

## 2022-02-08 NOTE — PROGRESS NOTES
Medication Therapy Management (MTM) Encounter    ASSESSMENT:                            Medication Adherence/Access: No issues identified    Type 2 Diabetes: Patient is not meeting A1c goal of < 7%.  The patient is not meeting goal of greater than 70% time in range for continuous glucose monitoring.  She has made great improvements in her diet in order to increase her time in range over 30 days from 43% to 66% in range over the last 7 days.  Based off this improvement continued improvement in neck in diet including the addition of exercise will likely bring the patient to the goal of greater than 70% in range without adjustments in medication.  However, when she starts trying to become pregnant it would be prudent to discontinue the Jardiance as it is not currently recommended in pregnancy.    Hyperlipidemia: Atorvastatin and fibroids are not commonly recommended to be taken to gather except for in cases of severe hypertriglyceridemia due to the increased risk for rhabdomyolysis and myopathies.  Would recommend to stop the fenofibrate.  If further benefit for triglycerides is desired we could consider increasing the fish oil to 4 g/day or changing atorvastatin to rosuvastatin.  However when she is started trying to become pregnant the statin should be discontinued until after pregnancy due to potential fetal harm.    Hypertension: Patient is meeting blood pressure goal of < 130/80mmHg.  When patient starts trying to become pregnant losartan should also be discontinued since losartan is not recommended in pregnancy to the potential for fetal harm.  Would recommend discontinuing this medication and monitoring blood pressure closely and initiating either labetalol or nifedipine ER if blood pressures rise back above 140/90.  A cog recommends only treating in pregnancy when blood pressures get above 160/110 mmHg, however they do recommend lower for patients with other comorbidities.  ADA 2022 guidelines recommend a goal  of 110-135/85 mmHg.    Pain: NSAIDs such as naproxen and ibuprofen can cause a premature closure of the ductus arteriosus and I recommended to not be started after 30 weeks.  Since she does not have chronic pain I would recommend discontinuing ibuprofen and naproxen and only focus on using Tylenol once she becomes pregnant.    Vitamin D deficiency: Patient's vitamin D at 50,000 units weekly is usually only recommended for 8 weeks to treat vitamin D deficiency and then followed by low-dose vitamin D daily.  It does not seem like she had a vitamin D level drawn since 2018, so this would be good to do as soon as possible.  If her vitamin D comes back well in range we will transition her over to a once daily vitamin D instead of once weekly.    PLAN:                            1. Avoid naproxen and ibuprofen if becoming pregnant and stick with Tylenol for pain.   2. Once you start trying to become pregnant stop the statin and losartan.  3. Will recommend to Mariya to stop the fenofibrate and if so increase fish oil to 4 g daily (could consider Lovaza)  4. Will recommend to Mariya Mohamud to start a folic acid supplement when you start trying to become pregnant.  5. Will also stop Jardiance when you become pregnant and focus on insulin for control.  6.  Get your vitamin D level drawn as soon as you are able.  If you are in range we will decrease your vitamin D down to a low dose for once daily use.    Follow-up: Return in about 9 weeks (around 4/12/2022).    SUBJECTIVE/OBJECTIVE:                          Nayeli Caal is a 41 year old female coming in for an initial visit. She was referred to me from Mariya Mohamud, APRN CNP. Patient seen with .     Reason for visit: diabetes management - also she is potentially planning on trying to become pregnant however she has not decided on the timeline for this yet.  We conducted today's discussion with that in mind, and recommendations will be given  conditionally on her deciding to try to get pregnant.    Allergies/ADRs: Reviewed in chart  Past Medical History: Reviewed in chart  Tobacco: She reports that she quit smoking about 11 years ago. Her smoking use included cigarettes. She has never used smokeless tobacco.  Alcohol: none      Medication Adherence/Access: 2-3 times per month    Type 2 Diabetes:  Currently taking Jardiance 25 mg daily, basaglar 45 units daily, and Novopen Echo or inpen carb counting  Reports an average of about 8 untis per day. No side effects reported. Surgery and pregnancy are her motivations to reduce her A1c. Needs to be under 8% for pregnancy  Blood sugar monitoring: Continuous Glucose Monitor. Ranges (based on glucometer readings):   Average glucose over last 90 days: 192 mg/dL - 50% in range, 30% high, and 19% very high.  Just started being more strict about 3 days ago.    Last 30 days: 204 average, 43% in range, 30% high, 26% very high.    Last 7 days: 158 mg/dL, 66% in range, 28% high and 5% very high    Symptoms of low blood sugar? Sometimes get tingling when blood sugars go low. Glucose was down to 69 at bedtime last night. Not very often but a couple times in the last few weeks.   Symptoms of high blood sugar? none  Eye exam: up to date  Foot exam: up to date  Diet/Exercise: carb counting. Avoiding high carb foods and eating healthier foods within the last few days. Recently worked with diabetes educator. Knows she needs to start exercising but hasnt yet. [place] Has thought about doing a gym but didn't elaborate much more on that.   Aspirin: Taking 81mg daily and denies side effects  Statin: Yes: atorvastatin 40 mg daily   ACEi/ARB: Yes: losartan .   Urine Albumin:  Done on 2/1   Lab Results   Component Value Date    UMALCR 54.71 (H) 07/20/2020      Lab Results   Component Value Date    A1C 9.3 01/27/2022    A1C 9.8 10/13/2021    A1C 10.2 07/19/2021    A1C 11.6 04/05/2021    A1C 12.4  10/29/2020    A1C 12.9 07/08/2020    A1C 8.2 05/02/2019    A1C 10.3 10/15/2018       Hyperlipidemia: Current therapy includes atorvastatin 40mg daily and Fenofibrate 160 mg once daily and fishoil 2000 mg daily (she is actually unsure of the dose of this).  Patient reports no significant myalgias or other side effects.  The 10-year ASCVD risk score (Camille DURÁN Jr., et al., 2013) is: 2.1%    Values used to calculate the score:      Age: 41 years      Sex: Female      Is Non- : No      Diabetic: Yes      Tobacco smoker: No      Systolic Blood Pressure: 119 mmHg      Is BP treated: Yes      HDL Cholesterol: 43 mg/dL      Total Cholesterol: 194 mg/dL  Recent Labs   Lab Test 07/19/21  0626 07/08/20  1623   CHOL 194 179   HDL 43* 41*   * 72   TRIG 133 331*     Hypertension: Current medications include losartan 50 mg daily.  Patient does not self-monitor blood pressure.  Patient reports no current medication side effects.  BP Readings from Last 3 Encounters:   02/08/22 119/73   02/01/22 117/81   09/23/21 127/81     Pain: Currently taking naproxen 375 mg twice a day (reports the last time she used it was last year), ibuprofen 600 mg every 6 hours as needed (takes rarely), and acetaminophen 1000 mg every 6 hours as needed (reports that she doesn't use it too often). No chronic pain just as needed aches and pains.    Vitamin D Deficiency: Currently taking ergocalciferol 50,000 units once weekly.    Vitamin D Deficiency Screening Results:  Lab Results   Component Value Date    VITDT 17 (L) 11/27/2017    VITDT 21 07/13/2017    VITDT 13 (L) 05/15/2017    VITDT (L) 01/02/2017     <13  Season, race, dietary intake, and treatment affect the concentration of   25-hydroxy-Vitamin D. Values may decrease during winter months and increase   during summer months. Values 20-29 ug/L may indicate Vitamin D insufficiency   and values <20 ug/L may indicate Vitamin D deficiency.   Vitamin D determination is routinely  performed by an immunoassay specific for   25 hydroxyvitamin D3.  If an individual is on vitamin D2 (ergocalciferol)   supplementation, please specify 25 OH vitamin D2 and D3 level determination by   LCMSMS test VITD23.      VITDT 19 (L) 01/16/2014     25 OH Vit D2   Date Value Ref Range Status   05/17/2018 41 ug/L Final   05/15/2017 34 ug/L Final     25 OH Vit D3   Date Value Ref Range Status   05/17/2018 7 ug/L Final   05/15/2017 5 ug/L Final     25 OH Vit D total   Date Value Ref Range Status   05/17/2018 48 20 - 75 ug/L Final     Comment:     Season, race, dietary intake, and treatment affect the concentration of   25-hydroxy-Vitamin D. Values may decrease during winter months and increase   during summer months. Values 20-29 ug/L may indicate Vitamin D insufficiency   and values <20 ug/L may indicate Vitamin D deficiency.  This test was developed and its performance characteristics determined by the   [facility],  Special Chemistry Laboratory. It has   not been cleared or approved by the FDA. The laboratory is regulated under   CLIA as qualified to perform high-complexity testing. This test is used for   clinical purposes. It should not be regarded as investigational or for   research.     05/15/2017 39 20 - 75 ug/L Final     Comment:     Season, race, dietary intake, and treatment affect the concentration of   25-hydroxy-Vitamin D. Values may decrease during winter months and increase   during summer months. Values 20-29 ug/L may indicate Vitamin D insufficiency   and values <20 ug/L may indicate Vitamin D deficiency.   This test was developed and its performance characteristics determined by the   [facility],  Special Chemistry Laboratory. It has   not been cleared or approved by the FDA. The laboratory is regulated under   CLIA   as qualified to perform high-complexity testing. This test is used for   clinical   purposes. It should not be regarded as  investigational or for research.           Today's Vitals: /73   Pulse 74   Wt 172 lb 4.8 oz (78.2 kg)   BMI 31.51 kg/m    ----------------      I spent 45 minutes with this patient today. All changes were made via collaborative practice agreement with SABI Morrison CNP. A copy of the visit note was provided to the patient's provider(s).    The patient was given a summary of these recommendations.     Rosas Greene, Pharm. D., [facility]  Medication Therapy Management Pharmacist  Direct Voicemail: [phone]     Medication Therapy Recommendations  High cholesterol    Current Medication: fenofibrate (TRIGLIDE/LOFIBRA) 160 MG tablet   Rationale: Medication interaction - Dosage too high - Safety   Recommendation: Discontinue Medication   Status: Contact Provider - Awaiting Response         Vitamin D deficiency    Current Medication: ergocalciferol (ERGOCALCIFEROL) 1.25 MG (39186 UT) capsule   Rationale: Medication requires monitoring - Needs additional monitoring - Safety   Recommendation: Order Lab   Status: Accepted per CPA

## 2022-03-03 NOTE — PROGRESS NOTES
Outcome for 03/03/22 10:10 AM: Data uploaded on Dexcom   Bing Doran on 3/3/2022 at 10:11 AM    dm 2  Casedayami Marie, MOISE    Start time: 0957  End time: 1034    HPI:   Nayeli is a 40 yo woman here with a  for follow up of type 2 diabetes since the early 2000's.  She also sees Dr. Bragg, last visit was in April, 2021. She started on insulin in 2003 after failing Metformin treatment.  She has struggled with high blood sugars for many years.   Since her last visit, she has been feeling very frustrated because she feels she has been working very hard to manage her diabetes and her last a1c was still high.      Nayeli is going to be out of town next week x 5 months.  Lab draw end of April.  Can she do labs somewhere else?     She wonders if she will have to be on this for the rest of her life.  Insulin previously had a copay of $40 went up to to over $100.      Her current regimen is:   empagliflozin 25 mg daily.   Basaglar 45 units.  Novolog (dosing on In-pen):  Carb ratio: 1/8g Sensitivity: Mid- 50, 6am- 40, 10:30pm- 50    Bedtimes run a little high.  She does not correct at bedtime because her glucose comes down on its own.      Her overall average glucose is 171 mg/dL (CV 42%), over the past 2 weeks.  Her recent glucose is as follows:             She is checking her glucose 5-7 times a day on her dexcom and is taking bolus insulin 3-5 times a day.       She is no longer on Metformin as even the low dose caused intense diarrhea to the point where she could not go out of her home.     Diabetes complications:  Retinopathy: last eye exam - 3/21. 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  Nephropathy: h/o proteinuria; normal GFR. Now on 50mg losartan daily (tx with lisinopril was associated with a dry cough).   Neuropathy: No numbness or tingling sensation in her feet.     She has no other concerns today.       Past Medical History:   Diagnosis Date     Combined visual and hearing impairment      Deaf       Diabetic retinopathy of both eyes (H) 8/19/2011     Hepatic steatosis 08/19/2011     History of tobacco use      Hyperlipidemia      Hypertension      Hypertriglyceridemia      Migraines      Obesity 8/19/2011     Problem list name updated by automated process. 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  Ganglion cyst excision.;  Surgeon: Greg Streeter MD;  Location: [facility] OR       Family History   Problem Relation Age of Onset     Unknown/Adopted Other        Social History     Social Hx: She is adopted.  Works for [employer].   Social History     Marital status: Single     Spouse name: N/A     Number of children: N/A     Years of education: N/A     Social History Main Topics     Smoking status: Former Smoker     Types: Cigarettes     Quit date: 12/1/2010     Smokeless tobacco: Never Used      Comment: stopped 2 yrs ago     Alcohol use No     Drug use: No     Sexual activity: Not Currently     Partners: Male     Other Topics Concern      Service No     Blood Transfusions No     Caffeine Concern No     Occupational Exposure No     Hobby Hazards No     Sleep Concern No     Stress Concern No     Weight Concern Yes     Special Diet No     Back Care No     Exercise Yes     4-5 x a week     Bike Helmet No     Seat Belt Yes     Self-Exams Yes     Social History Narrative   Social Hx:  Lives in a condo.  Working- secretarial.  Boyfriend is in [place].     Current Outpatient Medications   Medication     acetaminophen (TYLENOL) 500 MG tablet     Alcohol Swabs (ALCOHOL PREP PAD) 70 % PADS     aspirin (ASA) 81 MG chewable tablet     atorvastatin (LIPITOR) 40 MG tablet     B-D U/F insulin pen needle     BD ULTRA FINE PEN NEEDLES     blood glucose (ACCU-CHEK LAYA PLUS) test strip     blood glucose monitoring (ACCU-CHEK FASTCLIX) lancets     Continuous Blood Gluc Sensor (DEXCOM G6 SENSOR) MISC     Continuous Blood Gluc Transmit (DEXCOM G6 TRANSMITTER) MISC     empagliflozin (JARDIANCE) 25 MG TABS tablet     ergocalciferol (ERGOCALCIFEROL) 1.25 MG (72171 UT) capsule     fenofibrate (TRIGLIDE/LOFIBRA) 160 MG tablet     fish oil-omega-3 fatty acids 1000 MG capsule     ibuprofen (ADVIL/MOTRIN) 600 MG tablet     Injection Device for insulin (INPEN 100-PINK-SHAYNE) DAVID     insulin aspart (NOVOPEN ECHO) 100 UNIT/ML cartridge     insulin glargine (BASAGLAR KWIKPEN) 100 UNIT/ML pen     insulin pen needle (B-D U/F) 31G X 8 MM miscellaneous     losartan (COZAAR) 50 MG tablet     naproxen (NAPROSYN) 375 MG tablet     Ostomy Supplies (ADHESIVE REMOVER WIPES) MISC     Ostomy Supplies (SKIN TAC ADHESIVE BARRIER WIPE) MISC     Current Facility-Administered Medications   Medication     hydrocortisone (CORTAID) 1 % cream     levonorgestrel (MIRENA) 20 MCG/24HR IUD 20 mcg        No Known Allergies    Physical Exam  There were no vitals taken for this visit.  GENERAL: healthy, alert and no distress  RESP: no audible wheeze, cough, or visible cyanosis.  No visible retractions or increased work of breathing.  Able to speak fully in complete sentences.  PSYCH: mentation appears normal, affect normal/bright, judgement and insight intact, normal speech and appearance well-groomed    RESULTS  Lab Results   Component Value Date    A1C 9.3 (H) 01/27/2022    A1C 9.8 (H) 10/13/2021    A1C 10.2 (H) 07/19/2021    A1C 11.6 (H)  04/05/2021    A1C 12.4 (H) 10/29/2020    A1C 12.9 (H) 07/08/2020    A1C 8.2 (H) 05/02/2019    A1C 10.3 (H) 10/15/2018    HEMOGLOBINA1 10.0 (A) 01/13/2020    HEMOGLOBINA1 9.9 (A) 10/07/2019    HEMOGLOBINA1 8.1 (A) 04/22/2019    HEMOGLOBINA1 10.4 (A) 01/14/2019    HEMOGLOBINA1 8.7 (A) 03/12/2018       TSH   Date Value Ref Range Status   07/08/2020 1.34 0.40 - 4.00 mU/L Final   05/17/2018 1.84 0.40 - 4.00 mU/L Final   11/27/2017 1.92 0.40 - 4.00 mU/L Final   05/11/2016 1.85 0.40 - 4.00 mU/L Final   02/11/2016 1.14 0.40 - 4.00 mU/L Final       ALT   Date Value Ref Range Status   08/31/2021 31 0 - 50 U/L Final   07/08/2020 29 0 - 50 U/L Final   05/02/2019 43 0 - 50 U/L Final   ]    Recent Labs   Lab Test 07/19/21  0626 07/08/20  1623   CHOL 194 179   HDL 43* 41*   * 72   TRIG 133 331*       Lab Results   Component Value Date     08/31/2021     07/08/2020      Lab Results   Component Value Date    POTASSIUM 4.0 08/31/2021    POTASSIUM 4.0 07/08/2020     Lab Results   Component Value Date    CHLORIDE 112 08/31/2021    CHLORIDE 101 07/08/2020     Lab Results   Component Value Date    VERO 8.6 08/31/2021    VERO 8.6 07/08/2020     Lab Results   Component Value Date    CO2 20 08/31/2021    CO2 30 07/08/2020     Lab Results   Component Value Date    BUN 19 08/31/2021    BUN 13 07/08/2020     Lab Results   Component Value Date    CR 0.70 08/31/2021    CR 0.74 07/08/2020       GFR Estimate   Date Value Ref Range Status   08/31/2021 >90 >60 mL/min/1.73m2 Final     Comment:     As of July 11, 2021, eGFR is calculated by the CKD-EPI creatinine equation, without race adjustment. eGFR can be influenced by muscle mass, exercise, and diet. The reported eGFR is an estimation only and is only applicable if the renal function is stable.   07/22/2021 >90 >60 mL/min/1.73m2 Final     Comment:     As of July 11, 2021, eGFR is calculated by the CKD-EPI creatinine equation, without race adjustment. eGFR can be influenced by  muscle mass, exercise, and diet. The reported eGFR is an estimation only and is only applicable if the renal function is stable.   07/08/2020 >90 >60 mL/min/[1.73_m2] Final     Comment:     Non  GFR Calc  Starting 12/18/2018, serum creatinine based estimated GFR (eGFR) will be   calculated using the Chronic Kidney Disease Epidemiology Collaboration   (CKD-EPI) equation.     05/02/2019 >90 >60 mL/min/[1.73_m2] Final     Comment:     Non  GFR Calc  Starting 12/18/2018, serum creatinine based estimated GFR (eGFR) will be   calculated using the Chronic Kidney Disease Epidemiology Collaboration   (CKD-EPI) equation.     05/17/2018 >90 >60 mL/min/1.7m2 Final     Comment:     Non  GFR Calc     GFR Estimate If Black   Date Value Ref Range Status   07/08/2020 >90 >60 mL/min/[1.73_m2] Final     Comment:      GFR Calc  Starting 12/18/2018, serum creatinine based estimated GFR (eGFR) will be   calculated using the Chronic Kidney Disease Epidemiology Collaboration   (CKD-EPI) equation.     05/02/2019 >90 >60 mL/min/[1.73_m2] Final     Comment:      GFR Calc  Starting 12/18/2018, serum creatinine based estimated GFR (eGFR) will be   calculated using the Chronic Kidney Disease Epidemiology Collaboration   (CKD-EPI) equation.     05/17/2018 >90 >60 mL/min/1.7m2 Final     Comment:      GFR Calc       Lab Results   Component Value Date    MICROL 31 07/20/2020     No results found for: MICROALBUMIN  Lab Results   Component Value Date    CPEPT 1.3 03/25/2005       No results found for: B12]    Most recent eye exam date: : Not Found     Assessment/Plan:     1.  Type 2 diabetes-  Nayeli's glucose control has improved.  She is having a significant drop in her glucose overnight, but she is going too high post-meal.  We made the following changes today (instructions given to patient):     Please be sure to send me an In-pen report before each  "appointment.  Please push \"refresh\" before sending.      Change carb ratio to 1/6g     Lower basaglar to 38 units.     Please let me know if you are running over 250 or less than 70 mg/dL.     Please let me know if you are having low blood sugars less than 70 or over 250 mg/dL.      If you have concerns, please send me a Charge Payment message M-Th, call the clinic at [phone], or call [phone] after hours/weekends and ask to speak with the endocrinologist on call.      2.  Risk factors-     Retinopathy:  No.  Had eye exam within last year.   Nephropathy:  BP well controlled. No microalbuminuria.  Creatinine stable.   Neuropathy: No.    Feet: OK, no ulcers.   Taking ASA: yes  Lipids:  LDL above target.  Patient taking statin and fibrate. Discussed diet and limiting saturated fats/processed meats. Will review with dietitian.     3.  F/U in 3 months with me, in 6 months with Dr. Bragg, in the next 2-3 months diabetes education to see our dietitan.  We will follow with her closely, however she does strongly prefer in-person appointments.      49 minutes spent on the date of the encounter doing chart review, review of test results, patient visit and documentation, counseling/coordination of care, and discussion of follow up plan for worsening hyper and hypoglycemia.  The patient understood and is satisfied with today's visit.        Anne Marie Medina PA-C, MPAS   [facility]  Department of Medicine        "

## 2022-03-04 DIAGNOSIS — E11.8 TYPE 2 DIABETES MELLITUS WITH COMPLICATION, WITH LONG-TERM CURRENT USE OF INSULIN (H): ICD-10-CM

## 2022-03-04 DIAGNOSIS — Z79.4 TYPE 2 DIABETES MELLITUS WITH COMPLICATION, WITH LONG-TERM CURRENT USE OF INSULIN (H): ICD-10-CM

## 2022-03-05 RX ORDER — PEN NEEDLE, DIABETIC 31 GX5/16"
NEEDLE, DISPOSABLE MISCELLANEOUS
Qty: 200 EACH | Refills: 1 | Status: SHIPPED | OUTPATIENT
Start: 2022-03-05 | End: 2023-02-16

## 2022-03-05 NOTE — TELEPHONE ENCOUNTER
Pen needle    2/1/2022  [facility] [place]      Mariya Mohamud, APRN CNP

## 2022-03-07 ENCOUNTER — LAB (OUTPATIENT)
Dept: LAB | Facility: CLINIC | Age: 42
End: 2022-03-07
Payer: COMMERCIAL

## 2022-03-07 ENCOUNTER — VIRTUAL VISIT (OUTPATIENT)
Dept: ENDOCRINOLOGY | Facility: CLINIC | Age: 42
End: 2022-03-07
Payer: COMMERCIAL

## 2022-03-07 DIAGNOSIS — E11.65 UNCONTROLLED TYPE 2 DIABETES MELLITUS WITH HYPERGLYCEMIA, WITH LONG-TERM CURRENT USE OF INSULIN (H): ICD-10-CM

## 2022-03-07 DIAGNOSIS — Z79.4 TYPE 2 DIABETES MELLITUS WITH HYPERGLYCEMIA, WITH LONG-TERM CURRENT USE OF INSULIN (H): ICD-10-CM

## 2022-03-07 DIAGNOSIS — E11.65 UNCONTROLLED TYPE 2 DIABETES MELLITUS WITH HYPERGLYCEMIA, WITH LONG-TERM CURRENT USE OF INSULIN (H): Primary | ICD-10-CM

## 2022-03-07 DIAGNOSIS — Z79.4 UNCONTROLLED TYPE 2 DIABETES MELLITUS WITH HYPERGLYCEMIA, WITH LONG-TERM CURRENT USE OF INSULIN (H): Primary | ICD-10-CM

## 2022-03-07 DIAGNOSIS — Z79.4 UNCONTROLLED TYPE 2 DIABETES MELLITUS WITH HYPERGLYCEMIA, WITH LONG-TERM CURRENT USE OF INSULIN (H): ICD-10-CM

## 2022-03-07 DIAGNOSIS — E11.65 TYPE 2 DIABETES MELLITUS WITH HYPERGLYCEMIA, WITH LONG-TERM CURRENT USE OF INSULIN (H): ICD-10-CM

## 2022-03-07 DIAGNOSIS — E55.9 VITAMIN D DEFICIENCY: ICD-10-CM

## 2022-03-07 LAB
CREAT UR-MCNC: 18 MG/DL
HBA1C MFR BLD: 8.9 % (ref 0–5.6)
MICROALBUMIN UR-MCNC: 115 MG/L
MICROALBUMIN/CREAT UR: 638.89 MG/G CR (ref 0–25)

## 2022-03-07 PROCEDURE — 36415 COLL VENOUS BLD VENIPUNCTURE: CPT | Performed by: PATHOLOGY

## 2022-03-07 PROCEDURE — 82306 VITAMIN D 25 HYDROXY: CPT | Mod: 90 | Performed by: PATHOLOGY

## 2022-03-07 PROCEDURE — T1013 SIGN LANG/ORAL INTERPRETER: HCPCS | Mod: U3 | Performed by: PHYSICIAN ASSISTANT

## 2022-03-07 PROCEDURE — 99000 SPECIMEN HANDLING OFFICE-LAB: CPT | Performed by: PATHOLOGY

## 2022-03-07 PROCEDURE — 99215 OFFICE O/P EST HI 40 MIN: CPT | Mod: 95 | Performed by: PHYSICIAN ASSISTANT

## 2022-03-07 PROCEDURE — 83036 HEMOGLOBIN GLYCOSYLATED A1C: CPT | Performed by: PATHOLOGY

## 2022-03-07 PROCEDURE — 82043 UR ALBUMIN QUANTITATIVE: CPT | Performed by: PATHOLOGY

## 2022-03-07 NOTE — LETTER
3/7/2022       RE: Nayeli Caal  [address]  [facility] [address]     Dear Colleague,    Thank you for referring your patient, Nayeli Caal, to the [facility] [place] [facility]. Please see a copy of my visit note below.    Outcome for 03/03/22 10:10 AM: Data uploaded on Dexcom   Bing Espinoza on 3/3/2022 at 10:11 AM    dm 2  Jeniffer Salazar CMA    Start time: 0957  End time: 1034    HPI:   Nayeli is a 40 yo woman here with a  for follow up of type 2 diabetes since the early 2000's.  She also sees Dr. Bragg, last visit was in April, 2021. She started on insulin in 2003 after failing Metformin treatment.  She has struggled with high blood sugars for many years.   Since her last visit, she has been feeling very frustrated because she feels she has been working very hard to manage her diabetes and her last a1c was still high.      Nayeli is going to be out of town next week x 5 months.  Lab draw end of April.  Can she do labs somewhere else?     She wonders if she will have to be on this for the rest of her life.  Insulin previously had a copay of $40 went up to to over $100.      Her current regimen is:   empagliflozin 25 mg daily.   Basaglar 45 units.  Novolog (dosing on In-pen):  Carb ratio: 1/8g Sensitivity: Mid- 50, 6am- 40, 10:30pm- 50    Bedtimes run a little high.  She does not correct at bedtime because her glucose comes down on its own.      Her overall average glucose is 171 mg/dL (CV 42%), over the past 2 weeks.  Her recent glucose is as follows:             She is checking her glucose 5-7 times a day on her dexcom and is taking bolus insulin 3-5 times a day.       She is no longer on Metformin as even the low dose caused intense diarrhea to the point where she could not go out of her home.     Diabetes complications:  Retinopathy: last eye exam - 3/21. No DR. Pimentel's  syndrome.  Nephropathy: h/o proteinuria; normal GFR. Now on 50mg losartan daily (tx with lisinopril was associated with a dry cough).   Neuropathy: No numbness or tingling sensation in her feet.     She has no other concerns today.       Past Medical History:   Diagnosis Date     Combined visual and hearing impairment      Deaf      Diabetic retinopathy of both eyes (H) 8/19/2011     Hepatic steatosis 08/19/2011     History of tobacco use      Hyperlipidemia      Hypertension      Hypertriglyceridemia      Migraines      Obesity 8/19/2011     Problem list name updated by automated process. 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  Ganglion cyst excision.;  Surgeon: Greg Streeter MD;  Location: [facility] OR       Family History   Problem Relation Age of Onset     Unknown/Adopted Other        Social History     Social Hx: She is adopted.  Works for [employer].   Social History     Marital status: Single     Spouse name: N/A     Number of children: N/A     Years of education: N/A     Social History Main Topics     Smoking status: Former Smoker     Types: Cigarettes     Quit date: 12/1/2010     Smokeless tobacco: Never Used      Comment: stopped 2 yrs ago     Alcohol use No     Drug use: No     Sexual activity: Not Currently     Partners: Male     Other Topics Concern      Service No      Blood Transfusions No     Caffeine Concern No     Occupational Exposure No     Hobby Hazards No     Sleep Concern No     Stress Concern No     Weight Concern Yes     Special Diet No     Back Care No     Exercise Yes     4-5 x a week     Bike Helmet No     Seat Belt Yes     Self-Exams Yes     Social History Narrative   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  Boyfriend is in [place].     Current Outpatient Medications   Medication     acetaminophen (TYLENOL) 500 MG tablet     Alcohol Swabs (ALCOHOL PREP PAD) 70 % PADS     aspirin (ASA) 81 MG chewable tablet     atorvastatin (LIPITOR) 40 MG tablet     B-D U/F insulin pen needle     BD ULTRA FINE PEN NEEDLES     blood glucose (ACCU-CHEK LAYA PLUS) test strip     blood glucose monitoring (ACCU-CHEK FASTCLIX) lancets     Continuous Blood Gluc Sensor (DEXCOM G6 SENSOR) MISC     Continuous Blood Gluc Transmit (DEXCOM G6 TRANSMITTER) MISC     empagliflozin (JARDIANCE) 25 MG TABS tablet     ergocalciferol (ERGOCALCIFEROL) 1.25 MG (28807 UT) capsule     fenofibrate (TRIGLIDE/LOFIBRA) 160 MG tablet     fish oil-omega-3 fatty acids 1000 MG capsule     ibuprofen (ADVIL/MOTRIN) 600 MG tablet     Injection Device for insulin (INPEN 100-PINK-SHAYNE) DAVID     insulin aspart (NOVOPEN ECHO) 100 UNIT/ML cartridge     insulin glargine (BASAGLAR KWIKPEN) 100 UNIT/ML pen     insulin pen needle (B-D U/F) 31G X 8 MM miscellaneous     losartan (COZAAR) 50 MG tablet     naproxen (NAPROSYN) 375 MG tablet     Ostomy Supplies (ADHESIVE REMOVER WIPES) MISC     Ostomy Supplies (SKIN TAC ADHESIVE BARRIER WIPE) MISC     Current Facility-Administered Medications   Medication     hydrocortisone (CORTAID) 1 % cream     levonorgestrel (MIRENA) 20 MCG/24HR IUD 20 mcg        No Known Allergies    Physical Exam  There were no vitals taken for this visit.  GENERAL: healthy, alert and no distress  RESP: no audible wheeze, cough, or visible cyanosis.  No visible retractions or increased work  of breathing.  Able to speak fully in complete sentences.  PSYCH: mentation appears normal, affect normal/bright, judgement and insight intact, normal speech and appearance well-groomed    RESULTS  Lab Results   Component Value Date    A1C 9.3 (H) 01/27/2022    A1C 9.8 (H) 10/13/2021    A1C 10.2 (H) 07/19/2021    A1C 11.6 (H) 04/05/2021    A1C 12.4 (H) 10/29/2020    A1C 12.9 (H) 07/08/2020    A1C 8.2 (H) 05/02/2019    A1C 10.3 (H) 10/15/2018    HEMOGLOBINA1 10.0 (A) 01/13/2020    HEMOGLOBINA1 9.9 (A) 10/07/2019    HEMOGLOBINA1 8.1 (A) 04/22/2019    HEMOGLOBINA1 10.4 (A) 01/14/2019    HEMOGLOBINA1 8.7 (A) 03/12/2018       TSH   Date Value Ref Range Status   07/08/2020 1.34 0.40 - 4.00 mU/L Final   05/17/2018 1.84 0.40 - 4.00 mU/L Final   11/27/2017 1.92 0.40 - 4.00 mU/L Final   05/11/2016 1.85 0.40 - 4.00 mU/L Final   02/11/2016 1.14 0.40 - 4.00 mU/L Final       ALT   Date Value Ref Range Status   08/31/2021 31 0 - 50 U/L Final   07/08/2020 29 0 - 50 U/L Final   05/02/2019 43 0 - 50 U/L Final   ]    Recent Labs   Lab Test 07/19/21  0626 07/08/20  1623   CHOL 194 179   HDL 43* 41*   * 72   TRIG 133 331*       Lab Results   Component Value Date     08/31/2021     07/08/2020      Lab Results   Component Value Date    POTASSIUM 4.0 08/31/2021    POTASSIUM 4.0 07/08/2020     Lab Results   Component Value Date    CHLORIDE 112 08/31/2021    CHLORIDE 101 07/08/2020     Lab Results   Component Value Date    VERO 8.6 08/31/2021    VERO 8.6 07/08/2020     Lab Results   Component Value Date    CO2 20 08/31/2021    CO2 30 07/08/2020     Lab Results   Component Value Date    BUN 19 08/31/2021    BUN 13 07/08/2020     Lab Results   Component Value Date    CR 0.70 08/31/2021    CR 0.74 07/08/2020       GFR Estimate   Date Value Ref Range Status   08/31/2021 >90 >60 mL/min/1.73m2 Final     Comment:     As of July 11, 2021, eGFR is calculated by the CKD-EPI creatinine equation, without race adjustment. eGFR can be  influenced by muscle mass, exercise, and diet. The reported eGFR is an estimation only and is only applicable if the renal function is stable.   07/22/2021 >90 >60 mL/min/1.73m2 Final     Comment:     As of July 11, 2021, eGFR is calculated by the CKD-EPI creatinine equation, without race adjustment. eGFR can be influenced by muscle mass, exercise, and diet. The reported eGFR is an estimation only and is only applicable if the renal function is stable.   07/08/2020 >90 >60 mL/min/[1.73_m2] Final     Comment:     Non  GFR Calc  Starting 12/18/2018, serum creatinine based estimated GFR (eGFR) will be   calculated using the Chronic Kidney Disease Epidemiology Collaboration   (CKD-EPI) equation.     05/02/2019 >90 >60 mL/min/[1.73_m2] Final     Comment:     Non  GFR Calc  Starting 12/18/2018, serum creatinine based estimated GFR (eGFR) will be   calculated using the Chronic Kidney Disease Epidemiology Collaboration   (CKD-EPI) equation.     05/17/2018 >90 >60 mL/min/1.7m2 Final     Comment:     Non  GFR Calc     GFR Estimate If Black   Date Value Ref Range Status   07/08/2020 >90 >60 mL/min/[1.73_m2] Final     Comment:      GFR Calc  Starting 12/18/2018, serum creatinine based estimated GFR (eGFR) will be   calculated using the Chronic Kidney Disease Epidemiology Collaboration   (CKD-EPI) equation.     05/02/2019 >90 >60 mL/min/[1.73_m2] Final     Comment:      GFR Calc  Starting 12/18/2018, serum creatinine based estimated GFR (eGFR) will be   calculated using the Chronic Kidney Disease Epidemiology Collaboration   (CKD-EPI) equation.     05/17/2018 >90 >60 mL/min/1.7m2 Final     Comment:      GFR Calc       Lab Results   Component Value Date    MICROL 31 07/20/2020     No results found for: MICROALBUMIN  Lab Results   Component Value Date    CPEPT 1.3 03/25/2005       No results found for: B12]    Most recent eye exam date:  ": Not Found     Assessment/Plan:     1.  Type 2 diabetes-  Nayeli's glucose control has improved.  She is having a significant drop in her glucose overnight, but she is going too high post-meal.  We made the following changes today (instructions given to patient):     Please be sure to send me an In-pen report before each appointment.  Please push \"refresh\" before sending.      Change carb ratio to 1/6g     Lower basaglar to 38 units.     Please let me know if you are running over 250 or less than 70 mg/dL.     Please let me know if you are having low blood sugars less than 70 or over 250 mg/dL.      If you have concerns, please send me a [employer] message M-Th, call the clinic at [phone], or call [phone] after hours/weekends and ask to speak with the endocrinologist on call.      2.  Risk factors-     Retinopathy:  No.  Had eye exam within last year.   Nephropathy:  BP well controlled. No microalbuminuria.  Creatinine stable.   Neuropathy: No.    Feet: OK, no ulcers.   Taking ASA: yes  Lipids:  LDL above target.  Patient taking statin and fibrate. Discussed diet and limiting saturated fats/processed meats. Will review with dietitian.     3.  F/U in 3 months with me, in 6 months with Dr. Bragg, in the next 2-3 months diabetes education to see our dietitan.  We will follow with her closely, however she does strongly prefer in-person appointments.      49 minutes spent on the date of the encounter doing chart review, review of test results, patient visit and documentation, counseling/coordination of care, and discussion of follow up plan for worsening hyper and hypoglycemia.  The patient understood and is satisfied with today's visit.        Anne Marie Medina PA-C, MPAS   [facility]  Department of Medicine          Nayeli is a 41 year old who is being evaluated via a billable video visit.      How would you like to obtain your AVS? Portal [employer]  If the video visit is dropped, the invitation should be " resent by: Send to e-mail at: [email or web address]  Will anyone else be joining your video visit? No

## 2022-03-07 NOTE — PROGRESS NOTES
Nayeli is a 41 year old who is being evaluated via a billable video visit.      How would you like to obtain your AVS? MyChart  If the video visit is dropped, the invitation should be resent by: Send to e-mail at: [email or web address]  Will anyone else be joining your video visit? No

## 2022-03-07 NOTE — PATIENT INSTRUCTIONS
"Please be sure to send me an In-pen report before each appointment.  Please push \"refresh\" before sending.      Change carb ratio to 1/6g     Lower basaglar to 38 units.     Please let me know if you are running over 250 or less than 70 mg/dL.     Please let me know if you are having low blood sugars less than 70 or over 250 mg/dL.      If you have concerns, please send me a [employer] message M-Th, call the clinic at [phone], or call [phone] after hours/weekends and ask to speak with the endocrinologist on call.      "

## 2022-03-08 ENCOUNTER — MYC MEDICAL ADVICE (OUTPATIENT)
Dept: INTERNAL MEDICINE | Facility: CLINIC | Age: 42
End: 2022-03-08
Payer: COMMERCIAL

## 2022-03-08 DIAGNOSIS — M54.50 CHRONIC BILATERAL LOW BACK PAIN WITHOUT SCIATICA: ICD-10-CM

## 2022-03-08 DIAGNOSIS — G89.29 CHRONIC BILATERAL LOW BACK PAIN WITHOUT SCIATICA: ICD-10-CM

## 2022-03-08 RX ORDER — ACETAMINOPHEN 500 MG
1000 TABLET ORAL EVERY 6 HOURS PRN
Qty: 100 TABLET | Refills: 3 | Status: SHIPPED | OUTPATIENT
Start: 2022-03-08 | End: 2023-02-16

## 2022-03-09 ENCOUNTER — LAB (OUTPATIENT)
Dept: LAB | Facility: CLINIC | Age: 42
End: 2022-03-09
Payer: COMMERCIAL

## 2022-03-09 ENCOUNTER — TELEPHONE (OUTPATIENT)
Dept: OBGYN | Facility: CLINIC | Age: 42
End: 2022-03-09

## 2022-03-09 DIAGNOSIS — Z32.00 PREGNANCY EXAMINATION OR TEST, PREGNANCY UNCONFIRMED: ICD-10-CM

## 2022-03-09 DIAGNOSIS — E55.9 VITAMIN D DEFICIENCY: Primary | ICD-10-CM

## 2022-03-09 LAB
B-HCG SERPL-ACNC: <1 IU/L (ref 0–5)
DEPRECATED CALCIDIOL+CALCIFEROL SERPL-MC: 18 UG/L (ref 20–75)

## 2022-03-09 PROCEDURE — 84702 CHORIONIC GONADOTROPIN TEST: CPT

## 2022-03-09 PROCEDURE — 36415 COLL VENOUS BLD VENIPUNCTURE: CPT

## 2022-03-09 NOTE — TELEPHONE ENCOUNTER
[facility]    Phone Message    May a detailed message be left on voicemail: yes     Reason for Call: Requesting Results   Name/type of test: Pregnancy Test  Date of test: 03/08/2022  Was test done at a location other than [facility] (Please fill in the location if not [facility])?: No      Action Taken: Message routed to:  Other: [facility]    Travel Screening: Not Applicable

## 2022-03-09 NOTE — TELEPHONE ENCOUNTER
Voicemail received from patient that she would like [employer] message back. Message sent for results.

## 2022-03-10 DIAGNOSIS — Z79.4 UNCONTROLLED TYPE 2 DIABETES MELLITUS WITH HYPERGLYCEMIA, WITH LONG-TERM CURRENT USE OF INSULIN (H): Primary | ICD-10-CM

## 2022-03-10 DIAGNOSIS — E11.65 UNCONTROLLED TYPE 2 DIABETES MELLITUS WITH HYPERGLYCEMIA, WITH LONG-TERM CURRENT USE OF INSULIN (H): Primary | ICD-10-CM

## 2022-03-10 RX ORDER — CHOLECALCIFEROL (VITAMIN D3) 1250 MCG
1250 CAPSULE ORAL
Status: DISCONTINUED | OUTPATIENT
Start: 2022-03-10 | End: 2022-03-11 | Stop reason: HOSPADM

## 2022-03-10 RX ORDER — LOSARTAN POTASSIUM 100 MG/1
100 TABLET ORAL DAILY
Qty: 90 TABLET | Refills: 3 | Status: SHIPPED | OUTPATIENT
Start: 2022-03-10 | End: 2023-04-21

## 2022-03-11 NOTE — RESULT ENCOUNTER NOTE
Hi, Nayeli! The a1c has improved. The amount of proteins in the urine is higher. I recommend to increase the dose of losartan to 100 mg daily, to try to further protect your kidney from diabetes damage. I placed orders for a new prescription. Please let us know if you have questions.

## 2022-05-12 NOTE — ED AVS SNAPSHOT
[facility], Emergency Department    [address] [address]    Phone:  [phone]                                       Nayeli Caal   MRN: [id number]    Department:  [facility], Emergency Department   Date of Visit:  3/9/2018           Patient Information     Date Of Birth          1980        Your diagnoses for this visit were:     Abdominal pain, epigastric        You were seen by Suyapa Acevedo MD.        Discharge Instructions       Please make an appointment to follow up with Surgery (General) (phone: [phone]) as soon as possible for follow up on suspected biliary colic.     You may use acetaminophen 1000 mg every 6 hours as needed for pain and oxycodone for break through pain.  Use zofran as needed for nausea.  Take ranitidine daily to decreased stomach acidity.      If you have worsening pain, fevers, persistent vomiting or other concerns, return to the emergency department for re-evaluation.          Epigastric Pain (Uncertain Cause)     Epigastric pain can be a sign of disease in the upper abdomen. Common causes include:    Acid reflux (stomach acid flowing up into the esophagus)    Gastritis (irritation of the stomach lining)    Peptic Ulcer Disease    Inflammation of the pancreas    Gallstone    Infection in the gallbladder  Pain may be dull or burning. It may spread upward to the chest or to the back. There may be other symptoms such as belching, bloating, cramps or hunger pains. There may be weight loss or poor appetite, nausea or vomiting.  Since the diagnosis of your pain is not certain yet, further tests may sometimes be needed. Sometimes the doctor will treat you for the most likely condition to see if there is improvement before doing further tests.  Home care  Medicines    Antacids help neutralize the normal acids in your stomach. Examples are Maalox, Mylanta, Rolaids, and Tums. If you don t like the liquid, you can also try a chewable one. You  (4) no impairment may find one works better than another for you. Overuse can cause diarrhea or constipation.    Acid blockers (H2 blockers) decrease acid production. Examples are cimetidine (Tagamet), famotidine (Pepcid) and ranitidine (Zantac).    Acid inhibitors (PPIs) decrease acid production in a different way than the blockers. You may find they work better, but can take a little longer to take effect.  Examples are omeprazole (Prilosec), lansoprazole (Prevacid), pantoprazole (Protonix), rabeprazole (Aciphex), and esomeprazole (Nexium).    Take an antacid 30-60 minutes after eating and at bedtime, but not at the same time as an acid blocker.    Try not to take NSAIDs. Aspirin may also cause problems, but if taking it for your heart or other medical reasons, talk to your doctor before stopping it; you do not want to cause a worse problem, like a heart attack or stroke.  Diet    If certain foods seem to cause your spasm, try to avoid them.     Eat slowly and chew food well before swallowing. Symptoms of gastritis can be worsened by certain foods. Limit or avoid fatty, fried, and spicy foods, as well as coffee, chocolate, mint, and foods with high acid content such as tomatoes and citrus fruit and juices (orange, grapefruit, lemon).    Avoid alcohol, caffeine, and tobacco, which can delay healing and worsen your problem.    Try eating smaller meals with snacks in between  Follow-up care  Follow up with your healthcare provider or as advised.  When to seek medical advice  Call your healthcare provider right away if any of the following occur:    Stomach pain worsens or moves to the right lower part of the abdomen    Chest pain appears, or if it worsens or spreads to the chest, back, neck, shoulder, or arm    Frequent vomiting (can t keep down liquids)    Blood in the stool or vomit (red or black color)    Feeling weak or dizzy, fainting, or having trouble breathing    Fever of 100.4 F (38 C) or higher, or as directed by your  healthcare provider    Abdominal swelling  Date Last Reviewed: 9/25/2015 2000-2017 [employer]. [address] [place] [address], [place] [address]. All rights reserved. This information is not intended as a substitute for professional medical care. Always follow your healthcare professional's instructions.            Future Appointments        Provider Department Dept Phone Center    3/12/2018 7:45 AM Reshma Cerna; Jimena Bragg MD [facility] Endocrinology [phone] [facility]    3/14/2018 1:00 PM Greyson Schroeder  Services Department [phone] [place]    3/20/2018 4:00 PM Chava Lutz MD  Services Department [phone] [place]    5/17/2018 2:40 PM [facility] [phone] [facility]      24 Hour Appointment Hotline       To make an appointment at any [facility], call [phone] ([phone]). If you don't have a family doctor or clinic, we will help you find one. [facility] are conveniently located to serve the needs of you and your family.             Review of your medicines      START taking        Dose / Directions Last dose taken    ondansetron 4 MG ODT tab   Commonly known as:  ZOFRAN ODT   Dose:  4 mg   Quantity:  6 tablet        Take 1 tablet (4 mg) by mouth every 8 hours as needed for nausea   Refills:  0        oxyCODONE IR 5 MG tablet   Commonly known as:  ROXICODONE   Dose:  5 mg   Quantity:  10 tablet        Take 1 tablet (5 mg) by mouth every 6 hours as needed for pain   Refills:  0        ranitidine 150 MG tablet   Commonly known as:  ZANTAC   Dose:  150 mg   Quantity:  30 tablet        Take 1 tablet (150 mg) by mouth 2 times daily for 15 days   Refills:  0          Our records show that you are taking the medicines listed below. If these are incorrect, please call your family doctor or clinic.        Dose / Directions Last dose taken    Alcohol Prep Pad 70 % Pads   Dose:  1 each    Quantity:  100 each        1 each 3 times daily   Refills:  3        aspirin 81 MG tablet   Dose:  81 mg   Quantity:  100 tablet        Take 1 tablet (81 mg) by mouth daily   Refills:  3        atorvastatin 40 MG tablet   Commonly known as:  LIPITOR   Dose:  40 mg   Quantity:  90 tablet        Take 1 tablet (40 mg) by mouth daily   Refills:  3        BASAGLAR 100 UNIT/ML injection   Dose:  50 Units   Quantity:  12 mL        Inject 50 Units Subcutaneous daily Please provide 3 months supply   Refills:  1        BD ULTRA FINE PEN NEEDLES   Dose:  1 dose.   Quantity:  200 Units        Inject 1 dose. Subcutaneous 2 times daily BD ultra-fine insulin syringe, 30 ga. X 1/2'' short needle 1/2 cc. Use as directed.   Refills:  11        blood glucose monitoring lancets   Quantity:  6 Box        Use to test blood sugar 6 times daily or as directed   Refills:  3        blood glucose monitoring test strip   Commonly known as:  ACCU-CHEK LAYA PLUS   Quantity:  600 each        Laya Plus test strips for use in Accucheck Expert meter.  Use to test blood sugar 6 times daily. 3 month supply.  Send PA's to Dr. Mohamud.   Refills:  3        dulaglutide 0.75 MG/0.5ML pen   Commonly known as:  TRULICITY   Dose:  0.75 mg   Quantity:  6 mL        Inject 0.75 mg Subcutaneous every 7 days   Refills:  3        fenofibrate 160 MG tablet   Dose:  160 mg   Quantity:  90 tablet        Take 1 tablet (160 mg) by mouth daily   Refills:  3        ibuprofen 600 MG tablet   Commonly known as:  ADVIL/MOTRIN   Dose:  600 mg   Quantity:  120 tablet        Take 1 tablet (600 mg) by mouth every 6 hours as needed for moderate pain   Refills:  1        insulin aspart 100 UNIT/ML injection   Commonly known as:  NovoLOG PEN   Quantity:  63 mL        Admin Instructions: Before meals and bedtime correction sliding scale 120 - 160 - 1 U  161 - 200 - 2 U  201 - 240 - 3 U  241 - 280 - 4 U  281 - 320 - 5 U  321 - 360 - 6 U ...   Refills:  3        levonorgestrel  20 MCG/24HR IUD   Commonly known as:  MIRENA   Dose:  1 each   Quantity:  1 each        1 each (20 mcg) by Intrauterine route once for 1 dose   Refills:  0        losartan 25 MG tablet   Commonly known as:  COZAAR   Dose:  25 mg   Quantity:  90 tablet        Take 1 tablet (25 mg) by mouth daily   Refills:  3        vitamin D 97668 UNIT capsule   Commonly known as:  ERGOCALCIFEROL   Dose:  69719 Units   Quantity:  12 capsule        Take 1 capsule (50,000 Units) by mouth every 7 days   Refills:  3                Prescriptions were sent or printed at these locations (3 Prescriptions)                   Other Prescriptions                Printed at Department/Unit printer (3 of 3)         ondansetron (ZOFRAN ODT) 4 MG ODT tab               oxyCODONE IR (ROXICODONE) 5 MG tablet               ranitidine (ZANTAC) 150 MG tablet                Procedures and tests performed during your visit     CBC with platelets differential    Comprehensive metabolic panel    EKG 12-lead, tracing only    Lactic acid whole blood    Lipase    Troponin I    UA with Microscopic    US Abdomen Limited    hCG qual urine POCT      Orders Needing Specimen Collection     None      Pending Results     No orders found from 3/7/2018 to 3/10/2018.            Pending Culture Results     No orders found from 3/7/2018 to 3/10/2018.            Pending Results Instructions     If you had any lab results that were not finalized at the time of your Discharge, you can call the ED Lab Result RN at [phone]. You will be contacted by this team for any positive Lab results or changes in treatment. The nurses are available 7 days a week from 10A to 6:30P.  You can leave a message 24 hours per day and they will return your call.        Thank you for choosing [facility]       Thank you for choosing [facility] for your care. Our goal is always to provide you with excellent care. Hearing back from our patients is one way we can continue to improve our services. Please  take a few minutes to complete the written survey that you may receive in the mail after you visit with us. Thank you!        [employer] Information     [employer] gives you secure access to your electronic health record. If you see a primary care provider, you can also send messages to your care team and make appointments. If you have questions, please call your primary care clinic.  If you do not have a primary care provider, please call [phone] and they will assist you.        Care EveryWhere ID     This is your Care EveryWhere ID. This could be used by other organizations to access your [facility] medical records  [id number]        Equal Access to Services     [facility]AN [facility] : Chica Gimenez, perlita dallas, zack hurd, marc rae . So [facility] [phone].    ATENCIÓN: Si habla español, tiene a dykes disposición servicios gratuitos de asistencia lingüística. Llame al [phone].    We comply with applicable federal civil rights laws and [place] laws. We do not discriminate on the basis of race, color, national origin, age, disability, sex, sexual orientation, or gender identity.            After Visit Summary       This is your record. Keep this with you and show to your community pharmacist(s) and doctor(s) at your next visit.

## 2022-05-16 ENCOUNTER — MYC MEDICAL ADVICE (OUTPATIENT)
Dept: INTERNAL MEDICINE | Facility: CLINIC | Age: 42
End: 2022-05-16
Payer: COMMERCIAL

## 2022-07-30 ENCOUNTER — HEALTH MAINTENANCE LETTER (OUTPATIENT)
Age: 42
End: 2022-07-30

## 2022-08-04 ENCOUNTER — OFFICE VISIT (OUTPATIENT)
Dept: INTERNAL MEDICINE | Facility: CLINIC | Age: 42
End: 2022-08-04
Payer: COMMERCIAL

## 2022-08-04 ENCOUNTER — LAB (OUTPATIENT)
Dept: LAB | Facility: CLINIC | Age: 42
End: 2022-08-04
Payer: COMMERCIAL

## 2022-08-04 VITALS
HEART RATE: 74 BPM | DIASTOLIC BLOOD PRESSURE: 85 MMHG | BODY MASS INDEX: 31.68 KG/M2 | RESPIRATION RATE: 16 BRPM | OXYGEN SATURATION: 98 % | WEIGHT: 173.2 LBS | SYSTOLIC BLOOD PRESSURE: 136 MMHG

## 2022-08-04 DIAGNOSIS — K76.0 HEPATIC STEATOSIS: ICD-10-CM

## 2022-08-04 DIAGNOSIS — E11.8 TYPE 2 DIABETES MELLITUS WITH COMPLICATION, WITH LONG-TERM CURRENT USE OF INSULIN (H): ICD-10-CM

## 2022-08-04 DIAGNOSIS — R10.13 ABDOMINAL PAIN, EPIGASTRIC: ICD-10-CM

## 2022-08-04 DIAGNOSIS — K76.0 HEPATIC STEATOSIS: Primary | ICD-10-CM

## 2022-08-04 DIAGNOSIS — R10.2 PELVIC PAIN: ICD-10-CM

## 2022-08-04 DIAGNOSIS — K76.0 FATTY LIVER DISEASE, NONALCOHOLIC: ICD-10-CM

## 2022-08-04 DIAGNOSIS — Z79.4 TYPE 2 DIABETES MELLITUS WITH COMPLICATION, WITH LONG-TERM CURRENT USE OF INSULIN (H): ICD-10-CM

## 2022-08-04 LAB
ALBUMIN UR-MCNC: 30 MG/DL
ALT SERPL W P-5'-P-CCNC: 27 U/L (ref 0–50)
APPEARANCE UR: ABNORMAL
AST SERPL W P-5'-P-CCNC: 15 U/L (ref 0–45)
BILIRUB UR QL STRIP: NEGATIVE
COLOR UR AUTO: YELLOW
GLUCOSE UR STRIP-MCNC: 1000 MG/DL
HBA1C MFR BLD: 9.9 % (ref 0–5.6)
HGB UR QL STRIP: ABNORMAL
KETONES UR STRIP-MCNC: NEGATIVE MG/DL
LEUKOCYTE ESTERASE UR QL STRIP: NEGATIVE
NITRATE UR QL: NEGATIVE
PH UR STRIP: 6.5 [PH] (ref 5–7)
RBC URINE: >182 /HPF
SP GR UR STRIP: 1.01 (ref 1–1.03)
SQUAMOUS EPITHELIAL: <1 /HPF
UROBILINOGEN UR STRIP-MCNC: NORMAL MG/DL
WBC URINE: 4 /HPF

## 2022-08-04 PROCEDURE — 84450 TRANSFERASE (AST) (SGOT): CPT | Performed by: PATHOLOGY

## 2022-08-04 PROCEDURE — T1013 SIGN LANG/ORAL INTERPRETER: HCPCS | Mod: U3

## 2022-08-04 PROCEDURE — 81001 URINALYSIS AUTO W/SCOPE: CPT | Performed by: PATHOLOGY

## 2022-08-04 PROCEDURE — 36415 COLL VENOUS BLD VENIPUNCTURE: CPT | Performed by: PATHOLOGY

## 2022-08-04 PROCEDURE — 99215 OFFICE O/P EST HI 40 MIN: CPT | Performed by: NURSE PRACTITIONER

## 2022-08-04 PROCEDURE — 84460 ALANINE AMINO (ALT) (SGPT): CPT | Performed by: PATHOLOGY

## 2022-08-04 PROCEDURE — 83036 HEMOGLOBIN GLYCOSYLATED A1C: CPT | Performed by: PATHOLOGY

## 2022-08-04 NOTE — PATIENT INSTRUCTIONS
Omeprazole 20 mg  1 tablet daily.    Gastroenterology [phone] (4th Floor [facility])

## 2022-08-04 NOTE — NURSING NOTE
Nayeli Caal is a 42 year old female patient that presents today in clinic for the following:    Chief Complaint   Patient presents with     Follow Up     Six month follow-up     The patient's allergies and medications were reviewed as noted. A set of vitals were recorded as noted without incident: /85 (BP Location: Right arm, Patient Position: Sitting, Cuff Size: Adult Regular)   Pulse 74   Resp 16   Wt 78.6 kg (173 lb 3.2 oz)   SpO2 98%   Breastfeeding No   BMI 31.68 kg/m  . The patient does not have any other questions for the provider.    Mitch Moise, EMT at 1:35 PM on 8/4/2022

## 2022-08-05 NOTE — PROGRESS NOTES
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  She is seen with a . She was visiting relatives in [place] 3/11/ - 4/19 when she developed gastric pain and diarrhea. The diarrhea resolved and she went to [place] 4/19 - 7/15.  She still had abdominal p[ain and pelvic \"pressure\". No dysuria.  Her pain was not affected by food.. No N or V. She did not start Omeprazole because she is trying to get pregnant.  SHe stopped her meds for awhile too until she learned her pregnancy test was negative.  except She went to an ER and had an ultrasound and was told she had liver fibrosis but it was likely no the cause of her stomach pain.  She was referred to a gastroenterologist. Which has not yet seen. She then visited [place] 7/28 until yesterday.  She still has gastric pain, worsened by palpation her abdominal wall. She has cut back her coffee intake from 4 cups a day to 2. SHe has stopped orange juice. ETOH is only occasional.       She states her glucose readings have been high but she is planning to concentrate on her health from now on.    She has an appt with Dr. Hodge in GYN later this month.  She would like to get pregnant and have one child.        Patient Active Problem List   Diagnosis     Essential hypertension     Hypersomnia, organic     Other chronic nonalcoholic liver disease     Diabetic retinopathy of both eyes (H)     Obesity     Usher Syndrome: congenital deafness, retinitis pigmentosa     Macular degeneration, age related, nonexudative     Benign neoplasm of iris     Dry eye syndrome     Pemphigus erythematosus     Chondromalacia of patella, right, steroid injection 6/12/2015     Uncontrolled type 2 diabetes mellitus with hyperglycemia, with long-term current use of insulin (H)            Past Medical History:   Diagnosis Date     Combined visual and hearing impairment      Deaf      Diabetic retinopathy of both eyes (H) " 8/19/2011     Hepatic steatosis 08/19/2011     History of tobacco use      Hyperlipidemia      Hypertension      Hypertriglyceridemia      Migraines      Obesity 8/19/2011     Problem list name updated by automated process. 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            Past Surgical History:   Procedure Laterality Date     LAPAROSCOPIC CHOLECYSTECTOMY N/A 3/10/2018    Procedure: LAPAROSCOPIC CHOLECYSTECTOMY;  Laparoscopic Cholecystectomy ;  Surgeon: Kuldeep Sigala MD;  Location: [facility] OR     RELEASE TRIGGER FINGER Right 5/2/2019    Procedure: Right Thumb Trigger Release;  Surgeon: Greg Streeter MD;  Location: [facility] OR     RELEASE TRIGGER FINGER Left 5/30/2019    Procedure: Left Ring Trigger Finger Release.  Ganglion cyst excision.;  Surgeon: Greg Streeter MD;  Location: [facility] OR            Family History   Problem Relation Age of Onset     Unknown/Adopted Other              No Known Allergies         Current Outpatient Medications   Medication Sig Dispense Refill     acetaminophen (TYLENOL) 500 MG tablet Take 2 tablets (1,000 mg) by mouth every 6 hours as needed for mild pain 100 tablet 3     Alcohol Swabs (ALCOHOL PREP PAD) 70 % PADS 1 each 3 times daily 100 each 3     aspirin (ASA) 81 MG chewable tablet Take 1 tablet (81 mg) by mouth daily CHEW AND SWALLOW 90 tablet 3     atorvastatin (LIPITOR) 40 MG tablet Take 1 tablet (40 mg) by mouth daily 90 tablet 2     B-D U/F insulin pen needle        BD ULTRA FINE PEN NEEDLES Inject 1 dose. Subcutaneous 2 times daily BD ultra-fine insulin syringe, 30 ga. X 1/2'' short needle 1/2 cc. Use as directed. 400 Units 11     blood glucose (ACCU-CHEK LAYA PLUS) test strip Use with  Accucheck Expert meter.  Test blood sugar 6 times daily. 600 each 3     blood glucose monitoring (ACCU-CHEK FASTCLIX)  lancets Use to test blood sugar 6 times daily or as directed 510 each 3     Continuous Blood Gluc Sensor (DEXCOM G6 SENSOR) MISC Change every 10 days. 3 month supply. 9 each 3     Continuous Blood Gluc Transmit (DEXCOM G6 TRANSMITTER) MISC Change every 3 months. 1 each 3     empagliflozin (JARDIANCE) 25 MG TABS tablet Take 1 tablet (25 mg) by mouth daily 90 tablet 3     ergocalciferol (ERGOCALCIFEROL) 1.25 MG (16376 UT) capsule Take 1 capsule (50,000 Units) by mouth once a week For additional refills, please schedule a follow-up appointment at [phone] 12 capsule 3     fenofibrate (TRIGLIDE/LOFIBRA) 160 MG tablet Take 1 tablet (160 mg) by mouth daily 90 tablet 3     fish oil-omega-3 fatty acids 1000 MG capsule TAKE TWO CAPSULES (2 GM) BY MOUTH ONCE DAILY 180 capsule 3     ibuprofen (ADVIL/MOTRIN) 600 MG tablet Take 1 tablet (600 mg) by mouth every 6 hours as needed for moderate pain 120 tablet 1     Injection Device for insulin (INPEN 100-PINK-SHAYNE) DAVID 1 each continuous 2 each 0     insulin aspart (NOVOPEN ECHO) 100 UNIT/ML cartridge For use with the In-Pen device.  Give 1 unit per 5 grams CHO with meals and snacks as well as correction.  Average daily use is 80 units daily. 60 mL 3     insulin glargine (BASAGLAR KWIKPEN) 100 UNIT/ML pen Inject 45 Units Subcutaneous daily 45 mL 3     insulin pen needle (B-D U/F) 31G X 8 MM miscellaneous USE AS DIRECTED. 200 each 1     losartan (COZAAR) 100 MG tablet Take 1 tablet (100 mg) by mouth daily 90 tablet 3     naproxen (NAPROSYN) 375 MG tablet Take 1 tablet (375 mg) by mouth 2 times daily (with meals) 60 tablet 3     Ostomy Supplies (ADHESIVE REMOVER WIPES) MISC 1 each every 14 days 50 each 3     Ostomy Supplies (SKIN TAC ADHESIVE BARRIER WIPE) MISC 1 each every 14 days 6 each 3       REVIEW OF SYSTEMS:  See above.    O:   /85 (BP Location: Right arm, Patient Position: Sitting, Cuff Size: Adult Regular)   Pulse 74   Resp 16   Wt 78.6 kg (173 lb 3.2 oz)    SpO2 98%   Breastfeeding No   BMI 31.68 kg/m    GENERAL APPEARANCE: healthy, alert and no distress  RESP: lungs clear to auscultation - no rales, rhonchi or wheezes  CV: regular rates and rhythm, normal S1 S2, no S3 or S4 and no murmur, click or rub   ABDOMEN:  soft, tender to touch LUQ, epigastric and RUQ. Non-tender to palpation over pelvic area.   NEURO: alert and O x 3  MUSK: normal.  EXT: warm.  Edema: none  PSYCHE: normal.     A/P:  Nayeli was seen today for follow up.    Diagnoses and all orders for this visit:    Hepatic steatosis  -     AST; Future  -     ALT; Future    Type 2 diabetes mellitus with complication, with long-term current use of insulin (H)  -     Hemoglobin A1c; Future    Pelvic pain  -     UA with Micro reflex to Culture; Future    Abdominal pain, epigastric  -     UPPER GI ENDOSCOPY; Future      The patient voiced understanding of the information discussed and all questions were answered.     I spent a total of 45 minutes in the care of this pt during today's office visit. This time includes reviewing the patient's chart and prior history, obtaining a history, performing an examination and evaluation and counseling the patient. This time also includes ordering medications or tests necessary in addition to communication to other member's of the patient's health care team. Time spent in documentation and care coordination is included.     Mariya CELESTIN, CNP

## 2022-08-15 ENCOUNTER — LAB (OUTPATIENT)
Dept: LAB | Facility: CLINIC | Age: 42
End: 2022-08-15

## 2022-08-15 ENCOUNTER — OFFICE VISIT (OUTPATIENT)
Dept: ENDOCRINOLOGY | Facility: CLINIC | Age: 42
End: 2022-08-15
Payer: COMMERCIAL

## 2022-08-15 VITALS
WEIGHT: 171.7 LBS | BODY MASS INDEX: 31.4 KG/M2 | HEART RATE: 65 BPM | SYSTOLIC BLOOD PRESSURE: 126 MMHG | DIASTOLIC BLOOD PRESSURE: 84 MMHG

## 2022-08-15 DIAGNOSIS — Z79.4 UNCONTROLLED TYPE 2 DIABETES MELLITUS WITH HYPERGLYCEMIA, WITH LONG-TERM CURRENT USE OF INSULIN (H): Primary | ICD-10-CM

## 2022-08-15 DIAGNOSIS — B37.31 GENITAL CANDIDIASIS IN FEMALE: ICD-10-CM

## 2022-08-15 DIAGNOSIS — N18.1 CHRONIC KIDNEY DISEASE, STAGE 1: ICD-10-CM

## 2022-08-15 DIAGNOSIS — E11.65 UNCONTROLLED TYPE 2 DIABETES MELLITUS WITH HYPERGLYCEMIA, WITH LONG-TERM CURRENT USE OF INSULIN (H): Primary | ICD-10-CM

## 2022-08-15 DIAGNOSIS — E78.2 MIXED HYPERLIPIDEMIA: ICD-10-CM

## 2022-08-15 DIAGNOSIS — I10 BENIGN ESSENTIAL HYPERTENSION: ICD-10-CM

## 2022-08-15 LAB — HCG SERPL QL: NEGATIVE

## 2022-08-15 PROCEDURE — 99214 OFFICE O/P EST MOD 30 MIN: CPT | Performed by: INTERNAL MEDICINE

## 2022-08-15 PROCEDURE — 36415 COLL VENOUS BLD VENIPUNCTURE: CPT | Performed by: PATHOLOGY

## 2022-08-15 PROCEDURE — 84703 CHORIONIC GONADOTROPIN ASSAY: CPT | Performed by: PATHOLOGY

## 2022-08-15 RX ORDER — INSULIN GLARGINE 100 [IU]/ML
45 INJECTION, SOLUTION SUBCUTANEOUS DAILY
Qty: 45 ML | Refills: 3 | Status: SHIPPED | OUTPATIENT
Start: 2022-08-15 | End: 2022-10-04

## 2022-08-15 RX ORDER — FLUCONAZOLE 150 MG/1
150 TABLET ORAL ONCE
Qty: 1 TABLET | Refills: 0 | Status: SHIPPED | OUTPATIENT
Start: 2022-08-15 | End: 2022-08-17

## 2022-08-15 ASSESSMENT — PAIN SCALES - GENERAL: PAINLEVEL: NO PAIN (0)

## 2022-08-15 NOTE — NURSING NOTE
"Chief Complaint   Patient presents with     Diabetes     Vital signs:      BP: 126/84 Pulse: 65             Weight: 77.9 kg (171 lb 11.2 oz)  Estimated body mass index is 31.4 kg/m  as calculated from the following:    Height as of 2/1/22: 1.575 m (5' 2\").    Weight as of this encounter: 77.9 kg (171 lb 11.2 oz).          "

## 2022-08-15 NOTE — PROGRESS NOTES
HPI:   Nayeli is a 40 yo woman here with a  for follow up of type 2 diabetes since the early 2000's.  She also sees Anne Marie Medina, last visit was in April, 2022.     She started on insulin in 2003 after failing Metformin treatment.  Metformin was discontinued due to diarrhea.  She has struggled with high blood sugars for many years.  Besides insulin, she is now treated with Jardiance, 25 mg daily.  Most recent A1c was 9.9% on 8/4/22.     Last February, she got her IUD removed.  Her menstrual periods are regular.  She is interested in getting pregnant.  She has been seeing her psychiatrist and trying to discontinue the psych meds, in preparation for pregnancy.  She has stopped taking the lisinopril, the fenofibrate and atorvastatin since February of this year.  Continues to take Jardiance.  Her father passed away recently.  She was in [place] in the beginning of the year and she just came back from a trip to [place].  She is scheduled to have an endoscopy in order to be evaluated for the heartburns and chest pain.  Complains of recent genital fungal infection.    Her current regimen is:   empagliflozin 25 mg daily.   Basaglar 38 units.    Novolog (dosing on In-pen):  Carb ratio: 1/8g for all meals  Sensitivity: Mid- 50, 6am- 40, 10:30pm- 50    She reports having 3 meals a day.  Today, we could not download the InPen records, as she forgot her password.  She has not been snacking much.  If she snacks, it is mainly after dinner.  Forgets to administer Basaglar approximately twice a week.  The lowest blood glucose she reports in the last couple of months occurred last Wednesday, when she had a salad, did not take NovoLog, and her blood sugar was 81.  She remains symptomatic for hypoglycemia.  On average, she reports taking 60 units of NovoLog per meal.  However, she does not report not having enough insulin based on her prescription.    Her overall average glucose is 236 mg/dL (SD 69), over the  past 2 weeks, corresponding to a GMI of 9%.  The blood sugar numbers are only 23% of time in range, 41% of the glucose numbers are above 250.        Diabetes complications:  Retinopathy: last eye exam - 3/21. 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  Nephropathy: h/o proteinuria; normal GFR. On 100 mg losartan daily (tx with lisinopril was associated with a dry cough).  Most recent urine microalbumin positive at 639, in April 2022. GFR>90.   Neuropathy: No numbness or tingling sensation in her feet.   Most recent lipid panel:  7/19/2021 LDL cholesterol 124, HDL 43, triglycerides 133.  Prescribed 40 mg atorvastatin daily and 160 mg fenofibrate daily.    Past Medical History:   Diagnosis Date     Combined visual and hearing impairment      Deaf      Diabetic retinopathy of both eyes (H) 8/19/2011     Hepatic steatosis 08/19/2011     History of tobacco use      Hyperlipidemia      Hypertension      Hypertriglyceridemia      Migraines      Obesity 8/19/2011     Problem list name updated by automated process. 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  Ganglion cyst excision.;  Surgeon: Greg Streeter MD;  Location: [facility] OR       Family History   Problem Relation Age of Onset     Unknown/Adopted Other        Social History     Social Hx: She is adopted.  Works for [employer].   Social History     Marital  status: Single     Spouse name: N/A     Number of children: N/A     Years of education: N/A     Social History Main Topics     Smoking status: Former Smoker     Types: Cigarettes     Quit date: 12/1/2010     Smokeless tobacco: Never Used      Comment: stopped 2 yrs ago     Alcohol use No     Drug use: No     Sexual activity: Not Currently     Partners: Male     Other Topics Concern      Service No     Blood Transfusions No     Caffeine Concern No     Occupational Exposure No     Hobby Hazards No     Sleep Concern No     Stress Concern No     Weight Concern Yes     Special Diet No     Back Care No     Exercise Yes     4-5 x a week     Bike Helmet No     Seat Belt Yes     Self-Exams Yes     Social History Narrative   Social Hx: Lives in a [place].  Working- secretarial.  Boyfriend is in [place].     Current Outpatient Medications   Medication     acetaminophen (TYLENOL) 500 MG tablet     Alcohol Swabs (ALCOHOL PREP PAD) 70 % PADS     aspirin (ASA) 81 MG chewable tablet     atorvastatin (LIPITOR) 40 MG tablet     B-D U/F insulin pen needle     BD ULTRA FINE PEN NEEDLES     blood glucose (ACCU-CHEK LAYA PLUS) test strip     blood glucose monitoring (ACCU-CHEK FASTCLIX) lancets     Continuous Blood Gluc Sensor (DEXCOM G6 SENSOR) MISC     Continuous Blood Gluc Transmit (DEXCOM G6 TRANSMITTER) MISC     empagliflozin (JARDIANCE) 25 MG TABS tablet     ergocalciferol (ERGOCALCIFEROL) 1.25 MG (73989 UT) capsule     fenofibrate (TRIGLIDE/LOFIBRA) 160 MG tablet     fish oil-omega-3 fatty acids 1000 MG capsule     ibuprofen (ADVIL/MOTRIN) 600 MG tablet     Injection Device for insulin (INPEN 100-PINK-SHAYNE) DAVID     insulin aspart (NOVOPEN ECHO) 100 UNIT/ML cartridge     insulin glargine (BASAGLAR KWIKPEN) 100 UNIT/ML pen     insulin pen needle (B-D U/F) 31G X 8 MM miscellaneous     losartan (COZAAR) 100 MG tablet     naproxen (NAPROSYN) 375 MG tablet     Ostomy Supplies (ADHESIVE REMOVER WIPES) MISC     Ostomy Supplies  (SKIN TAC ADHESIVE BARRIER WIPE) MISC     Current Facility-Administered Medications   Medication     hydrocortisone (CORTAID) 1 % cream     levonorgestrel (MIRENA) 20 MCG/24HR IUD 20 mcg        No Known Allergies    Physical Exam  /84 (BP Location: Left arm, Patient Position: Sitting, Cuff Size: Adult Regular)   Pulse 65   Wt 77.9 kg (171 lb 11.2 oz)   BMI 31.40 kg/m     Wt Readings from Last 10 Encounters:   08/15/22 77.9 kg (171 lb 11.2 oz)   08/04/22 78.6 kg (173 lb 3.2 oz)   02/08/22 78.2 kg (172 lb 4.8 oz)   02/01/22 78.7 kg (173 lb 6.4 oz)   09/23/21 77.6 kg (171 lb)   09/16/21 78.2 kg (172 lb 6.4 oz)   08/09/21 77 kg (169 lb 12.8 oz)   07/22/21 77.1 kg (170 lb)   07/19/21 76.9 kg (169 lb 9.6 oz)   07/07/21 77.6 kg (171 lb)     General appearance: she is well-developed, well-nourished, and in no distress.  Eyes: conjunctivae and extraocular motions are normal. Pupils are equal, round, and reactive to light. No lid lag, no stare.  HENT: oropharynx clear and moist; neck no JVD, no bruits, no thyromegaly, no palpable nodules  Cardiovascular: regular rhythm, no murmurs, distal pulses palpable, no edema  Respiratory: chest clear, no rales, no rhonchi  Musculoskeletal: normal tone and strength   Neurologic: reflexes normal and symmetric, no resting tremor  Psychiatric: affect and judgment normal   Skin: warm, no lesions  Feet: Onychomycosis, no lesions noted    RESULTS  Lab Results   Component Value Date    A1C 9.9 (H) 08/04/2022    A1C 8.9 (H) 03/07/2022    A1C 9.3 (H) 01/27/2022    A1C 9.8 (H) 10/13/2021    A1C 10.2 (H) 07/19/2021    A1C 11.6 (H) 04/05/2021    A1C 12.4 (H) 10/29/2020    A1C 12.9 (H) 07/08/2020    A1C 8.2 (H) 05/02/2019    A1C 10.3 (H) 10/15/2018    HEMOGLOBINA1 10.0 (A) 01/13/2020    HEMOGLOBINA1 9.9 (A) 10/07/2019    HEMOGLOBINA1 8.1 (A) 04/22/2019    HEMOGLOBINA1 10.4 (A) 01/14/2019    HEMOGLOBINA1 8.7 (A) 03/12/2018       Hemoglobin   Date Value Ref Range Status   09/23/2021 13.1 11.7  - 15.7 g/dL Final   03/13/2018 14.2 11.7 - 15.7 g/dL Final     Hematocrit   Date Value Ref Range Status   09/23/2021 40.8 35.0 - 47.0 % Final   07/08/2020 44.2 35.0 - 47.0 % Final     Cholesterol   Date Value Ref Range Status   07/19/2021 194 <200 mg/dL Final     Comment:     Age 0-19 years  Desirable: <170 mg/dL  Borderline high:  170-199 mg/dl  High:            >199 mg/dl    Age 20 years and older  Desirable: <200 mg/dL   07/08/2020 179 <200 mg/dL Final     Cholesterol/HDL Ratio   Date Value Ref Range Status   09/16/2013 5.8 (H) 0.0 - 5.0 Final     HDL Cholesterol   Date Value Ref Range Status   07/08/2020 41 (L) >49 mg/dL Final     Direct Measure HDL   Date Value Ref Range Status   07/19/2021 43 (L) >=50 mg/dL Final     Comment:     0-19 years:       Greater than or equal to 45 mg/dL   Low: Less than 40 mg/dL   Borderline low: 40-44 mg/dL     20 years and older:   Female: Greater than or equal to 50 mg/dL   Male:   Greater than or equal to 40 mg/dL          LDL Cholesterol Calculated   Date Value Ref Range Status   07/19/2021 124 (H) <=100 mg/dL Final     Comment:     Age 0-19 years:  Desirable: 0-110 mg/dL   Borderline high: 110-129 mg/dL   High: >= 130 mg/dL    Age 20 years and older:  Desirable: <100mg/dL  Above desirable: 100-129 mg/dL   Borderline high: 130-159 mg/dL   High: 160-189 mg/dL   Very high: >= 190 mg/dL   07/08/2020 72 <100 mg/dL Final     Comment:     Desirable:       <100 mg/dl     VLDL-Cholesterol   Date Value Ref Range Status   09/16/2013 44 (H) 0 - 30 mg/dL Final     Triglycerides   Date Value Ref Range Status   07/19/2021 133 <150 mg/dL Final     Comment:     0-9 years:  Normal:    Less than 75 mg/dL  Borderline high:  75-99 mg/dL  High:             Greater than or equal to 100 mg/dL    0-19 years:  Normal:    Less than 90 mg/dL  Borderline high:   mg/dL  High:             Greater than or equal to 130 mg/dL    20 years and older:  Normal:    Less than 150 mg/dL  Borderline high:   150-199 mg/dL  High:             200-499 mg/dL  Very high:   Greater than or equal to 500 mg/dL   07/08/2020 331 (H) <150 mg/dL Final     Comment:     Borderline high:  150-199 mg/dl  High:             200-499 mg/dl  Very high:       >499 mg/dl       Albumin Urine mg/L   Date Value Ref Range Status   03/07/2022 115 mg/L Final   07/20/2020 31 mg/L Final     TSH   Date Value Ref Range Status   07/08/2020 1.34 0.40 - 4.00 mU/L Final         Last Basic Metabolic Panel:    Sodium   Date Value Ref Range Status   08/31/2021 138 133 - 144 mmol/L Final   07/08/2020 136 133 - 144 mmol/L Final     Potassium   Date Value Ref Range Status   08/31/2021 4.0 3.4 - 5.3 mmol/L Final   07/08/2020 4.0 3.4 - 5.3 mmol/L Final     Chloride   Date Value Ref Range Status   08/31/2021 112 (H) 94 - 109 mmol/L Final   07/08/2020 101 94 - 109 mmol/L Final     Calcium   Date Value Ref Range Status   08/31/2021 8.6 8.5 - 10.1 mg/dL Final   07/08/2020 8.6 8.5 - 10.1 mg/dL Final     Carbon Dioxide   Date Value Ref Range Status   07/08/2020 30 20 - 32 mmol/L Final     Carbon Dioxide (CO2)   Date Value Ref Range Status   08/31/2021 20 20 - 32 mmol/L Final     Urea Nitrogen   Date Value Ref Range Status   08/31/2021 19 7 - 30 mg/dL Final   07/08/2020 13 7 - 30 mg/dL Final     Creatinine   Date Value Ref Range Status   08/31/2021 0.70 0.52 - 1.04 mg/dL Final   07/08/2020 0.74 0.52 - 1.04 mg/dL Final     GFR Estimate   Date Value Ref Range Status   08/31/2021 >90 >60 mL/min/1.73m2 Final     Comment:     As of July 11, 2021, eGFR is calculated by the CKD-EPI creatinine equation, without race adjustment. eGFR can be influenced by muscle mass, exercise, and diet. The reported eGFR is an estimation only and is only applicable if the renal function is stable.   07/08/2020 >90 >60 mL/min/[1.73_m2] Final     Comment:     Non  GFR Calc  Starting 12/18/2018, serum creatinine based estimated GFR (eGFR) will be   calculated using the Chronic Kidney  Disease Epidemiology Collaboration   (CKD-EPI) equation.       Glucose   Date Value Ref Range Status   2021 143 (H) 70 - 99 mg/dL Final   2020 417 (H) 70 - 99 mg/dL Final     GLUCOSE BY METER POCT   Date Value Ref Range Status   2021 138 (H) 70 - 99 mg/dL Final       AST   Date Value Ref Range Status   2022 15 0 - 45 U/L Final   2020 11 0 - 45 U/L Final     ALT   Date Value Ref Range Status   2022 27 0 - 50 U/L Final   2020 29 0 - 50 U/L Final     Albumin Urine mg/g Cr   Date Value Ref Range Status   2022 638.89 (H) 0.00 - 25.00 mg/g Cr Final   2020 54.71 (H) 0 - 25 mg/g Cr Final       Assessment/Plan:     1.  Diabetes, type 2 vs. PRAVIN, complicated by diabetic nephropathy and retinopathy, uncontrolled.  Patient is considering pregnancy.   The patient was counseled on:   - the importance of obtaining a tight glycemic control during pregnancy in order to decrease the risk of potential fetal/pregnancy complications like: congenital malformations, spontaneous , macrosomia, preeclampsia,  birth, etc;   - blood glucose goals in diabetic pregnant woman (pre and post conception):  Fasting blood glucose concentration ?95 mg/dL  One-hour postprandial blood glucose concentration ?140 mg/dL  Two-hour postprandial glucose concentration ?120 mg/dL  A1C ?6.5 percent (ideally, ?6 percent)    Since she wants to get pregnant, I recommended to discontinue Jardiance.   At the end of the visit, the patient reports being undecided about pursuing pregnancy.  She needs some time to think about whether she can adequately manage her diabetes during pregnancy.    Recommendations:  Check BG before breakfast, 2 hrs after meals and at bedtime. Occasionally, also check the prelunch or predinner BG.   Have the DEXCOM and the inpen downloaded for our review   Schedule an apt with CDE in 1-2 weeks and with us in 2 months      Proteinuria/HTN  Blood pressure fairly well controlled  considering that she does not take antihypertensive medications.     Genital fungal infection   Advised to have a pregnancy test done today, before prescribing an oral antifungal.     35 minutes spent on the date of the encounter doing chart review, history and exam, documentation and further activities as noted above.

## 2022-08-15 NOTE — RESULT ENCOUNTER NOTE
Pregnancy test is negative. I prescribed a tablet of diflucan, 150 mg - one tablet. If the genital fungal infection recurs, please f/up with your gynecologist.

## 2022-08-15 NOTE — PATIENT INSTRUCTIONS
Pregnancy BG targets:  Fasting blood glucose concentration ?95 mg/dL  One-hour postprandial blood glucose concentration ?140 mg/dL  Two-hour postprandial glucose concentration ?120 mg/dL  A1C ?6.5 percent (ideally, ?6 percent)  Check BG before breakfast, 2 hrs after meals and at bedtime. Occasionally, also check the prelunch or predinner BG.   Have the DEXCOM and the inpen downloaded for our review

## 2022-08-15 NOTE — LETTER
8/15/2022       RE: Nayeli Caal  [address]  [facility] [address]     Dear Colleague,    Thank you for referring your patient, Nayeli Caal, to the [facility] [place] at [facility]. Please see a copy of my visit note below.    Outcome for 08/11/22 10:16 AM :Glucose sent via Email     Smita Miles          HPI:   Nayeli is a 42 yo woman here with a  for follow up of type 2 diabetes since the early 2000's.  She also sees Anne Marie Medina, last visit was in April, 2022.     She started on insulin in 2003 after failing Metformin treatment.  Metformin was discontinued due to diarrhea.  She has struggled with high blood sugars for many years.  Besides insulin, she is now treated with Jardiance, 25 mg daily.  Most recent A1c was 9.9% on 8/4/22.     Last February, she got her IUD removed.  Her menstrual periods are regular.  She is interested in getting pregnant.  She has been seeing her psychiatrist and trying to discontinue the psych meds, in preparation for pregnancy.  She has stopped taking the lisinopril, the fenofibrate and atorvastatin since February of this year.  Continues to take Jardiance.  Her father passed away recently.  She was in [place] in the beginning of the year and she just came back from a trip to [place].  She is scheduled to have an endoscopy in order to be evaluated for the heartburns and chest pain.  Complains of recent genital fungal infection.    Her current regimen is:   empagliflozin 25 mg daily.   Basaglar 38 units.    Novolog (dosing on In-pen):  Carb ratio: 1/8g for all meals  Sensitivity: Mid- 50, 6am- 40, 10:30pm- 50    She reports having 3 meals a day.  Today, we could not download the InPen records, as she forgot her password.  She has not been snacking much.  If she snacks, it is mainly after dinner.  Forgets to administer Basaglar approximately twice a week.   The lowest blood glucose she reports in the last couple of months occurred last Wednesday, when she had a salad, did not take NovoLog, and her blood sugar was 81.  She remains symptomatic for hypoglycemia.  On average, she reports taking 60 units of NovoLog per meal.  However, she does not report not having enough insulin based on her prescription.    Her overall average glucose is 236 mg/dL (SD 69), over the past 2 weeks, corresponding to a GMI of 9%.  The blood sugar numbers are only 23% of time in range, 41% of the glucose numbers are above 250.        Diabetes complications:  Retinopathy: last eye exam - 3/21. 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  Nephropathy: h/o proteinuria; normal GFR. On 100 mg losartan daily (tx with lisinopril was associated with a dry cough).  Most recent urine microalbumin positive at 639, in April 2022. GFR>90.   Neuropathy: No numbness or tingling sensation in her feet.   Most recent lipid panel:  7/19/2021 LDL cholesterol 124, HDL 43, triglycerides 133.  Prescribed 40 mg atorvastatin daily and 160 mg fenofibrate daily.    Past Medical History:   Diagnosis Date     Combined visual and hearing impairment      Deaf      Diabetic retinopathy of both eyes (H) 8/19/2011     Hepatic steatosis 08/19/2011     History of tobacco use      Hyperlipidemia      Hypertension      Hypertriglyceridemia      Migraines      Obesity 8/19/2011     Problem list name updated by automated process. 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  Ganglion cyst excision.;  Surgeon: Greg Streeter MD;  Location: [facility] OR       Family History   Problem Relation Age of Onset     Unknown/Adopted Other        Social History     Social Hx: She is adopted.  Works for [employer].   Social History     Marital status: Single     Spouse name: N/A     Number of children: N/A     Years of education: N/A     Social History Main Topics     Smoking status: Former Smoker     Types: Cigarettes     Quit date: 12/1/2010     Smokeless tobacco: Never Used      Comment: stopped 2 yrs ago     Alcohol use No     Drug use: No     Sexual activity: Not Currently     Partners: Male     Other Topics Concern      Service No     Blood Transfusions No     Caffeine Concern No     Occupational Exposure No     Hobby Hazards No     Sleep Concern No     Stress Concern No     Weight Concern Yes     Special Diet No     Back Care No     Exercise Yes     4-5 x a week     Bike Helmet No     Seat Belt Yes     Self-Exams Yes     Social History Narrative   Social Hx: Lives in a [facility].  Working- secretarial.  Boyfriend is in [place].     Current Outpatient Medications   Medication     acetaminophen (TYLENOL) 500 MG tablet     Alcohol Swabs (ALCOHOL PREP PAD) 70 % PADS     aspirin (ASA) 81 MG chewable tablet     atorvastatin (LIPITOR) 40 MG tablet     B-D U/F insulin pen needle     BD ULTRA FINE PEN NEEDLES     blood glucose (ACCU-CHEK LAYA PLUS) test strip     blood glucose monitoring (ACCU-CHEK FASTCLIX) lancets     Continuous Blood Gluc Sensor (DEXCOM G6 SENSOR) MISC     Continuous Blood Gluc Transmit (DEXCOM G6 TRANSMITTER) MISC     empagliflozin (JARDIANCE) 25 MG TABS tablet     ergocalciferol (ERGOCALCIFEROL) 1.25 MG (86505 UT) capsule     fenofibrate (TRIGLIDE/LOFIBRA) 160 MG tablet     fish oil-omega-3 fatty acids 1000 MG capsule      ibuprofen (ADVIL/MOTRIN) 600 MG tablet     Injection Device for insulin (INPEN 100-PINK-SHAYNE) DAVID     insulin aspart (NOVOPEN ECHO) 100 UNIT/ML cartridge     insulin glargine (BASAGLAR KWIKPEN) 100 UNIT/ML pen     insulin pen needle (B-D U/F) 31G X 8 MM miscellaneous     losartan (COZAAR) 100 MG tablet     naproxen (NAPROSYN) 375 MG tablet     Ostomy Supplies (ADHESIVE REMOVER WIPES) MISC     Ostomy Supplies (SKIN TAC ADHESIVE BARRIER WIPE) MISC     Current Facility-Administered Medications   Medication     hydrocortisone (CORTAID) 1 % cream     levonorgestrel (MIRENA) 20 MCG/24HR IUD 20 mcg        No Known Allergies    Physical Exam  /84 (BP Location: Left arm, Patient Position: Sitting, Cuff Size: Adult Regular)   Pulse 65   Wt 77.9 kg (171 lb 11.2 oz)   BMI 31.40 kg/m     Wt Readings from Last 10 Encounters:   08/15/22 77.9 kg (171 lb 11.2 oz)   08/04/22 78.6 kg (173 lb 3.2 oz)   02/08/22 78.2 kg (172 lb 4.8 oz)   02/01/22 78.7 kg (173 lb 6.4 oz)   09/23/21 77.6 kg (171 lb)   09/16/21 78.2 kg (172 lb 6.4 oz)   08/09/21 77 kg (169 lb 12.8 oz)   07/22/21 77.1 kg (170 lb)   07/19/21 76.9 kg (169 lb 9.6 oz)   07/07/21 77.6 kg (171 lb)     General appearance: she is well-developed, well-nourished, and in no distress.  Eyes: conjunctivae and extraocular motions are normal. Pupils are equal, round, and reactive to light. No lid lag, no stare.  HENT: oropharynx clear and moist; neck no JVD, no bruits, no thyromegaly, no palpable nodules  Cardiovascular: regular rhythm, no murmurs, distal pulses palpable, no edema  Respiratory: chest clear, no rales, no rhonchi  Musculoskeletal: normal tone and strength   Neurologic: reflexes normal and symmetric, no resting tremor  Psychiatric: affect and judgment normal   Skin: warm, no lesions  Feet: Onychomycosis, no lesions noted    RESULTS  Lab Results   Component Value Date    A1C 9.9 (H) 08/04/2022    A1C 8.9 (H) 03/07/2022    A1C 9.3 (H) 01/27/2022    A1C 9.8 (H)  10/13/2021    A1C 10.2 (H) 07/19/2021    A1C 11.6 (H) 04/05/2021    A1C 12.4 (H) 10/29/2020    A1C 12.9 (H) 07/08/2020    A1C 8.2 (H) 05/02/2019    A1C 10.3 (H) 10/15/2018    HEMOGLOBINA1 10.0 (A) 01/13/2020    HEMOGLOBINA1 9.9 (A) 10/07/2019    HEMOGLOBINA1 8.1 (A) 04/22/2019    HEMOGLOBINA1 10.4 (A) 01/14/2019    HEMOGLOBINA1 8.7 (A) 03/12/2018       Hemoglobin   Date Value Ref Range Status   09/23/2021 13.1 11.7 - 15.7 g/dL Final   03/13/2018 14.2 11.7 - 15.7 g/dL Final     Hematocrit   Date Value Ref Range Status   09/23/2021 40.8 35.0 - 47.0 % Final   07/08/2020 44.2 35.0 - 47.0 % Final     Cholesterol   Date Value Ref Range Status   07/19/2021 194 <200 mg/dL Final     Comment:     Age 0-19 years  Desirable: <170 mg/dL  Borderline high:  170-199 mg/dl  High:            >199 mg/dl    Age 20 years and older  Desirable: <200 mg/dL   07/08/2020 179 <200 mg/dL Final     Cholesterol/HDL Ratio   Date Value Ref Range Status   09/16/2013 5.8 (H) 0.0 - 5.0 Final     HDL Cholesterol   Date Value Ref Range Status   07/08/2020 41 (L) >49 mg/dL Final     Direct Measure HDL   Date Value Ref Range Status   07/19/2021 43 (L) >=50 mg/dL Final     Comment:     0-19 years:       Greater than or equal to 45 mg/dL   Low: Less than 40 mg/dL   Borderline low: 40-44 mg/dL     20 years and older:   Female: Greater than or equal to 50 mg/dL   Male:   Greater than or equal to 40 mg/dL          LDL Cholesterol Calculated   Date Value Ref Range Status   07/19/2021 124 (H) <=100 mg/dL Final     Comment:     Age 0-19 years:  Desirable: 0-110 mg/dL   Borderline high: 110-129 mg/dL   High: >= 130 mg/dL    Age 20 years and older:  Desirable: <100mg/dL  Above desirable: 100-129 mg/dL   Borderline high: 130-159 mg/dL   High: 160-189 mg/dL   Very high: >= 190 mg/dL   07/08/2020 72 <100 mg/dL Final     Comment:     Desirable:       <100 mg/dl     VLDL-Cholesterol   Date Value Ref Range Status   09/16/2013 44 (H) 0 - 30 mg/dL Final     Triglycerides    Date Value Ref Range Status   07/19/2021 133 <150 mg/dL Final     Comment:     0-9 years:  Normal:    Less than 75 mg/dL  Borderline high:  75-99 mg/dL  High:             Greater than or equal to 100 mg/dL    0-19 years:  Normal:    Less than 90 mg/dL  Borderline high:   mg/dL  High:             Greater than or equal to 130 mg/dL    20 years and older:  Normal:    Less than 150 mg/dL  Borderline high:  150-199 mg/dL  High:             200-499 mg/dL  Very high:   Greater than or equal to 500 mg/dL   07/08/2020 331 (H) <150 mg/dL Final     Comment:     Borderline high:  150-199 mg/dl  High:             200-499 mg/dl  Very high:       >499 mg/dl       Albumin Urine mg/L   Date Value Ref Range Status   03/07/2022 115 mg/L Final   07/20/2020 31 mg/L Final     TSH   Date Value Ref Range Status   07/08/2020 1.34 0.40 - 4.00 mU/L Final         Last Basic Metabolic Panel:    Sodium   Date Value Ref Range Status   08/31/2021 138 133 - 144 mmol/L Final   07/08/2020 136 133 - 144 mmol/L Final     Potassium   Date Value Ref Range Status   08/31/2021 4.0 3.4 - 5.3 mmol/L Final   07/08/2020 4.0 3.4 - 5.3 mmol/L Final     Chloride   Date Value Ref Range Status   08/31/2021 112 (H) 94 - 109 mmol/L Final   07/08/2020 101 94 - 109 mmol/L Final     Calcium   Date Value Ref Range Status   08/31/2021 8.6 8.5 - 10.1 mg/dL Final   07/08/2020 8.6 8.5 - 10.1 mg/dL Final     Carbon Dioxide   Date Value Ref Range Status   07/08/2020 30 20 - 32 mmol/L Final     Carbon Dioxide (CO2)   Date Value Ref Range Status   08/31/2021 20 20 - 32 mmol/L Final     Urea Nitrogen   Date Value Ref Range Status   08/31/2021 19 7 - 30 mg/dL Final   07/08/2020 13 7 - 30 mg/dL Final     Creatinine   Date Value Ref Range Status   08/31/2021 0.70 0.52 - 1.04 mg/dL Final   07/08/2020 0.74 0.52 - 1.04 mg/dL Final     GFR Estimate   Date Value Ref Range Status   08/31/2021 >90 >60 mL/min/1.73m2 Final     Comment:     As of July 11, 2021, eGFR is calculated by  the CKD-EPI creatinine equation, without race adjustment. eGFR can be influenced by muscle mass, exercise, and diet. The reported eGFR is an estimation only and is only applicable if the renal function is stable.   2020 >90 >60 mL/min/[1.73_m2] Final     Comment:     Non  GFR Calc  Starting 2018, serum creatinine based estimated GFR (eGFR) will be   calculated using the Chronic Kidney Disease Epidemiology Collaboration   (CKD-EPI) equation.       Glucose   Date Value Ref Range Status   2021 143 (H) 70 - 99 mg/dL Final   2020 417 (H) 70 - 99 mg/dL Final     GLUCOSE BY METER POCT   Date Value Ref Range Status   2021 138 (H) 70 - 99 mg/dL Final       AST   Date Value Ref Range Status   2022 15 0 - 45 U/L Final   2020 11 0 - 45 U/L Final     ALT   Date Value Ref Range Status   2022 27 0 - 50 U/L Final   2020 29 0 - 50 U/L Final     Albumin Urine mg/g Cr   Date Value Ref Range Status   2022 638.89 (H) 0.00 - 25.00 mg/g Cr Final   2020 54.71 (H) 0 - 25 mg/g Cr Final       Assessment/Plan:     1.  Diabetes, type 2 vs. PRAVIN, complicated by diabetic nephropathy and retinopathy, uncontrolled.  Patient is considering pregnancy.   The patient was counseled on:   - the importance of obtaining a tight glycemic control during pregnancy in order to decrease the risk of potential fetal/pregnancy complications like: congenital malformations, spontaneous , macrosomia, preeclampsia,  birth, etc;   - blood glucose goals in diabetic pregnant woman (pre and post conception):  Fasting blood glucose concentration ?95 mg/dL  One-hour postprandial blood glucose concentration ?140 mg/dL  Two-hour postprandial glucose concentration ?120 mg/dL  A1C ?6.5 percent (ideally, ?6 percent)    Since she wants to get pregnant, I recommended to discontinue Jardiance.   At the end of the visit, the patient reports being undecided about pursuing pregnancy.  She  needs some time to think about whether she can adequately manage her diabetes during pregnancy.    Recommendations:  Check BG before breakfast, 2 hrs after meals and at bedtime. Occasionally, also check the prelunch or predinner BG.   Have the DEXCOM and the inpen downloaded for our review   Schedule an apt with CDE in 1-2 weeks and with us in 2 months      Proteinuria/HTN  Blood pressure fairly well controlled considering that she does not take antihypertensive medications.     Genital fungal infection   Advised to have a pregnancy test done today, before prescribing an oral antifungal.     35 minutes spent on the date of the encounter doing chart review, history and exam, documentation and further activities as noted above.    Jimena Bragg MD

## 2022-08-17 ENCOUNTER — LAB (OUTPATIENT)
Dept: LAB | Facility: CLINIC | Age: 42
End: 2022-08-17
Attending: NURSE PRACTITIONER
Payer: COMMERCIAL

## 2022-08-17 ENCOUNTER — OFFICE VISIT (OUTPATIENT)
Dept: OBGYN | Facility: CLINIC | Age: 42
End: 2022-08-17
Attending: NURSE PRACTITIONER
Payer: COMMERCIAL

## 2022-08-17 VITALS — HEIGHT: 62 IN | WEIGHT: 171.4 LBS | BODY MASS INDEX: 31.54 KG/M2

## 2022-08-17 DIAGNOSIS — D25.9 UTERINE LEIOMYOMA, UNSPECIFIED LOCATION: ICD-10-CM

## 2022-08-17 DIAGNOSIS — B37.31 GENITAL CANDIDIASIS IN FEMALE: ICD-10-CM

## 2022-08-17 DIAGNOSIS — Z31.69 PRE-CONCEPTION COUNSELING: Primary | ICD-10-CM

## 2022-08-17 DIAGNOSIS — Z79.4 TYPE 2 DIABETES MELLITUS WITH HYPERGLYCEMIA, WITH LONG-TERM CURRENT USE OF INSULIN (H): ICD-10-CM

## 2022-08-17 DIAGNOSIS — E11.65 TYPE 2 DIABETES MELLITUS WITH HYPERGLYCEMIA, WITH LONG-TERM CURRENT USE OF INSULIN (H): ICD-10-CM

## 2022-08-17 DIAGNOSIS — Z31.69 PRE-CONCEPTION COUNSELING: ICD-10-CM

## 2022-08-17 LAB
ALBUMIN SERPL-MCNC: 3.4 G/DL (ref 3.4–5)
ALP SERPL-CCNC: 103 U/L (ref 40–150)
ALT SERPL W P-5'-P-CCNC: 28 U/L (ref 0–50)
ANION GAP SERPL CALCULATED.3IONS-SCNC: 3 MMOL/L (ref 3–14)
AST SERPL W P-5'-P-CCNC: 14 U/L (ref 0–45)
BILIRUB SERPL-MCNC: 0.5 MG/DL (ref 0.2–1.3)
BUN SERPL-MCNC: 13 MG/DL (ref 7–30)
CALCIUM SERPL-MCNC: 9.3 MG/DL (ref 8.5–10.1)
CHLORIDE BLD-SCNC: 104 MMOL/L (ref 94–109)
CO2 SERPL-SCNC: 29 MMOL/L (ref 20–32)
CREAT SERPL-MCNC: 0.54 MG/DL (ref 0.52–1.04)
GFR SERPL CREATININE-BSD FRML MDRD: >90 ML/MIN/1.73M2
GLUCOSE BLD-MCNC: 291 MG/DL (ref 70–99)
HBA1C MFR BLD: 9.8 % (ref 0–5.6)
MIS SERPL-MCNC: 1.27 NG/ML (ref 0.03–5.5)
POTASSIUM BLD-SCNC: 4.7 MMOL/L (ref 3.4–5.3)
PROT SERPL-MCNC: 7.3 G/DL (ref 6.8–8.8)
SODIUM SERPL-SCNC: 136 MMOL/L (ref 133–144)

## 2022-08-17 PROCEDURE — 83036 HEMOGLOBIN GLYCOSYLATED A1C: CPT

## 2022-08-17 PROCEDURE — 99214 OFFICE O/P EST MOD 30 MIN: CPT | Performed by: OBSTETRICS & GYNECOLOGY

## 2022-08-17 PROCEDURE — 82310 ASSAY OF CALCIUM: CPT

## 2022-08-17 PROCEDURE — T1013 SIGN LANG/ORAL INTERPRETER: HCPCS | Mod: U3

## 2022-08-17 PROCEDURE — G0463 HOSPITAL OUTPT CLINIC VISIT: HCPCS

## 2022-08-17 PROCEDURE — 36415 COLL VENOUS BLD VENIPUNCTURE: CPT

## 2022-08-17 PROCEDURE — 83520 IMMUNOASSAY QUANT NOS NONAB: CPT

## 2022-08-17 RX ORDER — FLUCONAZOLE 150 MG/1
150 TABLET ORAL ONCE
Qty: 1 TABLET | Refills: 11 | Status: SHIPPED | OUTPATIENT
Start: 2022-08-17 | End: 2022-08-17

## 2022-08-17 NOTE — PROGRESS NOTES
[facility]  Gynecology Visit    Reason for Consult: Considering pregnancy  Consulting Provider: Self-referral    HPI:    Nayeli Caal is a 42 year old  with a history of Usher syndrome, fibroids, type 2 diabetes, essential hypertension and stage 1 CKD here for considering pregnancy. She would ideally like to pursue natural conception, but she is willing to explore IVF options as well. Notably, her current insurance will not cover IVF so she is investigating other insurance options including being added as a dependent on her boyfriend's insurance. She has a 5 year history of fibroids and is concerned that they may interfere with her ability to conceive. An ultrasound on 21 demonstrated a fundal subserosal and anterior inferior intramural fibroids. She has not had increased urinary urgency or incontinence, though she does have increased urinary frequency when her diabetes is not under control. She has not had any consistent symptoms of constipation, diarrhea or incontinence.    She recently had a Mirena IUD removed in early February and has been having intercourse intermittently since then with her boyfriend who lives in [place]. They visit one another every couple months. Prior to her IUD insertion, she had irregular and heavy menstrual cycles, but she has had regular cycles every 28-32 days since IUD removal. She experiences minor cramping during her menstrual cycle, but does not have any significant abdominal or pelvic pain. Menstrual cycles have moderate flow and her most recent cycle began on 22. She does not have any notable intermenstrual vaginal discharge or bleeding. However, she does experience frequent candidal infections that have been controlled with fluconazole.    Nayeli has type 2 diabetes that has not been well-controlled, with a recent HbA1c of 9.9. All HgbA1c levels have been elevated within the past year. She also has essential hypertension, which has been adequately  controlled.    Her male partner lives in [place], has not fathered any children, is about to turn 39yo, and is healthy.    GYN History  - LMP: Patient's last menstrual period was 2022.  - Menses: Regular, every 28-32 days, moderate flow  - Pap Smears     Lab Results   Component Value Date    PAP NIL 2021    PAP NIL 2016    PAP NIL 2012     - Contraception: None  - Sexual Activity/Concerns: Intermittent sexual activity with boyfriend without concerns.  - Hx STIs/UTIs: Hx of recurrent candidal vaginitis.     OBHx  OB History    Para Term  AB Living   0 0 0 0 0 0   SAB IAB Ectopic Multiple Live Births   0 0 0 0 0       Past Medical History:   Diagnosis Date     Combined visual and hearing impairment      Deaf      Diabetic retinopathy of both eyes (H) 2011     Hepatic steatosis 2011     History of tobacco use      Hyperlipidemia      Hypertension      Hypertriglyceridemia      Migraines      Obesity 2011     Problem list name updated by automated process. Provider to review     Uncontrolled type 2 diabetes mellitus with hyperglycemia, with long-term current use of insulin (H) 5/15/2017     Usher Syndrome: congenital deafness, retinitis pigmentosa 2011       Past Surgical History:   Procedure Laterality Date     LAPAROSCOPIC CHOLECYSTECTOMY N/A 3/10/2018    Procedure: LAPAROSCOPIC CHOLECYSTECTOMY;  Laparoscopic Cholecystectomy ;  Surgeon: Kuldeep Sigala MD;  Location: [facility] OR     RELEASE TRIGGER FINGER Right 2019    Procedure: Right Thumb Trigger Release;  Surgeon: Greg Streeter MD;  [facility]     RELEASE TRIGGER FINGER Left 2019    Procedure: Left Ring Trigger Finger Release.  Ganglion cyst excision.;  Surgeon: Greg Streeter MD;  Location: [facility] OR         Current Outpatient Medications:      B-D U/F insulin pen needle, , Disp: , Rfl:      BD ULTRA FINE PEN NEEDLES, Inject 1 dose. Subcutaneous 2 times daily BD ultra-fine insulin  syringe, 30 ga. X 1/2'' short needle 1/2 cc. Use as directed., Disp: 400 Units, Rfl: 11     blood glucose (ACCU-CHEK LAYA PLUS) test strip, Use with  Accucheck Expert meter.  Test blood sugar 6 times daily., Disp: 600 each, Rfl: 3     blood glucose monitoring (ACCU-CHEK FASTCLIX) lancets, Use to test blood sugar 6 times daily or as directed, Disp: 510 each, Rfl: 3     Continuous Blood Gluc Sensor (DEXCOM G6 SENSOR) MISC, Change every 10 days. 3 month supply., Disp: 9 each, Rfl: 3     Continuous Blood Gluc Transmit (DEXCOM G6 TRANSMITTER) MISC, Change every 3 months., Disp: 1 each, Rfl: 3     Injection Device for insulin (INPEN 100-PINK-SHAYNE) DAVID, 1 each continuous, Disp: 2 each, Rfl: 0     insulin aspart (NOVOPEN ECHO) 100 UNIT/ML cartridge, For use with the In-Pen device.  Give 1 unit per 5 grams CHO with meals and snacks as well as correction.  Average daily use is 100 units daily., Disp: 90 mL, Rfl: 3     insulin glargine (BASAGLAR KWIKPEN) 100 UNIT/ML pen, Inject 45 Units Subcutaneous daily, Disp: 45 mL, Rfl: 3     insulin pen needle (B-D U/F) 31G X 8 MM miscellaneous, USE AS DIRECTED., Disp: 200 each, Rfl: 1     acetaminophen (TYLENOL) 500 MG tablet, Take 2 tablets (1,000 mg) by mouth every 6 hours as needed for mild pain, Disp: 100 tablet, Rfl: 3     Alcohol Swabs (ALCOHOL PREP PAD) 70 % PADS, 1 each 3 times daily, Disp: 100 each, Rfl: 3     aspirin (ASA) 81 MG chewable tablet, Take 1 tablet (81 mg) by mouth daily CHEW AND SWALLOW, Disp: 90 tablet, Rfl: 3     atorvastatin (LIPITOR) 40 MG tablet, Take 1 tablet (40 mg) by mouth daily, Disp: 90 tablet, Rfl: 2     empagliflozin (JARDIANCE) 25 MG TABS tablet, Take 1 tablet (25 mg) by mouth daily, Disp: 90 tablet, Rfl: 3     ergocalciferol (ERGOCALCIFEROL) 1.25 MG (22661 UT) capsule, Take 1 capsule (50,000 Units) by mouth once a week For additional refills, please schedule a follow-up appointment at [phone], Disp: 12 capsule, Rfl: 3     fenofibrate  (TRIGLIDE/LOFIBRA) 160 MG tablet, Take 1 tablet (160 mg) by mouth daily, Disp: 90 tablet, Rfl: 3     fish oil-omega-3 fatty acids 1000 MG capsule, TAKE TWO CAPSULES (2 GM) BY MOUTH ONCE DAILY, Disp: 180 capsule, Rfl: 3     ibuprofen (ADVIL/MOTRIN) 600 MG tablet, Take 1 tablet (600 mg) by mouth every 6 hours as needed for moderate pain, Disp: 120 tablet, Rfl: 1     losartan (COZAAR) 100 MG tablet, Take 1 tablet (100 mg) by mouth daily, Disp: 90 tablet, Rfl: 3     naproxen (NAPROSYN) 375 MG tablet, Take 1 tablet (375 mg) by mouth 2 times daily (with meals), Disp: 60 tablet, Rfl: 3     Ostomy Supplies (ADHESIVE REMOVER WIPES) MISC, 1 each every 14 days, Disp: 50 each, Rfl: 3     Ostomy Supplies (SKIN TAC ADHESIVE BARRIER WIPE) MISC, 1 each every 14 days, Disp: 6 each, Rfl: 3    Current Facility-Administered Medications:      hydrocortisone (CORTAID) 1 % cream, , Topical, TID, Overkamp, Mariya M, APRN CNP    No Known Allergies    Family History   Problem Relation Age of Onset     Unknown/Adopted Other        Social History     Socioeconomic History     Marital status: Single     Spouse name: Not on file     Number of children: Not on file     Years of education: Not on file     Highest education level: Not on file   Occupational History     Not on file   Tobacco Use     Smoking status: Former Smoker     Types: Cigarettes     Quit date: 2010     Years since quittin.7     Smokeless tobacco: Never Used     Tobacco comment: stopped 10 yrs ago   Substance and Sexual Activity     Alcohol use: Yes     Comment: socially     Drug use: No     Sexual activity: Not Currently     Partners: Male     Birth control/protection: I.U.D.   Other Topics Concern     Parent/sibling w/ CABG, MI or angioplasty before 65F 55M? Not Asked      Service No     Blood Transfusions No     Caffeine Concern No     Occupational Exposure No     Hobby Hazards No     Sleep Concern No     Stress Concern No     Weight Concern Yes     Special  "Diet No     Back Care No     Exercise Yes     Comment: 4-5 x a week     Bike Helmet No     Seat Belt Yes     Self-Exams Yes   Social History Narrative     Not on file     Social Determinants of Health     Financial Resource Strain: Not on file   Food Insecurity: Not on file   Transportation Needs: Not on file   Physical Activity: Not on file   Stress: Not on file   Social Connections: Not on file   Intimate Partner Violence: Not on file   Housing Stability: Not on file       ROS: 10-Point ROS negative except as noted in HPI    Physical Exam  Ht 1.575 m (5' 2\")   Wt 77.7 kg (171 lb 6.4 oz)   LMP 2022   BMI 31.35 kg/m    Gen: Well-appearing, NAD  HEENT: Normocephalic, atraumatic  CV:  Well-perfused  Pulm: Breathing comfortably on RA  Ext: Extremities warm and well perfused    Assessment/Plan:  Nayeli MIXON Ingrid Caal is a 42 year old  female with a history of Usher syndrome, fibroids, type 2 diabetes, essential hypertension and stage 1 CKD here for considering pregnancy.    We discussed her biggest risks for conception and pregnancy are as follows:    1. Elevated A1c: elevated hgbA1c is directly related to lethal and significant lethal anomalies as well as increase in SAb.   2. HTN: she understands her risk for pre eclampsia is higher  3. AMA  4. Fibroid uterus -- unlikely to inhibit conception and may grow during pregnancy  5. Absent FOB: sig other lives in [place]    #Preconception treatment and counseling  - CMP and AMH for assessment of kidney and ovulation function  - Discussed purchasing inexpensive ovulation kit to track ovulation  - FSH, LH, estradiol labs on day 3 of upcoming menstrual cycle  - Counseled on importance of lowering HgbA1c values prior to pregnancy to prevent birth defects  - Discussed IVF and insurance options if this is an avenue she would like to pursue    #Candidal vaginitis  - Ordered refill of fluconazole      Return to clinic for further discussion on 10/5/22.    Wander GHOSH" Lawrence, Medical Student  [employer]  08/17/2022 10:28 AM     I agree with the PFSH and ROS as completed by the MS. The remainder of the encounter was performed by me and scribed by the MS. The scribed note accurately reflects my personal services and the decisions made by me. Time spent in visit today: 57 minutes.     Anne Hodge MD

## 2022-08-17 NOTE — LETTER
2022       RE: Nayeli Caal  [address]  [place] [address]     Dear Colleague,    Thank you for referring your patient, Nayeli Caal, to the [facility] [place] at [facility]. Please see a copy of my visit note below.    [facility]  Gynecology Visit    Reason for Consult: Considering pregnancy  Consulting Provider: Self-referral    HPI:    Nayeli Caal is a 42 year old  with a history of Usher syndrome, fibroids, type 2 diabetes, essential hypertension and stage 1 CKD here for considering pregnancy. She would ideally like to pursue natural conception, but she is willing to explore IVF options as well. Notably, her current insurance will not cover IVF so she is investigating other insurance options including being added as a dependent on her boyfriend's insurance. She has a 5 year history of fibroids and is concerned that they may interfere with her ability to conceive. An ultrasound on 21 demonstrated a fundal subserosal and anterior inferior intramural fibroids. She has not had increased urinary urgency or incontinence, though she does have increased urinary frequency when her diabetes is not under control. She has not had any consistent symptoms of constipation, diarrhea or incontinence.    She recently had a Mirena IUD removed in early February and has been having intercourse intermittently since then with her boyfriend who lives in [place]. They visit one another every couple months. Prior to her IUD insertion, she had irregular and heavy menstrual cycles, but she has had regular cycles every 28-32 days since IUD removal. She experiences minor cramping during her menstrual cycle, but does not have any significant abdominal or pelvic pain. Menstrual cycles have moderate flow and her most recent cycle began on 22. She does not have any notable intermenstrual vaginal discharge or  bleeding. However, she does experience frequent candidal infections that have been controlled with fluconazole.    Nayeli has type 2 diabetes that has not been well-controlled, with a recent HbA1c of 9.9. All HgbA1c levels have been elevated within the past year. She also has essential hypertension, which has been adequately controlled.    Her male partner lives in [place], has not fathered any children, is about to turn 39yo, and is healthy.    GYN History  - LMP: Patient's last menstrual period was 2022.  - Menses: Regular, every 28-32 days, moderate flow  - Pap Smears     Lab Results   Component Value Date    PAP NIL 2021    PAP NIL 2016    PAP NIL 2012     - Contraception: None  - Sexual Activity/Concerns: Intermittent sexual activity with boyfriend without concerns.  - Hx STIs/UTIs: Hx of recurrent candidal vaginitis.     OBHx  OB History    Para Term  AB Living   0 0 0 0 0 0   SAB IAB Ectopic Multiple Live Births   0 0 0 0 0       Past Medical History:   Diagnosis Date     Combined visual and hearing impairment      Deaf      Diabetic retinopathy of both eyes (H) 2011     Hepatic steatosis 2011     History of tobacco use      Hyperlipidemia      Hypertension      Hypertriglyceridemia      Migraines      Obesity 2011     Problem list name updated by automated process. Provider to review     Uncontrolled type 2 diabetes mellitus with hyperglycemia, with long-term current use of insulin (H) 5/15/2017     Usher Syndrome: congenital deafness, retinitis pigmentosa 2011       Past Surgical History:   Procedure Laterality Date     LAPAROSCOPIC CHOLECYSTECTOMY N/A 3/10/2018    Procedure: LAPAROSCOPIC CHOLECYSTECTOMY;  Laparoscopic Cholecystectomy ;  Surgeon: Kuldeep Sigala MD;  Location: [facility] OR     RELEASE TRIGGER FINGER Right 2019    Procedure: Right Thumb Trigger Release;  Surgeon: Greg Streeter MD;  [facility]     RELEASE TRIGGER FINGER Left  5/30/2019    Procedure: Left Ring Trigger Finger Release.  Ganglion cyst excision.;  Surgeon: Greg Streeter MD;  Location[facility]         Current Outpatient Medications:      B-D U/F insulin pen needle, , Disp: , Rfl:      BD ULTRA FINE PEN NEEDLES, Inject 1 dose. Subcutaneous 2 times daily BD ultra-fine insulin syringe, 30 ga. X 1/2'' short needle 1/2 cc. Use as directed., Disp: 400 Units, Rfl: 11     blood glucose (ACCU-CHEK LAYA PLUS) test strip, Use with  Accucheck Expert meter.  Test blood sugar 6 times daily., Disp: 600 each, Rfl: 3     blood glucose monitoring (ACCU-CHEK FASTCLIX) lancets, Use to test blood sugar 6 times daily or as directed, Disp: 510 each, Rfl: 3     Continuous Blood Gluc Sensor (DEXCOM G6 SENSOR) MISC, Change every 10 days. 3 month supply., Disp: 9 each, Rfl: 3     Continuous Blood Gluc Transmit (DEXCOM G6 TRANSMITTER) MISC, Change every 3 months., Disp: 1 each, Rfl: 3     Injection Device for insulin (INPEN 100-PINK-SHAYNE) DAVID, 1 each continuous, Disp: 2 each, Rfl: 0     insulin aspart (NOVOPEN ECHO) 100 UNIT/ML cartridge, For use with the In-Pen device.  Give 1 unit per 5 grams CHO with meals and snacks as well as correction.  Average daily use is 100 units daily., Disp: 90 mL, Rfl: 3     insulin glargine (BASAGLAR KWIKPEN) 100 UNIT/ML pen, Inject 45 Units Subcutaneous daily, Disp: 45 mL, Rfl: 3     insulin pen needle (B-D U/F) 31G X 8 MM miscellaneous, USE AS DIRECTED., Disp: 200 each, Rfl: 1     acetaminophen (TYLENOL) 500 MG tablet, Take 2 tablets (1,000 mg) by mouth every 6 hours as needed for mild pain, Disp: 100 tablet, Rfl: 3     Alcohol Swabs (ALCOHOL PREP PAD) 70 % PADS, 1 each 3 times daily, Disp: 100 each, Rfl: 3     aspirin (ASA) 81 MG chewable tablet, Take 1 tablet (81 mg) by mouth daily CHEW AND SWALLOW, Disp: 90 tablet, Rfl: 3     atorvastatin (LIPITOR) 40 MG tablet, Take 1 tablet (40 mg) by mouth daily, Disp: 90 tablet, Rfl: 2     empagliflozin (JARDIANCE) 25 MG  TABS tablet, Take 1 tablet (25 mg) by mouth daily, Disp: 90 tablet, Rfl: 3     ergocalciferol (ERGOCALCIFEROL) 1.25 MG (31033 UT) capsule, Take 1 capsule (50,000 Units) by mouth once a week For additional refills, please schedule a follow-up appointment at [phone], Disp: 12 capsule, Rfl: 3     fenofibrate (TRIGLIDE/LOFIBRA) 160 MG tablet, Take 1 tablet (160 mg) by mouth daily, Disp: 90 tablet, Rfl: 3     fish oil-omega-3 fatty acids 1000 MG capsule, TAKE TWO CAPSULES (2 GM) BY MOUTH ONCE DAILY, Disp: 180 capsule, Rfl: 3     ibuprofen (ADVIL/MOTRIN) 600 MG tablet, Take 1 tablet (600 mg) by mouth every 6 hours as needed for moderate pain, Disp: 120 tablet, Rfl: 1     losartan (COZAAR) 100 MG tablet, Take 1 tablet (100 mg) by mouth daily, Disp: 90 tablet, Rfl: 3     naproxen (NAPROSYN) 375 MG tablet, Take 1 tablet (375 mg) by mouth 2 times daily (with meals), Disp: 60 tablet, Rfl: 3     Ostomy Supplies (ADHESIVE REMOVER WIPES) MISC, 1 each every 14 days, Disp: 50 each, Rfl: 3     Ostomy Supplies (SKIN TAC ADHESIVE BARRIER WIPE) MISC, 1 each every 14 days, Disp: 6 each, Rfl: 3    Current Facility-Administered Medications:      hydrocortisone (CORTAID) 1 % cream, , Topical, TID, Overkamp, Mariya M, APRN CNP    No Known Allergies    Family History   Problem Relation Age of Onset     Unknown/Adopted Other        Social History     Socioeconomic History     Marital status: Single     Spouse name: Not on file     Number of children: Not on file     Years of education: Not on file     Highest education level: Not on file   Occupational History     Not on file   Tobacco Use     Smoking status: Former Smoker     Types: Cigarettes     Quit date: 2010     Years since quittin.7     Smokeless tobacco: Never Used     Tobacco comment: stopped 10 yrs ago   Substance and Sexual Activity     Alcohol use: Yes     Comment: socially     Drug use: No     Sexual activity: Not Currently     Partners: Male     Birth  "control/protection: I.U.D.   Other Topics Concern     Parent/sibling w/ CABG, MI or angioplasty before 65F 55M? Not Asked      Service No     Blood Transfusions No     Caffeine Concern No     Occupational Exposure No     Hobby Hazards No     Sleep Concern No     Stress Concern No     Weight Concern Yes     Special Diet No     Back Care No     Exercise Yes     Comment: 4-5 x a week     Bike Helmet No     Seat Belt Yes     Self-Exams Yes   Social History Narrative     Not on file     Social Determinants of Health     Financial Resource Strain: Not on file   Food Insecurity: Not on file   Transportation Needs: Not on file   Physical Activity: Not on file   Stress: Not on file   Social Connections: Not on file   Intimate Partner Violence: Not on file   Housing Stability: Not on file       ROS: 10-Point ROS negative except as noted in HPI    Physical Exam  Ht 1.575 m (5' 2\")   Wt 77.7 kg (171 lb 6.4 oz)   LMP 2022   BMI 31.35 kg/m    Gen: Well-appearing, NAD  HEENT: Normocephalic, atraumatic  CV:  Well-perfused  Pulm: Breathing comfortably on RA  Ext: Extremities warm and well perfused    Assessment/Plan:  Nayeli Caal is a 42 year old  female with a history of Usher syndrome, fibroids, type 2 diabetes, essential hypertension and stage 1 CKD here for considering pregnancy.    We discussed her biggest risks for conception and pregnancy are as follows:    1. Elevated A1c: elevated hgbA1c is directly related to lethal and significant lethal anomalies as well as increase in SAb.   2. HTN: she understands her risk for pre eclampsia is higher  3. AMA  4. Fibroid uterus -- unlikely to inhibit conception and may grow during pregnancy  5. Absent FOB: sig other lives in [place]    #Preconception treatment and counseling  - CMP and AMH for assessment of kidney and ovulation function  - Discussed purchasing inexpensive ovulation kit to track ovulation  - FSH, LH, estradiol labs on day 3 of upcoming " menstrual cycle  - Counseled on importance of lowering HgbA1c values prior to pregnancy to prevent birth defects  - Discussed IVF and insurance options if this is an avenue she would like to pursue    #Candidal vaginitis  - Ordered refill of fluconazole      Return to clinic for further discussion on 10/5/22.    Wander Bravo, Medical Student  [employer]  08/17/2022 10:28 AM     I agree with the PFSH and ROS as completed by the MS. The remainder of the encounter was performed by me and scribed by the MS. The scribed note accurately reflects my personal services and the decisions made by me. Time spent in visit today: 57 minutes.     Anne Hodge MD

## 2022-08-19 ENCOUNTER — TRANSFERRED RECORDS (OUTPATIENT)
Dept: HEALTH INFORMATION MANAGEMENT | Facility: CLINIC | Age: 42
End: 2022-08-19

## 2022-08-19 LAB — RETINOPATHY: NEGATIVE

## 2022-08-23 ENCOUNTER — VIRTUAL VISIT (OUTPATIENT)
Dept: URGENT CARE | Facility: CLINIC | Age: 42
End: 2022-08-23
Payer: COMMERCIAL

## 2022-08-23 ENCOUNTER — NURSE TRIAGE (OUTPATIENT)
Dept: NURSING | Facility: CLINIC | Age: 42
End: 2022-08-23

## 2022-08-23 DIAGNOSIS — U07.1 CLINICAL DIAGNOSIS OF COVID-19: Primary | ICD-10-CM

## 2022-08-23 PROCEDURE — 99213 OFFICE O/P EST LOW 20 MIN: CPT | Mod: CS

## 2022-08-23 NOTE — TELEPHONE ENCOUNTER
"Patient is calling and states that yesterday was not feeling well had a cough and took cough medicine.  Now this morning has a fever of 101.3 and took a covid test and tested positive today 8/23/2022.  Declines triage.      Coronavirus (COVID-19) Notification    Caller Name (Patient, parent, daughter/son, grandparent, etc)  Nayeli Carolina    Reason for call  Notify of Positive Coronavirus (COVID-19) lab results, assess symptoms,  review [facility] recommendations    Lab Result    Lab test:  2019-nCoV rRt-PCR or SARS-CoV-2 PCR    Oropharyngeal AND/OR nasopharyngeal swabs is POSITIVE for 2019-nCoV RNA/SARS-COV-2 PCR (COVID-19 virus)    Brief introduction script  Introduce self then review script:  \"I am calling on behalf of [employer].  We were notified that your Coronavirus test (COVID-19) for was POSITIVE for the virus.\"    Gather patient reported symptoms   Assessment   Current Symptoms at time of phone call, reported by patient: (if no symptoms, document No symptoms] Fever, cough   Date of Symptom(s) onset (if applicable) August 22, 2022     If at time of call, Patients symptoms hare worsened, the Patient should contact 911 or have someone drive them to Emergency Dept promptly:      If Patient calling 911, inform 911 personal that you have tested positive for the Coronavirus (COVID-19).  Place mask on and await 911 to arrive.    If Emergency Dept, If possible, please have another adult drive you to the Emergency Dept but you need to wear mask when in contact with other people.      Monoclonal Antibody Administration    You may be eligible to receive a new treatment with a monoclonal antibody for preventing hospitalization in patients at high risk for complications from COVID-19. This medication is still experimental and available on a limited basis; it is given through an IV and must be given at an infusion center. Please note that not all people who are eligible will receive the medication " since it is in limited supply.  Is the patient symptomatic and onset of symptoms within the last 7 days?  Yes  Is the patient interested in a visit with a provider to discuss treatment options?: Yes  Is the patient seen at [facility]?  No: Warm transfer caller to [phone] to be scheduled with a virtual urgent provider.  During transfer, instruct  on appropriate time frame for visit     Review information with Patient    Your result was positive. This means you have COVID-19 (coronavirus).      How can I protect others?    These guidelines are for isolating before returning to work, school or .       If you DO have symptoms:  o Stay home and away from others  - For at least 5 days after your symptoms started, AND   - You are fever free for 24 hours (with no medicine that reduces fever), AND  - Your other symptoms are better.  o Wear a mask for 10 full days any time you are around others.    If you DON'T have symptoms:  o Stay at home and away from others for at least 5 days after your positive test.  o Wear a mask for 10 full days any time you are around others.    There may be different guidelines for healthcare facilities. Please check with the specific sites before arriving.     If you've been told by a doctor that you were severely ill with COVID-19 or are immunocompromised, you should isolate for at least 10 days.    You should not go back to work until you meet the guidelines above for ending your home isolation. You don't need to be retested for COVID-19 before going back to work--studies show that you won't spread the virus if it's been at least 10 days since your symptoms started (or 20 days, if you have a weak immune system).    Employers, schools, and daycares: This is an official notice for this person's medical guidelines for returning in-person. They must meet the above guidelines before going back to work, school, or  in person.    You will receive a positive  COVID-19 letter via [employer] or the mail soon with additional self-care information.      Would you like me to review some of that information with you now?  Yes    How can I take care of myself?      Get lots of rest. Drink extra fluids (unless a doctor has told you not to).      Take Tylenol (acetaminophen) for fever or pain. If you have liver or kidney problems, ask your family doctor if it's okay to take Tylenol.     Take either:     650 mg (two 325 mg pills) every 4 to 6 hours, or     1,000 mg (two 500 mg pills) every 8 hours as needed.     Note: Do not take more than 3,000 mg in one day. Acetaminophen is found in many medicines (both prescribed and over-the-counter medicines). Read all labels to be sure you don't take too much.    For children, check the Tylenol bottle for the right dose (based on their age or weight).      If you have other health problems (like cancer, heart failure, an organ transplant or severe kidney disease): Call your specialty clinic if you don't feel better in the next 2 days.      Know when to call 911: Emergency warning signs include:    Trouble breathing or shortness of breath    Pain or pressure in the chest that doesn't go away    Feeling confused like you haven't felt before, or not being able to wake up    Bluish-colored lips or face        If you were tested for an upcoming procedure, please contact your provider for next steps.     Jhoana Shell RN    Reason for Disposition    Information only question and nurse able to answer    Additional Information    Negative: Nursing judgment    Negative: Nursing judgment    Negative: Nursing judgment    Negative: Nursing judgment    Protocols used: INFORMATION ONLY CALL - NO TRIAGE-A-OH

## 2022-08-23 NOTE — PATIENT INSTRUCTIONS

## 2022-08-23 NOTE — PROGRESS NOTES
"Nayeli is a 42 year old who is being evaluated via a billable telephone visit.          Assessment & Plan     Clinical diagnosis of COVID-19    Treat with Paxlovid.    - nirmatrelvir and ritonavir (PAXLOVID) therapy pack; Take 3 tablets by mouth 2 times daily for 5 days (Take 2 Nirmatrelvir tablets and 1 Ritonavir tablet twice daily for 5 days)               COVID-19 positive patient.  Encounter for consideration of medication intervention. Patient does qualify for a prescription. Full discussion with patient including medication options, risks and benefits. Potential drug interactions reviewed with patient.     Treatment Planned Paxlovid    Temporary change to home medications:  None - not currently taking anything aside from insulin    Estimated body mass index is 31.35 kg/m  as calculated from the following:    Height as of 8/17/22: 1.575 m (5' 2\").    Weight as of 8/17/22: 77.7 kg (171 lb 6.4 oz).  GFR Estimate   Date Value Ref Range Status   08/17/2022 >90 >60 mL/min/1.73m2 Final     Comment:     Effective December 21, 2021 eGFRcr in adults is calculated using the 2021 CKD-EPI creatinine equation which includes age and gender (Philippe et al., NEJ, DOI: 10.1056/RIFCdg3520362)   07/08/2020 >90 >60 mL/min/[1.73_m2] Final     Comment:     Non  GFR Calc  Starting 12/18/2018, serum creatinine based estimated GFR (eGFR) will be   calculated using the Chronic Kidney Disease Epidemiology Collaboration   (CKD-EPI) equation.       Lab Results   Component Value Date    TRLAV72RMW NOT DETECTED 01/17/2022       No follow-ups on file.    Virtual Urgent Care  [facility] VIRTUAL URGENT CARE    Subjective   Nayeli is a 42 year old, presenting for the following health issues:  No chief complaint on file.      HPI       COVID-19 Symptom Review  How many days ago did these symptoms start? 8/22    Are any of the following symptoms significant for you?    New or worsening difficulty breathing? No    Worsening " cough? Yes, it's a dry cough.     Fever or chills? Yes, I felt feverish or had chills.    Headache: No    Sore throat: No    Chest pain: No    Diarrhea: No    Body aches? YES- fatigue    What treatments has patient tried? Cough syrup   Does patient live in a nursing home, group home, or shelter? No  Does patient have a way to get food/medications during quarantined? Yes, I have a friend or family member who can help me.            Review of Systems   Constitutional, HEENT, cardiovascular, pulmonary, gi and gu systems are negative, except as otherwise noted.      Objective           Vitals:  No vitals were obtained today due to virtual visit.    Physical Exam   healthy, alert and no distress  PSYCH: Alert and oriented times 3; coherent speech, normal   rate and volume, able to articulate logical thoughts, able   to abstract reason, no tangential thoughts, no hallucinations   or delusions  Her affect is normal and pleasant  RESP: No cough, no audible wheezing, able to talk in full sentences  Remainder of exam unable to be completed due to telephone visits                Phone call duration: 13 minutes    .  ..

## 2022-08-29 ENCOUNTER — MYC MEDICAL ADVICE (OUTPATIENT)
Dept: OBGYN | Facility: CLINIC | Age: 42
End: 2022-08-29

## 2022-08-29 DIAGNOSIS — Z31.69 PRE-CONCEPTION COUNSELING: Primary | ICD-10-CM

## 2022-09-08 NOTE — TELEPHONE ENCOUNTER
Reviewed recent visit note - plan for day 3 labs (FSH, LH, and E2) with upcoming cycle. These orders were placed and communicated to patient via [employer].

## 2022-09-26 ENCOUNTER — LAB (OUTPATIENT)
Dept: LAB | Facility: CLINIC | Age: 42
End: 2022-09-26
Payer: COMMERCIAL

## 2022-09-26 DIAGNOSIS — Z31.69 PRE-CONCEPTION COUNSELING: ICD-10-CM

## 2022-09-26 LAB
ESTRADIOL SERPL-MCNC: 31 PG/ML
FSH SERPL-ACNC: 6.8 IU/L
LH SERPL-ACNC: 3.3 MIU/ML

## 2022-09-26 PROCEDURE — 83002 ASSAY OF GONADOTROPIN (LH): CPT

## 2022-09-26 PROCEDURE — 82670 ASSAY OF TOTAL ESTRADIOL: CPT

## 2022-09-26 PROCEDURE — 83001 ASSAY OF GONADOTROPIN (FSH): CPT

## 2022-09-26 PROCEDURE — 36415 COLL VENOUS BLD VENIPUNCTURE: CPT

## 2022-09-30 ENCOUNTER — MYC MEDICAL ADVICE (OUTPATIENT)
Dept: EDUCATION SERVICES | Facility: CLINIC | Age: 42
End: 2022-09-30

## 2022-09-30 DIAGNOSIS — E11.65 UNCONTROLLED TYPE 2 DIABETES MELLITUS WITH HYPERGLYCEMIA, WITH LONG-TERM CURRENT USE OF INSULIN (H): ICD-10-CM

## 2022-09-30 DIAGNOSIS — Z79.4 UNCONTROLLED TYPE 2 DIABETES MELLITUS WITH HYPERGLYCEMIA, WITH LONG-TERM CURRENT USE OF INSULIN (H): ICD-10-CM

## 2022-09-30 RX ORDER — PROCHLORPERAZINE 25 MG/1
SUPPOSITORY RECTAL
Qty: 1 EACH | Refills: 3 | Status: SHIPPED | OUTPATIENT
Start: 2022-09-30 | End: 2023-10-02

## 2022-09-30 RX ORDER — PROCHLORPERAZINE 25 MG/1
SUPPOSITORY RECTAL
Qty: 9 EACH | Refills: 3 | Status: SHIPPED | OUTPATIENT
Start: 2022-09-30 | End: 2023-10-02

## 2022-10-04 ENCOUNTER — TELEPHONE (OUTPATIENT)
Dept: ENDOCRINOLOGY | Facility: CLINIC | Age: 42
End: 2022-10-04

## 2022-10-04 ENCOUNTER — ALLIED HEALTH/NURSE VISIT (OUTPATIENT)
Dept: EDUCATION SERVICES | Facility: CLINIC | Age: 42
End: 2022-10-04
Payer: COMMERCIAL

## 2022-10-04 DIAGNOSIS — E11.65 UNCONTROLLED TYPE 2 DIABETES MELLITUS WITH HYPERGLYCEMIA, WITH LONG-TERM CURRENT USE OF INSULIN (H): ICD-10-CM

## 2022-10-04 DIAGNOSIS — Z79.4 UNCONTROLLED TYPE 2 DIABETES MELLITUS WITH HYPERGLYCEMIA, WITH LONG-TERM CURRENT USE OF INSULIN (H): ICD-10-CM

## 2022-10-04 PROCEDURE — G0108 DIAB MANAGE TRN  PER INDIV: HCPCS | Performed by: REGISTERED NURSE

## 2022-10-04 PROCEDURE — T1013 SIGN LANG/ORAL INTERPRETER: HCPCS | Mod: U3

## 2022-10-04 RX ORDER — INSULIN PEN,REUSABLE,BT LISPRO
1 INSULIN PEN (EA) SUBCUTANEOUS CONTINUOUS
Qty: 1 EACH | Refills: 0 | Status: SHIPPED | OUTPATIENT
Start: 2022-10-04 | End: 2023-01-27

## 2022-10-04 RX ORDER — INSULIN GLARGINE 100 [IU]/ML
55 INJECTION, SOLUTION SUBCUTANEOUS DAILY
Qty: 45 ML | Refills: 3 | Status: SHIPPED | OUTPATIENT
Start: 2022-10-04 | End: 2022-11-04

## 2022-10-04 NOTE — PROGRESS NOTES
Diabetes Self-Management Education & Support    Nayeli Caal presents today for education related to Type 2 diabetes    Patient is being treated with:  insulin  She is accompanied by self    Year of diagnosis: 2013 or 2014  Referring provider:  Anne Marie Medina PA-C  Living Situation: Lives with a room mate  Employment: Administrative work for [employer]    Nayeli has complications of chronic kidney disease and retinopathy.  She is also deaf and uses an , who attends with her today.    PATIENT CONCERNS RELATED TO DIABETES SELF MANAGEMENT:  Nayeli feels that her diabetes is very much out of control.  She stopped taking Jardience about a month ago, and has stopped taking the bulk of her other medications in anticipation of pregnancy.      ASSESSMENT:    Taking Medication:     Current Diabetes Management per Patient:  Taking diabetes medications?   yes:     Diabetes Medication(s)     Insulin       insulin aspart (NOVOPEN ECHO) 100 UNIT/ML cartridge    For use with the In-Pen device.  Give 1 unit per 5 grams CHO with meals and snacks as well as correction.  Average daily use is 100 units daily.     insulin glargine (BASAGLAR KWIKPEN) 100 UNIT/ML pen    Inject 45 Units Subcutaneous daily    Sodium-Glucose Co-Transporter 2 (SGLT2) Inhibitors       empagliflozin (JARDIANCE) 25 MG TABS tablet    Take 1 tablet (25 mg) by mouth daily          Monitoring    Patient glucose self monitoring as follows: continuously using a continuous glucose monitor (CGM)  BG meter: Dexcom   BG results:   \      Patient's most recent   Lab Results   Component Value Date    A1C 9.8 08/17/2022    A1C 11.6 04/05/2021      Patient's A1C goal: <7.0    Activity: not assessed    Healthy Eating:   She states that she doesn't understand why her glucoses are so high, as she is trying very hard to limit the amount of carbohydrate she is eating at meals.   She does know how to count carbohydrates and     Problem Solving:       Patient is at risk of hypoglycemia?: YES Happens rarely now.  Hospitalizations for hyper or hypoglycemia: Unknown    Healthy Coping and Stress Management:   Sources of stress identified by patient:  Death in the family recently.  Lots of travel, and a stressful job.      EDUCATION and INSTRUCTION PROVIDED AT THIS VISIT:      Nayeli would like to become pregnant.  She has spoken with a pharmacist, recommended by Mariya Mohamud, who has discontinued most of her medications except insulin.  She had been taking Jardiance, however she stopped this at the end of July.  She had been taken Metformin, however states she stopped this in 2020 because of GI upsets.      She is currently taking 45 units of Blasaglar (increased from 38 units in July by Dr. Bragg), and states that she misses taking it rarely.  She has an In-Pen, states she enters her carbs and BG into the yamileth and follows the advice it gives.      Discussed that it is vitally important that she have well controlled blood glucoses prior to conceiving.  She is currently not using any contraception. Discussed the risks to mom and baby if blood glucoses are out of control during pregnancy.     She has tried to eliminate most carbs from her diet, and to control her portion sizes, etc.  She feels like nothing she is doing is making much of a difference.  She is wondering if perhaps a vegan diet might be better for her.   Discussed the difference between veganism and vegetarianism and suggested that she discuss this with our dietitian, which would also be a good opportunity to review her carb counting skills.        Her current insulin dosing is clearly not adequate.  She states that she doesn't not very often omit her Basaglar, states that she may miss her Novolog dosing occasionally but not very often.  She has been using the In-Pen yamileth and pen, but apparently her InPen device's battery is dead and she needs a prescription for a new one.  Order sent today.   She  also is out of Dexcom sensors and transmitters, which she says is being processed now, but she has been doing fingersticks, so we don't have very current data, but enough to see that her diabetes is uncontrolled.  I gave her a sensor and a transmitter to tide her over until her order arrives.     Explained that we will need to meet more frequently than in the past to move her toward better control a little faster.  She is anxious to get in better control as well.     Today, increased her Basaglar insulin from 45 to 55 units daily.  Made the following adjustments in her In-Pen settings:   Intensified both the insulin to carb ratio and the correction factor, and shortened the insulin active time, which will hopefully lower her post meal glucoses.  Encouraged her, when she sees that her glucose is over 200, she give herself a correction without entering any carbs.      Reminded to pair her new In-Pen with her phone yamileth when she receives it.      Start time ICR  1 unit/gm Insulin Sensitivity Factor Target BG    06:00 AM  5 (6) 30 (40) 120   11:00 AM 5 (6) 30 (40) 120   05:00 PM 5 (6) 30 (40) 120   09:30 PM 5 (6) 30 (40) 150         Active Insulin Time: 3 (4)  Maximum Bolus:  25  Patient-stated goal written and given to Nayeli Caal.  Verbalized and demonstrated understanding of instructions.     FOLLOW-UP:      Appointments made for her with both the RD-CDE and myself in about 2 weeks.     Time spent with patient at today's visit was 60 minutes.      Any diabetes medication dose changes were made via the CDE Protocol and Collaborative Practice Agreement with [place] and  [facility].  A copy of this encounter was provided to patient's referring provider.

## 2022-10-10 ENCOUNTER — HEALTH MAINTENANCE LETTER (OUTPATIENT)
Age: 42
End: 2022-10-10

## 2022-10-28 ENCOUNTER — ALLIED HEALTH/NURSE VISIT (OUTPATIENT)
Dept: INTERNAL MEDICINE | Facility: CLINIC | Age: 42
End: 2022-10-28
Payer: COMMERCIAL

## 2022-10-28 DIAGNOSIS — Z23 NEED FOR VACCINATION: Primary | ICD-10-CM

## 2022-10-28 PROCEDURE — 90471 IMMUNIZATION ADMIN: CPT

## 2022-10-28 PROCEDURE — T1013 SIGN LANG/ORAL INTERPRETER: HCPCS | Mod: U3

## 2022-10-28 PROCEDURE — 0124A COVID-19,PF,PFIZER BOOSTER BIVALENT: CPT

## 2022-10-28 PROCEDURE — 91312 COVID-19,PF,PFIZER BOOSTER BIVALENT: CPT

## 2022-10-28 PROCEDURE — 90686 IIV4 VACC NO PRSV 0.5 ML IM: CPT

## 2022-10-28 NOTE — PROGRESS NOTES
Nayeli Caal received the Flu Vaccination as well as the COVID Bivalent (Pfizer) Booster Vaccination today in clinic at the request of Mariya Mohamud. The immunization was given under the supervision of  Dr. Velazquez if assistance was needed. The immunization site was cleaned with an alcohol prep wipe. The immunization was given without incident--see immunization list for administration details. No swelling or redness was observed at the site of injection after the immunization was given. The patient was advised to remain in [facility] lobby for 15 minutes after the injection in case of an adverse reaction.     Patient had COVID on 08/24/2022. Pt requested to still have immunization. Immunization was okay'd per Dr. Power and Dr. Velazquez. Patient also understood that they were premature and earlier than the 90 day window of contraction of COVID and immunization/ Booster vaccination.     Veda Brown, EMT at 2:37 PM on 10/28/2022.

## 2022-11-01 ENCOUNTER — ALLIED HEALTH/NURSE VISIT (OUTPATIENT)
Dept: EDUCATION SERVICES | Facility: CLINIC | Age: 42
End: 2022-11-01
Payer: COMMERCIAL

## 2022-11-01 DIAGNOSIS — Z79.4 UNCONTROLLED TYPE 2 DIABETES MELLITUS WITH HYPERGLYCEMIA, WITH LONG-TERM CURRENT USE OF INSULIN (H): Primary | ICD-10-CM

## 2022-11-01 DIAGNOSIS — E11.65 UNCONTROLLED TYPE 2 DIABETES MELLITUS WITH HYPERGLYCEMIA, WITH LONG-TERM CURRENT USE OF INSULIN (H): Primary | ICD-10-CM

## 2022-11-01 PROCEDURE — 99207 PR NO BILLABLE SERVICE THIS VISIT: CPT | Mod: U3

## 2022-11-01 PROCEDURE — G0108 DIAB MANAGE TRN  PER INDIV: HCPCS | Performed by: NUTRITIONIST

## 2022-11-01 NOTE — PATIENT INSTRUCTIONS
1) Increase your long acting insulin (basaglar) to 60 units.    2) If your meal doesn't contain carbohydrate, still take correction.     3) Send me your In Pen report when you get home today.     [email or web address]

## 2022-11-01 NOTE — PROGRESS NOTES
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   yes:     Diabetes Medication(s)     Insulin       insulin glargine (BASAGLAR KWIKPEN) 100 UNIT/ML pen    Inject 55 Units Subcutaneous daily     insulin aspart (NOVOPEN ECHO) 100 UNIT/ML cartridge    For use with the In-Pen device.  Give 1 unit per 5 grams CHO with meals and snacks as well as correction.  Average daily use is 100 units daily.        From Visit with Leigh Ann Escoto:    Basaglar 55 units    Start time ICR  1 unit/gm Insulin Sensitivity Factor Target BG    06:00 AM  5 (6) 30 (40) 120   11:00 AM 5 (6) 30 (40) 120   05:00 PM 5 (6) 30 (40) 120   09:30 PM 5 (6) 30 (40) 150             Active Insulin Time: 3 (4)  Maximum Bolus:  25    Monitoring              Patient's most recent   Lab Results   Component Value Date    A1C 9.8 08/17/2022    A1C 11.6 04/05/2021      Patient's A1C goal: <7.0    Healthy Eating:  Did not have time to obtain a diet recall from patient    Problem Solving:      Patient is at risk of hypoglycemia?: YES  Hospitalizations for hyper or hypoglycemia: No    EDUCATION and INSTRUCTION PROVIDED AT THIS VISIT:    Patient got a new phone so assisted patient in getting a new sensor connected to her new phone.  Since getting the phone she was not able to view her glucose readings.    Did some carb counting review and answered Nayeli's questions.  Will need additional follow  up to discuss further as we ran out of time today.      Patient-stated goal written and given to Nayeli Caal.  Verbalized and demonstrated understanding of instructions.     PLAN:  See patient instructions  AVS printed and given to patient    FOLLOW-UP:    Scheduled with me in Dec  Next week with hung    Time spent with patient at today's visit was 60 minutes.      Any diabetes medication dose changes were made via the CDE Protocol and Collaborative Practice Agreement with [place] and  [facility].  A copy of this encounter was provided to patient's referring provider.

## 2022-11-04 ENCOUNTER — CARE COORDINATION (OUTPATIENT)
Dept: EDUCATION SERVICES | Facility: CLINIC | Age: 42
End: 2022-11-04

## 2022-11-04 ENCOUNTER — MYC MEDICAL ADVICE (OUTPATIENT)
Dept: EDUCATION SERVICES | Facility: CLINIC | Age: 42
End: 2022-11-04

## 2022-11-04 ENCOUNTER — ALLIED HEALTH/NURSE VISIT (OUTPATIENT)
Dept: EDUCATION SERVICES | Facility: CLINIC | Age: 42
End: 2022-11-04
Payer: COMMERCIAL

## 2022-11-04 DIAGNOSIS — E11.65 UNCONTROLLED TYPE 2 DIABETES MELLITUS WITH HYPERGLYCEMIA, WITH LONG-TERM CURRENT USE OF INSULIN (H): Primary | ICD-10-CM

## 2022-11-04 DIAGNOSIS — Z79.4 UNCONTROLLED TYPE 2 DIABETES MELLITUS WITH HYPERGLYCEMIA, WITH LONG-TERM CURRENT USE OF INSULIN (H): ICD-10-CM

## 2022-11-04 DIAGNOSIS — E11.65 UNCONTROLLED TYPE 2 DIABETES MELLITUS WITH HYPERGLYCEMIA, WITH LONG-TERM CURRENT USE OF INSULIN (H): ICD-10-CM

## 2022-11-04 DIAGNOSIS — Z79.4 UNCONTROLLED TYPE 2 DIABETES MELLITUS WITH HYPERGLYCEMIA, WITH LONG-TERM CURRENT USE OF INSULIN (H): Primary | ICD-10-CM

## 2022-11-04 PROCEDURE — G0108 DIAB MANAGE TRN  PER INDIV: HCPCS | Performed by: REGISTERED NURSE

## 2022-11-04 RX ORDER — INSULIN GLARGINE 100 [IU]/ML
70 INJECTION, SOLUTION SUBCUTANEOUS DAILY
Qty: 45 ML | Refills: 2 | COMMUNITY
Start: 2022-11-04 | End: 2023-04-17

## 2022-11-04 RX ORDER — METFORMIN HCL 500 MG
1000 TABLET, EXTENDED RELEASE 24 HR ORAL
Qty: 180 TABLET | Refills: 3 | Status: SHIPPED | OUTPATIENT
Start: 2022-11-04 | End: 2022-11-04

## 2022-11-04 NOTE — PROGRESS NOTES
Discussed with Anne Marie Medina.   Start Metformin ER liquid 5 ml (500 mg) once daily and titrate up to 2000mg daily as tolerated.   She had a lot of GI issues when on it before, related to starting with too-high a dose.    She also states that she can't tolerate the size of the Metformin pills, which is why liquid Metformin was ordered.        Leigh Ann Escoto, JUSTICEN, RN, [facility]  Certified Diabetes Care and   [facility] Endocrinology and Diabetes  [facility] [place]  [facility] [id number]  Phone [phone]

## 2022-11-04 NOTE — PROGRESS NOTES
"Diabetes Self-Management Education & Support    Nayeli Caal presents today for education related to Type 2 diabetes    Patient is being treated with:  insulin  She is accompanied by self    Year of diagnosis: 2013 or 2014  Referring provider:  Anne Marie Medina PA-C  Living Situation: Lives with a room mate  Employment: Administrative work for [employer]    Nayeli has complications of chronic kidney disease and retinopathy.  She is also deaf and uses an , who attends with her today.    PATIENT CONCERNS RELATED TO DIABETES SELF MANAGEMENT:      Nayeli is hoping to become pregnant.  In preparation for this, she has been seeing Dr. Hodge in OB-GYN, and working with both Anne Marie Medina and the diabetes education team to try to get her glucose under control.  Because all of her oral medication were discontinued, this has become a huge challenge and she remains well above target throughout the day.        ASSESSMENT:    Taking Medication:     Current Diabetes Management per Patient:  Taking diabetes medications?   yes:     Diabetes Medication(s)     Insulin       insulin aspart (NOVOPEN ECHO) 100 UNIT/ML cartridge    For use with the In-Pen device.  Give 1 unit per 5 grams CHO with meals and snacks as well as correction.  Average daily use is 100 units daily.     insulin glargine (BASAGLAR KWIKPEN) 100 UNIT/ML pen    Inject 55 Units Subcutaneous daily        The med list is not completely accurate.  She is taking 60 units of Basaglar daily for the past two days.    Her IN-PEN settings remain the same.  She states that she is not forgetting to take her rapid acting insulin before meals, but does not always cover evening snacks, which is contributing to high overnight glucoses.      She notes today \"my blood sugars were much better controlled when I was taking the oral medications.\"      Monitoring    Patient glucose self monitoring as follows: continuously using a continuous glucose monitor " (CGM)  BG meter: Dexcom   BG results:         Patient's most recent   Lab Results   Component Value Date    A1C 9.8 08/17/2022    A1C 11.6 04/05/2021      Patient's A1C goal: <7.0    Healthy Eating:   She states that she doesn't understand why her glucoses are so high, as she is trying very hard to limit the amount of carbohydrate she is eating at meals.   She does know how to count carbohydrates and recently met with Jhoana Acuna to refine her carb counting.      Problem Solving:      Patient is at risk of hypoglycemia?: YES Happens rarely now.  Hospitalizations for hyper or hypoglycemia: Unknown    Healthy Coping and Stress Management:  She is feeling frustrated and a little overwhelmed at how difficult it is to mange diabetes.          EDUCATION and INSTRUCTION PROVIDED AT THIS VISIT:       She had been taking Jardiance, however she stopped this at the end of July.  She had been taken Metformin, however states she stopped this in 2020 because of GI upsets.  We discussed this today, as insulin resistance appears to be a huge problem and if she could tolerate it, it may be helpful.  She states that when she tried it before, she had severe diarrhea and she didn't like the size of the tablets, as they were too big.  In talking further, it sounds like when it was introduced previously, Metformin was prescribed as 2000 mg daily but it was started all at once, so it's quite possible that she could tolerate it if it was introduced more gradually.  Suggested that she may benefit from the liquid form of Metformin, Riomet, as swallowing the large pills is an issue.  Will check with Ms. Medina.      In the mean time, she is over target significantly throughout the day--some of this is related to not covering snacks.  She states that she generally eats three meals a day and occasionally has a bedtime snack, but often doesn't cover it--forgets.  Although she just had her Basaglar increased, I instructed her to wait until  Sunday, and if her fasting glucose is still over 150, she should increase her Basaglar to 70 units and administer this is two injections of 35 units each at the same time.         We paired her In-Pen device with her new phone.  Encouraged her to continue using it.  I want to see her back in a couple of weeks, but she will be traveling and said that she will call for an appointment.  .        Reminded to pair her new In-Pen with her phone yamileth when she receives it.      Start time ICR  1 unit/gm Insulin Sensitivity Factor Target BG    06:00 AM  5  30  120   11:00 AM 5  30  120   05:00 PM 5  30  120   09:30 PM 5 30  150         Active Insulin Time: 3   Maximum Bolus:  25  Patient-stated goal written and given to Nayeli Caal.  Verbalized and demonstrated understanding of instructions.     FOLLOW-UP:      She has a follow up appointment with Jhoana Acuna in two weeks, then she will be out of town for a couple of weeks, so did not want to make a follow up appointment quite yet.   Will talk to Anne Marie Medina re: re-starting Metformin gradually and in liquid form.      Time spent with patient at today's visit was 60 minutes.      Any diabetes medication dose changes were made via the CDE Protocol and Collaborative Practice Agreement with [place] and [facility].  A copy of this encounter was provided to patient's referring provider.

## 2022-11-08 ENCOUNTER — CARE COORDINATION (OUTPATIENT)
Dept: EDUCATION SERVICES | Facility: CLINIC | Age: 42
End: 2022-11-08

## 2022-11-08 ENCOUNTER — TELEPHONE (OUTPATIENT)
Dept: ENDOCRINOLOGY | Facility: CLINIC | Age: 42
End: 2022-11-08

## 2022-11-08 ENCOUNTER — TELEPHONE (OUTPATIENT)
Dept: NURSING | Facility: CLINIC | Age: 42
End: 2022-11-08

## 2022-11-08 DIAGNOSIS — E11.65 UNCONTROLLED TYPE 2 DIABETES MELLITUS WITH HYPERGLYCEMIA, WITH LONG-TERM CURRENT USE OF INSULIN (H): ICD-10-CM

## 2022-11-08 DIAGNOSIS — Z79.4 UNCONTROLLED TYPE 2 DIABETES MELLITUS WITH HYPERGLYCEMIA, WITH LONG-TERM CURRENT USE OF INSULIN (H): ICD-10-CM

## 2022-11-08 NOTE — TELEPHONE ENCOUNTER
"Patient calling through . \"On my chart, was said to get meds for yeast infection, was to be sent to pharmacy at  on [facility]. It is not there yet. PA was needed. \" Reviewed \"my chart message from Leigh Ann Escoto with patient.   Leigh Ann Escoto, RN  to Nayeli Caal        1:59 PM  I think they got the script, because I just got a notification that it needs to have a prior authorization and it was just sent to your plan for PA approval.  You won't be able to get it until the PA is approved.      This [email or web address] message has not been read.  Patient very upset/angry that this has not been taken care of and hung up the phone. Will route to Leigh Ann Escoto as FYI.   "

## 2022-11-08 NOTE — PROGRESS NOTES
Genaro Kenney,  She just sent a request for yeast infection treatment.  I don't typically order this.  Can you please send the script to the [facility] per Nayeli's request.  Thank you. Leigh Ann.

## 2022-11-09 DIAGNOSIS — Z79.4 UNCONTROLLED TYPE 2 DIABETES MELLITUS WITH HYPERGLYCEMIA, WITH LONG-TERM CURRENT USE OF INSULIN (H): ICD-10-CM

## 2022-11-09 DIAGNOSIS — E11.65 UNCONTROLLED TYPE 2 DIABETES MELLITUS WITH HYPERGLYCEMIA, WITH LONG-TERM CURRENT USE OF INSULIN (H): ICD-10-CM

## 2022-11-09 NOTE — TELEPHONE ENCOUNTER
I am currently on the phone with the patient's insurance and they have stated they have all the information for the prior authorization but still has yet to come to a decision and we'll be notified once a decision is made.      I also wanted to let you know that the Pharmacy Technician Supervisor from the [facility] did let me know that no [facility] stocks the Metformin Solution at this moment. The patient may want to check other local pharmacies for their inventory of Metformin Solution.    Thank You!    Pao Silva [facility] Pharmacy Liaison  [facility]  [email or web address]  Phone: [phone]  Fax: [phone]

## 2022-11-09 NOTE — TELEPHONE ENCOUNTER
Sounds good I will release the Metformin Solution prescription to the patient's local pharmacy now.

## 2022-11-09 NOTE — TELEPHONE ENCOUNTER
I see that pt was requesting Rx for the yeast infection to diabetes Ed on 11/4/22.  PA is for the liquid metformin per Endocrinology.    I will send [employer] message to the pt.

## 2022-11-09 NOTE — TELEPHONE ENCOUNTER
For documentation, please include hepatic panel and BMP from 8/17/22:    This shows no hepatic impairment as AST, ALT, and Alkaline Phosphatase are in normal range.    This also shows no metabolic acidosis given the anion gap and bicarb are within normal limits.    As for documentation of no ketoacidosis, I did find a note from 2018 from Dr. Bragg. Do you think this will suffice, or would the plan like a more recent note? If so, I can provide this documentation addended to a recent encounter with Anne Marie.     Let me know--feel free to teams or call me! I have a visit 3-4PM but after that I'm free. Or I'll catch you in the morning.     Joseph Pathak, PharmD  Endocrine & Diabetes [facility] Pharmacist  [address] [place] [address]  Direct Voicemail: [phone]

## 2022-11-09 NOTE — TELEPHONE ENCOUNTER
I have faxed all the information given to me over to the prior authorization department. I'm hoping to still hear a reply today but I am unsure if I will. Will keep updating as new information is received from pa dept.

## 2022-11-09 NOTE — TELEPHONE ENCOUNTER
Received call from pharmacy- patient is going out of town today and will be gone for 3 weeks so she needs this ASAP. The pharmacy also needs Rx to be sent to them. Please contact pharmacy when PA has been approved and Rx has been sent.

## 2022-11-09 NOTE — TELEPHONE ENCOUNTER
Insurance requesting lab work in order to approve  Metformin Suspension.   HgB A1C and CMP results being faxed now.     Leigh Ann Escoto, JUSTICEN, RN, [facility]  Certified Diabetes Care and   [facility] Endocrinology and Diabetes  [id number]  Phone [phone]

## 2022-11-09 NOTE — TELEPHONE ENCOUNTER
Dorian Gutierrez,    Did you want me to release the Metformin Solution prescription to the patient's preferred pharmacy right now without an answer to the prior authorization I initiated?    Thank You!    Pao Silva [facility] Pharmacy Liaison  [facility]  [email or web address]  Phone: [phone]  Fax: [phone]

## 2022-11-09 NOTE — TELEPHONE ENCOUNTER
"I apologize in advance I can't find a few of these information but the patient's insurance requires these information for the Prior Authorization:      I've found the EGFR from 8/23/2022, A1C from 11/04/2022 and the information that patient has difficulty swallowing metformin tablets from 11/04/2022 as well but I cannot find the \"Hepatic Impairment and Metabolic Acidosis including Diabetic Ketoacidosis. Can I be directed to finding these information so that I can fax it into the patient's prior authorization department?    Thank You!    Pao Silva [facility] Pharmacy Liaison  [facility]  [email or web address]  Phone: [phone]  Fax: [phone]    "

## 2022-11-10 NOTE — TELEPHONE ENCOUNTER
PRIOR AUTHORIZATION DENIED    Medication: Metformin 500MG/5mL Solution    Denial Date: 11/10/2022    Denial Rational:     Appeal Information:           I'm not really happy with the Denial. I will call them tomorrow!

## 2022-11-11 NOTE — TELEPHONE ENCOUNTER
Prior Authorization Approval    Authorization Effective Date: 10/11/2022  Authorization Expiration Date: 11/10/2023  Medication: Metformin 500MG/5mL Solution  Approved Dose/Quantity: #2365mL for 90 Day Supply  Reference #: [id number]   Insurance Company: [employer] - Phone [phone] Fax [phone]  Expected CoPay: $7.50 473mL(package size) for 23 days     CoPay Card Available:      Foundation Assistance Needed:    Which Pharmacy is filling the prescription (Not needed for infusion/clinic administered):    Pharmacy Notified:    Patient Notified:

## 2022-11-11 NOTE — TELEPHONE ENCOUNTER
Pao is giving plan a call to verify they received the fax with clinical information. If still denied, I will submit urgent appeal or complete peer to peer--there is no reason this shouldn't be covered...    Joseph Pathak, PharmD  Endocrine & Diabetes [facility] Pharmacist  [address] [place] [address]  Direct Voicemail: [phone]

## 2022-11-14 RX ORDER — METFORMIN HCL 500 MG
TABLET, EXTENDED RELEASE 24 HR ORAL
Qty: 360 TABLET | Refills: 90 | OUTPATIENT
Start: 2022-11-14

## 2022-11-27 ENCOUNTER — HEALTH MAINTENANCE LETTER (OUTPATIENT)
Age: 42
End: 2022-11-27

## 2022-12-06 ENCOUNTER — ALLIED HEALTH/NURSE VISIT (OUTPATIENT)
Dept: EDUCATION SERVICES | Facility: CLINIC | Age: 42
End: 2022-12-06
Payer: COMMERCIAL

## 2022-12-06 DIAGNOSIS — E11.65 UNCONTROLLED TYPE 2 DIABETES MELLITUS WITH HYPERGLYCEMIA, WITH LONG-TERM CURRENT USE OF INSULIN (H): Primary | ICD-10-CM

## 2022-12-06 DIAGNOSIS — Z79.4 UNCONTROLLED TYPE 2 DIABETES MELLITUS WITH HYPERGLYCEMIA, WITH LONG-TERM CURRENT USE OF INSULIN (H): Primary | ICD-10-CM

## 2022-12-06 PROCEDURE — T1013 SIGN LANG/ORAL INTERPRETER: HCPCS | Mod: U3

## 2022-12-06 PROCEDURE — 97803 MED NUTRITION INDIV SUBSEQ: CPT | Performed by: NUTRITIONIST

## 2022-12-06 NOTE — PROGRESS NOTES
Diabetes Self-Management Education & Support    Nayeli Caal presents today for education related to Type 2 diabetes    Patient is being treated with:  insulin  She is accompanied by     Referring provider:  Adam      PATIENT CONCERNS RELATED TO DIABETES SELF MANAGEMENT: Not too much, some high glucose after eating.       ASSESSMENT:    Taking Medication:     Current Diabetes Management per Patient:  Taking diabetes medications?   yes:     Diabetes Medication(s)     Biguanides       metFORMIN HCl  MG/5ML SRER    Take 1,000 mg by mouth 2 times daily    Insulin       insulin glargine (BASAGLAR KWIKPEN) 100 UNIT/ML pen    Inject 70 Units Subcutaneous daily 3 month supply.     insulin aspart (NOVOPEN ECHO) 100 UNIT/ML cartridge    For use with the In-Pen device.  Give 1 unit per 5 grams CHO with meals and snacks as well as correction.  Average daily use is 100 units daily.        Little bit of nausea in the morning, some looser stool. Doing 500mg currently, not ready to go up yet.   Still at 60 units basaglar    Monitoring                      Patient's most recent   Lab Results   Component Value Date    A1C 9.8 08/17/2022    A1C 11.6 04/05/2021      Patient's A1C goal: <7.0    Activity: not assessed    Healthy Eating:     Takes insulin 15-30 minutes before she eats.   She is covering her bedtime snack more often now.   She feels confident with carb counting and does not have any more questions today.    Problem Solving:      Patient is at risk of hypoglycemia?: YES  Hospitalizations for hyper or hypoglycemia: No    EDUCATION and INSTRUCTION PROVIDED AT THIS VISIT:    Calvin glucose is not in goal. Recommended to increase basaglar to 70 units as recommended by hung Escoto at last visit.   She will continue to take metformin at current dose until she feels comfortable increasing.  She has a follow up with MAGNOLIA Medina on the 19th and I scheduled her for follow up with ROLANDO escoto on 1/19 after her  trip.    Patient-stated goal written and given to Nayeli Caal.  Verbalized and demonstrated understanding of instructions.     PLAN:  See patient instructions  AVS printed and given to patient    FOLLOW-UP:    As needed with me    Time spent with patient at today's visit was 30 minutes.      Any diabetes medication dose changes were made via the CDE Protocol and Collaborative Practice Agreement with [facility].  A copy of this encounter was provided to patient's referring provider.

## 2022-12-16 ENCOUNTER — TELEPHONE (OUTPATIENT)
Dept: ENDOCRINOLOGY | Facility: CLINIC | Age: 42
End: 2022-12-16

## 2022-12-21 NOTE — PROGRESS NOTES
dm 2  Jeniffer Salazar, [facility]    HPI:   Nayeli is a 42 yo woman here with a  for follow up of type 2 diabetes since the early 2000's.  She also sees Dr. Bragg, last visit was in April, 2021. She started on insulin in 2003 after failing Metformin treatment.  She has struggled with high blood sugars for many years.   At her last visit, we discussed going back to basal/bolus insulin and stopping the NPH.  We also started her on an In-pen for dosing adjustments.   She also started on a dexcom sensor. She has met with Leigh Ann Escoto and feels like she has made a great improvement in her control. She has adapted well to carb counting.     Her current regimen is:   empagliflozin 25 mg daily.   Basaglar 50 units.  Novolog (dosing on In-pen):  Carb ratio: 1/10g (Leigh Ann just changed this to 1/8g at dinner at her visit yesterday).   Sensitivity: Mid- 50, 6am- 40, 10:30pm- 50      Her overall average glucose is 171 mg/dL (CV 42%), over the past 2 weeks.  Her recent glucose is as follows:             She is checking her glucose 5-7 times a day on her dexcom and is taking bolus insulin 3-5 times a day.      She reports that her glucose routinely climbs after she drinks coffee.  She adds a little soy creamer and sugar. She has been trying to limit carbs and fruit.  She is eating eggs, not buying bread, trying to increase vegetables.  She does eat nuts. Small OJ with medication. Lunch- salad, lunch meat no bread, reducing carbs.  Dinner- trying to avoid processed. She has not been exercising at the gym because of COVID.     She is no longer on Metformin as even the low dose caused intense diarrhea to the point where she could not go out of her home.     Diabetes complications:  Retinopathy: last eye exam - 3/21. 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  Nephropathy: h/o proteinuria; normal GFR. Now on 50mg losartan daily (tx with lisinopril was associated with a dry cough).   Neuropathy: No numbness or tingling sensation in  her feet.     She has no other concerns today.       Past Medical History:   Diagnosis Date     Combined visual and hearing impairment      Deaf      Diabetic retinopathy of both eyes (H) 8/19/2011     Hepatic steatosis 08/19/2011     History of tobacco use      Hyperlipidemia      Hypertension      Hypertriglyceridemia      Migraines      Obesity 8/19/2011     Problem list name updated by automated process. 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  Ganglion cyst excision.;  Surgeon: Greg Streeter MD;  Location: [facility] OR       Family History   Problem Relation Age of Onset     Unknown/Adopted Other        Social History     Social Hx: She is adopted.  Works for [employer].   Social History     Marital status: Single     Spouse name: N/A     Number of children: N/A     Years of education: N/A     Social History Main Topics     Smoking status: Former Smoker     Types: Cigarettes     Quit date: 12/1/2010     Smokeless tobacco: Never Used      Comment: stopped 2 yrs ago     Alcohol use No     Drug use: No     Sexual activity: Not Currently     Partners: Male     Other Topics Concern      Service No     Blood Transfusions No     Caffeine Concern No     Occupational Exposure No     Hobby Hazards No     Sleep Concern No     Stress Concern No     Weight Concern Yes     Special Diet No  "    Back Care No     Exercise Yes     4-5 x a week     Bike Helmet No     Seat Belt Yes     Self-Exams Yes     Social History Narrative   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  Boyfriend is in [place].     Current Outpatient Medications   Medication     acetaminophen (TYLENOL) 500 MG tablet     Alcohol Swabs (ALCOHOL PREP PAD) 70 % PADS     aspirin (ASA) 81 MG chewable tablet     atorvastatin (LIPITOR) 40 MG tablet     B-D U/F insulin pen needle     BD ULTRA FINE PEN NEEDLES     blood glucose (ACCU-CHEK LAYA PLUS) test strip     blood glucose monitoring (ACCU-CHEK FASTCLIX) lancets     cetirizine (ZYRTEC) 10 MG tablet     Continuous Blood Gluc Sensor (DEXCOM G6 SENSOR) MISC     Continuous Blood Gluc Transmit (DEXCOM G6 TRANSMITTER) MISC     empagliflozin (JARDIANCE) 25 MG TABS tablet     ergocalciferol (ERGOCALCIFEROL) 1.25 MG (86553 UT) capsule     fenofibrate (TRIGLIDE/LOFIBRA) 160 MG tablet     fish oil-omega-3 fatty acids 1000 MG capsule     ibuprofen (ADVIL/MOTRIN) 600 MG tablet     Injection Device for insulin (INPEN 100-PINK-SHAYNE) DAVID     insulin aspart (NOVOPEN ECHO) 100 UNIT/ML cartridge     insulin glargine (BASAGLAR KWIKPEN) 100 UNIT/ML pen     insulin pen needle (B-D U/F) 31G X 8 MM miscellaneous     levonorgestrel (MIRENA) 20 MCG/24HR IUD     losartan (COZAAR) 50 MG tablet     naproxen (NAPROSYN) 375 MG tablet     omega 3 1000 MG CAPS     Ostomy Supplies (ADHESIVE REMOVER WIPES) MISC     Ostomy Supplies (SKIN TAC ADHESIVE BARRIER WIPE) MISC     terbinafine (LAMISIL) 1 % external cream     triamcinolone (KENALOG) 0.1 % external cream     Current Facility-Administered Medications   Medication     hydrocortisone (CORTAID) 1 % cream        No Known Allergies    Physical Exam  /74   Ht 1.575 m (5' 2\")   Wt 78.2 kg (172 lb 6.4 oz)   BMI 31.53 kg/m    GENERAL:  Alert and oriented X3, NAD, well dressed, answering questions appropriately, appears stated age.  HEENT: OP clear, no " lymphadenopathy, no thyromegaly, non-tender, no exophthalmus, no proptosis, EOMI, no lid lag, no retraction  EXTREMITIES: no edema, +pulses, no rashes, no lesions  RESULTS  Lab Results   Component Value Date    A1C 10.2 (H) 07/19/2021    A1C 11.6 (H) 04/05/2021    A1C 12.4 (H) 10/29/2020    A1C 12.9 (H) 07/08/2020    A1C 8.2 (H) 05/02/2019    A1C 10.3 (H) 10/15/2018    HEMOGLOBINA1 10.0 (A) 01/13/2020    HEMOGLOBINA1 9.9 (A) 10/07/2019    HEMOGLOBINA1 8.1 (A) 04/22/2019    HEMOGLOBINA1 10.4 (A) 01/14/2019    HEMOGLOBINA1 8.7 (A) 03/12/2018       TSH   Date Value Ref Range Status   07/08/2020 1.34 0.40 - 4.00 mU/L Final   05/17/2018 1.84 0.40 - 4.00 mU/L Final   11/27/2017 1.92 0.40 - 4.00 mU/L Final   05/11/2016 1.85 0.40 - 4.00 mU/L Final   02/11/2016 1.14 0.40 - 4.00 mU/L Final       ALT   Date Value Ref Range Status   08/31/2021 31 0 - 50 U/L Final   07/08/2020 29 0 - 50 U/L Final   05/02/2019 43 0 - 50 U/L Final   ]    Recent Labs   Lab Test 07/19/21  0626 07/08/20  1623 11/19/15  1330 09/16/13  0753   CHOL 194 179   < > 197   HDL 43* 41*   < > 34*   * 72   < > 119   TRIG 133 331*   < > 221*   CHOLHDLRATIO  --   --   --  5.8*    < > = values in this interval not displayed.       Lab Results   Component Value Date     08/31/2021     07/08/2020      Lab Results   Component Value Date    POTASSIUM 4.0 08/31/2021    POTASSIUM 4.0 07/08/2020     Lab Results   Component Value Date    CHLORIDE 112 08/31/2021    CHLORIDE 101 07/08/2020     Lab Results   Component Value Date    VERO 8.6 08/31/2021    VERO 8.6 07/08/2020     Lab Results   Component Value Date    CO2 20 08/31/2021    CO2 30 07/08/2020     Lab Results   Component Value Date    BUN 19 08/31/2021    BUN 13 07/08/2020     Lab Results   Component Value Date    CR 0.70 08/31/2021    CR 0.74 07/08/2020       GFR Estimate   Date Value Ref Range Status   08/31/2021 >90 >60 mL/min/1.73m2 Final     Comment:     As of July 11, 2021, eGFR is calculated  by the CKD-EPI creatinine equation, without race adjustment. eGFR can be influenced by muscle mass, exercise, and diet. The reported eGFR is an estimation only and is only applicable if the renal function is stable.   07/22/2021 >90 >60 mL/min/1.73m2 Final     Comment:     As of July 11, 2021, eGFR is calculated by the CKD-EPI creatinine equation, without race adjustment. eGFR can be influenced by muscle mass, exercise, and diet. The reported eGFR is an estimation only and is only applicable if the renal function is stable.   07/08/2020 >90 >60 mL/min/[1.73_m2] Final     Comment:     Non  GFR Calc  Starting 12/18/2018, serum creatinine based estimated GFR (eGFR) will be   calculated using the Chronic Kidney Disease Epidemiology Collaboration   (CKD-EPI) equation.     05/02/2019 >90 >60 mL/min/[1.73_m2] Final     Comment:     Non  GFR Calc  Starting 12/18/2018, serum creatinine based estimated GFR (eGFR) will be   calculated using the Chronic Kidney Disease Epidemiology Collaboration   (CKD-EPI) equation.     05/17/2018 >90 >60 mL/min/1.7m2 Final     Comment:     Non  GFR Calc     GFR Estimate If Black   Date Value Ref Range Status   07/08/2020 >90 >60 mL/min/[1.73_m2] Final     Comment:      GFR Calc  Starting 12/18/2018, serum creatinine based estimated GFR (eGFR) will be   calculated using the Chronic Kidney Disease Epidemiology Collaboration   (CKD-EPI) equation.     05/02/2019 >90 >60 mL/min/[1.73_m2] Final     Comment:      GFR Calc  Starting 12/18/2018, serum creatinine based estimated GFR (eGFR) will be   calculated using the Chronic Kidney Disease Epidemiology Collaboration   (CKD-EPI) equation.     05/17/2018 >90 >60 mL/min/1.7m2 Final     Comment:      GFR Calc       Lab Results   Component Value Date    MICROL 31 07/20/2020     No results found for: MICROALBUMIN  Lab Results   Component Value Date    CPEPT 1.3  03/25/2005       No results found for: B12]    Most recent eye exam date: : Not Found     Assessment/Plan:     1.  Type 2 diabetes-  Nayeli's glucose control has improved significantly since her last visit.  She has started using the In-pen, Dexcom and has learned carb counting.  Her last a1c was 10.2%, but I suspect this will improve significantly at her next check.  For now, her glucose is a bit high post-breakfast and dinner, but is drifting down overnight.  We made the following plan today (instructions given to patient):     Lower basaglar from 50 units to 45 units daily. Start recording this on the In-pen yamileth when you take it.     Change your carb ratio to 1/8g at breakfast (was 1/10g) and at dinner (we made this change today).      Please let me know if you start having low blood sugars.  I anticipate we may need to lower your basaglar further once your post-meal glucose values come down more.     We also briefly discussed her diet and interest in low carb/keto diet.  I advised her that any diet will cause weight loss, but it must be a diet that is sustainable.  Discussed heart healthy eating ideas and encouraged her to increase consumption of fruits, vegetables and whole grains. I have scheduled her to meet with Jhoana Acuna to review.     I have renewed her Dexcom prescription.     Nayeli meets guidelines for Dexcom G6 of:    The patient has diabetes (T2DM complicated by hyperglycemia);    The patient has been using a home blood glucose monitor (BGM) and performing frequent (four or more times a day) BGM testing;    The patient is insulin-treated with three or more daily injections (MDI) of insulin ;    The patient's insulin treatment regimen requires frequent adjustments based on therapeutic CGM testing results;    Within six months prior to ordering the CGM, the patient had an in-person visit with the treating practitioner to evaluate their diabetes control and determine that the above criteria have been  met; and    Every six months following the initial prescription of the CGM, the patient has an in-person visit with the treating practitioner to assess adherence to their CGM regimen and diabetes treatment plan.      2.  Risk factors-     Retinopathy:  No.  Had eye exam within last year.   Nephropathy:  BP well controlled. No microalbuminuria.  Creatinine stable.   Neuropathy: No.    Feet: OK, no ulcers.   Taking ASA: yes  Lipids:  LDL above target.  Patient taking statin and fibrate. Discussed diet and limiting saturated fats/processed meats. Will review with dietitian.     3.  F/U in 3 months with me, in 6 months with Dr. Bragg, in the next 2-3 months diabetes education to see our dietitan.  We will follow with her closely, however she does strongly prefer in-person appointments.      49 minutes spent on the date of the encounter doing chart review, review of test results, patient visit and documentation, counseling/coordination of care, and discussion of follow up plan for worsening hyper and hypoglycemia.  The patient understood and is satisfied with today's visit.        Anne Marie Medina PA-C, MPAS   [facility]  Department of Medicine   I have reviewed and confirmed nurses' notes for patient's medications, allergies, medical history, and surgical history.

## 2023-01-19 ENCOUNTER — ALLIED HEALTH/NURSE VISIT (OUTPATIENT)
Dept: EDUCATION SERVICES | Facility: CLINIC | Age: 43
End: 2023-01-19
Payer: COMMERCIAL

## 2023-01-19 DIAGNOSIS — G89.29 CHRONIC BILATERAL LOW BACK PAIN WITHOUT SCIATICA: ICD-10-CM

## 2023-01-19 DIAGNOSIS — E11.65 UNCONTROLLED TYPE 2 DIABETES MELLITUS WITH HYPERGLYCEMIA, WITH LONG-TERM CURRENT USE OF INSULIN (H): ICD-10-CM

## 2023-01-19 DIAGNOSIS — M54.50 CHRONIC BILATERAL LOW BACK PAIN WITHOUT SCIATICA: ICD-10-CM

## 2023-01-19 DIAGNOSIS — Z79.4 UNCONTROLLED TYPE 2 DIABETES MELLITUS WITH HYPERGLYCEMIA, WITH LONG-TERM CURRENT USE OF INSULIN (H): ICD-10-CM

## 2023-01-19 PROCEDURE — G0108 DIAB MANAGE TRN  PER INDIV: HCPCS | Performed by: REGISTERED NURSE

## 2023-01-19 PROCEDURE — T1013 SIGN LANG/ORAL INTERPRETER: HCPCS | Mod: U3

## 2023-01-19 NOTE — PROGRESS NOTES
Diabetes Self-Management Education & Support    Nayeli Caal presents today for education related to Type 2 diabetes    Patient is being treated with:  oral agents and insulin  She is accompanied by self    Year of diagnosis: 2013 or 2014  Referring provider:  Anne Marie Medina PA-C  Living Situation: Lives with a room mate  Employment: Administrative work for [employer]     Nayeli has complications of chronic kidney disease and retinopathy.  She is also deaf and uses an , who attends with her today.    PATIENT CONCERNS RELATED TO DIABETES SELF MANAGEMENT:     Since our last visit, she tried the Metformin ER liquid preparation, and states that it didn't seem to make any difference in her GI symptoms.  She stopped taking it.  She was able to obtain Jardiance from her pharmacy.  It was removed from her med list in September when she had been planning to try to get pregnant. She wants to continue taking Jardiance, and feels pretty positive that this is going to get her glucose under better control.        ASSESSMENT:    Taking Medication:     Current Diabetes Management per Patient:  Taking diabetes medications?   yes:     Diabetes Medication(s)     Insulin       insulin aspart (NOVOPEN ECHO) 100 UNIT/ML cartridge    For use with the In-Pen device.  Give 1 unit per 5 grams CHO with meals and snacks as well as correction.  Average daily use is 100 units daily.     insulin glargine (BASAGLAR KWIKPEN) 100 UNIT/ML pen    Inject 70 Units Subcutaneous daily 3 month supply.          Monitoring    Patient glucose self monitoring as follows: four times daily using Dexcom G6 continuous glucose monitor           Patient's most recent   Lab Results   Component Value Date    A1C 9.8 08/17/2022    A1C 11.6 04/05/2021      Patient's A1C goal: <8.0    Activity: no regular exercise program    EDUCATION and INSTRUCTION PROVIDED AT THIS VISIT:       Nayeli reports that she and her boyfriend just returned  from a 2 week trip to [place].  They spent a week in [place], and then joined a group of other deaf people for a gathering in another part of [place].  She had a great time.  This was her first time out of the [place]    She reports that she is no longer taking Metformin in any form.  She couldn't tolerate the size of the pills, so we switched her to Metformin ER liquid, but she states that the GI side effects were no different, even with a gradual ramping up of the dose.  She was able to get a supply of Jardiance from her pharmacy, however I notice that it is no longer on her med list.  She states that she wants to stay on Jardiance.  States she is tolerating well, with no UTI's or yeast infections noted.  She is convinced that this is going to bring her glucoses down, and does not want to make any changes in her In-Pen settings today.  For whatever reason,  I don't have current data from her In-Pen yamileth.  It looks like her In pen device may have become unpaired from her phone yamileth.  She did not have the device with her today, so will need to re-pair when she is at home.      Discussed that she is still out of control.  She wants to take the Jardiance for another month before increasing insulin.    She reports that she and her boyfriend have decided NOT to pursue getting pregnant.    I re-ordered the Jardiance 25 mg from her [facility] mail order pharmacy.   She hasn't had an A1C checked for a while, and since it is a couple months before her next appointment with an endo provider, I put in an order to get this re-checked.       Patient-stated goal written and given to Nayeli Caal.  Verbalized and demonstrated understanding of instructions.     PLAN:  See patient instructions  AVS printed and given to patient    FOLLOW-UP:        Time spent with patient at today's visit was 60 minutes.      Any diabetes medication dose changes were made via the CDE Protocol and Collaborative Practice Agreement with Josefa  and  [facility].  A copy of this encounter was provided to patient's referring provider.

## 2023-01-20 VITALS — BODY MASS INDEX: 33.62 KG/M2 | WEIGHT: 183.8 LBS

## 2023-01-24 ENCOUNTER — OFFICE VISIT (OUTPATIENT)
Dept: INTERNAL MEDICINE | Facility: CLINIC | Age: 43
End: 2023-01-24
Payer: COMMERCIAL

## 2023-01-24 VITALS
HEART RATE: 77 BPM | BODY MASS INDEX: 33.22 KG/M2 | OXYGEN SATURATION: 97 % | DIASTOLIC BLOOD PRESSURE: 70 MMHG | SYSTOLIC BLOOD PRESSURE: 118 MMHG | WEIGHT: 181.6 LBS

## 2023-01-24 DIAGNOSIS — Z13.29 SCREENING FOR THYROID DISORDER: ICD-10-CM

## 2023-01-24 DIAGNOSIS — M79.674 PAIN OF TOE OF RIGHT FOOT: Primary | ICD-10-CM

## 2023-01-24 PROCEDURE — 99214 OFFICE O/P EST MOD 30 MIN: CPT | Performed by: NURSE PRACTITIONER

## 2023-01-24 PROCEDURE — T1013 SIGN LANG/ORAL INTERPRETER: HCPCS | Mod: U3

## 2023-01-24 NOTE — PROGRESS NOTES
S: Nayeli APURVA Ingrid Caal is a 42 year old female who presents with a  .  She had been considering pursuing a pregnancy and had dc'd some of her diabetes medications.  She has been unable to control her diabetes and has decided not to pursue pregnancy.  She has re-started her diabetes meds and wanted to check in to communicate her plan.  See med list.    SHe had Covid 8/22, treated with Paxlovid. Her last A1C was 8/17/22 was 9.8. She has scheduled appt. With Endocrine for 3/1/2023 and 4/17/23.     Her right first toenail is very tender and is becoming  from the nailbed.  SHe had this nail removed three years ago. Her toenail rubs on the toe in her shoes. SHe started wearing a larger size which helps a little.     She was recently in [place] for 2 weeks with a group of friends and had a wonderful time.       Patient Active Problem List   Diagnosis     Essential hypertension     Hypersomnia, organic     Other chronic nonalcoholic liver disease     Diabetic retinopathy of both eyes (H)     Obesity     Usher Syndrome: congenital deafness, retinitis pigmentosa     Macular degeneration, age related, nonexudative     Benign neoplasm of iris     Dry eye syndrome     Pemphigus erythematosus     Chondromalacia of patella, right, steroid injection 6/12/2015     Uncontrolled type 2 diabetes mellitus with hyperglycemia, with long-term current use of insulin (H)     Chronic kidney disease, stage 1            Past Medical History:   Diagnosis Date     Combined visual and hearing impairment      Deaf      Diabetic retinopathy of both eyes (H) 8/19/2011     Hepatic steatosis 08/19/2011     History of tobacco use      Hyperlipidemia      Hypertension      Hypertriglyceridemia      Migraines      Obesity 8/19/2011     Problem list name updated by automated process. Provider to review     Uncontrolled type 2 diabetes mellitus with hyperglycemia, with long-term current use of insulin (H) 5/15/2017     Margarito  Syndrome: congenital deafness, retinitis pigmentosa 2011            Past Surgical History:   Procedure Laterality Date     LAPAROSCOPIC CHOLECYSTECTOMY N/A 3/10/2018    Procedure: LAPAROSCOPIC CHOLECYSTECTOMY;  Laparoscopic Cholecystectomy ;  Surgeon: Kuldeep Sigala MD;  Location: [facility]     RELEASE TRIGGER FINGER Right 2019    Procedure: Right Thumb Trigger Release;  Surgeon: Greg Streeter MD;  [facility]     RELEASE TRIGGER FINGER Left 2019    Procedure: Left Ring Trigger Finger Release.  Ganglion cyst excision.;  Surgeon: Greg Streeter MD;  [facility]            Social History     Tobacco Use     Smoking status: Former     Types: Cigarettes     Quit date: 2010     Years since quittin.1     Smokeless tobacco: Never     Tobacco comments:     stopped 10 yrs ago   Substance Use Topics     Alcohol use: Yes     Comment: socially            Family History   Problem Relation Age of Onset     Unknown/Adopted Other              No Known Allergies         Current Outpatient Medications   Medication Sig Dispense Refill     acetaminophen (TYLENOL) 500 MG tablet Take 2 tablets (1,000 mg) by mouth every 6 hours as needed for mild pain 100 tablet 3     Alcohol Swabs (ALCOHOL PREP PAD) 70 % PADS 1 each 3 times daily 100 each 3     aspirin (ASA) 81 MG chewable tablet Take 1 tablet (81 mg) by mouth daily CHEW AND SWALLOW 90 tablet 3     atorvastatin (LIPITOR) 40 MG tablet Take 1 tablet (40 mg) by mouth daily 90 tablet 2     B-D U/F insulin pen needle        BD ULTRA FINE PEN NEEDLES Inject 1 dose. Subcutaneous 2 times daily BD ultra-fine insulin syringe, 30 ga. X 1/2'' short needle 1/2 cc. Use as directed. 400 Units 11     blood glucose (ACCU-CHEK LAYA PLUS) test strip Use with  Accucheck Expert meter.  Test blood sugar 6 times daily. 600 each 3     blood glucose monitoring (ACCU-CHEK FASTCLIX) lancets Use to test blood sugar 6 times daily or as directed 510 each 3      Continuous Blood Gluc Sensor (DEXCOM G6 SENSOR) MISC Change every 10 days. 3 month supply. 9 each 3     Continuous Blood Gluc Transmit (DEXCOM G6 TRANSMITTER) MISC Change every 3 months. 1 each 3     empagliflozin (JARDIANCE) 25 MG TABS tablet Take 1 tablet (25 mg) by mouth daily 90 tablet 1     ergocalciferol (ERGOCALCIFEROL) 1.25 MG (11239 UT) capsule Take 1 capsule (50,000 Units) by mouth once a week For additional refills, please schedule a follow-up appointment at [phone] 12 capsule 3     fenofibrate (TRIGLIDE/LOFIBRA) 160 MG tablet Take 1 tablet (160 mg) by mouth daily 90 tablet 3     fish oil-omega-3 fatty acids 1000 MG capsule TAKE TWO CAPSULES (2 GM) BY MOUTH ONCE DAILY 180 capsule 3     ibuprofen (ADVIL/MOTRIN) 600 MG tablet Take 1 tablet (600 mg) by mouth every 6 hours as needed for moderate pain 120 tablet 1     Injection Device for insulin (INPEN 100-BLUE-NOVOLOG-FIASP) DAVID 1 each continuous 1 each 0     insulin aspart (NOVOPEN ECHO) 100 UNIT/ML cartridge For use with the In-Pen device.  Give 1 unit per 5 grams CHO with meals and snacks as well as correction.  Average daily use is 100 units daily. 90 mL 3     insulin glargine (BASAGLAR KWIKPEN) 100 UNIT/ML pen Inject 70 Units Subcutaneous daily 3 month supply. 45 mL 2     insulin pen needle (B-D U/F) 31G X 8 MM miscellaneous USE AS DIRECTED. 200 each 1     losartan (COZAAR) 100 MG tablet Take 1 tablet (100 mg) by mouth daily 90 tablet 3     naproxen (NAPROSYN) 375 MG tablet Take 1 tablet (375 mg) by mouth 2 times daily (with meals) 60 tablet 3     Ostomy Supplies (ADHESIVE REMOVER WIPES) MISC 1 each every 14 days 50 each 3     Ostomy Supplies (SKIN TAC ADHESIVE BARRIER WIPE) MISC 1 each every 14 days 6 each 3       REVIEW OF SYSTEMS:  See above.    O: /70 (BP Location: Right arm, Patient Position: Sitting, Cuff Size: Adult Regular)   Pulse 77   Wt 82.4 kg (181 lb 9.6 oz)   SpO2 97%   BMI 33.22 kg/m      GENERAL APPEARANCE: healthy, alert  and no distress  EYES:  sclera white, conjunctiva normal.  RESP:no distress. No abnormal breath sounds.  CV: regular rates and rhythm, normal S1 S2, no S3 or S4 and no murmur, click or rub   ABDOMEN:  soft, nontender, no HSM or masses and bowel sounds normal  NEURO: alert and oriented.    MUSK: ambulatory.  SKIN: tanned. Right first toenail is dystrophic and partially  from the nailbed.   EXT: warm.  PSYCHE: bright and happy.    The 10-year ASCVD risk score (Riki EARL, et al., 2019) is: 2.4%    Values used to calculate the score:      Age: 42 years      Sex: Female      Is Non- : No      Diabetic: Yes      Tobacco smoker: No      Systolic Blood Pressure: 126 mmHg      Is BP treated: Yes      HDL Cholesterol: 43 mg/dL      Total Cholesterol: 194 mg/dL    A/P:  Nayeli was seen today for follow up.    Diagnoses and all orders for this visit:    Pain of toe of right foot  -     Orthopedic  Referral; Future for treatment of the toenail.  She has uncontrolled diabetes at risk for poor healing.    Screening for thyroid disorder  -     TSH with free T4 reflex; Future    The patient voiced understanding of the information discussed and all questions were answered.     I spent a total of 30 minutes in the care of this pt during today's office visit. This time includes reviewing the patient's chart and prior history, obtaining a history, performing an examination and evaluation and counseling the patient. This time also includes ordering medications or tests necessary in addition to communication to other member's of the patient's health care team. Time spent in documentation and care coordination is included.     Mariya CELESTIN, CNP

## 2023-01-24 NOTE — NURSING NOTE
Nayeli Caal is a 42 year old female that presents in clinic today for the following:     Chief Complaint   Patient presents with     Follow Up     Pt here for follow up and to discuss medications       The patient's allergies and medications were reviewed. The patient's vitals were obtained without incident. The patient does not have any other questions for the provider.     Alexandr Pastor, EMT at 3:03 PM on 1/24/2023.  Primary Care Clinic: [phone]

## 2023-01-24 NOTE — Clinical Note
Patient left before we could schedule a 6 month follow up please give them a call. Thank you!            -KALLI EMT

## 2023-01-25 ENCOUNTER — TELEPHONE (OUTPATIENT)
Dept: INTERNAL MEDICINE | Facility: CLINIC | Age: 43
End: 2023-01-25
Payer: COMMERCIAL

## 2023-01-27 ENCOUNTER — CARE COORDINATION (OUTPATIENT)
Dept: EDUCATION SERVICES | Facility: CLINIC | Age: 43
End: 2023-01-27
Payer: COMMERCIAL

## 2023-01-27 ENCOUNTER — MYC MEDICAL ADVICE (OUTPATIENT)
Dept: EDUCATION SERVICES | Facility: CLINIC | Age: 43
End: 2023-01-27
Payer: COMMERCIAL

## 2023-01-27 DIAGNOSIS — Z79.4 UNCONTROLLED TYPE 2 DIABETES MELLITUS WITH HYPERGLYCEMIA, WITH LONG-TERM CURRENT USE OF INSULIN (H): ICD-10-CM

## 2023-01-27 DIAGNOSIS — E11.65 UNCONTROLLED TYPE 2 DIABETES MELLITUS WITH HYPERGLYCEMIA, WITH LONG-TERM CURRENT USE OF INSULIN (H): Primary | ICD-10-CM

## 2023-01-27 DIAGNOSIS — Z79.4 UNCONTROLLED TYPE 2 DIABETES MELLITUS WITH HYPERGLYCEMIA, WITH LONG-TERM CURRENT USE OF INSULIN (H): Primary | ICD-10-CM

## 2023-01-27 DIAGNOSIS — E11.65 UNCONTROLLED TYPE 2 DIABETES MELLITUS WITH HYPERGLYCEMIA, WITH LONG-TERM CURRENT USE OF INSULIN (H): ICD-10-CM

## 2023-01-27 RX ORDER — INSULIN PEN,REUSABLE,BT LISPRO
1 INSULIN PEN (EA) SUBCUTANEOUS CONTINUOUS
Qty: 1 EACH | Refills: 0 | Status: SHIPPED | OUTPATIENT
Start: 2023-01-27 | End: 2024-01-26

## 2023-01-27 RX ORDER — INSULIN ASPART 100 [IU]/ML
INJECTION, SOLUTION INTRAVENOUS; SUBCUTANEOUS
Qty: 30 ML | Refills: 2 | Status: SHIPPED | OUTPATIENT
Start: 2023-01-27

## 2023-01-27 NOTE — TELEPHONE ENCOUNTER
Nayeli lost her IN-PEN device.  Called [facility].  Insurance denied because too soon.  They are forwarding the order for the IN PEN to [facility] but this will take some time.  Nayeli requests regular Novolog Flexpen be sent to [facility].  She uses approximately 100 units per day of Novolog.  Order sent.

## 2023-01-30 ENCOUNTER — TELEPHONE (OUTPATIENT)
Dept: AUDIOLOGY | Facility: CLINIC | Age: 43
End: 2023-01-30

## 2023-01-30 NOTE — TELEPHONE ENCOUNTER
[facility] Call Center    Phone Message    May a detailed message be left on voicemail: yes     Reason for Call: Other: Pt is calling to see if she can get her audiology records showing that she is deaf sent to her via [employer], she couldn't remember when she was last seen and I see an appointment that was back in 2011, she's wondering what she needs to do to receive these, she's also wondering if she needs to come in for an appointment, please call to discuss further, thanks     Action Taken: Other: AUDIO    Travel Screening: Not Applicable

## 2023-02-08 ENCOUNTER — OFFICE VISIT (OUTPATIENT)
Dept: PODIATRY | Facility: CLINIC | Age: 43
End: 2023-02-08
Attending: NURSE PRACTITIONER
Payer: COMMERCIAL

## 2023-02-08 VITALS — SYSTOLIC BLOOD PRESSURE: 115 MMHG | HEART RATE: 83 BPM | DIASTOLIC BLOOD PRESSURE: 70 MMHG

## 2023-02-08 DIAGNOSIS — Z79.4 UNCONTROLLED TYPE 2 DIABETES MELLITUS WITH HYPERGLYCEMIA, WITH LONG-TERM CURRENT USE OF INSULIN (H): ICD-10-CM

## 2023-02-08 DIAGNOSIS — E11.65 UNCONTROLLED TYPE 2 DIABETES MELLITUS WITH HYPERGLYCEMIA, WITH LONG-TERM CURRENT USE OF INSULIN (H): ICD-10-CM

## 2023-02-08 DIAGNOSIS — L60.3 ONYCHODYSTROPHY: Primary | ICD-10-CM

## 2023-02-08 DIAGNOSIS — M79.674 PAIN OF TOE OF RIGHT FOOT: ICD-10-CM

## 2023-02-08 PROCEDURE — 99244 OFF/OP CNSLTJ NEW/EST MOD 40: CPT | Performed by: PODIATRIST

## 2023-02-08 NOTE — PROGRESS NOTES
Patient seen today in consult from [facility] andcomplains of thick nail on right first toe.  Over a year ago patient had trauma to this nail.  Started becoming thicker and growing and very slow.  Patient having hard time trimming this.  Has pain which is aggravated activity and relieved by rest.  Denies erythema edema or drainage.  She has poorly controlled diabetes mellitus.  She denies constant numbness and tingling in her toes.  She quit smoking in 2010.    ROS:  see above     No Known Allergies    Current Outpatient Medications   Medication Sig Dispense Refill     acetaminophen (TYLENOL) 500 MG tablet Take 2 tablets (1,000 mg) by mouth every 6 hours as needed for mild pain 100 tablet 3     Alcohol Swabs (ALCOHOL PREP PAD) 70 % PADS 1 each 3 times daily 100 each 3     aspirin (ASA) 81 MG chewable tablet Take 1 tablet (81 mg) by mouth daily CHEW AND SWALLOW 90 tablet 3     atorvastatin (LIPITOR) 40 MG tablet Take 1 tablet (40 mg) by mouth daily 90 tablet 2     B-D U/F insulin pen needle        BD ULTRA FINE PEN NEEDLES Inject 1 dose. Subcutaneous 2 times daily BD ultra-fine insulin syringe, 30 ga. X 1/2'' short needle 1/2 cc. Use as directed. 400 Units 11     blood glucose (ACCU-CHEK LAYA PLUS) test strip Use with  Accucheck Expert meter.  Test blood sugar 6 times daily. 600 each 3     blood glucose monitoring (ACCU-CHEK FASTCLIX) lancets Use to test blood sugar 6 times daily or as directed 510 each 3     Continuous Blood Gluc Sensor (DEXCOM G6 SENSOR) MISC Change every 10 days. 3 month supply. 9 each 3     Continuous Blood Gluc Transmit (DEXCOM G6 TRANSMITTER) MISC Change every 3 months. 1 each 3     empagliflozin (JARDIANCE) 25 MG TABS tablet Take 1 tablet (25 mg) by mouth daily 90 tablet 1     ergocalciferol (ERGOCALCIFEROL) 1.25 MG (76842 UT) capsule Take 1 capsule (50,000 Units) by mouth once a week For additional refills, please schedule a follow-up appointment at [phone] 12 capsule 3      fenofibrate (TRIGLIDE/LOFIBRA) 160 MG tablet Take 1 tablet (160 mg) by mouth daily 90 tablet 3     fish oil-omega-3 fatty acids 1000 MG capsule TAKE TWO CAPSULES (2 GM) BY MOUTH ONCE DAILY 180 capsule 3     ibuprofen (ADVIL/MOTRIN) 600 MG tablet Take 1 tablet (600 mg) by mouth every 6 hours as needed for moderate pain 120 tablet 1     Injection Device for insulin (INPEN 100-BLUE-NOVOLOG-FIASP) DAVID 1 each continuous 1 each 0     insulin aspart (NOVOLOG FLEXPEN) 100 UNIT/ML pen 1 unit per 5 grams CHO and correction scale of 1/25/125.  Approximate daily use is 100 units. 30 mL 2     insulin aspart (NOVOPEN ECHO) 100 UNIT/ML cartridge For use with the In-Pen device.  Give 1 unit per 5 grams CHO with meals and snacks as well as correction.  Average daily use is 100 units daily. 90 mL 3     insulin glargine (BASAGLAR KWIKPEN) 100 UNIT/ML pen Inject 70 Units Subcutaneous daily 3 month supply. 45 mL 2     insulin pen needle (B-D U/F) 31G X 8 MM miscellaneous USE AS DIRECTED. 200 each 1     losartan (COZAAR) 100 MG tablet Take 1 tablet (100 mg) by mouth daily 90 tablet 3     naproxen (NAPROSYN) 375 MG tablet Take 1 tablet (375 mg) by mouth 2 times daily (with meals) 60 tablet 3     Ostomy Supplies (ADHESIVE REMOVER WIPES) MISC 1 each every 14 days 50 each 3     Ostomy Supplies (SKIN TAC ADHESIVE BARRIER WIPE) MISC 1 each every 14 days 6 each 3       Patient Active Problem List   Diagnosis     Essential hypertension     Hypersomnia, organic     Other chronic nonalcoholic liver disease     Diabetic retinopathy of both eyes (H)     Obesity     Usher Syndrome: congenital deafness, retinitis pigmentosa     Macular degeneration, age related, nonexudative     Benign neoplasm of iris     Dry eye syndrome     Pemphigus erythematosus     Chondromalacia of patella, right, steroid injection 6/12/2015     Uncontrolled type 2 diabetes mellitus with hyperglycemia, with long-term current use of insulin (H)     Chronic kidney disease, stage  1       Past Medical History:   Diagnosis Date     Combined visual and hearing impairment      Deaf      Diabetic retinopathy of both eyes (H) 2011     Hepatic steatosis 2011     History of tobacco use      Hyperlipidemia      Hypertension      Hypertriglyceridemia      Migraines      Obesity 2011     Problem list name updated by automated process. Provider to review     Uncontrolled type 2 diabetes mellitus with hyperglycemia, with long-term current use of insulin (H) 5/15/2017     Usher Syndrome: congenital deafness, retinitis pigmentosa 2011       Past Surgical History:   Procedure Laterality Date     LAPAROSCOPIC CHOLECYSTECTOMY N/A 3/10/2018    Procedure: LAPAROSCOPIC CHOLECYSTECTOMY;  Laparoscopic Cholecystectomy ;  Surgeon: Kuldeep Sigala MD;  Location: [facility] OR     RELEASE TRIGGER FINGER Right 2019    Procedure: Right Thumb Trigger Release;  Surgeon: Greg Streeter MD;  [facility]     RELEASE TRIGGER FINGER Left 2019    Procedure: Left Ring Trigger Finger Release.  Ganglion cyst excision.;  Surgeon: Greg Streeter MD;  [facility]       Family History   Problem Relation Age of Onset     Unknown/Adopted Other        Social History     Tobacco Use     Smoking status: Former     Types: Cigarettes     Quit date: 2010     Years since quittin.1     Smokeless tobacco: Never     Tobacco comments:     stopped 10 yrs ago   Substance Use Topics     Alcohol use: Yes     Comment: socially         Exam:    Vitals: /70   Pulse 83   BMI: There is no height or weight on file to calculate BMI.  Height: Data Unavailable    Constitutional/ general:  Pt is in no apparent distress, appears well-nourished.  Cooperative with history and physical exam.  Seen with     Psych:  The patient answered questions appropriately.  Normal affect.  Seems to have reasonable expectations, in terms of treatment.     Lungs:  Non labored breathing, non  labored speech. No cough.  No audible wheezing. Even, quiet breathing.       Vascular:  positive pedal pulses bilaterally for both the DP and PT arteries.  CFT < 3 sec.  positive ankle edema.  positive pedal hair growth.    Neuro:   Coordinated gait.  Light touch sensation is intact     Derm: Normal texture and turgor with hair growth noted.    Musculoskeletal:    Lower extremity muscle strength is normal.  Patient is ambulatory without an assistive device or brace.  No gross deformities.  Normal arch.        right first nail thickened, elongated, discolored.  The distal three quarters is loose from the nail bed.  After trimming this back some dried blood is noted in the nail bed.  No ulcerations..  No erythema, edema, drainage, pain on palpation.  No signs of subungual masses or exostosis and nailbed healthy.    Component Ref Range & Units 5 mo ago   (8/17/22) 6 mo ago   (8/4/22) 11 mo ago   (3/7/22) 1 yr ago   (1/27/22) 1 yr ago   (10/13/21) 1 yr ago   (7/19/21) 1 yr ago   (4/5/21)    Hemoglobin A1C 0.0 - 5.6 % 9.8 High   9.9 High  CM  8.9 High  CM  9.3 High  CM  9.8 High  CM  10.2 High  CM          A/P    Onychodystrophy status post blunt trauma  Poorly controlled diabetes mellitus    Discussed all options with patient.  Dystrophic nail manually debrided with a .  Explained to patient how thick loose nail is irritating nailbed.  Discussed small amount of dried blood but underlying nailbed healthy.    Patient will keep this debrided/filed down and explained to patient best ways to do this.  Watch feet daily.  Encouraged better blood sugar control.  RTC as needed.  Thank you for allowing me participate in the care of this patient.        Kuldeep Gentile, ENEDELIA, FACFAS

## 2023-02-08 NOTE — NURSING NOTE
Nayeli Caal's chief complaint for this visit includes:  Chief Complaint   Patient presents with     Consult     Right toe nail pain, half nail tore off about 1 year ago since then causing pain, grew back but not normally     PCP: Mariya Mohamud    Referring Provider:  SABI Ceja CNP  [address] [facility] [address]  [place] [address]    There were no vitals taken for this visit.  Data Unavailable      No Known Allergies      Do you need any medication refills at today's visit? HENRY MANDUJANO EMT

## 2023-02-08 NOTE — PATIENT INSTRUCTIONS
We wish you continued good healing. If you have any questions or concerns, please do not hesitate to contact us at  [phone]    [employer] (secure e-mail communication and access to your chart) to send a message or to make an appointment.    Please remember to call and schedule a follow up appointment if one was recommended at your earliest convenience.     PODIATRY CLINIC HOURS  TELEPHONE NUMBER    Dr. Kuldeep PHIPPSPPATRIA [facility]        Clinics:  [facility] [address]   [place]  Tuesday 1PM-6PM  [place]  Wednesday 745AM-330PM  [place]  Thursday/Friday 745AM-230PM       [place] APPOINTMENTS  [phone]    [place] APPOINTMENTS  [phone]        If you need a medication refill, please contact us you may need lab work and/or a follow up visit prior to your refill (i.e. Antifungal medications).  If MRI needed please call Imaging at [phone]   HOW DO I GET MY KNEE SCOOTER? Knee scooters can be picked up at ANY Medical Supply stores with your knee scooter Prescription.  OR  Bring your signed prescription to an [facility] showroom.

## 2023-02-08 NOTE — LETTER
2/8/2023         RE: Nayeli Caal  [address]  [facility] [address]        Dear Colleague,    Thank you for referring your patient, Nayeli Caal, to the [facility]. Please see a copy of my visit note below.    Patient seen today in consult from Mariya Overcamp andcomplains of thick nail on right first toe.  Over a year ago patient had trauma to this nail.  Started becoming thicker and growing and very slow.  Patient having hard time trimming this.  Has pain which is aggravated activity and relieved by rest.  Denies erythema edema or drainage.  She has poorly controlled diabetes mellitus.  She denies constant numbness and tingling in her toes.  She quit smoking in 2010.    ROS:  see above     No Known Allergies    Current Outpatient Medications   Medication Sig Dispense Refill     acetaminophen (TYLENOL) 500 MG tablet Take 2 tablets (1,000 mg) by mouth every 6 hours as needed for mild pain 100 tablet 3     Alcohol Swabs (ALCOHOL PREP PAD) 70 % PADS 1 each 3 times daily 100 each 3     aspirin (ASA) 81 MG chewable tablet Take 1 tablet (81 mg) by mouth daily CHEW AND SWALLOW 90 tablet 3     atorvastatin (LIPITOR) 40 MG tablet Take 1 tablet (40 mg) by mouth daily 90 tablet 2     B-D U/F insulin pen needle        BD ULTRA FINE PEN NEEDLES Inject 1 dose. Subcutaneous 2 times daily BD ultra-fine insulin syringe, 30 ga. X 1/2'' short needle 1/2 cc. Use as directed. 400 Units 11     blood glucose (ACCU-CHEK LAYA PLUS) test strip Use with  Accucheck Expert meter.  Test blood sugar 6 times daily. 600 each 3     blood glucose monitoring (ACCU-CHEK FASTCLIX) lancets Use to test blood sugar 6 times daily or as directed 510 each 3     Continuous Blood Gluc Sensor (DEXCOM G6 SENSOR) MISC Change every 10 days. 3 month supply. 9 each 3     Continuous Blood Gluc Transmit (DEXCOM G6 TRANSMITTER) MISC Change every 3 months. 1 each 3     empagliflozin (JARDIANCE) 25 MG TABS tablet  Take 1 tablet (25 mg) by mouth daily 90 tablet 1     ergocalciferol (ERGOCALCIFEROL) 1.25 MG (59815 UT) capsule Take 1 capsule (50,000 Units) by mouth once a week For additional refills, please schedule a follow-up appointment at [phone] 12 capsule 3     fenofibrate (TRIGLIDE/LOFIBRA) 160 MG tablet Take 1 tablet (160 mg) by mouth daily 90 tablet 3     fish oil-omega-3 fatty acids 1000 MG capsule TAKE TWO CAPSULES (2 GM) BY MOUTH ONCE DAILY 180 capsule 3     ibuprofen (ADVIL/MOTRIN) 600 MG tablet Take 1 tablet (600 mg) by mouth every 6 hours as needed for moderate pain 120 tablet 1     Injection Device for insulin (INPEN 100-BLUE-NOVOLOG-FIASP) DAVID 1 each continuous 1 each 0     insulin aspart (NOVOLOG FLEXPEN) 100 UNIT/ML pen 1 unit per 5 grams CHO and correction scale of 1/25/125.  Approximate daily use is 100 units. 30 mL 2     insulin aspart (NOVOPEN ECHO) 100 UNIT/ML cartridge For use with the In-Pen device.  Give 1 unit per 5 grams CHO with meals and snacks as well as correction.  Average daily use is 100 units daily. 90 mL 3     insulin glargine (BASAGLAR KWIKPEN) 100 UNIT/ML pen Inject 70 Units Subcutaneous daily 3 month supply. 45 mL 2     insulin pen needle (B-D U/F) 31G X 8 MM miscellaneous USE AS DIRECTED. 200 each 1     losartan (COZAAR) 100 MG tablet Take 1 tablet (100 mg) by mouth daily 90 tablet 3     naproxen (NAPROSYN) 375 MG tablet Take 1 tablet (375 mg) by mouth 2 times daily (with meals) 60 tablet 3     Ostomy Supplies (ADHESIVE REMOVER WIPES) MISC 1 each every 14 days 50 each 3     Ostomy Supplies (SKIN TAC ADHESIVE BARRIER WIPE) MISC 1 each every 14 days 6 each 3       Patient Active Problem List   Diagnosis     Essential hypertension     Hypersomnia, organic     Other chronic nonalcoholic liver disease     Diabetic retinopathy of both eyes (H)     Obesity     Usher Syndrome: congenital deafness, retinitis pigmentosa     Macular degeneration, age related, nonexudative     Benign neoplasm  of iris     Dry eye syndrome     Pemphigus erythematosus     Chondromalacia of patella, right, steroid injection 2015     Uncontrolled type 2 diabetes mellitus with hyperglycemia, with long-term current use of insulin (H)     Chronic kidney disease, stage 1       Past Medical History:   Diagnosis Date     Combined visual and hearing impairment      Deaf      Diabetic retinopathy of both eyes (H) 2011     Hepatic steatosis 2011     History of tobacco use      Hyperlipidemia      Hypertension      Hypertriglyceridemia      Migraines      Obesity 2011     Problem list name updated by automated process. Provider to review     Uncontrolled type 2 diabetes mellitus with hyperglycemia, with long-term current use of insulin (H) 5/15/2017     Usher Syndrome: congenital deafness, retinitis pigmentosa 2011       Past Surgical History:   Procedure Laterality Date     LAPAROSCOPIC CHOLECYSTECTOMY N/A 3/10/2018    Procedure: LAPAROSCOPIC CHOLECYSTECTOMY;  Laparoscopic Cholecystectomy ;  Surgeon: Kuldeep Sigala MD;  Location: [facility] OR     RELEASE TRIGGER FINGER Right 2019    Procedure: Right Thumb Trigger Release;  Surgeon: Greg Streeter MD;  Location: [facility] OR     RELEASE TRIGGER FINGER Left 2019    Procedure: Left Ring Trigger Finger Release.  Ganglion cyst excision.;  Surgeon: Greg Streeter MD;  Location: [facility] OR       Family History   Problem Relation Age of Onset     Unknown/Adopted Other        Social History     Tobacco Use     Smoking status: Former     Types: Cigarettes     Quit date: 2010     Years since quittin.1     Smokeless tobacco: Never     Tobacco comments:     stopped 10 yrs ago   Substance Use Topics     Alcohol use: Yes     Comment: socially         Exam:    Vitals: /70   Pulse 83   BMI: There is no height or weight on file to calculate BMI.  Height: Data Unavailable    Constitutional/ general:  Pt is in no apparent distress, appears  well-nourished.  Cooperative with history and physical exam.  Seen with     Psych:  The patient answered questions appropriately.  Normal affect.  Seems to have reasonable expectations, in terms of treatment.     Lungs:  Non labored breathing, non labored speech. No cough.  No audible wheezing. Even, quiet breathing.       Vascular:  positive pedal pulses bilaterally for both the DP and PT arteries.  CFT < 3 sec.  positive ankle edema.  positive pedal hair growth.    Neuro:   Coordinated gait.  Light touch sensation is intact     Derm: Normal texture and turgor with hair growth noted.    Musculoskeletal:    Lower extremity muscle strength is normal.  Patient is ambulatory without an assistive device or brace.  No gross deformities.  Normal arch.        right first nail thickened, elongated, discolored.  The distal three quarters is loose from the nail bed.  After trimming this back some dried blood is noted in the nail bed.  No ulcerations..  No erythema, edema, drainage, pain on palpation.  No signs of subungual masses or exostosis and nailbed healthy.    Component Ref Range & Units 5 mo ago   (8/17/22) 6 mo ago   (8/4/22) 11 mo ago   (3/7/22) 1 yr ago   (1/27/22) 1 yr ago   (10/13/21) 1 yr ago   (7/19/21) 1 yr ago   (4/5/21)    Hemoglobin A1C 0.0 - 5.6 % 9.8 High   9.9 High  CM  8.9 High  CM  9.3 High  CM  9.8 High  CM  10.2 High  CM          A/P    Onychodystrophy status post blunt trauma  Poorly controlled diabetes mellitus    Discussed all options with patient.  Dystrophic nail manually debrided with a .  Explained to patient how thick loose nail is irritating nailbed.  Discussed small amount of dried blood but underlying nailbed healthy.    Patient will keep this debrided/filed down and explained to patient best ways to do this.  Watch feet daily.  Encouraged better blood sugar control.  RTC as needed.  Thank you for allowing me participate in the care of this patient.         Kuldeep Gentile DPM, FACFAS          Again, thank you for allowing me to participate in the care of your patient.        Sincerely,        Kuldeep Gentile DPM

## 2023-02-16 ENCOUNTER — MYC MEDICAL ADVICE (OUTPATIENT)
Dept: INTERNAL MEDICINE | Facility: CLINIC | Age: 43
End: 2023-02-16
Payer: COMMERCIAL

## 2023-02-16 DIAGNOSIS — G89.29 CHRONIC BILATERAL LOW BACK PAIN WITHOUT SCIATICA: ICD-10-CM

## 2023-02-16 DIAGNOSIS — E11.8 TYPE 2 DIABETES MELLITUS WITH COMPLICATION, WITH LONG-TERM CURRENT USE OF INSULIN (H): ICD-10-CM

## 2023-02-16 DIAGNOSIS — Z79.4 TYPE 2 DIABETES MELLITUS WITH COMPLICATION, WITH LONG-TERM CURRENT USE OF INSULIN (H): ICD-10-CM

## 2023-02-16 DIAGNOSIS — M54.50 CHRONIC BILATERAL LOW BACK PAIN WITHOUT SCIATICA: ICD-10-CM

## 2023-02-16 DIAGNOSIS — L98.9 FINGER LESION: Primary | ICD-10-CM

## 2023-02-16 DIAGNOSIS — E78.2 MIXED HYPERLIPIDEMIA: ICD-10-CM

## 2023-02-16 DIAGNOSIS — E78.2 MIXED HYPERLIPIDEMIA: Primary | ICD-10-CM

## 2023-02-16 DIAGNOSIS — E10.8 TYPE 1 DIABETES MELLITUS WITH COMPLICATIONS (H): ICD-10-CM

## 2023-02-16 RX ORDER — FENOFIBRATE 160 MG/1
160 TABLET ORAL DAILY
Qty: 90 TABLET | Refills: 0 | Status: SHIPPED | OUTPATIENT
Start: 2023-02-16 | End: 2023-11-02

## 2023-02-16 RX ORDER — ATORVASTATIN CALCIUM 40 MG/1
40 TABLET, FILM COATED ORAL DAILY
Qty: 90 TABLET | Refills: 0 | Status: SHIPPED | OUTPATIENT
Start: 2023-02-16 | End: 2023-04-05

## 2023-02-16 RX ORDER — ACETAMINOPHEN 500 MG
1000 TABLET ORAL EVERY 6 HOURS PRN
Qty: 100 TABLET | Refills: 3 | Status: ON HOLD | OUTPATIENT
Start: 2023-02-16 | End: 2024-02-07

## 2023-02-16 RX ORDER — PEN NEEDLE, DIABETIC 31 GX5/16"
NEEDLE, DISPOSABLE MISCELLANEOUS
Qty: 200 EACH | Refills: 1 | Status: SHIPPED | OUTPATIENT
Start: 2023-02-16 | End: 2024-03-12

## 2023-02-16 RX ORDER — BENZOCAINE/MENTHOL 6 MG-10 MG
LOZENGE MUCOUS MEMBRANE 2 TIMES DAILY
Qty: 120 G | Refills: 1 | Status: SHIPPED | OUTPATIENT
Start: 2023-02-16 | End: 2024-07-12

## 2023-02-16 NOTE — TELEPHONE ENCOUNTER
1/24/2023  [facility] Internal [facility] [place]     Multiple Providers  Internal Medicine     acetaminophen (TYLENOL) 500 MG tablet   Last Written Prescription Date: 3/8/22  Last Fill Quantity: 100,   # refills: 3    insulin pen needle (B-D U/F) 31G X 8 MM miscellaneous        fenofibrate (TRIGLIDE/LOFIBRA) 160 MG tablet  Last Written Prescription Date: 12/10/21  Last Fill Quantity: 90,   # refills: 3     atorvastatin (LIPITOR) 40 MG tablet  Last Written Prescription Date:  7/22/21  Last Fill Quantity: 90,   # refills: 2       naproxen (NAPROSYN) 375 MG tablet   Last Written Prescription Date:  7/22/21  Last Fill Quantity: 60,   # refills: 3       hydrocortisone (CORTAID) 1 % cream   Last Written Prescription Date:  unknown  Last Fill Quantity: unknown,   # refills: unknown      Routing refill request to provider for review/approval because:request per pt call.  Fenofibrate,naproxen atorvastatin.    Gap in med rf.   hydrocortisone (CORTAID) historical

## 2023-02-20 ENCOUNTER — MYC MEDICAL ADVICE (OUTPATIENT)
Dept: INTERNAL MEDICINE | Facility: CLINIC | Age: 43
End: 2023-02-20
Payer: COMMERCIAL

## 2023-02-20 DIAGNOSIS — E55.9 VITAMIN D DEFICIENCY: ICD-10-CM

## 2023-02-20 DIAGNOSIS — E10.8 TYPE 1 DIABETES MELLITUS WITH COMPLICATIONS (H): ICD-10-CM

## 2023-02-20 DIAGNOSIS — E55.9 VITAMIN D DEFICIENCY: Primary | ICD-10-CM

## 2023-02-20 RX ORDER — ASPIRIN 81 MG/1
81 TABLET, CHEWABLE ORAL DAILY
Qty: 90 TABLET | Refills: 3 | Status: SHIPPED | OUTPATIENT
Start: 2023-02-20 | End: 2023-04-04

## 2023-02-20 RX ORDER — ERGOCALCIFEROL 1.25 MG/1
50000 CAPSULE ORAL WEEKLY
Qty: 12 CAPSULE | Refills: 3 | Status: SHIPPED | OUTPATIENT
Start: 2023-02-20 | End: 2023-04-05

## 2023-03-25 ENCOUNTER — HEALTH MAINTENANCE LETTER (OUTPATIENT)
Age: 43
End: 2023-03-25

## 2023-04-04 ENCOUNTER — OFFICE VISIT (OUTPATIENT)
Dept: INTERNAL MEDICINE | Facility: CLINIC | Age: 43
End: 2023-04-04
Payer: COMMERCIAL

## 2023-04-04 ENCOUNTER — LAB (OUTPATIENT)
Dept: LAB | Facility: CLINIC | Age: 43
End: 2023-04-04
Payer: COMMERCIAL

## 2023-04-04 VITALS
HEIGHT: 63 IN | OXYGEN SATURATION: 99 % | SYSTOLIC BLOOD PRESSURE: 120 MMHG | BODY MASS INDEX: 31.61 KG/M2 | HEART RATE: 74 BPM | WEIGHT: 178.4 LBS | DIASTOLIC BLOOD PRESSURE: 74 MMHG

## 2023-04-04 DIAGNOSIS — E78.2 MIXED HYPERLIPIDEMIA: ICD-10-CM

## 2023-04-04 DIAGNOSIS — E55.9 VITAMIN D DEFICIENCY: ICD-10-CM

## 2023-04-04 DIAGNOSIS — B37.31 VAGINAL YEAST INFECTION: Primary | ICD-10-CM

## 2023-04-04 DIAGNOSIS — G89.29 CHRONIC PAIN OF RIGHT KNEE: ICD-10-CM

## 2023-04-04 DIAGNOSIS — E11.65 UNCONTROLLED TYPE 2 DIABETES MELLITUS WITH HYPERGLYCEMIA, WITH LONG-TERM CURRENT USE OF INSULIN (H): ICD-10-CM

## 2023-04-04 DIAGNOSIS — Z13.29 SCREENING FOR THYROID DISORDER: ICD-10-CM

## 2023-04-04 DIAGNOSIS — Z79.4 UNCONTROLLED TYPE 2 DIABETES MELLITUS WITH HYPERGLYCEMIA, WITH LONG-TERM CURRENT USE OF INSULIN (H): ICD-10-CM

## 2023-04-04 DIAGNOSIS — E10.8 TYPE 1 DIABETES MELLITUS WITH COMPLICATIONS (H): ICD-10-CM

## 2023-04-04 DIAGNOSIS — M25.561 CHRONIC PAIN OF RIGHT KNEE: ICD-10-CM

## 2023-04-04 DIAGNOSIS — M25.511 PAIN IN JOINT OF RIGHT SHOULDER: ICD-10-CM

## 2023-04-04 DIAGNOSIS — M54.2 CERVICALGIA: ICD-10-CM

## 2023-04-04 DIAGNOSIS — G44.229 CHRONIC TENSION-TYPE HEADACHE, NOT INTRACTABLE: ICD-10-CM

## 2023-04-04 LAB
CHOLEST SERPL-MCNC: 221 MG/DL
HBA1C MFR BLD: 9.9 %
HDLC SERPL-MCNC: 38 MG/DL
LDLC SERPL CALC-MCNC: 123 MG/DL
NONHDLC SERPL-MCNC: 183 MG/DL
TRIGL SERPL-MCNC: 298 MG/DL
TSH SERPL DL<=0.005 MIU/L-ACNC: 1.41 UIU/ML (ref 0.3–4.2)

## 2023-04-04 PROCEDURE — T1013 SIGN LANG/ORAL INTERPRETER: HCPCS | Mod: U3

## 2023-04-04 PROCEDURE — 80061 LIPID PANEL: CPT | Performed by: PATHOLOGY

## 2023-04-04 PROCEDURE — 84443 ASSAY THYROID STIM HORMONE: CPT | Performed by: PATHOLOGY

## 2023-04-04 PROCEDURE — 99000 SPECIMEN HANDLING OFFICE-LAB: CPT | Performed by: PATHOLOGY

## 2023-04-04 PROCEDURE — 83036 HEMOGLOBIN GLYCOSYLATED A1C: CPT | Performed by: PATHOLOGY

## 2023-04-04 PROCEDURE — 99214 OFFICE O/P EST MOD 30 MIN: CPT | Performed by: NURSE PRACTITIONER

## 2023-04-04 PROCEDURE — 36415 COLL VENOUS BLD VENIPUNCTURE: CPT | Performed by: PATHOLOGY

## 2023-04-04 PROCEDURE — 82306 VITAMIN D 25 HYDROXY: CPT | Performed by: PATHOLOGY

## 2023-04-04 RX ORDER — FLUCONAZOLE 150 MG/1
150 TABLET ORAL ONCE
Qty: 2 TABLET | Refills: 0 | Status: SHIPPED | OUTPATIENT
Start: 2023-04-04 | End: 2023-04-04

## 2023-04-04 RX ORDER — ASPIRIN 81 MG/1
81 TABLET, CHEWABLE ORAL DAILY
Qty: 90 TABLET | Refills: 3 | Status: SHIPPED | OUTPATIENT
Start: 2023-04-04 | End: 2023-11-02

## 2023-04-04 RX ORDER — IBUPROFEN 600 MG/1
600 TABLET, FILM COATED ORAL EVERY 6 HOURS PRN
Qty: 120 TABLET | Refills: 1 | Status: ON HOLD | OUTPATIENT
Start: 2023-04-04 | End: 2024-02-07

## 2023-04-04 NOTE — PROGRESS NOTES
S:Nayeli Caal is a 42 year old female seen today with a .  She is currently taking Jardiance and has symptoms of vaginal yeast infection with vaginal irritation.  This is a recurrence of vaginal yeast symptoms.  Her glucose numbers have improved on the Jardiance. She has an appointment with her Endocrine Clinic tomorrow.     She has the first day of her menses today.    She requests an appt. with Sports Medicine as her right knee is painful. She has a hx of right knee pain which was treated with a steroid injection.       Patient Active Problem List   Diagnosis     Essential hypertension     Hypersomnia, organic     Other chronic nonalcoholic liver disease     Diabetic retinopathy of both eyes (H)     Obesity     Usher Syndrome: congenital deafness, retinitis pigmentosa     Macular degeneration, age related, nonexudative     Benign neoplasm of iris     Dry eye syndrome     Pemphigus erythematosus     Chondromalacia of patella, right, steroid injection 2015     Uncontrolled type 2 diabetes mellitus with hyperglycemia, with long-term current use of insulin (H)     Chronic kidney disease, stage 1          Social History     Tobacco Use     Smoking status: Former     Types: Cigarettes     Quit date: 2010     Years since quittin.3     Smokeless tobacco: Never     Tobacco comments:     stopped 10 yrs ago   Vaping Use     Vaping status: Not on file   Substance Use Topics     Alcohol use: Yes     Comment: socially            Family History   Problem Relation Age of Onset     Unknown/Adopted Other              No Known Allergies         Current Outpatient Medications   Medication Sig Dispense Refill     aspirin (ASA) 81 MG chewable tablet Take 1 tablet (81 mg) by mouth daily CHEW AND SWALLOW 90 tablet 3     ergocalciferol (ERGOCALCIFEROL) 1.25 MG (90262 UT) capsule Take 1 capsule (50,000 Units) by mouth once a week For additional refills, please schedule a follow-up  appointment at [phone] 12 capsule 3     acetaminophen (TYLENOL) 500 MG tablet Take 2 tablets (1,000 mg) by mouth every 6 hours as needed for mild pain 100 tablet 3     Alcohol Swabs (ALCOHOL PREP PAD) 70 % PADS 1 each 3 times daily 100 each 3     atorvastatin (LIPITOR) 40 MG tablet Take 1 tablet (40 mg) by mouth daily 90 tablet 0     B-D U/F insulin pen needle        BD ULTRA FINE PEN NEEDLES Inject 1 dose. Subcutaneous 2 times daily BD ultra-fine insulin syringe, 30 ga. X 1/2'' short needle 1/2 cc. Use as directed. 400 Units 11     blood glucose (ACCU-CHEK LAYA PLUS) test strip Use with  Accucheck Expert meter.  Test blood sugar 6 times daily. 600 each 3     blood glucose monitoring (ACCU-CHEK FASTCLIX) lancets Use to test blood sugar 6 times daily or as directed 510 each 3     Continuous Blood Gluc Sensor (DEXCOM G6 SENSOR) MISC Change every 10 days. 3 month supply. 9 each 3     Continuous Blood Gluc Transmit (DEXCOM G6 TRANSMITTER) MISC Change every 3 months. 1 each 3     empagliflozin (JARDIANCE) 25 MG TABS tablet Take 1 tablet (25 mg) by mouth daily 90 tablet 1     fenofibrate (TRIGLIDE/LOFIBRA) 160 MG tablet Take 1 tablet (160 mg) by mouth daily 90 tablet 0     fish oil-omega-3 fatty acids 1000 MG capsule TAKE TWO CAPSULES (2 GM) BY MOUTH ONCE DAILY 180 capsule 3     hydrocortisone (CORTAID) 1 % external cream Apply topically 2 times daily 120 g 1     ibuprofen (ADVIL/MOTRIN) 600 MG tablet Take 1 tablet (600 mg) by mouth every 6 hours as needed for moderate pain 120 tablet 1     Injection Device for insulin (INPEN 100-BLUE-NOVOLOG-FIASP) DAVID 1 each continuous 1 each 0     insulin aspart (NOVOLOG FLEXPEN) 100 UNIT/ML pen 1 unit per 5 grams CHO and correction scale of 1/25/125.  Approximate daily use is 100 units. 30 mL 2     insulin aspart (NOVOPEN ECHO) 100 UNIT/ML cartridge For use with the In-Pen device.  Give 1 unit per 5 grams CHO with meals and snacks as well as correction.  Average daily use is  "100 units daily. 90 mL 3     insulin glargine (BASAGLAR KWIKPEN) 100 UNIT/ML pen Inject 70 Units Subcutaneous daily 3 month supply. 45 mL 2     insulin pen needle (B-D U/F) 31G X 8 MM miscellaneous USE AS DIRECTED. 200 each 1     losartan (COZAAR) 100 MG tablet Take 1 tablet (100 mg) by mouth daily 90 tablet 3     naproxen (NAPROSYN) 375 MG tablet Take 1 tablet (375 mg) by mouth 2 times daily (with meals) 60 tablet 3     Ostomy Supplies (ADHESIVE REMOVER WIPES) MISC 1 each every 14 days 50 each 3     Ostomy Supplies (SKIN TAC ADHESIVE BARRIER WIPE) MISC 1 each every 14 days 6 each 3       REVIEW OF SYSTEMS:  See above.    O:   /74 (BP Location: Right arm, Patient Position: Sitting, Cuff Size: Adult Regular)   Pulse 74   Ht 1.592 m (5' 2.68\")   Wt 80.9 kg (178 lb 6.4 oz)   SpO2 99%   BMI 31.93 kg/m    GENERAL APPEARANCE: healthy, alert and no distress.  HENT:no distress.  CV: see VS  NEURO: alert and oriented.  Good historian.  MUSK: ambulatory.  PSYCHE: normal.    A/P:  Nayeli was seen today for follow up.    Diagnoses and all orders for this visit:    Vaginal yeast infection  -     fluconazole (DIFLUCAN) 150 MG tablet; Take 1 tablet (150 mg) by mouth once for 1 dose    Chronic pain of right knee  -     Orthopedic  Referral; Future  -     aspirin (ASA) 81 MG chewable tablet; Take 1 tablet (81 mg) by mouth daily CHEW AND SWALLOW  -     ibuprofen (ADVIL/MOTRIN) 600 MG tablet; Take 1 tablet (600 mg) by mouth every 6 hours as needed for moderate pain    Type 1 diabetes mellitus with complications (H)  -     aspirin (ASA) 81 MG chewable tablet; Take 1 tablet (81 mg) by mouth daily CHEW AND SWALLOW    Cervicalgia  -     ibuprofen (ADVIL/MOTRIN) 600 MG tablet; Take 1 tablet (600 mg) by mouth every 6 hours as needed for moderate pain    Headache  -     ibuprofen (ADVIL/MOTRIN) 600 MG tablet; Take 1 tablet (600 mg) by mouth every 6 hours as needed for moderate pain    Pain in joint, shoulder region  -     " ibuprofen (ADVIL/MOTRIN) 600 MG tablet; Take 1 tablet (600 mg) by mouth every 6 hours as needed for moderate pain          The patient voiced understanding of the information discussed and all questions were answered.     I spent a total of 25  minutes in the care of this pt during today's office visit. This time includes reviewing the patient's chart and prior history, obtaining a history, performing an examination and evaluation and counseling the patient. This time also includes ordering medications or tests necessary in addition to communication to other member's of the patient's health care team. Time spent in documentation and care coordination is included.     Mariya Mohamud APRMARCY, CNP

## 2023-04-04 NOTE — NURSING NOTE
"Nayeli Caal is a 42 year old female patient that presents today in clinic for the following:    Chief Complaint   Patient presents with     Follow Up     Pt here for fungal/yeast infection potentially from Jardiance medication starting in January. Pt states it burns and itches.     The patient's allergies and medications were reviewed as noted. A set of vitals were recorded as noted without incident: /74 (BP Location: Right arm, Patient Position: Sitting, Cuff Size: Adult Regular)   Pulse 74   Ht 1.592 m (5' 2.68\")   Wt 80.9 kg (178 lb 6.4 oz)   SpO2 99%   BMI 31.93 kg/m  . The patient does not have any other questions for the provider.    Laine Dunn, EMT at 3:01 PM on 4/4/2023  "

## 2023-04-05 ENCOUNTER — ALLIED HEALTH/NURSE VISIT (OUTPATIENT)
Dept: EDUCATION SERVICES | Facility: CLINIC | Age: 43
End: 2023-04-05
Payer: COMMERCIAL

## 2023-04-05 DIAGNOSIS — Z79.4 UNCONTROLLED TYPE 2 DIABETES MELLITUS WITH HYPERGLYCEMIA, WITH LONG-TERM CURRENT USE OF INSULIN (H): Primary | ICD-10-CM

## 2023-04-05 DIAGNOSIS — E11.65 UNCONTROLLED TYPE 2 DIABETES MELLITUS WITH HYPERGLYCEMIA, WITH LONG-TERM CURRENT USE OF INSULIN (H): Primary | ICD-10-CM

## 2023-04-05 LAB — DEPRECATED CALCIDIOL+CALCIFEROL SERPL-MC: 16 UG/L (ref 20–75)

## 2023-04-05 PROCEDURE — T1013 SIGN LANG/ORAL INTERPRETER: HCPCS | Mod: U3

## 2023-04-05 PROCEDURE — G0108 DIAB MANAGE TRN  PER INDIV: HCPCS | Performed by: REGISTERED NURSE

## 2023-04-05 RX ORDER — ATORVASTATIN CALCIUM 40 MG/1
40 TABLET, FILM COATED ORAL DAILY
Qty: 90 TABLET | Refills: 0 | Status: SHIPPED | OUTPATIENT
Start: 2023-04-05 | End: 2023-11-02

## 2023-04-05 RX ORDER — ERGOCALCIFEROL 1.25 MG/1
50000 CAPSULE ORAL WEEKLY
Qty: 12 CAPSULE | Refills: 3 | Status: SHIPPED | OUTPATIENT
Start: 2023-04-05

## 2023-04-05 NOTE — PROGRESS NOTES
"Diabetes Self-Management Education & Support    Nayeli Caal presents today for education related to Type 2 diabetes    Patient is being treated with:  oral agents, diet and insulin  She is accompanied by self    Year of diagnosis: 2013 or 2014  Referring provider:  Anne Marie Medina PA-C  Living Situation: Lives with a room mate  Employment: Administrative work for [employer]     Nayeli has complications of chronic kidney disease and retinopathy.  She is also deaf and uses an , who attends with her today.      PATIENT CONCERNS RELATED TO DIABETES SELF MANAGEMENT:   She is really frustrated with her control.  She has been waiting to have surgery but A1C is still too high.  I had an appointment scheduled with her about a month ago but she re-scheduled it.   She states \"I feel like I'm doing everything that I'm supposed to, but it doesn't seem to make any difference.\"       ASSESSMENT    Taking Medication:     Current Diabetes Management per Patient:  Taking diabetes medications  Takes Basaglar 70 units daily  Takes Novolog 1 unit per 5 grams CHO and a correction dose of 1 unit per 30 mg/dL with a glucose target of 120 throughout the day.    She is currently taking Jardiance 25 mg daily, but wants to get off this medication as she states she has had non-stop vaginal yeast infections since she started taking it.    States she is rarely missing any insulin doses or missing oral medications.    She would like to discuss using a different medication besides Jardiance.      Monitoring    Nayeli checks her glucose 4-6 times daily using a Dexcom CGM.                    Patient's most recent   Lab Results   Component Value Date    A1C 9.9 04/04/2023    A1C 11.6 04/05/2021      Patient's A1C goal: <7.0    EDUCATION and INSTRUCTION PROVIDED AT THIS VISIT:      Nayeli has insulin resistance and most of the excursions we are seeing are post-prandial.  As can be seen in above reports, her glucose " overnight appears to be in good control, so increasing her long acting insulin is not warranted at this time.  We made the following adjustments in her InPen settings        1200 AM 06:00 AM 11:00 AM 17:00 PM 22:00 PM  Blood glucose Target:   130 (150) 110 (120) 110 (120) 110 (120) 130 (150)  Insulin to Carb Ratio :   3  (5)  3  (5)  3 (5)  3 (5)  3 (5)  Correction Factor:    20 (30)  20 (30)  20 (30)  20 (30)  20 (30)    Left Basaglar dose as is for now.   Discussed the  GLP-1 class of medications and provided education on the action, timing, expected effects and potential side-effects of these medications.  Consulted our endocrinology pharmacist regarding which would be best covered.  It appears that Ozempic will only cost her $40 for a three month supply.  Explained that we need to start out with a low dose and work up to a full dose of 2 mg.  Instructed to start with 0.25 mg weekly for one month and increase to 0.5 mg, etc.  She verbalized understanding of this.     We scheduled a follow up appointment in one month.  She already is scheduled with Anne Marie Medina on April 17.          Patient-stated goal written and given to Nayeli Caal.  Verbalized and demonstrated understanding of instructions.     PLAN:  See patient instructions  AVS printed and given to patient    FOLLOW-UP:        Time spent with patient at today's visit was 60 minutes.      Any diabetes medication dose changes were made via the [facility] Protocol and Collaborative Practice Agreement [facility].  A copy of this encounter was provided to patient's referring provider.

## 2023-04-06 ENCOUNTER — MYC MEDICAL ADVICE (OUTPATIENT)
Dept: INTERNAL MEDICINE | Facility: CLINIC | Age: 43
End: 2023-04-06
Payer: COMMERCIAL

## 2023-04-07 ENCOUNTER — TELEPHONE (OUTPATIENT)
Dept: ORTHOPEDICS | Facility: CLINIC | Age: 43
End: 2023-04-07
Payer: COMMERCIAL

## 2023-04-07 DIAGNOSIS — M25.562 BILATERAL KNEE PAIN: Primary | ICD-10-CM

## 2023-04-07 DIAGNOSIS — M17.0 BILATERAL PRIMARY OSTEOARTHRITIS OF KNEE: ICD-10-CM

## 2023-04-07 DIAGNOSIS — M25.561 BILATERAL KNEE PAIN: Primary | ICD-10-CM

## 2023-04-07 NOTE — TELEPHONE ENCOUNTER
[facility] Call Center    Phone Message    May a detailed message be left on voicemail: yes     Reason for Call: Other: Prior authorization is needed before getting a syn-visc injection. Patient is currently scheduled for 4/21/23 with Dr. Rodriguez. Please advise     Action Taken: Message routed to:  [facility]    Travel Screening: Not Applicable

## 2023-04-13 NOTE — PROGRESS NOTES
Patient is showing 4/5 MNCM met. A1c not in range   Smita Miles, VF  Outcome for 04/13/23 10:39 AM :Sent patient [employer] message asking them to upload their BG data with Inpen sharing instructions.  Smita Miles  Outcome for 04/13/23 2:52 PM :Glucose sent via Email Patient sent Inpen report to the provider.  Smita Miles        Outcome for 03/03/22 10:10 AM: Data uploaded on Dexcom   Anne Marie Medina PA-C on 3/3/2022 at 10:11 AM    dm 2  Jeniffer Salazar, MOISE    HPI:   Nayeli is a 41 yo woman here with a  for follow up of type 2 diabetes since the early 2000's.  She also sees Dr. Bragg, last visit was in 8/2022 and Leigh Ann Escoto- last visit 4/2023. She started on insulin in 2003 after failing Metformin treatment.  She has struggled with high blood sugars for many years.   She has been working hard on improving her diabetes control.  She particularly wants to improve things for surgery (hysterectomy due to fibroids).  She has been following closely with Leigh Ann Escoto.  Unfortunately, she had a lot of genital yeast infections so we finally stopped Jardiance a couple of weeks ago.  She is now on ozempic 0.25 mg weekly instead and she is tolerating this well.  Appetite is less.  Eating smaller portions.  Blood sugars are better.  Overnight, glucose can fall from 200 down to 90 in the morning.  While she is sleeping, goes to bed and legs get really hot. Not sure if it is from her diabetes.      Her current regimen is:   empagliflozin 25 mg daily- stopped 2 weeks ago due to recurrent yeast infections.  Ozempic 0.25 mg weekly- replaced empagliflozin 2 weeks ago.  Basaglar 70 units daily.  Novolog (dosing on In-pen):  Carb ratio: 1/5g   Sensitivity: 30    Bedtimes run a little high.  She does not correct at bedtime because her glucose comes down on its own.      Her overall average glucose is 205 mg/dL (CV 35%), over the past 2 weeks.  Her recent glucose is as follows:                 She is no  longer on Metformin as even the low dose caused intense diarrhea to the point where she could not go out of her home.     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  Nephropathy: h/o proteinuria; normal GFR. Now on 50mg losartan daily (tx with lisinopril was associated with a dry cough).   Neuropathy: No numbness or tingling sensation in her feet.     Having tingling in her hands. She wonders if it has to do with signing.     She has no other concerns today.       Past Medical History:   Diagnosis Date     Combined visual and hearing impairment      Deaf      Diabetic retinopathy of both eyes (H) 8/19/2011     Hepatic steatosis 08/19/2011     History of tobacco use      Hyperlipidemia      Hypertension      Hypertriglyceridemia      Migraines      Obesity 8/19/2011     Problem list name updated by automated process. 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  Ganglion cyst excision.;  Surgeon: Greg Streeter MD;  Location: [facility] OR       Family History   Problem Relation Age of Onset     Unknown/Adopted Other        Social History     Social Hx: She is adopted.  Works for [employer].   Social History     Marital status: Single     Spouse name: N/A     Number of children: N/A     Years of education: N/A     Social History Main Topics     Smoking  status: Former Smoker     Types: Cigarettes     Quit date: 12/1/2010     Smokeless tobacco: Never Used      Comment: stopped 2 yrs ago     Alcohol use No     Drug use: No     Sexual activity: Not Currently     Partners: Male     Other Topics Concern      Service No     Blood Transfusions No     Caffeine Concern No     Occupational Exposure No     Hobby Hazards No     Sleep Concern No     Stress Concern No     Weight Concern Yes     Special Diet No     Back Care No     Exercise Yes     4-5 x a week     Bike Helmet No     Seat Belt Yes     Self-Exams Yes     Social History Narrative   Social Hx: Lives in a [facility].  Working- secretarial.  Boyfriend is in [place].     Current Outpatient Medications   Medication     acetaminophen (TYLENOL) 500 MG tablet     Alcohol Swabs (ALCOHOL PREP PAD) 70 % PADS     aspirin (ASA) 81 MG chewable tablet     atorvastatin (LIPITOR) 40 MG tablet     B-D U/F insulin pen needle     BD ULTRA FINE PEN NEEDLES     blood glucose (ACCU-CHEK LAYA PLUS) test strip     blood glucose monitoring (ACCU-CHEK FASTCLIX) lancets     Continuous Blood Gluc Sensor (DEXCOM G6 SENSOR) MISC     Continuous Blood Gluc Transmit (DEXCOM G6 TRANSMITTER) MISC     empagliflozin (JARDIANCE) 25 MG TABS tablet     ergocalciferol (ERGOCALCIFEROL) 1.25 MG (98347 UT) capsule     fenofibrate (TRIGLIDE/LOFIBRA) 160 MG tablet     fish oil-omega-3 fatty acids 1000 MG capsule     hydrocortisone (CORTAID) 1 % external cream     ibuprofen (ADVIL/MOTRIN) 600 MG tablet     Injection Device for insulin (INPEN 100-BLUE-NOVOLOG-FIASP) DAVID     insulin aspart (NOVOLOG FLEXPEN) 100 UNIT/ML pen     insulin aspart (NOVOPEN ECHO) 100 UNIT/ML cartridge     insulin glargine (BASAGLAR KWIKPEN) 100 UNIT/ML pen     insulin pen needle (B-D U/F) 31G X 8 MM miscellaneous     losartan (COZAAR) 100 MG tablet     naproxen (NAPROSYN) 375 MG tablet     Ostomy Supplies (ADHESIVE REMOVER WIPES) MISC     Ostomy Supplies (SKIN TAC ADHESIVE BARRIER  WIPE) MISC     semaglutide (OZEMPIC) 2 MG/3ML SOPN pen     Current Facility-Administered Medications   Medication     hydrocortisone (CORTAID) 1 % cream        No Known Allergies    Physical Exam  /77 (BP Location: Right arm, Patient Position: Sitting, Cuff Size: Adult Large)   Pulse 80   Wt 81.6 kg (179 lb 12.8 oz)   SpO2 98%   BMI 32.18 kg/m    GENERAL:  Alert and oriented X3, NAD, well dressed, answering questions appropriately, appears stated age.  HEENT: OP clear, no lymphadenopathy, no thyromegaly, non-tender, no exophthalmus, no proptosis, EOMI, no lid lag, no retraction  EXTREMITIES: no edema, +pulses, no rashes, no lesions  NEUROLOGY: CN grossly intact, + monofilament, +vibratory sensation, + DTR upper and lower extremity, +phalen sign.    RESULTS  Lab Results   Component Value Date    A1C 9.9 (H) 04/04/2023    A1C 9.8 (H) 08/17/2022    A1C 9.9 (H) 08/04/2022    A1C 8.9 (H) 03/07/2022    A1C 9.3 (H) 01/27/2022    A1C 11.6 (H) 04/05/2021    A1C 12.4 (H) 10/29/2020    A1C 12.9 (H) 07/08/2020    A1C 8.2 (H) 05/02/2019    A1C 10.3 (H) 10/15/2018    HEMOGLOBINA1 10.0 (A) 01/13/2020    HEMOGLOBINA1 9.9 (A) 10/07/2019    HEMOGLOBINA1 8.1 (A) 04/22/2019    HEMOGLOBINA1 10.4 (A) 01/14/2019    HEMOGLOBINA1 8.7 (A) 03/12/2018       TSH   Date Value Ref Range Status   04/04/2023 1.41 0.30 - 4.20 uIU/mL Final   07/08/2020 1.34 0.40 - 4.00 mU/L Final   05/17/2018 1.84 0.40 - 4.00 mU/L Final   11/27/2017 1.92 0.40 - 4.00 mU/L Final   05/11/2016 1.85 0.40 - 4.00 mU/L Final   02/11/2016 1.14 0.40 - 4.00 mU/L Final       ALT   Date Value Ref Range Status   08/17/2022 28 0 - 50 U/L Final   08/04/2022 27 0 - 50 U/L Final   07/08/2020 29 0 - 50 U/L Final   05/02/2019 43 0 - 50 U/L Final   ]    Recent Labs   Lab Test 04/04/23  1445 07/19/21  0626   CHOL 221* 194   HDL 38* 43*   * 124*   TRIG 298* 133       Lab Results   Component Value Date     08/17/2022     07/08/2020      Lab Results   Component  Value Date    POTASSIUM 4.7 08/17/2022    POTASSIUM 4.0 07/08/2020     Lab Results   Component Value Date    CHLORIDE 104 08/17/2022    CHLORIDE 101 07/08/2020     Lab Results   Component Value Date    VERO 9.3 08/17/2022    VERO 8.6 07/08/2020     Lab Results   Component Value Date    CO2 29 08/17/2022    CO2 30 07/08/2020     Lab Results   Component Value Date    BUN 13 08/17/2022    BUN 13 07/08/2020     Lab Results   Component Value Date    CR 0.54 08/17/2022    CR 0.74 07/08/2020       GFR Estimate   Date Value Ref Range Status   08/17/2022 >90 >60 mL/min/1.73m2 Final     Comment:     Effective December 21, 2021 eGFRcr in adults is calculated using the 2021 CKD-EPI creatinine equation which includes age and gender (Philippe et al., [place], DOI: 10.1056/OBDHzr1830895)   08/31/2021 >90 >60 mL/min/1.73m2 Final     Comment:     As of July 11, 2021, eGFR is calculated by the CKD-EPI creatinine equation, without race adjustment. eGFR can be influenced by muscle mass, exercise, and diet. The reported eGFR is an estimation only and is only applicable if the renal function is stable.   07/22/2021 >90 >60 mL/min/1.73m2 Final     Comment:     As of July 11, 2021, eGFR is calculated by the CKD-EPI creatinine equation, without race adjustment. eGFR can be influenced by muscle mass, exercise, and diet. The reported eGFR is an estimation only and is only applicable if the renal function is stable.   07/08/2020 >90 >60 mL/min/[1.73_m2] Final     Comment:     Non  GFR Calc  Starting 12/18/2018, serum creatinine based estimated GFR (eGFR) will be   calculated using the Chronic Kidney Disease Epidemiology Collaboration   (CKD-EPI) equation.     05/02/2019 >90 >60 mL/min/[1.73_m2] Final     Comment:     Non  GFR Calc  Starting 12/18/2018, serum creatinine based estimated GFR (eGFR) will be   calculated using the Chronic Kidney Disease Epidemiology Collaboration   (CKD-EPI) equation.     05/17/2018 >90 >60  mL/min/1.7m2 Final     Comment:     Non  GFR Calc     GFR Estimate If Black   Date Value Ref Range Status   07/08/2020 >90 >60 mL/min/[1.73_m2] Final     Comment:      GFR Calc  Starting 12/18/2018, serum creatinine based estimated GFR (eGFR) will be   calculated using the Chronic Kidney Disease Epidemiology Collaboration   (CKD-EPI) equation.     05/02/2019 >90 >60 mL/min/[1.73_m2] Final     Comment:      GFR Calc  Starting 12/18/2018, serum creatinine based estimated GFR (eGFR) will be   calculated using the Chronic Kidney Disease Epidemiology Collaboration   (CKD-EPI) equation.     05/17/2018 >90 >60 mL/min/1.7m2 Final     Comment:      GFR Calc       Lab Results   Component Value Date    MICROL 115 03/07/2022    MICROL 31 07/20/2020     No results found for: MICROALBUMIN  Lab Results   Component Value Date    CPEPT 1.3 03/25/2005       No results found for: B12]    Most recent eye exam date: : Not Found     Assessment/Plan:     1.  Type 2 diabetes-  Nayeli's glucose control has improved significantly.  She is relieved to be off jardiance and is doing well on ozempic.  She is having a significant drop in her glucose overnight, but she is going too high post-meal.  We made the following changes today (instructions given to patient):     Increase to 0.5 mg weekly starting next week if you are having no side effects.  Will increase ozempic to 1 mg weekly after 1 month (when you see Leigh Ann)    Lower insulin from 70 units to 60 units to prevent lows overnight.     Wear wrist splints at night. If tingling continues, please see your primary care provider.      Keep up the great work with your diabetes!    Please let me know if you are having low blood sugars less than 70 or over 250 mg/dL.      If you have concerns, please send me a [employer] message M-Th, call the clinic at [phone], or call [phone] after hours/weekends and ask to speak with the  endocrinologist on call.      2.  Risk factors-     Retinopathy:  No.  Had eye exam within last year. More sensitive to light- Usher's syndrome.    Nephropathy:  BP well controlled. Will check for microalbuminuria.  Creatinine stable.   Neuropathy: No.    Feet: OK, no ulcers.   Taking ASA: yes  Lipids:  LDL above target.  Patient taking statin and fibrate. Discussed diet and limiting saturated fats/processed meats.     3.  Tingling in hands- ? carpal tunnel.  Check b12. F/u with PCP to discuss.      4.  F/U in 3 months with me, in 6 months with Dr. Bragg, in 1 mo with diabetes education to see our dietitan.  We will follow with her closely, however she does strongly prefer in-person appointments.      40 minutes spent on the date of the encounter doing chart review, review of test results, patient visit and documentation, counseling/coordination of care, and discussion of follow up plan for worsening hyper and hypoglycemia.  The patient understood and is satisfied with today's visit.        Anne Marie Medina PA-C, MPAS   [facility]  Department of Medicine

## 2023-04-13 NOTE — TELEPHONE ENCOUNTER
DIAGNOSIS: Chronic Knee pain    APPOINTMENT DATE: 4.21.23   NOTES STATUS DETAILS   OFFICE NOTE from referring provider Internal 4.4.23 Mariya Mohamud, APRN CNP  3.21.17  12.8.16     OFFICE NOTE from other specialist Internal 12.14.17 Reshma Ragland MD    6.5.17 Sebas Bradley MD  12.14.16  12.7.15    More in epic     DISCHARGE REPORT from the ER Internal 10.26.17 Vamshi Arce MD   MEDICATION LIST Internal    MRI Internal R knee; 5.30.14   XRAYS (IMAGES & REPORTS) Internal R knee; 10.26.17 / 11.6.09 / 9.6.09 / 2.9.09    B knee; 6.5.17 / 5.15.14    R tib/fib; 01.17.14 / 01.16.14

## 2023-04-17 ENCOUNTER — LAB (OUTPATIENT)
Dept: LAB | Facility: CLINIC | Age: 43
End: 2023-04-17
Payer: COMMERCIAL

## 2023-04-17 ENCOUNTER — OFFICE VISIT (OUTPATIENT)
Dept: ENDOCRINOLOGY | Facility: CLINIC | Age: 43
End: 2023-04-17
Payer: COMMERCIAL

## 2023-04-17 VITALS
SYSTOLIC BLOOD PRESSURE: 112 MMHG | OXYGEN SATURATION: 98 % | HEART RATE: 80 BPM | BODY MASS INDEX: 32.18 KG/M2 | DIASTOLIC BLOOD PRESSURE: 77 MMHG | WEIGHT: 179.8 LBS

## 2023-04-17 DIAGNOSIS — Z79.4 UNCONTROLLED TYPE 2 DIABETES MELLITUS WITH HYPERGLYCEMIA, WITH LONG-TERM CURRENT USE OF INSULIN (H): ICD-10-CM

## 2023-04-17 DIAGNOSIS — E11.65 UNCONTROLLED TYPE 2 DIABETES MELLITUS WITH HYPERGLYCEMIA, WITH LONG-TERM CURRENT USE OF INSULIN (H): ICD-10-CM

## 2023-04-17 LAB
CREAT UR-MCNC: 110 MG/DL
MICROALBUMIN UR-MCNC: 354 MG/L
MICROALBUMIN/CREAT UR: 321.82 MG/G CR (ref 0–25)

## 2023-04-17 PROCEDURE — 82043 UR ALBUMIN QUANTITATIVE: CPT | Performed by: PATHOLOGY

## 2023-04-17 PROCEDURE — 82570 ASSAY OF URINE CREATININE: CPT | Performed by: PATHOLOGY

## 2023-04-17 PROCEDURE — T1013 SIGN LANG/ORAL INTERPRETER: HCPCS | Mod: U3

## 2023-04-17 PROCEDURE — 99215 OFFICE O/P EST HI 40 MIN: CPT | Performed by: PHYSICIAN ASSISTANT

## 2023-04-17 PROCEDURE — 99000 SPECIMEN HANDLING OFFICE-LAB: CPT | Performed by: PATHOLOGY

## 2023-04-17 RX ORDER — INSULIN GLARGINE 100 [IU]/ML
60 INJECTION, SOLUTION SUBCUTANEOUS DAILY
Qty: 54 ML | Refills: 3 | Status: SHIPPED | OUTPATIENT
Start: 2023-04-17 | End: 2024-08-20

## 2023-04-17 RX ORDER — ACYCLOVIR 400 MG/1
1 TABLET ORAL
Qty: 9 EACH | Refills: 3 | Status: SHIPPED | OUTPATIENT
Start: 2023-04-17 | End: 2024-07-12

## 2023-04-17 ASSESSMENT — PAIN SCALES - GENERAL: PAINLEVEL: NO PAIN (0)

## 2023-04-17 NOTE — PATIENT INSTRUCTIONS
Increase to 0.5 mg weekly starting next week if you are having no side effects.  Will increase ozempic to 1 mg weekly after 1 month (when you see Leigh Ann)    Lower insulin from 70 units to 60 units to prevent lows overnight.     Wear wrist splints at night. If tingling continues, please see your primary care provider.      Keep up the great work with your diabetes!    Please let me know if you are having low blood sugars less than 70 or over 250 mg/dL.      If you have concerns, please send me a [employer] message M-Th, call the clinic at [phone], or call [phone] after hours/weekends and ask to speak with the endocrinologist on call.

## 2023-04-17 NOTE — LETTER
4/17/2023       RE: Nayeli Caal  [address]  [facility] [address]     Dear Colleague,    Thank you for referring your patient, Nayeli Caal, to the [facility] ENDOCRINOLOG[facility] [place] at [facility]. Please see a copy of my visit note below.    Patient is showing 4/5 MNCM met. A1c not in range   Smita Miles, VF  Outcome for 04/13/23 10:39 AM :Sent patient [facility] message asking them to upload their BG data with Inpen sharing instructions.  Smita Jd  Outcome for 04/13/23 2:52 PM :Glucose sent via Email Patient sent Inpen report to the provider.  Smita Miles        Outcome for 03/03/22 10:10 AM: Data uploaded on Dexcom   Anne Marie Medina PA-C on 3/3/2022 at 10:11 AM    dm 2  Jeniffer Salazar CMA    HPI:   Nayeli is a 43 yo woman here with a  for follow up of type 2 diabetes since the early 2000's.  She also sees Dr. Bragg, last visit was in 8/2022 and Leigh Ann Escoto- last visit 4/2023. She started on insulin in 2003 after failing Metformin treatment.  She has struggled with high blood sugars for many years.   She has been working hard on improving her diabetes control.  She particularly wants to improve things for surgery (hysterectomy due to fibroids).  She has been following closely with Leigh Ann Escoto.  Unfortunately, she had a lot of genital yeast infections so we finally stopped Jardiance a couple of weeks ago.  She is now on ozempic 0.25 mg weekly instead and she is tolerating this well.  Appetite is less.  Eating smaller portions.  Blood sugars are better.  Overnight, glucose can fall from 200 down to 90 in the morning.  While she is sleeping, goes to bed and legs get really hot. Not sure if it is from her diabetes.      Her current regimen is:   empagliflozin 25 mg daily- stopped 2 weeks ago due to recurrent yeast infections.  Ozempic 0.25 mg weekly- replaced empagliflozin 2 weeks  ago.  Basaglar 70 units daily.  Novolog (dosing on In-pen):  Carb ratio: 1/5g   Sensitivity: 30    Bedtimes run a little high.  She does not correct at bedtime because her glucose comes down on its own.      Her overall average glucose is 205 mg/dL (CV 35%), over the past 2 weeks.  Her recent glucose is as follows:                 She is no longer on Metformin as even the low dose caused intense diarrhea to the point where she could not go out of her home.     Diabetes complications:  Retinopathy: last eye exam - 2022. 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  Nephropathy: h/o proteinuria; normal GFR. Now on 50mg losartan daily (tx with lisinopril was associated with a dry cough).   Neuropathy: No numbness or tingling sensation in her feet.     Having tingling in her hands. She wonders if it has to do with signing.     She has no other concerns today.       Past Medical History:   Diagnosis Date    Combined visual and hearing impairment     Deaf     Diabetic retinopathy of both eyes (H) 8/19/2011    Hepatic steatosis 08/19/2011    History of tobacco use     Hyperlipidemia     Hypertension     Hypertriglyceridemia     Migraines     Obesity 8/19/2011     Problem list name updated by automated process. 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  Ganglion cyst excision.;  Surgeon: Greg Streeter MD;  Location: [facility] OR        Family History   Problem Relation Age of Onset    Unknown/Adopted Other        Social History     Social Hx: She is adopted.  Works for [employer].   Social History    Marital status: Single     Spouse name: N/A    Number of children: N/A    Years of education: N/A     Social History Main Topics    Smoking status: Former Smoker     Types: Cigarettes     Quit date: 12/1/2010    Smokeless tobacco: Never Used      Comment: stopped 2 yrs ago    Alcohol use No    Drug use: No    Sexual activity: Not Currently     Partners: Male     Other Topics Concern     Service No    Blood Transfusions No    Caffeine Concern No    Occupational Exposure No    Hobby Hazards No    Sleep Concern No    Stress Concern No    Weight Concern Yes    Special Diet No    Back Care No    Exercise Yes     4-5 x a week    Bike Helmet No    Seat Belt Yes    Self-Exams Yes     Social History Narrative   Social Hx: Lives in a [facility].  Working- secretarial.  Boyfriend is in [place].     Current Outpatient Medications   Medication    acetaminophen (TYLENOL) 500 MG tablet    Alcohol Swabs (ALCOHOL PREP PAD) 70 % PADS    aspirin (ASA) 81 MG chewable tablet    atorvastatin (LIPITOR) 40 MG tablet    B-D U/F insulin pen needle    BD ULTRA FINE PEN NEEDLES    blood glucose (ACCU-CHEK LAYA PLUS) test strip    blood glucose monitoring (ACCU-CHEK FASTCLIX) lancets    Continuous Blood Gluc Sensor (DEXCOM G6 SENSOR) MISC    Continuous Blood Gluc Transmit (DEXCOM G6 TRANSMITTER) MISC    empagliflozin (JARDIANCE) 25 MG TABS tablet    ergocalciferol (ERGOCALCIFEROL) 1.25 MG (00625 UT) capsule    fenofibrate (TRIGLIDE/LOFIBRA) 160 MG tablet    fish oil-omega-3 fatty acids 1000 MG capsule    hydrocortisone (CORTAID) 1 % external cream    ibuprofen (ADVIL/MOTRIN) 600 MG tablet    Injection Device for insulin (INPEN 100-BLUE-NOVOLOG-FIASP) DAVID    insulin aspart (NOVOLOG FLEXPEN) 100 UNIT/ML pen    insulin aspart (NOVOPEN ECHO) 100 UNIT/ML  cartridge    insulin glargine (BASAGLAR KWIKPEN) 100 UNIT/ML pen    insulin pen needle (B-D U/F) 31G X 8 MM miscellaneous    losartan (COZAAR) 100 MG tablet    naproxen (NAPROSYN) 375 MG tablet    Ostomy Supplies (ADHESIVE REMOVER WIPES) MISC    Ostomy Supplies (SKIN TAC ADHESIVE BARRIER WIPE) MISC    semaglutide (OZEMPIC) 2 MG/3ML SOPN pen     Current Facility-Administered Medications   Medication    hydrocortisone (CORTAID) 1 % cream        No Known Allergies    Physical Exam  /77 (BP Location: Right arm, Patient Position: Sitting, Cuff Size: Adult Large)   Pulse 80   Wt 81.6 kg (179 lb 12.8 oz)   SpO2 98%   BMI 32.18 kg/m    GENERAL:  Alert and oriented X3, NAD, well dressed, answering questions appropriately, appears stated age.  HEENT: OP clear, no lymphadenopathy, no thyromegaly, non-tender, no exophthalmus, no proptosis, EOMI, no lid lag, no retraction  EXTREMITIES: no edema, +pulses, no rashes, no lesions  NEUROLOGY: CN grossly intact, + monofilament, +vibratory sensation, + DTR upper and lower extremity, +phalen sign.    RESULTS  Lab Results   Component Value Date    A1C 9.9 (H) 04/04/2023    A1C 9.8 (H) 08/17/2022    A1C 9.9 (H) 08/04/2022    A1C 8.9 (H) 03/07/2022    A1C 9.3 (H) 01/27/2022    A1C 11.6 (H) 04/05/2021    A1C 12.4 (H) 10/29/2020    A1C 12.9 (H) 07/08/2020    A1C 8.2 (H) 05/02/2019    A1C 10.3 (H) 10/15/2018    HEMOGLOBINA1 10.0 (A) 01/13/2020    HEMOGLOBINA1 9.9 (A) 10/07/2019    HEMOGLOBINA1 8.1 (A) 04/22/2019    HEMOGLOBINA1 10.4 (A) 01/14/2019    HEMOGLOBINA1 8.7 (A) 03/12/2018       TSH   Date Value Ref Range Status   04/04/2023 1.41 0.30 - 4.20 uIU/mL Final   07/08/2020 1.34 0.40 - 4.00 mU/L Final   05/17/2018 1.84 0.40 - 4.00 mU/L Final   11/27/2017 1.92 0.40 - 4.00 mU/L Final   05/11/2016 1.85 0.40 - 4.00 mU/L Final   02/11/2016 1.14 0.40 - 4.00 mU/L Final       ALT   Date Value Ref Range Status   08/17/2022 28 0 - 50 U/L Final   08/04/2022 27 0 - 50 U/L Final   07/08/2020  29 0 - 50 U/L Final   05/02/2019 43 0 - 50 U/L Final   ]    Recent Labs   Lab Test 04/04/23  1445 07/19/21  0626   CHOL 221* 194   HDL 38* 43*   * 124*   TRIG 298* 133       Lab Results   Component Value Date     08/17/2022     07/08/2020      Lab Results   Component Value Date    POTASSIUM 4.7 08/17/2022    POTASSIUM 4.0 07/08/2020     Lab Results   Component Value Date    CHLORIDE 104 08/17/2022    CHLORIDE 101 07/08/2020     Lab Results   Component Value Date    VERO 9.3 08/17/2022    VERO 8.6 07/08/2020     Lab Results   Component Value Date    CO2 29 08/17/2022    CO2 30 07/08/2020     Lab Results   Component Value Date    BUN 13 08/17/2022    BUN 13 07/08/2020     Lab Results   Component Value Date    CR 0.54 08/17/2022    CR 0.74 07/08/2020       GFR Estimate   Date Value Ref Range Status   08/17/2022 >90 >60 mL/min/1.73m2 Final     Comment:     Effective December 21, 2021 eGFRcr in adults is calculated using the 2021 CKD-EPI creatinine equation which includes age and gender (Philippe et al., NEJ, DOI: 10.1056/FBTYxx0380429)   08/31/2021 >90 >60 mL/min/1.73m2 Final     Comment:     As of July 11, 2021, eGFR is calculated by the CKD-EPI creatinine equation, without race adjustment. eGFR can be influenced by muscle mass, exercise, and diet. The reported eGFR is an estimation only and is only applicable if the renal function is stable.   07/22/2021 >90 >60 mL/min/1.73m2 Final     Comment:     As of July 11, 2021, eGFR is calculated by the CKD-EPI creatinine equation, without race adjustment. eGFR can be influenced by muscle mass, exercise, and diet. The reported eGFR is an estimation only and is only applicable if the renal function is stable.   07/08/2020 >90 >60 mL/min/[1.73_m2] Final     Comment:     Non  GFR Calc  Starting 12/18/2018, serum creatinine based estimated GFR (eGFR) will be   calculated using the Chronic Kidney Disease Epidemiology Collaboration   (CKD-EPI)  equation.     05/02/2019 >90 >60 mL/min/[1.73_m2] Final     Comment:     Non  GFR Calc  Starting 12/18/2018, serum creatinine based estimated GFR (eGFR) will be   calculated using the Chronic Kidney Disease Epidemiology Collaboration   (CKD-EPI) equation.     05/17/2018 >90 >60 mL/min/1.7m2 Final     Comment:     Non  GFR Calc     GFR Estimate If Black   Date Value Ref Range Status   07/08/2020 >90 >60 mL/min/[1.73_m2] Final     Comment:      GFR Calc  Starting 12/18/2018, serum creatinine based estimated GFR (eGFR) will be   calculated using the Chronic Kidney Disease Epidemiology Collaboration   (CKD-EPI) equation.     05/02/2019 >90 >60 mL/min/[1.73_m2] Final     Comment:      GFR Calc  Starting 12/18/2018, serum creatinine based estimated GFR (eGFR) will be   calculated using the Chronic Kidney Disease Epidemiology Collaboration   (CKD-EPI) equation.     05/17/2018 >90 >60 mL/min/1.7m2 Final     Comment:      GFR Calc       Lab Results   Component Value Date    MICROL 115 03/07/2022    MICROL 31 07/20/2020     No results found for: MICROALBUMIN  Lab Results   Component Value Date    CPEPT 1.3 03/25/2005       No results found for: B12]    Most recent eye exam date: : Not Found     Assessment/Plan:     1.  Type 2 diabetes-  Nayeli's glucose control has improved significantly.  She is relieved to be off jardiance and is doing well on ozempic.  She is having a significant drop in her glucose overnight, but she is going too high post-meal.  We made the following changes today (instructions given to patient):     Increase to 0.5 mg weekly starting next week if you are having no side effects.  Will increase ozempic to 1 mg weekly after 1 month (when you see Leigh Ann)    Lower insulin from 70 units to 60 units to prevent lows overnight.     Wear wrist splints at night. If tingling continues, please see your primary care provider.      Keep up  the great work with your diabetes!    Please let me know if you are having low blood sugars less than 70 or over 250 mg/dL.      If you have concerns, please send me a zoomsquare message M-Th, call the clinic at [phone], or call [phone] after hours/weekends and ask to speak with the endocrinologist on call.      2.  Risk factors-     Retinopathy:  No.  Had eye exam within last year. More sensitive to light- Usher's syndrome.    Nephropathy:  BP well controlled. Will check for microalbuminuria.  Creatinine stable.   Neuropathy: No.    Feet: OK, no ulcers.   Taking ASA: yes  Lipids:  LDL above target.  Patient taking statin and fibrate. Discussed diet and limiting saturated fats/processed meats.     3.  Tingling in hands- ? carpal tunnel.  Check b12. F/u with PCP to discuss.      4.  F/U in 3 months with me, in 6 months with Dr. Bragg, in 1 mo with diabetes education to see our dietitan.  We will follow with her closely, however she does strongly prefer in-person appointments.      40 minutes spent on the date of the encounter doing chart review, review of test results, patient visit and documentation, counseling/coordination of care, and discussion of follow up plan for worsening hyper and hypoglycemia.  The patient understood and is satisfied with today's visit.        Anne Marie Medina PA-C, MPAS   [facility]  Department of Medicine

## 2023-04-17 NOTE — NURSING NOTE
"Chief Complaint   Patient presents with     Diabetes     Vital signs:      BP: 112/77 Pulse: 80     SpO2: 98 %       Weight: 81.6 kg (179 lb 12.8 oz)  Estimated body mass index is 32.18 kg/m  as calculated from the following:    Height as of 4/4/23: 1.592 m (5' 2.68\").    Weight as of this encounter: 81.6 kg (179 lb 12.8 oz).          "

## 2023-04-18 DIAGNOSIS — M25.519 CHRONIC SHOULDER PAIN, UNSPECIFIED LATERALITY: Primary | ICD-10-CM

## 2023-04-18 DIAGNOSIS — G89.29 CHRONIC SHOULDER PAIN, UNSPECIFIED LATERALITY: Primary | ICD-10-CM

## 2023-04-20 ENCOUNTER — MYC MEDICAL ADVICE (OUTPATIENT)
Dept: INTERNAL MEDICINE | Facility: CLINIC | Age: 43
End: 2023-04-20
Payer: COMMERCIAL

## 2023-04-20 DIAGNOSIS — Z79.4 UNCONTROLLED TYPE 2 DIABETES MELLITUS WITH HYPERGLYCEMIA, WITH LONG-TERM CURRENT USE OF INSULIN (H): ICD-10-CM

## 2023-04-20 DIAGNOSIS — E11.65 UNCONTROLLED TYPE 2 DIABETES MELLITUS WITH HYPERGLYCEMIA, WITH LONG-TERM CURRENT USE OF INSULIN (H): ICD-10-CM

## 2023-04-21 ENCOUNTER — OFFICE VISIT (OUTPATIENT)
Dept: ORTHOPEDICS | Facility: CLINIC | Age: 43
End: 2023-04-21
Attending: NURSE PRACTITIONER
Payer: COMMERCIAL

## 2023-04-21 ENCOUNTER — PRE VISIT (OUTPATIENT)
Dept: ORTHOPEDICS | Facility: CLINIC | Age: 43
End: 2023-04-21

## 2023-04-21 ENCOUNTER — ANCILLARY PROCEDURE (OUTPATIENT)
Dept: GENERAL RADIOLOGY | Facility: CLINIC | Age: 43
End: 2023-04-21
Attending: FAMILY MEDICINE
Payer: COMMERCIAL

## 2023-04-21 VITALS — BODY MASS INDEX: 31.71 KG/M2 | WEIGHT: 179 LBS | HEIGHT: 63 IN

## 2023-04-21 DIAGNOSIS — M17.11 PRIMARY OSTEOARTHRITIS OF RIGHT KNEE: Primary | ICD-10-CM

## 2023-04-21 DIAGNOSIS — M25.561 BILATERAL KNEE PAIN: ICD-10-CM

## 2023-04-21 DIAGNOSIS — G89.29 CHRONIC PAIN OF RIGHT KNEE: ICD-10-CM

## 2023-04-21 DIAGNOSIS — M25.562 BILATERAL KNEE PAIN: ICD-10-CM

## 2023-04-21 DIAGNOSIS — M25.561 CHRONIC PAIN OF RIGHT KNEE: ICD-10-CM

## 2023-04-21 PROCEDURE — T1013 SIGN LANG/ORAL INTERPRETER: HCPCS | Mod: U3

## 2023-04-21 PROCEDURE — T1013 SIGN LANG/ORAL INTERPRETER: HCPCS | Mod: U3 | Performed by: FAMILY MEDICINE

## 2023-04-21 PROCEDURE — 73562 X-RAY EXAM OF KNEE 3: CPT | Mod: LT | Performed by: RADIOLOGY

## 2023-04-21 PROCEDURE — 99204 OFFICE O/P NEW MOD 45 MIN: CPT | Mod: 25 | Performed by: FAMILY MEDICINE

## 2023-04-21 RX ORDER — LOSARTAN POTASSIUM 100 MG/1
100 TABLET ORAL DAILY
Qty: 90 TABLET | Refills: 1 | Status: SHIPPED | OUTPATIENT
Start: 2023-04-21 | End: 2023-11-02

## 2023-04-21 NOTE — TELEPHONE ENCOUNTER
DIAGNOSIS: Chronic shoulder pain, left    APPOINTMENT DATE: 5.1.23   NOTES STATUS DETAILS   OFFICE NOTE from referring provider Internal 4.18.23 Mariya Mohamud, APRN CNP  7.22.21     MEDICATION LIST Internal

## 2023-04-21 NOTE — LETTER
4/21/2023      RE: Nayeli Caal  [address]  [facility] [address]     Dear Colleague,    Thank you for referring your patient, Nayeli Caal, to the [facility] SPORTS MEDICINE CLINIC [place]. Please see a copy of my visit note below.    CHIEF COMPLAINT:  Consult (Knee pain)       HISTORY OF PRESENT ILLNESS  Ms. Ingrid Caal is a pleasant 42 year old year old female who presents to clinic today with acute on chronic bilateral knee pain and known mild knee DJD, chondromalacia.  Nayeli explains that she had gradually worsening knee pain starting about 6 months ago.     Has been seen as early as 2014 in [facility] for right knee pain. Corticosteroids were injected and deemed ineffective. She also underwent PT and used a brace. She has been prescribed naproxen 375mg which she can use BID.    More recently injection 6/26/18 with hyaluronic acid bilateral knees synvisc.  This resolved all pain of left knee and improved right knee pain until 6 months ago.     Onset: gradual  Location: right knee  Quality:  pressure  Duration: 6 months   Severity: 7/10 at worst  Timing:intermittent episodes worse with going down stairs  Modifying factors:  resting and non-use makes it better, movement and use makes it worse  Associated signs & symptoms: pain  Previous similar pain: Yes, previous injections in 2017  Treatments to date: Corticosteroid injection. Synvisc One in past. Naproxen. PT. Bracing    Additional history: as documented    Review of Systems:  Have you recently had a a fever, chills, weight loss? No  Do you have any vision problems? No  Do you have any chest pain or edema? No  Do you have any shortness of breath or wheezing?  No  Do you have stomach problems? No  Do you have any numbness or focal weakness? No  Do you have diabetes? Yes, type 2  Do you have problems with bleeding or clotting? No  Do you have an rashes or other skin lesions? No    MEDICAL  HISTORY  Patient Active Problem List   Diagnosis    Essential hypertension    Hypersomnia, organic    Other chronic nonalcoholic liver disease    Diabetic retinopathy of both eyes (H)    Obesity    Usher Syndrome: congenital deafness, retinitis pigmentosa    Macular degeneration, age related, nonexudative    Benign neoplasm of iris    Dry eye syndrome    Pemphigus erythematosus    Chondromalacia of patella, right, steroid injection 6/12/2015    Uncontrolled type 2 diabetes mellitus with hyperglycemia, with long-term current use of insulin (H)    Chronic kidney disease, stage 1       Current Outpatient Medications   Medication Sig Dispense Refill    acetaminophen (TYLENOL) 500 MG tablet Take 2 tablets (1,000 mg) by mouth every 6 hours as needed for mild pain 100 tablet 3    Alcohol Swabs (ALCOHOL PREP PAD) 70 % PADS 1 each 3 times daily 100 each 3    aspirin (ASA) 81 MG chewable tablet Take 1 tablet (81 mg) by mouth daily CHEW AND SWALLOW 90 tablet 3    atorvastatin (LIPITOR) 40 MG tablet Take 1 tablet (40 mg) by mouth daily 90 tablet 0    B-D U/F insulin pen needle       BD ULTRA FINE PEN NEEDLES Inject 1 dose. Subcutaneous 2 times daily BD ultra-fine insulin syringe, 30 ga. X 1/2'' short needle 1/2 cc. Use as directed. 400 Units 11    blood glucose (ACCU-CHEK LAYA PLUS) test strip Use with  Accucheck Expert meter.  Test blood sugar 6 times daily. 600 each 3    blood glucose monitoring (ACCU-CHEK FASTCLIX) lancets Use to test blood sugar 6 times daily or as directed 510 each 3    Continuous Blood Gluc Sensor (DEXCOM G6 SENSOR) MISC Change every 10 days. 3 month supply. 9 each 3    Continuous Blood Gluc Sensor (DEXCOM G7 SENSOR) MISC 1 each every 10 days 9 each 3    Continuous Blood Gluc Transmit (DEXCOM G6 TRANSMITTER) MISC Change every 3 months. 1 each 3    ergocalciferol (ERGOCALCIFEROL) 1.25 MG (65953 UT) capsule Take 1 capsule (50,000 Units) by mouth once a week For additional refills, please schedule a  "follow-up appointment at [phone] 12 capsule 3    fenofibrate (TRIGLIDE/LOFIBRA) 160 MG tablet Take 1 tablet (160 mg) by mouth daily 90 tablet 0    fish oil-omega-3 fatty acids 1000 MG capsule TAKE TWO CAPSULES (2 GM) BY MOUTH ONCE DAILY 180 capsule 3    hydrocortisone (CORTAID) 1 % external cream Apply topically 2 times daily 120 g 1    ibuprofen (ADVIL/MOTRIN) 600 MG tablet Take 1 tablet (600 mg) by mouth every 6 hours as needed for moderate pain 120 tablet 1    Injection Device for insulin (INPEN 100-BLUE-NOVOLOG-FIASP) DAVID 1 each continuous 1 each 0    insulin aspart (NOVOLOG FLEXPEN) 100 UNIT/ML pen 1 unit per 5 grams CHO and correction scale of 1/25/125.  Approximate daily use is 100 units. 30 mL 2    insulin aspart (NOVOPEN ECHO) 100 UNIT/ML cartridge For use with the In-Pen device.  Give 1 unit per 5 grams CHO with meals and snacks as well as correction.  Average daily use is 100 units daily. 90 mL 3    insulin glargine (BASAGLAR KWIKPEN) 100 UNIT/ML pen Inject 60 Units Subcutaneous daily 3 month supply. 54 mL 3    insulin pen needle (B-D U/F) 31G X 8 MM miscellaneous USE AS DIRECTED. 200 each 1    losartan (COZAAR) 100 MG tablet Take 1 tablet (100 mg) by mouth daily 90 tablet 3    naproxen (NAPROSYN) 375 MG tablet Take 1 tablet (375 mg) by mouth 2 times daily (with meals) 60 tablet 3    Ostomy Supplies (ADHESIVE REMOVER WIPES) MISC 1 each every 14 days 50 each 3    Ostomy Supplies (SKIN TAC ADHESIVE BARRIER WIPE) MISC 1 each every 14 days 6 each 3    semaglutide (OZEMPIC) 2 MG/3ML SOPN pen Administer 0.25 mg subcutaneously weekly for one month, then increase dose to 0.5 mg weekly for one month. 3 mL 1       No Known Allergies    Family History   Problem Relation Age of Onset    Unknown/Adopted Other        Additional medical/Social/Surgical histories reviewed in [facility] and updated as appropriate.       PHYSICAL EXAM  Ht 1.592 m (5' 2.68\")   Wt 81.2 kg (179 lb)   BMI 32.03 kg/m      General  - normal " appearance, in no obvious distress  Musculoskeletal - right knee  - stance: normal gait without limp  - inspection: no swelling or effusion  - palpation: no joint line tenderness, patellar tendon non-tender, tender medial patellar facet  - ROM: 135 degrees flexion, -5 degrees extension, not painful, crepitus with weight-bearing flexion  - strength: 5/5 in flexion, 5/5 in extension  - special tests:  (-) Lachman  (-) anterior drawer  (-) Markie  (-) Thessaly  (-) varus at 0 and 30 degrees flexion  (-) valgus at 0 and 30 degrees flexion  (+) patellar compression  (+) patellar grind  (-) patellar apprehension  Neuro  - no sensory or motor deficit, grossly normal coordination, normal muscle tone    IMAGING :XR knee bilateral 3 views. Final results and radiologist's interpretation, available in the [facility] health record. Images were reviewed with the patient/family members in the office today. My personal interpretation of the performed imaging is mild medial compartment osteoarthritis of bilateral knees     ASSESSMENT & PLAN  Ms. Ingrid Caal is a 42 year old year old female history of Insulin-Dependent Diabetes Mellitus with uncontrolled A1c 9.9 who presents to clinic today with acute on chronic right knee pain secondary to chondromalacia, early osteoarthritis of right knee. Previous diagnosis of osteoarthritis of bilateral knees confirmed on radigraphs from 2017 and today.    Previous exceptional relief with synvisc one lasting 4.5 years.  She cannot pursue corticosteroid today due to her A1c >9.  She has also completed course of PT in the past.    Diagnosis:   Primary osteoarthritis of right knee    -She would benefit greatly from a repeat PA for Synvisc One today/  -Continue naproxen Rx PRN  -Try bracing patellofemoral stabilizing for now  -Follow up after PA achieved.  -If denied, we would need to understand why at this time.    ASL  utilized during visit today, in-person.    It was a pleasure seeing  Nayeli today.    Rosas Rodriguez DO, [facility]  Primary Care Sports Medicine        Again, thank you for allowing me to participate in the care of your patient.      Sincerely,    Rosas Rodriguez DO

## 2023-04-21 NOTE — PROGRESS NOTES
CHIEF COMPLAINT:  Consult (Knee pain)       HISTORY OF PRESENT ILLNESS  Ms. Ingrid Caal is a pleasant 42 year old year old female who presents to clinic today with acute on chronic bilateral knee pain and known mild knee DJD, chondromalacia.  Nayeli explains that she had gradually worsening knee pain starting about 6 months ago.     Has been seen as early as 2014 in [facility] for right knee pain. Corticosteroids were injected and deemed ineffective. She also underwent PT and used a brace. She has been prescribed naproxen 375mg which she can use BID. This need was reduced to zero naproxen / day until 6 months ago gradually increasing now.    More recently injection 6/26/18 with hyaluronic acid bilateral knees synvisc.  This resolved all pain of left knee and improved right knee pain until 6 months ago.     Onset: gradual  Location: right knee  Quality:  pressure  Duration: 6 months   Severity: 7/10 at worst  Timing:intermittent episodes worse with going down stairs  Modifying factors:  resting and non-use makes it better, movement and use makes it worse  Associated signs & symptoms: pain  Previous similar pain: Yes, previous injections in 2017  Treatments to date: Corticosteroid injection. Synvisc One in past. Naproxen dosing has decreased in frequency since injection last performed. PT. Bracing    Additional history: as documented    Review of Systems:    Have you recently had a a fever, chills, weight loss? No    Do you have any vision problems? No    Do you have any chest pain or edema? No    Do you have any shortness of breath or wheezing?  No    Do you have stomach problems? No    Do you have any numbness or focal weakness? No    Do you have diabetes? Yes, type 2    Do you have problems with bleeding or clotting? No    Do you have an rashes or other skin lesions? No    MEDICAL HISTORY  Patient Active Problem List   Diagnosis     Essential hypertension     Hypersomnia, organic     Other  chronic nonalcoholic liver disease     Diabetic retinopathy of both eyes (H)     Obesity     Usher Syndrome: congenital deafness, retinitis pigmentosa     Macular degeneration, age related, nonexudative     Benign neoplasm of iris     Dry eye syndrome     Pemphigus erythematosus     Chondromalacia of patella, right, steroid injection 6/12/2015     Uncontrolled type 2 diabetes mellitus with hyperglycemia, with long-term current use of insulin (H)     Chronic kidney disease, stage 1       Current Outpatient Medications   Medication Sig Dispense Refill     acetaminophen (TYLENOL) 500 MG tablet Take 2 tablets (1,000 mg) by mouth every 6 hours as needed for mild pain 100 tablet 3     Alcohol Swabs (ALCOHOL PREP PAD) 70 % PADS 1 each 3 times daily 100 each 3     aspirin (ASA) 81 MG chewable tablet Take 1 tablet (81 mg) by mouth daily CHEW AND SWALLOW 90 tablet 3     atorvastatin (LIPITOR) 40 MG tablet Take 1 tablet (40 mg) by mouth daily 90 tablet 0     B-D U/F insulin pen needle        BD ULTRA FINE PEN NEEDLES Inject 1 dose. Subcutaneous 2 times daily BD ultra-fine insulin syringe, 30 ga. X 1/2'' short needle 1/2 cc. Use as directed. 400 Units 11     blood glucose (ACCU-CHEK LAYA PLUS) test strip Use with  Accucheck Expert meter.  Test blood sugar 6 times daily. 600 each 3     blood glucose monitoring (ACCU-CHEK FASTCLIX) lancets Use to test blood sugar 6 times daily or as directed 510 each 3     Continuous Blood Gluc Sensor (DEXCOM G6 SENSOR) MISC Change every 10 days. 3 month supply. 9 each 3     Continuous Blood Gluc Sensor (DEXCOM G7 SENSOR) MISC 1 each every 10 days 9 each 3     Continuous Blood Gluc Transmit (DEXCOM G6 TRANSMITTER) MISC Change every 3 months. 1 each 3     ergocalciferol (ERGOCALCIFEROL) 1.25 MG (72415 UT) capsule Take 1 capsule (50,000 Units) by mouth once a week For additional refills, please schedule a follow-up appointment at [phone] 12 capsule 3     fenofibrate (TRIGLIDE/LOFIBRA) 160 MG  "tablet Take 1 tablet (160 mg) by mouth daily 90 tablet 0     fish oil-omega-3 fatty acids 1000 MG capsule TAKE TWO CAPSULES (2 GM) BY MOUTH ONCE DAILY 180 capsule 3     hydrocortisone (CORTAID) 1 % external cream Apply topically 2 times daily 120 g 1     ibuprofen (ADVIL/MOTRIN) 600 MG tablet Take 1 tablet (600 mg) by mouth every 6 hours as needed for moderate pain 120 tablet 1     Injection Device for insulin (INPEN 100-BLUE-NOVOLOG-FIASP) DAVID 1 each continuous 1 each 0     insulin aspart (NOVOLOG FLEXPEN) 100 UNIT/ML pen 1 unit per 5 grams CHO and correction scale of 1/25/125.  Approximate daily use is 100 units. 30 mL 2     insulin aspart (NOVOPEN ECHO) 100 UNIT/ML cartridge For use with the In-Pen device.  Give 1 unit per 5 grams CHO with meals and snacks as well as correction.  Average daily use is 100 units daily. 90 mL 3     insulin glargine (BASAGLAR KWIKPEN) 100 UNIT/ML pen Inject 60 Units Subcutaneous daily 3 month supply. 54 mL 3     insulin pen needle (B-D U/F) 31G X 8 MM miscellaneous USE AS DIRECTED. 200 each 1     losartan (COZAAR) 100 MG tablet Take 1 tablet (100 mg) by mouth daily 90 tablet 3     naproxen (NAPROSYN) 375 MG tablet Take 1 tablet (375 mg) by mouth 2 times daily (with meals) 60 tablet 3     Ostomy Supplies (ADHESIVE REMOVER WIPES) MISC 1 each every 14 days 50 each 3     Ostomy Supplies (SKIN TAC ADHESIVE BARRIER WIPE) MISC 1 each every 14 days 6 each 3     semaglutide (OZEMPIC) 2 MG/3ML SOPN pen Administer 0.25 mg subcutaneously weekly for one month, then increase dose to 0.5 mg weekly for one month. 3 mL 1       No Known Allergies    Family History   Problem Relation Age of Onset     Unknown/Adopted Other        Additional medical/Social/Surgical histories reviewed in [facility] and updated as appropriate.       PHYSICAL EXAM  Ht 1.592 m (5' 2.68\")   Wt 81.2 kg (179 lb)   BMI 32.03 kg/m      General  - normal appearance, in no obvious distress  Musculoskeletal - right knee  - stance: " normal gait without limp  - inspection: no swelling or effusion  - palpation: no joint line tenderness, patellar tendon non-tender, tender medial patellar facet  - ROM: 135 degrees flexion, -5 degrees extension, not painful, crepitus with weight-bearing flexion  - strength: 5/5 in flexion, 5/5 in extension  - special tests:  (-) Lachman  (-) anterior drawer  (-) Markie  (-) Thessaly  (-) varus at 0 and 30 degrees flexion  (-) valgus at 0 and 30 degrees flexion  (+) patellar compression  (+) patellar grind  (-) patellar apprehension  Neuro  - no sensory or motor deficit, grossly normal coordination, normal muscle tone    IMAGING :XR knee bilateral 3 views. Final results and radiologist's interpretation, available in the [facility] health record. Images were reviewed with the patient/family members in the office today. My personal interpretation of the performed imaging is mild medial compartment osteoarthritis of bilateral knees     ASSESSMENT & PLAN  Ms. Ingrid Caal is a 42 year old year old female history of Insulin-Dependent Diabetes Mellitus with uncontrolled A1c 9.9 who presents to clinic today with acute on chronic right knee pain secondary to chondromalacia, early osteoarthritis of right knee. Previous diagnosis of osteoarthritis of bilateral knees confirmed on radigraphs from 2017 and today.    Previous exceptional relief with synvisc one lasting 4.5 years.  She cannot pursue corticosteroid today due to her A1c >9.  She has also completed course of PT in the past.    Diagnosis:   Primary osteoarthritis of right knee    -She would benefit greatly from a repeat PA for Synvisc One today/  -Continue naproxen Rx PRN  -Try bracing patellofemoral stabilizing for now  -Follow up after PA achieved.  -If denied, we would need to understand why at this time.    ASL  utilized during visit today, in-person.    It was a pleasure seeing Nayeli today.    Rosas Rodriguez, DO, [facility]  Primary Care Sports Medicine

## 2023-04-21 NOTE — TELEPHONE ENCOUNTER
losartan (COZAAR) 100 MG tablet      Last Written Prescription Date:  3-10-22  Last Fill Quantity: 90,   # refills: 3  Last fill by [facility] -Dr.Angela Apryl MENDEZ  Last Office Visit : 4-17-23  Future Office visit:  8-14-23  RF under endo

## 2023-04-26 DIAGNOSIS — M25.519 PAIN IN JOINT, SHOULDER REGION: Primary | ICD-10-CM

## 2023-05-01 ENCOUNTER — PRE VISIT (OUTPATIENT)
Dept: ORTHOPEDICS | Facility: CLINIC | Age: 43
End: 2023-05-01

## 2023-05-01 ENCOUNTER — ANCILLARY PROCEDURE (OUTPATIENT)
Dept: GENERAL RADIOLOGY | Facility: CLINIC | Age: 43
End: 2023-05-01
Attending: FAMILY MEDICINE
Payer: COMMERCIAL

## 2023-05-01 ENCOUNTER — OFFICE VISIT (OUTPATIENT)
Dept: ORTHOPEDICS | Facility: CLINIC | Age: 43
End: 2023-05-01
Attending: NURSE PRACTITIONER
Payer: COMMERCIAL

## 2023-05-01 DIAGNOSIS — M75.32 CALCIFIC TENDINITIS OF LEFT SHOULDER: ICD-10-CM

## 2023-05-01 DIAGNOSIS — S43.52XA SPRAIN OF LEFT ACROMIOCLAVICULAR LIGAMENT, INITIAL ENCOUNTER: Primary | ICD-10-CM

## 2023-05-01 DIAGNOSIS — M25.519 PAIN IN JOINT, SHOULDER REGION: ICD-10-CM

## 2023-05-01 DIAGNOSIS — M75.102 ROTATOR CUFF SYNDROME OF LEFT SHOULDER: ICD-10-CM

## 2023-05-01 PROCEDURE — 99213 OFFICE O/P EST LOW 20 MIN: CPT | Performed by: FAMILY MEDICINE

## 2023-05-01 PROCEDURE — 73030 X-RAY EXAM OF SHOULDER: CPT | Mod: LT | Performed by: RADIOLOGY

## 2023-05-01 PROCEDURE — T1013 SIGN LANG/ORAL INTERPRETER: HCPCS | Mod: U3
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
## 2023-05-01 NOTE — PROGRESS NOTES
Patient came in to ortho, staff mentioned that patient wanted a referral for trigerfinger issues and neuropathy pain in hands. Asking for a hand clinic referral.    Jose Dey, EMT at 2:50 PM on 5/1/2023
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
## 2023-05-02 ENCOUNTER — TELEPHONE (OUTPATIENT)
Dept: ORTHOPEDICS | Facility: CLINIC | Age: 43
End: 2023-05-02
Payer: COMMERCIAL

## 2023-05-02 NOTE — TELEPHONE ENCOUNTER
Health Call Center    Phone Message    May a detailed message be left on voicemail: yes     Reason for Call: Other: Patient is requesting medical advice. She stated that she uses her hand for speaking and she's unable to do so because the pain level is at a 10/10. She questioned if she should put ice on it or what should she do? Please call patient back.    Action Taken: Message routed to:  [facility] ([facility] Sports Medicine    Travel Screening: Not Applicable

## 2023-05-02 NOTE — TELEPHONE ENCOUNTER
DIAGNOSIS: pain in both hands/self/bcbs/no imgs/ortho cons   APPOINTMENT DATE: 5/4/23   NOTES STATUS DETAILS   OFFICE NOTE from other specialist Internal 7/20/2020 OV Greg Streeter MD  7/24/18 OV Oleg Dias MD   OPERATIVE REPORT Internal 5/30/19 Left Ring Trigger Finger Release.  Ganglion cyst excision.    5/2/19 Right Thumb Trigger Release   MEDICATION LIST Internal    INJECTIONS  Internal  7/20/2020, 12/30/19   XRAYS (IMAGES & REPORTS) Internal Internal: PACS   XR HAND: 7/24/18

## 2023-05-04 ENCOUNTER — PRE VISIT (OUTPATIENT)
Dept: ORTHOPEDICS | Facility: CLINIC | Age: 43
End: 2023-05-04

## 2023-05-04 ENCOUNTER — OFFICE VISIT (OUTPATIENT)
Dept: ORTHOPEDICS | Facility: CLINIC | Age: 43
End: 2023-05-04
Payer: COMMERCIAL

## 2023-05-04 VITALS — HEIGHT: 63 IN | BODY MASS INDEX: 31.71 KG/M2 | WEIGHT: 179 LBS

## 2023-05-04 DIAGNOSIS — M65.331 TRIGGER MIDDLE FINGER OF RIGHT HAND: ICD-10-CM

## 2023-05-04 DIAGNOSIS — M65.332 TRIGGER MIDDLE FINGER OF LEFT HAND: Primary | ICD-10-CM

## 2023-05-04 PROCEDURE — 99213 OFFICE O/P EST LOW 20 MIN: CPT | Performed by: FAMILY MEDICINE

## 2023-05-04 NOTE — LETTER
"  5/4/2023      RE: Nayeli Caal  [address]  [facility] [address]     Dear Colleague,    Thank you for referring your patient, Nayeli Caal, to the [facility] [place]. Please see a copy of my visit note below.    Sports Medicine Clinic Visit    PCP: Mariya Mohamud    Nayeli Caal is a 42 year old female who is seen  as self referral presenting with bilateral intermittent triggering of the third fingers, right greater than left.  Patient had previous bilateral thumb trigger finger releases from Dr. Streeter in 2019.  Patient relies on her hands for signing.    Injury: No injury; fall last Saturday but didn't fall onto her hands     Location of Pain: bilateral hand; left and right middle finger  Duration of Pain: 1 month  Rating of Pain: 7/10  Pain is better with: Ibuprofen  Pain is worse with: Typing, sleeping, worse in the morning  Additional Features: Swelling in right hand, catching, locking  Treatment so far consists of: Ibuprofen  Prior History of related problems: No previous issues, change in diabetes medication recently prior to this issue starting     Ht 1.592 m (5' 2.68\")   Wt 81.2 kg (179 lb)   BMI 32.03 kg/m      Patient is noted triggering of the bilateral third fingers, right greater than left.    Patient has a history of bilateral trigger thumb release surgery 2019    Patient reports that last Friday, April 28th,six days ago, she had a fall after reaching high up in a cupboard and her foot slipped causing her to fall and land on her adducted left shoulder.  She was evaluated recently for shoulder discomfort 5/1/2023    Patient has a history of diabetic retinopathy, diabetic nephropathy and type 1 diabetes mellitus.  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  Patient communicates with American sign language.        PMH:  Past Medical History:   Diagnosis Date    Combined visual and hearing " impairment     Deaf     Diabetic retinopathy of both eyes (H) 2011    Hepatic steatosis 2011    History of tobacco use     Hyperlipidemia     Hypertension     Hypertriglyceridemia     Migraines     Obesity 2011     Problem list name updated by automated process. Provider to review    Uncontrolled type 2 diabetes mellitus with hyperglycemia, with long-term current use of insulin (H) 5/15/2017    Usher Syndrome: congenital deafness, retinitis pigmentosa 2011       Active problem list:  Patient Active Problem List   Diagnosis    Essential hypertension    Hypersomnia, organic    Other chronic nonalcoholic liver disease    Diabetic retinopathy of both eyes (H)    Obesity    Usher Syndrome: congenital deafness, retinitis pigmentosa    Macular degeneration, age related, nonexudative    Benign neoplasm of iris    Dry eye syndrome    Pemphigus erythematosus    Chondromalacia of patella, right, steroid injection 2015    Uncontrolled type 2 diabetes mellitus with hyperglycemia, with long-term current use of insulin (H)    Chronic kidney disease, stage 1       FH:  Family History   Problem Relation Age of Onset    Unknown/Adopted Other        SH:  Social History     Socioeconomic History    Marital status: Single     Spouse name: Not on file    Number of children: Not on file    Years of education: Not on file    Highest education level: Not on file   Occupational History    Not on file   Tobacco Use    Smoking status: Former     Types: Cigarettes     Quit date: 2010     Years since quittin.4    Smokeless tobacco: Never    Tobacco comments:     stopped 10 yrs ago   Vaping Use    Vaping status: Not on file   Substance and Sexual Activity    Alcohol use: Yes     Comment: socially    Drug use: No    Sexual activity: Not Currently     Partners: Male     Birth control/protection: I.U.D.   Other Topics Concern    Parent/sibling w/ CABG, MI or angioplasty before 65F 55M? Not Asked      Service No    Blood Transfusions No    Caffeine Concern No    Occupational Exposure No    Hobby Hazards No    Sleep Concern No    Stress Concern No    Weight Concern Yes    Special Diet No    Back Care No    Exercise Yes     Comment: 4-5 x a week    Bike Helmet No    Seat Belt Yes    Self-Exams Yes   Social History Narrative    Not on file     Social Determinants of Health     Financial Resource Strain: Not on file   Food Insecurity: Not on file   Transportation Needs: Not on file   Physical Activity: Not on file   Stress: Not on file   Social Connections: Not on file   Intimate Partner Violence: Not on file   Housing Stability: Not on file       MEDS:  See EMR, reviewed  ALL:  See EMR, reviewed    REVIEW OF SYSTEMS:  CONSTITUTIONAL:NEGATIVE for fever, chills, change in weight  INTEGUMENTARY/SKIN: NEGATIVE for worrisome rashes, moles or lesions  EYES: NEGATIVE for vision changes or irritation  ENT/MOUTH: NEGATIVE for ear, mouth and throat problems  RESP:NEGATIVE for significant cough or SOB  BREAST: NEGATIVE for masses, tenderness or discharge  CV: NEGATIVE for chest pain, palpitations or peripheral edema  GI: NEGATIVE for nausea, abdominal pain, heartburn, or change in bowel habits  :NEGATIVE for frequency, dysuria, or hematuria  :NEGATIVE for frequency, dysuria, or hematuria  NEURO: NEGATIVE for weakness, dizziness or paresthesias  ENDOCRINE: NEGATIVE for temperature intolerance, skin/hair changes  HEME/ALLERGY/IMMUNE: NEGATIVE for bleeding problems  PSYCHIATRIC: NEGATIVE for changes in mood or affect      Objective: She is a tender nodule at the A1 pulley of the bilateral third fingers.  She can flex the fingers against resistance and there is mild triggering noted, right greater than left.  Skin is intact.  No redness or signs of cellulitis.  Appropriate conversation and affect.    Assessment: Bilateral trigger finger, right greater than left, third finger    Plan: Through the  we discussed the  nature of trigger finger anatomy.  She would like to try an oval 8 splint for the next 4 days.  We discussed that if despite the use of the oval 8 splint she has repetitive triggering and is having to manually reduce the finger, that she has the option of discussing trigger finger release with hand surgery.  She plans on using the oval 8 splints and notify me if she would like a referral.      Again, thank you for allowing me to participate in the care of your patient.      Sincerely,    Sebas Bradley MD

## 2023-05-04 NOTE — PROGRESS NOTES
"Sports Medicine Clinic Visit    PCP: Mariya Mohamud Ingrid Caal is a 42 year old female who is seen  as self referral presenting with bilateral intermittent triggering of the third fingers, right greater than left.  Patient had previous bilateral thumb trigger finger releases from Dr. Streeter in 2019.  Patient relies on her hands for signing.    Injury: No injury; fall last Saturday but didn't fall onto her hands     Location of Pain: bilateral hand; left and right middle finger  Duration of Pain: 1 month  Rating of Pain: 7/10  Pain is better with: Ibuprofen  Pain is worse with: Typing, sleeping, worse in the morning  Additional Features: Swelling in right hand, catching, locking  Treatment so far consists of: Ibuprofen  Prior History of related problems: No previous issues, change in diabetes medication recently prior to this issue starting     Ht 1.592 m (5' 2.68\")   Wt 81.2 kg (179 lb)   BMI 32.03 kg/m      Patient is noted triggering of the bilateral third fingers, right greater than left.    Patient has a history of bilateral trigger thumb release surgery 2019    Patient reports that last Friday, April 28th,six days ago, she had a fall after reaching high up in a cupboard and her foot slipped causing her to fall and land on her adducted left shoulder.  She was evaluated recently for shoulder discomfort 5/1/2023    Patient has a history of diabetic retinopathy, diabetic nephropathy and type 1 diabetes mellitus.  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  Patient communicates with American sign language.        PMH:  Past Medical History:   Diagnosis Date     Combined visual and hearing impairment      Deaf      Diabetic retinopathy of both eyes (H) 8/19/2011     Hepatic steatosis 08/19/2011     History of tobacco use      Hyperlipidemia      Hypertension      Hypertriglyceridemia      Migraines      Obesity 8/19/2011     Problem list name updated by " automated process. Provider to review     Uncontrolled type 2 diabetes mellitus with hyperglycemia, with long-term current use of insulin (H) 5/15/2017     Usher Syndrome: congenital deafness, retinitis pigmentosa 2011       Active problem list:  Patient Active Problem List   Diagnosis     Essential hypertension     Hypersomnia, organic     Other chronic nonalcoholic liver disease     Diabetic retinopathy of both eyes (H)     Obesity     Usher Syndrome: congenital deafness, retinitis pigmentosa     Macular degeneration, age related, nonexudative     Benign neoplasm of iris     Dry eye syndrome     Pemphigus erythematosus     Chondromalacia of patella, right, steroid injection 2015     Uncontrolled type 2 diabetes mellitus with hyperglycemia, with long-term current use of insulin (H)     Chronic kidney disease, stage 1       FH:  Family History   Problem Relation Age of Onset     Unknown/Adopted Other        SH:  Social History     Socioeconomic History     Marital status: Single     Spouse name: Not on file     Number of children: Not on file     Years of education: Not on file     Highest education level: Not on file   Occupational History     Not on file   Tobacco Use     Smoking status: Former     Types: Cigarettes     Quit date: 2010     Years since quittin.4     Smokeless tobacco: Never     Tobacco comments:     stopped 10 yrs ago   Vaping Use     Vaping status: Not on file   Substance and Sexual Activity     Alcohol use: Yes     Comment: socially     Drug use: No     Sexual activity: Not Currently     Partners: Male     Birth control/protection: I.U.D.   Other Topics Concern     Parent/sibling w/ CABG, MI or angioplasty before 65F 55M? Not Asked      Service No     Blood Transfusions No     Caffeine Concern No     Occupational Exposure No     Hobby Hazards No     Sleep Concern No     Stress Concern No     Weight Concern Yes     Special Diet No     Back Care No     Exercise Yes      Comment: 4-5 x a week     Bike Helmet No     Seat Belt Yes     Self-Exams Yes   Social History Narrative     Not on file     Social Determinants of Health     Financial Resource Strain: Not on file   Food Insecurity: Not on file   Transportation Needs: Not on file   Physical Activity: Not on file   Stress: Not on file   Social Connections: Not on file   Intimate Partner Violence: Not on file   Housing Stability: Not on file       MEDS:  See EMR, reviewed  ALL:  See EMR, reviewed    REVIEW OF SYSTEMS:  CONSTITUTIONAL:NEGATIVE for fever, chills, change in weight  INTEGUMENTARY/SKIN: NEGATIVE for worrisome rashes, moles or lesions  EYES: NEGATIVE for vision changes or irritation  ENT/MOUTH: NEGATIVE for ear, mouth and throat problems  RESP:NEGATIVE for significant cough or SOB  BREAST: NEGATIVE for masses, tenderness or discharge  CV: NEGATIVE for chest pain, palpitations or peripheral edema  GI: NEGATIVE for nausea, abdominal pain, heartburn, or change in bowel habits  :NEGATIVE for frequency, dysuria, or hematuria  :NEGATIVE for frequency, dysuria, or hematuria  NEURO: NEGATIVE for weakness, dizziness or paresthesias  ENDOCRINE: NEGATIVE for temperature intolerance, skin/hair changes  HEME/ALLERGY/IMMUNE: NEGATIVE for bleeding problems  PSYCHIATRIC: NEGATIVE for changes in mood or affect      Objective: She is a tender nodule at the A1 pulley of the bilateral third fingers.  She can flex the fingers against resistance and there is mild triggering noted, right greater than left.  Skin is intact.  No redness or signs of cellulitis.  Appropriate conversation and affect.    Assessment: Bilateral trigger finger, right greater than left, third finger    Plan: Through the  we discussed the nature of trigger finger anatomy.  She would like to try an oval 8 splint for the next 4 days.  We discussed that if despite the use of the oval 8 splint she has repetitive triggering and is having to manually reduce the  finger, that she has the option of discussing trigger finger release with hand surgery.  She plans on using the oval 8 splints and notify me if she would like a referral.

## 2023-05-04 NOTE — LETTER
[facility] [place] [address] [facility]  [address] [facility] [address]  [phone]        May 4, 2023    Regarding:  Nayeli APURVA Caal  [address]   [facility] [address]              To Whom It May Concern;    This patient was seen in orthopedic clinic today for hand discomfort.  She is using 2 finger splints.  She will not be at work tomorrow 5/5/2023.          Sincerely,        Sebas Bradley MD

## 2023-05-09 ENCOUNTER — OFFICE VISIT (OUTPATIENT)
Dept: ORTHOPEDICS | Facility: CLINIC | Age: 43
End: 2023-05-09
Payer: COMMERCIAL

## 2023-05-09 DIAGNOSIS — M65.332 TRIGGER MIDDLE FINGER OF LEFT HAND: ICD-10-CM

## 2023-05-09 DIAGNOSIS — M17.11 PRIMARY OSTEOARTHRITIS OF RIGHT KNEE: Primary | ICD-10-CM

## 2023-05-09 PROCEDURE — 99207 PR DROP WITH A PROCEDURE: CPT | Performed by: FAMILY MEDICINE

## 2023-05-09 PROCEDURE — 20610 DRAIN/INJ JOINT/BURSA W/O US: CPT | Mod: RT | Performed by: FAMILY MEDICINE

## 2023-05-09 NOTE — NURSING NOTE
[address] [address]  Dept: [phone]  ______________________________________________________________________________    Patient: Nayeli Caal   : 1980   MRN: [id number]   May 9, 2023    INVASIVE PROCEDURE SAFETY CHECKLIST    Date: 23   Procedure: Right knee Synvisc One injection  Patient Name: Nayeli Caal  MRN: [id number]  YOB: 1980    Action: Complete sections as appropriate. Any discrepancy results in a HARD COPY until resolved.     PRE PROCEDURE:  Patient ID verified with 2 identifiers (name and  or MRN): Yes  Procedure and site verified with patient/designee (when able): Yes  Accurate consent documentation in medical record: Yes  H&P (or appropriate assessment) documented in medical record: Yes  H&P must be up to 20 days prior to procedure and updates within 24 hours of procedure as applicable: NA  Relevant diagnostic and radiology test results appropriately labeled and displayed as applicable: Yes  Procedure site(s) marked with provider initials: NA    TIMEOUT:  Time-Out performed immediately prior to starting procedure, including verbal and active participation of all team members addressing the following:Yes  * Correct patient identify  * Confirmed that the correct side and site are marked  * An accurate procedure consent form  * Agreement on the procedure to be done  * Correct patient position  * Relevant images and results are properly labeled and appropriately displayed  * The need to administer antibiotics or fluids for irrigation purposes during the procedure as applicable   * Safety precautions based on patient history or medication use    DURING PROCEDURE: Verification of correct person, site, and procedures any time the responsibility for care of the patient is transferred to another member of the care team.       Prior to injection, verified patient identity using patient's name and  date of birth.  Due to injection administration, patient instructed to remain in clinic for 15 minutes  afterwards, and to report any adverse reaction to me immediately.    Joint injection was performed.      Drug Amount Wasted:  None.  Vial/Syringe: Single dose vial  Expiration Date:  1/1/26      Navi Munson, ATC  May 9, 2023

## 2023-05-09 NOTE — PROGRESS NOTES
PROCEDURE ENCOUNTER    [facility]  Orthopedics  Rosas Rodriguez DO  May 9, 2023     Name: Nayeli Caal  MRN: [id number]  Age: 42 year old  : 1980    Diagnosis:Primary osteoarthritis of right knee    Knee Injection with Hyaluronic Acid    The patient was informed of the risks and the benefits of the procedure and a written consent was signed.  The patient s right knee was prepped with chlorhexidine in sterile fashion.     Synvisc One syringe and medication removed from packaging and examined for package consistency.    Injection was performed using sterile technique.  Needle placement using landmark guidance at anterolateral aspect of knee.  There were no complications. The patient tolerated the procedure well. There was negligible bleeding.     The patient was instructed to ice the knee upon leaving clinic and refrain from overuse over the next 3 days.     The patient was instructed to call or go to the emergency room with any unusual pain, swelling, redness, or if otherwise concerned.    A follow up appointment will be scheduled to evaluate response to the injection, and to assess range of motion and pain.    Consider MRI if no improvement.  Last Synvisc injection provided 4.5 years of relief fortunately.    Avoid corticosteroid due to A1c greater than 9.    Referral for trigger finger provided given lack of improvement and interested in surgical intervention.    Rosas Rodriguez DO [facility]  [facility] Medicine      Large Joint Injection: R knee joint    Date/Time: 2023 6:13 PM    Performed by: Rosas Rodriguez DO  Authorized by: Rosas Rodriguez DO    Indications:  Pain and osteoarthritis  Needle Size:  21 G  Guidance: landmark guided    Approach:  Anterolateral  Location:  Knee      Medications:  48 mg hylan 48 MG/6ML  Outcome:  Tolerated well, no immediate complications  Procedure discussed: discussed risks, benefits, and alternatives    Consent Given by:  Patient  Timeout: timeout called  immediately prior to procedure    Prep: patient was prepped and draped in usual sterile fashion     Navi Munson ATC on 5/9/2023 at 6:13 PM

## 2023-05-09 NOTE — LETTER
2023      RE: Nayeli Caal  [address]  [facility] [address]     Dear Colleague,    Thank you for referring your patient, Nayeli Caal, to the [facility] [place]. Please see a copy of my visit note below.    PROCEDURE ENCOUNTER    [facility]  Orthopedics  Rosas Rodriguez DO  May 9, 2023     Name: Nayeli Caal  MRN: [id number]  Age: 42 year old  : 1980    Diagnosis:Primary osteoarthritis of right knee    Knee Injection with Hyaluronic Acid    The patient was informed of the risks and the benefits of the procedure and a written consent was signed.  The patient s right knee was prepped with chlorhexidine in sterile fashion.     Synvisc One syringe and medication removed from packaging and examined for package consistency.    Injection was performed using sterile technique.  Needle placement using landmark guidance at anterolateral aspect of knee.  There were no complications. The patient tolerated the procedure well. There was negligible bleeding.     The patient was instructed to ice the knee upon leaving clinic and refrain from overuse over the next 3 days.     The patient was instructed to call or go to the emergency room with any unusual pain, swelling, redness, or if otherwise concerned.    A follow up appointment will be scheduled to evaluate response to the injection, and to assess range of motion and pain.    Consider MRI if no improvement.  Last Synvisc injection provided 4.5 years of relief fortunately.    Avoid corticosteroid due to A1c greater than 9.    Referral for trigger finger provided given lack of improvement and interested in surgical intervention.    Rosas Rodriguez DO [facility]  Primary Care Sports Medicine      Large Joint Injection: R knee joint    Date/Time: 2023 6:13 PM    Performed by: Rosas Rodriguez DO  Authorized by: Rosas Rodriguez DO    Indications:  Pain and osteoarthritis  Needle Size:  21 G  Guidance:  landmark guided    Approach:  Anterolateral  Location:  Knee      Medications:  48 mg hylan 48 MG/6ML  Outcome:  Tolerated well, no immediate complications  Procedure discussed: discussed risks, benefits, and alternatives    Consent Given by:  Patient  Timeout: timeout called immediately prior to procedure    Prep: patient was prepped and draped in usual sterile fashion     Navi Munson ATC on 5/9/2023 at 6:13 PM        Again, thank you for allowing me to participate in the care of your patient.      Sincerely,    Rosas Rodriguez, DO

## 2023-05-11 NOTE — TELEPHONE ENCOUNTER
DIAGNOSIS: Trigger middle finger of left hand   APPOINTMENT DATE: 5.30.23   NOTES STATUS DETAILS   OFFICE NOTE from referring provider Internal 5.9.23 - Rosas Rodriguez,      OFFICE NOTE from other specialist Internal 5.4.23 - Sebas Bradley MD   7.20.20 - Greg Streeter MD   7.8.20 - Greg Streeter Aila, MD   12.30.19 - Greg Streeter Aila, MD   7.10.19 - Greg Streeter Aila, MD     [facility]/ Nurse Visit   6.13.19      MEDICATION LIST Internal    XRAYS (IMAGES & REPORTS) Internal 7.24.18 - XR Hand Bilateral

## 2023-05-23 ENCOUNTER — ALLIED HEALTH/NURSE VISIT (OUTPATIENT)
Dept: EDUCATION SERVICES | Facility: CLINIC | Age: 43
End: 2023-05-23
Payer: COMMERCIAL

## 2023-05-23 DIAGNOSIS — Z79.4 UNCONTROLLED TYPE 2 DIABETES MELLITUS WITH HYPERGLYCEMIA, WITH LONG-TERM CURRENT USE OF INSULIN (H): Primary | ICD-10-CM

## 2023-05-23 DIAGNOSIS — E11.65 UNCONTROLLED TYPE 2 DIABETES MELLITUS WITH HYPERGLYCEMIA, WITH LONG-TERM CURRENT USE OF INSULIN (H): Primary | ICD-10-CM

## 2023-05-23 PROCEDURE — G0108 DIAB MANAGE TRN  PER INDIV: HCPCS | Performed by: REGISTERED NURSE

## 2023-05-23 PROCEDURE — T1013 SIGN LANG/ORAL INTERPRETER: HCPCS | Mod: U3

## 2023-05-24 VITALS — BODY MASS INDEX: 32.23 KG/M2 | WEIGHT: 180.1 LBS

## 2023-05-24 NOTE — PROGRESS NOTES
Diabetes Self-Management Education & Support    Nayeli Caal presents today for education related to Type 2 diabetes    Patient is being treated with:  oral agents, long and rapid acting insulin, and GLP-1 agonist.  She is accompanied by self and .  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      Year of diagnosis: 2013 or 2014  Referring provider:  Ann eMarie Medina PA-C  Living Situation: Lives with a room mate  Employment: Administrative work for [employer]    PATIENT CONCERNS RELATED TO DIABETES SELF MANAGEMENT:   Here for routine follow-up, and to receive instruction in using the Dexcom G7.      ASSESSMENT:    Taking Medication:     Current Diabetes Management per Patient:  Taking diabetes medications?   yes:     Diabetes Medication(s)     Insulin       insulin aspart (NOVOLOG FLEXPEN) 100 UNIT/ML pen    1 unit per 5 grams CHO and correction scale of 1/25/125.  Approximate daily use is 100 units.     insulin aspart (NOVOPEN ECHO) 100 UNIT/ML cartridge    For use with the In-Pen device.  Give 1 unit per 5 grams CHO with meals and snacks as well as correction.  Average daily use is 100 units daily.     insulin glargine (BASAGLAR KWIKPEN) 100 UNIT/ML pen    Inject 60 Units Subcutaneous daily 3 month supply.    Incretin Mimetic Agents       semaglutide (OZEMPIC) 2 MG/3ML SOPN pen    Administer 0.25 mg subcutaneously weekly for one month, then increase dose to 0.5 mg weekly for one month.     Semaglutide, 1 MG/DOSE, (OZEMPIC) 4 MG/3ML pen    Inject 1 mg Subcutaneous every 7 days          Monitoring    Patient glucose self monitoring as follows: continuously using a continuous glucose monitor (CGM)  BG meter: Dexcom G6  BG results: See report below:          Patient's most recent   Lab Results   Component Value Date    A1C 9.9 04/04/2023    A1C 11.6 04/05/2021      Patient's A1C goal: <7.0    EDUCATION and INSTRUCTION PROVIDED AT THIS VISIT:      Currently taking 60 units of Basaglar and In-Pen  settings as follows:   Insulin to Carb ratio throughout the day is 1 unit per 3 grams CHO.    Correction Factor throughout the day is 1 unit per 20 mg/dL   Blood Glucose Target is 110 throughout the day and 130 overnight.    Glucoses are all still high, however she is ready to step up to a 1 mg dose of Ozempic.  She has been taking 0.5mg for a month now, and is having no ill effects.  Pleased that her appetite seems to be under better control.  I did not make any changes in her insulin dosing, since she is going to double her dose of Ozempic and I expect we will see lower post-prandial glucoses.   She is still missing some doses of her RA insulin or gives it late.      She is pleased to see some progress since starting Ozempic.   Overall Time in Range has improved from 33% to 38%.    No hypoglycemia.   Average BG has decreased from 233 mg/dL to 203 mg/dL    Discussed increasing Ozempic from 0.5 mg to 1.0 mg.  She feels ready to do this.  Discussed that she may experience more of the effects associated with taking the medication, and cautioned her to pay close attention to satiety signals and to stop eating as soon as she starts to feel full.  She indicated understanding.      She has been having some pain and numbness in her hands.  She has seen ortho, and they feel that this is the return of her trigger finger.  She is trying to get her diabetes in the best control she can in preparation for hand surgery and also a hysterectomy.  She is quite bothered by the pain in her hands as it affects her ability to keyboard, and to sign.     Showed her how the Dexcom G7 works, and got her started with her first one.  She is connected to our clinic already.      Patient-stated goal written and given to Nayeli Caal.  Verbalized and demonstrated understanding of instructions.     PLAN:  See patient instructions  AVS printed and given to patient    FOLLOW-UP:      Suggest in-person follow up in one month.  She does not  see Anne Marieedi Medina again until August.  If she is doing OK on her 1 mg dose of Ozempic, we will increase to 2 mg.  Asked her to call if she starts to experience hypoglycemia before our next scheduled appointment.     Time spent with patient at today's visit was 60 minutes.      Any diabetes medication dose changes were made via the [facility] Protocol and Collaborative Practice Agreement with [facility] and  [facility].  A copy of this encounter was provided to patient's referring provider.

## 2023-05-25 ENCOUNTER — THERAPY VISIT (OUTPATIENT)
Dept: PHYSICAL THERAPY | Facility: CLINIC | Age: 43
End: 2023-05-25
Payer: COMMERCIAL

## 2023-05-25 DIAGNOSIS — S43.52XA SPRAIN OF LEFT ACROMIOCLAVICULAR LIGAMENT, INITIAL ENCOUNTER: ICD-10-CM

## 2023-05-25 PROCEDURE — 97110 THERAPEUTIC EXERCISES: CPT | Mod: GP

## 2023-05-25 PROCEDURE — 97161 PT EVAL LOW COMPLEX 20 MIN: CPT | Mod: GP
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
## 2023-05-30 ENCOUNTER — OFFICE VISIT (OUTPATIENT)
Dept: ORTHOPEDICS | Facility: CLINIC | Age: 43
End: 2023-05-30
Payer: COMMERCIAL

## 2023-05-30 ENCOUNTER — PRE VISIT (OUTPATIENT)
Dept: ORTHOPEDICS | Facility: CLINIC | Age: 43
End: 2023-05-30

## 2023-05-30 DIAGNOSIS — M65.332 TRIGGER MIDDLE FINGER OF LEFT HAND: ICD-10-CM

## 2023-05-30 DIAGNOSIS — M65.331 TRIGGER MIDDLE FINGER OF RIGHT HAND: Primary | ICD-10-CM

## 2023-05-30 PROCEDURE — 99204 OFFICE O/P NEW MOD 45 MIN: CPT | Performed by: STUDENT IN AN ORGANIZED HEALTH CARE EDUCATION/TRAINING PROGRAM

## 2023-05-30 NOTE — H&P (VIEW-ONLY)
Chief Complaint:   Chief Complaint   Patient presents with     Consult     Right and Left middle finger trigger finger Right >Left       Diagnosis: Right middle trigger finger, right index trigger finger, left middle trigger finger  Treatment:   2019: Right trigger thumb release, left ring trigger finger release (Dr. Streeter)    History of Present Illness: Nayeli Caal is a 42 year old LHD female presenting for evaluation of trigger fingers.  She has had the pain for several months.  The right hand is worse than the left.  The right middle finger is more symptomatic, but the right index finger started becoming painful and triggering last week.  She denies numbness or tingling.  She has pain and triggering all the time.  She has not had steroid injections.  She has tried a brace which sometimes helps.  Because she communicates via ASL, the triggering impacts her ability to communicate.    She had good relief of her right trigger thumb and left ring trigger finger after surgical release in 2019 and she would like to have surgery.    Documentation by Dr. Bradley on 5/4/2023 was reviewed.    Occupation: [employer]    Past Medical History:   Past Medical History:   Diagnosis Date     Combined visual and hearing impairment      Deaf      Diabetic retinopathy of both eyes (H) 8/19/2011     Hepatic steatosis 08/19/2011     History of tobacco use      Hyperlipidemia      Hypertension      Hypertriglyceridemia      Migraines      Obesity 8/19/2011     Problem list name updated by automated process. 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  Ganglion cyst excision.;  Surgeon: Greg Streeter MD;  Location: [facility] OR       Medications:   Current Outpatient Medications:      acetaminophen (TYLENOL) 500 MG tablet, Take 2 tablets (1,000 mg) by mouth every 6 hours as needed for mild pain, Disp: 100 tablet, Rfl: 3     Alcohol Swabs (ALCOHOL PREP PAD) 70 % PADS, 1 each 3 times daily, Disp: 100 each, Rfl: 3     aspirin (ASA) 81 MG chewable tablet, Take 1 tablet (81 mg) by mouth daily CHEW AND SWALLOW, Disp: 90 tablet, Rfl: 3     atorvastatin (LIPITOR) 40 MG tablet, Take 1 tablet (40 mg) by mouth daily, Disp: 90 tablet, Rfl: 0     B-D U/F insulin pen needle, , Disp: , Rfl:      BD ULTRA FINE PEN NEEDLES, Inject 1 dose. Subcutaneous 2 times daily BD ultra-fine insulin syringe, 30 ga. X 1/2'' short needle 1/2 cc. Use as directed., Disp: 400 Units, Rfl: 11     blood glucose (ACCU-CHEK LAYA PLUS) test strip, Use with  Accucheck Expert meter.  Test blood sugar 6 times daily., Disp: 600 each, Rfl: 3     blood glucose monitoring (ACCU-CHEK FASTCLIX) lancets, Use to test blood sugar 6 times daily or as directed, Disp: 510 each, Rfl: 3     Continuous Blood Gluc Sensor (DEXCOM G6 SENSOR) MISC, Change every 10 days. 3 month supply., Disp: 9 each, Rfl: 3     Continuous Blood Gluc Sensor (DEXCOM G7 SENSOR) MISC, 1 each every 10 days, Disp: 9 each, Rfl: 3     Continuous Blood Gluc Transmit (DEXCOM G6 TRANSMITTER) MISC, Change every 3 months., Disp: 1 each, Rfl: 3     ergocalciferol (ERGOCALCIFEROL) 1.25 MG (73347 UT) capsule, Take 1 capsule (50,000 Units) by mouth once a week For additional refills, please schedule a follow-up appointment at [phone], Disp: 12 capsule, Rfl: 3     fenofibrate  (TRIGLIDE/LOFIBRA) 160 MG tablet, Take 1 tablet (160 mg) by mouth daily, Disp: 90 tablet, Rfl: 0     fish oil-omega-3 fatty acids 1000 MG capsule, TAKE TWO CAPSULES (2 GM) BY MOUTH ONCE DAILY, Disp: 180 capsule, Rfl: 3     hydrocortisone (CORTAID) 1 % external cream, Apply topically 2 times daily, Disp: 120 g, Rfl: 1     ibuprofen (ADVIL/MOTRIN) 600 MG tablet, Take 1 tablet (600 mg) by mouth every 6 hours as needed for moderate pain, Disp: 120 tablet, Rfl: 1     Injection Device for insulin (INPEN 100-BLUE-NOVOLOG-FIASP) DAVID, 1 each continuous, Disp: 1 each, Rfl: 0     insulin aspart (NOVOLOG FLEXPEN) 100 UNIT/ML pen, 1 unit per 5 grams CHO and correction scale of 1/25/125.  Approximate daily use is 100 units., Disp: 30 mL, Rfl: 2     insulin aspart (NOVOPEN ECHO) 100 UNIT/ML cartridge, For use with the In-Pen device.  Give 1 unit per 5 grams CHO with meals and snacks as well as correction.  Average daily use is 100 units daily., Disp: 90 mL, Rfl: 3     insulin glargine (BASAGLAR KWIKPEN) 100 UNIT/ML pen, Inject 60 Units Subcutaneous daily 3 month supply., Disp: 54 mL, Rfl: 3     insulin pen needle (B-D U/F) 31G X 8 MM miscellaneous, USE AS DIRECTED., Disp: 200 each, Rfl: 1     losartan (COZAAR) 100 MG tablet, Take 1 tablet (100 mg) by mouth daily, Disp: 90 tablet, Rfl: 1     naproxen (NAPROSYN) 375 MG tablet, Take 1 tablet (375 mg) by mouth 2 times daily (with meals), Disp: 60 tablet, Rfl: 3     Ostomy Supplies (ADHESIVE REMOVER WIPES) MISC, 1 each every 14 days, Disp: 50 each, Rfl: 3     Ostomy Supplies (SKIN TAC ADHESIVE BARRIER WIPE) MISC, 1 each every 14 days, Disp: 6 each, Rfl: 3     semaglutide (OZEMPIC) 2 MG/3ML SOPN pen, Administer 0.25 mg subcutaneously weekly for one month, then increase dose to 0.5 mg weekly for one month., Disp: 3 mL, Rfl: 1     Semaglutide, 1 MG/DOSE, (OZEMPIC) 4 MG/3ML pen, Inject 1 mg Subcutaneous every 7 days, Disp: 9 mL, Rfl: 0    Current Facility-Administered Medications:       hydrocortisone (CORTAID) 1 % cream, , Topical, TID, Oneida, Mariya M, APRN CNP     hylan (SYNVISC ONE) injection 48 mg, 48 mg, , , Rosas Rodriguez DO, 48 mg at 23 1813    Allergy: No Known Allergies    Social History:   History   Smoking Status     Former     Types: Cigarettes     Quit date: 2010   Smokeless Tobacco     Never      Social History     Tobacco Use     Smoking status: Former     Types: Cigarettes     Quit date: 2010     Years since quittin.5     Smokeless tobacco: Never     Tobacco comments:     stopped 10 yrs ago   Substance Use Topics     Alcohol use: Yes     Comment: socially     Drug use: No        Family History:   Family History   Problem Relation Age of Onset     Unknown/Adopted Other        Physical Examination:  There were no vitals filed for this visit.  There is no height or weight on file to calculate BMI.    Well appearing, well nourished  Alert and oriented x 3, cooperative with exam     Bilateral hands  Skin intact  Tender to palpation over A1 pulley region of right middle finger, right index finger, left middle finger; with palpable nodules, triggering and pain with active motion  Prior incisions over right thumb A1 pulley and left ring A1 pulley are healed, no tenderness to palpation  Able to make full fist and fully extend digits  Motor Exam: Intact TU/OK/x2-3  Sensory Exam: Sensation intact to light touch in FDWS (radial), volar IF (median), volar SF (ulnar)  Vascular Exam: 2+ radial pulse, < 2 sec capillary refill    Assessment: Nayeli Caal is a 42 year old female with right middle and index trigger finger, left middle trigger finger.    Plan:   I had a discussion with the patient regarding my clinical findings, diagnosis, and treatment plan. I reviewed the treatment options for trigger digit with the patient (observation, steroid injection, A1 pulley release) as well as the risks and benefits of each.  At this time, since her symptoms continue  despite bracing and she does not wish to pursue a steroid injection, she elects operative intervention. The risks of surgery include but are not limited to infection, bleeding, nerve injury, permanent numbness, recurrence, post-operative stiffness, surgical scar, CRPS, anesthetic complications, etc. Because of her poorly controlled diabetes, she is at increased risk of infection.  We also discussed that there is a possibility that the surgery will not be successful and will require repeat surgery. We reviewed post-operative pain management and rehabilitation.   The patient understands and agrees to the treatment plan.  All questions answered.       Plan for operative intervention in the form of right middle finger and index finger A1 pulley release.  Plan for left middle finger A1 pulley release 2 weeks after right side pending clinical improvement on the right hand.    Next Visit:    Follow-up: 10-14 days after surgery.    Imaging: None    Plan: Wound check.      TRICIA SEYMOUR MD

## 2023-05-30 NOTE — NURSING NOTE
Reason For Visit:   Chief Complaint   Patient presents with     Consult     Right and Left middle finger trigger finger Right >Left       Primary MD: Mariya Mohamud  Ref. MD: Oneida    Age: 42 year old    ?  Yes, specify language: American Sign Language.      There were no vitals taken for this visit.      Pain Assessment  Patient Currently in Pain: Yes  0-10 Pain Scale: 8 (8/10 right 4/10 for left)  Primary Pain Location: Finger (Comment which one) (Right > Left Middle fingers)  Pain Descriptors: Intermittent, Constant, Tightness    Hand Dominance Evaluation  Hand Dominance: Left          QuickDASH Assessment       View : No data to display.                   Current Outpatient Medications   Medication Sig Dispense Refill     acetaminophen (TYLENOL) 500 MG tablet Take 2 tablets (1,000 mg) by mouth every 6 hours as needed for mild pain 100 tablet 3     Alcohol Swabs (ALCOHOL PREP PAD) 70 % PADS 1 each 3 times daily 100 each 3     aspirin (ASA) 81 MG chewable tablet Take 1 tablet (81 mg) by mouth daily CHEW AND SWALLOW 90 tablet 3     atorvastatin (LIPITOR) 40 MG tablet Take 1 tablet (40 mg) by mouth daily 90 tablet 0     B-D U/F insulin pen needle        BD ULTRA FINE PEN NEEDLES Inject 1 dose. Subcutaneous 2 times daily BD ultra-fine insulin syringe, 30 ga. X 1/2'' short needle 1/2 cc. Use as directed. 400 Units 11     blood glucose (ACCU-CHEK LAYA PLUS) test strip Use with  Accucheck Expert meter.  Test blood sugar 6 times daily. 600 each 3     blood glucose monitoring (ACCU-CHEK FASTCLIX) lancets Use to test blood sugar 6 times daily or as directed 510 each 3     Continuous Blood Gluc Sensor (DEXCOM G6 SENSOR) MISC Change every 10 days. 3 month supply. 9 each 3     Continuous Blood Gluc Sensor (DEXCOM G7 SENSOR) MISC 1 each every 10 days 9 each 3     Continuous Blood Gluc Transmit (DEXCOM G6 TRANSMITTER) MISC Change every 3 months. 1 each 3     ergocalciferol (ERGOCALCIFEROL) 1.25 MG (37133 UT)  capsule Take 1 capsule (50,000 Units) by mouth once a week For additional refills, please schedule a follow-up appointment at [phone] 12 capsule 3     fenofibrate (TRIGLIDE/LOFIBRA) 160 MG tablet Take 1 tablet (160 mg) by mouth daily 90 tablet 0     fish oil-omega-3 fatty acids 1000 MG capsule TAKE TWO CAPSULES (2 GM) BY MOUTH ONCE DAILY 180 capsule 3     hydrocortisone (CORTAID) 1 % external cream Apply topically 2 times daily 120 g 1     ibuprofen (ADVIL/MOTRIN) 600 MG tablet Take 1 tablet (600 mg) by mouth every 6 hours as needed for moderate pain 120 tablet 1     Injection Device for insulin (INPEN 100-BLUE-NOVOLOG-FIASP) DAVID 1 each continuous 1 each 0     insulin aspart (NOVOLOG FLEXPEN) 100 UNIT/ML pen 1 unit per 5 grams CHO and correction scale of 1/25/125.  Approximate daily use is 100 units. 30 mL 2     insulin aspart (NOVOPEN ECHO) 100 UNIT/ML cartridge For use with the In-Pen device.  Give 1 unit per 5 grams CHO with meals and snacks as well as correction.  Average daily use is 100 units daily. 90 mL 3     insulin glargine (BASAGLAR KWIKPEN) 100 UNIT/ML pen Inject 60 Units Subcutaneous daily 3 month supply. 54 mL 3     insulin pen needle (B-D U/F) 31G X 8 MM miscellaneous USE AS DIRECTED. 200 each 1     losartan (COZAAR) 100 MG tablet Take 1 tablet (100 mg) by mouth daily 90 tablet 1     naproxen (NAPROSYN) 375 MG tablet Take 1 tablet (375 mg) by mouth 2 times daily (with meals) 60 tablet 3     Ostomy Supplies (ADHESIVE REMOVER WIPES) MISC 1 each every 14 days 50 each 3     Ostomy Supplies (SKIN TAC ADHESIVE BARRIER WIPE) MISC 1 each every 14 days 6 each 3     semaglutide (OZEMPIC) 2 MG/3ML SOPN pen Administer 0.25 mg subcutaneously weekly for one month, then increase dose to 0.5 mg weekly for one month. 3 mL 1     Semaglutide, 1 MG/DOSE, (OZEMPIC) 4 MG/3ML pen Inject 1 mg Subcutaneous every 7 days 9 mL 0       No Known Allergies    LORENZO FLEMING, ATC

## 2023-05-30 NOTE — PROGRESS NOTES
"Chief Complaint:   Chief Complaint   Patient presents with     Consult     Right and Left middle finger trigger finger Right >Left       Date of Injury: ***  Mechanism of Injury: ***  Diagnosis: ***  Treatment: ***    History of Present Illness: Nayeli Caal is a 42 year old {***:02409::\"R\",\"L\"}HD female presenting for evaluation of ***.    Clinical documentation by  *** on *** was reviewed.    Occupation: ***    Past Medical History:   Past Medical History:   Diagnosis Date     Combined visual and hearing impairment      Deaf      Diabetic retinopathy of both eyes (H) 8/19/2011     Hepatic steatosis 08/19/2011     History of tobacco use      Hyperlipidemia      Hypertension      Hypertriglyceridemia      Migraines      Obesity 8/19/2011     Problem list name updated by automated process. 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  Ganglion cyst excision.;  Surgeon: Greg Streeter MD;  Location: [facility]       Medications:   Current Outpatient Medications:      acetaminophen (TYLENOL) 500 MG tablet, Take 2 tablets (1,000 mg) by mouth every 6 hours as needed for mild pain, Disp: 100 tablet, Rfl: 3     Alcohol Swabs (ALCOHOL PREP PAD) 70 % PADS, 1 each 3 times daily, Disp: 100 each, Rfl: 3     aspirin (ASA) 81 MG chewable tablet, Take 1 tablet (81 mg) by mouth daily " CHEW AND SWALLOW, Disp: 90 tablet, Rfl: 3     atorvastatin (LIPITOR) 40 MG tablet, Take 1 tablet (40 mg) by mouth daily, Disp: 90 tablet, Rfl: 0     B-D U/F insulin pen needle, , Disp: , Rfl:      BD ULTRA FINE PEN NEEDLES, Inject 1 dose. Subcutaneous 2 times daily BD ultra-fine insulin syringe, 30 ga. X 1/2'' short needle 1/2 cc. Use as directed., Disp: 400 Units, Rfl: 11     blood glucose (ACCU-CHEK LAYA PLUS) test strip, Use with  Accucheck Expert meter.  Test blood sugar 6 times daily., Disp: 600 each, Rfl: 3     blood glucose monitoring (ACCU-CHEK FASTCLIX) lancets, Use to test blood sugar 6 times daily or as directed, Disp: 510 each, Rfl: 3     Continuous Blood Gluc Sensor (DEXCOM G6 SENSOR) MISC, Change every 10 days. 3 month supply., Disp: 9 each, Rfl: 3     Continuous Blood Gluc Sensor (DEXCOM G7 SENSOR) MISC, 1 each every 10 days, Disp: 9 each, Rfl: 3     Continuous Blood Gluc Transmit (DEXCOM G6 TRANSMITTER) MISC, Change every 3 months., Disp: 1 each, Rfl: 3     ergocalciferol (ERGOCALCIFEROL) 1.25 MG (89331 UT) capsule, Take 1 capsule (50,000 Units) by mouth once a week For additional refills, please schedule a follow-up appointment at [phone], Disp: 12 capsule, Rfl: 3     fenofibrate (TRIGLIDE/LOFIBRA) 160 MG tablet, Take 1 tablet (160 mg) by mouth daily, Disp: 90 tablet, Rfl: 0     fish oil-omega-3 fatty acids 1000 MG capsule, TAKE TWO CAPSULES (2 GM) BY MOUTH ONCE DAILY, Disp: 180 capsule, Rfl: 3     hydrocortisone (CORTAID) 1 % external cream, Apply topically 2 times daily, Disp: 120 g, Rfl: 1     ibuprofen (ADVIL/MOTRIN) 600 MG tablet, Take 1 tablet (600 mg) by mouth every 6 hours as needed for moderate pain, Disp: 120 tablet, Rfl: 1     Injection Device for insulin (INPEN 100-BLUE-NOVOLOG-FIASP) DAVID, 1 each continuous, Disp: 1 each, Rfl: 0     insulin aspart (NOVOLOG FLEXPEN) 100 UNIT/ML pen, 1 unit per 5 grams CHO and correction scale of 1/25/125.  Approximate daily use is 100 units., Disp:  30 mL, Rfl: 2     insulin aspart (NOVOPEN ECHO) 100 UNIT/ML cartridge, For use with the In-Pen device.  Give 1 unit per 5 grams CHO with meals and snacks as well as correction.  Average daily use is 100 units daily., Disp: 90 mL, Rfl: 3     insulin glargine (BASAGLAR KWIKPEN) 100 UNIT/ML pen, Inject 60 Units Subcutaneous daily 3 month supply., Disp: 54 mL, Rfl: 3     insulin pen needle (B-D U/F) 31G X 8 MM miscellaneous, USE AS DIRECTED., Disp: 200 each, Rfl: 1     losartan (COZAAR) 100 MG tablet, Take 1 tablet (100 mg) by mouth daily, Disp: 90 tablet, Rfl: 1     naproxen (NAPROSYN) 375 MG tablet, Take 1 tablet (375 mg) by mouth 2 times daily (with meals), Disp: 60 tablet, Rfl: 3     Ostomy Supplies (ADHESIVE REMOVER WIPES) MISC, 1 each every 14 days, Disp: 50 each, Rfl: 3     Ostomy Supplies (SKIN TAC ADHESIVE BARRIER WIPE) MISC, 1 each every 14 days, Disp: 6 each, Rfl: 3     semaglutide (OZEMPIC) 2 MG/3ML SOPN pen, Administer 0.25 mg subcutaneously weekly for one month, then increase dose to 0.5 mg weekly for one month., Disp: 3 mL, Rfl: 1     Semaglutide, 1 MG/DOSE, (OZEMPIC) 4 MG/3ML pen, Inject 1 mg Subcutaneous every 7 days, Disp: 9 mL, Rfl: 0    Current Facility-Administered Medications:      hydrocortisone (CORTAID) 1 % cream, , Topical, TID, Overpramod, Mariya M, APRN CNP     hylan (SYNVISC ONE) injection 48 mg, 48 mg, , , Rosas Rodriguez, DO, 48 mg at 23 [id number]    Allergy: No Known Allergies    Social History:   History   Smoking Status     Former     Types: Cigarettes     Quit date: 2010   Smokeless Tobacco     Never      Social History     Tobacco Use     Smoking status: Former     Types: Cigarettes     Quit date: 2010     Years since quittin.5     Smokeless tobacco: Never     Tobacco comments:     stopped 10 yrs ago   Substance Use Topics     Alcohol use: Yes     Comment: socially     Drug use: No        Family History:   Family History   Problem Relation Age of Onset     Unknown/Adopted  "Other        Physical Examination:  There were no vitals filed for this visit.  There is no height or weight on file to calculate BMI.    Well appearing, well nourished  Alert and oriented x 3, cooperative with exam     {***:19197::\"Right\",\"Left\"} {***:19197::\"thumb\",\"index finger\",\"middle finger\",\"ring finger\",\"small finger\",\"hand\",\"wrist\",\"elbow\",\"shoulder\",\"clavicle\",\"upper extremity\"}  {***:19197::\"Skin intact\",\"Incisions well healed\"}  No STS  TTP    Radial styloid: {***:19197::\"yes\",\"no\"}     Scaphoid tubercle: {***:19197::\"yes\",\"no\"}     Anatomic snuff box: {***:19197::\"yes\",\"no\"}    SL interval: {***:19197::\"yes\",\"no\"}     Ulnocarpal joint: {***:19197::\"yes\",\"no\"}     Pisotriquetral compression: {***:19197::\"yes\",\"no\"}     ECU: {***:19197::\"yes\",\"no\"}   Fovea sign: {***:19197::\"positive\",\"negative\"}   DRUJ {***:19197::\"stable in pronation, neutral, and pronation.\"}   Range of Motion:    Elbow: extension/flexion {***:19197::\"0\"}/{***:19197::\"140\"}    Forearm: pronation/supination {***:19197::\"80\"}/{***:19197::\"80\"}    Wrist: extension/flexion {***:19197::\"70\"}/{***:49211::\"70\"}    Digit: {***:75252::\"full AROM in DIP, PIP, MP joints\"}  Motor Exam: {***:30845::\"Intact TU/OK/x2-3\"}  Sensory Exam: {***:44050::\"Sensation intact to light touch in FDWS (radial), volar IF (median), volar SF (ulnar)\"}  Vascular Exam: {***:41830::\"2+ radial pulse, < 2 sec capillary refill\"}    Imaging/Studies:  {***:08719::\"XR (3 views)\",\"MRI\"} of the {***:95146::\"right\",\"left\"} {***:48163::\"thumb\",\"index finger\",\"middle finger\",\"ring finger\",\"small finger\",\"hand\",\"wrist\",\"forearm\",\"elbow\",\"humerus\",\"shoulder\",\"clavicle\"} was obtained {***:23811::\"5/30/2023\"}.  I reviewed the images with the patient.  The imaging study shows {***:73263::\"no fracture/dislocation.  Well maintained joint space\"}.    Assessment: Nayeli Caal is a 42 year old female with ***.    Plan:   I had a discussion with the patient regarding my clinical " "findings, diagnosis, and treatment plan. ***.  All questions answered.      ***    Next Visit:    Follow-up: {***:19197::\"1\",\"2\",\"3\",\"4\",\"5\",\"6\"} {***:19197::\"week(s)\",\"month(s)\"}.    Imaging: {***:19197::\"None.\",\"XR ***\"}.    Plan: ***.      TRICIA SEYMOUR MD  "

## 2023-05-30 NOTE — PROGRESS NOTES
Chief Complaint:   Chief Complaint   Patient presents with     Consult     Right and Left middle finger trigger finger Right >Left       Diagnosis: Right middle trigger finger, right index trigger finger, left middle trigger finger  Treatment:   2019: Right trigger thumb release, left ring trigger finger release (Dr. Streeter)    History of Present Illness: Nayeli Caal is a 42 year old LHD female presenting for evaluation of trigger fingers.  She has had the pain for several months.  The right hand is worse than the left.  The right middle finger is more symptomatic, but the right index finger started becoming painful and triggering last week.  She denies numbness or tingling.  She has pain and triggering all the time.  She has not had steroid injections.  She has tried a brace which sometimes helps.  Because she communicates via ASL, the triggering impacts her ability to communicate.    She had good relief of her right trigger thumb and left ring trigger finger after surgical release in 2019 and she would like to have surgery.    Documentation by Dr. Bradley on 5/4/2023 was reviewed.    Occupation: [place]    Past Medical History:   Past Medical History:   Diagnosis Date     Combined visual and hearing impairment      Deaf      Diabetic retinopathy of both eyes (H) 8/19/2011     Hepatic steatosis 08/19/2011     History of tobacco use      Hyperlipidemia      Hypertension      Hypertriglyceridemia      Migraines      Obesity 8/19/2011     Problem list name updated by automated process. 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  Ganglion cyst excision.;  Surgeon: Greg Streeter MD;  [facility]       Medications:   Current Outpatient Medications:      acetaminophen (TYLENOL) 500 MG tablet, Take 2 tablets (1,000 mg) by mouth every 6 hours as needed for mild pain, Disp: 100 tablet, Rfl: 3     Alcohol Swabs (ALCOHOL PREP PAD) 70 % PADS, 1 each 3 times daily, Disp: 100 each, Rfl: 3     aspirin (ASA) 81 MG chewable tablet, Take 1 tablet (81 mg) by mouth daily CHEW AND SWALLOW, Disp: 90 tablet, Rfl: 3     atorvastatin (LIPITOR) 40 MG tablet, Take 1 tablet (40 mg) by mouth daily, Disp: 90 tablet, Rfl: 0     B-D U/F insulin pen needle, , Disp: , Rfl:      BD ULTRA FINE PEN NEEDLES, Inject 1 dose. Subcutaneous 2 times daily BD ultra-fine insulin syringe, 30 ga. X 1/2'' short needle 1/2 cc. Use as directed., Disp: 400 Units, Rfl: 11     blood glucose (ACCU-CHEK LAYA PLUS) test strip, Use with  Accucheck Expert meter.  Test blood sugar 6 times daily., Disp: 600 each, Rfl: 3     blood glucose monitoring (ACCU-CHEK FASTCLIX) lancets, Use to test blood sugar 6 times daily or as directed, Disp: 510 each, Rfl: 3     Continuous Blood Gluc Sensor (DEXCOM G6 SENSOR) MISC, Change every 10 days. 3 month supply., Disp: 9 each, Rfl: 3     Continuous Blood Gluc Sensor (DEXCOM G7 SENSOR) MISC, 1 each every 10 days, Disp: 9 each, Rfl: 3     Continuous Blood Gluc Transmit (DEXCOM G6 TRANSMITTER) MISC, Change every 3 months., Disp: 1 each, Rfl: 3     ergocalciferol (ERGOCALCIFEROL) 1.25 MG (73646 UT) capsule, Take 1 capsule (50,000 Units) by mouth once a week For additional refills, please schedule a follow-up appointment at [phone], Disp: 12 capsule, Rfl: 3     fenofibrate  (TRIGLIDE/LOFIBRA) 160 MG tablet, Take 1 tablet (160 mg) by mouth daily, Disp: 90 tablet, Rfl: 0     fish oil-omega-3 fatty acids 1000 MG capsule, TAKE TWO CAPSULES (2 GM) BY MOUTH ONCE DAILY, Disp: 180 capsule, Rfl: 3     hydrocortisone (CORTAID) 1 % external cream, Apply topically 2 times daily, Disp: 120 g, Rfl: 1     ibuprofen (ADVIL/MOTRIN) 600 MG tablet, Take 1 tablet (600 mg) by mouth every 6 hours as needed for moderate pain, Disp: 120 tablet, Rfl: 1     Injection Device for insulin (INPEN 100-BLUE-NOVOLOG-FIASP) DAVID, 1 each continuous, Disp: 1 each, Rfl: 0     insulin aspart (NOVOLOG FLEXPEN) 100 UNIT/ML pen, 1 unit per 5 grams CHO and correction scale of 1/25/125.  Approximate daily use is 100 units., Disp: 30 mL, Rfl: 2     insulin aspart (NOVOPEN ECHO) 100 UNIT/ML cartridge, For use with the In-Pen device.  Give 1 unit per 5 grams CHO with meals and snacks as well as correction.  Average daily use is 100 units daily., Disp: 90 mL, Rfl: 3     insulin glargine (BASAGLAR KWIKPEN) 100 UNIT/ML pen, Inject 60 Units Subcutaneous daily 3 month supply., Disp: 54 mL, Rfl: 3     insulin pen needle (B-D U/F) 31G X 8 MM miscellaneous, USE AS DIRECTED., Disp: 200 each, Rfl: 1     losartan (COZAAR) 100 MG tablet, Take 1 tablet (100 mg) by mouth daily, Disp: 90 tablet, Rfl: 1     naproxen (NAPROSYN) 375 MG tablet, Take 1 tablet (375 mg) by mouth 2 times daily (with meals), Disp: 60 tablet, Rfl: 3     Ostomy Supplies (ADHESIVE REMOVER WIPES) MISC, 1 each every 14 days, Disp: 50 each, Rfl: 3     Ostomy Supplies (SKIN TAC ADHESIVE BARRIER WIPE) MISC, 1 each every 14 days, Disp: 6 each, Rfl: 3     semaglutide (OZEMPIC) 2 MG/3ML SOPN pen, Administer 0.25 mg subcutaneously weekly for one month, then increase dose to 0.5 mg weekly for one month., Disp: 3 mL, Rfl: 1     Semaglutide, 1 MG/DOSE, (OZEMPIC) 4 MG/3ML pen, Inject 1 mg Subcutaneous every 7 days, Disp: 9 mL, Rfl: 0    Current Facility-Administered Medications:       hydrocortisone (CORTAID) 1 % cream, , Topical, TID, [place], Mariya M, APRN CNP     hylan (SYNVISC ONE) injection 48 mg, 48 mg, , , Rosas Rodriguez DO, 48 mg at 23 1813    Allergy: No Known Allergies    Social History:   History   Smoking Status     Former     Types: Cigarettes     Quit date: 2010   Smokeless Tobacco     Never      Social History     Tobacco Use     Smoking status: Former     Types: Cigarettes     Quit date: 2010     Years since quittin.5     Smokeless tobacco: Never     Tobacco comments:     stopped 10 yrs ago   Substance Use Topics     Alcohol use: Yes     Comment: socially     Drug use: No        Family History:   Family History   Problem Relation Age of Onset     Unknown/Adopted Other        Physical Examination:  There were no vitals filed for this visit.  There is no height or weight on file to calculate BMI.    Well appearing, well nourished  Alert and oriented x 3, cooperative with exam     Bilateral hands  Skin intact  Tender to palpation over A1 pulley region of right middle finger, right index finger, left middle finger; with palpable nodules, triggering and pain with active motion  Prior incisions over right thumb A1 pulley and left ring A1 pulley are healed, no tenderness to palpation  Able to make full fist and fully extend digits  Motor Exam: Intact TU/OK/x2-3  Sensory Exam: Sensation intact to light touch in FDWS (radial), volar IF (median), volar SF (ulnar)  Vascular Exam: 2+ radial pulse, < 2 sec capillary refill    Assessment: Nayeli Caal is a 42 year old female with right middle and index trigger finger, left middle trigger finger.    Plan:   I had a discussion with the patient regarding my clinical findings, diagnosis, and treatment plan. I reviewed the treatment options for trigger digit with the patient (observation, steroid injection, A1 pulley release) as well as the risks and benefits of each.  At this time, since her symptoms continue  despite bracing and she does not wish to pursue a steroid injection, she elects operative intervention. The risks of surgery include but are not limited to infection, bleeding, nerve injury, permanent numbness, recurrence, post-operative stiffness, surgical scar, CRPS, anesthetic complications, etc. Because of her poorly controlled diabetes, she is at increased risk of infection.  We also discussed that there is a possibility that the surgery will not be successful and will require repeat surgery. We reviewed post-operative pain management and rehabilitation.   The patient understands and agrees to the treatment plan.  All questions answered.       Plan for operative intervention in the form of right middle finger and index finger A1 pulley release.  Plan for left middle finger A1 pulley release 2 weeks after right side pending clinical improvement on the right hand.    Next Visit:    Follow-up: 10-14 days after surgery.    Imaging: None    Plan: Wound check.      TRICIA SEYMOUR MD

## 2023-05-30 NOTE — NURSING NOTE
Teaching Flowsheet   Teaching completed with assistance of in-person   Relevant Diagnosis: Bilateral middle finger trigger finger releases.  Teaching Topic: Right middle trigger finger release, left middle trigger finger release 2 weeks later.     CSC under local only anesthetic with Dr Miracle Carlin.    Patient requests to schedule using My Chart. Message sent to scheduling with patient request.     HgbA1C was 9.9% in April 2023.  She will have this officially rechecked prior to surgery.  Her average glucoses on her phone pointed to a possible A1C of 8.3% now.    BMI 32.23    Person(s) involved in teaching:   Patient and      Motivation Level:  Asks Questions: Yes  Eager to Learn: Yes  Cooperative: Yes  Receptive (willing/able to accept information): Yes  Any cultural factors/Restorationist beliefs that may influence understanding or compliance? No       Patient demonstrates understanding of the following:  Reason for the appointment, diagnosis and treatment plan: Yes  Knowledge of proper use of medications and conditions for which they are ordered (with special attention to potential side effects or drug interactions): Yes  Which situations necessitate calling provider and whom to contact: Yes       Teaching Concerns Addressed:   Proper use and care of  (medical equip, care aids, etc.): Yes  Nutritional needs and diet plan: Yes  Pain management techniques: Yes  Wound Care: Yes  How and/when to access community resources: Yes     Instructional Materials Used/Given:   Preoperative surgery packet, antibacterial Chlorhexidine soap. Stop Light Tool reviewed, after-hours number provided, patient verbalized understanding, had no immediate questions. Kristy Scott RN

## 2023-05-30 NOTE — LETTER
5/30/2023         RE: Nayeli Caal  [address]  [facility] [address]        Dear Colleague,    Thank you for referring your patient, Nayeli Caal, to the [facility] [place]. Please see a copy of my visit note below.    Chief Complaint:   Chief Complaint   Patient presents with    Consult     Right and Left middle finger trigger finger Right >Left       Diagnosis: Right middle trigger finger, right index trigger finger, left middle trigger finger  Treatment:   2019: Right trigger thumb release, left ring trigger finger release (Dr. Streeter)    History of Present Illness: Nayeli Caal is a 42 year old LHD female presenting for evaluation of trigger fingers.  She has had the pain for several months.  The right hand is worse than the left.  The right middle finger is more symptomatic, but the right index finger started becoming painful and triggering last week.  She denies numbness or tingling.  She has pain and triggering all the time.  She has not had steroid injections.  She has tried a brace which sometimes helps.  Because she communicates via ASL, the triggering impacts her ability to communicate.    She had good relief of her right trigger thumb and left ring trigger finger after surgical release in 2019 and she would like to have surgery.    Documentation by Dr. Bradley on 5/4/2023 was reviewed.    Occupation: [employer]    Past Medical History:   Past Medical History:   Diagnosis Date    Combined visual and hearing impairment     Deaf     Diabetic retinopathy of both eyes (H) 8/19/2011    Hepatic steatosis 08/19/2011    History of tobacco use     Hyperlipidemia     Hypertension     Hypertriglyceridemia     Migraines     Obesity 8/19/2011     Problem list name updated by automated process. 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  Ganglion cyst excision.;  Surgeon: Greg Streeter MD;  Location: [facility] OR       Medications:   Current Outpatient Medications:     acetaminophen (TYLENOL) 500 MG tablet, Take 2 tablets (1,000 mg) by mouth every 6 hours as needed for mild pain, Disp: 100 tablet, Rfl: 3    Alcohol Swabs (ALCOHOL PREP PAD) 70 % PADS, 1 each 3 times daily, Disp: 100 each, Rfl: 3    aspirin (ASA) 81 MG chewable tablet, Take 1 tablet (81 mg) by mouth daily CHEW AND SWALLOW, Disp: 90 tablet, Rfl: 3    atorvastatin (LIPITOR) 40 MG tablet, Take 1 tablet (40 mg) by mouth daily, Disp: 90 tablet, Rfl: 0    B-D U/F insulin pen needle, , Disp: , Rfl:     BD ULTRA FINE PEN NEEDLES, Inject 1 dose. Subcutaneous 2 times daily BD ultra-fine insulin syringe, 30 ga. X 1/2'' short needle 1/2 cc. Use as directed., Disp: 400 Units, Rfl: 11    blood glucose (ACCU-CHEK LAYA PLUS) test strip, Use with  Accucheck Expert meter.  Test blood sugar 6 times daily., Disp: 600 each, Rfl: 3    blood glucose monitoring (ACCU-CHEK FASTCLIX) lancets, Use to test blood sugar 6 times daily or as directed, Disp: 510 each, Rfl: 3    Continuous Blood Gluc Sensor (DEXCOM G6 SENSOR) MISC, Change every 10 days. 3 month supply., Disp: 9 each, Rfl: 3    Continuous Blood Gluc Sensor (DEXCOM G7 SENSOR) MISC, 1 each every 10 days, Disp: 9 each, Rfl: 3    Continuous Blood Gluc Transmit (DEXCOM G6 TRANSMITTER) MISC, Change every 3 months., Disp: 1 each,  Rfl: 3    ergocalciferol (ERGOCALCIFEROL) 1.25 MG (68447 UT) capsule, Take 1 capsule (50,000 Units) by mouth once a week For additional refills, please schedule a follow-up appointment at [phone], Disp: 12 capsule, Rfl: 3    fenofibrate (TRIGLIDE/LOFIBRA) 160 MG tablet, Take 1 tablet (160 mg) by mouth daily, Disp: 90 tablet, Rfl: 0    fish oil-omega-3 fatty acids 1000 MG capsule, TAKE TWO CAPSULES (2 GM) BY MOUTH ONCE DAILY, Disp: 180 capsule, Rfl: 3    hydrocortisone (CORTAID) 1 % external cream, Apply topically 2 times daily, Disp: 120 g, Rfl: 1    ibuprofen (ADVIL/MOTRIN) 600 MG tablet, Take 1 tablet (600 mg) by mouth every 6 hours as needed for moderate pain, Disp: 120 tablet, Rfl: 1    Injection Device for insulin (INPEN 100-BLUE-NOVOLOG-FIASP) DAVID, 1 each continuous, Disp: 1 each, Rfl: 0    insulin aspart (NOVOLOG FLEXPEN) 100 UNIT/ML pen, 1 unit per 5 grams CHO and correction scale of 1/25/125.  Approximate daily use is 100 units., Disp: 30 mL, Rfl: 2    insulin aspart (NOVOPEN ECHO) 100 UNIT/ML cartridge, For use with the In-Pen device.  Give 1 unit per 5 grams CHO with meals and snacks as well as correction.  Average daily use is 100 units daily., Disp: 90 mL, Rfl: 3    insulin glargine (BASAGLAR KWIKPEN) 100 UNIT/ML pen, Inject 60 Units Subcutaneous daily 3 month supply., Disp: 54 mL, Rfl: 3    insulin pen needle (B-D U/F) 31G X 8 MM miscellaneous, USE AS DIRECTED., Disp: 200 each, Rfl: 1    losartan (COZAAR) 100 MG tablet, Take 1 tablet (100 mg) by mouth daily, Disp: 90 tablet, Rfl: 1    naproxen (NAPROSYN) 375 MG tablet, Take 1 tablet (375 mg) by mouth 2 times daily (with meals), Disp: 60 tablet, Rfl: 3    Ostomy Supplies (ADHESIVE REMOVER WIPES) MISC, 1 each every 14 days, Disp: 50 each, Rfl: 3    Ostomy Supplies (SKIN TAC ADHESIVE BARRIER WIPE) MISC, 1 each every 14 days, Disp: 6 each, Rfl: 3    semaglutide (OZEMPIC) 2 MG/3ML SOPN pen, Administer 0.25 mg subcutaneously weekly for one month, then  increase dose to 0.5 mg weekly for one month., Disp: 3 mL, Rfl: 1    Semaglutide, 1 MG/DOSE, (OZEMPIC) 4 MG/3ML pen, Inject 1 mg Subcutaneous every 7 days, Disp: 9 mL, Rfl: 0    Current Facility-Administered Medications:     hydrocortisone (CORTAID) 1 % cream, , Topical, TID, Overpramod, Mariya M, APRN CNP    hylan (SYNVISC ONE) injection 48 mg, 48 mg, , , Rosas Rodriguez, , 48 mg at 23 1813    Allergy: No Known Allergies    Social History:   History   Smoking Status    Former    Types: Cigarettes    Quit date: 2010   Smokeless Tobacco    Never      Social History     Tobacco Use    Smoking status: Former     Types: Cigarettes     Quit date: 2010     Years since quittin.5    Smokeless tobacco: Never    Tobacco comments:     stopped 10 yrs ago   Substance Use Topics    Alcohol use: Yes     Comment: socially    Drug use: No        Family History:   Family History   Problem Relation Age of Onset    Unknown/Adopted Other        Physical Examination:  There were no vitals filed for this visit.  There is no height or weight on file to calculate BMI.    Well appearing, well nourished  Alert and oriented x 3, cooperative with exam     Bilateral hands  Skin intact  Tender to palpation over A1 pulley region of right middle finger, right index finger, left middle finger; with palpable nodules, triggering and pain with active motion  Prior incisions over right thumb A1 pulley and left ring A1 pulley are healed, no tenderness to palpation  Able to make full fist and fully extend digits  Motor Exam: Intact TU/OK/x2-3  Sensory Exam: Sensation intact to light touch in FDWS (radial), volar IF (median), volar SF (ulnar)  Vascular Exam: 2+ radial pulse, < 2 sec capillary refill    Assessment: Nayeli Caal is a 42 year old female with right middle and index trigger finger, left middle trigger finger.    Plan:   I had a discussion with the patient regarding my clinical findings, diagnosis, and treatment  plan. I reviewed the treatment options for trigger digit with the patient (observation, steroid injection, A1 pulley release) as well as the risks and benefits of each.  At this time, since her symptoms continue despite bracing and she does not wish to pursue a steroid injection, she elects operative intervention. The risks of surgery include but are not limited to infection, bleeding, nerve injury, permanent numbness, recurrence, post-operative stiffness, surgical scar, CRPS, anesthetic complications, etc. Because of her poorly controlled diabetes, she is at increased risk of infection.  We also discussed that there is a possibility that the surgery will not be successful and will require repeat surgery. We reviewed post-operative pain management and rehabilitation.   The patient understands and agrees to the treatment plan.  All questions answered.      Plan for operative intervention in the form of right middle finger and index finger A1 pulley release.  Plan for left middle finger A1 pulley release 2 weeks after right side pending clinical improvement on the right hand.    Next Visit:   Follow-up: 10-14 days after surgery.   Imaging: None   Plan: Wound check.      TRICIA SEYMOUR MD

## 2023-05-31 ENCOUNTER — TELEPHONE (OUTPATIENT)
Dept: ORTHOPEDICS | Facility: CLINIC | Age: 43
End: 2023-05-31
Payer: COMMERCIAL

## 2023-05-31 PROBLEM — M65.331 TRIGGER MIDDLE FINGER OF RIGHT HAND: Status: ACTIVE | Noted: 2023-05-31

## 2023-05-31 NOTE — TELEPHONE ENCOUNTER
Patient is scheduled for surgery with Dr. Carlin    Spoke with: Patient via Purfreshhart    Date of Surgery: 06/13/23    Location: [facility]    Informed patient they will need an adult  yes    H&P: Scheduled with: LOCAL    Additional imaging/appointments: pop made    Surgery packet: recieved    Additional comments: N/A

## 2023-05-31 NOTE — TELEPHONE ENCOUNTER
Patient is scheduled for surgery with Dr. Carlin    Spoke with: Patient via mimoOnhart    Date of Surgery: 06/27/23    [facility]    Informed patient they will need an adult  yes    H&P: Scheduled with: LOCAL    Additional imaging/appointments: pop made    Surgery packet: recieved     Additional comments: N/A

## 2023-06-06 ENCOUNTER — LAB (OUTPATIENT)
Dept: LAB | Facility: CLINIC | Age: 43
End: 2023-06-06
Payer: COMMERCIAL

## 2023-06-06 ENCOUNTER — MYC MEDICAL ADVICE (OUTPATIENT)
Dept: SURGERY | Facility: CLINIC | Age: 43
End: 2023-06-06

## 2023-06-06 DIAGNOSIS — Z79.4 UNCONTROLLED TYPE 2 DIABETES MELLITUS WITH HYPERGLYCEMIA, WITH LONG-TERM CURRENT USE OF INSULIN (H): ICD-10-CM

## 2023-06-06 DIAGNOSIS — E11.65 UNCONTROLLED TYPE 2 DIABETES MELLITUS WITH HYPERGLYCEMIA, WITH LONG-TERM CURRENT USE OF INSULIN (H): ICD-10-CM

## 2023-06-06 LAB
HBA1C MFR BLD: 8.7 %
VIT B12 SERPL-MCNC: 776 PG/ML (ref 232–1245)

## 2023-06-06 PROCEDURE — 82607 VITAMIN B-12: CPT | Performed by: PATHOLOGY

## 2023-06-06 PROCEDURE — 83036 HEMOGLOBIN GLYCOSYLATED A1C: CPT | Performed by: PATHOLOGY

## 2023-06-06 PROCEDURE — 36415 COLL VENOUS BLD VENIPUNCTURE: CPT | Performed by: PATHOLOGY

## 2023-06-06 PROCEDURE — 99000 SPECIMEN HANDLING OFFICE-LAB: CPT | Performed by: PATHOLOGY

## 2023-06-13 ENCOUNTER — HOSPITAL ENCOUNTER (OUTPATIENT)
Facility: AMBULATORY SURGERY CENTER | Age: 43
Discharge: HOME | End: 2023-06-13
Attending: STUDENT IN AN ORGANIZED HEALTH CARE EDUCATION/TRAINING PROGRAM | Admitting: STUDENT IN AN ORGANIZED HEALTH CARE EDUCATION/TRAINING PROGRAM
Payer: COMMERCIAL

## 2023-06-13 ENCOUNTER — OFFICE VISIT (OUTPATIENT)
Dept: INTERPRETER SERVICES | Facility: CLINIC | Age: 43
End: 2023-06-13

## 2023-06-13 VITALS
HEART RATE: 92 BPM | DIASTOLIC BLOOD PRESSURE: 75 MMHG | SYSTOLIC BLOOD PRESSURE: 131 MMHG | TEMPERATURE: 98.8 F | RESPIRATION RATE: 14 BRPM | BODY MASS INDEX: 33.04 KG/M2 | HEIGHT: 61 IN | WEIGHT: 175 LBS | OXYGEN SATURATION: 99 %

## 2023-06-13 DIAGNOSIS — M65.331 TRIGGER MIDDLE FINGER OF RIGHT HAND: ICD-10-CM

## 2023-06-13 PROCEDURE — 26055 INCISE FINGER TENDON SHEATH: CPT | Mod: F7

## 2023-06-13 PROCEDURE — 26055 INCISE FINGER TENDON SHEATH: CPT | Mod: F7 | Performed by: STUDENT IN AN ORGANIZED HEALTH CARE EDUCATION/TRAINING PROGRAM

## 2023-06-13 RX ORDER — CEFAZOLIN SODIUM 2 G/50ML
2 SOLUTION INTRAVENOUS ONCE
Status: COMPLETED | OUTPATIENT
Start: 2023-06-13 | End: 2023-06-13

## 2023-06-13 RX ORDER — SODIUM CHLORIDE, SODIUM LACTATE, POTASSIUM CHLORIDE, CALCIUM CHLORIDE 600; 310; 30; 20 MG/100ML; MG/100ML; MG/100ML; MG/100ML
INJECTION, SOLUTION INTRAVENOUS CONTINUOUS
Status: DISCONTINUED | OUTPATIENT
Start: 2023-06-13 | End: 2023-06-14 | Stop reason: HOSPADM

## 2023-06-13 RX ORDER — OXYCODONE HYDROCHLORIDE 5 MG/1
5 TABLET ORAL EVERY 6 HOURS PRN
Qty: 5 TABLET | Refills: 0 | Status: SHIPPED | OUTPATIENT
Start: 2023-06-13 | End: 2024-04-15

## 2023-06-13 RX ORDER — LIDOCAINE HYDROCHLORIDE AND EPINEPHRINE 10; 10 MG/ML; UG/ML
20 INJECTION, SOLUTION INFILTRATION; PERINEURAL ONCE
Status: COMPLETED | OUTPATIENT
Start: 2023-06-13 | End: 2023-06-13

## 2023-06-13 RX ADMIN — CEFAZOLIN SODIUM 2 G: 2 SOLUTION INTRAVENOUS at 07:56

## 2023-06-13 RX ADMIN — LIDOCAINE HYDROCHLORIDE AND EPINEPHRINE 14 ML: 10; 10 INJECTION, SOLUTION INFILTRATION; PERINEURAL at 07:55

## 2023-06-13 RX ADMIN — SODIUM CHLORIDE, SODIUM LACTATE, POTASSIUM CHLORIDE, CALCIUM CHLORIDE: 600; 310; 30; 20 INJECTION, SOLUTION INTRAVENOUS at 07:28

## 2023-06-13 NOTE — OP NOTE
OPERATIVE REPORT    PATIENT NAME: Nayeli Caal  MRN: [id number]    DATE: 6/13/2023    PREOPERATIVE DIAGNOSIS:   1. Right middle trigger finger.  2. Right index trigger finger.    POSTOPERATIVE DIAGNOSIS:   1. Right middle trigger finger.  2. Right index trigger finger.    OPERATION:   1. Right middle trigger finger release  2. Right index trigger finger rlease    SURGEON: Miracle Carlin MD    ASSISTANT(S): Lesley Smallwood PA-C    The physician assistant was necessary for the procedure. They placed and held retractors to provide adequate exposure that allowed the case to proceed in a safe, efficient manner. They assisted in identifying and protecting important structures. They ligated blood vessels to maintain hemostasis and minimize bleeding risk. They suctioned fluids when needed. They assisted with the proper selection and positioning of any implants required for the case. They performed or assisted with wound closure of the operative incisions. An experienced physician assistant was medically necessary to ensure a safe and efficient procedure. The assistance that they provided decreased operative time and thereby, reduced the risk of infection and complications from prolonged time of anesthesia. Their assistance was vital in achieving best practices. No qualified residents or fellows were available to assist with this case.       STAFF: Circulator: Nereyda Smith RN  Scrub Person: Keshia Pablo  Staff Assist: Zoe Owen CMA    ANESTHESIA: Regional block and IV sedation    IMPLANTS: * No implants in log *    ESTIMATED BLOOD LOSS: < 1 mL.    FLUIDS: See anesthesia records.     URINE OUTPUT: Not applicable.  Kwong was not placed.     TOURNIQUET TIME: 0 minutes.    COMPLICATION: None.     INDICATIONS: Nayeli Caal is a 42 year old female who developed a right index and middle trigger finger, which has not responded to non-operative treatment.  She was indicated for surgical release.  The risks, benefits, and alternatives were discussed with the patient. We specifically discussed the risk of infection given her elevated A1c. Due to the patient being deaf and requiring her hands to communicate, she elected to proceed. The patient verbalized understanding of the treatment plan and an informed consent was signed.     DESCRIPTION OF PROCEDURE: The patient was taken to the operating room and placed in supine position on the operating table. Anesthesia was administered by the Department of Anesthesia followed by administration of antibiotics. The right upper extremity was prepped and draped in standard sterile fashion. A timeout was performed with all OR staff.  The patient name, MRN, operative extremity, procedure, allergies, antibiotics, DVT prophylaxis/SCDs, and fire precautions/plan were reviewed, and all were in agreement.     A longitudinal incision was made over the volar aspect of the middle finger MCP joint/metacarpal head.  Skin and subcutaneous tissues were sharply incised. Blunt dissection was carried down to the level of the A1 pulley.  Soft tissues were elevated off the A1 pulley. The radial and ulnar digital neurovascular bundles were protected.  The A1 pulley was incised.  The A2 pulley was preserved. There was significant tenosynovitis which was excised. The FDS and FDP were noted to have independent glide. There was no palpable trigger with passive flexion/extension of the digit.      A longitudinal incision was made over the volar aspect of the index finger MCP joint/metacarpal head.  Skin and subcutaneous tissues were sharply incised. Blunt dissection was carried down to the level of the A1 pulley.  Soft tissues were elevated off the A1 pulley. The radial and ulnar digital neurovascular bundles were protected.  The A1 pulley was incised.  The A2 pulley was preserved. There was significant tenosynovitis which was excised. The FDS and FDP were noted to have independent glide. There  was no palpable trigger with passive flexion/extension of the digit.      The wounds were irrigated.  Skin closure was performed with 4-0 plain gut. Sterile dressings were applied.  Capillary refill was intact < 2 seconds.     All counts were correct at the end of the case.       There were no complications.    POSTOPERATIVE PLAN: return to clinic in 7-10 days for wound check    TRICIA SEYMOUR MD

## 2023-06-13 NOTE — DISCHARGE INSTRUCTIONS
Date: 6/13/2023    DIAGNOSIS  1. Trigger middle finger of right hand        MEDICATIONS   Resume all home medications as directed unless otherwise instructed during this hospitalization. If there is any question, double check with your primary care provider.  Start new discharge medications as directed.    Take 1 tablet of 650 mg Tylenol (acetaminophen) Arthritis Strength (extended release) and 1 tablet of Aleve (naproxen) 220 mg in the morning with breakfast and in the evening with dinner.     For breakthrough pain use narcotic pain medication as prescribed.    Do not drive or operate machinery while taking narcotic pain medications.   If you are taking other Tylenol containing medicines at home, be sure NOT to exceed 4 gram's (4000 milligrams) of Tylenol per day.   If you are taking pain medications, be sure to take Colace (docusate sodium) as well to prevent constipation. If constipated, try adding another cathartic or enema.  If nausea and vomiting, call the hospital or seek medical attention.    ACTIVITY   Weight bearing: Non-weight bearing to arm.    DIET  Resume same diet prior to your hospital admission.    WOUND   Leave dressing on until you are seen in clinic for your follow up visit.   Watch for signs and symptoms of infection of your wounds including; pain, redness, swelling, drainage or fever.  If you notice any of these symptoms please call or seek medical attention.    Keep wound clean, dry, and intact.  Do not submerge wounds in water until they are healed. No baths, soaking, swimming, or prolonged water exposure for 4 weeks after surgery.    RETURN   Follow-up with Orthopedic Clinic as directed.     Future Appointments   Date Time Provider Department Center   7/5/2023  2:00 PM Miracle Carlin MD [facility]   7/18/2023  1:30 PM Miracle Carlin MD [facility]   7/25/2023  6:15 AM  LAB [facility]   8/1/2023  3:30 PM Mariya Mohamud APRN [facility]   8/14/2023  2:30 PM Adam  Anne Marie RODRIGUEZ PA-C [facility]   10/2/2023  7:30 AM Jimena Bragg MD [facility]       Call the [facility] at [phone] during business hours for any symptoms such as:    * Fevers with Temperature greater than 101.5 degrees.   * Pus drainage from wound site.   * Severe pain, not controlled by medication.   * Persistent nausea, vomiting and inablility to tolerate fluids.    If you are receiving care in [place], you may call the Orthopedic clinic at [phone].    FOR URGENT PROBLEMS ONLY, after hours or on weekends call the hospital  at [phone] and ask to speak with the orthopedic resident on call.    You may also be seen at the [facility] that runs 6 days per week from 8a-5p Monday-Friday and 8a-12p on Saturday at [address] [place] [id number]. You can call the Walk-In Clinic at [phone].

## 2023-06-23 PROBLEM — M65.332 TRIGGER MIDDLE FINGER OF LEFT HAND: Status: ACTIVE | Noted: 2023-05-30

## 2023-06-27 ENCOUNTER — OFFICE VISIT (OUTPATIENT)
Dept: ORTHOPEDICS | Facility: CLINIC | Age: 43
End: 2023-06-27
Payer: COMMERCIAL

## 2023-06-27 ENCOUNTER — THERAPY VISIT (OUTPATIENT)
Dept: OCCUPATIONAL THERAPY | Facility: CLINIC | Age: 43
End: 2023-06-27
Payer: COMMERCIAL

## 2023-06-27 DIAGNOSIS — M65.331 TRIGGER MIDDLE FINGER OF RIGHT HAND: Primary | ICD-10-CM

## 2023-06-27 PROCEDURE — 97110 THERAPEUTIC EXERCISES: CPT | Mod: GO | Performed by: OCCUPATIONAL THERAPIST

## 2023-06-27 PROCEDURE — 97165 OT EVAL LOW COMPLEX 30 MIN: CPT | Mod: GO | Performed by: OCCUPATIONAL THERAPIST

## 2023-06-27 PROCEDURE — 99024 POSTOP FOLLOW-UP VISIT: CPT | Performed by: STUDENT IN AN ORGANIZED HEALTH CARE EDUCATION/TRAINING PROGRAM

## 2023-06-27 NOTE — LETTER
6/27/2023         RE: Nayeli Caal  [address]  [place] [address]        Dear Colleague,    Thank you for referring your patient, Nayeli Caal, to the [facility] [place]. Please see a copy of my visit note below.    Chief Complaint:   Chief Complaint   Patient presents with    Surgical Followup     DOS 06/13/23. RELEASE, RIGHT TRIGGER FINGER, INDEX AND MIDDLE Right       Diagnosis: Right middle trigger finger, right index trigger finger, left middle trigger finger  Treatment:   6/13/2023: right middle and index trigger finger release  2019: Right trigger thumb release, left ring trigger finger release (Dr. Streeter)    History of Present Illness: Nayeli Caal is a 43 year old LHD female presenting for evaluation of trigger fingers.  She has had the pain for several months.  The right hand is worse than the left.  The right middle finger is more symptomatic, but the right index finger started becoming painful and triggering last week.  She denies numbness or tingling.  She has pain and triggering all the time.  She has not had steroid injections.  She has tried a brace which sometimes helps.  Because she communicates via ASL, the triggering impacts her ability to communicate.    She had good relief of her right trigger thumb and left ring trigger finger after surgical release in 2019 and she would like to have surgery.    Documentation by Dr. Bradley on 5/4/2023 was reviewed.    Interval 6/27/2023: presents for first postoperative visit. Reports no more triggering in the right hand, no numbness or tingling in the right hand, but the right middle finger PIP is stiff and painful.     Occupation:     Physical Examination:  There were no vitals filed for this visit.  There is no height or weight on file to calculate BMI.    Well appearing, well nourished  Alert and oriented x 3, cooperative with exam     Bilateral hands  Skin intact left  hand  Incisions over right index/middle finger A1 pulleys healing well without evidence of infection, middle finger with 20 degree PIP flexion contracture  Tender to palpation over A1 pulley region left middle finger; with palpable nodule, triggering and pain with active motion  Prior incisions over right thumb A1 pulley and left ring A1 pulley are healed, no tenderness to palpation  Able to make full fist  Motor Exam: Intact TU/OK/x2-3  Sensory Exam: Sensation intact to light touch in FDWS (radial), volar IF (median), volar SF (ulnar)  Vascular Exam: 2+ radial pulse, < 2 sec capillary refill    Assessment: Nayeli Caal is a 43 year old female with right middle and index trigger finger, left middle trigger finger. Now s/p right index and middle trigger finger releases.    Plan:   I had a discussion with the patient regarding my clinical findings, diagnosis, and treatment plan. We reviewed the post-operative plan, frequency of follow-up, rehabilitation, and expected outcomes.  All questions answered.     PWB (< 5 lbs) right upper extremity.   OK to wash hands/shower.  Do not submerge incision until 4 weeks from the date of surgery.   Reviewed digit/wrist/forearm ROM exercises. Hand therapy to address middle finger PIP flexion contracture.    Next Visit:   Follow-up: 2 week(s).   Imaging: None.   Plan: ROM check, symptom check      TRICIA SEYMOUR MD

## 2023-06-27 NOTE — PROGRESS NOTES
OCCUPATIONAL THERAPY EVALUATION  Type of Visit: Evaluation    See electronic medical record for Abuse and Falls Screening details.    Subjective      Presenting condition or subjective complaint:  Finger stifness  Date of onset: 06/13/23    Relevant medical history:     Past Medical History:   Diagnosis Date     Combined visual and hearing impairment      Deaf      Diabetic retinopathy of both eyes (H) 8/19/2011     Hepatic steatosis 08/19/2011     History of tobacco use      Hyperlipidemia      Hypertension      Hypertriglyceridemia      Migraines      Obesity 8/19/2011     Problem list name updated by automated process. 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  Ganglion cyst excision.;  Surgeon: Greg Streeter MD;  Location: [facility] OR     RELEASE TRIGGER FINGER Right 6/13/2023    Procedure: RELEASE, RIGHT TRIGGER FINGER, INDEX AND MIDDLE;  Surgeon: Miracle Carlin MD;  Location: [facility] OR     Prior diagnostic imaging/testing results:     See chart  Prior therapy history for the same diagnosis, illness or injury:    Hand therapy in 2018    Living Environment  Type of home:   [facility]    Employment:    [employer], works hybrid  Hobbies/Interests:      Patient goals for therapy:       Objective   Left hand dominant  Patient reports symptoms of pain, stiffness/loss of motion and  weakness/loss of strength  DOS 06/13/23. RELEASE, RIGHT TRIGGER FINGER, INDEX AND MIDDLE Right    Pain Level (Scale 0-10)   6/27/2023   At Rest 0/10   With Use NA     Pain Description  Date 6/27/2023   Location long finger   Pain Quality Sore, stiff   Frequency intermittent     Pain is worst  daytime   Exacerbated by  Attempted digit extension   Relieved by NSAIDs   Progression Gradually improving, surgery helped relieve triggering     Edema  Moderate    Sensation   WNL throughout all nerve distributions; per patient report    ROM  Long Finger 6/27/2023 6/27/2023   AROM (PROM) L   R   MCP 0/81 0/59   PIP -10/84 31/73   DIP 0/62 4/52     ROM  Index Finger 6/27/2023   AROM (PROM) R   MCP 0/57   PIP 6/93   DIP 14/37   Note: Right thumb is not able to fully flex compared to left side    Strength  NT    Stage of Stenosing Tenosynovitis (SST)     6/27/2023   Long Finger Stage 3-4   Stage 1:  Normal  Stage 2:  Uneven motion of tendon  Stage 3:  Triggering, clicking, catching  Stage 4:  Locking in extension or flexion; unlocked by active motion  Stage 5:  Locking in extension or flexion; unlocked by passive motion  Stage 6:  Finger locked in extension or flexion    Assessment & Plan   CLINICAL IMPRESSIONS   Medical Diagnosis: Trigger middle finger of right hand    Treatment Diagnosis: Finger pain, stiffness    Impression/Assessment: Pt is a 43 year old female presenting to Occupational Therapy due to surgery.  The following significant findings have been identified: Impaired ROM, Impaired strength, Pain and Post-surgical precautions.  These identified deficits interfere with their ability to perform self care tasks, work tasks, recreational activities, household chores, driving  and meal planning and preparation as compared to previous level of function.   Patient's limitations or Problem List includes: Pain, Decreased ROM/motion, Increased edema, Weakness, Sensory disturbance, Adherent scarring and Tightness in musculature  of the right index finger and long finger which interferes with the patient's ability to perform Self Care Tasks (dressing, eating, bathing, hygiene/toileting), Work Tasks, Sleep Patterns, Recreational Activities, Household Chores and Driving  as compared to previous level of function.    Clinical Decision Making (Complexity):   Assessment of Occupational Performance: 5 or more Performance Deficits  Occupational Performance Limitations: bathing/showering, toileting, dressing, feeding, functional mobility, hygiene and grooming, driving and community mobility, health management and maintenance, home establishment and management, meal preparation and cleanup, shopping, sleep, work and leisure activities  Clinical Decision Making (Complexity): Low complexity    PLAN OF CARE  Modalities:  US and Paraffin  Therapeutic Exercise:  AROM, AAROM, PROM, Tendon Gliding, Blocking, Reverse Blocking, Place and Hold, Contract Relax, Extensor Tracking, Isotonics, Isometrics and Stabilization  Neuromuscular re-education:  Nerve Gliding, Coordination/Dexterity, Sensory re-education, Desensitization, Kinesthetic Training, Proprioceptive Training, Kinesiotaping, Strain Counter Strain, Isometrics, Stabilization   Manual Techniques:  Coordination/Dexterity, Joint mobilization, Scar mobilization, Friction massage, Myofascial release, and Manual edema mobilization  Orthotic Fabrication:  Static and Forearm based  Self Care:  Self Care Tasks, Ergonomic Considerations and Work Tasks    Long Term Goals   OT Goal 2  Goal Identifier: Household IADLs - carrying a shopping bag  Goal Description: No difficulty carrying a shopping bag equal to or greater than 10lb  Rationale: In order to maximize safety and independence with performance of self-care activities  Goal Progress: Extreme difficulty d/t pain, stiffness  Target Date: 09/27/23      Frequency of Treatment: 2x/month  Duration of Treatment: 3 months     Risks and benefits of  evaluation/treatment have been explained.   Patient/Family/caregiver agrees with Plan of Care.     Evaluation Time:    OT Eval, Low Complexity Minutes (18874): 20   Present: Yes: Language: ASL, ID Number/Identifier: [id number]     Signing Clinician: Christine Rodriguez OT

## 2023-06-27 NOTE — NURSING NOTE
Reason For Visit:   Chief Complaint   Patient presents with     Surgical Followup     DOS 06/13/23. RELEASE, RIGHT TRIGGER FINGER, INDEX AND MIDDLE Right       Primary MD: Mariya Mohamud  Ref. MD:     Age: 43 year old    ?  No      LMP 06/02/2023 (Exact Date)       Pain Assessment  Patient Currently in Pain: Yes  0-10 Pain Scale: 4  Primary Pain Location: Finger (Comment which one) (right middle finger)  Pain Descriptors: Sore  Alleviating Factors: Rest  Aggravating Factors: Other (comment) (push down when it is straight,  when she exends out)    Hand Dominance Evaluation  Hand Dominance: Left          QuickDASH Assessment       No data to display                   Current Outpatient Medications   Medication Sig Dispense Refill     acetaminophen (TYLENOL) 500 MG tablet Take 2 tablets (1,000 mg) by mouth every 6 hours as needed for mild pain 100 tablet 3     Alcohol Swabs (ALCOHOL PREP PAD) 70 % PADS 1 each 3 times daily 100 each 3     aspirin (ASA) 81 MG chewable tablet Take 1 tablet (81 mg) by mouth daily CHEW AND SWALLOW 90 tablet 3     atorvastatin (LIPITOR) 40 MG tablet Take 1 tablet (40 mg) by mouth daily 90 tablet 0     B-D U/F insulin pen needle        BD ULTRA FINE PEN NEEDLES Inject 1 dose. Subcutaneous 2 times daily BD ultra-fine insulin syringe, 30 ga. X 1/2'' short needle 1/2 cc. Use as directed. 400 Units 11     blood glucose (ACCU-CHEK LAYA PLUS) test strip Use with  Accucheck Expert meter.  Test blood sugar 6 times daily. 600 each 3     blood glucose monitoring (ACCU-CHEK FASTCLIX) lancets Use to test blood sugar 6 times daily or as directed 510 each 3     Continuous Blood Gluc Sensor (DEXCOM G6 SENSOR) MISC Change every 10 days. 3 month supply. 9 each 3     Continuous Blood Gluc Sensor (DEXCOM G7 SENSOR) MISC 1 each every 10 days 9 each 3     Continuous Blood Gluc Transmit (DEXCOM G6 TRANSMITTER) MISC Change every 3 months. 1 each 3     ergocalciferol (ERGOCALCIFEROL) 1.25 MG (64385  UT) capsule Take 1 capsule (50,000 Units) by mouth once a week For additional refills, please schedule a follow-up appointment at [phone] 12 capsule 3     fenofibrate (TRIGLIDE/LOFIBRA) 160 MG tablet Take 1 tablet (160 mg) by mouth daily 90 tablet 0     fish oil-omega-3 fatty acids 1000 MG capsule TAKE TWO CAPSULES (2 GM) BY MOUTH ONCE DAILY 180 capsule 3     hydrocortisone (CORTAID) 1 % external cream Apply topically 2 times daily 120 g 1     ibuprofen (ADVIL/MOTRIN) 600 MG tablet Take 1 tablet (600 mg) by mouth every 6 hours as needed for moderate pain 120 tablet 1     Injection Device for insulin (INPEN 100-BLUE-NOVOLOG-FIASP) DAVID 1 each continuous 1 each 0     insulin aspart (NOVOLOG FLEXPEN) 100 UNIT/ML pen 1 unit per 5 grams CHO and correction scale of 1/25/125.  Approximate daily use is 100 units. 30 mL 2     insulin aspart (NOVOPEN ECHO) 100 UNIT/ML cartridge For use with the In-Pen device.  Give 1 unit per 5 grams CHO with meals and snacks as well as correction.  Average daily use is 100 units daily. 90 mL 3     insulin glargine (BASAGLAR KWIKPEN) 100 UNIT/ML pen Inject 60 Units Subcutaneous daily 3 month supply. 54 mL 3     insulin pen needle (B-D U/F) 31G X 8 MM miscellaneous USE AS DIRECTED. 200 each 1     losartan (COZAAR) 100 MG tablet Take 1 tablet (100 mg) by mouth daily 90 tablet 1     naproxen (NAPROSYN) 375 MG tablet Take 1 tablet (375 mg) by mouth 2 times daily (with meals) 60 tablet 3     Ostomy Supplies (ADHESIVE REMOVER WIPES) MISC 1 each every 14 days 50 each 3     Ostomy Supplies (SKIN TAC ADHESIVE BARRIER WIPE) MISC 1 each every 14 days 6 each 3     oxyCODONE (ROXICODONE) 5 MG tablet Take 1 tablet (5 mg) by mouth every 6 hours as needed for pain 5 tablet 0     semaglutide (OZEMPIC) 2 MG/3ML SOPN pen Administer 0.25 mg subcutaneously weekly for one month, then increase dose to 0.5 mg weekly for one month. 3 mL 1     Semaglutide, 1 MG/DOSE, (OZEMPIC) 4 MG/3ML pen Inject 1 mg  Subcutaneous every 7 days 9 mL 0       No Known Allergies    Odessa Fuller, ATC

## 2023-06-27 NOTE — PROGRESS NOTES
Chief Complaint:   Chief Complaint   Patient presents with     Surgical Followup     DOS 06/13/23. RELEASE, RIGHT TRIGGER FINGER, INDEX AND MIDDLE Right       Diagnosis: Right middle trigger finger, right index trigger finger, left middle trigger finger  Treatment:   6/13/2023: right middle and index trigger finger release  2019: Right trigger thumb release, left ring trigger finger release (Dr. Streeter)    History of Present Illness: Nayeli Caal is a 43 year old LHD female presenting for evaluation of trigger fingers.  She has had the pain for several months.  The right hand is worse than the left.  The right middle finger is more symptomatic, but the right index finger started becoming painful and triggering last week.  She denies numbness or tingling.  She has pain and triggering all the time.  She has not had steroid injections.  She has tried a brace which sometimes helps.  Because she communicates via ASL, the triggering impacts her ability to communicate.    She had good relief of her right trigger thumb and left ring trigger finger after surgical release in 2019 and she would like to have surgery.    Documentation by Dr. Bradley on 5/4/2023 was reviewed.    Interval 6/27/2023: presents for first postoperative visit. Reports no more triggering in the right hand, no numbness or tingling in the right hand, but the right middle finger PIP is stiff and painful.     Occupation:     Physical Examination:  There were no vitals filed for this visit.  There is no height or weight on file to calculate BMI.    Well appearing, well nourished  Alert and oriented x 3, cooperative with exam     Bilateral hands  Skin intact left hand  Incisions over right index/middle finger A1 pulleys healing well without evidence of infection, middle finger with 20 degree PIP flexion contracture  Tender to palpation over A1 pulley region left middle finger; with palpable nodule, triggering and pain with active  motion  Prior incisions over right thumb A1 pulley and left ring A1 pulley are healed, no tenderness to palpation  Able to make full fist  Motor Exam: Intact TU/OK/x2-3  Sensory Exam: Sensation intact to light touch in FDWS (radial), volar IF (median), volar SF (ulnar)  Vascular Exam: 2+ radial pulse, < 2 sec capillary refill    Assessment: Nayeli Caal is a 43 year old female with right middle and index trigger finger, left middle trigger finger. Now s/p right index and middle trigger finger releases.    Plan:   I had a discussion with the patient regarding my clinical findings, diagnosis, and treatment plan. We reviewed the post-operative plan, frequency of follow-up, rehabilitation, and expected outcomes.  All questions answered.      PWB (< 5 lbs) right upper extremity.    OK to wash hands/shower.  Do not submerge incision until 4 weeks from the date of surgery.    Reviewed digit/wrist/forearm ROM exercises. Hand therapy to address middle finger PIP flexion contracture.    Next Visit:    Follow-up: 2 week(s).    Imaging: None.    Plan: ROM check, symptom check      TRICIA SEYMOUR MD

## 2023-07-05 ENCOUNTER — ALLIED HEALTH/NURSE VISIT (OUTPATIENT)
Dept: EDUCATION SERVICES | Facility: CLINIC | Age: 43
End: 2023-07-05
Payer: COMMERCIAL

## 2023-07-05 VITALS — BODY MASS INDEX: 34.07 KG/M2 | WEIGHT: 180.3 LBS

## 2023-07-05 DIAGNOSIS — E11.65 UNCONTROLLED TYPE 2 DIABETES MELLITUS WITH HYPERGLYCEMIA, WITH LONG-TERM CURRENT USE OF INSULIN (H): Primary | ICD-10-CM

## 2023-07-05 DIAGNOSIS — Z79.4 UNCONTROLLED TYPE 2 DIABETES MELLITUS WITH HYPERGLYCEMIA, WITH LONG-TERM CURRENT USE OF INSULIN (H): Primary | ICD-10-CM

## 2023-07-05 PROCEDURE — G0108 DIAB MANAGE TRN  PER INDIV: HCPCS | Performed by: REGISTERED NURSE

## 2023-07-05 NOTE — PATIENT INSTRUCTIONS
"I sent a prescription for Ozempic 2 mg pen to the [facility].     If you don't want to jump right up to the 2 mg dose,  you can count the \"clicks\" to only start with 1 1/2 mg.  The number of clicks to get to 1 1/2 mg is 54.      Reduce your Basaglar to 50 units when you start the 2 mg dose.     If you notice after starting the 2 mg Ozempic dose that you are having lows after you eat, then change your IN-PEN settings as follows:     Change the Insulin to Carbohydrate Ratio to 5 instead of 3  Change the Sensitivity Factor to 30 instead of 25.      Let me know if you are having more than one episode per week of low blood glucose.      Appointment with Anne Marie on August 14!        "

## 2023-07-06 NOTE — PROGRESS NOTES
Diabetes Self-Management Education & Support    Nayeli Caal presents today for education related to Type 2 diabetes    Patient is being treated with:  insulin  She is accompanied by self    Year of diagnosis:  or   Referring provider:  Anne Marie Medina PA-C  Living Situation: Lives with a room mate  Employment: Administrative work for [employer]    PATIENT CONCERNS RELATED TO DIABETES SELF MANAGEMENT:   Here for follow up after starting GLP-1    ASSESSMENT:    Taking Medication:     Current Diabetes Management per Patient:  Taking diabetes medications  Currently taking Basaglar 60 units daily  Taking Novolo unit per 3 grams CHO with a correction scale of   She uses the In-Pen yamileth and device to dose Novolog  Takes 1 mg Ozempic.  Tolerating well at this point and feels ready to increase to the 2 mg dose.     Monitoring    Patient glucose self monitoring as follows: Uses the Dexcom G7 continuous glucose monitor (CGM).    Patient's most recent   Lab Results   Component Value Date    A1C 8.7 2023    A1C 11.6 2021      Patient's A1C goal: <7.0    EDUCATION and INSTRUCTION PROVIDED AT THIS VISIT:       Reviewed her Dexcom report.  Her overall control has improved and at this point she is feeling reading to step up to the 2mg dose of Ozempic.   She is leaving this weekend for a vacation to [place] and is a little concerned about side effects from the increased dose.   Discussed that she can actually deliver a 1 1/2 mg dose by only dialing the pen up by counting clicks.  On an Ozempic 2 mg pen, 54 clicks will deliver a 1 1/2 mg dose.  If she is concerned about side effects, this may be a gentler way to increase the dose.  She verbalized understanding of this.     Meanwhile, advised to reduce her Basaglar by 10 units when she gets up to the 2 mg dose.  Also given instructions to adjust her In-Pen settings if she is noticing low glucose after eating.      Follow up  appointment in one month.       Patient-stated goal written and given to Nayeli Caal.  Verbalized and demonstrated understanding of instructions.     PLAN:  See patient instructions  AVS printed and given to patient    FOLLOW-UP:        Time spent with patient at today's visit was 45  minutes.      Any diabetes medication dose changes were made via the [facility] Protocol and Collaborative Practice Agreement with [place] and  [facility].  A copy of this encounter was provided to patient's referring provider.

## 2023-07-10 NOTE — PROGRESS NOTES
DM follow up note:    Pt seen with the assistance of an in person .     Pt states that she is primarily here for DM ed appt to learn the Evelia. Evelia has been approved but she needs to have the teaching here before picking up the meter. She does have some questions for us though- see below.     Surgery was cancelled due to elevated A1C- surgery for trigger finger. It is currently rescheduled for early Dec. She is in a lot of pain in relation to her trigger finger this and really wants A1C to come down so that she can get the surgery.     Empaglifozin - co pay was going to be $600 dollars, was switched to canaglifozin but co-pay is still $100 would like to talk about other options. (Per pharmacy, confirmation present that the co-pay would be 125$ though that would be for a 3 month supply).     Still taking the trulicity, 1.5mg weekly.     Has been on metformin in the past- did fine with it without side effects but it was stopped with initiation of insulin. No liver problems.     Taking her insulin as follows:  Glargine 46U per day, takes in the mornings, usually pretty good about remembering, forgets about once per week   Novolog taking 1U/8g, correction. No change since last visit.   Parameters noted last visit:  Insulin to Carb Ratio: 8  Correction Factor: 50  BG Target: 110-150  Offset time 2 hrs   Acting time 3 hrs     The glucometer readings revealed that she checks her blood glucose 0.7 times daily.  Average blood glucose is 190 with a standard deviation of 66 and a range variable between 87 and 328.  In general, her morning blood sugar is consistently elevated, frequently in the 200 range.  She rarely checks her blood sugar and administers insulin for dinner, based on the meter records.    Trying to do less carbs, add vegetables  530AM Eggs/omlet or sandwich, sometimes meat, sometimes skips this meal.. Uses novolog with this meal, never forgets. Orange juice in the morning, small amt.   1130  PRE-SEDATION ASSESSMENT    CONSENT  Risks, benefits, and alternatives have been discussed with the patient/patient representative, and patient/patient representative agrees to proceed: Yes    MEDICAL HISTORY  Significant medical/surgical history: Yes  Past Complications with Sedation/Anesthesia: No  Significant Family History: No  Smoking History: No  Possible Pregnancy (LMP): No  Cardiac History: Yes  Respiratory History: No    PHYSICAL EXAM  History and Physical Reviewed: Patient has valid H&P within 30 days. I have reviewed and there are no changes.  Airway Anatomy : Class II  Heart : Abnormal  Heart (Comment): IRR  Lungs : Normal  LOC/Mental Status : Abnormal  LOC/Mental Status (Comment) : post arrest mild decrease memory    OTHER FINDINGS  Reviewed current medications and allergies: Yes  Pertinent lab/diagnostic test reviewed: Yes    SEDATION RISK ASSESSMENT  Risk Status ASA: Class II - Normal patient with mild systemic disease  Plan for Sedation: Moderate Sedation  EKG Monitoring: Yes    NARRATIVE FINDINGS  Narrative Findings: VF arrest  with trop 9K - rec CATH today - VIT K given for INR increase   Lunch Sandwhich with meat but no bread, salad, something small. Does use novolog.   6p Dinner: spaghetti, meat, veggies, ect. Uses novolog, sometimes misses  Doesn't have evening snack  Has a regular soda, one 12 can evenings, not always, sometimes not the whole thing. Doesn't cover the soda  Sometimes misses hs correction scale.     Went up to losartan 50mg per day about a month ago. No lightheadness or dizziness.     Diabetes complications:  Retinopathy: last eye exam - June 2017. 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 States 11/19/18 that she is going to schedule f/u appt with eye doctor.   Nephropathy: h/o proteinuria; normal GFR. Now on 50mg losartan daily (tx with lisinopril was associated with a dry cough).   Neuropathy: No N/T in feet or hands. Just the trigger finger that bothers her.     At last visit, pt reported that 1 week prior to recent lab work, she states she did not take fenofibrate, as she did not have time to  the prescription. 11/19/18 she reports taking the atorvastatin and fenofibrate.     Current Outpatient Prescriptions   Medication     Alcohol Swabs (ALCOHOL PREP PAD) 70 % PADS     aspirin 81 MG chewable tablet     atorvastatin (LIPITOR) 40 MG tablet     B-D U/F insulin pen needle     BASAGLAR 100 UNIT/ML injection     BD ULTRA FINE PEN NEEDLES     blood glucose monitoring (ACCU-CHEK LAYA PLUS) test strip     blood glucose monitoring (ACCU-CHEK FASTCLIX) lancets     canagliflozin (INVOKANA) 100 MG tablet     Continuous Blood Gluc  (FREESTYLE ZORAIDA READER) DAVID     continuous blood glucose monitoring (FREESTYLE ZORAIDA) sensor     cyclobenzaprine (FLEXERIL) 5 MG tablet     dulaglutide (TRULICITY) 1.5 MG/0.5ML pen     ergocalciferol (ERGOCALCIFEROL) 65466 units capsule     fenofibrate 160 MG tablet     ibuprofen (ADVIL/MOTRIN) 600 MG tablet     insulin aspart (NOVOLOG PEN) 100 UNIT/ML injection     levonorgestrel (MIRENA) 20 MCG/24HR IUD     losartan (COZAAR) 50 MG tablet     naproxen  "(NAPROSYN) 500 MG tablet     TRULICITY 0.75 MG/0.5ML pen     [DISCONTINUED] BD ULTRA FINE PEN NEEDLES     Current Facility-Administered Medications   Medication     hylan (SYNVISC ONE) injection 48 mg     hylan (SYNVISC ONE) injection 48 mg     Past Medical History:   Diagnosis Date     Combined visual and hearing impairment      Deaf      Diabetic retinopathy of both eyes (H) 8/19/2011     Hepatic steatosis 08/19/2011     History of tobacco use      Hyperlipidemia      Hypertension      Hypertriglyceridemia      Migraines      Obesity 8/19/2011     Problem list name updated by automated process. 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 Continues working- currently doing secretarial work.     ROS:  Negative except as noted above.     /80  Pulse 87  Ht (P) 1.549 m (5' 0.98\")  Wt (P) 79.6 kg (175 lb 6.4 oz)  BMI (P) 33.16 kg/m2  Gen: No acute distress, comfortable appearing   HEENT: No noted icterus, MMM  CV: Regular rate and rhythm.   Pulm: Breathing comfortably on room air. No discrete adventitious sounds identified.  Ext: No LE edema, no bony deficits noted    Integumentary: No rashes or other lesions identified   Neuro: No focal deficits noted, muscle bulk appears intact  Psych: Mood and affect congruent     Labs:  Reviewed.    A/P:  Diabetes, type 2 vs. PRAVIN, complicated by diabetic nephropathy and retinopathy, uncontrolled, mainly due to noncompliance with the prescribed regimen  At last visit 10/15/18 plan was to initiate a SGLT2 inhibitor in the setting of persistent hyperglycemia and A1C of 10.3. Pt unable to start empaglifozin due to prohibitive cost. Canaglifozin was substitutied but given that co-pay would still be over $125 for 3 month supply, so she wanted to explore other options. She " has been on metformin in the past and tolerated it well per her report. She does not have other contraindications to retrial thus will plan for re-initiation and uptitration to full dose.   Minimal blood glucose meter data is available, but pt appears to have persistent morning hyperglycemia with some improvement pre lunch. Morning hyperglycemia could be due to missed dinner novolog, soda intake before bed, exaggerated harshil phenomenon, long-acting insulin wearing off, etc. Suspect evelia data will assist with differentiation of potential causes and assist with targeting changes. Given significant improvement in glucoses later in the day and continued component of non adherence, will not make any changes to insulin regimen today.   -start metformin ER and titrate up to full dose   -continue glargine 46U per day, aspart 1U/8g and 1U/50 over 150   -work with pt to identify alternative way to help calculate her insulin doses (as pt previously inputting data into her meter to help calculate the aspart dose that was indicated)   -initiate Evelia monitoring (pt to have teaching with DM educator after appt)  -continue Trulicity 1.8mg per week   -pt to contact staff if needed to review glucoses and provide verification that glucoses are appropriate prior to surgery.  She has no definite contraindication to surgery, although we would prefer a tighter blood glucose control.  Counseled the patient on the importance of achieving a better blood glucose control around the time of surgery, in order to decrease the risk of infections, delayed healing.    Proteinuria/HTN  Pt now with BP at goal after losartan increased to 50mg per day approximately one month ago. No SEs noted.   -continue losartan 50mg per day   -Follow-up urine microalbumin at her next appointment    Dyslipidemia   Suspect further elevation in triglycerides on last eval 10/15/18 was secondary to non adherence with fenofibrate. Pt now reports full adherence.  -continue  atorvastatin and fenofibrate    Follow up in clinic in 2 months.     The pt was staffed with Dr. Bragg.     Mary Ann Perdomo MD  Endocrine Fellow   Pager [phone]    I have personally examined the patient, reviewed and edited the fellow's note and agree with the plan of care.    Jimena Bragg MD.      Dima Artis)  Urology  [address], [address]  [place] [address]  Phone: [phone]  Fax: [phone]  Follow Up Time: 4-6 Days

## 2023-07-18 ENCOUNTER — THERAPY VISIT (OUTPATIENT)
Dept: OCCUPATIONAL THERAPY | Facility: CLINIC | Age: 43
End: 2023-07-18
Payer: COMMERCIAL

## 2023-07-18 ENCOUNTER — OFFICE VISIT (OUTPATIENT)
Dept: ORTHOPEDICS | Facility: CLINIC | Age: 43
End: 2023-07-18
Payer: COMMERCIAL

## 2023-07-18 DIAGNOSIS — M65.331 TRIGGER MIDDLE FINGER OF RIGHT HAND: Primary | ICD-10-CM

## 2023-07-18 DIAGNOSIS — Z98.890 POST-OPERATIVE STATE: ICD-10-CM

## 2023-07-18 PROCEDURE — 97110 THERAPEUTIC EXERCISES: CPT | Mod: GO | Performed by: OCCUPATIONAL THERAPIST

## 2023-07-18 PROCEDURE — 99024 POSTOP FOLLOW-UP VISIT: CPT | Performed by: PHYSICIAN ASSISTANT

## 2023-07-18 PROCEDURE — 97530 THERAPEUTIC ACTIVITIES: CPT | Mod: GO | Performed by: OCCUPATIONAL THERAPIST

## 2023-07-18 NOTE — PROGRESS NOTES
Chief Complaint:   Chief Complaint   Patient presents with     Surgical Followup     Follow up for right index and middle trigger finger release DOS 6/13/23     Diagnosis: Right middle trigger finger, right index trigger finger, left middle trigger finger  Treatment:   6/13/2023: right middle and index trigger finger release  2019: Right trigger thumb release, left ring trigger finger release (Dr. Streeter)    History of Present Illness: Nayeli Caal is a 43 year old LHD female presenting for evaluation of trigger fingers.  She has had the pain for several months.  The right hand is worse than the left.  The right middle finger is more symptomatic, but the right index finger started becoming painful and triggering last week.  She denies numbness or tingling.  She has pain and triggering all the time.  She has not had steroid injections.  She has tried a brace which sometimes helps.  Because she communicates via ASL, the triggering impacts her ability to communicate.    She had good relief of her right trigger thumb and left ring trigger finger after surgical release in 2019 and she would like to have surgery.    Interval 6/27/2023: presents for first postoperative visit. Reports no more triggering in the right hand, no numbness or tingling in the right hand, but the right middle finger PIP is stiff and painful.     Interval 7/18/2023: reports ROM has improved. She is able to make a full fist and fully extend all her fingers. She notes some persistent swelling to the long finger. She also notes hypersensitivity to the surgical incisions, which has been limiting. Has been working with therapy. Noted plain gut sutures utilized.     Occupation:     Physical Examination:  Well appearing, well nourished  Alert and oriented x 3, cooperative with exam     Bilateral hands:  Skin intact left hand  Incisions over right index/middle finger A1 pulleys healing well without evidence of infection, mild  surrounding skin irritation.   Able to make a full composite fist with the right hand, able to fully extend both long and index fingers. No palpable clicking or catching.   Hypersensitivity of the surgical incisions to the right long and index fingers.     Tender to palpation over A1 pulley region left middle finger; with palpable nodule, triggering and pain with active motion  Prior incisions over right thumb A1 pulley and left ring A1 pulley are healed, no tenderness to palpation  Able to make full fist  Motor Exam: Intact TU/OK/x2-3  Sensory Exam: Sensation intact to light touch in FDWS (radial), volar IF (median), volar SF (ulnar)  Vascular Exam: 2+ radial pulse, < 2 sec capillary refill    Assessment: Nayeli Caal is a 43 year old female with right middle and index trigger finger, left middle trigger finger. Now s/p right index and middle trigger finger releases, doing well with improved ROM, hypersensitivity of surgical incisions likely 2/2 irritation from plain gut sutures.     Plan:     WBAT right upper extremity.    Continue working on ROM of right middle finger, hand therapy to work on edema control, scar massage, and desensitization. Discussed that hypersensitivity could be due to inflammatory response from plain gut sutures, will plan to use Nylon in the gutreu.     Plan for operative intervention in the form of left middle finger A1 pulley release in early August.     Next Visit:    Follow-up: 1-2 week(s) after left sided surgery    Imaging: None.    Plan: ROM and wound check, symptom check      OLIVER STARK PA-C  7/18/2023 2:01 PM   Orthopaedic Surgery

## 2023-07-18 NOTE — NURSING NOTE
Reason For Visit:   Chief Complaint   Patient presents with     Surgical Followup     Follow up for right index and middle trigger finger release DOS 6/13/23       Primary MD: Mariya Mohamud  Ref. MD: est    Age: 43 year old    ?  No      LMP 06/02/2023 (Exact Date)       Pain Assessment  Patient Currently in Pain: Yes  0-10 Pain Scale: 3  Primary Pain Location: Finger (Comment which one) (right index and middle)  Pain Descriptors: Dull, Constant (sharp pain when touched)    Hand Dominance Evaluation  Hand Dominance: Left          QuickDASH Assessment       No data to display                   Current Outpatient Medications   Medication Sig Dispense Refill     acetaminophen (TYLENOL) 500 MG tablet Take 2 tablets (1,000 mg) by mouth every 6 hours as needed for mild pain 100 tablet 3     Alcohol Swabs (ALCOHOL PREP PAD) 70 % PADS 1 each 3 times daily 100 each 3     aspirin (ASA) 81 MG chewable tablet Take 1 tablet (81 mg) by mouth daily CHEW AND SWALLOW 90 tablet 3     atorvastatin (LIPITOR) 40 MG tablet Take 1 tablet (40 mg) by mouth daily 90 tablet 0     B-D U/F insulin pen needle        BD ULTRA FINE PEN NEEDLES Inject 1 dose. Subcutaneous 2 times daily BD ultra-fine insulin syringe, 30 ga. X 1/2'' short needle 1/2 cc. Use as directed. 400 Units 11     blood glucose (ACCU-CHEK LAYA PLUS) test strip Use with  Accucheck Expert meter.  Test blood sugar 6 times daily. 600 each 3     blood glucose monitoring (ACCU-CHEK FASTCLIX) lancets Use to test blood sugar 6 times daily or as directed 510 each 3     Continuous Blood Gluc Sensor (DEXCOM G6 SENSOR) MISC Change every 10 days. 3 month supply. 9 each 3     Continuous Blood Gluc Sensor (DEXCOM G7 SENSOR) MISC 1 each every 10 days 9 each 3     Continuous Blood Gluc Transmit (DEXCOM G6 TRANSMITTER) MISC Change every 3 months. 1 each 3     ergocalciferol (ERGOCALCIFEROL) 1.25 MG (47879 UT) capsule Take 1 capsule (50,000 Units) by mouth once a week For  additional refills, please schedule a follow-up appointment at [phone] 12 capsule 3     fenofibrate (TRIGLIDE/LOFIBRA) 160 MG tablet Take 1 tablet (160 mg) by mouth daily 90 tablet 0     fish oil-omega-3 fatty acids 1000 MG capsule TAKE TWO CAPSULES (2 GM) BY MOUTH ONCE DAILY 180 capsule 3     hydrocortisone (CORTAID) 1 % external cream Apply topically 2 times daily 120 g 1     ibuprofen (ADVIL/MOTRIN) 600 MG tablet Take 1 tablet (600 mg) by mouth every 6 hours as needed for moderate pain 120 tablet 1     Injection Device for insulin (INPEN 100-BLUE-NOVOLOG-FIASP) DAVID 1 each continuous 1 each 0     insulin aspart (NOVOLOG FLEXPEN) 100 UNIT/ML pen 1 unit per 5 grams CHO and correction scale of 1/25/125.  Approximate daily use is 100 units. 30 mL 2     insulin aspart (NOVOPEN ECHO) 100 UNIT/ML cartridge For use with the In-Pen device.  Give 1 unit per 5 grams CHO with meals and snacks as well as correction.  Average daily use is 100 units daily. 90 mL 3     insulin glargine (BASAGLAR KWIKPEN) 100 UNIT/ML pen Inject 60 Units Subcutaneous daily 3 month supply. 54 mL 3     insulin pen needle (B-D U/F) 31G X 8 MM miscellaneous USE AS DIRECTED. 200 each 1     losartan (COZAAR) 100 MG tablet Take 1 tablet (100 mg) by mouth daily 90 tablet 1     naproxen (NAPROSYN) 375 MG tablet Take 1 tablet (375 mg) by mouth 2 times daily (with meals) 60 tablet 3     Ostomy Supplies (ADHESIVE REMOVER WIPES) MISC 1 each every 14 days 50 each 3     Ostomy Supplies (SKIN TAC ADHESIVE BARRIER WIPE) MISC 1 each every 14 days 6 each 3     oxyCODONE (ROXICODONE) 5 MG tablet Take 1 tablet (5 mg) by mouth every 6 hours as needed for pain 5 tablet 0     semaglutide (OZEMPIC) 2 MG/3ML SOPN pen Administer 0.25 mg subcutaneously weekly for one month, then increase dose to 0.5 mg weekly for one month. 3 mL 1     Semaglutide, 2 MG/DOSE, (OZEMPIC) 8 MG/3ML pen Inject 2 mg Subcutaneous every 7 days 9 mL 3       No Known Allergies    Graciela Meza,  MA

## 2023-07-18 NOTE — LETTER
7/18/2023         RE: Nayeli Caal  [address]  [facility] [address]        Dear Colleague,    Thank you for referring your patient, Nayeli Caal, to the [facility] [place]. Please see a copy of my visit note below.    Chief Complaint:   Chief Complaint   Patient presents with    Surgical Followup     Follow up for right index and middle trigger finger release DOS 6/13/23     Diagnosis: Right middle trigger finger, right index trigger finger, left middle trigger finger  Treatment:   6/13/2023: right middle and index trigger finger release  2019: Right trigger thumb release, left ring trigger finger release (Dr. Streeter)    History of Present Illness: Nayeli Caal is a 43 year old LHD female presenting for evaluation of trigger fingers.  She has had the pain for several months.  The right hand is worse than the left.  The right middle finger is more symptomatic, but the right index finger started becoming painful and triggering last week.  She denies numbness or tingling.  She has pain and triggering all the time.  She has not had steroid injections.  She has tried a brace which sometimes helps.  Because she communicates via ASL, the triggering impacts her ability to communicate.    She had good relief of her right trigger thumb and left ring trigger finger after surgical release in 2019 and she would like to have surgery.    Interval 6/27/2023: presents for first postoperative visit. Reports no more triggering in the right hand, no numbness or tingling in the right hand, but the right middle finger PIP is stiff and painful.     Interval 7/18/2023: reports ROM has improved. She is able to make a full fist and fully extend all her fingers. She notes some persistent swelling to the long finger. She also notes hypersensitivity to the surgical incisions, which has been limiting. Has been working with therapy. Noted plain gut sutures utilized.      Occupation:     Physical Examination:  Well appearing, well nourished  Alert and oriented x 3, cooperative with exam     Bilateral hands:  Skin intact left hand  Incisions over right index/middle finger A1 pulleys healing well without evidence of infection, mild surrounding skin irritation.   Able to make a full composite fist with the right hand, able to fully extend both long and index fingers. No palpable clicking or catching.   Hypersensitivity of the surgical incisions to the right long and index fingers.     Tender to palpation over A1 pulley region left middle finger; with palpable nodule, triggering and pain with active motion  Prior incisions over right thumb A1 pulley and left ring A1 pulley are healed, no tenderness to palpation  Able to make full fist  Motor Exam: Intact TU/OK/x2-3  Sensory Exam: Sensation intact to light touch in FDWS (radial), volar IF (median), volar SF (ulnar)  Vascular Exam: 2+ radial pulse, < 2 sec capillary refill    Assessment: Nayeli Caal is a 43 year old female with right middle and index trigger finger, left middle trigger finger. Now s/p right index and middle trigger finger releases, doing well with improved ROM, hypersensitivity of surgical incisions likely 2/2 irritation from plain gut sutures.     Plan:    WBAT right upper extremity.   Continue working on ROM of right middle finger, hand therapy to work on edema control, scar massage, and desensitization. Discussed that hypersensitivity could be due to inflammatory response from plain gut sutures, will plan to use Nylon in the gutreu.    Plan for operative intervention in the form of left middle finger A1 pulley release in early August.     Next Visit:   Follow-up: 1-2 week(s) after left sided surgery   Imaging: None.   Plan: ROM and wound check, symptom check    OLIVER STARK PA-C  7/18/2023 2:01 PM   Orthopaedic Surgery

## 2023-07-25 ENCOUNTER — LAB (OUTPATIENT)
Dept: LAB | Facility: CLINIC | Age: 43
End: 2023-07-25
Payer: COMMERCIAL

## 2023-07-25 DIAGNOSIS — E11.65 UNCONTROLLED TYPE 2 DIABETES MELLITUS WITH HYPERGLYCEMIA, WITH LONG-TERM CURRENT USE OF INSULIN (H): ICD-10-CM

## 2023-07-25 DIAGNOSIS — Z79.4 UNCONTROLLED TYPE 2 DIABETES MELLITUS WITH HYPERGLYCEMIA, WITH LONG-TERM CURRENT USE OF INSULIN (H): ICD-10-CM

## 2023-07-25 LAB — HBA1C MFR BLD: 7.7 %

## 2023-07-25 PROCEDURE — 36415 COLL VENOUS BLD VENIPUNCTURE: CPT | Performed by: PATHOLOGY

## 2023-07-25 PROCEDURE — 99000 SPECIMEN HANDLING OFFICE-LAB: CPT | Performed by: PATHOLOGY

## 2023-07-25 PROCEDURE — 83036 HEMOGLOBIN GLYCOSYLATED A1C: CPT | Performed by: PHYSICIAN ASSISTANT

## 2023-08-01 ENCOUNTER — LAB (OUTPATIENT)
Dept: LAB | Facility: CLINIC | Age: 43
End: 2023-08-01
Payer: COMMERCIAL

## 2023-08-01 ENCOUNTER — OFFICE VISIT (OUTPATIENT)
Dept: INTERNAL MEDICINE | Facility: CLINIC | Age: 43
End: 2023-08-01
Payer: COMMERCIAL

## 2023-08-01 ENCOUNTER — HOSPITAL ENCOUNTER (OUTPATIENT)
Facility: AMBULATORY SURGERY CENTER | Age: 43
Discharge: HOME | End: 2023-08-01
Attending: STUDENT IN AN ORGANIZED HEALTH CARE EDUCATION/TRAINING PROGRAM | Admitting: STUDENT IN AN ORGANIZED HEALTH CARE EDUCATION/TRAINING PROGRAM
Payer: COMMERCIAL

## 2023-08-01 VITALS
WEIGHT: 179 LBS | DIASTOLIC BLOOD PRESSURE: 82 MMHG | HEART RATE: 78 BPM | RESPIRATION RATE: 19 BRPM | OXYGEN SATURATION: 98 % | SYSTOLIC BLOOD PRESSURE: 156 MMHG | BODY MASS INDEX: 33.79 KG/M2 | TEMPERATURE: 97.1 F | HEIGHT: 61 IN

## 2023-08-01 VITALS
DIASTOLIC BLOOD PRESSURE: 85 MMHG | HEART RATE: 79 BPM | BODY MASS INDEX: 33.62 KG/M2 | OXYGEN SATURATION: 97 % | WEIGHT: 178.1 LBS | HEIGHT: 61 IN | SYSTOLIC BLOOD PRESSURE: 150 MMHG

## 2023-08-01 DIAGNOSIS — Z79.4 UNCONTROLLED TYPE 2 DIABETES MELLITUS WITH HYPERGLYCEMIA, WITH LONG-TERM CURRENT USE OF INSULIN (H): ICD-10-CM

## 2023-08-01 DIAGNOSIS — Z12.31 VISIT FOR SCREENING MAMMOGRAM: Primary | ICD-10-CM

## 2023-08-01 DIAGNOSIS — E11.65 UNCONTROLLED TYPE 2 DIABETES MELLITUS WITH HYPERGLYCEMIA, WITH LONG-TERM CURRENT USE OF INSULIN (H): ICD-10-CM

## 2023-08-01 DIAGNOSIS — Z12.31 ENCOUNTER FOR SCREENING MAMMOGRAM FOR BREAST CANCER: ICD-10-CM

## 2023-08-01 DIAGNOSIS — L40.9 PSORIASIS: ICD-10-CM

## 2023-08-01 DIAGNOSIS — M65.332 TRIGGER MIDDLE FINGER OF LEFT HAND: Primary | ICD-10-CM

## 2023-08-01 LAB
ALBUMIN MFR UR ELPH: 88.4 MG/DL
CREAT UR-MCNC: 131 MG/DL
GLUCOSE BLDC GLUCOMTR-MCNC: 164 MG/DL (ref 70–99)
PROT/CREAT 24H UR: 0.67 MG/MG CR (ref 0–0.2)

## 2023-08-01 PROCEDURE — 26055 INCISE FINGER TENDON SHEATH: CPT | Mod: F2 | Performed by: STUDENT IN AN ORGANIZED HEALTH CARE EDUCATION/TRAINING PROGRAM

## 2023-08-01 PROCEDURE — 84156 ASSAY OF PROTEIN URINE: CPT | Performed by: PATHOLOGY

## 2023-08-01 PROCEDURE — 99214 OFFICE O/P EST MOD 30 MIN: CPT | Performed by: NURSE PRACTITIONER

## 2023-08-01 PROCEDURE — 26055 INCISE FINGER TENDON SHEATH: CPT | Mod: F2

## 2023-08-01 PROCEDURE — 82962 GLUCOSE BLOOD TEST: CPT | Performed by: PATHOLOGY

## 2023-08-01 RX ORDER — BACITRACIN ZINC 500 [USP'U]/G
OINTMENT TOPICAL PRN
Status: DISCONTINUED | OUTPATIENT
Start: 2023-08-01 | End: 2023-08-01 | Stop reason: HOSPADM

## 2023-08-01 RX ORDER — OXYCODONE HYDROCHLORIDE 5 MG/1
5 TABLET ORAL EVERY 6 HOURS PRN
Qty: 5 TABLET | Refills: 0 | Status: SHIPPED | OUTPATIENT
Start: 2023-08-01 | End: 2023-08-04

## 2023-08-01 RX ORDER — LIDOCAINE HYDROCHLORIDE AND EPINEPHRINE 10; 10 MG/ML; UG/ML
10 INJECTION, SOLUTION INFILTRATION; PERINEURAL ONCE
Status: COMPLETED | OUTPATIENT
Start: 2023-08-01 | End: 2023-08-01

## 2023-08-01 RX ORDER — TRIAMCINOLONE ACETONIDE 1 MG/G
OINTMENT TOPICAL 2 TIMES DAILY
Qty: 30 G | Refills: 1 | Status: SHIPPED | OUTPATIENT
Start: 2023-08-01 | End: 2024-07-12

## 2023-08-01 RX ORDER — CEFAZOLIN SODIUM 2 G/50ML
2 SOLUTION INTRAVENOUS ONCE
Status: COMPLETED | OUTPATIENT
Start: 2023-08-01 | End: 2023-08-01

## 2023-08-01 RX ADMIN — CEFAZOLIN SODIUM 2 G: 2 SOLUTION INTRAVENOUS at 08:40

## 2023-08-01 RX ADMIN — LIDOCAINE HYDROCHLORIDE AND EPINEPHRINE 10 ML: 10; 10 INJECTION, SOLUTION INFILTRATION; PERINEURAL at 08:40

## 2023-08-01 NOTE — DISCHARGE INSTRUCTIONS
Date: 8/1/2023    DIAGNOSIS  1. Trigger middle finger of left hand        MEDICATIONS   Resume all home medications as directed unless otherwise instructed during this hospitalization. If there is any question, double check with your primary care provider.  Start new discharge medications as directed.    Take 1 tablet of 650 mg Tylenol (acetaminophen) Arthritis Strength (extended release) and 1 tablet of Aleve (naproxen) 220 mg in the morning with breakfast and in the evening with dinner.     For breakthrough pain use narcotic pain medication as prescribed.    Do not drive or operate machinery while taking narcotic pain medications.   If you are taking other Tylenol containing medicines at home, be sure NOT to exceed 4 gram's (4000 milligrams) of Tylenol per day.   If you are taking pain medications, be sure to take Colace (docusate sodium) as well to prevent constipation. If constipated, try adding another cathartic or enema.  If nausea and vomiting, call the hospital or seek medical attention.    ACTIVITY   Weight bearing: Non-weight bearing to arm.    DIET  Resume same diet prior to your hospital admission.    WOUND   Leave dressing on for 3 days. At that point, you can remove your bandage and begin showering and washing your hands, but do NOT submerge your hand. Place a bandaid on top of the sutures.  Watch for signs and symptoms of infection of your wounds including; pain, redness, swelling, drainage or fever.  If you notice any of these symptoms please call or seek medical attention.    Keep wound clean, dry, and intact.  Do not submerge wounds in water until they are healed. No baths, soaking, swimming, or prolonged water exposure for 4 weeks after surgery.    RETURN   Follow-up with Orthopedic Clinic as directed.     Future Appointments   Date Time Provider Department Center   8/1/2023  9:50 AM ASL IS [facility]   8/1/2023  3:15 PM Mariya Mohamud APRN [facility]   8/14/2023  2:15 PM  "Anne Marie Medina PA-C [facility]   8/15/2023  1:00 PM [facility]   10/2/2023  7:30 AM Jimena Bragg MD [facility]       Call the [facility] at [phone] during business hours for any symptoms such as:    * Fevers with Temperature greater than 101.5 degrees.   * Pus drainage from wound site.   * Severe pain, not controlled by medication.   * Persistent nausea, vomiting and inablility to tolerate fluids.    If you are receiving care in [place], you may call the Orthopedic clinic at [phone].    FOR URGENT PROBLEMS ONLY, after hours or on weekends call the hospital  at [phone] and ask to speak with the orthopedic resident on call.    You may also be seen at the [facility] that runs 6 days per week from 8a-5p Monday-Friday and 8a-12p on Saturday at [address] [place] [id number]. You can call the Walk-In Clinic at [phone].    Today you received Lidocaine  to numb the nerves near your surgery site.  This is a block using local anesthetic or \"numbing\" medication injected around the nerves to anesthetize or \"numb\" the area supplied by those nerves.  This block is injected into the muscle layer near your surgical site.  The medication may numb the location where you had surgery for 6-18 hours, but may last up to 24 hours.  If your surgical site is an arm or leg you should be careful with your affected limb, since it is possible to injure your limb without being aware of it due to the numbing.  Until full feeling returns, you should guard against bumping or hitting your limb, and avoid extreme hot or cold temperatures on the skin.  As the block wears off, the feeling will return as a tingling or prickly sensation near your surgical site.  You will experience more discomfort from your incision as the feeling returns.  You may want to take a pain pill (a narcotic or Tylenol if this was prescribed by your " surgeon) when you start to experience mild pain before the pain beccomes more severe.  If your pain medications do not control your pain you should notifiy your surgeon.    Tips for taking pain medications  To get the best pain relief possible, remember these points:  Take pain medications as directed, before pain becomes severe.  Pain medication can upset your stomach: taking it with food may help.  Constipation is a common side effect of pain medication. Drink plenty of  fluids.  Eat foods high in fiber. Take a stool softener if recommended by your doctor or pharmacist.  Do not drink alcohol, drive or operate machinery while taking pain medications.  Ask about other ways to control pain, such as with heat, ice or relaxation.    Tylenol/Acetaminophen Consumption    If you feel your pain relief is insufficient, you may take Tylenol/Acetaminophen in addition to your narcotic pain medication.   Be careful not to exceed 4,000 mg of Tylenol/Acetaminophen in a 24 hour period from all sources.  If you are taking extra strength Tylenol/acetaminophen (500 mg), the maximum dose is 8 tablets in 24 hours.  If you are taking regular strength acetaminophen (325 mg), the maximum dose is 12 tablets in 24 hours.    Call a doctor for any of the following:  Signs of infection (fever, growing tenderness at the surgery site, a large amount of drainage or bleeding, severe pain, foul-smelling drainage, redness, swelling).  It has been over 8 to 10 hours since surgery and you are still not able to urinate (pass water).  Headache for over 24 hours.  Numbness, tingling or weakness the day after surgery (if you had spinal anesthesia).  Signs of Covid-19 infection (temperature over 100 degrees, shortness of breath, cough, loss of taste/smell, generalized body aches, persistent headache, chills, sore throat, nausea/vomiting/diarrhea)  Your doctor is:       Dr. Miracle Carlin, Orthopaedics: [phone]               Or dial [phone] and  ask for the resident on call for:  Orthopaedics  For emergency care, call the:  [facility] Emergency Department: [phone] (TTY for hearing impaired: [phone])              ;

## 2023-08-01 NOTE — OP NOTE
OPERATIVE REPORT    PATIENT NAME: Nayeli Caal  MRN: [id number]    DATE: 8/1/2023    PREOPERATIVE DIAGNOSIS:   1. Left middle trigger finger.    POSTOPERATIVE DIAGNOSIS:   1. Left middle trigger finger.     OPERATION:   1. Left middle trigger finger release. [id number]    SURGEON: Miracle Carlin MD    STAFF: Circulator: Allison Roman RN  Scrub Person: Alissa Horan MA  Local Monitor: Maria C Hernandes RN    ANESTHESIA: Local    IMPLANTS: * No implants in log *    ESTIMATED BLOOD LOSS: < 1 mL.    FLUIDS: See anesthesia records.     URINE OUTPUT: Not applicable.  Kwong was not placed.     TOURNIQUET TIME: not used.    COMPLICATION: None.     INDICATIONS: Nayeli Caal is a 43 year old female who developed a left middle trigger finger, which has not responded to non-operative treatment.  She was indicated for surgical release. The risks, benefits, and alternatives were discussed with the patient.  The patient verbalized understanding of the treatment plan and an informed consent was signed.     DESCRIPTION OF PROCEDURE: The base of the middle finger was prepped with alcohol and a digital block using 7ml of 1% lidocaine and 1:100,000 epinephrine. The patient was taken to the operating room and placed in supine position on the operating table. Ancef was given due to the patient's poorly controlled diabetes. The left upper extremity was then prepped and draped in the usual sterile fashion.  A timeout was performed with all OR staff.  The patient name, MRN, operative extremity, procedure, allergies, antibiotics, DVT prophylaxis/SCDs, and fire precautions/plan were reviewed, and all were in agreement.     A longitudinal incision was made over the volar aspect of the MCP joint/metacarpal head at the level of the distal palmar crease.  Skin and subcutaneous tissues were sharply incised. Blunt dissection was carried down to the level of the A1 pulley.  Soft tissues were elevated off the A1 pulley. The radial  and ulnar digital neurovascular bundles were protected.  The A1 pulley was incised.  The A2 pulley was preserved. The FDS and FDP were noted to have independent glide. There was no palpable trigger with active flexion/extension of the digit.  The wound was irrigated.  Skin closure was performed with 4-0 Nylon. Sterile dressings were applied.  Capillary refill was intact < 2 seconds.     The patient was taken to the recovery room in stable condition.      All counts were correct at the end of the case.       There were no complications.    POSTOPERATIVE PLAN: return to clinic in 1-2 weeks for wound check, suture removal.     TRICIA SEYMOUR MD

## 2023-08-01 NOTE — NURSING NOTE
"Nayeli Caal is a 43 year old female patient that presents today in clinic for the following:    Chief Complaint   Patient presents with    Follow Up     Pt here for 6 month follow up and would like to discuss pain in left shoulder to leg from fall on 7/27 and previous urgent care notes     The patient's allergies and medications were reviewed as noted. A set of vitals were recorded as noted without incident: BP (!) 150/85 (BP Location: Right arm, Patient Position: Sitting, Cuff Size: Adult Regular)   Pulse 79   Ht 1.56 m (5' 1.42\")   Wt 80.8 kg (178 lb 1.6 oz)   LMP 06/02/2023 (Exact Date)   SpO2 97%   BMI 33.20 kg/m  . The patient does not have any other questions for the provider.    Laine Dunn, EMT at 3:43 PM on 8/1/2023  "

## 2023-08-03 NOTE — PROGRESS NOTES
S: Nayeli Caal is a 43 year old female who is here for a six month follow-up. She is seen with . She was in [place] last week and was visiting the [facility] on day three when she fell off a wall and dropped 2 feet landing on her left shoulder and hip. The left arm pain is interfering with her ability to sign, but getting better.  She has some slight low back pain.  It is slowly improving.     She is on Ozempic and her glucose control has improved.     She believes she will be scheduled for a hysterectomy for fibroids.      She is otherwise doing fine. States she is UTD with EYE and dental visits.        Patient Active Problem List   Diagnosis     Essential hypertension     Hypersomnia, organic     Other chronic nonalcoholic liver disease     Diabetic retinopathy of both eyes (H)     Obesity     Usher Syndrome: congenital deafness, retinitis pigmentosa     Macular degeneration, age related, nonexudative     Benign neoplasm of iris     Dry eye syndrome     Pemphigus erythematosus     Chondromalacia of patella, right, steroid injection 6/12/2015     Uncontrolled type 2 diabetes mellitus with hyperglycemia, with long-term current use of insulin (H)     Chronic kidney disease, stage 1     Sprain of left acromioclavicular ligament     Trigger middle finger of right hand     Trigger middle finger of left hand            Past Medical History:   Diagnosis Date     Combined visual and hearing impairment      Deaf      Diabetic retinopathy of both eyes (H) 8/19/2011     Hepatic steatosis 08/19/2011     History of tobacco use      Hyperlipidemia      Hypertension      Hypertriglyceridemia      Migraines      Obesity 8/19/2011     Problem list name updated by automated process. 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             Past Surgical History:   Procedure Laterality Date     LAPAROSCOPIC CHOLECYSTECTOMY N/A 3/10/2018    Procedure: LAPAROSCOPIC CHOLECYSTECTOMY;  Laparoscopic Cholecystectomy ;  Surgeon: Kuldeep Sigala MD;  Location: [facility] OR     RELEASE TRIGGER FINGER Right 2019    Procedure: Right Thumb Trigger Release;  Surgeon: Greg Streeter MD;  Location: [facility] OR     RELEASE TRIGGER FINGER Left 2019    Procedure: Left Ring Trigger Finger Release.  Ganglion cyst excision.;  Surgeon: Greg Streeter MD;  Location: [facility] OR     RELEASE TRIGGER FINGER Right 2023    Procedure: RELEASE, RIGHT TRIGGER FINGER, INDEX AND MIDDLE;  Surgeon: Miracle Carlin MD;  Location: [facility] OR     RELEASE TRIGGER FINGER Left 2023    Procedure: RELEASE, LEFT TRIGGER FINGER, MIDDLE;  Surgeon: Miracle Carlin MD;  Location: [facility] OR            Social History     Tobacco Use     Smoking status: Former     Types: Cigarettes     Quit date: 2010     Years since quittin.6     Smokeless tobacco: Never     Tobacco comments:     stopped 10 yrs ago   Substance Use Topics     Alcohol use: Not Currently     Comment: socially            Family History   Problem Relation Age of Onset     Unknown/Adopted Other              No Known Allergies         Current Outpatient Medications   Medication Sig Dispense Refill     calcium carbonate-vitamin D (OSCAL) 500-5 MG-MCG tablet Take 1 tablet by mouth 2 times daily 90 tablet 3     triamcinolone (KENALOG) 0.1 % external ointment Apply topically 2 times daily 30 g 1     acetaminophen (TYLENOL) 500 MG tablet Take 2 tablets (1,000 mg) by mouth every 6 hours as needed for mild pain 100 tablet 3     Alcohol Swabs (ALCOHOL PREP PAD) 70 % PADS 1 each 3 times daily 100 each 3     aspirin (ASA) 81 MG chewable tablet Take 1 tablet (81 mg) by mouth daily CHEW AND SWALLOW 90 tablet 3     atorvastatin (LIPITOR) 40 MG tablet Take 1 tablet (40 mg) by mouth daily 90 tablet 0     B-D U/F insulin  pen needle        BD ULTRA FINE PEN NEEDLES Inject 1 dose. Subcutaneous 2 times daily BD ultra-fine insulin syringe, 30 ga. X 1/2'' short needle 1/2 cc. Use as directed. 400 Units 11     blood glucose (ACCU-CHEK LAYA PLUS) test strip Use with  Accucheck Expert meter.  Test blood sugar 6 times daily. 600 each 3     blood glucose monitoring (ACCU-CHEK FASTCLIX) lancets Use to test blood sugar 6 times daily or as directed 510 each 3     Continuous Blood Gluc Sensor (DEXCOM G6 SENSOR) MISC Change every 10 days. 3 month supply. 9 each 3     Continuous Blood Gluc Sensor (DEXCOM G7 SENSOR) MISC 1 each every 10 days 9 each 3     Continuous Blood Gluc Transmit (DEXCOM G6 TRANSMITTER) MISC Change every 3 months. 1 each 3     ergocalciferol (ERGOCALCIFEROL) 1.25 MG (05375 UT) capsule Take 1 capsule (50,000 Units) by mouth once a week For additional refills, please schedule a follow-up appointment at [phone] 12 capsule 3     fenofibrate (TRIGLIDE/LOFIBRA) 160 MG tablet Take 1 tablet (160 mg) by mouth daily 90 tablet 0     fish oil-omega-3 fatty acids 1000 MG capsule TAKE TWO CAPSULES (2 GM) BY MOUTH ONCE DAILY 180 capsule 3     hydrocortisone (CORTAID) 1 % external cream Apply topically 2 times daily 120 g 1     ibuprofen (ADVIL/MOTRIN) 600 MG tablet Take 1 tablet (600 mg) by mouth every 6 hours as needed for moderate pain 120 tablet 1     Injection Device for insulin (INPEN 100-BLUE-NOVOLOG-FIASP) DAVID 1 each continuous 1 each 0     insulin aspart (NOVOLOG FLEXPEN) 100 UNIT/ML pen 1 unit per 5 grams CHO and correction scale of 1/25/125.  Approximate daily use is 100 units. 30 mL 2     insulin aspart (NOVOPEN ECHO) 100 UNIT/ML cartridge For use with the In-Pen device.  Give 1 unit per 5 grams CHO with meals and snacks as well as correction.  Average daily use is 100 units daily. 90 mL 3     insulin glargine (BASAGLAR KWIKPEN) 100 UNIT/ML pen Inject 60 Units Subcutaneous daily 3 month supply. 54 mL 3     insulin pen needle  "(B-D U/F) 31G X 8 MM miscellaneous USE AS DIRECTED. 200 each 1     losartan (COZAAR) 100 MG tablet Take 1 tablet (100 mg) by mouth daily 90 tablet 1     naproxen (NAPROSYN) 375 MG tablet Take 1 tablet (375 mg) by mouth 2 times daily (with meals) 60 tablet 3     Ostomy Supplies (ADHESIVE REMOVER WIPES) MISC 1 each every 14 days 50 each 3     Ostomy Supplies (SKIN TAC ADHESIVE BARRIER WIPE) MISC 1 each every 14 days 6 each 3     oxyCODONE (ROXICODONE) 5 MG tablet Take 1 tablet (5 mg) by mouth every 6 hours as needed for pain 5 tablet 0     oxyCODONE (ROXICODONE) 5 MG tablet Take 1 tablet (5 mg) by mouth every 6 hours as needed for pain 5 tablet 0     semaglutide (OZEMPIC) 2 MG/3ML SOPN pen Administer 0.25 mg subcutaneously weekly for one month, then increase dose to 0.5 mg weekly for one month. 3 mL 1     Semaglutide, 2 MG/DOSE, (OZEMPIC) 8 MG/3ML pen Inject 2 mg Subcutaneous every 7 days 9 mL 3       REVIEW OF SYSTEMS:  See above.    O:   BP (!) 150/85 (BP Location: Right arm, Patient Position: Sitting, Cuff Size: Adult Regular)   Pulse 79   Ht 1.56 m (5' 1.42\")   Wt 80.8 kg (178 lb 1.6 oz)   LMP 06/02/2023 (Exact Date)   SpO2 97%   BMI 33.20 kg/m    GENERAL APPEARANCE: NAD,  alert and no distress  EYES: sclera white, conjunctiva normal.  RESP: lungs clear to auscultation - no rales, rhonchi or wheezes  CV: regular rates and rhythm, normal S1 S2, no S3 or S4 and no murmur, click or rub   NEURO: alert and oriented.  Good historian.  MUSK: ambulatory.  SKIN: normal.  EXT: warm.  Edema: none  PSYCHE: normal.    A/P:  Nayeli was seen today for follow up.    Diagnoses and all orders for this visit:    Visit for screening mammogram    Encounter for screening mammogram for breast cancer  -     calcium carbonate-vitamin D (OSCAL) 500-5 MG-MCG tablet; Take 1 tablet by mouth 2 times daily  -     MA Screening Digital Bilateral; Future    Psoriasis  -     triamcinolone (KENALOG) 0.1 % external ointment; Apply topically 2 " times daily    Uncontrolled type 2 diabetes mellitus with hyperglycemia, with long-term current use of insulin (H)  -     Protein  random urine; Future    The patient voiced understanding of the information discussed and all questions were answered.     I spent a total of 30 minutes in the care of this pt during today's office visit. This time includes reviewing the patient's chart and prior history, obtaining a history, performing an examination and evaluation and counseling the patient. This time also includes ordering medications or tests necessary in addition to communication to other member's of the patient's health care team. Time spent in documentation and care coordination is included.     Mariya Mohamud APRN, CNP
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
## 2023-08-14 ENCOUNTER — OFFICE VISIT (OUTPATIENT)
Dept: ENDOCRINOLOGY | Facility: CLINIC | Age: 43
End: 2023-08-14
Payer: COMMERCIAL

## 2023-08-14 ENCOUNTER — ANCILLARY PROCEDURE (OUTPATIENT)
Dept: GENERAL RADIOLOGY | Facility: CLINIC | Age: 43
End: 2023-08-14
Attending: PHYSICIAN ASSISTANT
Payer: COMMERCIAL

## 2023-08-14 VITALS
WEIGHT: 180 LBS | SYSTOLIC BLOOD PRESSURE: 148 MMHG | OXYGEN SATURATION: 97 % | HEART RATE: 81 BPM | BODY MASS INDEX: 33.99 KG/M2 | DIASTOLIC BLOOD PRESSURE: 87 MMHG | HEIGHT: 61 IN

## 2023-08-14 DIAGNOSIS — D22.9 ATYPICAL MOLE: ICD-10-CM

## 2023-08-14 DIAGNOSIS — M25.512 ACUTE PAIN OF LEFT SHOULDER: ICD-10-CM

## 2023-08-14 DIAGNOSIS — M25.512 ACUTE PAIN OF LEFT SHOULDER: Primary | ICD-10-CM

## 2023-08-14 PROCEDURE — 99215 OFFICE O/P EST HI 40 MIN: CPT | Performed by: PHYSICIAN ASSISTANT

## 2023-08-14 PROCEDURE — 73030 X-RAY EXAM OF SHOULDER: CPT | Mod: LT | Performed by: RADIOLOGY

## 2023-08-14 ASSESSMENT — PAIN SCALES - GENERAL: PAINLEVEL: MODERATE PAIN (4)
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
## 2023-08-14 NOTE — NURSING NOTE
"Chief Complaint   Patient presents with    Diabetes     Vital signs:      BP: (!) 148/87 Pulse: 81     SpO2: 97 %     Height: 154.9 cm (5' 1\") Weight: 81.6 kg (180 lb)  Estimated body mass index is 34.01 kg/m  as calculated from the following:    Height as of this encounter: 1.549 m (5' 1\").    Weight as of this encounter: 81.6 kg (180 lb).        "

## 2023-08-14 NOTE — PATIENT INSTRUCTIONS
Change carb ratio at lunch to 1/2.5g (was 3).      Schedule in dermatology about your mole.      Schedule in ortho for your shoulder.     Please let me know if you are having low blood sugars less than 70 or over 250 mg/dL.      If you have concerns, please send me a [employer] message M-Th, call the clinic at [phone], or call [phone] after hours/weekends and ask to speak with the endocrinologist on call.

## 2023-08-15 ENCOUNTER — OFFICE VISIT (OUTPATIENT)
Dept: ORTHOPEDICS | Facility: CLINIC | Age: 43
End: 2023-08-15
Payer: COMMERCIAL

## 2023-08-15 DIAGNOSIS — Z98.890 POST-OPERATIVE STATE: Primary | ICD-10-CM

## 2023-08-15 DIAGNOSIS — M65.332 TRIGGER MIDDLE FINGER OF LEFT HAND: ICD-10-CM

## 2023-08-15 PROCEDURE — 99024 POSTOP FOLLOW-UP VISIT: CPT | Performed by: PHYSICIAN ASSISTANT

## 2023-08-15 NOTE — LETTER
8/15/2023         RE: Nayeli Caal  [address]  [facility] [address]        Dear Colleague,    Thank you for referring your patient, Nayeli Caal, to the [facility]THOP[facility] [place]. Please see a copy of my visit note below.    Chief Complaint:   Chief Complaint   Patient presents with    Surgical Followup     Post-op followup Left middle trigger finger release DOS 8/1/23      Diagnosis: Right middle trigger finger, right index trigger finger, left middle trigger finger  Treatment:   8/1/2023: left middle trigger finger release  6/13/2023: right middle and index trigger finger release  2019: Right trigger thumb release, left ring trigger finger release (Dr. Streeter)    History of Present Illness: Nayeli Caal is a 43 year old LHD female presenting for evaluation of trigger fingers.  She has had the pain for several months.  The right hand is worse than the left.  The right middle finger is more symptomatic, but the right index finger started becoming painful and triggering last week.  She denies numbness or tingling.  She has pain and triggering all the time.  She has not had steroid injections.  She has tried a brace which sometimes helps.  Because she communicates via ASL, the triggering impacts her ability to communicate.    She had good relief of her right trigger thumb and left ring trigger finger after surgical release in 2019 and she would like to have surgery.    Interval 6/27/2023: presents for first postoperative visit. Reports no more triggering in the right hand, no numbness or tingling in the right hand, but the right middle finger PIP is stiff and painful.     Interval 7/18/2023: reports ROM has improved. She is able to make a full fist and fully extend all her fingers. She notes some persistent swelling to the long finger. She also notes hypersensitivity to the surgical incisions, which has been limiting. Has been working with therapy.  Noted plain gut sutures utilized.     8/15/2023: presents for first postoperative visit after left middle trigger finger release. Pain well controlled. Denies N/t. No further clicking. Overall happy with progress. Right palmar incision less sensaive.     Occupation: [employer]    Physical Examination:  Well appearing, well nourished  Alert and oriented x 3, cooperative with exam     Bilateral hands:  Incisions over right index/middle finger A1 pulleys healed; incision over left middle finger A1 pulley healing well, Nylon sutures intact  Able to make a full composite fist with both hands, able to fully extend both long and index fingers. No palpable clicking or catching.   Hypersensitivity of the surgical incisions to the right long and index fingers.   Motor Exam: Intact TU/OK/x2-3  Sensory Exam: Sensation intact to light touch in FDWS (radial), volar IF (median), volar SF (ulnar)  Vascular Exam: 2+ radial pulse, < 2 sec capillary refill    Assessment: Nayeli MIXON Ingrid Caal is a 43 year old female with right middle and index trigger finger, left middle trigger finger. Now s/p right index and middle trigger finger releases, left middle trigger finger release.    Plan:    WBAT right upper extremity. PWB < 5 lbs left upper extremity. Left hand sutures removed today   Continue working on ROM of both hands, hand therapy exercises for edema control, scar massage, and desensitization.    Next Visit:   Follow-up: 4 weeks or prn.    Imaging: None.   Plan: ROM and wound check, symptom check, advance activities    OLIVER STARK PA-C  Orthopaedic Surgery

## 2023-08-15 NOTE — PROGRESS NOTES
Chief Complaint:   Chief Complaint   Patient presents with    Surgical Followup     Post-op followup Left middle trigger finger release DOS 8/1/23      Diagnosis: Right middle trigger finger, right index trigger finger, left middle trigger finger  Treatment:   8/1/2023: left middle trigger finger release  6/13/2023: right middle and index trigger finger release  2019: Right trigger thumb release, left ring trigger finger release (Dr. Streeter)    History of Present Illness: Nayeli Caal is a 43 year old LHD female presenting for evaluation of trigger fingers.  She has had the pain for several months.  The right hand is worse than the left.  The right middle finger is more symptomatic, but the right index finger started becoming painful and triggering last week.  She denies numbness or tingling.  She has pain and triggering all the time.  She has not had steroid injections.  She has tried a brace which sometimes helps.  Because she communicates via ASL, the triggering impacts her ability to communicate.    She had good relief of her right trigger thumb and left ring trigger finger after surgical release in 2019 and she would like to have surgery.    Interval 6/27/2023: presents for first postoperative visit. Reports no more triggering in the right hand, no numbness or tingling in the right hand, but the right middle finger PIP is stiff and painful.     Interval 7/18/2023: reports ROM has improved. She is able to make a full fist and fully extend all her fingers. She notes some persistent swelling to the long finger. She also notes hypersensitivity to the surgical incisions, which has been limiting. Has been working with therapy. Noted plain gut sutures utilized.     8/15/2023: presents for first postoperative visit after left middle trigger finger release. Pain well controlled. Denies N/t. No further clicking. Overall happy with progress. Right palmar incision less sensaive.     Occupation:      Physical Examination:  Well appearing, well nourished  Alert and oriented x 3, cooperative with exam     Bilateral hands:  Incisions over right index/middle finger A1 pulleys healed; incision over left middle finger A1 pulley healing well, Nylon sutures intact  Able to make a full composite fist with both hands, able to fully extend both long and index fingers. No palpable clicking or catching.   Hypersensitivity of the surgical incisions to the right long and index fingers.   Motor Exam: Intact TU/OK/x2-3  Sensory Exam: Sensation intact to light touch in FDWS (radial), volar IF (median), volar SF (ulnar)  Vascular Exam: 2+ radial pulse, < 2 sec capillary refill    Assessment: Nayeli APURVA Ingrid Caal is a 43 year old female with right middle and index trigger finger, left middle trigger finger. Now s/p right index and middle trigger finger releases, left middle trigger finger release.    Plan:    WBAT right upper extremity. PWB < 5 lbs left upper extremity. Left hand sutures removed today   Continue working on ROM of both hands, hand therapy exercises for edema control, scar massage, and desensitization.    Next Visit:   Follow-up: 4 weeks or prn.    Imaging: None.   Plan: ROM and wound check, symptom check, advance activities    OLIVER STARK PA-C  Orthopaedic Surgery

## 2023-08-15 NOTE — NURSING NOTE
Reason For Visit:   Chief Complaint   Patient presents with    Surgical Followup     Post-op followup Left middle trigger finger release DOS 8/1/23        Primary MD: Mariya Mohamud      Age: 43 year old    ?  No      There were no vitals taken for this visit.      Pain Assessment  Patient Currently in Pain: Denies (no pain per pt)               QuickDASH Assessment       No data to display                   Current Outpatient Medications   Medication Sig Dispense Refill    acetaminophen (TYLENOL) 500 MG tablet Take 2 tablets (1,000 mg) by mouth every 6 hours as needed for mild pain 100 tablet 3    Alcohol Swabs (ALCOHOL PREP PAD) 70 % PADS 1 each 3 times daily 100 each 3    aspirin (ASA) 81 MG chewable tablet Take 1 tablet (81 mg) by mouth daily CHEW AND SWALLOW 90 tablet 3    atorvastatin (LIPITOR) 40 MG tablet Take 1 tablet (40 mg) by mouth daily 90 tablet 0    B-D U/F insulin pen needle       BD ULTRA FINE PEN NEEDLES Inject 1 dose. Subcutaneous 2 times daily BD ultra-fine insulin syringe, 30 ga. X 1/2'' short needle 1/2 cc. Use as directed. 400 Units 11    blood glucose (ACCU-CHEK LAYA PLUS) test strip Use with  Accucheck Expert meter.  Test blood sugar 6 times daily. 600 each 3    blood glucose monitoring (ACCU-CHEK FASTCLIX) lancets Use to test blood sugar 6 times daily or as directed 510 each 3    calcium carbonate-vitamin D (OSCAL) 500-5 MG-MCG tablet Take 1 tablet by mouth 2 times daily 90 tablet 3    Continuous Blood Gluc Sensor (DEXCOM G6 SENSOR) MISC Change every 10 days. 3 month supply. (Patient not taking: Reported on 8/14/2023) 9 each 3    Continuous Blood Gluc Sensor (DEXCOM G7 SENSOR) MISC 1 each every 10 days 9 each 3    Continuous Blood Gluc Transmit (DEXCOM G6 TRANSMITTER) MISC Change every 3 months. (Patient not taking: Reported on 8/14/2023) 1 each 3    ergocalciferol (ERGOCALCIFEROL) 1.25 MG (21684 UT) capsule Take 1 capsule (50,000 Units) by mouth once a week For additional  refills, please schedule a follow-up appointment at [phone] 12 capsule 3    fenofibrate (TRIGLIDE/LOFIBRA) 160 MG tablet Take 1 tablet (160 mg) by mouth daily 90 tablet 0    fish oil-omega-3 fatty acids 1000 MG capsule TAKE TWO CAPSULES (2 GM) BY MOUTH ONCE DAILY 180 capsule 3    hydrocortisone (CORTAID) 1 % external cream Apply topically 2 times daily 120 g 1    ibuprofen (ADVIL/MOTRIN) 600 MG tablet Take 1 tablet (600 mg) by mouth every 6 hours as needed for moderate pain 120 tablet 1    Injection Device for insulin (INPEN 100-BLUE-NOVOLOG-FIASP) DAVID 1 each continuous 1 each 0    insulin aspart (NOVOLOG FLEXPEN) 100 UNIT/ML pen 1 unit per 5 grams CHO and correction scale of 1/25/125.  Approximate daily use is 100 units. 30 mL 2    insulin aspart (NOVOPEN ECHO) 100 UNIT/ML cartridge For use with the In-Pen device.  Give 1 unit per 5 grams CHO with meals and snacks as well as correction.  Average daily use is 100 units daily. 90 mL 3    insulin glargine (BASAGLAR KWIKPEN) 100 UNIT/ML pen Inject 60 Units Subcutaneous daily 3 month supply. 54 mL 3    insulin pen needle (B-D U/F) 31G X 8 MM miscellaneous USE AS DIRECTED. 200 each 1    losartan (COZAAR) 100 MG tablet Take 1 tablet (100 mg) by mouth daily 90 tablet 1    naproxen (NAPROSYN) 375 MG tablet Take 1 tablet (375 mg) by mouth 2 times daily (with meals) 60 tablet 3    Ostomy Supplies (ADHESIVE REMOVER WIPES) MISC 1 each every 14 days 50 each 3    Ostomy Supplies (SKIN TAC ADHESIVE BARRIER WIPE) MISC 1 each every 14 days 6 each 3    oxyCODONE (ROXICODONE) 5 MG tablet Take 1 tablet (5 mg) by mouth every 6 hours as needed for pain 5 tablet 0    semaglutide (OZEMPIC) 2 MG/3ML SOPN pen Administer 0.25 mg subcutaneously weekly for one month, then increase dose to 0.5 mg weekly for one month. 3 mL 1    Semaglutide, 2 MG/DOSE, (OZEMPIC) 8 MG/3ML pen Inject 2 mg Subcutaneous every 7 days 9 mL 3    triamcinolone (KENALOG) 0.1 % external ointment Apply topically 2  times daily 30 g 1       No Known Allergies    Nick Maranda, ATC
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
## 2023-08-24 ENCOUNTER — OFFICE VISIT (OUTPATIENT)
Dept: ORTHOPEDICS | Facility: CLINIC | Age: 43
End: 2023-08-24
Attending: PHYSICIAN ASSISTANT
Payer: COMMERCIAL

## 2023-08-24 VITALS — DIASTOLIC BLOOD PRESSURE: 83 MMHG | HEART RATE: 77 BPM | OXYGEN SATURATION: 100 % | SYSTOLIC BLOOD PRESSURE: 144 MMHG

## 2023-08-24 DIAGNOSIS — M75.82 ROTATOR CUFF TENDINITIS, LEFT: ICD-10-CM

## 2023-08-24 DIAGNOSIS — M19.012 ARTHRITIS OF LEFT ACROMIOCLAVICULAR JOINT: ICD-10-CM

## 2023-08-24 DIAGNOSIS — S43.52XA SPRAIN OF LEFT ACROMIOCLAVICULAR LIGAMENT, INITIAL ENCOUNTER: ICD-10-CM

## 2023-08-24 DIAGNOSIS — S46.012A STRAIN OF LEFT ROTATOR CUFF CAPSULE, INITIAL ENCOUNTER: Primary | ICD-10-CM

## 2023-08-24 DIAGNOSIS — M25.512 ACUTE PAIN OF LEFT SHOULDER: ICD-10-CM

## 2023-08-24 DIAGNOSIS — M65.20 CALCIFIC TENDONITIS: ICD-10-CM

## 2023-08-24 PROCEDURE — T1013 SIGN LANG/ORAL INTERPRETER: HCPCS | Mod: U3

## 2023-08-24 PROCEDURE — 99244 OFF/OP CNSLTJ NEW/EST MOD 40: CPT | Performed by: ORTHOPAEDIC SURGERY

## 2023-08-24 RX ORDER — TRAMADOL HYDROCHLORIDE 50 MG/1
50 TABLET ORAL EVERY 6 HOURS PRN
Qty: 10 TABLET | Refills: 0 | Status: SHIPPED | OUTPATIENT
Start: 2023-08-24 | End: 2023-08-27

## 2023-08-24 ASSESSMENT — PAIN SCALES - GENERAL: PAINLEVEL: SEVERE PAIN (6)
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
## 2023-08-30 ENCOUNTER — HOSPITAL ENCOUNTER (OUTPATIENT)
Dept: MRI IMAGING | Facility: CLINIC | Age: 43
Discharge: HOME | End: 2023-08-30
Attending: ORTHOPAEDIC SURGERY | Admitting: ORTHOPAEDIC SURGERY
Payer: COMMERCIAL

## 2023-08-30 DIAGNOSIS — M25.512 ACUTE PAIN OF LEFT SHOULDER: ICD-10-CM

## 2023-08-30 DIAGNOSIS — S43.52XA SPRAIN OF LEFT ACROMIOCLAVICULAR LIGAMENT, INITIAL ENCOUNTER: ICD-10-CM

## 2023-08-30 PROCEDURE — 73221 MRI JOINT UPR EXTREM W/O DYE: CPT | Mod: 26 | Performed by: RADIOLOGY

## 2023-08-30 PROCEDURE — 73221 MRI JOINT UPR EXTREM W/O DYE: CPT | Mod: LT

## 2023-09-05 ENCOUNTER — ANCILLARY PROCEDURE (OUTPATIENT)
Dept: MAMMOGRAPHY | Facility: CLINIC | Age: 43
End: 2023-09-05
Attending: NURSE PRACTITIONER
Payer: COMMERCIAL

## 2023-09-05 ENCOUNTER — OFFICE VISIT (OUTPATIENT)
Dept: OBGYN | Facility: CLINIC | Age: 43
End: 2023-09-05
Attending: INTERNAL MEDICINE
Payer: COMMERCIAL

## 2023-09-05 VITALS
WEIGHT: 179.3 LBS | SYSTOLIC BLOOD PRESSURE: 187 MMHG | DIASTOLIC BLOOD PRESSURE: 100 MMHG | BODY MASS INDEX: 33.85 KG/M2 | HEIGHT: 61 IN | HEART RATE: 84 BPM

## 2023-09-05 DIAGNOSIS — Z12.31 ENCOUNTER FOR SCREENING MAMMOGRAM FOR BREAST CANCER: ICD-10-CM

## 2023-09-05 DIAGNOSIS — N89.8 VAGINAL DISCHARGE: ICD-10-CM

## 2023-09-05 DIAGNOSIS — N93.9 ABNORMAL UTERINE BLEEDING (AUB): ICD-10-CM

## 2023-09-05 DIAGNOSIS — D25.2 SUBSEROUS LEIOMYOMA OF UTERUS: Primary | ICD-10-CM

## 2023-09-05 LAB
BACTERIAL VAGINOSIS VAG-IMP: NEGATIVE
CANDIDA DNA VAG QL NAA+PROBE: NOT DETECTED
CANDIDA GLABRATA / CANDIDA KRUSEI DNA: NOT DETECTED
T VAGINALIS DNA VAG QL NAA+PROBE: NOT DETECTED

## 2023-09-05 PROCEDURE — 99214 OFFICE O/P EST MOD 30 MIN: CPT | Mod: GC | Performed by: STUDENT IN AN ORGANIZED HEALTH CARE EDUCATION/TRAINING PROGRAM

## 2023-09-05 PROCEDURE — T1013 SIGN LANG/ORAL INTERPRETER: HCPCS | Mod: U3

## 2023-09-05 PROCEDURE — 77067 SCR MAMMO BI INCL CAD: CPT | Mod: GC

## 2023-09-05 PROCEDURE — 77063 BREAST TOMOSYNTHESIS BI: CPT | Mod: GC

## 2023-09-05 PROCEDURE — 99213 OFFICE O/P EST LOW 20 MIN: CPT

## 2023-09-05 PROCEDURE — 0352U MULTIPLEX VAGINAL PANEL BY PCR: CPT

## 2023-09-05 NOTE — PROGRESS NOTES
SUBJECTIVE   Nayeli Caal is a 43 year old , Patient's last menstrual period was 2023., here for follow up on her fibroid uterus and previous suspicions for adenomyosis. Her PMH is significant for difficult to control T2DM, severe hypertension and Usher syndrome.     Specific concerns today: She reports abdominal pain while moving in bed that is located in her groins bilaterally. She states she feels her uterus moving and putting pressure on her pelvic floor. She also reports pain with defecation. Denies dysuria. Reports not being sexually active for the past year. She also notes increased spacing of periods from 1 month to now 2-3 months apart with vaginal dryness. No hot flashes.     She no longer is interested in fertility. Her goals are to work on these pelvic symptoms and identify a cause. She would like to discuss hysterectomy as an option however understands that she may not be the best surgical candidate, so she is open to other treatments if available.     She was seen with a professional  today     Gynecologic History  Patient's last menstrual period was 2023.   Menstrual History:      2022     8:30 AM 2023     6:49 AM 2023     2:45 PM   Menstrual History   LAST MENSTRUAL PERIOD 2022       Current contraception: none  Number of partners in last year:  0    Lab Results   Component Value Date    PAP NIL 2021      History of abnormal Pap smear: No    Obstetric History  OB History    Para Term  AB Living   0 0 0 0 0 0   SAB IAB Ectopic Multiple Live Births   0 0 0 0 0        Past Medical History  Past Medical History:   Diagnosis Date    Combined visual and hearing impairment     Deaf     Diabetic retinopathy of both eyes (H) 2011    Hepatic steatosis 2011    History of tobacco use     Hyperlipidemia     Hypertension     Hypertriglyceridemia     Migraines     Obesity 2011     Problem list name  updated by automated process. 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  Ganglion cyst excision.;  Surgeon: Greg Streeter MD;  [facility]    RELEASE TRIGGER FINGER Right 6/13/2023    Procedure: RELEASE, RIGHT TRIGGER FINGER, INDEX AND MIDDLE;  Surgeon: Miracle Carlin MD;  Location: [facility]    RELEASE TRIGGER FINGER Left 8/1/2023    Procedure: RELEASE, LEFT TRIGGER FINGER, MIDDLE;  Surgeon: Miracle Carlin MD;  Location: [facility] OR       Medications  Current Outpatient Medications   Medication    acetaminophen (TYLENOL) 500 MG tablet    Alcohol Swabs (ALCOHOL PREP PAD) 70 % PADS    aspirin (ASA) 81 MG chewable tablet    atorvastatin (LIPITOR) 40 MG tablet    B-D U/F insulin pen needle    BD ULTRA FINE PEN NEEDLES    blood glucose (ACCU-CHEK LAYA PLUS) test strip    blood glucose monitoring (ACCU-CHEK FASTCLIX) lancets    calcium carbonate-vitamin D (OSCAL) 500-5 MG-MCG tablet    Continuous Blood Gluc Sensor (DEXCOM G6 SENSOR) MISC    Continuous Blood Gluc Sensor (DEXCOM G7 SENSOR) MISC    Continuous Blood Gluc Transmit (DEXCOM G6 TRANSMITTER) MISC    ergocalciferol (ERGOCALCIFEROL) 1.25 MG (38076 UT) capsule    fenofibrate (TRIGLIDE/LOFIBRA) 160 MG tablet    fish oil-omega-3 fatty acids 1000 MG capsule    hydrocortisone (CORTAID) 1 % external cream    ibuprofen  "(ADVIL/MOTRIN) 600 MG tablet    Injection Device for insulin (INPEN 100-BLUE-NOVOLOG-FIASP) DAVID    insulin aspart (NOVOLOG FLEXPEN) 100 UNIT/ML pen    insulin aspart (NOVOPEN ECHO) 100 UNIT/ML cartridge    insulin glargine (BASAGLAR KWIKPEN) 100 UNIT/ML pen    insulin pen needle (B-D U/F) 31G X 8 MM miscellaneous    losartan (COZAAR) 100 MG tablet    naproxen (NAPROSYN) 375 MG tablet    Ostomy Supplies (ADHESIVE REMOVER WIPES) MISC    Ostomy Supplies (SKIN TAC ADHESIVE BARRIER WIPE) MISC    oxyCODONE (ROXICODONE) 5 MG tablet    semaglutide (OZEMPIC) 2 MG/3ML SOPN pen    Semaglutide, 2 MG/DOSE, (OZEMPIC) 8 MG/3ML pen    triamcinolone (KENALOG) 0.1 % external ointment     Current Facility-Administered Medications   Medication    hydrocortisone (CORTAID) 1 % cream    hylan (SYNVISC ONE) injection 48 mg     Allergies   No Known Allergies    Social History  Social History     Tobacco Use    Smoking status: Former     Types: Cigarettes     Quit date: 2010     Years since quittin.7    Smokeless tobacco: Never    Tobacco comments:     stopped 10 yrs ago   Substance Use Topics    Alcohol use: Not Currently     Comment: socially    Drug use: No       Family History  Family History   Problem Relation Age of Onset    Unknown/Adopted Other        Review of Systems    OBJECTIVE   BP (!) 187/100   Pulse 84   Ht 1.549 m (5' 1\")   Wt 81.3 kg (179 lb 4.8 oz)   LMP 2023   BMI 33.88 kg/m    BMI: Body mass index is 33.88 kg/m .  General:  Alert, no distress   Head:  Normocephalic, without obvious abnormality   Lungs:  Non labored breathing   Heart:  Normal HR   Abdomen:  Soft, non-tender, non-distended, no masses palpated    Pelvic: Speculum exam notable for clear grayish discharge, cervix appears wnl, slight irritation to vaginal tissue, clitoral piercing present  Bimanual exam tender and limited by pain, freely mobile uterus, no adnexal masses appreciated   Extremities:  normal     Vaginal swab collected for " yeast and BV  ?  ASSESSMENT   Nayeli Caal is a 43 year old , referred for consultation for abdominal pain in the setting of uterine fibroids and adenomyosis. PMH notable for difficult to control T2DM, HTN with usher syndrome.     PLAN   #abdominal pain  #fibroid uterus  #adenomyosis  Patient reported symptoms most similar to bulk symptoms.  Her last US was completed in  that showed a 5cm fundal fibroid and concerns for adenomyosis. Today she noted she is not interested in fertility anymore and that these symptoms are severely impacting her quality of life and would like workup. I recommended a repeat TVUS with follow up in clinic after to go over results. We discussed that surgery comes with risks and I would prefer to attempt less invasive measures if the fibroids are stable as we could not guarantee that a hysterectomy would solved this pelvic bulk like symptoms pain. If the fibroids have grown significantly, which I was unable to appreciate on bimanual exam,  we could consider conversations around hysterectomy. Due to her significant PMH, we would need to optimize both her T2DM and her HTN in order to safely proceed. She agreed with the plan.  -repeat US with follow up in clinic     #irregular menses  She notes spacing of periods but that since removal of her IUD she has noticed improvement in her pain during menses. Possibly perimenopausal spacing of periods. Denies hot flashes, reports some vaginal dryness.  -continue to observe, if >3 months between menses would recommend lab evaluation     #foul smelling discharge  #uncontrolled T2DM  - sent vaginal swab for concerns for yeast or BV infections, [facility]  - patient has history of chronic yeast infections that have improved since better blood sugar control. Will plan episodic treatment if yeast present and if >3 infections in 1 year would consider prophylactic treatment.     Patient seen and discussed with   Chapo William DO, MA  OBGYN-PGY2    I examined Nayeli Caal on 9/6/2023 with Dr. William and agree with the presentation, exam and plan of care documented in this note with edits by me. I personally reviewed her last ultrasound in 2021 showing a 5.3 cm subserosal myoma.   Vonnie Cowan MD

## 2023-09-05 NOTE — PATIENT INSTRUCTIONS
Thank you for trusting us with your care!     If you need to contact us for questions about:  Symptoms, Scheduling & Medical Questions; Non-urgent (2-3 day response) Enrike message, Urgent (needing response today) [phone] (if after 3:30pm next day response)   Prescriptions: Please call your Pharmacy   Billing: [place] [phone] or [facility]:[phone]

## 2023-09-05 NOTE — LETTER
2023       RE: Nayeli Caal  [address]  [facility] [address]     Dear Colleague,    Thank you for referring your patient, Nayeli Caal, to the [facility] [place] at [facility]. Please see a copy of my visit note below.    SUBJECTIVE   Nayeli Caal is a 43 year old , Patient's last menstrual period was 2023., here for follow up on her fibroid uterus and previous suspicions for adenomyosis. Her PMH is significant for difficult to control T2DM, severe hypertension and Usher syndrome.     Specific concerns today: She reports abdominal pain while moving in bed that is located in her groins bilaterally. She states she feels her uterus moving and putting pressure on her pelvic floor. She also reports pain with defecation. Denies dysuria. Reports not being sexually active for the past year. She also notes increased spacing of periods from 1 month to now 2-3 months apart with vaginal dryness. No hot flashes.     She no longer is interested in fertility. Her goals are to work on these pelvic symptoms and identify a cause. She would like to discuss hysterectomy as an option however understands that she may not be the best surgical candidate, so she is open to other treatments if available.     She was seen with a professional  today     Gynecologic History  Patient's last menstrual period was 2023.   Menstrual History:      2022     8:30 AM 2023     6:49 AM 2023     2:45 PM   Menstrual History   LAST MENSTRUAL PERIOD 2022       Current contraception: none  Number of partners in last year:  0    Lab Results   Component Value Date    PAP NIL 2021      History of abnormal Pap smear: No    Obstetric History  OB History    Para Term  AB Living   0 0 0 0 0 0   SAB IAB Ectopic Multiple Live Births   0 0 0 0 0        Past Medical  History  Past Medical History:   Diagnosis Date    Combined visual and hearing impairment     Deaf     Diabetic retinopathy of both eyes (H) 8/19/2011    Hepatic steatosis 08/19/2011    History of tobacco use     Hyperlipidemia     Hypertension     Hypertriglyceridemia     Migraines     Obesity 8/19/2011     Problem list name updated by automated process. 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  Ganglion cyst excision.;  Surgeon: Greg Streeter MD;  [facility]    RELEASE TRIGGER FINGER Right 6/13/2023    Procedure: RELEASE, RIGHT TRIGGER FINGER, INDEX AND MIDDLE;  Surgeon: Miracle Carlin MD;  Location: [facility] OR    RELEASE TRIGGER FINGER Left 8/1/2023    Procedure: RELEASE, LEFT TRIGGER FINGER, MIDDLE;  Surgeon: Miracle Carlin MD;  Location: [facility] OR       Medications  Current Outpatient Medications   Medication    acetaminophen (TYLENOL) 500 MG tablet    Alcohol Swabs (ALCOHOL PREP PAD) 70 % PADS    aspirin (ASA) 81 MG chewable tablet    atorvastatin (LIPITOR) 40 MG tablet    B-D U/F insulin pen needle    BD ULTRA FINE PEN NEEDLES    blood glucose (ACCU-CHEK LAYA PLUS) test strip    blood glucose monitoring (ACCU-CHEK FASTCLIX) lancets    calcium carbonate-vitamin D (OSCAL) 500-5 MG-MCG tablet    Continuous Blood Gluc Sensor (DEXCOM G6 SENSOR) MISC    Continuous Blood  "Gluc Sensor (DEXCOM G7 SENSOR) MISC    Continuous Blood Gluc Transmit (DEXCOM G6 TRANSMITTER) MISC    ergocalciferol (ERGOCALCIFEROL) 1.25 MG (57074 UT) capsule    fenofibrate (TRIGLIDE/LOFIBRA) 160 MG tablet    fish oil-omega-3 fatty acids 1000 MG capsule    hydrocortisone (CORTAID) 1 % external cream    ibuprofen (ADVIL/MOTRIN) 600 MG tablet    Injection Device for insulin (INPEN 100-BLUE-NOVOLOG-FIASP) DAVID    insulin aspart (NOVOLOG FLEXPEN) 100 UNIT/ML pen    insulin aspart (NOVOPEN ECHO) 100 UNIT/ML cartridge    insulin glargine (BASAGLAR KWIKPEN) 100 UNIT/ML pen    insulin pen needle (B-D U/F) 31G X 8 MM miscellaneous    losartan (COZAAR) 100 MG tablet    naproxen (NAPROSYN) 375 MG tablet    Ostomy Supplies (ADHESIVE REMOVER WIPES) MISC    Ostomy Supplies (SKIN TAC ADHESIVE BARRIER WIPE) MISC    oxyCODONE (ROXICODONE) 5 MG tablet    semaglutide (OZEMPIC) 2 MG/3ML SOPN pen    Semaglutide, 2 MG/DOSE, (OZEMPIC) 8 MG/3ML pen    triamcinolone (KENALOG) 0.1 % external ointment     Current Facility-Administered Medications   Medication    hydrocortisone (CORTAID) 1 % cream    hylan (SYNVISC ONE) injection 48 mg     Allergies   No Known Allergies    Social History  Social History     Tobacco Use    Smoking status: Former     Types: Cigarettes     Quit date: 2010     Years since quittin.7    Smokeless tobacco: Never    Tobacco comments:     stopped 10 yrs ago   Substance Use Topics    Alcohol use: Not Currently     Comment: socially    Drug use: No       Family History  Family History   Problem Relation Age of Onset    Unknown/Adopted Other        Review of Systems    OBJECTIVE   BP (!) 187/100   Pulse 84   Ht 1.549 m (5' 1\")   Wt 81.3 kg (179 lb 4.8 oz)   LMP 2023   BMI 33.88 kg/m    BMI: Body mass index is 33.88 kg/m .  General:  Alert, no distress   Head:  Normocephalic, without obvious abnormality   Lungs:  Non labored breathing   Heart:  Normal HR   Abdomen:  Soft, non-tender, non-distended, " no masses palpated    Pelvic: Speculum exam notable for clear grayish discharge, cervix appears wnl, slight irritation to vaginal tissue, clitoral piercing present  Bimanual exam tender and limited by pain, freely mobile uterus, no adnexal masses appreciated   Extremities:  normal     Vaginal swab collected for yeast and BV  ?  ASSESSMENT   Nayeli Caal is a 43 year old , referred for consultation for abdominal pain in the setting of uterine fibroids and adenomyosis. PMH notable for difficult to control T2DM, HTN with usher syndrome.     PLAN   #abdominal pain  #fibroid uterus  #adenomyosis  Patient reported symptoms most similar to bulk symptoms.  Her last US was completed in  that showed a 5cm fundal fibroid and concerns for adenomyosis. Today she noted she is not interested in fertility anymore and that these symptoms are severely impacting her quality of life and would like workup. I recommended a repeat TVUS with follow up in clinic after to go over results. We discussed that surgery comes with risks and I would prefer to attempt less invasive measures if the fibroids are stable as we could not guarantee that a hysterectomy would solved this pelvic bulk like symptoms pain. If the fibroids have grown significantly, which I was unable to appreciate on bimanual exam,  we could consider conversations around hysterectomy. Due to her significant PMH, we would need to optimize both her T2DM and her HTN in order to safely proceed. She agreed with the plan.  -repeat US with follow up in clinic     #irregular menses  She notes spacing of periods but that since removal of her IUD she has noticed improvement in her pain during menses. Possibly perimenopausal spacing of periods. Denies hot flashes, reports some vaginal dryness.  -continue to observe, if >3 months between menses would recommend lab evaluation     #foul smelling discharge  #uncontrolled T2DM  - sent vaginal swab for concerns for yeast or BV  infections, will follow up with patient over mychart  - patient has history of chronic yeast infections that have improved since better blood sugar control. Will plan episodic treatment if yeast present and if >3 infections in 1 year would consider prophylactic treatment.     Patient seen and discussed with Dr. Chapo William DO, MA  OBHECTOR-[id number]    I examined Nayeli Caal on 9/6/2023 with Dr. William and agree with the presentation, exam and plan of care documented in this note with edits by me. I personally reviewed her last ultrasound in 2021 showing a 5.3 cm subserosal myoma.   Vonnie Cowan MD

## 2023-09-06 ENCOUNTER — ANCILLARY PROCEDURE (OUTPATIENT)
Dept: ULTRASOUND IMAGING | Facility: CLINIC | Age: 43
End: 2023-09-06
Payer: COMMERCIAL

## 2023-09-06 DIAGNOSIS — N93.9 ABNORMAL UTERINE BLEEDING (AUB): ICD-10-CM

## 2023-09-06 PROCEDURE — 76830 TRANSVAGINAL US NON-OB: CPT | Mod: GC | Performed by: RADIOLOGY

## 2023-09-06 PROCEDURE — T1013 SIGN LANG/ORAL INTERPRETER: HCPCS | Mod: U3 | Performed by: RADIOLOGY

## 2023-09-06 PROCEDURE — 76856 US EXAM PELVIC COMPLETE: CPT | Mod: GC | Performed by: RADIOLOGY

## 2023-09-22 NOTE — TELEPHONE ENCOUNTER
DIAGNOSIS: right shoulder pain/self/bcbs/ortho    APPOINTMENT DATE: 10/05/23   NOTES STATUS DETAILS   OFFICE NOTE from referring provider Self Self   OFFICE NOTE from other specialist Internal 09/20/12 - Grant Kruse, PT     08/23/12 - Mariya Mohamud NP     08/09/11 - KENYATTA HUERTA    MEDICATION LIST Internal    XRAYS  & INJECTIONS (IMAGES & REPORTS) Internal XR R SHOULDER:  - 08/09/11

## 2023-09-29 NOTE — PROGRESS NOTES
Outcome for 09/29/23 10:23 AM :Sent patient [employer] message asking them to upload their BG data Inpen report.  Smita Miles

## 2023-10-02 ENCOUNTER — OFFICE VISIT (OUTPATIENT)
Dept: ENDOCRINOLOGY | Facility: CLINIC | Age: 43
End: 2023-10-02
Payer: COMMERCIAL

## 2023-10-02 VITALS
BODY MASS INDEX: 33.42 KG/M2 | OXYGEN SATURATION: 95 % | HEIGHT: 61 IN | DIASTOLIC BLOOD PRESSURE: 79 MMHG | WEIGHT: 177 LBS | SYSTOLIC BLOOD PRESSURE: 131 MMHG | HEART RATE: 72 BPM

## 2023-10-02 DIAGNOSIS — E11.65 UNCONTROLLED TYPE 2 DIABETES MELLITUS WITH HYPERGLYCEMIA, WITH LONG-TERM CURRENT USE OF INSULIN (H): Primary | ICD-10-CM

## 2023-10-02 DIAGNOSIS — Z79.4 UNCONTROLLED TYPE 2 DIABETES MELLITUS WITH HYPERGLYCEMIA, WITH LONG-TERM CURRENT USE OF INSULIN (H): Primary | ICD-10-CM

## 2023-10-02 DIAGNOSIS — I10 BENIGN ESSENTIAL HYPERTENSION: ICD-10-CM

## 2023-10-02 DIAGNOSIS — E78.2 MIXED HYPERLIPIDEMIA: ICD-10-CM

## 2023-10-02 PROCEDURE — 99214 OFFICE O/P EST MOD 30 MIN: CPT | Performed by: INTERNAL MEDICINE

## 2023-10-02 ASSESSMENT — PAIN SCALES - GENERAL: PAINLEVEL: SEVERE PAIN (6)

## 2023-10-02 NOTE — LETTER
10/2/2023       RE: Nayeli Caal  [address]  [facility] [address]     Dear Colleague,    Thank you for referring your patient, Nayeli Caal, to the [facility] [place] at [facility]. Please see a copy of my visit note below.          Outcome for 09/29/23 10:23 AM :Sent patient [employer] message asking them to upload their BG data Inpen report.  Smita Miles        Patient is showing 4/5 MNCM met. BP out range   Smita Miles, VF  Outcome for 08/09/23 12:46 PM :Sent patient [employer] message asking them to upload their BG data to send their Inpen report with Inpen sharing instructions.  Smita Miles    HPI:   Nayeli is a 42 yo woman here with a  for follow up of type 2 diabetes since the early 2000's.     I reviewed the InPen records.  Total daily insulin dose recorded by InPen is 104 units.  She has been doing a better job in taking insulin for meals, 2-4 times daily, with most of the dosages variable between 15 and 25 units per meal.  On the sensor, average glucose over the last 2 weeks is 193, with a standard deviation of 43, corresponding to a GMI of 7.9%.  39% of the glucose numbers are within the desired range, 10% are above 250 and there are no significant hypoglycemic episodes documented by the sensor.  During the night, average blood glucose is around 180, fairly stable.    Her current regimen is:   Ozempic 2 mg weekly - started in April 2023. Weight stable. The medication is effective in curbing her appetite.   Basaglar 50 units daily.  Novolog (dosing on In-pen):  Carb ratio: 1 unit per 2.5 g carbohydrates at 11 AM and 1 unit per 3 g carbohydrates at 5 PM  Sensitivity: 20    She hurt her shoulder in June and she is contemplating a possible intra-articular steroid injection.  Her blood sugar tends to run higher after breakfast.  For breakfast, she frequently has to muffins and  "coffee with milk, for a total of 45 g of carbohydrates.    Previous treatments:   empagliflozin 25 mg daily- stopped 2023 due to recurrent yeast infections.  Metformin- severe diarrhea.     Diabetes complications:  Retinopathy: last eye exam - 8/2022. 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  Nephropathy: h/o proteinuria; normal GFR. Now on 50mg losartan daily (tx with lisinopril was associated with a dry cough).   Neuropathy: some tingling present intermittently. No pain. Feet feel \"sensitive\".   Taking statin and fenofibrate.    Past Medical History:   Diagnosis Date    Combined visual and hearing impairment     Deaf     Diabetic retinopathy of both eyes (H) 8/19/2011    Hepatic steatosis 08/19/2011    History of tobacco use     Hyperlipidemia     Hypertension     Hypertriglyceridemia     Migraines     Obesity 8/19/2011     Problem list name updated by automated process. 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  Ganglion cyst excision.;  Surgeon: Greg Streeter MD;  [facility]    RELEASE TRIGGER FINGER Right 6/13/2023    Procedure: RELEASE, RIGHT TRIGGER FINGER, INDEX AND MIDDLE;  Surgeon: Miracle Carlin MD;  Location: [facility] OR    RELEASE TRIGGER FINGER Left 8/1/2023    Procedure: RELEASE, LEFT TRIGGER FINGER, MIDDLE;  Surgeon: Miracle Carlin MD;  Location: [facility] OR       Family History   Problem Relation " Age of Onset    Unknown/Adopted Other        Social History     Social History    Marital status: Single     Spouse name: N/A    Number of children: N/A    Years of education: N/A     Social History Main Topics    Smoking status: Former Smoker     Types: Cigarettes     Quit date: 12/1/2010    Smokeless tobacco: Never Used      Comment: stopped 2 yrs ago    Alcohol use No    Drug use: No    Sexual activity: Not Currently     Partners: Male     Other Topics Concern     Service No    Blood Transfusions No    Caffeine Concern No    Occupational Exposure No    Hobby Hazards No    Sleep Concern No    Stress Concern No    Weight Concern Yes    Special Diet No    Back Care No    Exercise Yes     4-5 x a week    Bike Helmet No    Seat Belt Yes    Self-Exams Yes     Social History Narrative   Social Hx: Lives in a [facility].  Boyfriend is in [place]. She is adopted.  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     Current Outpatient Medications   Medication    acetaminophen (TYLENOL) 500 MG tablet    Alcohol Swabs (ALCOHOL PREP PAD) 70 % PADS    aspirin (ASA) 81 MG chewable tablet    atorvastatin (LIPITOR) 40 MG tablet    B-D U/F insulin pen needle    BD ULTRA FINE PEN NEEDLES    blood glucose (ACCU-CHEK LAYA PLUS) test strip    blood glucose monitoring (ACCU-CHEK FASTCLIX) lancets    calcium carbonate-vitamin D (OSCAL) 500-5 MG-MCG tablet    Continuous Blood Gluc Sensor (DEXCOM G7 SENSOR) MISC    ergocalciferol (ERGOCALCIFEROL) 1.25 MG (65247 UT) capsule    fenofibrate (TRIGLIDE/LOFIBRA) 160 MG tablet    fish oil-omega-3 fatty acids 1000 MG capsule    hydrocortisone (CORTAID) 1 % external cream    ibuprofen (ADVIL/MOTRIN) 600 MG tablet    Injection Device for insulin (INPEN 100-BLUE-NOVOLOG-FIASP) DAVID    insulin aspart (NOVOLOG FLEXPEN) 100 UNIT/ML pen    insulin aspart (NOVOPEN ECHO) 100 UNIT/ML cartridge    insulin glargine (BASAGLAR KWIKPEN) 100 UNIT/ML pen    insulin pen needle (B-D U/F) 31G X 8 MM  "miscellaneous    losartan (COZAAR) 100 MG tablet    naproxen (NAPROSYN) 375 MG tablet    Ostomy Supplies (ADHESIVE REMOVER WIPES) MISC    Ostomy Supplies (SKIN TAC ADHESIVE BARRIER WIPE) MISC    oxyCODONE (ROXICODONE) 5 MG tablet    Semaglutide, 2 MG/DOSE, (OZEMPIC) 8 MG/3ML pen    triamcinolone (KENALOG) 0.1 % external ointment    Continuous Blood Gluc Sensor (DEXCOM G6 SENSOR) MISC    Continuous Blood Gluc Transmit (DEXCOM G6 TRANSMITTER) MISC    semaglutide (OZEMPIC) 2 MG/3ML SOPN pen     Current Facility-Administered Medications   Medication    hydrocortisone (CORTAID) 1 % cream    hylan (SYNVISC ONE) injection 48 mg        No Known Allergies    Physical Exam  Wt Readings from Last 10 Encounters:   10/02/23 80.3 kg (177 lb)   09/05/23 81.3 kg (179 lb 4.8 oz)   08/14/23 81.6 kg (180 lb)   08/01/23 81.2 kg (179 lb)   08/01/23 80.8 kg (178 lb 1.6 oz)   07/05/23 81.8 kg (180 lb 4.8 oz)   06/13/23 79.4 kg (175 lb)   05/23/23 81.7 kg (180 lb 1.6 oz)   05/04/23 81.2 kg (179 lb)   04/21/23 81.2 kg (179 lb)       /79 (BP Location: Right arm, Patient Position: Sitting, Cuff Size: Adult Regular)   Pulse 72   Ht 1.549 m (5' 1\")   Wt 80.3 kg (177 lb)   LMP 08/24/2023   SpO2 95%   BMI 33.44 kg/m      General appearance: she is well-developed, well-nourished, and in no distress.  Eyes: conjunctivae and extraocular motions are normal. Pupils are equal, round, and reactive to light. No lid lag, no stare.  HENT: oropharynx clear and moist; neck no JVD, no bruits, no thyromegaly, no palpable nodules  Cardiovascular: regular rhythm, no murmurs, distal pulses palpable, no edema  Respiratory: chest clear, no rales, no rhonchi  Musculoskeletal: normal tone and strength   Neurologic: reflexes normal and symmetric, no resting tremor  Psychiatric: affect and judgment normal   Skin: Hyperpigmented lesion at the site of application of the Dexcom patch  Feet: Onychomycosis, sensation preserved to monofilament " testing.    RESULTS  Lab Results   Component Value Date    A1C 7.7 (H) 07/25/2023    A1C 8.7 (H) 06/06/2023    A1C 9.9 (H) 04/04/2023    A1C 9.8 (H) 08/17/2022    A1C 9.9 (H) 08/04/2022    A1C 11.6 (H) 04/05/2021    A1C 12.4 (H) 10/29/2020    A1C 12.9 (H) 07/08/2020    A1C 8.2 (H) 05/02/2019    A1C 10.3 (H) 10/15/2018    HEMOGLOBINA1 10.0 (A) 01/13/2020    HEMOGLOBINA1 9.9 (A) 10/07/2019    HEMOGLOBINA1 8.1 (A) 04/22/2019    HEMOGLOBINA1 10.4 (A) 01/14/2019    HEMOGLOBINA1 8.7 (A) 03/12/2018       TSH   Date Value Ref Range Status   04/04/2023 1.41 0.30 - 4.20 uIU/mL Final   07/08/2020 1.34 0.40 - 4.00 mU/L Final   05/17/2018 1.84 0.40 - 4.00 mU/L Final   11/27/2017 1.92 0.40 - 4.00 mU/L Final   05/11/2016 1.85 0.40 - 4.00 mU/L Final   02/11/2016 1.14 0.40 - 4.00 mU/L Final       ALT   Date Value Ref Range Status   08/17/2022 28 0 - 50 U/L Final   08/04/2022 27 0 - 50 U/L Final   07/08/2020 29 0 - 50 U/L Final   05/02/2019 43 0 - 50 U/L Final     Assessment/Plan:     Type 2 diabetes, with improved glucose control.  Recommendations:  Change insulin to carbohydrate ratio to 2.5 g at 6 AM (this change was made in the clinic)  Increase the dose of Basaglar from 50 to 55 units  Follow-up with Anne Marie Medina in ~3 months  F/up labs at her next visit     Orders Placed This Encounter   Procedures    Comprehensive metabolic panel    Lipid panel reflex to direct LDL Fasting    Hematocrit    Albumin Random Urine Quantitative with Creat Ratio    Hemoglobin A1c         Jimena Bragg MD

## 2023-10-02 NOTE — NURSING NOTE
"Chief Complaint   Patient presents with    Diabetes     Vital signs:      BP: 131/79 Pulse: 72     SpO2: 95 %     Height: 154.9 cm (5' 1\") Weight: 80.3 kg (177 lb)  Estimated body mass index is 33.44 kg/m  as calculated from the following:    Height as of this encounter: 1.549 m (5' 1\").    Weight as of this encounter: 80.3 kg (177 lb).        "

## 2023-10-02 NOTE — PATIENT INSTRUCTIONS
Prior to the steroid intraarticular injection, change the insulin to carb ratio in the Inpen settings to 2 around the clock. Maintain this ratio for 4 days, then change back to 2.5 at 6 am and 3 at 5 pm.     Increase the dose of Basaglar to 55 units

## 2023-10-02 NOTE — PROGRESS NOTES
Patient is showing 4/5 MNCM met. BP out range   Smita Miles, VF  Outcome for 08/09/23 12:46 PM :Sent patient [employer] message asking them to upload their BG data to send their Inpen report with Inpen sharing instructions.  Smita Miles    HPI:   Nayeli is a 42 yo woman here with a  for follow up of type 2 diabetes since the early 2000's.     I reviewed the InPen records.  Total daily insulin dose recorded by InPen is 104 units.  She has been doing a better job in taking insulin for meals, 2-4 times daily, with most of the dosages variable between 15 and 25 units per meal.  On the sensor, average glucose over the last 2 weeks is 193, with a standard deviation of 43, corresponding to a GMI of 7.9%.  39% of the glucose numbers are within the desired range, 10% are above 250 and there are no significant hypoglycemic episodes documented by the sensor.  During the night, average blood glucose is around 180, fairly stable.    Her current regimen is:   Ozempic 2 mg weekly - started in April 2023. Weight stable. The medication is effective in curbing her appetite.   Basaglar 50 units daily.  Novolog (dosing on In-pen):  Carb ratio: 1 unit per 2.5 g carbohydrates at 11 AM and 1 unit per 3 g carbohydrates at 5 PM  Sensitivity: 20    She hurt her shoulder in June and she is contemplating a possible intra-articular steroid injection.  Her blood sugar tends to run higher after breakfast.  For breakfast, she frequently has to muffins and coffee with milk, for a total of 45 g of carbohydrates.    Previous treatments:   empagliflozin 25 mg daily- stopped 2023 due to recurrent yeast infections.  Metformin- severe diarrhea.     Diabetes complications:  Retinopathy: last eye exam - 8/2022. 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  Nephropathy: h/o proteinuria; normal GFR. Now on 50mg losartan daily (tx with lisinopril was associated with a dry cough).   Neuropathy: some tingling present intermittently. No pain. Feet feel  "\"sensitive\".   Taking statin and fenofibrate.    Past Medical History:   Diagnosis Date    Combined visual and hearing impairment     Deaf     Diabetic retinopathy of both eyes (H) 8/19/2011    Hepatic steatosis 08/19/2011    History of tobacco use     Hyperlipidemia     Hypertension     Hypertriglyceridemia     Migraines     Obesity 8/19/2011     Problem list name updated by automated process. 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  Ganglion cyst excision.;  Surgeon: Greg Streeter MD;  Location: [facility]    RELEASE TRIGGER FINGER Right 6/13/2023    Procedure: RELEASE, RIGHT TRIGGER FINGER, INDEX AND MIDDLE;  Surgeon: Miracle Carlin MD;  Location: [facility] OR    RELEASE TRIGGER FINGER Left 8/1/2023    Procedure: RELEASE, LEFT TRIGGER FINGER, MIDDLE;  Surgeon: Miracle Carlin MD;  Location: [facility] OR       Family History   Problem Relation Age of Onset    Unknown/Adopted Other        Social History     Social History    Marital status: Single     Spouse name: N/A    Number of children: N/A    Years of education: N/A     Social History Main Topics    Smoking status: Former Smoker     Types: Cigarettes     Quit date: 12/1/2010    Smokeless tobacco: Never Used      Comment: stopped 2 yrs ago    Alcohol use No    Drug use: No    Sexual activity: Not Currently     Partners: Male     Other Topics Concern    "  Service No    Blood Transfusions No    Caffeine Concern No    Occupational Exposure No    Hobby Hazards No    Sleep Concern No    Stress Concern No    Weight Concern Yes    Special Diet No    Back Care No    Exercise Yes     4-5 x a week    Bike Helmet No    Seat Belt Yes    Self-Exams Yes     Social History Narrative   Social Hx: Lives in a condo.  Boyfriend is in [place]. She is adopted.  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     Current Outpatient Medications   Medication    acetaminophen (TYLENOL) 500 MG tablet    Alcohol Swabs (ALCOHOL PREP PAD) 70 % PADS    aspirin (ASA) 81 MG chewable tablet    atorvastatin (LIPITOR) 40 MG tablet    B-D U/F insulin pen needle    BD ULTRA FINE PEN NEEDLES    blood glucose (ACCU-CHEK LAYA PLUS) test strip    blood glucose monitoring (ACCU-CHEK FASTCLIX) lancets    calcium carbonate-vitamin D (OSCAL) 500-5 MG-MCG tablet    Continuous Blood Gluc Sensor (DEXCOM G7 SENSOR) MISC    ergocalciferol (ERGOCALCIFEROL) 1.25 MG (23100 UT) capsule    fenofibrate (TRIGLIDE/LOFIBRA) 160 MG tablet    fish oil-omega-3 fatty acids 1000 MG capsule    hydrocortisone (CORTAID) 1 % external cream    ibuprofen (ADVIL/MOTRIN) 600 MG tablet    Injection Device for insulin (INPEN 100-BLUE-NOVOLOG-FIASP) DAVID    insulin aspart (NOVOLOG FLEXPEN) 100 UNIT/ML pen    insulin aspart (NOVOPEN ECHO) 100 UNIT/ML cartridge    insulin glargine (BASAGLAR KWIKPEN) 100 UNIT/ML pen    insulin pen needle (B-D U/F) 31G X 8 MM miscellaneous    losartan (COZAAR) 100 MG tablet    naproxen (NAPROSYN) 375 MG tablet    Ostomy Supplies (ADHESIVE REMOVER WIPES) MISC    Ostomy Supplies (SKIN TAC ADHESIVE BARRIER WIPE) MISC    oxyCODONE (ROXICODONE) 5 MG tablet    Semaglutide, 2 MG/DOSE, (OZEMPIC) 8 MG/3ML pen    triamcinolone (KENALOG) 0.1 % external ointment    Continuous Blood Gluc Sensor (DEXCOM G6 SENSOR) MISC    Continuous Blood Gluc Transmit (DEXCOM G6 TRANSMITTER) MISC    semaglutide (OZEMPIC) 2  "MG/3ML SOPN pen     Current Facility-Administered Medications   Medication    hydrocortisone (CORTAID) 1 % cream    hylan (SYNVISC ONE) injection 48 mg        No Known Allergies    Physical Exam  Wt Readings from Last 10 Encounters:   10/02/23 80.3 kg (177 lb)   09/05/23 81.3 kg (179 lb 4.8 oz)   08/14/23 81.6 kg (180 lb)   08/01/23 81.2 kg (179 lb)   08/01/23 80.8 kg (178 lb 1.6 oz)   07/05/23 81.8 kg (180 lb 4.8 oz)   06/13/23 79.4 kg (175 lb)   05/23/23 81.7 kg (180 lb 1.6 oz)   05/04/23 81.2 kg (179 lb)   04/21/23 81.2 kg (179 lb)       /79 (BP Location: Right arm, Patient Position: Sitting, Cuff Size: Adult Regular)   Pulse 72   Ht 1.549 m (5' 1\")   Wt 80.3 kg (177 lb)   LMP 08/24/2023   SpO2 95%   BMI 33.44 kg/m      General appearance: she is well-developed, well-nourished, and in no distress.  Eyes: conjunctivae and extraocular motions are normal. Pupils are equal, round, and reactive to light. No lid lag, no stare.  HENT: oropharynx clear and moist; neck no JVD, no bruits, no thyromegaly, no palpable nodules  Cardiovascular: regular rhythm, no murmurs, distal pulses palpable, no edema  Respiratory: chest clear, no rales, no rhonchi  Musculoskeletal: normal tone and strength   Neurologic: reflexes normal and symmetric, no resting tremor  Psychiatric: affect and judgment normal   Skin: Hyperpigmented lesion at the site of application of the Dexcom patch  Feet: Onychomycosis, sensation preserved to monofilament testing.    RESULTS  Lab Results   Component Value Date    A1C 7.7 (H) 07/25/2023    A1C 8.7 (H) 06/06/2023    A1C 9.9 (H) 04/04/2023    A1C 9.8 (H) 08/17/2022    A1C 9.9 (H) 08/04/2022    A1C 11.6 (H) 04/05/2021    A1C 12.4 (H) 10/29/2020    A1C 12.9 (H) 07/08/2020    A1C 8.2 (H) 05/02/2019    A1C 10.3 (H) 10/15/2018    HEMOGLOBINA1 10.0 (A) 01/13/2020    HEMOGLOBINA1 9.9 (A) 10/07/2019    HEMOGLOBINA1 8.1 (A) 04/22/2019    HEMOGLOBINA1 10.4 (A) 01/14/2019    HEMOGLOBINA1 8.7 (A) 03/12/2018 "       TSH   Date Value Ref Range Status   04/04/2023 1.41 0.30 - 4.20 uIU/mL Final   07/08/2020 1.34 0.40 - 4.00 mU/L Final   05/17/2018 1.84 0.40 - 4.00 mU/L Final   11/27/2017 1.92 0.40 - 4.00 mU/L Final   05/11/2016 1.85 0.40 - 4.00 mU/L Final   02/11/2016 1.14 0.40 - 4.00 mU/L Final       ALT   Date Value Ref Range Status   08/17/2022 28 0 - 50 U/L Final   08/04/2022 27 0 - 50 U/L Final   07/08/2020 29 0 - 50 U/L Final   05/02/2019 43 0 - 50 U/L Final     Assessment/Plan:     Type 2 diabetes, with improved glucose control.  Recommendations:  Change insulin to carbohydrate ratio to 2.5 g at 6 AM (this change was made in the clinic)  Increase the dose of Basaglar from 50 to 55 units  Follow-up with Anne Marie Medina in ~3 months  F/up labs at her next visit     Orders Placed This Encounter   Procedures    Comprehensive metabolic panel    Lipid panel reflex to direct LDL Fasting    Hematocrit    Albumin Random Urine Quantitative with Creat Ratio    Hemoglobin A1c

## 2023-10-05 ENCOUNTER — PRE VISIT (OUTPATIENT)
Dept: ORTHOPEDICS | Facility: CLINIC | Age: 43
End: 2023-10-05

## 2023-10-05 ENCOUNTER — OFFICE VISIT (OUTPATIENT)
Dept: ORTHOPEDICS | Facility: CLINIC | Age: 43
End: 2023-10-05
Payer: COMMERCIAL

## 2023-10-05 DIAGNOSIS — M75.32 CALCIFIC TENDINITIS OF LEFT SHOULDER: Primary | ICD-10-CM

## 2023-10-05 DIAGNOSIS — M75.112 INCOMPLETE TEAR OF LEFT ROTATOR CUFF, UNSPECIFIED WHETHER TRAUMATIC: ICD-10-CM

## 2023-10-05 PROCEDURE — 99213 OFFICE O/P EST LOW 20 MIN: CPT | Mod: 25 | Performed by: FAMILY MEDICINE

## 2023-10-05 PROCEDURE — 20610 DRAIN/INJ JOINT/BURSA W/O US: CPT | Mod: LT | Performed by: FAMILY MEDICINE

## 2023-10-05 RX ORDER — LIDOCAINE HYDROCHLORIDE 10 MG/ML
4 INJECTION, SOLUTION EPIDURAL; INFILTRATION; INTRACAUDAL; PERINEURAL
Status: SHIPPED | OUTPATIENT
Start: 2023-10-05

## 2023-10-05 RX ORDER — TRIAMCINOLONE ACETONIDE 40 MG/ML
40 INJECTION, SUSPENSION INTRA-ARTICULAR; INTRAMUSCULAR
Status: SHIPPED | OUTPATIENT
Start: 2023-10-05

## 2023-10-05 RX ADMIN — LIDOCAINE HYDROCHLORIDE 4 ML: 10 INJECTION, SOLUTION EPIDURAL; INFILTRATION; INTRACAUDAL; PERINEURAL at 09:23

## 2023-10-05 RX ADMIN — TRIAMCINOLONE ACETONIDE 40 MG: 40 INJECTION, SUSPENSION INTRA-ARTICULAR; INTRAMUSCULAR at 09:23
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
## 2023-10-05 NOTE — NURSING NOTE
[address] [address]  Dept: [phone]  ______________________________________________________________________________    Patient: Nayeli Caal   : 1980   MRN: [id number]   2023    INVASIVE PROCEDURE SAFETY CHECKLIST    Date: 2023   Procedure: Right shoulder subacromial injection  Patient Name: Nayeli Caal  MRN: [id number]  YOB: 1980    Action: Complete sections as appropriate. Any discrepancy results in a HARD COPY until resolved.     PRE PROCEDURE:  Patient ID verified with 2 identifiers (name and  or MRN): Yes  Procedure and site verified with patient/designee (when able): Yes  Accurate consent documentation in medical record: Yes  H&P (or appropriate assessment) documented in medical record: Yes  H&P must be up to 20 days prior to procedure and updates within 24 hours of procedure as applicable: NA  Relevant diagnostic and radiology test results appropriately labeled and displayed as applicable: Yes  Procedure site(s) marked with provider initials: NA    TIMEOUT:  Time-Out performed immediately prior to starting procedure, including verbal and active participation of all team members addressing the following:Yes  * Correct patient identify  * Confirmed that the correct side and site are marked  * An accurate procedure consent form  * Agreement on the procedure to be done  * Correct patient position  * Relevant images and results are properly labeled and appropriately displayed  * The need to administer antibiotics or fluids for irrigation purposes during the procedure as applicable   * Safety precautions based on patient history or medication use    DURING PROCEDURE: Verification of correct person, site, and procedures any time the responsibility for care of the patient is transferred to another member of the care team.       Prior to injection, verified patient identity using  patient's name and date of birth.  Due to injection administration, patient instructed to remain in clinic for 15 minutes  afterwards, and to report any adverse reaction to me immediately.    Bursa injection was performed.      Drug Amount Wasted:  Yes: 1 mg/ml   Vial/Syringe: Single dose vial  Expiration Date:  04/01/2027      Lesley Gutierrez, ATC  October 5, 2023

## 2023-10-09 NOTE — PROGRESS NOTES
[facility]  Gynecology Visit    Visit conducted with an in-person .     HPI:    Nayeli Caal is a 43 year old , here for f/up regarding fibroids/suspected adenomyosis. Seen on  for known fibroids at which point she reported bothersome bulk symptoms including pelvic pain and dyschezia. Pelvic US was obtained on  which showed stable size of known uterine fibroids including a 5.2 cm subserosal fundal fibroid and a 4 cm intramural fibroid in the lower uterine segment and suspected adenomyosis. Today, she is very frustrated. She does not feel that she has been heard about her symptoms that are now very debilitating. She reports heavy vaginal bleeding and irregular bleeding that has been present since . She had an IUD from 1790-8031. While on her IUD, she had normal menstrual cycles and experienced no pain despite having known fibroids present since at least . Since having her IUD out, she has had irregular and heavy menstrual periods as well as bulk symptoms from her fibroids including pelvic pain/pressure, bladder pressure, and occasional constipation. She reports that her pelvic pain is constant and unpredictable. It can be both sharp/cramping, generally L>R. No interventions have helped with her pain. She is interested in definitive management of her fibroid uterus as her pain/pressure symptoms and AUB have been interfering with both her mental health and her other chronic medical conditions.     PMH  Past Medical History:   Diagnosis Date    Combined visual and hearing impairment     Deaf     Diabetic retinopathy of both eyes (H) 2011    Hepatic steatosis 2011    History of tobacco use     Hyperlipidemia     Hypertension     Hypertriglyceridemia     Migraines     Obesity 2011     Problem list name updated by automated process. 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  Ganglion cyst excision.;  Surgeon: Greg Streeter MD;  Location: [facility] OR    RELEASE TRIGGER FINGER Right 6/13/2023    Procedure: RELEASE, RIGHT TRIGGER FINGER, INDEX AND MIDDLE;  Surgeon: Miracle Carlin MD;  Location: [facility] OR    RELEASE TRIGGER FINGER Left 8/1/2023    Procedure: RELEASE, LEFT TRIGGER FINGER, MIDDLE;  Surgeon: Miracle Carlin MD;  Location: [facility] OR       Medications    Current Outpatient Medications:     acetaminophen (TYLENOL) 500 MG tablet, Take 2 tablets (1,000 mg) by mouth every 6 hours as needed for mild pain, Disp: 100 tablet, Rfl: 3    Alcohol Swabs (ALCOHOL PREP PAD) 70 % PADS, 1 each 3 times daily, Disp: 100 each, Rfl: 3    aspirin (ASA) 81 MG chewable tablet, Take 1 tablet (81 mg) by mouth daily CHEW AND SWALLOW, Disp: 90 tablet, Rfl: 3    atorvastatin (LIPITOR) 40 MG tablet, Take 1 tablet (40 mg) by mouth daily, Disp: 90 tablet, Rfl: 0    calcium carbonate-vitamin D (OSCAL) 500-5 MG-MCG tablet, Take 1 tablet by mouth 2 times daily, Disp: 90 tablet, Rfl: 3    Continuous Blood Gluc Sensor (DEXCOM G7 SENSOR) MISC, 1 each every 10 days, Disp: 9 each, Rfl: 3    Elagolix Sodium 150 MG TABS, Take 300 mg by mouth 2 times daily, Disp: 60 tablet, Rfl: 3    ergocalciferol (ERGOCALCIFEROL) 1.25 MG (26499 UT) capsule, Take 1 capsule (50,000 Units) by mouth once a week For additional refills, please schedule a follow-up appointment at [phone], Disp: 12  capsule, Rfl: 3    fenofibrate (TRIGLIDE/LOFIBRA) 160 MG tablet, Take 1 tablet (160 mg) by mouth daily, Disp: 90 tablet, Rfl: 0    fish oil-omega-3 fatty acids 1000 MG capsule, TAKE TWO CAPSULES (2 GM) BY MOUTH ONCE DAILY, Disp: 180 capsule, Rfl: 3    hydrocortisone (CORTAID) 1 % external cream, Apply topically 2 times daily, Disp: 120 g, Rfl: 1    ibuprofen (ADVIL/MOTRIN) 600 MG tablet, Take 1 tablet (600 mg) by mouth every 6 hours as needed for moderate pain, Disp: 120 tablet, Rfl: 1    Injection Device for insulin (INPEN 100-BLUE-NOVOLOG-FIASP) DAVID, 1 each continuous, Disp: 1 each, Rfl: 0    insulin aspart (NOVOLOG FLEXPEN) 100 UNIT/ML pen, 1 unit per 5 grams CHO and correction scale of 1/25/125.  Approximate daily use is 100 units., Disp: 30 mL, Rfl: 2    insulin aspart (NOVOPEN ECHO) 100 UNIT/ML cartridge, For use with the In-Pen device.  Give 1 unit per 5 grams CHO with meals and snacks as well as correction.  Average daily use is 100 units daily., Disp: 90 mL, Rfl: 3    insulin glargine (BASAGLAR KWIKPEN) 100 UNIT/ML pen, Inject 60 Units Subcutaneous daily 3 month supply., Disp: 54 mL, Rfl: 3    insulin pen needle (B-D U/F) 31G X 8 MM miscellaneous, USE AS DIRECTED., Disp: 200 each, Rfl: 1    losartan (COZAAR) 100 MG tablet, Take 1 tablet (100 mg) by mouth daily, Disp: 90 tablet, Rfl: 1    naproxen (NAPROSYN) 375 MG tablet, Take 1 tablet (375 mg) by mouth 2 times daily (with meals), Disp: 60 tablet, Rfl: 3    Semaglutide, 2 MG/DOSE, (OZEMPIC) 8 MG/3ML pen, Inject 2 mg Subcutaneous every 7 days, Disp: 9 mL, Rfl: 3    triamcinolone (KENALOG) 0.1 % external ointment, Apply topically 2 times daily, Disp: 30 g, Rfl: 1    B-D U/F insulin pen needle, , Disp: , Rfl:     BD ULTRA FINE PEN NEEDLES, Inject 1 dose. Subcutaneous 2 times daily BD ultra-fine insulin syringe, 30 ga. X 1/2'' short needle 1/2 cc. Use as directed., Disp: 400 Units, Rfl: 11    blood glucose (ACCU-CHEK LAYA PLUS) test strip, Use with  Accucheck  Expert meter.  Test blood sugar 6 times daily., Disp: 600 each, Rfl: 3    blood glucose monitoring (ACCU-CHEK FASTCLIX) lancets, Use to test blood sugar 6 times daily or as directed, Disp: 510 each, Rfl: 3    Ostomy Supplies (ADHESIVE REMOVER WIPES) MISC, 1 each every 14 days, Disp: 50 each, Rfl: 3    Ostomy Supplies (SKIN TAC ADHESIVE BARRIER WIPE) MISC, 1 each every 14 days, Disp: 6 each, Rfl: 3    oxyCODONE (ROXICODONE) 5 MG tablet, Take 1 tablet (5 mg) by mouth every 6 hours as needed for pain, Disp: 5 tablet, Rfl: 0    Current Facility-Administered Medications:     hydrocortisone (CORTAID) 1 % cream, , Topical, TID, Mariya Mohamud APRN CNP    hylan (SYNVISC ONE) injection 48 mg, 48 mg, , , Rosas Rodriguez, DO, 48 mg at 23    lidocaine (PF) (XYLOCAINE) 1 % injection 4 mL, 4 mL, , , Sebas Bradley MD, 4 mL at 10/05/23 0923    triamcinolone (KENALOG-40) injection 40 mg, 40 mg, , , Sebas Bradley MD, 40 mg at 10/05/23 0923    Physical Exam  BP (!) 143/72   Pulse 84   Wt 81.2 kg (179 lb)   LMP 2023   BMI 33.82 kg/m    Gen: Well-appearing  CV:  Regular rate, well perfused  Pulm: Breathing comfortably on room air   Pelvic: Deferred, please see examination findings from clinic visit on 23    Assessment/Plan:  Nayeli Caal is a 43 year old  female here for follow up regarding fibroids. Seen on  for known fibroids at which point she reports bothersome bulk symptoms including pelvic pain and dyschezia. Pelvic US was obtained which showed stable size of known uterine fibroids including a 5.2 cm subserosal fundal fibroid and a 4 cm intramural fibroid in the lower uterine segment. Discussed that overall fibroids have not grown which is reassuring and as she approaches menopause, they should stop growing and possibly shrink in size. However, given her bothersome symptoms, recommend that we pursue management. Options include medical, interventional, and surgical. In  terms of medical management, discussed hormonal management of AUB (OCPs, IUD, etc) as well as non-hormonal management (monthly TXA). Discussed GnRH antagonist with or without add-back therapy (depending on length of use) for symptomatic improvement/possible shrink fibroids though did discuss that this would be a bridging method to surgery as there is a high rate of recurrence with stopping these interventions. This medication is currently only FDA approved for up to 2 years. Discussed interventional management such as UAE and ultrasound radiofrequency ablation (performed at [place]). Finally, discussed definitive management with surgery. In her case, she is not interested in future child bearing therefore recommended hysterectomy with bilateral salpingectomy. Discussed intent for minimally invasive approach though with her fibroids there is a fair chance that this would need to be converted to an open procedure. Discussed risks of surgery including bleeding/infection/damage to surrounding tissues and structures. Discussed that in anticipation of surgery we would need to optimize medical comorbidities such as HTN and T2DM. She is working with her PCP on these conditions. Discussed risks/benefits of ovarian preservation. Given her age, recommend keeping ovaries in situ for cardiac/bone health, to minimize menstrual symptoms, and to decrease rates of all cause mortality.   - After thorough discussion, patient would like to pursue definitive management with hysterectomy. Is interested in GnRH antagonist prior to surgery to help decrease size of fibroids and improve symptoms in interim. Given anticipated length of time on medication < 6 months, will not do hormonal add back therapy at this time. Discussed possible symptoms including hot flashes, vaginal dryness, mood changes and if bothersome can include add-back therapy.  - Case request placed for robotic assisted TLH, BS, possible laparotomy, cystoscopy   - Elagolix 300  mg BID ordered; will need prior authorization   - Discussed returning to clinic for pre-operative appointment with intended surgeon (Dr Cowan). EMB will be performed at that time to rule out cancerous/precancerous lesions of the endometrium. Will place referral to PAC Clinic at that visit to ensure safety in regards to anesthesia component of procedure.   - Discussed continued work to lower Hgb A1c and optimize blood pressure.     Staffed with Dr. Chapo Freire MD  Ob/Gyn PGY-2  10/10/23 5:22 PM    I examined Nayeli Caal with Dr. Freire and agree with the presentation, exam and plan of care documented in this note with edits by me. I discussed surgical planning and Elagolix with Nayeli and signed sterilization consent form today. She will return for an EMB and exam with me.   Vonnie Cowan MD

## 2023-10-10 ENCOUNTER — OFFICE VISIT (OUTPATIENT)
Dept: OBGYN | Facility: CLINIC | Age: 43
End: 2023-10-10
Payer: COMMERCIAL

## 2023-10-10 VITALS
SYSTOLIC BLOOD PRESSURE: 143 MMHG | WEIGHT: 179 LBS | BODY MASS INDEX: 33.82 KG/M2 | HEART RATE: 84 BPM | DIASTOLIC BLOOD PRESSURE: 72 MMHG

## 2023-10-10 DIAGNOSIS — D25.2 INTRAMURAL AND SUBSEROUS LEIOMYOMA OF UTERUS: Primary | ICD-10-CM

## 2023-10-10 DIAGNOSIS — D25.1 INTRAMURAL AND SUBSEROUS LEIOMYOMA OF UTERUS: Primary | ICD-10-CM

## 2023-10-10 PROCEDURE — 99213 OFFICE O/P EST LOW 20 MIN: CPT

## 2023-10-10 PROCEDURE — 99214 OFFICE O/P EST MOD 30 MIN: CPT | Mod: GC | Performed by: STUDENT IN AN ORGANIZED HEALTH CARE EDUCATION/TRAINING PROGRAM

## 2023-10-10 PROCEDURE — 90686 IIV4 VACC NO PRSV 0.5 ML IM: CPT

## 2023-10-10 PROCEDURE — G0008 ADMIN INFLUENZA VIRUS VAC: HCPCS

## 2023-10-10 PROCEDURE — 250N000011 HC RX IP 250 OP 636

## 2023-10-10 ASSESSMENT — PAIN SCALES - GENERAL: PAINLEVEL: MODERATE PAIN (4)

## 2023-10-10 NOTE — PATIENT INSTRUCTIONS
Thank you for trusting us with your care!     If you need to contact us for questions about:  Symptoms, Scheduling & Medical Questions; Non-urgent (2-3 day response) Enrike message, Urgent (needing response today) [phone] (if after 3:30pm next day response)   Prescriptions: Please call your Pharmacy   Billing: [place] [phone] or [facility]:[phone]

## 2023-10-10 NOTE — LETTER
10/10/2023       RE: Nayeli Caal  [address]  [facility] [address]     Dear Colleague,    Thank you for referring your patient, Nayeli Caal, to the [facility]. Please see a copy of my visit note below.    [facility]  Gynecology Visit    Visit conducted with an in-person .     HPI:    Nayeli Caal is a 43 year old , here for f/up regarding fibroids/suspected adenomyosis. Seen on  for known fibroids at which point she reported bothersome bulk symptoms including pelvic pain and dyschezia. Pelvic US was obtained on  which showed stable size of known uterine fibroids including a 5.2 cm subserosal fundal fibroid and a 4 cm intramural fibroid in the lower uterine segment and suspected adenomyosis. Today, she is very frustrated. She does not feel that she has been heard about her symptoms that are now very debilitating. She reports heavy vaginal bleeding and irregular bleeding that has been present since . She had an IUD from 5480-5058. While on her IUD, she had normal menstrual cycles and experienced no pain despite having known fibroids present since at least . Since having her IUD out, she has had irregular and heavy menstrual periods as well as bulk symptoms from her fibroids including pelvic pain/pressure, bladder pressure, and occasional constipation. She reports that her pelvic pain is constant and unpredictable. It can be both sharp/cramping, generally L>R. No interventions have helped with her pain. She is interested in definitive management of her fibroid uterus as her pain/pressure symptoms and AUB have been interfering with both her mental health and her other chronic medical conditions.     PMH  Past Medical History:   Diagnosis Date    Combined visual and hearing impairment     Deaf     Diabetic retinopathy of both eyes (H) 2011     Hepatic steatosis 08/19/2011    History of tobacco use     Hyperlipidemia     Hypertension     Hypertriglyceridemia     Migraines     Obesity 8/19/2011     Problem list name updated by automated process. 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  Ganglion cyst excision.;  Surgeon: Greg Streeter MD;  [facility]    RELEASE TRIGGER FINGER Right 6/13/2023    Procedure: RELEASE, RIGHT TRIGGER FINGER, INDEX AND MIDDLE;  Surgeon: Miracle Carlin MD;  Location: [facility] OR    RELEASE TRIGGER FINGER Left 8/1/2023    Procedure: RELEASE, LEFT TRIGGER FINGER, MIDDLE;  Surgeon: Miracle Carlin MD;  Location: [facility] OR       Medications    Current Outpatient Medications:     acetaminophen (TYLENOL) 500 MG tablet, Take 2 tablets (1,000 mg) by mouth every 6 hours as needed for mild pain, Disp: 100 tablet, Rfl: 3    Alcohol Swabs (ALCOHOL PREP PAD) 70 % PADS, 1 each 3 times daily, Disp: 100 each, Rfl: 3    aspirin (ASA) 81 MG chewable tablet, Take 1 tablet (81 mg) by mouth daily CHEW AND SWALLOW, Disp: 90 tablet, Rfl: 3    atorvastatin (LIPITOR) 40 MG tablet, Take 1 tablet (40 mg) by mouth daily, Disp: 90 tablet, Rfl: 0    calcium carbonate-vitamin D (OSCAL) 500-5 MG-MCG tablet, Take 1 tablet by mouth 2 times daily, Disp: 90 tablet, Rfl: 3    Continuous Blood Gluc Sensor (DEXCOM G7 SENSOR) MISC, 1 each every 10  days, Disp: 9 each, Rfl: 3    Elagolix Sodium 150 MG TABS, Take 300 mg by mouth 2 times daily, Disp: 60 tablet, Rfl: 3    ergocalciferol (ERGOCALCIFEROL) 1.25 MG (15066 UT) capsule, Take 1 capsule (50,000 Units) by mouth once a week For additional refills, please schedule a follow-up appointment at [phone], Disp: 12 capsule, Rfl: 3    fenofibrate (TRIGLIDE/LOFIBRA) 160 MG tablet, Take 1 tablet (160 mg) by mouth daily, Disp: 90 tablet, Rfl: 0    fish oil-omega-3 fatty acids 1000 MG capsule, TAKE TWO CAPSULES (2 GM) BY MOUTH ONCE DAILY, Disp: 180 capsule, Rfl: 3    hydrocortisone (CORTAID) 1 % external cream, Apply topically 2 times daily, Disp: 120 g, Rfl: 1    ibuprofen (ADVIL/MOTRIN) 600 MG tablet, Take 1 tablet (600 mg) by mouth every 6 hours as needed for moderate pain, Disp: 120 tablet, Rfl: 1    Injection Device for insulin (INPEN 100-BLUE-NOVOLOG-FIASP) DAVID, 1 each continuous, Disp: 1 each, Rfl: 0    insulin aspart (NOVOLOG FLEXPEN) 100 UNIT/ML pen, 1 unit per 5 grams CHO and correction scale of 1/25/125.  Approximate daily use is 100 units., Disp: 30 mL, Rfl: 2    insulin aspart (NOVOPEN ECHO) 100 UNIT/ML cartridge, For use with the In-Pen device.  Give 1 unit per 5 grams CHO with meals and snacks as well as correction.  Average daily use is 100 units daily., Disp: 90 mL, Rfl: 3    insulin glargine (BASAGLAR KWIKPEN) 100 UNIT/ML pen, Inject 60 Units Subcutaneous daily 3 month supply., Disp: 54 mL, Rfl: 3    insulin pen needle (B-D U/F) 31G X 8 MM miscellaneous, USE AS DIRECTED., Disp: 200 each, Rfl: 1    losartan (COZAAR) 100 MG tablet, Take 1 tablet (100 mg) by mouth daily, Disp: 90 tablet, Rfl: 1    naproxen (NAPROSYN) 375 MG tablet, Take 1 tablet (375 mg) by mouth 2 times daily (with meals), Disp: 60 tablet, Rfl: 3    Semaglutide, 2 MG/DOSE, (OZEMPIC) 8 MG/3ML pen, Inject 2 mg Subcutaneous every 7 days, Disp: 9 mL, Rfl: 3    triamcinolone (KENALOG) 0.1 % external ointment, Apply topically 2 times  daily, Disp: 30 g, Rfl: 1    B-D U/F insulin pen needle, , Disp: , Rfl:     BD ULTRA FINE PEN NEEDLES, Inject 1 dose. Subcutaneous 2 times daily BD ultra-fine insulin syringe, 30 ga. X 1/2'' short needle 1/2 cc. Use as directed., Disp: 400 Units, Rfl: 11    blood glucose (ACCU-CHEK LAYA PLUS) test strip, Use with  Accucheck Expert meter.  Test blood sugar 6 times daily., Disp: 600 each, Rfl: 3    blood glucose monitoring (ACCU-CHEK FASTCLIX) lancets, Use to test blood sugar 6 times daily or as directed, Disp: 510 each, Rfl: 3    Ostomy Supplies (ADHESIVE REMOVER WIPES) MISC, 1 each every 14 days, Disp: 50 each, Rfl: 3    Ostomy Supplies (SKIN TAC ADHESIVE BARRIER WIPE) MISC, 1 each every 14 days, Disp: 6 each, Rfl: 3    oxyCODONE (ROXICODONE) 5 MG tablet, Take 1 tablet (5 mg) by mouth every 6 hours as needed for pain, Disp: 5 tablet, Rfl: 0    Current Facility-Administered Medications:     hydrocortisone (CORTAID) 1 % cream, , Topical, TID, Overkamp, Mariya M, APRN CNP    hylan (SYNVISC ONE) injection 48 mg, 48 mg, , , Rosas Rodriguez DO, 48 mg at 23    lidocaine (PF) (XYLOCAINE) 1 % injection 4 mL, 4 mL, , , Sebas Bradley MD, 4 mL at 10/05/23 0923    triamcinolone (KENALOG-40) injection 40 mg, 40 mg, , , Sebas Bradley MD, 40 mg at 10/05/23 0923    Physical Exam  BP (!) 143/72   Pulse 84   Wt 81.2 kg (179 lb)   LMP 2023   BMI 33.82 kg/m    Gen: Well-appearing  CV:  Regular rate, well perfused  Pulm: Breathing comfortably on room air   Pelvic: Deferred, please see examination findings from clinic visit on 23    Assessment/Plan:  Nayeli Caal is a 43 year old  female here for follow up regarding fibroids. Seen on  for known fibroids at which point she reports bothersome bulk symptoms including pelvic pain and dyschezia. Pelvic US was obtained which showed stable size of known uterine fibroids including a 5.2 cm subserosal fundal fibroid and a 4 cm intramural  fibroid in the lower uterine segment. Discussed that overall fibroids have not grown which is reassuring and as she approaches menopause, they should stop growing and possibly shrink in size. However, given her bothersome symptoms, recommend that we pursue management. Options include medical, interventional, and surgical. In terms of medical management, discussed hormonal management of AUB (OCPs, IUD, etc) as well as non-hormonal management (monthly TXA). Discussed GnRH antagonist with or without add-back therapy (depending on length of use) for symptomatic improvement/possible shrink fibroids though did discuss that this would be a bridging method to surgery as there is a high rate of recurrence with stopping these interventions. This medication is currently only FDA approved for up to 2 years. Discussed interventional management such as UAE and ultrasound radiofrequency ablation (performed at [place]). Finally, discussed definitive management with surgery. In her case, she is not interested in future child bearing therefore recommended hysterectomy with bilateral salpingectomy. Discussed intent for minimally invasive approach though with her fibroids there is a fair chance that this would need to be converted to an open procedure. Discussed risks of surgery including bleeding/infection/damage to surrounding tissues and structures. Discussed that in anticipation of surgery we would need to optimize medical comorbidities such as HTN and T2DM. She is working with her PCP on these conditions. Discussed risks/benefits of ovarian preservation. Given her age, recommend keeping ovaries in situ for cardiac/bone health, to minimize menstrual symptoms, and to decrease rates of all cause mortality.   - After thorough discussion, patient would like to pursue definitive management with hysterectomy. Is interested in GnRH antagonist prior to surgery to help decrease size of fibroids and improve symptoms in interim. Given  anticipated length of time on medication < 6 months, will not do hormonal add back therapy at this time. Discussed possible symptoms including hot flashes, vaginal dryness, mood changes and if bothersome can include add-back therapy.  - Case request placed for robotic assisted TLH, BS, possible laparotomy, cystoscopy   - Elagolix 300 mg BID ordered; will need prior authorization   - Discussed returning to clinic for pre-operative appointment with intended surgeon (Dr Cowan). EMB will be performed at that time to rule out cancerous/precancerous lesions of the endometrium. Will place referral to PAC Clinic at that visit to ensure safety in regards to anesthesia component of procedure.   - Discussed continued work to lower Hgb A1c and optimize blood pressure.     Staffed with Dr. Chapo Freire MD  Ob/Gyn PGY-2  10/10/23 5:22 PM    I examined Nayeli Caal with Dr. Freire and agree with the presentation, exam and plan of care documented in this note with edits by me. I discussed surgical planning and Elagolix with Nayeli and signed sterilization consent form today. She will return for an EMB and exam with me.   Vonnie Cowan MD

## 2023-10-10 NOTE — Clinical Note
Hi Dr. Cowan,   I have placed the order for the Elagolix. I did not yet put in the case request because I wanted to confirm with you first that that was in alignment with your discussion for plan with the patient. Please let me know if you would like me to place this case request.   Again, thank you so much for all of your assistance! Lesley

## 2023-10-11 ENCOUNTER — MYC MEDICAL ADVICE (OUTPATIENT)
Dept: OBGYN | Facility: CLINIC | Age: 43
End: 2023-10-11
Payer: COMMERCIAL

## 2023-10-18 NOTE — TELEPHONE ENCOUNTER
Forms printed and placed in Dr. Cowan's bin for signing.  Dr. Cowan was overseeing Dr. Ferire when it was ordered.

## 2023-10-24 ENCOUNTER — TELEPHONE (OUTPATIENT)
Dept: OBGYN | Facility: CLINIC | Age: 43
End: 2023-10-24
Payer: COMMERCIAL

## 2023-10-24 DIAGNOSIS — N93.9 ABNORMAL UTERINE BLEEDING (AUB): ICD-10-CM

## 2023-10-24 DIAGNOSIS — D25.1 INTRAMURAL AND SUBSEROUS LEIOMYOMA OF UTERUS: Primary | ICD-10-CM

## 2023-10-24 DIAGNOSIS — D25.2 INTRAMURAL AND SUBSEROUS LEIOMYOMA OF UTERUS: Primary | ICD-10-CM

## 2023-10-24 RX ORDER — RELUGOLIX, ESTRADIOL HEMIHYDRATE, AND NORETHINDRONE ACETATE 40; 1; .5 MG/1; MG/1; MG/1
1 TABLET, FILM COATED ORAL DAILY
Qty: 90 TABLET | Refills: 1 | Status: ON HOLD | OUTPATIENT
Start: 2023-10-24 | End: 2024-02-07

## 2023-10-25 ENCOUNTER — TELEPHONE (OUTPATIENT)
Dept: OBGYN | Facility: CLINIC | Age: 43
End: 2023-10-25
Payer: COMMERCIAL

## 2023-10-25 NOTE — TELEPHONE ENCOUNTER
Sent the patient a [email or web address] message letting her know that the PA was just sent in yesterday and hopefully we will hear back this week.

## 2023-10-25 NOTE — TELEPHONE ENCOUNTER
[facility] Call Center    Phone Message    May a detailed message be left on voicemail: yes     Reason for Call: Other: Pt is wondering about the status for her medication. States her insurance needs a prior auth and they haven't received anything  yet. Please advise and call pt.      Action Taken: Other: [facility]    Travel Screening: Not Applicable

## 2023-10-26 NOTE — TELEPHONE ENCOUNTER
Central Prior Authorization Team  Phone: [phone]    PA Initiation    Medication: MYFEMBREE 40-1-0.5 MG PO TABS  Insurance Company: [employer] Phone [phone] Fax [phone]  Pharmacy Filling the Rx: [id number] [place], [place] - [address] AT [facility] & [address] [place] [address]  Filling Pharmacy Phone: [phone]  Filling Pharmacy Fax:    Start Date: 10/26/2023

## 2023-10-27 NOTE — TELEPHONE ENCOUNTER
Central Prior Authorization Team  Phone: [phone]    PRIOR AUTHORIZATION DENIED    Medication: MYFEMBREE 40-1-0.5 MG PO TABS  Insurance Company: [employer] Phone [phone] Fax [phone]  Denial Date: 10/27/2023  Denial Rational:        Appeal Information:

## 2023-10-29 ENCOUNTER — HEALTH MAINTENANCE LETTER (OUTPATIENT)
Age: 43
End: 2023-10-29

## 2023-10-30 ENCOUNTER — ALLIED HEALTH/NURSE VISIT (OUTPATIENT)
Dept: INTERNAL MEDICINE | Facility: CLINIC | Age: 43
End: 2023-10-30
Payer: COMMERCIAL

## 2023-10-30 DIAGNOSIS — Z23 NEED FOR VACCINATION: Primary | ICD-10-CM

## 2023-10-30 PROCEDURE — T1013 SIGN LANG/ORAL INTERPRETER: HCPCS | Mod: U3

## 2023-10-30 PROCEDURE — 99207 PR NO CHARGE NURSE ONLY: CPT

## 2023-10-30 PROCEDURE — 90480 ADMN SARSCOV2 VAC 1/ONLY CMP: CPT

## 2023-10-30 PROCEDURE — 91320 SARSCV2 VAC 30MCG TRS-SUC IM: CPT

## 2023-10-30 NOTE — COMMUNITY RESOURCES LIST (ENGLISH)
10/30/2023   [facility]  N/A  For questions about this resource list or additional care needs, please contact your primary care clinic or care manager.  Phone: [phone]   Email: N/A   Address: [facility] [place] [address]   Hours: N/A        Transportation       Free or low-cost transportation  1  [facility] Distance: 1.38 miles      In-Person   [address] [place] [address]  Language: English  Hours: Mon - Fri 8:00 AM - 3:00 PM  Fees: Free   Phone: (318) 511-9982 Ext.14 Email: [email or web address].[employer] Website: http://www.[facility]isAs[facility].[employer]     2  [facility] Distance: 1.75 miles      In-Person   [address] [place] [id number]  Language: English  Hours: Mon - Fri 9:00 AM - 12:00 PM , Mon - Fri 1:00 PM - 3:00 PM  Fees: Self Pay   Phone: (828) 618-7672 Email: info@[employer].org Website: https://www.[facility].org/[place]     Transportation to medical appointments  3  [facility] Distance: 5.92 miles      In-Person   [address] [facility] [address] [place] [address]  Language: English  Hours: Mon - Sun Open 24 Hours  Fees: Insurance, Self Pay   Phone: (145) 348-5805 Email: katie@[employer] Website: https://www.[employer]/midmetro/     4  [facility] Distance: 6.19 miles      In-Person   [address] [facility] [place] [address]  Language: English, Swazi  Hours: Mon - Sun Appt. Only  Fees: Self Pay   Phone: (116) 698-6414 Email: lida@[employer] Website: http://www.[employer]/          Important Numbers & Websites       Emergency Services   911  City Services   311  Poison Control   (297) 528-4207  Suicide Prevention Lifeline   (414) 141-4814 (TALK)  Child Abuse Hotline   (205) 106-3800 (4-A-Child)  Sexual Assault Hotline   (821) 432-2375 (HOPE)  National Runaway Safeline   (400) 876-6142  (RUNAWAY)  All-Options Talkline   (258) 106-4208  Substance Abuse Referral   (704) 559-7908 (HELP)

## 2023-10-30 NOTE — PROGRESS NOTES
Nayeli Eidjo ann Caal received the Covid vaccine today in clinic at the request of Mariya Mohamud. The immunization was given under the supervision of Dr Shanks if assistance was needed. The patient does not report a history of adverse reactions associated with vaccine administration. The immunization site was cleaned with an alcohol prep wipe. The immunization was given without incident--see immunization list for administration details. No swelling or redness was observed at the site of injection after the immunization was given. The patient was advised to remain in fourth floor lobby of the [facility] for fifteen minutes after the injection in case of an adverse reaction.     Salazar Montoya, EMT 1:30 PM on 10/30/2023

## 2023-11-01 ENCOUNTER — OFFICE VISIT (OUTPATIENT)
Dept: OBGYN | Facility: CLINIC | Age: 43
End: 2023-11-01
Attending: STUDENT IN AN ORGANIZED HEALTH CARE EDUCATION/TRAINING PROGRAM
Payer: COMMERCIAL

## 2023-11-01 VITALS
SYSTOLIC BLOOD PRESSURE: 183 MMHG | HEIGHT: 61 IN | DIASTOLIC BLOOD PRESSURE: 97 MMHG | BODY MASS INDEX: 33.89 KG/M2 | HEART RATE: 91 BPM | WEIGHT: 179.5 LBS

## 2023-11-01 DIAGNOSIS — N93.9 ABNORMAL UTERINE BLEEDING (AUB): ICD-10-CM

## 2023-11-01 DIAGNOSIS — N94.6 DYSMENORRHEA: Primary | ICD-10-CM

## 2023-11-01 DIAGNOSIS — N80.03 ADENOMYOSIS OF UTERUS: ICD-10-CM

## 2023-11-01 PROCEDURE — 88305 TISSUE EXAM BY PATHOLOGIST: CPT | Mod: 26 | Performed by: PATHOLOGY

## 2023-11-01 PROCEDURE — 99213 OFFICE O/P EST LOW 20 MIN: CPT | Performed by: STUDENT IN AN ORGANIZED HEALTH CARE EDUCATION/TRAINING PROGRAM

## 2023-11-01 PROCEDURE — 99215 OFFICE O/P EST HI 40 MIN: CPT | Mod: 25 | Performed by: STUDENT IN AN ORGANIZED HEALTH CARE EDUCATION/TRAINING PROGRAM

## 2023-11-01 PROCEDURE — 58100 BIOPSY OF UTERUS LINING: CPT | Performed by: STUDENT IN AN ORGANIZED HEALTH CARE EDUCATION/TRAINING PROGRAM

## 2023-11-01 PROCEDURE — T1013 SIGN LANG/ORAL INTERPRETER: HCPCS | Mod: U3

## 2023-11-01 PROCEDURE — 88305 TISSUE EXAM BY PATHOLOGIST: CPT | Mod: TC | Performed by: STUDENT IN AN ORGANIZED HEALTH CARE EDUCATION/TRAINING PROGRAM

## 2023-11-01 RX ORDER — PHENAZOPYRIDINE HYDROCHLORIDE 100 MG/1
200 TABLET, FILM COATED ORAL ONCE
Status: CANCELLED | OUTPATIENT
Start: 2023-11-01 | End: 2023-11-01

## 2023-11-01 RX ORDER — METRONIDAZOLE 500 MG/100ML
500 INJECTION, SOLUTION INTRAVENOUS
Status: CANCELLED | OUTPATIENT
Start: 2023-11-01

## 2023-11-01 RX ORDER — ACETAMINOPHEN 325 MG/1
975 TABLET ORAL ONCE
Status: CANCELLED | OUTPATIENT
Start: 2023-11-01 | End: 2023-11-01

## 2023-11-01 ASSESSMENT — PAIN SCALES - GENERAL: PAINLEVEL: SEVERE PAIN (6)

## 2023-11-01 NOTE — PATIENT INSTRUCTIONS
Thank you for trusting us with your care!     If you need to contact us for questions about:  Symptoms, Scheduling & Medical Questions; Non-urgent (2-3 day response) Enrike message, Urgent (needing response today) [phone] (if after 3:30pm next day response)   Prescriptions: Please call your Pharmacy   Billing: Josefa [phone] or [facility]:[phone]

## 2023-11-01 NOTE — PROGRESS NOTES
Please place needed labs for an appt scheduled 1/15/20     [facility]  Gynecology Visit    Visit conducted with an in-person .     HPI:    Nayeli Caal is a 43 year old , here for follow-up regarding fibroids/suspected adenomyosis. Seen on  and 10/10 for known fibroids with bothersome bulk symptoms including pelvic pain and dyschezia. At the appointment on 10/10 GnRh antagonist was prescribed but denied by her insurance. We also discussed Lupron as a possibility until her hysterectomy scheduled in Feb. She is in severe pain and currently having her menses. She is frustrated at the lack of options for pain control and insurance coverage. She is agreeable to an endometrial biopsy today and would like help with the PA on Myfembree. She is currently managing pain with ibuprofen. Not sexually active.     PMH  Past Medical History:   Diagnosis Date    Combined visual and hearing impairment     Deaf     Diabetic retinopathy of both eyes (H) 2011    Hepatic steatosis 2011    History of tobacco use     Hyperlipidemia     Hypertension     Hypertriglyceridemia     Migraines     Obesity 2011     Problem list name updated by automated process. Provider to review    Uncontrolled type 2 diabetes mellitus with hyperglycemia, with long-term current use of insulin (H) 5/15/2017    Usher Syndrome: congenital deafness, retinitis pigmentosa 2011     PSH  Past Surgical History:   Procedure Laterality Date    LAPAROSCOPIC CHOLECYSTECTOMY N/A 3/10/2018    Procedure: LAPAROSCOPIC CHOLECYSTECTOMY;  Laparoscopic Cholecystectomy ;  Surgeon: Kuldeep Sigala MD;  [facility]    RELEASE TRIGGER FINGER Right 2019    Procedure: Right Thumb Trigger Release;  Surgeon: Greg Streeter MD;  [facility]    RELEASE TRIGGER FINGER Left 2019    Procedure: Left Ring Trigger Finger Release.  Ganglion cyst excision.;  Surgeon: Greg Streeter MD;  [facility]    RELEASE TRIGGER FINGER Right 2023    Procedure: RELEASE,  RIGHT TRIGGER FINGER, INDEX AND MIDDLE;  Surgeon: Miracle Carlin MD;  Location: [facility] OR    RELEASE TRIGGER FINGER Left 8/1/2023    Procedure: RELEASE, LEFT TRIGGER FINGER, MIDDLE;  Surgeon: Miracle Carlin MD;  Location: [facility] OR       Medications    Current Outpatient Medications:     acetaminophen (TYLENOL) 500 MG tablet, Take 2 tablets (1,000 mg) by mouth every 6 hours as needed for mild pain, Disp: 100 tablet, Rfl: 3    Alcohol Swabs (ALCOHOL PREP PAD) 70 % PADS, 1 each 3 times daily, Disp: 100 each, Rfl: 3    aspirin (ASA) 81 MG chewable tablet, Take 1 tablet (81 mg) by mouth daily CHEW AND SWALLOW, Disp: 90 tablet, Rfl: 3    atorvastatin (LIPITOR) 40 MG tablet, Take 1 tablet (40 mg) by mouth daily, Disp: 90 tablet, Rfl: 0    B-D U/F insulin pen needle, , Disp: , Rfl:     BD ULTRA FINE PEN NEEDLES, Inject 1 dose. Subcutaneous 2 times daily BD ultra-fine insulin syringe, 30 ga. X 1/2'' short needle 1/2 cc. Use as directed., Disp: 400 Units, Rfl: 11    blood glucose (ACCU-CHEK LAYA PLUS) test strip, Use with  Accucheck Expert meter.  Test blood sugar 6 times daily., Disp: 600 each, Rfl: 3    blood glucose monitoring (ACCU-CHEK FASTCLIX) lancets, Use to test blood sugar 6 times daily or as directed, Disp: 510 each, Rfl: 3    calcium carbonate-vitamin D (OSCAL) 500-5 MG-MCG tablet, Take 1 tablet by mouth 2 times daily, Disp: 90 tablet, Rfl: 3    Continuous Blood Gluc Sensor (DEXCOM G7 SENSOR) MISC, 1 each every 10 days, Disp: 9 each, Rfl: 3    ergocalciferol (ERGOCALCIFEROL) 1.25 MG (29640 UT) capsule, Take 1 capsule (50,000 Units) by mouth once a week For additional refills, please schedule a follow-up appointment at [phone], Disp: 12 capsule, Rfl: 3    fenofibrate (TRIGLIDE/LOFIBRA) 160 MG tablet, Take 1 tablet (160 mg) by mouth daily, Disp: 90 tablet, Rfl: 0    fish oil-omega-3 fatty acids 1000 MG capsule, TAKE TWO CAPSULES (2 GM) BY MOUTH ONCE DAILY, Disp: 180 capsule, Rfl: 3    hydrocortisone  (CORTAID) 1 % external cream, Apply topically 2 times daily, Disp: 120 g, Rfl: 1    ibuprofen (ADVIL/MOTRIN) 600 MG tablet, Take 1 tablet (600 mg) by mouth every 6 hours as needed for moderate pain, Disp: 120 tablet, Rfl: 1    Injection Device for insulin (INPEN 100-BLUE-NOVOLOG-FIASP) DAVID, 1 each continuous, Disp: 1 each, Rfl: 0    insulin aspart (NOVOLOG FLEXPEN) 100 UNIT/ML pen, 1 unit per 5 grams CHO and correction scale of 1/25/125.  Approximate daily use is 100 units., Disp: 30 mL, Rfl: 2    insulin aspart (NOVOPEN ECHO) 100 UNIT/ML cartridge, For use with the In-Pen device.  Give 1 unit per 5 grams CHO with meals and snacks as well as correction.  Average daily use is 100 units daily., Disp: 90 mL, Rfl: 3    insulin glargine (BASAGLAR KWIKPEN) 100 UNIT/ML pen, Inject 60 Units Subcutaneous daily 3 month supply., Disp: 54 mL, Rfl: 3    insulin pen needle (B-D U/F) 31G X 8 MM miscellaneous, USE AS DIRECTED., Disp: 200 each, Rfl: 1    losartan (COZAAR) 100 MG tablet, Take 1 tablet (100 mg) by mouth daily, Disp: 90 tablet, Rfl: 1    naproxen (NAPROSYN) 375 MG tablet, Take 1 tablet (375 mg) by mouth 2 times daily (with meals), Disp: 60 tablet, Rfl: 3    Ostomy Supplies (ADHESIVE REMOVER WIPES) MISC, 1 each every 14 days, Disp: 50 each, Rfl: 3    Ostomy Supplies (SKIN TAC ADHESIVE BARRIER WIPE) MISC, 1 each every 14 days, Disp: 6 each, Rfl: 3    oxyCODONE (ROXICODONE) 5 MG tablet, Take 1 tablet (5 mg) by mouth every 6 hours as needed for pain, Disp: 5 tablet, Rfl: 0    Relugolix-Estradiol-Norethind (MYFEMBREE) 40-1-0.5 MG TABS, Take 1 tablet by mouth daily, Disp: 90 tablet, Rfl: 1    Semaglutide, 2 MG/DOSE, (OZEMPIC) 8 MG/3ML pen, Inject 2 mg Subcutaneous every 7 days, Disp: 9 mL, Rfl: 3    triamcinolone (KENALOG) 0.1 % external ointment, Apply topically 2 times daily, Disp: 30 g, Rfl: 1    Current Facility-Administered Medications:     hydrocortisone (CORTAID) 1 % cream, , Topical, TID, Mariya Mohamud, APRN  "CNP    hylan (SYNVISC ONE) injection 48 mg, 48 mg, , , Rosas Rodriguez, , 48 mg at 23    leuprolide (LUPRON DEPOT) kit 11.25 mg, 11.25 mg, Intramuscular, Q90 Days, Vonnie Cowan MD    lidocaine (PF) (XYLOCAINE) 1 % injection 4 mL, 4 mL, , , Sebas Bradley MD, 4 mL at 10/05/23 0923    triamcinolone (KENALOG-40) injection 40 mg, 40 mg, , , Sebas Bradley MD, 40 mg at 10/05/23 0923    Physical Exam  BP (!) 183/97   Pulse 91   Ht 1.549 m (5' 1\")   Wt 81.4 kg (179 lb 8 oz)   LMP 10/29/2023 (Exact Date)   BMI 33.92 kg/m    Gen: Upset, tearful   CV:  tachycardic rate, well perfused  Pulm: Breathing comfortably on room air   Pelvic: Blood over labia majora/vulva. Normal vagina with menstrual blood. Large cervix with anteverted uterus. Pain with bimanual exam. Unable to appreciate significant uterine mobility. Adnexa not palpable.     Procedure:   Consented for procedure via . Cervix was cleaned with betadine. Biopsy pipelle placed and large amount of tissue obtained. All instruments removed from vagina.     Imaging:   Pelvic ultrasound from 23   FINDINGS:  The uterus measures 9.3 x 5.7 x 4.5, 5.2 x 4.5 x 4.5 cm subserosal  fundal fibroid is not significantly changed. 4 x 3.5 x 1.9 cm  intramural fibroid in the lower uterine segment/uterus, also not  significantly changed. The endometrium is within normal limits and  measures 9 mm. Again seen is subendometrial cystic change and  heterogeneity. Nabothian cysts.     There is no free fluid in the pelvis.     The right ovary measures 2.6 x 2.3 x 1.5 cm and the left ovary  measures 1.7 x 1.5 x 2.1 cm. There is no adnexal mass. There is normal blood flow to the ovaries.                                                                   IMPRESSION:   1. Unchanged subserosal and intramural uterine fibroids.  2. Uterine adenomyosis.    Assessment/Plan:  Nayeli Caal is a 43 year old  female here for follow up " regarding fibroids and adenomyosis.     - Reviewed risks again of robotic assisted TLH, BS, cysto scheduled in Feb. Recommended keeping ovaries which she is agreeable to. Previously have discussed other options for treatment.   - In interval time will appeal PA decision for Myfembree. She is aware of the risk of worsening mood and suicidal ideation on this medication and will stop its use and inform our clinic of any worsening mood symptoms. If not able to use Myfembree we discussed the use of Lupron which she can receive in clinic with or without add-back norethindrone. However, this can cause an initial worsening of symptoms after administration before improvement.   - she has not taken her BP medication today. Discussed importance of taking medication daily and keeping BP and BG well controlled preoperatively.     A total of 50 minutes was spent by myself reviewing results, prior history, imaging, face-to-face with the patient and documentation. This was outside of time spent performing endometrial biopsy.     Vonnie Cowan MD

## 2023-11-01 NOTE — LETTER
2023       RE: Nayeli Caal  [address]  [place] [place] [address]     Dear Colleague,    Thank you for referring your patient, Nayeli Caal, to the [facility] [place] at [facility]. Please see a copy of my visit note below.    [facility]  Gynecology Visit    Visit conducted with an in-person .     HPI:    Nayeli Caal is a 43 year old , here for follow-up regarding fibroids/suspected adenomyosis. Seen on  and 10/10 for known fibroids with bothersome bulk symptoms including pelvic pain and dyschezia. At the appointment on 10/10 GnRh antagonist was prescribed but denied by her insurance. We also discussed Lupron as a possibility until her hysterectomy scheduled in Feb. She is in severe pain and currently having her menses. She is frustrated at the lack of options for pain control and insurance coverage. She is agreeable to an endometrial biopsy today and would like help with the PA on [facility]. She is currently managing pain with ibuprofen. Not sexually active.     PMH  Past Medical History:   Diagnosis Date    Combined visual and hearing impairment     Deaf     Diabetic retinopathy of both eyes (H) 2011    Hepatic steatosis 2011    History of tobacco use     Hyperlipidemia     Hypertension     Hypertriglyceridemia     Migraines     Obesity 2011     Problem list name updated by automated process. Provider to review    Uncontrolled type 2 diabetes mellitus with hyperglycemia, with long-term current use of insulin (H) 5/15/2017    Usher Syndrome: congenital deafness, retinitis pigmentosa 2011     PSH  Past Surgical History:   Procedure Laterality Date    LAPAROSCOPIC CHOLECYSTECTOMY N/A 3/10/2018    Procedure: LAPAROSCOPIC CHOLECYSTECTOMY;  Laparoscopic Cholecystectomy ;  Surgeon: Kuldeep Sigala MD;  [facility]    RELEASE TRIGGER FINGER Right  5/2/2019    Procedure: Right Thumb Trigger Release;  Surgeon: Greg Streeter MD;  [facility]    RELEASE TRIGGER FINGER Left 5/30/2019    Procedure: Left Ring Trigger Finger Release.  Ganglion cyst excision.;  Surgeon: Greg Streeter MD;  [facility]    RELEASE TRIGGER FINGER Right 6/13/2023    Procedure: RELEASE, RIGHT TRIGGER FINGER, INDEX AND MIDDLE;  Surgeon: Miracle Carlin MD;  Location: [facility] OR    RELEASE TRIGGER FINGER Left 8/1/2023    Procedure: RELEASE, LEFT TRIGGER FINGER, MIDDLE;  Surgeon: Miracle Carlin MD;  Location: [facility] OR       Medications    Current Outpatient Medications:     acetaminophen (TYLENOL) 500 MG tablet, Take 2 tablets (1,000 mg) by mouth every 6 hours as needed for mild pain, Disp: 100 tablet, Rfl: 3    Alcohol Swabs (ALCOHOL PREP PAD) 70 % PADS, 1 each 3 times daily, Disp: 100 each, Rfl: 3    aspirin (ASA) 81 MG chewable tablet, Take 1 tablet (81 mg) by mouth daily CHEW AND SWALLOW, Disp: 90 tablet, Rfl: 3    atorvastatin (LIPITOR) 40 MG tablet, Take 1 tablet (40 mg) by mouth daily, Disp: 90 tablet, Rfl: 0    B-D U/F insulin pen needle, , Disp: , Rfl:     BD ULTRA FINE PEN NEEDLES, Inject 1 dose. Subcutaneous 2 times daily BD ultra-fine insulin syringe, 30 ga. X 1/2'' short needle 1/2 cc. Use as directed., Disp: 400 Units, Rfl: 11    blood glucose (ACCU-CHEK LAYA PLUS) test strip, Use with  Accucheck Expert meter.  Test blood sugar 6 times daily., Disp: 600 each, Rfl: 3    blood glucose monitoring (ACCU-CHEK FASTCLIX) lancets, Use to test blood sugar 6 times daily or as directed, Disp: 510 each, Rfl: 3    calcium carbonate-vitamin D (OSCAL) 500-5 MG-MCG tablet, Take 1 tablet by mouth 2 times daily, Disp: 90 tablet, Rfl: 3    Continuous Blood Gluc Sensor (DEXCOM G7 SENSOR) MISC, 1 each every 10 days, Disp: 9 each, Rfl: 3    ergocalciferol (ERGOCALCIFEROL) 1.25 MG (35753 UT) capsule, Take 1 capsule (50,000 Units) by mouth once a week For additional refills,  please schedule a follow-up appointment at [phone], Disp: 12 capsule, Rfl: 3    fenofibrate (TRIGLIDE/LOFIBRA) 160 MG tablet, Take 1 tablet (160 mg) by mouth daily, Disp: 90 tablet, Rfl: 0    fish oil-omega-3 fatty acids 1000 MG capsule, TAKE TWO CAPSULES (2 GM) BY MOUTH ONCE DAILY, Disp: 180 capsule, Rfl: 3    hydrocortisone (CORTAID) 1 % external cream, Apply topically 2 times daily, Disp: 120 g, Rfl: 1    ibuprofen (ADVIL/MOTRIN) 600 MG tablet, Take 1 tablet (600 mg) by mouth every 6 hours as needed for moderate pain, Disp: 120 tablet, Rfl: 1    Injection Device for insulin (INPEN 100-BLUE-NOVOLOG-FIASP) DAVID, 1 each continuous, Disp: 1 each, Rfl: 0    insulin aspart (NOVOLOG FLEXPEN) 100 UNIT/ML pen, 1 unit per 5 grams CHO and correction scale of 1/25/125.  Approximate daily use is 100 units., Disp: 30 mL, Rfl: 2    insulin aspart (NOVOPEN ECHO) 100 UNIT/ML cartridge, For use with the In-Pen device.  Give 1 unit per 5 grams CHO with meals and snacks as well as correction.  Average daily use is 100 units daily., Disp: 90 mL, Rfl: 3    insulin glargine (BASAGLAR KWIKPEN) 100 UNIT/ML pen, Inject 60 Units Subcutaneous daily 3 month supply., Disp: 54 mL, Rfl: 3    insulin pen needle (B-D U/F) 31G X 8 MM miscellaneous, USE AS DIRECTED., Disp: 200 each, Rfl: 1    losartan (COZAAR) 100 MG tablet, Take 1 tablet (100 mg) by mouth daily, Disp: 90 tablet, Rfl: 1    naproxen (NAPROSYN) 375 MG tablet, Take 1 tablet (375 mg) by mouth 2 times daily (with meals), Disp: 60 tablet, Rfl: 3    Ostomy Supplies (ADHESIVE REMOVER WIPES) MISC, 1 each every 14 days, Disp: 50 each, Rfl: 3    Ostomy Supplies (SKIN TAC ADHESIVE BARRIER WIPE) MISC, 1 each every 14 days, Disp: 6 each, Rfl: 3    oxyCODONE (ROXICODONE) 5 MG tablet, Take 1 tablet (5 mg) by mouth every 6 hours as needed for pain, Disp: 5 tablet, Rfl: 0    Relugolix-Estradiol-Norethind (MYFEMBREE) 40-1-0.5 MG TABS, Take 1 tablet by mouth daily, Disp: 90 tablet, Rfl: 1     "Semaglutide, 2 MG/DOSE, (OZEMPIC) 8 MG/3ML pen, Inject 2 mg Subcutaneous every 7 days, Disp: 9 mL, Rfl: 3    triamcinolone (KENALOG) 0.1 % external ointment, Apply topically 2 times daily, Disp: 30 g, Rfl: 1    Current Facility-Administered Medications:     hydrocortisone (CORTAID) 1 % cream, , Topical, TID, Mariya Mohamud APRN CNP    hylan (SYNVISC ONE) injection 48 mg, 48 mg, , , Rosas Rodriguez, DO, 48 mg at 05/09/23 1813    leuprolide (LUPRON DEPOT) kit 11.25 mg, 11.25 mg, Intramuscular, Q90 Days, Vonnie Cowan MD    lidocaine (PF) (XYLOCAINE) 1 % injection 4 mL, 4 mL, , , Sebas Bradley MD, 4 mL at 10/05/23 0923    triamcinolone (KENALOG-40) injection 40 mg, 40 mg, , , Sebas Bradley MD, 40 mg at 10/05/23 0923    Physical Exam  BP (!) 183/97   Pulse 91   Ht 1.549 m (5' 1\")   Wt 81.4 kg (179 lb 8 oz)   LMP 10/29/2023 (Exact Date)   BMI 33.92 kg/m    Gen: Upset, tearful   CV:  tachycardic rate, well perfused  Pulm: Breathing comfortably on room air   Pelvic: Blood over labia majora/vulva. Normal vagina with menstrual blood. Large cervix with anteverted uterus. Pain with bimanual exam. Unable to appreciate significant uterine mobility. Adnexa not palpable.     Procedure:   Consented for procedure via . Cervix was cleaned with betadine. Biopsy pipelle placed and large amount of tissue obtained. All instruments removed from vagina.     Imaging:   Pelvic ultrasound from 9/6/23   FINDINGS:  The uterus measures 9.3 x 5.7 x 4.5, 5.2 x 4.5 x 4.5 cm subserosal  fundal fibroid is not significantly changed. 4 x 3.5 x 1.9 cm  intramural fibroid in the lower uterine segment/uterus, also not  significantly changed. The endometrium is within normal limits and  measures 9 mm. Again seen is subendometrial cystic change and  heterogeneity. Nabothian cysts.     There is no free fluid in the pelvis.     The right ovary measures 2.6 x 2.3 x 1.5 cm and the left ovary  measures 1.7 x 1.5 x " 2.1 cm. There is no adnexal mass. There is normal blood flow to the ovaries.                                                                   IMPRESSION:   1. Unchanged subserosal and intramural uterine fibroids.  2. Uterine adenomyosis.    Assessment/Plan:  Nayeli Caal is a 43 year old  female here for follow up regarding fibroids and adenomyosis.     - Reviewed risks again of robotic assisted TLH, BS, cysto scheduled in Feb. Recommended keeping ovaries which she is agreeable to. Previously have discussed other options for treatment.   - In interval time will appeal PA decision for Myfembree. She is aware of the risk of worsening mood and suicidal ideation on this medication and will stop its use and inform our clinic of any worsening mood symptoms. If not able to use Myfembree we discussed the use of Lupron which she can receive in clinic with or without add-back norethindrone. However, this can cause an initial worsening of symptoms after administration before improvement.   - she has not taken her BP medication today. Discussed importance of taking medication daily and keeping BP and BG well controlled preoperatively.     A total of 50 minutes was spent by myself reviewing results, prior history, imaging, face-to-face with the patient and documentation. This was outside of time spent performing endometrial biopsy.     Vonnie Cowan MD

## 2023-11-02 ENCOUNTER — MYC MEDICAL ADVICE (OUTPATIENT)
Dept: INTERNAL MEDICINE | Facility: CLINIC | Age: 43
End: 2023-11-02
Payer: COMMERCIAL

## 2023-11-02 DIAGNOSIS — M25.561 CHRONIC PAIN OF RIGHT KNEE: ICD-10-CM

## 2023-11-02 DIAGNOSIS — E78.2 MIXED HYPERLIPIDEMIA: ICD-10-CM

## 2023-11-02 DIAGNOSIS — E10.8 TYPE 1 DIABETES MELLITUS WITH COMPLICATIONS (H): ICD-10-CM

## 2023-11-02 DIAGNOSIS — Z12.31 ENCOUNTER FOR SCREENING MAMMOGRAM FOR BREAST CANCER: ICD-10-CM

## 2023-11-02 DIAGNOSIS — Z79.4 UNCONTROLLED TYPE 2 DIABETES MELLITUS WITH HYPERGLYCEMIA, WITH LONG-TERM CURRENT USE OF INSULIN (H): ICD-10-CM

## 2023-11-02 DIAGNOSIS — G89.29 CHRONIC PAIN OF RIGHT KNEE: ICD-10-CM

## 2023-11-02 DIAGNOSIS — E11.65 UNCONTROLLED TYPE 2 DIABETES MELLITUS WITH HYPERGLYCEMIA, WITH LONG-TERM CURRENT USE OF INSULIN (H): ICD-10-CM

## 2023-11-02 RX ORDER — ASPIRIN 81 MG/1
81 TABLET, CHEWABLE ORAL DAILY
Qty: 90 TABLET | Refills: 1 | Status: SHIPPED | OUTPATIENT
Start: 2023-11-02

## 2023-11-02 RX ORDER — ATORVASTATIN CALCIUM 40 MG/1
40 TABLET, FILM COATED ORAL DAILY
Qty: 90 TABLET | Refills: 1 | Status: SHIPPED | OUTPATIENT
Start: 2023-11-02

## 2023-11-02 RX ORDER — LOSARTAN POTASSIUM 100 MG/1
100 TABLET ORAL DAILY
Qty: 90 TABLET | Refills: 1 | Status: SHIPPED | OUTPATIENT
Start: 2023-11-02 | End: 2024-01-04

## 2023-11-02 RX ORDER — FENOFIBRATE 160 MG/1
160 TABLET ORAL DAILY
Qty: 90 TABLET | Refills: 1 | Status: SHIPPED | OUTPATIENT
Start: 2023-11-02 | End: 2024-01-04

## 2023-11-02 NOTE — TELEPHONE ENCOUNTER
losartan (COZAAR) 100 MG tablet   Last Written Prescription Date:   4/21/2023  Last Fill Quantity: 90,   # refills: 1  Last Office Visit :  8/1/2023  Future Office visit:  2/6/2024  Routing refill request to provider for review/approval because:  Abnormal B/P  Change med?  Changed dose?  Add med?  Follow up B/P check?  Also, needing updated Creatinine and potassium labs        BP Readings from Last 3 Encounters:   11/01/23 (!) 183/97   10/10/23 (!) 143/72   10/02/23 131/79     Recent Labs   Lab Test 08/17/22  0949   CR 0.54          Recent Labs   Lab Test 08/17/22  0949   POTASSIUM 4.7       Jimena Balderas RN  Central Triage Red Flags/Med Refills    fenofibrate (TRIGLIDE/LOFIBRA) 160 MG tablet   Last Written Prescription Date:   4/21/2023  Last Fill Quantity: 90,   # refills: 1  Last Office Visit :  8/1/2023  Future Office visit:  2/6/2024  Routing refill request to provider for review/approval because:  90 Tabs, 1 Refill sent to pharm   Fibrates [id number]/02/2023 12:20 PM   Protocol Details Lipid panel on file in past 12 months    No abnormal creatine kinase in past 12 months    Recent (12 mo) or future (30 days) visit within the authorizing provider's specialty    Medication is active on med list    Patient is age 18 or older    No active pregnancy on record    No positive pregnancy test in past 12 months       atorvastatin (LIPITOR) 40 MG tablet   Last Written Prescription Date:   4/21/2023  Last Fill Quantity: 90,   # refills: 1  Last Office Visit :  8/1/2023  Future Office visit:  2/6/2024  Routing refill request to provider for review/approval because:  90 Tabs, 1 Refill sent to pharm   Statins Protocol [id number]/02/2023 12:20 PM   Protocol Details LDL on file in past 12 months    No abnormal creatine kinase in past 12 months    Recent (12 mo) or future (30 days) visit within the authorizing provider's specialty    Medication is active on med list    Patient is age 18 or older    No active pregnancy on record     No positive pregnancy test in past 12 months       aspirin (ASA) 81 MG chewable tablet   Last Written Prescription Date:   4/21/2023  Last Fill Quantity: 90,   # refills: 3  Last Office Visit :  8/1/2023  Future Office visit:  2/6/2024  Routing refill request to provider for review/approval because:  90 Tabs, 1 Refill sent to pharm   Analgesics (Non-Narcotic Tylenol and ASA Only) Bimwgj6111/02/2023 12:20 PM   Protocol Details Recent (12 mo) or future (30 days) visit within the authorizing provider's specialty    Patient is age 20 years or older    Medication is active on med list       calcium carbonate-vitamin D (OSCAL) 500-5 MG-MCG tablet   Last Written Prescription Date:   4/21/2023  Last Fill Quantity: 90,   # refills: 1  Last Office Visit :  8/1/2023  Future Office visit:  2/6/2024  Routing refill request to provider for review/approval because:  180 Tabs, 1 Refill sent to pharm   Pt takes 2 tabs daily = 180 Tabs  Vitamin Supplements (Adult) Protocol [id number]/02/2023 12:20 PM   Protocol Details High dose Vitamin D not ordered    Recent (12 mo) or future (30 days) visit within the authorizing provider's specialty    Medication is active on med list

## 2023-11-02 NOTE — TELEPHONE ENCOUNTER
- [place]  - Fenofibrate  - Atorvastatin  - Aspirin - to prevent blood thin or blood clot  - [place] CRAFT - not enough from the [place]

## 2023-11-02 NOTE — TELEPHONE ENCOUNTER
"Appeal letter provided to Dr. Cowan by patient. Completed and signed forms were faxed to [phone] to begin appeal process. Forms placed in brown folder in RN triage office for storage under \"w\".   "

## 2023-11-03 ENCOUNTER — TELEPHONE (OUTPATIENT)
Dept: OBGYN | Facility: CLINIC | Age: 43
End: 2023-11-03
Payer: COMMERCIAL

## 2023-11-05 LAB
PATH REPORT.COMMENTS IMP SPEC: NORMAL
PATH REPORT.COMMENTS IMP SPEC: NORMAL
PATH REPORT.FINAL DX SPEC: NORMAL
PATH REPORT.GROSS SPEC: NORMAL
PATH REPORT.MICROSCOPIC SPEC OTHER STN: NORMAL
PATH REPORT.RELEVANT HX SPEC: NORMAL
PHOTO IMAGE: NORMAL

## 2023-11-06 ENCOUNTER — TELEPHONE (OUTPATIENT)
Dept: OBGYN | Facility: CLINIC | Age: 43
End: 2023-11-06
Payer: COMMERCIAL

## 2023-11-06 NOTE — CONFIDENTIAL NOTE
[facility] Call Center    Phone Message    May a detailed message be left on voicemail:     Reason for Call: Ravi calling because pt prescription Relugolix-Estradiol-Norethind (MYFEMBREE) prior auth was denied. Reference #[id number] Store#[id number] Reason: needing more clinical information from prescriber:   Please call to discuss. Thank you    Action Taken: Message routed to:  Other: [facility]    Travel Screening: Not Applicable

## 2023-11-06 NOTE — TELEPHONE ENCOUNTER
Myfembree PA was denied.  Ravi is calling to determine if further action will be taken to try to get this covered.  Routed to Dr Cowan

## 2023-11-06 NOTE — LETTER
2023    INSURER: Payor: LOGAN / Plan: [facility] FEDERAL EMPLOYEE PROGRAM / Product Type: PPO /   ATTN: Prior Approval Clinical Services   Re: Prior Authorization Request  Patient: Nayeli aCal  Policy ID#:  [id number]  : 1980    To Whom it May Concern:    I am writing to formally request a prior authorization of coverage for my patient,  Nayeli Caal, for treatment using Myfembree. I am requesting authorization for applicable provider professional and facility services associated with this therapy.    The therapy involves Myfembree 1 tablet daily for treatment of heavy bleeding associated with uterine myomas.     I have treated Nayeli Caal since 2023 and I have determined that it is medically appropriate for  this patient to receive be treated with Myfembree for the reasons stated below:    She has an enlarged uterus with a subserosal and intramural myoma resulting in dysmenorrhea and abnormal uterine bleeding.    She has tried a Mirena IUD and is not a candidate for estrogen containing contraceptive dose medications due to her hypertension. Myfembree is preferred to Lupron due to the initial flare of symptoms that can be associated with a GnRh agonist. She is considering a hysterectomy next spring but would like relief of her symptoms in the meantime.     We have discussed the possibility of worsening mood symptoms, in particular suicidal ideation with Myfembree and will monitor closely with frequent clinic visits. We have also reviewed the risk of VTE, CVD, liver dysfunction and other side effects listed on prescribing instructions.     I firmly believe that this therapy is clinically appropriate and that Nayeli Caal would benefit from decreased uterine bleeding if allowed the opportunity to receive this treatment.  Please contact me at [phone] if you require additional information to ensure the prompt approval for coverage.    Please send your written decision  to me at this address:  [facility] [place]  Attn: Dr. Vonnie Cowan  [address]  [address]  [facility] [address]  Phone: [phone]  Fax: [phone]        Sincerely,      Vonnie Cowan MD

## 2023-11-09 ENCOUNTER — TELEPHONE (OUTPATIENT)
Dept: OBGYN | Facility: CLINIC | Age: 43
End: 2023-11-09
Payer: COMMERCIAL

## 2023-11-10 NOTE — TELEPHONE ENCOUNTER
CENTRAL PRIOR AUTHORIZATION  [phone]      [facility] calling to check to see if the patient's provider will appeal the denial.    I informed the [facility] Rep that the clinic has sent the appeal and it is in process.

## 2023-11-21 ENCOUNTER — TELEPHONE (OUTPATIENT)
Dept: ENDOCRINOLOGY | Facility: CLINIC | Age: 43
End: 2023-11-21
Payer: COMMERCIAL

## 2023-11-21 NOTE — TELEPHONE ENCOUNTER
Patient call:     Appointment type: return diabetes   Provider: Anne Marie   Return date: reschedule 1/15 appt to 1/26   Speciality phone number: [phone]  Additional appointment(s) needed:   Additional notes: LVM and sent myc x1     Treat 1/26 as a hernan day Please note that the above appointment(s) will require manual scheduling as they are marked as HERNAN and will not appear using auto search. Do not schedule the patient if another patient has already been scheduled in the requested appointment slot.   Take time off hold when pt is scheduled on 1/26     Michelle Galarza on 11/21/2023 at 2:21 PM

## 2024-01-04 ENCOUNTER — LAB (OUTPATIENT)
Dept: LAB | Facility: CLINIC | Age: 44
End: 2024-01-04
Payer: COMMERCIAL

## 2024-01-04 ENCOUNTER — OFFICE VISIT (OUTPATIENT)
Dept: INTERNAL MEDICINE | Facility: CLINIC | Age: 44
End: 2024-01-04
Payer: COMMERCIAL

## 2024-01-04 VITALS
WEIGHT: 183.6 LBS | HEART RATE: 82 BPM | SYSTOLIC BLOOD PRESSURE: 152 MMHG | DIASTOLIC BLOOD PRESSURE: 87 MMHG | OXYGEN SATURATION: 97 % | BODY MASS INDEX: 34.69 KG/M2

## 2024-01-04 DIAGNOSIS — Z79.4 UNCONTROLLED TYPE 2 DIABETES MELLITUS WITH HYPERGLYCEMIA, WITH LONG-TERM CURRENT USE OF INSULIN (H): ICD-10-CM

## 2024-01-04 DIAGNOSIS — E11.65 UNCONTROLLED TYPE 2 DIABETES MELLITUS WITH HYPERGLYCEMIA, WITH LONG-TERM CURRENT USE OF INSULIN (H): ICD-10-CM

## 2024-01-04 DIAGNOSIS — Z01.818 PRE-OP EXAM: ICD-10-CM

## 2024-01-04 DIAGNOSIS — Z11.59 NEED FOR HEPATITIS C SCREENING TEST: Primary | ICD-10-CM

## 2024-01-04 DIAGNOSIS — E78.2 MIXED HYPERLIPIDEMIA: ICD-10-CM

## 2024-01-04 DIAGNOSIS — R21 RASH AND NONSPECIFIC SKIN ERUPTION: ICD-10-CM

## 2024-01-04 DIAGNOSIS — I10 ESSENTIAL HYPERTENSION: ICD-10-CM

## 2024-01-04 LAB
ALBUMIN SERPL BCG-MCNC: 4.1 G/DL (ref 3.5–5.2)
ALBUMIN UR-MCNC: 100 MG/DL
ALP SERPL-CCNC: 99 U/L (ref 40–150)
ALT SERPL W P-5'-P-CCNC: 30 U/L (ref 0–50)
ANION GAP SERPL CALCULATED.3IONS-SCNC: 9 MMOL/L (ref 7–15)
APPEARANCE UR: ABNORMAL
AST SERPL W P-5'-P-CCNC: 23 U/L (ref 0–45)
BILIRUB SERPL-MCNC: 0.2 MG/DL
BILIRUB UR QL STRIP: NEGATIVE
BUN SERPL-MCNC: 12.3 MG/DL (ref 6–20)
CALCIUM SERPL-MCNC: 8.9 MG/DL (ref 8.6–10)
CHLORIDE SERPL-SCNC: 102 MMOL/L (ref 98–107)
CHOLEST SERPL-MCNC: 183 MG/DL
COLOR UR AUTO: YELLOW
CREAT SERPL-MCNC: 0.54 MG/DL (ref 0.51–0.95)
DEPRECATED HCO3 PLAS-SCNC: 27 MMOL/L (ref 22–29)
EGFRCR SERPLBLD CKD-EPI 2021: >90 ML/MIN/1.73M2
ERYTHROCYTE [DISTWIDTH] IN BLOOD BY AUTOMATED COUNT: 12.6 % (ref 10–15)
FASTING STATUS PATIENT QL REPORTED: ABNORMAL
GLUCOSE SERPL-MCNC: 200 MG/DL (ref 70–99)
GLUCOSE UR STRIP-MCNC: 200 MG/DL
HBA1C MFR BLD: 7.8 %
HCT VFR BLD AUTO: 39.8 % (ref 35–47)
HDLC SERPL-MCNC: 37 MG/DL
HGB BLD-MCNC: 13.5 G/DL (ref 11.7–15.7)
HGB UR QL STRIP: ABNORMAL
KETONES UR STRIP-MCNC: NEGATIVE MG/DL
LDLC SERPL CALC-MCNC: 76 MG/DL
LEUKOCYTE ESTERASE UR QL STRIP: NEGATIVE
MCH RBC QN AUTO: 28.4 PG (ref 26.5–33)
MCHC RBC AUTO-ENTMCNC: 33.9 G/DL (ref 31.5–36.5)
MCV RBC AUTO: 84 FL (ref 78–100)
MUCOUS THREADS #/AREA URNS LPF: PRESENT /LPF
NITRATE UR QL: NEGATIVE
NONHDLC SERPL-MCNC: 146 MG/DL
PH UR STRIP: 6.5 [PH] (ref 5–7)
PLATELET # BLD AUTO: 353 10E3/UL (ref 150–450)
POTASSIUM SERPL-SCNC: 4 MMOL/L (ref 3.4–5.3)
PROT SERPL-MCNC: 7.2 G/DL (ref 6.4–8.3)
RBC # BLD AUTO: 4.75 10E6/UL (ref 3.8–5.2)
RBC URINE: >182 /HPF
SODIUM SERPL-SCNC: 138 MMOL/L (ref 135–145)
SP GR UR STRIP: 1.02 (ref 1–1.03)
TRIGL SERPL-MCNC: 350 MG/DL
UROBILINOGEN UR STRIP-MCNC: NORMAL MG/DL
WBC # BLD AUTO: 11.2 10E3/UL (ref 4–11)
WBC URINE: 7 /HPF

## 2024-01-04 PROCEDURE — 93000 ELECTROCARDIOGRAM COMPLETE: CPT | Performed by: NURSE PRACTITIONER

## 2024-01-04 PROCEDURE — 82570 ASSAY OF URINE CREATININE: CPT | Performed by: INTERNAL MEDICINE

## 2024-01-04 PROCEDURE — 83036 HEMOGLOBIN GLYCOSYLATED A1C: CPT | Performed by: PHYSICIAN ASSISTANT

## 2024-01-04 PROCEDURE — T1013 SIGN LANG/ORAL INTERPRETER: HCPCS | Mod: U3

## 2024-01-04 PROCEDURE — 85027 COMPLETE CBC AUTOMATED: CPT | Performed by: PATHOLOGY

## 2024-01-04 PROCEDURE — 99000 SPECIMEN HANDLING OFFICE-LAB: CPT | Performed by: PATHOLOGY

## 2024-01-04 PROCEDURE — 80053 COMPREHEN METABOLIC PANEL: CPT | Performed by: PATHOLOGY

## 2024-01-04 PROCEDURE — 99215 OFFICE O/P EST HI 40 MIN: CPT | Mod: 25 | Performed by: NURSE PRACTITIONER

## 2024-01-04 PROCEDURE — 36415 COLL VENOUS BLD VENIPUNCTURE: CPT | Performed by: PATHOLOGY

## 2024-01-04 PROCEDURE — 80061 LIPID PANEL: CPT | Performed by: PATHOLOGY

## 2024-01-04 PROCEDURE — 81001 URINALYSIS AUTO W/SCOPE: CPT | Performed by: PATHOLOGY

## 2024-01-04 RX ORDER — FENOFIBRATE 160 MG/1
160 TABLET ORAL DAILY
Qty: 90 TABLET | Refills: 3 | Status: SHIPPED | OUTPATIENT
Start: 2024-01-04

## 2024-01-04 RX ORDER — FENOFIBRATE 160 MG/1
160 TABLET ORAL DAILY
Qty: 90 TABLET | Refills: 1 | Status: SHIPPED | OUTPATIENT
Start: 2024-01-04 | End: 2024-01-04

## 2024-01-04 RX ORDER — LOSARTAN POTASSIUM 100 MG/1
100 TABLET ORAL DAILY
Qty: 90 TABLET | Refills: 3 | Status: SHIPPED | OUTPATIENT
Start: 2024-01-04 | End: 2024-02-20

## 2024-01-04 RX ORDER — BENZOCAINE/MENTHOL 6 MG-10 MG
LOZENGE MUCOUS MEMBRANE ONCE
Status: ACTIVE | OUTPATIENT
Start: 2024-01-04

## 2024-01-04 RX ORDER — AMLODIPINE BESYLATE 5 MG/1
5 TABLET ORAL AT BEDTIME
Qty: 30 TABLET | Refills: 1 | Status: SHIPPED | OUTPATIENT
Start: 2024-01-04 | End: 2024-02-20

## 2024-01-04 NOTE — PROGRESS NOTES
Surgeon: Vonnie Cowan MD   Fax number for Preop Evaluation: N/A  Location of Surgery: [facility] OR  Date of Surgery: 2/7/2024  Procedure: HYSTERECTOMY, TOTAL, ROBOT-ASSISTED, WITH BILATERAL SALPINGECTOMY, CYSTOSCOPY, POSSIBLE LAPAROTOMY   History of reaction to anesthesia? No    Salazar Montoya, EMT at 3:16 PM on 1/4/2024.

## 2024-01-04 NOTE — PATIENT INSTRUCTIONS
No evidence of functionally compromising cardiac or pulmonary status  The patient is recommended to hold aspirin or NSAIDS, fish oil for 10 days prior to surgery.    The patient is instructed to take 1/2 of the normal scheduled insulin the morning of surgery    Pre-Op Plan:   Proceed with surgery as planned.  No food for 8 hours before surgery.  No liquid for 2 hours before surgery.   Call surgeon  If you develop a fever, respiratory illness or other symptoms.  Hold the following meds for 7 days before surgery: aspirin, ibuprofen, fish oil.  Hold the following medications the morning of  Surgery: Lipitor: Take the following medications the morning of surgery with a sip of water: losartan.    Letter sent to requesting surgeon  listed above  Written pre-operative instructions given.    Laboratory studies:  CBC, BMP, HbA1C, and UA, micro if positive  Pregnancy testing was not indicated.

## 2024-01-04 NOTE — PROGRESS NOTES
Nayeli Caal is 43 year old female here at the request of    Surgeon: Vonnie Cowan MD   Fax number for Preop Evaluation: N/A  Location of Surgery: [facility]  Date of Surgery: 2024  Procedure: HYSTERECTOMY, TOTAL, ROBOT-ASSISTED, WITH BILATERAL SALPINGECTOMY, CYSTOSCOPY, POSSIBLE LAPAROTOMY   History of reaction to anesthesia?        Nayeli Caal is a 43 year old , here for follow-up regarding fibroids/suspected adenomyosis. Seen on  and 10/10 for known fibroids with bothersome bulk symptoms including pelvic pain and dyschezia. At the appointment on 10/10 GnRh antagonist was prescribed but denied by her insurance. We also discussed Lupron as a possibility until her hysterectomy scheduled in Feb. She is in severe pain and currently having her menses. She is frustrated at the lack of options for pain control and insurance coverage. She is agreeable to an endometrial biopsy today and would like help with the PA on [facility]. She is currently managing pain with ibuprofen. Not sexually active.      PROBLEM LIST:   Patient Active Problem List   Diagnosis     Essential hypertension     Hypersomnia, organic     Other chronic nonalcoholic liver disease     Diabetic retinopathy of both eyes (H)     Obesity     Usher Syndrome: congenital deafness, retinitis pigmentosa     Macular degeneration, age related, nonexudative     Benign neoplasm of iris     Dry eye syndrome     Pemphigus erythematosus (H28)     Chondromalacia of patella, right, steroid injection 2015     Uncontrolled type 2 diabetes mellitus with hyperglycemia, with long-term current use of insulin (H)     Chronic kidney disease, stage 1     Sprain of left acromioclavicular ligament     Trigger middle finger of right hand     Trigger middle finger of left hand     Adenomyosis of uterus     Dysmenorrhea       PAST SURGICAL HX:   Past Surgical History:   Procedure Laterality Date     LAPAROSCOPIC CHOLECYSTECTOMY N/A 3/10/2018     Procedure: LAPAROSCOPIC CHOLECYSTECTOMY;  Laparoscopic Cholecystectomy ;  Surgeon: Kuldeep Sigala MD;  [facility]     RELEASE TRIGGER FINGER Right 5/2/2019    Procedure: Right Thumb Trigger Release;  Surgeon: Greg Streeter MD;  [facility]     RELEASE TRIGGER FINGER Left 5/30/2019    Procedure: Left Ring Trigger Finger Release.  Ganglion cyst excision.;  Surgeon: Greg Streeter MD;  [facility]     RELEASE TRIGGER FINGER Right 6/13/2023    Procedure: RELEASE, RIGHT TRIGGER FINGER, INDEX AND MIDDLE;  Surgeon: Miracle Carlin MD;  Location: [facility] OR     RELEASE TRIGGER FINGER Left 8/1/2023    Procedure: RELEASE, LEFT TRIGGER FINGER, MIDDLE;  Surgeon: Miracle Carlin MD;  Location: [facility] OR       FAMILY MEDICAL HX:   Family History   Problem Relation Age of Onset     Unknown/Adopted Other        IMMUNIZATION HX:   Immunization History   Administered Date(s) Administered     COVID-19 12+ (2023-24) (Pfizer) 10/30/2023     COVID-19 Bivalent 12+ (Pfizer) 10/28/2022     COVID-19 MONOVALENT 12+ (Pfizer) 01/04/2022     COVID-19 Monovalent 18+ (Moderna) 03/13/2021, 04/10/2021     HEPA 05/18/2007, 10/07/2008     HepB 08/06/2001, 01/03/2003, 04/09/2013     Influenza (IIV3) PF 10/09/2011, 11/12/2013, 10/23/2014, 11/19/2015     Influenza Vaccine >6 months,quad, PF 10/17/2017, 10/02/2018, 10/17/2019, 09/19/2020, 09/23/2021, 10/28/2022, 10/10/2023     MMR 05/21/2007     Pneumo Conj 13-V (2010&after) 10/23/2014     Pneumococcal 23 valent 07/18/2006     TD,PF 7+ (Tenivac) 11/01/1999     TDAP (Adacel,Boostrix) 01/09/2009, 01/21/2020     TDAP Vaccine (Boostrix) 01/21/2020       MEDICATIONS:   Current Outpatient Medications   Medication Sig Dispense Refill     acetaminophen (TYLENOL) 500 MG tablet Take 2 tablets (1,000 mg) by mouth every 6 hours as needed for mild pain 100 tablet 3     Alcohol Swabs (ALCOHOL PREP PAD) 70 % PADS 1 each 3 times daily 100 each 3     aspirin (ASA) 81 MG chewable tablet Take  1 tablet (81 mg) by mouth daily CHEW AND SWALLOW 90 tablet 1     atorvastatin (LIPITOR) 40 MG tablet Take 1 tablet (40 mg) by mouth daily 90 tablet 1     B-D U/F insulin pen needle        BD ULTRA FINE PEN NEEDLES Inject 1 dose. Subcutaneous 2 times daily BD ultra-fine insulin syringe, 30 ga. X 1/2'' short needle 1/2 cc. Use as directed. 400 Units 11     blood glucose (ACCU-CHEK LAYA PLUS) test strip Use with  Accucheck Expert meter.  Test blood sugar 6 times daily. 600 each 3     blood glucose monitoring (ACCU-CHEK FASTCLIX) lancets Use to test blood sugar 6 times daily or as directed 510 each 3     calcium carbonate-vitamin D (OSCAL) 500-5 MG-MCG tablet Take 1 tablet by mouth 2 times daily 180 tablet 1     Continuous Blood Gluc Sensor (DEXCOM G7 SENSOR) MISC 1 each every 10 days 9 each 3     ergocalciferol (ERGOCALCIFEROL) 1.25 MG (83107 UT) capsule Take 1 capsule (50,000 Units) by mouth once a week For additional refills, please schedule a follow-up appointment at [phone] 12 capsule 3     fenofibrate (TRIGLIDE/LOFIBRA) 160 MG tablet Take 1 tablet (160 mg) by mouth daily 90 tablet 1     fish oil-omega-3 fatty acids 1000 MG capsule TAKE TWO CAPSULES (2 GM) BY MOUTH ONCE DAILY 180 capsule 3     hydrocortisone (CORTAID) 1 % external cream Apply topically 2 times daily 120 g 1     ibuprofen (ADVIL/MOTRIN) 600 MG tablet Take 1 tablet (600 mg) by mouth every 6 hours as needed for moderate pain 120 tablet 1     Injection Device for insulin (INPEN 100-BLUE-NOVOLOG-FIASP) DAVID 1 each continuous 1 each 0     insulin aspart (NOVOLOG FLEXPEN) 100 UNIT/ML pen 1 unit per 5 grams CHO and correction scale of 1/25/125.  Approximate daily use is 100 units. 30 mL 2     insulin aspart (NOVOPEN ECHO) 100 UNIT/ML cartridge For use with the In-Pen device.  Give 1 unit per 5 grams CHO with meals and snacks as well as correction.  Average daily use is 100 units daily. 90 mL 3     insulin glargine (BASAGLAR KWIKPEN) 100 UNIT/ML pen  Inject 60 Units Subcutaneous daily 3 month supply. 54 mL 3     insulin pen needle (B-D U/F) 31G X 8 MM miscellaneous USE AS DIRECTED. 200 each 1     losartan (COZAAR) 100 MG tablet Take 1 tablet (100 mg) by mouth daily 90 tablet 1     naproxen (NAPROSYN) 375 MG tablet Take 1 tablet (375 mg) by mouth 2 times daily (with meals) 60 tablet 3     Ostomy Supplies (ADHESIVE REMOVER WIPES) MISC 1 each every 14 days 50 each 3     Ostomy Supplies (SKIN TAC ADHESIVE BARRIER WIPE) MISC 1 each every 14 days 6 each 3     oxyCODONE (ROXICODONE) 5 MG tablet Take 1 tablet (5 mg) by mouth every 6 hours as needed for pain 5 tablet 0     Relugolix-Estradiol-Norethind (MYFEMBREE) 40-1-0.5 MG TABS Take 1 tablet by mouth daily 90 tablet 1     Semaglutide, 2 MG/DOSE, (OZEMPIC) 8 MG/3ML pen Inject 2 mg Subcutaneous every 7 days 9 mL 3     triamcinolone (KENALOG) 0.1 % external ointment Apply topically 2 times daily 30 g 1       SOCIAL HX:   Social History     Socioeconomic History     Marital status: Single   Tobacco Use     Smoking status: Former     Types: Cigarettes     Quit date: 2010     Years since quittin.1     Smokeless tobacco: Never     Tobacco comments:     stopped 10 yrs ago   Substance and Sexual Activity     Alcohol use: Not Currently     Comment: socially     Drug use: No     Sexual activity: Not Currently     Partners: Male     Birth control/protection: I.U.D.   Other Topics Concern      Service No     Blood Transfusions No     Caffeine Concern No     Occupational Exposure No     Hobby Hazards No     Sleep Concern No     Stress Concern No     Weight Concern Yes     Special Diet No     Back Care No     Exercise Yes     Comment: 4-5 x a week     Bike Helmet No     Seat Belt Yes     Self-Exams Yes     Social Determinants of Health     Financial Resource Strain: Low Risk  (2024)    Financial Resource Strain      Within the past 12 months, have you or your family members you live with been unable to get  utilities (heat, electricity) when it was really needed?: No   Food Insecurity: Low Risk  (2024)    Food Insecurity      Within the past 12 months, did you worry that your food would run out before you got money to buy more?: No      Within the past 12 months, did the food you bought just not last and you didn t have money to get more?: No   Transportation Needs: Low Risk  (2024)    Transportation Needs      Within the past 12 months, has lack of transportation kept you from medical appointments, getting your medicines, non-medical meetings or appointments, work, or from getting things that you need?: No   Recent Concern: Transportation Needs - High Risk (10/30/2023)    Transportation Needs      Within the past 12 months, has lack of transportation kept you from medical appointments, getting your medicines, non-medical meetings or appointments, work, or from getting things that you need?: Yes   Housing Stability: Low Risk  (2024)    Housing Stability      Do you have housing? : Yes      Are you worried about losing your housing?: No       This is a intermediate risk surgery.       HPI:   Reason for surgery:   Nayeli Caal is a 43 year old , is scheduled for hysterectomy due to  fibroids/suspected adenomyosis causing severe dysmenorrhea.  Her GYN prescribed   GnRh antagonist but denied by her insurance.  She is currently managing pain with ibuprofen. Not sexually active.     Cardiovascular Risk:  This patient ambulates without assist.  without chest pain. She IS able to climb a flight of stairs without chest pain.    She does  have a history of hypertension and high cholesterol.   She was advised her BP would have to be better controlled at the time of surgery or surgery would be postponed. The patient does not  have a history of stroke, and does not  have a history of valvular disease.  She does have a hx of poorly controlled Type 2 DM.     Pulmonary Risk:  In terms of risk factors for  pulmonary complications, the patient does not have a history of CHF, COPD, age >60 years, being functionally dependent.       Is this patient going to undergo any of the following procedures that would put him at higher risk of postoperative pulmonary complications:  Prolonged surgery (>3 hours): No  Abdominal surgery/thoracic surgery/neurosurgery/head and neck: No  Vascular/aortic aneurysm repair: No  General anesthesia: yes.    Perioperative Complications:  The patient does not  have a history of bleeding or clotting problems in the past. The patient has not  had complications from past surgeries.      ROS:  Constitutional: no fevers, night sweats or unintentional weight change   Eyes: no vision change, diplopia or red eyes   Ears, Nose, Mouth, Throat: no tinnitus or hearing change, no epistaxis or nasal discharge, no oral lesions, throat clear   Cardiovascular: no chest pain, palpitations, or pain with walking, no orthopnea or PND   Respiratory: no dyspnea, cough, shortness of breath or wheezing   GI: no nausea, vomiting, diarrhea or constipation, no abdominal pain   : no change in urine, no dysuria or hematuria  Musculoskeletal: no joint or muscle pain or swelling   Integumentary: no concerning lesions or moles   Neuro: no loss of strength or sensation, no numbness or tingling, no tremor, no dizziness, no headache   Endo: no polyuria or polydipsia, no temperature intolerance   Heme/Lymph: no concerning bumps, no bleeding problems   Allergy: no environmental allergies   Psych: no depression or anxiety, no sleep problems    PHYSICAL EXAM:  There were no vitals taken for this visit.    Wt Readings from Last 1 Encounters:   11/01/23 81.4 kg (179 lb 8 oz)     Constitutional: no distress, comfortable, pleasant   Eyes: anicteric, normal extra-ocular movements   Ears, Nose and Throat: tympanic membranes clear,throat clear, neck supple with full range of motion, no thyromegaly.   Cardiovascular: regular rate and  rhythm, normal S1 and S2, no murmurs, rubs or gallops, peripheral pulses full and symmetric   Respiratory: clear to auscultation, no wheezes or crackles, normal breath sounds   Gastrointestinal: positive bowel sounds, nontender, no hepatosplenomegaly, no masses   Musculoskeletal: full range of motion, no edema   Skin: no concerning lesions, no jaundice   Neurological:  normal gait, no tremor, grossly normal.   Psychological: appropriate mood   Lymphatic: no cervical, supra or infra clavicular lymphadenopathy.      A/P:    Nayeli Caal is a 43 year old female presents prior to surgery for assessment of perioperative risk. The patient fairly low  risk for cardiovascular complications but does require better BP control prior to surgery.  She is at LOW risk for pulmonary complications of this intermediate risk surgery.    She is instructed to add Amlodipine 5 mg at HS to her losartan medication which she takes in the a.m. and to follow-up with this clinic for a repeat BP check in 2 weeks, prior to her surgery.    The patient is recommended to hold aspirin or NSAIDS for 10 days prior to surgery.  The patient is instructed as to which medications to take with sips of water the morning of surgery  The patient is instructed to take 1/2 of the normal scheduled insulin the morning of surgery    Pre-Op Plan:   Proceed with surgery as planned.  No food for 8 hours before surgery.  No liquid for 2 hours before surgery.   Call surgeon  If you develop a fever, respiratory illness or other symptoms.  Hold the following medications the morning of  Surgery:  Take the following medications the morning of surgery with a sip of water:    Letter sent to requesting surgeon  listed above  Written pre-operative instructions given.    Laboratory studies:  CBC, BMP, HbA1C, and UA, micro if positive  Pregnancy testing was not indicated.    Cardiovascular: EKG was indicated based on risk assessment: NSR with incomplete right bundle  branch block. Her BP is 152/92 .    Total time spent  40 minutes.  More than 50% of the time spent with Ms. Ingrid Caal on counseling / coordinating her care      Please contact our office if there are any further questions or information required about this patient.      Mariya CELESTIN CNP  -o  Addenum:  1/18/24 revisit for BP re-check.    BP today after adding Amlodipine 5 mg at bedtime was:    1/18/2024  3:32 PM   Vital Signs    Systolic 138    Diastolic 83    Pulse 82    Temperature        She is approved for surgery.  She should take her BP meds as ordered before surgery.    Mariya CELESTIN, CNP

## 2024-01-04 NOTE — PROGRESS NOTES
Nayeli is a 43 year old that presents in clinic today for the following:     Chief Complaint   Patient presents with    Pre-Op Exam     DOS 2/7/2024 at [facility] with Vonnie Cowan MD for HYSTERECTOMY, TOTAL, ROBOT-ASSISTED, WITH BILATERAL SALPINGECTOMY, CYSTOSCOPY, POSSIBLE LAPAROTOMY            1/4/2024     3:17 PM   Additional Questions   Roomed by SADIA Vasquez as of today     PHQ-2 Total Score (Adult) - Positive if 3 or more points; Administer   PHQ-9 if positive 0        Salaazr Montoya at 3:26 PM on 1/4/2024

## 2024-01-05 LAB
ATRIAL RATE - MUSE: 80 BPM
CREAT UR-MCNC: 93.2 MG/DL
DIASTOLIC BLOOD PRESSURE - MUSE: NORMAL MMHG
INTERPRETATION ECG - MUSE: NORMAL
MICROALBUMIN UR-MCNC: 1199 MG/L
MICROALBUMIN/CREAT UR: 1286.48 MG/G CR (ref 0–25)
P AXIS - MUSE: 59 DEGREES
PR INTERVAL - MUSE: 140 MS
QRS DURATION - MUSE: 84 MS
QT - MUSE: 402 MS
QTC - MUSE: 463 MS
R AXIS - MUSE: -28 DEGREES
SYSTOLIC BLOOD PRESSURE - MUSE: NORMAL MMHG
T AXIS - MUSE: 60 DEGREES
VENTRICULAR RATE- MUSE: 80 BPM

## 2024-01-06 NOTE — RESULT ENCOUNTER NOTE
There is a significantly higher amount of proteins in the urine.  The cholesterol level looks good.  The A1c and the triglycerides are stable.  Please keep your scheduled follow-up appointment so we can discuss at that time medications which help maintain the kidney function, similar to Jardiance which you used to take, like Farxiga or Invokana.

## 2024-01-08 NOTE — PROGRESS NOTES
1/8/2024  PATIENT LAB/IMAGING STATUS : No pending lab orders   Outcome for 01/25/24 10:16 AM: Data obtained via [employer] website  Kathe Gary CMA    HPI:   Nayeli is a 42 yo woman here with a  for follow up of type 2 diabetes since the early 2000's.  She also sees Dr. Bragg and Leigh Ann Escoto. She started on insulin in 2003 after failing Metformin treatment.  She has struggled with high blood sugars for many years.   She has been working hard on improving her diabetes control.  She is scheduled for hysterectomy due to fibroids/suspected adenomyosis causing severe dysmenorrhea.  Her GYN prescribed   GnRh antagonist but denied by her insurance.  Feb. 7th.  She has been following closely with Leigh Ann Escoto.  She needs a new PA for her ozempic.  Feels treatment is working well.       New high level of protein in her urine.  Unable to tolerate SGLT-2 inhibitors due to yeast infections.  Taking losartan 100 mg daily.      Her current regimen is:   Ozempic 2 mg weekly  Basaglar 55 units daily.  Novolog (dosing on In-pen):  Carb ratio: 1/3g   Sensitivity: 20    Previous treatments:   empagliflozin 25 mg daily- stopped 2023 due to recurrent yeast infections.  Metformin- severe diarrhea.     Her overall average glucose is 199 mg/dL (CV 31%), over the past 2 weeks.  Her recent glucose is as follows:                               She is no longer on Metformin as even the low dose caused intense diarrhea to the point where she could not go out of her home.     Diabetes complications:  Retinopathy: last eye exam - 8/2022. 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  Nephropathy: h/o proteinuria; normal GFR. Now on 50mg losartan daily (tx with lisinopril was associated with a dry cough).   Neuropathy: No numbness or tingling sensation in her feet.     She has no other concerns today.       Past Medical History:   Diagnosis Date    Combined visual and hearing impairment     Deaf     Diabetic retinopathy of both eyes (H)  8/19/2011    Hepatic steatosis 08/19/2011    History of tobacco use     Hyperlipidemia     Hypertension     Hypertriglyceridemia     Migraines     Obesity 8/19/2011     Problem list name updated by automated process. 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  Ganglion cyst excision.;  Surgeon: Greg Streeter MD;  Location: [facility]    RELEASE TRIGGER FINGER Right 6/13/2023    Procedure: RELEASE, RIGHT TRIGGER FINGER, INDEX AND MIDDLE;  Surgeon: Miracle Carlin MD;  Location: [facility] OR    RELEASE TRIGGER FINGER Left 8/1/2023    Procedure: RELEASE, LEFT TRIGGER FINGER, MIDDLE;  Surgeon: Miracle Carlin MD;  Location: [facility] OR       Family History   Problem Relation Age of Onset    Unknown/Adopted Other        Social History     Social History    Marital status: Single     Spouse name: N/A    Number of children: N/A    Years of education: N/A     Social History Main Topics    Smoking status: Former Smoker     Types: Cigarettes     Quit date: 12/1/2010    Smokeless tobacco: Never Used      Comment: stopped 2 yrs ago    Alcohol use No    Drug use: No    Sexual activity: Not Currently     Partners: Male     Other Topics Concern     Service No    Blood Transfusions No    Caffeine Concern No    Occupational Exposure No    Hobby Hazards No    Sleep Concern No    Stress Concern No    Weight Concern Yes     Special Diet No    Back Care No    Exercise Yes     4-5 x a week    Bike Helmet No    Seat Belt Yes    Self-Exams Yes     Social History Narrative   Social Hx: Lives in a condo.  Boyfriend is in [place]. She is adopted.  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     Current Outpatient Medications   Medication    acetaminophen (TYLENOL) 500 MG tablet    Alcohol Swabs (ALCOHOL PREP PAD) 70 % PADS    amLODIPine (NORVASC) 5 MG tablet    aspirin (ASA) 81 MG chewable tablet    atorvastatin (LIPITOR) 40 MG tablet    B-D U/F insulin pen needle    BD ULTRA FINE PEN NEEDLES    blood glucose (ACCU-CHEK LAYA PLUS) test strip    blood glucose monitoring (ACCU-CHEK FASTCLIX) lancets    calcium carbonate-vitamin D (OSCAL) 500-5 MG-MCG tablet    Continuous Blood Gluc Sensor (DEXCOM G7 SENSOR) MISC    ergocalciferol (ERGOCALCIFEROL) 1.25 MG (25551 UT) capsule    fenofibrate (TRIGLIDE/LOFIBRA) 160 MG tablet    fish oil-omega-3 fatty acids 1000 MG capsule    hydrocortisone (CORTAID) 1 % external cream    ibuprofen (ADVIL/MOTRIN) 600 MG tablet    Injection Device for insulin (INPEN 100-BLUE-NOVOLOG-FIASP) DAVID    insulin aspart (NOVOLOG FLEXPEN) 100 UNIT/ML pen    insulin aspart (NOVOPEN ECHO) 100 UNIT/ML cartridge    insulin glargine (BASAGLAR KWIKPEN) 100 UNIT/ML pen    insulin pen needle (B-D U/F) 31G X 8 MM miscellaneous    losartan (COZAAR) 100 MG tablet    naproxen (NAPROSYN) 375 MG tablet    Ostomy Supplies (ADHESIVE REMOVER WIPES) MISC    Ostomy Supplies (SKIN TAC ADHESIVE BARRIER WIPE) MISC    oxyCODONE (ROXICODONE) 5 MG tablet    Relugolix-Estradiol-Norethind (MYFEMBREE) 40-1-0.5 MG TABS    Semaglutide, 2 MG/DOSE, (OZEMPIC) 8 MG/3ML pen    triamcinolone (KENALOG) 0.1 % external ointment     Current Facility-Administered Medications   Medication    hydrocortisone (CORTAID) 1 % cream    hydrocortisone (CORTAID) 1 % cream    hylan (SYNVISC ONE) injection 48 mg    leuprolide (LUPRON DEPOT) kit 11.25 mg    lidocaine  "(PF) (XYLOCAINE) 1 % injection 4 mL    triamcinolone (KENALOG-40) injection 40 mg        No Known Allergies    Physical Exam  BP (!) 145/89   Pulse 95   Ht 1.549 m (5' 1\")   Wt 82.1 kg (181 lb)   SpO2 98%   BMI 34.20 kg/m    GENERAL:  Alert and oriented X3, NAD, well dressed, answering questions appropriately, appears stated age.    RESULTS  Lab Results   Component Value Date    A1C 7.8 (H) 01/04/2024    A1C 7.7 (H) 07/25/2023    A1C 8.7 (H) 06/06/2023    A1C 9.9 (H) 04/04/2023    A1C 9.8 (H) 08/17/2022    A1C 11.6 (H) 04/05/2021    A1C 12.4 (H) 10/29/2020    A1C 12.9 (H) 07/08/2020    A1C 8.2 (H) 05/02/2019    A1C 10.3 (H) 10/15/2018    HEMOGLOBINA1 10.0 (A) 01/13/2020    HEMOGLOBINA1 9.9 (A) 10/07/2019    HEMOGLOBINA1 8.1 (A) 04/22/2019    HEMOGLOBINA1 10.4 (A) 01/14/2019    HEMOGLOBINA1 8.7 (A) 03/12/2018       TSH   Date Value Ref Range Status   04/04/2023 1.41 0.30 - 4.20 uIU/mL Final   07/08/2020 1.34 0.40 - 4.00 mU/L Final   05/17/2018 1.84 0.40 - 4.00 mU/L Final   11/27/2017 1.92 0.40 - 4.00 mU/L Final   05/11/2016 1.85 0.40 - 4.00 mU/L Final   02/11/2016 1.14 0.40 - 4.00 mU/L Final       ALT   Date Value Ref Range Status   01/04/2024 30 0 - 50 U/L Final     Comment:     Reference intervals for this test were updated on 6/12/2023 to more accurately reflect our healthy population. There may be differences in the flagging of prior results with similar values performed with this method. Interpretation of those prior results can be made in the context of the updated reference intervals.     08/17/2022 28 0 - 50 U/L Final   07/08/2020 29 0 - 50 U/L Final   05/02/2019 43 0 - 50 U/L Final   ]    Recent Labs   Lab Test 01/04/24  1657 04/04/23  1445   CHOL 183 221*   HDL 37* 38*   LDL 76 123*   TRIG 350* 298*       Lab Results   Component Value Date     01/04/2024     07/08/2020      Lab Results   Component Value Date    POTASSIUM 4.0 01/04/2024    POTASSIUM 4.7 08/17/2022    POTASSIUM 4.0 07/08/2020 "     Lab Results   Component Value Date    CHLORIDE 102 01/04/2024    CHLORIDE 104 08/17/2022    CHLORIDE 101 07/08/2020     Lab Results   Component Value Date    VERO 8.9 01/04/2024    VERO 8.6 07/08/2020     Lab Results   Component Value Date    CO2 27 01/04/2024    CO2 29 08/17/2022    CO2 30 07/08/2020     Lab Results   Component Value Date    BUN 12.3 01/04/2024    BUN 13 08/17/2022    BUN 13 07/08/2020     Lab Results   Component Value Date    CR 0.54 01/04/2024    CR 0.74 07/08/2020       GFR Estimate   Date Value Ref Range Status   01/04/2024 >90 >60 mL/min/1.73m2 Final   08/17/2022 >90 >60 mL/min/1.73m2 Final     Comment:     Effective December 21, 2021 eGFRcr in adults is calculated using the 2021 CKD-EPI creatinine equation which includes age and gender (Philippe zuniga al., [place], DOI: 10.1056/IHXHov1283294)   08/31/2021 >90 >60 mL/min/1.73m2 Final     Comment:     As of July 11, 2021, eGFR is calculated by the CKD-EPI creatinine equation, without race adjustment. eGFR can be influenced by muscle mass, exercise, and diet. The reported eGFR is an estimation only and is only applicable if the renal function is stable.   07/08/2020 >90 >60 mL/min/[1.73_m2] Final     Comment:     Non  GFR Calc  Starting 12/18/2018, serum creatinine based estimated GFR (eGFR) will be   calculated using the Chronic Kidney Disease Epidemiology Collaboration   (CKD-EPI) equation.     05/02/2019 >90 >60 mL/min/[1.73_m2] Final     Comment:     Non  GFR Calc  Starting 12/18/2018, serum creatinine based estimated GFR (eGFR) will be   calculated using the Chronic Kidney Disease Epidemiology Collaboration   (CKD-EPI) equation.     05/17/2018 >90 >60 mL/min/1.7m2 Final     Comment:     Non  GFR Calc     GFR Estimate If Black   Date Value Ref Range Status   07/08/2020 >90 >60 mL/min/[1.73_m2] Final     Comment:      GFR Calc  Starting 12/18/2018, serum creatinine based estimated GFR  "(eGFR) will be   calculated using the Chronic Kidney Disease Epidemiology Collaboration   (CKD-EPI) equation.     05/02/2019 >90 >60 mL/min/[1.73_m2] Final     Comment:      GFR Calc  Starting 12/18/2018, serum creatinine based estimated GFR (eGFR) will be   calculated using the Chronic Kidney Disease Epidemiology Collaboration   (CKD-EPI) equation.     05/17/2018 >90 >60 mL/min/1.7m2 Final     Comment:      GFR Calc       Lab Results   Component Value Date    MICROL 1,199.0 01/04/2024    MICROL 115 03/07/2022    MICROL 31 07/20/2020     No results found for: \"MICROALBUMIN\"  Lab Results   Component Value Date    CPEPT 1.3 03/25/2005       Vitamin B12   Date Value Ref Range Status   06/06/2023 776 232 - 1,245 pg/mL Final       Most recent eye exam date: : Not Found       Assessment/Plan:     1.  Type 2 diabetes-  Nayeli's glucose control has improved significantly.  A1c is down to 7.8%.  Discussed switching from ozempic to mounjaro for lower insulin requirements/more potential weight loss. She is interested.  Going a bit low after lunch, so will relax carb ratio to 1/4g then.  We made the following plan today (instructions given to patient):       Switch to Mounjaro 2.5 mg weekly.  After 1 month, we will increase to 5 mg weekly.  Plan on monthly increases.    Change carb ratio to 1/4g at lunch (going low after lunch)    Send me a [employer] message in 1 month, sooner if you are having low blood sugars.     Stop ozempic (or moujaro) 1 week before surgery.     No Novolog morning of surgery.      The patient is recommended to hold aspirin or NSAIDS (ibuprofen), fish oil for 10 days prior to surgery.     The patient is instructed to take 1/2 of the normal scheduled insulin the morning of surgery    Please let me know if you are having low blood sugars less than 70 or over 250 mg/dL.      If you have concerns, please send me a [employer] message M-Th, call the clinic at [phone], or call " [phone] after hours/weekends and ask to speak with the endocrinologist on call.      2.  Risk factors-     Retinopathy:  No.  Had eye exam within last year. More sensitive to light- Usher's syndrome.    Nephropathy:  BP high today, but she is also coughing and did not take her losartan the past couple days. +new albuminuria despite improved glucose control.  Unable to tolerate SGLT-2 inhibitor. Will refer to nephrology.  Creatinine stable.   Neuropathy: No.    Feet: OK, no ulcers.   Taking ASA: yes  Lipids:  LDL now in target on statin and fibrate.     3.  F/U in 3 months with me, in 6 months with Dr. Bragg, in 1 mo with diabetes education to see our dietitan.  We will follow with her closely, however she does strongly prefer in-person appointments.      42 minutes spent on the date of the encounter doing chart review, review of test results, patient visit and documentation, counseling/coordination of care, and discussion of follow up plan for worsening hyper and hypoglycemia.  The patient understood and is satisfied with today's visit.        Anne Marie Medina PA-C, MPAS   [facility]  Department of Medicine

## 2024-01-18 ENCOUNTER — OFFICE VISIT (OUTPATIENT)
Dept: INTERNAL MEDICINE | Facility: CLINIC | Age: 44
End: 2024-01-18
Payer: COMMERCIAL

## 2024-01-18 VITALS
DIASTOLIC BLOOD PRESSURE: 83 MMHG | BODY MASS INDEX: 33.95 KG/M2 | HEART RATE: 82 BPM | SYSTOLIC BLOOD PRESSURE: 138 MMHG | OXYGEN SATURATION: 99 % | WEIGHT: 179.7 LBS

## 2024-01-18 DIAGNOSIS — I10 UNCONTROLLED HYPERTENSION: Primary | ICD-10-CM

## 2024-01-18 DIAGNOSIS — Z51.89 VISIT FOR WOUND CHECK: ICD-10-CM

## 2024-01-18 PROCEDURE — 99213 OFFICE O/P EST LOW 20 MIN: CPT | Performed by: NURSE PRACTITIONER

## 2024-01-18 PROCEDURE — T1013 SIGN LANG/ORAL INTERPRETER: HCPCS | Mod: U3

## 2024-01-18 NOTE — PROGRESS NOTES
Nayeli is a 43 year old that presents in clinic today for the following:     Chief Complaint   Patient presents with    Follow Up     Pt here for follow up and wants to discuss recent torn toe nail           1/18/2024     3:32 PM   Additional Questions   Roomed by MJL, EMT     Screenings from encounters over the past 10 days    No data recorded       Alexandr Pastor at 3:34 PM on 1/18/2024

## 2024-01-21 NOTE — PROGRESS NOTES
S: Nayeli Caal is a 43 year old female seen with [facility] , for a BP re-check after starting on Amlodipine 5 mg at .  Her hysterectomy is scheduled for 2/7/24.    While in [facility] last week she lost a toenail from her right first toe. She saw a medical clinic and it was removed and seems to be healing well. She is applying salve to it and keeping it clean.     O: /83 (BP Location: Right arm, Patient Position: Sitting, Cuff Size: Adult Regular)   Pulse 82   Wt 81.5 kg (179 lb 11.2 oz)   SpO2 99%   BMI 33.95 kg/m      Toe looks clean and no discharge, redness or swelling.     A:  She is ao for surgery, BP controlled.     P: proceed with planned hysterectomy.     Mariya CELESTIN, CNP

## 2024-01-21 NOTE — PROGRESS NOTES
Nayeli was seen today for BP follow-up pre-operatively after adding Amlodipine 5 mg to her daily medications.    Her BP today is WNL and she is approved for hysterectomy.     Mariya CELESTIN, CNP

## 2024-01-22 ENCOUNTER — MYC MEDICAL ADVICE (OUTPATIENT)
Dept: ENDOCRINOLOGY | Facility: CLINIC | Age: 44
End: 2024-01-22
Payer: COMMERCIAL

## 2024-01-22 ENCOUNTER — TELEPHONE (OUTPATIENT)
Dept: ENDOCRINOLOGY | Facility: CLINIC | Age: 44
End: 2024-01-22
Payer: COMMERCIAL

## 2024-01-22 NOTE — TELEPHONE ENCOUNTER
Attempted to reach pt via [employer] video relay. No answer. Unable to leave message. REview sent via [employer].   Hafsa Gutierrez RN on 1/22/2024 at 11:16 AM         Result Care Coordination      Result Notes and Patient Communication     Edit Comments   Add Notifications  Back to Top    There is a significantly higher amount of proteins in the urine.  The cholesterol level looks good.  The A1c and the triglycerides are stable.  Please keep your scheduled follow-up appointment so we can discuss at that time medications which help maintain the kidney function, similar to Jardiance which you used to take, like Farxiga or Invokana.   Written by Jimena Bragg MD on 1/5/2024  7:56 PM CST

## 2024-01-22 NOTE — TELEPHONE ENCOUNTER
Attempted to reach PT ASL #[id number] . No answer. LVM via .   Hafsa Gutierrez RN on 1/22/2024 at 11:50 AM           Pt returned call to the clinic, requesting call back         Note      Nayeli Carolina [phone]  [place] Hilario 18 minutes ago (11:27 AM)     You 30 minutes ago (11:16 AM)     DM  Attempted to reach pt via [employer] video relay. No answer. Unable to leave message. REview sent via [employer].   Hafsa Gutierrez RN on 1/22/2024 at 11:16 AM            Result Care Coordination       Result Notes and Patient Communication           Edit Comments   Add Notifications  Back to Top    There is a significantly higher amount of proteins in the urine.  The cholesterol level looks good.  The A1c and the triglycerides are stable.  Please keep your scheduled follow-up appointment so we can discuss at that time medications which help maintain the kidney function, similar to Jardiance which you used to take, like Farxiga or Invokana.   Written by Jimena Bragg MD on 1/5/2024  7:56 PM CST

## 2024-01-26 ENCOUNTER — OFFICE VISIT (OUTPATIENT)
Dept: ENDOCRINOLOGY | Facility: CLINIC | Age: 44
End: 2024-01-26
Payer: COMMERCIAL

## 2024-01-26 VITALS
OXYGEN SATURATION: 98 % | SYSTOLIC BLOOD PRESSURE: 145 MMHG | BODY MASS INDEX: 34.17 KG/M2 | HEART RATE: 95 BPM | DIASTOLIC BLOOD PRESSURE: 89 MMHG | WEIGHT: 181 LBS | HEIGHT: 61 IN

## 2024-01-26 DIAGNOSIS — E11.9 WELL CONTROLLED TYPE 2 DIABETES MELLITUS (H): ICD-10-CM

## 2024-01-26 DIAGNOSIS — R80.9 ALBUMINURIA: Primary | ICD-10-CM

## 2024-01-26 DIAGNOSIS — Z79.4 UNCONTROLLED TYPE 2 DIABETES MELLITUS WITH HYPERGLYCEMIA, WITH LONG-TERM CURRENT USE OF INSULIN (H): ICD-10-CM

## 2024-01-26 DIAGNOSIS — E11.65 UNCONTROLLED TYPE 2 DIABETES MELLITUS WITH HYPERGLYCEMIA, WITH LONG-TERM CURRENT USE OF INSULIN (H): ICD-10-CM

## 2024-01-26 PROCEDURE — 99215 OFFICE O/P EST HI 40 MIN: CPT | Performed by: PHYSICIAN ASSISTANT

## 2024-01-26 PROCEDURE — T1013 SIGN LANG/ORAL INTERPRETER: HCPCS | Mod: U3

## 2024-01-26 RX ORDER — INSULIN PEN,REUSABLE,BT LISPRO
1 INSULIN PEN (EA) SUBCUTANEOUS CONTINUOUS
Qty: 2 EACH | Refills: 1 | Status: SHIPPED | OUTPATIENT
Start: 2024-01-26 | End: 2024-03-12

## 2024-01-26 RX ORDER — TIRZEPATIDE 2.5 MG/.5ML
2.5 INJECTION, SOLUTION SUBCUTANEOUS
Qty: 2 ML | Refills: 11 | Status: SHIPPED | OUTPATIENT
Start: 2024-01-26 | End: 2024-02-27

## 2024-01-26 ASSESSMENT — PAIN SCALES - GENERAL: PAINLEVEL: NO PAIN (0)

## 2024-01-26 NOTE — PATIENT INSTRUCTIONS
Switch to Mounjaro 2.5 mg weekly.  After 1 month, we will increase to 5 mg weekly.  Plan on monthly increases.    Change carb ratio to 1/4g at lunch (going low after lunch)    Send me a [employer] message in 1 month, sooner if you are having low blood sugars.     Stop ozempic (or moujaro) 1 week before surgery.     No Novolog morning of surgery.      The patient is recommended to hold aspirin or NSAIDS (ibuprofen), fish oil for 10 days prior to surgery.     The patient is instructed to take 1/2 of the normal scheduled insulin the morning of surgery    Please let me know if you are having low blood sugars less than 70 or over 250 mg/dL.      If you have concerns, please send me a [employer] message M-Th, call the clinic at [phone], or call [phone] after hours/weekends and ask to speak with the endocrinologist on call.

## 2024-01-26 NOTE — LETTER
1/26/2024       RE: Nayeli Caal  [address]  [facility] [address]     Dear Colleague,    Thank you for referring your patient, Nayeli Caal, to the [facility] ENDOCRINOLOG[facility] [place] at [facility]. Please see a copy of my visit note below.    1/8/2024  PATIENT LAB/IMAGING STATUS : No pending lab orders   Outcome for 01/25/24 10:16 AM: Data obtained via Dexcom website  Kathe Gary CMA    HPI:   Nayeli is a 42 yo woman here with a  for follow up of type 2 diabetes since the early 2000's.  She also sees Dr. Bragg and Leigh Ann Escoto. She started on insulin in 2003 after failing Metformin treatment.  She has struggled with high blood sugars for many years.   She has been working hard on improving her diabetes control.  She is scheduled for hysterectomy due to fibroids/suspected adenomyosis causing severe dysmenorrhea.  Her GYN prescribed   GnRh antagonist but denied by her insurance.  Feb. 7th.  She has been following closely with Leigh Ann Escoto.  She needs a new PA for her ozempic.  Feels treatment is working well.       New high level of protein in her urine.  Unable to tolerate SGLT-2 inhibitors due to yeast infections.  Taking losartan 100 mg daily.      Her current regimen is:   Ozempic 2 mg weekly  Basaglar 55 units daily.  Novolog (dosing on In-pen):  Carb ratio: 1/3g   Sensitivity: 20    Previous treatments:   empagliflozin 25 mg daily- stopped 2023 due to recurrent yeast infections.  Metformin- severe diarrhea.     Her overall average glucose is 199 mg/dL (CV 31%), over the past 2 weeks.  Her recent glucose is as follows:                               She is no longer on Metformin as even the low dose caused intense diarrhea to the point where she could not go out of her home.     Diabetes complications:  Retinopathy: last eye exam - 8/2022. 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  Nephropathy: h/o  proteinuria; normal GFR. Now on 50mg losartan daily (tx with lisinopril was associated with a dry cough).   Neuropathy: No numbness or tingling sensation in her feet.     She has no other concerns today.       Past Medical History:   Diagnosis Date    Combined visual and hearing impairment     Deaf     Diabetic retinopathy of both eyes (H) 8/19/2011    Hepatic steatosis 08/19/2011    History of tobacco use     Hyperlipidemia     Hypertension     Hypertriglyceridemia     Migraines     Obesity 8/19/2011     Problem list name updated by automated process. 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  Ganglion cyst excision.;  Surgeon: Greg Streeter MD;  [facility]    RELEASE TRIGGER FINGER Right 6/13/2023    Procedure: RELEASE, RIGHT TRIGGER FINGER, INDEX AND MIDDLE;  Surgeon: Miracle Carlin MD;  Location: [facility] OR    RELEASE TRIGGER FINGER Left 8/1/2023    Procedure: RELEASE, LEFT TRIGGER FINGER, MIDDLE;  Surgeon: Miracle Carlin MD;  Location: [facility] OR       Family History   Problem Relation Age of Onset    Unknown/Adopted Other        Social History     Social History    Marital status: Single     Spouse name: N/A    Number of children: N/A    Years of education: N/A     Social History Main Topics    Smoking status: Former Smoker     Types: Cigarettes     Quit date: 12/1/2010    Smokeless tobacco:  Never Used      Comment: stopped 2 yrs ago    Alcohol use No    Drug use: No    Sexual activity: Not Currently     Partners: Male     Other Topics Concern     Service No    Blood Transfusions No    Caffeine Concern No    Occupational Exposure No    Hobby Hazards No    Sleep Concern No    Stress Concern No    Weight Concern Yes    Special Diet No    Back Care No    Exercise Yes     4-5 x a week    Bike Helmet No    Seat Belt Yes    Self-Exams Yes     Social History Narrative   Social Hx: Lives in a [facility].  Boyfriend is in [place]. She is adopted.  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     Current Outpatient Medications   Medication    acetaminophen (TYLENOL) 500 MG tablet    Alcohol Swabs (ALCOHOL PREP PAD) 70 % PADS    amLODIPine (NORVASC) 5 MG tablet    aspirin (ASA) 81 MG chewable tablet    atorvastatin (LIPITOR) 40 MG tablet    B-D U/F insulin pen needle    BD ULTRA FINE PEN NEEDLES    blood glucose (ACCU-CHEK LAYA PLUS) test strip    blood glucose monitoring (ACCU-CHEK FASTCLIX) lancets    calcium carbonate-vitamin D (OSCAL) 500-5 MG-MCG tablet    Continuous Blood Gluc Sensor (DEXCOM G7 SENSOR) MISC    ergocalciferol (ERGOCALCIFEROL) 1.25 MG (16238 UT) capsule    fenofibrate (TRIGLIDE/LOFIBRA) 160 MG tablet    fish oil-omega-3 fatty acids 1000 MG capsule    hydrocortisone (CORTAID) 1 % external cream    ibuprofen (ADVIL/MOTRIN) 600 MG tablet    Injection Device for insulin (INPEN 100-BLUE-NOVOLOG-FIASP) DAVID    insulin aspart (NOVOLOG FLEXPEN) 100 UNIT/ML pen    insulin aspart (NOVOPEN ECHO) 100 UNIT/ML cartridge    insulin glargine (BASAGLAR KWIKPEN) 100 UNIT/ML pen    insulin pen needle (B-D U/F) 31G X 8 MM miscellaneous    losartan (COZAAR) 100 MG tablet    naproxen (NAPROSYN) 375 MG tablet    Ostomy Supplies (ADHESIVE REMOVER WIPES) MISC    Ostomy Supplies (SKIN TAC ADHESIVE BARRIER WIPE) MISC    oxyCODONE (ROXICODONE) 5 MG tablet    Relugolix-Estradiol-Norethind (MYFEMBREE) 40-1-0.5  "MG TABS    Semaglutide, 2 MG/DOSE, (OZEMPIC) 8 MG/3ML pen    triamcinolone (KENALOG) 0.1 % external ointment     Current Facility-Administered Medications   Medication    hydrocortisone (CORTAID) 1 % cream    hydrocortisone (CORTAID) 1 % cream    hylan (SYNVISC ONE) injection 48 mg    leuprolide (LUPRON DEPOT) kit 11.25 mg    lidocaine (PF) (XYLOCAINE) 1 % injection 4 mL    triamcinolone (KENALOG-40) injection 40 mg        No Known Allergies    Physical Exam  BP (!) 145/89   Pulse 95   Ht 1.549 m (5' 1\")   Wt 82.1 kg (181 lb)   SpO2 98%   BMI 34.20 kg/m    GENERAL:  Alert and oriented X3, NAD, well dressed, answering questions appropriately, appears stated age.    RESULTS  Lab Results   Component Value Date    A1C 7.8 (H) 01/04/2024    A1C 7.7 (H) 07/25/2023    A1C 8.7 (H) 06/06/2023    A1C 9.9 (H) 04/04/2023    A1C 9.8 (H) 08/17/2022    A1C 11.6 (H) 04/05/2021    A1C 12.4 (H) 10/29/2020    A1C 12.9 (H) 07/08/2020    A1C 8.2 (H) 05/02/2019    A1C 10.3 (H) 10/15/2018    HEMOGLOBINA1 10.0 (A) 01/13/2020    HEMOGLOBINA1 9.9 (A) 10/07/2019    HEMOGLOBINA1 8.1 (A) 04/22/2019    HEMOGLOBINA1 10.4 (A) 01/14/2019    HEMOGLOBINA1 8.7 (A) 03/12/2018       TSH   Date Value Ref Range Status   04/04/2023 1.41 0.30 - 4.20 uIU/mL Final   07/08/2020 1.34 0.40 - 4.00 mU/L Final   05/17/2018 1.84 0.40 - 4.00 mU/L Final   11/27/2017 1.92 0.40 - 4.00 mU/L Final   05/11/2016 1.85 0.40 - 4.00 mU/L Final   02/11/2016 1.14 0.40 - 4.00 mU/L Final       ALT   Date Value Ref Range Status   01/04/2024 30 0 - 50 U/L Final     Comment:     Reference intervals for this test were updated on 6/12/2023 to more accurately reflect our healthy population. There may be differences in the flagging of prior results with similar values performed with this method. Interpretation of those prior results can be made in the context of the updated reference intervals.     08/17/2022 28 0 - 50 U/L Final   07/08/2020 29 0 - 50 U/L Final   05/02/2019 43 0 - 50 " U/L Final   ]    Recent Labs   Lab Test 01/04/24  1657 04/04/23  1445   CHOL 183 221*   HDL 37* 38*   LDL 76 123*   TRIG 350* 298*       Lab Results   Component Value Date     01/04/2024     07/08/2020      Lab Results   Component Value Date    POTASSIUM 4.0 01/04/2024    POTASSIUM 4.7 08/17/2022    POTASSIUM 4.0 07/08/2020     Lab Results   Component Value Date    CHLORIDE 102 01/04/2024    CHLORIDE 104 08/17/2022    CHLORIDE 101 07/08/2020     Lab Results   Component Value Date    VERO 8.9 01/04/2024    VERO 8.6 07/08/2020     Lab Results   Component Value Date    CO2 27 01/04/2024    CO2 29 08/17/2022    CO2 30 07/08/2020     Lab Results   Component Value Date    BUN 12.3 01/04/2024    BUN 13 08/17/2022    BUN 13 07/08/2020     Lab Results   Component Value Date    CR 0.54 01/04/2024    CR 0.74 07/08/2020       GFR Estimate   Date Value Ref Range Status   01/04/2024 >90 >60 mL/min/1.73m2 Final   08/17/2022 >90 >60 mL/min/1.73m2 Final     Comment:     Effective December 21, 2021 eGFRcr in adults is calculated using the 2021 CKD-EPI creatinine equation which includes age and gender (Philippe et al., NEJ, DOI: 10.1056/MWIGrf1940091)   08/31/2021 >90 >60 mL/min/1.73m2 Final     Comment:     As of July 11, 2021, eGFR is calculated by the CKD-EPI creatinine equation, without race adjustment. eGFR can be influenced by muscle mass, exercise, and diet. The reported eGFR is an estimation only and is only applicable if the renal function is stable.   07/08/2020 >90 >60 mL/min/[1.73_m2] Final     Comment:     Non  GFR Calc  Starting 12/18/2018, serum creatinine based estimated GFR (eGFR) will be   calculated using the Chronic Kidney Disease Epidemiology Collaboration   (CKD-EPI) equation.     05/02/2019 >90 >60 mL/min/[1.73_m2] Final     Comment:     Non  GFR Calc  Starting 12/18/2018, serum creatinine based estimated GFR (eGFR) will be   calculated using the Chronic Kidney Disease  "Epidemiology Collaboration   (CKD-EPI) equation.     05/17/2018 >90 >60 mL/min/1.7m2 Final     Comment:     Non  GFR Calc     GFR Estimate If Black   Date Value Ref Range Status   07/08/2020 >90 >60 mL/min/[1.73_m2] Final     Comment:      GFR Calc  Starting 12/18/2018, serum creatinine based estimated GFR (eGFR) will be   calculated using the Chronic Kidney Disease Epidemiology Collaboration   (CKD-EPI) equation.     05/02/2019 >90 >60 mL/min/[1.73_m2] Final     Comment:      GFR Calc  Starting 12/18/2018, serum creatinine based estimated GFR (eGFR) will be   calculated using the Chronic Kidney Disease Epidemiology Collaboration   (CKD-EPI) equation.     05/17/2018 >90 >60 mL/min/1.7m2 Final     Comment:      GFR Calc       Lab Results   Component Value Date    MICROL 1,199.0 01/04/2024    MICROL 115 03/07/2022    MICROL 31 07/20/2020     No results found for: \"MICROALBUMIN\"  Lab Results   Component Value Date    CPEPT 1.3 03/25/2005       Vitamin B12   Date Value Ref Range Status   06/06/2023 776 232 - 1,245 pg/mL Final       Most recent eye exam date: : Not Found       Assessment/Plan:     1.  Type 2 diabetes-  Nayeli's glucose control has improved significantly.  A1c is down to 7.8%.  Discussed switching from ozempic to mounjaro for lower insulin requirements/more potential weight loss. She is interested.  Going a bit low after lunch, so will relax carb ratio to 1/4g then.  We made the following plan today (instructions given to patient):       Switch to Mounjaro 2.5 mg weekly.  After 1 month, we will increase to 5 mg weekly.  Plan on monthly increases.    Change carb ratio to 1/4g at lunch (going low after lunch)    Send me a [employer] message in 1 month, sooner if you are having low blood sugars.     Stop ozempic (or moujaro) 1 week before surgery.     No Novolog morning of surgery.      The patient is recommended to hold aspirin or NSAIDS " (ibuprofen), fish oil for 10 days prior to surgery.     The patient is instructed to take 1/2 of the normal scheduled insulin the morning of surgery    Please let me know if you are having low blood sugars less than 70 or over 250 mg/dL.      If you have concerns, please send me a ProviderTrust message M-Th, call the clinic at [phone], or call [phone] after hours/weekends and ask to speak with the endocrinologist on call.      2.  Risk factors-     Retinopathy:  No.  Had eye exam within last year. More sensitive to light- Usher's syndrome.    Nephropathy:  BP high today, but she is also coughing and did not take her losartan the past couple days. +new albuminuria despite improved glucose control.  Unable to tolerate SGLT-2 inhibitor. Will refer to nephrology.  Creatinine stable.   Neuropathy: No.    Feet: OK, no ulcers.   Taking ASA: yes  Lipids:  LDL now in target on statin and fibrate.     3.  F/U in 3 months with me, in 6 months with Dr. Bragg, in 1 mo with diabetes education to see our dietitan.  We will follow with her closely, however she does strongly prefer in-person appointments.      42 minutes spent on the date of the encounter doing chart review, review of test results, patient visit and documentation, counseling/coordination of care, and discussion of follow up plan for worsening hyper and hypoglycemia.  The patient understood and is satisfied with today's visit.        Anne Marie Medina PA-C, MPAS   [facility]  Department of Medicine

## 2024-02-06 ENCOUNTER — ANESTHESIA EVENT (OUTPATIENT)
Dept: SURGERY | Facility: CLINIC | Age: 44
End: 2024-02-06
Payer: COMMERCIAL

## 2024-02-06 ASSESSMENT — LIFESTYLE VARIABLES: TOBACCO_USE: 1

## 2024-02-06 NOTE — ANESTHESIA PREPROCEDURE EVALUATION
Anesthesia Pre-Procedure Evaluation    Patient: Nayeli Caal   MRN: [id number] : 1980        Procedure : Procedure(s):  HYSTERECTOMY, TOTAL, ROBOT-ASSISTED, WITH BILATERAL SALPINGECTOMY, CYSTOSCOPY, POSSIBLE LAPAROTOMY          Past Medical History:   Diagnosis Date    Combined visual and hearing impairment     Deaf     Diabetic retinopathy of both eyes (H) 2011    Hepatic steatosis 2011    History of tobacco use     Hyperlipidemia     Hypertension     Hypertriglyceridemia     Migraines     Obesity 2011     Problem list name updated by automated process. Provider to review    Uncontrolled type 2 diabetes mellitus with hyperglycemia, with long-term current use of insulin (H) 5/15/2017    Usher Syndrome: congenital deafness, retinitis pigmentosa 2011      Past Surgical History:   Procedure Laterality Date    LAPAROSCOPIC CHOLECYSTECTOMY N/A 3/10/2018    Procedure: LAPAROSCOPIC CHOLECYSTECTOMY;  Laparoscopic Cholecystectomy ;  Surgeon: Kuldeep Sigala MD;  Location: [facility] OR    RELEASE TRIGGER FINGER Right 2019    Procedure: Right Thumb Trigger Release;  Surgeon: Greg Streeter MD;  [facility]    RELEASE TRIGGER FINGER Left 2019    Procedure: Left Ring Trigger Finger Release.  Ganglion cyst excision.;  Surgeon: Greg Streeter MD;  [facility]    RELEASE TRIGGER FINGER Right 2023    Procedure: RELEASE, RIGHT TRIGGER FINGER, INDEX AND MIDDLE;  Surgeon: Miracle Carlin MD;  Location: [facility] OR    RELEASE TRIGGER FINGER Left 2023    Procedure: RELEASE, LEFT TRIGGER FINGER, MIDDLE;  Surgeon: Miracle Carlin MD;  Location: [facility] OR      No Known Allergies   Social History     Tobacco Use    Smoking status: Former     Types: Cigarettes     Quit date: 2010     Years since quittin.1    Smokeless tobacco: Never    Tobacco comments:     stopped 10 yrs ago   Substance Use Topics    Alcohol use: Not Currently     Comment: socially      Wt  Readings from Last 1 Encounters:   01/26/24 82.1 kg (181 lb)        Anesthesia Evaluation            ROS/MED HX  ENT/Pulmonary: Comment: Deaf (h/o usher syndrome: congenital deafness, retinitis pigmentosa)    (+)     ELISEO risk factors,  hypertension,         tobacco use, Past use,                       Neurologic:       Cardiovascular:     (+) Dyslipidemia hypertension- -   -  - -                                 Previous cardiac testing   Echo: Date: Results:    Stress Test:  Date: Results:    ECG Reviewed:  Date: 1/4/24 Results:  NSR  Cath:  Date: Results:      METS/Exercise Tolerance: >4 METS    Hematologic:       Musculoskeletal: Comment: S/p multiple trigger finger releases.      GI/Hepatic:  - neg GI/hepatic ROS   (+)             liver disease,       Renal/Genitourinary:       Endo:     (+)  type II DM, Last HgA1c: 7.8, date: 1/4/24, Using insulin,    Diabetic complications: retinopathy.      Obesity,       Psychiatric/Substance Use:       Infectious Disease:  - neg infectious disease ROS     Malignancy:  - neg malignancy ROS     Other:            Physical Exam    Airway        Mallampati: II   TM distance: > 3 FB   Neck ROM: full   Mouth opening: > 3 cm    Respiratory Devices and Support         Dental       (+) Modest Abnormalities - crowns, retainers, 1 or 2 missing teeth      Cardiovascular          Rhythm and rate: regular   (+) carotid bruit is present       Pulmonary           breath sounds clear to auscultation           OUTSIDE LABS:  CBC:   Lab Results   Component Value Date    WBC 11.2 (H) 01/04/2024    WBC 10.7 09/23/2021    HGB 13.5 01/04/2024    HGB 13.1 09/23/2021    HCT 39.8 01/04/2024    HCT 40.8 09/23/2021     01/04/2024     09/23/2021     BMP:   Lab Results   Component Value Date     01/04/2024     08/17/2022    POTASSIUM 4.0 01/04/2024    POTASSIUM 4.7 08/17/2022    CHLORIDE 102 01/04/2024    CHLORIDE 104 08/17/2022    CO2 27 01/04/2024    CO2 29 08/17/2022    BUN  "12.3 01/04/2024    BUN 13 08/17/2022    CR 0.54 01/04/2024    CR 0.54 08/17/2022     (H) 01/04/2024     (H) 08/01/2023     COAGS:   Lab Results   Component Value Date    INR 1.03 06/18/2010     POC:   Lab Results   Component Value Date     (H) 05/30/2019    HCG Negative 08/31/2021    HCGS Negative 08/15/2022     HEPATIC:   Lab Results   Component Value Date    ALBUMIN 4.1 01/04/2024    PROTTOTAL 7.2 01/04/2024    ALT 30 01/04/2024    AST 23 01/04/2024    ALKPHOS 99 01/04/2024    BILITOTAL 0.2 01/04/2024     OTHER:   Lab Results   Component Value Date    PH 7.39 08/31/2021    LACT 0.8 08/31/2021    A1C 7.8 (H) 01/04/2024    VERO 8.9 01/04/2024    PHOS 4.2 03/12/2010    MAG 2.2 07/13/2017    LIPASE 94 08/31/2021    AMYLASE 16 (L) 05/15/2017    TSH 1.41 04/04/2023    CRP 12.0 (H) 03/24/2016    SED 19 03/24/2016       Anesthesia Plan    ASA Status:  3    NPO Status:  Will be NPO Appropriate at ...    Anesthesia Type: General.     - Airway: ETT   Induction: Intravenous.   Maintenance: Balanced.   Techniques and Equipment:     - Lines/Monitors: BIS, 2nd IV     Consents            Postoperative Care    Pain management: IV analgesics, Oral pain medications, Multi-modal analgesia, Peripheral nerve block (Single Shot).   PONV prophylaxis: Ondansetron (or other 5HT-3), Dexamethasone or Solumedrol, Background Propofol Infusion     Comments:               Sandy Frausto MD    I have reviewed the pertinent notes and labs in the chart from the past 30 days and (re)examined the patient.  Any updates or changes from those notes are reflected in this note.              # DMII: A1C = 7.8 % (Ref range: <5.7 %) within past 6 months  # Obesity: Estimated body mass index is 34.2 kg/m  as calculated from the following:    Height as of 1/26/24: 1.549 m (5' 1\").    Weight as of 1/26/24: 82.1 kg (181 lb).      "

## 2024-02-07 ENCOUNTER — OFFICE VISIT (OUTPATIENT)
Dept: INTERPRETER SERVICES | Facility: CLINIC | Age: 44
End: 2024-02-07
Payer: COMMERCIAL

## 2024-02-07 ENCOUNTER — TELEPHONE (OUTPATIENT)
Dept: OBGYN | Facility: CLINIC | Age: 44
End: 2024-02-07

## 2024-02-07 ENCOUNTER — ANESTHESIA (OUTPATIENT)
Dept: SURGERY | Facility: CLINIC | Age: 44
End: 2024-02-07
Payer: COMMERCIAL

## 2024-02-07 ENCOUNTER — HOSPITAL ENCOUNTER (OUTPATIENT)
Facility: CLINIC | Age: 44
Discharge: HOME | End: 2024-02-07
Attending: STUDENT IN AN ORGANIZED HEALTH CARE EDUCATION/TRAINING PROGRAM | Admitting: STUDENT IN AN ORGANIZED HEALTH CARE EDUCATION/TRAINING PROGRAM
Payer: COMMERCIAL

## 2024-02-07 VITALS
TEMPERATURE: 97.7 F | OXYGEN SATURATION: 99 % | BODY MASS INDEX: 34.37 KG/M2 | RESPIRATION RATE: 20 BRPM | SYSTOLIC BLOOD PRESSURE: 147 MMHG | DIASTOLIC BLOOD PRESSURE: 78 MMHG | HEART RATE: 93 BPM | WEIGHT: 181.88 LBS

## 2024-02-07 DIAGNOSIS — G44.229 CHRONIC TENSION-TYPE HEADACHE, NOT INTRACTABLE: ICD-10-CM

## 2024-02-07 DIAGNOSIS — M65.331 TRIGGER MIDDLE FINGER OF RIGHT HAND: ICD-10-CM

## 2024-02-07 DIAGNOSIS — M25.511 PAIN IN JOINT OF RIGHT SHOULDER: ICD-10-CM

## 2024-02-07 DIAGNOSIS — G89.29 CHRONIC PAIN OF RIGHT KNEE: ICD-10-CM

## 2024-02-07 DIAGNOSIS — D25.1 INTRAMURAL AND SUBSEROUS LEIOMYOMA OF UTERUS: ICD-10-CM

## 2024-02-07 DIAGNOSIS — M54.2 CERVICALGIA: ICD-10-CM

## 2024-02-07 DIAGNOSIS — M25.561 CHRONIC PAIN OF RIGHT KNEE: ICD-10-CM

## 2024-02-07 DIAGNOSIS — G89.29 CHRONIC BILATERAL LOW BACK PAIN WITHOUT SCIATICA: ICD-10-CM

## 2024-02-07 DIAGNOSIS — Z90.710 S/P HYSTERECTOMY: Primary | ICD-10-CM

## 2024-02-07 DIAGNOSIS — D25.2 INTRAMURAL AND SUBSEROUS LEIOMYOMA OF UTERUS: ICD-10-CM

## 2024-02-07 DIAGNOSIS — M54.50 CHRONIC BILATERAL LOW BACK PAIN WITHOUT SCIATICA: ICD-10-CM

## 2024-02-07 LAB
ABO/RH(D): NORMAL
ANTIBODY SCREEN: NEGATIVE
GLUCOSE BLDC GLUCOMTR-MCNC: 180 MG/DL (ref 70–99)
GLUCOSE BLDC GLUCOMTR-MCNC: 204 MG/DL (ref 70–99)
GLUCOSE BLDC GLUCOMTR-MCNC: 207 MG/DL (ref 70–99)
HCG INTACT+B SERPL-ACNC: <1 MIU/ML
HOLD SPECIMEN: NORMAL
SPECIMEN EXPIRATION DATE: NORMAL

## 2024-02-07 PROCEDURE — 82962 GLUCOSE BLOOD TEST: CPT

## 2024-02-07 PROCEDURE — 250N000011 HC RX IP 250 OP 636

## 2024-02-07 PROCEDURE — 58571 TLH W/T/O 250 G OR LESS: CPT | Mod: GC | Performed by: STUDENT IN AN ORGANIZED HEALTH CARE EDUCATION/TRAINING PROGRAM

## 2024-02-07 PROCEDURE — 250N000009 HC RX 250

## 2024-02-07 PROCEDURE — 258N000003 HC RX IP 258 OP 636

## 2024-02-07 PROCEDURE — 250N000013 HC RX MED GY IP 250 OP 250 PS 637: Performed by: STUDENT IN AN ORGANIZED HEALTH CARE EDUCATION/TRAINING PROGRAM

## 2024-02-07 PROCEDURE — 250N000025 HC SEVOFLURANE, PER MIN: Performed by: STUDENT IN AN ORGANIZED HEALTH CARE EDUCATION/TRAINING PROGRAM

## 2024-02-07 PROCEDURE — T1013 SIGN LANG/ORAL INTERPRETER: HCPCS | Mod: U3

## 2024-02-07 PROCEDURE — 710N000012 HC RECOVERY PHASE 2, PER MINUTE: Performed by: STUDENT IN AN ORGANIZED HEALTH CARE EDUCATION/TRAINING PROGRAM

## 2024-02-07 PROCEDURE — 360N000080 HC SURGERY LEVEL 7, PER MIN: Performed by: STUDENT IN AN ORGANIZED HEALTH CARE EDUCATION/TRAINING PROGRAM

## 2024-02-07 PROCEDURE — 258N000001 HC RX 258: Performed by: STUDENT IN AN ORGANIZED HEALTH CARE EDUCATION/TRAINING PROGRAM

## 2024-02-07 PROCEDURE — 710N000010 HC RECOVERY PHASE 1, LEVEL 2, PER MIN: Performed by: STUDENT IN AN ORGANIZED HEALTH CARE EDUCATION/TRAINING PROGRAM

## 2024-02-07 PROCEDURE — 84702 CHORIONIC GONADOTROPIN TEST: CPT | Performed by: STUDENT IN AN ORGANIZED HEALTH CARE EDUCATION/TRAINING PROGRAM

## 2024-02-07 PROCEDURE — 999N000141 HC STATISTIC PRE-PROCEDURE NURSING ASSESSMENT: Performed by: STUDENT IN AN ORGANIZED HEALTH CARE EDUCATION/TRAINING PROGRAM

## 2024-02-07 PROCEDURE — 272N000001 HC OR GENERAL SUPPLY STERILE: Performed by: STUDENT IN AN ORGANIZED HEALTH CARE EDUCATION/TRAINING PROGRAM

## 2024-02-07 PROCEDURE — 250N000011 HC RX IP 250 OP 636: Performed by: STUDENT IN AN ORGANIZED HEALTH CARE EDUCATION/TRAINING PROGRAM

## 2024-02-07 PROCEDURE — 250N000011 HC RX IP 250 OP 636: Performed by: ANESTHESIOLOGY

## 2024-02-07 PROCEDURE — 370N000017 HC ANESTHESIA TECHNICAL FEE, PER MIN: Performed by: STUDENT IN AN ORGANIZED HEALTH CARE EDUCATION/TRAINING PROGRAM

## 2024-02-07 PROCEDURE — 250N000009 HC RX 250: Performed by: ANESTHESIOLOGY

## 2024-02-07 PROCEDURE — 86900 BLOOD TYPING SEROLOGIC ABO: CPT | Performed by: STUDENT IN AN ORGANIZED HEALTH CARE EDUCATION/TRAINING PROGRAM

## 2024-02-07 PROCEDURE — 88307 TISSUE EXAM BY PATHOLOGIST: CPT | Mod: 26 | Performed by: PATHOLOGY

## 2024-02-07 PROCEDURE — 88307 TISSUE EXAM BY PATHOLOGIST: CPT | Mod: TC | Performed by: STUDENT IN AN ORGANIZED HEALTH CARE EDUCATION/TRAINING PROGRAM

## 2024-02-07 PROCEDURE — 250N000013 HC RX MED GY IP 250 OP 250 PS 637

## 2024-02-07 RX ORDER — NALOXONE HYDROCHLORIDE 0.4 MG/ML
0.2 INJECTION, SOLUTION INTRAMUSCULAR; INTRAVENOUS; SUBCUTANEOUS
Status: DISCONTINUED | OUTPATIENT
Start: 2024-02-07 | End: 2024-02-07 | Stop reason: HOSPADM

## 2024-02-07 RX ORDER — HYDROMORPHONE HYDROCHLORIDE 1 MG/ML
0.2 INJECTION, SOLUTION INTRAMUSCULAR; INTRAVENOUS; SUBCUTANEOUS EVERY 5 MIN PRN
Status: CANCELLED | OUTPATIENT
Start: 2024-02-07

## 2024-02-07 RX ORDER — DIMENHYDRINATE 50 MG/ML
25 INJECTION, SOLUTION INTRAMUSCULAR; INTRAVENOUS
Status: CANCELLED | OUTPATIENT
Start: 2024-02-07

## 2024-02-07 RX ORDER — NALOXONE HYDROCHLORIDE 0.4 MG/ML
0.4 INJECTION, SOLUTION INTRAMUSCULAR; INTRAVENOUS; SUBCUTANEOUS
Status: DISCONTINUED | OUTPATIENT
Start: 2024-02-07 | End: 2024-02-07 | Stop reason: HOSPADM

## 2024-02-07 RX ORDER — SODIUM CHLORIDE, SODIUM LACTATE, POTASSIUM CHLORIDE, CALCIUM CHLORIDE 600; 310; 30; 20 MG/100ML; MG/100ML; MG/100ML; MG/100ML
INJECTION, SOLUTION INTRAVENOUS CONTINUOUS PRN
Status: DISCONTINUED | OUTPATIENT
Start: 2024-02-07 | End: 2024-02-07

## 2024-02-07 RX ORDER — METRONIDAZOLE 500 MG/100ML
500 INJECTION, SOLUTION INTRAVENOUS
Status: COMPLETED | OUTPATIENT
Start: 2024-02-07 | End: 2024-02-07

## 2024-02-07 RX ORDER — ONDANSETRON 2 MG/ML
4 INJECTION INTRAMUSCULAR; INTRAVENOUS EVERY 30 MIN PRN
Status: CANCELLED | OUTPATIENT
Start: 2024-02-07

## 2024-02-07 RX ORDER — MEPERIDINE HYDROCHLORIDE 25 MG/ML
12.5 INJECTION INTRAMUSCULAR; INTRAVENOUS; SUBCUTANEOUS EVERY 5 MIN PRN
Status: CANCELLED | OUTPATIENT
Start: 2024-02-07

## 2024-02-07 RX ORDER — BUPIVACAINE HYDROCHLORIDE AND EPINEPHRINE 2.5; 5 MG/ML; UG/ML
INJECTION, SOLUTION INFILTRATION; PERINEURAL
Status: COMPLETED | OUTPATIENT
Start: 2024-02-07 | End: 2024-02-07

## 2024-02-07 RX ORDER — AMOXICILLIN 250 MG
1-2 CAPSULE ORAL 2 TIMES DAILY
Qty: 30 TABLET | Refills: 0 | Status: SHIPPED | OUTPATIENT
Start: 2024-02-07 | End: 2024-07-12

## 2024-02-07 RX ORDER — FENTANYL CITRATE 50 UG/ML
50 INJECTION, SOLUTION INTRAMUSCULAR; INTRAVENOUS EVERY 5 MIN PRN
Status: CANCELLED | OUTPATIENT
Start: 2024-02-07

## 2024-02-07 RX ORDER — ACETAMINOPHEN 325 MG/1
975 TABLET ORAL ONCE
Status: COMPLETED | OUTPATIENT
Start: 2024-02-07 | End: 2024-02-07

## 2024-02-07 RX ORDER — LORAZEPAM 2 MG/ML
.5-1 INJECTION INTRAMUSCULAR
Status: CANCELLED | OUTPATIENT
Start: 2024-02-07

## 2024-02-07 RX ORDER — DEXAMETHASONE SODIUM PHOSPHATE 4 MG/ML
4 INJECTION, SOLUTION INTRA-ARTICULAR; INTRALESIONAL; INTRAMUSCULAR; INTRAVENOUS; SOFT TISSUE
Status: CANCELLED | OUTPATIENT
Start: 2024-02-07

## 2024-02-07 RX ORDER — OXYCODONE HYDROCHLORIDE 5 MG/1
5-10 TABLET ORAL EVERY 4 HOURS PRN
Qty: 6 TABLET | Refills: 0 | Status: SHIPPED | OUTPATIENT
Start: 2024-02-07 | End: 2024-02-10

## 2024-02-07 RX ORDER — HYDROMORPHONE HYDROCHLORIDE 1 MG/ML
0.4 INJECTION, SOLUTION INTRAMUSCULAR; INTRAVENOUS; SUBCUTANEOUS EVERY 5 MIN PRN
Status: DISCONTINUED | OUTPATIENT
Start: 2024-02-07 | End: 2024-02-07 | Stop reason: HOSPADM

## 2024-02-07 RX ORDER — IBUPROFEN 600 MG/1
600 TABLET, FILM COATED ORAL EVERY 6 HOURS PRN
Qty: 120 TABLET | Refills: 1 | Status: SHIPPED | OUTPATIENT
Start: 2024-02-07

## 2024-02-07 RX ORDER — ONDANSETRON 4 MG/1
4 TABLET, ORALLY DISINTEGRATING ORAL EVERY 30 MIN PRN
Status: DISCONTINUED | OUTPATIENT
Start: 2024-02-07 | End: 2024-02-07 | Stop reason: HOSPADM

## 2024-02-07 RX ORDER — SODIUM CHLORIDE, SODIUM LACTATE, POTASSIUM CHLORIDE, CALCIUM CHLORIDE 600; 310; 30; 20 MG/100ML; MG/100ML; MG/100ML; MG/100ML
INJECTION, SOLUTION INTRAVENOUS CONTINUOUS
Status: CANCELLED | OUTPATIENT
Start: 2024-02-07

## 2024-02-07 RX ORDER — SODIUM CHLORIDE, SODIUM LACTATE, POTASSIUM CHLORIDE, CALCIUM CHLORIDE 600; 310; 30; 20 MG/100ML; MG/100ML; MG/100ML; MG/100ML
INJECTION, SOLUTION INTRAVENOUS CONTINUOUS
Status: DISCONTINUED | OUTPATIENT
Start: 2024-02-07 | End: 2024-02-07 | Stop reason: HOSPADM

## 2024-02-07 RX ORDER — FENTANYL CITRATE 50 UG/ML
25 INJECTION, SOLUTION INTRAMUSCULAR; INTRAVENOUS EVERY 5 MIN PRN
Status: DISCONTINUED | OUTPATIENT
Start: 2024-02-07 | End: 2024-02-07 | Stop reason: HOSPADM

## 2024-02-07 RX ORDER — ALBUTEROL SULFATE 90 UG/1
AEROSOL, METERED RESPIRATORY (INHALATION) PRN
Status: DISCONTINUED | OUTPATIENT
Start: 2024-02-07 | End: 2024-02-07

## 2024-02-07 RX ORDER — LIDOCAINE 40 MG/G
CREAM TOPICAL
Status: DISCONTINUED | OUTPATIENT
Start: 2024-02-07 | End: 2024-02-07 | Stop reason: HOSPADM

## 2024-02-07 RX ORDER — ALBUTEROL SULFATE 0.83 MG/ML
2.5 SOLUTION RESPIRATORY (INHALATION) EVERY 4 HOURS PRN
Status: CANCELLED | OUTPATIENT
Start: 2024-02-07

## 2024-02-07 RX ORDER — OXYCODONE HYDROCHLORIDE 5 MG/1
5 TABLET ORAL
Status: COMPLETED | OUTPATIENT
Start: 2024-02-07 | End: 2024-02-07

## 2024-02-07 RX ORDER — ONDANSETRON 2 MG/ML
4 INJECTION INTRAMUSCULAR; INTRAVENOUS EVERY 30 MIN PRN
Status: DISCONTINUED | OUTPATIENT
Start: 2024-02-07 | End: 2024-02-07 | Stop reason: HOSPADM

## 2024-02-07 RX ORDER — DEXMEDETOMIDINE HYDROCHLORIDE 4 UG/ML
INJECTION, SOLUTION INTRAVENOUS
Status: COMPLETED | OUTPATIENT
Start: 2024-02-07 | End: 2024-02-07

## 2024-02-07 RX ORDER — HYDRALAZINE HYDROCHLORIDE 20 MG/ML
2.5-5 INJECTION INTRAMUSCULAR; INTRAVENOUS EVERY 10 MIN PRN
Status: CANCELLED | OUTPATIENT
Start: 2024-02-07

## 2024-02-07 RX ORDER — FENTANYL CITRATE 50 UG/ML
25 INJECTION, SOLUTION INTRAMUSCULAR; INTRAVENOUS
Status: CANCELLED | OUTPATIENT
Start: 2024-02-07

## 2024-02-07 RX ORDER — FENTANYL CITRATE 50 UG/ML
25 INJECTION, SOLUTION INTRAMUSCULAR; INTRAVENOUS
Status: DISCONTINUED | OUTPATIENT
Start: 2024-02-07 | End: 2024-02-07 | Stop reason: HOSPADM

## 2024-02-07 RX ORDER — OXYCODONE HYDROCHLORIDE 5 MG/1
5 TABLET ORAL
Status: CANCELLED | OUTPATIENT
Start: 2024-02-07

## 2024-02-07 RX ORDER — IBUPROFEN 800 MG/1
800 TABLET, FILM COATED ORAL ONCE
Status: COMPLETED | OUTPATIENT
Start: 2024-02-07 | End: 2024-02-07

## 2024-02-07 RX ORDER — FENTANYL CITRATE 50 UG/ML
INJECTION, SOLUTION INTRAMUSCULAR; INTRAVENOUS PRN
Status: DISCONTINUED | OUTPATIENT
Start: 2024-02-07 | End: 2024-02-07

## 2024-02-07 RX ORDER — ACETAMINOPHEN 325 MG/1
975 TABLET ORAL ONCE
Status: CANCELLED | OUTPATIENT
Start: 2024-02-07 | End: 2024-02-07

## 2024-02-07 RX ORDER — LIDOCAINE HYDROCHLORIDE 20 MG/ML
INJECTION, SOLUTION INFILTRATION; PERINEURAL PRN
Status: DISCONTINUED | OUTPATIENT
Start: 2024-02-07 | End: 2024-02-07

## 2024-02-07 RX ORDER — ONDANSETRON 4 MG/1
4 TABLET, ORALLY DISINTEGRATING ORAL EVERY 8 HOURS PRN
Qty: 4 TABLET | Refills: 0 | Status: SHIPPED | OUTPATIENT
Start: 2024-02-07 | End: 2024-05-08

## 2024-02-07 RX ORDER — DEXAMETHASONE SODIUM PHOSPHATE 10 MG/ML
INJECTION, SOLUTION INTRAMUSCULAR; INTRAVENOUS
Status: COMPLETED | OUTPATIENT
Start: 2024-02-07 | End: 2024-02-07

## 2024-02-07 RX ORDER — HALOPERIDOL 5 MG/ML
1 INJECTION INTRAMUSCULAR
Status: CANCELLED | OUTPATIENT
Start: 2024-02-07

## 2024-02-07 RX ORDER — PROPOFOL 10 MG/ML
INJECTION, EMULSION INTRAVENOUS PRN
Status: DISCONTINUED | OUTPATIENT
Start: 2024-02-07 | End: 2024-02-07

## 2024-02-07 RX ORDER — OXYCODONE HYDROCHLORIDE 5 MG/1
10 TABLET ORAL
Status: DISCONTINUED | OUTPATIENT
Start: 2024-02-07 | End: 2024-02-07 | Stop reason: HOSPADM

## 2024-02-07 RX ORDER — ACETAMINOPHEN 325 MG/1
975 TABLET ORAL ONCE
Status: DISCONTINUED | OUTPATIENT
Start: 2024-02-07 | End: 2024-02-07 | Stop reason: HOSPADM

## 2024-02-07 RX ORDER — ONDANSETRON 4 MG/1
4 TABLET, ORALLY DISINTEGRATING ORAL EVERY 30 MIN PRN
Status: CANCELLED | OUTPATIENT
Start: 2024-02-07

## 2024-02-07 RX ORDER — OXYCODONE HYDROCHLORIDE 5 MG/1
10 TABLET ORAL
Status: CANCELLED | OUTPATIENT
Start: 2024-02-07

## 2024-02-07 RX ORDER — CEFAZOLIN SODIUM/WATER 2 G/20 ML
2 SYRINGE (ML) INTRAVENOUS
Status: COMPLETED | OUTPATIENT
Start: 2024-02-07 | End: 2024-02-07

## 2024-02-07 RX ORDER — HYDROMORPHONE HYDROCHLORIDE 1 MG/ML
0.4 INJECTION, SOLUTION INTRAMUSCULAR; INTRAVENOUS; SUBCUTANEOUS EVERY 5 MIN PRN
Status: CANCELLED | OUTPATIENT
Start: 2024-02-07

## 2024-02-07 RX ORDER — CEFAZOLIN SODIUM/WATER 2 G/20 ML
2 SYRINGE (ML) INTRAVENOUS SEE ADMIN INSTRUCTIONS
Status: DISCONTINUED | OUTPATIENT
Start: 2024-02-07 | End: 2024-02-07 | Stop reason: HOSPADM

## 2024-02-07 RX ORDER — FENTANYL CITRATE 50 UG/ML
25-50 INJECTION, SOLUTION INTRAMUSCULAR; INTRAVENOUS
Status: DISCONTINUED | OUTPATIENT
Start: 2024-02-07 | End: 2024-02-07 | Stop reason: HOSPADM

## 2024-02-07 RX ORDER — HYDROXYZINE HYDROCHLORIDE 25 MG/1
25 TABLET, FILM COATED ORAL EVERY 6 HOURS PRN
Status: CANCELLED | OUTPATIENT
Start: 2024-02-07

## 2024-02-07 RX ORDER — HYDROMORPHONE HYDROCHLORIDE 1 MG/ML
0.2 INJECTION, SOLUTION INTRAMUSCULAR; INTRAVENOUS; SUBCUTANEOUS EVERY 5 MIN PRN
Status: DISCONTINUED | OUTPATIENT
Start: 2024-02-07 | End: 2024-02-07 | Stop reason: HOSPADM

## 2024-02-07 RX ORDER — ONDANSETRON 2 MG/ML
INJECTION INTRAMUSCULAR; INTRAVENOUS PRN
Status: DISCONTINUED | OUTPATIENT
Start: 2024-02-07 | End: 2024-02-07

## 2024-02-07 RX ORDER — FLUMAZENIL 0.1 MG/ML
0.2 INJECTION, SOLUTION INTRAVENOUS
Status: DISCONTINUED | OUTPATIENT
Start: 2024-02-07 | End: 2024-02-07 | Stop reason: HOSPADM

## 2024-02-07 RX ORDER — ACETAMINOPHEN 500 MG
1000 TABLET ORAL EVERY 8 HOURS PRN
Qty: 100 TABLET | Refills: 3 | Status: SHIPPED | OUTPATIENT
Start: 2024-02-07

## 2024-02-07 RX ORDER — KETOROLAC TROMETHAMINE 30 MG/ML
15 INJECTION, SOLUTION INTRAMUSCULAR; INTRAVENOUS
Status: CANCELLED | OUTPATIENT
Start: 2024-02-07

## 2024-02-07 RX ORDER — LABETALOL HYDROCHLORIDE 5 MG/ML
10 INJECTION, SOLUTION INTRAVENOUS
Status: CANCELLED | OUTPATIENT
Start: 2024-02-07

## 2024-02-07 RX ORDER — FENTANYL CITRATE 50 UG/ML
50 INJECTION, SOLUTION INTRAMUSCULAR; INTRAVENOUS EVERY 5 MIN PRN
Status: DISCONTINUED | OUTPATIENT
Start: 2024-02-07 | End: 2024-02-07 | Stop reason: HOSPADM

## 2024-02-07 RX ORDER — FENTANYL CITRATE 50 UG/ML
25 INJECTION, SOLUTION INTRAMUSCULAR; INTRAVENOUS EVERY 5 MIN PRN
Status: CANCELLED | OUTPATIENT
Start: 2024-02-07

## 2024-02-07 RX ORDER — PHENAZOPYRIDINE HYDROCHLORIDE 200 MG/1
200 TABLET, FILM COATED ORAL ONCE
Status: COMPLETED | OUTPATIENT
Start: 2024-02-07 | End: 2024-02-07

## 2024-02-07 RX ADMIN — Medication 2 G: at 08:05

## 2024-02-07 RX ADMIN — ALBUTEROL SULFATE 6 PUFF: 108 INHALANT RESPIRATORY (INHALATION) at 08:50

## 2024-02-07 RX ADMIN — ACETAMINOPHEN 975 MG: 325 TABLET, FILM COATED ORAL at 13:17

## 2024-02-07 RX ADMIN — SUGAMMADEX 200 MG: 100 INJECTION, SOLUTION INTRAVENOUS at 10:46

## 2024-02-07 RX ADMIN — FENTANYL CITRATE 50 MCG: 50 INJECTION INTRAMUSCULAR; INTRAVENOUS at 09:05

## 2024-02-07 RX ADMIN — FENTANYL CITRATE 100 MCG: 50 INJECTION INTRAMUSCULAR; INTRAVENOUS at 07:51

## 2024-02-07 RX ADMIN — IBUPROFEN 800 MG: 200 TABLET, FILM COATED ORAL at 12:39

## 2024-02-07 RX ADMIN — HYDROMORPHONE HYDROCHLORIDE 0.4 MG: 1 INJECTION, SOLUTION INTRAMUSCULAR; INTRAVENOUS; SUBCUTANEOUS at 15:55

## 2024-02-07 RX ADMIN — SODIUM CHLORIDE, SODIUM LACTATE, POTASSIUM CHLORIDE, CALCIUM CHLORIDE: 600; 310; 30; 20 INJECTION, SOLUTION INTRAVENOUS at 08:10

## 2024-02-07 RX ADMIN — ALBUTEROL SULFATE 4 PUFF: 108 INHALANT RESPIRATORY (INHALATION) at 08:51

## 2024-02-07 RX ADMIN — DEXAMETHASONE SODIUM PHOSPHATE 2 MG: 10 INJECTION, SOLUTION INTRAMUSCULAR; INTRAVENOUS at 10:42

## 2024-02-07 RX ADMIN — HYDROMORPHONE HYDROCHLORIDE 0.2 MG: 1 INJECTION, SOLUTION INTRAMUSCULAR; INTRAVENOUS; SUBCUTANEOUS at 12:19

## 2024-02-07 RX ADMIN — ONDANSETRON 4 MG: 2 INJECTION INTRAMUSCULAR; INTRAVENOUS at 10:22

## 2024-02-07 RX ADMIN — OXYCODONE HYDROCHLORIDE 5 MG: 5 TABLET ORAL at 16:40

## 2024-02-07 RX ADMIN — FENTANYL CITRATE 25 MCG: 50 INJECTION INTRAMUSCULAR; INTRAVENOUS at 11:25

## 2024-02-07 RX ADMIN — MIDAZOLAM 1 MG: 1 INJECTION INTRAMUSCULAR; INTRAVENOUS at 07:50

## 2024-02-07 RX ADMIN — Medication 50 MG: at 08:36

## 2024-02-07 RX ADMIN — OXYCODONE HYDROCHLORIDE 5 MG: 5 TABLET ORAL at 13:29

## 2024-02-07 RX ADMIN — HYDROMORPHONE HYDROCHLORIDE 0.2 MG: 1 INJECTION, SOLUTION INTRAMUSCULAR; INTRAVENOUS; SUBCUTANEOUS at 11:57

## 2024-02-07 RX ADMIN — Medication 50 MG: at 07:54

## 2024-02-07 RX ADMIN — PROCHLORPERAZINE EDISYLATE 5 MG: 5 INJECTION INTRAMUSCULAR; INTRAVENOUS at 17:36

## 2024-02-07 RX ADMIN — PHENYLEPHRINE HYDROCHLORIDE 200 MCG: 10 INJECTION INTRAVENOUS at 08:26

## 2024-02-07 RX ADMIN — FENTANYL CITRATE 50 MCG: 50 INJECTION INTRAMUSCULAR; INTRAVENOUS at 11:05

## 2024-02-07 RX ADMIN — LIDOCAINE HYDROCHLORIDE 100 MG: 20 INJECTION, SOLUTION INFILTRATION; PERINEURAL at 07:51

## 2024-02-07 RX ADMIN — PHENYLEPHRINE HYDROCHLORIDE 100 MCG: 10 INJECTION INTRAVENOUS at 08:32

## 2024-02-07 RX ADMIN — ONDANSETRON 4 MG: 2 INJECTION INTRAMUSCULAR; INTRAVENOUS at 12:38

## 2024-02-07 RX ADMIN — MIDAZOLAM 1 MG: 1 INJECTION INTRAMUSCULAR; INTRAVENOUS at 07:35

## 2024-02-07 RX ADMIN — BUPIVACAINE HYDROCHLORIDE AND EPINEPHRINE BITARTRATE 60 ML: 2.5; .005 INJECTION, SOLUTION INFILTRATION; PERINEURAL at 10:42

## 2024-02-07 RX ADMIN — PHENAZOPYRIDINE 200 MG: 200 TABLET ORAL at 07:21

## 2024-02-07 RX ADMIN — PHENYLEPHRINE HYDROCHLORIDE 200 MCG: 10 INJECTION INTRAVENOUS at 08:12

## 2024-02-07 RX ADMIN — PROPOFOL 100 MG: 10 INJECTION, EMULSION INTRAVENOUS at 07:51

## 2024-02-07 RX ADMIN — PHENYLEPHRINE HYDROCHLORIDE 100 MCG: 10 INJECTION INTRAVENOUS at 08:06

## 2024-02-07 RX ADMIN — PHENYLEPHRINE HYDROCHLORIDE 200 MCG: 10 INJECTION INTRAVENOUS at 08:20

## 2024-02-07 RX ADMIN — PHENYLEPHRINE HYDROCHLORIDE 100 MCG: 10 INJECTION INTRAVENOUS at 08:17

## 2024-02-07 RX ADMIN — METRONIDAZOLE 500 MG: 500 INJECTION, SOLUTION INTRAVENOUS at 08:05

## 2024-02-07 RX ADMIN — SODIUM CHLORIDE, POTASSIUM CHLORIDE, SODIUM LACTATE AND CALCIUM CHLORIDE: 600; 310; 30; 20 INJECTION, SOLUTION INTRAVENOUS at 07:50

## 2024-02-07 RX ADMIN — DEXMEDETOMIDINE 40 MCG: 100 INJECTION, SOLUTION, CONCENTRATE INTRAVENOUS at 10:42

## 2024-02-07 RX ADMIN — ACETAMINOPHEN 975 MG: 325 TABLET, FILM COATED ORAL at 07:24

## 2024-02-07 ASSESSMENT — ACTIVITIES OF DAILY LIVING (ADL)
ADLS_ACUITY_SCORE: 24

## 2024-02-07 NOTE — OR NURSING
Dr Cowan called this writer to give patient update over phone using . Dr Cowan notified of patient slow wake up, ongoing pain, and slow progression of care in phase 2. Will continue to attempt progression of phase 2 recovery. Instructed to page resident if additional orders needed.     present at bedside throughout cares. Request for extended  appointment pending.

## 2024-02-07 NOTE — DISCHARGE INSTRUCTIONS
Same-Day Surgery   Adult Discharge Orders & Instructions     For 24 hours after surgery:  Get plenty of rest.  A responsible adult must stay with you for at least 24 hours after you leave the hospital.   Pain medication can slow your reflexes. Do not drive or use heavy equipment.  If you have weakness or tingling, don't drive or use heavy equipment until this feeling goes away.  Mixing alcohol and pain medication can cause dizziness and slow your breathing. It can even be fatal. Do not drink alcohol while taking pain medication.  Avoid strenuous or risky activities.  Ask for help when climbing stairs.   You may feel lightheaded.  If so, sit for a few minutes before standing.  Have someone help you get up.   If you have nausea (feel sick to your stomach), drink only clear liquids such as apple juice, ginger ale, broth or 7-Up.  Rest may also help.  Be sure to drink enough fluids.  Move to a regular diet as you feel able. Take pain medications with a small amount of solid food, such as toast or crackers, to avoid nausea.   A slight fever is normal. Call the doctor if your fever is over 100 F (37.7 C) (taken under the tongue) or lasts longer than 24 hours.  You may have a dry mouth, muscle aches, trouble sleeping or a sore throat.  These symptoms should go away after 24 hours.  Do not make important or legal decisions.   Pain Management:      1. Take pain medication (if prescribed) for pain as directed by your physician.        2. WARNING: If the pain medication you have been prescribed contains Tylenol  (acetaminophen), DO NOT take additional doses of Tylenol (acetaminophen).     Call your doctor for any of the followin.  Signs of infection (fever, growing tenderness at the surgery site, severe pain, a large amount of drainage or bleeding, foul-smelling drainage, redness, swelling).    2.  It has been over 8 to 10 hours since surgery and you are still not able to urinate (pee).    3.  Headache for over 24  hours.    4.  Numbness, tingling or weakness the day after surgery (if you had spinal anesthesia).  To contact a doctor, call __Dr. Cowan's clinic at [phone]____ or:  '   [phone] and ask for the Resident On Call for:          __OBGyn_____ (answered 24 hours a day)  '   Emergency Department:  [facility]: [phone]  [facility]: [phone]               Discharge Instructions: Vaginal or Abdominal Hysterectomy   Healing takes time. How much time depends on your health and the type of surgery you had. During your recovery time, you can do a lot to make sure that you regain your health and energy.    Diet  Eat a well-balanced diet with lots of protein, fruits, vegetables, and whole grains.  Avoid spicy or greasy foods.   Drink plenty of fluids - at least 8 tall glasses of water a day. Water is best. Limit caffeine (coffee, tea, soda) to help prevent constipation (hard stools that are difficult to pass).  If you are constipated, you may take one of these medications from the drug store: Docusate (Colace), Docusate with Casanthranol (Radha-Colace), Psyllium (Metamucil), or Milk of Magnesia. Follow to directions on the label.  Activity  Get plenty of rest at first. Slowly return to your normal routine. Several short walks each day will help. It is okay to climb stairs, but use the handrail in case you feel dizzy.   After 2 weeks, you may start gentle exercises. Listen to your body. If you feel tired, sore, or have backaches, you may be doing too much too soon.   Do not drive until you can step on the brakes without pain.   Do not use tampons, douche, or have sex (intercourse) until you see your doctor.   For the next 6 weeks, do not lift anything greater than 15 pounds and avoid heavy exercise.  Pain  Your pain should decrease over the next 2-3 weeks.   You may feel sore after mild exercise.  Take your pain medication as prescribed by your doctor.   Caring for your  Incisions (if applicable)  You may see some fluid draining from your incision(s). Wear the bandage(s) until it stops.   Keep the area clean and dry. Wash with soap and water.  Do not use lotions or powders near the incision(s).  If you have:  Steri-strips (small pieces of tape) - they should fall off on their own in 7-10 days. If that time has passed and they are still in place, you may remove them.  Staples - These will be removed at your next visit.   Dermabond (medical glue) - Leave it in place until it wears off.   Bathing  Take care to avoid slips and falls. Gently pat your incisions dry after bathing.  After Abdominal Hysterectomy (surgery through the belly): You may shower. Avoid tub baths and swimming for two weeks, of until your incision heals.   After Vaginal Hysterectomy (surgery through the vagina): You may bathe or shower. If you had surgery to repair the vaginal wall, it may helps to soak in a warm bath for 20 minutes, twice a day. This will speed healing and reduce tenderness.   What to expect after surgery  A small amount of blood or fluid coming from your vagina for several weeks. Wear pads as needed.   Stitches poking out of the vagina.   Stitches passing out of the vagina (they will look like tiny threads).  Most stitches dissolve within 3 months.   Feeling numb around your stitches. This should go away in less than a year.   Feeling dizzy or light-headed. Hot flashes, trouble sleeping, sudden mood swings, and irritability. If side effects become a problem, notify your doctor.   If you had a vaginal repair, you may feel tugging in the vagina. This is a normal part of healing.   Call your doctor if you have:  Severe chills and a fever of 100.4 degrees F or higher, taken under the tongue.   Bright red blood coming out of the vagina - enough to soak one pad an hour.   Large clots coming out of the vagina.   Urine or vaginal fluid that smells bad.   Trouble urinating (peeing), burning when you go, or  the need to go more often.   Calf pain and/or swelling in both legs.  Nausea (feeling sick to your stomach) or vomiting (throwing up).  Pain that you cannot control with the pain medication prescribed by your doctor.   Follow up with your doctor and return to the clinic in as directed  Make this appointment after you get home if it has not already been scheduled.

## 2024-02-07 NOTE — BRIEF OP NOTE
[facility]    Brief Operative Note    Pre-operative diagnosis: Intramural and subserous leiomyoma of uterus [D25.1, D25.2]  Post-operative diagnosis Same as pre-operative diagnosis    Procedure: HYSTERECTOMY, TOTAL, ROBOT-ASSISTED, WITH BILATERAL SALPINGECTOMY, CYSTOSCOPY, Bilateral - Abdomen    Surgeon: Surgeon(s) and Role:     * Vonnie Cowan MD - Primary     * Cecilia Sosa MD - Resident - Assisting  Anesthesia: General with Block   Estimated Blood Loss: 10 mL from 2/7/2024  7:40 AM to 2/7/2024 10:55 AM  IVF: 1400 ml crystalloid  UOP: 200 ml clear urine end of case      Drains: None  Specimens:   ID Type Source Tests Collected by Time Destination   1 : uterus, cervix, bilateral fallopian tubes and fibroids Tissue Uterus, Cervix, Bilateral Fallopian Tubes SURGICAL PATHOLOGY EXAM Vonnie Cowan MD 2/7/2024  9:39 AM      Findings:   Exam under anesthesia with normal external genitalia. Cervix patulous with enlarged anterior lip, suspect nabothian cyst vs cervical fibroid. Atraumatic laparoscopic entry. Unremarkable abdominal survey including normal liver edge, stomach, bowels. Filmy adhesion from omentum to right fallopian tube, otherwise no adhesions. Uterus enlarged with fundal anterior fibroid, stretching/distorting the right round ligament. 1-2cm subserosal fibroid severed with removal of uterus but removed for pathology separately. Normal appearance of bilateral ovaries. Surgical sites hemostatic end of case. Cuff palpates intact vaginally. Cystoscopy with no foreign body or injury to the bladder, brisk efflux from bilateral Uos.  Complications: None.  Implants: * No implants in log *      Cecilia Sosa MD  ObGyn Resident, PGY-4  02/07/2024

## 2024-02-07 NOTE — ANESTHESIA CARE TRANSFER NOTE
Patient: Nayeli Caal    Procedure: Procedure(s):  HYSTERECTOMY, TOTAL, ROBOT-ASSISTED, WITH BILATERAL SALPINGECTOMY, CYSTOSCOPY       Diagnosis: Intramural and subserous leiomyoma of uterus [D25.1, D25.2]  Diagnosis Additional Information: No value filed.    Anesthesia Type:   General     Note:    Oropharynx: oropharynx clear of all foreign objects  Level of Consciousness: awake and drowsy  Oxygen Supplementation: face mask  Level of Supplemental Oxygen (L/min / FiO2): 6  Independent Airway: airway patency satisfactory and stable  Dentition: dentition unchanged  Vital Signs Stable: post-procedure vital signs reviewed and stable  Report to RN Given: handoff report given  Patient transferred to: PACU    Handoff Report: Identifed the Patient, Identified the Reponsible Provider, Reviewed the pertinent medical history, Discussed the surgical course, Reviewed Intra-OP anesthesia mangement and issues during anesthesia, Set expectations for post-procedure period and Allowed opportunity for questions and acknowledgement of understanding      Vitals:  Vitals Value Taken Time   /71 02/07/24 1330   Temp 36.5  C (97.7  F) 02/07/24 1330   Pulse 95 02/07/24 1330   Resp 14 02/07/24 1330   SpO2 92 % 02/07/24 1330       Electronically Signed By: Sandy Frausto MD  February 7, 2024  2:00 PM

## 2024-02-07 NOTE — ANESTHESIA POSTPROCEDURE EVALUATION
Patient: Nayeli Caal    Procedure: Procedure(s):  HYSTERECTOMY, TOTAL, ROBOT-ASSISTED, WITH BILATERAL SALPINGECTOMY, CYSTOSCOPY       Anesthesia Type:  General    Note:     Postop Pain Control: Uneventful            Sign Out: Well controlled pain   PONV: No   Neuro/Psych: Uneventful            Sign Out: Acceptable/Baseline neuro status   Airway/Respiratory: Uneventful            Sign Out: Acceptable/Baseline resp. status   CV/Hemodynamics: Uneventful            Sign Out: Acceptable CV status; No obvious hypovolemia; No obvious fluid overload   Other NRE: NONE   DID A NON-ROUTINE EVENT OCCUR? No           Last vitals:  Vitals Value Taken Time   /71 02/07/24 1330   Temp 36.5  C (97.7  F) 02/07/24 1330   Pulse 95 02/07/24 1330   Resp 14 02/07/24 1330   SpO2 92 % 02/07/24 1330       Electronically Signed By: Francesco Lockhart DO  February 7, 2024  2:41 PM

## 2024-02-07 NOTE — OP NOTE
[facility]  Full Operative Note    Date of Service:  02/07/24    Surgeon: Vonnie Cowan MD  Assistants: Cecilia Sosa MD PGY-4    Maria C Dunbar, MS3  Preop Dx: Intramural and subserous leiomyomas  Postop Dx: Same  Procedure:  HYSTERECTOMY, TOTAL, ROBOT-ASSISTED, WITH BILATERAL SALPINGECTOMY, CYSTOSCOPY, Bilateral - Abdomen    Anesthesia: GETA  IVF: 1400 mL crystalloid  EBL: 10 mL  UOP: 200 mL clear urine at the end of the case  Drains: none  Specimens:   ID Type Source Tests Collected by Time Destination   1 : uterus, cervix, bilateral fallopian tubes and fibroids Tissue Uterus, Cervix, Bilateral Fallopian Tubes SURGICAL PATHOLOGY EXAM Vonnie Cowan MD 2/7/2024  9:39 AM       Complications: None apparent    Findings: Large Nabothian cyst or cervical myoma palpated on right aspect of cervix. Cervix noted to be dilated to approximately 1cm. Myomatous uterus with fundal subserosal myoma and peduculated myoma. Normal appearing upper abdomen. Right fallopian tube with vascular adhesion to omentum. No other adhesions noted in pelvis. Normal bilateral ovaries. Cystoscopy with bilateral ureteral flow and no injuries to bladder at conclusion of the case.     Indications: Nayeli Caal is 43 year old female who was referred to GYN clinic with abnormal uterine bleeding and dysmenorrhea. Pelvic ultrasound shows fibroids and sign of adenomyosis. EMB benign. Medical management with Myfembree were attempted but not covered by insurance. Given this and desire to undergo definitive therapy a Davinci assisted total laparoscopy hysterectomy, bilateral salpingectomy was recommended. Risks, benefits and alternatives to above stated procedure were discussed. Patient desired to proceed and written informed consent was obtained prior to proceeding.   Procedure Details:   Consent was reviewed with the patient in the preoperative setting and confirmed. She received prophylactic IV ancef and IV  flagyl for antibiotics. The patient was transferred to the operating room where SCDs were placed. General anesthesia was obtained without noted difficulties and she was positioned in dorsal lithotomy position with both arms tucked at sides. Exam under anesthesia was performed with findings as described above. The patient was then prepped and draped in the usual sterile fashion.   Timeout was called at which point the patient's name, procedure and operative site were confirmed by the operative team. A speculum was placed in the vagina. The anterior lip of the cervix was grasped with a tenaculum and large V-care uterine manipulator placed without difficulty. The speculum was removed and a lozada catheter placed.   Attention was then turned to the upper abdomen. An 11 blade was used to make an 8 mm vertical incision within the umbilicus and the 5 mm laparoscopic camera and visiport were used to obtain entry to the abdomen under direct visualization. The abdomen was insufflated with CO2 gas and pneumoperitoneum was achieved to a maxiumum pressure of 15mm Hg. It was decreased to 12 mmHg when patient had poor ventilation with the higher pressure, which was improved with lower insufflation pressure. The abdomen was evaluated with no evidence of intraabdominal trauma from entry noted. All additional ports were placed under direct visualization without any injuries noted. Two additional 8 mm Davinci ports were placed in the left and right lower abdomen with a minimum of 10 cm between them. A 5 mm assistant port was placed between the midline port and the left lower abdomen port. At this point, the patient was placed into steep Trendelenburg. The airseal was attached. The pelvis was inspected as well as the upper abdomen with findings as noted above. The adhesion between the omentum and left fallopian tube was taken down sharply. The [place] robot was docked and instruments were inserted including bipolar and monopolar  scissors.  Attention was then turned to the left where the ureter was easily identified at the pelvic brim. The left fallopian tube was elevated and the mesosalpinx sequentially cauterized and cut along the fallopian tube. Next the left round ligament was grasped and transected using monopolar scissors. The left uterovarian vessels were cauterized and cut and dissection carried inferiorly to meet opening in round ligament. The anterior and posterior leaves of the broad ligament were then opened and carefully dissected down to the level of the uterine arteries bilaterally. This dissection was continued medially to develop the bladder flap. This process was then completed on the right side. The bladder was gently dissected off the uterus and cervix until it was clear of the planned area of colpotomy. Bilateral uterine arteries were skeletonized, isolated, cauterized and transected. Blanching of the uterus was noted. Serial bites along bilateral cardinal ligaments were completed until the distal cervix was reached on each side. The monopolar scissors were then used to further dissect the cardial ligament so that the ureter and vascular pedicle fell away from the cervix bilaterally. The monopolar scissors were then used to incise the tissue to make the colpotomy circumferentially. This incision was extended around the cervix until the uterus was free of all its attachments. The uterus and cervix were then removed through the vagina. The small subserosal fibroid was severed in the uterus extraction, so it was manually removed from the abdomen and sent with the other specimen. A vaginal balloon was inserted to mantain adequate insufflation. The vaginal cuff was closed with continuous suture of 0 Stratafix barbed suture from right to left, then backtracked senior living for a second layer with good reapproximation noted. The vaginal cuff was hemostatic and the pelvis irrigated. Hemostasis of all surgical excision sites were  noted.    Next, attention was turned to cystoscopy portion of case. The lozada catheter was removed.  A 60-degree angled cystoscope was placed with findings as above. Cystoscope was used to drain bladder USP prior to removal. The lozada catheter was not replaced. Vaginal balloon was removed from the vagina. Vaginal inspection revealed no lacerations or other injuries and cuff palpated intact. All instruments were then removed from the abdomen and the DaVinci robot was undocked and all laparoscopic instruments and ports were then removed and the pneumoperitoneum was expelled. Skin was reapproximated with  4-0 monocryl sutures and dermabond. Sponge, instrument and needle counts were correct x 2. The patient tolerated the procedure well and was taken to PACU in stable condition. Dr. Cowan was present for the entire procedure.    Cecilia Sosa MD  ObGyn Resident, PGY-4  02/07/2024     I participated in all aspects of Nayeli Caal's case with Dr. Sosa on 2/7/2024 and agree with the operative details in this note with edits by me.     Vonnie Cowan MD

## 2024-02-07 NOTE — ANESTHESIA PROCEDURE NOTES
TAP Procedure Note    Pre-Procedure   Staff -        Anesthesiologist:  Kuldeep Duran MD       Resident/Fellow: Madhu Sifuentes       Performed By: resident       Location: pre-op       Pre-Anesthestic Checklist: patient identified, IV checked, site marked, risks and benefits discussed, informed consent, monitors and equipment checked, pre-op evaluation, at physician/surgeon's request and post-op pain management  Timeout:       Correct Patient: Yes        Correct Procedure: Yes        Correct Site: Yes        Correct Position: Yes        Correct Laterality: Yes        Site Marked: Yes  Procedure Documentation  Procedure: TAP       Diagnosis: POST OPERATIVE PAIN       Laterality: bilateral       Patient Position: supine       Patient Prep/Sterile Barriers: sterile gloves, mask       Skin prep: Chloraprep       Needle Type: short bevel       Needle Gauge: 21.        Needle Length (millimeters): 110        Ultrasound guided       1. Ultrasound was used to identify targeted nerve, plexus, vascular marker, or fascial plane and place a needle adjacent to it in real-time.       2. Ultrasound was used to visualize the spread of anesthetic in close proximity to the above referenced structure.       3. A permanent image is entered into the patient's record.    Assessment/Narrative         The placement was negative for: blood aspirated, painful injection and site bleeding       Paresthesias: No.       Bolus given via needle..        Secured via.        Insertion/Infusion Method: Single Shot       Complications: none       Injection made incrementally with aspirations every 5 mL.    Medication(s) Administered   Bupivacaine 0.25% w/ 1:200K Epi (Injection) - Injection   60 mL - 2/7/2024 10:42:00 AM  Dexamethasone 10 mg/mL PF (Perineural) - Perineural   2 mg - 2/7/2024 10:42:00 AM  Dexmedetomidine 4 mcg/mL (Perineural) - Perineural   40 mcg - 2/7/2024 10:42:00 AM    FOR [facility] ONLY:   Pain Team  "Contact information: please page the Pain Team Via [facility]. Search \"Pain\". During daytime hours, please page the attending first. At night please page the resident first.      "

## 2024-02-07 NOTE — TELEPHONE ENCOUNTER
----- Message from Vonnie Cowan MD sent at 2/7/2024  2:20 PM CST -----  Regarding: Post-op appointments  Can you call Nayeli and help her schedule a 2-3 weeks and 6-9 week post-op appointment with me? She uses as .     Vonnie Cowan MD

## 2024-02-07 NOTE — ANESTHESIA PROCEDURE NOTES
Airway       Patient location during procedure: OR       Procedure Start/Stop Times: 2/7/2024 7:57 AM  Staff -        Anesthesiologist:  Francesco Lockhart DO       Resident/Fellow: Sandy Frausto MD       Performed By: resident  Consent for Airway        Urgency: elective  Indications and Patient Condition       Indications for airway management: charity-procedural       Induction type:intravenous       Mask difficulty assessment: 1 - vent by mask    Final Airway Details       Final airway type: endotracheal airway       Successful airway: ETT - single and Oral  Endotracheal Airway Details        ETT size (mm): 7.0       Cuffed: yes       Successful intubation technique: direct laryngoscopy       DL Blade Type: MAC 3       Grade View of Cords: 1       Adjucts: stylet       Position: Right       Measured from: gums/teeth       Secured at (cm): 23       Bite block used: None    Post intubation assessment        Placement verified by: capnometry, equal breath sounds and chest rise        Number of attempts at approach: 1       Number of other approaches attempted: 0       Secured with: tape       Ease of procedure: easy       Dentition: Intact and Unchanged    Medication(s) Administered   Medication Administration Time: 2/7/2024 7:57 AM

## 2024-02-07 NOTE — OR NURSING
Dr Perez notified of need for phase 2 recovery orders, possibility of need to re-dose pain medication and slow phase 2 progression at this time.   Hand off given to Suyapa AU

## 2024-02-12 ENCOUNTER — NURSE TRIAGE (OUTPATIENT)
Dept: OBGYN | Facility: CLINIC | Age: 44
End: 2024-02-12
Payer: COMMERCIAL

## 2024-02-12 NOTE — TELEPHONE ENCOUNTER
"Received call from patient. Pt had hysterectomy on 2/7. Reports her left leg is \"feeling funny,\" doing \"a vibration thing\" between her hip and leg and it \"feels weird and numb\" for the last four days. No swelling or redness noted. Ambulating normally. Feels limp, wondering if she has nerve damage from surgery. Intermittent pain, taking tylenol and ibuprofen prn. Discussed that numbness can occur after surgery. Offered visit, pt declined. Routed to provider for recommendations.    Additional Information   Negative: Sounds like a life-threatening emergency to the triager   Negative: Chest pain   Negative: Difficulty breathing   Negative: Acting confused (e.g., disoriented, slurred speech) or excessively sleepy   Negative: Surgical incision symptoms and questions   Negative: Pain or burning with passing urine (urination) and male   Negative: Pain or burning with passing urine (urination) and female   Negative: Constipation   Negative: New or worsening leg (calf, thigh) pain   Negative: New or worsening leg swelling   Negative: Dizziness is severe, or persists > 24 hours after surgery   Negative: Symptoms arising from use of a urinary catheter (Kwong or Coude)   Negative: Cast problems or questions   Negative: Medication question   Negative: Bright red, wide-spread, sunburn-like rash   Negative: SEVERE headache and after spinal (epidural) anesthesia   Negative: Vomiting and persists > 4 hours   Negative: Vomiting and abdomen looks much more swollen than usual   Negative: Drinking very little and dehydration suspected (e.g., no urine > 12 hours, very dry mouth, very lightheaded)   Negative: Patient sounds very sick or weak to the triager   Negative: Sounds like a serious complication to the triager   Negative: Fever > 100.4 F (38.0 C)   Negative: Caller has URGENT question and triager unable to answer question    Answer Assessment - Initial Assessment Questions  1. SYMPTOM: \"What's the main symptom you're concerned " "about?\" (e.g., pain, fever, vomiting)      Numbness and \"weird feeling\" on left leg on area between hip and leg, \"feels weird to touch\"    2. ONSET: \"When did these symptoms start?\"      Started after surgery, got better for a few days but has now been occurring for 4 days    3. SURGERY: \"What surgery did you have?\"      Hysterectomy    4. DATE of SURGERY: \"When was the surgery?\"       2/7/24    5. ANESTHESIA: \" What type of anesthesia did you have?\" (e.g., general, spinal, epidural, local)      General    6. PAIN: \"Is there any pain?\" If Yes, ask: \"How bad is it?\"  (Scale 1-10; or mild, moderate, severe)      Some pain in lower back, has been taking ibuprofen / tylenol prn with mild relief    7. FEVER: \"Do you have a fever?\" If Yes, ask: \"What is your temperature, how was it measured, and when did it start?\"      No    8. VOMITING: \"Is there any vomiting?\" If Yes, ask: \"How many times?\"      No    9. BLEEDING: \"Is there any bleeding?\" If Yes, ask: \"How much?\" and \"Where?\"      No    10. OTHER SYMPTOMS: \"Do you have any other symptoms?\" (e.g., drainage from wound, painful urination, constipation)        No    Protocols used: Post-Op Symptoms and [id number]    "

## 2024-02-12 NOTE — TELEPHONE ENCOUNTER
Nayeli had a hysterectomy + salpingectomy, cystoscopy on 2/7 with Dr. Cowan.    Called pt to discuss her symptoms; no answer, LVM.

## 2024-02-12 NOTE — CONFIDENTIAL NOTE
[facility] Call Center    Phone Message    May a detailed message be left on voicemail: yes     Reason for Call: Pt stated that after surgery, she had been doing fine but then her left leg the skin on the left leg is weird, she's not sure if it feels numb.   Pt declined red flag triage.   Please call pt to discuss. Thank you    Action Taken: Message routed to:  Other: Women's    Travel Screening: Not Applicable

## 2024-02-20 ENCOUNTER — OFFICE VISIT (OUTPATIENT)
Dept: INTERNAL MEDICINE | Facility: CLINIC | Age: 44
End: 2024-02-20
Payer: COMMERCIAL

## 2024-02-20 ENCOUNTER — TELEPHONE (OUTPATIENT)
Dept: NEPHROLOGY | Facility: CLINIC | Age: 44
End: 2024-02-20

## 2024-02-20 VITALS
DIASTOLIC BLOOD PRESSURE: 84 MMHG | WEIGHT: 177.9 LBS | BODY MASS INDEX: 33.59 KG/M2 | OXYGEN SATURATION: 98 % | HEART RATE: 77 BPM | SYSTOLIC BLOOD PRESSURE: 130 MMHG | HEIGHT: 61 IN

## 2024-02-20 DIAGNOSIS — H90.3 USHER SYNDROME: Primary | ICD-10-CM

## 2024-02-20 DIAGNOSIS — Z11.59 NEED FOR HEPATITIS C SCREENING TEST: Primary | ICD-10-CM

## 2024-02-20 DIAGNOSIS — Z79.4 UNCONTROLLED TYPE 2 DIABETES MELLITUS WITH HYPERGLYCEMIA, WITH LONG-TERM CURRENT USE OF INSULIN (H): ICD-10-CM

## 2024-02-20 DIAGNOSIS — H35.52 USHER SYNDROME: Primary | ICD-10-CM

## 2024-02-20 DIAGNOSIS — I10 ESSENTIAL HYPERTENSION: ICD-10-CM

## 2024-02-20 DIAGNOSIS — E11.65 UNCONTROLLED TYPE 2 DIABETES MELLITUS WITH HYPERGLYCEMIA, WITH LONG-TERM CURRENT USE OF INSULIN (H): ICD-10-CM

## 2024-02-20 PROCEDURE — 99214 OFFICE O/P EST MOD 30 MIN: CPT | Performed by: NURSE PRACTITIONER

## 2024-02-20 PROCEDURE — T1013 SIGN LANG/ORAL INTERPRETER: HCPCS | Mod: U3

## 2024-02-20 RX ORDER — LOSARTAN POTASSIUM 100 MG/1
100 TABLET ORAL DAILY
Qty: 90 TABLET | Refills: 3 | Status: SHIPPED | OUTPATIENT
Start: 2024-02-20

## 2024-02-20 RX ORDER — AMLODIPINE BESYLATE 5 MG/1
5 TABLET ORAL AT BEDTIME
Qty: 90 TABLET | Refills: 3 | Status: SHIPPED | OUTPATIENT
Start: 2024-02-20

## 2024-02-20 NOTE — PROGRESS NOTES
S: Naylei Caal is a 43 year old female is seen with  regarding being told by the anesthesiologist, Dr. Kuldeep Duran, during her GYN surgery, that she appeared to have a blockage in her right throat/neck and advised to have an ultrasound.  She denies any pain, difficulty swallowing.    She has also experienced left lateral thigh numbness starting 2 days after her GYN surgery. She did receive a block anesthetic during that surgery. She denies pain.    Patient Active Problem List   Diagnosis    Essential hypertension    Hypersomnia, organic    Other chronic nonalcoholic liver disease    Diabetic retinopathy of both eyes (H)    Obesity    Usher Syndrome: congenital deafness, retinitis pigmentosa    Macular degeneration, age related, nonexudative    Benign neoplasm of iris    Dry eye syndrome    Pemphigus erythematosus (H28)    Chondromalacia of patella, right, steroid injection 6/12/2015    Uncontrolled type 2 diabetes mellitus with hyperglycemia, with long-term current use of insulin (H)    Chronic kidney disease, stage 1    Sprain of left acromioclavicular ligament    Trigger middle finger of right hand    Trigger middle finger of left hand    Adenomyosis of uterus    Dysmenorrhea    S/P hysterectomy          Past Medical History:   Diagnosis Date    Combined visual and hearing impairment     Deaf     Diabetic retinopathy of both eyes (H) 8/19/2011    Hepatic steatosis 08/19/2011    History of tobacco use     Hyperlipidemia     Hypertension     Hypertriglyceridemia     Migraines     Obesity 8/19/2011     Problem list name updated by automated process. 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          Past Surgical History:   Procedure Laterality Date    DAVINCI HYSTERECTOMY TOTAL, SALPINGECTOMY BILATERAL Bilateral  "2/7/2024    Procedure: HYSTERECTOMY, TOTAL, ROBOT-ASSISTED, WITH BILATERAL SALPINGECTOMY, CYSTOSCOPY;  Surgeon: Vonnie Cowan MD;  Location: [facility]    LAPAROSCOPIC CHOLECYSTECTOMY N/A 3/10/2018    Procedure: LAPAROSCOPIC CHOLECYSTECTOMY;  Laparoscopic Cholecystectomy ;  Surgeon: Kuldeep Sigala MD;  Location: [facility] OR    RELEASE TRIGGER FINGER Right 5/2/2019    Procedure: Right Thumb Trigger Release;  Surgeon: Greg Streeter MD;  [facility]    RELEASE TRIGGER FINGER Left 5/30/2019    Procedure: Left Ring Trigger Finger Release.  Ganglion cyst excision.;  Surgeon: Greg Streeter MD;  [facility]    RELEASE TRIGGER FINGER Right 6/13/2023    Procedure: RELEASE, RIGHT TRIGGER FINGER, INDEX AND MIDDLE;  Surgeon: Miracle Carlin MD;  Location: [facility]    RELEASE TRIGGER FINGER Left 8/1/2023    Procedure: RELEASE, LEFT TRIGGER FINGER, MIDDLE;  Surgeon: Miracle Carlin MD;  Location: [facility] OR         REVIEW OF SYSTEMS:  See above.    O:   /84 (BP Location: Right arm)   Pulse 77   Ht 1.549 m (5' 0.98\")   Wt 80.7 kg (177 lb 14.4 oz)   LMP 02/01/2024 (Approximate)   SpO2 98%   BMI 33.63 kg/m    GENERAL APPEARANCE: alert and no distress  HENT: ear canals and TM's normal and mouth without ulcers or lesions. Throat exam appears normal. Throat auscultates normal.  RESP: lungs clear to auscultation - no rales, rhonchi or wheezes  NEURO: alert and oriented.  Good historian.  MUSK: ambulatory.  SKIN: warm and dry  LYMPH: neg cervical, supra and infraclavicular nodes.  PSYCHE: normal      A/P:  Nayeli was seen today for follow up.    Diagnoses and all orders for this visit:    Throat Obstruction       -     Will message the anesthesiologist regarding his findings.    Uncontrolled type 2 diabetes mellitus with hyperglycemia, with long-term current use of insulin (H)  -     losartan (COZAAR) 100 MG tablet; Take 1 tablet (100 mg) by mouth daily    Essential hypertension  -     amLODIPine " (NORVASC) 5 MG tablet; Take 1 tablet (5 mg) by mouth at bedtime    Other orders  -     REVIEW OF HEALTH MAINTENANCE PROTOCOL ORDERS      The patient voiced understanding of the information discussed and all questions were answered.     I spent a total of  30 minutes in the care of this pt during today's office visit. This time includes reviewing the patient's chart and prior history, obtaining a history, performing an examination and evaluation and counseling the patient. This time also includes ordering medications or tests necessary in addition to communication to other member's of the patient's health care team. Time spent in documentation and care coordination is included.     Mariya Mohamud APRN, CNP

## 2024-02-20 NOTE — TELEPHONE ENCOUNTER
[facility] Call Center    Phone Message    May a detailed message be left on voicemail: no     Reason for Call: Order(s): Other:   Reason for requested: Lab orders  Date needed: 05/31/24  Provider name: Toño Escalante      Action Taken: Message routed to:  [facility] Nephrology    Travel Screening: Not Applicable

## 2024-02-20 NOTE — PROGRESS NOTES
Nayeli is a 43 year old that presents in clinic today for the following:     Chief Complaint   Patient presents with    Follow Up     Pt would like to discuss testing on left side of throat because of hysterectomy back earlier this month and anesthesiologist noticed blockage on left side. Pt has numbness in left leg since earlier this month           2/20/2024    10:49 AM   Additional Questions   Roomed by Laine Dunn   Accompanied by N/A     Screenings from encounters over the past 10 days    No data recorded       Laine Dunn at 10:56 AM on 2/20/2024

## 2024-02-23 ENCOUNTER — OFFICE VISIT (OUTPATIENT)
Dept: OBGYN | Facility: CLINIC | Age: 44
End: 2024-02-23
Attending: STUDENT IN AN ORGANIZED HEALTH CARE EDUCATION/TRAINING PROGRAM
Payer: COMMERCIAL

## 2024-02-23 VITALS
HEART RATE: 86 BPM | SYSTOLIC BLOOD PRESSURE: 120 MMHG | WEIGHT: 176 LBS | BODY MASS INDEX: 33.27 KG/M2 | DIASTOLIC BLOOD PRESSURE: 70 MMHG

## 2024-02-23 DIAGNOSIS — Z90.710 S/P HYSTERECTOMY: Primary | ICD-10-CM

## 2024-02-23 PROCEDURE — 99024 POSTOP FOLLOW-UP VISIT: CPT | Performed by: STUDENT IN AN ORGANIZED HEALTH CARE EDUCATION/TRAINING PROGRAM

## 2024-02-23 PROCEDURE — 99213 OFFICE O/P EST LOW 20 MIN: CPT | Performed by: STUDENT IN AN ORGANIZED HEALTH CARE EDUCATION/TRAINING PROGRAM

## 2024-02-23 ASSESSMENT — PAIN SCALES - GENERAL: PAINLEVEL: MODERATE PAIN (4)

## 2024-02-23 NOTE — PROGRESS NOTES
[facility]    Seen with an in person     Reason for visit: post-op visit    HPI: Nayeli Caal is a 43 year old  who presents to clinic today for a postoperative visit following a robotic assisted TLH, BS, cysto on 24. Since surgery she had some increased pain that is now resolving.  Currently pain is rated as a 4 out of 10.  Taking occasional ibuprofen and Tylenol.  No abnormal vaginal discharge.  Has noticed some occasional pain with urination and feeling as if bowels have been moved.    Final Diagnosis   Uterus, Cervix, Bilateral Fallopian Tubes:  - Proliferative-type endometrium  - Leiomyomata (size: 0.2 - 7.0 cm)  -Unremarkable cervix, bilateral fallopian tubes   241.8 g total hysterectomy     Findings: Large Nabothian cyst or cervical myoma palpated on right aspect of cervix. Cervix noted to be dilated to approximately 1cm. Myomatous uterus with fundal subserosal myoma and peduculated myoma. Normal appearing upper abdomen. Right fallopian tube with vascular adhesion to omentum. No other adhesions noted in pelvis. Normal bilateral ovaries. Cystoscopy with bilateral ureteral flow and no injuries to bladder at conclusion of the case.      Objective:  /70   Pulse 86   Wt 79.8 kg (176 lb)   LMP 2024 (Approximate)   BMI 33.27 kg/m    General: Well-appearing  Abdomen: Laparoscopic sites are well healed.  Dermabond removed    Assessment and plan:  -Normal postoperative recovery  -Operative findings and pathology reviewed.  Will give pictures at next visit  -Continue pelvic rest until 6 weeks  -6-week follow-up appointment scheduled    Vonnie Cowan MD

## 2024-02-23 NOTE — PATIENT INSTRUCTIONS
Thank you for trusting us with your care!     If you need to contact us for questions about:  Symptoms, Scheduling & Medical Questions; Non-urgent (2-3 day response) Enrike message, Urgent (needing response today) [phone] (if after 3:30pm next day response)   Prescriptions: Please call your Pharmacy   Billing: [facility] [phone] or [facility]:[phone]

## 2024-02-23 NOTE — NURSING NOTE
Chief Complaint   Patient presents with    Surgical Followup     Total Hysterectomy 2/7/2024    Erika Ramirez LPN

## 2024-02-23 NOTE — LETTER
2024       RE: Nayeli Caal  [address]  [place] [address]     Dear Colleague,    Thank you for referring your patient, Nayeli Caal, to the [facility]. Please see a copy of my visit note below.    [facility]    Seen with an in person     Reason for visit: post-op visit    HPI: Nayeli Caal is a 43 year old  who presents to clinic today for a postoperative visit following a robotic assisted TLH, BS, cysto on 24. Since surgery she had some increased pain that is now resolving.  Currently pain is rated as a 4 out of 10.  Taking occasional ibuprofen and Tylenol.  No abnormal vaginal discharge.  Has noticed some occasional pain with urination and feeling as if bowels have been moved.    Final Diagnosis   Uterus, Cervix, Bilateral Fallopian Tubes:  - Proliferative-type endometrium  - Leiomyomata (size: 0.2 - 7.0 cm)  -Unremarkable cervix, bilateral fallopian tubes   241.8 g total hysterectomy     Findings: Large Nabothian cyst or cervical myoma palpated on right aspect of cervix. Cervix noted to be dilated to approximately 1cm. Myomatous uterus with fundal subserosal myoma and peduculated myoma. Normal appearing upper abdomen. Right fallopian tube with vascular adhesion to omentum. No other adhesions noted in pelvis. Normal bilateral ovaries. Cystoscopy with bilateral ureteral flow and no injuries to bladder at conclusion of the case.      Objective:  /70   Pulse 86   Wt 79.8 kg (176 lb)   LMP 2024 (Approximate)   BMI 33.27 kg/m    General: Well-appearing  Abdomen: Laparoscopic sites are well healed.  Dermabond removed    Assessment and plan:  -Normal postoperative recovery  -Operative findings and pathology reviewed.  Will give pictures at next visit  -Continue pelvic rest until 6 weeks  -6-week follow-up appointment  scheduled    Vonnie Cowan MD

## 2024-02-27 ENCOUNTER — OFFICE VISIT (OUTPATIENT)
Dept: EDUCATION SERVICES | Facility: CLINIC | Age: 44
End: 2024-02-27
Attending: PHYSICIAN ASSISTANT
Payer: COMMERCIAL

## 2024-02-27 DIAGNOSIS — E11.8 TYPE 2 DIABETES MELLITUS WITH COMPLICATION, WITH LONG-TERM CURRENT USE OF INSULIN (H): ICD-10-CM

## 2024-02-27 DIAGNOSIS — Z79.4 TYPE 2 DIABETES MELLITUS WITH COMPLICATION, WITH LONG-TERM CURRENT USE OF INSULIN (H): ICD-10-CM

## 2024-02-27 DIAGNOSIS — Z79.4 UNCONTROLLED TYPE 2 DIABETES MELLITUS WITH HYPERGLYCEMIA, WITH LONG-TERM CURRENT USE OF INSULIN (H): ICD-10-CM

## 2024-02-27 DIAGNOSIS — E11.65 UNCONTROLLED TYPE 2 DIABETES MELLITUS WITH HYPERGLYCEMIA, WITH LONG-TERM CURRENT USE OF INSULIN (H): ICD-10-CM

## 2024-02-27 PROCEDURE — G0108 DIAB MANAGE TRN  PER INDIV: HCPCS | Performed by: REGISTERED NURSE

## 2024-02-27 RX ORDER — INSULIN ASPART 100 [IU]/ML
INJECTION, SOLUTION INTRAVENOUS; SUBCUTANEOUS
Qty: 270 ML | Refills: 3 | Status: SHIPPED | OUTPATIENT
Start: 2024-02-27 | End: 2024-06-26

## 2024-02-27 RX ORDER — INSULIN PEN,REUSABLE,BT LISPRO
1 INSULIN PEN (EA) SUBCUTANEOUS CONTINUOUS
Qty: 1 EACH | Refills: 0 | Status: SHIPPED | OUTPATIENT
Start: 2024-02-27

## 2024-02-27 RX ORDER — TIRZEPATIDE 5 MG/.5ML
5 INJECTION, SOLUTION SUBCUTANEOUS
Qty: 2 ML | Refills: 1 | Status: SHIPPED | OUTPATIENT
Start: 2024-02-27 | End: 2024-03-26

## 2024-02-27 NOTE — BRIEF OP NOTE
[facility], [place]    Brief Operative Note    Pre-operative diagnosis: symtamatic cholelithisis  Post-operative diagnosis Acute on chronic cholecystitis  Procedure: Procedure(s):  Laparoscopic Cholecystectomy  - Wound Class: II-Clean Contaminated  Surgeon: Surgeon(s) and Role:     * Karl Rea MD - Resident - Assisting     * Jaspreet Ibanez MD - Attending  Anesthesia: General   Estimated blood loss: 1cc  Drains:  None  Specimens:   ID Type Source Tests Collected by Time Destination   A : Gallbladder Tissue Gallbladder and Contents SURGICAL PATHOLOGY EXAM Kuldeep Sigala MD 3/10/2018  9:44 AM      Findings: Inflamed gallbladder with dense fatty adhesions  Complications: None apparent  Implants: None         Mom called back to answer your question:    Hi, this is Delmi Ordaz, Nielsen's mom calling back. I did receive your voicemail, so I know that you asked if he had counseling or anything, but he's not in any counseling besides like what he gets through school, which is like his support teachers and just the extra support he receives there, but they don't have any issues there with him. It's pretty much just outside of school.

## 2024-02-28 VITALS — BODY MASS INDEX: 33.92 KG/M2 | WEIGHT: 179.4 LBS

## 2024-02-28 NOTE — PROGRESS NOTES
Diabetes Self-Management Education & Support    Nayeli Caal presents today for education related to Type 2 diabetes    Patient is being treated with:  MDI Insulin  She is accompanied by self and     Year of diagnosis: 2013 or 2014  Referring provider:  Anne Marie Medina PA-C  Living Situation: Lives with a room mate  Employment: Administrative work for [employer]    Complications/Comorbidities:  Usher's syndrome,  CKD Stage 1, bilateral diabetic retinopathy.      PATIENT CONCERNS/REASON FOR REFERRAL :  Nayeli was recently started on Mounjaro 2.5 mg and Ozempic 2 mg was discontinued.  She is here for routine followup, and possible insulin adjustment.       ASSESSMENT:    Taking Medication:     Current Diabetes Management per Patient:  Taking diabetes medications? yes:     Diabetes Medication(s)       Insulin       insulin aspart (NOVOLOG FLEXPEN) 100 UNIT/ML pen 1 unit per 5 grams CHO and correction scale of 1/25/125.  Approximate daily use is 100 units.     insulin aspart (NOVOLOG PENFILL) 100 UNIT/ML cartridge Take with each meal and snack: 1 per 3 grams CHO and correction of 1 unit per 20 mg/dL over a target of 120. Average daily dose is 50 units.     insulin glargine (BASAGLAR KWIKPEN) 100 UNIT/ML pen Inject 60 Units Subcutaneous daily 3 month supply.     Patient taking differently: Inject 55 Units Subcutaneous daily 3 month supply.       Incretin Mimetic Agents       tirzepatide (MOUNJARO) 5 MG/0.5ML pen Inject 5 mg Subcutaneous every 7 days            Monitoring    Patient glucose self monitoring as follows: continuously using a continuous glucose monitor (CGM)  BG meter: Contour Next   CGM: Dexcom G7  BG results:          Patient's most recent   Lab Results   Component Value Date    A1C 7.8 01/04/2024    A1C 11.6 04/05/2021      Patient's A1C goal: <7.0    Activity: not assessed    Healthy Eating:   Generally eats a healthy diet.  Counts carbs.  Uses the In-Pen device for doing  "Novolog.       Problem Solving:      She is at risk for hypoglycemia, however she states \"This has never happened.\"   She does not routinely administer correction insulin when her glucose is over target.     EDUCATION and INSTRUCTION PROVIDED AT THIS VISIT:       Reviewed Nayeli's Dexcom report.  She is over target most of the day, but because we are about to increase her Mounjaro dose from 2.5 mg to 5 mg weekly, I'm not going to intensify her insulin dosing at this time.   She is tolerating the Mounjaro 2.5 mg dose without GI problems and no indication of hypoglycemia.      She had a number of prescriptions that she wanted changed.  Her Novolog cartridges and her insulin pen needles need to be re-ordered, so these were sent to the [facility] mail order service.  Her In-Pen device is about to , so a prescription for a new one was sent to [facility] pharmacy, which is [facility]'s latest recommendation.      We made a follow up appointment in one month, before we increase her Mounjaro dose to 7.5 mg.  She was instructed to contact us right away if starts to experience symptoms of hypoglycemia.      Patient-stated goal written and given to Nayeli Caal.  Verbalized and demonstrated understanding of instructions.     PLAN:  See patient instructions  AVS printed and given to patient    FOLLOW-UP:        Time spent with patient at today's visit was 60 minutes.      Any diabetes medication dose changes were made via the CDE Protocol and Collaborative Practice Agreement with [facility] and  [facility].  A copy of this encounter was provided to patient's referring provider.    "

## 2024-02-29 ENCOUNTER — MYC MEDICAL ADVICE (OUTPATIENT)
Dept: EDUCATION SERVICES | Facility: CLINIC | Age: 44
End: 2024-02-29
Payer: COMMERCIAL

## 2024-02-29 DIAGNOSIS — Z79.4 UNCONTROLLED TYPE 2 DIABETES MELLITUS WITH HYPERGLYCEMIA, WITH LONG-TERM CURRENT USE OF INSULIN (H): Primary | ICD-10-CM

## 2024-02-29 DIAGNOSIS — E11.65 UNCONTROLLED TYPE 2 DIABETES MELLITUS WITH HYPERGLYCEMIA, WITH LONG-TERM CURRENT USE OF INSULIN (H): Primary | ICD-10-CM

## 2024-03-04 NOTE — PROGRESS NOTES
CC -   Usher's Syndrome    INTERVAL HISTORY - Initial visit with THEO luther at [facility] 2015, seen at [facility].  Sees floaters, unsure which eye      PMH -   Nayeli MIXON Ingrid Caal is a  43 year old year-old patient with history of Usher's syndrome seen by SM in 2015 had ffERG 2016 severe attenuation & GVF constriction    DM II no DR in 2015      PAST OCULAR SURGERY  None      RETINAL IMAGING:  OCT   OD - severe atrophy, mild ERM, no fluid, PVD  OS - severe atrophy, no fluid, PHF attached      ASSESSMENT & PLAN    # Ushers syndrome   - minimal changes from 2015 on OCT or FAF   - no CME   - monitor   - advise f/u with Dr. Etienne [facility] clinic next available      # ERM OD   - mild NVS    # PVD OD, Syneresis OS   - advised S/Sx RD 3/2024        # Tr NS/PSC OU   - NVS, monitor              return to clinic: with Dr. Etienne [facility] clinic next available    ATTESTATION     Attending Attestation:     Complete documentation of historical and exam elements from today's encounter can be found in the full encounter summary report (not reduplicated in this progress note).  I personally obtained the chief complaint(s) and history of present illness.  I confirmed and edited as necessary the review of systems, past medical/surgical history, family history, social history, and examination findings as documented by others; and I examined the patient myself.  I personally reviewed the relevant tests, images, and reports as documented above.  I formulated and edited as necessary the assessment and plan and discussed the findings and management plan with the patient and family    Norah Sanchez MD, PhD  , Vitreoretinal Surgery  Department of Ophthalmology  [facility]

## 2024-03-05 ENCOUNTER — OFFICE VISIT (OUTPATIENT)
Dept: OPHTHALMOLOGY | Facility: CLINIC | Age: 44
End: 2024-03-05
Attending: OPHTHALMOLOGY
Payer: COMMERCIAL

## 2024-03-05 DIAGNOSIS — H90.3 USHER SYNDROME: ICD-10-CM

## 2024-03-05 DIAGNOSIS — H35.52 USHER SYNDROME: ICD-10-CM

## 2024-03-05 PROCEDURE — 99207 FUNDUS PHOTOS OU (BOTH EYES): CPT | Mod: 26 | Performed by: OPHTHALMOLOGY

## 2024-03-05 PROCEDURE — 92250 FUNDUS PHOTOGRAPHY W/I&R: CPT | Performed by: OPHTHALMOLOGY

## 2024-03-05 PROCEDURE — 99211 OFF/OP EST MAY X REQ PHY/QHP: CPT | Performed by: OPHTHALMOLOGY

## 2024-03-05 PROCEDURE — 92134 CPTRZ OPH DX IMG PST SGM RTA: CPT | Performed by: OPHTHALMOLOGY

## 2024-03-05 PROCEDURE — 99207 FUNDUS AUTOFLUORESCENCE IMAGE (FAF) OU (BOTH EYES): CPT | Mod: 26 | Performed by: OPHTHALMOLOGY

## 2024-03-05 PROCEDURE — 99204 OFFICE O/P NEW MOD 45 MIN: CPT | Performed by: OPHTHALMOLOGY

## 2024-03-05 ASSESSMENT — EXTERNAL EXAM - LEFT EYE: OS_EXAM: NORMAL

## 2024-03-05 ASSESSMENT — VISUAL ACUITY
OS_SC: 20/250
METHOD: SNELLEN - LINEAR
OD_SC: 20/100

## 2024-03-05 ASSESSMENT — CONF VISUAL FIELD
OD_SUPERIOR_TEMPORAL_RESTRICTION: 3
OS_INFERIOR_NASAL_RESTRICTION: 3
OD_INFERIOR_TEMPORAL_RESTRICTION: 3
OD_INFERIOR_NASAL_RESTRICTION: 3
OS_SUPERIOR_TEMPORAL_RESTRICTION: 3
OD_SUPERIOR_NASAL_RESTRICTION: 3
OS_SUPERIOR_NASAL_RESTRICTION: 3
OS_INFERIOR_TEMPORAL_RESTRICTION: 3

## 2024-03-05 ASSESSMENT — CUP TO DISC RATIO
OS_RATIO: 0.3
OD_RATIO: 0.3

## 2024-03-05 ASSESSMENT — TONOMETRY
IOP_METHOD: ICARE
OS_IOP_MMHG: 10
OD_IOP_MMHG: 11

## 2024-03-05 ASSESSMENT — SLIT LAMP EXAM - LIDS
COMMENTS: NORMAL
COMMENTS: NORMAL

## 2024-03-05 ASSESSMENT — EXTERNAL EXAM - RIGHT EYE: OD_EXAM: NORMAL

## 2024-03-08 ENCOUNTER — APPOINTMENT (OUTPATIENT)
Dept: GENERAL RADIOLOGY | Facility: CLINIC | Age: 44
End: 2024-03-08
Attending: EMERGENCY MEDICINE
Payer: COMMERCIAL

## 2024-03-08 ENCOUNTER — NURSE TRIAGE (OUTPATIENT)
Dept: NURSING | Facility: CLINIC | Age: 44
End: 2024-03-08

## 2024-03-08 ENCOUNTER — HOSPITAL ENCOUNTER (EMERGENCY)
Facility: CLINIC | Age: 44
Discharge: HOME | End: 2024-03-08
Attending: EMERGENCY MEDICINE | Admitting: EMERGENCY MEDICINE
Payer: COMMERCIAL

## 2024-03-08 ENCOUNTER — OFFICE VISIT (OUTPATIENT)
Dept: INTERPRETER SERVICES | Facility: CLINIC | Age: 44
End: 2024-03-08

## 2024-03-08 VITALS
SYSTOLIC BLOOD PRESSURE: 124 MMHG | OXYGEN SATURATION: 98 % | RESPIRATION RATE: 18 BRPM | DIASTOLIC BLOOD PRESSURE: 71 MMHG | TEMPERATURE: 98.1 F | HEART RATE: 81 BPM

## 2024-03-08 DIAGNOSIS — R07.9 CHEST PAIN, UNSPECIFIED TYPE: ICD-10-CM

## 2024-03-08 LAB
ANION GAP SERPL CALCULATED.3IONS-SCNC: 13 MMOL/L (ref 7–15)
ATRIAL RATE - MUSE: 94 BPM
BASOPHILS # BLD AUTO: 0.1 10E3/UL (ref 0–0.2)
BASOPHILS NFR BLD AUTO: 0 %
BUN SERPL-MCNC: 15.8 MG/DL (ref 6–20)
CALCIUM SERPL-MCNC: 9.2 MG/DL (ref 8.6–10)
CHLORIDE SERPL-SCNC: 101 MMOL/L (ref 98–107)
CREAT SERPL-MCNC: 0.66 MG/DL (ref 0.51–0.95)
DEPRECATED HCO3 PLAS-SCNC: 23 MMOL/L (ref 22–29)
DIASTOLIC BLOOD PRESSURE - MUSE: NORMAL MMHG
EGFRCR SERPLBLD CKD-EPI 2021: >90 ML/MIN/1.73M2
EOSINOPHIL # BLD AUTO: 0.4 10E3/UL (ref 0–0.7)
EOSINOPHIL NFR BLD AUTO: 2 %
ERYTHROCYTE [DISTWIDTH] IN BLOOD BY AUTOMATED COUNT: 12.6 % (ref 10–15)
FLUAV RNA SPEC QL NAA+PROBE: NEGATIVE
FLUBV RNA RESP QL NAA+PROBE: NEGATIVE
GLUCOSE SERPL-MCNC: 207 MG/DL (ref 70–99)
HCT VFR BLD AUTO: 38.3 % (ref 35–47)
HGB BLD-MCNC: 12.6 G/DL (ref 11.7–15.7)
HOLD SPECIMEN: NORMAL
IMM GRANULOCYTES # BLD: 0.1 10E3/UL
IMM GRANULOCYTES NFR BLD: 0 %
INTERPRETATION ECG - MUSE: NORMAL
LYMPHOCYTES # BLD AUTO: 2.5 10E3/UL (ref 0.8–5.3)
LYMPHOCYTES NFR BLD AUTO: 15 %
MCH RBC QN AUTO: 28.1 PG (ref 26.5–33)
MCHC RBC AUTO-ENTMCNC: 32.9 G/DL (ref 31.5–36.5)
MCV RBC AUTO: 85 FL (ref 78–100)
MONOCYTES # BLD AUTO: 0.7 10E3/UL (ref 0–1.3)
MONOCYTES NFR BLD AUTO: 4 %
NEUTROPHILS # BLD AUTO: 12.7 10E3/UL (ref 1.6–8.3)
NEUTROPHILS NFR BLD AUTO: 79 %
NRBC # BLD AUTO: 0 10E3/UL
NRBC BLD AUTO-RTO: 0 /100
P AXIS - MUSE: 56 DEGREES
PLATELET # BLD AUTO: 393 10E3/UL (ref 150–450)
POTASSIUM SERPL-SCNC: 4 MMOL/L (ref 3.4–5.3)
PR INTERVAL - MUSE: 146 MS
QRS DURATION - MUSE: 84 MS
QT - MUSE: 372 MS
QTC - MUSE: 465 MS
R AXIS - MUSE: 215 DEGREES
RBC # BLD AUTO: 4.49 10E6/UL (ref 3.8–5.2)
RSV RNA SPEC NAA+PROBE: NEGATIVE
SARS-COV-2 RNA RESP QL NAA+PROBE: NEGATIVE
SODIUM SERPL-SCNC: 137 MMOL/L (ref 135–145)
SYSTOLIC BLOOD PRESSURE - MUSE: NORMAL MMHG
T AXIS - MUSE: 51 DEGREES
TROPONIN T SERPL HS-MCNC: 9 NG/L
TROPONIN T SERPL HS-MCNC: <6 NG/L
TROPONIN T SERPL HS-MCNC: <6 NG/L
VENTRICULAR RATE- MUSE: 94 BPM
WBC # BLD AUTO: 16.4 10E3/UL (ref 4–11)

## 2024-03-08 PROCEDURE — 85025 COMPLETE CBC W/AUTO DIFF WBC: CPT | Performed by: EMERGENCY MEDICINE

## 2024-03-08 PROCEDURE — 250N000013 HC RX MED GY IP 250 OP 250 PS 637: Performed by: EMERGENCY MEDICINE

## 2024-03-08 PROCEDURE — 93005 ELECTROCARDIOGRAM TRACING: CPT

## 2024-03-08 PROCEDURE — 87637 SARSCOV2&INF A&B&RSV AMP PRB: CPT | Performed by: EMERGENCY MEDICINE

## 2024-03-08 PROCEDURE — 36415 COLL VENOUS BLD VENIPUNCTURE: CPT | Performed by: EMERGENCY MEDICINE

## 2024-03-08 PROCEDURE — 99285 EMERGENCY DEPT VISIT HI MDM: CPT | Mod: 25

## 2024-03-08 PROCEDURE — 84484 ASSAY OF TROPONIN QUANT: CPT | Performed by: EMERGENCY MEDICINE

## 2024-03-08 PROCEDURE — 80048 BASIC METABOLIC PNL TOTAL CA: CPT | Performed by: EMERGENCY MEDICINE

## 2024-03-08 PROCEDURE — T1013 SIGN LANG/ORAL INTERPRETER: HCPCS | Mod: U3

## 2024-03-08 PROCEDURE — 71046 X-RAY EXAM CHEST 2 VIEWS: CPT

## 2024-03-08 RX ORDER — FAMOTIDINE 20 MG/1
20 TABLET, FILM COATED ORAL ONCE
Status: COMPLETED | OUTPATIENT
Start: 2024-03-08 | End: 2024-03-08

## 2024-03-08 RX ORDER — FAMOTIDINE 20 MG/1
20 TABLET, FILM COATED ORAL 2 TIMES DAILY
Qty: 30 TABLET | Refills: 0 | Status: SHIPPED | OUTPATIENT
Start: 2024-03-08 | End: 2024-07-12

## 2024-03-08 RX ORDER — MAGNESIUM HYDROXIDE/ALUMINUM HYDROXICE/SIMETHICONE 120; 1200; 1200 MG/30ML; MG/30ML; MG/30ML
15 SUSPENSION ORAL ONCE
Status: COMPLETED | OUTPATIENT
Start: 2024-03-08 | End: 2024-03-08

## 2024-03-08 RX ORDER — SUCRALFATE 1 G/1
1 TABLET ORAL 4 TIMES DAILY
Qty: 30 TABLET | Refills: 0 | Status: SHIPPED | OUTPATIENT
Start: 2024-03-08 | End: 2024-07-12

## 2024-03-08 RX ORDER — ACETAMINOPHEN 500 MG
1000 TABLET ORAL ONCE
Status: COMPLETED | OUTPATIENT
Start: 2024-03-08 | End: 2024-03-08

## 2024-03-08 RX ADMIN — FAMOTIDINE 20 MG: 20 TABLET ORAL at 18:13

## 2024-03-08 RX ADMIN — ALUMINUM HYDROXIDE, MAGNESIUM HYDROXIDE, AND SIMETHICONE 15 ML: 200; 200; 20 SUSPENSION ORAL at 18:42

## 2024-03-08 RX ADMIN — ACETAMINOPHEN 1000 MG: 500 TABLET, FILM COATED ORAL at 19:01

## 2024-03-08 ASSESSMENT — ACTIVITIES OF DAILY LIVING (ADL)
ADLS_ACUITY_SCORE: 35

## 2024-03-08 ASSESSMENT — COLUMBIA-SUICIDE SEVERITY RATING SCALE - C-SSRS
6. HAVE YOU EVER DONE ANYTHING, STARTED TO DO ANYTHING, OR PREPARED TO DO ANYTHING TO END YOUR LIFE?: NO
1. IN THE PAST MONTH, HAVE YOU WISHED YOU WERE DEAD OR WISHED YOU COULD GO TO SLEEP AND NOT WAKE UP?: NO
2. HAVE YOU ACTUALLY HAD ANY THOUGHTS OF KILLING YOURSELF IN THE PAST MONTH?: NO

## 2024-03-08 NOTE — TELEPHONE ENCOUNTER
Nurse Triage SBAR    Is this a 2nd Level Triage? YES, LICENSED PRACTITIONER REVIEW IS REQUIRED    Situation: Patient calling with difficulty breathing since waking up this morning and has not gone away.    Background: Last seen in Primary Care clinic 2/20/24. Hx HTN, T2DM, CKD    Of note: During recent GYN surgery, appeared to have a blockage in her right throat/neck and was advised to have an ultrasound. This has not yet been scheduled.    Assessment:  Patient communication via .  -Endorses difficulty taking deep breath in, and pain with doing so.  -Describes as sharp  -No pulse oximeter available  -Unknown if wheezing, patient is deaf. Thinks chest might be tight  -Cough, non-productive  -Endorses anxiety     Protocol Recommended Disposition:   Go to ED Now    Recommendation: Please follow-up on pt disposition. She will be calling a friend to drive her to ER. Advised to callback if further concerns or questions arise.    Routed to provider/clinic    Payal MCLAUGHLIN RN  [facility]/Red Flag Triage & Med Refill Team      Reason for Disposition   MODERATE difficulty breathing (e.g., speaks in phrases, SOB even at rest, pulse 100-120) of new-onset or worse than normal    Additional Information   Negative: SEVERE difficulty breathing (e.g., struggling for each breath, speaks in single words, pulse > 120)   Negative: Breathing stopped and hasn't returned   Negative: Choking on something   Negative: Bluish (or gray) lips or face   Negative: Difficult to awaken or acting confused (e.g., disoriented, slurred speech)   Negative: Passed out (i.e., fainted, collapsed and was not responding)   Negative: Wheezing started suddenly after medicine, an allergic food, or bee sting   Negative: Stridor (harsh sound while breathing in)   Negative: Slow, shallow and weak breathing   Negative: Sounds like a life-threatening emergency to the triager   Negative: Chest pain   Negative: Wheezing (high pitched whistling  "sound) and previous asthma attacks or use of asthma medicines   Negative: Difficulty breathing and within 14 days of COVID-19 Exposure   Negative: Difficulty breathing and only present when coughing   Negative: Difficulty breathing and only from stuffy nose   Negative: Difficulty breathing and only from stuffy nose or runny nose from common cold    Answer Assessment - Initial Assessment Questions  1. RESPIRATORY STATUS: \"Describe your breathing?\" (e.g., wheezing, shortness of breath, unable to speak, severe coughing)       Pain & sharpness with breathing. It is difficult to take breath in.      2. ONSET: \"When did this breathing problem begin?\"       Woke up feeling this way, noticed upon standing.      3. PATTERN \"Does the difficult breathing come and go, or has it been constant since it started?\"       Constant      4. SEVERITY: \"How bad is your breathing?\" (e.g., mild, moderate, severe)     - MILD: No SOB at rest, mild SOB with walking, speaks normally in sentences, can lie down, no retractions, pulse < 100.     - MODERATE: SOB at rest, SOB with minimal exertion and prefers to sit, cannot lie down flat, speaks in phrases, mild retractions, audible wheezing, pulse 100-120.     - SEVERE: Very SOB at rest, speaks in single words, struggling to breathe, sitting hunched forward, retractions, pulse > 120             5. RECURRENT SYMPTOM: \"Have you had difficulty breathing before?\" If Yes, ask: \"When was the last time?\" and \"What happened that time?\"       Has not had before      6. CARDIAC HISTORY: \"Do you have any history of heart disease?\" (e.g., heart attack, angina, bypass surgery, angioplasty)       Not aware of      7. LUNG HISTORY: \"Do you have any history of lung disease?\"  (e.g., pulmonary embolus, asthma, emphysema)      Not aware of    8. CAUSE: \"What do you think is causing the breathing problem?\"       Doesn't know      9. OTHER SYMPTOMS: \"Do you have any other symptoms? (e.g., dizziness, runny nose, " "cough, chest pain, fever)      Headache, non productive cough      10. O2 SATURATION MONITOR:  \"Do you use an oxygen saturation monitor (pulse oximeter) at home?\" If Yes, ask: \"What is your reading (oxygen level) today?\" \"What is your usual oxygen saturation reading?\" (e.g., 95%)        ELVIS      11. PREGNANCY: \"Is there any chance you are pregnant?\" \"When was your last menstrual period?\"        -      12. TRAVEL: \"Have you traveled out of the country in the last month?\" (e.g., travel history, exposures)        -    Protocols used: Breathing Difficulty-A-OH    "

## 2024-03-08 NOTE — ED PROVIDER NOTES
History     Chief Complaint:  Chest Pain    HPI   Nayeli Caal is a 43 year old female with history of hypertension, hyperlipidemia, stage 1 chronic kidney disease, and type 2 diabetes, who presents with chest pain upon waking this morning. She states that it alleviated on its own for some time but has worsened, with increased shortness of breath. Denies anything that makes her pain worse. Denies any fever.     Independent Historian:   None - Patient Only    Review of External Notes:   None     Medications:    Amlodipine  Aspirin  Atorvastatin  Fenofibrate  Insulin aspart  Insulin glargine   Losartan  Oxycodone   Tirzepatide     Past Medical History:    Diabetic retinopathy  Hepatic steatosis  Hyperlipidemia  Hypertension  Migraines  Obesity  Type 2 diabetes  Usher syndrome  Stage 1 chronic kidney disease  Benign neoplasm of iris     Past Surgical History:    DaVinci total hysterectomy  Laparoscopic cholecystectomy  Release trigger finger     Physical Exam   Patient Vitals for the past 24 hrs:   BP Temp Temp src Pulse Resp SpO2   03/08/24 2134 124/71 -- -- 81 18 98 %   03/08/24 2003 -- -- -- -- -- 98 %   03/08/24 2002 -- -- -- -- -- 98 %   03/08/24 2001 -- -- -- -- -- 97 %   03/08/24 2000 128/77 -- -- 101 -- 97 %   03/08/24 1904 (!) 142/94 -- -- -- -- 98 %   03/08/24 1843 -- -- -- -- -- 97 %   03/08/24 1834 -- -- -- -- -- 98 %   03/08/24 1828 131/79 -- -- -- -- 98 %   03/08/24 1813 132/85 -- -- 98 -- --   03/08/24 1532 (!) 159/81 98.1  F (36.7  C) Temporal 96 16 99 %        Physical Exam  Constitutional: Alert, attentive, GCS 15  HENT:    Nose: Nose normal.    Mouth/Throat: Oropharynx is clear, mucous membranes are moist  Eyes: EOM are normal, anicteric, conjugate gaze  CV: regular rate and rhythm   Chest: Effort normal and breath sounds clear without wheezing or rales, symmetric bilaterally   GI:  non tender. No distension. No guarding or rebound.    MSK: No LE edema, no tenderness to palpation of  BLE.  Neurological: Alert, attentive, moving all extremities equally.   Skin: Skin is warm and dry.    Emergency Department Course   ECG  ECG results from 03/08/24   EKG 12-lead, tracing only     Value    Systolic Blood Pressure     Diastolic Blood Pressure     Ventricular Rate 94    Atrial Rate 94    WI Interval 146    QRS Duration 84        QTc 465    P Axis 56    R AXIS 215    T Axis 51    Interpretation ECG      Sinus rhythm  Right superior axis deviation  Abnormal ECG  When compared with ECG of 04-JAN-2024 16:30,  No significant change was found       *Note: Due to a large number of results and/or encounters for the requested time period, some results have not been displayed. A complete set of results can be found in Results Review.     Imaging:  Chest XR,  PA & LAT   Final Result   IMPRESSION:       Lungs are clear. No evidence of pneumonia. No pleural effusions or pneumothorax. Normal pulmonary vascularity. Nonenlarged cardiac silhouette.        Laboratory:  Labs Ordered and Resulted from Time of ED Arrival to Time of ED Departure   BASIC METABOLIC PANEL - Abnormal       Result Value    Sodium 137      Potassium 4.0      Chloride 101      Carbon Dioxide (CO2) 23      Anion Gap 13      Urea Nitrogen 15.8      Creatinine 0.66      GFR Estimate >90      Calcium 9.2      Glucose 207 (*)    CBC WITH PLATELETS AND DIFFERENTIAL - Abnormal    WBC Count 16.4 (*)     RBC Count 4.49      Hemoglobin 12.6      Hematocrit 38.3      MCV 85      MCH 28.1      MCHC 32.9      RDW 12.6      Platelet Count 393      % Neutrophils 79      % Lymphocytes 15      % Monocytes 4      % Eosinophils 2      % Basophils 0      % Immature Granulocytes 0      NRBCs per 100 WBC 0      Absolute Neutrophils 12.7 (*)     Absolute Lymphocytes 2.5      Absolute Monocytes 0.7      Absolute Eosinophils 0.4      Absolute Basophils 0.1      Absolute Immature Granulocytes 0.1      Absolute NRBCs 0.0     TROPONIN T, HIGH SENSITIVITY - Normal     Troponin T, High Sensitivity <6     INFLUENZA A/B, RSV, & SARS-COV2 PCR - Normal    Influenza A PCR Negative      Influenza B PCR Negative      RSV PCR Negative      SARS CoV2 PCR Negative     TROPONIN T, HIGH SENSITIVITY - Normal    Troponin T, High Sensitivity 9     TROPONIN T, HIGH SENSITIVITY - Normal    Troponin T, High Sensitivity <6        Emergency Department Course & Assessments:    Interventions:  Medications   alum & mag hydroxide-simethicone (MAALOX) suspension 15 mL (15 mLs Oral $Given 3/8/24 1842)   famotidine (PEPCID) tablet 20 mg (20 mg Oral $Given 3/8/24 1813)   acetaminophen (TYLENOL) tablet 1,000 mg (1,000 mg Oral $Given 3/8/24 1901)        Assessments:   I entered the patient's room and obtained history.   I rechecked the patient and explained findings. I believe she is safe for discharge at this time.     Independent Interpretation (X-rays, CTs, rhythm strip):   I personally viewed her chest x-ray, see no evidence of pneumonia pneumothorax.    Consultations/Discussion of Management or Tests:  None        Social Determinants of Health affecting care:   None    Disposition:  The patient was discharged.     Impression & Plan    Medical Decision Makin-year-old woman past medical history significant for deafness, diabetes presenting for evaluation of noncardiac sounding central chest pain.  EKG showed no evidence of ischemia, high-sensitivity troponins x 3 were negative, we did a third because she went from undetectable (< 6), 9 then back to < 6.  Her pain resolved with GI cocktail, she further went on to report of chalky metallic taste in her mouth and dry mouth, I have high suspicion this is acid related.  Chest x-ray is clear.  Her abdominal exam is benign.  No indication for admission, I recommended antiacid medication and close follow-up with PCP, return precautions reviewed.      Diagnosis:    ICD-10-CM    1. Chest pain, unspecified type  R07.9            Discharge  Medications:  Discharge Medication List as of 3/8/2024  9:54 PM        START taking these medications    Details   famotidine (PEPCID) 20 MG tablet Take 1 tablet (20 mg) by mouth 2 times daily, Disp-30 tablet, R-0, Local Print      sucralfate (CARAFATE) 1 GM tablet Take 1 tablet (1 g) by mouth 4 times daily, Disp-30 tablet, R-0, Local Print           Jasbir Biswas MD  [employer]  12:06 AM 03/09/24     Scribe Disclosure:  Jimmie MONREAL Hired, am serving as a scribe at 4:12 PM on 3/8/2024 to document services personally performed by Jasbir Biswas MD based on my observations and the provider's statements to me.   3/8/2024   Jasbir Biswas MD Dunbar, John Forrest, MD  03/09/24 0009

## 2024-03-11 ENCOUNTER — OFFICE VISIT (OUTPATIENT)
Dept: DERMATOLOGY | Facility: CLINIC | Age: 44
End: 2024-03-11
Attending: PHYSICIAN ASSISTANT
Payer: COMMERCIAL

## 2024-03-11 DIAGNOSIS — D18.01 CHERRY ANGIOMA: ICD-10-CM

## 2024-03-11 DIAGNOSIS — D49.2 NEOPLASM OF UNSPECIFIED BEHAVIOR OF BONE, SOFT TISSUE, AND SKIN: ICD-10-CM

## 2024-03-11 DIAGNOSIS — Z12.83 SKIN CANCER SCREENING: Primary | ICD-10-CM

## 2024-03-11 DIAGNOSIS — D22.9 MULTIPLE BENIGN NEVI: ICD-10-CM

## 2024-03-11 DIAGNOSIS — L82.1 SEBORRHEIC KERATOSES: ICD-10-CM

## 2024-03-11 DIAGNOSIS — D22.9 ATYPICAL MOLE: ICD-10-CM

## 2024-03-11 DIAGNOSIS — L81.4 SOLAR LENTIGO: ICD-10-CM

## 2024-03-11 PROCEDURE — 99203 OFFICE O/P NEW LOW 30 MIN: CPT | Mod: 24 | Performed by: PHYSICIAN ASSISTANT

## 2024-03-11 PROCEDURE — 11102 TANGNTL BX SKIN SINGLE LES: CPT | Mod: 79 | Performed by: PHYSICIAN ASSISTANT

## 2024-03-11 PROCEDURE — 11103 TANGNTL BX SKIN EA SEP/ADDL: CPT | Mod: 79 | Performed by: PHYSICIAN ASSISTANT

## 2024-03-11 PROCEDURE — 88341 IMHCHEM/IMCYTCHM EA ADD ANTB: CPT | Mod: TC | Performed by: PHYSICIAN ASSISTANT

## 2024-03-11 PROCEDURE — 88305 TISSUE EXAM BY PATHOLOGIST: CPT | Mod: 26 | Performed by: PATHOLOGY

## 2024-03-11 PROCEDURE — 88342 IMHCHEM/IMCYTCHM 1ST ANTB: CPT | Mod: 26 | Performed by: PATHOLOGY

## 2024-03-11 PROCEDURE — 88341 IMHCHEM/IMCYTCHM EA ADD ANTB: CPT | Mod: 26 | Performed by: PATHOLOGY

## 2024-03-11 ASSESSMENT — PAIN SCALES - GENERAL: PAINLEVEL: NO PAIN (0)

## 2024-03-11 NOTE — LETTER
3/11/2024       RE: Nayeli Caal  [address]  [place] [address]     Dear Colleague,    Thank you for referring your patient, Nayeli Caal, to the [facility] [place] at [facility]. Please see a copy of my visit note below.    [facility] Dermatology Note  Encounter Date: Mar 11, 2024  Office Visit     Dermatology Problem List:  1. NUB x2  - s/p shave bx 03/11/2024    ____________________________________________    Assessment & Plan:    # Neoplasm of unspecified behavior of the skin (D49.2) on the L plantar surface. The differential diagnosis includes dysplasia.   - Shave biopsy performed today (see procedure note(s) below).    # Neoplasm of unspecified behavior of the skin (D49.2) on the central back. The differential diagnosis includes dysplasia.   - Shave biopsy performed today (see procedure note(s) below).    # Skin cancer screening with multiple benign nevi, solar lentigines  - ABCDEs: Counseled ABCDEs of melanoma: Asymmetry, Border (irregularity), Color (not uniform, changes in color), Diameter (greater than 6 mm which is about the size of a pencil eraser), and Evolving (any changes in preexisting moles).  - Sun protection: Counseled SPF30+ sunscreen, UPF clothing, sun avoidance, tanning bed avoidance.    # Cherry angiomas and seborrheic keratoses,  Benign, reassurance given.       Procedures Performed:   - Shave biopsy procedure note, location(s): L plantar surface. After discussion of benefits and risks including but not limited to bleeding, infection, scar, incomplete removal, recurrence, and non-diagnostic biopsy, written consent and photographs were obtained. The area was cleaned with isopropyl alcohol. 0.5mL of 1% lidocaine with epinephrine was injected to obtain adequate anesthesia of lesion(s). Shave biopsy at site(s) performed. Hemostasis was achieved with aluminium  chloride. Petrolatum ointment and a sterile dressing were applied. The patient tolerated the procedure and no complications were noted. The patient was provided with verbal and written post care instructions.   - Shave biopsy procedure note, location(s): central back. After discussion of benefits and risks including but not limited to bleeding, infection, scar, incomplete removal, recurrence, and non-diagnostic biopsy, written consent and photographs were obtained. The area was cleaned with isopropyl alcohol. 0.5mL of 1% lidocaine with epinephrine was injected to obtain adequate anesthesia of lesion(s). Shave biopsy at site(s) performed. Hemostasis was achieved with aluminium chloride. Petrolatum ointment and a sterile dressing were applied. The patient tolerated the procedure and no complications were noted. The patient was provided with verbal and written post care instructions.     Follow-up: pending path results    Staff and Scribe:   Scribe Disclosure:   By signing my name below, I, Minnie Cochran, attest that this documentation has been prepared under the direction and in the presence of Ainsley Mcfarland PA-C.  - Electronically Signed: Minnie Cochran 03/11/24       Provider Disclosure:  I agree with above History, Review of Systems, Physical exam and Plan.  I have reviewed the content of the documentation and have edited it as needed. I have personally performed the services documented here and the documentation accurately represents those services and the decisions I have made.      Electronically signed by:    All risk, benefits and alternatives were discussed with patient.  Patient is in agreement and understands the assessment and plan.  All questions were answered.  Sun Screen Education was given.   Return to Clinic as needed.   Ainsley Mcfarland PA-C        ____________________________________________    CC: Derm Problem (Spot of concern on the right foot )    HPI:  MsMatthew Nayeli Quintero Ten is a(n) 43  year old female who presents today as a new patient for a skin check.     Patient is uncertain of Fhx due to being adopted. She was referred here today for a mole on her foot.    Patient is otherwise feeling well, without additional skin concerns.    Labs Reviewed:  None reviewed.    Physical exam:  Vitals: LMP 02/01/2024 (Approximate)   GEN: This is a well developed, well-nourished female in no acute distress, in a pleasant mood.    SKIN: Norris Skin Type III  Full skin, which includes the head/face, both arms, chest, back, abdomen,both legs, genitalia and/or groin buttocks, digits and/or nails, was examined.  - 6 mm brown asymmetric macule L plantar surface  - central back 4 mm hyperpigmented macule  - There are dome shaped bright red papules on the head/neck, trunk, extremities.   - Multiple regular brown pigmented macules and papules are identified on the head/neck, trunk, extremities.   - Scattered brown macules on sun exposed areas.  - There are waxy stuck on tan to brown papules on the head/neck, trunk, extremities.  - No other lesions of concern on areas examined.                       Medications:  Current Outpatient Medications   Medication     acetaminophen (TYLENOL) 500 MG tablet     Alcohol Swabs (ALCOHOL PREP PAD) 70 % PADS     amLODIPine (NORVASC) 5 MG tablet     aspirin (ASA) 81 MG chewable tablet     atorvastatin (LIPITOR) 40 MG tablet     B-D U/F insulin pen needle     BD ULTRA FINE PEN NEEDLES     blood glucose (ACCU-CHEK LAYA PLUS) test strip     blood glucose monitoring (ACCU-CHEK FASTCLIX) lancets     calcium carbonate-vitamin D (OSCAL) 500-5 MG-MCG tablet     Continuous Blood Gluc Sensor (DEXCOM G7 SENSOR) MISC     ergocalciferol (ERGOCALCIFEROL) 1.25 MG (95531 UT) capsule     famotidine (PEPCID) 20 MG tablet     fenofibrate (TRIGLIDE/LOFIBRA) 160 MG tablet     fish oil-omega-3 fatty acids 1000 MG capsule     hydrocortisone (CORTAID) 1 % external cream     ibuprofen (ADVIL/MOTRIN) 600 MG  tablet     Injection Device for insulin (INPEN 100-BLUE-NOVOLOG-FIASP) DAVID     Injection Device for insulin (INPEN 100-PINK-NOVOLOG-FIASP) DAVID     insulin aspart (NOVOLOG FLEXPEN) 100 UNIT/ML pen     insulin aspart (NOVOLOG PENFILL) 100 UNIT/ML cartridge     insulin glargine (BASAGLAR KWIKPEN) 100 UNIT/ML pen     insulin pen needle (B-D U/F) 31G X 8 MM miscellaneous     losartan (COZAAR) 100 MG tablet     naproxen (NAPROSYN) 375 MG tablet     ondansetron (ZOFRAN ODT) 4 MG ODT tab     Ostomy Supplies (ADHESIVE REMOVER WIPES) MISC     Ostomy Supplies (SKIN TAC ADHESIVE BARRIER WIPE) MISC     oxyCODONE (ROXICODONE) 5 MG tablet     senna-docusate (SENOKOT-S/PERICOLACE) 8.6-50 MG tablet     sucralfate (CARAFATE) 1 GM tablet     tirzepatide (MOUNJARO) 5 MG/0.5ML pen     triamcinolone (KENALOG) 0.1 % external ointment     Current Facility-Administered Medications   Medication     hydrocortisone (CORTAID) 1 % cream     hydrocortisone (CORTAID) 1 % cream     hylan (SYNVISC ONE) injection 48 mg     lidocaine (PF) (XYLOCAINE) 1 % injection 4 mL     triamcinolone (KENALOG-40) injection 40 mg      Past Medical History:   Patient Active Problem List   Diagnosis     Essential hypertension     Hypersomnia, organic     Other chronic nonalcoholic liver disease     Diabetic retinopathy of both eyes (H)     Obesity     Usher Syndrome: congenital deafness, retinitis pigmentosa     Macular degeneration, age related, nonexudative     Benign neoplasm of iris     Dry eye syndrome     Pemphigus erythematosus (H28)     Chondromalacia of patella, right, steroid injection 6/12/2015     Uncontrolled type 2 diabetes mellitus with hyperglycemia, with long-term current use of insulin (H)     Chronic kidney disease, stage 1     Sprain of left acromioclavicular ligament     Trigger middle finger of right hand     Trigger middle finger of left hand     Adenomyosis of uterus     Dysmenorrhea     S/P hysterectomy     Past Medical History:   Diagnosis  Date     Combined visual and hearing impairment      Deaf      Diabetic retinopathy of both eyes (H) 8/19/2011     Hepatic steatosis 08/19/2011     History of tobacco use      Hyperlipidemia      Hypertension      Hypertriglyceridemia      Migraines      Obesity 8/19/2011     Problem list name updated by automated process. 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        CC Anne Marie Medina PA-C  [address] [facility] [address]  [place] [address] on close of this encounter.      Again, thank you for allowing me to participate in the care of your patient.      Sincerely,    Ainsley Mcfarland PA-C

## 2024-03-11 NOTE — NURSING NOTE
Lidoca1ine-epinephrine 1-1:042735 % injection   1mL once for one use, starting 3/11/2024 ending 3/11/2024,  2mL disp, R-0, injection  Injected by Nora Patel CMA

## 2024-03-11 NOTE — NURSING NOTE
Dermatology Rooming Note    Nayeli Caal's goals for this visit include:   Chief Complaint   Patient presents with    Derm Problem     Spot of concern on the right foot      Nora Patel CMA

## 2024-03-11 NOTE — PATIENT INSTRUCTIONS
Checking for Skin Cancer  You can help find cancer early by checking your skin each month. There are 3 main kinds of skin cancer: melanoma, basal cell carcinoma, and squamous cell carcinoma. Doing monthly skin checks is the best way to find new marks, sores, or skin changes. Follow these instructions for checking your skin.   The ABCDEs of checking moles for melanoma   Check your moles or growths for signs of melanoma using ABCDE:   Asymmetry: The sides of the mole or growth don t match.  Border: The edges are ragged, notched, or blurred.  Color: The color within the mole or growth varies. It could be black, brown, tan, white, or shades of red, gray, or blue.  Diameter: The mole or growth is larger than   inch or 6 mm (size of a pencil eraser).  Evolving: The size, shape, texture, or color of the mole or growth is changing.     ABCDE's of moles on light skin.        ABCDE's of moles on dark skin may be harder to identify.     Checking for other types of skin cancer  Basal cell carcinoma or squamous cell carcinoma cause symptoms like:     A spot or mole that looks different from all other marks on your skin  Changes in how an area feels, such as itching, tenderness, or pain  Changes in the skin's surface, such as oozing, bleeding, or scaliness  A sore that doesn't heal  New swelling, redness, or spread of color beyond the border of a mole    Who s at risk?  Anyone of any skin color can get skin cancer. But you're at greater risk if you have:   Fair skin that freckles easily and burns instead of tanning  Light-colored or red hair  Light-colored eyes  Many moles or abnormal moles on your skin  A long history of unprotected exposure to sunlight or tanning beds  A history of many blistering sunburns as a child or teen  A family history of skin cancer  Been exposed to radiation or chemicals  A weakened immune system  Been exposed to arsenic  If you've had skin cancer in the past, you're at high risk of having it again.    How to check your skin  Do your monthly skin checkups in front of a full-length mirror. Use a room with good lighting so it's easier to see. Use a hand mirror to look at hard-to-see places like your buttocks and back. You can also have a trusted friend or family member help you with these checks. Check every part of your body, including your:   Head (ears, face, neck, and scalp)  Torso (front, back, sides, and under breasts)  Arms (tops, undersides, and armpits)  Hands (palms, backs, and fingers, including under the nails)  Lower back, buttocks, and genitals  Legs (front, back, and sides)  Feet (tops, soles, toes, including under the nails, and between toes)  Watch for new spots on your skin or a spot that's changing in color, shape, size.   If you have a lot of moles, take digital photos of them each month. Make sure to take photos both up close and from a distance. These can help you see if any moles change over time.   Know your skin  Most skin changes aren't cancer. But if you see any changes in your skin, call your healthcare provider right away. Only they can tell you if a change is a problem. If you have skin cancer, seeing your provider can be the first step to getting the treatment that could save your life.   Jasmina last reviewed this educational content on 10/1/2021    6236-8044 [employer]. All rights reserved. This information is not intended as a substitute for professional medical care. Always follow your healthcare professional's instructions.     Wound Care After a Biopsy    What is a skin biopsy?  A skin biopsy allows the doctor to examine a very small piece of tissue under the microscope to determine the diagnosis and the best treatment for the skin condition. A local anesthetic (numbing medicine) is injected with a very small needle into the skin area to be tested. A small piece of skin is taken from the area. Sometimes a suture (stitch) is used.     What are the risks of a skin  biopsy?  I will experience scar, bleeding, swelling, pain, crusting and redness. I may experience incomplete removal or recurrence. Risks of this procedure are excessive bleeding, bruising, infection, nerve damage, numbness, thick (hypertrophic or keloidal) scar and non-diagnostic biopsy.    How should I care for my wound for the first 24 hours?  Keep the wound dry and covered for 24 hours  If it bleeds, hold direct pressure on the area for 15 minutes. If bleeding does not stop, call us or go to the emergency room  Avoid strenuous exercise the first 1-2 days or as your doctor instructs you    How should I care for the wound after 24 hours?  After 24 hours, remove the bandage  You may bathe or shower as normal  If you had a scalp biopsy, you can shampoo as usual and can use shower water to clean the biopsy site daily  Clean the wound once a day with gentle soap and water  Do not scrub, be gentle  Apply white petroleum/Vaseline after cleaning the wound with a cotton swab or a clean finger, and keep the site covered with a Bandaid /bandage. Bandages are not necessary with a scalp biopsy  If you are unable to cover the site with a Bandaid /bandage, re-apply ointment 2-3 times a day to keep the site moist. Moisture will help with healing  Avoid strenuous activity for first 1-2 days  Avoid lakes, rivers, pools, and oceans until the stitches are removed or the site is healed    How do I clean my wound?  Wash hands thoroughly with soap or use hand  before all wound care  Clean the wound with gentle soap and water  Apply white petroleum/Vaseline  to wound after it is clean  Replace the Bandaid /bandage to keep the wound covered for the first few days or as instructed by your doctor  If you had a scalp biopsy, warm shower water to the area on a daily basis should suffice    What should I use to clean my wound?   Cotton-tipped applicators (Qtips )  White petroleum jelly (Vaseline ). Use a clean new container and use  Q-tips to apply.  Bandaids  as needed  Gentle soap     How should I care for my wound long term?  Do not get your wound dirty  Keep up with wound care for one week or until the area is healed.  A small scab will form and fall off by itself when the area is completely healed. The area will be red and will become pink in color as it heals. Sun protection is very important for how your scar will turn out. Sunscreen with an SPF 30 or greater is recommended once the area is healed.  You should have some soreness but it should be mild and slowly go away over several days. Talk to your doctor about using tylenol for pain,    When should I call my doctor?  If you have increased:   Pain or swelling  Pus or drainage (clear or slightly yellow drainage is ok)  Temperature over 100F  Spreading redness or warmth around wound    When will I hear about my results?  The biopsy results can take 2 weeks to come back.  Your results will automatically release to [employer] before your provider has even reviewed them.  The clinic will call you with the results, send you a [employer] message, or have you schedule a follow-up clinic or phone time to discuss the results.  Contact our clinics if you do not hear from us in 2 weeks.    Who should I call with questions?  [facility]: [phone]  [facility]: [phone]  For urgent needs outside of business hours call the [facility] [[email or web address]] at [phone] and ask for the dermatology resident on call

## 2024-03-11 NOTE — PROGRESS NOTES
[facility] Dermatology Note  Encounter Date: Mar 11, 2024  Office Visit     Dermatology Problem List:  1. NUB x2  - s/p shave bx 03/11/2024    ____________________________________________    Assessment & Plan:    # Neoplasm of unspecified behavior of the skin (D49.2) on the L plantar surface. The differential diagnosis includes dysplasia.   - Shave biopsy performed today (see procedure note(s) below).    # Neoplasm of unspecified behavior of the skin (D49.2) on the central back. The differential diagnosis includes dysplasia.   - Shave biopsy performed today (see procedure note(s) below).    # Skin cancer screening with multiple benign nevi, solar lentigines  - ABCDEs: Counseled ABCDEs of melanoma: Asymmetry, Border (irregularity), Color (not uniform, changes in color), Diameter (greater than 6 mm which is about the size of a pencil eraser), and Evolving (any changes in preexisting moles).  - Sun protection: Counseled SPF30+ sunscreen, UPF clothing, sun avoidance, tanning bed avoidance.    # Cherry angiomas and seborrheic keratoses,  Benign, reassurance given.       Procedures Performed:   - Shave biopsy procedure note, location(s): L plantar surface. After discussion of benefits and risks including but not limited to bleeding, infection, scar, incomplete removal, recurrence, and non-diagnostic biopsy, written consent and photographs were obtained. The area was cleaned with isopropyl alcohol. 0.5mL of 1% lidocaine with epinephrine was injected to obtain adequate anesthesia of lesion(s). Shave biopsy at site(s) performed. Hemostasis was achieved with aluminium chloride. Petrolatum ointment and a sterile dressing were applied. The patient tolerated the procedure and no complications were noted. The patient was provided with verbal and written post care instructions.   - Shave biopsy procedure note, location(s): central back. After discussion of benefits and risks including but not limited to  bleeding, infection, scar, incomplete removal, recurrence, and non-diagnostic biopsy, written consent and photographs were obtained. The area was cleaned with isopropyl alcohol. 0.5mL of 1% lidocaine with epinephrine was injected to obtain adequate anesthesia of lesion(s). Shave biopsy at site(s) performed. Hemostasis was achieved with aluminium chloride. Petrolatum ointment and a sterile dressing were applied. The patient tolerated the procedure and no complications were noted. The patient was provided with verbal and written post care instructions.     Follow-up: pending path results    Staff and Scribe:   Scribe Disclosure:   By signing my name below, I, Minnie Dimas, attest that this documentation has been prepared under the direction and in the presence of Ainsley Mcfarland PA-C.  - Electronically Signed: Minnie Cochran 03/11/24       Provider Disclosure:  I agree with above History, Review of Systems, Physical exam and Plan.  I have reviewed the content of the documentation and have edited it as needed. I have personally performed the services documented here and the documentation accurately represents those services and the decisions I have made.      Electronically signed by:    All risk, benefits and alternatives were discussed with patient.  Patient is in agreement and understands the assessment and plan.  All questions were answered.  Sun Screen Education was given.   Return to Clinic as needed.   Ainsley Mcfarland PA-C        ____________________________________________    CC: Derm Problem (Spot of concern on the right foot )    HPI:  Ms. Nayeli Caal is a(n) 43 year old female who presents today as a new patient for a skin check.     Patient is uncertain of Fhx due to being adopted. She was referred here today for a mole on her foot.    Patient is otherwise feeling well, without additional skin concerns.    Labs Reviewed:  None reviewed.    Physical exam:  Vitals: LMP 02/01/2024  (Approximate)   GEN: This is a well developed, well-nourished female in no acute distress, in a pleasant mood.    SKIN: Norris Skin Type III  Full skin, which includes the head/face, both arms, chest, back, abdomen,both legs, genitalia and/or groin buttocks, digits and/or nails, was examined.  - 6 mm brown asymmetric macule L plantar surface  - central back 4 mm hyperpigmented macule  - There are dome shaped bright red papules on the head/neck, trunk, extremities.   - Multiple regular brown pigmented macules and papules are identified on the head/neck, trunk, extremities.   - Scattered brown macules on sun exposed areas.  - There are waxy stuck on tan to brown papules on the head/neck, trunk, extremities.  - No other lesions of concern on areas examined.                       Medications:  Current Outpatient Medications   Medication    acetaminophen (TYLENOL) 500 MG tablet    Alcohol Swabs (ALCOHOL PREP PAD) 70 % PADS    amLODIPine (NORVASC) 5 MG tablet    aspirin (ASA) 81 MG chewable tablet    atorvastatin (LIPITOR) 40 MG tablet    B-D U/F insulin pen needle    BD ULTRA FINE PEN NEEDLES    blood glucose (ACCU-CHEK LAYA PLUS) test strip    blood glucose monitoring (ACCU-CHEK FASTCLIX) lancets    calcium carbonate-vitamin D (OSCAL) 500-5 MG-MCG tablet    Continuous Blood Gluc Sensor (DEXCOM G7 SENSOR) MISC    ergocalciferol (ERGOCALCIFEROL) 1.25 MG (32098 UT) capsule    famotidine (PEPCID) 20 MG tablet    fenofibrate (TRIGLIDE/LOFIBRA) 160 MG tablet    fish oil-omega-3 fatty acids 1000 MG capsule    hydrocortisone (CORTAID) 1 % external cream    ibuprofen (ADVIL/MOTRIN) 600 MG tablet    Injection Device for insulin (INPEN 100-BLUE-NOVOLOG-FIASP) DAVID    Injection Device for insulin (INPEN 100-PINK-NOVOLOG-FIASP) DAVID    insulin aspart (NOVOLOG FLEXPEN) 100 UNIT/ML pen    insulin aspart (NOVOLOG PENFILL) 100 UNIT/ML cartridge    insulin glargine (BASAGLAR KWIKPEN) 100 UNIT/ML pen    insulin pen needle (B-D U/F)  31G X 8 MM miscellaneous    losartan (COZAAR) 100 MG tablet    naproxen (NAPROSYN) 375 MG tablet    ondansetron (ZOFRAN ODT) 4 MG ODT tab    Ostomy Supplies (ADHESIVE REMOVER WIPES) MISC    Ostomy Supplies (SKIN TAC ADHESIVE BARRIER WIPE) MISC    oxyCODONE (ROXICODONE) 5 MG tablet    senna-docusate (SENOKOT-S/PERICOLACE) 8.6-50 MG tablet    sucralfate (CARAFATE) 1 GM tablet    tirzepatide (MOUNJARO) 5 MG/0.5ML pen    triamcinolone (KENALOG) 0.1 % external ointment     Current Facility-Administered Medications   Medication    hydrocortisone (CORTAID) 1 % cream    hydrocortisone (CORTAID) 1 % cream    hylan (SYNVISC ONE) injection 48 mg    lidocaine (PF) (XYLOCAINE) 1 % injection 4 mL    triamcinolone (KENALOG-40) injection 40 mg      Past Medical History:   Patient Active Problem List   Diagnosis    Essential hypertension    Hypersomnia, organic    Other chronic nonalcoholic liver disease    Diabetic retinopathy of both eyes (H)    Obesity    Usher Syndrome: congenital deafness, retinitis pigmentosa    Macular degeneration, age related, nonexudative    Benign neoplasm of iris    Dry eye syndrome    Pemphigus erythematosus (H28)    Chondromalacia of patella, right, steroid injection 6/12/2015    Uncontrolled type 2 diabetes mellitus with hyperglycemia, with long-term current use of insulin (H)    Chronic kidney disease, stage 1    Sprain of left acromioclavicular ligament    Trigger middle finger of right hand    Trigger middle finger of left hand    Adenomyosis of uterus    Dysmenorrhea    S/P hysterectomy     Past Medical History:   Diagnosis Date    Combined visual and hearing impairment     Deaf     Diabetic retinopathy of both eyes (H) 8/19/2011    Hepatic steatosis 08/19/2011    History of tobacco use     Hyperlipidemia     Hypertension     Hypertriglyceridemia     Migraines     Obesity 8/19/2011     Problem list name updated by automated process. Provider to review    Uncontrolled type 2 diabetes mellitus with  hyperglycemia, with long-term current use of insulin (H) 5/15/2017    Usher Syndrome: congenital deafness, retinitis pigmentosa 8/19/2011        CC Anne Marie Medina PA-C  [address]  [place] [address] on close of this encounter.

## 2024-03-12 ENCOUNTER — MYC MEDICAL ADVICE (OUTPATIENT)
Dept: EDUCATION SERVICES | Facility: CLINIC | Age: 44
End: 2024-03-12
Payer: COMMERCIAL

## 2024-03-12 NOTE — TELEPHONE ENCOUNTER
Diabetes Educator Note:      Nayeli hasn't heard anything regarding her In-Pen replacement.  I called [facility].  The claim was denied by insurance, so she'll have a $35 copay.  [employer] message was sent to Nayeli.      Leigh Ann Escoto, JUSTICEN, RN, [facility]  Certified Diabetes Care and   [facility]   [facility]  Phone [phone]  Hours:  Tuesday:       In Clinic and Virtual Visits  8am to 4pm              Wednesday:  In Clinic and Virtual Visits  8am to 4pm               Thursday:     Virtural  Visits only             8am to 4pm

## 2024-03-13 LAB
PATH REPORT.COMMENTS IMP SPEC: NORMAL
PATH REPORT.FINAL DX SPEC: NORMAL
PATH REPORT.GROSS SPEC: NORMAL
PATH REPORT.MICROSCOPIC SPEC OTHER STN: NORMAL
PATH REPORT.RELEVANT HX SPEC: NORMAL

## 2024-03-15 NOTE — CONFIDENTIAL NOTE
DIAGNOSIS:     Albuminuria   Well controlled type 2 diabetes mellitus      DATE RECEIVED:  05.31.2024    NOTES STATUS DETAILS   OFFICE NOTE from referring provider Internal 01.26.2024 Anne Marie Medina PA-C    OFFICE NOTE from other specialist      *Only VASCULITIS or LUPUS gather office notes for the following     *PULMONARY       *ENT     *DERMATOLOGY     *RHEUMATOLOGY     DISCHARGE SUMMARY from hospital     DISCHARGE REPORT from the ER     MEDICATION LIST Internal    IMAGING  (NEED IMAGES AND REPORTS)     KIDNEY CT SCAN     KIDNEY ULTRASOUND     MR ABDOMEN     NUCLEAR MEDICINE RENAL     LABS     CBC Internal 03.08.2024   CMP Internal 03.08.2024   BMP Internal 03.08.2024   UA Internal 01.04.2024   URINE PROTEIN Internal 01.04.2024    RENAL PANEL     BIOPSY     KIDNEY BIOPSY

## 2024-03-24 NOTE — PROGRESS NOTES
"[facility]    Seen with an in person     Reason for visit: post-op visit    HPI: Nayeli Caal is a 43 year old  who presents to clinic today for a postoperative visit following a robotic assisted TLH, BS, cysto on 24. Seen in the ED for chest pain on 3/8/24. Hgb 12.6 at that time, normal creatinine, neg resp panel and normal troponin. Chest x-ray normal. Suspected reflux and symptoms have improved. Still notes occasional twinges of pain on right side that feel similar to what she attributed to as fibroid pain. Notes normal bowel movements and urination. No vaginal bleeding or abnormal discharge.    Final Diagnosis   Uterus, Cervix, Bilateral Fallopian Tubes:  - Proliferative-type endometrium  - Leiomyomata (size: 0.2 - 7.0 cm)  -Unremarkable cervix, bilateral fallopian tubes   241.8 g total hysterectomy     Findings: Large Nabothian cyst or cervical myoma palpated on right aspect of cervix. Cervix noted to be dilated to approximately 1cm. Myomatous uterus with fundal subserosal myoma and peduculated myoma. Normal appearing upper abdomen. Right fallopian tube with vascular adhesion to omentum. No other adhesions noted in pelvis. Normal bilateral ovaries. Cystoscopy with bilateral ureteral flow and no injuries to bladder at conclusion of the case.      Objective:  /82   Pulse 73   Ht 1.549 m (5' 0.98\")   Wt 82.6 kg (182 lb)   LMP 2024 (Approximate)   BMI 34.41 kg/m    General: Well-appearing  Abdomen: Laparoscopic sites are healing well. Some areas with scab still present. Area of discomfort is in RLQ near right ASIS, pain not reproducible with palpation, no masses palpated.   Pelvic: Normal external genitalia. Vagina with scant white discharge. No cuff separation or significant granulation tissue seen. Suture still present in mid vaginal cuff. Suture palpated on vaginal exam, no separation of cuff.     Assessment and plan:  -Normal postoperative " recovery  -Operative findings and pathology reviewed. Reviewed pictures. No gynecologic cause of RLQ pain. Discussed only remaining gyn organ in that area is her ovary which was normal appearing at time of surgery. Offered GI referral if desired.   -Ok for shallow vaginal insertion, warned against significant pressure on vaginal cuff until suture completely dissolved   -No further pap tests needed  -Return for follow-up LUIS FERNANDO Cowan MD

## 2024-03-25 ENCOUNTER — OFFICE VISIT (OUTPATIENT)
Dept: OBGYN | Facility: CLINIC | Age: 44
End: 2024-03-25
Attending: STUDENT IN AN ORGANIZED HEALTH CARE EDUCATION/TRAINING PROGRAM
Payer: COMMERCIAL

## 2024-03-25 VITALS
SYSTOLIC BLOOD PRESSURE: 135 MMHG | DIASTOLIC BLOOD PRESSURE: 82 MMHG | HEIGHT: 61 IN | HEART RATE: 73 BPM | WEIGHT: 182 LBS | BODY MASS INDEX: 34.36 KG/M2

## 2024-03-25 DIAGNOSIS — Z90.710 S/P HYSTERECTOMY: Primary | ICD-10-CM

## 2024-03-25 DIAGNOSIS — R10.31 RLQ ABDOMINAL PAIN: ICD-10-CM

## 2024-03-25 PROCEDURE — T1013 SIGN LANG/ORAL INTERPRETER: HCPCS | Mod: U3

## 2024-03-25 PROCEDURE — 99213 OFFICE O/P EST LOW 20 MIN: CPT | Performed by: STUDENT IN AN ORGANIZED HEALTH CARE EDUCATION/TRAINING PROGRAM

## 2024-03-25 PROCEDURE — 99024 POSTOP FOLLOW-UP VISIT: CPT | Performed by: STUDENT IN AN ORGANIZED HEALTH CARE EDUCATION/TRAINING PROGRAM

## 2024-03-25 ASSESSMENT — PAIN SCALES - GENERAL: PAINLEVEL: SEVERE PAIN (6)

## 2024-03-25 NOTE — LETTER
"3/25/2024       RE: Nayeli Caal  [address]  [place] [place] [address]     Dear Colleague,    Thank you for referring your patient, Nayeli Caal, to the [facility]. Please see a copy of my visit note below.    [facility]    Seen with an in person     Reason for visit: post-op visit    HPI: Nayeli Caal is a 43 year old  who presents to clinic today for a postoperative visit following a robotic assisted TLH, BS, cysto on 24. Seen in the ED for chest pain on 3/8/24. Hgb 12.6 at that time, normal creatinine, neg resp panel and normal troponin. Chest x-ray normal. Suspected reflux and symptoms have improved. Still notes occasional twinges of pain on right side that feel similar to what she attributed to as fibroid pain. Notes normal bowel movements and urination. No vaginal bleeding or abnormal discharge.    Final Diagnosis   Uterus, Cervix, Bilateral Fallopian Tubes:  - Proliferative-type endometrium  - Leiomyomata (size: 0.2 - 7.0 cm)  -Unremarkable cervix, bilateral fallopian tubes   241.8 g total hysterectomy     Findings: Large Nabothian cyst or cervical myoma palpated on right aspect of cervix. Cervix noted to be dilated to approximately 1cm. Myomatous uterus with fundal subserosal myoma and peduculated myoma. Normal appearing upper abdomen. Right fallopian tube with vascular adhesion to omentum. No other adhesions noted in pelvis. Normal bilateral ovaries. Cystoscopy with bilateral ureteral flow and no injuries to bladder at conclusion of the case.      Objective:  /82   Pulse 73   Ht 1.549 m (5' 0.98\")   Wt 82.6 kg (182 lb)   LMP 2024 (Approximate)   BMI 34.41 kg/m    General: Well-appearing  Abdomen: Laparoscopic sites are healing well. Some areas with scab still present. Area of discomfort is in RLQ near right ASIS, pain " not reproducible with palpation, no masses palpated.   Pelvic: Normal external genitalia. Vagina with scant white discharge. No cuff separation or significant granulation tissue seen. Suture still present in mid vaginal cuff. Suture palpated on vaginal exam, no separation of cuff.     Assessment and plan:  -Normal postoperative recovery  -Operative findings and pathology reviewed. Reviewed pictures. No gynecologic cause of RLQ pain. Discussed only remaining gyn organ in that area is her ovary which was normal appearing at time of surgery. Offered GI referral if desired.   -Ok for shallow vaginal insertion, warned against significant pressure on vaginal cuff until suture completely dissolved   -No further pap tests needed  -Return for follow-up LUIS FERNANDO Cowan MD

## 2024-03-26 ENCOUNTER — ALLIED HEALTH/NURSE VISIT (OUTPATIENT)
Dept: EDUCATION SERVICES | Facility: CLINIC | Age: 44
End: 2024-03-26
Payer: COMMERCIAL

## 2024-03-26 VITALS — BODY MASS INDEX: 34.26 KG/M2 | WEIGHT: 181.2 LBS

## 2024-03-26 DIAGNOSIS — Z79.4 UNCONTROLLED TYPE 2 DIABETES MELLITUS WITH HYPERGLYCEMIA, WITH LONG-TERM CURRENT USE OF INSULIN (H): Primary | ICD-10-CM

## 2024-03-26 DIAGNOSIS — E11.65 UNCONTROLLED TYPE 2 DIABETES MELLITUS WITH HYPERGLYCEMIA, WITH LONG-TERM CURRENT USE OF INSULIN (H): Primary | ICD-10-CM

## 2024-03-26 PROCEDURE — G0108 DIAB MANAGE TRN  PER INDIV: HCPCS | Performed by: REGISTERED NURSE

## 2024-03-26 RX ORDER — TIRZEPATIDE 7.5 MG/.5ML
7.5 INJECTION, SOLUTION SUBCUTANEOUS
Qty: 2 ML | Refills: 0 | Status: SHIPPED | OUTPATIENT
Start: 2024-03-26 | End: 2024-04-22

## 2024-03-26 NOTE — PROGRESS NOTES
Diabetes Self-Management Education & Support    Nayeli Caal presents today for education related to Type 2 diabetes    Patient is being treated with:  MDI Insulin  She is accompanied by self and      Year of diagnosis: 2013 or 2014  Referring provider:  Anne Marie Medina PA-C  Living Situation: Lives with a room mate  Employment: Administrative work for [employer]    PATIENT CONCERNS/REASON FOR REFERRAL  Has started Mounjaro 5 mg and is here to discuss insulin titration.  So far she has taken 3 doses and doesn't notice any difference in how she feels.  No nausea or other GI side effects.      ASSESSMENT:    Taking Medication:     Current Diabetes Management per Patient:  Taking diabetes medications? yes:     Diabetes Medication(s)       Insulin       insulin aspart (NOVOLOG FLEXPEN) 100 UNIT/ML pen 1 unit per 5 grams CHO and correction scale of 1/25/125.  Approximate daily use is 100 units.     insulin aspart (NOVOLOG PENFILL) 100 UNIT/ML cartridge Take with each meal and snack: 1 per 3 grams CHO and correction of 1 unit per 20 mg/dL over a target of 120. Average daily dose is 50 units.     insulin glargine (BASAGLAR KWIKPEN) 100 UNIT/ML pen Inject 60 Units Subcutaneous daily 3 month supply.     Patient taking differently: Inject 55 Units Subcutaneous daily 3 month supply.       Incretin Mimetic Agents       tirzepatide (MOUNJARO) 7.5 MG/0.5ML pen Inject 7.5 mg Subcutaneous every 7 days     tirzepatide (MOUNJARO) 5 MG/0.5ML pen Inject 5 mg Subcutaneous every 7 days            Monitoring    Patient glucose self monitoring as follows: continuously using a continuous glucose monitor (CGM)    CGM: Dexcom G7  BG results:       Patient's most recent   Lab Results   Component Value Date    A1C 7.8 01/04/2024    A1C 11.6 04/05/2021      Patient's A1C goal: <7.0    EDUCATION and INSTRUCTION PROVIDED AT THIS VISIT:       Time in range is still sub-optimal, but we are in the process of  titrating up her Mounjaro dose, so want to be cautious about increasing her insulin.    At last visit she did not have her InPen, as it had , so we ordered a new one, and she states that she has been notified that it is ready.  She feels confident that she can pair the new pen and resume using it.  It looks like from her Dexcom report that she is often running in the 200's overnight, but not always.  She does not correct her glucose when it is high, so today discussed that prior to going to bed, she should check her glucose and do a correction if it is over 200, so she doesn't spend the entire night being over-target.  I hesitate to increase her long acting insulin as she is often in target range during the day, and overnight if she doesn't eat anything before going to bed.      She has one more dose of Mounjaro at the 5 mg dose, and then will increase to 7.5 mg dose.  She has a follow up appointment with Dr. Bragg on April 15, so hopefully she will adjust insulin at that time if indicated, and order the 10 mg dose of Mounjaro if Nayeli is still tolerating well.  I made a follow up appointment 2 weeks following her appointment with Dr. Bragg.      Nayeli had a visit in the ED this month for chest pain and shortness of breath which was determined to be non-cardiac in origin.  She has also had two episodes of an URI over the past month.  In addition she is complaining of her hands occasionally feeling numb and tingling.  This led to a discussion about the effects of high glucoses over time and their effect on blood vessel health and the immune system.  Encouraged Nayeli to keep working on getting her glucose under the best control possible and we will follow more closely going forward.  Reviewed sign and symptoms of heart attack, stressing that often in women the symptoms can be quite subtle, and that she should never delay getting medical attention if she experiences any of these.      Patient-stated goal  written and given to Nayeli Caal.  Verbalized and demonstrated understanding of instructions.     PLAN:  See patient instructions  AVS printed and given to patient    FOLLOW-UP:        Time spent with patient at today's visit was 45 minutes.      Any diabetes medication dose changes were made via the CDE Protocol and Collaborative Practice Agreement with [facility] and  [facility].  A copy of this encounter was provided to patient's referring provider.

## 2024-03-28 ENCOUNTER — TELEPHONE (OUTPATIENT)
Dept: GASTROENTEROLOGY | Facility: CLINIC | Age: 44
End: 2024-03-28
Payer: COMMERCIAL

## 2024-03-28 NOTE — TELEPHONE ENCOUNTER
[facility] Call Center    Phone Message    May a detailed message be left on voicemail: Yes    Reason for Call: Other: Patient is currently scheduled on 5/9/24, as visit type New GI Urgent. This is outside the expected timeline for this referral. Patient has been added to the waitlist.        Pt declined next available ([facility]) due to location. Pt only wants [facility].     Action Taken: Message routed to:  Other: GI REFERRAL TRIAGE POOL     Travel Screening: Not Applicable

## 2024-03-29 NOTE — TELEPHONE ENCOUNTER
Writer called and left voice message for Pt. Called Pt to help get them scheduled for a sooner GI appointment. Writer left call back number.    Writer will send Pt a [email or web address] message.

## 2024-04-01 NOTE — TELEPHONE ENCOUNTER
Per Pt's reply on [employer] message, Pt would like to keep the appointment as scheduled for 5/9/2024. No rescheduling is needed anymore. Closing encounter.

## 2024-04-12 DIAGNOSIS — Z79.4 UNCONTROLLED TYPE 2 DIABETES MELLITUS WITH HYPERGLYCEMIA, WITH LONG-TERM CURRENT USE OF INSULIN (H): ICD-10-CM

## 2024-04-12 DIAGNOSIS — E11.65 UNCONTROLLED TYPE 2 DIABETES MELLITUS WITH HYPERGLYCEMIA, WITH LONG-TERM CURRENT USE OF INSULIN (H): ICD-10-CM

## 2024-04-15 ENCOUNTER — OFFICE VISIT (OUTPATIENT)
Dept: ENDOCRINOLOGY | Facility: CLINIC | Age: 44
End: 2024-04-15
Payer: COMMERCIAL

## 2024-04-15 VITALS
OXYGEN SATURATION: 97 % | HEART RATE: 81 BPM | DIASTOLIC BLOOD PRESSURE: 76 MMHG | BODY MASS INDEX: 34.41 KG/M2 | WEIGHT: 182 LBS | SYSTOLIC BLOOD PRESSURE: 114 MMHG

## 2024-04-15 DIAGNOSIS — Z79.4 UNCONTROLLED TYPE 2 DIABETES MELLITUS WITH HYPERGLYCEMIA, WITH LONG-TERM CURRENT USE OF INSULIN (H): Primary | ICD-10-CM

## 2024-04-15 DIAGNOSIS — E78.2 MIXED HYPERLIPIDEMIA: ICD-10-CM

## 2024-04-15 DIAGNOSIS — E11.65 UNCONTROLLED TYPE 2 DIABETES MELLITUS WITH HYPERGLYCEMIA, WITH LONG-TERM CURRENT USE OF INSULIN (H): Primary | ICD-10-CM

## 2024-04-15 DIAGNOSIS — R80.9 ALBUMINURIA: ICD-10-CM

## 2024-04-15 DIAGNOSIS — I10 BENIGN ESSENTIAL HYPERTENSION: ICD-10-CM

## 2024-04-15 LAB — HBA1C MFR BLD: 8.7 %

## 2024-04-15 PROCEDURE — T1013 SIGN LANG/ORAL INTERPRETER: HCPCS | Mod: U3

## 2024-04-15 PROCEDURE — 83036 HEMOGLOBIN GLYCOSYLATED A1C: CPT | Performed by: PATHOLOGY

## 2024-04-15 PROCEDURE — 99215 OFFICE O/P EST HI 40 MIN: CPT | Mod: 24 | Performed by: INTERNAL MEDICINE

## 2024-04-15 ASSESSMENT — PAIN SCALES - GENERAL: PAINLEVEL: NO PAIN (0)

## 2024-04-15 NOTE — PATIENT INSTRUCTIONS
Try to take Novolog for the evening snack   Check BG at bedtime and use the inpen correction to bring it down

## 2024-04-15 NOTE — PROGRESS NOTES
Assessment and Plan:    1.  Type 2 diabetes, uncontrolled, complicated by nephropathy and mild diabetic neuropathy  Recommendations:  Change insulin to carbohydrate ratio to 1 unit per 3 g carbohydrates around-the-clock  Instructed the patient to use the correction scale recommended by InPen, especially at bedtime, when her blood sugars frequently in the 300s.  Administer NovoLog for the bedtime snack, according to the carbohydrate content.  Follow-up appointment is scheduled in 3 months and we are going to consider increasing the dose of Mounjaro at that time.  Follow-up appointment with nephrology scheduled.    2.  Hypertension, controlled    3.  Dyslipidemia  Expect to improve with weight loss and better glucose control.    Orders Placed This Encounter   Procedures    AFINION HEMOGLOBIN A1C POCT     =========================================================================================    HPI:   Nayeli is a 43 year old woman here with a  for follow up of type 2 diabetes since the early 2000's.    Since her last visit with me, she underwent robotic assisted TLH, BS, cysto on 2/7/24. In march, she was seen in ER and evaluated for atypical CP. Notes reviewed.     1.  Type 2 diabetes  Most recent A1c was 7.8 on 1/4/24. It was 8.7% today.     I reviewed the InPen records.  Total daily insulin dose recorded by InPen is 61 units. On the sensor, the average glucose is 268, with a standard deviation of 73.  The coefficient of variation is 37%.  Missed dosages tend to happen mainly with dinner.  Nayeli reports having 3 meals and at bedtime snack.  The bedtime snack frequently consists of candies, for which she does not take NovoLog.  Per meal dose of NovoLog is variable between 14 and 30 units.  She has been having issues with the sensor disconnecting.   Denies experiencing hypoglycemic episodes.    Her current regimen is:   Mountjaro, 5 mg weekly - no side effects. Curbing her appetite. 7.5 mg  "weekly was prescribed and she plans to start the new dose this week.    Basaglar 55 units daily, at 5-7 am.  Novolog (dosing on In-pen):  Carb ratio: 1 unit per 3 g carbohydrates at 5 pm and 4 grams at 11 am.   Sensitivity: 20  Duration 2.5 hrs   Target 110 at 6 am and 130 at 10 pm     Previous treatments:   Empagliflozin 25 mg daily- stopped 2023 due to recurrent yeast infections.  Metformin- severe diarrhea.   Ozempic - started in 4/2023 and replaced by Mountjaro in 1/24    Her weight prior to staring Ozempic was 179 lbs. Current weight is 182 pounds.     Diabetes complications:  Retinopathy: last eye exam - 3/2024. 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  Nephropathy: h/o proteinuria; most recent urine microalbumin strongly positive in 1/24. Normal GFR. On 100 mg losartan daily (tx with lisinopril was associated with a dry cough).   Neuropathy: some tingling present intermittently. No pain. Feet feel \"sensitive\".   Most recent lipid panel: 1/4/24: LDL cholesterol 76, HDL cholesterol 37, . On 40 mg atorvastatin and 160 mg fenofibrate daily.    2. HTN   Current Tx: Losartan, 100 mg daily and amlodipine, 5 mg daily.    Past Medical History:   Diagnosis Date    Combined visual and hearing impairment     Deaf     Diabetic retinopathy of both eyes (H) 8/19/2011    Hepatic steatosis 08/19/2011    History of tobacco use     Hyperlipidemia     Hypertension     Hypertriglyceridemia     Migraines     Obesity 8/19/2011     Problem list name updated by automated process. 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       Past Surgical History:   Procedure Laterality Date    DAVINCI HYSTERECTOMY TOTAL, SALPINGECTOMY BILATERAL Bilateral 2/7/2024    Procedure: HYSTERECTOMY, TOTAL, ROBOT-ASSISTED, WITH BILATERAL SALPINGECTOMY, CYSTOSCOPY;  Surgeon: Vonnie Cowan MD;  " Location: [facility] OR    LAPAROSCOPIC CHOLECYSTECTOMY N/A 3/10/2018    Procedure: LAPAROSCOPIC CHOLECYSTECTOMY;  Laparoscopic Cholecystectomy ;  Surgeon: Kuldeep Sigala MD;  Location: [facility] OR    RELEASE TRIGGER FINGER Right 5/2/2019    Procedure: Right Thumb Trigger Release;  Surgeon: Greg Streeter MD;  Location: [facility]    RELEASE TRIGGER FINGER Left 5/30/2019    Procedure: Left Ring Trigger Finger Release.  Ganglion cyst excision.;  Surgeon: Greg Streeter MD;  Location: [facility]    RELEASE TRIGGER FINGER Right 6/13/2023    Procedure: RELEASE, RIGHT TRIGGER FINGER, INDEX AND MIDDLE;  Surgeon: Miracle Carlin MD;  Location: [facility] OR    RELEASE TRIGGER FINGER Left 8/1/2023    Procedure: RELEASE, LEFT TRIGGER FINGER, MIDDLE;  Surgeon: Miracle Carlin MD;  Location: [facility] OR       Family History   Problem Relation Age of Onset    Unknown/Adopted Other      Social Hx: Lives in a [facility].  Boyfriend is in [place]. She is adopted.  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       Current Outpatient Medications:     acetaminophen (TYLENOL) 500 MG tablet, Take 2 tablets (1,000 mg) by mouth every 8 hours as needed for mild pain, Disp: 100 tablet, Rfl: 3    Alcohol Swabs (ALCOHOL PREP PAD) 70 % PADS, 1 each 3 times daily, Disp: 100 each, Rfl: 3    amLODIPine (NORVASC) 5 MG tablet, Take 1 tablet (5 mg) by mouth at bedtime, Disp: 90 tablet, Rfl: 3    aspirin (ASA) 81 MG chewable tablet, Take 1 tablet (81 mg) by mouth daily CHEW AND SWALLOW, Disp: 90 tablet, Rfl: 1    atorvastatin (LIPITOR) 40 MG tablet, Take 1 tablet (40 mg) by mouth daily, Disp: 90 tablet, Rfl: 1    blood glucose (ACCU-CHEK LAYA PLUS) test strip, Use with  Accucheck Expert meter.  Test blood sugar 6 times daily., Disp: 600 each, Rfl: 3    blood glucose monitoring (ACCU-CHEK FASTCLIX) lancets, Use to test blood sugar 6 times daily or as directed, Disp: 510 each, Rfl: 3    calcium carbonate-vitamin D (OSCAL) 500-5 MG-MCG tablet, Take 1 tablet  by mouth 2 times daily, Disp: 180 tablet, Rfl: 1    Continuous Blood Gluc Sensor (DEXCOM G7 SENSOR) MISC, 1 each every 10 days, Disp: 9 each, Rfl: 3    ergocalciferol (ERGOCALCIFEROL) 1.25 MG (48669 UT) capsule, Take 1 capsule (50,000 Units) by mouth once a week For additional refills, please schedule a follow-up appointment at [phone], Disp: 12 capsule, Rfl: 3    famotidine (PEPCID) 20 MG tablet, Take 1 tablet (20 mg) by mouth 2 times daily, Disp: 30 tablet, Rfl: 0    fenofibrate (TRIGLIDE/LOFIBRA) 160 MG tablet, Take 1 tablet (160 mg) by mouth daily, Disp: 90 tablet, Rfl: 3    fish oil-omega-3 fatty acids 1000 MG capsule, TAKE TWO CAPSULES (2 GM) BY MOUTH ONCE DAILY, Disp: 180 capsule, Rfl: 3    hydrocortisone (CORTAID) 1 % external cream, Apply topically 2 times daily, Disp: 120 g, Rfl: 1    ibuprofen (ADVIL/MOTRIN) 600 MG tablet, Take 1 tablet (600 mg) by mouth every 6 hours as needed for moderate pain, Disp: 120 tablet, Rfl: 1    Injection Device for insulin (INPEN 100-PINK-NOVOLOG-FIASP) DAVID, 1 each continuous, Disp: 1 each, Rfl: 0    insulin aspart (NOVOLOG FLEXPEN) 100 UNIT/ML pen, 1 unit per 5 grams CHO and correction scale of 1/25/125.  Approximate daily use is 100 units., Disp: 30 mL, Rfl: 2    insulin aspart (NOVOLOG PENFILL) 100 UNIT/ML cartridge, Take with each meal and snack: 1 per 3 grams CHO and correction of 1 unit per 20 mg/dL over a target of 120. Average daily dose is 50 units., Disp: 270 mL, Rfl: 3    insulin glargine (BASAGLAR KWIKPEN) 100 UNIT/ML pen, Inject 60 Units Subcutaneous daily 3 month supply. (Patient taking differently: Inject 55 Units Subcutaneous daily 3 month supply.), Disp: 54 mL, Rfl: 3    insulin pen needle (31G X 5 MM) 31G X 5 MM miscellaneous, Use 5 to 6 pen needles daily or as directed.  3 month supply., Disp: 500 each, Rfl: 3    losartan (COZAAR) 100 MG tablet, Take 1 tablet (100 mg) by mouth daily, Disp: 90 tablet, Rfl: 3    naproxen (NAPROSYN) 375 MG tablet, Take 1  tablet (375 mg) by mouth 2 times daily (with meals) (Patient not taking: Reported on 3/25/2024), Disp: 60 tablet, Rfl: 3    ondansetron (ZOFRAN ODT) 4 MG ODT tab, Take 1 tablet (4 mg) by mouth every 8 hours as needed for nausea, Disp: 4 tablet, Rfl: 0    Ostomy Supplies (ADHESIVE REMOVER WIPES) MISC, 1 each every 14 days, Disp: 50 each, Rfl: 3    Ostomy Supplies (SKIN TAC ADHESIVE BARRIER WIPE) MISC, 1 each every 14 days, Disp: 6 each, Rfl: 3    oxyCODONE (ROXICODONE) 5 MG tablet, Take 1 tablet (5 mg) by mouth every 6 hours as needed for pain (Patient not taking: Reported on 3/25/2024), Disp: 5 tablet, Rfl: 0    senna-docusate (SENOKOT-S/PERICOLACE) 8.6-50 MG tablet, Take 1-2 tablets by mouth 2 times daily, Disp: 30 tablet, Rfl: 0    sucralfate (CARAFATE) 1 GM tablet, Take 1 tablet (1 g) by mouth 4 times daily, Disp: 30 tablet, Rfl: 0    tirzepatide (MOUNJARO) 7.5 MG/0.5ML pen, Inject 7.5 mg Subcutaneous every 7 days, Disp: 2 mL, Rfl: 0    triamcinolone (KENALOG) 0.1 % external ointment, Apply topically 2 times daily, Disp: 30 g, Rfl: 1    Current Facility-Administered Medications:     hydrocortisone (CORTAID) 1 % cream, , Topical, Once, Mariya Mohamud APRN CNP    hydrocortisone (CORTAID) 1 % cream, , Topical, TID, Mariya Mohamud APRN CNP    hylan (SYNVISC ONE) injection 48 mg, 48 mg, , , Rosas Rodriguez DO, 48 mg at 05/09/23 1813    lidocaine (PF) (XYLOCAINE) 1 % injection 4 mL, 4 mL, , , Sebas Bradley MD, 4 mL at 10/05/23 0923    triamcinolone (KENALOG-40) injection 40 mg, 40 mg, , , Sebas Bradley MD, 40 mg at 10/05/23 0923      Physical Exam  Wt Readings from Last 10 Encounters:   04/15/24 82.6 kg (182 lb)   03/26/24 82.2 kg (181 lb 3.2 oz)   03/25/24 82.6 kg (182 lb)   02/27/24 81.4 kg (179 lb 6.4 oz)   02/23/24 79.8 kg (176 lb)   02/20/24 80.7 kg (177 lb 14.4 oz)   02/07/24 82.5 kg (181 lb 14.1 oz)   01/26/24 82.1 kg (181 lb)   01/18/24 81.5 kg (179 lb 11.2 oz)   01/04/24 83.3 kg (183  lb 9.6 oz)     /76 (BP Location: Right arm, Patient Position: Sitting, Cuff Size: Adult Regular)   Pulse 81   Wt 82.6 kg (182 lb)   LMP 02/01/2024 (Approximate)   SpO2 97%   BMI 34.41 kg/m      General appearance: she is well-developed, well-nourished, and in no distress.  Eyes: conjunctivae and extraocular motions are normal. Pupils are equal, round, and reactive to light. No lid lag, no stare.  HENT: oropharynx clear and moist; neck no JVD, no bruits, no thyromegaly, no palpable nodules  Cardiovascular: regular rhythm, no murmurs, distal pulses palpable, no edema  Respiratory: chest clear, no rales, no rhonchi  Musculoskeletal: normal tone and strength   Neurologic: Unable to elicit knee reflexes, no resting tremor  Psychiatric: affect and judgment normal   Skin: Hyperpigmented lesion at the site of application of the Dexcom patch  Feet: Onychomycosis, sensation preserved to monofilament testing.  Left medial foot postbiopsy scar, well-healed.    RESULTS  Lab Results   Component Value Date    A1C 7.8 (H) 01/04/2024    A1C 7.7 (H) 07/25/2023    A1C 8.7 (H) 06/06/2023    A1C 9.9 (H) 04/04/2023    A1C 9.8 (H) 08/17/2022    A1C 11.6 (H) 04/05/2021    A1C 12.4 (H) 10/29/2020    A1C 12.9 (H) 07/08/2020    A1C 8.2 (H) 05/02/2019    A1C 10.3 (H) 10/15/2018    HEMOGLOBINA1 10.0 (A) 01/13/2020    HEMOGLOBINA1 9.9 (A) 10/07/2019    HEMOGLOBINA1 8.1 (A) 04/22/2019    HEMOGLOBINA1 10.4 (A) 01/14/2019    HEMOGLOBINA1 8.7 (A) 03/12/2018       TSH   Date Value Ref Range Status   04/04/2023 1.41 0.30 - 4.20 uIU/mL Final   07/08/2020 1.34 0.40 - 4.00 mU/L Final   05/17/2018 1.84 0.40 - 4.00 mU/L Final   11/27/2017 1.92 0.40 - 4.00 mU/L Final   05/11/2016 1.85 0.40 - 4.00 mU/L Final   02/11/2016 1.14 0.40 - 4.00 mU/L Final       ALT   Date Value Ref Range Status   01/04/2024 30 0 - 50 U/L Final     Comment:     Reference intervals for this test were updated on 6/12/2023 to more accurately reflect our healthy population.  There may be differences in the flagging of prior results with similar values performed with this method. Interpretation of those prior results can be made in the context of the updated reference intervals.     08/17/2022 28 0 - 50 U/L Final   07/08/2020 29 0 - 50 U/L Final   05/02/2019 43 0 - 50 U/L Final     42 minutes spent on the date of the encounter doing chart review, history and exam, documentation and further activities as noted above.

## 2024-04-15 NOTE — LETTER
4/15/2024       RE: Nayeli Caal  [address]  [facility] [address]     Dear Colleague,    Thank you for referring your patient, Nayeli Caal, to the [facility] [place] at [facility]. Please see a copy of my visit note below.    Assessment and Plan:    1.  Type 2 diabetes, uncontrolled, complicated by nephropathy and mild diabetic neuropathy  Recommendations:  Change insulin to carbohydrate ratio to 1 unit per 3 g carbohydrates around-the-clock  Instructed the patient to use the correction scale recommended by InPen, especially at bedtime, when her blood sugars frequently in the 300s.  Administer NovoLog for the bedtime snack, according to the carbohydrate content.  Follow-up appointment is scheduled in 3 months and we are going to consider increasing the dose of Mounjaro at that time.  Follow-up appointment with nephrology scheduled.    2.  Hypertension, controlled    3.  Dyslipidemia  Expect to improve with weight loss and better glucose control.    Orders Placed This Encounter   Procedures    AFINION HEMOGLOBIN A1C POCT     =========================================================================================    HPI:   Nayeli is a 43 year old woman here with a  for follow up of type 2 diabetes since the early 2000's.    Since her last visit with me, she underwent robotic assisted TLH, BS, cysto on 2/7/24. In march, she was seen in ER and evaluated for atypical CP. Notes reviewed.     1.  Type 2 diabetes  Most recent A1c was 7.8 on 1/4/24. It was 8.7% today.     I reviewed the InPen records.  Total daily insulin dose recorded by InPen is 61 units. On the sensor, the average glucose is 268, with a standard deviation of 73.  The coefficient of variation is 37%.  Missed dosages tend to happen mainly with dinner.  Nayeli reports having 3 meals and at bedtime snack.  The bedtime snack  "frequently consists of candies, for which she does not take NovoLog.  Per meal dose of NovoLog is variable between 14 and 30 units.  She has been having issues with the sensor disconnecting.   Denies experiencing hypoglycemic episodes.    Her current regimen is:   Mountjaro, 5 mg weekly - no side effects. Curbing her appetite. 7.5 mg weekly was prescribed and she plans to start the new dose this week.    Basaglar 55 units daily, at 5-7 am.  Novolog (dosing on In-pen):  Carb ratio: 1 unit per 3 g carbohydrates at 5 pm and 4 grams at 11 am.   Sensitivity: 20  Duration 2.5 hrs   Target 110 at 6 am and 130 at 10 pm     Previous treatments:   Empagliflozin 25 mg daily- stopped 2023 due to recurrent yeast infections.  Metformin- severe diarrhea.   Ozempic - started in 4/2023 and replaced by Mountjaro in 1/24    Her weight prior to staring Ozempic was 179 lbs. Current weight is 182 pounds.     Diabetes complications:  Retinopathy: last eye exam - 3/2024. 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  Nephropathy: h/o proteinuria; most recent urine microalbumin strongly positive in 1/24. Normal GFR. On 100 mg losartan daily (tx with lisinopril was associated with a dry cough).   Neuropathy: some tingling present intermittently. No pain. Feet feel \"sensitive\".   Most recent lipid panel: 1/4/24: LDL cholesterol 76, HDL cholesterol 37, . On 40 mg atorvastatin and 160 mg fenofibrate daily.    2. HTN   Current Tx: Losartan, 100 mg daily and amlodipine, 5 mg daily.    Past Medical History:   Diagnosis Date    Combined visual and hearing impairment     Deaf     Diabetic retinopathy of both eyes (H) 8/19/2011    Hepatic steatosis 08/19/2011    History of tobacco use     Hyperlipidemia     Hypertension     Hypertriglyceridemia     Migraines     Obesity 8/19/2011     Problem list name updated by automated process. 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       Past Surgical History:   Procedure Laterality Date    DAVINCI HYSTERECTOMY TOTAL, SALPINGECTOMY BILATERAL Bilateral 2/7/2024    Procedure: HYSTERECTOMY, TOTAL, ROBOT-ASSISTED, WITH BILATERAL SALPINGECTOMY, CYSTOSCOPY;  Surgeon: Vonnie Cowan MD;  Location: [facility] OR    LAPAROSCOPIC CHOLECYSTECTOMY N/A 3/10/2018    Procedure: LAPAROSCOPIC CHOLECYSTECTOMY;  Laparoscopic Cholecystectomy ;  Surgeon: Kuldeep Sigala MD;  Location: [facility] OR    RELEASE TRIGGER FINGER Right 5/2/2019    Procedure: Right Thumb Trigger Release;  Surgeon: Greg Streeter MD;  [facility]: [facility]    RELEASE TRIGGER FINGER Left 5/30/2019    Procedure: Left Ring Trigger Finger Release.  Ganglion cyst excision.;  Surgeon: Greg Streeter MD;  [facility]    RELEASE TRIGGER FINGER Right 6/13/2023    Procedure: RELEASE, RIGHT TRIGGER FINGER, INDEX AND MIDDLE;  Surgeon: Miracle Carlin MD;  Location: [facility]    RELEASE TRIGGER FINGER Left 8/1/2023    Procedure: RELEASE, LEFT TRIGGER FINGER, MIDDLE;  Surgeon: Miracle Carlin MD;  Location: [facility] OR       Family History   Problem Relation Age of Onset    Unknown/Adopted Other      Social Hx: Lives in a [facility].  Boyfriend is in [place]. She is adopted.  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       Current Outpatient Medications:     acetaminophen (TYLENOL) 500 MG tablet, Take 2 tablets (1,000 mg) by mouth every 8 hours as needed for mild pain, Disp: 100 tablet, Rfl: 3    Alcohol Swabs (ALCOHOL PREP PAD) 70 % PADS, 1 each 3 times daily, Disp: 100 each, Rfl: 3    amLODIPine (NORVASC) 5 MG tablet, Take 1 tablet (5 mg) by mouth at bedtime, Disp: 90 tablet, Rfl: 3    aspirin (ASA) 81 MG chewable tablet, Take 1 tablet (81 mg) by mouth daily CHEW AND SWALLOW, Disp: 90 tablet, Rfl: 1    atorvastatin (LIPITOR) 40 MG tablet, Take 1 tablet (40 mg) by mouth daily, Disp: 90 tablet, Rfl: 1    blood glucose  (ACCU-CHEK LAYA PLUS) test strip, Use with  Accucheck Expert meter.  Test blood sugar 6 times daily., Disp: 600 each, Rfl: 3    blood glucose monitoring (ACCU-CHEK FASTCLIX) lancets, Use to test blood sugar 6 times daily or as directed, Disp: 510 each, Rfl: 3    calcium carbonate-vitamin D (OSCAL) 500-5 MG-MCG tablet, Take 1 tablet by mouth 2 times daily, Disp: 180 tablet, Rfl: 1    Continuous Blood Gluc Sensor (DEXCOM G7 SENSOR) MISC, 1 each every 10 days, Disp: 9 each, Rfl: 3    ergocalciferol (ERGOCALCIFEROL) 1.25 MG (41680 UT) capsule, Take 1 capsule (50,000 Units) by mouth once a week For additional refills, please schedule a follow-up appointment at [phone], Disp: 12 capsule, Rfl: 3    famotidine (PEPCID) 20 MG tablet, Take 1 tablet (20 mg) by mouth 2 times daily, Disp: 30 tablet, Rfl: 0    fenofibrate (TRIGLIDE/LOFIBRA) 160 MG tablet, Take 1 tablet (160 mg) by mouth daily, Disp: 90 tablet, Rfl: 3    fish oil-omega-3 fatty acids 1000 MG capsule, TAKE TWO CAPSULES (2 GM) BY MOUTH ONCE DAILY, Disp: 180 capsule, Rfl: 3    hydrocortisone (CORTAID) 1 % external cream, Apply topically 2 times daily, Disp: 120 g, Rfl: 1    ibuprofen (ADVIL/MOTRIN) 600 MG tablet, Take 1 tablet (600 mg) by mouth every 6 hours as needed for moderate pain, Disp: 120 tablet, Rfl: 1    Injection Device for insulin (INPEN 100-PINK-NOVOLOG-FIASP) DAVID, 1 each continuous, Disp: 1 each, Rfl: 0    insulin aspart (NOVOLOG FLEXPEN) 100 UNIT/ML pen, 1 unit per 5 grams CHO and correction scale of 1/25/125.  Approximate daily use is 100 units., Disp: 30 mL, Rfl: 2    insulin aspart (NOVOLOG PENFILL) 100 UNIT/ML cartridge, Take with each meal and snack: 1 per 3 grams CHO and correction of 1 unit per 20 mg/dL over a target of 120. Average daily dose is 50 units., Disp: 270 mL, Rfl: 3    insulin glargine (BASAGLAR KWIKPEN) 100 UNIT/ML pen, Inject 60 Units Subcutaneous daily 3 month supply. (Patient taking differently: Inject 55 Units Subcutaneous  daily 3 month supply.), Disp: 54 mL, Rfl: 3    insulin pen needle (31G X 5 MM) 31G X 5 MM miscellaneous, Use 5 to 6 pen needles daily or as directed.  3 month supply., Disp: 500 each, Rfl: 3    losartan (COZAAR) 100 MG tablet, Take 1 tablet (100 mg) by mouth daily, Disp: 90 tablet, Rfl: 3    naproxen (NAPROSYN) 375 MG tablet, Take 1 tablet (375 mg) by mouth 2 times daily (with meals) (Patient not taking: Reported on 3/25/2024), Disp: 60 tablet, Rfl: 3    ondansetron (ZOFRAN ODT) 4 MG ODT tab, Take 1 tablet (4 mg) by mouth every 8 hours as needed for nausea, Disp: 4 tablet, Rfl: 0    Ostomy Supplies (ADHESIVE REMOVER WIPES) MISC, 1 each every 14 days, Disp: 50 each, Rfl: 3    Ostomy Supplies (SKIN TAC ADHESIVE BARRIER WIPE) MISC, 1 each every 14 days, Disp: 6 each, Rfl: 3    oxyCODONE (ROXICODONE) 5 MG tablet, Take 1 tablet (5 mg) by mouth every 6 hours as needed for pain (Patient not taking: Reported on 3/25/2024), Disp: 5 tablet, Rfl: 0    senna-docusate (SENOKOT-S/PERICOLACE) 8.6-50 MG tablet, Take 1-2 tablets by mouth 2 times daily, Disp: 30 tablet, Rfl: 0    sucralfate (CARAFATE) 1 GM tablet, Take 1 tablet (1 g) by mouth 4 times daily, Disp: 30 tablet, Rfl: 0    tirzepatide (MOUNJARO) 7.5 MG/0.5ML pen, Inject 7.5 mg Subcutaneous every 7 days, Disp: 2 mL, Rfl: 0    triamcinolone (KENALOG) 0.1 % external ointment, Apply topically 2 times daily, Disp: 30 g, Rfl: 1    Current Facility-Administered Medications:     hydrocortisone (CORTAID) 1 % cream, , Topical, Once, OverkaMariya cullen, APRN CNP    hydrocortisone (CORTAID) 1 % cream, , Topical, TID, OverkampMariya, APRN CNP    hylan (SYNVISC ONE) injection 48 mg, 48 mg, , , Rosas Rodriguez, DO, 48 mg at 05/09/23 1813    lidocaine (PF) (XYLOCAINE) 1 % injection 4 mL, 4 mL, , , Sebas Bradley MD, 4 mL at 10/05/23 0923    triamcinolone (KENALOG-40) injection 40 mg, 40 mg, , , Sebas Bradley MD, 40 mg at 10/05/23 0923      Physical Exam  Wt Readings from  Last 10 Encounters:   04/15/24 82.6 kg (182 lb)   03/26/24 82.2 kg (181 lb 3.2 oz)   03/25/24 82.6 kg (182 lb)   02/27/24 81.4 kg (179 lb 6.4 oz)   02/23/24 79.8 kg (176 lb)   02/20/24 80.7 kg (177 lb 14.4 oz)   02/07/24 82.5 kg (181 lb 14.1 oz)   01/26/24 82.1 kg (181 lb)   01/18/24 81.5 kg (179 lb 11.2 oz)   01/04/24 83.3 kg (183 lb 9.6 oz)     /76 (BP Location: Right arm, Patient Position: Sitting, Cuff Size: Adult Regular)   Pulse 81   Wt 82.6 kg (182 lb)   LMP 02/01/2024 (Approximate)   SpO2 97%   BMI 34.41 kg/m      General appearance: she is well-developed, well-nourished, and in no distress.  Eyes: conjunctivae and extraocular motions are normal. Pupils are equal, round, and reactive to light. No lid lag, no stare.  HENT: oropharynx clear and moist; neck no JVD, no bruits, no thyromegaly, no palpable nodules  Cardiovascular: regular rhythm, no murmurs, distal pulses palpable, no edema  Respiratory: chest clear, no rales, no rhonchi  Musculoskeletal: normal tone and strength   Neurologic: Unable to elicit knee reflexes, no resting tremor  Psychiatric: affect and judgment normal   Skin: Hyperpigmented lesion at the site of application of the Dexcom patch  Feet: Onychomycosis, sensation preserved to monofilament testing.  Left medial foot postbiopsy scar, well-healed.    RESULTS  Lab Results   Component Value Date    A1C 7.8 (H) 01/04/2024    A1C 7.7 (H) 07/25/2023    A1C 8.7 (H) 06/06/2023    A1C 9.9 (H) 04/04/2023    A1C 9.8 (H) 08/17/2022    A1C 11.6 (H) 04/05/2021    A1C 12.4 (H) 10/29/2020    A1C 12.9 (H) 07/08/2020    A1C 8.2 (H) 05/02/2019    A1C 10.3 (H) 10/15/2018    HEMOGLOBINA1 10.0 (A) 01/13/2020    HEMOGLOBINA1 9.9 (A) 10/07/2019    HEMOGLOBINA1 8.1 (A) 04/22/2019    HEMOGLOBINA1 10.4 (A) 01/14/2019    HEMOGLOBINA1 8.7 (A) 03/12/2018       TSH   Date Value Ref Range Status   04/04/2023 1.41 0.30 - 4.20 uIU/mL Final   07/08/2020 1.34 0.40 - 4.00 mU/L Final   05/17/2018 1.84 0.40 - 4.00  mU/L Final   11/27/2017 1.92 0.40 - 4.00 mU/L Final   05/11/2016 1.85 0.40 - 4.00 mU/L Final   02/11/2016 1.14 0.40 - 4.00 mU/L Final       ALT   Date Value Ref Range Status   01/04/2024 30 0 - 50 U/L Final     Comment:     Reference intervals for this test were updated on 6/12/2023 to more accurately reflect our healthy population. There may be differences in the flagging of prior results with similar values performed with this method. Interpretation of those prior results can be made in the context of the updated reference intervals.     08/17/2022 28 0 - 50 U/L Final   07/08/2020 29 0 - 50 U/L Final   05/02/2019 43 0 - 50 U/L Final     42 minutes spent on the date of the encounter doing chart review, history and exam, documentation and further activities as noted above.                        03/25/24 1:15 PM  PATIENT LAB/IMAGING STATUS : No pending lab orders                        Jimena Bragg MD

## 2024-04-16 DIAGNOSIS — E11.65 UNCONTROLLED TYPE 2 DIABETES MELLITUS WITH HYPERGLYCEMIA, WITH LONG-TERM CURRENT USE OF INSULIN (H): ICD-10-CM

## 2024-04-16 DIAGNOSIS — Z79.4 UNCONTROLLED TYPE 2 DIABETES MELLITUS WITH HYPERGLYCEMIA, WITH LONG-TERM CURRENT USE OF INSULIN (H): ICD-10-CM

## 2024-04-16 RX ORDER — TIRZEPATIDE 5 MG/.5ML
INJECTION, SOLUTION SUBCUTANEOUS
Qty: 2 ML | Refills: 0 | Status: SHIPPED | OUTPATIENT
Start: 2024-04-16 | End: 2024-05-03

## 2024-04-17 NOTE — TELEPHONE ENCOUNTER
tirzepatide (MOUNJARO) 5 MG/0.5ML pen 6 mL 3 refills sent on  4/15/2024.  [facility] - ONE [facility] AT PORTAL TO REGISTERED [facility] SITES    Per pharmacy:  MOUNJARO (4) PEN 5/0.5 is on back order.  To prescribe an alternate please respond with appropriate changes or comment to Pharmacy.  (IF applicable, please review your patients formulary for alternate)

## 2024-04-22 RX ORDER — TIRZEPATIDE 7.5 MG/.5ML
7.5 INJECTION, SOLUTION SUBCUTANEOUS WEEKLY
Qty: 2 ML | Refills: 11 | Status: SHIPPED | OUTPATIENT
Start: 2024-04-22 | End: 2024-07-12

## 2024-04-22 NOTE — TELEPHONE ENCOUNTER
MOUNJARO (4) PEN 7.5/0.5     Last Written Prescription Date:  4/16/24  Last Fill Quantity: 2,   # refills: 0  Last Office Visit : 4/15/24  Future Office visit:  7/8/24    Routing refill request to provider for review/approval because:  See pharmacy request, this may have been addressed already

## 2024-04-25 DIAGNOSIS — E11.65 UNCONTROLLED TYPE 2 DIABETES MELLITUS WITH HYPERGLYCEMIA, WITH LONG-TERM CURRENT USE OF INSULIN (H): Primary | ICD-10-CM

## 2024-04-25 DIAGNOSIS — Z79.4 UNCONTROLLED TYPE 2 DIABETES MELLITUS WITH HYPERGLYCEMIA, WITH LONG-TERM CURRENT USE OF INSULIN (H): Primary | ICD-10-CM

## 2024-04-25 NOTE — TELEPHONE ENCOUNTER
REFERRAL INFORMATION:  Referring Provider:  Vonnie Cowan MD  Referring Clinic:  [facility]  Reason for Visit/Diagnosis: RLQ abdominal pain      FUTURE VISIT INFORMATION:  Appointment Date: 5/9/24  Appointment Time: 7:00     NOTES STATUS DETAILS   OFFICE NOTE from Referring Provider Internal OV-3/25/24-Dr. Cowan   OFFICE NOTE from Other Specialist Internal OV 9/5/23 Kali William MD    OV 8/4/22-Mariya Mohamud    OV 7/7/21-[facility] DISCHARGE SUMMARY/  ED VISITS internal ED visit 8/31/21-[facility]   OPERATIVE REPORT Internal [facility]-2/7/24-Dr. Cowan    [facility]-3/10/18-Jaspreet Ibanez MD   MEDICATION LIST Internal         PERTINENT LABS Internal    PATHOLOGY REPORTS (RELATED) Internal Surgery on 2/7/24-[facility]    Surgery on 3/10/18-[facility]   IMAGING (CT, MRI, EGD, MRCP, Small Bowel Follow Through/SBT, MR/CT Enterography) Internal US pelvic 9/16/23, 9/24/21,7/7/21, 7/8/20    CT abdomen 8/31/21    XR abdomen 7/7/21

## 2024-05-01 ENCOUNTER — ALLIED HEALTH/NURSE VISIT (OUTPATIENT)
Dept: EDUCATION SERVICES | Facility: CLINIC | Age: 44
End: 2024-05-01
Payer: COMMERCIAL

## 2024-05-01 DIAGNOSIS — Z79.4 UNCONTROLLED TYPE 2 DIABETES MELLITUS WITH HYPERGLYCEMIA, WITH LONG-TERM CURRENT USE OF INSULIN (H): Primary | ICD-10-CM

## 2024-05-01 DIAGNOSIS — E11.65 UNCONTROLLED TYPE 2 DIABETES MELLITUS WITH HYPERGLYCEMIA, WITH LONG-TERM CURRENT USE OF INSULIN (H): Primary | ICD-10-CM

## 2024-05-01 PROCEDURE — G0108 DIAB MANAGE TRN  PER INDIV: HCPCS | Performed by: REGISTERED NURSE

## 2024-05-01 PROCEDURE — T1013 SIGN LANG/ORAL INTERPRETER: HCPCS | Mod: U3

## 2024-05-02 DIAGNOSIS — E55.9 VITAMIN D DEFICIENCY: ICD-10-CM

## 2024-05-03 NOTE — PROGRESS NOTES
Diabetes Self-Management Education & Support    Nayeli Caal presents today for education related to Type 2 diabetes    Patient is being treated with:  MDI Insulin  She is accompanied by self   Visit was conducted with the assistance of , Adama.     Year of diagnosis: 2013 or 2014  Referring provider:  Anne Marie Medina PA-C  Living Situation: Lives with a room mate  Employment: Administrative work for [employer]    PATIENT CONCERNS/REASON FOR REFERRAL       ASSESSMENT:    Taking Medication:     Current Diabetes Management per Patient:  Taking diabetes medications? yes:     Diabetes Medication(s)       Insulin       insulin aspart (NOVOLOG FLEXPEN) 100 UNIT/ML pen 1 unit per 5 grams CHO and correction scale of 1/25/125.  Approximate daily use is 100 units.     insulin aspart (NOVOLOG PENFILL) 100 UNIT/ML cartridge Take with each meal and snack: 1 per 3 grams CHO and correction of 1 unit per 20 mg/dL over a target of 120. Average daily dose is 50 units.     insulin glargine (BASAGLAR KWIKPEN) 100 UNIT/ML pen Inject 60 Units Subcutaneous daily 3 month supply.     Patient taking differently: Inject 55 Units Subcutaneous daily 3 month supply.       Incretin Mimetic Agents       Semaglutide, 2 MG/DOSE, (OZEMPIC) 8 MG/3ML pen Inject 2 mg Subcutaneous every 7 days     tirzepatide (MOUNJARO) 7.5 MG/0.5ML pen Inject 7.5 mg Subcutaneous once a week            Monitoring    Patient glucose self monitoring as follows: continuously using a continuous glucose monitor (CGM)  CGM meter: Dexcom G7 CGM                  Patient's most recent   Lab Results   Component Value Date    A1C 8.7 04/15/2024    A1C 7.8 01/04/2024    A1C 11.6 04/05/2021      Patient's A1C goal: <7.0    EDUCATION and INSTRUCTION PROVIDED AT THIS VISIT:       Nayeli was unable to get the Mounjaro 7.5mg dose, so she spoke with Anne Marie Medina, who switched her to Ozempic 2 mg, which she states she is tolerating without side effects.   Anne Marie also increased her dose of long acting insulin to 65 units.  Since then, Nayeli states that she's had 2 episodes of hypoglycemia in the past couple of days as seen in her Dexcom report.     She will be on the Ozempic 2 mg dose for the next month, and then hopefully the Mounjaro 7.5 mg dose will be available.  In the meantime suggested that she cut back her long acting insulin dose to 60 units.  She could reduce it again to 55 units if she continues to have hypoglycemia.      She had some questions about some symptoms of what sounds like gastric reflux--seen in the ED several months ago for chest pain that was determined to be non-cardiac in origin.  She describes some similar sort of symptoms, a burning in her chest occasionally that happens after eating.  Discussed how to prevent gastric reflux--suggested avoiding very spicy or acidic foods, and recommended remaining upright for at least 30 minutes after eating, and to avoid eating right before going to bed.  She indicated understanding.  She has an appointment made with GI to evaluate a pain she is having in the right lower abdomen and will discuss these symptoms with them as well at that appointment.       Patient-stated goal written and given to Nayeli APURVA Cordovaon.  Verbalized and demonstrated understanding of instructions.     PLAN:    See patient instructions  AVS printed and given to patient    FOLLOW-UP:      Follow up in one month.   Time spent with patient at today's visit was 60 minutes.      Any diabetes medication dose changes were made via the CDE Protocol and Collaborative Practice Agreement with [facility] and  [facility].  A copy of this encounter was provided to patient's referring provider.

## 2024-05-08 ENCOUNTER — APPOINTMENT (OUTPATIENT)
Dept: CT IMAGING | Facility: CLINIC | Age: 44
End: 2024-05-08
Attending: EMERGENCY MEDICINE
Payer: COMMERCIAL

## 2024-05-08 ENCOUNTER — HOSPITAL ENCOUNTER (EMERGENCY)
Facility: CLINIC | Age: 44
Discharge: HOME | End: 2024-05-08
Attending: EMERGENCY MEDICINE | Admitting: EMERGENCY MEDICINE
Payer: COMMERCIAL

## 2024-05-08 VITALS
DIASTOLIC BLOOD PRESSURE: 86 MMHG | HEART RATE: 94 BPM | RESPIRATION RATE: 18 BRPM | OXYGEN SATURATION: 99 % | TEMPERATURE: 97.5 F | SYSTOLIC BLOOD PRESSURE: 142 MMHG

## 2024-05-08 DIAGNOSIS — R10.84 GENERALIZED ABDOMINAL PAIN: ICD-10-CM

## 2024-05-08 DIAGNOSIS — R10.31 RLQ ABDOMINAL PAIN: ICD-10-CM

## 2024-05-08 DIAGNOSIS — R11.2 NAUSEA AND VOMITING, UNSPECIFIED VOMITING TYPE: ICD-10-CM

## 2024-05-08 LAB
ALBUMIN SERPL BCG-MCNC: 4.2 G/DL (ref 3.5–5.2)
ALBUMIN UR-MCNC: 70 MG/DL
ALP SERPL-CCNC: 79 U/L (ref 40–150)
ALT SERPL W P-5'-P-CCNC: 45 U/L (ref 0–50)
ANION GAP SERPL CALCULATED.3IONS-SCNC: 11 MMOL/L (ref 7–15)
APPEARANCE UR: CLEAR
AST SERPL W P-5'-P-CCNC: 27 U/L (ref 0–45)
BACTERIA #/AREA URNS HPF: ABNORMAL /HPF
BASOPHILS # BLD AUTO: 0.1 10E3/UL (ref 0–0.2)
BASOPHILS NFR BLD AUTO: 0 %
BILIRUB SERPL-MCNC: 0.2 MG/DL
BILIRUB UR QL STRIP: NEGATIVE
BUN SERPL-MCNC: 12.7 MG/DL (ref 6–20)
CALCIUM SERPL-MCNC: 8.9 MG/DL (ref 8.6–10)
CHLORIDE SERPL-SCNC: 104 MMOL/L (ref 98–107)
COLOR UR AUTO: ABNORMAL
CREAT SERPL-MCNC: 0.76 MG/DL (ref 0.51–0.95)
DEPRECATED HCO3 PLAS-SCNC: 24 MMOL/L (ref 22–29)
EGFRCR SERPLBLD CKD-EPI 2021: >90 ML/MIN/1.73M2
EOSINOPHIL # BLD AUTO: 0.3 10E3/UL (ref 0–0.7)
EOSINOPHIL NFR BLD AUTO: 3 %
ERYTHROCYTE [DISTWIDTH] IN BLOOD BY AUTOMATED COUNT: 12.9 % (ref 10–15)
GLUCOSE SERPL-MCNC: 126 MG/DL (ref 70–99)
GLUCOSE UR STRIP-MCNC: NEGATIVE MG/DL
HCG SERPL QL: NEGATIVE
HCT VFR BLD AUTO: 40.3 % (ref 35–47)
HGB BLD-MCNC: 13 G/DL (ref 11.7–15.7)
HGB UR QL STRIP: NEGATIVE
IMM GRANULOCYTES # BLD: 0 10E3/UL
IMM GRANULOCYTES NFR BLD: 0 %
KETONES UR STRIP-MCNC: NEGATIVE MG/DL
LEUKOCYTE ESTERASE UR QL STRIP: NEGATIVE
LIPASE SERPL-CCNC: 32 U/L (ref 13–60)
LYMPHOCYTES # BLD AUTO: 3.2 10E3/UL (ref 0.8–5.3)
LYMPHOCYTES NFR BLD AUTO: 25 %
MCH RBC QN AUTO: 28 PG (ref 26.5–33)
MCHC RBC AUTO-ENTMCNC: 32.3 G/DL (ref 31.5–36.5)
MCV RBC AUTO: 87 FL (ref 78–100)
MONOCYTES # BLD AUTO: 0.6 10E3/UL (ref 0–1.3)
MONOCYTES NFR BLD AUTO: 5 %
MUCOUS THREADS #/AREA URNS LPF: PRESENT /LPF
NEUTROPHILS # BLD AUTO: 8.7 10E3/UL (ref 1.6–8.3)
NEUTROPHILS NFR BLD AUTO: 67 %
NITRATE UR QL: NEGATIVE
NRBC # BLD AUTO: 0 10E3/UL
NRBC BLD AUTO-RTO: 0 /100
PH UR STRIP: 6.5 [PH] (ref 5–7)
PLATELET # BLD AUTO: 413 10E3/UL (ref 150–450)
POTASSIUM SERPL-SCNC: 4.1 MMOL/L (ref 3.4–5.3)
PROT SERPL-MCNC: 7.4 G/DL (ref 6.4–8.3)
RBC # BLD AUTO: 4.64 10E6/UL (ref 3.8–5.2)
RBC URINE: 2 /HPF
SODIUM SERPL-SCNC: 139 MMOL/L (ref 135–145)
SP GR UR STRIP: 1.02 (ref 1–1.03)
SQUAMOUS EPITHELIAL: <1 /HPF
UROBILINOGEN UR STRIP-MCNC: NORMAL MG/DL
WBC # BLD AUTO: 12.9 10E3/UL (ref 4–11)
WBC URINE: 2 /HPF

## 2024-05-08 PROCEDURE — 84703 CHORIONIC GONADOTROPIN ASSAY: CPT | Performed by: EMERGENCY MEDICINE

## 2024-05-08 PROCEDURE — 36415 COLL VENOUS BLD VENIPUNCTURE: CPT | Performed by: EMERGENCY MEDICINE

## 2024-05-08 PROCEDURE — 80053 COMPREHEN METABOLIC PANEL: CPT | Performed by: EMERGENCY MEDICINE

## 2024-05-08 PROCEDURE — 83690 ASSAY OF LIPASE: CPT | Performed by: EMERGENCY MEDICINE

## 2024-05-08 PROCEDURE — 85004 AUTOMATED DIFF WBC COUNT: CPT | Performed by: EMERGENCY MEDICINE

## 2024-05-08 PROCEDURE — 96361 HYDRATE IV INFUSION ADD-ON: CPT

## 2024-05-08 PROCEDURE — 250N000009 HC RX 250: Performed by: EMERGENCY MEDICINE

## 2024-05-08 PROCEDURE — 82784 ASSAY IGA/IGD/IGG/IGM EACH: CPT

## 2024-05-08 PROCEDURE — 99285 EMERGENCY DEPT VISIT HI MDM: CPT | Mod: 25

## 2024-05-08 PROCEDURE — 250N000011 HC RX IP 250 OP 636: Performed by: EMERGENCY MEDICINE

## 2024-05-08 PROCEDURE — 258N000003 HC RX IP 258 OP 636: Performed by: EMERGENCY MEDICINE

## 2024-05-08 PROCEDURE — 85025 COMPLETE CBC W/AUTO DIFF WBC: CPT | Performed by: EMERGENCY MEDICINE

## 2024-05-08 PROCEDURE — 74177 CT ABD & PELVIS W/CONTRAST: CPT

## 2024-05-08 PROCEDURE — 81001 URINALYSIS AUTO W/SCOPE: CPT | Performed by: EMERGENCY MEDICINE

## 2024-05-08 PROCEDURE — 86364 TISS TRNSGLTMNASE EA IG CLAS: CPT | Mod: 59

## 2024-05-08 PROCEDURE — 96374 THER/PROPH/DIAG INJ IV PUSH: CPT | Mod: 59

## 2024-05-08 PROCEDURE — 96375 TX/PRO/DX INJ NEW DRUG ADDON: CPT

## 2024-05-08 PROCEDURE — 82247 BILIRUBIN TOTAL: CPT | Performed by: EMERGENCY MEDICINE

## 2024-05-08 RX ORDER — ONDANSETRON 4 MG/1
4 TABLET, ORALLY DISINTEGRATING ORAL EVERY 6 HOURS PRN
Qty: 20 TABLET | Refills: 0 | Status: SHIPPED | OUTPATIENT
Start: 2024-05-08

## 2024-05-08 RX ORDER — IOPAMIDOL 755 MG/ML
92 INJECTION, SOLUTION INTRAVASCULAR ONCE
Status: COMPLETED | OUTPATIENT
Start: 2024-05-08 | End: 2024-05-08

## 2024-05-08 RX ORDER — DIPHENHYDRAMINE HYDROCHLORIDE 50 MG/ML
50 INJECTION INTRAMUSCULAR; INTRAVENOUS ONCE
Status: COMPLETED | OUTPATIENT
Start: 2024-05-08 | End: 2024-05-08

## 2024-05-08 RX ORDER — KETOROLAC TROMETHAMINE 15 MG/ML
15 INJECTION, SOLUTION INTRAMUSCULAR; INTRAVENOUS ONCE
Status: COMPLETED | OUTPATIENT
Start: 2024-05-08 | End: 2024-05-08

## 2024-05-08 RX ORDER — ONDANSETRON 2 MG/ML
4 INJECTION INTRAMUSCULAR; INTRAVENOUS ONCE
Status: COMPLETED | OUTPATIENT
Start: 2024-05-08 | End: 2024-05-08

## 2024-05-08 RX ADMIN — KETOROLAC TROMETHAMINE 15 MG: 15 INJECTION, SOLUTION INTRAMUSCULAR; INTRAVENOUS at 17:08

## 2024-05-08 RX ADMIN — IOPAMIDOL 92 ML: 755 INJECTION, SOLUTION INTRAVENOUS at 17:38

## 2024-05-08 RX ADMIN — SODIUM CHLORIDE 66 ML: 9 INJECTION, SOLUTION INTRAVENOUS at 17:37

## 2024-05-08 RX ADMIN — ONDANSETRON 4 MG: 2 INJECTION INTRAMUSCULAR; INTRAVENOUS at 17:09

## 2024-05-08 RX ADMIN — SODIUM CHLORIDE 1000 ML: 9 INJECTION, SOLUTION INTRAVENOUS at 17:08

## 2024-05-08 RX ADMIN — DIPHENHYDRAMINE HYDROCHLORIDE 50 MG: 50 INJECTION, SOLUTION INTRAMUSCULAR; INTRAVENOUS at 18:04

## 2024-05-08 ASSESSMENT — ACTIVITIES OF DAILY LIVING (ADL)
ADLS_ACUITY_SCORE: 35
ADLS_ACUITY_SCORE: 33
ADLS_ACUITY_SCORE: 35

## 2024-05-08 ASSESSMENT — COLUMBIA-SUICIDE SEVERITY RATING SCALE - C-SSRS
2. HAVE YOU ACTUALLY HAD ANY THOUGHTS OF KILLING YOURSELF IN THE PAST MONTH?: NO
6. HAVE YOU EVER DONE ANYTHING, STARTED TO DO ANYTHING, OR PREPARED TO DO ANYTHING TO END YOUR LIFE?: NO
1. IN THE PAST MONTH, HAVE YOU WISHED YOU WERE DEAD OR WISHED YOU COULD GO TO SLEEP AND NOT WAKE UP?: NO

## 2024-05-08 NOTE — ED TRIAGE NOTES
Patient presents to the ER for complaints of abdominal pain. Per patient report, patient has been having abdominal pain since Sunday night. Patient endorses frequent vomiting and pain worsens after PO intake. Patient endorses being bloated as well.   Patient diabetic and states that her blood sugar has been high recently.     All triage questions completed with use of in person       Triage Assessment (Adult)       Row Name 05/08/24 9548          Triage Assessment    Airway WDL WDL        Respiratory WDL    Respiratory WDL WDL        Skin Circulation/Temperature WDL    Skin Circulation/Temperature WDL WDL        Cardiac WDL    Cardiac WDL WDL        Peripheral/Neurovascular WDL    Peripheral Neurovascular WDL WDL        Cognitive/Neuro/Behavioral WDL    Cognitive/Neuro/Behavioral WDL WDL

## 2024-05-09 ENCOUNTER — LAB (OUTPATIENT)
Dept: LAB | Facility: CLINIC | Age: 44
End: 2024-05-09
Payer: COMMERCIAL

## 2024-05-09 ENCOUNTER — PRE VISIT (OUTPATIENT)
Dept: GASTROENTEROLOGY | Facility: CLINIC | Age: 44
End: 2024-05-09

## 2024-05-09 ENCOUNTER — OFFICE VISIT (OUTPATIENT)
Dept: GASTROENTEROLOGY | Facility: CLINIC | Age: 44
End: 2024-05-09
Attending: STUDENT IN AN ORGANIZED HEALTH CARE EDUCATION/TRAINING PROGRAM
Payer: COMMERCIAL

## 2024-05-09 VITALS
HEART RATE: 79 BPM | WEIGHT: 183 LBS | HEIGHT: 61 IN | DIASTOLIC BLOOD PRESSURE: 78 MMHG | SYSTOLIC BLOOD PRESSURE: 131 MMHG | OXYGEN SATURATION: 99 % | BODY MASS INDEX: 34.55 KG/M2

## 2024-05-09 DIAGNOSIS — R12 HEARTBURN: ICD-10-CM

## 2024-05-09 DIAGNOSIS — R10.31 RLQ ABDOMINAL PAIN: Primary | ICD-10-CM

## 2024-05-09 DIAGNOSIS — K59.00 CONSTIPATION, UNSPECIFIED CONSTIPATION TYPE: ICD-10-CM

## 2024-05-09 LAB — IGA SERPL-MCNC: 222 MG/DL (ref 84–499)

## 2024-05-09 PROCEDURE — 99207 PR NO BILLABLE SERVICE THIS VISIT: CPT | Mod: U3

## 2024-05-09 PROCEDURE — 99204 OFFICE O/P NEW MOD 45 MIN: CPT | Performed by: PHYSICIAN ASSISTANT

## 2024-05-09 RX ORDER — OMEPRAZOLE 40 MG/1
40 CAPSULE, DELAYED RELEASE ORAL DAILY
Qty: 90 CAPSULE | Refills: 0 | Status: SHIPPED | OUTPATIENT
Start: 2024-05-09 | End: 2024-06-06

## 2024-05-09 ASSESSMENT — PAIN SCALES - GENERAL: PAINLEVEL: MILD PAIN (3)

## 2024-05-09 NOTE — LETTER
5/9/2024         RE: Nayeli Caal  [address]  [facility] [address]        Dear Colleague,    Thank you for referring your patient, Nayeli Caal, to the [facility] [place]. Please see a copy of my visit note below.      GI CLINIC VISIT    CC/REFERRING MD:  Vonnie Cowan  REASON FOR CONSULTATION: RLQ abd pain    HPI  Nayeli Caal is a 43 year old year old female with PMHx of Type 2 DM,  presenting to establish care with the [facility] GI group for abd pain.     Recently seen in ED yesterday for abd pain with N/V. CTAP with labs ( cmp, lipase) were WNL. Cbc with mild nonspecific leukocytosis though it actually is lower than previously. She reports these sx started 2 weeks ago when she was started on ozempic. Reports she is taking 2 mg dose a week since the start of this medication. Last dose on Monday. She plans to hold this med until she meets again with endo - I agree with this plan.   She does have heartburn, this got worse after she started ozempic. It is daily when she takes the med. She denies dysphagia, odynphagia, melena or unintentional weight loss.   Her largest concern is that of ongoing pain to RLQ - this pain started a few weeks after her hysterectomy on Feb 2024. It is intermittent and tends to be triggered with coughing, positional changes, laying down. It also tends to be worse after lunch for unclear reasons. She reports it's a pressure sensation. Tried APAP w/o relief. Can be flared with having a BM but this does not seem to be consistent. CTAP yesterday with normal appearing appendix during an episode of this pain. No associated fevers, chills, nausea/vomiting.  She does not have a gallbladder - there was no evidence of ductal dilation on yesterday's scan.   She is having 2-3 bristol type 1-2 daily. This usually occurs after having no BM for a few days. She denies days without stooling. She denies straining or blood or  black in her stools. She reports overall fair evacuation. On toilet for 5 min to pass a BM.     Wt Readings from Last 10 Encounters:   05/09/24 83 kg (183 lb)   04/15/24 82.6 kg (182 lb)   03/26/24 82.2 kg (181 lb 3.2 oz)   03/25/24 82.6 kg (182 lb)   02/27/24 81.4 kg (179 lb 6.4 oz)   02/23/24 79.8 kg (176 lb)   02/20/24 80.7 kg (177 lb 14.4 oz)   02/07/24 82.5 kg (181 lb 14.1 oz)   01/26/24 82.1 kg (181 lb)   01/18/24 81.5 kg (179 lb 11.2 oz)     Family Hx  Adopted - unsure of FHx    Social Hx   Yes use of NSAIDs - sparingly. Last use in March 2023     1-2 drinks of ETOH   Former tobacco products. 2009 quit. On and off smoker, so hard to quantify amount   No recreational drug use     PROBLEM LIST  Patient Active Problem List    Diagnosis Date Noted    S/P hysterectomy 02/07/2024     Priority: Medium    Adenomyosis of uterus 11/01/2023     Priority: Medium    Dysmenorrhea 11/01/2023     Priority: Medium    Trigger middle finger of right hand 05/31/2023     Priority: Medium     Added automatically from request for surgery [id number]      Trigger middle finger of left hand 05/30/2023     Priority: Medium    Sprain of left acromioclavicular ligament 05/25/2023     Priority: Medium    Chronic kidney disease, stage 1 08/15/2022     Priority: Medium    Uncontrolled type 2 diabetes mellitus with hyperglycemia, with long-term current use of insulin (H) 05/15/2017     Priority: Medium    Chondromalacia of patella, right, steroid injection 6/12/2015 11/24/2015     Priority: Medium    Macular degeneration, age related, nonexudative 11/22/2011     Priority: Medium    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 Provider to review      Hypersomnia, organic 08/19/2011     Priority: Medium    Other  chronic nonalcoholic liver disease 08/19/2011     Priority: Medium    Diabetic retinopathy of both eyes (H) 08/19/2011     Priority: Medium    Obesity 08/19/2011     Priority: Medium     Problem list name updated by automated process. Provider to review      Usher Syndrome: congenital deafness, retinitis pigmentosa 08/19/2011     Priority: Medium       PERTINENT PAST MEDICAL HISTORY:  Past Medical History:   Diagnosis Date    Combined visual and hearing impairment     Deaf     Diabetic retinopathy of both eyes (H) 8/19/2011    Hepatic steatosis 08/19/2011    History of tobacco use     Hyperlipidemia     Hypertension     Hypertriglyceridemia     Migraines     Obesity 8/19/2011     Problem list name updated by automated process. 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       PREVIOUS SURGERIES:  Past Surgical History:   Procedure Laterality Date    DAVINCI HYSTERECTOMY TOTAL, SALPINGECTOMY BILATERAL Bilateral 2/7/2024    Procedure: HYSTERECTOMY, TOTAL, ROBOT-ASSISTED, WITH BILATERAL SALPINGECTOMY, CYSTOSCOPY;  Surgeon: Vonnie Cowan MD;  Location: [facility] OR    LAPAROSCOPIC CHOLECYSTECTOMY N/A 3/10/2018    Procedure: LAPAROSCOPIC CHOLECYSTECTOMY;  Laparoscopic Cholecystectomy ;  Surgeon: Kuldeep Sigala MD;  Location: [facility] OR    RELEASE TRIGGER FINGER Right 5/2/2019    Procedure: Right Thumb Trigger Release;  Surgeon: Greg Streeter MD;  Location: [facility]    RELEASE TRIGGER FINGER Left 5/30/2019    Procedure: Left Ring Trigger Finger Release.  Ganglion cyst excision.;  Surgeon: Greg Streeter MD;  Location: [facility]    RELEASE TRIGGER FINGER Right 6/13/2023    Procedure: RELEASE, RIGHT TRIGGER FINGER, INDEX AND MIDDLE;  Surgeon: Miracle Carlin MD;  Location: [facility] OR    RELEASE TRIGGER FINGER Left 8/1/2023    Procedure: RELEASE, LEFT TRIGGER  FINGER, MIDDLE;  Surgeon: Miracle Carlin MD;  Location: [facility] OR       ALLERGIES:   No Known Allergies    PERTINENT MEDICATIONS:    Current Outpatient Medications:     acetaminophen (TYLENOL) 500 MG tablet, Take 2 tablets (1,000 mg) by mouth every 8 hours as needed for mild pain, Disp: 100 tablet, Rfl: 3    Alcohol Swabs (ALCOHOL PREP PAD) 70 % PADS, 1 each 3 times daily, Disp: 100 each, Rfl: 3    amLODIPine (NORVASC) 5 MG tablet, Take 1 tablet (5 mg) by mouth at bedtime, Disp: 90 tablet, Rfl: 3    aspirin (ASA) 81 MG chewable tablet, Take 1 tablet (81 mg) by mouth daily CHEW AND SWALLOW, Disp: 90 tablet, Rfl: 1    atorvastatin (LIPITOR) 40 MG tablet, Take 1 tablet (40 mg) by mouth daily, Disp: 90 tablet, Rfl: 1    blood glucose (ACCU-CHEK LAYA PLUS) test strip, Use with  Accucheck Expert meter.  Test blood sugar 6 times daily., Disp: 600 each, Rfl: 3    blood glucose monitoring (ACCU-CHEK FASTCLIX) lancets, Use to test blood sugar 6 times daily or as directed, Disp: 510 each, Rfl: 3    calcium carbonate-vitamin D (OSCAL) 500-5 MG-MCG tablet, Take 1 tablet by mouth 2 times daily, Disp: 180 tablet, Rfl: 1    Continuous Blood Gluc Sensor (DEXCOM G7 SENSOR) MISC, 1 each every 10 days, Disp: 9 each, Rfl: 3    ergocalciferol (ERGOCALCIFEROL) 1.25 MG (66926 UT) capsule, Take 1 capsule (50,000 Units) by mouth once a week For additional refills, please schedule a follow-up appointment at [phone], Disp: 12 capsule, Rfl: 3    famotidine (PEPCID) 20 MG tablet, Take 1 tablet (20 mg) by mouth 2 times daily, Disp: 30 tablet, Rfl: 0    fenofibrate (TRIGLIDE/LOFIBRA) 160 MG tablet, Take 1 tablet (160 mg) by mouth daily, Disp: 90 tablet, Rfl: 3    fish oil-omega-3 fatty acids 1000 MG capsule, TAKE TWO CAPSULES (2 GM) BY MOUTH ONCE DAILY, Disp: 180 capsule, Rfl: 3    hydrocortisone (CORTAID) 1 % external cream, Apply topically 2 times daily, Disp: 120 g, Rfl: 1    ibuprofen (ADVIL/MOTRIN) 600 MG tablet, Take 1 tablet (600 mg)  by mouth every 6 hours as needed for moderate pain, Disp: 120 tablet, Rfl: 1    Injection Device for insulin (INPEN 100-PINK-NOVOLOG-FIASP) DAVID, 1 each continuous, Disp: 1 each, Rfl: 0    insulin aspart (NOVOLOG FLEXPEN) 100 UNIT/ML pen, 1 unit per 5 grams CHO and correction scale of 1/25/125.  Approximate daily use is 100 units., Disp: 30 mL, Rfl: 2    insulin aspart (NOVOLOG PENFILL) 100 UNIT/ML cartridge, Take with each meal and snack: 1 per 3 grams CHO and correction of 1 unit per 20 mg/dL over a target of 120. Average daily dose is 50 units., Disp: 270 mL, Rfl: 3    insulin glargine (BASAGLAR KWIKPEN) 100 UNIT/ML pen, Inject 60 Units Subcutaneous daily 3 month supply. (Patient taking differently: Inject 55 Units Subcutaneous daily 3 month supply.), Disp: 54 mL, Rfl: 3    insulin pen needle (31G X 5 MM) 31G X 5 MM miscellaneous, Use 5 to 6 pen needles daily or as directed.  3 month supply., Disp: 500 each, Rfl: 3    losartan (COZAAR) 100 MG tablet, Take 1 tablet (100 mg) by mouth daily, Disp: 90 tablet, Rfl: 3    naproxen (NAPROSYN) 375 MG tablet, Take 1 tablet (375 mg) by mouth 2 times daily (with meals), Disp: 60 tablet, Rfl: 3    ondansetron (ZOFRAN ODT) 4 MG ODT tab, Take 1 tablet (4 mg) by mouth every 6 hours as needed for nausea or vomiting, Disp: 20 tablet, Rfl: 0    Ostomy Supplies (ADHESIVE REMOVER WIPES) MISC, 1 each every 14 days, Disp: 50 each, Rfl: 3    Ostomy Supplies (SKIN TAC ADHESIVE BARRIER WIPE) MISC, 1 each every 14 days, Disp: 6 each, Rfl: 3    Semaglutide, 2 MG/DOSE, (OZEMPIC) 8 MG/3ML pen, Inject 2 mg Subcutaneous every 7 days, Disp: 9 mL, Rfl: 3    senna-docusate (SENOKOT-S/PERICOLACE) 8.6-50 MG tablet, Take 1-2 tablets by mouth 2 times daily, Disp: 30 tablet, Rfl: 0    sucralfate (CARAFATE) 1 GM tablet, Take 1 tablet (1 g) by mouth 4 times daily, Disp: 30 tablet, Rfl: 0    tirzepatide (MOUNJARO) 7.5 MG/0.5ML pen, Inject 7.5 mg Subcutaneous once a week, Disp: 2 mL, Rfl: 11     triamcinolone (KENALOG) 0.1 % external ointment, Apply topically 2 times daily, Disp: 30 g, Rfl: 1    Current Facility-Administered Medications:     hydrocortisone (CORTAID) 1 % cream, , Topical, Once, Mariya Mohamud APRN CNP    hydrocortisone (CORTAID) 1 % cream, , Topical, TID, Mariya Mohamud APRN CNP    hylan (SYNVISC ONE) injection 48 mg, 48 mg, , , Rosas Rodriguez DO, 48 mg at 23    lidocaine (PF) (XYLOCAINE) 1 % injection 4 mL, 4 mL, , , Sebas Bradley MD, 4 mL at 10/05/23 0923    triamcinolone (KENALOG-40) injection 40 mg, 40 mg, , , Sebas Bradley MD, 40 mg at 10/05/23 0923    SOCIAL HISTORY:  Social History     Socioeconomic History    Marital status: Single     Spouse name: Not on file    Number of children: Not on file    Years of education: Not on file    Highest education level: Not on file   Occupational History    Not on file   Tobacco Use    Smoking status: Former     Current packs/day: 0.00     Types: Cigarettes     Quit date: 2010     Years since quittin.4    Smokeless tobacco: Never    Tobacco comments:     stopped 10 yrs ago   Substance and Sexual Activity    Alcohol use: Not Currently     Comment: socially    Drug use: No    Sexual activity: Not Currently     Partners: Male     Birth control/protection: I.U.D.   Other Topics Concern    Parent/sibling w/ CABG, MI or angioplasty before 65F 55M? Not Asked     Service No    Blood Transfusions No    Caffeine Concern No    Occupational Exposure No    Hobby Hazards No    Sleep Concern No    Stress Concern No    Weight Concern Yes    Special Diet No    Back Care No    Exercise Yes     Comment: 4-5 x a week    Bike Helmet No    Seat Belt Yes    Self-Exams Yes   Social History Narrative    Not on file     Social Determinants of Health     Financial Resource Strain: Low Risk  (2024)    Financial Resource Strain     Within the past 12 months, have you or your family members you live with been unable  "to get utilities (heat, electricity) when it was really needed?: No   Food Insecurity: Low Risk  (1/4/2024)    Food Insecurity     Within the past 12 months, did you worry that your food would run out before you got money to buy more?: No     Within the past 12 months, did the food you bought just not last and you didn t have money to get more?: No   Transportation Needs: Low Risk  (1/4/2024)    Transportation Needs     Within the past 12 months, has lack of transportation kept you from medical appointments, getting your medicines, non-medical meetings or appointments, work, or from getting things that you need?: No   Recent Concern: Transportation Needs - High Risk (10/30/2023)    Transportation Needs     Within the past 12 months, has lack of transportation kept you from medical appointments, getting your medicines, non-medical meetings or appointments, work, or from getting things that you need?: Yes   Physical Activity: Not on file   Stress: Not on file   Social Connections: Not on file   Interpersonal Safety: Low Risk  (1/4/2024)    Interpersonal Safety     Do you feel physically and emotionally safe where you currently live?: Yes     Within the past 12 months, have you been hit, slapped, kicked or otherwise physically hurt by someone?: No     Within the past 12 months, have you been humiliated or emotionally abused in other ways by your partner or ex-partner?: No   Housing Stability: Low Risk  (1/4/2024)    Housing Stability     Do you have housing? : Yes     Are you worried about losing your housing?: No       FAMILY HISTORY:  Family History   Problem Relation Age of Onset    Unknown/Adopted Other        Past/family/social history reviewed and no changes    PHYSICAL EXAMINATION:  Vitals reviewed: /78   Pulse 79   Ht 1.549 m (5' 1\")   Wt 83 kg (183 lb)   LMP 02/01/2024 (Approximate)   SpO2 99%   BMI 34.58 kg/m    Wt:   Wt Readings from Last 2 Encounters:   05/09/24 83 kg (183 lb)   04/15/24 82.6 kg " (182 lb)      Constitutional: aaox3, cooperative, pleasant, not dyspneic/diaphoretic, no acute distress  Eyes: Sclera anicteric/injected  Neck: supple, active ROM w/o limitation or pain   CV: No edema  Respiratory: Unlabored breathing  Abd: Nondistended, +bs, no hepatosplenomegaly, some tenderness to deep touch of RLQ abd, no peritoneal signs, neg Gutierres's sign. She visibly winched getting up - she reported pain to RLQ   Skin: warm, perfused, no jaundice  Psych: Normal affect  MSK: Normal gait     PERTINENT STUDIES:    Office Visit on 04/15/2024   Component Date Value Ref Range Status    Afinion Hemoglobin A1c POCT 04/15/2024 8.7 (H)  <=5.7 % Final        Lab Results   Component Value Date    WBC 12.9 (H) 05/08/2024    WBC 16.4 (H) 03/08/2024    WBC 11.2 (H) 01/04/2024    HGB 13.0 05/08/2024    HGB 12.6 03/08/2024    HGB 13.5 01/04/2024     05/08/2024     03/08/2024     01/04/2024    CHOL 183 01/04/2024    CHOL 221 (H) 04/04/2023    CHOL 194 07/19/2021    TRIG 350 (H) 01/04/2024    TRIG 298 (H) 04/04/2023    TRIG 133 07/19/2021    HDL 37 (L) 01/04/2024    HDL 38 (L) 04/04/2023    HDL 43 (L) 07/19/2021    ALT 45 05/08/2024    ALT 30 01/04/2024    ALT 28 08/17/2022    AST 27 05/08/2024    AST 23 01/04/2024    AST 14 08/17/2022     05/08/2024     03/08/2024     01/04/2024    BUN 12.7 05/08/2024    BUN 15.8 03/08/2024    BUN 12.3 01/04/2024    CO2 24 05/08/2024    CO2 23 03/08/2024    CO2 27 01/04/2024    TSH 1.41 04/04/2023    TSH 1.34 07/08/2020    TSH 1.84 05/17/2018    INR 1.03 06/18/2010    INR 0.92 06/18/2010        Liver Function Studies -   Recent Labs   Lab Test 05/08/24  1524   PROTTOTAL 7.4   ALBUMIN 4.2   BILITOTAL 0.2   ALKPHOS 79   AST 27   ALT 45        PREVIOUS ENDOSCOPY    No results found. However, due to the size of the patient record, not all encounters were searched. Please check Results Review for a complete set of results.    ASSESSMENT/PLAN:  Nayeli Quintero  Ten is a 43 year old year old female with PMHx of Type 2 DM,  presenting to establish care with the [facility] GI group for abd pain that began after her hysterectomy in 2/2024. It is intermittent and reliably triggered with positional changes and touch. It is not associated with any other sx including n/v/f/c. Her appetite and weight are stable. She had a reassuring CTAP w contrast done yesterday for eval of this pain. Appendix w/o inflammatory changes. She has not had a colonoscopy. FH is unknown.     #RLQ abd pain  Suspect component of abd wall pain with ddx to include hypervisceral senstivity, pain secondary to scarring from hysterectomy, under-treated constipation, SIBO/IMO vs other     -no strong clinical indication for colonoscopy today but will keep as a future consideration   -start bowel regiment after bowel clean out, per AVS     Future consideration  1.RLQ US can be considered for further eval of pain, teena if it continues despite better bowel evacuation   breath test   2. GI RD consult   3. PFC evaluation     #heartburn   Sx classic for GERD with ddx to include functional disease, h.pylori infection. Sx likely compounded by under-treated constipation     -h.pylori stool antigen then start daily omperazole  -no strong clinical indication for EGD today but keep as future consideration   -bowel clean out per above    Future consideration  1.EGD +/- HRM pH impedence  2. Addition of scheduled H2RA +/- alginates     #N/V/abd pain x 2 weeks  Given initiation of sx with start of ozempic 2 mg once weekly, suspect this is med induced. CTAP yesterday unremarkable. ddx includes GERD, hepatobilary disease, gastroparesis vs other   -I agree with plan to continue holding med until she again meets with endo  -if sx continue despite being off ozempic 2 mg once weekly, will consider EGD for further evaluation     Future consideration  -NMGES vs empiric trial of reglan after risk vs benefits discussion (NMGES clinical  utility will be limited if she remains on GLP-1 agonist)   -EGD    Orders Placed This Encounter   Procedures    Helicobacter pylori Antigen Stool    Tissue transglutaminase radha IgA and IgG    IgA      Colorectal cancer screening: aged 45, unless otherwise clinically indicated     RTC 6-8 weeks or sooner PRN     Thank you for this consultation.  It was a pleasure to participate in the care of this patient; please contact me with any further questions.       Follow up: As planned above. Today, I personally spent 47 minutes in direct face to face time with the patient, of which greater than 50% of the time was spent in patient education and counseling as described above. Approximately minutes were spent on indirect care associated with the patient's consultation including but not limited to review of: patient medical records to date, clinic visits, hospital records, lab results, imaging studies, procedural documentation, and coordinating care with other providers. The findings from this review are summarized in the above note. All of the above accounted for a cumulative time of 47 minutes and was performed on the date of service.         Again, thank you for allowing me to participate in the care of your patient.      Sincerely,    Kirsten Conn PA-C

## 2024-05-09 NOTE — NURSING NOTE
"Chief Complaint   Patient presents with    New Patient       Vitals:    05/09/24 0652   BP: 131/78   Pulse: 79   SpO2: 99%   Weight: 83 kg (183 lb)   Height: 1.549 m (5' 1\")       Body mass index is 34.58 kg/m .    [employer]    "

## 2024-05-09 NOTE — PROGRESS NOTES
GI CLINIC VISIT    CC/REFERRING MD:  Vonnie Cowan  REASON FOR CONSULTATION: RLQ abd pain    HPI  Nayeli K Ingrid Caal is a 43 year old year old female with PMHx of Type 2 DM,  presenting to establish care with the [facility] GI group for abd pain.     1. Recently seen in ED yesterday for abd pain with N/V. CTAP with labs ( cmp, lipase) were WNL. Cbc with mild nonspecific leukocytosis though it actually is lower than previously. She reports these sx started 2 weeks ago when she was started on ozempic. Reports she is taking 2 mg dose a week since the start of this medication. Last dose on Monday. She plans to hold this med until she meets again with endo - I agree with this plan.   2. She does have heartburn, this got worse after she started ozempic. It is daily when she takes the med. She denies dysphagia, odynphagia, melena or unintentional weight loss.   3. Her largest concern is that of ongoing pain to RLQ - this pain started a few weeks after her hysterectomy on Feb 2024. It is intermittent and tends to be triggered with coughing, positional changes, laying down. It also tends to be worse after lunch for unclear reasons. She reports it's a pressure sensation. Tried APAP w/o relief. Can be flared with having a BM but this does not seem to be consistent. CTAP yesterday with normal appearing appendix during an episode of this pain. No associated fevers, chills, nausea/vomiting.  She does not have a gallbladder - there was no evidence of ductal dilation on yesterday's scan.   4. She is having 2-3 bristol type 1-2 daily. This usually occurs after having no BM for a few days. She denies days without stooling. She denies straining or blood or black in her stools. She reports overall fair evacuation. On toilet for 5 min to pass a BM.     Wt Readings from Last 10 Encounters:   05/09/24 83 kg (183 lb)   04/15/24 82.6 kg (182 lb)   03/26/24 82.2 kg (181 lb 3.2 oz)   03/25/24 82.6 kg (182 lb)   02/27/24 81.4 kg (179 lb  6.4 oz)   02/23/24 79.8 kg (176 lb)   02/20/24 80.7 kg (177 lb 14.4 oz)   02/07/24 82.5 kg (181 lb 14.1 oz)   01/26/24 82.1 kg (181 lb)   01/18/24 81.5 kg (179 lb 11.2 oz)     Family Hx  Adopted - unsure of FHx    Social Hx   Yes use of NSAIDs - sparingly. Last use in March 2023     1-2 drinks of ETOH   Former tobacco products. 2009 quit. On and off smoker, so hard to quantify amount   No recreational drug use     PROBLEM LIST  Patient Active Problem List    Diagnosis Date Noted     S/P hysterectomy 02/07/2024     Priority: Medium     Adenomyosis of uterus 11/01/2023     Priority: Medium     Dysmenorrhea 11/01/2023     Priority: Medium     Trigger middle finger of right hand 05/31/2023     Priority: Medium     Added automatically from request for surgery [id number]       Trigger middle finger of left hand 05/30/2023     Priority: Medium     Sprain of left acromioclavicular ligament 05/25/2023     Priority: Medium     Chronic kidney disease, stage 1 08/15/2022     Priority: Medium     Uncontrolled type 2 diabetes mellitus with hyperglycemia, with long-term current use of insulin (H) 05/15/2017     Priority: Medium     Chondromalacia of patella, right, steroid injection 6/12/2015 11/24/2015     Priority: Medium     Macular degeneration, age related, nonexudative 11/22/2011     Priority: Medium     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 Provider to review       Hypersomnia, organic 08/19/2011     Priority: Medium     Other chronic nonalcoholic liver disease 08/19/2011     Priority: Medium     Diabetic retinopathy of both eyes (H) 08/19/2011     Priority: Medium     Obesity 08/19/2011     Priority: Medium     Problem list name updated by automated process. 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       PREVIOUS SURGERIES:  Past Surgical History:   Procedure Laterality Date     DAVINCI HYSTERECTOMY TOTAL, SALPINGECTOMY BILATERAL Bilateral 2/7/2024    Procedure: HYSTERECTOMY, TOTAL, ROBOT-ASSISTED, WITH BILATERAL SALPINGECTOMY, CYSTOSCOPY;  Surgeon: Vonnie Cowan MD;  Location: [facility] OR     LAPAROSCOPIC CHOLECYSTECTOMY N/A 3/10/2018    Procedure: LAPAROSCOPIC CHOLECYSTECTOMY;  Laparoscopic Cholecystectomy ;  Surgeon: Kuldeep Sigala MD;  Location: [facility] OR     RELEASE TRIGGER FINGER Right 5/2/2019    Procedure: Right Thumb Trigger Release;  Surgeon: Greg Streeter MD;  [facility]     RELEASE TRIGGER FINGER Left 5/30/2019    Procedure: Left Ring Trigger Finger Release.  Ganglion cyst excision.;  Surgeon: Greg Streeter MD;  [facility]     RELEASE TRIGGER FINGER Right 6/13/2023    Procedure: RELEASE, RIGHT TRIGGER FINGER, INDEX AND MIDDLE;  Surgeon: Miracle Carlin MD;  Location: [facility]     RELEASE TRIGGER FINGER Left 8/1/2023    Procedure: RELEASE, LEFT TRIGGER FINGER, MIDDLE;  Surgeon: Miracle Carlin MD;  Location: [facility] OR       ALLERGIES:   No Known Allergies    PERTINENT MEDICATIONS:    Current Outpatient Medications:      acetaminophen (TYLENOL) 500 MG tablet, Take 2 tablets (1,000 mg) by  mouth every 8 hours as needed for mild pain, Disp: 100 tablet, Rfl: 3     Alcohol Swabs (ALCOHOL PREP PAD) 70 % PADS, 1 each 3 times daily, Disp: 100 each, Rfl: 3     amLODIPine (NORVASC) 5 MG tablet, Take 1 tablet (5 mg) by mouth at bedtime, Disp: 90 tablet, Rfl: 3     aspirin (ASA) 81 MG chewable tablet, Take 1 tablet (81 mg) by mouth daily CHEW AND SWALLOW, Disp: 90 tablet, Rfl: 1     atorvastatin (LIPITOR) 40 MG tablet, Take 1 tablet (40 mg) by mouth daily, Disp: 90 tablet, Rfl: 1     blood glucose (ACCU-CHEK LAYA PLUS) test strip, Use with  Accucheck Expert meter.  Test blood sugar 6 times daily., Disp: 600 each, Rfl: 3     blood glucose monitoring (ACCU-CHEK FASTCLIX) lancets, Use to test blood sugar 6 times daily or as directed, Disp: 510 each, Rfl: 3     calcium carbonate-vitamin D (OSCAL) 500-5 MG-MCG tablet, Take 1 tablet by mouth 2 times daily, Disp: 180 tablet, Rfl: 1     Continuous Blood Gluc Sensor (DEXCOM G7 SENSOR) MISC, 1 each every 10 days, Disp: 9 each, Rfl: 3     ergocalciferol (ERGOCALCIFEROL) 1.25 MG (36774 UT) capsule, Take 1 capsule (50,000 Units) by mouth once a week For additional refills, please schedule a follow-up appointment at [phone], Disp: 12 capsule, Rfl: 3     famotidine (PEPCID) 20 MG tablet, Take 1 tablet (20 mg) by mouth 2 times daily, Disp: 30 tablet, Rfl: 0     fenofibrate (TRIGLIDE/LOFIBRA) 160 MG tablet, Take 1 tablet (160 mg) by mouth daily, Disp: 90 tablet, Rfl: 3     fish oil-omega-3 fatty acids 1000 MG capsule, TAKE TWO CAPSULES (2 GM) BY MOUTH ONCE DAILY, Disp: 180 capsule, Rfl: 3     hydrocortisone (CORTAID) 1 % external cream, Apply topically 2 times daily, Disp: 120 g, Rfl: 1     ibuprofen (ADVIL/MOTRIN) 600 MG tablet, Take 1 tablet (600 mg) by mouth every 6 hours as needed for moderate pain, Disp: 120 tablet, Rfl: 1     Injection Device for insulin (INPEN 100-PINK-NOVOLOG-FIASP) DAVID, 1 each continuous, Disp: 1 each, Rfl: 0     insulin aspart (NOVOLOG FLEXPEN)  100 UNIT/ML pen, 1 unit per 5 grams CHO and correction scale of 1/25/125.  Approximate daily use is 100 units., Disp: 30 mL, Rfl: 2     insulin aspart (NOVOLOG PENFILL) 100 UNIT/ML cartridge, Take with each meal and snack: 1 per 3 grams CHO and correction of 1 unit per 20 mg/dL over a target of 120. Average daily dose is 50 units., Disp: 270 mL, Rfl: 3     insulin glargine (BASAGLAR KWIKPEN) 100 UNIT/ML pen, Inject 60 Units Subcutaneous daily 3 month supply. (Patient taking differently: Inject 55 Units Subcutaneous daily 3 month supply.), Disp: 54 mL, Rfl: 3     insulin pen needle (31G X 5 MM) 31G X 5 MM miscellaneous, Use 5 to 6 pen needles daily or as directed.  3 month supply., Disp: 500 each, Rfl: 3     losartan (COZAAR) 100 MG tablet, Take 1 tablet (100 mg) by mouth daily, Disp: 90 tablet, Rfl: 3     naproxen (NAPROSYN) 375 MG tablet, Take 1 tablet (375 mg) by mouth 2 times daily (with meals), Disp: 60 tablet, Rfl: 3     ondansetron (ZOFRAN ODT) 4 MG ODT tab, Take 1 tablet (4 mg) by mouth every 6 hours as needed for nausea or vomiting, Disp: 20 tablet, Rfl: 0     Ostomy Supplies (ADHESIVE REMOVER WIPES) MISC, 1 each every 14 days, Disp: 50 each, Rfl: 3     Ostomy Supplies (SKIN TAC ADHESIVE BARRIER WIPE) MISC, 1 each every 14 days, Disp: 6 each, Rfl: 3     Semaglutide, 2 MG/DOSE, (OZEMPIC) 8 MG/3ML pen, Inject 2 mg Subcutaneous every 7 days, Disp: 9 mL, Rfl: 3     senna-docusate (SENOKOT-S/PERICOLACE) 8.6-50 MG tablet, Take 1-2 tablets by mouth 2 times daily, Disp: 30 tablet, Rfl: 0     sucralfate (CARAFATE) 1 GM tablet, Take 1 tablet (1 g) by mouth 4 times daily, Disp: 30 tablet, Rfl: 0     tirzepatide (MOUNJARO) 7.5 MG/0.5ML pen, Inject 7.5 mg Subcutaneous once a week, Disp: 2 mL, Rfl: 11     triamcinolone (KENALOG) 0.1 % external ointment, Apply topically 2 times daily, Disp: 30 g, Rfl: 1    Current Facility-Administered Medications:      hydrocortisone (CORTAID) 1 % cream, , Topical, Once, Mariya Mohamud  M, APRN CNP     hydrocortisone (CORTAID) 1 % cream, , Topical, TID, OverMariya benavidez M, APRN CNP     hylan (SYNVISC ONE) injection 48 mg, 48 mg, , , Rosas Rodriguez DO, 48 mg at 23     lidocaine (PF) (XYLOCAINE) 1 % injection 4 mL, 4 mL, , , Sebas Bradley MD, 4 mL at 10/05/23 0923     triamcinolone (KENALOG-40) injection 40 mg, 40 mg, , , Sebas Bradley MD, 40 mg at 10/05/23 0923    SOCIAL HISTORY:  Social History     Socioeconomic History     Marital status: Single     Spouse name: Not on file     Number of children: Not on file     Years of education: Not on file     Highest education level: Not on file   Occupational History     Not on file   Tobacco Use     Smoking status: Former     Current packs/day: 0.00     Types: Cigarettes     Quit date: 2010     Years since quittin.4     Smokeless tobacco: Never     Tobacco comments:     stopped 10 yrs ago   Substance and Sexual Activity     Alcohol use: Not Currently     Comment: socially     Drug use: No     Sexual activity: Not Currently     Partners: Male     Birth control/protection: I.U.D.   Other Topics Concern     Parent/sibling w/ CABG, MI or angioplasty before 65F 55M? Not Asked      Service No     Blood Transfusions No     Caffeine Concern No     Occupational Exposure No     Hobby Hazards No     Sleep Concern No     Stress Concern No     Weight Concern Yes     Special Diet No     Back Care No     Exercise Yes     Comment: 4-5 x a week     Bike Helmet No     Seat Belt Yes     Self-Exams Yes   Social History Narrative     Not on file     Social Determinants of Health     Financial Resource Strain: Low Risk  (2024)    Financial Resource Strain      Within the past 12 months, have you or your family members you live with been unable to get utilities (heat, electricity) when it was really needed?: No   Food Insecurity: Low Risk  (2024)    Food Insecurity      Within the past 12 months, did you worry that your food  "would run out before you got money to buy more?: No      Within the past 12 months, did the food you bought just not last and you didn t have money to get more?: No   Transportation Needs: Low Risk  (1/4/2024)    Transportation Needs      Within the past 12 months, has lack of transportation kept you from medical appointments, getting your medicines, non-medical meetings or appointments, work, or from getting things that you need?: No   Recent Concern: Transportation Needs - High Risk (10/30/2023)    Transportation Needs      Within the past 12 months, has lack of transportation kept you from medical appointments, getting your medicines, non-medical meetings or appointments, work, or from getting things that you need?: Yes   Physical Activity: Not on file   Stress: Not on file   Social Connections: Not on file   Interpersonal Safety: Low Risk  (1/4/2024)    Interpersonal Safety      Do you feel physically and emotionally safe where you currently live?: Yes      Within the past 12 months, have you been hit, slapped, kicked or otherwise physically hurt by someone?: No      Within the past 12 months, have you been humiliated or emotionally abused in other ways by your partner or ex-partner?: No   Housing Stability: Low Risk  (1/4/2024)    Housing Stability      Do you have housing? : Yes      Are you worried about losing your housing?: No       FAMILY HISTORY:  Family History   Problem Relation Age of Onset     Unknown/Adopted Other        Past/family/social history reviewed and no changes    PHYSICAL EXAMINATION:  Vitals reviewed: /78   Pulse 79   Ht 1.549 m (5' 1\")   Wt 83 kg (183 lb)   LMP 02/01/2024 (Approximate)   SpO2 99%   BMI 34.58 kg/m    Wt:   Wt Readings from Last 2 Encounters:   05/09/24 83 kg (183 lb)   04/15/24 82.6 kg (182 lb)      Constitutional: aaox3, cooperative, pleasant, not dyspneic/diaphoretic, no acute distress  Eyes: Sclera anicteric/injected  Neck: supple, active ROM w/o limitation " or pain   CV: No edema  Respiratory: Unlabored breathing  Abd: Nondistended, +bs, no hepatosplenomegaly, some tenderness to deep touch of RLQ abd, no peritoneal signs, neg Gutierres's sign. She visibly winched getting up - she reported pain to RLQ   Skin: warm, perfused, no jaundice  Psych: Normal affect  MSK: Normal gait     PERTINENT STUDIES:    Office Visit on 04/15/2024   Component Date Value Ref Range Status     Afinion Hemoglobin A1c POCT 04/15/2024 8.7 (H)  <=5.7 % Final        Lab Results   Component Value Date    WBC 12.9 (H) 05/08/2024    WBC 16.4 (H) 03/08/2024    WBC 11.2 (H) 01/04/2024    HGB 13.0 05/08/2024    HGB 12.6 03/08/2024    HGB 13.5 01/04/2024     05/08/2024     03/08/2024     01/04/2024    CHOL 183 01/04/2024    CHOL 221 (H) 04/04/2023    CHOL 194 07/19/2021    TRIG 350 (H) 01/04/2024    TRIG 298 (H) 04/04/2023    TRIG 133 07/19/2021    HDL 37 (L) 01/04/2024    HDL 38 (L) 04/04/2023    HDL 43 (L) 07/19/2021    ALT 45 05/08/2024    ALT 30 01/04/2024    ALT 28 08/17/2022    AST 27 05/08/2024    AST 23 01/04/2024    AST 14 08/17/2022     05/08/2024     03/08/2024     01/04/2024    BUN 12.7 05/08/2024    BUN 15.8 03/08/2024    BUN 12.3 01/04/2024    CO2 24 05/08/2024    CO2 23 03/08/2024    CO2 27 01/04/2024    TSH 1.41 04/04/2023    TSH 1.34 07/08/2020    TSH 1.84 05/17/2018    INR 1.03 06/18/2010    INR 0.92 06/18/2010        Liver Function Studies -   Recent Labs   Lab Test 05/08/24  1524   PROTTOTAL 7.4   ALBUMIN 4.2   BILITOTAL 0.2   ALKPHOS 79   AST 27   ALT 45        PREVIOUS ENDOSCOPY    No results found. However, due to the size of the patient record, not all encounters were searched. Please check Results Review for a complete set of results.    ASSESSMENT/PLAN:  Nayeli Caal is a 43 year old year old female with PMHx of Type 2 DM,  presenting to establish care with the [facility] GI group for abd pain that began after her hysterectomy in 2/2024. It  is intermittent and reliably triggered with positional changes and touch. It is not associated with any other sx including n/v/f/c. Her appetite and weight are stable. She had a reassuring CTAP w contrast done yesterday for eval of this pain. Appendix w/o inflammatory changes. She has not had a colonoscopy. FH is unknown.     #RLQ abd pain  Suspect component of abd wall pain with ddx to include hypervisceral senstivity, pain secondary to scarring from hysterectomy, under-treated constipation, SIBO/IMO vs other     -no strong clinical indication for colonoscopy today but will keep as a future consideration   -start bowel regiment after bowel clean out, per AVS     Future consideration  1.RLQ US can be considered for further eval of pain, teena if it continues despite better bowel evacuation   breath test   2. GI RD consult   3. PFC evaluation     #heartburn   Sx classic for GERD with ddx to include functional disease, h.pylori infection. Sx likely compounded by under-treated constipation     -h.pylori stool antigen then start daily omperazole  -no strong clinical indication for EGD today but keep as future consideration   -bowel clean out per above    Future consideration  1.EGD +/- HRM pH impedence  2. Addition of scheduled H2RA +/- alginates     #N/V/abd pain x 2 weeks  Given initiation of sx with start of ozempic 2 mg once weekly, suspect this is med induced. CTAP yesterday unremarkable. ddx includes GERD, hepatobilary disease, gastroparesis vs other   -I agree with plan to continue holding med until she again meets with endo  -if sx continue despite being off ozempic 2 mg once weekly, will consider EGD for further evaluation     Future consideration  -NMGES vs empiric trial of reglan after risk vs benefits discussion (NMGES clinical utility will be limited if she remains on GLP-1 agonist)   -EGD    Orders Placed This Encounter   Procedures     Helicobacter pylori Antigen Stool     Tissue transglutaminase radha IgA  and IgG     IgA      Colorectal cancer screening: aged 45, unless otherwise clinically indicated     RTC 6-8 weeks or sooner PRN     Thank you for this consultation.  It was a pleasure to participate in the care of this patient; please contact me with any further questions.     Kirsten Conn PA-C    Follow up: As planned above. Today, I personally spent 47 minutes in direct face to face time with the patient, of which greater than 50% of the time was spent in patient education and counseling as described above. Approximately minutes were spent on indirect care associated with the patient's consultation including but not limited to review of: patient medical records to date, clinic visits, hospital records, lab results, imaging studies, procedural documentation, and coordinating care with other providers. The findings from this review are summarized in the above note. All of the above accounted for a cumulative time of 47 minutes and was performed on the date of service.

## 2024-05-09 NOTE — DISCHARGE INSTRUCTIONS
Discuss the Ozempic with your Doctor before you take it again, since it is likely the cause of your abdominal pain, nausea and vomiting    Drink plenty of water/fluids

## 2024-05-09 NOTE — PATIENT INSTRUCTIONS
It was a pleasure taking care of you today.  I've included a brief summary of our discussion and care plan from today's visit below.  Please review this information with your primary care provider.  _______________________________________________________________________    My recommendations are summarized as follows:    Leave the stool sample for h.pylori  After you leave the sample, start the new medicine omeprazole   I want you to do a bowel clean out, see instructions below    --  ONE Miralax bottle (238 g) and TWO 32 once Gatorade (64 ounces). Mix the two. Drink mixture over the course of 3-4 hours.  You should experience loose bowel movements that are watery in consistency when complete.  -- The day after the cleanout, immediately resume Miralax 3 caps full daily x 1 week.   -- This is not a stimulant laxative and is safe to take on a daily basis.  You can titrate this dose (increase or decrease) based on stool frequency and consistency.     -- Recommend soluble fiber supplementation on a daily basis (Metamucil, citrucel or benefiber).  Start with 1 tsp per day and if tolerated, may increase by a tsp weekly for a goal of 1-2 tbsp in a day.  You may experience some bloating with initiation of fiber supplementation that will improve over the first month.  A good fiber trial to evaluate the effect is 3-6 months.      Please call our clinic or send a MyChart message to us if you have any questions or concerns. MyChart messages are answered by your nurse or doctor typically within 24 hours.  Please allow extra time on weekends and holidays    Return to GI Clinic in 6-8 wk to review your progress.    _______________________________________________________________________    How do I schedule labs, imaging studies, or procedures that were ordered in clinic today?      Labs: To schedule lab appointment at the [facility], use my chart or call [phone]. If you have a [place] lab closer to home  where you are regularly seen you can give them a call.      Procedures: If a colonoscopy, upper endoscopy, breath test, esophageal manometry, or pH impedence was ordered today, our endoscopy team will call you to schedule this. If you have not heard from our endoscopy team within a week, please call [phone] option 2 to schedule.      Imaging Studies: If you were scheduled for a CT scan, X-ray, MRI, ultrasound, HIDA scan or other imaging study, please call [phone] to have this scheduled.      Referral: If a referral to another specialty was ordered, expect a phone call or follow instructions above. If you have not heard from anyone regarding your referral in a week, please call our clinic to check the status.      Who do I call with any questions after my visit?  Please be in touch if there are any further questions that arise following today's visit.  There are multiple ways to contact your gastroenterology care team.       During business hours, you may reach a Gastroenterology nurse at [phone]     To schedule or reschedule an appointment, please call [phone].      You can always send a secure message through [employer].  [employer] messages are answered by your nurse or doctor typically within 24 hours.  Please allow extra time on weekends and holidays.       For urgent/emergent questions after business hours, you may reach the on-call GI Fellow by contacting the [facility]  at [phone].     How will I get the results of any tests ordered?    You will receive all of your results.  If you have signed up for [employer], any tests ordered at your visit will be available to you after your physician reviews them.  Typically this takes 1-2 weeks.  If there are urgent results that require a change in your care plan, your physician or nurse will call you to discuss the next steps.       What is [employer]?  [employer] is a secure way for you to access all of your healthcare records from the  [facility].  It is a web based computer program, so you can sign on to it from any location.  It also allows you to send secure messages to your care team.  I recommend signing up for epicurio access if you have not already done so and are comfortable with using a computer.       How to I schedule a follow-up visit?  If you did not schedule a follow-up visit today, please call [phone] to schedule a follow-up office visit.      Sincerely,    Kirsten Conn PA-C  Gastroenterology

## 2024-05-10 ENCOUNTER — TELEPHONE (OUTPATIENT)
Dept: ENDOCRINOLOGY | Facility: CLINIC | Age: 44
End: 2024-05-10
Payer: COMMERCIAL

## 2024-05-10 LAB
TTG IGA SER-ACNC: 0.8 U/ML
TTG IGG SER-ACNC: <0.6 U/ML

## 2024-05-10 RX ORDER — ERGOCALCIFEROL 1.25 MG/1
50000 CAPSULE ORAL WEEKLY
Qty: 12 CAPSULE | Refills: 3 | OUTPATIENT
Start: 2024-05-10

## 2024-05-10 NOTE — TELEPHONE ENCOUNTER
ergocalciferol (ERGOCALCIFEROL) 1.25 MG (40058 UT) capsule 12 capsule 3 4/5/2023 -- No   Sig - Route: Take 1 capsule (50,000 Units) by mouth once a week For additional refills, please schedule a follow-up appointment at [phone] - Oral     ----------------------  Last Office Visit : 2/20/2024  [facility]     Multiple Providers     Future Office visit:  0  ----------------------    Routing refill request to provider for review/approval because:  Drug not on the [facility], [facility] or [facility] refill protocol

## 2024-05-10 NOTE — TELEPHONE ENCOUNTER
Patient confirmed scheduled appointment:  Date: 7/12   Time: 8am   Visit type: return diabetes   Provider: Adam   Location: [facility] [place]   Testing/imaging:   Additional notes: 7/8 hector/s     Michelle Galarza on 5/10/2024 at 3:09 PM

## 2024-05-11 PROCEDURE — 87338 HPYLORI STOOL AG IA: CPT | Performed by: PHYSICIAN ASSISTANT

## 2024-05-11 PROCEDURE — 99000 SPECIMEN HANDLING OFFICE-LAB: CPT | Performed by: PATHOLOGY

## 2024-05-13 LAB — H PYLORI AG STL QL IA: NEGATIVE

## 2024-05-16 DIAGNOSIS — E55.9 VITAMIN D DEFICIENCY: ICD-10-CM

## 2024-05-23 RX ORDER — ERGOCALCIFEROL 1.25 MG/1
50000 CAPSULE ORAL WEEKLY
Qty: 12 CAPSULE | Refills: 3 | OUTPATIENT
Start: 2024-05-23

## 2024-05-24 NOTE — TELEPHONE ENCOUNTER
Refused 5/2/24 for Adjustment in Therapy.     Sushma Chairez RN   [facility]

## 2024-05-28 ENCOUNTER — MYC MEDICAL ADVICE (OUTPATIENT)
Dept: EDUCATION SERVICES | Facility: CLINIC | Age: 44
End: 2024-05-28
Payer: COMMERCIAL

## 2024-05-28 DIAGNOSIS — E11.65 UNCONTROLLED TYPE 2 DIABETES MELLITUS WITH HYPERGLYCEMIA, WITH LONG-TERM CURRENT USE OF INSULIN (H): Primary | ICD-10-CM

## 2024-05-28 DIAGNOSIS — Z79.4 UNCONTROLLED TYPE 2 DIABETES MELLITUS WITH HYPERGLYCEMIA, WITH LONG-TERM CURRENT USE OF INSULIN (H): Primary | ICD-10-CM

## 2024-05-30 ENCOUNTER — OFFICE VISIT (OUTPATIENT)
Dept: INTERNAL MEDICINE | Facility: CLINIC | Age: 44
End: 2024-05-30
Payer: COMMERCIAL

## 2024-05-30 VITALS
BODY MASS INDEX: 34.42 KG/M2 | SYSTOLIC BLOOD PRESSURE: 155 MMHG | HEIGHT: 61 IN | WEIGHT: 182.3 LBS | OXYGEN SATURATION: 99 % | DIASTOLIC BLOOD PRESSURE: 89 MMHG | HEART RATE: 79 BPM

## 2024-05-30 DIAGNOSIS — R10.31 ABDOMINAL PAIN, RIGHT LOWER QUADRANT: Primary | ICD-10-CM

## 2024-05-30 DIAGNOSIS — R80.9 ALBUMINURIA: ICD-10-CM

## 2024-05-30 PROBLEM — S43.52XA SPRAIN OF LEFT ACROMIOCLAVICULAR LIGAMENT: Status: RESOLVED | Noted: 2023-05-25 | Resolved: 2024-05-30

## 2024-05-30 PROCEDURE — G2211 COMPLEX E/M VISIT ADD ON: HCPCS | Performed by: NURSE PRACTITIONER

## 2024-05-30 PROCEDURE — 99214 OFFICE O/P EST MOD 30 MIN: CPT | Performed by: NURSE PRACTITIONER

## 2024-05-30 NOTE — Clinical Note
Can someone reach out to patient to get scheduled for US and upper endoscopy any time Monday, Wednesday, Thursday, or Friday? Thank you!  Jose Dey, EMT at 7:07 AM on 5/31/2024

## 2024-05-30 NOTE — PROGRESS NOTES
Assessment & Plan     Abdominal pain, right lower quadrant    - US Abdomen Complete tomorrow.  - US Hernia Evaluation tomorrow.  - UPPER GI ENDOSCOPY ASAP.  - CRP, inflammation  - CBC with platelets  - Comprehensive metabolic panel (BMP + Alb, Alk Phos, ALT, AST, Total. Bili, TP)        Will F/U with results when available.  The longitudinal plan of care for the diagnosis(es)/condition(s) as documented were addressed during this visit. Due to the added complexity in care, I will continue to support Nayeli in the subsequent management and with ongoing continuity of care.    Mariya CELESTIN, LINETTE    Subjective   Nayeli is a 43 year old, presenting for the following health issues:  RECHECK (Ovary pain, medication refills)        5/30/2024     5:46 PM   Additional Questions   Roomed by ALEX SANDOVAL     Nayeli is seen with the  today. She is experiencing pain in the same area as she was prior to her hysterectomy for fibroids. She states the pain is intermittent and aggravated by sitting (5/10). She recently flew home from [place] and had to stand as much as possible due to the pain.  She had her hysterectomy on 2/7/24. The pain recurred one month post-op. She is also having epigastric pain and can only eat small bites of food, yet is gaining weight.  Her Ozempic was stopped. She started taking Pepcid.  Her pain is a 8/10 if she palpates her right lower pelvis and groin area.   She went to Urgent Care on 5/8/24 after experiencing 4 days of abdominal pain, N and V after eating, and had an abdominal CT which was negative for appendicitis, hernia, renal stones, negative lab eval except for a WBC of 12.9.  She does have hepatic steatosis.    She is requesting an abdominal US as her pain is disabling her ability to eat or sleep.     She has an appointment with the Nephrologist tomorrow.    History of Present Illness       Reason for visit:  Ovary issueShe consumes 0 sweetened beverage(s) daily.She exercises with  enough effort to increase her heart rate 9 or less minutes per day.  She exercises with enough effort to increase her heart rate 3 or less days per week. She is missing 2 dose(s) of medications per week.  She is not taking prescribed medications regularly due to remembering to take.       Patient Active Problem List   Diagnosis    Essential hypertension    Hypersomnia, organic    Other chronic nonalcoholic liver disease    Diabetic retinopathy of both eyes (H)    Obesity    Usher Syndrome: congenital deafness, retinitis pigmentosa    Macular degeneration, age related, nonexudative    Benign neoplasm of iris    Dry eye syndrome    Pemphigus erythematosus (H28)    Chondromalacia of patella, right, steroid injection 6/12/2015    Uncontrolled type 2 diabetes mellitus with hyperglycemia, with long-term current use of insulin (H)    Chronic kidney disease, stage 1    Trigger middle finger of right hand    Trigger middle finger of left hand    Adenomyosis of uterus    S/P hysterectomy     Past Surgical History:   Procedure Laterality Date    DAVINCI HYSTERECTOMY TOTAL, SALPINGECTOMY BILATERAL Bilateral 2/7/2024    Procedure: HYSTERECTOMY, TOTAL, ROBOT-ASSISTED, WITH BILATERAL SALPINGECTOMY, CYSTOSCOPY;  Surgeon: Vonnie Cowan MD;  [facility]    LAPAROSCOPIC CHOLECYSTECTOMY N/A 3/10/2018    Procedure: LAPAROSCOPIC CHOLECYSTECTOMY;  Laparoscopic Cholecystectomy ;  Surgeon: Kuldeep Sigala MD;  [facility]    RELEASE TRIGGER FINGER Right 5/2/2019    Procedure: Right Thumb Trigger Release;  Surgeon: Greg Streeter MD;  [facility]    RELEASE TRIGGER FINGER Left 5/30/2019    Procedure: Left Ring Trigger Finger Release.  Ganglion cyst excision.;  Surgeon: Greg Streeter MD;  [facility]    RELEASE TRIGGER FINGER Right 6/13/2023    Procedure: RELEASE, RIGHT TRIGGER FINGER, INDEX AND MIDDLE;  Surgeon: Miracle Carlin MD;  [facility]    RELEASE TRIGGER FINGER Left 8/1/2023     "Procedure: RELEASE, LEFT TRIGGER FINGER, MIDDLE;  Surgeon: Miracle Carlin MD;  [facility]             Objective    BP (!) 155/89 (BP Location: Right arm, Patient Position: Sitting, Cuff Size: Adult Large)   Pulse 79   Ht 1.549 m (5' 0.98\")   Wt 82.7 kg (182 lb 4.8 oz)   LMP 02/01/2024 (Approximate)   SpO2 99%   BMI 34.46 kg/m    Body mass index is 34.46 kg/m .  Physical Exam   GENERAL: alert and  mild distress  RESP: normal  CV: see VS  ABDOMEN: soft, tender to palpation over epigastric and entire right upper and lower abdomen and right inguinal area, worsened by light palpation.  MS: no gross musculoskeletal defects noted, no edema  PSYCH: mentation appears normal, she is very distressed.            Signed Electronically by: SABI Morrison CNP    "

## 2024-05-31 ENCOUNTER — LAB (OUTPATIENT)
Dept: LAB | Facility: CLINIC | Age: 44
End: 2024-05-31
Payer: COMMERCIAL

## 2024-05-31 ENCOUNTER — PRE VISIT (OUTPATIENT)
Dept: NEPHROLOGY | Facility: CLINIC | Age: 44
End: 2024-05-31

## 2024-05-31 ENCOUNTER — OFFICE VISIT (OUTPATIENT)
Dept: NEPHROLOGY | Facility: CLINIC | Age: 44
End: 2024-05-31
Attending: PHYSICIAN ASSISTANT
Payer: COMMERCIAL

## 2024-05-31 VITALS
DIASTOLIC BLOOD PRESSURE: 86 MMHG | WEIGHT: 179.6 LBS | OXYGEN SATURATION: 97 % | HEART RATE: 74 BPM | SYSTOLIC BLOOD PRESSURE: 136 MMHG | TEMPERATURE: 98.6 F | BODY MASS INDEX: 33.95 KG/M2

## 2024-05-31 DIAGNOSIS — R80.9 ALBUMINURIA: ICD-10-CM

## 2024-05-31 DIAGNOSIS — E11.9 WELL CONTROLLED TYPE 2 DIABETES MELLITUS (H): ICD-10-CM

## 2024-05-31 DIAGNOSIS — Z11.59 NEED FOR HEPATITIS C SCREENING TEST: Primary | ICD-10-CM

## 2024-05-31 LAB
ALBUMIN SERPL BCG-MCNC: 4 G/DL (ref 3.5–5.2)
ALP SERPL-CCNC: 96 U/L (ref 40–150)
ALT SERPL W P-5'-P-CCNC: 51 U/L (ref 0–50)
ANION GAP SERPL CALCULATED.3IONS-SCNC: 11 MMOL/L (ref 7–15)
AST SERPL W P-5'-P-CCNC: 32 U/L (ref 0–45)
BILIRUB SERPL-MCNC: 0.3 MG/DL
BUN SERPL-MCNC: 13.3 MG/DL (ref 6–20)
CALCIUM SERPL-MCNC: 8.9 MG/DL (ref 8.6–10)
CHLORIDE SERPL-SCNC: 103 MMOL/L (ref 98–107)
CREAT SERPL-MCNC: 0.6 MG/DL (ref 0.51–0.95)
CRP SERPL-MCNC: 9.29 MG/L
DEPRECATED HCO3 PLAS-SCNC: 23 MMOL/L (ref 22–29)
EGFRCR SERPLBLD CKD-EPI 2021: >90 ML/MIN/1.73M2
ERYTHROCYTE [DISTWIDTH] IN BLOOD BY AUTOMATED COUNT: 12.3 % (ref 10–15)
GLUCOSE SERPL-MCNC: 250 MG/DL (ref 70–99)
HCT VFR BLD AUTO: 36.6 % (ref 35–47)
HGB BLD-MCNC: 12.3 G/DL (ref 11.7–15.7)
MCH RBC QN AUTO: 28 PG (ref 26.5–33)
MCHC RBC AUTO-ENTMCNC: 33.6 G/DL (ref 31.5–36.5)
MCV RBC AUTO: 83 FL (ref 78–100)
PLATELET # BLD AUTO: 375 10E3/UL (ref 150–450)
POTASSIUM SERPL-SCNC: 4.2 MMOL/L (ref 3.4–5.3)
PROT SERPL-MCNC: 7.1 G/DL (ref 6.4–8.3)
RBC # BLD AUTO: 4.39 10E6/UL (ref 3.8–5.2)
SODIUM SERPL-SCNC: 137 MMOL/L (ref 135–145)
WBC # BLD AUTO: 7.7 10E3/UL (ref 4–11)

## 2024-05-31 PROCEDURE — 99204 OFFICE O/P NEW MOD 45 MIN: CPT | Mod: GC

## 2024-05-31 PROCEDURE — 99000 SPECIMEN HANDLING OFFICE-LAB: CPT | Performed by: PATHOLOGY

## 2024-05-31 PROCEDURE — 36415 COLL VENOUS BLD VENIPUNCTURE: CPT | Performed by: PATHOLOGY

## 2024-05-31 PROCEDURE — 80053 COMPREHEN METABOLIC PANEL: CPT | Performed by: PATHOLOGY

## 2024-05-31 PROCEDURE — 86803 HEPATITIS C AB TEST: CPT | Performed by: NURSE PRACTITIONER

## 2024-05-31 PROCEDURE — 99213 OFFICE O/P EST LOW 20 MIN: CPT

## 2024-05-31 PROCEDURE — 86140 C-REACTIVE PROTEIN: CPT | Performed by: PATHOLOGY

## 2024-05-31 PROCEDURE — 85027 COMPLETE CBC AUTOMATED: CPT | Performed by: PATHOLOGY

## 2024-05-31 PROCEDURE — 82610 CYSTATIN C: CPT

## 2024-05-31 PROCEDURE — T1013 SIGN LANG/ORAL INTERPRETER: HCPCS | Mod: U3

## 2024-05-31 ASSESSMENT — PAIN SCALES - GENERAL: PAINLEVEL: SEVERE PAIN (7)

## 2024-05-31 NOTE — PROGRESS NOTES
Assessment and Plan:    # CKD stage G1A2  DM2 since 2000, complicated by retinopathy and mild nueropathy. Noted to have albuminuria since 2005,was fluctuating at the beginning. becomes persistent and has been increasing gradually since 2012 to most recent 1/4/2024 UACR 1286 mg/ gm. Creatinine normal throughout. Urine microscopy is bland. Imaging without hydronephrosis.Given the presence of other microvascular complication ( diabetic retinopathy and neuropathy), CKD is likely from DKD. Absence of active urine sediment makes glomerulonephritis less likely. She is on max dose ARB/ losartan 100 mg po daily. She had received jardiance in the past, discontinued because of yeast infection. Recommend better sugar control and given its anti proteinuric effects, she would like to re-start Jardiance.     - Re-start Jardiance 25 mg po daily ,If sign/symptoms of yeast infection stop jardiance  and report to either your PCP or send us my chart message.  - Will consider add on finerenone therapy if no significant improvement in proteinuria on next visit  - Recommend tighter blood sugar  and BP control   - Avoid Ibuprofen ( Advil ,Motrin)   - Healthy life measures : regular exercise, maintaining body weight and healtyh diet.  - Follow up in six months    # Hypertension  Current regimen : losartan 100 mg and amlodipine 5 mg. Has not been monitoring BP at home regularly. Office BP today 136/86.  - Encourage home BP monitoring, keep a Blood Pressure log    # DM 2  On MDI Insulin and [place]jaro 5 mg weekly. Could not tolerate Metformin because of diarrhea and Ozempic due to GI upset ,abdominal pain ,nausea and vomiting.  Most recent HBAIC  7.8. Not yet in target.  - Continue follow up with Endo and Primary   Other medical problems   # Dyslipidemia     # Usher syndrome     Assessment and plan was discussed with patient and she voiced her understanding and agreement.    Consult:  Nayeli Caal was seen  for CKD management.  Evaluated via ASL.     Reason for Visit:  Nayeli Caal is a 43 year old female with CKD from likely from diabetic nephropthay, who presents for to establish CKD care and follow up.    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   DM2 since 2000, currently receieving MDI Insulin  and mountjaro 5 mg weekly. Blodd sugar control was not optimal, HBA1C persistently over target. Unable to tolerate many of  antidiabetic medicines,metformin discontinued due to diarrhea, ozempic resulted in serious GI upset , and had yeast infection to jardiance. Diabtes complicated by retinopathy and peripheral neuropathy.  Regarding hypertension ,she is currently receiving losartan 50 mg po and amlodipine 5 mg. Has not been monitoring BP at home however office BP mostly ranges ' and DBP in 80-90.  Regarding her kidneys ,creatinine and cystatin c has been stable with GFR > 90 thus far however noted to have albuminuria since 2005 ,which gradually increased to UACR 1286 mg / gm on 4/1/24. Urine microscopy is bland and renal imaging without obstruction. No lower leg swelling ,shortness of breathing. She uses two -thrice per week NSAID over  last couple of years. No joint pain , skin rases or recurrent oral ulcers. No recurrent oral ulcers. No history of recurrent UTI. No family history of kidney diseases.       ROS:   A comprehensive review of systems was obtained and negative, except as noted in the HPI or PMH.    Active Medical Problems:  Patient Active Problem List   Diagnosis    Essential hypertension    Hypersomnia, organic    Other chronic nonalcoholic liver disease    Diabetic retinopathy of both eyes (H)    Obesity    Usher Syndrome: congenital deafness, retinitis pigmentosa    Macular degeneration, age related, nonexudative    Benign neoplasm of iris     Dry eye syndrome    Pemphigus erythematosus (H28)    Chondromalacia of patella, right, steroid injection 6/12/2015    Uncontrolled type 2 diabetes mellitus with hyperglycemia, with long-term current use of insulin (H)    Chronic kidney disease, stage 1    Trigger middle finger of right hand    Trigger middle finger of left hand    Adenomyosis of uterus    S/P hysterectomy     PMH:   Medical record was reviewed and PMH was discussed with patient and noted below.  Past Medical History:   Diagnosis Date    Combined visual and hearing impairment     Deaf     Diabetic retinopathy of both eyes (H) 8/19/2011    Hepatic steatosis 08/19/2011    History of tobacco use     Hyperlipidemia     Hypertension     Hypertriglyceridemia     Migraines     Obesity 8/19/2011     Problem list name updated by automated process. 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     PSH:   Past Surgical History:   Procedure Laterality Date    DAVINCI HYSTERECTOMY TOTAL, SALPINGECTOMY BILATERAL Bilateral 2/7/2024    Procedure: HYSTERECTOMY, TOTAL, ROBOT-ASSISTED, WITH BILATERAL SALPINGECTOMY, CYSTOSCOPY;  Surgeon: Vonnie Cowan MD;  [facility]    LAPAROSCOPIC CHOLECYSTECTOMY N/A 3/10/2018    Procedure: LAPAROSCOPIC CHOLECYSTECTOMY;  Laparoscopic Cholecystectomy ;  Surgeon: Kuldeep Sigala MD;  [facility]    RELEASE TRIGGER FINGER Right 5/2/2019    Procedure: Right Thumb Trigger Release;  Surgeon: Greg Streeter MD;  [facility]    RELEASE TRIGGER FINGER Left 5/30/2019    Procedure: Left Ring Trigger Finger Release.  Ganglion cyst excision.;  Surgeon: Greg Streeter MD;  [facility]    RELEASE TRIGGER FINGER Right 6/13/2023    Procedure: RELEASE, RIGHT TRIGGER FINGER, INDEX AND MIDDLE;  Surgeon: Miracle Carlin MD;  [facility]    RELEASE  TRIGGER FINGER Left 2023    Procedure: RELEASE, LEFT TRIGGER FINGER, MIDDLE;  Surgeon: Miracle Carlin MD;  [facility]       Family Hx:   Family History   Problem Relation Age of Onset    Unknown/Adopted Other      Personal Hx:   Social History     Tobacco Use    Smoking status: Former     Current packs/day: 0.00     Types: Cigarettes     Quit date: 2010     Years since quittin.5     Passive exposure: Never    Smokeless tobacco: Never    Tobacco comments:     stopped 10 yrs ago   Substance Use Topics    Alcohol use: Not Currently     Comment: socially       Allergies:  No Known Allergies    Medications:  Current Outpatient Medications   Medication Sig Dispense Refill    acetaminophen (TYLENOL) 500 MG tablet Take 2 tablets (1,000 mg) by mouth every 8 hours as needed for mild pain 100 tablet 3    Alcohol Swabs (ALCOHOL PREP PAD) 70 % PADS 1 each 3 times daily 100 each 3    amLODIPine (NORVASC) 5 MG tablet Take 1 tablet (5 mg) by mouth at bedtime 90 tablet 3    aspirin (ASA) 81 MG chewable tablet Take 1 tablet (81 mg) by mouth daily CHEW AND SWALLOW 90 tablet 1    atorvastatin (LIPITOR) 40 MG tablet Take 1 tablet (40 mg) by mouth daily 90 tablet 1    blood glucose (ACCU-CHEK LAYA PLUS) test strip Use with  Accucheck Expert meter.  Test blood sugar 6 times daily. 600 each 3    blood glucose monitoring (ACCU-CHEK FASTCLIX) lancets Use to test blood sugar 6 times daily or as directed 510 each 3    calcium carbonate-vitamin D (OSCAL) 500-5 MG-MCG tablet Take 1 tablet by mouth 2 times daily 180 tablet 1    Continuous Blood Gluc Sensor (DEXCOM G7 SENSOR) MISC 1 each every 10 days 9 each 3    ergocalciferol (ERGOCALCIFEROL) 1.25 MG (04018 UT) capsule Take 1 capsule (50,000 Units) by mouth once a week For additional refills, please schedule a follow-up appointment at [phone] 12 capsule 3    famotidine (PEPCID) 20 MG tablet Take 1 tablet (20 mg) by mouth 2 times daily 30 tablet 0    fenofibrate  (TRIGLIDE/LOFIBRA) 160 MG tablet Take 1 tablet (160 mg) by mouth daily 90 tablet 3    fish oil-omega-3 fatty acids 1000 MG capsule TAKE TWO CAPSULES (2 GM) BY MOUTH ONCE DAILY 180 capsule 3    hydrocortisone (CORTAID) 1 % external cream Apply topically 2 times daily 120 g 1    ibuprofen (ADVIL/MOTRIN) 600 MG tablet Take 1 tablet (600 mg) by mouth every 6 hours as needed for moderate pain 120 tablet 1    Injection Device for insulin (INPEN 100-PINK-NOVOLOG-FIASP) DAVID 1 each continuous 1 each 0    insulin aspart (NOVOLOG FLEXPEN) 100 UNIT/ML pen 1 unit per 5 grams CHO and correction scale of 1/25/125.  Approximate daily use is 100 units. 30 mL 2    insulin aspart (NOVOLOG PENFILL) 100 UNIT/ML cartridge Take with each meal and snack: 1 per 3 grams CHO and correction of 1 unit per 20 mg/dL over a target of 120. Average daily dose is 50 units. 270 mL 3    insulin glargine (BASAGLAR KWIKPEN) 100 UNIT/ML pen Inject 60 Units Subcutaneous daily 3 month supply. (Patient taking differently: Inject 55 Units Subcutaneous daily 3 month supply.) 54 mL 3    insulin pen needle (31G X 5 MM) 31G X 5 MM miscellaneous Use 5 to 6 pen needles daily or as directed.  3 month supply. 500 each 3    losartan (COZAAR) 100 MG tablet Take 1 tablet (100 mg) by mouth daily 90 tablet 3    naproxen (NAPROSYN) 375 MG tablet Take 1 tablet (375 mg) by mouth 2 times daily (with meals) 60 tablet 3    omeprazole (PRILOSEC) 40 MG DR capsule Take 1 capsule (40 mg) by mouth daily 8-12 weeks 90 capsule 0    ondansetron (ZOFRAN ODT) 4 MG ODT tab Take 1 tablet (4 mg) by mouth every 6 hours as needed for nausea or vomiting 20 tablet 0    Ostomy Supplies (ADHESIVE REMOVER WIPES) MISC 1 each every 14 days 50 each 3    Ostomy Supplies (SKIN TAC ADHESIVE BARRIER WIPE) MISC 1 each every 14 days 6 each 3    Semaglutide, 2 MG/DOSE, (OZEMPIC) 8 MG/3ML pen Inject 2 mg Subcutaneous every 7 days (Patient not taking: Reported on 5/30/2024) 9 mL 3    senna-docusate  (SENOKOT-S/PERICOLACE) 8.6-50 MG tablet Take 1-2 tablets by mouth 2 times daily 30 tablet 0    sucralfate (CARAFATE) 1 GM tablet Take 1 tablet (1 g) by mouth 4 times daily (Patient not taking: Reported on 5/30/2024) 30 tablet 0    tirzepatide (MOUNJARO) 2.5 MG/0.5ML pen Inject 2.5 mg Subcutaneous every 7 days (Patient not taking: Reported on 5/30/2024) 6 mL 3    tirzepatide (MOUNJARO) 7.5 MG/0.5ML pen Inject 7.5 mg Subcutaneous once a week 2 mL 11    triamcinolone (KENALOG) 0.1 % external ointment Apply topically 2 times daily 30 g 1     Current Facility-Administered Medications   Medication Dose Route Frequency Provider Last Rate Last Admin    hydrocortisone (CORTAID) 1 % cream   Topical Once Mariya Mohamud APRN CNP        hydrocortisone (CORTAID) 1 % cream   Topical TID Mariya Mohamud APRN CNP        hylan (SYNVISC ONE) injection 48 mg  48 mg   Rosas Rodriguez DO   48 mg at 05/09/23 1813    lidocaine (PF) (XYLOCAINE) 1 % injection 4 mL  4 mL   Sebas Bradley MD   4 mL at 10/05/23 0923    triamcinolone (KENALOG-40) injection 40 mg  40 mg   Sebas Bradley MD   40 mg at 10/05/23 0923      Vitals:  /86 (BP Location: Left arm, Patient Position: Sitting, Cuff Size: Adult Regular)   Pulse 74   Temp 98.6  F (37  C) (Oral)   Wt 81.5 kg (179 lb 9.6 oz)   LMP 02/01/2024 (Approximate)   SpO2 97%   BMI 33.95 kg/m      Exam:  GENERAL APPEARANCE: alert and no distress  HENT: mouth without ulcers or lesions  RESP: lungs clear to auscultation - no rales, rhonchi or wheezes  CV: regular rhythm, normal rate, no rub, no murmur  EDEMA: no LE edema bilaterally  ABDOMEN: soft, nondistended, nontender, bowel sounds normal  MS: extremities normal - no gross deformities noted, no evidence of inflammation in joints, no muscle tenderness  SKIN: no rash    LABS:   CMP  Recent Labs   Lab Test 05/31/24  0909 05/08/24  1524 03/08/24  1610 02/07/24  1104 02/07/24  0655 01/04/24  1657 07/22/21  0950  07/08/20  1623 05/02/19  0949 05/17/18  0723 03/13/18  2210    139 137  --   --  138   < > 136 138 138 137   POTASSIUM 4.2 4.1 4.0  --   --  4.0   < > 4.0 4.0 4.3 4.3   CHLORIDE 103 104 101  --   --  102   < > 101 105 106 104   CO2 23 24 23  --   --  27   < > 30 26 25 27   ANIONGAP 11 11 13  --   --  9   < > 5 7 7 6   * 126* 207* 207*   < > 200*   < > 417* 138* 286* 171*   BUN 13.3 12.7 15.8  --   --  12.3   < > 13 13 13 18   CR 0.60 0.76 0.66  --   --  0.54   < > 0.74 0.66 0.56 0.85   GFRESTIMATED >90 >90 >90  --   --  >90   < > >90 >90 >90 75   GFRESTBLACK  --   --   --   --   --   --   --  >90 >90 >90 >90   VERO 8.9 8.9 9.2  --   --  8.9   < > 8.6 8.8 8.7 8.9    < > = values in this interval not displayed.     Recent Labs   Lab Test 05/31/24  0909 05/08/24  1524 01/04/24  1657 08/17/22  0949   BILITOTAL 0.3 0.2 0.2 0.5   ALKPHOS 96 79 99 103   ALT 51* 45 30 28   AST 32 27 23 14     CBC  Recent Labs   Lab Test 05/31/24  0909 05/08/24  1524 03/08/24  1610 01/04/24  1657   HGB 12.3 13.0 12.6 13.5   WBC 7.7 12.9* 16.4* 11.2*   RBC 4.39 4.64 4.49 4.75   HCT 36.6 40.3 38.3 39.8   MCV 83 87 85 84   MCH 28.0 28.0 28.1 28.4   MCHC 33.6 32.3 32.9 33.9   RDW 12.3 12.9 12.6 12.6    413 393 353     URINE STUDIES  Recent Labs   Lab Test 05/08/24  1724 01/04/24  1704 08/04/22  1454 08/31/21  1317   COLOR Light Yellow Yellow Yellow Light Yellow   APPEARANCE Clear Slightly Cloudy* Slightly Cloudy* Clear   URINEGLC Negative 200* 1000* >=1000*   URINEBILI Negative Negative Negative Negative   URINEKETONE Negative Negative Negative Negative   SG 1.021 1.022 1.015 1.034   UBLD Negative Large* Large* Negative   URINEPH 6.5 6.5 6.5 5.0   PROTEIN 70* 100* 30* 30*   NITRITE Negative Negative Negative Negative   LEUKEST Negative Negative Negative Negative   RBCU 2 >182* >182* 1   WBCU 2 7* 4 1     Recent Labs   Lab Test 02/01/22  1618   UTPG 0.35*     PTH  No lab results found.  IRON STUDIES  No lab results  found.    Toño Escalante MD

## 2024-05-31 NOTE — NURSING NOTE
Chief Complaint   Patient presents with    Consult     NEW NEPHROLOGY     /86 (BP Location: Left arm, Patient Position: Sitting, Cuff Size: Adult Regular)   Pulse 74   Temp 98.6  F (37  C) (Oral)   Wt 81.5 kg (179 lb 9.6 oz)   LMP 02/01/2024 (Approximate)   SpO2 97%   BMI 33.95 kg/m      Babatunde Cade CMA on 5/31/2024 at 9:50 AM

## 2024-05-31 NOTE — PATIENT INSTRUCTIONS
"It was a pleasure to see you in nephrology clinic today. I've included a brief summary of our discussion and care plan from today's visit below.  _______________________________________________________________________    My recommendations are summarized as follows:  -Keep a Blood Pressure log. Please make sure that you are using a validated blood pressure device (check \"www.validatebp.org\").  -Avoid all NSAID's. Examples include Ibuprofen (Advil, Motrin), naprosyn (Aleve), celebrex among others. Acetaminophen (Tylenol) is ok with maximum dose in 24 hours of 3000mg.  -Healthy lifestyle measures will keep your kidney's functioning at their current best. This includes regular exercise, maintaining a healthy body weight and smoking cessation.   - Avoid Ibuprofen   - Start  jardiance 25 mg once daily,if yeast infection symptoms appears stop jardiance and see either your pcp and send us my cart message.  - Monitor BP at home, goal  120'/70'  - Sugar control  - Follow up in four months    Please return to Nephrology Clinic in 10/1/2024 to review your progress.     Who do I call with any questions after my visit?  There are multiple ways to contact your nephrology care team:    -To schedule or reschedule an appointment, please call [phone].  -Reach out via [facility]. These messages are answered by your nurse or doctor during business hours and typically in 1-2 days. Fi[facility] messages are best for quick questions/clarifications/updates. Frequently, your doctor or nurse will recommend setting up a follow up appointment to address any significant questions/concerns.  -For urgent questions after business hours, you may reach the on-call Nephrology Fellow by contacting the [facility]  at [phone].    To schedule imaging:   -Please call [phone]     To schedule your lab appointment at the [facility]:  -Please call [phone]    Sincerely,    Dr.Momina Escalante   Nephrology fellow "   Division of Nephrology and Hypertension  [facility]

## 2024-05-31 NOTE — LETTER
5/31/2024       RE: Nayeli Caal  [address]  [facility] [address]     Dear Colleague,    Thank you for referring your patient, Nayeli Caal, to the [facility] [place] at [facility]. Please see a copy of my visit note below.    Assessment and Plan:    # CKD stage G1A2  DM2 since 2000, complicated by retinopathy and mild nueropathy. Noted to have albuminuria since 2005,was fluctuating at the beginning. becomes persistent and has been increasing gradually since 2012 to most recent 1/4/2024 UACR 1286 mg/ gm. Creatinine normal throughout. Urine microscopy is bland. Imaging without hydronephrosis.Given the presence of other microvascular complication ( diabetic retinopathy and neuropathy), CKD is likely from DKD. Absence of active urine sediment makes glomerulonephritis less likely. She is on max dose ARB/ losartan 100 mg po daily. She had received jardiance in the past, discontinued because of yeast infection. Recommend better sugar control and given its anti proteinuric effects, she would like to re-start Jardiance.     - Re-start Jardiance 25 mg po daily ,If sign/symptoms of yeast infection stop jardiance  and report to either your PCP or send us my chart message.  - Will consider add on finerenone therapy if no significant improvement in proteinuria on next visit  - Recommend tighter blood sugar  and BP control   - Avoid Ibuprofen ( Advil ,Motrin)   - Healthy life measures : regular exercise, maintaining body weight and healtyh diet.  - Follow up in six months    # Hypertension  Current regimen : losartan 100 mg and amlodipine 5 mg. Has not been monitoring BP at home regularly. Office BP today 136/86.  - Encourage home BP monitoring, keep a Blood Pressure log    # DM 2  On MDI Insulin and Mountjaro 5 mg weekly. Could not tolerate Metformin because of diarrhea and Ozempic due to GI upset ,abdominal pain ,nausea  and vomiting.  Most recent HBAIC  7.8. Not yet in target.  - Continue follow up with Endo and Primary   Other medical problems   # Dyslipidemia     # Usher syndrome     Assessment and plan was discussed with patient and she voiced her understanding and agreement.    Consult:  Nayeli Caal was seen  for CKD management. Evaluated via ASL.     Reason for Visit:  Nayeli Caal is a 43 year old female with CKD from likely from diabetic nephropthay, who presents for to establish CKD care and follow up.    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   DM2 since 2000, currently receieving MDI Insulin  and mountjaro 5 mg weekly. Blodd sugar control was not optimal, HBA1C persistently over target. Unable to tolerate many of  antidiabetic medicines,metformin discontinued due to diarrhea, ozempic resulted in serious GI upset , and had yeast infection to jardiance. Diabtes complicated by retinopathy and peripheral neuropathy.  Regarding hypertension ,she is currently receiving losartan 50 mg po and amlodipine 5 mg. Has not been monitoring BP at home however office BP mostly ranges ' and DBP in 80-90.  Regarding her kidneys ,creatinine and cystatin c has been stable with GFR > 90 thus far however noted to have albuminuria since 2005 ,which gradually increased to UACR 1286 mg / gm on 4/1/24. Urine microscopy is bland and renal imaging without obstruction. No lower leg swelling ,shortness of breathing. She uses two -thrice per week NSAID over  last couple of years. No joint pain , skin rases or recurrent oral ulcers. No recurrent oral ulcers. No history of recurrent UTI. No family history of kidney diseases.       ROS:   A comprehensive review of systems was obtained and negative, except as noted in the HPI or PMH.    Active Medical  Problems:  Patient Active Problem List   Diagnosis     Essential hypertension     Hypersomnia, organic     Other chronic nonalcoholic liver disease     Diabetic retinopathy of both eyes (H)     Obesity     Usher Syndrome: congenital deafness, retinitis pigmentosa     Macular degeneration, age related, nonexudative     Benign neoplasm of iris     Dry eye syndrome     Pemphigus erythematosus (H28)     Chondromalacia of patella, right, steroid injection 6/12/2015     Uncontrolled type 2 diabetes mellitus with hyperglycemia, with long-term current use of insulin (H)     Chronic kidney disease, stage 1     Trigger middle finger of right hand     Trigger middle finger of left hand     Adenomyosis of uterus     S/P hysterectomy     PMH:   Medical record was reviewed and PMH was discussed with patient and noted below.  Past Medical History:   Diagnosis Date     Combined visual and hearing impairment      Deaf      Diabetic retinopathy of both eyes (H) 8/19/2011     Hepatic steatosis 08/19/2011     History of tobacco use      Hyperlipidemia      Hypertension      Hypertriglyceridemia      Migraines      Obesity 8/19/2011     Problem list name updated by automated process. 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     PSH:   Past Surgical History:   Procedure Laterality Date     DAVINCI HYSTERECTOMY TOTAL, SALPINGECTOMY BILATERAL Bilateral 2/7/2024    Procedure: HYSTERECTOMY, TOTAL, ROBOT-ASSISTED, WITH BILATERAL SALPINGECTOMY, CYSTOSCOPY;  Surgeon: Vonnie Cowan MD;  Location: [facility] OR     LAPAROSCOPIC CHOLECYSTECTOMY N/A 3/10/2018    Procedure: LAPAROSCOPIC CHOLECYSTECTOMY;  Laparoscopic Cholecystectomy ;  Surgeon: Kuldeep Sigala MD;  Location: [facility] OR     RELEASE TRIGGER FINGER Right 5/2/2019    Procedure: Right Thumb Trigger Release;  Surgeon: Ferdinand  Greg Klein MD;  Location: [facility]     RELEASE TRIGGER FINGER Left 2019    Procedure: Left Ring Trigger Finger Release.  Ganglion cyst excision.;  Surgeon: Greg Streeter MD;  Location: [facility]     RELEASE TRIGGER FINGER Right 2023    Procedure: RELEASE, RIGHT TRIGGER FINGER, INDEX AND MIDDLE;  Surgeon: Miracle Carlin MD;  Location: [facility]     RELEASE TRIGGER FINGER Left 2023    Procedure: RELEASE, LEFT TRIGGER FINGER, MIDDLE;  Surgeon: Miracle Carlin MD;  Location: [facility] OR       Family Hx:   Family History   Problem Relation Age of Onset     Unknown/Adopted Other      Personal Hx:   Social History     Tobacco Use     Smoking status: Former     Current packs/day: 0.00     Types: Cigarettes     Quit date: 2010     Years since quittin.5     Passive exposure: Never     Smokeless tobacco: Never     Tobacco comments:     stopped 10 yrs ago   Substance Use Topics     Alcohol use: Not Currently     Comment: socially       Allergies:  No Known Allergies    Medications:  Current Outpatient Medications   Medication Sig Dispense Refill     acetaminophen (TYLENOL) 500 MG tablet Take 2 tablets (1,000 mg) by mouth every 8 hours as needed for mild pain 100 tablet 3     Alcohol Swabs (ALCOHOL PREP PAD) 70 % PADS 1 each 3 times daily 100 each 3     amLODIPine (NORVASC) 5 MG tablet Take 1 tablet (5 mg) by mouth at bedtime 90 tablet 3     aspirin (ASA) 81 MG chewable tablet Take 1 tablet (81 mg) by mouth daily CHEW AND SWALLOW 90 tablet 1     atorvastatin (LIPITOR) 40 MG tablet Take 1 tablet (40 mg) by mouth daily 90 tablet 1     blood glucose (ACCU-CHEK LAYA PLUS) test strip Use with  Accucheck Expert meter.  Test blood sugar 6 times daily. 600 each 3     blood glucose monitoring (ACCU-CHEK FASTCLIX) lancets Use to test blood sugar 6 times daily or as directed 510 each 3     calcium carbonate-vitamin D (OSCAL) 500-5 MG-MCG tablet Take 1 tablet by mouth 2 times daily 180 tablet 1     Continuous Blood  Gluc Sensor (DEXCOM G7 SENSOR) MISC 1 each every 10 days 9 each 3     ergocalciferol (ERGOCALCIFEROL) 1.25 MG (10144 UT) capsule Take 1 capsule (50,000 Units) by mouth once a week For additional refills, please schedule a follow-up appointment at [phone] 12 capsule 3     famotidine (PEPCID) 20 MG tablet Take 1 tablet (20 mg) by mouth 2 times daily 30 tablet 0     fenofibrate (TRIGLIDE/LOFIBRA) 160 MG tablet Take 1 tablet (160 mg) by mouth daily 90 tablet 3     fish oil-omega-3 fatty acids 1000 MG capsule TAKE TWO CAPSULES (2 GM) BY MOUTH ONCE DAILY 180 capsule 3     hydrocortisone (CORTAID) 1 % external cream Apply topically 2 times daily 120 g 1     ibuprofen (ADVIL/MOTRIN) 600 MG tablet Take 1 tablet (600 mg) by mouth every 6 hours as needed for moderate pain 120 tablet 1     Injection Device for insulin (INPEN 100-PINK-NOVOLOG-FIASP) DAVID 1 each continuous 1 each 0     insulin aspart (NOVOLOG FLEXPEN) 100 UNIT/ML pen 1 unit per 5 grams CHO and correction scale of 1/25/125.  Approximate daily use is 100 units. 30 mL 2     insulin aspart (NOVOLOG PENFILL) 100 UNIT/ML cartridge Take with each meal and snack: 1 per 3 grams CHO and correction of 1 unit per 20 mg/dL over a target of 120. Average daily dose is 50 units. 270 mL 3     insulin glargine (BASAGLAR KWIKPEN) 100 UNIT/ML pen Inject 60 Units Subcutaneous daily 3 month supply. (Patient taking differently: Inject 55 Units Subcutaneous daily 3 month supply.) 54 mL 3     insulin pen needle (31G X 5 MM) 31G X 5 MM miscellaneous Use 5 to 6 pen needles daily or as directed.  3 month supply. 500 each 3     losartan (COZAAR) 100 MG tablet Take 1 tablet (100 mg) by mouth daily 90 tablet 3     naproxen (NAPROSYN) 375 MG tablet Take 1 tablet (375 mg) by mouth 2 times daily (with meals) 60 tablet 3     omeprazole (PRILOSEC) 40 MG DR capsule Take 1 capsule (40 mg) by mouth daily 8-12 weeks 90 capsule 0     ondansetron (ZOFRAN ODT) 4 MG ODT tab Take 1 tablet (4 mg) by  mouth every 6 hours as needed for nausea or vomiting 20 tablet 0     Ostomy Supplies (ADHESIVE REMOVER WIPES) MISC 1 each every 14 days 50 each 3     Ostomy Supplies (SKIN TAC ADHESIVE BARRIER WIPE) MISC 1 each every 14 days 6 each 3     Semaglutide, 2 MG/DOSE, (OZEMPIC) 8 MG/3ML pen Inject 2 mg Subcutaneous every 7 days (Patient not taking: Reported on 5/30/2024) 9 mL 3     senna-docusate (SENOKOT-S/PERICOLACE) 8.6-50 MG tablet Take 1-2 tablets by mouth 2 times daily 30 tablet 0     sucralfate (CARAFATE) 1 GM tablet Take 1 tablet (1 g) by mouth 4 times daily (Patient not taking: Reported on 5/30/2024) 30 tablet 0     tirzepatide (MOUNJARO) 2.5 MG/0.5ML pen Inject 2.5 mg Subcutaneous every 7 days (Patient not taking: Reported on 5/30/2024) 6 mL 3     tirzepatide (MOUNJARO) 7.5 MG/0.5ML pen Inject 7.5 mg Subcutaneous once a week 2 mL 11     triamcinolone (KENALOG) 0.1 % external ointment Apply topically 2 times daily 30 g 1     Current Facility-Administered Medications   Medication Dose Route Frequency Provider Last Rate Last Admin     hydrocortisone (CORTAID) 1 % cream   Topical Once Mariya Mohamud APRN CNP         hydrocortisone (CORTAID) 1 % cream   Topical TID OverMariya benavidez APRN CNP         hylan (SYNVISC ONE) injection 48 mg  48 mg   Rosas Rodriguez DO   48 mg at 05/09/23 1813     lidocaine (PF) (XYLOCAINE) 1 % injection 4 mL  4 mL   Sebas Bradley MD   4 mL at 10/05/23 0923     triamcinolone (KENALOG-40) injection 40 mg  40 mg   Sebas Bradley MD   40 mg at 10/05/23 0923      Vitals:  /86 (BP Location: Left arm, Patient Position: Sitting, Cuff Size: Adult Regular)   Pulse 74   Temp 98.6  F (37  C) (Oral)   Wt 81.5 kg (179 lb 9.6 oz)   LMP 02/01/2024 (Approximate)   SpO2 97%   BMI 33.95 kg/m      Exam:  GENERAL APPEARANCE: alert and no distress  HENT: mouth without ulcers or lesions  RESP: lungs clear to auscultation - no rales, rhonchi or wheezes  CV: regular rhythm, normal  rate, no rub, no murmur  EDEMA: no LE edema bilaterally  ABDOMEN: soft, nondistended, nontender, bowel sounds normal  MS: extremities normal - no gross deformities noted, no evidence of inflammation in joints, no muscle tenderness  SKIN: no rash    LABS:   CMP  Recent Labs   Lab Test 05/31/24  0909 05/08/24  1524 03/08/24  1610 02/07/24  1104 02/07/24  0655 01/04/24  1657 07/22/21  0950 07/08/20  1623 05/02/19  0949 05/17/18  0723 03/13/18  2210    139 137  --   --  138   < > 136 138 138 137   POTASSIUM 4.2 4.1 4.0  --   --  4.0   < > 4.0 4.0 4.3 4.3   CHLORIDE 103 104 101  --   --  102   < > 101 105 106 104   CO2 23 24 23  --   --  27   < > 30 26 25 27   ANIONGAP 11 11 13  --   --  9   < > 5 7 7 6   * 126* 207* 207*   < > 200*   < > 417* 138* 286* 171*   BUN 13.3 12.7 15.8  --   --  12.3   < > 13 13 13 18   CR 0.60 0.76 0.66  --   --  0.54   < > 0.74 0.66 0.56 0.85   GFRESTIMATED >90 >90 >90  --   --  >90   < > >90 >90 >90 75   GFRESTBLACK  --   --   --   --   --   --   --  >90 >90 >90 >90   VERO 8.9 8.9 9.2  --   --  8.9   < > 8.6 8.8 8.7 8.9    < > = values in this interval not displayed.     Recent Labs   Lab Test 05/31/24  0909 05/08/24  1524 01/04/24  1657 08/17/22  0949   BILITOTAL 0.3 0.2 0.2 0.5   ALKPHOS 96 79 99 103   ALT 51* 45 30 28   AST 32 27 23 14     CBC  Recent Labs   Lab Test 05/31/24  0909 05/08/24  1524 03/08/24  1610 01/04/24  1657   HGB 12.3 13.0 12.6 13.5   WBC 7.7 12.9* 16.4* 11.2*   RBC 4.39 4.64 4.49 4.75   HCT 36.6 40.3 38.3 39.8   MCV 83 87 85 84   MCH 28.0 28.0 28.1 28.4   MCHC 33.6 32.3 32.9 33.9   RDW 12.3 12.9 12.6 12.6    413 393 353     URINE STUDIES  Recent Labs   Lab Test 05/08/24  1724 01/04/24  1704 08/04/22  1454 08/31/21  1317   COLOR Light Yellow Yellow Yellow Light Yellow   APPEARANCE Clear Slightly Cloudy* Slightly Cloudy* Clear   URINEGLC Negative 200* 1000* >=1000*   URINEBILI Negative Negative Negative Negative   URINEKETONE Negative Negative Negative  Negative   SG 1.021 1.022 1.015 1.034   UBLD Negative Large* Large* Negative   URINEPH 6.5 6.5 6.5 5.0   PROTEIN 70* 100* 30* 30*   NITRITE Negative Negative Negative Negative   LEUKEST Negative Negative Negative Negative   RBCU 2 >182* >182* 1   WBCU 2 7* 4 1     Recent Labs   Lab Test 02/01/22  1618   UTPG 0.35*     PTH  No lab results found.  IRON STUDIES  No lab results found.    Toño Escalante MD        Attestation signed by Sylvain Mayfield MD at 6/1/2024  1:50 PM:  Attending's attestation:   I was the supervising physician in the delivery of the service. I verified the history of present illness and the patient's clinical course. I personally evaluated and interviewed the patient on the date of the encounter.       I reviewed the relevant labs, previous notes and imaging studies, and participated in the formulation of a diagnostic and treatment plan.  I have reviewed and discussed with the fellow their history, physical exam and plan. This note reflects my direct involvement in the patient's care.     We had a long discussion today with this patient with the help of the .  CKD likely secondary to diabetic nephropathy.  We emphasized today the importance of sugar control to halt the progression of chronic kidney disease.  She had tried Jardiance in the past but developed some yeast infection but she is willing to retry it again given her high sugar levels.  Was told that Jardiance will help with better blood pressure control given its natriuretic effect.  Her goal blood pressure should be around 120/80.      Sylvain Mayfield MD   Physicians  , [facility]  Division of Nephrology  Department of Internal Medicine                    Again, thank you for allowing me to participate in the care of your patient.      Sincerely,    Toño Escalante MD

## 2024-06-01 DIAGNOSIS — R13.19 ESOPHAGEAL DYSPHAGIA: Primary | ICD-10-CM

## 2024-06-01 LAB
CYSTATIN C (ROCHE): 0.8 MG/L (ref 0.6–1)
GFR SERPL CREATININE-BSD FRML MDRD: >90 ML/MIN/1.73M2
HCV AB SERPL QL IA: NONREACTIVE

## 2024-06-03 ENCOUNTER — ANCILLARY PROCEDURE (OUTPATIENT)
Dept: ULTRASOUND IMAGING | Facility: CLINIC | Age: 44
End: 2024-06-03
Attending: NURSE PRACTITIONER
Payer: COMMERCIAL

## 2024-06-03 PROCEDURE — 76700 US EXAM ABDOM COMPLETE: CPT | Mod: GC | Performed by: RADIOLOGY

## 2024-06-03 PROCEDURE — T1013 SIGN LANG/ORAL INTERPRETER: HCPCS | Mod: U3

## 2024-06-04 ENCOUNTER — MYC MEDICAL ADVICE (OUTPATIENT)
Dept: INTERNAL MEDICINE | Facility: CLINIC | Age: 44
End: 2024-06-04
Payer: COMMERCIAL

## 2024-06-05 ENCOUNTER — ALLIED HEALTH/NURSE VISIT (OUTPATIENT)
Dept: EDUCATION SERVICES | Facility: CLINIC | Age: 44
End: 2024-06-05
Payer: COMMERCIAL

## 2024-06-05 DIAGNOSIS — Z79.4 TYPE 2 DIABETES MELLITUS WITH COMPLICATION, WITH LONG-TERM CURRENT USE OF INSULIN (H): Primary | ICD-10-CM

## 2024-06-05 DIAGNOSIS — E11.8 TYPE 2 DIABETES MELLITUS WITH COMPLICATION, WITH LONG-TERM CURRENT USE OF INSULIN (H): Primary | ICD-10-CM

## 2024-06-05 PROCEDURE — G0108 DIAB MANAGE TRN  PER INDIV: HCPCS | Performed by: REGISTERED NURSE

## 2024-06-06 DIAGNOSIS — R12 HEARTBURN: ICD-10-CM

## 2024-06-06 RX ORDER — OMEPRAZOLE 40 MG/1
40 CAPSULE, DELAYED RELEASE ORAL DAILY
Qty: 90 CAPSULE | Refills: 0 | Status: SHIPPED | OUTPATIENT
Start: 2024-06-06 | End: 2024-06-13

## 2024-06-06 NOTE — PROGRESS NOTES
Diabetes Self-Management Education & Support    Nayeli Caal presents today for education related to Type 2 diabetes    Patient is being treated with:  MDI Insulin and oral meds  She is accompanied by self and     Year of diagnosis: 2013 or 2014  Referring provider:  Anne Marie Medina PA-C  Living Situation: Lives with a room mate  Employment: Administrative work for [employer]    PATIENT CONCERNS/REASON FOR REFERRAL:  Ongoing DSME and support    ASSESSMENT:    Taking Medication:     Current Diabetes Management per Patient:  Taking diabetes medications  She is taking Lantus 60 units daily.   In addition she is using an In-Pen with Novolog cartridges with the following settings.      Start time ICR  1 unit/gm Insulin Sensitivity Factor Target BG    06:00 AM 3 20 130   11:00 AM 3 20 110   05:00 PM 3 20 110   09:30 PM 3 20 130         Active Insulin Time:  3 hours  Maximum Bolus:  30      Monitoring    Patient glucose self monitoring as follows: continuously using a continuous glucose monitor (CGM)  CGM: Dexcom G7  BG results: Dexcom report appears below:                      Patient's most recent   Lab Results   Component Value Date    A1C 8.7 04/15/2024    A1C 7.8 01/04/2024    A1C 11.6 04/05/2021      Patient's A1C goal: <7.0    Activity: no regular exercise program    Healthy Eating:  She states that she eats a healthy diet.  We didn't examine this in great detail today.      Problem Solving:      Patient is at risk of hypoglycemia?: Yes--she doesn't have hypoglycemia very often.   Hospitalizations for hyper or hypoglycemia: None    EDUCATION and INSTRUCTION PROVIDED AT THIS VISIT:       Nayeli comes to this appointment quite upset today.  She had a total hysterectomy several months ago, and noticed following a coughing episode that she had significant pain in her right groin.  She has had an US of her abdomen, and the right inguinal area and these did not show a hernia or  "appendicitis.  The only remarkable finding was hepatic steatosis.  She states that the pain in her right groin is persistant and that \"it really hurts.\"  She states that she has been referred to GI for an endoscopy.  She states that at times she is nauseated and has vomited even though she has not taken a GLP-1 agonist for weeks.      History of her GLP-1 use.   April 2023:  Didn't tolerate either Metformin (intractable diarrhea), or Jardiance (frequent yeast infections) so Ozempic 0.25 mg started.   January 2024:  She had titrated up to 2mg Ozempic and was tolerating well, however Ms. Medina felt she could get better control with Mounjaro, so Ozempic was discontinued and Mounjaro 2.5mg was started.   March 2024:  Mounjaro dose increased to 5 mg.  Still tolerating well, but some complaint of GERD symptoms.    April 2024:  Saw Dr. Bragg.  Still tolerating the 5 mg dose   May 2024:  Ready to advance to Mounjaro 7.5 however the drug was not available, so Ms. Medina changed her script to Ozempic 2 mg.                      Nayeli took two doses of this and had severe nausea and vomiting along with the GERD symptoms she had been experiencing.  She messaged me that she was                      Going to stop the medication as she was on her way out of town.  Since then, Mounjaro has become available.  Ms. Medina ordered Mounjaro 2.5 mg however                      Nayeli states today that she does not feel well and she doesn't not want to restart the Mounjaro until she is feeling better.      She repeated several times today \"I just don't feel right.\"  She is very frustrated that the source of her abdominal/inguinal pain hasn't been identified.      Discussed her recent visit with nephrology and the importance of getting glucose in better control.  She agreed at that visit to try Jardiance again.  We discussed the possible side effects, vaginal yeast infections which she has experienced before.  We also made changes in her " In-Pen yamilteh.      Her Dexcom report is puzzling.  There are long stretches of very high glucoses, preceded or followed by glucoses in the target range.  I looked at her In pen report on her phone and it appears that she is carb counting and entering carbs and taking the advised amount of insulin.  She cannot explain the very high glucoses she is having--she states that she never misses her Lantus insulin.      In pen settings at the conclusion of this visit:     Start time ICR  1 unit/gm Insulin Sensitivity Factor Target BG    06:00 AM 2 15 130   11:00 AM 2 15 110   05:00 PM 2 15 110   09:30 PM 2 15 130         Active Insulin Time:  3  Maximum Bolus:  30 units    Left her Lantus insulin dose at 60 units.      I think that Nayeli would do quite well on an insulin pump, however she has steadfastly refused this option when it's been brought up.      She asks to switch her sensor from the Dexcom G7 to the Evelia 3, primarily because she feels that it would be cheaper for her.  I gave her a couple of samples to try before making the change.  Copy of this note to Anne Marie Medina and Dr. Bragg.      Patient-stated goal written and given to Nayeli MIXON Ingrid Caal.  Verbalized and demonstrated understanding of instructions.     PLAN:    See patient instructions  AVS printed and given to patient    FOLLOW-UP:      Appointment scheduled in two weeks.  Meanwhile she will contact her primary care provider, Mariya Mohamud for a follow up on the diagnostic tests that have been done.    Time spent with patient at today's visit was 60 minutes.      Any diabetes medication dose changes were made via the CDE Protocol and Collaborative Practice Agreement with [facility] and  [facility].  A copy of this encounter was provided to patient's referring provider.

## 2024-06-07 ENCOUNTER — TELEPHONE (OUTPATIENT)
Dept: GASTROENTEROLOGY | Facility: CLINIC | Age: 44
End: 2024-06-07
Payer: COMMERCIAL

## 2024-06-07 NOTE — TELEPHONE ENCOUNTER
"Endoscopy Scheduling Screen    Have you had a positive Covid test in the last 14 days?  No    What is your communication preference for Instructions and/or Bowel Prep?   MyChart    What insurance is in the chart?  Other:  [employer]     Ordering/Referring Provider:     KENNEDI NEUMANN       (If ordering provider performs procedure, schedule with ordering provider unless otherwise instructed. )    BMI: Estimated body mass index is 33.95 kg/m  as calculated from the following:    Height as of 5/30/24: 1.549 m (5' 0.98\").    Weight as of 5/31/24: 81.5 kg (179 lb 9.6 oz).     Sedation Ordered  moderate sedation.   If patient BMI > 50 do not schedule in [facility].    If patient BMI > 45 do not schedule at [facility].    Are you taking methadone or Suboxone?  No    Have you had difficulties, pain, or discomfort during past endoscopy procedures?  No    Are you taking any prescription medications for pain 3 or more times per week?   NO, No RN review required.    Do you have a history of malignant hyperthermia?  No    (Females) Are you currently pregnant?   No     Have you been diagnosed or told you have pulmonary hypertension?   No    Do you have an LVAD?  No    Have you been told you have moderate to severe sleep apnea?  No    Have you been told you have COPD, asthma, or any other lung disease?  No    Do you have any heart conditions?  No     Have you ever had or are you waiting for an organ transplant?  No. Continue scheduling, no site restrictions.    Have you had a stroke or transient ischemic attack (TIA aka \"mini stroke\" in the last 6 months?   No    Have you been diagnosed with or been told you have cirrhosis of the liver?   No    Are you currently on dialysis?   No    Do you need assistance transferring?   No    BMI: Estimated body mass index is 33.95 kg/m  as calculated from the following:    Height as of 5/30/24: 1.549 m (5' 0.98\").    Weight as of 5/31/24: 81.5 kg (179 lb 9.6 oz).     Is patients BMI > 40 and scheduling " location [facility]?  No    Do you take an injectable medication for weight loss or diabetes (excluding insulin)?  No    Do you take the medication Naltrexone?  No    Do you take blood thinners?  No       Prep   Are you currently on dialysis or do you have chronic kidney disease?  No    Do you have a diagnosis of diabetes?  Yes (Golytely Prep)    Do you have a diagnosis of cystic fibrosis (CF)?  No    On a regular basis do you go 3 -5 days between bowel movements?  No    BMI > 40?  No    Preferred Pharmacy:      [place], [place] - [address]  [address]  [facility] [address]  Phone: [phone] Fax: [phone] Alternate Fax: [phone], [phone]      Final Scheduling Details     Procedure scheduled  Upper endoscopy (EGD)    Surgeon:  Babs     Date of procedure:  06/13/2024     Pre-OP / PAC:   No - Not required for this site.    Location  [facility] - [facility] - Per order.    Sedation   Moderate Sedation - Per order.      Patient Reminders:   You will receive a call from a Nurse to review instructions and health history.  This assessment must be completed prior to your procedure.  Failure to complete the Nurse assessment may result in the procedure being cancelled.      On the day of your procedure, please designate an adult(s) who can drive you home stay with you for the next 24 hours. The medicines used in the exam will make you sleepy. You will not be able to drive.      You cannot take public transportation, ride share services, or non-medical taxi service without a responsible caregiver.  Medical transport services are allowed with the requirement that a responsible caregiver will receive you at your destination.  We require that drivers and caregivers are confirmed prior to your procedure.

## 2024-06-07 NOTE — TELEPHONE ENCOUNTER
Attempted to contact patient in order to complete pre assessment questions.     Via  ID [id number]    No answer. Left message to return call to [phone] option 4    Callback required communication sent via [employer].    If patient takes Mounjaro and/or Ozempic she will need to reschedule out farther as theses are 7 day hold - verify when patient took last dose    Procedure details:    Patient scheduled for Upper endoscopy (EGD) on 6/13.     Arrival time: 0915. Procedure time 1015    [facility] [address] [place] [address], 5th Floor, [place] [address]. Check in location: 5th Floor.    Sedation type: MAC    Pre op exam needed? N/A    Indication for procedure:   Abdominal pain, right lower quadrant            Chart review:     Electronic implanted devices? No    Recent diagnosis of diverticulitis within the last 6 weeks? No    Diabetic? Yes. Diabetic medication HOLDING recommendations: Oral diabetic medications: Yes:  Jardiance (empagliflozin): HOLD  3 days before procedure.   Diabetic injectables: Yes- Mounjaro (Tirzepatide).  Weekly dosing of medication.  Hold 7 days before procedure.  Follow up with managing provider.   Ozempic (Semaglutide). Weekly dosing of medication.  Hold 7 days before procedure.  Follow up with managing provider.   Insulin: Yes. Consult with managing provider       Medication review:    Anticoagulants? No    NSAIDS? Yes.  Ibuprofen (Advil, Motrin).  Holding interval of 1 day before procedure. and Naproxen (Naprosyn, Aleve).  Holding interval of 4 days.    Other medication HOLDING recommendations:  EGD: Sucralfate (Carafate): HOLD 1 day before procedure.      Prep for procedure:     Prep instructions sent via [employer].       Reshma Nayak RN  Endoscopy Procedure Pre Assessment

## 2024-06-07 NOTE — TELEPHONE ENCOUNTER
Pre assessment completed for upcoming procedure.   (Please see previous telephone encounter notes for complete details)    Patient  returned call with .      Procedure details:    Arrival time and facility location reviewed.    Pre op exam needed? N/A    Designated  policy reviewed. Instructed to have someone stay 24  hours post procedure.       Medication review:    Oral diabetic medication(s): Jardiance (empagliflozin): HOLD  3 days before procedure.  Insulin.  Consult with your managing provider.   NSAID medication(s): Ibuprofen (Advil, Motrin): HOLD 1 day before procedure.  Sucralfate (Carafate): HOLD 1 day before procedure.    Patient states is not taking the Ozempic,Mounjaro any longer    Prep for procedure:     Procedure prep instructions reviewed.        Any additional information needed:  N/A      Patient  verbalized understanding and had no questions or concerns at this time.      Flower Slaughter RN  Endoscopy Procedure Pre Assessment   [phone] option 4

## 2024-06-11 ENCOUNTER — CARE COORDINATION (OUTPATIENT)
Dept: EDUCATION SERVICES | Facility: CLINIC | Age: 44
End: 2024-06-11
Payer: COMMERCIAL

## 2024-06-11 DIAGNOSIS — Z79.4 UNCONTROLLED TYPE 2 DIABETES MELLITUS WITH HYPERGLYCEMIA, WITH LONG-TERM CURRENT USE OF INSULIN (H): Primary | ICD-10-CM

## 2024-06-11 DIAGNOSIS — E11.65 UNCONTROLLED TYPE 2 DIABETES MELLITUS WITH HYPERGLYCEMIA, WITH LONG-TERM CURRENT USE OF INSULIN (H): Primary | ICD-10-CM

## 2024-06-11 RX ORDER — BLOOD-GLUCOSE SENSOR
1 EACH MISCELLANEOUS
Qty: 6 EACH | Refills: 3 | Status: SHIPPED | OUTPATIENT
Start: 2024-06-11 | End: 2024-08-20

## 2024-06-13 ENCOUNTER — ANESTHESIA (OUTPATIENT)
Dept: SURGERY | Facility: AMBULATORY SURGERY CENTER | Age: 44
End: 2024-06-13
Payer: COMMERCIAL

## 2024-06-13 ENCOUNTER — HOSPITAL ENCOUNTER (OUTPATIENT)
Facility: AMBULATORY SURGERY CENTER | Age: 44
Discharge: HOME | End: 2024-06-13
Attending: INTERNAL MEDICINE | Admitting: INTERNAL MEDICINE
Payer: COMMERCIAL

## 2024-06-13 ENCOUNTER — ANESTHESIA EVENT (OUTPATIENT)
Dept: SURGERY | Facility: AMBULATORY SURGERY CENTER | Age: 44
End: 2024-06-13
Payer: COMMERCIAL

## 2024-06-13 VITALS
TEMPERATURE: 97.6 F | OXYGEN SATURATION: 97 % | SYSTOLIC BLOOD PRESSURE: 130 MMHG | BODY MASS INDEX: 33.99 KG/M2 | RESPIRATION RATE: 20 BRPM | HEIGHT: 61 IN | WEIGHT: 180 LBS | HEART RATE: 85 BPM | DIASTOLIC BLOOD PRESSURE: 80 MMHG

## 2024-06-13 VITALS — HEART RATE: 82 BPM

## 2024-06-13 DIAGNOSIS — R12 HEARTBURN: ICD-10-CM

## 2024-06-13 LAB — UPPER GI ENDOSCOPY: NORMAL

## 2024-06-13 PROCEDURE — 43239 EGD BIOPSY SINGLE/MULTIPLE: CPT | Performed by: NURSE ANESTHETIST, CERTIFIED REGISTERED

## 2024-06-13 PROCEDURE — 43239 EGD BIOPSY SINGLE/MULTIPLE: CPT | Performed by: ANESTHESIOLOGY

## 2024-06-13 PROCEDURE — 88305 TISSUE EXAM BY PATHOLOGIST: CPT | Mod: TC | Performed by: INTERNAL MEDICINE

## 2024-06-13 PROCEDURE — 43239 EGD BIOPSY SINGLE/MULTIPLE: CPT | Performed by: INTERNAL MEDICINE

## 2024-06-13 PROCEDURE — 88305 TISSUE EXAM BY PATHOLOGIST: CPT | Mod: 26 | Performed by: PATHOLOGY

## 2024-06-13 RX ORDER — FLUMAZENIL 0.1 MG/ML
0.2 INJECTION, SOLUTION INTRAVENOUS
Status: ACTIVE | OUTPATIENT
Start: 2024-06-13 | End: 2024-06-13

## 2024-06-13 RX ORDER — KETAMINE HYDROCHLORIDE 10 MG/ML
INJECTION INTRAMUSCULAR; INTRAVENOUS PRN
Status: DISCONTINUED | OUTPATIENT
Start: 2024-06-13 | End: 2024-06-13

## 2024-06-13 RX ORDER — PROCHLORPERAZINE MALEATE 10 MG
10 TABLET ORAL EVERY 6 HOURS PRN
Status: DISCONTINUED | OUTPATIENT
Start: 2024-06-13 | End: 2024-06-14 | Stop reason: HOSPADM

## 2024-06-13 RX ORDER — OMEPRAZOLE 40 MG/1
40 CAPSULE, DELAYED RELEASE ORAL 2 TIMES DAILY
Qty: 90 CAPSULE | Refills: 0 | Status: SHIPPED | OUTPATIENT
Start: 2024-06-13

## 2024-06-13 RX ORDER — NALOXONE HYDROCHLORIDE 0.4 MG/ML
0.4 INJECTION, SOLUTION INTRAMUSCULAR; INTRAVENOUS; SUBCUTANEOUS
Status: DISCONTINUED | OUTPATIENT
Start: 2024-06-13 | End: 2024-06-14 | Stop reason: HOSPADM

## 2024-06-13 RX ORDER — ONDANSETRON 4 MG/1
4 TABLET, ORALLY DISINTEGRATING ORAL EVERY 6 HOURS PRN
Status: DISCONTINUED | OUTPATIENT
Start: 2024-06-13 | End: 2024-06-14 | Stop reason: HOSPADM

## 2024-06-13 RX ORDER — NALOXONE HYDROCHLORIDE 0.4 MG/ML
0.2 INJECTION, SOLUTION INTRAMUSCULAR; INTRAVENOUS; SUBCUTANEOUS
Status: DISCONTINUED | OUTPATIENT
Start: 2024-06-13 | End: 2024-06-14 | Stop reason: HOSPADM

## 2024-06-13 RX ORDER — PROPOFOL 10 MG/ML
INJECTION, EMULSION INTRAVENOUS PRN
Status: DISCONTINUED | OUTPATIENT
Start: 2024-06-13 | End: 2024-06-13

## 2024-06-13 RX ORDER — SODIUM CHLORIDE, SODIUM LACTATE, POTASSIUM CHLORIDE, CALCIUM CHLORIDE 600; 310; 30; 20 MG/100ML; MG/100ML; MG/100ML; MG/100ML
INJECTION, SOLUTION INTRAVENOUS CONTINUOUS
Status: DISCONTINUED | OUTPATIENT
Start: 2024-06-13 | End: 2024-06-13 | Stop reason: HOSPADM

## 2024-06-13 RX ORDER — ONDANSETRON 2 MG/ML
4 INJECTION INTRAMUSCULAR; INTRAVENOUS EVERY 6 HOURS PRN
Status: DISCONTINUED | OUTPATIENT
Start: 2024-06-13 | End: 2024-06-14 | Stop reason: HOSPADM

## 2024-06-13 RX ORDER — LIDOCAINE 40 MG/G
CREAM TOPICAL
Status: DISCONTINUED | OUTPATIENT
Start: 2024-06-13 | End: 2024-06-13 | Stop reason: HOSPADM

## 2024-06-13 RX ORDER — LIDOCAINE HYDROCHLORIDE 20 MG/ML
INJECTION, SOLUTION INFILTRATION; PERINEURAL PRN
Status: DISCONTINUED | OUTPATIENT
Start: 2024-06-13 | End: 2024-06-13

## 2024-06-13 RX ORDER — GLYCOPYRROLATE 0.2 MG/ML
INJECTION, SOLUTION INTRAMUSCULAR; INTRAVENOUS PRN
Status: DISCONTINUED | OUTPATIENT
Start: 2024-06-13 | End: 2024-06-13

## 2024-06-13 RX ORDER — ONDANSETRON 2 MG/ML
4 INJECTION INTRAMUSCULAR; INTRAVENOUS
Status: DISCONTINUED | OUTPATIENT
Start: 2024-06-13 | End: 2024-06-13 | Stop reason: HOSPADM

## 2024-06-13 RX ADMIN — SODIUM CHLORIDE, SODIUM LACTATE, POTASSIUM CHLORIDE, CALCIUM CHLORIDE: 600; 310; 30; 20 INJECTION, SOLUTION INTRAVENOUS at 10:20

## 2024-06-13 RX ADMIN — PROPOFOL 40 MG: 10 INJECTION, EMULSION INTRAVENOUS at 10:19

## 2024-06-13 RX ADMIN — PROPOFOL 30 MG: 10 INJECTION, EMULSION INTRAVENOUS at 10:22

## 2024-06-13 RX ADMIN — PROPOFOL 20 MG: 10 INJECTION, EMULSION INTRAVENOUS at 10:16

## 2024-06-13 RX ADMIN — PROPOFOL 10 MG: 10 INJECTION, EMULSION INTRAVENOUS at 10:21

## 2024-06-13 RX ADMIN — PROPOFOL 80 MG: 10 INJECTION, EMULSION INTRAVENOUS at 10:14

## 2024-06-13 RX ADMIN — SODIUM CHLORIDE, SODIUM LACTATE, POTASSIUM CHLORIDE, CALCIUM CHLORIDE: 600; 310; 30; 20 INJECTION, SOLUTION INTRAVENOUS at 10:08

## 2024-06-13 RX ADMIN — KETAMINE HYDROCHLORIDE 10 MG: 10 INJECTION INTRAMUSCULAR; INTRAVENOUS at 10:16

## 2024-06-13 RX ADMIN — PROPOFOL 30 MG: 10 INJECTION, EMULSION INTRAVENOUS at 10:17

## 2024-06-13 RX ADMIN — GLYCOPYRROLATE 0.2 MG: 0.2 INJECTION, SOLUTION INTRAMUSCULAR; INTRAVENOUS at 10:09

## 2024-06-13 RX ADMIN — ONDANSETRON 4 MG: 2 INJECTION INTRAMUSCULAR; INTRAVENOUS at 10:32

## 2024-06-13 RX ADMIN — LIDOCAINE HYDROCHLORIDE 80 MG: 20 INJECTION, SOLUTION INFILTRATION; PERINEURAL at 10:13

## 2024-06-13 NOTE — ANESTHESIA POSTPROCEDURE EVALUATION
Patient: Nayeli Caal    Procedure: Procedure(s):  ESOPHAGOGASTRODUODENOSCOPY, WITH BIOPSY       Anesthesia Type:  MAC    Note:  Disposition: Outpatient   Postop Pain Control: Uneventful            Sign Out: Well controlled pain   PONV: No   Neuro/Psych: Uneventful            Sign Out: Acceptable/Baseline neuro status   Airway/Respiratory: Uneventful            Sign Out: Acceptable/Baseline resp. status   CV/Hemodynamics: Uneventful            Sign Out: Acceptable CV status   Other NRE: NONE   DID A NON-ROUTINE EVENT OCCUR? No           Last vitals:  Vitals Value Taken Time   /80 06/13/24 1050   Temp 36.4  C (97.6  F) 06/13/24 1050   Pulse 85 06/13/24 1050   Resp 20 06/13/24 1050   SpO2 97 % 06/13/24 1050       Electronically Signed By: Terrence Daugherty MD  June 13, 2024  12:59 PM

## 2024-06-13 NOTE — ANESTHESIA PREPROCEDURE EVALUATION
Anesthesia Pre-Procedure Evaluation    Patient: Nayeli Caal   MRN: [id number] : 1980        Procedure : Procedure(s):  Esophagoscopy, gastroscopy, duodenoscopy (EGD), combined          Past Medical History:   Diagnosis Date    Combined visual and hearing impairment     Deaf     Diabetic retinopathy of both eyes (H) 2011    Hepatic steatosis 2011    History of tobacco use     Hyperlipidemia     Hypertension     Hypertriglyceridemia     Migraines     Obesity 2011     Problem list name updated by automated process. Provider to review    Uncontrolled type 2 diabetes mellitus with hyperglycemia, with long-term current use of insulin (H) 5/15/2017    Usher Syndrome: congenital deafness, retinitis pigmentosa 2011      Past Surgical History:   Procedure Laterality Date    DAVINCI HYSTERECTOMY TOTAL, SALPINGECTOMY BILATERAL Bilateral 2024    Procedure: HYSTERECTOMY, TOTAL, ROBOT-ASSISTED, WITH BILATERAL SALPINGECTOMY, CYSTOSCOPY;  Surgeon: Vonnie Cowan MD;  [facility]    LAPAROSCOPIC CHOLECYSTECTOMY N/A 3/10/2018    Procedure: LAPAROSCOPIC CHOLECYSTECTOMY;  Laparoscopic Cholecystectomy ;  Surgeon: Kuldeep Sigala MD;  [facility]    RELEASE TRIGGER FINGER Right 2019    Procedure: Right Thumb Trigger Release;  Surgeon: Greg Streeter MD;  [facility]    RELEASE TRIGGER FINGER Left 2019    Procedure: Left Ring Trigger Finger Release.  Ganglion cyst excision.;  Surgeon: Greg Streeter MD;  [facility]    RELEASE TRIGGER FINGER Right 2023    Procedure: RELEASE, RIGHT TRIGGER FINGER, INDEX AND MIDDLE;  Surgeon: Miracle Carlin MD;  Location: [facility] OR    RELEASE TRIGGER FINGER Left 2023    Procedure: RELEASE, LEFT TRIGGER FINGER, MIDDLE;  Surgeon: Miracle Carlin MD;  Location: [facility] OR      No Known Allergies   Social History     Tobacco Use    Smoking status: Former     Current packs/day: 0.00     Types: Cigarettes      Quit date: 2010     Years since quittin.5     Passive exposure: Never    Smokeless tobacco: Never    Tobacco comments:     stopped 10 yrs ago   Substance Use Topics    Alcohol use: Not Currently     Comment: socially      Wt Readings from Last 1 Encounters:   24 81.6 kg (180 lb)        Anesthesia Evaluation            ROS/MED HX  ENT/Pulmonary:       Neurologic:       Cardiovascular:     (+)  hypertension- -   -  - -                                      METS/Exercise Tolerance:     Hematologic:       Musculoskeletal:       GI/Hepatic:     (+)             liver disease,       Renal/Genitourinary:     (+) renal disease,             Endo:     (+)  type II DM,             Obesity,       Psychiatric/Substance Use:       Infectious Disease:       Malignancy:       Other:            Physical Exam    Airway  airway exam normal      Mallampati: II   TM distance: > 3 FB   Neck ROM: full   Mouth opening: > 3 cm    Respiratory Devices and Support         Dental       (+) Completely normal teeth      Cardiovascular          Rhythm and rate: regular and normal     Pulmonary   pulmonary exam normal        breath sounds clear to auscultation           OUTSIDE LABS:  CBC:   Lab Results   Component Value Date    WBC 7.7 2024    WBC 12.9 (H) 2024    HGB 12.3 2024    HGB 13.0 2024    HCT 36.6 2024    HCT 40.3 2024     2024     2024     BMP:   Lab Results   Component Value Date     2024     2024    POTASSIUM 4.2 2024    POTASSIUM 4.1 2024    CHLORIDE 103 2024    CHLORIDE 104 2024    CO2 23 2024    CO2 24 2024    BUN 13.3 2024    BUN 12.7 2024    CR 0.60 2024    CR 0.76 2024     (H) 2024     (H) 2024     COAGS:   Lab Results   Component Value Date    INR 1.03 2010     POC:   Lab Results   Component Value Date     (H) 2019    HCG  "Negative 08/31/2021    HCGS Negative 05/08/2024     HEPATIC:   Lab Results   Component Value Date    ALBUMIN 4.0 05/31/2024    PROTTOTAL 7.1 05/31/2024    ALT 51 (H) 05/31/2024    AST 32 05/31/2024    ALKPHOS 96 05/31/2024    BILITOTAL 0.3 05/31/2024     OTHER:   Lab Results   Component Value Date    PH 7.39 08/31/2021    LACT 0.8 08/31/2021    A1C 8.7 (H) 04/15/2024    VERO 8.9 05/31/2024    PHOS 4.2 03/12/2010    MAG 2.2 07/13/2017    LIPASE 32 05/08/2024    AMYLASE 16 (L) 05/15/2017    TSH 1.41 04/04/2023    CRP 12.0 (H) 03/24/2016    SED 19 03/24/2016       Anesthesia Plan    ASA Status:  2    NPO Status:  NPO Appropriate    Anesthesia Type: MAC.     - Reason for MAC: immobility needed, straight local not clinically adequate   Induction: Intravenous.   Maintenance: TIVA.        Consents    Anesthesia Plan(s) and associated risks, benefits, and realistic alternatives discussed. Questions answered and patient/representative(s) expressed understanding.     - Discussed:     - Discussed with:  Patient      - Extended Intubation/Ventilatory Support Discussed: No.      - Patient is DNR/DNI Status: No     Use of blood products discussed: No .     Postoperative Care    Pain management: IV analgesics, Oral pain medications, Multi-modal analgesia.   PONV prophylaxis: Ondansetron (or other 5HT-3), Background Propofol Infusion     Comments:               Terrence Daugherty MD    I have reviewed the pertinent notes and labs in the chart from the past 30 days and (re)examined the patient.  Any updates or changes from those notes are reflected in this note.             # Drug Induced Platelet Defect: home medication list includes an antiplatelet medication  # DMII: A1C = 8.7 % (Ref range: <=5.7 %) within past 6 months  # Obesity: Estimated body mass index is 34.03 kg/m  as calculated from the following:    Height as of this encounter: 1.549 m (5' 0.98\").    Weight as of this encounter: 81.6 kg (180 lb).      "

## 2024-06-13 NOTE — H&P
Nayeli Quintero [place]  [id number]  female  43 year old    Reason for procedure/surgery: EGD for abdominal paoin    Patient Active Problem List   Diagnosis    Essential hypertension    Hypersomnia, organic    Other chronic nonalcoholic liver disease    Diabetic retinopathy of both eyes (H)    Obesity    Usher Syndrome: congenital deafness, retinitis pigmentosa    Macular degeneration, age related, nonexudative    Benign neoplasm of iris    Dry eye syndrome    Pemphigus erythematosus (H28)    Chondromalacia of patella, right, steroid injection 6/12/2015    Uncontrolled type 2 diabetes mellitus with hyperglycemia, with long-term current use of insulin (H)    Chronic kidney disease, stage 1    Trigger middle finger of right hand    Trigger middle finger of left hand    Adenomyosis of uterus    S/P hysterectomy       Past Surgical History:    Past Surgical History:   Procedure Laterality Date    DAVINCI HYSTERECTOMY TOTAL, SALPINGECTOMY BILATERAL Bilateral 2/7/2024    Procedure: HYSTERECTOMY, TOTAL, ROBOT-ASSISTED, WITH BILATERAL SALPINGECTOMY, CYSTOSCOPY;  Surgeon: Vonnie Cowan MD;  [facility]    LAPAROSCOPIC CHOLECYSTECTOMY N/A 3/10/2018    Procedure: LAPAROSCOPIC CHOLECYSTECTOMY;  Laparoscopic Cholecystectomy ;  Surgeon: Kuldeep Sigala MD;  [facility]    RELEASE TRIGGER FINGER Right 5/2/2019    Procedure: Right Thumb Trigger Release;  Surgeon: Greg Streeter MD;  [facility]    RELEASE TRIGGER FINGER Left 5/30/2019    Procedure: Left Ring Trigger Finger Release.  Ganglion cyst excision.;  Surgeon: Greg Streeter MD;  [facility]    RELEASE TRIGGER FINGER Right 6/13/2023    Procedure: RELEASE, RIGHT TRIGGER FINGER, INDEX AND MIDDLE;  Surgeon: Miracle Carlin MD;  Location[facility]    RELEASE TRIGGER FINGER Left 8/1/2023    Procedure: RELEASE, LEFT TRIGGER FINGER, MIDDLE;  Surgeon: Miracle Carlin MD;  Location: [facility] OR       Past Medical History:   Past Medical  History:   Diagnosis Date    Combined visual and hearing impairment     Deaf     Diabetic retinopathy of both eyes (H) 2011    Hepatic steatosis 2011    History of tobacco use     Hyperlipidemia     Hypertension     Hypertriglyceridemia     Migraines     Obesity 2011     Problem list name updated by automated process. Provider to review    Uncontrolled type 2 diabetes mellitus with hyperglycemia, with long-term current use of insulin (H) 5/15/2017    Usher Syndrome: congenital deafness, retinitis pigmentosa 2011       Social History:   Social History     Tobacco Use    Smoking status: Former     Current packs/day: 0.00     Types: Cigarettes     Quit date: 2010     Years since quittin.5     Passive exposure: Never    Smokeless tobacco: Never    Tobacco comments:     stopped 10 yrs ago   Substance Use Topics    Alcohol use: Not Currently     Comment: socially       Family History:   Family History   Problem Relation Age of Onset    Unknown/Adopted Other        Allergies: No Known Allergies    Active Medications:   Current Outpatient Medications   Medication Sig Dispense Refill    acetaminophen (TYLENOL) 500 MG tablet Take 2 tablets (1,000 mg) by mouth every 8 hours as needed for mild pain 100 tablet 3    Alcohol Swabs (ALCOHOL PREP PAD) 70 % PADS 1 each 3 times daily 100 each 3    amLODIPine (NORVASC) 5 MG tablet Take 1 tablet (5 mg) by mouth at bedtime 90 tablet 3    aspirin (ASA) 81 MG chewable tablet Take 1 tablet (81 mg) by mouth daily CHEW AND SWALLOW 90 tablet 1    atorvastatin (LIPITOR) 40 MG tablet Take 1 tablet (40 mg) by mouth daily 90 tablet 1    blood glucose (ACCU-CHEK LAYA PLUS) test strip Use with  Accucheck Expert meter.  Test blood sugar 6 times daily. 600 each 3    blood glucose monitoring (ACCU-CHEK FASTCLIX) lancets Use to test blood sugar 6 times daily or as directed 510 each 3    calcium carbonate-vitamin D (OSCAL) 500-5 MG-MCG tablet Take 1 tablet by mouth 2 times  daily 180 tablet 1    Continuous Blood Gluc Sensor (DEXCOM G7 SENSOR) MISC 1 each every 10 days 9 each 3    Continuous Glucose Sensor (FREESTYLE ZORAIDA 3 SENSOR) MISC 1 each every 14 days Please provide 3 month supply. 6 each 3    empagliflozin (JARDIANCE) 25 MG TABS tablet Take 1 tablet (25 mg) by mouth daily for 180 days 90 tablet 1    ergocalciferol (ERGOCALCIFEROL) 1.25 MG (32770 UT) capsule Take 1 capsule (50,000 Units) by mouth once a week For additional refills, please schedule a follow-up appointment at [phone] 12 capsule 3    famotidine (PEPCID) 20 MG tablet Take 1 tablet (20 mg) by mouth 2 times daily (Patient not taking: Reported on 5/31/2024) 30 tablet 0    fenofibrate (TRIGLIDE/LOFIBRA) 160 MG tablet Take 1 tablet (160 mg) by mouth daily 90 tablet 3    fish oil-omega-3 fatty acids 1000 MG capsule TAKE TWO CAPSULES (2 GM) BY MOUTH ONCE DAILY 180 capsule 3    hydrocortisone (CORTAID) 1 % external cream Apply topically 2 times daily (Patient not taking: Reported on 5/31/2024) 120 g 1    ibuprofen (ADVIL/MOTRIN) 600 MG tablet Take 1 tablet (600 mg) by mouth every 6 hours as needed for moderate pain 120 tablet 1    Injection Device for insulin (INPEN 100-PINK-NOVOLOG-FIASP) DAVID 1 each continuous 1 each 0    insulin aspart (NOVOLOG FLEXPEN) 100 UNIT/ML pen 1 unit per 5 grams CHO and correction scale of 1/25/125.  Approximate daily use is 100 units. 30 mL 2    insulin aspart (NOVOLOG PENFILL) 100 UNIT/ML cartridge Take with each meal and snack: 1 per 3 grams CHO and correction of 1 unit per 20 mg/dL over a target of 120. Average daily dose is 50 units. 270 mL 3    insulin glargine (BASAGLAR KWIKPEN) 100 UNIT/ML pen Inject 60 Units Subcutaneous daily 3 month supply. (Patient taking differently: Inject 55 Units Subcutaneous daily 3 month supply.) 54 mL 3    insulin pen needle (31G X 5 MM) 31G X 5 MM miscellaneous Use 5 to 6 pen needles daily or as directed.  3 month supply. 500 each 3    losartan (COZAAR)  "100 MG tablet Take 1 tablet (100 mg) by mouth daily 90 tablet 3    naproxen (NAPROSYN) 375 MG tablet Take 1 tablet (375 mg) by mouth 2 times daily (with meals) (Patient not taking: Reported on 5/31/2024) 60 tablet 3    omeprazole (PRILOSEC) 40 MG DR capsule Take 1 capsule (40 mg) by mouth daily 8-12 weeks 90 capsule 0    ondansetron (ZOFRAN ODT) 4 MG ODT tab Take 1 tablet (4 mg) by mouth every 6 hours as needed for nausea or vomiting 20 tablet 0    Ostomy Supplies (ADHESIVE REMOVER WIPES) MISC 1 each every 14 days (Patient not taking: Reported on 5/31/2024) 50 each 3    Ostomy Supplies (SKIN TAC ADHESIVE BARRIER WIPE) MISC 1 each every 14 days (Patient not taking: Reported on 5/31/2024) 6 each 3    Semaglutide, 2 MG/DOSE, (OZEMPIC) 8 MG/3ML pen Inject 2 mg Subcutaneous every 7 days (Patient not taking: Reported on 5/30/2024) 9 mL 3    senna-docusate (SENOKOT-S/PERICOLACE) 8.6-50 MG tablet Take 1-2 tablets by mouth 2 times daily (Patient not taking: Reported on 5/31/2024) 30 tablet 0    sucralfate (CARAFATE) 1 GM tablet Take 1 tablet (1 g) by mouth 4 times daily (Patient not taking: Reported on 5/30/2024) 30 tablet 0    tirzepatide (MOUNJARO) 2.5 MG/0.5ML pen Inject 2.5 mg Subcutaneous every 7 days (Patient not taking: Reported on 5/30/2024) 6 mL 3    tirzepatide (MOUNJARO) 7.5 MG/0.5ML pen Inject 7.5 mg Subcutaneous once a week (Patient not taking: Reported on 5/31/2024) 2 mL 11    triamcinolone (KENALOG) 0.1 % external ointment Apply topically 2 times daily (Patient not taking: Reported on 5/31/2024) 30 g 1       Systemic Review:   CONSTITUTIONAL: NEGATIVE for fever, chills, change in weight  ENT/MOUTH: NEGATIVE for ear, mouth and throat problems  RESP: NEGATIVE for significant cough or SOB  CV: NEGATIVE for chest pain, palpitations or peripheral edema    Physical Examination:   Vital Signs: BP (!) 151/80 (BP Location: Right arm)   Pulse 71   Temp 97.5  F (36.4  C) (Temporal)   Resp 18   Ht 1.549 m (5' 0.98\")  "  Wt 81.6 kg (180 lb)   LMP 02/01/2024 (Approximate)   SpO2 98%   BMI 34.03 kg/m    GENERAL: healthy, alert and no distress  NECK: no adenopathy, no asymmetry, masses, or scars  RESP: lungs clear to auscultation - no rales, rhonchi or wheezes  CV: regular rate and rhythm, normal S1 S2, no S3 or S4, no murmur, click or rub, no peripheral edema and peripheral pulses strong  ABDOMEN: soft, nontender, no hepatosplenomegaly, no masses and bowel sounds normal  MS: no gross musculoskeletal defects noted, no edema    Plan: Appropriate to proceed as scheduled.      Nora Brice MD  6/13/2024    PCP:  Mariya Mohamud

## 2024-06-13 NOTE — ANESTHESIA CARE TRANSFER NOTE
Patient: Nayeli Caal    Procedure: Procedure(s):  ESOPHAGOGASTRODUODENOSCOPY, WITH BIOPSY       Diagnosis: Abdominal pain, right lower quadrant [R10.31]  Diagnosis Additional Information: No value filed.    Anesthesia Type:   MAC     Note:    Oropharynx: oropharynx clear of all foreign objects and spontaneously breathing  Level of Consciousness: awake  Oxygen Supplementation: room air    Independent Airway: airway patency satisfactory and stable  Dentition: dentition unchanged  Vital Signs Stable: post-procedure vital signs reviewed and stable  Report to RN Given: handoff report given  Patient transferred to: Phase II  Comments: See Phase II flowsheet for VS.  Handoff Report: Identifed the Patient, Identified the Reponsible Provider, Reviewed the pertinent medical history, Discussed the surgical course, Reviewed Intra-OP anesthesia mangement and issues during anesthesia, Set expectations for post-procedure period and Allowed opportunity for questions and acknowledgement of understanding      Vitals:  Vitals Value Taken Time   BP     Temp     Pulse     Resp     SpO2         Electronically Signed By: SABI Schaefer CRNA  June 13, 2024  10:34 AM

## 2024-06-21 NOTE — PROGRESS NOTES
"Patient is showing 4/5 MNCM met. A1c not in range       HPI:   Nayeli is a pleasant 45 yo woman here with a  for follow up of type 2 diabetes since the early 2000's.  She also sees Dr. Bragg and Leigh Ann Escoto. She started on insulin in 2003 after failing Metformin treatment.  She has struggled with high blood sugars for many years.   She has been working hard on improving her diabetes control.  She is currently on Mounjaro 2.5 mg weekly for the past 3 weeks.  This has been going well.  Blood sugars are better.   Lowered lantus from 60 units to 50 units due to hypoglycemia.  She has been following closely with Leigh Ann Escoto.      Discouraged because her weight is not going down, it is going up.  She is up over 180#.  Does not feel she eats very much. Wants to start exercising again.     Involved in non-profit for deaf community.  Very busy.  Has not taken the time to be active.     Typical day:   Two schedules. Works from home M/F.  Other days she goes to office.  Gets up at different times.  She has been taking meds for GERD.  Waits an hour before she eats.  Gets up at 4 on days she goes to the office.   Breakfast- Egg sandwich or scrambled eggs with veggies.   Lunch- leftovers, sometimes sandwich or salad  Dinner- varies, mostly more vegetables, trying to cut back on carbs.  Loves starchy foods- cutting back on that.  Used to eat a \"pile\" of food. Now cutting back.   Snacks- not very often.     Tingling in both hands.  Typing at work. Worse when her blood sugars are low.     Was   Reviewed recent note from DM education:   History of her GLP-1 use.   April 2023:  Didn't tolerate either Metformin (intractable diarrhea), or Jardiance (frequent yeast infections) so Ozempic 0.25 mg started.   January 2024:  She had titrated up to 2mg Ozempic and was tolerating well, however Ms. Medina felt she could get better control with Mounjaro, so Ozempic was discontinued and Mounjaro 2.5mg was started. "   March 2024:  Mounjaro dose increased to 5 mg.  Still tolerating well, but some complaint of GERD symptoms.    April 2024:  Saw Dr. Bragg.  Still tolerating the 5 mg dose   May 2024:  Ready to advance to Mounjaro 7.5 however the drug was not available, so Ms. Medina changed her script to Ozempic 2 mg.                      Nayeli took two doses of this and had severe nausea and vomiting along with the GERD symptoms she had been experiencing.  She messaged me that she was                      Going to stop the medication as she was on her way out of town.  Since then, Mounjaro has become available.  Ms. Medina ordered Mounjaro 2.5 mg.       Nayeli states today that she just resumed the 2.5 mg dose of Mounjaro about 4 days ago and so far is tolerating it well.  Discussed that she should stay on this dose for a month and then, if feeling alright, she could graduate to the 5 mg dose.  I ordered this today, along with more Novolog cartridges for her In-Pen per her request.       IN PEN SETTINGS:  Start time ICR  1 unit/gm Insulin Sensitivity Factor Target BG    06:00 AM 2 15 130   11:00 AM 2 15 110   05:00 PM 2 15 110   09:30 PM 2 15 130             Active Insulin Time:  3  Maximum Bolus:  30 units     Left her Lantus insulin dose at 60 units.         New high level of protein in her urine.  Unable to tolerate SGLT-2 inhibitors due to yeast infections.  Taking losartan 100 mg daily.      Her current regimen is:   Mounjaro 2.5 mg weekly  Basaglar 50 units daily.  Novolog (dosing on In-pen):  Carb ratio: 1/2g   Sensitivity: 15    Previous treatments:   empagliflozin 25 mg daily- stopped 2023 due to recurrent yeast infections.  Metformin- severe diarrhea.   Ozempic 2 mg- severe nausea after switching from mounjaro 5 mg    Her overall average glucose is 199 mg/dL (CV 31%), over the past 2 weeks.  Her recent glucose is as follows:                     She is no longer on Metformin as even the low dose caused intense diarrhea  to the point where she could not go out of her home.     She has no other concerns today.       Past Medical History:   Diagnosis Date    Combined visual and hearing impairment     Deaf     Diabetic retinopathy of both eyes (H) 8/19/2011    Hepatic steatosis 08/19/2011    History of tobacco use     Hyperlipidemia     Hypertension     Hypertriglyceridemia     Migraines     Obesity 8/19/2011     Problem list name updated by automated process. 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       Past Surgical History:   Procedure Laterality Date    DAVINCI HYSTERECTOMY TOTAL, SALPINGECTOMY BILATERAL Bilateral 2/7/2024    Procedure: HYSTERECTOMY, TOTAL, ROBOT-ASSISTED, WITH BILATERAL SALPINGECTOMY, CYSTOSCOPY;  Surgeon: Vonnie Cowan MD;  Location: [facility] OR    ESOPHAGOSCOPY, GASTROSCOPY, DUODENOSCOPY (EGD), COMBINED N/A 6/13/2024    Procedure: ESOPHAGOGASTRODUODENOSCOPY, WITH BIOPSY;  Surgeon: Nora Brice MD;  Location: [facility] OR    LAPAROSCOPIC CHOLECYSTECTOMY N/A 3/10/2018    Procedure: LAPAROSCOPIC CHOLECYSTECTOMY;  Laparoscopic Cholecystectomy ;  Surgeon: Kuldeep Sigala MD;  Location: [facility] OR    RELEASE TRIGGER FINGER Right 5/2/2019    Procedure: Right Thumb Trigger Release;  Surgeon: Greg Streeter MD;  Location: [facility]    RELEASE TRIGGER FINGER Left 5/30/2019    Procedure: Left Ring Trigger Finger Release.  Ganglion cyst excision.;  Surgeon: Greg Streeter MD;  Location: [facility]    RELEASE TRIGGER FINGER Right 6/13/2023    Procedure: RELEASE, RIGHT TRIGGER FINGER, INDEX AND MIDDLE;  Surgeon: Miracle Carlin MD;  Location: [facility] OR    RELEASE TRIGGER FINGER Left 8/1/2023    Procedure: RELEASE, LEFT TRIGGER FINGER, MIDDLE;  Surgeon: Miracle Carlin MD;  Location: [facility] OR       Family History   Problem Relation Age of Onset     Unknown/Adopted Other        Social History     Social History    Marital status: Single     Spouse name: N/A    Number of children: N/A    Years of education: N/A     Social History Main Topics    Smoking status: Former Smoker     Types: Cigarettes     Quit date: 12/1/2010    Smokeless tobacco: Never Used      Comment: stopped 2 yrs ago    Alcohol use No    Drug use: No    Sexual activity: Not Currently     Partners: Male     Other Topics Concern     Service No    Blood Transfusions No    Caffeine Concern No    Occupational Exposure No    Hobby Hazards No    Sleep Concern No    Stress Concern No    Weight Concern Yes    Special Diet No    Back Care No    Exercise Yes     4-5 x a week    Bike Helmet No    Seat Belt Yes    Self-Exams Yes     Social History Narrative   Social Hx: Lives in a [facility].  Boyfriend is in [place]. She is adopted.  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     Current Outpatient Medications   Medication Sig Dispense Refill    acetaminophen (TYLENOL) 500 MG tablet Take 2 tablets (1,000 mg) by mouth every 8 hours as needed for mild pain 100 tablet 3    Alcohol Swabs (ALCOHOL PREP PAD) 70 % PADS 1 each 3 times daily 100 each 3    amLODIPine (NORVASC) 5 MG tablet Take 1 tablet (5 mg) by mouth at bedtime 90 tablet 3    aspirin (ASA) 81 MG chewable tablet Take 1 tablet (81 mg) by mouth daily CHEW AND SWALLOW 90 tablet 1    atorvastatin (LIPITOR) 40 MG tablet Take 1 tablet (40 mg) by mouth daily 90 tablet 1    blood glucose (ACCU-CHEK LAYA PLUS) test strip Use with  Accucheck Expert meter.  Test blood sugar 6 times daily. 600 each 3    blood glucose monitoring (ACCU-CHEK FASTCLIX) lancets Use to test blood sugar 6 times daily or as directed 510 each 3    calcium carbonate-vitamin D (OSCAL) 500-5 MG-MCG tablet Take 1 tablet by mouth 2 times daily 180 tablet 1    Continuous Blood Gluc Sensor (DEXCOM G7 SENSOR) MISC 1 each every 10 days 9 each 3    Continuous Glucose Sensor  (FREESTYLE ZORAIDA 3 SENSOR) [facility] 1 each every 14 days Please provide 3 month supply. 6 each 3    empagliflozin (JARDIANCE) 25 MG TABS tablet Take 1 tablet (25 mg) by mouth daily for 180 days 90 tablet 1    ergocalciferol (ERGOCALCIFEROL) 1.25 MG (08763 UT) capsule Take 1 capsule (50,000 Units) by mouth once a week For additional refills, please schedule a follow-up appointment at [phone] 12 capsule 3    famotidine (PEPCID) 20 MG tablet Take 1 tablet (20 mg) by mouth 2 times daily (Patient not taking: Reported on 5/31/2024) 30 tablet 0    fenofibrate (TRIGLIDE/LOFIBRA) 160 MG tablet Take 1 tablet (160 mg) by mouth daily 90 tablet 3    fish oil-omega-3 fatty acids 1000 MG capsule TAKE TWO CAPSULES (2 GM) BY MOUTH ONCE DAILY 180 capsule 3    hydrocortisone (CORTAID) 1 % external cream Apply topically 2 times daily (Patient not taking: Reported on 5/31/2024) 120 g 1    ibuprofen (ADVIL/MOTRIN) 600 MG tablet Take 1 tablet (600 mg) by mouth every 6 hours as needed for moderate pain 120 tablet 1    Injection Device for insulin (INPEN 100-PINK-NOVOLOG-FIASP) DAVID 1 each continuous 1 each 0    insulin aspart (NOVOLOG FLEXPEN) 100 UNIT/ML pen 1 unit per 5 grams CHO and correction scale of 1/25/125.  Approximate daily use is 100 units. 30 mL 2    insulin aspart (NOVOLOG PENFILL) 100 UNIT/ML cartridge Take with each meal and snack: 1 per 3 grams CHO and correction of 1 unit per 20 mg/dL over a target of 120. Average daily dose is 85 units. 75 mL 3    insulin glargine (BASAGLAR KWIKPEN) 100 UNIT/ML pen Inject 60 Units Subcutaneous daily 3 month supply. (Patient taking differently: Inject 55 Units Subcutaneous daily 3 month supply.) 54 mL 3    insulin pen needle (31G X 5 MM) 31G X 5 MM miscellaneous Use 5 to 6 pen needles daily or as directed.  3 month supply. 500 each 3    losartan (COZAAR) 100 MG tablet Take 1 tablet (100 mg) by mouth daily 90 tablet 3    naproxen (NAPROSYN) 375 MG tablet Take 1 tablet (375 mg) by mouth 2  times daily (with meals) (Patient not taking: Reported on 5/31/2024) 60 tablet 3    omeprazole (PRILOSEC) 40 MG DR capsule Take 1 capsule (40 mg) by mouth 2 times daily 8-12 weeks 90 capsule 0    ondansetron (ZOFRAN ODT) 4 MG ODT tab Take 1 tablet (4 mg) by mouth every 6 hours as needed for nausea or vomiting 20 tablet 0    Ostomy Supplies (ADHESIVE REMOVER WIPES) MISC 1 each every 14 days (Patient not taking: Reported on 5/31/2024) 50 each 3    Ostomy Supplies (SKIN TAC ADHESIVE BARRIER WIPE) MISC 1 each every 14 days (Patient not taking: Reported on 5/31/2024) 6 each 3    Semaglutide, 2 MG/DOSE, (OZEMPIC) 8 MG/3ML pen Inject 2 mg Subcutaneous every 7 days (Patient not taking: Reported on 5/30/2024) 9 mL 3    senna-docusate (SENOKOT-S/PERICOLACE) 8.6-50 MG tablet Take 1-2 tablets by mouth 2 times daily (Patient not taking: Reported on 5/31/2024) 30 tablet 0    sucralfate (CARAFATE) 1 GM tablet Take 1 tablet (1 g) by mouth 4 times daily (Patient not taking: Reported on 5/30/2024) 30 tablet 0    tirzepatide (MOUNJARO) 2.5 MG/0.5ML pen Inject 2.5 mg Subcutaneous every 7 days 6 mL 3    tirzepatide (MOUNJARO) 5 MG/0.5ML pen Inject 5 mg Subcutaneous every 7 days 2 mL 1    tirzepatide (MOUNJARO) 7.5 MG/0.5ML pen Inject 7.5 mg Subcutaneous once a week (Patient not taking: Reported on 5/31/2024) 2 mL 11    triamcinolone (KENALOG) 0.1 % external ointment Apply topically 2 times daily (Patient not taking: Reported on 5/31/2024) 30 g 1     Current Facility-Administered Medications   Medication Dose Route Frequency Provider Last Rate Last Admin    hydrocortisone (CORTAID) 1 % cream   Topical Once Mariya Mohamud APRN CNP        hydrocortisone (CORTAID) 1 % cream   Topical TID Mariya Mohamud APRN CNP        hylan (SYNVISC ONE) injection 48 mg  48 mg   Rosas Rodriguez DO   48 mg at 05/09/23 1813    lidocaine (PF) (XYLOCAINE) 1 % injection 4 mL  4 mL   Sebas Bradley MD   4 mL at 10/05/23 0908    triamcinolone  (KENALOG-40) injection 40 mg  40 mg   Sebas Bradley MD   40 mg at 10/05/23 0923        No Known Allergies    Physical Exam  LMP 02/01/2024 (Approximate)   GENERAL:  Alert and oriented X3, NAD, well dressed, answering questions appropriately, appears stated age.  HEENT: OP clear, no lymphadenopathy, no thyromegaly, non-tender, no exophthalmus, no proptosis, EOMI, no lid lag, no retraction  EXTREMITIES: no edema, +pulses, no rashes, no lesions  NEUROLOGY: + monofilament, +vibratory sensation, + DTR upper and lower extremity  MSK: grossly intact      RESULTS  Lab Results   Component Value Date    A1C 8.7 (H) 04/15/2024    A1C 7.8 (H) 01/04/2024    A1C 7.7 (H) 07/25/2023    A1C 8.7 (H) 06/06/2023    A1C 9.9 (H) 04/04/2023    A1C 9.8 (H) 08/17/2022    A1C 11.6 (H) 04/05/2021    A1C 12.4 (H) 10/29/2020    A1C 12.9 (H) 07/08/2020    A1C 8.2 (H) 05/02/2019    A1C 10.3 (H) 10/15/2018    HEMOGLOBINA1 10.0 (A) 01/13/2020    HEMOGLOBINA1 9.9 (A) 10/07/2019    HEMOGLOBINA1 8.1 (A) 04/22/2019    HEMOGLOBINA1 10.4 (A) 01/14/2019    HEMOGLOBINA1 8.7 (A) 03/12/2018       TSH   Date Value Ref Range Status   04/04/2023 1.41 0.30 - 4.20 uIU/mL Final   07/08/2020 1.34 0.40 - 4.00 mU/L Final   05/17/2018 1.84 0.40 - 4.00 mU/L Final   11/27/2017 1.92 0.40 - 4.00 mU/L Final   05/11/2016 1.85 0.40 - 4.00 mU/L Final   02/11/2016 1.14 0.40 - 4.00 mU/L Final       ALT   Date Value Ref Range Status   05/31/2024 51 (H) 0 - 50 U/L Final     Comment:     Reference intervals for this test were updated on 6/12/2023 to more accurately reflect our healthy population. There may be differences in the flagging of prior results with similar values performed with this method. Interpretation of those prior results can be made in the context of the updated reference intervals.     05/08/2024 45 0 - 50 U/L Final     Comment:     Reference intervals for this test were updated on 6/12/2023 to more accurately reflect our healthy population. There may be  differences in the flagging of prior results with similar values performed with this method. Interpretation of those prior results can be made in the context of the updated reference intervals.     07/08/2020 29 0 - 50 U/L Final   05/02/2019 43 0 - 50 U/L Final   ]    Recent Labs   Lab Test 01/04/24  1657 04/04/23  1445   CHOL 183 221*   HDL 37* 38*   LDL 76 123*   TRIG 350* 298*       Lab Results   Component Value Date     05/31/2024     07/08/2020      Lab Results   Component Value Date    POTASSIUM 4.2 05/31/2024    POTASSIUM 4.7 08/17/2022    POTASSIUM 4.0 07/08/2020     Lab Results   Component Value Date    CHLORIDE 103 05/31/2024    CHLORIDE 104 08/17/2022    CHLORIDE 101 07/08/2020     Lab Results   Component Value Date    VERO 8.9 05/31/2024    VERO 8.6 07/08/2020     Lab Results   Component Value Date    CO2 23 05/31/2024    CO2 29 08/17/2022    CO2 30 07/08/2020     Lab Results   Component Value Date    BUN 13.3 05/31/2024    BUN 13 08/17/2022    BUN 13 07/08/2020     Lab Results   Component Value Date    CR 0.60 05/31/2024    CR 0.74 07/08/2020       GFR Estimate   Date Value Ref Range Status   05/31/2024 >90 >60 mL/min/1.73m2 Final   05/08/2024 >90 >60 mL/min/1.73m2 Final   03/08/2024 >90 >60 mL/min/1.73m2 Final   07/08/2020 >90 >60 mL/min/[1.73_m2] Final     Comment:     Non  GFR Calc  Starting 12/18/2018, serum creatinine based estimated GFR (eGFR) will be   calculated using the Chronic Kidney Disease Epidemiology Collaboration   (CKD-EPI) equation.     05/02/2019 >90 >60 mL/min/[1.73_m2] Final     Comment:     Non  GFR Calc  Starting 12/18/2018, serum creatinine based estimated GFR (eGFR) will be   calculated using the Chronic Kidney Disease Epidemiology Collaboration   (CKD-EPI) equation.     05/17/2018 >90 >60 mL/min/1.7m2 Final     Comment:     Non  GFR Calc     GFR Estimate If Black   Date Value Ref Range Status   07/08/2020 >90 >60  "mL/min/[1.73_m2] Final     Comment:      GFR Calc  Starting 12/18/2018, serum creatinine based estimated GFR (eGFR) will be   calculated using the Chronic Kidney Disease Epidemiology Collaboration   (CKD-EPI) equation.     05/02/2019 >90 >60 mL/min/[1.73_m2] Final     Comment:      GFR Calc  Starting 12/18/2018, serum creatinine based estimated GFR (eGFR) will be   calculated using the Chronic Kidney Disease Epidemiology Collaboration   (CKD-EPI) equation.     05/17/2018 >90 >60 mL/min/1.7m2 Final     Comment:      GFR Calc       Lab Results   Component Value Date    MICROL 1,199.0 01/04/2024    MICROL 115 03/07/2022    MICROL 31 07/20/2020     No results found for: \"MICROALBUMIN\"  Lab Results   Component Value Date    CPEPT 1.3 03/25/2005       Vitamin B12   Date Value Ref Range Status   06/06/2023 776 232 - 1,245 pg/mL Final   ]    Most recent eye exam date: : Not Found     FIB-4 Calculation: 0.51 at 5/31/2024  9:09 AM  Calculated from:  SGOT/AST: 32 U/L at 5/31/2024  9:09 AM  SGPT/ALT: 51 U/L at 5/31/2024  9:09 AM  Platelets: 375 10e3/uL at 5/31/2024  9:09 AM  Age: 43 years    Assessment/Plan:     1.  Type 2 diabetes-  Nayeli's glucose control has improved significantly.  Recent glucose is much lower than A1c would suggest.  A1c is 8.1% today with average glucose of 130 mg/dL.  Will increase mounjaro and follow up monthly with Leigh Ann Escoto for insulin titration.  Discussed heart healthy eating ideas and encouraged patient to increase consumption of fruits, vegetables and whole grains (high fiber diet).  We made the following plan today (instructions given to patient):      Lower basaglar to 40 units when you increase mounjaro to 5 mg.      Let me know how you are doing in a few weeks and we will plan to increase your dose to 7.5 mg.     You may have to relax your carb ratio to 1/3g if you are going low.     Schedule follow up with Leigh Ann in 1 month.     Keep up the " excellent work!    Try wrist splints.     Please let me know if you are having low blood sugars less than 70 or over 250 mg/dL.      If you have concerns, please send me a [employer] message M-Th, call the clinic at [phone], or call [phone] after hours/weekends and ask to speak with the endocrinologist on call.                                    2.  Risk factors-     Retinopathy:  No.  Had eye exam within last year. More sensitive to light- Usher's syndrome.    Nephropathy:  BP well controlled. +new albuminuria despite improved glucose control.  Unable to tolerate SGLT-2 inhibitor. Referral placed  to nephrology.  Creatinine stable.   Neuropathy: No.    Feet: OK, no ulcers.   Taking ASA: yes  Lipids:  LDL now in target on statin and fibrate.   LIver: Normal Fib 4 score.  Low risk for fibrosis.     3.  F/U in 3 months with Dr. Bragg, in 1 mo with diabetes education, in 6 mos with me.  We will follow with her closely, however she does strongly prefer in-person appointments.      42 minutes spent on the date of the encounter doing chart review, review of test results, patient visit and documentation, counseling/coordination of care, and discussion of follow up plan for worsening hyper and hypoglycemia.  The patient understood and is satisfied with today's visit.        Anne Marie Medina PA-C, MPAS   [facility]  Department of Medicine

## 2024-06-24 ENCOUNTER — MYC MEDICAL ADVICE (OUTPATIENT)
Dept: INTERNAL MEDICINE | Facility: CLINIC | Age: 44
End: 2024-06-24
Payer: COMMERCIAL

## 2024-06-24 ENCOUNTER — TELEPHONE (OUTPATIENT)
Dept: NEPHROLOGY | Facility: CLINIC | Age: 44
End: 2024-06-24
Payer: COMMERCIAL

## 2024-06-24 NOTE — TELEPHONE ENCOUNTER
KHLOE and sent [facility] to schedule follow up around 9.30.24 with Dr. Escalante// 6.24.24 KET

## 2024-06-25 ENCOUNTER — OFFICE VISIT (OUTPATIENT)
Dept: INTERNAL MEDICINE | Facility: CLINIC | Age: 44
End: 2024-06-25
Payer: COMMERCIAL

## 2024-06-25 VITALS
SYSTOLIC BLOOD PRESSURE: 115 MMHG | HEIGHT: 61 IN | OXYGEN SATURATION: 98 % | BODY MASS INDEX: 34.32 KG/M2 | HEART RATE: 79 BPM | WEIGHT: 181.8 LBS | DIASTOLIC BLOOD PRESSURE: 79 MMHG

## 2024-06-25 DIAGNOSIS — R10.31 GROIN PAIN, RIGHT: Primary | ICD-10-CM

## 2024-06-25 DIAGNOSIS — R06.9 ABNORMAL BREATHING: ICD-10-CM

## 2024-06-25 PROCEDURE — 99214 OFFICE O/P EST MOD 30 MIN: CPT | Performed by: NURSE PRACTITIONER

## 2024-06-25 NOTE — PROGRESS NOTES
"  Assessment & Plan     Groin pain, right  - Orthopedic  Referral; Future to determine whether her right groin pain could be hip flexor or other ligamentous origin. She no remembers that during her February surgery her right leg was positioned below the level of the table?  Monica has been since her surgery.    Abnormal breathing  - Adult Sleep Eval & Management Referral; Future  - She is working on weight loss and has resumed her Mounjaro.    BMI  Estimated body mass index is 34.37 kg/m  as calculated from the following:    Height as of this encounter: 1.549 m (5' 0.98\").    Weight as of this encounter: 82.5 kg (181 lb 12.8 oz).        I spent a total of 35 minutes in the care of this pt during today's office visit. This time includes reviewing the patient's chart and prior history, obtaining a history, performing an examination and evaluation and counseling the patient. This time also includes ordering medications or tests necessary in addition to communication to other member's of the patient's health care team. Time spent in documentation and care coordination is included.     LINETTE Sawyer   Nayeli is a 44 year old, presenting for the following health issues:  Consult (Breathing pattern during endoscopy)      6/25/2024    11:18 AM   Additional Questions   Roomed by [facility]     Pt seen with an .  Nayeli was told she had an abnormal breathing pattern noted during her upper endoscopy.  She remembers she was told the same thing when she had her surgery in February.  Sleep study was recommended at that time.  She is unaware of any abnormal sleep breathing.     Her glucose control has been improved since her visit with Endocrine.    Her right groin pain still persists and is slightly less severe. It seems position dependent, ie: rolling over in bed, etc.     History of Present Illness       Reason for visit:  Follow up with my health issues    She eats " "0-1 servings of fruits and vegetables daily.She consumes 2 sweetened beverage(s) daily.She exercises with enough effort to increase her heart rate 20 to 29 minutes per day.  She exercises with enough effort to increase her heart rate 3 or less days per week.   She is taking medications regularly.       Patient Active Problem List   Diagnosis    Essential hypertension    Hypersomnia, organic    Other chronic nonalcoholic liver disease    Diabetic retinopathy of both eyes (H)    Obesity    Usher Syndrome: congenital deafness, retinitis pigmentosa    Macular degeneration, age related, nonexudative    Benign neoplasm of iris    Dry eye syndrome    Pemphigus erythematosus (H28)    Chondromalacia of patella, right, steroid injection 6/12/2015    Uncontrolled type 2 diabetes mellitus with hyperglycemia, with long-term current use of insulin (H)    Chronic kidney disease, stage 1    Trigger middle finger of right hand    Trigger middle finger of left hand    Adenomyosis of uterus    S/P hysterectomy           Objective    /79 (BP Location: Right arm, Patient Position: Sitting, Cuff Size: Adult Large)   Pulse 79   Ht 1.549 m (5' 0.98\")   Wt 82.5 kg (181 lb 12.8 oz)   LMP 02/01/2024 (Approximate)   SpO2 98%   BMI 34.37 kg/m    Body mass index is 34.37 kg/m .  Physical Exam   GENERAL: alert and no distress  RESP: no distress  CV: see VS  MS: she has acute pain in the right lower groin with sitting from a reclined back position.  She has pain with right hip extension.  NEURO: mentation intact and speech normal  PSYCH: mentation appears normal, affect normal/bright            Signed Electronically by: SABI Morrison CNP    "

## 2024-06-26 ENCOUNTER — ALLIED HEALTH/NURSE VISIT (OUTPATIENT)
Dept: EDUCATION SERVICES | Facility: CLINIC | Age: 44
End: 2024-06-26
Payer: COMMERCIAL

## 2024-06-26 DIAGNOSIS — Z79.4 UNCONTROLLED TYPE 2 DIABETES MELLITUS WITH HYPERGLYCEMIA, WITH LONG-TERM CURRENT USE OF INSULIN (H): ICD-10-CM

## 2024-06-26 DIAGNOSIS — E11.65 UNCONTROLLED TYPE 2 DIABETES MELLITUS WITH HYPERGLYCEMIA, WITH LONG-TERM CURRENT USE OF INSULIN (H): ICD-10-CM

## 2024-06-26 PROCEDURE — T1013 SIGN LANG/ORAL INTERPRETER: HCPCS | Mod: U3

## 2024-06-26 PROCEDURE — 99207 PR DIABETES SELF MANAGEMENT: CPT | Performed by: REGISTERED NURSE

## 2024-06-26 RX ORDER — INSULIN ASPART 100 [IU]/ML
INJECTION, SOLUTION INTRAVENOUS; SUBCUTANEOUS
Qty: 75 ML | Refills: 3 | Status: SHIPPED | OUTPATIENT
Start: 2024-06-26 | End: 2024-08-20

## 2024-06-26 NOTE — PROGRESS NOTES
.Diabetes Self-Management Education & Support    Nayeli Caal presents today for education related to Type 2 diabetes    Patient is being treated with:  MDI Insulin and oral meds  She is accompanied by self and     Year of diagnosis: 2013 or 2014  Referring provider:  Anne Marie Medina PA-C  Living Situation: Lives with a room mate  Employment: Administrative work for [employer]    PATIENT CONCERNS/REASON FOR REFERRAL:  Ongoing DSME and support    ASSESSMENT:    Taking Medication:     Current Diabetes Management per Patient:  Taking diabetes medications  She is taking Lantus 60 units daily.   In addition she is using an In-Pen with Novolog cartridges with the following settings.      Active Insulin Time:  3 hours  Maximum Bolus:  30    Monitoring    Patient glucose self monitoring as follows: continuously using a continuous glucose monitor (CGM)  CGM: Evelia 3   BG results: Screenleap report appears below:             Patient's most recent   Lab Results   Component Value Date    A1C 8.7 04/15/2024    A1C 7.8 01/04/2024    A1C 11.6 04/05/2021      Patient's A1C goal: <7.0.  Her estimated GMI is 7.4%  At last visit about a month ago her Time in Range was 27%.  Today she has been in target range 59% of the time, a huge improvement.  I gave her a lot of positive feedback for this.  She states that she thinks the fact that she is feeling better with less pain and the changes we made in her In-Pen settings is the reason for the improvement.  She is very consistent about taking her Lantus insulin but states that sometimes she forgets to cover a late night snack, which accounts for some of the overnight highs we see.      Activity: no regular exercise program.  She states that she is walking as much as she can.  The right sided groin pain she's been experiencing since shortly after her hysterectomy seems to be better she thinks.  She saw orthopedics about this and they felt that it was a  strain that will take time to heal.  She states that it is slowly improving and she is trying to be as active as she can be.  She has been traveling a lot and this interrupts her daily routine quite a bit.      Healthy Eating:  She states that she eats a healthy diet.  We didn't examine this in great detail today.      Problem Solving:      Patient is at risk of hypoglycemia?: Yes--she doesn't have hypoglycemia very often.  She states that recently the sensor has been reading low sometimes, but she doesn't have any symptoms, and so hasn't been treating it as hypoglycemia and states that it resolves on it's own.    Hospitalizations for hyper or hypoglycemia: None    EDUCATION and INSTRUCTION PROVIDED AT THIS VISIT:     History of her GLP-1 use.   April 2023:  Didn't tolerate either Metformin (intractable diarrhea), or Jardiance (frequent yeast infections) so Ozempic 0.25 mg started.   January 2024:  She had titrated up to 2mg Ozempic and was tolerating well, however Ms. Medina felt she could get better control with Mounjaro, so Ozempic was discontinued and Mounjaro 2.5mg was started.   March 2024:  Mounjaro dose increased to 5 mg.  Still tolerating well, but some complaint of GERD symptoms.    April 2024:  Saw Dr. Bragg.  Still tolerating the 5 mg dose   May 2024:  Ready to advance to Mounjaro 7.5 however the drug was not available, so Ms. Medina changed her script to Ozempic 2 mg.                      Nayeli took two doses of this and had severe nausea and vomiting along with the GERD symptoms she had been experiencing.  She messaged me that she was                      Going to stop the medication as she was on her way out of town.  Since then, Mounjaro has become available.  Ms. Medina ordered Mounjaro 2.5 mg.      Nayeli states today that she just resumed the 2.5 mg dose of Mounjaro about 4 days ago and so far is tolerating it well.  Discussed that she should stay on this dose for a month and then, if feeling  alright, she could graduate to the 5 mg dose.  I ordered this today, along with more Novolog cartridges for her In-Pen per her request.       IN PEN SETTINGS:  Start time ICR  1 unit/gm Insulin Sensitivity Factor Target BG    06:00 AM 2 15 130   11:00 AM 2 15 110   05:00 PM 2 15 110   09:30 PM 2 15 130         Active Insulin Time:  3  Maximum Bolus:  30 units    Left her Lantus insulin dose at 60 units.      Patient-stated goal written and given to Nayeli Caal.  Verbalized and demonstrated understanding of instructions.     PLAN:    See patient instructions  AVS printed and given to patient    FOLLOW-UP:      Appointment scheduled in one month.    She has to call to set up a sleep study.    She is motivated to lose weight, after her consultation with GI, who recommended it.       Any diabetes medication dose changes were made via the [facility] Protocol and Collaborative Practice Agreement with [facility].  A copy of this encounter was provided to patient's referring provider.

## 2024-06-27 NOTE — TELEPHONE ENCOUNTER
Left Voicemail (2nd Attempt) for the patient to schedule:    Appointment type: Return Nephrology  Provider: Dr. Escalante  Return date: Approx. 9/30  Phone number: [phone]  Add'l appt(s) needed: Lab 1-hour prior to clinic visit (or 1-week if video visit)  Notes:

## 2024-07-12 ENCOUNTER — OFFICE VISIT (OUTPATIENT)
Dept: ENDOCRINOLOGY | Facility: CLINIC | Age: 44
End: 2024-07-12
Payer: COMMERCIAL

## 2024-07-12 VITALS
HEART RATE: 76 BPM | DIASTOLIC BLOOD PRESSURE: 70 MMHG | BODY MASS INDEX: 34.6 KG/M2 | WEIGHT: 183 LBS | OXYGEN SATURATION: 98 % | SYSTOLIC BLOOD PRESSURE: 113 MMHG

## 2024-07-12 DIAGNOSIS — E11.9 WELL CONTROLLED TYPE 2 DIABETES MELLITUS (H): Primary | ICD-10-CM

## 2024-07-12 LAB — HBA1C MFR BLD: 8.1 %

## 2024-07-12 PROCEDURE — 83036 HEMOGLOBIN GLYCOSYLATED A1C: CPT | Performed by: PATHOLOGY

## 2024-07-12 PROCEDURE — 99215 OFFICE O/P EST HI 40 MIN: CPT | Performed by: PHYSICIAN ASSISTANT

## 2024-07-12 ASSESSMENT — PAIN SCALES - GENERAL: PAINLEVEL: NO PAIN (0)

## 2024-07-12 NOTE — NURSING NOTE
"Chief Complaint   Patient presents with    Diabetes     Vital signs:      BP: 113/70 Pulse: 76     SpO2: 98 %       Weight: 83 kg (183 lb)  Estimated body mass index is 34.6 kg/m  as calculated from the following:    Height as of 6/25/24: 1.549 m (5' 0.98\").    Weight as of this encounter: 83 kg (183 lb).        "

## 2024-07-12 NOTE — PATIENT INSTRUCTIONS
Lower basaglar to 40 units when you increase mounjaro to 5 mg.      Let me know how you are doing in a few weeks and we will plan to increase your dose to 7.5 mg.     You may have to relax your carb ratio to 1/3g if you are going low.     Schedule follow up with Leigh Ann in 1 month.     Keep up the excellent work!    Try wrist splints.     Please let me know if you are having low blood sugars less than 70 or over 250 mg/dL.      If you have concerns, please send me a [employer] message M-Th, call the clinic at [phone], or call [phone] after hours/weekends and ask to speak with the endocrinologist on call.                                           Heart Healthy Diet and Lifestyle    - Eat your veggies and fruits -- and switch to whole grains. An abundance and variety of plant foods should make up the majority of your meals. Strive for seven to 10 servings a day of veggies and fruits. Add in beans and lentils.  Switch to whole-grain bread and cereal, and begin to eat more whole-grain rice and pasta products.    - Go nuts. Keep almonds, cashews, pistachios and walnuts on hand for a quick snack. Choose natural peanut butter or almond butter, rather than the kind with hydrogenated fat added. Try hummus as a dip or spread for bread.    - Pass on the butter. Try olive or canola oil as a healthy replacement for butter or margarine. Use it in cooking. Dip bread in flavored olive oil or lightly spread it on whole-grain bread for a tasty alternative to butter.     - Spice it up. Herbs and spices make food tasty and are also rich in health-promoting substances. Season your meals with herbs and spices rather than salt.    - Go fish. Eat fish twice a week. Fresh or water-packed tuna, salmon, trout, mackerel and herring are healthy choices. Grilled fish tastes good and requires little cleanup. Avoid fried fish, unless it's sauteed in a small amount of canola oil.    - Rein in the red meat. Substitute fish and poultry for red  meat. When eaten, make sure it's lean and keep portions small (about the size of a deck of cards). Try to limit sausage, webb and other high-fat or processed meats.    - Avoid being sedentary.  Decrease the amount of time spent in daily sedentary behavior.  Prolonged sitting should be interrupted every 30 min for blood glucose benefits.     - Be active.  30 minutes of moderate-to-vigorous intensity aerobic exercise at least 5 days a week or a total of 150 minutes spread over 3 days per week.  2-3 sessions/week of resistance exercise on nonconsecutive days. Flexibility training and balance training 2-3 times/week for older adults with diabetes.     Make an appointment with a dietitian for a personalized meal plan/nutrition review.      Women should try to eat at least 21 to 25 grams of fiber a day, while men should aim for 30 to 38 grams a day.    Here's a look at how much dietary fiber is found in some common foods. When buying packaged foods, check the Nutrition Facts label for fiber content. It can vary among brands.    Fruits     Serving size  Total fiber (grams)*  Raspberries    1 cup   8.0  Pear     1 medium  5.5  Apple, with skin   1 medium  4.5  Banana    1 medium  3.0  Orange    1 medium  3.0  Strawberries    1 cup   3.0    Vegetables    Serving size  Total fiber (grams)*  Green peas, boiled   1 cup   9.0  Broccoli, boiled   1 cup chopped 5.0  Turnip greens, boiled   1 cup   5.0  [place] sprouts, boiled  1 cup   4.0  Potato, with skin, baked  1 medium  4.0  Sweet corn, boiled   1 cup   3.5  Cauliflower, raw   1 cup chopped 2.0  Carrot, raw    1 medium  1.5    Grains     Serving size  Total fiber (grams)*  Spaghetti, whole-wheat, cooked 1 cup   6.0  Barley, pearled, cooked  1 cup   6.0  Bran flakes    3/4 cup   5.5  Quinoa, cooked   1 cup   5.0  Oat bran muffin   1 medium  5.0  Oatmeal, instant, cooked  1 cup   5.0  Popcorn, air-popped   3 cups   3.5  Brown rice, cooked   1 cup   3.5  Bread, whole-wheat   1  slice   2.0  Bread, rye    1 slice   2.0    Legumes, nuts and seeds  Serving size  Total fiber (grams)*  Split peas, boiled   1 cup   16.0  Lentils, boiled    1 cup   15.5  Black beans, boiled   1 cup   15.0  Baked beans, canned   1 cup   10.0  Tereso seeds    1 ounce  10.0  Almonds    1 ounce (23 nuts) 3.5  Pistachios    1 ounce (49 nuts) 3.0  Sunflower kernels   1 ounce  3.0  *Rounded to nearest 0.5 gram.    Source: USDA National Nutrient Database for Standard

## 2024-07-12 NOTE — LETTER
"7/12/2024       RE: Nayeli Caal  [address]  [facility] [address]     Dear Colleague,    Thank you for referring your patient, Nayeli Caal, to the [facility] ENDOCRINOLOG[facility] [place] [facility]. Please see a copy of my visit note below.    Patient is showing 4/5 MNCM met. A1c not in range       HPI:   Nayeli is a pleasant 45 yo woman here with a  for follow up of type 2 diabetes since the early 2000's.  She also sees Dr. Bragg and Leigh Ann Escoto. She started on insulin in 2003 after failing Metformin treatment.  She has struggled with high blood sugars for many years.   She has been working hard on improving her diabetes control.  She is currently on Mounjaro 2.5 mg weekly for the past 3 weeks.  This has been going well.  Blood sugars are better.   Lowered lantus from 60 units to 50 units due to hypoglycemia.  She has been following closely with Leigh Ann Escoto.      Discouraged because her weight is not going down, it is going up.  She is up over 180#.  Does not feel she eats very much. Wants to start exercising again.     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  Very busy.  Has not taken the time to be active.     Typical day:   Two schedules. Works from home M/F.  Other days she goes to office.  Gets up at different times.  She has been taking meds for GERD.  Waits an hour before she eats.  Gets up at 4 on days she goes to the office.   Breakfast- Egg sandwich or scrambled eggs with veggies.   Lunch- leftovers, sometimes sandwich or salad  Dinner- varies, mostly more vegetables, trying to cut back on carbs.  Loves starchy foods- cutting back on that.  Used to eat a \"pile\" of food. Now cutting back.   Snacks- not very often.     Tingling in both hands.  Typing at work. Worse when her blood sugars are low.     Was   Reviewed recent note from DM education:   History of her GLP-1 use.   April " 2023:  Didn't tolerate either Metformin (intractable diarrhea), or Jardiance (frequent yeast infections) so Ozempic 0.25 mg started.   January 2024:  She had titrated up to 2mg Ozempic and was tolerating well, however Ms. Medina felt she could get better control with Mounjaro, so Ozempic was discontinued and Mounjaro 2.5mg was started.   March 2024:  Mounjaro dose increased to 5 mg.  Still tolerating well, but some complaint of GERD symptoms.    April 2024:  Saw Dr. Bragg.  Still tolerating the 5 mg dose   May 2024:  Ready to advance to Mounjaro 7.5 however the drug was not available, so Ms. Medina changed her script to Ozempic 2 mg.                      Nayeli took two doses of this and had severe nausea and vomiting along with the GERD symptoms she had been experiencing.  She messaged me that she was                      Going to stop the medication as she was on her way out of town.  Since then, Mounjaro has become available.  Ms. Medina ordered Mounjaro 2.5 mg.       Nayeli states today that she just resumed the 2.5 mg dose of Mounjaro about 4 days ago and so far is tolerating it well.  Discussed that she should stay on this dose for a month and then, if feeling alright, she could graduate to the 5 mg dose.  I ordered this today, along with more Novolog cartridges for her In-Pen per her request.       IN PEN SETTINGS:  Start time ICR  1 unit/gm Insulin Sensitivity Factor Target BG    06:00 AM 2 15 130   11:00 AM 2 15 110   05:00 PM 2 15 110   09:30 PM 2 15 130             Active Insulin Time:  3  Maximum Bolus:  30 units     Left her Lantus insulin dose at 60 units.         New high level of protein in her urine.  Unable to tolerate SGLT-2 inhibitors due to yeast infections.  Taking losartan 100 mg daily.      Her current regimen is:   Mounjaro 2.5 mg weekly  Basaglar 50 units daily.  Novolog (dosing on In-pen):  Carb ratio: 1/2g   Sensitivity: 15    Previous treatments:   empagliflozin 25 mg daily- stopped 2023  due to recurrent yeast infections.  Metformin- severe diarrhea.   Ozempic 2 mg- severe nausea after switching from mounjaro 5 mg    Her overall average glucose is 199 mg/dL (CV 31%), over the past 2 weeks.  Her recent glucose is as follows:                     She is no longer on Metformin as even the low dose caused intense diarrhea to the point where she could not go out of her home.     She has no other concerns today.       Past Medical History:   Diagnosis Date    Combined visual and hearing impairment     Deaf     Diabetic retinopathy of both eyes (H) 8/19/2011    Hepatic steatosis 08/19/2011    History of tobacco use     Hyperlipidemia     Hypertension     Hypertriglyceridemia     Migraines     Obesity 8/19/2011     Problem list name updated by automated process. 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       Past Surgical History:   Procedure Laterality Date    DAVINCI HYSTERECTOMY TOTAL, SALPINGECTOMY BILATERAL Bilateral 2/7/2024    Procedure: HYSTERECTOMY, TOTAL, ROBOT-ASSISTED, WITH BILATERAL SALPINGECTOMY, CYSTOSCOPY;  Surgeon: Vonnie Cowan MD;  Location: [facility] OR    ESOPHAGOSCOPY, GASTROSCOPY, DUODENOSCOPY (EGD), COMBINED N/A 6/13/2024    Procedure: ESOPHAGOGASTRODUODENOSCOPY, WITH BIOPSY;  Surgeon: Nora Brice MD;  Location: [facility] OR    LAPAROSCOPIC CHOLECYSTECTOMY N/A 3/10/2018    Procedure: LAPAROSCOPIC CHOLECYSTECTOMY;  Laparoscopic Cholecystectomy ;  Surgeon: Kuldeep Sigala MD;  Location: [facility] OR    RELEASE TRIGGER FINGER Right 5/2/2019    Procedure: Right Thumb Trigger Release;  Surgeon: Greg Streeter MD;  Location: [facility] OR    RELEASE TRIGGER FINGER Left 5/30/2019    Procedure: Left Ring Trigger Finger Release.  Ganglion cyst excision.;  Surgeon: Greg Streeter MD;  Location: [facility]    RELEASE  TRIGGER FINGER Right 6/13/2023    Procedure: RELEASE, RIGHT TRIGGER FINGER, INDEX AND MIDDLE;  Surgeon: Miracle Carlin MD;  Location: [facility] OR    RELEASE TRIGGER FINGER Left 8/1/2023    Procedure: RELEASE, LEFT TRIGGER FINGER, MIDDLE;  Surgeon: Miracle Carlin MD;  Location: [facility] OR       Family History   Problem Relation Age of Onset    Unknown/Adopted Other        Social History     Social History    Marital status: Single     Spouse name: N/A    Number of children: N/A    Years of education: N/A     Social History Main Topics    Smoking status: Former Smoker     Types: Cigarettes     Quit date: 12/1/2010    Smokeless tobacco: Never Used      Comment: stopped 2 yrs ago    Alcohol use No    Drug use: No    Sexual activity: Not Currently     Partners: Male     Other Topics Concern     Service No    Blood Transfusions No    Caffeine Concern No    Occupational Exposure No    Hobby Hazards No    Sleep Concern No    Stress Concern No    Weight Concern Yes    Special Diet No    Back Care No    Exercise Yes     4-5 x a week    Bike Helmet No    Seat Belt Yes    Self-Exams Yes     Social History Narrative   Social Hx: Lives in a [facility].  Boyfriend is in [place]. She is adopted.  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     Current Outpatient Medications   Medication Sig Dispense Refill    acetaminophen (TYLENOL) 500 MG tablet Take 2 tablets (1,000 mg) by mouth every 8 hours as needed for mild pain 100 tablet 3    Alcohol Swabs (ALCOHOL PREP PAD) 70 % PADS 1 each 3 times daily 100 each 3    amLODIPine (NORVASC) 5 MG tablet Take 1 tablet (5 mg) by mouth at bedtime 90 tablet 3    aspirin (ASA) 81 MG chewable tablet Take 1 tablet (81 mg) by mouth daily CHEW AND SWALLOW 90 tablet 1    atorvastatin (LIPITOR) 40 MG tablet Take 1 tablet (40 mg) by mouth daily 90 tablet 1    blood glucose (ACCU-CHEK LAYA PLUS) test strip Use with  Accucheck Expert meter.  Test blood sugar 6 times daily. 600 each 3    blood  glucose monitoring (ACCU-CHEK FASTCLIX) lancets Use to test blood sugar 6 times daily or as directed 510 each 3    calcium carbonate-vitamin D (OSCAL) 500-5 MG-MCG tablet Take 1 tablet by mouth 2 times daily 180 tablet 1    Continuous Blood Gluc Sensor (DEXCOM G7 SENSOR) MISC 1 each every 10 days 9 each 3    Continuous Glucose Sensor (FREESTYLE ZORAIDA 3 SENSOR) MISC 1 each every 14 days Please provide 3 month supply. 6 each 3    empagliflozin (JARDIANCE) 25 MG TABS tablet Take 1 tablet (25 mg) by mouth daily for 180 days 90 tablet 1    ergocalciferol (ERGOCALCIFEROL) 1.25 MG (66831 UT) capsule Take 1 capsule (50,000 Units) by mouth once a week For additional refills, please schedule a follow-up appointment at [phone] 12 capsule 3    famotidine (PEPCID) 20 MG tablet Take 1 tablet (20 mg) by mouth 2 times daily (Patient not taking: Reported on 5/31/2024) 30 tablet 0    fenofibrate (TRIGLIDE/LOFIBRA) 160 MG tablet Take 1 tablet (160 mg) by mouth daily 90 tablet 3    fish oil-omega-3 fatty acids 1000 MG capsule TAKE TWO CAPSULES (2 GM) BY MOUTH ONCE DAILY 180 capsule 3    hydrocortisone (CORTAID) 1 % external cream Apply topically 2 times daily (Patient not taking: Reported on 5/31/2024) 120 g 1    ibuprofen (ADVIL/MOTRIN) 600 MG tablet Take 1 tablet (600 mg) by mouth every 6 hours as needed for moderate pain 120 tablet 1    Injection Device for insulin (INPEN 100-PINK-NOVOLOG-FIASP) DAVID 1 each continuous 1 each 0    insulin aspart (NOVOLOG FLEXPEN) 100 UNIT/ML pen 1 unit per 5 grams CHO and correction scale of 1/25/125.  Approximate daily use is 100 units. 30 mL 2    insulin aspart (NOVOLOG PENFILL) 100 UNIT/ML cartridge Take with each meal and snack: 1 per 3 grams CHO and correction of 1 unit per 20 mg/dL over a target of 120. Average daily dose is 85 units. 75 mL 3    insulin glargine (BASAGLAR KWIKPEN) 100 UNIT/ML pen Inject 60 Units Subcutaneous daily 3 month supply. (Patient taking differently: Inject 55  Units Subcutaneous daily 3 month supply.) 54 mL 3    insulin pen needle (31G X 5 MM) 31G X 5 MM miscellaneous Use 5 to 6 pen needles daily or as directed.  3 month supply. 500 each 3    losartan (COZAAR) 100 MG tablet Take 1 tablet (100 mg) by mouth daily 90 tablet 3    naproxen (NAPROSYN) 375 MG tablet Take 1 tablet (375 mg) by mouth 2 times daily (with meals) (Patient not taking: Reported on 5/31/2024) 60 tablet 3    omeprazole (PRILOSEC) 40 MG DR capsule Take 1 capsule (40 mg) by mouth 2 times daily 8-12 weeks 90 capsule 0    ondansetron (ZOFRAN ODT) 4 MG ODT tab Take 1 tablet (4 mg) by mouth every 6 hours as needed for nausea or vomiting 20 tablet 0    Ostomy Supplies (ADHESIVE REMOVER WIPES) MISC 1 each every 14 days (Patient not taking: Reported on 5/31/2024) 50 each 3    Ostomy Supplies (SKIN TAC ADHESIVE BARRIER WIPE) MISC 1 each every 14 days (Patient not taking: Reported on 5/31/2024) 6 each 3    Semaglutide, 2 MG/DOSE, (OZEMPIC) 8 MG/3ML pen Inject 2 mg Subcutaneous every 7 days (Patient not taking: Reported on 5/30/2024) 9 mL 3    senna-docusate (SENOKOT-S/PERICOLACE) 8.6-50 MG tablet Take 1-2 tablets by mouth 2 times daily (Patient not taking: Reported on 5/31/2024) 30 tablet 0    sucralfate (CARAFATE) 1 GM tablet Take 1 tablet (1 g) by mouth 4 times daily (Patient not taking: Reported on 5/30/2024) 30 tablet 0    tirzepatide (MOUNJARO) 2.5 MG/0.5ML pen Inject 2.5 mg Subcutaneous every 7 days 6 mL 3    tirzepatide (MOUNJARO) 5 MG/0.5ML pen Inject 5 mg Subcutaneous every 7 days 2 mL 1    tirzepatide (MOUNJARO) 7.5 MG/0.5ML pen Inject 7.5 mg Subcutaneous once a week (Patient not taking: Reported on 5/31/2024) 2 mL 11    triamcinolone (KENALOG) 0.1 % external ointment Apply topically 2 times daily (Patient not taking: Reported on 5/31/2024) 30 g 1     Current Facility-Administered Medications   Medication Dose Route Frequency Provider Last Rate Last Admin    hydrocortisone (CORTAID) 1 % cream   Topical  Once Mariya Mohamud APRN CNP        hydrocortisone (CORTAID) 1 % cream   Topical TID OviMariya benavidez APRN CNP        hylan (SYNVISC ONE) injection 48 mg  48 mg   Rosas Rodriguez DO   48 mg at 05/09/23 1813    lidocaine (PF) (XYLOCAINE) 1 % injection 4 mL  4 mL   Sebas Bradley MD   4 mL at 10/05/23 0923    triamcinolone (KENALOG-40) injection 40 mg  40 mg   Sebas Bradley MD   40 mg at 10/05/23 0923        No Known Allergies    Physical Exam  LMP 02/01/2024 (Approximate)   GENERAL:  Alert and oriented X3, NAD, well dressed, answering questions appropriately, appears stated age.  HEENT: OP clear, no lymphadenopathy, no thyromegaly, non-tender, no exophthalmus, no proptosis, EOMI, no lid lag, no retraction  EXTREMITIES: no edema, +pulses, no rashes, no lesions  NEUROLOGY: + monofilament, +vibratory sensation, + DTR upper and lower extremity  MSK: grossly intact      RESULTS  Lab Results   Component Value Date    A1C 8.7 (H) 04/15/2024    A1C 7.8 (H) 01/04/2024    A1C 7.7 (H) 07/25/2023    A1C 8.7 (H) 06/06/2023    A1C 9.9 (H) 04/04/2023    A1C 9.8 (H) 08/17/2022    A1C 11.6 (H) 04/05/2021    A1C 12.4 (H) 10/29/2020    A1C 12.9 (H) 07/08/2020    A1C 8.2 (H) 05/02/2019    A1C 10.3 (H) 10/15/2018    HEMOGLOBINA1 10.0 (A) 01/13/2020    HEMOGLOBINA1 9.9 (A) 10/07/2019    HEMOGLOBINA1 8.1 (A) 04/22/2019    HEMOGLOBINA1 10.4 (A) 01/14/2019    HEMOGLOBINA1 8.7 (A) 03/12/2018       TSH   Date Value Ref Range Status   04/04/2023 1.41 0.30 - 4.20 uIU/mL Final   07/08/2020 1.34 0.40 - 4.00 mU/L Final   05/17/2018 1.84 0.40 - 4.00 mU/L Final   11/27/2017 1.92 0.40 - 4.00 mU/L Final   05/11/2016 1.85 0.40 - 4.00 mU/L Final   02/11/2016 1.14 0.40 - 4.00 mU/L Final       ALT   Date Value Ref Range Status   05/31/2024 51 (H) 0 - 50 U/L Final     Comment:     Reference intervals for this test were updated on 6/12/2023 to more accurately reflect our healthy population. There may be differences in the flagging of  prior results with similar values performed with this method. Interpretation of those prior results can be made in the context of the updated reference intervals.     05/08/2024 45 0 - 50 U/L Final     Comment:     Reference intervals for this test were updated on 6/12/2023 to more accurately reflect our healthy population. There may be differences in the flagging of prior results with similar values performed with this method. Interpretation of those prior results can be made in the context of the updated reference intervals.     07/08/2020 29 0 - 50 U/L Final   05/02/2019 43 0 - 50 U/L Final   ]    Recent Labs   Lab Test 01/04/24  1657 04/04/23  1445   CHOL 183 221*   HDL 37* 38*   LDL 76 123*   TRIG 350* 298*       Lab Results   Component Value Date     05/31/2024     07/08/2020      Lab Results   Component Value Date    POTASSIUM 4.2 05/31/2024    POTASSIUM 4.7 08/17/2022    POTASSIUM 4.0 07/08/2020     Lab Results   Component Value Date    CHLORIDE 103 05/31/2024    CHLORIDE 104 08/17/2022    CHLORIDE 101 07/08/2020     Lab Results   Component Value Date    VERO 8.9 05/31/2024    VERO 8.6 07/08/2020     Lab Results   Component Value Date    CO2 23 05/31/2024    CO2 29 08/17/2022    CO2 30 07/08/2020     Lab Results   Component Value Date    BUN 13.3 05/31/2024    BUN 13 08/17/2022    BUN 13 07/08/2020     Lab Results   Component Value Date    CR 0.60 05/31/2024    CR 0.74 07/08/2020       GFR Estimate   Date Value Ref Range Status   05/31/2024 >90 >60 mL/min/1.73m2 Final   05/08/2024 >90 >60 mL/min/1.73m2 Final   03/08/2024 >90 >60 mL/min/1.73m2 Final   07/08/2020 >90 >60 mL/min/[1.73_m2] Final     Comment:     Non  GFR Calc  Starting 12/18/2018, serum creatinine based estimated GFR (eGFR) will be   calculated using the Chronic Kidney Disease Epidemiology Collaboration   (CKD-EPI) equation.     05/02/2019 >90 >60 mL/min/[1.73_m2] Final     Comment:     Non  GFR  "Calc  Starting 12/18/2018, serum creatinine based estimated GFR (eGFR) will be   calculated using the Chronic Kidney Disease Epidemiology Collaboration   (CKD-EPI) equation.     05/17/2018 >90 >60 mL/min/1.7m2 Final     Comment:     Non  GFR Calc     GFR Estimate If Black   Date Value Ref Range Status   07/08/2020 >90 >60 mL/min/[1.73_m2] Final     Comment:      GFR Calc  Starting 12/18/2018, serum creatinine based estimated GFR (eGFR) will be   calculated using the Chronic Kidney Disease Epidemiology Collaboration   (CKD-EPI) equation.     05/02/2019 >90 >60 mL/min/[1.73_m2] Final     Comment:      GFR Calc  Starting 12/18/2018, serum creatinine based estimated GFR (eGFR) will be   calculated using the Chronic Kidney Disease Epidemiology Collaboration   (CKD-EPI) equation.     05/17/2018 >90 >60 mL/min/1.7m2 Final     Comment:      GFR Calc       Lab Results   Component Value Date    MICROL 1,199.0 01/04/2024    MICROL 115 03/07/2022    MICROL 31 07/20/2020     No results found for: \"MICROALBUMIN\"  Lab Results   Component Value Date    CPEPT 1.3 03/25/2005       Vitamin B12   Date Value Ref Range Status   06/06/2023 776 232 - 1,245 pg/mL Final   ]    Most recent eye exam date: : Not Found     FIB-4 Calculation: 0.51 at 5/31/2024  9:09 AM  Calculated from:  SGOT/AST: 32 U/L at 5/31/2024  9:09 AM  SGPT/ALT: 51 U/L at 5/31/2024  9:09 AM  Platelets: 375 10e3/uL at 5/31/2024  9:09 AM  Age: 43 years    Assessment/Plan:     1.  Type 2 diabetes-  Nayeli's glucose control has improved significantly.  Recent glucose is much lower than A1c would suggest.  A1c is 8.1% today with average glucose of 130 mg/dL.  Will increase mounjaro and follow up monthly with Leigh Ann Escoto for insulin titration.  Discussed heart healthy eating ideas and encouraged patient to increase consumption of fruits, vegetables and whole grains (high fiber diet).  We made the following plan " today (instructions given to patient):      Lower basaglar to 40 units when you increase mounjaro to 5 mg.      Let me know how you are doing in a few weeks and we will plan to increase your dose to 7.5 mg.     You may have to relax your carb ratio to 1/3g if you are going low.     Schedule follow up with Leigh Ann in 1 month.     Keep up the excellent work!    Try wrist splints.     Please let me know if you are having low blood sugars less than 70 or over 250 mg/dL.      If you have concerns, please send me a [employer] message M-Th, call the clinic at [phone], or call [phone] after hours/weekends and ask to speak with the endocrinologist on call.                                    2.  Risk factors-     Retinopathy:  No.  Had eye exam within last year. More sensitive to light- Usher's syndrome.    Nephropathy:  BP well controlled. +new albuminuria despite improved glucose control.  Unable to tolerate SGLT-2 inhibitor. Referral placed  to nephrology.  Creatinine stable.   Neuropathy: No.    Feet: OK, no ulcers.   Taking ASA: yes  Lipids:  LDL now in target on statin and fibrate.   LIver: Normal Fib 4 score.  Low risk for fibrosis.     3.  F/U in 3 months with Dr. Bragg, in 1 mo with diabetes education, in 6 mos with me.  We will follow with her closely, however she does strongly prefer in-person appointments.      42 minutes spent on the date of the encounter doing chart review, review of test results, patient visit and documentation, counseling/coordination of care, and discussion of follow up plan for worsening hyper and hypoglycemia.  The patient understood and is satisfied with today's visit.        Anne Marie Medina PA-C, MPAS   [facility]  Department of Medicine

## 2024-07-19 ENCOUNTER — OFFICE VISIT (OUTPATIENT)
Dept: GASTROENTEROLOGY | Facility: CLINIC | Age: 44
End: 2024-07-19
Attending: PHYSICIAN ASSISTANT
Payer: COMMERCIAL

## 2024-07-19 ENCOUNTER — TELEPHONE (OUTPATIENT)
Dept: GASTROENTEROLOGY | Facility: CLINIC | Age: 44
End: 2024-07-19

## 2024-07-19 VITALS
HEART RATE: 77 BPM | DIASTOLIC BLOOD PRESSURE: 65 MMHG | HEIGHT: 61 IN | OXYGEN SATURATION: 99 % | SYSTOLIC BLOOD PRESSURE: 96 MMHG | WEIGHT: 181.9 LBS | BODY MASS INDEX: 34.34 KG/M2

## 2024-07-19 DIAGNOSIS — R10.13 EPIGASTRIC PAIN: ICD-10-CM

## 2024-07-19 DIAGNOSIS — R10.13 DYSPEPSIA: Primary | ICD-10-CM

## 2024-07-19 DIAGNOSIS — K31.89 EROSIVE GASTROPATHY: ICD-10-CM

## 2024-07-19 PROCEDURE — G2211 COMPLEX E/M VISIT ADD ON: HCPCS | Performed by: PHYSICIAN ASSISTANT

## 2024-07-19 PROCEDURE — 99215 OFFICE O/P EST HI 40 MIN: CPT | Performed by: PHYSICIAN ASSISTANT

## 2024-07-19 RX ORDER — OMEPRAZOLE 40 MG/1
40 CAPSULE, DELAYED RELEASE ORAL 2 TIMES DAILY
Qty: 90 CAPSULE | Refills: 0 | Status: SHIPPED | OUTPATIENT
Start: 2024-07-19

## 2024-07-19 ASSESSMENT — PAIN SCALES - GENERAL: PAINLEVEL: NO PAIN (0)

## 2024-07-19 NOTE — NURSING NOTE
"Chief Complaint   Patient presents with    Follow Up       Vitals:    07/19/24 0754   BP: 96/65   Pulse: 77   SpO2: 99%   Weight: 82.5 kg (181 lb 14.4 oz)   Height: 1.549 m (5' 1\")       Body mass index is 34.37 kg/m .    [employer]    "

## 2024-07-19 NOTE — PATIENT INSTRUCTIONS
Take the new medicine that was given - omeprazole 40 mg twice a day for a total of 12 weeks   Then you decrease the dose to 40 mg once daily in the morning   I want you to take this until I see you again in 3 months   Stop taking anti-inflammatory medications like ibuprofen / motrin / advil   Tylenol is OK - can take 1000 mg twice a day to help with pain   Start taking Citrucel (methylcellulose) fiber caplets.  Start with 2 caplets in the evening and slowly increase as tolerated to effective dose.  Max dose is 2 caplets up to 6 times per day (12 caplets per day).   -may experience bloating or discomfort, important to go slow and increase as tolerated, can take a few months to see full effect of fiber therapy.     3 month follow-up

## 2024-07-19 NOTE — TELEPHONE ENCOUNTER
Patient confirmed scheduled appointment:  Date: 10/18/24  Time: 8 am  Visit type: RET GI  Provider: Kirsten Conn  Location: [facility] - 4th floor  Testing/imaging: n/a  Additional notes: n/a

## 2024-07-19 NOTE — LETTER
7/19/2024      Nayeli Caal  [address]  [facility] [address]      Dear Colleague,    Thank you for referring your patient, Nayeli Caal, to the [facility] [place]. Please see a copy of my visit note below.      GI CLINIC VISIT    HPI  Nayeli Caal is a 43 year old year old female with PMHx of Type 2 DM following with the [facility] GI group for abd pain.     Since her initial visit, had an upper endoscopy performed due to progressive epigastric pain despite being off the GLP 1 agonist.  It showed a small healing gastric pylorus erosion.  The rest of the exam was unremarkable.  Biopsies below    Final Diagnosis   A. DUODENUM, BIOPSY:  - Small intestinal mucosa with preserved villous architecture and focal foveolar metaplasia suggestive of peptic injury  - Negative for celiac sprue     B. STOMACH, BIOPSY:  - Gastric mucosa with reactive gastropathy, focally erosive with active inflammation  - Negative for intestinal metaplasia and dysplasia  - No H. pylori-like organisms identified on routine stain       Today 7/19/24  Today she presents with in person   Reports that she is overall doing really well.  Few weeks after the upper endoscopy, she started taking the omeprazole which she continues to this day.  She is taking 40 mg twice a day and the plan is for 12 weeks then taper to 40 mg once daily.  At that time same time, she had also started the omeprazole that I had sent back in May as well.  She inquired if these are the same medications?  She reports no symptoms to include abdominal pain, nausea, vomiting, heartburn.Will drink ETOH once in a while - socially . Stopped NSAIDs now.  She is very pleased about this  Many questions about how to take PPI, dosing, and regiment.   She is stooling daily - much more regular now. Having 1-2 BSC type 1-2 daily with incomplete evacuate. unknown blood or black in stools due to low vision. Not  hard like before however so she wonders if she needs fiber. No skiped days. No longer RLQ abd pain. Did not do well with capsules or powder - did not like orange flavor.     Wt Readings from Last 10 Encounters:   07/19/24 82.5 kg (181 lb 14.4 oz)   07/12/24 83 kg (183 lb)   06/25/24 82.5 kg (181 lb 12.8 oz)   06/13/24 81.6 kg (180 lb)   05/31/24 81.5 kg (179 lb 9.6 oz)   05/30/24 82.7 kg (182 lb 4.8 oz)   05/09/24 83 kg (183 lb)   04/15/24 82.6 kg (182 lb)   03/26/24 82.2 kg (181 lb 3.2 oz)   03/25/24 82.6 kg (182 lb)     Family Hx  Adopted - unsure of FHx    Social Hx   Prior use of NSAIDs - none now     1-2 drinks of ETOH - socially   Former tobacco products. 2009 quit. On and off smoker, so hard to quantify amount   No recreational drug use     PROBLEM LIST  Patient Active Problem List    Diagnosis Date Noted    S/P hysterectomy 02/07/2024     Priority: Medium    Adenomyosis of uterus 11/01/2023     Priority: Medium    Trigger middle finger of right hand 05/31/2023     Priority: Medium     Added automatically from request for surgery [id number]      Trigger middle finger of left hand 05/30/2023     Priority: Medium    Chronic kidney disease, stage 1 08/15/2022     Priority: Medium    Uncontrolled type 2 diabetes mellitus with hyperglycemia, with long-term current use of insulin (H) 05/15/2017     Priority: Medium    Chondromalacia of patella, right, steroid injection 6/12/2015 11/24/2015     Priority: Medium    Macular degeneration, age related, nonexudative 11/22/2011     Priority: Medium    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 Provider to review      Hypersomnia, organic 08/19/2011     Priority: Medium    Other chronic nonalcoholic liver disease 08/19/2011     Priority:  Medium    Diabetic retinopathy of both eyes (H) 08/19/2011     Priority: Medium    Obesity 08/19/2011     Priority: Medium     Problem list name updated by automated process. Provider to review      Usher Syndrome: congenital deafness, retinitis pigmentosa 08/19/2011     Priority: Medium       PERTINENT PAST MEDICAL HISTORY:  Past Medical History:   Diagnosis Date    Combined visual and hearing impairment     Deaf     Diabetic retinopathy of both eyes (H) 8/19/2011    Hepatic steatosis 08/19/2011    History of tobacco use     Hyperlipidemia     Hypertension     Hypertriglyceridemia     Migraines     Obesity 8/19/2011     Problem list name updated by automated process. 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       PREVIOUS SURGERIES:  Past Surgical History:   Procedure Laterality Date    DAVINCI HYSTERECTOMY TOTAL, SALPINGECTOMY BILATERAL Bilateral 2/7/2024    Procedure: HYSTERECTOMY, TOTAL, ROBOT-ASSISTED, WITH BILATERAL SALPINGECTOMY, CYSTOSCOPY;  Surgeon: Vonnie Cowan MD;  Location: [facility] OR    ESOPHAGOSCOPY, GASTROSCOPY, DUODENOSCOPY (EGD), COMBINED N/A 6/13/2024    Procedure: ESOPHAGOGASTRODUODENOSCOPY, WITH BIOPSY;  Surgeon: Nora Brice MD;  Location: [facility] OR    LAPAROSCOPIC CHOLECYSTECTOMY N/A 3/10/2018    Procedure: LAPAROSCOPIC CHOLECYSTECTOMY;  Laparoscopic Cholecystectomy ;  Surgeon: Kuldeep Sigala MD;  Location: [facility] OR    RELEASE TRIGGER FINGER Right 5/2/2019    Procedure: Right Thumb Trigger Release;  Surgeon: Greg Streeter MD;  Location: [facility]    RELEASE TRIGGER FINGER Left 5/30/2019    Procedure: Left Ring Trigger Finger Release.  Ganglion cyst excision.;  Surgeon: Greg Streeter MD;  Location: [facility]    RELEASE TRIGGER FINGER Right 6/13/2023    Procedure: RELEASE, RIGHT TRIGGER FINGER, INDEX AND  MIDDLE;  Surgeon: Miracle Carlin MD;  Location: [facility] OR    RELEASE TRIGGER FINGER Left 8/1/2023    Procedure: RELEASE, LEFT TRIGGER FINGER, MIDDLE;  Surgeon: Miracle Carlin MD;  Location: [facility] OR       ALLERGIES:   No Known Allergies    PERTINENT MEDICATIONS:    Current Outpatient Medications:     acetaminophen (TYLENOL) 500 MG tablet, Take 2 tablets (1,000 mg) by mouth every 8 hours as needed for mild pain, Disp: 100 tablet, Rfl: 3    Alcohol Swabs (ALCOHOL PREP PAD) 70 % PADS, 1 each 3 times daily, Disp: 100 each, Rfl: 3    amLODIPine (NORVASC) 5 MG tablet, Take 1 tablet (5 mg) by mouth at bedtime, Disp: 90 tablet, Rfl: 3    aspirin (ASA) 81 MG chewable tablet, Take 1 tablet (81 mg) by mouth daily CHEW AND SWALLOW, Disp: 90 tablet, Rfl: 1    atorvastatin (LIPITOR) 40 MG tablet, Take 1 tablet (40 mg) by mouth daily, Disp: 90 tablet, Rfl: 1    blood glucose (ACCU-CHEK LAYA PLUS) test strip, Use with  Accucheck Expert meter.  Test blood sugar 6 times daily., Disp: 600 each, Rfl: 3    blood glucose monitoring (ACCU-CHEK FASTCLIX) lancets, Use to test blood sugar 6 times daily or as directed, Disp: 510 each, Rfl: 3    calcium carbonate-vitamin D (OSCAL) 500-5 MG-MCG tablet, Take 1 tablet by mouth 2 times daily, Disp: 180 tablet, Rfl: 1    Continuous Glucose Sensor (FREESTYLE ZORAIDA 3 SENSOR) MISC, 1 each every 14 days Please provide 3 month supply., Disp: 6 each, Rfl: 3    empagliflozin (JARDIANCE) 25 MG TABS tablet, Take 1 tablet (25 mg) by mouth daily for 180 days, Disp: 90 tablet, Rfl: 1    ergocalciferol (ERGOCALCIFEROL) 1.25 MG (81759 UT) capsule, Take 1 capsule (50,000 Units) by mouth once a week For additional refills, please schedule a follow-up appointment at [phone], Disp: 12 capsule, Rfl: 3    fenofibrate (TRIGLIDE/LOFIBRA) 160 MG tablet, Take 1 tablet (160 mg) by mouth daily, Disp: 90 tablet, Rfl: 3    fish oil-omega-3 fatty acids 1000 MG capsule, TAKE TWO CAPSULES (2 GM) BY MOUTH ONCE DAILY,  Disp: 180 capsule, Rfl: 3    ibuprofen (ADVIL/MOTRIN) 600 MG tablet, Take 1 tablet (600 mg) by mouth every 6 hours as needed for moderate pain, Disp: 120 tablet, Rfl: 1    Injection Device for insulin (INPEN 100-PINK-NOVOLOG-FIASP) DAVID, 1 each continuous, Disp: 1 each, Rfl: 0    insulin aspart (NOVOLOG FLEXPEN) 100 UNIT/ML pen, 1 unit per 5 grams CHO and correction scale of 1/25/125.  Approximate daily use is 100 units., Disp: 30 mL, Rfl: 2    insulin aspart (NOVOLOG PENFILL) 100 UNIT/ML cartridge, Take with each meal and snack: 1 per 3 grams CHO and correction of 1 unit per 20 mg/dL over a target of 120. Average daily dose is 85 units., Disp: 75 mL, Rfl: 3    insulin glargine (BASAGLAR KWIKPEN) 100 UNIT/ML pen, Inject 60 Units Subcutaneous daily 3 month supply. (Patient taking differently: Inject 55 Units subcutaneously daily 3 month supply.), Disp: 54 mL, Rfl: 3    insulin pen needle (31G X 5 MM) 31G X 5 MM miscellaneous, Use 5 to 6 pen needles daily or as directed.  3 month supply., Disp: 500 each, Rfl: 3    losartan (COZAAR) 100 MG tablet, Take 1 tablet (100 mg) by mouth daily, Disp: 90 tablet, Rfl: 3    omeprazole (PRILOSEC) 40 MG DR capsule, Take 1 capsule (40 mg) by mouth 2 times daily 8-12 weeks, Disp: 90 capsule, Rfl: 0    ondansetron (ZOFRAN ODT) 4 MG ODT tab, Take 1 tablet (4 mg) by mouth every 6 hours as needed for nausea or vomiting, Disp: 20 tablet, Rfl: 0    tirzepatide (MOUNJARO) 2.5 MG/0.5ML pen, Inject 2.5 mg Subcutaneous every 7 days, Disp: 6 mL, Rfl: 3    tirzepatide (MOUNJARO) 5 MG/0.5ML pen, Inject 5 mg subcutaneously every 7 days, Disp: 2 mL, Rfl: 11    tirzepatide (MOUNJARO) 5 MG/0.5ML pen, Inject 5 mg Subcutaneous every 7 days, Disp: 2 mL, Rfl: 1    Current Facility-Administered Medications:     hydrocortisone (CORTAID) 1 % cream, , Topical, Once, Mariya Mohamud APRN CNP    hydrocortisone (CORTAID) 1 % cream, , Topical, TID, Mariya Mohamud APRN CNP    hylan (SYNVISC ONE) injection  48 mg, 48 mg, , , Rosas Rodriguez DO, 48 mg at 23    lidocaine (PF) (XYLOCAINE) 1 % injection 4 mL, 4 mL, , , Sebas Bradley MD, 4 mL at 10/05/23 09    triamcinolone (KENALOG-40) injection 40 mg, 40 mg, , , Sebas Bradley MD, 40 mg at 10/05/23 09    SOCIAL HISTORY:  Social History     Socioeconomic History    Marital status: Single     Spouse name: Not on file    Number of children: Not on file    Years of education: Not on file    Highest education level: Not on file   Occupational History    Not on file   Tobacco Use    Smoking status: Former     Current packs/day: 0.00     Types: Cigarettes     Quit date: 2010     Years since quittin.6     Passive exposure: Never    Smokeless tobacco: Never    Tobacco comments:     stopped 10 yrs ago   Substance and Sexual Activity    Alcohol use: Not Currently     Comment: socially    Drug use: Not Currently     Types: Marijuana     Comment: much younger  or earlier    Sexual activity: Not Currently     Partners: Male     Birth control/protection: I.U.D.   Other Topics Concern    Parent/sibling w/ CABG, MI or angioplasty before 65F 55M? Not Asked     Service No    Blood Transfusions No    Caffeine Concern No    Occupational Exposure No    Hobby Hazards No    Sleep Concern No    Stress Concern No    Weight Concern Yes    Special Diet No    Back Care No    Exercise Yes     Comment: 4-5 x a week    Bike Helmet No    Seat Belt Yes    Self-Exams Yes   Social History Narrative    Not on file     Social Determinants of Health     Financial Resource Strain: Low Risk  (2024)    Financial Resource Strain     Within the past 12 months, have you or your family members you live with been unable to get utilities (heat, electricity) when it was really needed?: No   Food Insecurity: Low Risk  (2024)    Food Insecurity     Within the past 12 months, did you worry that your food would run out before you got money to buy more?: No      "Within the past 12 months, did the food you bought just not last and you didn t have money to get more?: No   Transportation Needs: Low Risk  (1/4/2024)    Transportation Needs     Within the past 12 months, has lack of transportation kept you from medical appointments, getting your medicines, non-medical meetings or appointments, work, or from getting things that you need?: No   Recent Concern: Transportation Needs - High Risk (10/30/2023)    Transportation Needs     Within the past 12 months, has lack of transportation kept you from medical appointments, getting your medicines, non-medical meetings or appointments, work, or from getting things that you need?: Yes   Physical Activity: Not on file   Stress: Not on file   Social Connections: Not on file   Interpersonal Safety: Low Risk  (1/4/2024)    Interpersonal Safety     Do you feel physically and emotionally safe where you currently live?: Yes     Within the past 12 months, have you been hit, slapped, kicked or otherwise physically hurt by someone?: No     Within the past 12 months, have you been humiliated or emotionally abused in other ways by your partner or ex-partner?: No   Housing Stability: Low Risk  (1/4/2024)    Housing Stability     Do you have housing? : Yes     Are you worried about losing your housing?: No       FAMILY HISTORY:  Family History   Problem Relation Age of Onset    Unknown/Adopted Other        Past/family/social history reviewed and no changes    PHYSICAL EXAMINATION:  Vitals reviewed: BP 96/65   Pulse 77   Ht 1.549 m (5' 1\")   Wt 82.5 kg (181 lb 14.4 oz)   LMP 02/01/2024 (Approximate)   SpO2 99%   BMI 34.37 kg/m    Wt:   Wt Readings from Last 2 Encounters:   07/19/24 82.5 kg (181 lb 14.4 oz)   07/12/24 83 kg (183 lb)      Constitutional: aaox3, cooperative, pleasant, not dyspneic/diaphoretic, no acute distress  Eyes: Sclera anicteric/injected  Neck: supple, active ROM w/o limitation or pain   CV: No edema  Respiratory: Unlabored " breathing  Abd: Nondistended, +bs, no hepatosplenomegaly, no abd tenderness, no peritoneal signs.   Skin: warm, perfused, no jaundice  Psych: Normal affect  MSK: Normal gait     PERTINENT STUDIES:    Office Visit on 04/15/2024   Component Date Value Ref Range Status    Afinion Hemoglobin A1c POCT 04/15/2024 8.7 (H)  <=5.7 % Final        Lab Results   Component Value Date    WBC 7.7 05/31/2024    WBC 12.9 (H) 05/08/2024    WBC 16.4 (H) 03/08/2024    HGB 12.3 05/31/2024    HGB 13.0 05/08/2024    HGB 12.6 03/08/2024     05/31/2024     05/08/2024     03/08/2024    CHOL 183 01/04/2024    CHOL 221 (H) 04/04/2023    CHOL 194 07/19/2021    TRIG 350 (H) 01/04/2024    TRIG 298 (H) 04/04/2023    TRIG 133 07/19/2021    HDL 37 (L) 01/04/2024    HDL 38 (L) 04/04/2023    HDL 43 (L) 07/19/2021    ALT 51 (H) 05/31/2024    ALT 45 05/08/2024    ALT 30 01/04/2024    AST 32 05/31/2024    AST 27 05/08/2024    AST 23 01/04/2024     05/31/2024     05/08/2024     03/08/2024    BUN 13.3 05/31/2024    BUN 12.7 05/08/2024    BUN 15.8 03/08/2024    CO2 23 05/31/2024    CO2 24 05/08/2024    CO2 23 03/08/2024    TSH 1.41 04/04/2023    TSH 1.34 07/08/2020    TSH 1.84 05/17/2018    INR 1.03 06/18/2010    INR 0.92 06/18/2010        Liver Function Studies -   Recent Labs   Lab Test 05/08/24  1524   PROTTOTAL 7.4   ALBUMIN 4.2   BILITOTAL 0.2   ALKPHOS 79   AST 27   ALT 45        PREVIOUS ENDOSCOPY    Component Collected Lab   Upper GI Endoscopy 06/13/2024  9:13 AM Rad Rslts   [facility]  [address] [place] [address] [place] [address] [phone]     Endoscopy Department  _______________________________________________________________________________  Patient Name: Nayeli Caal    Procedure Date: 6/13/2024 9:13 AM  MRN: [id number]                       Account Number: [id number]  YOB: 1980              Admit Type: Outpatient  Age: 43                               [place] [facility] [place] [facility]  Gender: Female                        Note Status: Finalized  Attending MD: KARSON DASILVA MD,   Total Sedation Time:  _______________________________________________________________________________     Procedure:             Upper GI endoscopy  Indications:           Abdominal pain in the left lower quadrant  Providers:             KARSON DASILVA MD, Allison Mauro,                         RN, Bing Gonsalez, Sara Beavers, Technician  Patient Profile:       This is a 43 year old female with type II diabetes,                         HTN, hyperlipidemia, obesity and hepatic steatosis who                         presents for abdominal pain. .  Referring MD:          KENNEDI NEUMANN, DON  Medicines:             Monitored Anesthesia Care  Complications:         No immediate complications.  _______________________________________________________________________________  Procedure:             Pre-Anesthesia Assessment:                         - Prior to the procedure, a History and Physical was                         performed, and patient medications and allergies were                         reviewed. The patient is competent. The risks and                         benefits of the procedure and the sedation options and                         risks were discussed with the patient. All questions                         were answered and informed consent was obtained.                         Patient identification and proposed procedure were                         verified by the physician. Mental Status Examination:                         alert and oriented. Airway Examination: normal                         oropharyngeal airway and neck mobility. Respiratory                         Examination: clear to auscultation. CV Examination:                         normal. Prophylactic Antibiotics: The patient does not                         require prophylactic antibiotics.  Prior                         Anticoagulants: The patient has taken no anticoagulant                         or antiplatelet agents. ASA Grade Assessment: II - A                         patient with mild systemic disease. After reviewing                         the risks and benefits, the patient was deemed in                         satisfactory condition to undergo the procedure. The                         anesthesia plan was to use moderate sedation /                         analgesia (conscious sedation). Immediately prior to                         administration of medications, the patient was                         re-assessed for adequacy to receive sedatives. The                         heart rate, respiratory rate, oxygen saturations,                         blood pressure, adequacy of pulmonary ventilation, and                         response to care were monitored throughout the                         procedure. The physical status of the patient was                         re-assessed after the procedure.                         After obtaining informed consent, the endoscope was                         passed under direct vision. Throughout the procedure,                         the patient's blood pressure, pulse, and oxygen                         saturations were monitored continuously. The Endoscope                         was introduced through the mouth, and advanced to the                         second part of duodenum. The upper GI endoscopy was                         accomplished without difficulty. The patient tolerated                         the procedure well.                                                                                   Findings:       The esophagus was normal.       The Z-line was regular.       A single healing erosion with no stigmata of recent bleeding was found       at the pylorus. Biopsies were taken with a cold forceps for Helicobacter       pylori  testing in the antrum, incisura and body.  Very small gastric       polyps, benign in appearance       The cardia and gastric fundus were normal on retroflexion.       The examined duodenum was normal. Biopsies for histology were taken with       a cold forceps for evaluation of celiac disease.                                                                                   Moderate Sedation:       MAC as above  Impression:            - Normal esophagus.                         - Erosive gastropathy with no stigmata of recent                         bleeding. Biopsied. Otherwise normal stomach.                         - Normal examined duodenum. Biopsied.  Recommendation:        - Await pathology results.                         - Resume previous diet.                         - Increase omeprazole to 40mg twice daily, 30 minutes                         before meals, x 12 weeks then okay to reduce to 40mg                         daily and then consider tapering off                         - Continue present medications. Avoid NSAIDs                         - Work with primary care doctor on weight loss                         management, given this can help with reflux                         - Given breathing pattern during procedure, would                         recommend outpatient sleep study to assess for sleep                         apnea.                                                         Final Diagnosis   A. DUODENUM, BIOPSY:  - Small intestinal mucosa with preserved villous architecture and focal foveolar metaplasia suggestive of peptic injury  - Negative for celiac sprue     B. STOMACH, BIOPSY:  - Gastric mucosa with reactive gastropathy, focally erosive with active inflammation  - Negative for intestinal metaplasia and dysplasia  - No H. pylori-like organisms identified on routine stain       ASSESSMENT/PLAN:  Nayeli Caal is a 43 year old year old female with PMHx of Type 2 DM,   following with [facility] GI group for abd pain that began after her hysterectomy in 2/2024. She had a reassuring CTAP w contrast done for eval of this pain. Appendix w/o inflammatory changes. It was intermittent and reliably triggered with positional changes and touch then progressed which led to an EGD done 6/13/24 with Dr Cassidy. It showed a single healing erosion to gastric pylorus w/o stigmata of bleeding. She was started on BID PPI x 12 weeks with resolution in this pain. She presents for follow up today.     #Pyloric erosion   Seen on EGD 6/2024 - biopsies negative of dysplasia. Thought due to NSAID and ETOH use, both of which she has stopped taking.  Now on twice daily omeprazole 40 mg for total of 12 weeks.  Symptoms have resolved on this medication.   -Long discussion about dosing we will plan for total of 40 mg BID x 12 weeks then transition to 40 mg once daily.  Will then revisit dosing at our follow-up appointment    #RLQ abd pain, h/o   #stooling   CTAP W was negative for appendicitis. This has resolved after daily bowel evacuation so thought driven by retained stools. DDX includes  hypervisceral senstivity, pain secondary to scarring from hysterectomy  -As she is still passing BSC type 1-2 stools, I recommended daily fiber gummies (switched after AVS was printed).     Future consideration  1. GI RD consult   2. PFC evaluation   3. Breath test for SIBO/IMO    #h/o heartburn  #h/o abd pain  #h/o nausea  Likely combined effects of GLP-1 agonist and above mentioned pylori erosion. Ddx gastroparesis, atypicaly biliary disease (thought less likely given s/p CCY, normal Alk phos levels and resolved nature of this pain).     -now asymptomatic, cont PPI taper per above. More med sent to her pharmacy. If mail order does not cover it, will send to local pharmacy.     Future consideration  1. Addition of scheduled H2RA +/- alginates   2. Repeat hepatic function panel  3. NMGES study     No orders of the defined types  were placed in this encounter.    Colorectal cancer screening: aged 45, unless otherwise clinically indicated     RTC - 3 mo or sooner PRN     Thank you for this consultation.  It was a pleasure to participate in the care of this patient; please contact me with any further questions.     Kirsten Conn PA-C    Follow up: As planned above. Today, I personally spent 40 minutes in direct face to face time with the patient, of which greater than 50% of the time was spent in patient education and counseling as described above. Approximately minutes were spent on indirect care associated with the patient's consultation including but not limited to review of: patient medical records to date, clinic visits, hospital records, lab results, imaging studies, procedural documentation, and coordinating care with other providers. The findings from this review are summarized in the above note. All of the above accounted for a cumulative time of 40 minutes and was performed on the date of service.       Kirsten Conn PA-C

## 2024-07-23 DIAGNOSIS — E11.65 UNCONTROLLED TYPE 2 DIABETES MELLITUS WITH HYPERGLYCEMIA, WITH LONG-TERM CURRENT USE OF INSULIN (H): ICD-10-CM

## 2024-07-23 DIAGNOSIS — Z79.4 UNCONTROLLED TYPE 2 DIABETES MELLITUS WITH HYPERGLYCEMIA, WITH LONG-TERM CURRENT USE OF INSULIN (H): ICD-10-CM

## 2024-07-24 ENCOUNTER — NURSE TRIAGE (OUTPATIENT)
Dept: NURSING | Facility: CLINIC | Age: 44
End: 2024-07-24
Payer: COMMERCIAL

## 2024-07-24 NOTE — TELEPHONE ENCOUNTER
"Answer Assessment - Initial Assessment Questions  1. SYMPTOMS: \"What symptoms are you concerned about?\"      Cold,  2. ONSET:  \"When did the symptoms start?\"      Took monjaro  4:30 am  5 mg dose first dsoe at this strength, then bs dropped  3. BLOOD GLUCOSE: \"What is your blood glucose level?\"   now 73    68 earlier   now 90  4. USUAL RANGE: \"What is your blood glucose level usually?\" (e.g., usual fasting morning value, usual evening value)        5. TYPE 1 or 2:  \"Do you know what type of diabetes you have?\"  (e.g., Type 1, Type 2, Gestational; doesn't know)     Type 2  6. INSULIN: \"Do you take insulin?\" \"What type of insulin(s) do you use? What is the mode of delivery? (syringe, pen; injection or pump) \"When did you last give yourself an insulin dose?\" (i.e., time or hours/minutes ago) \"How much did you give?\" (i.e., how many units)      Basaglar 50 unit(s) 4:30 am, novolog this morning  w water  31 units bs 159  7. DIABETES PILLS: \"Do you take any pills for your diabetes?\" If Yes, ask: \"What is the name of the medicine(s) that you take for high blood sugar?\"      No dm tabs  8. OTHER SYMPTOMS: \"Do you have any symptoms?\" (e.g., fever, frequent urination, difficulty breathing, vomiting)      Chills intermittent  9. LOW BLOOD GLUCOSE TREATMENT: \"What have you done so far to treat the low blood glucose level?\"      Twik candy bar  10. FOOD: \"When did you last eat or drink?\"        Apple juice, lettuce wrap ham  cheese  11. ALONE: \"Are you alone right now or is someone with you?\"         At work  12. PREGNANCY: \"Is there any chance you are pregnant?\" \"When was your last menstrual period?\"        no    Protocols used: Diabetes - Low Blood Sugar-A-OH    "

## 2024-07-24 NOTE — TELEPHONE ENCOUNTER
I spoke with Nayeli through sign language and she for got about the drop of Basaglar to 40 units with the increased Mounjaro start. If lows continue she can drop to 30 units . Her carb ratio can go to 1/3 g CHO as well for lows . Verbal understanding given Olga Lidia Kirk, RN on 7/24/2024 at 2:39 PM

## 2024-07-24 NOTE — TELEPHONE ENCOUNTER
I tried to reach Nayeli back but had to leave a message.  I see that she took  at 4:30 AM Mounjaro 5 mg , Basglar 50 units, Novolog 31 units when BS was 159 . However it doesn't state if she ate breakfast . It only has what she ate when BS dropped to 63 . Message left  for her to verify this. She has to eat when giving insulin. If she did eat and numbers lowered should the  Basaglar dose be lowered or carb counting change?  I will wait for a call back Olga Lidia Kirk RN on 7/24/2024 at 2:15 PM

## 2024-07-29 DIAGNOSIS — H35.52 USHER SYNDROME: Primary | ICD-10-CM

## 2024-07-29 DIAGNOSIS — H90.3 USHER SYNDROME: Primary | ICD-10-CM

## 2024-08-01 ENCOUNTER — OFFICE VISIT (OUTPATIENT)
Dept: INTERNAL MEDICINE | Facility: CLINIC | Age: 44
End: 2024-08-01
Payer: COMMERCIAL

## 2024-08-01 VITALS
SYSTOLIC BLOOD PRESSURE: 125 MMHG | BODY MASS INDEX: 34.52 KG/M2 | DIASTOLIC BLOOD PRESSURE: 78 MMHG | HEART RATE: 72 BPM | WEIGHT: 182.7 LBS | OXYGEN SATURATION: 98 %

## 2024-08-01 DIAGNOSIS — M79.2 NEUROPATHIC PAIN OF HAND: Primary | ICD-10-CM

## 2024-08-01 PROCEDURE — 99214 OFFICE O/P EST MOD 30 MIN: CPT | Performed by: NURSE PRACTITIONER

## 2024-08-01 PROCEDURE — T1013 SIGN LANG/ORAL INTERPRETER: HCPCS | Mod: U3

## 2024-08-01 NOTE — PROGRESS NOTES
Assessment & Plan     Neuropathic pain of hand  - Orthopedic  Referral; Future  - EMG; Future  - Occupational Therapy  Referral; Future       I spent a total of  30  minutes in the care of this pt during today's office visit. This time includes reviewing the patient's chart and prior history, obtaining a history, performing an examination and evaluation and counseling the patient. This time also includes ordering medications or tests necessary in addition to communication to other member's of the patient's health care team. Time spent in documentation and care coordination is included.     LINETTE Sawyer   Nayeli is a 44 year old, presenting for the following health issues:  RECHECK (Possible carpal tunnel./Numbness and tingling in both hands./Symptoms since 2017.)      8/1/2024     9:54 AM   Additional Questions   Roomed by SELAM     Nayeli presents today with an .  She is concerned about bilateral hand numbness and tingling which extends from her mid forearms to the ends of all of her fingers equally.  She states this wakes her up at night especially.  She was given a brace to wear for some time but stated it did not help.  She has friends who have had similar symptoms and advised her to have an EMG test to be tested for carpal tunnel. In retrospect she believes she has had these symptoms since 2017 but they are worse.     She states that her blood sugars have been better controlled.  Her blood glucose this morning was 127.  She is sleeping better.    History of Present Illness       Reason for visit:  Carpal Tunnel  Symptom onset:  More than a month  Symptoms include:  Tingles/numbless  Symptom intensity:  Moderate  Symptom progression:  Worsening  Had these symptoms before:  Yes  Has tried/received treatment for these symptoms:  No  What makes it worse:  On/off tingle/numbless  What makes it better:  Don't know    She eats 0-1 servings  of fruits and vegetables daily.She consumes 1 sweetened beverage(s) daily.She exercises with enough effort to increase her heart rate 9 or less minutes per day.  She exercises with enough effort to increase her heart rate 3 or less days per week. She is missing 1 dose(s) of medications per week.  She is not taking prescribed medications regularly due to other.       Patient Active Problem List   Diagnosis    Essential hypertension    Hypersomnia, organic    Other chronic nonalcoholic liver disease    Diabetic retinopathy of both eyes (H)    Obesity    Usher Syndrome: congenital deafness, retinitis pigmentosa    Macular degeneration, age related, nonexudative    Benign neoplasm of iris    Dry eye syndrome    Pemphigus erythematosus (H28)    Chondromalacia of patella, right, steroid injection 6/12/2015    Uncontrolled type 2 diabetes mellitus with hyperglycemia, with long-term current use of insulin (H)    Chronic kidney disease, stage 1    Trigger middle finger of right hand    Trigger middle finger of left hand    Adenomyosis of uterus    S/P hysterectomy               Objective    /78 (BP Location: Right arm, Patient Position: Sitting, Cuff Size: Adult Regular)   Pulse 72   Wt 82.9 kg (182 lb 11.2 oz)   LMP 02/01/2024 (Approximate)   SpO2 98%   BMI 34.52 kg/m    Body mass index is 34.52 kg/m .  Physical Exam   GENERAL: alert and no distress  MS: no gross musculoskeletal defects noted, no edema  NEURO: Tinel test is positive. Phalen test is positive.  PSYCH: mentation appears normal, affect normal/bright            Signed Electronically by: SABI Morrison CNP

## 2024-08-05 ENCOUNTER — THERAPY VISIT (OUTPATIENT)
Dept: OCCUPATIONAL THERAPY | Facility: CLINIC | Age: 44
End: 2024-08-05
Attending: NURSE PRACTITIONER
Payer: COMMERCIAL

## 2024-08-05 DIAGNOSIS — M79.642 PAIN IN BOTH HANDS: Primary | ICD-10-CM

## 2024-08-05 DIAGNOSIS — M79.2 NEUROPATHIC PAIN OF HAND: ICD-10-CM

## 2024-08-05 DIAGNOSIS — M79.641 PAIN IN BOTH HANDS: Primary | ICD-10-CM

## 2024-08-05 PROBLEM — M65.331 TRIGGER MIDDLE FINGER OF RIGHT HAND: Status: RESOLVED | Noted: 2023-05-31 | Resolved: 2024-08-05

## 2024-08-05 PROCEDURE — 97166 OT EVAL MOD COMPLEX 45 MIN: CPT | Mod: GO | Performed by: OCCUPATIONAL THERAPIST

## 2024-08-05 PROCEDURE — T1013 SIGN LANG/ORAL INTERPRETER: HCPCS | Mod: U3 | Performed by: OCCUPATIONAL THERAPIST

## 2024-08-05 PROCEDURE — 97760 ORTHOTIC MGMT&TRAING 1ST ENC: CPT | Mod: GO | Performed by: OCCUPATIONAL THERAPIST

## 2024-08-05 NOTE — PROGRESS NOTES
OCCUPATIONAL THERAPY EVALUATION  Type of Visit: Evaluation    See electronic medical record for Abuse and Falls Screening details.    Diagnosis: paresthesias in bilateral hands    Subjective      Presenting condition or subjective complaint:  Numbness, tingling og the B hands. Worse at night, and on waking. It gets  bit better during the day. Started ~2017. At first I thought it was due to diabetes but talked about it with a friend and seems like it could be carpal tunnel.  Its pretty sore today.   Has noticed she needs to sleep a certain way. Sometimes it's hard to get comfortable. Ok if she can get the pillow just right but needs to avoid hurting shoulder.    Pt has been using some braces, helps with the wrists but the fingers still curl up.    Date of onset: 08/01/24    Relevant medical history:   h/o trigger finger releases  Dates & types of surgery:  Please refer to electronic medical record.    Prior diagnostic imaging/testing results:     EMG pending  Prior therapy history for the same diagnosis, illness or injury:        Prior Level of Function  Transfers: Independent  Ambulation: Independent  ADL: Independent  IADL: IND in all IADLs    Living Environment:   Patient is independent at home and there are no concerns about accessibility and mobility in the home.    Employment:        Patient goals for therapy:      Pain assessment: See objective evaluation for additional pain details     Objective   ADDITIONAL HISTORY:  Left hand dominant    PAIN:  Pain intensity not rated.  Based on observations, symptoms appear to be indicative of neural tissues with mild to severe irritability in the forearms, wrists, hands.  Pain location: bilateral  hands, wrists    Pain quality: Numb, Tingling, tightness, and bothersome (not pain per so. Not a shooting or stabbing.   Pain frequency: happening more regularly  Pain is worst: nighttime  Pain is exacerbated By: worse at night. Will have some symptoms with work.  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 Not particularly bad with ASL, maybe a little.  Pain is relieved by: rest, having hands completely free.  Pain progression: Gradually getting worse.     POSTURE: Forward Neck Posture and Rounded Forward Shoulders     EDEMA:  Per observations:  none of the bilateral hands    SCAR/WOUND:  Bilateral trigger finger release scars visible and well-healed    SENSATION/PALPATION: Light touch testing completed to several key areas of the volar hands, as well as the dorsal hands.  Patient notes that it all feels sensitive.  The right and left medial elbow area is also sensitive.  Nontender to the anterior bilateral elbows/median nerve area.  Mild to moderate tension noted of the bilateral flexor and extensor wads of the forearms.    AROM:   B wrists are WFL all planes  B elbows are WFL all planes  B shoulders are WFL all planes    Shoulders: Pt has had pain on the L for a long time, has OA.    OBSERVATIONS/APPEARANCE:   Side: bilateral hands/upper extremity  Joint alignment: WNL    Color:  WNL    Joint enlargements:  None    Skin Appearance: WNL    Other observations:  Patient shakes out the hands down at her sides several times during the session     SPECIAL TESTS:   CTS Special Tests  Pain Report Left Right   Median Nerve Compression at the carpal tunnel - with light /moderate pressure Hurts right away   Tinel's at Carpal Tunnel deferred deferred   Phalen's Sign Can feel it a bit to volar wrist Sore to volar wrist quickly     NEURAL TENSION TESTING: Deferred due to symptom irritability.  May be appropriate at a later point.    RESISTED TESTING: Deferred    STRENGTH: Testing deferred    Assessment & Plan   CLINICAL IMPRESSIONS  Medical Diagnosis: Bilateral hand paresthesias    Treatment Diagnosis: Bilateral hand paresthesias    Impression/Assessment: Pt is a 44 year old female presenting to Occupational Therapy due to condition as  noted above.  The following significant findings have been identified: Paresthesias.  These identified deficits interfere with their ability to perform self care tasks and sleep  as compared to previous level of function.     Clinical Decision Making (Complexity):  Assessment of Occupational Performance: 3-5 Performance Deficits  Occupational Performance Limitations: sleep, work, and leisure activities  Clinical Decision Making (Complexity): Moderate complexity    PLAN OF CARE  Treatment Interventions:  Modalities:  US  Therapeutic Exercise:  AROM  Neuromuscular re-education:  Nerve Gliding, Desensitization, Proprioceptive Training, Posture, and Kinesiotaping  Manual Techniques:  Myofascial release  Orthotic Fabrication:  Static and Forearm based  Self Care:  Self Care Tasks, Ergonomic Considerations, and Work Tasks    Long Term Goals   OT Goal 1  Goal Identifier: Sleep  Goal Description: Patient will report mild paresthesias only on 5/7 nights, for improved sleep function and quality of life.  Target Date: 10/05/24      Frequency of Treatment: 2 times per month  Duration of Treatment: 2 months     Recommended Referrals to Other Professionals: NA  Education Assessment: Learner/Method: Patient;No Barriers to Learning;Listening;Reading;Demonstration;Pictures/Video  Education Comments: Printed PT Rx.  Printed orthosis instructions.     Risks and benefits of evaluation/treatment have been explained.   Patient/Family/caregiver agrees with Plan of Care.     Evaluation Time:    OT Eval, Moderate Complexity Minutes (25552): 33       Signing Clinician: Reshma Palacios OT

## 2024-08-10 NOTE — TELEPHONE ENCOUNTER
DIAGNOSIS:     Neuropathic pain of hand      APPOINTMENT DATE: 08/13/2024   NOTES STATUS DETAILS   OFFICE NOTE from referring provider Internal 08/01/2024-ROGELIO NEUMANN   OFFICE NOTE from other specialist Internal 10/05/2023  SHOULDER PAIN  ULYSSES BARAJAS   DISCHARGE SUMMARY from hospital Internal TRIGGER FINGER  2019 DR. MAGNOLIA CHRISTENSEN   OPERATIVE REPORT Internal TRIGGER FINGER  2019 DR. MAGNOLIA CHRISTENSEN   INJECTION  report Internal DR ZEYAD BARAJAS-10/05/2023  MOST RECENT   MEDICATION LIST Internal    XRAYS (IMAGES & REPORTS) Internal DR MICHOACANO MASTERS-07/24/2018

## 2024-08-12 ENCOUNTER — OFFICE VISIT (OUTPATIENT)
Dept: OPHTHALMOLOGY | Facility: CLINIC | Age: 44
End: 2024-08-12
Attending: OPHTHALMOLOGY
Payer: COMMERCIAL

## 2024-08-12 DIAGNOSIS — Z71.83 ENCOUNTER FOR NONPROCREATIVE GENETIC COUNSELING: ICD-10-CM

## 2024-08-12 DIAGNOSIS — H35.52 USHER SYNDROME: Primary | ICD-10-CM

## 2024-08-12 DIAGNOSIS — H90.3 USHER SYNDROME: Primary | ICD-10-CM

## 2024-08-12 DIAGNOSIS — H90.3 USHER SYNDROME: ICD-10-CM

## 2024-08-12 DIAGNOSIS — H35.52 RETINITIS PIGMENTOSA: ICD-10-CM

## 2024-08-12 DIAGNOSIS — H90.5 CONGENITAL SENSORINEURAL HEARING LOSS: ICD-10-CM

## 2024-08-12 DIAGNOSIS — H35.52 USHER SYNDROME: ICD-10-CM

## 2024-08-12 PROCEDURE — 92134 CPTRZ OPH DX IMG PST SGM RTA: CPT | Performed by: OPHTHALMOLOGY

## 2024-08-12 PROCEDURE — 96040 HC GENETIC COUNSELING, EACH 30 MINUTES: CPT | Performed by: GENETIC COUNSELOR, MS

## 2024-08-12 PROCEDURE — 99207 FUNDUS AUTOFLUORESCENCE IMAGE (FAF) OU (BOTH EYES): CPT | Mod: 26 | Performed by: OPHTHALMOLOGY

## 2024-08-12 PROCEDURE — 99213 OFFICE O/P EST LOW 20 MIN: CPT | Performed by: OPHTHALMOLOGY

## 2024-08-12 PROCEDURE — 99214 OFFICE O/P EST MOD 30 MIN: CPT | Mod: GC | Performed by: OPHTHALMOLOGY

## 2024-08-12 PROCEDURE — 92250 FUNDUS PHOTOGRAPHY W/I&R: CPT | Performed by: OPHTHALMOLOGY

## 2024-08-12 PROCEDURE — T1013 SIGN LANG/ORAL INTERPRETER: HCPCS | Mod: U3

## 2024-08-12 ASSESSMENT — GONIOSCOPY
OS_NASAL: GRADE 3
OS_TEMPORAL: GRADE 2-3
METHOD: ZEISS, FOUR MIRROR
OD_INFERIOR: GRADE 2
OS_SUPERIOR: GRADE 4
OD_SUPERIOR: GRADE 4
OD_NASAL: GRADE 3
OD_TEMPORAL: GRADE 2
OS_INFERIOR: GRADE 2-3

## 2024-08-12 ASSESSMENT — CONF VISUAL FIELD
OD_INFERIOR_TEMPORAL_RESTRICTION: 3
OD_SUPERIOR_TEMPORAL_RESTRICTION: 3
OD_SUPERIOR_NASAL_RESTRICTION: 3
OD_INFERIOR_NASAL_RESTRICTION: 3
OS_INFERIOR_TEMPORAL_RESTRICTION: 3
OS_INFERIOR_NASAL_RESTRICTION: 3
OS_SUPERIOR_NASAL_RESTRICTION: 3
OS_SUPERIOR_TEMPORAL_RESTRICTION: 3

## 2024-08-12 ASSESSMENT — SLIT LAMP EXAM - LIDS
COMMENTS: NORMAL
COMMENTS: NORMAL

## 2024-08-12 ASSESSMENT — VISUAL ACUITY
OD_SC: 20/125
OS_SC: 20/300
METHOD: SNELLEN - LINEAR

## 2024-08-12 ASSESSMENT — TONOMETRY
IOP_METHOD: TONOPEN
OD_IOP_MMHG: 18
OS_IOP_MMHG: 20

## 2024-08-12 ASSESSMENT — EXTERNAL EXAM - RIGHT EYE: OD_EXAM: NORMAL

## 2024-08-12 ASSESSMENT — EXTERNAL EXAM - LEFT EYE: OS_EXAM: NORMAL

## 2024-08-12 ASSESSMENT — CUP TO DISC RATIO
OD_RATIO: 0.3
OS_RATIO: 0.3

## 2024-08-12 NOTE — PROGRESS NOTES
CC: Follow up Usher Syndrome    HPI: Nayeli Caal is a 44 year old year-old patient with history of Usher Syndrome, last seen in 2015 by Dr. Etienne and on 03/05/2024 by Dr. Sanchez. During her last visit Nayeli reported that she noticed flashes more in her right eye that are increased by the light. She reports that this has been going on for the last year.     Retinal Dystrophy assessment:  Nyctalopia: Yes   Problems going from outside bright light to inside: Yes  Problems going from inside to bright light outside: No  Photosensitivity: Yes   Problems with steps, curbs, or stairs:No  Problems with color vision: Yes  Sees flashing lights:Yes  Hearing loss: Yes    PMH: Type II diabetes without diabetic retinopathy  Past ocular history: Usher Sd.    Retinal Imaging:  OCT 08/12/24   Both eyes with  outer retina atrophic changes; no cystoid macular edema; right eye> left eye   Optos consistent with exam  Autofluorescence: peripheral and central hypoautofluorescence. Foveal hyperautofluorescence      Assessment & Plan:    # likely Usher Sd.  VA: right eye: 20/125 left eye: 20/300   OCT:right eye: Epiretinal membrane, outer segment loss  left eye: Epiretinal membrane, outer segment loss  No genetic report available.   Discussed with patient that she will benefit from genetic testing and genetic counseling appointment  Follow up in one yr with Optical Coherence Tomography and optos  Genetic counseling today 08/12/24     #Type II diabetes without diabetic retinopathy  No sign of diabetes   Lab Results   Component Value Date    A1C 8.1 07/12/2024    A1C 8.7 04/15/2024    A1C 7.8 01/04/2024     Elvin Rabago MD  PGY-6 Vitreoretinal Surgery Fellow  [facility]        ~~~~~~~~~~~~~~~~~~~~~~~~~~~~~~~~~~   Complete documentation of historical and exam elements from today's encounter can be found in the full encounter summary report (not reduplicated in this progress note).  I personally obtained the  chief complaint(s) and history of present illness.  I confirmed and edited as necessary the review of systems, past medical/surgical history, family history, social history, and examination findings as documented by others; and I examined the patient myself.  I personally reviewed the relevant tests, images, and reports as documented above.  I personally reviewed the ophthalmic test(s) associated with this encounter, agree with the interpretation(s) as documented by the resident/fellow, and have edited the corresponding report(s) as necessary.   I formulated and edited as necessary the assessment and plan and discussed the findings and management plan with the patient and family    Safia Etienne MD  Professor of Ophthalmology.   Vitreo-retinal surgeon   Department of Ophthalmology and Visual Neurosciences   [facility]  Phone: [phone]   Fax: [phone]

## 2024-08-12 NOTE — NURSING NOTE
Chief Complaints and History of Present Illnesses   Patient presents with    Consult For     Usher Syndrome: congenital deafness, retinitis pigmentosa referred by Dr Tyler     Chief Complaint(s) and History of Present Illness(es)       Consult For              Laterality: both eyes    Course: stable    Associated symptoms: dryness (mild), flashes and floaters.  Negative for eye pain    Treatments tried: no treatments    Pain scale: 0/10    Comments: Usher Syndrome: congenital deafness, retinitis pigmentosa referred by Dr Tyler              Comments    She states that her vision has seemed stable in each eye since her last exam 4 months ago, however, she has been seeing more floaters and flashing in the right eye.    Lab Results       Component                Value               Date                       A1C                      8.1                 07/12/2024                 A1C                      8.7                 04/15/2024                 A1C                      7.8                 01/04/2024                 A1C                      7.7                 07/25/2023                 A1C                      8.7                 06/06/2023                 A1C                      9.9                 04/04/2023                 A1C                      9.8                 08/17/2022                 A1C                      11.6                04/05/2021                 A1C                      12.4                10/29/2020                 A1C                      12.9                07/08/2020                 A1C                      8.2                 05/02/2019                 A1C                      10.3                10/15/2018             Funmilayo Mccann, COT 7:23 AM  August 12, 2024

## 2024-08-12 NOTE — PROGRESS NOTES
[facility] Genetic Counseling Note     Date of Visit: Aug 12, 2024     Presenting Information: Nayeli Caal is a 44 year old year old female who was seen for genetic counseling at the request of Dr. Etienne, because Nayeli's previous eye exam findings were suggestive a diagnosis of Usher syndrome.  Genetic counseling was requested to obtain family history, to discuss the genetic details of Usher syndrome, and to coordinate genetic testing. We were assisted by [facility] , [place], today.      Nayeli has a history of profound sensorineural hearing loss for as long as she can remember. She tried shivam amplification devices in childhood and then again started wearing hearing aids in 2007. She has not been seen by Audiology at [facility] since 2014. With regards to her vision symptoms, Nayeli reports noticing the night vision problems and peripheral vision loss around age 9 or 10. She thinks it was around this age that she was told she had Usher syndrome. 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     Nayeli also has a history of diabetes and diabetic neuropathy.     Please refer to Dr. Etienne's note from today for further details of Nayeli's medical history and evaluation from today.    Family History:  A three generation pedigree was obtained and scanned into the electronic medical record. Nayeli was adopted shortly after birth and does not know any information about biological relatives. Nayeli has no children.      Genetic Counseling Discussion:  We reviewed that our bodies are made of cells that contain our chromosomes which are made up of long stretches of DNA containing our genes. Our genes serve as the instructions for our bodies to grow and function. We have two copies of each gene, one inherited from our mother and one inherited from our father.     Usher syndrome is an autosomal recessive  "condition characterized by hearing loss and progressive vision loss due to retinitis pigmentosa (RP). There are three different types of Usher syndrome that have varying age of onset and severity of these symptoms.    Usher syndrome type 1 is caused by biallelic mutations in one of six genes: MYO7A, USH1C, CDH23, PCDH15, USH1G, and CIB2. Usher syndrome type 1 is characterized by congenital severe to profound hearing loss. Hearing aids are usually ineffectual for individuals with Usher syndrome type 1, so speech development may be difficult but this may be improved with a cochlear implant. Usher syndrome type 1 is distinguished from the other subtypes by vestibular system abnormalities which leads to poor balance. For this reason, infants with Usher syndrome type 1 may learn to sit independently and walk later than their peers. Vision loss symptoms typically begin in childhood or adolescence. The initial symptom of RP is defective dark adaptation, or \"night blindness,\" due to jasmin dysfunction. This is followed by loss of peripheral vision due to cone dysfunction. Most adults retain good central visual acuity but may lose their functional vision as they age. The rate and degree of vision loss can vary within and among families.    Usher syndrome type 2 is caused by mutations in USH2A, WHRN (DNFB31), or ADGRV1 genes. Individuals with Usher syndrome type 2 have moderate to severe congenital hearing loss, with hearing loss being more mild in lower frequencies and more sever in higher frequencies. Because most speech is in lower frequencies, individuals with Usher syndrome type 2 generally have good communication skills. Unlike other types of Usher syndrome, the vestibular function is not affected in Usher syndrome type 2, so individuals with Usher syndrome type 2 typically do not have balance issues. Vision loss symptoms typically begin in the teenage years. The initial symptom of RP is defective dark adaptation, or " "\"night blindness,\" due to jasmin dysfunction. This is followed by loss of peripheral vision due to cone dysfunction. Most adults retain good central visual acuity but may lose their functional vision as they age. The rate and degree of vision loss can vary within and among families.    Usher syndrome type 3 is a more rare, yet more mild form of Usher syndrome caused by biallelic mutations in the CLRN1 and HARS1 genes. Individuals with Usher syndrome type 3 develop postlingual progressive sensorineural hearing loss and onset of RP is later in life when compared to Usher syndrome type 1 and 2. Vestibular function is variable.     Autosomal recessive means an individual needs two likely pathogenic/pathogenic variants, one on each copy of the gene, in order to be affected with the condition. When an individual only has one variant in the gene, they are considered a carrier for the condition. When both parents are carriers for the same recessive condition, there is a 1 in 4 (25%) chance of having an affected child, a 1 in 2 (50%) chance of having a child who is an unaffected carrier, and a 1 in 4 (25%) chance of having a child who is neither affected nor a carrier with each pregnancy.     We reviewed the availability of genetic testing for analysis of genes known to be associated with Usher syndrome, with the aim of determining if Joshs symptoms are in fact due to Usher syndrome and to determine what type of Usher syndrome she has.    We went on to discuss the details, limitations, and possible outcomes of genetic testing. In particular, we discussed that there are three possible results:  Negative: meaning normal or no mutations are identified in the genes that were tested/sequenced  Positive: meaning a variant that is known to be associated with a particular set of symptoms is identified (pathogenic/likely pathogenic variant)  Variant of uncertain significance (VUS): meaning a change in the DNA sequence of a particular " gene was seen but there is not enough information or data yet to know if it explains the symptoms. If a VUS is identified, testing of other relatives may be helpful to provide clarification.  In most cases, identification of a VUS does not confirm a diagnosis and does not result in any clinically actionable recommendations.    We discussed the potential benefits of genetic testing and why this genetic testing is medically indicated. A positive result will help determine the etiology of the ocular abnormalities noted in Nayeli and may guide the medical management for her, particularly if a syndromic cause of these ocular symptoms is found.  Additionally, for many of these genes, there is information available about expected prognosis or new treatment options.      We also discussed the possibility that this test could turn up no definitive answers about the underlying cause of Nayeli's ocular abnormalties.  We talked about that a negative genetic test would not rule out a genetic cause for Nayeli's ocular abnormalities, as she could have two separate genetic causes for hearing loss and RP (two different genes).     Next Generation Sequencing is a well established technology utilized by all molecular genetic labs throughout the country for identifying disease-causing mutations in various genes.  Next Generation Sequencing is currently the standard of care for genetic testing of single genes.  The recommended testing for Nayeli is DIAGNOSTIC testing, and it is NOT investigational.    Nayeli consented for genetic testing today. A buccal sample was collected in clinic today and sent to [facility] for testing. [facility] will bill Nayeli's insurance directly and overall cost may depend on the remaining benefits, deductible, and co-insurances. Nayeli will receive a bill from [facility] and if the bill is >$100 a representative from [facility] will reach out and discuss their financial assistance programs and payment plans.      I will send Nayeli a Xipinhart message with results in 2-3 weeks.    It was a pleasure meeting Nayeli today. She was encouraged to reach out to me if she has any further questions.     Plan:  [facility] Usher syndrome Panel  Results are expected in 2-3 weeks and I will send Nayeli a Simple-Fill message with the results.    Lisa Dominguez MS, [facility]  Licensed Genetic Counselor  [facility]  Phone: [phone]  Fax: [phone]    Time spent in consultation face to face was approximately 20 minutes.

## 2024-08-12 NOTE — LETTER
8/12/2024       RE: Nayeli Caal  [address]  [place] [address]     Dear Colleague,    Thank you for referring your patient, Nayeli Caal, to the [facility] [place] at [facility]. Please see a copy of my visit note below.    CC: Follow up Usher Syndrome    HPI: Nayeli Caal is a 44 year old year-old patient with history of Usher Syndrome, last seen in 2015 by Dr. Etienne and on 03/05/2024 by Dr. Sanchez. During her last visit Nayeli reported that she noticed flashes more in her right eye that are increased by the light. She reports that this has been going on for the last year.     Retinal Dystrophy assessment:  Nyctalopia: Yes   Problems going from outside bright light to inside: Yes  Problems going from inside to bright light outside: No  Photosensitivity: Yes   Problems with steps, curbs, or stairs:No  Problems with color vision: Yes  Sees flashing lights:Yes  Hearing loss: Yes    PMH: Type II diabetes without diabetic retinopathy  Past ocular history: Usher Sd.    Retinal Imaging:  OCT 08/12/24   Both eyes with  outer retina atrophic changes; no cystoid macular edema; right eye> left eye   Optos consistent with exam  Autofluorescence: peripheral and central hypoautofluorescence. Foveal hyperautofluorescence      Assessment & Plan:    # likely Usher Sd.  VA: right eye: 20/125 left eye: 20/300   OCT:right eye: Epiretinal membrane, outer segment loss  left eye: Epiretinal membrane, outer segment loss  No genetic report available.   Discussed with patient that she will benefit from genetic testing and genetic counseling appointment  Follow up in one yr with Optical Coherence Tomography and optos  Genetic counseling today 08/12/24     #Type II diabetes without diabetic retinopathy  No sign of diabetes   Lab Results   Component Value Date    A1C 8.1 07/12/2024    A1C 8.7 04/15/2024    A1C 7.8 01/04/2024      Elvin Rabago MD  PGY-6 Vitreoretinal Surgery Fellow  [facility]      ~~~~~~~~~~~~~~~~~~~~~~~~~~~~~~~~~~  Complete documentation of historical and exam elements from today's encounter can be found in the full encounter summary report (not reduplicated in this progress note).  I personally obtained the chief complaint(s) and history of present illness.  I confirmed and edited as necessary the review of systems, past medical/surgical history, family history, social history, and examination findings as documented by others; and I examined the patient myself.  I personally reviewed the relevant tests, images, and reports as documented above.  I personally reviewed the ophthalmic test(s) associated with this encounter, agree with the interpretation(s) as documented by the resident/fellow, and have edited the corresponding report(s) as necessary.   I formulated and edited as necessary the assessment and plan and discussed the findings and management plan with the patient and family.  Safia Etienne MD      Again, thank you for allowing me to participate in the care of your patient.      Sincerely,    Safia Etienne M.D.  Professor of Ophthalmology  Vitreoretinal Surgeon  [employer] Endowed Chair  Department of Ophthalmology & Visual Neurosciences  [facility]  Phone:  [phone]   Fax:  [phone]

## 2024-08-13 ENCOUNTER — ANCILLARY PROCEDURE (OUTPATIENT)
Dept: GENERAL RADIOLOGY | Facility: CLINIC | Age: 44
End: 2024-08-13
Attending: FAMILY MEDICINE
Payer: COMMERCIAL

## 2024-08-13 ENCOUNTER — ALLIED HEALTH/NURSE VISIT (OUTPATIENT)
Dept: EDUCATION SERVICES | Facility: CLINIC | Age: 44
End: 2024-08-13
Payer: COMMERCIAL

## 2024-08-13 ENCOUNTER — OFFICE VISIT (OUTPATIENT)
Dept: ORTHOPEDICS | Facility: CLINIC | Age: 44
End: 2024-08-13
Attending: NURSE PRACTITIONER
Payer: COMMERCIAL

## 2024-08-13 ENCOUNTER — PRE VISIT (OUTPATIENT)
Dept: ORTHOPEDICS | Facility: CLINIC | Age: 44
End: 2024-08-13

## 2024-08-13 DIAGNOSIS — Z79.4 UNCONTROLLED TYPE 2 DIABETES MELLITUS WITH HYPERGLYCEMIA, WITH LONG-TERM CURRENT USE OF INSULIN (H): Primary | ICD-10-CM

## 2024-08-13 DIAGNOSIS — M54.12 CERVICAL RADICULOPATHY: ICD-10-CM

## 2024-08-13 DIAGNOSIS — M79.2 NEUROPATHIC PAIN OF HAND: Primary | ICD-10-CM

## 2024-08-13 DIAGNOSIS — E11.65 UNCONTROLLED TYPE 2 DIABETES MELLITUS WITH HYPERGLYCEMIA, WITH LONG-TERM CURRENT USE OF INSULIN (H): Primary | ICD-10-CM

## 2024-08-13 PROCEDURE — 99204 OFFICE O/P NEW MOD 45 MIN: CPT | Performed by: FAMILY MEDICINE

## 2024-08-13 PROCEDURE — 72040 X-RAY EXAM NECK SPINE 2-3 VW: CPT | Performed by: RADIOLOGY

## 2024-08-13 PROCEDURE — G0108 DIAB MANAGE TRN  PER INDIV: HCPCS | Performed by: REGISTERED NURSE

## 2024-08-13 PROCEDURE — T1013 SIGN LANG/ORAL INTERPRETER: HCPCS | Mod: U3

## 2024-08-13 RX ORDER — GABAPENTIN 100 MG/1
100 CAPSULE ORAL 3 TIMES DAILY
Qty: 42 CAPSULE | Refills: 1 | Status: SHIPPED | OUTPATIENT
Start: 2024-08-13

## 2024-08-13 NOTE — PROGRESS NOTES
ASSESSMENT/PLAN:    (M79.2) Neuropathic pain of hand  (primary encounter diagnosis)  Comment: features of exam are consistent w/ median neuropathy but paresthesias do not fit one specific dermatome or peripheral nerve distribution. EMG is pending on 8/21. I will see her back on 8/27 to review results; consider injections for CTS if that is confirmed vs checking c-spine MRI for potential cervical cause; trial of gabapentin for pain; precautions/ anticipatory guidance given  Plan: gabapentin (NEURONTIN) 100 MG capsule          (M54.12) Cervical radiculopathy  Comment: see above  Plan: X-ray Cervical Spine 2-3 vws, gabapentin (NEURONTIN) 100 MG capsule          Attestation:  This patient has been seen and evaluated by me, Kenji Cheung MD with the resident, Dr Rodriguez and the care team. I agree with the findings and plan of care as documented in this note.    Kenji Cheung MD  August 13, 2024  10:50 AM        Pt is a 44 year old female referred by Mariya Mohamud CNP here today for:   HPI:   Bilateral Wrist/ Hand pain: Bilateral hand pain and numbness/tingling. Pain is worse with signing, typing, and sleeping. Also having neck pain - uncomfortable sleeping at night, especially in L shoulder.   Inciting incidents? Has had several back incidents in her lifetime  Location: Numbness and tingling starts mid-forearms and into both hands.    Duration: Her symptoms have been present since 2017   Trauma/ Fall? NO   Swelling? NO   Numbness/ Tingling? Yes - see above   Weakness? 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     Imaging? MR ROLANDO shoulder 8/30/2023   Treatment? She has done bracing and hand therapy for treatment; custom made splint for the right hand by hand therapy. Brace has given some relief but numbness/tingling still present, still needs one for the L hand.     DM -   Lab Results   Component Value Date    A1C 8.1 07/12/2024    A1C 8.7 04/15/2024     A1C 7.8 01/04/2024    A1C 7.7 07/25/2023    A1C 8.7 06/06/2023    A1C 9.9 04/04/2023    A1C 9.8 08/17/2022    A1C 11.6 04/05/2021    A1C 12.4 10/29/2020    A1C 12.9 07/08/2020    A1C 8.2 05/02/2019    A1C 10.3 10/15/2018         Per Mariya Mohamud's note on 8/1/24:  Neuropathic pain of hand  - Orthopedic  Referral; Future  - EMG; Future  - Occupational Therapy  Referral; Future    Nayeli presents today with an . She is concerned about bilateral hand numbness and tingling which extends from her mid forearms to the ends of all of her fingers equally. She states this wakes her up at night especially. She was given a brace to wear for some time but stated it did not help. She has friends who have had similar symptoms and advised her to have an EMG test to be tested for carpal tunnel. In retrospect she believes she has had these symptoms since 2017 but they are worse.         Past Medical History:   Diagnosis Date    Combined visual and hearing impairment     Deaf     Diabetic retinopathy of both eyes (H) 8/19/2011    Hepatic steatosis 08/19/2011    History of tobacco use     Hyperlipidemia     Hypertension     Hypertriglyceridemia     Migraines     Obesity 8/19/2011     Problem list name updated by automated process. 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      Past Surgical History:   Procedure Laterality Date    DAVINCI HYSTERECTOMY TOTAL, SALPINGECTOMY BILATERAL Bilateral 2/7/2024    Procedure: HYSTERECTOMY, TOTAL, ROBOT-ASSISTED, WITH BILATERAL SALPINGECTOMY, CYSTOSCOPY;  Surgeon: Vonnie Cowan MD;  Location: [facility] OR    ESOPHAGOSCOPY, GASTROSCOPY, DUODENOSCOPY (EGD), COMBINED N/A 6/13/2024    Procedure: ESOPHAGOGASTRODUODENOSCOPY, WITH BIOPSY;  Surgeon: Nora Brice MD;  Location:  [facility] OR    LAPAROSCOPIC CHOLECYSTECTOMY N/A 3/10/2018    Procedure: LAPAROSCOPIC CHOLECYSTECTOMY;  Laparoscopic Cholecystectomy ;  Surgeon: Kuldeep Sigala MD;  Location: [facility] OR    RELEASE TRIGGER FINGER Right 5/2/2019    Procedure: Right Thumb Trigger Release;  Surgeon: Greg Streeter MD;  Location: [facility] OR    RELEASE TRIGGER FINGER Left 5/30/2019    Procedure: Left Ring Trigger Finger Release.  Ganglion cyst excision.;  Surgeon: Greg Streeter MD;  Location: [facility]    RELEASE TRIGGER FINGER Right 6/13/2023    Procedure: RELEASE, RIGHT TRIGGER FINGER, INDEX AND MIDDLE;  Surgeon: Miracle Carlin MD;  Location: [facility] OR    RELEASE TRIGGER FINGER Left 8/1/2023    Procedure: RELEASE, LEFT TRIGGER FINGER, MIDDLE;  Surgeon: Miracle Carlin MD;  Location: [facility] OR      Current Outpatient Medications   Medication Sig Dispense Refill    acetaminophen (TYLENOL) 500 MG tablet Take 2 tablets (1,000 mg) by mouth every 8 hours as needed for mild pain 100 tablet 3    Alcohol Swabs (ALCOHOL PREP PAD) 70 % PADS 1 each 3 times daily 100 each 3    amLODIPine (NORVASC) 5 MG tablet Take 1 tablet (5 mg) by mouth at bedtime 90 tablet 3    aspirin (ASA) 81 MG chewable tablet Take 1 tablet (81 mg) by mouth daily CHEW AND SWALLOW 90 tablet 1    atorvastatin (LIPITOR) 40 MG tablet Take 1 tablet (40 mg) by mouth daily 90 tablet 1    blood glucose (ACCU-CHEK LAYA PLUS) test strip Use with  Accucheck Expert meter.  Test blood sugar 6 times daily. 600 each 3    blood glucose monitoring (ACCU-CHEK FASTCLIX) lancets Use to test blood sugar 6 times daily or as directed 510 each 3    calcium carbonate-vitamin D (OSCAL) 500-5 MG-MCG tablet Take 1 tablet by mouth 2 times daily 180 tablet 1    Continuous Glucose Sensor (FREESTYLE ZORAIDA 3 SENSOR) [facility] 1 each every 14 days Please provide 3 month supply. 6 each 3    empagliflozin (JARDIANCE) 25 MG TABS tablet Take 1 tablet (25 mg) by mouth daily for 180 days 90 tablet 1    ergocalciferol  (ERGOCALCIFEROL) 1.25 MG (74031 UT) capsule Take 1 capsule (50,000 Units) by mouth once a week For additional refills, please schedule a follow-up appointment at [phone] 12 capsule 3    fenofibrate (TRIGLIDE/LOFIBRA) 160 MG tablet Take 1 tablet (160 mg) by mouth daily 90 tablet 3    fish oil-omega-3 fatty acids 1000 MG capsule TAKE TWO CAPSULES (2 GM) BY MOUTH ONCE DAILY 180 capsule 3    ibuprofen (ADVIL/MOTRIN) 600 MG tablet Take 1 tablet (600 mg) by mouth every 6 hours as needed for moderate pain 120 tablet 1    Injection Device for insulin (INPEN 100-PINK-NOVOLOG-FIASP) DAVID 1 each continuous 1 each 0    insulin aspart (NOVOLOG FLEXPEN) 100 UNIT/ML pen 1 unit per 5 grams CHO and correction scale of 1/25/125.  Approximate daily use is 100 units. 30 mL 2    insulin aspart (NOVOLOG PENFILL) 100 UNIT/ML cartridge Take with each meal and snack: 1 per 3 grams CHO and correction of 1 unit per 20 mg/dL over a target of 120. Average daily dose is 85 units. 75 mL 3    insulin glargine (BASAGLAR KWIKPEN) 100 UNIT/ML pen Inject 60 Units Subcutaneous daily 3 month supply. (Patient taking differently: Inject 55 Units subcutaneously daily 3 month supply.) 54 mL 3    insulin pen needle (31G X 5 MM) 31G X 5 MM miscellaneous Use 5 to 6 pen needles daily or as directed.  3 month supply. 500 each 3    losartan (COZAAR) 100 MG tablet Take 1 tablet (100 mg) by mouth daily 90 tablet 3    omeprazole (PRILOSEC) 40 MG DR capsule Take 1 capsule (40 mg) by mouth 2 times daily 90 capsule 0    omeprazole (PRILOSEC) 40 MG DR capsule Take 1 capsule (40 mg) by mouth 2 times daily 8-12 weeks 90 capsule 0    ondansetron (ZOFRAN ODT) 4 MG ODT tab Take 1 tablet (4 mg) by mouth every 6 hours as needed for nausea or vomiting 20 tablet 0    tirzepatide (MOUNJARO) 2.5 MG/0.5ML pen Inject 2.5 mg Subcutaneous every 7 days 6 mL 3    tirzepatide (MOUNJARO) 5 MG/0.5ML pen Inject 5 mg subcutaneously every 7 days 2 mL 11    tirzepatide (MOUNJARO) 5  MG/0.5ML pen Inject 5 mg subcutaneously every 7 days 2 mL 1      No Known Allergies   ROS:   Gen- no fevers/chills   Derm - no rash/ redness   Neuro - see HPI  Remainder of ROS negative.     Exam:   LMP 02/01/2024 (Approximate)      BL WRIST/HAND:   Inspection: Swelling - none; Atrophy - none   Sensation: subjective paresthesias and dec sensation diffusely   ROM:   Wrist: flexion- full/ extension- full/ pronation- full/ supination- full;   radial deviation - full; ulnar deviation- full   Hand: Full flexion at PIP/DIP, finger abduction/ adduction.   Strength: 4/5 w/   Bony tenderness:   Wrist: Carpal bones: none; Snuffbox: none   Hand: Metacarpals: none; Phalanges: none   Tendons: FDS/FDP/Extensor mechanism intact   Maneuvers: + median compression test. +Tinel's/Phalen; -Finkelstein  Other: No nodules palpated in palmar aspect.     L shoulder:  Inspection: no swelling, no atrophy  Sensation: grossly intact  ROM:  R Flexion @ neck: limited d/t pain   Extension: limited d/t pain  Forward Flexion:intact  Adduction/abduction: intact  Internal rotation: intact  External rotation: limited d/t pain - cannot put hand behind her back   Strength: teres minor/infraspinatus 5/5        Xray of c-spine on August 13, 2024 at [facility] location - films personally reviewed with patient at time of visit     My impression: straightening of cervical lordosis; otherwise minimal degenerative changes of c-spine

## 2024-08-13 NOTE — LETTER
8/13/2024      RE: Nayeli Caal  [address]  [facility] [address]     Dear Colleague,    Thank you for referring your patient, Nayeli Caal, to the [facility] [place]. Please see a copy of my visit note below.    ASSESSMENT/PLAN:    (M79.2) Neuropathic pain of hand  (primary encounter diagnosis)  Comment: features of exam are consistent w/ median neuropathy but paresthesias do not fit one specific dermatome or peripheral nerve distribution. EMG is pending on 8/21. I will see her back on 8/27 to review results; consider injections for CTS if that is confirmed vs checking c-spine MRI for potential cervical cause; trial of gabapentin for pain; precautions/ anticipatory guidance given  Plan: gabapentin (NEURONTIN) 100 MG capsule          (M54.12) Cervical radiculopathy  Comment: see above  Plan: X-ray Cervical Spine 2-3 vws, gabapentin (NEURONTIN) 100 MG capsule          Attestation:  This patient has been seen and evaluated by me, Kenji Cheung MD with the resident, Dr Rodriguez and the care team. I agree with the findings and plan of care as documented in this note.    Kenji Cheung MD  August 13, 2024  10:50 AM        Pt is a 44 year old female referred by Mariya Mohamud CNP here today for:   HPI:   Bilateral Wrist/ Hand pain: Bilateral hand pain and numbness/tingling. Pain is worse with signing, typing, and sleeping. Also having neck pain - uncomfortable sleeping at night, especially in L shoulder.   Inciting incidents? Has had several back incidents in her lifetime  Location: Numbness and tingling starts mid-forearms and into both hands.    Duration: Her symptoms have been present since 2017   Trauma/ Fall? NO   Swelling? NO   Numbness/ Tingling? Yes - see above   Weakness? 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     Imaging? MR ROLANDO shoulder 8/30/2023   Treatment? She has done  bracing and hand therapy for treatment; custom made splint for the right hand by hand therapy. Brace has given some relief but numbness/tingling still present, still needs one for the L hand.     DM -   Lab Results   Component Value Date    A1C 8.1 07/12/2024    A1C 8.7 04/15/2024    A1C 7.8 01/04/2024    A1C 7.7 07/25/2023    A1C 8.7 06/06/2023    A1C 9.9 04/04/2023    A1C 9.8 08/17/2022    A1C 11.6 04/05/2021    A1C 12.4 10/29/2020    A1C 12.9 07/08/2020    A1C 8.2 05/02/2019    A1C 10.3 10/15/2018         Per Mariya Mohamud's note on 8/1/24:  Neuropathic pain of hand  - Orthopedic  Referral; Future  - EMG; Future  - Occupational Therapy  Referral; Future    Nayeli presents today with an . She is concerned about bilateral hand numbness and tingling which extends from her mid forearms to the ends of all of her fingers equally. She states this wakes her up at night especially. She was given a brace to wear for some time but stated it did not help. She has friends who have had similar symptoms and advised her to have an EMG test to be tested for carpal tunnel. In retrospect she believes she has had these symptoms since 2017 but they are worse.         Past Medical History:   Diagnosis Date     Combined visual and hearing impairment      Deaf      Diabetic retinopathy of both eyes (H) 8/19/2011     Hepatic steatosis 08/19/2011     History of tobacco use      Hyperlipidemia      Hypertension      Hypertriglyceridemia      Migraines      Obesity 8/19/2011     Problem list name updated by automated process. 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      Past Surgical History:   Procedure Laterality Date     DAVINCI HYSTERECTOMY TOTAL, SALPINGECTOMY BILATERAL Bilateral 2/7/2024    Procedure: HYSTERECTOMY,  TOTAL, ROBOT-ASSISTED, WITH BILATERAL SALPINGECTOMY, CYSTOSCOPY;  Surgeon: Vonnie Cowan MD;  Location: [facility] OR     ESOPHAGOSCOPY, GASTROSCOPY, DUODENOSCOPY (EGD), COMBINED N/A 6/13/2024    Procedure: ESOPHAGOGASTRODUODENOSCOPY, WITH BIOPSY;  Surgeon: Nora Brice MD;  Location: [facility] OR     LAPAROSCOPIC CHOLECYSTECTOMY N/A 3/10/2018    Procedure: LAPAROSCOPIC CHOLECYSTECTOMY;  Laparoscopic Cholecystectomy ;  Surgeon: Kuldeep Sigala MD;  Location: [facility] OR     RELEASE TRIGGER FINGER Right 5/2/2019    Procedure: Right Thumb Trigger Release;  Surgeon: Greg Streeter MD;  [facility]     RELEASE TRIGGER FINGER Left 5/30/2019    Procedure: Left Ring Trigger Finger Release.  Ganglion cyst excision.;  Surgeon: Greg Streeter MD;  [facility]     RELEASE TRIGGER FINGER Right 6/13/2023    Procedure: RELEASE, RIGHT TRIGGER FINGER, INDEX AND MIDDLE;  Surgeon: Miracle Carlin MD;  Location: [facility] OR     RELEASE TRIGGER FINGER Left 8/1/2023    Procedure: RELEASE, LEFT TRIGGER FINGER, MIDDLE;  Surgeon: Miracle Carlin MD;  Location: [facility] OR      Current Outpatient Medications   Medication Sig Dispense Refill     acetaminophen (TYLENOL) 500 MG tablet Take 2 tablets (1,000 mg) by mouth every 8 hours as needed for mild pain 100 tablet 3     Alcohol Swabs (ALCOHOL PREP PAD) 70 % PADS 1 each 3 times daily 100 each 3     amLODIPine (NORVASC) 5 MG tablet Take 1 tablet (5 mg) by mouth at bedtime 90 tablet 3     aspirin (ASA) 81 MG chewable tablet Take 1 tablet (81 mg) by mouth daily CHEW AND SWALLOW 90 tablet 1     atorvastatin (LIPITOR) 40 MG tablet Take 1 tablet (40 mg) by mouth daily 90 tablet 1     blood glucose (ACCU-CHEK LAYA PLUS) test strip Use with  Accucheck Expert meter.  Test blood sugar 6 times daily. 600 each 3     blood glucose monitoring (ACCU-CHEK FASTCLIX) lancets Use to test blood sugar 6 times daily or as directed 510 each 3     calcium carbonate-vitamin D (OSCAL)  500-5 MG-MCG tablet Take 1 tablet by mouth 2 times daily 180 tablet 1     Continuous Glucose Sensor (FREESTYLE ZORAIDA 3 SENSOR) MISC 1 each every 14 days Please provide 3 month supply. 6 each 3     empagliflozin (JARDIANCE) 25 MG TABS tablet Take 1 tablet (25 mg) by mouth daily for 180 days 90 tablet 1     ergocalciferol (ERGOCALCIFEROL) 1.25 MG (76264 UT) capsule Take 1 capsule (50,000 Units) by mouth once a week For additional refills, please schedule a follow-up appointment at [phone] 12 capsule 3     fenofibrate (TRIGLIDE/LOFIBRA) 160 MG tablet Take 1 tablet (160 mg) by mouth daily 90 tablet 3     fish oil-omega-3 fatty acids 1000 MG capsule TAKE TWO CAPSULES (2 GM) BY MOUTH ONCE DAILY 180 capsule 3     ibuprofen (ADVIL/MOTRIN) 600 MG tablet Take 1 tablet (600 mg) by mouth every 6 hours as needed for moderate pain 120 tablet 1     Injection Device for insulin (INPEN 100-PINK-NOVOLOG-FIASP) DAVID 1 each continuous 1 each 0     insulin aspart (NOVOLOG FLEXPEN) 100 UNIT/ML pen 1 unit per 5 grams CHO and correction scale of 1/25/125.  Approximate daily use is 100 units. 30 mL 2     insulin aspart (NOVOLOG PENFILL) 100 UNIT/ML cartridge Take with each meal and snack: 1 per 3 grams CHO and correction of 1 unit per 20 mg/dL over a target of 120. Average daily dose is 85 units. 75 mL 3     insulin glargine (BASAGLAR KWIKPEN) 100 UNIT/ML pen Inject 60 Units Subcutaneous daily 3 month supply. (Patient taking differently: Inject 55 Units subcutaneously daily 3 month supply.) 54 mL 3     insulin pen needle (31G X 5 MM) 31G X 5 MM miscellaneous Use 5 to 6 pen needles daily or as directed.  3 month supply. 500 each 3     losartan (COZAAR) 100 MG tablet Take 1 tablet (100 mg) by mouth daily 90 tablet 3     omeprazole (PRILOSEC) 40 MG DR capsule Take 1 capsule (40 mg) by mouth 2 times daily 90 capsule 0     omeprazole (PRILOSEC) 40 MG DR capsule Take 1 capsule (40 mg) by mouth 2 times daily 8-12 weeks 90 capsule 0      ondansetron (ZOFRAN ODT) 4 MG ODT tab Take 1 tablet (4 mg) by mouth every 6 hours as needed for nausea or vomiting 20 tablet 0     tirzepatide (MOUNJARO) 2.5 MG/0.5ML pen Inject 2.5 mg Subcutaneous every 7 days 6 mL 3     tirzepatide (MOUNJARO) 5 MG/0.5ML pen Inject 5 mg subcutaneously every 7 days 2 mL 11     tirzepatide (MOUNJARO) 5 MG/0.5ML pen Inject 5 mg subcutaneously every 7 days 2 mL 1      No Known Allergies   ROS:   Gen- no fevers/chills   Derm - no rash/ redness   Neuro - see HPI  Remainder of ROS negative.     Exam:   LMP 02/01/2024 (Approximate)      BL WRIST/HAND:   Inspection: Swelling - none; Atrophy - none   Sensation: subjective paresthesias and dec sensation diffusely   ROM:   Wrist: flexion- full/ extension- full/ pronation- full/ supination- full;   radial deviation - full; ulnar deviation- full   Hand: Full flexion at PIP/DIP, finger abduction/ adduction.   Strength: 4/5 w/   Bony tenderness:   Wrist: Carpal bones: none; Snuffbox: none   Hand: Metacarpals: none; Phalanges: none   Tendons: FDS/FDP/Extensor mechanism intact   Maneuvers: + median compression test. +Tinel's/Phalen; -Finkelstein  Other: No nodules palpated in palmar aspect.     L shoulder:  Inspection: no swelling, no atrophy  Sensation: grossly intact  ROM:  R Flexion @ neck: limited d/t pain   Extension: limited d/t pain  Forward Flexion:intact  Adduction/abduction: intact  Internal rotation: intact  External rotation: limited d/t pain - cannot put hand behind her back   Strength: teres minor/infraspinatus 5/5        Xray of c-spine on August 13, 2024 at [facility] location - films personally reviewed with patient at time of visit     My impression: straightening of cervical lordosis; otherwise minimal degenerative changes of c-spine         Again, thank you for allowing me to participate in the care of your patient.      Sincerely,    Kenji Cheung MD

## 2024-08-14 VITALS — BODY MASS INDEX: 34.18 KG/M2 | WEIGHT: 180.9 LBS

## 2024-08-14 NOTE — PROGRESS NOTES
.Diabetes Self-Management Education & Support    Nayeli Caal presents today for education related to Type 2 diabetes    Patient is being treated with:  MDI Insulin and oral meds  She is accompanied by self and     Year of diagnosis: 2013 or 2014  Referring provider:  Anne Marie Medina PA-C  Living Situation: Lives with a room mate  Employment: Administrative work for [employer]    PATIENT CONCERNS/REASON FOR REFERRAL:  Ongoing DSME and support    ASSESSMENT:    Taking Medication:     Current Diabetes Management per Patient:  Taking diabetes medications  She is taking Lantus 40 units daily.   Currently taking Mounjaro 5 mg weekly--tolerating well.  GERD is better after using a PPI for the past month or so.    Jardiance:  25 mg daily.  No report of vaginal yeast infections or UTI.    In addition she is using an In-Pen with Novolog cartridges with the following settings.      IN PEN SETTINGS:  Start time ICR  1 unit/gm Insulin Sensitivity Factor Target BG    06:00 AM 2 15 130   11:00 AM 2 15 110   05:00 PM 2 15 110   09:30 PM 2 15 130           Active Insulin Time:  3  Maximum Bolus:  30 units      Monitoring    Patient glucose self monitoring as follows: continuously using a continuous glucose monitor (CGM)  CGM: Evelia 3   BG results: [employer] report appears below:             Patient's most recent   Lab Results   Component Value Date    A1C 8.7 04/15/2024    A1C 7.8 01/04/2024    A1C 11.6 04/05/2021      Patient's A1C goal: <7.0.  Her estimated GMI is 6.9%  At last visit about a month ago her Time in Range was 59%.  Today she has been in target range 71% of the time, a huge improvement.  I gave her a lot of positive feedback for this.   She is very consistent about taking her Lantus insulin but states that sometimes she forgets to cover a late night snack, which accounts for some of the overnight highs we see.      Activity: no regular exercise program. Not discussed today.  She  tries to be as active as she can be, given some of her physical limitations.      Healthy Eating:  She states that she eats a healthy diet.  We didn't examine this in great detail today.      Problem Solving:      Having hypoglycemia a little more frequently.  Discussed appropriate treatment.    Hospitalizations for hyper or hypoglycemia: None    EDUCATION and INSTRUCTION PROVIDED AT THIS VISIT:     History of her GLP-1 use.   April 2023:  Didn't tolerate either Metformin (intractable diarrhea), or Jardiance (frequent yeast infections) so Ozempic 0.25 mg started.   January 2024:  She had titrated up to 2mg Ozempic and was tolerating well, however Ms. Medina felt she could get better control with Mounjaro, so Ozempic was discontinued and Mounjaro 2.5mg was started.   March 2024:  Mounjaro dose increased to 5 mg.  Still tolerating well, but some complaint of GERD symptoms.    April 2024:  Saw Dr. Bragg.  Still tolerating the 5 mg dose   May 2024:  Ready to advance to Mounjaro 7.5 however the drug was not available, so Ms. Medina changed her script to Ozempic 2 mg.                      Nayeli took two doses of this and had severe nausea and vomiting along with the GERD symptoms she had been experiencing.  She messaged me that  she was going to stop the medication as she was on her way out of town.  Since then, Mounjaro has become available.     Because she is ready to start using the 7.5 mg dose of Mounjaro, we reduced her glargine insulin dose to 35 units, to be started when she starts the Mounjaro 7.5 mg dose.  We also relaxed her insulin to carb ratio from 1 unit per 2 grams CHO to 1 unit per 3 grams CHO at breakfast and lunch and 1 unit per 4 grams CHO at dinnertime, as this seems to be the time that she is most likely to drop post-prandially.       She has been having quite a few problems with her hands, for which she has seen a hand specialist.  There is a question about whether the pain and weakness she has  been experiencing in her arms and hands is related to cervical disc problems, so she is scheduled to undergo EMG and depending on the results of that, an MRI of her neck.  It will be important, if she requires surgery, to have her diabetes in the best possible control.      FOLLOW-UP:      Appointment scheduled in one month.    She will call if she continues to have hypoglycemia after reducing her glargine dose to 35 units.        Any diabetes medication dose changes were made via the [facility] Protocol and Collaborative Practice Agreement with [place] and  [facility].  A copy of this encounter was provided to patient's referring provider.

## 2024-08-19 ENCOUNTER — THERAPY VISIT (OUTPATIENT)
Dept: OCCUPATIONAL THERAPY | Facility: CLINIC | Age: 44
End: 2024-08-19
Payer: COMMERCIAL

## 2024-08-19 DIAGNOSIS — M79.641 PAIN IN BOTH HANDS: Primary | ICD-10-CM

## 2024-08-19 DIAGNOSIS — M79.642 PAIN IN BOTH HANDS: Primary | ICD-10-CM

## 2024-08-19 PROCEDURE — T1013 SIGN LANG/ORAL INTERPRETER: HCPCS | Mod: U3 | Performed by: OCCUPATIONAL THERAPIST

## 2024-08-19 PROCEDURE — 97535 SELF CARE MNGMENT TRAINING: CPT | Mod: GO | Performed by: OCCUPATIONAL THERAPIST

## 2024-08-19 PROCEDURE — 97760 ORTHOTIC MGMT&TRAING 1ST ENC: CPT | Mod: GO | Performed by: OCCUPATIONAL THERAPIST

## 2024-08-20 ENCOUNTER — MYC MEDICAL ADVICE (OUTPATIENT)
Dept: ENDOCRINOLOGY | Facility: CLINIC | Age: 44
End: 2024-08-20
Payer: COMMERCIAL

## 2024-08-20 DIAGNOSIS — Z79.4 UNCONTROLLED TYPE 2 DIABETES MELLITUS WITH HYPERGLYCEMIA, WITH LONG-TERM CURRENT USE OF INSULIN (H): ICD-10-CM

## 2024-08-20 DIAGNOSIS — E11.65 UNCONTROLLED TYPE 2 DIABETES MELLITUS WITH HYPERGLYCEMIA, WITH LONG-TERM CURRENT USE OF INSULIN (H): ICD-10-CM

## 2024-08-20 RX ORDER — INSULIN GLARGINE 100 [IU]/ML
INJECTION, SOLUTION SUBCUTANEOUS
Qty: 45 ML | Refills: 3 | Status: SHIPPED | OUTPATIENT
Start: 2024-08-20

## 2024-08-20 RX ORDER — BLOOD-GLUCOSE SENSOR
1 EACH MISCELLANEOUS
Qty: 6 EACH | Refills: 3 | Status: SHIPPED | OUTPATIENT
Start: 2024-08-20

## 2024-08-20 RX ORDER — INSULIN ASPART 100 [IU]/ML
INJECTION, SOLUTION INTRAVENOUS; SUBCUTANEOUS
Qty: 75 ML | Refills: 3 | Status: SHIPPED | OUTPATIENT
Start: 2024-08-20 | End: 2024-10-07

## 2024-08-20 NOTE — TELEPHONE ENCOUNTER
insulin glargine (BASAGLAR KWIKPEN) 100 UNIT/ML pen   Disp-54 mL, R-3   Start: 04/17/2023     insulin aspart (NOVOLOG PENFILL) 100 UNIT/ML cartridge   Disp-75 mL, R-3,   Start: 06/26/2024 7/12/2024  [facility] [place]    Anne Marie Medina PA-C  Endocrinology, Diabetes, and Metabolism    Routed because:  my chart request. Wants it at  [address] [facility] tomorrow.

## 2024-08-21 ENCOUNTER — OFFICE VISIT (OUTPATIENT)
Dept: NEUROLOGY | Facility: CLINIC | Age: 44
End: 2024-08-21
Attending: NURSE PRACTITIONER
Payer: COMMERCIAL

## 2024-08-21 DIAGNOSIS — G56.03 BILATERAL CARPAL TUNNEL SYNDROME: ICD-10-CM

## 2024-08-21 DIAGNOSIS — G56.02 CARPAL TUNNEL SYNDROME OF LEFT WRIST: Primary | ICD-10-CM

## 2024-08-21 DIAGNOSIS — M79.2 NEUROPATHIC PAIN OF HAND: ICD-10-CM

## 2024-08-21 PROCEDURE — 95885 MUSC TST DONE W/NERV TST LIM: CPT | Mod: 59 | Performed by: PSYCHIATRY & NEUROLOGY

## 2024-08-21 PROCEDURE — 95911 NRV CNDJ TEST 9-10 STUDIES: CPT | Mod: GC | Performed by: PSYCHIATRY & NEUROLOGY

## 2024-08-21 PROCEDURE — T1013 SIGN LANG/ORAL INTERPRETER: HCPCS | Mod: U3

## 2024-08-21 PROCEDURE — 95886 MUSC TEST DONE W/N TEST COMP: CPT | Mod: GC | Performed by: PSYCHIATRY & NEUROLOGY

## 2024-08-21 NOTE — PROGRESS NOTES
[facility]  Electrodiagnostic Laboratory                 Department of Neurology                                                                                                         Test Date:  2024    Patient: Nayeli Caal : 1980 Physician: Bert Tavera MD   Sex: Female AGE: 44 year Ref Phys: SABI Bailey CNP   ID#: [id number]   Technician: Asha Burgos     History and Examination:  44 year old female with a past medial history of congential deafness and type 2 diabetes mellitus presents for bilateral upper motor extremity tingling from the neck to the hands, more severe on the left. EMG/NCS ordered to evaluate for focal neuropathy vs cervical radiculopathy.     Techniques:  Motor and sensory conduction studies were done with surface recording electrodes. EMG was done with a concentric needle electrode.     Results:  Nerve Conduction Studies  Bilateral median-D2 sensory responses are absent.  Bilateral ulnar-D5 and radial sensory responses were normal  Left median-APB motor response shows severely prolonged distal latency, moderately reduced amplitude and mild to moderately slowed CV in the forearm.   Right median-APB motor response shows severely prolonged distal latency, mildly reduced amplitude and normal CV in the forearm.   Bilateral ulnar-ADM motor responses are normal.   Bilateral median-lumbrical vs ulnar-interosseous interlatency differences are prolonged.     Needle EMG of selected proximal and distal right and left upper limb muscles was performed as tabulated below. No abnormal spontaneous activity was observed in the sampled muscles. Motor unit potential morphology and recruitment patterns were normal.     Interpretation:  This is an abnormal study. There is electrophysiologic evidence of severe median neuropathies at the wrists on both sides, as can be seen in the clinical context of severe bilateral carpal tunnel syndrome. There is  no evidence of a cervical radiculopathy affecting the upper limbs on the basis of this study. Clinical correlation is recommended.     Madhu Alvarado,    Neurophysiology Fellow    I was present for all key portions of the NCS/EMG study. All data and waveforms personally reviewed. I agree with the results and interpretation as above.  Bert Tavera MD  Department of Neurology        Nerve Conduction Studies  Motor Sites      Latency Neg. Amp Neg. Amp Diff Segment Distance Velocity Neg. Dur Neg Area Diff Temperature Comment   Site (ms) Norm (mV) Norm (%)  cm m/s Norm (ms) (%) ( C)    Left Median (APB) Motor   Wrist 7.8  < 4.4 3.7  > 5.0  Wrist-APB 8   8.2  30.6    Elbow 11.7 - 3.3  > 5.0 -11 Elbow-Wrist 15.5 40  > 48 8.0 -11 29.5    Right Median (APB) Motor   Wrist 7.2  < 4.4 4.6  > 5.0  Wrist-APB 8   8.2  29.4    Elbow 10.7 - 4.1  > 5.0 -11 Elbow-Wrist 18 51  > 48 9.1 -4 29.4    Left Median/Ulnar (Lumb-Dors Int II) Motor        Median (Lumb I)   Wrist 10.4 - 0.47 -  Wrist-Lumb I 10   5.2  28.9         Ulnar (Dors int II (pedis))   Wrist 3.6 - 3.9 -  Wrist-Dors int II (pedis) 10   4.9  28.8    Right Median/Ulnar (Lumb-Dors Int II) Motor        Median (Lumb I)   Wrist 10.3 - 0.22 -  Wrist-Lumb I 10   6.1  29.2         Ulnar (Dors int II (pedis))   Wrist 3.9 - 3.2 -  Wrist-Dors int II (pedis) 10   5.5  29.3    Left Ulnar (ADM) Motor   Wrist 2.9  < 3.5 7.8  > 5.0  Wrist-ADM 8   6.3  30.3    Below Elbow 6.2 - 6.9 - -12 Below Elbow-Wrist 17.5 53  > 48 6.7 -7 30.2    Above Elbow 7.6 - 6.8 - -1 Above Elbow-Below Elbow 7.5 54  > 48 6.9 -2 30.2    Right Ulnar (ADM) Motor   Wrist 2.8  < 3.5 7.6  > 5.0  Wrist-ADM 8   5.9  25.3    Below Elbow 5.8 - 7.2 - -5 Below Elbow-Wrist 17 57  > 48 6.2 -3 25.3    Above Elbow 7.3 - 7.0 - -3 Above Elbow-Below Elbow 9 60  > 48 6.2 -5 25.3      F-Wave Sites      Min F-Lat Max-Min F-Lat Mean F-Lat   Site (ms) (ms) (ms)   Left Ulnar F-Wave   Wrist 24.5 3.0 25.9   Right Ulnar F-Wave   Wrist 25.6  0.80 25.9     Sensory Sites      Onset Lat Peak Lat Amp (O-P) Amp (P-P) Segment Distance Velocity Temperature Comment   Site ms (ms)  V Norm ( V)  cm m/s Norm ( C)    Left Median Sensory   Wrist-Dig II NR NR NR  > 10 NR Wrist-Dig II 14 NR  > 48 25.4    Right Median Sensory   Wrist-Dig II NR NR NR  > 10 NR Wrist-Dig II 12.5 NR  > 48 28.9    Left Median-Ulnar Palmar Sensory        Median   Palm-Wrist NR NR NR - NR Palm-Wrist 8 NR - 29.2         Ulnar   Palm-Wrist 1.43 1.95 4 - 3 Palm-Wrist 8 56 - 29.2    Right Median-Ulnar Palmar Sensory        Median   Palm-Wrist NR NR NR - NR Palm-Wrist 8 NR - 28.9         Ulnar   Palm-Wrist 1.48 2.2 4 - 6 Palm-Wrist 8 54 - 28.9    Left Radial Sensory   Forearm-Wrist 1.43 2.2 17  > 15 20 Forearm-Wrist 10 70 - 32.3    Right Radial Sensory   Forearm-Wrist 1.88 2.5 15  > 15 16 Forearm-Wrist 10 53 - 31.4    Left Ulnar Sensory   Wrist-Dig V 2.0 2.8 8  > 8 8 Wrist-Dig V 12.5 63  > 48 25.4    Right Ulnar Sensory   Wrist-Dig V 2.6 3.5 11  > 8 11 Wrist-Dig V 14 54  > 48 28.8      Inter-Nerve Comparisons     Nerve 1 Value 1 Nerve 2 Value 2 Parameter Result Normal   Sensory Sites   R Median Palm-Wrist - R Ulnar Palm-Wrist 2.2 ms Peak Lat Diff - <0.40   L Median Palm-Wrist - L Ulnar Palm-Wrist 1.95 ms Peak Lat Diff - <0.40       Electromyography     Side Muscle Ins Act Fibs/PSW Fasc HF Amp Dur Poly Recrt Int Pat   Left FDI Nml None Nml 0 Nml Nml 0 Nml Nml   Left Biceps Nml None Nml 0 Nml Nml 0 Nml Nml   Left Triceps Nml None Nml 0 Nml Nml 0 Nml Nml   Left Deltoid Nml None Nml 0 Nml Nml 0 Nml Nml   Left Pronator Teres Nml None Nml 0 Nml Nml 0 Nml Nml   Right Deltoid Nml None Nml 0 Nml Nml 0 Nml Nml   Right Triceps Nml None Nml 0 Nml Nml 0 Nml Nml   Right FDI Nml None Nml 0 Nml Nml 0 Nml Nml           NCS Waveforms:    Motor                    Sensory                           F-Wave            Ultrasound Images:

## 2024-08-21 NOTE — LETTER
2024       RE: Nayeli Caal  [address]  [facility] [address]     Dear Colleague,    Thank you for referring your patient, Nayeli Caal, to the [facility] [place] at [facility]. Please see a copy of my visit note below.                        [facility]  Electrodiagnostic Laboratory                 Department of Neurology                                                                                                         Test Date:  2024    Patient: Nayeli Caal : 1980 Physician: Bert Tavera MD   Sex: Female AGE: 44 year Ref Phys: SABI Bailey CNP   ID#: [id number]   Technician: Asha Burgos     History and Examination:  44 year old female with a past medial history of congential deafness and type 2 diabetes mellitus presents for bilateral upper motor extremity tingling from the neck to the hands, more severe on the left. EMG/NCS ordered to evaluate for focal neuropathy vs cervical radiculopathy.     Techniques:  Motor and sensory conduction studies were done with surface recording electrodes. EMG was done with a concentric needle electrode.     Results:  Nerve Conduction Studies  Bilateral median-D2 sensory responses are absent.  Bilateral ulnar-D5 and radial sensory responses were normal  Left median-APB motor response shows severely prolonged distal latency, moderately reduced amplitude and mild to moderately slowed CV in the forearm.   Right median-APB motor response shows severely prolonged distal latency, mildly reduced amplitude and normal CV in the forearm.   Bilateral ulnar-ADM motor responses are normal.   Bilateral median-lumbrical vs ulnar-interosseous interlatency differences are prolonged.     Needle EMG of selected proximal and distal right and left upper limb muscles was performed as tabulated below. No abnormal spontaneous activity was observed  in the sampled muscles. Motor unit potential morphology and recruitment patterns were normal.     Interpretation:  This is an abnormal study. There is electrophysiologic evidence of severe median neuropathies at the wrists on both sides, as can be seen in the clinical context of severe bilateral carpal tunnel syndrome. There is no evidence of a cervical radiculopathy affecting the upper limbs on the basis of this study. Clinical correlation is recommended.     Madhu Alvarado DO   Neurophysiology Fellow    I was present for all key portions of the NCS/EMG study. All data and waveforms personally reviewed. I agree with the results and interpretation as above.  Bert Tavera MD  Department of Neurology        Nerve Conduction Studies  Motor Sites      Latency Neg. Amp Neg. Amp Diff Segment Distance Velocity Neg. Dur Neg Area Diff Temperature Comment   Site (ms) Norm (mV) Norm (%)  cm m/s Norm (ms) (%) ( C)    Left Median (APB) Motor   Wrist 7.8  < 4.4 3.7  > 5.0  Wrist-APB 8   8.2  30.6    Elbow 11.7 - 3.3  > 5.0 -11 Elbow-Wrist 15.5 40  > 48 8.0 -11 29.5    Right Median (APB) Motor   Wrist 7.2  < 4.4 4.6  > 5.0  Wrist-APB 8   8.2  29.4    Elbow 10.7 - 4.1  > 5.0 -11 Elbow-Wrist 18 51  > 48 9.1 -4 29.4    Left Median/Ulnar (Lumb-Dors Int II) Motor        Median (Lumb I)   Wrist 10.4 - 0.47 -  Wrist-Lumb I 10   5.2  28.9         Ulnar (Dors int II (pedis))   Wrist 3.6 - 3.9 -  Wrist-Dors int II (pedis) 10   4.9  28.8    Right Median/Ulnar (Lumb-Dors Int II) Motor        Median (Lumb I)   Wrist 10.3 - 0.22 -  Wrist-Lumb I 10   6.1  29.2         Ulnar (Dors int II (pedis))   Wrist 3.9 - 3.2 -  Wrist-Dors int II (pedis) 10   5.5  29.3    Left Ulnar (ADM) Motor   Wrist 2.9  < 3.5 7.8  > 5.0  Wrist-ADM 8   6.3  30.3    Below Elbow 6.2 - 6.9 - -12 Below Elbow-Wrist 17.5 53  > 48 6.7 -7 30.2    Above Elbow 7.6 - 6.8 - -1 Above Elbow-Below Elbow 7.5 54  > 48 6.9 -2 30.2    Right Ulnar (ADM) Motor   Wrist 2.8  < 3.5 7.6  > 5.0   Wrist-ADM 8   5.9  25.3    Below Elbow 5.8 - 7.2 - -5 Below Elbow-Wrist 17 57  > 48 6.2 -3 25.3    Above Elbow 7.3 - 7.0 - -3 Above Elbow-Below Elbow 9 60  > 48 6.2 -5 25.3      F-Wave Sites      Min F-Lat Max-Min F-Lat Mean F-Lat   Site (ms) (ms) (ms)   Left Ulnar F-Wave   Wrist 24.5 3.0 25.9   Right Ulnar F-Wave   Wrist 25.6 0.80 25.9     Sensory Sites      Onset Lat Peak Lat Amp (O-P) Amp (P-P) Segment Distance Velocity Temperature Comment   Site ms (ms)  V Norm ( V)  cm m/s Norm ( C)    Left Median Sensory   Wrist-Dig II NR NR NR  > 10 NR Wrist-Dig II 14 NR  > 48 25.4    Right Median Sensory   Wrist-Dig II NR NR NR  > 10 NR Wrist-Dig II 12.5 NR  > 48 28.9    Left Median-Ulnar Palmar Sensory        Median   Palm-Wrist NR NR NR - NR Palm-Wrist 8 NR - 29.2         Ulnar   Palm-Wrist 1.43 1.95 4 - 3 Palm-Wrist 8 56 - 29.2    Right Median-Ulnar Palmar Sensory        Median   Palm-Wrist NR NR NR - NR Palm-Wrist 8 NR - 28.9         Ulnar   Palm-Wrist 1.48 2.2 4 - 6 Palm-Wrist 8 54 - 28.9    Left Radial Sensory   Forearm-Wrist 1.43 2.2 17  > 15 20 Forearm-Wrist 10 70 - 32.3    Right Radial Sensory   Forearm-Wrist 1.88 2.5 15  > 15 16 Forearm-Wrist 10 53 - 31.4    Left Ulnar Sensory   Wrist-Dig V 2.0 2.8 8  > 8 8 Wrist-Dig V 12.5 63  > 48 25.4    Right Ulnar Sensory   Wrist-Dig V 2.6 3.5 11  > 8 11 Wrist-Dig V 14 54  > 48 28.8      Inter-Nerve Comparisons     Nerve 1 Value 1 Nerve 2 Value 2 Parameter Result Normal   Sensory Sites   R Median Palm-Wrist - R Ulnar Palm-Wrist 2.2 ms Peak Lat Diff - <0.40   L Median Palm-Wrist - L Ulnar Palm-Wrist 1.95 ms Peak Lat Diff - <0.40       Electromyography     Side Muscle Ins Act Fibs/PSW Fasc HF Amp Dur Poly Recrt Int Pat   Left FDI Nml None Nml 0 Nml Nml 0 Nml Nml   Left Biceps Nml None Nml 0 Nml Nml 0 Nml Nml   Left Triceps Nml None Nml 0 Nml Nml 0 Nml Nml   Left Deltoid Nml None Nml 0 Nml Nml 0 Nml Nml   Left Pronator Teres Nml None Nml 0 Nml Nml 0 Nml Nml   Right Deltoid Nml  None Nml 0 Nml Nml 0 Nml Nml   Right Triceps Nml None Nml 0 Nml Nml 0 Nml Nml   Right FDI Nml None Nml 0 Nml Nml 0 Nml Nml           NCS Waveforms:    Motor                    Sensory                           F-Wave            Ultrasound Images:                Again, thank you for allowing me to participate in the care of your patient.      Sincerely,    Bert Tavera MD

## 2024-09-04 ENCOUNTER — APPOINTMENT (OUTPATIENT)
Dept: CT IMAGING | Facility: CLINIC | Age: 44
End: 2024-09-04
Attending: EMERGENCY MEDICINE
Payer: COMMERCIAL

## 2024-09-04 ENCOUNTER — NURSE TRIAGE (OUTPATIENT)
Dept: NURSING | Facility: CLINIC | Age: 44
End: 2024-09-04

## 2024-09-04 ENCOUNTER — HOSPITAL ENCOUNTER (EMERGENCY)
Facility: CLINIC | Age: 44
Discharge: HOME | End: 2024-09-04
Attending: EMERGENCY MEDICINE | Admitting: EMERGENCY MEDICINE
Payer: COMMERCIAL

## 2024-09-04 VITALS
TEMPERATURE: 98.6 F | DIASTOLIC BLOOD PRESSURE: 68 MMHG | SYSTOLIC BLOOD PRESSURE: 121 MMHG | RESPIRATION RATE: 16 BRPM | HEART RATE: 78 BPM | OXYGEN SATURATION: 97 %

## 2024-09-04 DIAGNOSIS — K52.9 NON-SPECIFIC COLITIS: ICD-10-CM

## 2024-09-04 LAB
ALBUMIN SERPL BCG-MCNC: 4.4 G/DL (ref 3.5–5.2)
ALBUMIN UR-MCNC: 20 MG/DL
ALP SERPL-CCNC: 62 U/L (ref 40–150)
ALT SERPL W P-5'-P-CCNC: 42 U/L (ref 0–50)
ANION GAP SERPL CALCULATED.3IONS-SCNC: 12 MMOL/L (ref 7–15)
APPEARANCE UR: CLEAR
AST SERPL W P-5'-P-CCNC: 25 U/L (ref 0–45)
B-OH-BUTYR SERPL-SCNC: 0.5 MMOL/L
BILIRUB SERPL-MCNC: 0.2 MG/DL
BILIRUB UR QL STRIP: NEGATIVE
BUN SERPL-MCNC: 21.1 MG/DL (ref 6–20)
CALCIUM SERPL-MCNC: 9.2 MG/DL (ref 8.8–10.4)
CHLORIDE SERPL-SCNC: 107 MMOL/L (ref 98–107)
COLOR UR AUTO: ABNORMAL
CREAT SERPL-MCNC: 0.74 MG/DL (ref 0.51–0.95)
EGFRCR SERPLBLD CKD-EPI 2021: >90 ML/MIN/1.73M2
ERYTHROCYTE [DISTWIDTH] IN BLOOD BY AUTOMATED COUNT: 13.2 % (ref 10–15)
GLUCOSE SERPL-MCNC: 84 MG/DL (ref 70–99)
GLUCOSE UR STRIP-MCNC: >=1000 MG/DL
HCG SERPL QL: NEGATIVE
HCO3 SERPL-SCNC: 20 MMOL/L (ref 22–29)
HCT VFR BLD AUTO: 38.6 % (ref 35–47)
HGB BLD-MCNC: 12.2 G/DL (ref 11.7–15.7)
HGB UR QL STRIP: NEGATIVE
KETONES UR STRIP-MCNC: NEGATIVE MG/DL
LEUKOCYTE ESTERASE UR QL STRIP: NEGATIVE
LIPASE SERPL-CCNC: 36 U/L (ref 13–60)
MCH RBC QN AUTO: 27.5 PG (ref 26.5–33)
MCHC RBC AUTO-ENTMCNC: 31.6 G/DL (ref 31.5–36.5)
MCV RBC AUTO: 87 FL (ref 78–100)
NITRATE UR QL: NEGATIVE
PH UR STRIP: 5.5 [PH] (ref 5–7)
PLATELET # BLD AUTO: 379 10E3/UL (ref 150–450)
POTASSIUM SERPL-SCNC: 4.3 MMOL/L (ref 3.4–5.3)
PROT SERPL-MCNC: 7.6 G/DL (ref 6.4–8.3)
RBC # BLD AUTO: 4.43 10E6/UL (ref 3.8–5.2)
RBC URINE: 0 /HPF
SODIUM SERPL-SCNC: 139 MMOL/L (ref 135–145)
SP GR UR STRIP: 1.03 (ref 1–1.03)
SQUAMOUS EPITHELIAL: 1 /HPF
UROBILINOGEN UR STRIP-MCNC: NORMAL MG/DL
WBC # BLD AUTO: 12.5 10E3/UL (ref 4–11)
WBC URINE: 1 /HPF

## 2024-09-04 PROCEDURE — 85027 COMPLETE CBC AUTOMATED: CPT | Performed by: EMERGENCY MEDICINE

## 2024-09-04 PROCEDURE — 84703 CHORIONIC GONADOTROPIN ASSAY: CPT | Performed by: STUDENT IN AN ORGANIZED HEALTH CARE EDUCATION/TRAINING PROGRAM

## 2024-09-04 PROCEDURE — 85027 COMPLETE CBC AUTOMATED: CPT | Performed by: STUDENT IN AN ORGANIZED HEALTH CARE EDUCATION/TRAINING PROGRAM

## 2024-09-04 PROCEDURE — 96360 HYDRATION IV INFUSION INIT: CPT

## 2024-09-04 PROCEDURE — 36415 COLL VENOUS BLD VENIPUNCTURE: CPT | Performed by: STUDENT IN AN ORGANIZED HEALTH CARE EDUCATION/TRAINING PROGRAM

## 2024-09-04 PROCEDURE — 99284 EMERGENCY DEPT VISIT MOD MDM: CPT | Mod: 25

## 2024-09-04 PROCEDURE — 74177 CT ABD & PELVIS W/CONTRAST: CPT

## 2024-09-04 PROCEDURE — 81001 URINALYSIS AUTO W/SCOPE: CPT | Performed by: EMERGENCY MEDICINE

## 2024-09-04 PROCEDURE — 250N000011 HC RX IP 250 OP 636: Performed by: EMERGENCY MEDICINE

## 2024-09-04 PROCEDURE — 83690 ASSAY OF LIPASE: CPT | Performed by: STUDENT IN AN ORGANIZED HEALTH CARE EDUCATION/TRAINING PROGRAM

## 2024-09-04 PROCEDURE — 80053 COMPREHEN METABOLIC PANEL: CPT | Performed by: STUDENT IN AN ORGANIZED HEALTH CARE EDUCATION/TRAINING PROGRAM

## 2024-09-04 PROCEDURE — 82010 KETONE BODYS QUAN: CPT | Performed by: EMERGENCY MEDICINE

## 2024-09-04 PROCEDURE — 83690 ASSAY OF LIPASE: CPT | Performed by: EMERGENCY MEDICINE

## 2024-09-04 PROCEDURE — 96361 HYDRATE IV INFUSION ADD-ON: CPT

## 2024-09-04 PROCEDURE — 80053 COMPREHEN METABOLIC PANEL: CPT | Performed by: EMERGENCY MEDICINE

## 2024-09-04 PROCEDURE — 81001 URINALYSIS AUTO W/SCOPE: CPT | Performed by: STUDENT IN AN ORGANIZED HEALTH CARE EDUCATION/TRAINING PROGRAM

## 2024-09-04 PROCEDURE — 258N000003 HC RX IP 258 OP 636: Performed by: EMERGENCY MEDICINE

## 2024-09-04 PROCEDURE — 250N000009 HC RX 250: Performed by: EMERGENCY MEDICINE

## 2024-09-04 PROCEDURE — 84703 CHORIONIC GONADOTROPIN ASSAY: CPT | Performed by: EMERGENCY MEDICINE

## 2024-09-04 RX ORDER — ONDANSETRON 4 MG/1
4 TABLET, ORALLY DISINTEGRATING ORAL EVERY 6 HOURS PRN
Qty: 10 TABLET | Refills: 0 | Status: SHIPPED | OUTPATIENT
Start: 2024-09-04 | End: 2024-09-07

## 2024-09-04 RX ORDER — IOPAMIDOL 755 MG/ML
91 INJECTION, SOLUTION INTRAVASCULAR ONCE
Status: COMPLETED | OUTPATIENT
Start: 2024-09-04 | End: 2024-09-04

## 2024-09-04 RX ADMIN — IOPAMIDOL 91 ML: 755 INJECTION, SOLUTION INTRAVENOUS at 15:59

## 2024-09-04 RX ADMIN — SODIUM CHLORIDE 66 ML: 9 INJECTION, SOLUTION INTRAVENOUS at 15:59

## 2024-09-04 RX ADMIN — SODIUM CHLORIDE 1000 ML: 9 INJECTION, SOLUTION INTRAVENOUS at 15:31

## 2024-09-04 ASSESSMENT — ACTIVITIES OF DAILY LIVING (ADL)
ADLS_ACUITY_SCORE: 35

## 2024-09-04 ASSESSMENT — COLUMBIA-SUICIDE SEVERITY RATING SCALE - C-SSRS
2. HAVE YOU ACTUALLY HAD ANY THOUGHTS OF KILLING YOURSELF IN THE PAST MONTH?: NO
1. IN THE PAST MONTH, HAVE YOU WISHED YOU WERE DEAD OR WISHED YOU COULD GO TO SLEEP AND NOT WAKE UP?: NO
6. HAVE YOU EVER DONE ANYTHING, STARTED TO DO ANYTHING, OR PREPARED TO DO ANYTHING TO END YOUR LIFE?: NO

## 2024-09-04 NOTE — ED PROVIDER NOTES
Emergency Department Note      History of Present Illness     Chief Complaint   Abdominal Pain and diarrhea      HPI   Of note, a  was used for this encounter.  Nayeli Caal is a 44 year old female who presents with abdominal pain and diarrhea.  [place], and then when she ate at home 2 mornings ago, she ate some eggs that did not taste good.  Her pain and diarrhea started after that, and she has had some nausea as well.  She has not had any vomiting or fevers.  She is also having some burning pain in her anus from all the diarrhea.  She denies any blood in the stools or any recent travel or antibiotic use.       Independent Historian   None    Review of External Notes   I reviewed the nurse triage phone note from today.     Past Medical History     Medical History and Problem List   Combined visual and hearing impairment  Hepatic steatosis  Tobacco use disorder   Hyperlipidemia   Hypertension   Hypertriglyceridemia  Migraines   Type 2 diabetes   Usher syndrome    Medications   Tylenol  Norvasc  Aspirin 81 mg   Lipitor  Triglide  Neurontin  Cozaar  Prilosec  Mounjaro  Synvisc one    Surgical History   Hysterectomy   EGD  Cholecystectomy   Right/Left trigger release    Physical Exam     Patient Vitals for the past 24 hrs:   BP Temp Temp src Pulse Resp SpO2   09/04/24 1732 -- -- -- -- -- 97 %   09/04/24 1731 -- -- -- -- -- 97 %   09/04/24 1218 121/68 98.6  F (37  C) Oral 78 16 98 %     Physical Exam  Nursing note and vitals reviewed.  Constitutional:  Oriented to person, place, and time. Cooperative.   HENT:   Nose:    Nose normal.   Mouth/Throat:   Mucous membranes are normal.   Eyes:    Conjunctivae normal and EOM are normal.      Pupils are equal, round, and reactive to light.   Neck:    Trachea normal.   Cardiovascular:  Normal rate, regular rhythm, normal heart sounds and normal pulses. No murmur  heard.  Pulmonary/Chest:  Effort normal and breath sounds normal.   Abdominal:   Soft. Normal appearance and bowel sounds are normal.      There is some mild diffuse tenderness to palpation.      There is no rebound and no CVA tenderness.   Musculoskeletal:  Extremities atraumatic x 4.   Lymphadenopathy:  No cervical adenopathy.   Neurological:   Alert and oriented to person, place, and time. Normal strength.      No cranial nerve deficit or sensory deficit. GCS eye subscore is 4. GCS verbal subscore is 5. GCS motor subscore is 6.   Skin:    Skin is intact. No rash noted.   Psychiatric:   Normal mood and affect.      Diagnostics     Lab Results   Labs Ordered and Resulted from Time of ED Arrival to Time of ED Departure   COMPREHENSIVE METABOLIC PANEL - Abnormal       Result Value    Sodium 139      Potassium 4.3      Carbon Dioxide (CO2) 20 (*)     Anion Gap 12      Urea Nitrogen 21.1 (*)     Creatinine 0.74      GFR Estimate >90      Calcium 9.2      Chloride 107      Glucose 84      Alkaline Phosphatase 62      AST 25      ALT 42      Protein Total 7.6      Albumin 4.4      Bilirubin Total 0.2     CBC WITH PLATELETS - Abnormal    WBC Count 12.5 (*)     RBC Count 4.43      Hemoglobin 12.2      Hematocrit 38.6      MCV 87      MCH 27.5      MCHC 31.6      RDW 13.2      Platelet Count 379     ROUTINE UA WITH MICROSCOPIC REFLEX TO CULTURE - Abnormal    Color Urine Light Yellow      Appearance Urine Clear      Glucose Urine >=1000 (*)     Bilirubin Urine Negative      Ketones Urine Negative      Specific Gravity Urine 1.029      Blood Urine Negative      pH Urine 5.5      Protein Albumin Urine 20 (*)     Urobilinogen Urine Normal      Nitrite Urine Negative      Leukocyte Esterase Urine Negative      RBC Urine 0      WBC Urine 1      Squamous Epithelials Urine 1     KETONE BETA-HYDROXYBUTYRATE QUANTITATIVE, RAPID - Abnormal    Ketone (Beta-Hydroxybutyrate) Quantitative 0.50 (*)    LIPASE - Normal    Lipase 36     HCG  QUALITATIVE PREGNANCY - Normal    hCG Serum Qualitative Negative     ENTERIC BACTERIA AND VIRUS PANEL BY PCR   C. DIFFICILE TOXIN B PCR WITH REFLEX TO C. DIFFICILE ANTIGEN AND TOXINS A/B EIA       Imaging   CT Abdomen Pelvis w Contrast   Final Result   IMPRESSION:       1.  Probable mild colitis extending from the mid transverse colon through the rectum.      2.  Fatty liver.         Independent Interpretation   None    ED Course      Medications Administered   Medications   sodium chloride 0.9% BOLUS 1,000 mL (0 mLs Intravenous Stopped 9/4/24 1813)   iopamidol (ISOVUE-370) solution 91 mL (91 mLs Intravenous $Given 9/4/24 1559)   100mL Saline Flush (66 mLs Intravenous $Given 9/4/24 1559)       Procedures   Procedures     Discussion of Management   None    ED Course   ED Course as of 09/04/24 1843   Wed Sep 04, 2024   1432 I obtained history and examined the patient as noted above         Additional Documentation  None    Medical Decision Making / Diagnosis     CMS Diagnoses: None    MIPS       None    [facility]   Nayeli MIXON Ingrid Caal is a 44 year old female who came in for further evaluation of diarrhea and some abdominal discomfort.  She really had minimal tenderness on exam, but I was still concerned that she might have colitis, diverticulitis, or pancreatitis, as well as possibly food poisoning, gastroenteritis, bowel obstruction, or possibly an atypical presentation for appendicitis or even DKA.  I proceeded with the above workup including the blood work, urine, and CT scan.  I also wanted to obtain stool cultures, however the patient was unable to provide a stool sample here.  Her workup here shows that she does in fact have nonspecific colitis.  She does not appear to be in DKA, nor does she appear to be septic or toxic.  I suspect that this might be from an infectious cause based on the history.  However I explained to her that we typically do not start people on antibiotics without stool culture results or  biopsies from a colonoscopy or flexible sigmoidoscopy.  She does feel comfortable going home, which I think is reasonable.  She has an appointment in 2 days with what sounds like primary care.  We will send her home with a collection kit so that hopefully she can collect a stool sample to bring into her clinic.  I will send her home with a prescription for Zofran as well.  I have provided her the contact information for a GI clinic as well, as she is not sure if she has an appointment next week with a GI physician.  She knows to return with any concerns or worsening symptoms as well.    Disposition   The patient was discharged.     Diagnosis     ICD-10-CM    1. Non-specific colitis  K52.9            Discharge Medications   Discharge Medication List as of 9/4/2024  6:14 PM        START taking these medications    Details   !! ondansetron (ZOFRAN ODT) 4 MG ODT tab Take 1 tablet (4 mg) by mouth every 6 hours as needed for nausea., Disp-10 tablet, R-0, Local Print       !! - Potential duplicate medications found. Please discuss with provider.            Scribe Disclosure:  I, Michael Amaro, am serving as a scribe at 3:22 PM on 9/4/2024 to document services personally performed by Nura Valladares MD based on my observations and the provider's statements to me.        Nura Valladares MD  09/04/24 1440

## 2024-09-04 NOTE — TELEPHONE ENCOUNTER
Nurse Triage SBAR    Is this a 2nd Level Triage? YES, LICENSED PRACTITIONER REVIEW IS REQUIRED    Situation: LOOSE STOOL/ ABD PAIN  Patient uses [facility].   was on line with triager and patient.  -Calling with issues of loose stool and severe   -Urination is fine- no mhematuria or dysuria, is making urine   She initially had diarrheal symptoms on Monday 9/2 , improved somewhat on Tuesday  -Now today, her diarrheal episodes are every 15 minutes, urgent and she notes dark fluid without not paul blood in stool. Anus is very sore.  -She has abd pain cramping and spasmodic  at 7-8/10. Recommend ED now   - She does also state she has stomach bloating and cramping Pepto Bismol  Drinking fluids- poorly- not much food today( yogurt, coffee),  but no vomiting.  She is diabetic and uses insulin       Background:Usher syndrome, patient is diabeticLabs CBC/ CMP / LFT done 5/31/24  Last Comprehensive Metabolic Panel:  Lab Results   Component Value Date     05/31/2024    POTASSIUM 4.2 05/31/2024    CHLORIDE 103 05/31/2024    CO2 23 05/31/2024    ANIONGAP 11 05/31/2024     (H) 05/31/2024    BUN 13.3 05/31/2024    CR 0.60 05/31/2024    GFRESTIMATED >90 05/31/2024    VERO 8.9 05/31/2024      CBC RESULTS:   Recent Labs   Lab Test 05/31/24  0909   WBC 7.7   RBC 4.39   HGB 12.3   HCT 36.6   MCV 83   MCH 28.0   MCHC 33.6   RDW 12.3       Liver Function Studies -   Recent Labs   Lab Test 05/31/24  0909   PROTTOTAL 7.1   ALBUMIN 4.0   BILITOTAL 0.3   ALKPHOS 96   AST 32   ALT 51*      Assessment: Patient with worsening diarrhea x 3 days, diabetic on insulin, abd pain 7-8/10 and possible occult blood in stool . Afebrile. ED NOW    Protocol Recommended Disposition:   ED    Recommendation: ED now, 911 if no transport.    Routed to provider    Does the patient meet one of the following criteria for ADS visit consideration? No    Reason for Disposition   Bloody, black, or tarry bowel movements  (Exception:  "Chronic-unchanged black-grey bowel movements and is taking iron pills or Pepto-Bismol.)   Constant abdominal pain lasting > 2 hours   SEVERE diarrhea (e.g., 7 or more times / day more than normal) and present > 24 hours (1 day)    Additional Information   Negative: Shock suspected (e.g., cold/pale/clammy skin, too weak to stand, low BP, rapid pulse)   Negative: Difficult to awaken or acting confused (e.g., disoriented, slurred speech)   Negative: Sounds like a life-threatening emergency to the triager   Negative: Vomiting also present and worse than the diarrhea   Negative: Blood in stool and without diarrhea   Negative: SEVERE diarrhea (e.g., 7 or more times / day more than normal) and age > 60 years    Answer Assessment - Initial Assessment Questions  1. DIARRHEA SEVERITY: \"How bad is the diarrhea?\" \"How many more stools have you had in the past 24 hours than normal?\"        -  SEVERE (SCALE 8-10; OR \"WORST POSSIBLE\"): Increase of 7 or more stools daily over normal; moderate increase in ostomy output; incontinence.       2. ONSET: \"When did the diarrhea begin?\"       Monday, better Tuesday now much worse again  3. BM CONSISTENCY: \"How loose or watery is the diarrhea?\"       Watery  4. VOMITING: \"Are you also vomiting?\" If Yes, ask: \"How many times in the past 24 hours?\"       No  5. ABDOMEN PAIN: \"Are you having any abdomen pain?\" If Yes, ask: \"What does it feel like?\" (e.g., crampy, dull, intermittent, constant)       7-8/10 cramping, spasmodic, anal pain from freq diarrhea  6. ABDOMEN PAIN SEVERITY: If present, ask: \"How bad is the pain?\"  (e.g., Scale 1-10; mild, moderate, or severe)    7-8      7. ORAL INTAKE: If vomiting, \"Have you been able to drink liquids?\" \"How much liquids have you had in the past 24 hours?\"      Some, small amounts carefully  8. HYDRATION: \"Any signs of dehydration?\" (e.g., dry mouth [not just dry lips], too weak to stand, dizziness, new weight loss) \"When did you last urinate?\"       " "Feels terrible  9. EXPOSURE: \"Have you traveled to a foreign country recently?\" \"Have you been exposed to anyone with diarrhea?\" \"Could you have eaten any food that was spoiled?\"  Maybe- state fair?  10. ANTIBIOTIC USE: \"Are you taking antibiotics now or have you taken antibiotics in the past 2 months?\"        No  11. OTHER SYMPTOMS: \"Do you have any other symptoms?\" (e.g., fever, blood in stool)        Diarrhea with dark streaks, maybe old blood. No red tinge in watery stool    Protocols used: Diarrhea-A-OH    "

## 2024-09-04 NOTE — ED TRIAGE NOTES
Pt c/o abd pain and diarrhea since Monday     Triage Assessment (Adult)       Row Name 09/04/24 1220          Triage Assessment    Airway WDL WDL        Respiratory WDL    Respiratory WDL WDL        Skin Circulation/Temperature WDL    Skin Circulation/Temperature WDL WDL        Cardiac WDL    Cardiac WDL WDL        Peripheral/Neurovascular WDL    Peripheral Neurovascular WDL WDL        Cognitive/Neuro/Behavioral WDL    Cognitive/Neuro/Behavioral WDL WDL

## 2024-09-05 ENCOUNTER — OFFICE VISIT (OUTPATIENT)
Dept: ORTHOPEDICS | Facility: CLINIC | Age: 44
End: 2024-09-05
Attending: FAMILY MEDICINE
Payer: COMMERCIAL

## 2024-09-05 ENCOUNTER — LAB (OUTPATIENT)
Dept: LAB | Facility: CLINIC | Age: 44
End: 2024-09-05
Payer: COMMERCIAL

## 2024-09-05 ENCOUNTER — THERAPY VISIT (OUTPATIENT)
Dept: OCCUPATIONAL THERAPY | Facility: CLINIC | Age: 44
End: 2024-09-05
Payer: COMMERCIAL

## 2024-09-05 DIAGNOSIS — M75.52 BURSITIS OF LEFT SHOULDER: ICD-10-CM

## 2024-09-05 DIAGNOSIS — M79.642 PAIN IN BOTH HANDS: Primary | ICD-10-CM

## 2024-09-05 DIAGNOSIS — M79.641 PAIN IN BOTH HANDS: Primary | ICD-10-CM

## 2024-09-05 DIAGNOSIS — R19.5 LOOSE STOOLS: ICD-10-CM

## 2024-09-05 DIAGNOSIS — G56.03 SEVERE CARPAL TUNNEL SYNDROME OF BOTH WRISTS: Primary | ICD-10-CM

## 2024-09-05 LAB

## 2024-09-05 PROCEDURE — 97140 MANUAL THERAPY 1/> REGIONS: CPT | Mod: GO | Performed by: OCCUPATIONAL THERAPIST

## 2024-09-05 PROCEDURE — 99000 SPECIMEN HANDLING OFFICE-LAB: CPT | Performed by: PATHOLOGY

## 2024-09-05 PROCEDURE — 99213 OFFICE O/P EST LOW 20 MIN: CPT | Performed by: FAMILY MEDICINE

## 2024-09-05 PROCEDURE — T1013 SIGN LANG/ORAL INTERPRETER: HCPCS | Mod: U3

## 2024-09-05 PROCEDURE — 87493 C DIFF AMPLIFIED PROBE: CPT | Performed by: NURSE PRACTITIONER

## 2024-09-05 PROCEDURE — 87507 IADNA-DNA/RNA PROBE TQ 12-25: CPT | Performed by: NURSE PRACTITIONER

## 2024-09-05 PROCEDURE — 97035 APP MDLTY 1+ULTRASOUND EA 15: CPT | Mod: GO | Performed by: OCCUPATIONAL THERAPIST

## 2024-09-05 PROCEDURE — T1013 SIGN LANG/ORAL INTERPRETER: HCPCS | Mod: U3 | Performed by: OCCUPATIONAL THERAPIST

## 2024-09-05 NOTE — LETTER
9/5/2024      RE: Nayeli Caal  [address]  [facility] [address]     Dear Colleague,    Thank you for referring your patient, Nayeli Caal, to the [facility] [place]. Please see a copy of my visit note below.    ASSESSMENT/PLAN:    (G56.03) Severe carpal tunnel syndrome of both wrists  (primary encounter diagnosis)  Comment: reviewed EMG findings; pt not interested in injections; would like to proceed directly to carpal tunnel release; f/up prn  Plan: Orthopedic  Referral          (M75.52) Bursitis of left shoulder  Comment: exam w/ features of AC arthritis, RTC tendonitis, and biceps tendonitis; will trial topical nsaid and PT; will follow-up with me in 2 months; it may be that she is unable to tolerate much shoulder PT given her CTS but we can adjust follow-up accordingly  Plan: diclofenac (VOLTAREN) 1 % topical gel, Physical Therapy  Referral          Kenji Cheung MD  September 5, 2024  2:09 PM      Pt is a 44 year old female last seen on 8/13/24 here for follow up of:     Bilateral carpal tunnel syndrome - here to review EMG and discuss plan for bilateral wrist pain. Here to review EMG and next steps.  Continues to have paresthesias, pain and weakness in both hands  EMG 8/21/24:  Interpretation:  This is an abnormal study. There is electrophysiologic evidence of severe median neuropathies at the wrists on both sides, as can be seen in the clinical context of severe bilateral carpal tunnel syndrome. There is no evidence of a cervical radiculopathy affecting the upper limbs on the basis of this study. Clinical correlation is recommended.     2) L shoulder - previously seen by Dr Lamas in 8/2023. subacromial cortisone in 10/2023 by Dr Bradley - mild improvement.  No new injury    Per Dr Lamas note in 8/2023:  Calcific rotator cuff tendonitis.  Possible rotator cuff tear, superior labral tear.  AC joint arthritis, possible low  grade separation.      PLAN:   We discussed, through the ASL , that even though her rotator cuff seems pretty strong, the recurrent injuries and severe pain I think warrants MRI evaluation of the rotator cuff..    MRI 8/2023:  Impression:  1. Superior AC capsular/ligament thickening sprain. No full thickness  tear or widening of the joint.  2. Low-grade bursal sided tear of the supraspinatus posterior fibers  near the footprint.   3. Long head of biceps tendon bicipital pulley segment tendinosis with  possible low-grade intrasubstance tear.   4. Moderate glenohumeral joint effusion with likely synovial  proliferation.  5. Relatively prominent for age red marrow reconversion, nonspecific  but can be seen in the setting of anemia of chronic disease, smoking,  obesity and other etiologies.     Per my last note:  (M79.2) Neuropathic pain of hand  (primary encounter diagnosis)  Comment: features of exam are consistent w/ median neuropathy but paresthesias do not fit one specific dermatome or peripheral nerve distribution. EMG is pending on 8/21. I will see her back on 8/27 to review results; consider injections for CTS if that is confirmed vs checking c-spine MRI for potential cervical cause; trial of gabapentin for pain; precautions/ anticipatory guidance given  Plan: gabapentin (NEURONTIN) 100 MG capsule          (M54.12) Cervical radiculopathy  Comment: see above  Plan: X-ray Cervical Spine 2-3 vws, gabapentin (NEURONTIN) 100 MG capsule    Past Medical History:   Diagnosis Date     Combined visual and hearing impairment      Deaf      Diabetic retinopathy of both eyes (H) 8/19/2011     Hepatic steatosis 08/19/2011     History of tobacco use      Hyperlipidemia      Hypertension      Hypertriglyceridemia      Migraines      Obesity 8/19/2011     Problem list name updated by automated process. 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      Current Outpatient Medications   Medication Sig Dispense Refill     acetaminophen (TYLENOL) 500 MG tablet Take 2 tablets (1,000 mg) by mouth every 8 hours as needed for mild pain 100 tablet 3     Alcohol Swabs (ALCOHOL PREP PAD) 70 % PADS 1 each 3 times daily 100 each 3     amLODIPine (NORVASC) 5 MG tablet Take 1 tablet (5 mg) by mouth at bedtime 90 tablet 3     aspirin (ASA) 81 MG chewable tablet Take 1 tablet (81 mg) by mouth daily CHEW AND SWALLOW 90 tablet 1     atorvastatin (LIPITOR) 40 MG tablet Take 1 tablet (40 mg) by mouth daily 90 tablet 1     blood glucose (ACCU-CHEK LAYA PLUS) test strip Use with  Accucheck Expert meter.  Test blood sugar 6 times daily. 600 each 3     blood glucose monitoring (ACCU-CHEK FASTCLIX) lancets Use to test blood sugar 6 times daily or as directed 510 each 3     calcium carbonate-vitamin D (OSCAL) 500-5 MG-MCG tablet Take 1 tablet by mouth 2 times daily 180 tablet 1     Continuous Glucose Sensor (FREESTYLE ZORAIDA 3 SENSOR) [facility] [place] 1 each every 14 days Please provide 3 month supply. 6 each 3     empagliflozin (JARDIANCE) 25 MG TABS tablet Take 1 tablet (25 mg) by mouth daily for 180 days 90 tablet 1     ergocalciferol (ERGOCALCIFEROL) 1.25 MG (59343 UT) capsule Take 1 capsule (50,000 Units) by mouth once a week For additional refills, please schedule a follow-up appointment at [phone] 12 capsule 3     fenofibrate (TRIGLIDE/LOFIBRA) 160 MG tablet Take 1 tablet (160 mg) by mouth daily 90 tablet 3     fish oil-omega-3 fatty acids 1000 MG capsule TAKE TWO CAPSULES (2 GM) BY MOUTH ONCE DAILY 180 capsule 3     gabapentin (NEURONTIN) 100 MG capsule Take 1 capsule (100 mg) by mouth 3 times daily 42 capsule 1     ibuprofen (ADVIL/MOTRIN) 600 MG tablet Take 1 tablet (600 mg) by mouth every 6 hours as needed for moderate pain 120 tablet 1     Injection Device for insulin (INPEN  100-PINK-NOVOLOG-FIASP) DAVID 1 each continuous 1 each 0     insulin aspart (NOVOLOG FLEXPEN) 100 UNIT/ML pen 1 unit per 5 grams CHO and correction scale of 1/25/125.  Approximate daily use is 100 units. 30 mL 2     insulin aspart (NOVOLOG PENFILL) 100 UNIT/ML cartridge Take with each meal and snack: 1 per 3 grams CHO and correction of 1 unit per 20 mg/dL over a target of 120. Average daily dose is 85 units. 75 mL 3     insulin glargine 100 UNIT/ML pen 3 month supply.Inject 55 units daily 45 mL 3     insulin pen needle (31G X 5 MM) 31G X 5 MM miscellaneous Use 5 to 6 pen needles daily or as directed.  3 month supply. 500 each 3     losartan (COZAAR) 100 MG tablet Take 1 tablet (100 mg) by mouth daily 90 tablet 3     omeprazole (PRILOSEC) 40 MG DR capsule Take 1 capsule (40 mg) by mouth 2 times daily 90 capsule 0     omeprazole (PRILOSEC) 40 MG DR capsule Take 1 capsule (40 mg) by mouth 2 times daily 8-12 weeks 90 capsule 0     ondansetron (ZOFRAN ODT) 4 MG ODT tab Take 1 tablet (4 mg) by mouth every 6 hours as needed for nausea. 10 tablet 0     ondansetron (ZOFRAN ODT) 4 MG ODT tab Take 1 tablet (4 mg) by mouth every 6 hours as needed for nausea or vomiting 20 tablet 0     tirzepatide (MOUNJARO) 2.5 MG/0.5ML pen Inject 2.5 mg Subcutaneous every 7 days 6 mL 3     tirzepatide (MOUNJARO) 5 MG/0.5ML pen Inject 5 mg subcutaneously every 7 days 2 mL 11     tirzepatide (MOUNJARO) 5 MG/0.5ML pen Inject 5 mg subcutaneously every 7 days 2 mL 1     tirzepatide (MOUNJARO) 7.5 MG/0.5ML pen Inject 7.5 mg subcutaneously every 7 days 2 mL 1      No Known Allergies     ROS:   Gen- no fevers/chills   Derm - no rash/ redness   Neuro - see HPI  Remainder of ROS negative.     Exam:     L shoulder:  FF-100; Abd-90; Full ER/IR  Strength is 4+/5  +empty can/ Silva/ Neer/ cross arm/ Speed's   +TTP at AC joint, supraspinatus and biceps tendon      Again, thank you for allowing me to participate in the care of your patient.       Sincerely,    Kenji Cheung MD

## 2024-09-05 NOTE — PROGRESS NOTES
ASSESSMENT/PLAN:    (G56.03) Severe carpal tunnel syndrome of both wrists  (primary encounter diagnosis)  Comment: reviewed EMG findings; pt not interested in injections; would like to proceed directly to carpal tunnel release; f/up prn  Plan: Orthopedic  Referral          (M75.52) Bursitis of left shoulder  Comment: exam w/ features of AC arthritis, RTC tendonitis, and biceps tendonitis; will trial topical nsaid and PT; will follow-up with me in 2 months; it may be that she is unable to tolerate much shoulder PT given her CTS but we can adjust follow-up accordingly  Plan: diclofenac (VOLTAREN) 1 % topical gel, Physical Therapy  Referral          Kenji Cheung MD  September 5, 2024  2:09 PM      Pt is a 44 year old female last seen on 8/13/24 here for follow up of:     Bilateral carpal tunnel syndrome - here to review EMG and discuss plan for bilateral wrist pain. Here to review EMG and next steps.  Continues to have paresthesias, pain and weakness in both hands  EMG 8/21/24:  Interpretation:  This is an abnormal study. There is electrophysiologic evidence of severe median neuropathies at the wrists on both sides, as can be seen in the clinical context of severe bilateral carpal tunnel syndrome. There is no evidence of a cervical radiculopathy affecting the upper limbs on the basis of this study. Clinical correlation is recommended.     2) L shoulder - previously seen by Dr Lamas in 8/2023. subacromial cortisone in 10/2023 by Dr Bradley - mild improvement.  No new injury    Per Dr Lamas note in 8/2023:  Calcific rotator cuff tendonitis.  Possible rotator cuff tear, superior labral tear.  AC joint arthritis, possible low grade separation.      PLAN:   We discussed, through the ASL , that even though her rotator cuff seems pretty strong, the recurrent injuries and severe pain I think warrants MRI evaluation of the rotator cuff..    MRI 8/2023:  Impression:  1. Superior AC capsular/ligament  thickening sprain. No full thickness  tear or widening of the joint.  2. Low-grade bursal sided tear of the supraspinatus posterior fibers  near the footprint.   3. Long head of biceps tendon bicipital pulley segment tendinosis with  possible low-grade intrasubstance tear.   4. Moderate glenohumeral joint effusion with likely synovial  proliferation.  5. Relatively prominent for age red marrow reconversion, nonspecific  but can be seen in the setting of anemia of chronic disease, smoking,  obesity and other etiologies.     Per my last note:  (M79.2) Neuropathic pain of hand  (primary encounter diagnosis)  Comment: features of exam are consistent w/ median neuropathy but paresthesias do not fit one specific dermatome or peripheral nerve distribution. EMG is pending on 8/21. I will see her back on 8/27 to review results; consider injections for CTS if that is confirmed vs checking c-spine MRI for potential cervical cause; trial of gabapentin for pain; precautions/ anticipatory guidance given  Plan: gabapentin (NEURONTIN) 100 MG capsule          (M54.12) Cervical radiculopathy  Comment: see above  Plan: X-ray Cervical Spine 2-3 vws, gabapentin (NEURONTIN) 100 MG capsule    Past Medical History:   Diagnosis Date    Combined visual and hearing impairment     Deaf     Diabetic retinopathy of both eyes (H) 8/19/2011    Hepatic steatosis 08/19/2011    History of tobacco use     Hyperlipidemia     Hypertension     Hypertriglyceridemia     Migraines     Obesity 8/19/2011     Problem list name updated by automated process. 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      Current Outpatient Medications   Medication Sig Dispense Refill    acetaminophen (TYLENOL) 500 MG tablet Take 2 tablets (1,000 mg) by mouth every 8 hours as needed for mild pain 100 tablet 3    Alcohol  Swabs (ALCOHOL PREP PAD) 70 % PADS 1 each 3 times daily 100 each 3    amLODIPine (NORVASC) 5 MG tablet Take 1 tablet (5 mg) by mouth at bedtime 90 tablet 3    aspirin (ASA) 81 MG chewable tablet Take 1 tablet (81 mg) by mouth daily CHEW AND SWALLOW 90 tablet 1    atorvastatin (LIPITOR) 40 MG tablet Take 1 tablet (40 mg) by mouth daily 90 tablet 1    blood glucose (ACCU-CHEK LAYA PLUS) test strip Use with  Accucheck Expert meter.  Test blood sugar 6 times daily. 600 each 3    blood glucose monitoring (ACCU-CHEK FASTCLIX) lancets Use to test blood sugar 6 times daily or as directed 510 each 3    calcium carbonate-vitamin D (OSCAL) 500-5 MG-MCG tablet Take 1 tablet by mouth 2 times daily 180 tablet 1    Continuous Glucose Sensor (FREESTYLE ZORAIDA 3 SENSOR) MISC 1 each every 14 days Please provide 3 month supply. 6 each 3    empagliflozin (JARDIANCE) 25 MG TABS tablet Take 1 tablet (25 mg) by mouth daily for 180 days 90 tablet 1    ergocalciferol (ERGOCALCIFEROL) 1.25 MG (59914 UT) capsule Take 1 capsule (50,000 Units) by mouth once a week For additional refills, please schedule a follow-up appointment at [phone] 12 capsule 3    fenofibrate (TRIGLIDE/LOFIBRA) 160 MG tablet Take 1 tablet (160 mg) by mouth daily 90 tablet 3    fish oil-omega-3 fatty acids 1000 MG capsule TAKE TWO CAPSULES (2 GM) BY MOUTH ONCE DAILY 180 capsule 3    gabapentin (NEURONTIN) 100 MG capsule Take 1 capsule (100 mg) by mouth 3 times daily 42 capsule 1    ibuprofen (ADVIL/MOTRIN) 600 MG tablet Take 1 tablet (600 mg) by mouth every 6 hours as needed for moderate pain 120 tablet 1    Injection Device for insulin (INPEN 100-PINK-NOVOLOG-FIASP) DAVID 1 each continuous 1 each 0    insulin aspart (NOVOLOG FLEXPEN) 100 UNIT/ML pen 1 unit per 5 grams CHO and correction scale of 1/25/125.  Approximate daily use is 100 units. 30 mL 2    insulin aspart (NOVOLOG PENFILL) 100 UNIT/ML cartridge Take with each meal and snack: 1 per 3 grams CHO and  correction of 1 unit per 20 mg/dL over a target of 120. Average daily dose is 85 units. 75 mL 3    insulin glargine 100 UNIT/ML pen 3 month supply.Inject 55 units daily 45 mL 3    insulin pen needle (31G X 5 MM) 31G X 5 MM miscellaneous Use 5 to 6 pen needles daily or as directed.  3 month supply. 500 each 3    losartan (COZAAR) 100 MG tablet Take 1 tablet (100 mg) by mouth daily 90 tablet 3    omeprazole (PRILOSEC) 40 MG DR capsule Take 1 capsule (40 mg) by mouth 2 times daily 90 capsule 0    omeprazole (PRILOSEC) 40 MG DR capsule Take 1 capsule (40 mg) by mouth 2 times daily 8-12 weeks 90 capsule 0    ondansetron (ZOFRAN ODT) 4 MG ODT tab Take 1 tablet (4 mg) by mouth every 6 hours as needed for nausea. 10 tablet 0    ondansetron (ZOFRAN ODT) 4 MG ODT tab Take 1 tablet (4 mg) by mouth every 6 hours as needed for nausea or vomiting 20 tablet 0    tirzepatide (MOUNJARO) 2.5 MG/0.5ML pen Inject 2.5 mg Subcutaneous every 7 days 6 mL 3    tirzepatide (MOUNJARO) 5 MG/0.5ML pen Inject 5 mg subcutaneously every 7 days 2 mL 11    tirzepatide (MOUNJARO) 5 MG/0.5ML pen Inject 5 mg subcutaneously every 7 days 2 mL 1    tirzepatide (MOUNJARO) 7.5 MG/0.5ML pen Inject 7.5 mg subcutaneously every 7 days 2 mL 1      No Known Allergies     ROS:   Gen- no fevers/chills   Derm - no rash/ redness   Neuro - see HPI  Remainder of ROS negative.     Exam:     L shoulder:  FF-100; Abd-90; Full ER/IR  Strength is 4+/5  +empty can/ Silva/ Neer/ cross arm/ Speed's   +TTP at AC joint, supraspinatus and biceps tendon

## 2024-09-06 ENCOUNTER — OFFICE VISIT (OUTPATIENT)
Dept: INTERNAL MEDICINE | Facility: CLINIC | Age: 44
End: 2024-09-06
Payer: COMMERCIAL

## 2024-09-06 VITALS
HEART RATE: 75 BPM | BODY MASS INDEX: 33.61 KG/M2 | WEIGHT: 177.9 LBS | OXYGEN SATURATION: 98 % | DIASTOLIC BLOOD PRESSURE: 72 MMHG | SYSTOLIC BLOOD PRESSURE: 114 MMHG

## 2024-09-06 DIAGNOSIS — K52.9 GASTROENTERITIS: Primary | ICD-10-CM

## 2024-09-06 DIAGNOSIS — K30 UPSET STOMACH: ICD-10-CM

## 2024-09-06 PROCEDURE — 99213 OFFICE O/P EST LOW 20 MIN: CPT | Performed by: INTERNAL MEDICINE

## 2024-09-06 RX ORDER — MAGNESIUM HYDROXIDE/ALUMINUM HYDROXICE/SIMETHICONE 120; 1200; 1200 MG/30ML; MG/30ML; MG/30ML
30 SUSPENSION ORAL EVERY 6 HOURS PRN
Qty: 355 ML | Refills: 1 | Status: SHIPPED | OUTPATIENT
Start: 2024-09-06 | End: 2024-09-06

## 2024-09-06 RX ORDER — MAGNESIUM HYDROXIDE/ALUMINUM HYDROXICE/SIMETHICONE 120; 1200; 1200 MG/30ML; MG/30ML; MG/30ML
10 SUSPENSION ORAL EVERY 6 HOURS PRN
Qty: 355 ML | Refills: 1 | Status: SHIPPED | OUTPATIENT
Start: 2024-09-06

## 2024-09-06 ASSESSMENT — ENCOUNTER SYMPTOMS: DIARRHEA: 1

## 2024-09-06 NOTE — PROGRESS NOTES
"  Assessment & Plan     Gastroenteritis  Upset stomach  Gastroenteritis from food poisoning - the food tasted off and it resolved on its own. The diarrhea has resolved - there was no blood but was dark brown and then light brown. There was no fever. There was no vomiting but there was nausea. Now she is much better.    - alum & mag hydroxide-simethicone (MAALOX) 200-200-20 MG/5ML SUSP suspension  Dispense: 355 mL; Refill: 1      BMI  Estimated body mass index is 33.61 kg/m  as calculated from the following:    Height as of 7/19/24: 1.549 m (5' 1\").    Weight as of this encounter: 80.7 kg (177 lb 14.4 oz).             No follow-ups on file.      Brenden Tyler is a 44 year old, presenting for the following health issues:  Diarrhea (Nausea and diarrhea since Monday. )      9/6/2024     2:28 PM   Additional Questions   Roomed by KTR         9/6/2024   Forms   Any forms needing to be completed Yes        Diarrhea    History of Present Illness       Reason for visit:  Abdominal pain  Symptom onset:  3-7 days ago  Symptoms include:  Diarrhea, Nausea  Symptom intensity:  Moderate  Symptom progression:  Improving  Had these symptoms before:  Yes  Has tried/received treatment for these symptoms:  No   She is taking medications regularly.     [place] and then got nausea, abdominal pain, and diarrhea. She had a Western Egg Scramble and it tasted strange. The next morning she felt worse. It started 5 days ago and then got better 2 days ago. Now no diarrhea and nausea. The pain is much better.     She had colitis on the abdomen but this is nonspecific.  Stool studies were normal.                    Objective    /72 (BP Location: Right arm, Patient Position: Sitting, Cuff Size: Adult Regular)   Pulse 75   Wt 80.7 kg (177 lb 14.4 oz)   LMP 02/01/2024 (Approximate)   SpO2 98%   BMI 33.61 kg/m    Body mass index is 33.61 kg/m .  Physical Exam     GI: Tender throughout especially in the " epigastrium            Signed Electronically by: Jorge Posada MD

## 2024-09-10 ENCOUNTER — ALLIED HEALTH/NURSE VISIT (OUTPATIENT)
Dept: EDUCATION SERVICES | Facility: CLINIC | Age: 44
End: 2024-09-10
Payer: COMMERCIAL

## 2024-09-10 DIAGNOSIS — E11.65 UNCONTROLLED TYPE 2 DIABETES MELLITUS WITH HYPERGLYCEMIA, WITH LONG-TERM CURRENT USE OF INSULIN (H): Primary | ICD-10-CM

## 2024-09-10 DIAGNOSIS — Z79.4 UNCONTROLLED TYPE 2 DIABETES MELLITUS WITH HYPERGLYCEMIA, WITH LONG-TERM CURRENT USE OF INSULIN (H): Primary | ICD-10-CM

## 2024-09-10 PROCEDURE — 99207 PR NO BILLABLE SERVICE THIS VISIT: CPT | Performed by: REGISTERED NURSE

## 2024-09-11 ENCOUNTER — LAB (OUTPATIENT)
Dept: LAB | Facility: CLINIC | Age: 44
End: 2024-09-11
Payer: COMMERCIAL

## 2024-09-11 ENCOUNTER — TELEPHONE (OUTPATIENT)
Dept: GASTROENTEROLOGY | Facility: CLINIC | Age: 44
End: 2024-09-11

## 2024-09-11 ENCOUNTER — OFFICE VISIT (OUTPATIENT)
Dept: GASTROENTEROLOGY | Facility: CLINIC | Age: 44
End: 2024-09-11
Payer: COMMERCIAL

## 2024-09-11 VITALS
DIASTOLIC BLOOD PRESSURE: 65 MMHG | BODY MASS INDEX: 33 KG/M2 | WEIGHT: 174.8 LBS | SYSTOLIC BLOOD PRESSURE: 100 MMHG | OXYGEN SATURATION: 100 % | HEART RATE: 78 BPM | HEIGHT: 61 IN

## 2024-09-11 DIAGNOSIS — E11.9 WELL CONTROLLED TYPE 2 DIABETES MELLITUS (H): ICD-10-CM

## 2024-09-11 DIAGNOSIS — K52.9 COLITIS: Primary | ICD-10-CM

## 2024-09-11 DIAGNOSIS — K52.9 COLITIS: ICD-10-CM

## 2024-09-11 LAB
ALBUMIN MFR UR ELPH: 17.9 MG/DL
ALBUMIN SERPL BCG-MCNC: 4.2 G/DL (ref 3.5–5.2)
ALP SERPL-CCNC: 56 U/L (ref 40–150)
ALT SERPL W P-5'-P-CCNC: 29 U/L (ref 0–50)
ANION GAP SERPL CALCULATED.3IONS-SCNC: 11 MMOL/L (ref 7–15)
AST SERPL W P-5'-P-CCNC: 18 U/L (ref 0–45)
BASOPHILS # BLD AUTO: 0.1 10E3/UL (ref 0–0.2)
BASOPHILS NFR BLD AUTO: 0 %
BILIRUB SERPL-MCNC: 0.3 MG/DL
BUN SERPL-MCNC: 23.5 MG/DL (ref 6–20)
CALCIUM SERPL-MCNC: 9 MG/DL (ref 8.8–10.4)
CHLORIDE SERPL-SCNC: 107 MMOL/L (ref 98–107)
CREAT SERPL-MCNC: 0.93 MG/DL (ref 0.51–0.95)
CREAT UR-MCNC: 87.2 MG/DL
CRP SERPL-MCNC: 7.52 MG/L
EGFRCR SERPLBLD CKD-EPI 2021: 77 ML/MIN/1.73M2
EOSINOPHIL # BLD AUTO: 0.3 10E3/UL (ref 0–0.7)
EOSINOPHIL NFR BLD AUTO: 3 %
ERYTHROCYTE [DISTWIDTH] IN BLOOD BY AUTOMATED COUNT: 13.3 % (ref 10–15)
GLUCOSE SERPL-MCNC: 116 MG/DL (ref 70–99)
HBA1C MFR BLD: 7.8 %
HCO3 SERPL-SCNC: 21 MMOL/L (ref 22–29)
HCT VFR BLD AUTO: 37.4 % (ref 35–47)
HGB BLD-MCNC: 12.3 G/DL (ref 11.7–15.7)
IMM GRANULOCYTES # BLD: 0.1 10E3/UL
IMM GRANULOCYTES NFR BLD: 0 %
LYMPHOCYTES # BLD AUTO: 2.8 10E3/UL (ref 0.8–5.3)
LYMPHOCYTES NFR BLD AUTO: 23 %
MCH RBC QN AUTO: 28.3 PG (ref 26.5–33)
MCHC RBC AUTO-ENTMCNC: 32.9 G/DL (ref 31.5–36.5)
MCV RBC AUTO: 86 FL (ref 78–100)
MONOCYTES # BLD AUTO: 0.6 10E3/UL (ref 0–1.3)
MONOCYTES NFR BLD AUTO: 5 %
NEUTROPHILS # BLD AUTO: 8.3 10E3/UL (ref 1.6–8.3)
NEUTROPHILS NFR BLD AUTO: 69 %
NRBC # BLD AUTO: 0 10E3/UL
NRBC BLD AUTO-RTO: 0 /100
PLATELET # BLD AUTO: 391 10E3/UL (ref 150–450)
POTASSIUM SERPL-SCNC: 4.3 MMOL/L (ref 3.4–5.3)
PROT SERPL-MCNC: 7.3 G/DL (ref 6.4–8.3)
PROT/CREAT 24H UR: 0.21 MG/MG CR (ref 0–0.2)
RBC # BLD AUTO: 4.35 10E6/UL (ref 3.8–5.2)
SODIUM SERPL-SCNC: 139 MMOL/L (ref 135–145)
WBC # BLD AUTO: 12.1 10E3/UL (ref 4–11)

## 2024-09-11 PROCEDURE — 36415 COLL VENOUS BLD VENIPUNCTURE: CPT | Performed by: PATHOLOGY

## 2024-09-11 PROCEDURE — 99000 SPECIMEN HANDLING OFFICE-LAB: CPT | Performed by: PATHOLOGY

## 2024-09-11 PROCEDURE — 83993 ASSAY FOR CALPROTECTIN FECAL: CPT | Performed by: PHYSICIAN ASSISTANT

## 2024-09-11 PROCEDURE — 86140 C-REACTIVE PROTEIN: CPT | Performed by: PATHOLOGY

## 2024-09-11 PROCEDURE — 83036 HEMOGLOBIN GLYCOSYLATED A1C: CPT | Performed by: PHYSICIAN ASSISTANT

## 2024-09-11 PROCEDURE — 99215 OFFICE O/P EST HI 40 MIN: CPT | Performed by: PHYSICIAN ASSISTANT

## 2024-09-11 PROCEDURE — 80053 COMPREHEN METABOLIC PANEL: CPT | Performed by: PATHOLOGY

## 2024-09-11 PROCEDURE — 84156 ASSAY OF PROTEIN URINE: CPT | Performed by: PATHOLOGY

## 2024-09-11 PROCEDURE — 85025 COMPLETE CBC W/AUTO DIFF WBC: CPT | Performed by: PATHOLOGY

## 2024-09-11 ASSESSMENT — PAIN SCALES - GENERAL: PAINLEVEL: SEVERE PAIN (7)

## 2024-09-11 NOTE — LETTER
2024    Nayeli Caal   1980        To Whom it May Concern;    Please excuse Nayeli Caal from work for a health related reasons.     She has been following with me for care. For the next week, please juan luis work from home accommodations until  when I see her again. She had been ill starting .     If you have any questions, please contact my office.     Sincerely,        Kirsten Conn PA-C  NPI # [id number]  [place] License # [id number]

## 2024-09-11 NOTE — TELEPHONE ENCOUNTER
Patient confirmed scheduled appointment:  Date: 9/11/24  Time: 4:30 pm  Visit type: lab  Provider: Kirsten Conn  [place] [facility] [place] [facility]st [place]  Testing/imaging: n/a  Additional notes: n/a

## 2024-09-11 NOTE — PROGRESS NOTES
GI CLINIC VISIT  ASSESSMENT/PLAN:  44 year old female with PMH of type 2 diabetes on insulin presenting to GI clinic for acute diarrhea    # Diarrhea  # Abdominal pain  CT abdomen pelvis with contrast showing mild colitis starting at mid transverse extending to rectum.  She is hydrating and able to keep meals down however is avoiding oral intake due to concerns of diarrhea.  She has dropped some weight with this acute episode.  Given the sudden nature, suspect this is infectious in etiology.  Differential includes start of underlying inflammatory bowel disease (adopted, family history is unknown).  Her vitals are stable and she is nontoxic appearing. Her abd exam did have tenderness with guarding.     -Recommended supportive care measures, ensuring good hydration and oral intake  -Discussed brat diet  -Okay to take scheduled Tylenol 1 g 3 times a day for pain  -Recommended start of scheduled Imodium  -Trend CBC, CMP.  Add on CRP and fecal calprotectin  -Strict ER precautions reviewed    Short-term follow-up with me 1 week.    We can consider colonoscopy at that point in time depending on how she is doing    Thank you for this consultation. It was a pleasure to participate in the care of this patient; please contact us with any further questions.    Kirsten Conn PA-C    Follow up: As planned above. Today, I personally spent 40 minutes in direct face to face time with the patient, of which greater than 50% of the time was spent in patient education and counseling as described above. Approximately 10 minutes were spent on indirect care associated with the patient's consultation including but not limited to review of: patient medical records to date, clinic visits, hospital records, lab results, imaging studies, procedural documentation, and coordinating care with other providers. The findings from this review are summarized in the above note. All of the above accounted for a cumulative time of 50 minutes and was  performed on the date of service.     HPI: 44 year old female with PMH of type 2 diabetes on insulin presenting to GI clinic for diarrhea.   This is an urgent appointment to discuss this acute diarrhea.    Starting September 2, she started feeling unwell with abdominal discomfort.  Later that day she then developed loose stools and had lower abdominal pain. She thinks this started after eating bad eggs. The diarrhea persisted throughout the week -she was seen at the emergency room 9 4.  Labs showed a white count to the 12's,  a bump in her BUN and with CRT to 0.76 (baseline 0.5-0.76). no anion gap. CTAP mild colitis starting from mid transverse all the way to rectum.  Subsequent stool studies were negative for infection to include enteric panel and C. difficile.  Her symptoms improved that weekend but flared up again on 9/9.  It continues today.     She is reporting lower abdominal pain that she only notices with defecation or if she presses on the abdomen.  Sitting upright in a chair she denies any pain in her abdomen.  She reports frequent loose stools, unable to quantify the amount, consistency watery oatmeal.  She denies bloody or black stools.    She tried Maalox without improvement in the symptoms.  She took Pepto-Bismol once at its onset, it did not help.    Never had fevers, chills, nausea or vomiting.    She is scared to eat as she does not want to prompt diarrhea.  She is avoiding coffee and heavily seasoned foods.  She would like some guidance on dietary intake.  She is still going to work, and is requesting for work accommodations.  She is in office Tuesday Wednesday Thursday, work from home on Mondays and Fridays.  She is a .     She is a type II diabetic on insulin -continues with Basaglar 55 units daily.  She does have rapid aspart insulin that she takes as needed.  Her fasting sugars have been her baseline in the 90s to 120s.  She denies hypoglycemic episodes with this recent illness.       She denies dizziness lightheadedness, syncope.    She lives with a roommate at home.    PAST MEDICAL HISTORY:  Past Medical History:   Diagnosis Date    Combined visual and hearing impairment     Deaf     Diabetic retinopathy of both eyes (H) 8/19/2011    Hepatic steatosis 08/19/2011    History of tobacco use     Hyperlipidemia     Hypertension     Hypertriglyceridemia     Migraines     Obesity 8/19/2011     Problem list name updated by automated process. 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       PREVIOUS ABDOMINAL/GYNECOLOGIC SURGERIES:  Past Surgical History:   Procedure Laterality Date    DAVINCI HYSTERECTOMY TOTAL, SALPINGECTOMY BILATERAL Bilateral 2/7/2024    Procedure: HYSTERECTOMY, TOTAL, ROBOT-ASSISTED, WITH BILATERAL SALPINGECTOMY, CYSTOSCOPY;  Surgeon: Vonnie Cowan MD;  Location: [facility] OR    ESOPHAGOSCOPY, GASTROSCOPY, DUODENOSCOPY (EGD), COMBINED N/A 6/13/2024    Procedure: ESOPHAGOGASTRODUODENOSCOPY, WITH BIOPSY;  Surgeon: Nora Brice MD;  Location: [facility] OR    LAPAROSCOPIC CHOLECYSTECTOMY N/A 3/10/2018    Procedure: LAPAROSCOPIC CHOLECYSTECTOMY;  Laparoscopic Cholecystectomy ;  Surgeon: Kuldeep Sigala MD;  Location: [facility] OR    RELEASE TRIGGER FINGER Right 5/2/2019    Procedure: Right Thumb Trigger Release;  Surgeon: Greg Streeter MD;  Location: [facility]    RELEASE TRIGGER FINGER Left 5/30/2019    Procedure: Left Ring Trigger Finger Release.  Ganglion cyst excision.;  Surgeon: Greg Streeter MD;  Location: [facility]    RELEASE TRIGGER FINGER Right 6/13/2023    Procedure: RELEASE, RIGHT TRIGGER FINGER, INDEX AND MIDDLE;  Surgeon: Miracle Carlin MD;  Location: [facility] OR    RELEASE TRIGGER FINGER Left 8/1/2023    Procedure: RELEASE, LEFT TRIGGER FINGER, MIDDLE;  Surgeon: Miracle Carlin MD;  Location:  [facility] OR         PERTINENT MEDICATIONS:  Current Outpatient Medications   Medication Sig Dispense Refill    acetaminophen (TYLENOL) 500 MG tablet Take 2 tablets (1,000 mg) by mouth every 8 hours as needed for mild pain 100 tablet 3    Alcohol Swabs (ALCOHOL PREP PAD) 70 % PADS 1 each 3 times daily 100 each 3    alum & mag hydroxide-simethicone (MAALOX) 200-200-20 MG/5ML SUSP suspension Take 10 mLs by mouth every 6 hours as needed for indigestion. 355 mL 1    amLODIPine (NORVASC) 5 MG tablet Take 1 tablet (5 mg) by mouth at bedtime 90 tablet 3    aspirin (ASA) 81 MG chewable tablet Take 1 tablet (81 mg) by mouth daily CHEW AND SWALLOW 90 tablet 1    atorvastatin (LIPITOR) 40 MG tablet Take 1 tablet (40 mg) by mouth daily 90 tablet 1    blood glucose (ACCU-CHEK LAYA PLUS) test strip Use with  Accucheck Expert meter.  Test blood sugar 6 times daily. 600 each 3    blood glucose monitoring (ACCU-CHEK FASTCLIX) lancets Use to test blood sugar 6 times daily or as directed 510 each 3    calcium carbonate-vitamin D (OSCAL) 500-5 MG-MCG tablet Take 1 tablet by mouth 2 times daily 180 tablet 1    Continuous Glucose Sensor (FREESTYLE ZORAIDA 3 SENSOR) MISC 1 each every 14 days Please provide 3 month supply. 6 each 3    diclofenac (VOLTAREN) 1 % topical gel Apply 2 g topically 4 times daily. For L shoulder pain 100 g 3    empagliflozin (JARDIANCE) 25 MG TABS tablet Take 1 tablet (25 mg) by mouth daily for 180 days 90 tablet 1    ergocalciferol (ERGOCALCIFEROL) 1.25 MG (15833 UT) capsule Take 1 capsule (50,000 Units) by mouth once a week For additional refills, please schedule a follow-up appointment at [phone] 12 capsule 3    fenofibrate (TRIGLIDE/LOFIBRA) 160 MG tablet Take 1 tablet (160 mg) by mouth daily 90 tablet 3    fish oil-omega-3 fatty acids 1000 MG capsule TAKE TWO CAPSULES (2 GM) BY MOUTH ONCE DAILY 180 capsule 3    gabapentin (NEURONTIN) 100 MG capsule Take 1 capsule (100 mg) by mouth 3 times daily 42 capsule 1     ibuprofen (ADVIL/MOTRIN) 600 MG tablet Take 1 tablet (600 mg) by mouth every 6 hours as needed for moderate pain 120 tablet 1    Injection Device for insulin (INPEN 100-PINK-NOVOLOG-FIASP) DAVID 1 each continuous 1 each 0    insulin aspart (NOVOLOG FLEXPEN) 100 UNIT/ML pen 1 unit per 5 grams CHO and correction scale of 1/25/125.  Approximate daily use is 100 units. 30 mL 2    insulin aspart (NOVOLOG PENFILL) 100 UNIT/ML cartridge Take with each meal and snack: 1 per 3 grams CHO and correction of 1 unit per 20 mg/dL over a target of 120. Average daily dose is 85 units. 75 mL 3    insulin glargine 100 UNIT/ML pen 3 month supply.Inject 55 units daily 45 mL 3    insulin pen needle (31G X 5 MM) 31G X 5 MM miscellaneous Use 5 to 6 pen needles daily or as directed.  3 month supply. 500 each 3    losartan (COZAAR) 100 MG tablet Take 1 tablet (100 mg) by mouth daily 90 tablet 3    omeprazole (PRILOSEC) 40 MG DR capsule Take 1 capsule (40 mg) by mouth 2 times daily 90 capsule 0    omeprazole (PRILOSEC) 40 MG DR capsule Take 1 capsule (40 mg) by mouth 2 times daily 8-12 weeks 90 capsule 0    ondansetron (ZOFRAN ODT) 4 MG ODT tab Take 1 tablet (4 mg) by mouth every 6 hours as needed for nausea or vomiting 20 tablet 0    tirzepatide (MOUNJARO) 2.5 MG/0.5ML pen Inject 2.5 mg Subcutaneous every 7 days 6 mL 3    tirzepatide (MOUNJARO) 5 MG/0.5ML pen Inject 5 mg subcutaneously every 7 days 2 mL 11    tirzepatide (MOUNJARO) 5 MG/0.5ML pen Inject 5 mg subcutaneously every 7 days 2 mL 1    tirzepatide (MOUNJARO) 7.5 MG/0.5ML pen Inject 7.5 mg subcutaneously every 7 days 2 mL 1         SOCIAL HISTORY:  Social History     Socioeconomic History    Marital status: Single     Spouse name: Not on file    Number of children: Not on file    Years of education: Not on file    Highest education level: Not on file   Occupational History    Not on file   Tobacco Use    Smoking status: Former     Current packs/day: 0.00     Types: Cigarettes      Quit date: 2010     Years since quittin.7     Passive exposure: Never    Smokeless tobacco: Never    Tobacco comments:     stopped 10 yrs ago   Substance and Sexual Activity    Alcohol use: Not Currently     Comment: socially    Drug use: Not Currently     Types: Marijuana     Comment: much younger  or earlier    Sexual activity: Not Currently     Partners: Male     Birth control/protection: I.U.D.   Other Topics Concern    Parent/sibling w/ CABG, MI or angioplasty before 65F 55M? Not Asked     Service No    Blood Transfusions No    Caffeine Concern No    Occupational Exposure No    Hobby Hazards No    Sleep Concern No    Stress Concern No    Weight Concern Yes    Special Diet No    Back Care No    Exercise Yes     Comment: 4-5 x a week    Bike Helmet No    Seat Belt Yes    Self-Exams Yes   Social History Narrative    Not on file     Social Determinants of Health     Financial Resource Strain: Low Risk  (2024)    Financial Resource Strain     Within the past 12 months, have you or your family members you live with been unable to get utilities (heat, electricity) when it was really needed?: No   Food Insecurity: Low Risk  (2024)    Food Insecurity     Within the past 12 months, did you worry that your food would run out before you got money to buy more?: No     Within the past 12 months, did the food you bought just not last and you didn t have money to get more?: No   Transportation Needs: Low Risk  (2024)    Transportation Needs     Within the past 12 months, has lack of transportation kept you from medical appointments, getting your medicines, non-medical meetings or appointments, work, or from getting things that you need?: No   Recent Concern: Transportation Needs - High Risk (10/30/2023)    Transportation Needs     Within the past 12 months, has lack of transportation kept you from medical appointments, getting your medicines, non-medical meetings or appointments, work, or from  "getting things that you need?: Yes   Physical Activity: Not on file   Stress: Not on file   Social Connections: Not on file   Interpersonal Safety: Low Risk  (1/4/2024)    Interpersonal Safety     Do you feel physically and emotionally safe where you currently live?: Yes     Within the past 12 months, have you been hit, slapped, kicked or otherwise physically hurt by someone?: No     Within the past 12 months, have you been humiliated or emotionally abused in other ways by your partner or ex-partner?: No   Housing Stability: Low Risk  (1/4/2024)    Housing Stability     Do you have housing? : Yes     Are you worried about losing your housing?: No       FAMILY HISTORY:  Family History   Problem Relation Age of Onset    Unknown/Adopted Other        PHYSICAL EXAMINATION:  Vitals reviewed: /65   Pulse 78   Ht 1.549 m (5' 1\")   Wt 79.3 kg (174 lb 12.8 oz)   LMP 02/01/2024 (Approximate)   SpO2 100%   BMI 33.03 kg/m    Wt:   Wt Readings from Last 2 Encounters:   09/11/24 79.3 kg (174 lb 12.8 oz)   09/06/24 80.7 kg (177 lb 14.4 oz)      Constitutional: aaox3, cooperative, pleasant, not dyspneic/diaphoretic, no acute distress  Eyes: Sclera anicteric/injected  Ears/nose/mouth/throat: hearing intact  Neck: supple, active ROM w/o limitation or pain   CV: No edema  Respiratory: Unlabored breathing  Abd:  Nondistended, +bs, no hepatosplenomegaly, tenderness noted to the upper/mid abdomen with some guarding. Sitting comfortably in chair, moving and leaving room without issues.   Skin: warm, perfused, no jaundice  Psych: Normal affect  MSK: Normal gait     PERTINENT STUDIES - Reviewed in EMR     Lab Results   Component Value Date    WBC 12.5 (H) 09/04/2024    WBC 7.7 05/31/2024    WBC 12.9 (H) 05/08/2024    HGB 12.2 09/04/2024    HGB 12.3 05/31/2024    HGB 13.0 05/08/2024     09/04/2024     05/31/2024     05/08/2024    CHOL 183 01/04/2024    CHOL 221 (H) 04/04/2023    CHOL 194 07/19/2021    TRIG 350 " (H) 01/04/2024    TRIG 298 (H) 04/04/2023    TRIG 133 07/19/2021    HDL 37 (L) 01/04/2024    HDL 38 (L) 04/04/2023    HDL 43 (L) 07/19/2021    ALT 42 09/04/2024    ALT 51 (H) 05/31/2024    ALT 45 05/08/2024    AST 25 09/04/2024    AST 32 05/31/2024    AST 27 05/08/2024     09/04/2024     05/31/2024     05/08/2024    BUN 21.1 (H) 09/04/2024    BUN 13.3 05/31/2024    BUN 12.7 05/08/2024    CO2 20 (L) 09/04/2024    CO2 23 05/31/2024    CO2 24 05/08/2024    TSH 1.41 04/04/2023    TSH 1.34 07/08/2020    TSH 1.84 05/17/2018    INR 1.03 06/18/2010    INR 0.92 06/18/2010        Liver Function Studies -   Recent Labs   Lab Test 09/04/24  1224   PROTTOTAL 7.6   ALBUMIN 4.4   BILITOTAL 0.2   ALKPHOS 62   AST 25   ALT 42        PREVIOUS ENDOSCOPY    No results found. However, due to the size of the patient record, not all encounters were searched. Please check Results Review for a complete set of results.

## 2024-09-11 NOTE — PATIENT INSTRUCTIONS
It was a pleasure taking care of you today.  I've included a brief summary of our discussion and care plan from today's visit below.  Please review this information with your primary care provider.  _______________________________________________________________________    My recommendations are summarized as follows:    Stool studies and blood work today   OK to take imodium - start 4 mg in the morning when you get up. If you have a loose stool again, OK to repeat a 2 mg dose in the early afternoon and again in the evening (4 mg - 2 mg - 2 mg). OK to increase up to 16 mg in a day (max dose)   Can do Pedialyte or adult rehydration solution to help with keeping hydrated   Can do bone broth to help with some calories as well   OK to stop Maalox  We may need a colonoscopy - we will review this at our upcoming appt   Work from home accommodations until next week. We will revisit this then   Go to the ER for any worsening symptoms     Please call our clinic or send a [facility] message to us if you have any questions or concerns. MyChart messages are answered by your nurse or doctor typically within 24 hours.  Please allow extra time on weekends and holidays    Return to GI Clinic in 1 wk with me to review your progress.    _______________________________________________________________________    How do I schedule labs, imaging studies, or procedures that were ordered in clinic today?      Labs: To schedule lab appointment at the [facility], use my chart or call [phone]. If you have a [facility] lab closer to home where you are regularly seen you can give them a call.      Procedures: If a colonoscopy, upper endoscopy, breath test, esophageal manometry, or pH impedence was ordered today, our endoscopy team will call you to schedule this. If you have not heard from our endoscopy team within a week, please call [phone] option 2 to schedule.      Imaging Studies: If you were scheduled for a CT scan,  X-ray, MRI, ultrasound, HIDA scan, EKG or other imaging study, please call [phone] to have this scheduled.      Referral: If a referral to another specialty was ordered, expect a phone call or follow instructions above. If you have not heard from anyone regarding your referral in a week, please call our clinic to check the status.      Who do I call with any questions after my visit?  Please be in touch if there are any further questions that arise following today's visit.  There are multiple ways to contact your gastroenterology care team.       During business hours, you may reach a Gastroenterology nurse at [phone]     To schedule or reschedule an appointment, please call [phone].      You can always send a secure message through [employer].  [employer] messages are answered by your nurse or doctor typically within 24 hours.  Please allow extra time on weekends and holidays.       For urgent/emergent questions after business hours, you may reach the on-call GI Fellow by contacting the [facility]  at [phone].     How will I get the results of any tests ordered?    You will receive all of your results.  If you have signed up for [facility], any tests ordered at your visit will be available to you after your physician reviews them.  Typically this takes 1-2 weeks.  If there are urgent results that require a change in your care plan, your physician or nurse will call you to discuss the next steps.       What is [employer]?  [facility] is a secure way for you to access all of your healthcare records from the [facility].  It is a web based computer program, so you can sign on to it from any location.  It also allows you to send secure messages to your care team.  I recommend signing up for [employer] access if you have not already done so and are comfortable with using a computer.       How to I schedule a follow-up visit?  If you did not schedule a follow-up visit today, please call  [phone] to schedule a follow-up office visit.      Sincerely,    Kirsten Conn PA-C  Gastroenterology

## 2024-09-11 NOTE — LETTER
9/11/2024      Nayeli Caal  [address]  [facility] [address]      Dear Colleague,    Thank you for referring your patient, Nayeli Caal, to the [facility] [place]. Please see a copy of my visit note below.      GI CLINIC VISIT  ASSESSMENT/PLAN:  44 year old female with PMH of type 2 diabetes on insulin presenting to GI clinic for acute diarrhea    # Diarrhea  # Abdominal pain  CT abdomen pelvis with contrast showing mild colitis starting at mid transverse extending to rectum.  She is hydrating and able to keep meals down however is avoiding oral intake due to concerns of diarrhea.  She has dropped some weight with this acute episode.  Given the sudden nature, suspect this is infectious in etiology.  Differential includes start of underlying inflammatory bowel disease (adopted, family history is unknown).  Her vitals are stable and she is nontoxic appearing. Her abd exam did have tenderness with guarding.     -Recommended supportive care measures, ensuring good hydration and oral intake  -Discussed brat diet  -Okay to take scheduled Tylenol 1 g 3 times a day for pain  -Recommended start of scheduled Imodium  -Trend CBC, CMP.  Add on CRP and fecal calprotectin  -Strict ER precautions reviewed    Short-term follow-up with me 1 week.    We can consider colonoscopy at that point in time depending on how she is doing    Thank you for this consultation. It was a pleasure to participate in the care of this patient; please contact us with any further questions.    Kirsten Conn PA-C    Follow up: As planned above. Today, I personally spent 40 minutes in direct face to face time with the patient, of which greater than 50% of the time was spent in patient education and counseling as described above. Approximately 10 minutes were spent on indirect care associated with the patient's consultation including but not limited to review of: patient medical records to  date, clinic visits, hospital records, lab results, imaging studies, procedural documentation, and coordinating care with other providers. The findings from this review are summarized in the above note. All of the above accounted for a cumulative time of 50 minutes and was performed on the date of service.     HPI: 44 year old female with PMH of type 2 diabetes on insulin presenting to GI clinic for diarrhea.   This is an urgent appointment to discuss this acute diarrhea.    Starting September 2, she started feeling unwell with abdominal discomfort.  Later that day she then developed loose stools and had lower abdominal pain. She thinks this started after eating bad eggs. The diarrhea persisted throughout the week -she was seen at the emergency room 9 4.  Labs showed a white count to the 12's,  a bump in her BUN and with CRT to 0.76 (baseline 0.5-0.76). no anion gap. CTAP mild colitis starting from mid transverse all the way to rectum.  Subsequent stool studies were negative for infection to include enteric panel and C. difficile.  Her symptoms improved that weekend but flared up again on 9/9.  It continues today.     She is reporting lower abdominal pain that she only notices with defecation or if she presses on the abdomen.  Sitting upright in a chair she denies any pain in her abdomen.  She reports frequent loose stools, unable to quantify the amount, consistency watery oatmeal.  She denies bloody or black stools.    She tried Maalox without improvement in the symptoms.  She took Pepto-Bismol once at its onset, it did not help.    Never had fevers, chills, nausea or vomiting.    She is scared to eat as she does not want to prompt diarrhea.  She is avoiding coffee and heavily seasoned foods.  She would like some guidance on dietary intake.  She is still going to work, and is requesting for work accommodations.  She is in office Tuesday Wednesday Thursday, work from home on Mondays and Fridays.  She is a .      She is a type II diabetic on insulin -continues with Basaglar 55 units daily.  She does have rapid aspart insulin that she takes as needed.  Her fasting sugars have been her baseline in the 90s to 120s.  She denies hypoglycemic episodes with this recent illness.      She denies dizziness lightheadedness, syncope.    She lives with a roommate at home.    PAST MEDICAL HISTORY:  Past Medical History:   Diagnosis Date     Combined visual and hearing impairment      Deaf      Diabetic retinopathy of both eyes (H) 8/19/2011     Hepatic steatosis 08/19/2011     History of tobacco use      Hyperlipidemia      Hypertension      Hypertriglyceridemia      Migraines      Obesity 8/19/2011     Problem list name updated by automated process. 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       PREVIOUS ABDOMINAL/GYNECOLOGIC SURGERIES:  Past Surgical History:   Procedure Laterality Date     DAVINCI HYSTERECTOMY TOTAL, SALPINGECTOMY BILATERAL Bilateral 2/7/2024    Procedure: HYSTERECTOMY, TOTAL, ROBOT-ASSISTED, WITH BILATERAL SALPINGECTOMY, CYSTOSCOPY;  Surgeon: Vonnie Cowan MD;  Location: [facility] OR     ESOPHAGOSCOPY, GASTROSCOPY, DUODENOSCOPY (EGD), COMBINED N/A 6/13/2024    Procedure: ESOPHAGOGASTRODUODENOSCOPY, WITH BIOPSY;  Surgeon: Nora Brice MD;  Location: [facility] OR     LAPAROSCOPIC CHOLECYSTECTOMY N/A 3/10/2018    Procedure: LAPAROSCOPIC CHOLECYSTECTOMY;  Laparoscopic Cholecystectomy ;  Surgeon: Kuldeep Sigala MD;  Location: [facility] OR     RELEASE TRIGGER FINGER Right 5/2/2019    Procedure: Right Thumb Trigger Release;  Surgeon: Greg Streeter MD;  Location: [facility]     RELEASE TRIGGER FINGER Left 5/30/2019    Procedure: Left Ring Trigger Finger Release.  Ganglion cyst excision.;  Surgeon: Greg Streeter MD;  Location: [facility]      RELEASE TRIGGER FINGER Right 6/13/2023    Procedure: RELEASE, RIGHT TRIGGER FINGER, INDEX AND MIDDLE;  Surgeon: Miracle Carlin MD;  Location: [facility] OR     RELEASE TRIGGER FINGER Left 8/1/2023    Procedure: RELEASE, LEFT TRIGGER FINGER, MIDDLE;  Surgeon: Miracle Carlin MD;  Location: [facility] OR         PERTINENT MEDICATIONS:  Current Outpatient Medications   Medication Sig Dispense Refill     acetaminophen (TYLENOL) 500 MG tablet Take 2 tablets (1,000 mg) by mouth every 8 hours as needed for mild pain 100 tablet 3     Alcohol Swabs (ALCOHOL PREP PAD) 70 % PADS 1 each 3 times daily 100 each 3     alum & mag hydroxide-simethicone (MAALOX) 200-200-20 MG/5ML SUSP suspension Take 10 mLs by mouth every 6 hours as needed for indigestion. 355 mL 1     amLODIPine (NORVASC) 5 MG tablet Take 1 tablet (5 mg) by mouth at bedtime 90 tablet 3     aspirin (ASA) 81 MG chewable tablet Take 1 tablet (81 mg) by mouth daily CHEW AND SWALLOW 90 tablet 1     atorvastatin (LIPITOR) 40 MG tablet Take 1 tablet (40 mg) by mouth daily 90 tablet 1     blood glucose (ACCU-CHEK LAYA PLUS) test strip Use with  Accucheck Expert meter.  Test blood sugar 6 times daily. 600 each 3     blood glucose monitoring (ACCU-CHEK FASTCLIX) lancets Use to test blood sugar 6 times daily or as directed 510 each 3     calcium carbonate-vitamin D (OSCAL) 500-5 MG-MCG tablet Take 1 tablet by mouth 2 times daily 180 tablet 1     Continuous Glucose Sensor (FREESTYLE ZORAIDA 3 SENSOR) MISC 1 each every 14 days Please provide 3 month supply. 6 each 3     diclofenac (VOLTAREN) 1 % topical gel Apply 2 g topically 4 times daily. For L shoulder pain 100 g 3     empagliflozin (JARDIANCE) 25 MG TABS tablet Take 1 tablet (25 mg) by mouth daily for 180 days 90 tablet 1     ergocalciferol (ERGOCALCIFEROL) 1.25 MG (62250 UT) capsule Take 1 capsule (50,000 Units) by mouth once a week For additional refills, please schedule a follow-up appointment at [phone] 12 capsule 3      fenofibrate (TRIGLIDE/LOFIBRA) 160 MG tablet Take 1 tablet (160 mg) by mouth daily 90 tablet 3     fish oil-omega-3 fatty acids 1000 MG capsule TAKE TWO CAPSULES (2 GM) BY MOUTH ONCE DAILY 180 capsule 3     gabapentin (NEURONTIN) 100 MG capsule Take 1 capsule (100 mg) by mouth 3 times daily 42 capsule 1     ibuprofen (ADVIL/MOTRIN) 600 MG tablet Take 1 tablet (600 mg) by mouth every 6 hours as needed for moderate pain 120 tablet 1     Injection Device for insulin (INPEN 100-PINK-NOVOLOG-FIASP) DAVID 1 each continuous 1 each 0     insulin aspart (NOVOLOG FLEXPEN) 100 UNIT/ML pen 1 unit per 5 grams CHO and correction scale of 1/25/125.  Approximate daily use is 100 units. 30 mL 2     insulin aspart (NOVOLOG PENFILL) 100 UNIT/ML cartridge Take with each meal and snack: 1 per 3 grams CHO and correction of 1 unit per 20 mg/dL over a target of 120. Average daily dose is 85 units. 75 mL 3     insulin glargine 100 UNIT/ML pen 3 month supply.Inject 55 units daily 45 mL 3     insulin pen needle (31G X 5 MM) 31G X 5 MM miscellaneous Use 5 to 6 pen needles daily or as directed.  3 month supply. 500 each 3     losartan (COZAAR) 100 MG tablet Take 1 tablet (100 mg) by mouth daily 90 tablet 3     omeprazole (PRILOSEC) 40 MG DR capsule Take 1 capsule (40 mg) by mouth 2 times daily 90 capsule 0     omeprazole (PRILOSEC) 40 MG DR capsule Take 1 capsule (40 mg) by mouth 2 times daily 8-12 weeks 90 capsule 0     ondansetron (ZOFRAN ODT) 4 MG ODT tab Take 1 tablet (4 mg) by mouth every 6 hours as needed for nausea or vomiting 20 tablet 0     tirzepatide (MOUNJARO) 2.5 MG/0.5ML pen Inject 2.5 mg Subcutaneous every 7 days 6 mL 3     tirzepatide (MOUNJARO) 5 MG/0.5ML pen Inject 5 mg subcutaneously every 7 days 2 mL 11     tirzepatide (MOUNJARO) 5 MG/0.5ML pen Inject 5 mg subcutaneously every 7 days 2 mL 1     tirzepatide (MOUNJARO) 7.5 MG/0.5ML pen Inject 7.5 mg subcutaneously every 7 days 2 mL 1         SOCIAL HISTORY:  Social History      Socioeconomic History     Marital status: Single     Spouse name: Not on file     Number of children: Not on file     Years of education: Not on file     Highest education level: Not on file   Occupational History     Not on file   Tobacco Use     Smoking status: Former     Current packs/day: 0.00     Types: Cigarettes     Quit date: 2010     Years since quittin.7     Passive exposure: Never     Smokeless tobacco: Never     Tobacco comments:     stopped 10 yrs ago   Substance and Sexual Activity     Alcohol use: Not Currently     Comment: socially     Drug use: Not Currently     Types: Marijuana     Comment: much younger  or earlier     Sexual activity: Not Currently     Partners: Male     Birth control/protection: I.U.D.   Other Topics Concern     Parent/sibling w/ CABG, MI or angioplasty before 65F 55M? Not Asked      Service No     Blood Transfusions No     Caffeine Concern No     Occupational Exposure No     Hobby Hazards No     Sleep Concern No     Stress Concern No     Weight Concern Yes     Special Diet No     Back Care No     Exercise Yes     Comment: 4-5 x a week     Bike Helmet No     Seat Belt Yes     Self-Exams Yes   Social History Narrative     Not on file     Social Determinants of Health     Financial Resource Strain: Low Risk  (2024)    Financial Resource Strain      Within the past 12 months, have you or your family members you live with been unable to get utilities (heat, electricity) when it was really needed?: No   Food Insecurity: Low Risk  (2024)    Food Insecurity      Within the past 12 months, did you worry that your food would run out before you got money to buy more?: No      Within the past 12 months, did the food you bought just not last and you didn t have money to get more?: No   Transportation Needs: Low Risk  (2024)    Transportation Needs      Within the past 12 months, has lack of transportation kept you from medical appointments, getting your  "medicines, non-medical meetings or appointments, work, or from getting things that you need?: No   Recent Concern: Transportation Needs - High Risk (10/30/2023)    Transportation Needs      Within the past 12 months, has lack of transportation kept you from medical appointments, getting your medicines, non-medical meetings or appointments, work, or from getting things that you need?: Yes   Physical Activity: Not on file   Stress: Not on file   Social Connections: Not on file   Interpersonal Safety: Low Risk  (1/4/2024)    Interpersonal Safety      Do you feel physically and emotionally safe where you currently live?: Yes      Within the past 12 months, have you been hit, slapped, kicked or otherwise physically hurt by someone?: No      Within the past 12 months, have you been humiliated or emotionally abused in other ways by your partner or ex-partner?: No   Housing Stability: Low Risk  (1/4/2024)    Housing Stability      Do you have housing? : Yes      Are you worried about losing your housing?: No       FAMILY HISTORY:  Family History   Problem Relation Age of Onset     Unknown/Adopted Other        PHYSICAL EXAMINATION:  Vitals reviewed: /65   Pulse 78   Ht 1.549 m (5' 1\")   Wt 79.3 kg (174 lb 12.8 oz)   LMP 02/01/2024 (Approximate)   SpO2 100%   BMI 33.03 kg/m    Wt:   Wt Readings from Last 2 Encounters:   09/11/24 79.3 kg (174 lb 12.8 oz)   09/06/24 80.7 kg (177 lb 14.4 oz)      Constitutional: aaox3, cooperative, pleasant, not dyspneic/diaphoretic, no acute distress  Eyes: Sclera anicteric/injected  Ears/nose/mouth/throat: hearing intact  Neck: supple, active ROM w/o limitation or pain   CV: No edema  Respiratory: Unlabored breathing  Abd:  Nondistended, +bs, no hepatosplenomegaly, tenderness noted to the upper/mid abdomen with some guarding. Sitting comfortably in chair, moving and leaving room without issues.   Skin: warm, perfused, no jaundice  Psych: Normal affect  MSK: Normal gait "     PERTINENT STUDIES - Reviewed in EMR     Lab Results   Component Value Date    WBC 12.5 (H) 09/04/2024    WBC 7.7 05/31/2024    WBC 12.9 (H) 05/08/2024    HGB 12.2 09/04/2024    HGB 12.3 05/31/2024    HGB 13.0 05/08/2024     09/04/2024     05/31/2024     05/08/2024    CHOL 183 01/04/2024    CHOL 221 (H) 04/04/2023    CHOL 194 07/19/2021    TRIG 350 (H) 01/04/2024    TRIG 298 (H) 04/04/2023    TRIG 133 07/19/2021    HDL 37 (L) 01/04/2024    HDL 38 (L) 04/04/2023    HDL 43 (L) 07/19/2021    ALT 42 09/04/2024    ALT 51 (H) 05/31/2024    ALT 45 05/08/2024    AST 25 09/04/2024    AST 32 05/31/2024    AST 27 05/08/2024     09/04/2024     05/31/2024     05/08/2024    BUN 21.1 (H) 09/04/2024    BUN 13.3 05/31/2024    BUN 12.7 05/08/2024    CO2 20 (L) 09/04/2024    CO2 23 05/31/2024    CO2 24 05/08/2024    TSH 1.41 04/04/2023    TSH 1.34 07/08/2020    TSH 1.84 05/17/2018    INR 1.03 06/18/2010    INR 0.92 06/18/2010        Liver Function Studies -   Recent Labs   Lab Test 09/04/24  1224   PROTTOTAL 7.6   ALBUMIN 4.4   BILITOTAL 0.2   ALKPHOS 62   AST 25   ALT 42        PREVIOUS ENDOSCOPY    No results found. However, due to the size of the patient record, not all encounters were searched. Please check Results Review for a complete set of results.      Again, thank you for allowing me to participate in the care of your patient.        Sincerely,        Kirsten Conn PA-C

## 2024-09-11 NOTE — NURSING NOTE
"Chief Complaint   Patient presents with    Follow Up       Vitals:    09/11/24 1354   BP: 100/65   Pulse: 78   SpO2: 100%   Weight: 79.3 kg (174 lb 12.8 oz)   Height: 1.549 m (5' 1\")       Body mass index is 33.03 kg/m .    Miri Parker MA   "

## 2024-09-11 NOTE — TELEPHONE ENCOUNTER
Patient confirmed scheduled appointment:  Date: 9/18/24  Time: 12 pm  Visit type: RET GI  Provider: Kirsten Conn  [place] [facility] [place]  Testing/imaging: n/a  Additional notes: slot ok per Kirsten Conn

## 2024-09-12 ENCOUNTER — CARE COORDINATION (OUTPATIENT)
Dept: EDUCATION SERVICES | Facility: CLINIC | Age: 44
End: 2024-09-12

## 2024-09-12 DIAGNOSIS — E11.9 TYPE 2 DIABETES MELLITUS (H): Primary | ICD-10-CM

## 2024-09-13 LAB — CALPROTECTIN STL-MCNT: 320 MG/KG (ref 0–49.9)

## 2024-09-15 NOTE — PROGRESS NOTES
.Diabetes Self-Management Education & Support    Nayeli Caal presents today for education related to Type 2 diabetes    Patient is being treated with:  MDI Insulin and oral meds  She is accompanied by self and     Year of diagnosis: 2013 or 2014  Referring provider:  Anne Marie Medina PA-C  Living Situation: Lives with a room mate  Employment: Administrative work for [employer]    PATIENT CONCERNS/REASON FOR REFERRAL:  Ongoing DSME and support    ASSESSMENT:    Taking Medication:     Current Diabetes Management per Patient:  Taking diabetes medications  She is taking Lantus 40 units daily.   Currently taking Mounjaro 5 mg weekly--tolerating well.  GERD is better after using a PPI for the past month or so.    Jardiance:  25 mg daily.  No report of vaginal yeast infections or UTI.    In addition she is using an In-Pen with Novolog cartridges with the following settings.      IN PEN SETTINGS:  Start time ICR  1 unit/gm Insulin Sensitivity Factor Target BG    06:00 AM 2 15 130   11:00 AM 2 15 110   05:00 PM 2 15 110   09:30 PM 2 15 130           Active Insulin Time:  3  Maximum Bolus:  30 units      Monitoring    Patient glucose self monitoring as follows: continuously using a continuous glucose monitor (CGM)  CGM: Evelia 3   BG results: Well[employer] report appears below:             Patient's most recent   Lab Results   Component Value Date    A1C 8.7 04/15/2024    A1C 7.8 01/04/2024    A1C 11.6 04/05/2021      Patient's A1C goal: <7.0.  Her estimated GMI is 6.9%  At last visit about a month ago her Time in Range was 71%.  Today she has been in target range % of the time, a huge improvement.  I gave her a lot of positive feedback for this.   She is very consistent about taking her Lantus insulin but states that sometimes she forgets to cover a late night snack, which accounts for some of the overnight highs we see.            EDUCATION and INSTRUCTION PROVIDED AT THIS VISIT:     History of  her GLP-1 use.   April 2023:  Didn't tolerate either Metformin (intractable diarrhea), or Jardiance (frequent yeast infections) so Ozempic 0.25 mg started.   January 2024:  She had titrated up to 2mg Ozempic and was tolerating well, however Ms. Medina felt she could get better control with Mounjaro, so Ozempic was discontinued and Mounjaro 2.5mg was started.   March 2024:  Mounjaro dose increased to 5 mg.  Still tolerating well, but some complaint of GERD symptoms.    April 2024:  Saw Dr. Bragg.  Still tolerating the 5 mg dose   May 2024:  Ready to advance to Mounjaro 7.5 however the drug was not available, so Ms. Medina changed her script to Ozempic 2 mg.                      Nayeli took two doses of this and had severe nausea and vomiting along with the GERD symptoms she had been experiencing.  She messaged me that  she was   going to stop the medication as she was on her way out of town.  Since then, Mounjaro has become available.     Currently taking 7.5 mg of Mounjaro.  She is ready to step up to the 10 mg dose.    Her In Pen settings are unchanged.    Instructed to reduce her Lantus dose from 35 units to 30 units when she starts the 10 mg dose of Mounjaro.    We will follow up again in about a month.   Prescription sent to [facility] pharmacy.         She states that over the Labor Day weekend, she had about 4-5 days of diarrhea, severe enough to send her to the ED, where there was no  available.  She said it was a horrible experience.  She received a couple of liters of NS and returned home.  She states that she is still having some diarrhea however it has improved.    Her TIR has improved dramatically and the lows seen on her Libreview report are due to compression of the sensor overnight.      FOLLOW-UP:      Appointment scheduled in one month.    She will call if she continues to have hypoglycemia after reducing her glargine dose to 30 units.      Any diabetes medication dose  changes were made via the CDE Protocol and Collaborative Practice Agreement with [facility] and  [facility].  A copy of this encounter was provided to patient's referring provider.

## 2024-09-18 ENCOUNTER — OFFICE VISIT (OUTPATIENT)
Dept: GASTROENTEROLOGY | Facility: CLINIC | Age: 44
End: 2024-09-18
Attending: PHYSICIAN ASSISTANT
Payer: COMMERCIAL

## 2024-09-18 VITALS
WEIGHT: 171.9 LBS | OXYGEN SATURATION: 99 % | DIASTOLIC BLOOD PRESSURE: 70 MMHG | HEART RATE: 76 BPM | TEMPERATURE: 98.4 F | BODY MASS INDEX: 32.48 KG/M2 | SYSTOLIC BLOOD PRESSURE: 108 MMHG

## 2024-09-18 DIAGNOSIS — R19.7 DIARRHEA OF PRESUMED INFECTIOUS ORIGIN: Primary | ICD-10-CM

## 2024-09-18 DIAGNOSIS — K52.9 COLITIS: ICD-10-CM

## 2024-09-18 DIAGNOSIS — R63.4 WEIGHT LOSS: ICD-10-CM

## 2024-09-18 PROCEDURE — 99215 OFFICE O/P EST HI 40 MIN: CPT | Performed by: PHYSICIAN ASSISTANT

## 2024-09-18 PROCEDURE — 99207 PR NO BILLABLE SERVICE THIS VISIT: CPT | Mod: U3

## 2024-09-18 ASSESSMENT — PAIN SCALES - GENERAL: PAINLEVEL: NO PAIN (0)

## 2024-09-18 NOTE — PATIENT INSTRUCTIONS
It was a pleasure taking care of you today.  I've included a brief summary of our discussion and care plan from today's visit below.  Please review this information with your primary care provider.  _______________________________________________________________________    My recommendations are summarized as follows:    Your symptoms have improved so significantly  Reintroduce more foods into your diet - tofu, eggs, oatmeal  Ensure good hydration   Send a weight to me in one week on 9/24 or 9/25  -what your weight is  -how your diarrhea is  -how often she takes imodium   We will revisit if we need to extend the work from home accommodation with the dVisit    Labs today     Please call our clinic or send a TravelZeeky message to us if you have any questions or concerns. TravelZeeky messages are answered by your nurse or doctor typically within 24 hours.  Please allow extra time on weekends and holidays    Return to GI Clinic in 2 weeks to review your progress.    _______________________________________________________________________    How do I schedule labs, imaging studies, or procedures that were ordered in clinic today?      Labs: To schedule lab appointment at the [facility], use my chart or call [phone]. If you have a [place] lab closer to home where you are regularly seen you can give them a call.      Procedures: If a colonoscopy, upper endoscopy, breath test, esophageal manometry, or pH impedence was ordered today, our endoscopy team will call you to schedule this. If you have not heard from our endoscopy team within a week, please call [phone] option 2 to schedule.      Imaging Studies: If you were scheduled for a CT scan, X-ray, MRI, ultrasound, HIDA scan, EKG or other imaging study, please call [phone] to have this scheduled.      Referral: If a referral to another specialty was ordered, expect a phone call or follow instructions above. If you have not heard from anyone  regarding your referral in a week, please call our clinic to check the status.      Who do I call with any questions after my visit?  Please be in touch if there are any further questions that arise following today's visit.  There are multiple ways to contact your gastroenterology care team.       During business hours, you may reach a Gastroenterology nurse at [phone]     To schedule or reschedule an appointment, please call [phone].      You can always send a secure message through SmartDocs ([employer]ek[employer]).  SmartDocs ([employer]) messages are answered by your nurse or doctor typically within 24 hours.  Please allow extra time on weekends and holidays.       For urgent/emergent questions after business hours, you may reach the on-call GI Fellow by contacting the [facility]  at [phone].     How will I get the results of any tests ordered?    You will receive all of your results.  If you have signed up for [employer], any tests ordered at your visit will be available to you after your physician reviews them.  Typically this takes 1-2 weeks.  If there are urgent results that require a change in your care plan, your physician or nurse will call you to discuss the next steps.       What is SmartDocs (Teknowmics)?  SmartDocs (T[employer]ics) is a secure way for you to access all of your healthcare records from the [facility].  It is a web based computer program, so you can sign on to it from any location.  It also allows you to send secure messages to your care team.  I recommend signing up for SmartDocs ([employer]) access if you have not already done so and are comfortable with using a computer.       How to I schedule a follow-up visit?  If you did not schedule a follow-up visit today, please call [phone] to schedule a follow-up office visit.      Sincerely,    Kirsten Conn PA-C  Gastroenterology

## 2024-09-18 NOTE — LETTER
2024    Nayeli Caal   1980        To Whom it May Concern;     Please excuse Nayeli Caal from in-person work for a health related reasons.     She has been following with me for care. For the next week, please juan luis work from home accommodations until  when I revisit with her. She had been ill starting .      If you have any questions, please contact my office.      Sincerely,           Kirsten Conn PA-C  NPI # [id number]  [place] License # [id number]

## 2024-09-18 NOTE — PROGRESS NOTES
GI CLINIC VISIT  ASSESSMENT/PLAN:  44 year old female with PMH of type 2 diabetes on insulin presenting to GI clinic for acute diarrhea.     # Diarrhea  # Abdominal pain  CT abdomen pelvis with contrast showing mild colitis starting at mid transverse extending to rectum.  She is hydrating and able to keep meals down however her oral intake was recently worsened due to a dental injury (now improving).  She has dropped some weight with this acute episode.  Given the sudden nature of her sx, suspect her presentation is infectious in etiology.  Differential includes start of underlying inflammatory bowel disease (adopted, family history is unknown).  Less likely ischemic disease given patent vasculature seen on CTAP. Her vitals are stable and she is nontoxic appearing. Basic labs - CBC, CMP were essentially stable. No L shift seen. Her abd exam is also much improved from a week ago.     -improving but still with some weight loss, suspect compounded by recent dental illness. I asked she send weight to me in 1 week on mychart  -cont supportive measures  -trend cbc and bmp given weight loss, diarrhea and type 2 DM on insulin status   -fecal calpro in low 300s despite diffuse colonic inflammation seen on CTAP (mid transverse down to rectum) - only mild inflammation. Will plan to trend at our next appt   -as she's improving, no indication for colonoscopy today, plan to revisit this conversation as needed   -Strict ER precautions reviewed    Short-term follow-up with me 10/2 at 12pm    Thank you for this consultation. It was a pleasure to participate in the care of this patient; please contact us with any further questions.    Kirsten Conn PA-C    Follow up: As planned above. Today, I personally spent 40 minutes in direct face to face time with the patient, of which greater than 50% of the time was spent in patient education and counseling as described above. Approximately 10 minutes were spent on indirect care associated  with the patient's consultation including but not limited to review of: patient medical records to date, clinic visits, hospital records, lab results, imaging studies, procedural documentation, and coordinating care with other providers. The findings from this review are summarized in the above note. All of the above accounted for a cumulative time of 50 minutes and was performed on the date of service.     HPI: 44 year old female with PMH of type 2 diabetes on insulin presenting to GI clinic for diarrhea.     She presents for 1 week short term follow up given acute GI symptoms of colitis.trended labs were stable when compared to 2 weeks ago at ED - CBC with wbc 12s but no L shift. Renal function with slight bump but still within normal range.     Today 9/18/24  Here with professional    She feels  unwell still  On sat developed dental pain and went to dental ER on Sunday. Ultimately had dental extraction with some improvement in this pain. No abx. She feels this contributed to not being able to eat well.   Appetite still somewhat depressed. Down to 171.9# today, lost a few more lb from last week.   No longer having abdominal pain  Had a drop to 70-80s but has since since worked with her DM educator and dose of insulin has been changed. Most recently BS have been normal for her   No f/c/n/v/bloody stools/black stools. No dizziness/syncope    Stooling journal   9/12 Thur - loose stools. 2 imoidum AM and 1 PM  9/13 Friday - loose stools. no imodium. Had a more normal BM   9/14 Saturday - Had a more normal BM. no imodium.   9/15 Ben - had 2 normal BM then a loose stool in evening. No imodium.   9/16 Monday - had some diarrhea again - 3-4 loose stools. She took 2 tabs imodium in AM and her after BM was normal   9/17 Tue - normal stool  9/18 Wed (today) - 1 loose stool at 6 am. No imodium. No further loose stools either.     Wt Readings from Last 10 Encounters:   09/18/24 78 kg (171 lb 14.4 oz)    09/11/24 79.3 kg (174 lb 12.8 oz)   09/06/24 80.7 kg (177 lb 14.4 oz)   08/13/24 82.1 kg (180 lb 14.4 oz)   08/01/24 82.9 kg (182 lb 11.2 oz)   07/19/24 82.5 kg (181 lb 14.4 oz)   07/12/24 83 kg (183 lb)   06/25/24 82.5 kg (181 lb 12.8 oz)   06/13/24 81.6 kg (180 lb)   05/31/24 81.5 kg (179 lb 9.6 oz)       9/11/24  Starting September 2, she started feeling unwell with abdominal discomfort.  Later that day she then developed loose stools and had lower abdominal pain. She thinks this started after eating bad eggs. The diarrhea persisted throughout the week -she was seen at the emergency room 9 4.  Labs showed a white count to the 12's,  a bump in her BUN and with CRT to 0.76 (baseline 0.5-0.76). no anion gap. CTAP mild colitis starting from mid transverse all the way to rectum.  Subsequent stool studies were negative for infection to include enteric panel and C. difficile.  Her symptoms improved that weekend but flared up again on 9/9.  It continues today.     She is reporting lower abdominal pain that she only notices with defecation or if she presses on the abdomen.  Sitting upright in a chair she denies any pain in her abdomen.  She reports frequent loose stools, unable to quantify the amount, consistency watery oatmeal.  She denies bloody or black stools.    She tried Maalox without improvement in the symptoms.  She took Pepto-Bismol once at its onset, it did not help.    Never had fevers, chills, nausea or vomiting.    She is scared to eat as she does not want to prompt diarrhea.  She is avoiding coffee and heavily seasoned foods.  She would like some guidance on dietary intake.  She is still going to work, and is requesting for work accommodations.  She is in office Tuesday Wednesday Thursday, work from home on Mondays and Fridays.  She is a .     She is a type II diabetic on insulin -continues with Basaglar 55 units daily.  She does have rapid aspart insulin that she takes as needed.  Her fasting  sugars have been her baseline in the 90s to 120s.  She denies hypoglycemic episodes with this recent illness.      She denies dizziness lightheadedness, syncope.    She lives with a roommate at home.    PAST MEDICAL HISTORY:  Past Medical History:   Diagnosis Date    Combined visual and hearing impairment     Deaf     Diabetic retinopathy of both eyes (H) 8/19/2011    Hepatic steatosis 08/19/2011    History of tobacco use     Hyperlipidemia     Hypertension     Hypertriglyceridemia     Migraines     Obesity 8/19/2011     Problem list name updated by automated process. 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       PREVIOUS ABDOMINAL/GYNECOLOGIC SURGERIES:  Past Surgical History:   Procedure Laterality Date    DAVINCI HYSTERECTOMY TOTAL, SALPINGECTOMY BILATERAL Bilateral 2/7/2024    Procedure: HYSTERECTOMY, TOTAL, ROBOT-ASSISTED, WITH BILATERAL SALPINGECTOMY, CYSTOSCOPY;  Surgeon: Vonnie Cowan MD;  Location: [facility] OR    ESOPHAGOSCOPY, GASTROSCOPY, DUODENOSCOPY (EGD), COMBINED N/A 6/13/2024    Procedure: ESOPHAGOGASTRODUODENOSCOPY, WITH BIOPSY;  Surgeon: Nora Brice MD;  Location: [facility] OR    LAPAROSCOPIC CHOLECYSTECTOMY N/A 3/10/2018    Procedure: LAPAROSCOPIC CHOLECYSTECTOMY;  Laparoscopic Cholecystectomy ;  Surgeon: Kuldeep Sigala MD;  Location: [facility] OR    RELEASE TRIGGER FINGER Right 5/2/2019    Procedure: Right Thumb Trigger Release;  Surgeon: Greg Streeter MD;  Location: [facility]    RELEASE TRIGGER FINGER Left 5/30/2019    Procedure: Left Ring Trigger Finger Release.  Ganglion cyst excision.;  Surgeon: Greg Streeter MD;  Location: [facility]    RELEASE TRIGGER FINGER Right 6/13/2023    Procedure: RELEASE, RIGHT TRIGGER FINGER, INDEX AND MIDDLE;  Surgeon: Miracle Carlin MD;  Location: [facility] OR    RELEASE TRIGGER  FINGER Left 8/1/2023    Procedure: RELEASE, LEFT TRIGGER FINGER, MIDDLE;  Surgeon: iMracle Carlin MD;  Location: [facility] OR         PERTINENT MEDICATIONS:  Current Outpatient Medications   Medication Sig Dispense Refill    acetaminophen (TYLENOL) 500 MG tablet Take 2 tablets (1,000 mg) by mouth every 8 hours as needed for mild pain 100 tablet 3    Alcohol Swabs (ALCOHOL PREP PAD) 70 % PADS 1 each 3 times daily 100 each 3    alum & mag hydroxide-simethicone (MAALOX) 200-200-20 MG/5ML SUSP suspension Take 10 mLs by mouth every 6 hours as needed for indigestion. 355 mL 1    amLODIPine (NORVASC) 5 MG tablet Take 1 tablet (5 mg) by mouth at bedtime 90 tablet 3    aspirin (ASA) 81 MG chewable tablet Take 1 tablet (81 mg) by mouth daily CHEW AND SWALLOW 90 tablet 1    atorvastatin (LIPITOR) 40 MG tablet Take 1 tablet (40 mg) by mouth daily 90 tablet 1    blood glucose (ACCU-CHEK LAYA PLUS) test strip Use with  Accucheck Expert meter.  Test blood sugar 6 times daily. 600 each 3    blood glucose monitoring (ACCU-CHEK FASTCLIX) lancets Use to test blood sugar 6 times daily or as directed 510 each 3    calcium carbonate-vitamin D (OSCAL) 500-5 MG-MCG tablet Take 1 tablet by mouth 2 times daily 180 tablet 1    Continuous Glucose Sensor (FREESTYLE ZORAIDA 3 SENSOR) MISC 1 each every 14 days Please provide 3 month supply. 6 each 3    diclofenac (VOLTAREN) 1 % topical gel Apply 2 g topically 4 times daily. For L shoulder pain 100 g 3    empagliflozin (JARDIANCE) 25 MG TABS tablet Take 1 tablet (25 mg) by mouth daily for 180 days 90 tablet 1    ergocalciferol (ERGOCALCIFEROL) 1.25 MG (80795 UT) capsule Take 1 capsule (50,000 Units) by mouth once a week For additional refills, please schedule a follow-up appointment at [phone] 12 capsule 3    fenofibrate (TRIGLIDE/LOFIBRA) 160 MG tablet Take 1 tablet (160 mg) by mouth daily 90 tablet 3    fish oil-omega-3 fatty acids 1000 MG capsule TAKE TWO CAPSULES (2 GM) BY MOUTH ONCE DAILY  180 capsule 3    gabapentin (NEURONTIN) 100 MG capsule Take 1 capsule (100 mg) by mouth 3 times daily 42 capsule 1    ibuprofen (ADVIL/MOTRIN) 600 MG tablet Take 1 tablet (600 mg) by mouth every 6 hours as needed for moderate pain 120 tablet 1    Injection Device for insulin (INPEN 100-PINK-NOVOLOG-FIASP) DAVID 1 each continuous 1 each 0    insulin aspart (NOVOLOG FLEXPEN) 100 UNIT/ML pen 1 unit per 5 grams CHO and correction scale of 1/25/125.  Approximate daily use is 100 units. 30 mL 2    insulin aspart (NOVOLOG PENFILL) 100 UNIT/ML cartridge Take with each meal and snack: 1 per 3 grams CHO and correction of 1 unit per 20 mg/dL over a target of 120. Average daily dose is 85 units. 75 mL 3    insulin glargine 100 UNIT/ML pen 3 month supply.Inject 55 units daily 45 mL 3    insulin pen needle (31G X 5 MM) 31G X 5 MM miscellaneous Use 5 to 6 pen needles daily or as directed.  3 month supply. 500 each 3    losartan (COZAAR) 100 MG tablet Take 1 tablet (100 mg) by mouth daily 90 tablet 3    omeprazole (PRILOSEC) 40 MG DR capsule Take 1 capsule (40 mg) by mouth 2 times daily 90 capsule 0    omeprazole (PRILOSEC) 40 MG DR capsule Take 1 capsule (40 mg) by mouth 2 times daily 8-12 weeks 90 capsule 0    ondansetron (ZOFRAN ODT) 4 MG ODT tab Take 1 tablet (4 mg) by mouth every 6 hours as needed for nausea or vomiting 20 tablet 0    tirzepatide (MOUNJARO) 10 MG/0.5ML pen Inject 10 mg subcutaneously every 7 days. 2 mL 1    tirzepatide (MOUNJARO) 2.5 MG/0.5ML pen Inject 2.5 mg Subcutaneous every 7 days 6 mL 3    tirzepatide (MOUNJARO) 5 MG/0.5ML pen Inject 5 mg subcutaneously every 7 days 2 mL 11    tirzepatide (MOUNJARO) 5 MG/0.5ML pen Inject 5 mg subcutaneously every 7 days 2 mL 1    tirzepatide (MOUNJARO) 7.5 MG/0.5ML pen Inject 7.5 mg subcutaneously every 7 days 2 mL 1         SOCIAL HISTORY:  Social History     Socioeconomic History    Marital status: Single     Spouse name: Not on file    Number of children: Not on file     Years of education: Not on file    Highest education level: Not on file   Occupational History    Not on file   Tobacco Use    Smoking status: Former     Current packs/day: 0.00     Types: Cigarettes     Quit date: 2010     Years since quittin.8     Passive exposure: Never    Smokeless tobacco: Never    Tobacco comments:     stopped 10 yrs ago   Substance and Sexual Activity    Alcohol use: Not Currently     Comment: socially    Drug use: Not Currently     Types: Marijuana     Comment: much younger  or earlier    Sexual activity: Not Currently     Partners: Male     Birth control/protection: I.U.D.   Other Topics Concern    Parent/sibling w/ CABG, MI or angioplasty before 65F 55M? Not Asked     Service No    Blood Transfusions No    Caffeine Concern No    Occupational Exposure No    Hobby Hazards No    Sleep Concern No    Stress Concern No    Weight Concern Yes    Special Diet No    Back Care No    Exercise Yes     Comment: 4-5 x a week    Bike Helmet No    Seat Belt Yes    Self-Exams Yes   Social History Narrative    Not on file     Social Determinants of Health     Financial Resource Strain: Low Risk  (2024)    Financial Resource Strain     Within the past 12 months, have you or your family members you live with been unable to get utilities (heat, electricity) when it was really needed?: No   Food Insecurity: Low Risk  (2024)    Food Insecurity     Within the past 12 months, did you worry that your food would run out before you got money to buy more?: No     Within the past 12 months, did the food you bought just not last and you didn t have money to get more?: No   Transportation Needs: Low Risk  (2024)    Transportation Needs     Within the past 12 months, has lack of transportation kept you from medical appointments, getting your medicines, non-medical meetings or appointments, work, or from getting things that you need?: No   Recent Concern: Transportation Needs - High Risk  (10/30/2023)    Transportation Needs     Within the past 12 months, has lack of transportation kept you from medical appointments, getting your medicines, non-medical meetings or appointments, work, or from getting things that you need?: Yes   Physical Activity: Not on file   Stress: Not on file   Social Connections: Not on file   Interpersonal Safety: Low Risk  (1/4/2024)    Interpersonal Safety     Do you feel physically and emotionally safe where you currently live?: Yes     Within the past 12 months, have you been hit, slapped, kicked or otherwise physically hurt by someone?: No     Within the past 12 months, have you been humiliated or emotionally abused in other ways by your partner or ex-partner?: No   Housing Stability: Low Risk  (1/4/2024)    Housing Stability     Do you have housing? : Yes     Are you worried about losing your housing?: No       FAMILY HISTORY:  Family History   Problem Relation Age of Onset    Unknown/Adopted Other        PHYSICAL EXAMINATION:  Vitals reviewed: LMP 02/01/2024 (Approximate)   Wt:   Wt Readings from Last 2 Encounters:   09/11/24 79.3 kg (174 lb 12.8 oz)   09/06/24 80.7 kg (177 lb 14.4 oz)      Constitutional: aaox3, cooperative, pleasant, not dyspneic/diaphoretic, no acute distress  Eyes: Sclera anicteric/injected  Ears/nose/mouth/throat: hearing intact  Neck: supple, active ROM w/o limitation or pain   CV: No edema  Respiratory: Unlabored breathing  Abd:  Nondistended, +bs, no hepatosplenomegaly, no tenderness to light or deep palpation . Sitting comfortably in chair, moving without issues.   Skin: warm, perfused, no jaundice  Psych: Normal affect  MSK: Normal gait     PERTINENT STUDIES - Reviewed in EMR     Lab Results   Component Value Date    WBC 12.1 (H) 09/11/2024    WBC 12.5 (H) 09/04/2024    WBC 7.7 05/31/2024    HGB 12.3 09/11/2024    HGB 12.2 09/04/2024    HGB 12.3 05/31/2024     09/11/2024     09/04/2024     05/31/2024    CHOL 183 01/04/2024     CHOL 221 (H) 04/04/2023    CHOL 194 07/19/2021    TRIG 350 (H) 01/04/2024    TRIG 298 (H) 04/04/2023    TRIG 133 07/19/2021    HDL 37 (L) 01/04/2024    HDL 38 (L) 04/04/2023    HDL 43 (L) 07/19/2021    ALT 29 09/11/2024    ALT 42 09/04/2024    ALT 51 (H) 05/31/2024    AST 18 09/11/2024    AST 25 09/04/2024    AST 32 05/31/2024     09/11/2024     09/04/2024     05/31/2024    BUN 23.5 (H) 09/11/2024    BUN 21.1 (H) 09/04/2024    BUN 13.3 05/31/2024    CO2 21 (L) 09/11/2024    CO2 20 (L) 09/04/2024    CO2 23 05/31/2024    TSH 1.41 04/04/2023    TSH 1.34 07/08/2020    TSH 1.84 05/17/2018    INR 1.03 06/18/2010    INR 0.92 06/18/2010        Liver Function Studies -   Recent Labs   Lab Test 09/04/24  1224   PROTTOTAL 7.6   ALBUMIN 4.4   BILITOTAL 0.2   ALKPHOS 62   AST 25   ALT 42        PREVIOUS ENDOSCOPY    No results found. However, due to the size of the patient record, not all encounters were searched. Please check Results Review for a complete set of results.

## 2024-09-18 NOTE — Clinical Note
9/18/2024      Naylei Caal  [address]  [facility] [address]      Dear Colleague,    Thank you for referring your patient, Nayeli Caal, to the [facility] [place]. Please see a copy of my visit note below.      GI CLINIC VISIT  ASSESSMENT/PLAN:  44 year old female with PMH of type 2 diabetes on insulin presenting to GI clinic for acute diarrhea    # Diarrhea  # Abdominal pain  CT abdomen pelvis with contrast showing mild colitis starting at mid transverse extending to rectum.  She is hydrating and able to keep meals down however is avoiding oral intake due to concerns of diarrhea.  She has dropped some weight with this acute episode.  Given the sudden nature, suspect this is infectious in etiology.  Differential includes start of underlying inflammatory bowel disease (adopted, family history is unknown).  Her vitals are stable and she is nontoxic appearing. Her abd exam did have tenderness with guarding.     -Recommended supportive care measures, ensuring good hydration and oral intake  -Discussed brat diet  -Okay to take scheduled Tylenol 1 g 3 times a day for pain  -Recommended start of scheduled Imodium  -Trend CBC, CMP.  Add on CRP and fecal calprotectin  -Strict ER precautions reviewed    Short-term follow-up with me 1 week.    We can consider colonoscopy at that point in time depending on how she is doing    Thank you for this consultation. It was a pleasure to participate in the care of this patient; please contact us with any further questions.    Kirsten Conn PA-C    Follow up: As planned above. Today, I personally spent 40 minutes in direct face to face time with the patient, of which greater than 50% of the time was spent in patient education and counseling as described above. Approximately 10 minutes were spent on indirect care associated with the patient's consultation including but not limited to review of: patient medical records to  date, clinic visits, hospital records, lab results, imaging studies, procedural documentation, and coordinating care with other providers. The findings from this review are summarized in the above note. All of the above accounted for a cumulative time of 50 minutes and was performed on the date of service.     HPI: 44 year old female with PMH of type 2 diabetes on insulin presenting to GI clinic for diarrhea.     She presents for 1 week short term follow up given acute GI symptoms of colitis.trended labs were stable - CBC with wbc 12s (similar ***    9/11/24  Starting September 2, she started feeling unwell with abdominal discomfort.  Later that day she then developed loose stools and had lower abdominal pain. She thinks this started after eating bad eggs. The diarrhea persisted throughout the week -she was seen at the emergency room 9 4.  Labs showed a white count to the 12's,  a bump in her BUN and with CRT to 0.76 (baseline 0.5-0.76). no anion gap. CTAP mild colitis starting from mid transverse all the way to rectum.  Subsequent stool studies were negative for infection to include enteric panel and C. difficile.  Her symptoms improved that weekend but flared up again on 9/9.  It continues today.     She is reporting lower abdominal pain that she only notices with defecation or if she presses on the abdomen.  Sitting upright in a chair she denies any pain in her abdomen.  She reports frequent loose stools, unable to quantify the amount, consistency watery oatmeal.  She denies bloody or black stools.    She tried Maalox without improvement in the symptoms.  She took Pepto-Bismol once at its onset, it did not help.    Never had fevers, chills, nausea or vomiting.    She is scared to eat as she does not want to prompt diarrhea.  She is avoiding coffee and heavily seasoned foods.  She would like some guidance on dietary intake.  She is still going to work, and is requesting for work accommodations.  She is in office  Tuesday Wednesday Thursday, work from home on Mondays and Fridays.  She is a .     She is a type II diabetic on insulin -continues with Basaglar 55 units daily.  She does have rapid aspart insulin that she takes as needed.  Her fasting sugars have been her baseline in the 90s to 120s.  She denies hypoglycemic episodes with this recent illness.      She denies dizziness lightheadedness, syncope.    She lives with a roommate at home.    PAST MEDICAL HISTORY:  Past Medical History:   Diagnosis Date    Combined visual and hearing impairment     Deaf     Diabetic retinopathy of both eyes (H) 8/19/2011    Hepatic steatosis 08/19/2011    History of tobacco use     Hyperlipidemia     Hypertension     Hypertriglyceridemia     Migraines     Obesity 8/19/2011     Problem list name updated by automated process. 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       PREVIOUS ABDOMINAL/GYNECOLOGIC SURGERIES:  Past Surgical History:   Procedure Laterality Date    DAVINCI HYSTERECTOMY TOTAL, SALPINGECTOMY BILATERAL Bilateral 2/7/2024    Procedure: HYSTERECTOMY, TOTAL, ROBOT-ASSISTED, WITH BILATERAL SALPINGECTOMY, CYSTOSCOPY;  Surgeon: Vonnie Cowan MD;  [facility]    ESOPHAGOSCOPY, GASTROSCOPY, DUODENOSCOPY (EGD), COMBINED N/A 6/13/2024    Procedure: ESOPHAGOGASTRODUODENOSCOPY, WITH BIOPSY;  Surgeon: Nora Brice MD;  Location: [facility] OR    LAPAROSCOPIC CHOLECYSTECTOMY N/A 3/10/2018    Procedure: LAPAROSCOPIC CHOLECYSTECTOMY;  Laparoscopic Cholecystectomy ;  Surgeon: Kuldeep Sigala MD;  [facility]    RELEASE TRIGGER FINGER Right 5/2/2019    Procedure: Right Thumb Trigger Release;  Surgeon: Greg Streeter MD;  [facility]    RELEASE TRIGGER FINGER Left 5/30/2019    Procedure: Left Ring Trigger Finger Release.  Ganglion cyst  excision.;  Surgeon: Greg Streeter MD;  [facility]    RELEASE TRIGGER FINGER Right 6/13/2023    Procedure: RELEASE, RIGHT TRIGGER FINGER, INDEX AND MIDDLE;  Surgeon: Miracle Carlin MD;  Location: [facility] OR    RELEASE TRIGGER FINGER Left 8/1/2023    Procedure: RELEASE, LEFT TRIGGER FINGER, MIDDLE;  Surgeon: Miracle Carlin MD;  Location: [facility] OR         PERTINENT MEDICATIONS:  Current Outpatient Medications   Medication Sig Dispense Refill    acetaminophen (TYLENOL) 500 MG tablet Take 2 tablets (1,000 mg) by mouth every 8 hours as needed for mild pain 100 tablet 3    Alcohol Swabs (ALCOHOL PREP PAD) 70 % PADS 1 each 3 times daily 100 each 3    alum & mag hydroxide-simethicone (MAALOX) 200-200-20 MG/5ML SUSP suspension Take 10 mLs by mouth every 6 hours as needed for indigestion. 355 mL 1    amLODIPine (NORVASC) 5 MG tablet Take 1 tablet (5 mg) by mouth at bedtime 90 tablet 3    aspirin (ASA) 81 MG chewable tablet Take 1 tablet (81 mg) by mouth daily CHEW AND SWALLOW 90 tablet 1    atorvastatin (LIPITOR) 40 MG tablet Take 1 tablet (40 mg) by mouth daily 90 tablet 1    blood glucose (ACCU-CHEK LAYA PLUS) test strip Use with  Accucheck Expert meter.  Test blood sugar 6 times daily. 600 each 3    blood glucose monitoring (ACCU-CHEK FASTCLIX) lancets Use to test blood sugar 6 times daily or as directed 510 each 3    calcium carbonate-vitamin D (OSCAL) 500-5 MG-MCG tablet Take 1 tablet by mouth 2 times daily 180 tablet 1    Continuous Glucose Sensor (FREESTYLE ZORAIDA 3 SENSOR) MISC 1 each every 14 days Please provide 3 month supply. 6 each 3    diclofenac (VOLTAREN) 1 % topical gel Apply 2 g topically 4 times daily. For L shoulder pain 100 g 3    empagliflozin (JARDIANCE) 25 MG TABS tablet Take 1 tablet (25 mg) by mouth daily for 180 days 90 tablet 1    ergocalciferol (ERGOCALCIFEROL) 1.25 MG (62292 UT) capsule Take 1 capsule (50,000 Units) by mouth once a week For additional refills, please schedule a  follow-up appointment at [phone] 12 capsule 3    fenofibrate (TRIGLIDE/LOFIBRA) 160 MG tablet Take 1 tablet (160 mg) by mouth daily 90 tablet 3    fish oil-omega-3 fatty acids 1000 MG capsule TAKE TWO CAPSULES (2 GM) BY MOUTH ONCE DAILY 180 capsule 3    gabapentin (NEURONTIN) 100 MG capsule Take 1 capsule (100 mg) by mouth 3 times daily 42 capsule 1    ibuprofen (ADVIL/MOTRIN) 600 MG tablet Take 1 tablet (600 mg) by mouth every 6 hours as needed for moderate pain 120 tablet 1    Injection Device for insulin (INPEN 100-PINK-NOVOLOG-FIASP) DAVID 1 each continuous 1 each 0    insulin aspart (NOVOLOG FLEXPEN) 100 UNIT/ML pen 1 unit per 5 grams CHO and correction scale of 1/25/125.  Approximate daily use is 100 units. 30 mL 2    insulin aspart (NOVOLOG PENFILL) 100 UNIT/ML cartridge Take with each meal and snack: 1 per 3 grams CHO and correction of 1 unit per 20 mg/dL over a target of 120. Average daily dose is 85 units. 75 mL 3    insulin glargine 100 UNIT/ML pen 3 month supply.Inject 55 units daily 45 mL 3    insulin pen needle (31G X 5 MM) 31G X 5 MM miscellaneous Use 5 to 6 pen needles daily or as directed.  3 month supply. 500 each 3    losartan (COZAAR) 100 MG tablet Take 1 tablet (100 mg) by mouth daily 90 tablet 3    omeprazole (PRILOSEC) 40 MG DR capsule Take 1 capsule (40 mg) by mouth 2 times daily 90 capsule 0    omeprazole (PRILOSEC) 40 MG DR capsule Take 1 capsule (40 mg) by mouth 2 times daily 8-12 weeks 90 capsule 0    ondansetron (ZOFRAN ODT) 4 MG ODT tab Take 1 tablet (4 mg) by mouth every 6 hours as needed for nausea or vomiting 20 tablet 0    tirzepatide (MOUNJARO) 10 MG/0.5ML pen Inject 10 mg subcutaneously every 7 days. 2 mL 1    tirzepatide (MOUNJARO) 2.5 MG/0.5ML pen Inject 2.5 mg Subcutaneous every 7 days 6 mL 3    tirzepatide (MOUNJARO) 5 MG/0.5ML pen Inject 5 mg subcutaneously every 7 days 2 mL 11    tirzepatide (MOUNJARO) 5 MG/0.5ML pen Inject 5 mg subcutaneously every 7 days 2 mL 1     tirzepatide (MOUNJARO) 7.5 MG/0.5ML pen Inject 7.5 mg subcutaneously every 7 days 2 mL 1         SOCIAL HISTORY:  Social History     Socioeconomic History    Marital status: Single     Spouse name: Not on file    Number of children: Not on file    Years of education: Not on file    Highest education level: Not on file   Occupational History    Not on file   Tobacco Use    Smoking status: Former     Current packs/day: 0.00     Types: Cigarettes     Quit date: 2010     Years since quittin.8     Passive exposure: Never    Smokeless tobacco: Never    Tobacco comments:     stopped 10 yrs ago   Substance and Sexual Activity    Alcohol use: Not Currently     Comment: socially    Drug use: Not Currently     Types: Marijuana     Comment: much younger  or earlier    Sexual activity: Not Currently     Partners: Male     Birth control/protection: I.U.D.   Other Topics Concern    Parent/sibling w/ CABG, MI or angioplasty before 65F 55M? Not Asked     Service No    Blood Transfusions No    Caffeine Concern No    Occupational Exposure No    Hobby Hazards No    Sleep Concern No    Stress Concern No    Weight Concern Yes    Special Diet No    Back Care No    Exercise Yes     Comment: 4-5 x a week    Bike Helmet No    Seat Belt Yes    Self-Exams Yes   Social History Narrative    Not on file     Social Determinants of Health     Financial Resource Strain: Low Risk  (2024)    Financial Resource Strain     Within the past 12 months, have you or your family members you live with been unable to get utilities (heat, electricity) when it was really needed?: No   Food Insecurity: Low Risk  (2024)    Food Insecurity     Within the past 12 months, did you worry that your food would run out before you got money to buy more?: No     Within the past 12 months, did the food you bought just not last and you didn t have money to get more?: No   Transportation Needs: Low Risk  (2024)    Transportation Needs      Within the past 12 months, has lack of transportation kept you from medical appointments, getting your medicines, non-medical meetings or appointments, work, or from getting things that you need?: No   Recent Concern: Transportation Needs - High Risk (10/30/2023)    Transportation Needs     Within the past 12 months, has lack of transportation kept you from medical appointments, getting your medicines, non-medical meetings or appointments, work, or from getting things that you need?: Yes   Physical Activity: Not on file   Stress: Not on file   Social Connections: Not on file   Interpersonal Safety: Low Risk  (1/4/2024)    Interpersonal Safety     Do you feel physically and emotionally safe where you currently live?: Yes     Within the past 12 months, have you been hit, slapped, kicked or otherwise physically hurt by someone?: No     Within the past 12 months, have you been humiliated or emotionally abused in other ways by your partner or ex-partner?: No   Housing Stability: Low Risk  (1/4/2024)    Housing Stability     Do you have housing? : Yes     Are you worried about losing your housing?: No       FAMILY HISTORY:  Family History   Problem Relation Age of Onset    Unknown/Adopted Other        PHYSICAL EXAMINATION:  Vitals reviewed: LMP 02/01/2024 (Approximate)   Wt:   Wt Readings from Last 2 Encounters:   09/11/24 79.3 kg (174 lb 12.8 oz)   09/06/24 80.7 kg (177 lb 14.4 oz)      Constitutional: aaox3, cooperative, pleasant, not dyspneic/diaphoretic, no acute distress  Eyes: Sclera anicteric/injected  Ears/nose/mouth/throat: hearing intact  Neck: supple, active ROM w/o limitation or pain   CV: No edema  Respiratory: Unlabored breathing  Abd:  Nondistended, +bs, no hepatosplenomegaly, tenderness noted to the upper/mid abdomen with some guarding. Sitting comfortably in chair, moving and leaving room without issues.   Skin: warm, perfused, no jaundice  Psych: Normal affect  MSK: Normal gait     PERTINENT STUDIES -  Reviewed in EMR     Lab Results   Component Value Date    WBC 12.1 (H) 09/11/2024    WBC 12.5 (H) 09/04/2024    WBC 7.7 05/31/2024    HGB 12.3 09/11/2024    HGB 12.2 09/04/2024    HGB 12.3 05/31/2024     09/11/2024     09/04/2024     05/31/2024    CHOL 183 01/04/2024    CHOL 221 (H) 04/04/2023    CHOL 194 07/19/2021    TRIG 350 (H) 01/04/2024    TRIG 298 (H) 04/04/2023    TRIG 133 07/19/2021    HDL 37 (L) 01/04/2024    HDL 38 (L) 04/04/2023    HDL 43 (L) 07/19/2021    ALT 29 09/11/2024    ALT 42 09/04/2024    ALT 51 (H) 05/31/2024    AST 18 09/11/2024    AST 25 09/04/2024    AST 32 05/31/2024     09/11/2024     09/04/2024     05/31/2024    BUN 23.5 (H) 09/11/2024    BUN 21.1 (H) 09/04/2024    BUN 13.3 05/31/2024    CO2 21 (L) 09/11/2024    CO2 20 (L) 09/04/2024    CO2 23 05/31/2024    TSH 1.41 04/04/2023    TSH 1.34 07/08/2020    TSH 1.84 05/17/2018    INR 1.03 06/18/2010    INR 0.92 06/18/2010        Liver Function Studies -   Recent Labs   Lab Test 09/04/24  1224   PROTTOTAL 7.6   ALBUMIN 4.4   BILITOTAL 0.2   ALKPHOS 62   AST 25   ALT 42        PREVIOUS ENDOSCOPY    No results found. However, due to the size of the patient record, not all encounters were searched. Please check Results Review for a complete set of results.      GI CLINIC VISIT  ASSESSMENT/PLAN:  44 year old female with PMH of type 2 diabetes on insulin presenting to GI clinic for acute diarrhea    # Diarrhea  # Abdominal pain  CT abdomen pelvis with contrast showing mild colitis starting at mid transverse extending to rectum.  She is hydrating and able to keep meals down however is avoiding oral intake due to concerns of diarrhea.  She has dropped some weight with this acute episode.  Given the sudden nature, suspect this is infectious in etiology.  Differential includes start of underlying inflammatory bowel disease (adopted, family history is unknown).  Her vitals are stable and she is nontoxic appearing.  Her abd exam did have tenderness with guarding.     -Recommended supportive care measures, ensuring good hydration and oral intake  -Discussed brat diet  -Okay to take scheduled Tylenol 1 g 3 times a day for pain  -Recommended start of scheduled Imodium  -Trend CBC, CMP.  Add on CRP and fecal calprotectin  -Strict ER precautions reviewed    Short-term follow-up with me 1 week.    We can consider colonoscopy at that point in time depending on how she is doing    Thank you for this consultation. It was a pleasure to participate in the care of this patient; please contact us with any further questions.    Kirsten Conn PA-C    Follow up: As planned above. Today, I personally spent 40 minutes in direct face to face time with the patient, of which greater than 50% of the time was spent in patient education and counseling as described above. Approximately 10 minutes were spent on indirect care associated with the patient's consultation including but not limited to review of: patient medical records to date, clinic visits, hospital records, lab results, imaging studies, procedural documentation, and coordinating care with other providers. The findings from this review are summarized in the above note. All of the above accounted for a cumulative time of 50 minutes and was performed on the date of service.     HPI: 44 year old female with PMH of type 2 diabetes on insulin presenting to GI clinic for diarrhea.     She presents for 1 week short term follow up given acute GI symptoms of colitis.trended labs were stable - CBC with wbc 12s (similar ***    Today   Sx have improved. Took imodium thur. No meds fri. No sat. Symptoms were good   2 tabs Sunday  On Monday she had dairrhea again - 3-4x/d. She took imodium 2 tabs that AM, her afternoon BM were OK    Thur - 2 imoidum AM and 1 PM  Friday - no imodium. Had a more normal BM   Saturday - no imodium. Had a more normal BM   Sunday - had 2 normal BM and then       Had a BM this AM,  it was still watery with urgency. No blody or black stools. Light brown coloratoin.     3. Toothache Saturday - went to dental emergency room on Sunday. Tried to do a filling. Still had dental pain, they pulled a tooth yesterday. No abx. No follow-up with dentist. Tooth pain is improving now. Taking tylenol for pain control it is helpful somewhat but still with some breakthrough pain as well.   No f/c/n/v.   Appettite - somewhat deprssed  She's trying to contact diabetics educator     Wt Readings from Last 10 Encounters:   09/18/24 78 kg (171 lb 14.4 oz)   09/11/24 79.3 kg (174 lb 12.8 oz)   09/06/24 80.7 kg (177 lb 14.4 oz)   08/13/24 82.1 kg (180 lb 14.4 oz)   08/01/24 82.9 kg (182 lb 11.2 oz)   07/19/24 82.5 kg (181 lb 14.4 oz)   07/12/24 83 kg (183 lb)   06/25/24 82.5 kg (181 lb 12.8 oz)   06/13/24 81.6 kg (180 lb)   05/31/24 81.5 kg (179 lb 9.6 oz)           9/11/24  Starting September 2, she started feeling unwell with abdominal discomfort.  Later that day she then developed loose stools and had lower abdominal pain. She thinks this started after eating bad eggs. The diarrhea persisted throughout the week -she was seen at the emergency room 9 4.  Labs showed a white count to the 12's,  a bump in her BUN and with CRT to 0.76 (baseline 0.5-0.76). no anion gap. CTAP mild colitis starting from mid transverse all the way to rectum.  Subsequent stool studies were negative for infection to include enteric panel and C. difficile.  Her symptoms improved that weekend but flared up again on 9/9.  It continues today.     She is reporting lower abdominal pain that she only notices with defecation or if she presses on the abdomen.  Sitting upright in a chair she denies any pain in her abdomen.  She reports frequent loose stools, unable to quantify the amount, consistency watery oatmeal.  She denies bloody or black stools.    She tried Maalox without improvement in the symptoms.  She took Pepto-Bismol once at its onset, it  did not help.    Never had fevers, chills, nausea or vomiting.    She is scared to eat as she does not want to prompt diarrhea.  She is avoiding coffee and heavily seasoned foods.  She would like some guidance on dietary intake.  She is still going to work, and is requesting for work accommodations.  She is in office Tuesday Wednesday Thursday, work from home on Mondays and Fridays.  She is a .     She is a type II diabetic on insulin -continues with Basaglar 55 units daily.  She does have rapid aspart insulin that she takes as needed.  Her fasting sugars have been her baseline in the 90s to 120s.  She denies hypoglycemic episodes with this recent illness.      She denies dizziness lightheadedness, syncope.    She lives with a roommate at home.    PAST MEDICAL HISTORY:  Past Medical History:   Diagnosis Date     Combined visual and hearing impairment      Deaf      Diabetic retinopathy of both eyes (H) 8/19/2011     Hepatic steatosis 08/19/2011     History of tobacco use      Hyperlipidemia      Hypertension      Hypertriglyceridemia      Migraines      Obesity 8/19/2011     Problem list name updated by automated process. 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       PREVIOUS ABDOMINAL/GYNECOLOGIC SURGERIES:  Past Surgical History:   Procedure Laterality Date     DAVINCI HYSTERECTOMY TOTAL, SALPINGECTOMY BILATERAL Bilateral 2/7/2024    Procedure: HYSTERECTOMY, TOTAL, ROBOT-ASSISTED, WITH BILATERAL SALPINGECTOMY, CYSTOSCOPY;  Surgeon: Vonnie Cowan MD;  [facility]     ESOPHAGOSCOPY, GASTROSCOPY, DUODENOSCOPY (EGD), COMBINED N/A 6/13/2024    Procedure: ESOPHAGOGASTRODUODENOSCOPY, WITH BIOPSY;  Surgeon: Nora Brice MD;  Location: [facility] OR     LAPAROSCOPIC CHOLECYSTECTOMY N/A 3/10/2018    Procedure: LAPAROSCOPIC  CHOLECYSTECTOMY;  Laparoscopic Cholecystectomy ;  Surgeon: Kuldeep Sigala MD;  Location: [facility] OR     RELEASE TRIGGER FINGER Right 5/2/2019    Procedure: Right Thumb Trigger Release;  Surgeon: Greg Streeter MD;  [facility]     RELEASE TRIGGER FINGER Left 5/30/2019    Procedure: Left Ring Trigger Finger Release.  Ganglion cyst excision.;  Surgeon: Greg Streeter MD;  [facility]     RELEASE TRIGGER FINGER Right 6/13/2023    Procedure: RELEASE, RIGHT TRIGGER FINGER, INDEX AND MIDDLE;  Surgeon: Miracle Carlin MD;  Location: [facility] OR     RELEASE TRIGGER FINGER Left 8/1/2023    Procedure: RELEASE, LEFT TRIGGER FINGER, MIDDLE;  Surgeon: Miracle Carlin MD;  Location: [facility] OR         PERTINENT MEDICATIONS:  Current Outpatient Medications   Medication Sig Dispense Refill     acetaminophen (TYLENOL) 500 MG tablet Take 2 tablets (1,000 mg) by mouth every 8 hours as needed for mild pain 100 tablet 3     Alcohol Swabs (ALCOHOL PREP PAD) 70 % PADS 1 each 3 times daily 100 each 3     alum & mag hydroxide-simethicone (MAALOX) 200-200-20 MG/5ML SUSP suspension Take 10 mLs by mouth every 6 hours as needed for indigestion. 355 mL 1     amLODIPine (NORVASC) 5 MG tablet Take 1 tablet (5 mg) by mouth at bedtime 90 tablet 3     aspirin (ASA) 81 MG chewable tablet Take 1 tablet (81 mg) by mouth daily CHEW AND SWALLOW 90 tablet 1     atorvastatin (LIPITOR) 40 MG tablet Take 1 tablet (40 mg) by mouth daily 90 tablet 1     blood glucose (ACCU-CHEK LAYA PLUS) test strip Use with  Accucheck Expert meter.  Test blood sugar 6 times daily. 600 each 3     blood glucose monitoring (ACCU-CHEK FASTCLIX) lancets Use to test blood sugar 6 times daily or as directed 510 each 3     calcium carbonate-vitamin D (OSCAL) 500-5 MG-MCG tablet Take 1 tablet by mouth 2 times daily 180 tablet 1     Continuous Glucose Sensor (FREESTYLE ZORAIDA 3 SENSOR) MISC 1 each every 14 days Please provide 3 month supply. 6 each 3     diclofenac  (VOLTAREN) 1 % topical gel Apply 2 g topically 4 times daily. For L shoulder pain 100 g 3     empagliflozin (JARDIANCE) 25 MG TABS tablet Take 1 tablet (25 mg) by mouth daily for 180 days 90 tablet 1     ergocalciferol (ERGOCALCIFEROL) 1.25 MG (81075 UT) capsule Take 1 capsule (50,000 Units) by mouth once a week For additional refills, please schedule a follow-up appointment at [phone] 12 capsule 3     fenofibrate (TRIGLIDE/LOFIBRA) 160 MG tablet Take 1 tablet (160 mg) by mouth daily 90 tablet 3     fish oil-omega-3 fatty acids 1000 MG capsule TAKE TWO CAPSULES (2 GM) BY MOUTH ONCE DAILY 180 capsule 3     gabapentin (NEURONTIN) 100 MG capsule Take 1 capsule (100 mg) by mouth 3 times daily 42 capsule 1     ibuprofen (ADVIL/MOTRIN) 600 MG tablet Take 1 tablet (600 mg) by mouth every 6 hours as needed for moderate pain 120 tablet 1     Injection Device for insulin (INPEN 100-PINK-NOVOLOG-FIASP) DAVID 1 each continuous 1 each 0     insulin aspart (NOVOLOG FLEXPEN) 100 UNIT/ML pen 1 unit per 5 grams CHO and correction scale of 1/25/125.  Approximate daily use is 100 units. 30 mL 2     insulin aspart (NOVOLOG PENFILL) 100 UNIT/ML cartridge Take with each meal and snack: 1 per 3 grams CHO and correction of 1 unit per 20 mg/dL over a target of 120. Average daily dose is 85 units. 75 mL 3     insulin glargine 100 UNIT/ML pen 3 month supply.Inject 55 units daily 45 mL 3     insulin pen needle (31G X 5 MM) 31G X 5 MM miscellaneous Use 5 to 6 pen needles daily or as directed.  3 month supply. 500 each 3     losartan (COZAAR) 100 MG tablet Take 1 tablet (100 mg) by mouth daily 90 tablet 3     omeprazole (PRILOSEC) 40 MG DR capsule Take 1 capsule (40 mg) by mouth 2 times daily 90 capsule 0     omeprazole (PRILOSEC) 40 MG DR capsule Take 1 capsule (40 mg) by mouth 2 times daily 8-12 weeks 90 capsule 0     ondansetron (ZOFRAN ODT) 4 MG ODT tab Take 1 tablet (4 mg) by mouth every 6 hours as needed for nausea or vomiting 20  tablet 0     tirzepatide (MOUNJARO) 10 MG/0.5ML pen Inject 10 mg subcutaneously every 7 days. 2 mL 1     tirzepatide (MOUNJARO) 2.5 MG/0.5ML pen Inject 2.5 mg Subcutaneous every 7 days 6 mL 3     tirzepatide (MOUNJARO) 5 MG/0.5ML pen Inject 5 mg subcutaneously every 7 days 2 mL 11     tirzepatide (MOUNJARO) 5 MG/0.5ML pen Inject 5 mg subcutaneously every 7 days 2 mL 1     tirzepatide (MOUNJARO) 7.5 MG/0.5ML pen Inject 7.5 mg subcutaneously every 7 days 2 mL 1         SOCIAL HISTORY:  Social History     Socioeconomic History     Marital status: Single     Spouse name: Not on file     Number of children: Not on file     Years of education: Not on file     Highest education level: Not on file   Occupational History     Not on file   Tobacco Use     Smoking status: Former     Current packs/day: 0.00     Types: Cigarettes     Quit date: 2010     Years since quittin.8     Passive exposure: Never     Smokeless tobacco: Never     Tobacco comments:     stopped 10 yrs ago   Substance and Sexual Activity     Alcohol use: Not Currently     Comment: socially     Drug use: Not Currently     Types: Marijuana     Comment: much younger  or earlier     Sexual activity: Not Currently     Partners: Male     Birth control/protection: I.U.D.   Other Topics Concern     Parent/sibling w/ CABG, MI or angioplasty before 65F 55M? Not Asked      Service No     Blood Transfusions No     Caffeine Concern No     Occupational Exposure No     Hobby Hazards No     Sleep Concern No     Stress Concern No     Weight Concern Yes     Special Diet No     Back Care No     Exercise Yes     Comment: 4-5 x a week     Bike Helmet No     Seat Belt Yes     Self-Exams Yes   Social History Narrative     Not on file     Social Determinants of Health     Financial Resource Strain: Low Risk  (2024)    Financial Resource Strain      Within the past 12 months, have you or your family members you live with been unable to get utilities (heat,  electricity) when it was really needed?: No   Food Insecurity: Low Risk  (1/4/2024)    Food Insecurity      Within the past 12 months, did you worry that your food would run out before you got money to buy more?: No      Within the past 12 months, did the food you bought just not last and you didn t have money to get more?: No   Transportation Needs: Low Risk  (1/4/2024)    Transportation Needs      Within the past 12 months, has lack of transportation kept you from medical appointments, getting your medicines, non-medical meetings or appointments, work, or from getting things that you need?: No   Recent Concern: Transportation Needs - High Risk (10/30/2023)    Transportation Needs      Within the past 12 months, has lack of transportation kept you from medical appointments, getting your medicines, non-medical meetings or appointments, work, or from getting things that you need?: Yes   Physical Activity: Not on file   Stress: Not on file   Social Connections: Not on file   Interpersonal Safety: Low Risk  (1/4/2024)    Interpersonal Safety      Do you feel physically and emotionally safe where you currently live?: Yes      Within the past 12 months, have you been hit, slapped, kicked or otherwise physically hurt by someone?: No      Within the past 12 months, have you been humiliated or emotionally abused in other ways by your partner or ex-partner?: No   Housing Stability: Low Risk  (1/4/2024)    Housing Stability      Do you have housing? : Yes      Are you worried about losing your housing?: No       FAMILY HISTORY:  Family History   Problem Relation Age of Onset     Unknown/Adopted Other        PHYSICAL EXAMINATION:  Vitals reviewed: LMP 02/01/2024 (Approximate)   Wt:   Wt Readings from Last 2 Encounters:   09/11/24 79.3 kg (174 lb 12.8 oz)   09/06/24 80.7 kg (177 lb 14.4 oz)      Constitutional: aaox3, cooperative, pleasant, not dyspneic/diaphoretic, no acute distress  Eyes: Sclera  anicteric/injected  Ears/nose/mouth/throat: hearing intact  Neck: supple, active ROM w/o limitation or pain   CV: No edema  Respiratory: Unlabored breathing  Abd:  Nondistended, +bs, no hepatosplenomegaly, tenderness noted to the upper/mid abdomen with some guarding. Sitting comfortably in chair, moving and leaving room without issues.   Skin: warm, perfused, no jaundice  Psych: Normal affect  MSK: Normal gait     PERTINENT STUDIES - Reviewed in EMR     Lab Results   Component Value Date    WBC 12.1 (H) 09/11/2024    WBC 12.5 (H) 09/04/2024    WBC 7.7 05/31/2024    HGB 12.3 09/11/2024    HGB 12.2 09/04/2024    HGB 12.3 05/31/2024     09/11/2024     09/04/2024     05/31/2024    CHOL 183 01/04/2024    CHOL 221 (H) 04/04/2023    CHOL 194 07/19/2021    TRIG 350 (H) 01/04/2024    TRIG 298 (H) 04/04/2023    TRIG 133 07/19/2021    HDL 37 (L) 01/04/2024    HDL 38 (L) 04/04/2023    HDL 43 (L) 07/19/2021    ALT 29 09/11/2024    ALT 42 09/04/2024    ALT 51 (H) 05/31/2024    AST 18 09/11/2024    AST 25 09/04/2024    AST 32 05/31/2024     09/11/2024     09/04/2024     05/31/2024    BUN 23.5 (H) 09/11/2024    BUN 21.1 (H) 09/04/2024    BUN 13.3 05/31/2024    CO2 21 (L) 09/11/2024    CO2 20 (L) 09/04/2024    CO2 23 05/31/2024    TSH 1.41 04/04/2023    TSH 1.34 07/08/2020    TSH 1.84 05/17/2018    INR 1.03 06/18/2010    INR 0.92 06/18/2010        Liver Function Studies -   Recent Labs   Lab Test 09/04/24  1224   PROTTOTAL 7.6   ALBUMIN 4.4   BILITOTAL 0.2   ALKPHOS 62   AST 25   ALT 42        PREVIOUS ENDOSCOPY    No results found. However, due to the size of the patient record, not all encounters were searched. Please check Results Review for a complete set of results.      Again, thank you for allowing me to participate in the care of your patient.        Sincerely,        Kirsten Conn PA-C

## 2024-09-19 ENCOUNTER — LAB (OUTPATIENT)
Dept: LAB | Facility: CLINIC | Age: 44
End: 2024-09-19
Payer: COMMERCIAL

## 2024-09-19 ENCOUNTER — OFFICE VISIT (OUTPATIENT)
Dept: INTERPRETER SERVICES | Facility: CLINIC | Age: 44
End: 2024-09-19

## 2024-09-19 DIAGNOSIS — R63.4 WEIGHT LOSS: ICD-10-CM

## 2024-09-19 LAB
ANION GAP SERPL CALCULATED.3IONS-SCNC: 7 MMOL/L (ref 7–15)
BUN SERPL-MCNC: 15 MG/DL (ref 6–20)
CALCIUM SERPL-MCNC: 9.3 MG/DL (ref 8.8–10.4)
CHLORIDE SERPL-SCNC: 102 MMOL/L (ref 98–107)
CREAT SERPL-MCNC: 0.83 MG/DL (ref 0.51–0.95)
EGFRCR SERPLBLD CKD-EPI 2021: 89 ML/MIN/1.73M2
ERYTHROCYTE [DISTWIDTH] IN BLOOD BY AUTOMATED COUNT: 13.2 % (ref 10–15)
GLUCOSE SERPL-MCNC: 161 MG/DL (ref 70–99)
HCO3 SERPL-SCNC: 28 MMOL/L (ref 22–29)
HCT VFR BLD AUTO: 39 % (ref 35–47)
HGB BLD-MCNC: 12.8 G/DL (ref 11.7–15.7)
MCH RBC QN AUTO: 28.6 PG (ref 26.5–33)
MCHC RBC AUTO-ENTMCNC: 32.8 G/DL (ref 31.5–36.5)
MCV RBC AUTO: 87 FL (ref 78–100)
PLATELET # BLD AUTO: 445 10E3/UL (ref 150–450)
POTASSIUM SERPL-SCNC: 4.2 MMOL/L (ref 3.4–5.3)
RBC # BLD AUTO: 4.48 10E6/UL (ref 3.8–5.2)
SODIUM SERPL-SCNC: 137 MMOL/L (ref 135–145)
WBC # BLD AUTO: 11.3 10E3/UL (ref 4–11)

## 2024-09-19 PROCEDURE — 36415 COLL VENOUS BLD VENIPUNCTURE: CPT

## 2024-09-19 PROCEDURE — 82374 ASSAY BLOOD CARBON DIOXIDE: CPT

## 2024-09-19 PROCEDURE — 85027 COMPLETE CBC AUTOMATED: CPT

## 2024-09-24 ENCOUNTER — TELEPHONE (OUTPATIENT)
Dept: ENDOCRINOLOGY | Facility: CLINIC | Age: 44
End: 2024-09-24
Payer: COMMERCIAL

## 2024-09-24 NOTE — TELEPHONE ENCOUNTER
Sent Mychart (1st Attempt) for the patient to call back and schedule the following:    Appointment type: labs   Provider: kalpana   Return date: re-cristy 10/7 to 12/13 at 6:15 am  Specialty phone number: [phone]   Additional appointment(s) needed:   Additonal Notes: Sent MyC w date and time I re-cristy labs to   Per Carmenza RN:  Genaro Tyler,      Thank you for reaching out. As your last A1c was Sept 11 2024, You would not need a new lab draw for another A1c before DEC 11 2024 at the earliest.      Please feel free to cancel any lab draws for the A1c. Please be careful not to cancel any orders for other labs that we may not be aware of.      Thank you.      The Endocrine Team     Nayeli's response:  Can you send them to cancel it on Oct 7 and move to different date on Dec? Available before Dec 16 reason, i am going to out of town on Dec 16- Jan 6. So available before Dec 16. Lab earlier 615 AM any date on Dec.     Thank you Nayeli Jacob on 9/24/2024 at 2:59 PM

## 2024-09-25 ENCOUNTER — MYC MEDICAL ADVICE (OUTPATIENT)
Dept: INTERNAL MEDICINE | Facility: CLINIC | Age: 44
End: 2024-09-25
Payer: COMMERCIAL

## 2024-09-25 ENCOUNTER — TELEPHONE (OUTPATIENT)
Dept: GASTROENTEROLOGY | Facility: CLINIC | Age: 44
End: 2024-09-25
Payer: COMMERCIAL

## 2024-09-25 NOTE — TELEPHONE ENCOUNTER
"Endoscopy Scheduling Screen    Have you had any respiratory illness or flu-like symptoms in the last 10 days?  No    What is your communication preference for Instructions and/or Bowel Prep?   MyChart    What insurance is in the chart?  Other:  [employer]    Ordering/Referring Provider:   MUNDO HOOD        (If ordering provider performs procedure, schedule with ordering provider unless otherwise instructed. )    BMI: Estimated body mass index is 32.48 kg/m  as calculated from the following:    Height as of 9/11/24: 1.549 m (5' 1\").    Weight as of 9/18/24: 78 kg (171 lb 14.4 oz).     Sedation Ordered  moderate sedation.   If patient BMI > 50 do not schedule in [facility].    If patient BMI > 45 do not schedule at [facility].    Are you taking methadone or Suboxone?  NO, No RN review required.    Have you been diagnosed and are being treated for severe PTSD or severe anxiety?  NO, No RN review required.    Are you taking any prescription medications for pain 3 or more times per week?   NO, No RN review required.    Do you have a history of malignant hyperthermia?  No    (Females) Are you currently pregnant?   No     Have you been diagnosed or told you have pulmonary hypertension?   No    Do you have an LVAD?  No    Have you been told you have moderate to severe sleep apnea?  No.    Have you been told you have COPD, asthma, or any other lung disease?  No    Do you have any heart conditions?  No     Have you ever had or are you waiting for an organ transplant?  No. Continue scheduling, no site restrictions.    Have you had a stroke or transient ischemic attack (TIA aka \"mini stroke\" in the last 6 months?   No    Have you been diagnosed with or been told you have cirrhosis of the liver?   No.    Are you currently on dialysis?   No    Do you need assistance transferring?   No    BMI: Estimated body mass index is 32.48 kg/m  as calculated from the following:    Height as of 9/11/24: 1.549 m (5' 1\").    Weight as of 9/18/24: 78 kg (171 lb " 14.4 oz).     Is patients BMI > 40 and scheduling location [facility]?  No    Do you take an injectable or oral medication for weight loss or diabetes (excluding insulin)?  Yes, hold time can be up to 7 days. Please consult with you prescribing provider to discuss endoscopy recommendations. (Please schedule at least 7 days out.)    Do you take the medication Naltrexone?  No    Do you take blood thinners?  No       Prep   Are you currently on dialysis or do you have chronic kidney disease?  No    Do you have a diagnosis of diabetes?  Yes (Golytely Prep)    Do you have a diagnosis of cystic fibrosis (CF)?  No    On a regular basis do you go 3 -5 days between bowel movements?  No    BMI > 40?  No    Preferred Pharmacy:      [facility] [address] [facility] [address] [place] [address] [address] [place] [address]  Phone: [phone] Fax: [phone] Alternate Fax: [phone], [phone]        Final Scheduling Details     Procedure scheduled  Colonoscopy    Surgeon:  Mikey     Date of procedure:  10/9     Pre-OP / PAC:   No - Not required for this site.    Location  [facility] - [facility] - Patient preference.    Sedation   Moderate Sedation - Per order.      Patient Reminders:   You will receive a call from a Nurse to review instructions and health history.  This assessment must be completed prior to your procedure.  Failure to complete the Nurse assessment may result in the procedure being cancelled.      On the day of your procedure, please designate an adult(s) who can drive you home stay with you for the next 24 hours. The medicines used in the exam will make you sleepy. You will not be able to drive.      You cannot take public transportation, ride share services, or non-medical taxi service without a responsible caregiver.  Medical transport services are allowed with the requirement that a responsible caregiver will receive you at your destination.  We require that drivers and  caregivers are confirmed prior to your procedure.

## 2024-09-25 NOTE — TELEPHONE ENCOUNTER
DIAGNOSIS: left shoulder/ [employer]    APPOINTMENT DATE: 10/3/24   NOTES STATUS DETAILS   OFFICE NOTE from referring provider Internal Self    OFFICE NOTE from other specialist Internal 10/5/23 Sebas Bradley MD   MEDICATION LIST Internal    MRI Internal 8/30/23 MR shoulder left    XRAYS  & INJECTIONS (IMAGES & REPORTS) Internal 5/1/23 XR Shoulder Left  8/14/24 XR shoulder left

## 2024-09-26 NOTE — TELEPHONE ENCOUNTER
DIAGNOSIS: Bilateral carpal tunnel    APPOINTMENT DATE: 10/2/24   NOTES STATUS DETAILS   OFFICE NOTE from referring provider Internal 9/5/24 - Kenji Cheung MD - Sports Med    MEDICATION LIST Internal    XRAYS (IMAGES & REPORTS) Internal 7/24/18 - XR Hand andrew

## 2024-09-27 ENCOUNTER — TELEPHONE (OUTPATIENT)
Dept: GASTROENTEROLOGY | Facility: CLINIC | Age: 44
End: 2024-09-27
Payer: COMMERCIAL

## 2024-09-27 DIAGNOSIS — R19.7 DIARRHEA OF PRESUMED INFECTIOUS ORIGIN: ICD-10-CM

## 2024-09-27 DIAGNOSIS — K52.9 COLITIS: Primary | ICD-10-CM

## 2024-09-27 RX ORDER — BISACODYL 5 MG/1
TABLET, DELAYED RELEASE ORAL
Qty: 4 TABLET | Refills: 0 | Status: SHIPPED | OUTPATIENT
Start: 2024-09-27

## 2024-09-27 NOTE — TELEPHONE ENCOUNTER
Pre visit planning completed.      Procedure details:    Patient scheduled for Colonoscopy on 10/9/24.     Arrival time: 1410. Procedure time 1510    [facility] [address] [place] [address], 5[address] Floor, [place] [address]. Check in location: 5th Floor.    Sedation type: Conscious sedation     Pre op exam needed? No.    Indication for procedure:   Colitis [K52.9]  - Primary      Weight loss [R63.4]      Diarrhea of presumed infectious origin             Chart review:     Electronic implanted devices? No    Recent diagnosis of diverticulitis within the last 6 weeks? No      Medication review:    Diabetic? Yes. Diabetic medication HOLDING recommendations: Diabetic injectables: Mounjaro (Tirzepatide).  Weekly dosing of medication.  HOLD 7 days before procedure.  Follow up with managing provider.   Insulin: Consult with managing provider.     Anticoagulants? No    Weight loss medication/injectable? No.    Other medication HOLDING recommendations:  N/A      Prep for procedure:     Bowel prep recommendation: Extended Golytely.     Bowel prep prescription sent to      [place], [place] - [address] [place] [address] [phone]  Due to: GLP-1 agonist medication noted in chart.     Prep instructions sent via gricelda Nayak RN  Endoscopy Procedure Pre Assessment RN  [phone] option 2

## 2024-09-30 NOTE — TELEPHONE ENCOUNTER
Attempted to contact patient in order to complete pre assessment questions.     No answer. Left message to return call to [phone] option 2    Callback required communication sent via [employer].      Reshma Rojas RN  Endoscopy Procedure Pre Assessment

## 2024-10-01 ENCOUNTER — TELEPHONE (OUTPATIENT)
Dept: GASTROENTEROLOGY | Facility: CLINIC | Age: 44
End: 2024-10-01
Payer: COMMERCIAL

## 2024-10-01 NOTE — TELEPHONE ENCOUNTER
Patient still needs pre-assessment, has many questions about meds    Caller: Nayeli    Reason for Reschedule/Cancellation   (please be detailed, any staff messages or encounters to note?): Patient needs MAC per RN      Prior to reschedule please review:  Ordering Provider: Fabien March Determined: MAC  Does patient have any ASC Exclusions, please identify?: N      Notes on Cancelled Procedure:  Procedure: Lower Endoscopy [Colonoscopy]   Date: 10/9/24  Location: [facility]; [address] [place] [address], [address] [place] [address]  Surgeon: Mikey      Rescheduled: Yes,   Procedure: Lower Endoscopy [Colonoscopy]    Date: 10/21/24   Location: [facility]; [address] [place] [address], [place] [address]   Surgeon: Lamont   Sedation Level Scheduled  MAC ,  Reason for Sedation Level Per RN review   Instructions updated and sent: Yes     Does patient need PAC or Pre -Op Rescheduled? : N       Did you cancel or rescheduled an EUS procedure? No.

## 2024-10-01 NOTE — TELEPHONE ENCOUNTER
Patient returning call with  ID [id number]    Discussed sedation with patient, as she is scheduled with moderate sedation. Patient states she would rather be sleeping for the procedure after discussing the 2 options, given the sensory impairment.    Warm transferred to scheduling to reschedule with MAC.    Flower Slaughter RN  Endoscopy Procedure Pre Assessment RN  [phone] option 4

## 2024-10-02 ENCOUNTER — PRE VISIT (OUTPATIENT)
Dept: ORTHOPEDICS | Facility: CLINIC | Age: 44
End: 2024-10-02

## 2024-10-02 ENCOUNTER — APPOINTMENT (OUTPATIENT)
Dept: CT IMAGING | Facility: CLINIC | Age: 44
End: 2024-10-02
Attending: EMERGENCY MEDICINE
Payer: COMMERCIAL

## 2024-10-02 ENCOUNTER — HOSPITAL ENCOUNTER (EMERGENCY)
Facility: CLINIC | Age: 44
Discharge: HOME | End: 2024-10-02
Attending: EMERGENCY MEDICINE | Admitting: EMERGENCY MEDICINE
Payer: COMMERCIAL

## 2024-10-02 ENCOUNTER — OFFICE VISIT (OUTPATIENT)
Dept: GASTROENTEROLOGY | Facility: CLINIC | Age: 44
End: 2024-10-02
Attending: PHYSICIAN ASSISTANT
Payer: COMMERCIAL

## 2024-10-02 ENCOUNTER — OFFICE VISIT (OUTPATIENT)
Dept: ORTHOPEDICS | Facility: CLINIC | Age: 44
End: 2024-10-02
Attending: FAMILY MEDICINE
Payer: COMMERCIAL

## 2024-10-02 VITALS
HEIGHT: 61 IN | RESPIRATION RATE: 16 BRPM | DIASTOLIC BLOOD PRESSURE: 72 MMHG | HEART RATE: 87 BPM | SYSTOLIC BLOOD PRESSURE: 118 MMHG | WEIGHT: 171 LBS | BODY MASS INDEX: 32.28 KG/M2 | OXYGEN SATURATION: 98 % | TEMPERATURE: 97.6 F

## 2024-10-02 VITALS
TEMPERATURE: 98.1 F | WEIGHT: 171 LBS | OXYGEN SATURATION: 99 % | SYSTOLIC BLOOD PRESSURE: 112 MMHG | BODY MASS INDEX: 32.31 KG/M2 | DIASTOLIC BLOOD PRESSURE: 67 MMHG | HEART RATE: 80 BPM

## 2024-10-02 DIAGNOSIS — R10.33 PERIUMBILICAL PAIN: Primary | ICD-10-CM

## 2024-10-02 DIAGNOSIS — R10.31 RIGHT LOWER QUADRANT ABDOMINAL PAIN: ICD-10-CM

## 2024-10-02 DIAGNOSIS — R10.31 RLQ ABDOMINAL PAIN: ICD-10-CM

## 2024-10-02 DIAGNOSIS — R19.7 DIARRHEA, UNSPECIFIED TYPE: ICD-10-CM

## 2024-10-02 DIAGNOSIS — G56.03 SEVERE CARPAL TUNNEL SYNDROME OF BOTH WRISTS: ICD-10-CM

## 2024-10-02 DIAGNOSIS — R10.32 LLQ ABDOMINAL PAIN: ICD-10-CM

## 2024-10-02 DIAGNOSIS — R10.31 ABDOMINAL PAIN, RIGHT LOWER QUADRANT: ICD-10-CM

## 2024-10-02 LAB
ALBUMIN SERPL BCG-MCNC: 4.2 G/DL (ref 3.5–5.2)
ALBUMIN UR-MCNC: 20 MG/DL
ALP SERPL-CCNC: 56 U/L (ref 40–150)
ALT SERPL W P-5'-P-CCNC: 28 U/L (ref 0–50)
ANION GAP SERPL CALCULATED.3IONS-SCNC: 14 MMOL/L (ref 7–15)
APPEARANCE UR: CLEAR
AST SERPL W P-5'-P-CCNC: 21 U/L (ref 0–45)
BACTERIA #/AREA URNS HPF: ABNORMAL /HPF
BASOPHILS # BLD AUTO: 0.1 10E3/UL (ref 0–0.2)
BASOPHILS NFR BLD AUTO: 0 %
BILIRUB SERPL-MCNC: 0.2 MG/DL
BILIRUB UR QL STRIP: NEGATIVE
BUN SERPL-MCNC: 20.8 MG/DL (ref 6–20)
CALCIUM SERPL-MCNC: 8.8 MG/DL (ref 8.8–10.4)
CHLORIDE SERPL-SCNC: 103 MMOL/L (ref 98–107)
COLOR UR AUTO: YELLOW
CREAT SERPL-MCNC: 0.89 MG/DL (ref 0.51–0.95)
EGFRCR SERPLBLD CKD-EPI 2021: 82 ML/MIN/1.73M2
EOSINOPHIL # BLD AUTO: 0.3 10E3/UL (ref 0–0.7)
EOSINOPHIL NFR BLD AUTO: 2 %
ERYTHROCYTE [DISTWIDTH] IN BLOOD BY AUTOMATED COUNT: 13.4 % (ref 10–15)
GLUCOSE SERPL-MCNC: 83 MG/DL (ref 70–99)
GLUCOSE UR STRIP-MCNC: >=1000 MG/DL
HCO3 SERPL-SCNC: 20 MMOL/L (ref 22–29)
HCT VFR BLD AUTO: 39 % (ref 35–47)
HGB BLD-MCNC: 12.3 G/DL (ref 11.7–15.7)
HGB UR QL STRIP: NEGATIVE
IMM GRANULOCYTES # BLD: 0.1 10E3/UL
IMM GRANULOCYTES NFR BLD: 0 %
KETONES UR STRIP-MCNC: NEGATIVE MG/DL
LEUKOCYTE ESTERASE UR QL STRIP: NEGATIVE
LIPASE SERPL-CCNC: 25 U/L (ref 13–60)
LYMPHOCYTES # BLD AUTO: 2.8 10E3/UL (ref 0.8–5.3)
LYMPHOCYTES NFR BLD AUTO: 21 %
MCH RBC QN AUTO: 27.3 PG (ref 26.5–33)
MCHC RBC AUTO-ENTMCNC: 31.5 G/DL (ref 31.5–36.5)
MCV RBC AUTO: 87 FL (ref 78–100)
MONOCYTES # BLD AUTO: 0.6 10E3/UL (ref 0–1.3)
MONOCYTES NFR BLD AUTO: 4 %
MUCOUS THREADS #/AREA URNS LPF: PRESENT /LPF
NEUTROPHILS # BLD AUTO: 9.5 10E3/UL (ref 1.6–8.3)
NEUTROPHILS NFR BLD AUTO: 72 %
NITRATE UR QL: NEGATIVE
NRBC # BLD AUTO: 0 10E3/UL
NRBC BLD AUTO-RTO: 0 /100
PH UR STRIP: 5.5 [PH] (ref 5–7)
PLATELET # BLD AUTO: 391 10E3/UL (ref 150–450)
POTASSIUM SERPL-SCNC: 3.9 MMOL/L (ref 3.4–5.3)
PROT SERPL-MCNC: 7.3 G/DL (ref 6.4–8.3)
RBC # BLD AUTO: 4.51 10E6/UL (ref 3.8–5.2)
RBC URINE: 7 /HPF
SODIUM SERPL-SCNC: 137 MMOL/L (ref 135–145)
SP GR UR STRIP: 1.03 (ref 1–1.03)
SQUAMOUS EPITHELIAL: 1 /HPF
UROBILINOGEN UR STRIP-MCNC: NORMAL MG/DL
WBC # BLD AUTO: 13.3 10E3/UL (ref 4–11)
WBC URINE: 2 /HPF

## 2024-10-02 PROCEDURE — 36415 COLL VENOUS BLD VENIPUNCTURE: CPT | Performed by: EMERGENCY MEDICINE

## 2024-10-02 PROCEDURE — T1013 SIGN LANG/ORAL INTERPRETER: HCPCS | Mod: U3

## 2024-10-02 PROCEDURE — 83690 ASSAY OF LIPASE: CPT | Performed by: EMERGENCY MEDICINE

## 2024-10-02 PROCEDURE — 85004 AUTOMATED DIFF WBC COUNT: CPT | Performed by: EMERGENCY MEDICINE

## 2024-10-02 PROCEDURE — 250N000011 HC RX IP 250 OP 636: Performed by: EMERGENCY MEDICINE

## 2024-10-02 PROCEDURE — 99285 EMERGENCY DEPT VISIT HI MDM: CPT | Mod: 25

## 2024-10-02 PROCEDURE — 81003 URINALYSIS AUTO W/O SCOPE: CPT | Performed by: EMERGENCY MEDICINE

## 2024-10-02 PROCEDURE — 99215 OFFICE O/P EST HI 40 MIN: CPT | Performed by: PHYSICIAN ASSISTANT

## 2024-10-02 PROCEDURE — 74177 CT ABD & PELVIS W/CONTRAST: CPT

## 2024-10-02 PROCEDURE — 85018 HEMOGLOBIN: CPT | Performed by: EMERGENCY MEDICINE

## 2024-10-02 PROCEDURE — 250N000009 HC RX 250: Performed by: EMERGENCY MEDICINE

## 2024-10-02 PROCEDURE — 99203 OFFICE O/P NEW LOW 30 MIN: CPT | Mod: GC | Performed by: ORTHOPAEDIC SURGERY

## 2024-10-02 PROCEDURE — 96374 THER/PROPH/DIAG INJ IV PUSH: CPT | Mod: 59

## 2024-10-02 PROCEDURE — 82040 ASSAY OF SERUM ALBUMIN: CPT | Performed by: EMERGENCY MEDICINE

## 2024-10-02 RX ORDER — IOPAMIDOL 755 MG/ML
86 INJECTION, SOLUTION INTRAVASCULAR ONCE
Status: COMPLETED | OUTPATIENT
Start: 2024-10-02 | End: 2024-10-02

## 2024-10-02 RX ORDER — HYDROMORPHONE HYDROCHLORIDE 1 MG/ML
0.5 INJECTION, SOLUTION INTRAMUSCULAR; INTRAVENOUS; SUBCUTANEOUS ONCE
Status: DISCONTINUED | OUTPATIENT
Start: 2024-10-02 | End: 2024-10-02 | Stop reason: HOSPADM

## 2024-10-02 RX ORDER — DIPHENHYDRAMINE HYDROCHLORIDE 50 MG/ML
25 INJECTION INTRAMUSCULAR; INTRAVENOUS ONCE
Status: COMPLETED | OUTPATIENT
Start: 2024-10-02 | End: 2024-10-02

## 2024-10-02 RX ORDER — DICYCLOMINE HYDROCHLORIDE 10 MG/1
10 CAPSULE ORAL 4 TIMES DAILY PRN
Qty: 40 CAPSULE | Refills: 0 | Status: SHIPPED | OUTPATIENT
Start: 2024-10-02

## 2024-10-02 RX ADMIN — IOPAMIDOL 86 ML: 755 INJECTION, SOLUTION INTRAVENOUS at 15:44

## 2024-10-02 RX ADMIN — SODIUM CHLORIDE 65 ML: 9 INJECTION, SOLUTION INTRAVENOUS at 15:44

## 2024-10-02 RX ADMIN — DIPHENHYDRAMINE HYDROCHLORIDE 25 MG: 50 INJECTION, SOLUTION INTRAMUSCULAR; INTRAVENOUS at 15:49

## 2024-10-02 ASSESSMENT — COLUMBIA-SUICIDE SEVERITY RATING SCALE - C-SSRS
1. IN THE PAST MONTH, HAVE YOU WISHED YOU WERE DEAD OR WISHED YOU COULD GO TO SLEEP AND NOT WAKE UP?: NO
2. HAVE YOU ACTUALLY HAD ANY THOUGHTS OF KILLING YOURSELF IN THE PAST MONTH?: NO
6. HAVE YOU EVER DONE ANYTHING, STARTED TO DO ANYTHING, OR PREPARED TO DO ANYTHING TO END YOUR LIFE?: NO

## 2024-10-02 ASSESSMENT — ACTIVITIES OF DAILY LIVING (ADL)
ADLS_ACUITY_SCORE: 35

## 2024-10-02 NOTE — PROGRESS NOTES
GI CLINIC VISIT  ASSESSMENT/PLAN:  44 year old female with PMH of type 2 diabetes on insulin presenting to GI clinic for diarrhea and abdominal pain follow-up.     # Diarrhea  # Abdominal pain  9/4/24 CT abdomen pelvis with contrast showing mild colitis starting at mid transverse extending to rectum that was thought to be infectious (enteric/c.diff NEG) vs start of IBD. Basic labs have been stable on trend including mild leukocytosis without L shift and she has been overall maintaining oral intake. Fecal calpro was in the 300s congruent with colitis seen on CTAP.     She had been doing well however is now reporting a recurrence of abdominal pain and diarrhea starting this Monday. She was tender to the periumbilical region>LLQ and to a lesser degree RLQ. She is hemodynamically stable and she is not toxic appearing. I am worried about this recurrence of pain and recommended ER evaluation for updated cross sectional imaging and labs to rule out perforation, abscess, or other sequale from this known colitis . Soft handoff given to Dr Rosales at [facility] ER (where patient wishes to go to) at 12:30pm. Appreciate care and assistance from ED team. If ED evaluation is unremarkable, this pain recurrence could be a post-infectious iBS vs hyper visceral sensitivity. I will plan to repeat infectious stool studies and will relay this updated plan on a [employer] message either later today or tomorrow after I review the ED evaluation from today. She is aware I will message her on [employer].     Plan  -ED eval for updated evaluation given pain recurrence  -I will reach out to her via [employer] [facility] after reviewing ED eval   -Cscope with our team 10/21    Short-term follow-up with me 10/23 at 7AM    Thank you for this consultation. It was a pleasure to participate in the care of this patient; please contact us with any further questions.    Kirsten Conn PA-C    Follow up: As planned above. Today, I personally spent 25 minutes in direct face to  face time with the patient, of which greater than 50% of the time was spent in patient education and counseling as described above. Approximately 15 minutes were spent on indirect care associated with the patient's consultation including but not limited to review of: patient medical records to date, clinic visits, hospital records, lab results, imaging studies, procedural documentation, and coordinating care with other providers. The findings from this review are summarized in the above note. All of the above accounted for a cumulative time of 40 minutes and was performed on the date of service.     HPI: 44 year old female with PMH of type 2 diabetes on insulin presenting to GI clinic for diarrhea/abd pain.      She presents for 2 week short term follow up given acute GI symptoms associated with her colitis seen 9/4 on CTAP at the ED. Trended labs have been stable CBC with wbc 11-12s but no L shift. Renal function with slight bump but still within normal range.     Today 10/2/24  Here with professional    She reports doing well all of last week with no diarrhea, some weight gain and no abdominal pain however for unclear reasons had started with abdominal pain, nausea w/o emesis and diarrhea again on Monday 9/30.  She had been taking Imodium the last 2 days but still had 4-5 loose bowel movements today.  No bloody or black stools  She is reporting abdominal pain again.  Historically she had some pelvic surgery in February of this year and had some residual right lower quadrant abdominal pain.  That eventually went away.  On Monday, this RLQ abd pain came back.  She is not taking Tylenol  She denies fevers, chills, nausea or vomiting  Her blood sugar levels have been stable for her.  She does meet with a diabetic educator and states her Mounjaro was recently increased.  She had been on this medication for the past 5 months.  She continues with Basaglar insulin.   Appetite remains depressed.  Her home  weight was 164 this morning.  In clinic today we measured her at 171 pounds.  No f/c/n/v/bloody stools/black stools. No dizziness/syncope  Scheduled for csocpe 10/21 with Dr Lamont Cline Journal   9/18 - appt with me   9/23-27 - no BM for a few days after our appt, then 3 days of normal formed stools, then no BM until 9/30. Did not take imodium last week.   9/30 Monday - normal BM, developed stomach cramping, had a normal BM, then had liquid diarrhea. Slept OK but from 2-3 AM she woke up due to stomach cramping.    Took Imodium 4 mg in AM, 4 mg in afternoon    9/18/24  Here with professional    She feels  unwell still  On sat developed dental pain and went to dental ER on Sunday. Ultimately had dental extraction with some improvement in this pain. No abx. She feels this contributed to not being able to eat well.   Appetite still somewhat depressed. Down to 171.9# today, lost a few more lb from last week.   No longer having abdominal pain  Had a drop to 70-80s but has since since worked with her DM educator and dose of insulin has been changed. Most recently BS have been normal for her   No f/c/n/v/bloody stools/black stools. No dizziness/syncope    Stooling journal   9/12 Thur - loose stools. 2 imoidum AM and 1 PM  9/13 Friday - loose stools. no imodium. Had a more normal BM   9/14 Saturday - Had a more normal BM. no imodium.   9/15 Ben - had 2 normal BM then a loose stool in evening. No imodium.   9/16 Monday - had some diarrhea again - 3-4 loose stools. She took 2 tabs imodium in AM and her after BM was normal   9/17 Tue - normal stool  9/18 Wed (today) - 1 loose stool at 6 am. No imodium. No further loose stools either.     9/11/24 - our initial appt following ER evaluation 9/4  Starting September 2, she started feeling unwell with abdominal discomfort.  Later that day she then developed loose stools and had lower abdominal pain. She thinks this started after eating bad eggs. The diarrhea  persisted throughout the week -she was seen at the emergency room 9 4.  Labs showed a white count to the 12's,  a bump in her BUN and with CRT to 0.76 (baseline 0.5-0.76). no anion gap. CTAP mild colitis starting from mid transverse all the way to rectum.  Subsequent stool studies were negative for infection to include enteric panel and C. difficile.  Her symptoms improved that weekend but flared up again on 9/9.  It continues today.     She is reporting lower abdominal pain that she only notices with defecation or if she presses on the abdomen.  Sitting upright in a chair she denies any pain in her abdomen.  She reports frequent loose stools, unable to quantify the amount, consistency watery oatmeal.  She denies bloody or black stools.    She tried Maalox without improvement in the symptoms.  She took Pepto-Bismol once at its onset, it did not help.    Never had fevers, chills, nausea or vomiting.    She is scared to eat as she does not want to prompt diarrhea.  She is avoiding coffee and heavily seasoned foods.  She would like some guidance on dietary intake.  She is still going to work, and is requesting for work accommodations.  She is in office Tuesday Wednesday Thursday, work from home on Mondays and Fridays.  She is a .     She is a type II diabetic on insulin -continues with Basaglar 55 units daily.  She does have rapid aspart insulin that she takes as needed.  Her fasting sugars have been her baseline in the 90s to 120s.  She denies hypoglycemic episodes with this recent illness.      She denies dizziness lightheadedness, syncope.    She lives with a roommate at home.    PAST MEDICAL HISTORY:  Past Medical History:   Diagnosis Date    Combined visual and hearing impairment     Deaf     Diabetic retinopathy of both eyes (H) 8/19/2011    Hepatic steatosis 08/19/2011    History of tobacco use     Hyperlipidemia     Hypertension     Hypertriglyceridemia     Migraines     Obesity 8/19/2011     Problem  list name updated by automated process. 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       PREVIOUS ABDOMINAL/GYNECOLOGIC SURGERIES:  Past Surgical History:   Procedure Laterality Date    DAVINCI HYSTERECTOMY TOTAL, SALPINGECTOMY BILATERAL Bilateral 2/7/2024    Procedure: HYSTERECTOMY, TOTAL, ROBOT-ASSISTED, WITH BILATERAL SALPINGECTOMY, CYSTOSCOPY;  Surgeon: Vonnie Cowan MD;  Location: [facility] OR    ESOPHAGOSCOPY, GASTROSCOPY, DUODENOSCOPY (EGD), COMBINED N/A 6/13/2024    Procedure: ESOPHAGOGASTRODUODENOSCOPY, WITH BIOPSY;  Surgeon: Nora Brice MD;  Location: [facility] OR    LAPAROSCOPIC CHOLECYSTECTOMY N/A 3/10/2018    Procedure: LAPAROSCOPIC CHOLECYSTECTOMY;  Laparoscopic Cholecystectomy ;  Surgeon: Kuldeep Sigala MD;  Location: [facility] OR    RELEASE TRIGGER FINGER Right 5/2/2019    Procedure: Right Thumb Trigger Release;  Surgeon: Greg Streeter MD;  Location: [facility] OR    RELEASE TRIGGER FINGER Left 5/30/2019    Procedure: Left Ring Trigger Finger Release.  Ganglion cyst excision.;  Surgeon: Greg Streeter MD;  Location: [facility] OR    RELEASE TRIGGER FINGER Right 6/13/2023    Procedure: RELEASE, RIGHT TRIGGER FINGER, INDEX AND MIDDLE;  Surgeon: Miracle Carlin MD;  Location: [facility] OR    RELEASE TRIGGER FINGER Left 8/1/2023    Procedure: RELEASE, LEFT TRIGGER FINGER, MIDDLE;  Surgeon: Miracle Carlin MD;  Location: [facility] OR         PERTINENT MEDICATIONS:  Current Outpatient Medications   Medication Sig Dispense Refill    acetaminophen (TYLENOL) 500 MG tablet Take 2 tablets (1,000 mg) by mouth every 8 hours as needed for mild pain 100 tablet 3    Alcohol Swabs (ALCOHOL PREP PAD) 70 % PADS 1 each 3 times daily 100 each 3    alum & mag hydroxide-simethicone (MAALOX) 200-200-20 MG/5ML SUSP suspension Take 10 mLs by mouth every 6 hours  as needed for indigestion. 355 mL 1    amLODIPine (NORVASC) 5 MG tablet Take 1 tablet (5 mg) by mouth at bedtime 90 tablet 3    aspirin (ASA) 81 MG chewable tablet Take 1 tablet (81 mg) by mouth daily CHEW AND SWALLOW 90 tablet 1    atorvastatin (LIPITOR) 40 MG tablet Take 1 tablet (40 mg) by mouth daily 90 tablet 1    bisacodyl (DULCOLAX) 5 MG EC tablet Two days prior to exam take two (2) tablets at 4pm. One day prior to exam take two (2) tablets at 4pm 4 tablet 0    blood glucose (ACCU-CHEK LAYA PLUS) test strip Use with  Accucheck Expert meter.  Test blood sugar 6 times daily. 600 each 3    blood glucose monitoring (ACCU-CHEK FASTCLIX) lancets Use to test blood sugar 6 times daily or as directed 510 each 3    calcium carbonate-vitamin D (OSCAL) 500-5 MG-MCG tablet Take 1 tablet by mouth 2 times daily 180 tablet 1    Continuous Glucose Sensor (FREESTYLE ZORAIDA 3 SENSOR) MISC 1 each every 14 days Please provide 3 month supply. 6 each 3    diclofenac (VOLTAREN) 1 % topical gel Apply 2 g topically 4 times daily. For L shoulder pain 100 g 3    empagliflozin (JARDIANCE) 25 MG TABS tablet Take 1 tablet (25 mg) by mouth daily for 180 days 90 tablet 1    ergocalciferol (ERGOCALCIFEROL) 1.25 MG (61533 UT) capsule Take 1 capsule (50,000 Units) by mouth once a week For additional refills, please schedule a follow-up appointment at [phone] 12 capsule 3    fenofibrate (TRIGLIDE/LOFIBRA) 160 MG tablet Take 1 tablet (160 mg) by mouth daily 90 tablet 3    fish oil-omega-3 fatty acids 1000 MG capsule TAKE TWO CAPSULES (2 GM) BY MOUTH ONCE DAILY 180 capsule 3    gabapentin (NEURONTIN) 100 MG capsule Take 1 capsule (100 mg) by mouth 3 times daily 42 capsule 1    ibuprofen (ADVIL/MOTRIN) 600 MG tablet Take 1 tablet (600 mg) by mouth every 6 hours as needed for moderate pain 120 tablet 1    Injection Device for insulin (INPEN 100-PINK-NOVOLOG-FIASP) DAVID 1 each continuous 1 each 0    insulin aspart (NOVOLOG FLEXPEN) 100 UNIT/ML  pen 1 unit per 5 grams CHO and correction scale of 1/25/125.  Approximate daily use is 100 units. 30 mL 2    insulin aspart (NOVOLOG PENFILL) 100 UNIT/ML cartridge Take with each meal and snack: 1 per 3 grams CHO and correction of 1 unit per 20 mg/dL over a target of 120. Average daily dose is 85 units. 75 mL 3    insulin glargine 100 UNIT/ML pen 3 month supply.Inject 55 units daily 45 mL 3    insulin pen needle (31G X 5 MM) 31G X 5 MM miscellaneous Use 5 to 6 pen needles daily or as directed.  3 month supply. 500 each 3    losartan (COZAAR) 100 MG tablet Take 1 tablet (100 mg) by mouth daily 90 tablet 3    omeprazole (PRILOSEC) 40 MG DR capsule Take 1 capsule (40 mg) by mouth 2 times daily 90 capsule 0    omeprazole (PRILOSEC) 40 MG DR capsule Take 1 capsule (40 mg) by mouth 2 times daily 8-12 weeks 90 capsule 0    ondansetron (ZOFRAN ODT) 4 MG ODT tab Take 1 tablet (4 mg) by mouth every 6 hours as needed for nausea or vomiting 20 tablet 0    polyethylene glycol (GOLYTELY) 236 g suspension Two days before procedure at 5PM fill first container with water. Mix and drink an 8 oz glass every 15 minutes until HALF of the container is gone. Place the remainder in the refrigerator. One day before procedure at 5PM drink second half of bowel prep. Drink an 8 oz glass every 15 minutes until it is gone. Day of procedure 6 hours before arrival time fill the 2nd container with water. Mix and drink an 8 oz glass every 15 minutes until HALF of the container is gone. Discard the remaining solution. 8000 mL 0    tirzepatide (MOUNJARO) 10 MG/0.5ML pen Inject 10 mg subcutaneously every 7 days. 2 mL 1    tirzepatide (MOUNJARO) 2.5 MG/0.5ML pen Inject 2.5 mg Subcutaneous every 7 days 6 mL 3    tirzepatide (MOUNJARO) 5 MG/0.5ML pen Inject 5 mg subcutaneously every 7 days 2 mL 11    tirzepatide (MOUNJARO) 5 MG/0.5ML pen Inject 5 mg subcutaneously every 7 days 2 mL 1    tirzepatide (MOUNJARO) 7.5 MG/0.5ML pen Inject 7.5 mg subcutaneously  every 7 days 2 mL 1         SOCIAL HISTORY:  Social History     Socioeconomic History    Marital status: Single     Spouse name: Not on file    Number of children: Not on file    Years of education: Not on file    Highest education level: Not on file   Occupational History    Not on file   Tobacco Use    Smoking status: Former     Current packs/day: 0.00     Types: Cigarettes     Quit date: 2010     Years since quittin.8     Passive exposure: Never    Smokeless tobacco: Never    Tobacco comments:     stopped 10 yrs ago   Substance and Sexual Activity    Alcohol use: Not Currently     Comment: socially    Drug use: Not Currently     Types: Marijuana     Comment: much younger  or earlier    Sexual activity: Not Currently     Partners: Male     Birth control/protection: I.U.D.   Other Topics Concern    Parent/sibling w/ CABG, MI or angioplasty before 65F 55M? Not Asked     Service No    Blood Transfusions No    Caffeine Concern No    Occupational Exposure No    Hobby Hazards No    Sleep Concern No    Stress Concern No    Weight Concern Yes    Special Diet No    Back Care No    Exercise Yes     Comment: 4-5 x a week    Bike Helmet No    Seat Belt Yes    Self-Exams Yes   Social History Narrative    Not on file     Social Determinants of Health     Financial Resource Strain: Low Risk  (2024)    Financial Resource Strain     Within the past 12 months, have you or your family members you live with been unable to get utilities (heat, electricity) when it was really needed?: No   Food Insecurity: Low Risk  (2024)    Food Insecurity     Within the past 12 months, did you worry that your food would run out before you got money to buy more?: No     Within the past 12 months, did the food you bought just not last and you didn t have money to get more?: No   Transportation Needs: Low Risk  (2024)    Transportation Needs     Within the past 12 months, has lack of transportation kept you from  medical appointments, getting your medicines, non-medical meetings or appointments, work, or from getting things that you need?: No   Recent Concern: Transportation Needs - High Risk (10/30/2023)    Transportation Needs     Within the past 12 months, has lack of transportation kept you from medical appointments, getting your medicines, non-medical meetings or appointments, work, or from getting things that you need?: Yes   Physical Activity: Not on file   Stress: Not on file   Social Connections: Not on file   Interpersonal Safety: Low Risk  (1/4/2024)    Interpersonal Safety     Do you feel physically and emotionally safe where you currently live?: Yes     Within the past 12 months, have you been hit, slapped, kicked or otherwise physically hurt by someone?: No     Within the past 12 months, have you been humiliated or emotionally abused in other ways by your partner or ex-partner?: No   Housing Stability: Low Risk  (1/4/2024)    Housing Stability     Do you have housing? : Yes     Are you worried about losing your housing?: No       FAMILY HISTORY:  Family History   Problem Relation Age of Onset    Unknown/Adopted Other        PHYSICAL EXAMINATION:  Vitals reviewed: /67 (BP Location: Left arm, Patient Position: Sitting, Cuff Size: Adult Regular)   Pulse 80   Temp 98.1  F (36.7  C) (Oral)   Wt 77.6 kg (171 lb)   LMP 02/01/2024 (Approximate)   SpO2 99%   BMI 32.31 kg/m    Wt:   Wt Readings from Last 2 Encounters:   10/02/24 77.6 kg (171 lb)   09/18/24 78 kg (171 lb 14.4 oz)      Constitutional: aaox3, cooperative, pleasant, not dyspneic/diaphoretic, no acute distress  Eyes: Sclera anicteric/injected  Neck: supple, active ROM w/o limitation or pain   CV: No edema  Respiratory: Unlabored breathing  Abd:  tenderness to light palpation of periumbilical region w/ guarding. Some tenderness to LLQ>RLQ to light touch. Sitting comfortably in chair, moving without issues.   Skin: warm, perfused, no  jaundice  Psych: Normal affect  MSK: Normal gait     PERTINENT STUDIES - Reviewed in EMR     Lab Results   Component Value Date    WBC 11.3 (H) 09/19/2024    WBC 12.1 (H) 09/11/2024    WBC 12.5 (H) 09/04/2024    HGB 12.8 09/19/2024    HGB 12.3 09/11/2024    HGB 12.2 09/04/2024     09/19/2024     09/11/2024     09/04/2024    CHOL 183 01/04/2024    CHOL 221 (H) 04/04/2023    CHOL 194 07/19/2021    TRIG 350 (H) 01/04/2024    TRIG 298 (H) 04/04/2023    TRIG 133 07/19/2021    HDL 37 (L) 01/04/2024    HDL 38 (L) 04/04/2023    HDL 43 (L) 07/19/2021    ALT 29 09/11/2024    ALT 42 09/04/2024    ALT 51 (H) 05/31/2024    AST 18 09/11/2024    AST 25 09/04/2024    AST 32 05/31/2024     09/19/2024     09/11/2024     09/04/2024    BUN 15.0 09/19/2024    BUN 23.5 (H) 09/11/2024    BUN 21.1 (H) 09/04/2024    CO2 28 09/19/2024    CO2 21 (L) 09/11/2024    CO2 20 (L) 09/04/2024    TSH 1.41 04/04/2023    TSH 1.34 07/08/2020    TSH 1.84 05/17/2018    INR 1.03 06/18/2010    INR 0.92 06/18/2010        Liver Function Studies -   Recent Labs   Lab Test 09/04/24  1224   PROTTOTAL 7.6   ALBUMIN 4.4   BILITOTAL 0.2   ALKPHOS 62   AST 25   ALT 42        PREVIOUS ENDOSCOPY

## 2024-10-02 NOTE — DISCHARGE INSTRUCTIONS
Continue Motrin as needed for relief of diarrhea  Tylenol as needed for pain control  Bentyl as needed for additional abdominal cramping/pain relief

## 2024-10-02 NOTE — LETTER
10/2/2024      Nayeli Caal  [address]  [facility] [address]      Dear Colleague,    Thank you for referring your patient, Nayeli Caal, to the [facility] [place]. Please see a copy of my visit note below.    Orthopaedic Surgery Consultation  10/2/2024 9:09 AM   by Madhu Mcmahon MD    Nayeli Caal MRN# [id number]   Age: 44 year old YOB: 1980     Reason for consult: Consult for bilateral carpal tunnel syndrome                       Assessment and Plan:   Assessment:   Bilateral carpal tunnel syndrome with potential bilateral cubital tunnel syndrome that is less severe      Plan:   We had a long discussion with the patient regarding carpal tunnel syndrome as well as potential cubital tunnel syndrome.  We recommended addressing the carpal tunnel syndrome first.  We also discussed her EMG results that demonstrated bilateral severe carpal tunnel syndrome.  We discussed treatment options for this including nonoperative with bracing, injections.  We discussed an open carpal tunnel release.  The patient is already failed bracing and she would not want injections given her diabetes which is reasonable.  We discussed an open carpal tunnel release either under local or sedation.  We discussed risk of the surgery including failure to completely resolve her symptoms, wound healing complications, damage to surrounding structures among others.  After discussion of the risk and benefits of surgery the patient elected to proceed with a staged carpal tunnel release.  We recommended not having both of them done at once.  We will plan to proceed with the right carpal tunnel under local and then proceed with the left carpal tunnel 2 to 3 weeks later in a staged fashion.  All of the patient's questions were answered and addressed prior to the conclusion of the visit              History of Present Illness:   44 year old female who is  left-hand-dominant who has a history of diabetes with most recent hemoglobin A1c of 7.8 with a history of of decreased vision and a  was used presenting with bilateral upper extremity numbness or tingling.  She states that since 2017 she has had diffuse numbness and tingling throughout her bilateral hands.  She cannot say if 1 is worse than the other.  She states that she has been seen previously for this and had an EMG.  She has tried braces without significant relief.  Her hands will intermittently wake her up at night from sleep.  She says people have talked about corticosteroid injections with her previously but she is try to avoid this due to concerns about her blood sugars.  Denies any significant discomfort in the elbows.  Denies any neck issues.           Past Medical History:     Patient Active Problem List   Diagnosis     Essential hypertension     Hypersomnia, organic     Other chronic nonalcoholic liver disease     Diabetic retinopathy of both eyes (H)     Obesity     Usher Syndrome: congenital deafness, retinitis pigmentosa     Macular degeneration, age related, nonexudative     Benign neoplasm of iris     Dry eye syndrome     Pemphigus erythematosus (H)     Chondromalacia of patella, right, steroid injection 6/12/2015     Uncontrolled type 2 diabetes mellitus with hyperglycemia, with long-term current use of insulin (H)     Chronic kidney disease, stage 1     Trigger middle finger of left hand     Adenomyosis of uterus     S/P hysterectomy     Pain in both hands     Past Medical History:   Diagnosis Date     Combined visual and hearing impairment      Deaf      Diabetic retinopathy of both eyes (H) 8/19/2011     Hepatic steatosis 08/19/2011     History of tobacco use      Hyperlipidemia      Hypertension      Hypertriglyceridemia      Migraines      Obesity 8/19/2011     Problem list name updated by automated process. Provider to review     Uncontrolled type 2 diabetes mellitus with  hyperglycemia, with long-term current use of insulin (H) 5/15/2017     Usher Syndrome: congenital deafness, retinitis pigmentosa 2011            Past Surgical History:   Relevant surgical history as mentioned in HPI.  Past Surgical History:   Procedure Laterality Date     DAVINCI HYSTERECTOMY TOTAL, SALPINGECTOMY BILATERAL Bilateral 2024    Procedure: HYSTERECTOMY, TOTAL, ROBOT-ASSISTED, WITH BILATERAL SALPINGECTOMY, CYSTOSCOPY;  Surgeon: Vonnie Cowan MD;  Location: [facility] OR     ESOPHAGOSCOPY, GASTROSCOPY, DUODENOSCOPY (EGD), COMBINED N/A 2024    Procedure: ESOPHAGOGASTRODUODENOSCOPY, WITH BIOPSY;  Surgeon: Nora Brice MD;  Location: [facility] OR     LAPAROSCOPIC CHOLECYSTECTOMY N/A 3/10/2018    Procedure: LAPAROSCOPIC CHOLECYSTECTOMY;  Laparoscopic Cholecystectomy ;  Surgeon: Kuldeep Sigala MD;  Location: [facility] OR     RELEASE TRIGGER FINGER Right 2019    Procedure: Right Thumb Trigger Release;  Surgeon: Greg Streeter MD;  Location: [facility]     RELEASE TRIGGER FINGER Left 2019    Procedure: Left Ring Trigger Finger Release.  Ganglion cyst excision.;  Surgeon: Greg Streeter MD;  Location: [facility]     RELEASE TRIGGER FINGER Right 2023    Procedure: RELEASE, RIGHT TRIGGER FINGER, INDEX AND MIDDLE;  Surgeon: Miracle Carlin MD;  Location: [facility] OR     RELEASE TRIGGER FINGER Left 2023    Procedure: RELEASE, LEFT TRIGGER FINGER, MIDDLE;  Surgeon: Miracle Carlin MD;  Location: [facility] OR            Social History:     Social History     Socioeconomic History     Marital status: Single     Spouse name: Not on file     Number of children: Not on file     Years of education: Not on file     Highest education level: Not on file   Occupational History     Not on file   Tobacco Use     Smoking status: Former     Current packs/day: 0.00     Types: Cigarettes     Quit date: 2010     Years since quittin.8     Passive exposure: Never     Smokeless tobacco:  Never     Tobacco comments:     stopped 10 yrs ago   Substance and Sexual Activity     Alcohol use: Not Currently     Comment: socially     Drug use: Not Currently     Types: Marijuana     Comment: much younger 2009 or earlier     Sexual activity: Not Currently     Partners: Male     Birth control/protection: I.U.D.   Other Topics Concern     Parent/sibling w/ CABG, MI or angioplasty before 65F 55M? Not Asked      Service No     Blood Transfusions No     Caffeine Concern No     Occupational Exposure No     Hobby Hazards No     Sleep Concern No     Stress Concern No     Weight Concern Yes     Special Diet No     Back Care No     Exercise Yes     Comment: 4-5 x a week     Bike Helmet No     Seat Belt Yes     Self-Exams Yes   Social History Narrative     Not on file     Social Determinants of Health     Financial Resource Strain: Low Risk  (1/4/2024)    Financial Resource Strain      Within the past 12 months, have you or your family members you live with been unable to get utilities (heat, electricity) when it was really needed?: No   Food Insecurity: Low Risk  (1/4/2024)    Food Insecurity      Within the past 12 months, did you worry that your food would run out before you got money to buy more?: No      Within the past 12 months, did the food you bought just not last and you didn t have money to get more?: No   Transportation Needs: Low Risk  (1/4/2024)    Transportation Needs      Within the past 12 months, has lack of transportation kept you from medical appointments, getting your medicines, non-medical meetings or appointments, work, or from getting things that you need?: No   Recent Concern: Transportation Needs - High Risk (10/30/2023)    Transportation Needs      Within the past 12 months, has lack of transportation kept you from medical appointments, getting your medicines, non-medical meetings or appointments, work, or from getting things that you need?: Yes   Physical Activity: Not on file    Stress: Not on file   Social Connections: Not on file   Interpersonal Safety: Low Risk  (1/4/2024)    Interpersonal Safety      Do you feel physically and emotionally safe where you currently live?: Yes      Within the past 12 months, have you been hit, slapped, kicked or otherwise physically hurt by someone?: No      Within the past 12 months, have you been humiliated or emotionally abused in other ways by your partner or ex-partner?: No   Housing Stability: Low Risk  (1/4/2024)    Housing Stability      Do you have housing? : Yes      Are you worried about losing your housing?: No     Smoking, EtOH,        Family History:     Family History   Problem Relation Age of Onset     Unknown/Adopted Other      No history of problems with bleeding or anesthesia       Allergies:   No Known Allergies       Medications:     Current Outpatient Medications   Medication Sig Dispense Refill     acetaminophen (TYLENOL) 500 MG tablet Take 2 tablets (1,000 mg) by mouth every 8 hours as needed for mild pain 100 tablet 3     Alcohol Swabs (ALCOHOL PREP PAD) 70 % PADS 1 each 3 times daily 100 each 3     alum & mag hydroxide-simethicone (MAALOX) 200-200-20 MG/5ML SUSP suspension Take 10 mLs by mouth every 6 hours as needed for indigestion. 355 mL 1     amLODIPine (NORVASC) 5 MG tablet Take 1 tablet (5 mg) by mouth at bedtime 90 tablet 3     aspirin (ASA) 81 MG chewable tablet Take 1 tablet (81 mg) by mouth daily CHEW AND SWALLOW 90 tablet 1     atorvastatin (LIPITOR) 40 MG tablet Take 1 tablet (40 mg) by mouth daily 90 tablet 1     bisacodyl (DULCOLAX) 5 MG EC tablet Two days prior to exam take two (2) tablets at 4pm. One day prior to exam take two (2) tablets at 4pm 4 tablet 0     blood glucose (ACCU-CHEK LAYA PLUS) test strip Use with  Accucheck Expert meter.  Test blood sugar 6 times daily. 600 each 3     blood glucose monitoring (ACCU-CHEK FASTCLIX) lancets Use to test blood sugar 6 times daily or as directed 510 each 3      calcium carbonate-vitamin D (OSCAL) 500-5 MG-MCG tablet Take 1 tablet by mouth 2 times daily 180 tablet 1     Continuous Glucose Sensor (FREESTYLE ZORAIDA 3 SENSOR) MISC 1 each every 14 days Please provide 3 month supply. 6 each 3     diclofenac (VOLTAREN) 1 % topical gel Apply 2 g topically 4 times daily. For L shoulder pain 100 g 3     empagliflozin (JARDIANCE) 25 MG TABS tablet Take 1 tablet (25 mg) by mouth daily for 180 days 90 tablet 1     ergocalciferol (ERGOCALCIFEROL) 1.25 MG (85787 UT) capsule Take 1 capsule (50,000 Units) by mouth once a week For additional refills, please schedule a follow-up appointment at [phone] 12 capsule 3     fenofibrate (TRIGLIDE/LOFIBRA) 160 MG tablet Take 1 tablet (160 mg) by mouth daily 90 tablet 3     fish oil-omega-3 fatty acids 1000 MG capsule TAKE TWO CAPSULES (2 GM) BY MOUTH ONCE DAILY 180 capsule 3     gabapentin (NEURONTIN) 100 MG capsule Take 1 capsule (100 mg) by mouth 3 times daily 42 capsule 1     ibuprofen (ADVIL/MOTRIN) 600 MG tablet Take 1 tablet (600 mg) by mouth every 6 hours as needed for moderate pain 120 tablet 1     Injection Device for insulin (INPEN 100-PINK-NOVOLOG-FIASP) DAVID 1 each continuous 1 each 0     insulin aspart (NOVOLOG FLEXPEN) 100 UNIT/ML pen 1 unit per 5 grams CHO and correction scale of 1/25/125.  Approximate daily use is 100 units. 30 mL 2     insulin aspart (NOVOLOG PENFILL) 100 UNIT/ML cartridge Take with each meal and snack: 1 per 3 grams CHO and correction of 1 unit per 20 mg/dL over a target of 120. Average daily dose is 85 units. 75 mL 3     insulin glargine 100 UNIT/ML pen 3 month supply.Inject 55 units daily 45 mL 3     insulin pen needle (31G X 5 MM) 31G X 5 MM miscellaneous Use 5 to 6 pen needles daily or as directed.  3 month supply. 500 each 3     losartan (COZAAR) 100 MG tablet Take 1 tablet (100 mg) by mouth daily 90 tablet 3     omeprazole (PRILOSEC) 40 MG DR capsule Take 1 capsule (40 mg) by mouth 2 times daily 90  capsule 0     omeprazole (PRILOSEC) 40 MG DR capsule Take 1 capsule (40 mg) by mouth 2 times daily 8-12 weeks 90 capsule 0     ondansetron (ZOFRAN ODT) 4 MG ODT tab Take 1 tablet (4 mg) by mouth every 6 hours as needed for nausea or vomiting 20 tablet 0     polyethylene glycol (GOLYTELY) 236 g suspension Two days before procedure at 5PM fill first container with water. Mix and drink an 8 oz glass every 15 minutes until HALF of the container is gone. Place the remainder in the refrigerator. One day before procedure at 5PM drink second half of bowel prep. Drink an 8 oz glass every 15 minutes until it is gone. Day of procedure 6 hours before arrival time fill the 2nd container with water. Mix and drink an 8 oz glass every 15 minutes until HALF of the container is gone. Discard the remaining solution. 8000 mL 0     tirzepatide (MOUNJARO) 10 MG/0.5ML pen Inject 10 mg subcutaneously every 7 days. 2 mL 1     tirzepatide (MOUNJARO) 2.5 MG/0.5ML pen Inject 2.5 mg Subcutaneous every 7 days 6 mL 3     tirzepatide (MOUNJARO) 5 MG/0.5ML pen Inject 5 mg subcutaneously every 7 days 2 mL 11     tirzepatide (MOUNJARO) 5 MG/0.5ML pen Inject 5 mg subcutaneously every 7 days 2 mL 1     tirzepatide (MOUNJARO) 7.5 MG/0.5ML pen Inject 7.5 mg subcutaneously every 7 days 2 mL 1     Current Facility-Administered Medications   Medication Dose Route Frequency Provider Last Rate Last Admin     hydrocortisone (CORTAID) 1 % cream   Topical Once Mariya Mohamud APRN CNP         hydrocortisone (CORTAID) 1 % cream   Topical TID Mariya Mohamud APRN CNP         hylan (SYNVISC ONE) injection 48 mg  48 mg   Rosas Rodriguez DO   48 mg at 05/09/23 1813     lidocaine (PF) (XYLOCAINE) 1 % injection 4 mL  4 mL   Sebas Bradley MD   4 mL at 10/05/23 0923     triamcinolone (KENALOG-40) injection 40 mg  40 mg   Sebas Bradley MD   40 mg at 10/05/23 0923             Review of Systems:   A comprehensive 10 point review of systems  (constitutional, ENT, cardiac, peripheral vascular, lymphatic, respiratory, GI, , Musculoskeletal, skin, Neurological) was performed and found to be negative except as described in this note.           Physical Exam:     EXAMINATION pertinent findings:   VITAL SIGNS: Last menstrual period 02/01/2024, not currently breastfeeding.  There is no height or weight on file to calculate BMI.  RESP: non labored breathing   CARD: comfortable, no acute distress  ABD: benign   On focal exam of the right upper extremity without significant thenar atrophy.  She has good strength with ability to oppose the thumb.  She has negative Tinel's.  She has a positive Phalen and carpal compression test with paresthesias into the median nerve distribution.  She notes a positive Tinel's at the cubital tunnel.  No subluxing ulnar nerve is felt.  2 point discriminatory exam on the right upper extremity she has 6 mm over the radial and ulnar aspect of the thumb, 8 to 10 mm over the ulnar and radial aspect of the index finger as well as the long finger, and 7 mm in the ring and small finger.  She does have a small scar over the proximal long finger from a previous trigger finger release  On examination of the left upper extremity she has no significant thenar atrophy.  She has good strength with APB.  She has a negative Tinel's with a positive carpal compression test and a Phalen's.  She has a positive Tinel's at the elbow.  No subluxing ulnar nerve.  On 2 point discriminatory exam she has 8 mm over the radial and ulnar aspect of the thumb, 6 mm about the radial and ulnar aspect of the index and long finger, 6 mm over the radial and ulnar aspect of the ring finger and 5 mm over the radial and ulnar aspect of the small finger.            Data:     All laboratory data reviewed  All imaging studies reviewed by me.  Pertinent Imaging and Lab Review:     EMG performed in 08/21/24  Interpretation:  This is an abnormal study. There is  electrophysiologic evidence of severe median neuropathies at the wrists on both sides, as can be seen in the clinical context of severe bilateral carpal tunnel syndrome. There is no evidence of a cervical radiculopathy affecting the upper limbs on the basis of this study. Clinical correlation is recommended      Total Time = 20 min, 50% of which was spent in counseling and coordination of care as documented above.    Madhu Mcmahon MD  Orthopedic Surgery, Upper Extremity  Cell [phone]         Attestation signed by Madhu Mcmahon MD at 10/2/2024 10:37 AM:  Patient was seen and examined with the resident.  I agree with the assessment and plan of care.        Again, thank you for allowing me to participate in the care of your patient.        Sincerely,        Madhu Mcmahon MD

## 2024-10-02 NOTE — ED PROVIDER NOTES
Emergency Department Note      History of Present Illness     Chief Complaint   Abdominal Pain      HPI   Nayeli Caal is a 44 year old female who presents with type 2 diabetes, hypertension, hyperlipidemia, colitis who presents with abdominal pain. Patient notes the onset of her first bout of abdominal pain as 09/02/2024 when she went to the [facility] and had to repeatedly use the bathroom with frequent diarrhea. She states that she presented to the emergency department and was diagnosed with Colitis and has done GI follow-up. She reports that she has had a difficult time getting an  for her GI specialist but nonetheless was able to see a specialist who recommended that she takes imodium. Patient states that her GI symptoms have gotten better up until two days ago when she felt the onset of abdominal pain again. She states that she has had abdominal pain and cramps for the past two days as well as sporadic diarrhea. Patient describes that she has a constant right lower quadrant abdominal pain which presents similarly to her . She endorses loose stools. She denies fevers. She denies abnormal urinary output, dysuria, frequency. She denies bloody stools.    Independent Historian   None    Review of External Notes   N/A    Past Medical History     Medical History and Problem List   Combined visual and hearing impairment  Hepatic steatosis  Tobacco use disorder   Hyperlipidemia   Hypertension   Hypertriglyceridemia  Migraines   Type 2 diabetes   Usher syndrome    Medications   Tylenol  Norvasc  Aspirin 81 mg   Lipitor  Triglide  Neurontin  Cozaar  Prilosec  Mounjaro  Synvisc one    Surgical History   Hysterectomy   EGD  Cholecystectomy   Right/Left trigger release    Physical Exam     Patient Vitals for the past 24 hrs:   BP Temp Pulse Resp SpO2 Height Weight   10/02/24 1336 -- -- -- 16 -- -- --   10/02/24 1332 -- -- -- -- 98 % -- --   10/02/24 1330 118/72 97.6  F (36.4  C) 87 -- 97 % 1.549 m (5'  "1\") 77.6 kg (171 lb)     Physical Exam  General: Alert and cooperative with exam. Patient in mild to moderate distress. Normal mentation.  Head:  Scalp is NC/AT  Eyes:  No scleral icterus, PERRL  ENT:  The external nose and ears are normal. The oropharynx is normal and without erythema; mucus membranes are moist. Uvula midline, no evidence of deep space infection.  Neck:  Normal range of motion without rigidity.  CV:  Regular rate and rhythm    No pathologic murmur   Resp:  Breath sounds are clear bilaterally    Non-labored, no retractions or accessory muscle use  GI:  Abdomen is soft, no distension, mild to moderate right lower quadrant tenderness. No peritoneal signs  MS:  No lower extremity edema   Skin:  Warm and dry, No rash or lesions noted.  Neuro: Oriented x 3. No gross motor deficits.      Diagnostics     Lab Results   Labs Ordered and Resulted from Time of ED Arrival to Time of ED Departure   ROUTINE UA WITH MICROSCOPIC REFLEX TO CULTURE - Abnormal       Result Value    Color Urine Yellow      Appearance Urine Clear      Glucose Urine >=1000 (*)     Bilirubin Urine Negative      Ketones Urine Negative      Specific Gravity Urine 1.030      Blood Urine Negative      pH Urine 5.5      Protein Albumin Urine 20 (*)     Urobilinogen Urine Normal      Nitrite Urine Negative      Leukocyte Esterase Urine Negative      Bacteria Urine Few (*)     Mucus Urine Present (*)     RBC Urine 7 (*)     WBC Urine 2      Squamous Epithelials Urine 1     COMPREHENSIVE METABOLIC PANEL - Abnormal    Sodium 137      Potassium 3.9      Carbon Dioxide (CO2) 20 (*)     Anion Gap 14      Urea Nitrogen 20.8 (*)     Creatinine 0.89      GFR Estimate 82      Calcium 8.8      Chloride 103      Glucose 83      Alkaline Phosphatase 56      AST 21      ALT 28      Protein Total 7.3      Albumin 4.2      Bilirubin Total 0.2     CBC WITH PLATELETS AND DIFFERENTIAL - Abnormal    WBC Count 13.3 (*)     RBC Count 4.51      Hemoglobin 12.3      " Hematocrit 39.0      MCV 87      MCH 27.3      MCHC 31.5      RDW 13.4      Platelet Count 391      % Neutrophils 72      % Lymphocytes 21      % Monocytes 4      % Eosinophils 2      % Basophils 0      % Immature Granulocytes 0      NRBCs per 100 WBC 0      Absolute Neutrophils 9.5 (*)     Absolute Lymphocytes 2.8      Absolute Monocytes 0.6      Absolute Eosinophils 0.3      Absolute Basophils 0.1      Absolute Immature Granulocytes 0.1      Absolute NRBCs 0.0     LIPASE - Normal    Lipase 25         Imaging   CT Abdomen Pelvis w Contrast   Final Result   IMPRESSION:    1.  Interval improvement of previously described colitis.   2.  No additional visualized explanation for patient's symptoms.   Specifically, normal appendix.   3.  Persistent hepatic steatosis.      NANCY LOCKETT MD            SYSTEM ID:  [id number]            Independent Interpretation   None    ED Course      Medications Administered   Medications   iopamidol (ISOVUE-370) solution 86 mL (86 mLs Intravenous $Given 10/2/24 1544)   sodium chloride 0.9 % bag 500 mL for CT scan flush use (65 mLs Intravenous $Given 10/2/24 1544)   diphenhydrAMINE (BENADRYL) injection 25 mg (25 mg Intravenous $Given 10/2/24 7578)       Procedures   Procedures     Discussion of Management   None    ED Course        Additional Documentation  None    Medical Decision Making / Diagnosis     CMS Diagnoses: None    MIPS       None    MDM   Nayeli Caal is a 44 year old female with history of recent colitis who presents with increased right lower quadrant pain over the last 2 days as well as diarrhea.  Patient's medical history and records reviewed.  On evaluation patient is overall well-appearing with normal vital signs.  Labs and imaging were obtained.  UA demonstrates glucosuria (known history of diabetes) and white count is mildly elevated (13.3).  Patient requested pretreatment with Benadryl prior to undergoing CT imaging due to concern for previous episode of  itching potentially associated with contrast dye administration.  She was provided 25 mg IV Benadryl and had no complication with CT.  CT imaging demonstrates interval improvement of previously described colitis and no acute pathology.  On reassessment patient's pain has significantly improved.  Pain is described more as intermittent cramping.  Recommended trial of Bentyl for pain control.  Additionally recommended continued use of Imodium as needed for diarrhea and Tylenol as needed for pain.   was used throughout ED encounter.  Recommended close follow-up with GI; patient has upcoming colonoscopy scheduled.  No emergent cause for patient's symptoms determined.  No indication for stool studies at this time.  Return precautions discussed.  Discharged home.    Disposition   The patient was discharged.     Diagnosis     ICD-10-CM    1. Right lower quadrant abdominal pain  R10.31       2. Diarrhea, unspecified type  R19.7            Discharge Medications   Discharge Medication List as of 10/2/2024  5:01 PM        START taking these medications    Details   dicyclomine (BENTYL) 10 MG capsule Take 1 capsule (10 mg) by mouth 4 times daily as needed (Abdominal pain/cramping)., Disp-40 capsule, R-0, E-Prescribe                 Babatunde Reyes,   10/02/24 2639

## 2024-10-02 NOTE — LETTER
10/2/2024    Nayeli Caal   1980        To Whom it May Concern;     Please excuse Nayeli Caal from work for a health related reasons.     She has been following with me for care. For the next week, please juan luis work from home accommodations until 10/23when I see her again. She had been ill starting .      If you have any questions, please contact my office.      Sincerely,           Kirsten Conn PA-C  NPI # [id number]  [place] License # [id number]

## 2024-10-02 NOTE — NURSING NOTE
Reason For Visit:   Chief Complaint   Patient presents with    Consult     Bilateral carpal tunnel syndrome        Primary MD: Mariya Mohamud  Ref. MD: Bg    Age: 44 year old    ?  No      LMP 02/01/2024 (Approximate)       Pain Assessment  Patient Currently in Pain: Yes  Primary Pain Location: Wrist (Bilateral. Equal in regards to level of discomfort)  Pain Descriptors: Tingling, Intermittent    Hand Dominance Evaluation  Hand Dominance: Left          QuickDASH Assessment      10/1/2024     6:37 AM   QuickDASH Main   1. Open a tight or new jar Moderate difficulty   2. Do heavy household chores (e.g., wash walls, floors) Mild difficulty   3. Carry a shopping bag or briefcase Mild difficulty   4. Wash your back No difficulty   5. Use a knife to cut food Mild difficulty   6. Recreational activities in which you take some force or impact through your arm, shoulder or hand (e.g., golf, hammering, tennis, etc.) Moderate difficulty   7. During the past week, to what extent has your arm, shoulder or hand problem interfered with your normal social activities with family, friends, neighbours or groups Slightly   8. During the past week, were you limited in your work or other regular daily activities as a result of your arm, shoulder or hand problem Slightly limited   9. Arm, shoulder or hand pain Mild   10.Tingling (pins and needles) in your arm,shoulder or hand Moderate   11. During the past week, how much difficulty have you had sleeping because of the pain in your arm, shoulder or hand Moderate difficulty   Quickdash Ability Score 31.82          Current Outpatient Medications   Medication Sig Dispense Refill    acetaminophen (TYLENOL) 500 MG tablet Take 2 tablets (1,000 mg) by mouth every 8 hours as needed for mild pain 100 tablet 3    Alcohol Swabs (ALCOHOL PREP PAD) 70 % PADS 1 each 3 times daily 100 each 3    alum & mag hydroxide-simethicone (MAALOX) 200-200-20 MG/5ML SUSP suspension Take 10 mLs by  mouth every 6 hours as needed for indigestion. 355 mL 1    amLODIPine (NORVASC) 5 MG tablet Take 1 tablet (5 mg) by mouth at bedtime 90 tablet 3    aspirin (ASA) 81 MG chewable tablet Take 1 tablet (81 mg) by mouth daily CHEW AND SWALLOW 90 tablet 1    atorvastatin (LIPITOR) 40 MG tablet Take 1 tablet (40 mg) by mouth daily 90 tablet 1    bisacodyl (DULCOLAX) 5 MG EC tablet Two days prior to exam take two (2) tablets at 4pm. One day prior to exam take two (2) tablets at 4pm 4 tablet 0    blood glucose (ACCU-CHEK LAYA PLUS) test strip Use with  Accucheck Expert meter.  Test blood sugar 6 times daily. 600 each 3    blood glucose monitoring (ACCU-CHEK FASTCLIX) lancets Use to test blood sugar 6 times daily or as directed 510 each 3    calcium carbonate-vitamin D (OSCAL) 500-5 MG-MCG tablet Take 1 tablet by mouth 2 times daily 180 tablet 1    Continuous Glucose Sensor (FREESTYLE ZORAIDA 3 SENSOR) MISC 1 each every 14 days Please provide 3 month supply. 6 each 3    diclofenac (VOLTAREN) 1 % topical gel Apply 2 g topically 4 times daily. For L shoulder pain 100 g 3    empagliflozin (JARDIANCE) 25 MG TABS tablet Take 1 tablet (25 mg) by mouth daily for 180 days 90 tablet 1    ergocalciferol (ERGOCALCIFEROL) 1.25 MG (26197 UT) capsule Take 1 capsule (50,000 Units) by mouth once a week For additional refills, please schedule a follow-up appointment at [phone] 12 capsule 3    fenofibrate (TRIGLIDE/LOFIBRA) 160 MG tablet Take 1 tablet (160 mg) by mouth daily 90 tablet 3    fish oil-omega-3 fatty acids 1000 MG capsule TAKE TWO CAPSULES (2 GM) BY MOUTH ONCE DAILY 180 capsule 3    gabapentin (NEURONTIN) 100 MG capsule Take 1 capsule (100 mg) by mouth 3 times daily 42 capsule 1    ibuprofen (ADVIL/MOTRIN) 600 MG tablet Take 1 tablet (600 mg) by mouth every 6 hours as needed for moderate pain 120 tablet 1    Injection Device for insulin (INPEN 100-PINK-NOVOLOG-FIASP) DAVID 1 each continuous 1 each 0    insulin aspart (NOVOLOG  FLEXPEN) 100 UNIT/ML pen 1 unit per 5 grams CHO and correction scale of 1/25/125.  Approximate daily use is 100 units. 30 mL 2    insulin aspart (NOVOLOG PENFILL) 100 UNIT/ML cartridge Take with each meal and snack: 1 per 3 grams CHO and correction of 1 unit per 20 mg/dL over a target of 120. Average daily dose is 85 units. 75 mL 3    insulin glargine 100 UNIT/ML pen 3 month supply.Inject 55 units daily 45 mL 3    insulin pen needle (31G X 5 MM) 31G X 5 MM miscellaneous Use 5 to 6 pen needles daily or as directed.  3 month supply. 500 each 3    losartan (COZAAR) 100 MG tablet Take 1 tablet (100 mg) by mouth daily 90 tablet 3    omeprazole (PRILOSEC) 40 MG DR capsule Take 1 capsule (40 mg) by mouth 2 times daily 90 capsule 0    omeprazole (PRILOSEC) 40 MG DR capsule Take 1 capsule (40 mg) by mouth 2 times daily 8-12 weeks 90 capsule 0    ondansetron (ZOFRAN ODT) 4 MG ODT tab Take 1 tablet (4 mg) by mouth every 6 hours as needed for nausea or vomiting 20 tablet 0    polyethylene glycol (GOLYTELY) 236 g suspension Two days before procedure at 5PM fill first container with water. Mix and drink an 8 oz glass every 15 minutes until HALF of the container is gone. Place the remainder in the refrigerator. One day before procedure at 5PM drink second half of bowel prep. Drink an 8 oz glass every 15 minutes until it is gone. Day of procedure 6 hours before arrival time fill the 2nd container with water. Mix and drink an 8 oz glass every 15 minutes until HALF of the container is gone. Discard the remaining solution. 8000 mL 0    tirzepatide (MOUNJARO) 10 MG/0.5ML pen Inject 10 mg subcutaneously every 7 days. 2 mL 1    tirzepatide (MOUNJARO) 2.5 MG/0.5ML pen Inject 2.5 mg Subcutaneous every 7 days 6 mL 3    tirzepatide (MOUNJARO) 5 MG/0.5ML pen Inject 5 mg subcutaneously every 7 days 2 mL 11    tirzepatide (MOUNJARO) 5 MG/0.5ML pen Inject 5 mg subcutaneously every 7 days 2 mL 1    tirzepatide (MOUNJARO) 7.5 MG/0.5ML pen Inject  7.5 mg subcutaneously every 7 days 2 mL 1       No Known Allergies    Jesika Woodard, ATC

## 2024-10-02 NOTE — PATIENT INSTRUCTIONS
It was a pleasure taking care of you today.  I've included a brief summary of our discussion and care plan from today's visit below.  Please review this information with your primary care provider.  _______________________________________________________________________    My recommendations are summarized as follows:    I am worried about the recurrence of the pain. You had been doing a lot better until Monday of this week when the diarrhea and pain started again. I am sending you to the ER for imaging and some basic labs. I will check the chart later today and send you a MyChart message about next steps after the ER evaluation.   We still need the colonoscopy on 10/21.   OK to cancel the 10/18 appt     Please call our clinic or send a MyChart message to us if you have any questions or concerns. MyChart messages are answered by your nurse or doctor typically within 24 hours.  Please allow extra time on weekends and holidays    Return to GI Clinic in 10/23 at 7AM to review your progress.    _______________________________________________________________________    How do I schedule labs, imaging studies, or procedures that were ordered in clinic today?      Labs: To schedule lab appointment at the [facility], use my chart or call [phone]. If you have a [facility] lab closer to home where you are regularly seen you can give them a call.      Procedures: If a colonoscopy, upper endoscopy, breath test, esophageal manometry, or pH impedence was ordered today, our endoscopy team will call you to schedule this. If you have not heard from our endoscopy team within a week, please call [phone] option [phone] to schedule.      Imaging Studies: If you were scheduled for a CT scan, X-ray, MRI, ultrasound, HIDA scan, EKG or other imaging study, please call [phone] to have this scheduled.      Referral: If a referral to another specialty was ordered, expect a phone call or follow instructions above. If you  have not heard from anyone regarding your referral in a week, please call our clinic to check the status.      Who do I call with any questions after my visit?  Please be in touch if there are any further questions that arise following today's visit.  There are multiple ways to contact your gastroenterology care team.       During business hours, you may reach a Gastroenterology nurse at [phone]     To schedule or reschedule an appointment, please call [phone].      You can always send a secure message through [facility].  [facility] messages are answered by your nurse or doctor typically within 24 hours.  Please allow extra time on weekends and holidays.       For urgent/emergent questions after business hours, you may reach the on-call GI Fellow by contacting the [facility]  at [phone].     How will I get the results of any tests ordered?    You will receive all of your results.  If you have signed up for [facility], any tests ordered at your visit will be available to you after your physician reviews them.  Typically this takes 1-2 weeks.  If there are urgent results that require a change in your care plan, your physician or nurse will call you to discuss the next steps.       What is [facility]?  [facility] is a secure way for you to access all of your healthcare records from the [facility].  It is a web based computer program, so you can sign on to it from any location.  It also allows you to send secure messages to your care team.  I recommend signing up for [facility] access if you have not already done so and are comfortable with using a computer.       How to I schedule a follow-up visit?  If you did not schedule a follow-up visit today, please call [phone] to schedule a follow-up office visit.      Sincerely,    Kirsten Conn PA-C  Gastroenterology

## 2024-10-02 NOTE — ED TRIAGE NOTES
Pt sent from GI clinic for repeat labs and imaging for follow up for continuing abdominal pain with colitis. Pt arrives with , is deaf with some vision loss as well.      Triage Assessment (Adult)       Row Name 10/02/24 3297          Triage Assessment    Airway WDL WDL        Respiratory WDL    Respiratory WDL WDL        Skin Circulation/Temperature WDL    Skin Circulation/Temperature WDL WDL        Cardiac WDL    Cardiac WDL WDL        Peripheral/Neurovascular WDL    Peripheral Neurovascular WDL WDL        Cognitive/Neuro/Behavioral WDL    Cognitive/Neuro/Behavioral WDL WDL

## 2024-10-02 NOTE — NURSING NOTE
Teaching Flowsheet   Teaching completed with the assistance of .     Relevant Diagnosis: Bilateral carpal tunnel syndrome.  Teaching Topic: Right open carpal tunnel release.     CSC under local only anesthesia with Dr Madhu Mcmahon.   DM type 2, last HgbA1C 9-11-24 @ 7.8%  Hearing impaired  Will have left sided carpal tunnel release to follow this surgery.   She is left hand dominant, works in office setting.     Person(s) involved in teaching:   Patient and      Motivation Level:  Asks Questions: Yes  Eager to Learn: Yes  Cooperative: Yes  Receptive (willing/able to accept information): Yes  Any cultural factors/Yarsani beliefs that may influence understanding or compliance? No    Patient demonstrates understanding of the following:  Reason for the appointment, diagnosis and treatment plan: Yes  Knowledge of proper use of medications and conditions for which they are ordered (with special attention to potential side effects or drug interactions): Yes  Which situations necessitate calling provider and whom to contact: Yes    Teaching Concerns Addressed:   Proper use and care of  (medical equip, care aids, etc.): Yes  Nutritional needs and diet plan: Yes  Pain management techniques: Yes  Wound Care: Yes  How and/when to access community resources: Yes     Instructional Materials Used/Given: Preoperative surgery packet, antibacterial Chlorhexidine soap. Stop Light Tool reviewed, after-hours number provided, patient verbalized understanding, had no immediate questions. Kristy Scott RN

## 2024-10-02 NOTE — LETTER
10/2/2024      Nayeli Caal  [address]  [facility] [address]      Dear Colleague,    Thank you for referring your patient, Nayeli Caal, to the [facility] [place]. Please see a copy of my visit note below.      GI CLINIC VISIT  ASSESSMENT/PLAN:  44 year old female with PMH of type 2 diabetes on insulin presenting to GI clinic for diarrhea and abdominal pain follow-up.     # Diarrhea  # Abdominal pain  9/4/24 CT abdomen pelvis with contrast showing mild colitis starting at mid transverse extending to rectum that was thought to be infectious (enteric/c.diff NEG) vs start of IBD. Basic labs have been stable on trend including mild leukocytosis without L shift and she has been overall maintaining oral intake. Fecal calpro was in the 300s congruent with colitis seen on CTAP.     She had been doing well however is now reporting a recurrence of abdominal pain and diarrhea starting this Monday. She was tender to the periumbilical region>LLQ and to a lesser degree RLQ. She is hemodynamically stable and she is not toxic appearing. I am worried about this recurrence of pain and recommended ER evaluation for updated cross sectional imaging and labs to rule out perforation, abscess, or other sequale from this known colitis . Soft handoff given to Dr Rosales at [facility] ER (where patient wishes to go to) at 12:30pm. Appreciate care and assistance from ED team. If ED evaluation is unremarkable, this pain recurrence could be a post-infectious iBS vs hyper visceral sensitivity. I will plan to repeat infectious stool studies and will relay this updated plan on a [email or web address] message either later today or tomorrow after I review the ED evaluation from today. She is aware I will message her on [email or web address].     Plan  -ED eval for updated evaluation given pain recurrence  -I will reach out to her via [email or web address] after reviewing ED eval   -Cscope with our team 10/21    Short-term  follow-up with me 10/23 at 7AM    Thank you for this consultation. It was a pleasure to participate in the care of this patient; please contact us with any further questions.    Kirsten Conn PA-C    Follow up: As planned above. Today, I personally spent 25 minutes in direct face to face time with the patient, of which greater than 50% of the time was spent in patient education and counseling as described above. Approximately 15 minutes were spent on indirect care associated with the patient's consultation including but not limited to review of: patient medical records to date, clinic visits, hospital records, lab results, imaging studies, procedural documentation, and coordinating care with other providers. The findings from this review are summarized in the above note. All of the above accounted for a cumulative time of 40 minutes and was performed on the date of service.     HPI: 44 year old female with PMH of type 2 diabetes on insulin presenting to GI clinic for diarrhea/abd pain.      She presents for 2 week short term follow up given acute GI symptoms associated with her colitis seen 9/4 on CTAP at the ED. Trended labs have been stable CBC with wbc 11-12s but no L shift. Renal function with slight bump but still within normal range.     Today 10/2/24  Here with professional    She reports doing well all of last week with no diarrhea, some weight gain and no abdominal pain however for unclear reasons had started with abdominal pain, nausea w/o emesis and diarrhea again on Monday 9/30.  She had been taking Imodium the last 2 days but still had 4-5 loose bowel movements today.  No bloody or black stools  She is reporting abdominal pain again.  Historically she had some pelvic surgery in February of this year and had some residual right lower quadrant abdominal pain.  That eventually went away.  On Monday, this RLQ abd pain came back.  She is not taking Tylenol  She denies fevers, chills, nausea or  vomiting  Her blood sugar levels have been stable for her.  She does meet with a diabetic educator and states her Mounjaro was recently increased.  She had been on this medication for the past 5 months.  She continues with Basaglar insulin.   Appetite remains depressed.  Her home weight was 164 this morning.  In clinic today we measured her at 171 pounds.  No f/c/n/v/bloody stools/black stools. No dizziness/syncope  Scheduled for csocpe 10/21 with Dr Lamont Cline Journal   9/18 - appt with me   9/23-27 - no BM for a few days after our appt, then 3 days of normal formed stools, then no BM until 9/30. Did not take imodium last week.   9/30 Monday - normal BM, developed stomach cramping, had a normal BM, then had liquid diarrhea. Slept OK but from 2-3 AM she woke up due to stomach cramping.    Took Imodium 4 mg in AM, 4 mg in afternoon    9/18/24  Here with professional    She feels  unwell still  On sat developed dental pain and went to dental ER on Sunday. Ultimately had dental extraction with some improvement in this pain. No abx. She feels this contributed to not being able to eat well.   Appetite still somewhat depressed. Down to 171.9# today, lost a few more lb from last week.   No longer having abdominal pain  Had a drop to 70-80s but has since since worked with her DM educator and dose of insulin has been changed. Most recently BS have been normal for her   No f/c/n/v/bloody stools/black stools. No dizziness/syncope    Stooling journal   9/12 Thur - loose stools. 2 imoidum AM and 1 PM  9/13 Friday - loose stools. no imodium. Had a more normal BM   9/14 Saturday - Had a more normal BM. no imodium.   9/15 Ben - had 2 normal BM then a loose stool in evening. No imodium.   9/16 Monday - had some diarrhea again - 3-4 loose stools. She took 2 tabs imodium in AM and her after BM was normal   9/17 Tue - normal stool  9/18 Wed (today) - 1 loose stool at 6 am. No imodium. No further loose stools  either.     9/11/24 - our initial appt following ER evaluation 9/4  Starting September 2, she started feeling unwell with abdominal discomfort.  Later that day she then developed loose stools and had lower abdominal pain. She thinks this started after eating bad eggs. The diarrhea persisted throughout the week -she was seen at the emergency room 9 4.  Labs showed a white count to the 12's,  a bump in her BUN and with CRT to 0.76 (baseline 0.5-0.76). no anion gap. CTAP mild colitis starting from mid transverse all the way to rectum.  Subsequent stool studies were negative for infection to include enteric panel and C. difficile.  Her symptoms improved that weekend but flared up again on 9/9.  It continues today.     She is reporting lower abdominal pain that she only notices with defecation or if she presses on the abdomen.  Sitting upright in a chair she denies any pain in her abdomen.  She reports frequent loose stools, unable to quantify the amount, consistency watery oatmeal.  She denies bloody or black stools.    She tried Maalox without improvement in the symptoms.  She took Pepto-Bismol once at its onset, it did not help.    Never had fevers, chills, nausea or vomiting.    She is scared to eat as she does not want to prompt diarrhea.  She is avoiding coffee and heavily seasoned foods.  She would like some guidance on dietary intake.  She is still going to work, and is requesting for work accommodations.  She is in office Tuesday Wednesday Thursday, work from home on Mondays and Fridays.  She is a .     She is a type II diabetic on insulin -continues with Basaglar 55 units daily.  She does have rapid aspart insulin that she takes as needed.  Her fasting sugars have been her baseline in the 90s to 120s.  She denies hypoglycemic episodes with this recent illness.      She denies dizziness lightheadedness, syncope.    She lives with a roommate at home.    PAST MEDICAL HISTORY:  Past Medical History:    Diagnosis Date     Combined visual and hearing impairment      Deaf      Diabetic retinopathy of both eyes (H) 8/19/2011     Hepatic steatosis 08/19/2011     History of tobacco use      Hyperlipidemia      Hypertension      Hypertriglyceridemia      Migraines      Obesity 8/19/2011     Problem list name updated by automated process. 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       PREVIOUS ABDOMINAL/GYNECOLOGIC SURGERIES:  Past Surgical History:   Procedure Laterality Date     DAVINCI HYSTERECTOMY TOTAL, SALPINGECTOMY BILATERAL Bilateral 2/7/2024    Procedure: HYSTERECTOMY, TOTAL, ROBOT-ASSISTED, WITH BILATERAL SALPINGECTOMY, CYSTOSCOPY;  Surgeon: Vonnie Cowan MD;  Location: [facility] OR     ESOPHAGOSCOPY, GASTROSCOPY, DUODENOSCOPY (EGD), COMBINED N/A 6/13/2024    Procedure: ESOPHAGOGASTRODUODENOSCOPY, WITH BIOPSY;  Surgeon: Nora Brice MD;  Location: [facility] OR     LAPAROSCOPIC CHOLECYSTECTOMY N/A 3/10/2018    Procedure: LAPAROSCOPIC CHOLECYSTECTOMY;  Laparoscopic Cholecystectomy ;  Surgeon: Kuldeep Sigala MD;  Location: [facility] OR     RELEASE TRIGGER FINGER Right 5/2/2019    Procedure: Right Thumb Trigger Release;  Surgeon: Greg Streeter MD;  Location: [facility] OR     RELEASE TRIGGER FINGER Left 5/30/2019    Procedure: Left Ring Trigger Finger Release.  Ganglion cyst excision.;  Surgeon: Greg Streeter MD;  Location: [facility] OR     RELEASE TRIGGER FINGER Right 6/13/2023    Procedure: RELEASE, RIGHT TRIGGER FINGER, INDEX AND MIDDLE;  Surgeon: Miracle Carlin MD;  Location: [facility] OR     RELEASE TRIGGER FINGER Left 8/1/2023    Procedure: RELEASE, LEFT TRIGGER FINGER, MIDDLE;  Surgeon: Miracle Carlin MD;  Location: [facility] OR         PERTINENT MEDICATIONS:  Current Outpatient Medications   Medication Sig Dispense Refill      acetaminophen (TYLENOL) 500 MG tablet Take 2 tablets (1,000 mg) by mouth every 8 hours as needed for mild pain 100 tablet 3     Alcohol Swabs (ALCOHOL PREP PAD) 70 % PADS 1 each 3 times daily 100 each 3     alum & mag hydroxide-simethicone (MAALOX) 200-200-20 MG/5ML SUSP suspension Take 10 mLs by mouth every 6 hours as needed for indigestion. 355 mL 1     amLODIPine (NORVASC) 5 MG tablet Take 1 tablet (5 mg) by mouth at bedtime 90 tablet 3     aspirin (ASA) 81 MG chewable tablet Take 1 tablet (81 mg) by mouth daily CHEW AND SWALLOW 90 tablet 1     atorvastatin (LIPITOR) 40 MG tablet Take 1 tablet (40 mg) by mouth daily 90 tablet 1     bisacodyl (DULCOLAX) 5 MG EC tablet Two days prior to exam take two (2) tablets at 4pm. One day prior to exam take two (2) tablets at 4pm 4 tablet 0     blood glucose (ACCU-CHEK LAYA PLUS) test strip Use with  Accucheck Expert meter.  Test blood sugar 6 times daily. 600 each 3     blood glucose monitoring (ACCU-CHEK FASTCLIX) lancets Use to test blood sugar 6 times daily or as directed 510 each 3     calcium carbonate-vitamin D (OSCAL) 500-5 MG-MCG tablet Take 1 tablet by mouth 2 times daily 180 tablet 1     Continuous Glucose Sensor (FREESTYLE ZORAIDA 3 SENSOR) [facility] 1 each every 14 days Please provide 3 month supply. 6 each 3     diclofenac (VOLTAREN) 1 % topical gel Apply 2 g topically 4 times daily. For L shoulder pain 100 g 3     empagliflozin (JARDIANCE) 25 MG TABS tablet Take 1 tablet (25 mg) by mouth daily for 180 days 90 tablet 1     ergocalciferol (ERGOCALCIFEROL) 1.25 MG (51658 UT) capsule Take 1 capsule (50,000 Units) by mouth once a week For additional refills, please schedule a follow-up appointment at [phone] 12 capsule 3     fenofibrate (TRIGLIDE/LOFIBRA) 160 MG tablet Take 1 tablet (160 mg) by mouth daily 90 tablet 3     fish oil-omega-3 fatty acids 1000 MG capsule TAKE TWO CAPSULES (2 GM) BY MOUTH ONCE DAILY 180 capsule 3     gabapentin (NEURONTIN) 100 MG capsule  Take 1 capsule (100 mg) by mouth 3 times daily 42 capsule 1     ibuprofen (ADVIL/MOTRIN) 600 MG tablet Take 1 tablet (600 mg) by mouth every 6 hours as needed for moderate pain 120 tablet 1     Injection Device for insulin (INPEN 100-PINK-NOVOLOG-FIASP) DAVID 1 each continuous 1 each 0     insulin aspart (NOVOLOG FLEXPEN) 100 UNIT/ML pen 1 unit per 5 grams CHO and correction scale of 1/25/125.  Approximate daily use is 100 units. 30 mL 2     insulin aspart (NOVOLOG PENFILL) 100 UNIT/ML cartridge Take with each meal and snack: 1 per 3 grams CHO and correction of 1 unit per 20 mg/dL over a target of 120. Average daily dose is 85 units. 75 mL 3     insulin glargine 100 UNIT/ML pen 3 month supply.Inject 55 units daily 45 mL 3     insulin pen needle (31G X 5 MM) 31G X 5 MM miscellaneous Use 5 to 6 pen needles daily or as directed.  3 month supply. 500 each 3     losartan (COZAAR) 100 MG tablet Take 1 tablet (100 mg) by mouth daily 90 tablet 3     omeprazole (PRILOSEC) 40 MG DR capsule Take 1 capsule (40 mg) by mouth 2 times daily 90 capsule 0     omeprazole (PRILOSEC) 40 MG DR capsule Take 1 capsule (40 mg) by mouth 2 times daily 8-12 weeks 90 capsule 0     ondansetron (ZOFRAN ODT) 4 MG ODT tab Take 1 tablet (4 mg) by mouth every 6 hours as needed for nausea or vomiting 20 tablet 0     polyethylene glycol (GOLYTELY) 236 g suspension Two days before procedure at 5PM fill first container with water. Mix and drink an 8 oz glass every 15 minutes until HALF of the container is gone. Place the remainder in the refrigerator. One day before procedure at 5PM drink second half of bowel prep. Drink an 8 oz glass every 15 minutes until it is gone. Day of procedure 6 hours before arrival time fill the 2nd container with water. Mix and drink an 8 oz glass every 15 minutes until HALF of the container is gone. Discard the remaining solution. 8000 mL 0     tirzepatide (MOUNJARO) 10 MG/0.5ML pen Inject 10 mg subcutaneously every 7 days. 2  mL 1     tirzepatide (MOUNJARO) 2.5 MG/0.5ML pen Inject 2.5 mg Subcutaneous every 7 days 6 mL 3     tirzepatide (MOUNJARO) 5 MG/0.5ML pen Inject 5 mg subcutaneously every 7 days 2 mL 11     tirzepatide (MOUNJARO) 5 MG/0.5ML pen Inject 5 mg subcutaneously every 7 days 2 mL 1     tirzepatide (MOUNJARO) 7.5 MG/0.5ML pen Inject 7.5 mg subcutaneously every 7 days 2 mL 1         SOCIAL HISTORY:  Social History     Socioeconomic History     Marital status: Single     Spouse name: Not on file     Number of children: Not on file     Years of education: Not on file     Highest education level: Not on file   Occupational History     Not on file   Tobacco Use     Smoking status: Former     Current packs/day: 0.00     Types: Cigarettes     Quit date: 2010     Years since quittin.8     Passive exposure: Never     Smokeless tobacco: Never     Tobacco comments:     stopped 10 yrs ago   Substance and Sexual Activity     Alcohol use: Not Currently     Comment: socially     Drug use: Not Currently     Types: Marijuana     Comment: much younger  or earlier     Sexual activity: Not Currently     Partners: Male     Birth control/protection: I.U.D.   Other Topics Concern     Parent/sibling w/ CABG, MI or angioplasty before 65F 55M? Not Asked      Service No     Blood Transfusions No     Caffeine Concern No     Occupational Exposure No     Hobby Hazards No     Sleep Concern No     Stress Concern No     Weight Concern Yes     Special Diet No     Back Care No     Exercise Yes     Comment: 4-5 x a week     Bike Helmet No     Seat Belt Yes     Self-Exams Yes   Social History Narrative     Not on file     Social Determinants of Health     Financial Resource Strain: Low Risk  (2024)    Financial Resource Strain      Within the past 12 months, have you or your family members you live with been unable to get utilities (heat, electricity) when it was really needed?: No   Food Insecurity: Low Risk  (2024)    Food  Insecurity      Within the past 12 months, did you worry that your food would run out before you got money to buy more?: No      Within the past 12 months, did the food you bought just not last and you didn t have money to get more?: No   Transportation Needs: Low Risk  (1/4/2024)    Transportation Needs      Within the past 12 months, has lack of transportation kept you from medical appointments, getting your medicines, non-medical meetings or appointments, work, or from getting things that you need?: No   Recent Concern: Transportation Needs - High Risk (10/30/2023)    Transportation Needs      Within the past 12 months, has lack of transportation kept you from medical appointments, getting your medicines, non-medical meetings or appointments, work, or from getting things that you need?: Yes   Physical Activity: Not on file   Stress: Not on file   Social Connections: Not on file   Interpersonal Safety: Low Risk  (1/4/2024)    Interpersonal Safety      Do you feel physically and emotionally safe where you currently live?: Yes      Within the past 12 months, have you been hit, slapped, kicked or otherwise physically hurt by someone?: No      Within the past 12 months, have you been humiliated or emotionally abused in other ways by your partner or ex-partner?: No   Housing Stability: Low Risk  (1/4/2024)    Housing Stability      Do you have housing? : Yes      Are you worried about losing your housing?: No       FAMILY HISTORY:  Family History   Problem Relation Age of Onset     Unknown/Adopted Other        PHYSICAL EXAMINATION:  Vitals reviewed: /67 (BP Location: Left arm, Patient Position: Sitting, Cuff Size: Adult Regular)   Pulse 80   Temp 98.1  F (36.7  C) (Oral)   Wt 77.6 kg (171 lb)   LMP 02/01/2024 (Approximate)   SpO2 99%   BMI 32.31 kg/m    Wt:   Wt Readings from Last 2 Encounters:   10/02/24 77.6 kg (171 lb)   09/18/24 78 kg (171 lb 14.4 oz)      Constitutional: aaox3, cooperative, pleasant,  not dyspneic/diaphoretic, no acute distress  Eyes: Sclera anicteric/injected  Neck: supple, active ROM w/o limitation or pain   CV: No edema  Respiratory: Unlabored breathing  Abd:  tenderness to light palpation of periumbilical region w/ guarding. Some tenderness to LLQ>RLQ to light touch. Sitting comfortably in chair, moving without issues.   Skin: warm, perfused, no jaundice  Psych: Normal affect  MSK: Normal gait     PERTINENT STUDIES - Reviewed in EMR     Lab Results   Component Value Date    WBC 11.3 (H) 09/19/2024    WBC 12.1 (H) 09/11/2024    WBC 12.5 (H) 09/04/2024    HGB 12.8 09/19/2024    HGB 12.3 09/11/2024    HGB 12.2 09/04/2024     09/19/2024     09/11/2024     09/04/2024    CHOL 183 01/04/2024    CHOL 221 (H) 04/04/2023    CHOL 194 07/19/2021    TRIG 350 (H) 01/04/2024    TRIG 298 (H) 04/04/2023    TRIG 133 07/19/2021    HDL 37 (L) 01/04/2024    HDL 38 (L) 04/04/2023    HDL 43 (L) 07/19/2021    ALT 29 09/11/2024    ALT 42 09/04/2024    ALT 51 (H) 05/31/2024    AST 18 09/11/2024    AST 25 09/04/2024    AST 32 05/31/2024     09/19/2024     09/11/2024     09/04/2024    BUN 15.0 09/19/2024    BUN 23.5 (H) 09/11/2024    BUN 21.1 (H) 09/04/2024    CO2 28 09/19/2024    CO2 21 (L) 09/11/2024    CO2 20 (L) 09/04/2024    TSH 1.41 04/04/2023    TSH 1.34 07/08/2020    TSH 1.84 05/17/2018    INR 1.03 06/18/2010    INR 0.92 06/18/2010        Liver Function Studies -   Recent Labs   Lab Test 09/04/24  1224   PROTTOTAL 7.6   ALBUMIN 4.4   BILITOTAL 0.2   ALKPHOS 62   AST 25   ALT 42        PREVIOUS ENDOSCOPY      Again, thank you for allowing me to participate in the care of your patient.        Sincerely,        Kirsten Conn PA-C

## 2024-10-02 NOTE — PROGRESS NOTES
Orthopaedic Surgery Consultation  10/2/2024 9:09 AM   by Madhu Mcmahon MD    Nayeli Caal MRN# [id number]   Age: 44 year old YOB: 1980     Reason for consult: Consult for bilateral carpal tunnel syndrome                       Assessment and Plan:   Assessment:   Bilateral carpal tunnel syndrome with potential bilateral cubital tunnel syndrome that is less severe      Plan:   We had a long discussion with the patient regarding carpal tunnel syndrome as well as potential cubital tunnel syndrome.  We recommended addressing the carpal tunnel syndrome first.  We also discussed her EMG results that demonstrated bilateral severe carpal tunnel syndrome.  We discussed treatment options for this including nonoperative with bracing, injections.  We discussed an open carpal tunnel release.  The patient is already failed bracing and she would not want injections given her diabetes which is reasonable.  We discussed an open carpal tunnel release either under local or sedation.  We discussed risk of the surgery including failure to completely resolve her symptoms, wound healing complications, damage to surrounding structures among others.  After discussion of the risk and benefits of surgery the patient elected to proceed with a staged carpal tunnel release.  We recommended not having both of them done at once.  We will plan to proceed with the right carpal tunnel under local and then proceed with the left carpal tunnel 2 to 3 weeks later in a staged fashion.  All of the patient's questions were answered and addressed prior to the conclusion of the visit              History of Present Illness:   44 year old female who is left-hand-dominant who has a history of diabetes with most recent hemoglobin A1c of 7.8 with a history of of decreased vision and a  was used presenting with bilateral upper extremity numbness or tingling.  She states that since 2017 she has had diffuse numbness and  tingling throughout her bilateral hands.  She cannot say if 1 is worse than the other.  She states that she has been seen previously for this and had an EMG.  She has tried braces without significant relief.  Her hands will intermittently wake her up at night from sleep.  She says people have talked about corticosteroid injections with her previously but she is try to avoid this due to concerns about her blood sugars.  Denies any significant discomfort in the elbows.  Denies any neck issues.           Past Medical History:     Patient Active Problem List   Diagnosis    Essential hypertension    Hypersomnia, organic    Other chronic nonalcoholic liver disease    Diabetic retinopathy of both eyes (H)    Obesity    Usher Syndrome: congenital deafness, retinitis pigmentosa    Macular degeneration, age related, nonexudative    Benign neoplasm of iris    Dry eye syndrome    Pemphigus erythematosus (H)    Chondromalacia of patella, right, steroid injection 6/12/2015    Uncontrolled type 2 diabetes mellitus with hyperglycemia, with long-term current use of insulin (H)    Chronic kidney disease, stage 1    Trigger middle finger of left hand    Adenomyosis of uterus    S/P hysterectomy    Pain in both hands     Past Medical History:   Diagnosis Date    Combined visual and hearing impairment     Deaf     Diabetic retinopathy of both eyes (H) 8/19/2011    Hepatic steatosis 08/19/2011    History of tobacco use     Hyperlipidemia     Hypertension     Hypertriglyceridemia     Migraines     Obesity 8/19/2011     Problem list name updated by automated process. 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            Past Surgical History:   Relevant surgical history as mentioned in HPI.  Past Surgical History:   Procedure Laterality Date    DAVINCI HYSTERECTOMY TOTAL,  SALPINGECTOMY BILATERAL Bilateral 2024    Procedure: HYSTERECTOMY, TOTAL, ROBOT-ASSISTED, WITH BILATERAL SALPINGECTOMY, CYSTOSCOPY;  Surgeon: Vonnie Cowan MD;  Location: [facility] OR    ESOPHAGOSCOPY, GASTROSCOPY, DUODENOSCOPY (EGD), COMBINED N/A 2024    Procedure: ESOPHAGOGASTRODUODENOSCOPY, WITH BIOPSY;  Surgeon: Nora Brice MD;  Location: [facility] OR    LAPAROSCOPIC CHOLECYSTECTOMY N/A 3/10/2018    Procedure: LAPAROSCOPIC CHOLECYSTECTOMY;  Laparoscopic Cholecystectomy ;  Surgeon: Kuldeep Sigala MD;  Location: [facility] OR    RELEASE TRIGGER FINGER Right 2019    Procedure: Right Thumb Trigger Release;  Surgeon: Greg Streeter MD;  Location: [facility]    RELEASE TRIGGER FINGER Left 2019    Procedure: Left Ring Trigger Finger Release.  Ganglion cyst excision.;  Surgeon: Greg Streeter MD;  Location: [facility]    RELEASE TRIGGER FINGER Right 2023    Procedure: RELEASE, RIGHT TRIGGER FINGER, INDEX AND MIDDLE;  Surgeon: Miracle Carlin MD;  Location: [facility] OR    RELEASE TRIGGER FINGER Left 2023    Procedure: RELEASE, LEFT TRIGGER FINGER, MIDDLE;  Surgeon: Miracle Carlin MD;  Location: [facility] OR            Social History:     Social History     Socioeconomic History    Marital status: Single     Spouse name: Not on file    Number of children: Not on file    Years of education: Not on file    Highest education level: Not on file   Occupational History    Not on file   Tobacco Use    Smoking status: Former     Current packs/day: 0.00     Types: Cigarettes     Quit date: 2010     Years since quittin.8     Passive exposure: Never    Smokeless tobacco: Never    Tobacco comments:     stopped 10 yrs ago   Substance and Sexual Activity    Alcohol use: Not Currently     Comment: socially    Drug use: Not Currently     Types: Marijuana     Comment: much younger 2009 or earlier    Sexual activity: Not Currently     Partners: Male     Birth control/protection: I.U.D.    Other Topics Concern    Parent/sibling w/ CABG, MI or angioplasty before 65F 55M? Not Asked     Service No    Blood Transfusions No    Caffeine Concern No    Occupational Exposure No    Hobby Hazards No    Sleep Concern No    Stress Concern No    Weight Concern Yes    Special Diet No    Back Care No    Exercise Yes     Comment: 4-5 x a week    Bike Helmet No    Seat Belt Yes    Self-Exams Yes   Social History Narrative    Not on file     Social Determinants of Health     Financial Resource Strain: Low Risk  (1/4/2024)    Financial Resource Strain     Within the past 12 months, have you or your family members you live with been unable to get utilities (heat, electricity) when it was really needed?: No   Food Insecurity: Low Risk  (1/4/2024)    Food Insecurity     Within the past 12 months, did you worry that your food would run out before you got money to buy more?: No     Within the past 12 months, did the food you bought just not last and you didn t have money to get more?: No   Transportation Needs: Low Risk  (1/4/2024)    Transportation Needs     Within the past 12 months, has lack of transportation kept you from medical appointments, getting your medicines, non-medical meetings or appointments, work, or from getting things that you need?: No   Recent Concern: Transportation Needs - High Risk (10/30/2023)    Transportation Needs     Within the past 12 months, has lack of transportation kept you from medical appointments, getting your medicines, non-medical meetings or appointments, work, or from getting things that you need?: Yes   Physical Activity: Not on file   Stress: Not on file   Social Connections: Not on file   Interpersonal Safety: Low Risk  (1/4/2024)    Interpersonal Safety     Do you feel physically and emotionally safe where you currently live?: Yes     Within the past 12 months, have you been hit, slapped, kicked or otherwise physically hurt by someone?: No     Within the past 12 months,  have you been humiliated or emotionally abused in other ways by your partner or ex-partner?: No   Housing Stability: Low Risk  (1/4/2024)    Housing Stability     Do you have housing? : Yes     Are you worried about losing your housing?: No     Smoking, EtOH,        Family History:     Family History   Problem Relation Age of Onset    Unknown/Adopted Other      No history of problems with bleeding or anesthesia       Allergies:   No Known Allergies       Medications:     Current Outpatient Medications   Medication Sig Dispense Refill    acetaminophen (TYLENOL) 500 MG tablet Take 2 tablets (1,000 mg) by mouth every 8 hours as needed for mild pain 100 tablet 3    Alcohol Swabs (ALCOHOL PREP PAD) 70 % PADS 1 each 3 times daily 100 each 3    alum & mag hydroxide-simethicone (MAALOX) 200-200-20 MG/5ML SUSP suspension Take 10 mLs by mouth every 6 hours as needed for indigestion. 355 mL 1    amLODIPine (NORVASC) 5 MG tablet Take 1 tablet (5 mg) by mouth at bedtime 90 tablet 3    aspirin (ASA) 81 MG chewable tablet Take 1 tablet (81 mg) by mouth daily CHEW AND SWALLOW 90 tablet 1    atorvastatin (LIPITOR) 40 MG tablet Take 1 tablet (40 mg) by mouth daily 90 tablet 1    bisacodyl (DULCOLAX) 5 MG EC tablet Two days prior to exam take two (2) tablets at 4pm. One day prior to exam take two (2) tablets at 4pm 4 tablet 0    blood glucose (ACCU-CHEK LAYA PLUS) test strip Use with  Accucheck Expert meter.  Test blood sugar 6 times daily. 600 each 3    blood glucose monitoring (ACCU-CHEK FASTCLIX) lancets Use to test blood sugar 6 times daily or as directed 510 each 3    calcium carbonate-vitamin D (OSCAL) 500-5 MG-MCG tablet Take 1 tablet by mouth 2 times daily 180 tablet 1    Continuous Glucose Sensor (FREESTYLE ZORAIDA 3 SENSOR) [facility] [place], [place] 1 each every 14 days Please provide 3 month supply. 6 each 3    diclofenac (VOLTAREN) 1 % topical gel Apply 2 g topically 4 times daily. For L shoulder pain 100 g 3    empagliflozin (JARDIANCE) 25 MG  TABS tablet Take 1 tablet (25 mg) by mouth daily for 180 days 90 tablet 1    ergocalciferol (ERGOCALCIFEROL) 1.25 MG (00655 UT) capsule Take 1 capsule (50,000 Units) by mouth once a week For additional refills, please schedule a follow-up appointment at [phone] 12 capsule 3    fenofibrate (TRIGLIDE/LOFIBRA) 160 MG tablet Take 1 tablet (160 mg) by mouth daily 90 tablet 3    fish oil-omega-3 fatty acids 1000 MG capsule TAKE TWO CAPSULES (2 GM) BY MOUTH ONCE DAILY 180 capsule 3    gabapentin (NEURONTIN) 100 MG capsule Take 1 capsule (100 mg) by mouth 3 times daily 42 capsule 1    ibuprofen (ADVIL/MOTRIN) 600 MG tablet Take 1 tablet (600 mg) by mouth every 6 hours as needed for moderate pain 120 tablet 1    Injection Device for insulin (INPEN 100-PINK-NOVOLOG-FIASP) DAVID 1 each continuous 1 each 0    insulin aspart (NOVOLOG FLEXPEN) 100 UNIT/ML pen 1 unit per 5 grams CHO and correction scale of 1/25/125.  Approximate daily use is 100 units. 30 mL 2    insulin aspart (NOVOLOG PENFILL) 100 UNIT/ML cartridge Take with each meal and snack: 1 per 3 grams CHO and correction of 1 unit per 20 mg/dL over a target of 120. Average daily dose is 85 units. 75 mL 3    insulin glargine 100 UNIT/ML pen 3 month supply.Inject 55 units daily 45 mL 3    insulin pen needle (31G X 5 MM) 31G X 5 MM miscellaneous Use 5 to 6 pen needles daily or as directed.  3 month supply. 500 each 3    losartan (COZAAR) 100 MG tablet Take 1 tablet (100 mg) by mouth daily 90 tablet 3    omeprazole (PRILOSEC) 40 MG DR capsule Take 1 capsule (40 mg) by mouth 2 times daily 90 capsule 0    omeprazole (PRILOSEC) 40 MG DR capsule Take 1 capsule (40 mg) by mouth 2 times daily 8-12 weeks 90 capsule 0    ondansetron (ZOFRAN ODT) 4 MG ODT tab Take 1 tablet (4 mg) by mouth every 6 hours as needed for nausea or vomiting 20 tablet 0    polyethylene glycol (GOLYTELY) 236 g suspension Two days before procedure at 5PM fill first container with water. Mix and drink an 8  oz glass every 15 minutes until HALF of the container is gone. Place the remainder in the refrigerator. One day before procedure at 5PM drink second half of bowel prep. Drink an 8 oz glass every 15 minutes until it is gone. Day of procedure 6 hours before arrival time fill the 2nd container with water. Mix and drink an 8 oz glass every 15 minutes until HALF of the container is gone. Discard the remaining solution. 8000 mL 0    tirzepatide (MOUNJARO) 10 MG/0.5ML pen Inject 10 mg subcutaneously every 7 days. 2 mL 1    tirzepatide (MOUNJARO) 2.5 MG/0.5ML pen Inject 2.5 mg Subcutaneous every 7 days 6 mL 3    tirzepatide (MOUNJARO) 5 MG/0.5ML pen Inject 5 mg subcutaneously every 7 days 2 mL 11    tirzepatide (MOUNJARO) 5 MG/0.5ML pen Inject 5 mg subcutaneously every 7 days 2 mL 1    tirzepatide (MOUNJARO) 7.5 MG/0.5ML pen Inject 7.5 mg subcutaneously every 7 days 2 mL 1     Current Facility-Administered Medications   Medication Dose Route Frequency Provider Last Rate Last Admin    hydrocortisone (CORTAID) 1 % cream   Topical Once Mariya Mohamud APRN CNP        hydrocortisone (CORTAID) 1 % cream   Topical TID Mariya Mohamud APRN CNP        hylan (SYNVISC ONE) injection 48 mg  48 mg   Rosas Rodriguez DO   48 mg at 05/09/23 1813    lidocaine (PF) (XYLOCAINE) 1 % injection 4 mL  4 mL   Sebsa Bradley MD   4 mL at 10/05/23 0923    triamcinolone (KENALOG-40) injection 40 mg  40 mg   Sebas Bradley MD   40 mg at 10/05/23 0923             Review of Systems:   A comprehensive 10 point review of systems (constitutional, ENT, cardiac, peripheral vascular, lymphatic, respiratory, GI, , Musculoskeletal, skin, Neurological) was performed and found to be negative except as described in this note.           Physical Exam:     EXAMINATION pertinent findings:   VITAL SIGNS: Last menstrual period 02/01/2024, not currently breastfeeding.  There is no height or weight on file to calculate BMI.  RESP: non labored  breathing   CARD: comfortable, no acute distress  ABD: benign   On focal exam of the right upper extremity without significant thenar atrophy.  She has good strength with ability to oppose the thumb.  She has negative Tinel's.  She has a positive Phalen and carpal compression test with paresthesias into the median nerve distribution.  She notes a positive Tinel's at the cubital tunnel.  No subluxing ulnar nerve is felt.  2 point discriminatory exam on the right upper extremity she has 6 mm over the radial and ulnar aspect of the thumb, 8 to 10 mm over the ulnar and radial aspect of the index finger as well as the long finger, and 7 mm in the ring and small finger.  She does have a small scar over the proximal long finger from a previous trigger finger release  On examination of the left upper extremity she has no significant thenar atrophy.  She has good strength with APB.  She has a negative Tinel's with a positive carpal compression test and a Phalen's.  She has a positive Tinel's at the elbow.  No subluxing ulnar nerve.  On 2 point discriminatory exam she has 8 mm over the radial and ulnar aspect of the thumb, 6 mm about the radial and ulnar aspect of the index and long finger, 6 mm over the radial and ulnar aspect of the ring finger and 5 mm over the radial and ulnar aspect of the small finger.            Data:     All laboratory data reviewed  All imaging studies reviewed by me.  Pertinent Imaging and Lab Review:     EMG performed in 08/21/24  Interpretation:  This is an abnormal study. There is electrophysiologic evidence of severe median neuropathies at the wrists on both sides, as can be seen in the clinical context of severe bilateral carpal tunnel syndrome. There is no evidence of a cervical radiculopathy affecting the upper limbs on the basis of this study. Clinical correlation is recommended      Total Time = 20 min, 50% of which was spent in counseling and coordination of care as documented  above.    Madhu Mcmahon MD  Orthopedic Surgery, Upper Extremity  Cell [phone]

## 2024-10-03 ENCOUNTER — OFFICE VISIT (OUTPATIENT)
Dept: ORTHOPEDICS | Facility: CLINIC | Age: 44
End: 2024-10-03
Attending: FAMILY MEDICINE
Payer: COMMERCIAL

## 2024-10-03 ENCOUNTER — PRE VISIT (OUTPATIENT)
Dept: FAMILY MEDICINE | Facility: CLINIC | Age: 44
End: 2024-10-03

## 2024-10-03 DIAGNOSIS — M75.52 BURSITIS OF LEFT SHOULDER: Primary | ICD-10-CM

## 2024-10-03 DIAGNOSIS — G56.03 SEVERE CARPAL TUNNEL SYNDROME OF BOTH WRISTS: ICD-10-CM

## 2024-10-03 PROCEDURE — 99213 OFFICE O/P EST LOW 20 MIN: CPT | Performed by: FAMILY MEDICINE

## 2024-10-03 PROCEDURE — T1013 SIGN LANG/ORAL INTERPRETER: HCPCS | Mod: U3

## 2024-10-03 NOTE — LETTER
10/3/2024      RE: Nayeli Caal  [address]  [facility] [address]     Dear Colleague,    Thank you for referring your patient, Nayeli Caal, to the [facility] [place]. Please see a copy of my visit note below.    ASSESSMENT/PLAN:    (M75.52) Bursitis of left shoulder  (primary encounter diagnosis)  Comment:   Plan: stable L shoulder pain; we discussed using voltaren gel and tylenol at night to help w/ sleep; I taught her eccentric cuff exercises she should be able to do without aggravating her CTS; she will follow-up with me after recovery from wrist surgery or sooner if she is interested in an injection; precautions given    (G56.03) Severe carpal tunnel syndrome of both wrists  Comment:   Plan: will be scheduling staged CTS surgery w/ Dr Mcmahon; f/up with me roland Cheung MD  October 3, 2024  2:45 PM      Pt is a 44 year old female last seen on 9/5/24 here for follow up of:     Left shoulder pain. - Patient reports at least 3 years of shoulder pain without injury or change in activity. She saw Dr. Bradley on 10/5/23 and received a left subacromial injection. She has difficulty remember how much pain relief she received from this. The pain is located over the top over her shoulder. Pain is worse with lying on the shoulder while sleeping. She has not been to physical therapy for the left shoulder. She is left hand dominant. Is not ready for an injection yet. Just using tylenol and voltaren gel.     Per Dr Mcmahon's note on 10/2/24:  Assessment:    Bilateral carpal tunnel syndrome with potential bilateral cubital tunnel syndrome that is less severe   Plan:    We had a long discussion with the patient regarding carpal tunnel syndrome as well as potential cubital tunnel syndrome.  We recommended addressing the carpal tunnel syndrome first.  We also discussed her EMG results that demonstrated bilateral severe carpal tunnel syndrome.  We discussed  treatment options for this including nonoperative with bracing, injections.  We discussed an open carpal tunnel release.  The patient is already failed bracing and she would not want injections given her diabetes which is reasonable.  We discussed an open carpal tunnel release either under local or sedation.  We discussed risk of the surgery including failure to completely resolve her symptoms, wound healing complications, damage to surrounding structures among others.  After discussion of the risk and benefits of surgery the patient elected to proceed with a staged carpal tunnel release.  We recommended not having both of them done at once.  We will plan to proceed with the right carpal tunnel under local and then proceed with the left carpal tunnel 2 to 3 weeks later in a staged fashion.  All of the patient's questions were answered and addressed prior to the conclusion of the visit    Per my last note:  ASSESSMENT/PLAN:     (G56.03) Severe carpal tunnel syndrome of both wrists  (primary encounter diagnosis)  Comment: reviewed EMG findings; pt not interested in injections; would like to proceed directly to carpal tunnel release; f/up prn  Plan: Orthopedic  Referral          (M75.52) Bursitis of left shoulder  Comment: exam w/ features of AC arthritis, RTC tendonitis, and biceps tendonitis; will trial topical nsaid and PT; will follow-up with me in 2 months; it may be that she is unable to tolerate much shoulder PT given her CTS but we can adjust follow-up accordingly  Plan: diclofenac (VOLTAREN) 1 % topical gel, Physical Therapy  Referral    Past Medical History:   Diagnosis Date     Combined visual and hearing impairment      Deaf      Diabetic retinopathy of both eyes (H) 8/19/2011     Hepatic steatosis 08/19/2011     History of tobacco use      Hyperlipidemia      Hypertension      Hypertriglyceridemia      Migraines      Obesity 8/19/2011     Problem list name updated by automated process.  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 355 mL 1     amLODIPine (NORVASC) 5 MG tablet Take 1 tablet (5 mg) by mouth at bedtime 90 tablet 3     aspirin (ASA) 81 MG chewable tablet Take 1 tablet (81 mg) by mouth daily CHEW AND SWALLOW 90 tablet 1     atorvastatin (LIPITOR) 40 MG tablet Take 1 tablet (40 mg) by mouth daily 90 tablet 1     bisacodyl (DULCOLAX) 5 MG EC tablet Two days prior to exam take two (2) tablets at 4pm. One day prior to exam take two (2) tablets at 4pm 4 tablet 0     blood glucose (ACCU-CHEK LAYA PLUS) test strip Use with  Accucheck Expert meter.  Test blood sugar 6 times daily. 600 each 3     blood glucose monitoring (ACCU-CHEK FASTCLIX) lancets Use to test blood sugar 6 times daily or as directed 510 each 3     calcium carbonate-vitamin D (OSCAL) 500-5 MG-MCG tablet Take 1 tablet by mouth 2 times daily 180 tablet 1     Continuous Glucose Sensor (FREESTYLE ZORAIDA 3 SENSOR) [facility] 1 each every 14 days Please provide 3 month supply. 6 each 3     diclofenac (VOLTAREN) 1 % topical gel Apply 2 g topically 4 times daily. For L shoulder pain 100 g 3     dicyclomine (BENTYL) 10 MG capsule Take 1 capsule (10 mg) by mouth 4 times daily as needed (Abdominal pain/cramping). 40 capsule 0     empagliflozin (JARDIANCE) 25 MG TABS tablet Take 1 tablet (25 mg) by mouth daily for 180 days 90 tablet 1     ergocalciferol (ERGOCALCIFEROL) 1.25 MG  (59111 UT) capsule Take 1 capsule (50,000 Units) by mouth once a week For additional refills, please schedule a follow-up appointment at [phone] 12 capsule 3     fenofibrate (TRIGLIDE/LOFIBRA) 160 MG tablet Take 1 tablet (160 mg) by mouth daily 90 tablet 3     fish oil-omega-3 fatty acids 1000 MG capsule TAKE TWO CAPSULES (2 GM) BY MOUTH ONCE DAILY 180 capsule 3     gabapentin (NEURONTIN) 100 MG capsule Take 1 capsule (100 mg) by mouth 3 times daily 42 capsule 1     ibuprofen (ADVIL/MOTRIN) 600 MG tablet Take 1 tablet (600 mg) by mouth every 6 hours as needed for moderate pain 120 tablet 1     Injection Device for insulin (INPEN 100-PINK-NOVOLOG-FIASP) DAVID 1 each continuous 1 each 0     insulin aspart (NOVOLOG FLEXPEN) 100 UNIT/ML pen 1 unit per 5 grams CHO and correction scale of 1/25/125.  Approximate daily use is 100 units. 30 mL 2     insulin aspart (NOVOLOG PENFILL) 100 UNIT/ML cartridge Take with each meal and snack: 1 per 3 grams CHO and correction of 1 unit per 20 mg/dL over a target of 120. Average daily dose is 85 units. 75 mL 3     insulin glargine 100 UNIT/ML pen 3 month supply.Inject 55 units daily 45 mL 3     insulin pen needle (31G X 5 MM) 31G X 5 MM miscellaneous Use 5 to 6 pen needles daily or as directed.  3 month supply. 500 each 3     losartan (COZAAR) 100 MG tablet Take 1 tablet (100 mg) by mouth daily 90 tablet 3     omeprazole (PRILOSEC) 40 MG DR capsule Take 1 capsule (40 mg) by mouth 2 times daily 90 capsule 0     omeprazole (PRILOSEC) 40 MG DR capsule Take 1 capsule (40 mg) by mouth 2 times daily 8-12 weeks 90 capsule 0     ondansetron (ZOFRAN ODT) 4 MG ODT tab Take 1 tablet (4 mg) by mouth every 6 hours as needed for nausea or vomiting 20 tablet 0     polyethylene glycol (GOLYTELY) 236 g suspension Two days before procedure at 5PM fill first container with water. Mix and drink an 8 oz glass every 15 minutes until HALF of the container is gone. Place the remainder in the refrigerator.  One day before procedure at 5PM drink second half of bowel prep. Drink an 8 oz glass every 15 minutes until it is gone. Day of procedure 6 hours before arrival time fill the 2nd container with water. Mix and drink an 8 oz glass every 15 minutes until HALF of the container is gone. Discard the remaining solution. 8000 mL 0     tirzepatide (MOUNJARO) 10 MG/0.5ML pen Inject 10 mg subcutaneously every 7 days. 2 mL 1     tirzepatide (MOUNJARO) 2.5 MG/0.5ML pen Inject 2.5 mg Subcutaneous every 7 days 6 mL 3     tirzepatide (MOUNJARO) 5 MG/0.5ML pen Inject 5 mg subcutaneously every 7 days 2 mL 11     tirzepatide (MOUNJARO) 5 MG/0.5ML pen Inject 5 mg subcutaneously every 7 days 2 mL 1     tirzepatide (MOUNJARO) 7.5 MG/0.5ML pen Inject 7.5 mg subcutaneously every 7 days 2 mL 1      No Known Allergies     ROS:   Gen- no fevers/chills   Derm - no rash/ redness   Remainder of ROS negative.     Exam:   deferred      Again, thank you for allowing me to participate in the care of your patient.      Sincerely,    Kenji Cheung MD

## 2024-10-03 NOTE — PROGRESS NOTES
ASSESSMENT/PLAN:    (M75.52) Bursitis of left shoulder  (primary encounter diagnosis)  Comment:   Plan: stable L shoulder pain; we discussed using voltaren gel and tylenol at night to help w/ sleep; I taught her eccentric cuff exercises she should be able to do without aggravating her CTS; she will follow-up with me after recovery from wrist surgery or sooner if she is interested in an injection; precautions given    (G56.03) Severe carpal tunnel syndrome of both wrists  Comment:   Plan: will be scheduling staged CTS surgery w/ Dr Mcmahon; f/up with me roland Cheung MD  October 3, 2024  2:45 PM      Pt is a 44 year old female last seen on 9/5/24 here for follow up of:     Left shoulder pain. - Patient reports at least 3 years of shoulder pain without injury or change in activity. She saw Dr. Bradley on 10/5/23 and received a left subacromial injection. She has difficulty remember how much pain relief she received from this. The pain is located over the top over her shoulder. Pain is worse with lying on the shoulder while sleeping. She has not been to physical therapy for the left shoulder. She is left hand dominant. Is not ready for an injection yet. Just using tylenol and voltaren gel.     Per Dr Mcmahon's note on 10/2/24:  Assessment:    Bilateral carpal tunnel syndrome with potential bilateral cubital tunnel syndrome that is less severe   Plan:    We had a long discussion with the patient regarding carpal tunnel syndrome as well as potential cubital tunnel syndrome.  We recommended addressing the carpal tunnel syndrome first.  We also discussed her EMG results that demonstrated bilateral severe carpal tunnel syndrome.  We discussed treatment options for this including nonoperative with bracing, injections.  We discussed an open carpal tunnel release.  The patient is already failed bracing and she would not want injections given her diabetes which is reasonable.  We discussed an open carpal tunnel release  either under local or sedation.  We discussed risk of the surgery including failure to completely resolve her symptoms, wound healing complications, damage to surrounding structures among others.  After discussion of the risk and benefits of surgery the patient elected to proceed with a staged carpal tunnel release.  We recommended not having both of them done at once.  We will plan to proceed with the right carpal tunnel under local and then proceed with the left carpal tunnel 2 to 3 weeks later in a staged fashion.  All of the patient's questions were answered and addressed prior to the conclusion of the visit    Per my last note:  ASSESSMENT/PLAN:     (G56.03) Severe carpal tunnel syndrome of both wrists  (primary encounter diagnosis)  Comment: reviewed EMG findings; pt not interested in injections; would like to proceed directly to carpal tunnel release; f/up prn  Plan: Orthopedic  Referral          (M75.52) Bursitis of left shoulder  Comment: exam w/ features of AC arthritis, RTC tendonitis, and biceps tendonitis; will trial topical nsaid and PT; will follow-up with me in 2 months; it may be that she is unable to tolerate much shoulder PT given her CTS but we can adjust follow-up accordingly  Plan: diclofenac (VOLTAREN) 1 % topical gel, Physical Therapy  Referral    Past Medical History:   Diagnosis Date    Combined visual and hearing impairment     Deaf     Diabetic retinopathy of both eyes (H) 8/19/2011    Hepatic steatosis 08/19/2011    History of tobacco use     Hyperlipidemia     Hypertension     Hypertriglyceridemia     Migraines     Obesity 8/19/2011     Problem list name updated by automated process. 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 355 mL 1    amLODIPine (NORVASC) 5 MG tablet Take 1 tablet (5 mg) by mouth at bedtime 90 tablet 3    aspirin (ASA) 81 MG chewable tablet Take 1 tablet (81 mg) by mouth daily CHEW AND SWALLOW 90 tablet 1    atorvastatin (LIPITOR) 40 MG tablet Take 1 tablet (40 mg) by mouth daily 90 tablet 1    bisacodyl (DULCOLAX) 5 MG EC tablet Two days prior to exam take two (2) tablets at 4pm. One day prior to exam take two (2) tablets at 4pm 4 tablet 0    blood glucose (ACCU-CHEK LAYA PLUS) test strip Use with  Accucheck Expert meter.  Test blood sugar 6 times daily. 600 each 3    blood glucose monitoring (ACCU-CHEK FASTCLIX) lancets Use to test blood sugar 6 times daily or as directed 510 each 3    calcium carbonate-vitamin D (OSCAL) 500-5 MG-MCG tablet Take 1 tablet by mouth 2 times daily 180 tablet 1    Continuous Glucose Sensor (FREESTYLE ZORAIDA 3 SENSOR) MISC 1 each every 14 days Please provide 3 month supply. 6 each 3    diclofenac (VOLTAREN) 1 % topical gel Apply 2 g topically 4 times daily. For L shoulder pain 100 g 3    dicyclomine (BENTYL) 10 MG capsule Take 1 capsule (10 mg) by mouth 4 times daily as needed (Abdominal pain/cramping). 40 capsule 0    empagliflozin (JARDIANCE) 25 MG TABS tablet Take 1 tablet (25 mg) by mouth daily for 180 days 90 tablet 1    ergocalciferol (ERGOCALCIFEROL) 1.25 MG (06283 UT) capsule Take 1 capsule (50,000 Units) by mouth once a week For additional refills, please schedule a follow-up appointment at [phone] 12 capsule 3    fenofibrate (TRIGLIDE/LOFIBRA) 160 MG tablet Take 1 tablet (160 mg) by mouth daily 90 tablet 3    fish oil-omega-3 fatty acids 1000 MG  capsule TAKE TWO CAPSULES (2 GM) BY MOUTH ONCE DAILY 180 capsule 3    gabapentin (NEURONTIN) 100 MG capsule Take 1 capsule (100 mg) by mouth 3 times daily 42 capsule 1    ibuprofen (ADVIL/MOTRIN) 600 MG tablet Take 1 tablet (600 mg) by mouth every 6 hours as needed for moderate pain 120 tablet 1    Injection Device for insulin (INPEN 100-PINK-NOVOLOG-FIASP) DAVID 1 each continuous 1 each 0    insulin aspart (NOVOLOG FLEXPEN) 100 UNIT/ML pen 1 unit per 5 grams CHO and correction scale of 1/25/125.  Approximate daily use is 100 units. 30 mL 2    insulin aspart (NOVOLOG PENFILL) 100 UNIT/ML cartridge Take with each meal and snack: 1 per 3 grams CHO and correction of 1 unit per 20 mg/dL over a target of 120. Average daily dose is 85 units. 75 mL 3    insulin glargine 100 UNIT/ML pen 3 month supply.Inject 55 units daily 45 mL 3    insulin pen needle (31G X 5 MM) 31G X 5 MM miscellaneous Use 5 to 6 pen needles daily or as directed.  3 month supply. 500 each 3    losartan (COZAAR) 100 MG tablet Take 1 tablet (100 mg) by mouth daily 90 tablet 3    omeprazole (PRILOSEC) 40 MG DR capsule Take 1 capsule (40 mg) by mouth 2 times daily 90 capsule 0    omeprazole (PRILOSEC) 40 MG DR capsule Take 1 capsule (40 mg) by mouth 2 times daily 8-12 weeks 90 capsule 0    ondansetron (ZOFRAN ODT) 4 MG ODT tab Take 1 tablet (4 mg) by mouth every 6 hours as needed for nausea or vomiting 20 tablet 0    polyethylene glycol (GOLYTELY) 236 g suspension Two days before procedure at 5PM fill first container with water. Mix and drink an 8 oz glass every 15 minutes until HALF of the container is gone. Place the remainder in the refrigerator. One day before procedure at 5PM drink second half of bowel prep. Drink an 8 oz glass every 15 minutes until it is gone. Day of procedure 6 hours before arrival time fill the 2nd container with water. Mix and drink an 8 oz glass every 15 minutes until HALF of the container is gone. Discard the remaining solution.  8000 mL 0    tirzepatide (MOUNJARO) 10 MG/0.5ML pen Inject 10 mg subcutaneously every 7 days. 2 mL 1    tirzepatide (MOUNJARO) 2.5 MG/0.5ML pen Inject 2.5 mg Subcutaneous every 7 days 6 mL 3    tirzepatide (MOUNJARO) 5 MG/0.5ML pen Inject 5 mg subcutaneously every 7 days 2 mL 11    tirzepatide (MOUNJARO) 5 MG/0.5ML pen Inject 5 mg subcutaneously every 7 days 2 mL 1    tirzepatide (MOUNJARO) 7.5 MG/0.5ML pen Inject 7.5 mg subcutaneously every 7 days 2 mL 1      No Known Allergies     ROS:   Gen- no fevers/chills   Derm - no rash/ redness   Remainder of ROS negative.     Exam:   deferred

## 2024-10-04 NOTE — PROGRESS NOTES
10/04/24 1:25 PM : Appointment reminder phone call made to patient.PT disconnected call--unable to hear or see

## 2024-10-07 ENCOUNTER — OFFICE VISIT (OUTPATIENT)
Dept: ENDOCRINOLOGY | Facility: CLINIC | Age: 44
End: 2024-10-07
Payer: COMMERCIAL

## 2024-10-07 VITALS
DIASTOLIC BLOOD PRESSURE: 76 MMHG | OXYGEN SATURATION: 98 % | SYSTOLIC BLOOD PRESSURE: 118 MMHG | WEIGHT: 172 LBS | HEART RATE: 81 BPM | BODY MASS INDEX: 32.5 KG/M2

## 2024-10-07 DIAGNOSIS — E78.2 MIXED HYPERLIPIDEMIA: ICD-10-CM

## 2024-10-07 DIAGNOSIS — E11.9 WELL CONTROLLED TYPE 2 DIABETES MELLITUS (H): Primary | ICD-10-CM

## 2024-10-07 DIAGNOSIS — R80.9 ALBUMINURIA: ICD-10-CM

## 2024-10-07 DIAGNOSIS — I10 BENIGN ESSENTIAL HYPERTENSION: ICD-10-CM

## 2024-10-07 PROCEDURE — G2211 COMPLEX E/M VISIT ADD ON: HCPCS | Performed by: INTERNAL MEDICINE

## 2024-10-07 PROCEDURE — 99215 OFFICE O/P EST HI 40 MIN: CPT | Performed by: INTERNAL MEDICINE

## 2024-10-07 PROCEDURE — T1013 SIGN LANG/ORAL INTERPRETER: HCPCS | Mod: U3

## 2024-10-07 RX ORDER — INSULIN ASPART 100 [IU]/ML
INJECTION, SOLUTION INTRAVENOUS; SUBCUTANEOUS
Qty: 40 ML | Refills: 3 | Status: SHIPPED | OUTPATIENT
Start: 2024-10-07

## 2024-10-07 ASSESSMENT — PAIN SCALES - GENERAL: PAINLEVEL: NO PAIN (0)

## 2024-10-07 NOTE — NURSING NOTE
"Chief Complaint   Patient presents with    Diabetes     Vital signs:      BP: 118/76 Pulse: 81     SpO2: 98 %       Weight: 78 kg (172 lb)  Estimated body mass index is 32.5 kg/m  as calculated from the following:    Height as of 10/2/24: 1.549 m (5' 1\").    Weight as of this encounter: 78 kg (172 lb).        "

## 2024-10-07 NOTE — LETTER
10/7/2024       RE: Nayeli Caal  [address]  [facility] [address]     Dear Colleague,    Thank you for referring your patient, Nayeli Caal, to the [facility] [place] at [facility]. Please see a copy of my visit note below.      Blood Glucose Monitoring :                             10/04/24 1:25 PM : Appointment reminder phone call made to patient.PT disconnected call--unable to hear or see    Assessment and Plan:    1.  Type 2 diabetes, uncontrolled, complicated by nephropathy and mild diabetic neuropathy  Recommendations:  Change insulin to carbohydrate ratio to 1 unit per 4 g carbohydrates around-the-clock  Instructed the patient to use the correction scale recommended by InPen, especially during the week when she is going to be off Mounjaro.    Unlikely for the diarrhea to be a consequence of Mounjaro.  However, for now, the plan is to continue Mounjaro at the same dose of 10 mg daily.    She has a follow-up appointment scheduled with Anne Marie Medina in January  Return to clinic with me in 6 months, with labs before.    2.  Hypertension, controlled    3.  Dyslipidemia  Controlled.    Orders Placed This Encounter   Procedures     Comprehensive metabolic panel     Lipid panel reflex to direct LDL Fasting     Hematocrit     Albumin Random Urine Quantitative with Creat Ratio     Hemoglobin A1c     =========================================================================================    HPI:   Nayeli is a 44 year old woman here with a  for follow up of type 2 diabetes since the early 2000's.      1.  Type 2 diabetes  Most recent A1c was 7.8 on 9/4/24.     I reviewed the InPen records.  Total daily short-acting insulin dose is around 28 units (5 units for breakfast, 12 units for lunch, 8 units for dinner).  She continues to bolus from food intake 1-2 times daily.  Quite frequently,  after taking NovoLog for breakfast, she does experience asymptomatic hypoglycemic episodes around noontime.    Nayeli reports having 3 meals and at bedtime snack.    On the Evelia sensor, average glucose is 122, with a glucose variability of 30.5%, corresponding to an estimated GMI of 6.2%.  86% of the glucose numbers are within target, 6% are in the mild hypoglycemic range.  The sensor reveals a trend towards the blood sugar reaching a ramona around 10 AM to 12 noon.  Nayeli states that she does not always have breakfast on a daily basis.    She has been experiencing more symptoms suggestive of IBS for the last couple of weeks.  She questions whether or not this is related to Mounjaro.  Denies experiencing constipation, just diarrhea and abdominal pain.  The abdominal pain has recently improved.  She is scheduled to undergo a colonoscopy and in preparation for colonoscopy, she is going to skip 1 dose of Mounjaro.    Her current regimen is:   Jardiance, 25 mg daily (restarted in May 2024)  Mountjaro, 10 mg weekly for the last 3 weeks. Started in January 2024. Curbing her appetite.   Glargine 30 units daily, at 5-6 am.  Novolog (dosing on In-pen):  Carb ratio: 1 unit per 3 g carbohydrates for breakfast and 1 unit per 4 g carbohydrates for lunch and dinner.  On average, she enters 45 to 60 g per meal, once or twice a day.  Sensitivity: 15 at 11 AM and 20 at 10 PM  Duration 2 hrs   Target 110 at 11 am and 130 at 9:30 pm     Previous treatments:   Empagliflozin 25 mg daily- stopped 2023 due to recurrent yeast infections.  Metformin- severe diarrhea.   Ozempic - started in 4/2023 and replaced by Mountjaro in 1/24    Her weight in 1/2024 was 180 lbs. Current weight is 171 pounds.     Diabetes complications:  Retinopathy: last eye exam - 3/2024. 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  Nephropathy: h/o proteinuria; most recent urine microalbumin strongly positive in 1/24. Normal GFR. On 100 mg losartan daily (tx with lisinopril was  "associated with a dry cough).   Neuropathy: some tingling present intermittently. No pain. Feet feel \"sensitive\". On 100 mg gabapentin TID.   Most recent lipid panel: 1/4/24: LDL cholesterol 76, HDL cholesterol 37, . On 40 mg atorvastatin, 160 mg fenofibrate daily and 2 gm omega 3 FA BID.    2. HTN   Current Tx: Losartan, 100 mg daily and amlodipine, 5 mg daily.    Past Medical History:   Diagnosis Date     Combined visual and hearing impairment      Deaf      Diabetic retinopathy of both eyes (H) 8/19/2011     Hepatic steatosis 08/19/2011     History of tobacco use      Hyperlipidemia      Hypertension      Hypertriglyceridemia      Migraines      Obesity 8/19/2011     Problem list name updated by automated process. 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       Past Surgical History:   Procedure Laterality Date     DAVINCI HYSTERECTOMY TOTAL, SALPINGECTOMY BILATERAL Bilateral 2/7/2024    Procedure: HYSTERECTOMY, TOTAL, ROBOT-ASSISTED, WITH BILATERAL SALPINGECTOMY, CYSTOSCOPY;  Surgeon: Vonnie Cowan MD;  Location: [facility] OR     ESOPHAGOSCOPY, GASTROSCOPY, DUODENOSCOPY (EGD), COMBINED N/A 6/13/2024    Procedure: ESOPHAGOGASTRODUODENOSCOPY, WITH BIOPSY;  Surgeon: Nora Brice MD;  Location: [facility] OR     LAPAROSCOPIC CHOLECYSTECTOMY N/A 3/10/2018    Procedure: LAPAROSCOPIC CHOLECYSTECTOMY;  Laparoscopic Cholecystectomy ;  Surgeon: Kuldeep Sigala MD;  Location: [facility] OR     RELEASE TRIGGER FINGER Right 5/2/2019    Procedure: Right Thumb Trigger Release;  Surgeon: Greg Streeter MD;  Location: [facility] OR     RELEASE TRIGGER FINGER Left 5/30/2019    Procedure: Left Ring Trigger Finger Release.  Ganglion cyst excision.;  Surgeon: Greg Streeter MD;  Location: [facility] OR     RELEASE TRIGGER FINGER Right 6/13/2023    Procedure: " RELEASE, RIGHT TRIGGER FINGER, INDEX AND MIDDLE;  Surgeon: Miracle Carlin MD;  Location: [facility] OR     RELEASE TRIGGER FINGER Left 8/1/2023    Procedure: RELEASE, LEFT TRIGGER FINGER, MIDDLE;  Surgeon: Miracle Carlin MD;  Location: [facility] OR       Family History   Problem Relation Age of Onset     Unknown/Adopted Other      Social Hx: Lives in a [facility].  Boyfriend is in [place]. She is adopted.  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       Current Outpatient Medications:      acetaminophen (TYLENOL) 500 MG tablet, Take 2 tablets (1,000 mg) by mouth every 8 hours as needed for mild pain, Disp: 100 tablet, Rfl: 3     Alcohol Swabs (ALCOHOL PREP PAD) 70 % PADS, 1 each 3 times daily, Disp: 100 each, Rfl: 3     alum & mag hydroxide-simethicone (MAALOX) 200-200-20 MG/5ML SUSP suspension, Take 10 mLs by mouth every 6 hours as needed for indigestion., Disp: 355 mL, Rfl: 1     amLODIPine (NORVASC) 5 MG tablet, Take 1 tablet (5 mg) by mouth at bedtime, Disp: 90 tablet, Rfl: 3     aspirin (ASA) 81 MG chewable tablet, Take 1 tablet (81 mg) by mouth daily CHEW AND SWALLOW, Disp: 90 tablet, Rfl: 1     atorvastatin (LIPITOR) 40 MG tablet, Take 1 tablet (40 mg) by mouth daily, Disp: 90 tablet, Rfl: 1     bisacodyl (DULCOLAX) 5 MG EC tablet, Two days prior to exam take two (2) tablets at 4pm. One day prior to exam take two (2) tablets at 4pm, Disp: 4 tablet, Rfl: 0     blood glucose (ACCU-CHEK LAYA PLUS) test strip, Use with  Accucheck Expert meter.  Test blood sugar 6 times daily., Disp: 600 each, Rfl: 3     blood glucose monitoring (ACCU-CHEK FASTCLIX) lancets, Use to test blood sugar 6 times daily or as directed, Disp: 510 each, Rfl: 3     calcium carbonate-vitamin D (OSCAL) 500-5 MG-MCG tablet, Take 1 tablet by mouth 2 times daily, Disp: 180 tablet, Rfl: 1     Continuous Glucose Sensor (FREESTYLE ZORAIDA 3 SENSOR) [facility], 1 each every 14 days Please provide 3 month supply., Disp: 6 each, Rfl: 3     diclofenac  (VOLTAREN) 1 % topical gel, Apply 2 g topically 4 times daily. For L shoulder pain, Disp: 100 g, Rfl: 3     dicyclomine (BENTYL) 10 MG capsule, Take 1 capsule (10 mg) by mouth 4 times daily as needed (Abdominal pain/cramping)., Disp: 40 capsule, Rfl: 0     empagliflozin (JARDIANCE) 25 MG TABS tablet, Take 1 tablet (25 mg) by mouth daily for 180 days, Disp: 90 tablet, Rfl: 1     ergocalciferol (ERGOCALCIFEROL) 1.25 MG (33791 UT) capsule, Take 1 capsule (50,000 Units) by mouth once a week For additional refills, please schedule a follow-up appointment at [phone], Disp: 12 capsule, Rfl: 3     fenofibrate (TRIGLIDE/LOFIBRA) 160 MG tablet, Take 1 tablet (160 mg) by mouth daily, Disp: 90 tablet, Rfl: 3     fish oil-omega-3 fatty acids 1000 MG capsule, TAKE TWO CAPSULES (2 GM) BY MOUTH ONCE DAILY, Disp: 180 capsule, Rfl: 3     gabapentin (NEURONTIN) 100 MG capsule, Take 1 capsule (100 mg) by mouth 3 times daily, Disp: 42 capsule, Rfl: 1     ibuprofen (ADVIL/MOTRIN) 600 MG tablet, Take 1 tablet (600 mg) by mouth every 6 hours as needed for moderate pain, Disp: 120 tablet, Rfl: 1     Injection Device for insulin (INPEN 100-PINK-NOVOLOG-FIASP) DAVID, 1 each continuous, Disp: 1 each, Rfl: 0     insulin aspart (NOVOLOG FLEXPEN) 100 UNIT/ML pen, 1 unit per 5 grams CHO and correction scale of 1/25/125.  Approximate daily use is 100 units., Disp: 30 mL, Rfl: 2     insulin aspart (NOVOLOG PENFILL) 100 UNIT/ML cartridge, Take with each meal and snack: 1 per 3 grams CHO and correction of 1 unit per 20 mg/dL over a target of 120. Average daily dose is 85 units., Disp: 75 mL, Rfl: 3     insulin glargine 100 UNIT/ML pen, 3 month supply.Inject 55 units daily, Disp: 45 mL, Rfl: 3     insulin pen needle (31G X 5 MM) 31G X 5 MM miscellaneous, Use 5 to 6 pen needles daily or as directed.  3 month supply., Disp: 500 each, Rfl: 3     losartan (COZAAR) 100 MG tablet, Take 1 tablet (100 mg) by mouth daily, Disp: 90 tablet, Rfl: 3      omeprazole (PRILOSEC) 40 MG DR capsule, Take 1 capsule (40 mg) by mouth 2 times daily, Disp: 90 capsule, Rfl: 0     omeprazole (PRILOSEC) 40 MG DR capsule, Take 1 capsule (40 mg) by mouth 2 times daily 8-12 weeks, Disp: 90 capsule, Rfl: 0     ondansetron (ZOFRAN ODT) 4 MG ODT tab, Take 1 tablet (4 mg) by mouth every 6 hours as needed for nausea or vomiting, Disp: 20 tablet, Rfl: 0     polyethylene glycol (GOLYTELY) 236 g suspension, Two days before procedure at 5PM fill first container with water. Mix and drink an 8 oz glass every 15 minutes until HALF of the container is gone. Place the remainder in the refrigerator. One day before procedure at 5PM drink second half of bowel prep. Drink an 8 oz glass every 15 minutes until it is gone. Day of procedure 6 hours before arrival time fill the 2nd container with water. Mix and drink an 8 oz glass every 15 minutes until HALF of the container is gone. Discard the remaining solution., Disp: 8000 mL, Rfl: 0     tirzepatide (MOUNJARO) 10 MG/0.5ML pen, Inject 10 mg subcutaneously every 7 days., Disp: 2 mL, Rfl: 1     tirzepatide (MOUNJARO) 2.5 MG/0.5ML pen, Inject 2.5 mg Subcutaneous every 7 days, Disp: 6 mL, Rfl: 3     tirzepatide (MOUNJARO) 5 MG/0.5ML pen, Inject 5 mg subcutaneously every 7 days, Disp: 2 mL, Rfl: 11     tirzepatide (MOUNJARO) 5 MG/0.5ML pen, Inject 5 mg subcutaneously every 7 days, Disp: 2 mL, Rfl: 1     tirzepatide (MOUNJARO) 7.5 MG/0.5ML pen, Inject 7.5 mg subcutaneously every 7 days, Disp: 2 mL, Rfl: 1    Current Facility-Administered Medications:      hydrocortisone (CORTAID) 1 % cream, , Topical, Once, OverMariya benavidez APRN CNP     hydrocortisone (CORTAID) 1 % cream, , Topical, TID, Mariya Mohamud APRN CNP     hylan (SYNVISC ONE) injection 48 mg, 48 mg, , , Rosas Rodriguez, DO, 48 mg at 05/09/23 1813     lidocaine (PF) (XYLOCAINE) 1 % injection 4 mL, 4 mL, , , Sebas Bradley MD, 4 mL at 10/05/23 0923     triamcinolone (KENALOG-40)  injection 40 mg, 40 mg, , , Sebas Bradley MD, 40 mg at 10/05/23 0923      Physical Exam  Wt Readings from Last 10 Encounters:   10/02/24 77.6 kg (171 lb)   10/02/24 77.6 kg (171 lb)   09/18/24 78 kg (171 lb 14.4 oz)   09/11/24 79.3 kg (174 lb 12.8 oz)   09/06/24 80.7 kg (177 lb 14.4 oz)   08/13/24 82.1 kg (180 lb 14.4 oz)   08/01/24 82.9 kg (182 lb 11.2 oz)   07/19/24 82.5 kg (181 lb 14.4 oz)   07/12/24 83 kg (183 lb)   06/25/24 82.5 kg (181 lb 12.8 oz)     /76   Pulse 81   Wt 78 kg (172 lb)   LMP 02/01/2024 (Approximate)   SpO2 98%   BMI 32.50 kg/m      General appearance: she is well-developed, well-nourished, and in no distress.  Eyes: conjunctivae and extraocular motions are normal. Pupils are equal, round, and reactive to light. No lid lag, no stare.  HENT: oropharynx clear and moist; neck no JVD, no bruits, no thyromegaly, no palpable nodules  Cardiovascular: regular rhythm, no murmurs, distal pulses palpable, no edema  Respiratory: chest clear, no rales, no rhonchi  Musculoskeletal: normal tone and strength   Neurologic: Unable to elicit knee reflexes, no resting tremor  Psychiatric: affect and judgment normal   Feet: Onychomycosis, sensation preserved to monofilament testing.      RESULTS  Lab Results   Component Value Date    A1C 7.8 (H) 09/11/2024    A1C 8.1 (H) 07/12/2024    A1C 8.7 (H) 04/15/2024    A1C 7.8 (H) 01/04/2024    A1C 7.7 (H) 07/25/2023    A1C 8.7 (H) 06/06/2023    A1C 9.9 (H) 04/04/2023    A1C 11.6 (H) 04/05/2021    A1C 12.4 (H) 10/29/2020    A1C 12.9 (H) 07/08/2020    A1C 8.2 (H) 05/02/2019    A1C 10.3 (H) 10/15/2018    HEMOGLOBINA1 10.0 (A) 01/13/2020    HEMOGLOBINA1 9.9 (A) 10/07/2019    HEMOGLOBINA1 8.1 (A) 04/22/2019    HEMOGLOBINA1 10.4 (A) 01/14/2019    HEMOGLOBINA1 8.7 (A) 03/12/2018       TSH   Date Value Ref Range Status   04/04/2023 1.41 0.30 - 4.20 uIU/mL Final   07/08/2020 1.34 0.40 - 4.00 mU/L Final   05/17/2018 1.84 0.40 - 4.00 mU/L Final   11/27/2017 1.92  0.40 - 4.00 mU/L Final   05/11/2016 1.85 0.40 - 4.00 mU/L Final   02/11/2016 1.14 0.40 - 4.00 mU/L Final       ALT   Date Value Ref Range Status   10/02/2024 28 0 - 50 U/L Final   09/11/2024 29 0 - 50 U/L Final   07/08/2020 29 0 - 50 U/L Final   05/02/2019 43 0 - 50 U/L Final     42 minutes spent on the date of the encounter doing chart review, history and exam, documentation and further activities as noted above.  The longitudinal plan of care for the diagnosis(es)/condition(s) as documented were addressed during this visit. Due to the added complexity in care, I will continue to support Nayeli in the subsequent management and with ongoing continuity of care.                      Again, thank you for allowing me to participate in the care of your patient.      Sincerely,    Jimena Bragg MD

## 2024-10-07 NOTE — PROGRESS NOTES
Assessment and Plan:    1.  Type 2 diabetes, uncontrolled, complicated by nephropathy and mild diabetic neuropathy  Recommendations:  Change insulin to carbohydrate ratio to 1 unit per 4 g carbohydrates around-the-clock  Instructed the patient to use the correction scale recommended by [facility], especially during the week when she is going to be off Mounjaro.    Unlikely for the diarrhea to be a consequence of Mounjaro.  However, for now, the plan is to continue Mounjaro at the same dose of 10 mg daily.    She has a follow-up appointment scheduled with Anne Marie Medina in January  Return to clinic with me in 6 months, with labs before.    2.  Hypertension, controlled    3.  Dyslipidemia  Controlled.    Orders Placed This Encounter   Procedures    Comprehensive metabolic panel    Lipid panel reflex to direct LDL Fasting    Hematocrit    Albumin Random Urine Quantitative with Creat Ratio    Hemoglobin A1c     =========================================================================================    HPI:   Nayeli is a 44 year old woman here with a  for follow up of type 2 diabetes since the early 2000's.      1.  Type 2 diabetes  Most recent A1c was 7.8 on 9/4/24.     I reviewed the [facility] records.  Total daily short-acting insulin dose is around 28 units (5 units for breakfast, 12 units for lunch, 8 units for dinner).  She continues to bolus from food intake 1-2 times daily.  Quite frequently, after taking NovoLog for breakfast, she does experience asymptomatic hypoglycemic episodes around noontime.    Nayeli reports having 3 meals and at bedtime snack.    On the Evelia sensor, average glucose is 122, with a glucose variability of 30.5%, corresponding to an estimated GMI of 6.2%.  86% of the glucose numbers are within target, 6% are in the mild hypoglycemic range.  The sensor reveals a trend towards the blood sugar reaching a ramona around 10 AM to 12 noon.  Nayeli states that she does not always have  "breakfast on a daily basis.    She has been experiencing more symptoms suggestive of IBS for the last couple of weeks.  She questions whether or not this is related to Mounjaro.  Denies experiencing constipation, just diarrhea and abdominal pain.  The abdominal pain has recently improved.  She is scheduled to undergo a colonoscopy and in preparation for colonoscopy, she is going to skip 1 dose of Mounjaro.    Her current regimen is:   Jardiance, 25 mg daily (restarted in May 2024)  Mountjaro, 10 mg weekly for the last 3 weeks. Started in January 2024. Curbing her appetite.   Glargine 30 units daily, at 5-6 am.  Novolog (dosing on In-pen):  Carb ratio: 1 unit per 3 g carbohydrates for breakfast and 1 unit per 4 g carbohydrates for lunch and dinner.  On average, she enters 45 to 60 g per meal, once or twice a day.  Sensitivity: 15 at 11 AM and 20 at 10 PM  Duration 2 hrs   Target 110 at 11 am and 130 at 9:30 pm     Previous treatments:   Empagliflozin 25 mg daily- stopped 2023 due to recurrent yeast infections.  Metformin- severe diarrhea.   Ozempic - started in 4/2023 and replaced by Mountjaro in 1/24    Her weight in 1/2024 was 180 lbs. Current weight is 171 pounds.     Diabetes complications:  Retinopathy: last eye exam - 3/2024. No . Usher's syndrome.  Nephropathy: h/o proteinuria; most recent urine microalbumin strongly positive in 1/24. Normal GFR. On 100 mg losartan daily (tx with lisinopril was associated with a dry cough).   Neuropathy: some tingling present intermittently. No pain. Feet feel \"sensitive\". On 100 mg gabapentin TID.   Most recent lipid panel: 1/4/24: LDL cholesterol 76, HDL cholesterol 37, . On 40 mg atorvastatin, 160 mg fenofibrate daily and 2 gm omega 3 FA BID.    2. HTN   Current Tx: Losartan, 100 mg daily and amlodipine, 5 mg daily.    Past Medical History:   Diagnosis Date    Combined visual and hearing impairment     Deaf     Diabetic retinopathy of both eyes (H) 8/19/2011    " Hepatic steatosis 08/19/2011    History of tobacco use     Hyperlipidemia     Hypertension     Hypertriglyceridemia     Migraines     Obesity 8/19/2011     Problem list name updated by automated process. 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       Past Surgical History:   Procedure Laterality Date    DAVINCI HYSTERECTOMY TOTAL, SALPINGECTOMY BILATERAL Bilateral 2/7/2024    Procedure: HYSTERECTOMY, TOTAL, ROBOT-ASSISTED, WITH BILATERAL SALPINGECTOMY, CYSTOSCOPY;  Surgeon: Vonnie Cowan MD;  Location: [facility] OR    ESOPHAGOSCOPY, GASTROSCOPY, DUODENOSCOPY (EGD), COMBINED N/A 6/13/2024    Procedure: ESOPHAGOGASTRODUODENOSCOPY, WITH BIOPSY;  Surgeon: Nora Brice MD;  Location: [facility] OR    LAPAROSCOPIC CHOLECYSTECTOMY N/A 3/10/2018    Procedure: LAPAROSCOPIC CHOLECYSTECTOMY;  Laparoscopic Cholecystectomy ;  Surgeon: Kuldeep Sigala MD;  Location: [facility] OR    RELEASE TRIGGER FINGER Right 5/2/2019    Procedure: Right Thumb Trigger Release;  Surgeon: Greg Streeter MD;  Location: [facility]    RELEASE TRIGGER FINGER Left 5/30/2019    Procedure: Left Ring Trigger Finger Release.  Ganglion cyst excision.;  Surgeon: Greg Streeter MD;  Location: [facility]    RELEASE TRIGGER FINGER Right 6/13/2023    Procedure: RELEASE, RIGHT TRIGGER FINGER, INDEX AND MIDDLE;  Surgeon: Miracle Carlin MD;  Location: [facility] OR    RELEASE TRIGGER FINGER Left 8/1/2023    Procedure: RELEASE, LEFT TRIGGER FINGER, MIDDLE;  Surgeon: Miracle Carlin MD;  Location: [facility] OR       Family History   Problem Relation Age of Onset    Unknown/Adopted Other      Social Hx: Lives in a [facility].  Boyfriend is in [place]. She is adopted.  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       Current Outpatient Medications:     acetaminophen (TYLENOL) 500 MG tablet, Take 2  tablets (1,000 mg) by mouth every 8 hours as needed for mild pain, Disp: 100 tablet, Rfl: 3    Alcohol Swabs (ALCOHOL PREP PAD) 70 % PADS, 1 each 3 times daily, Disp: 100 each, Rfl: 3    alum & mag hydroxide-simethicone (MAALOX) 200-200-20 MG/5ML SUSP suspension, Take 10 mLs by mouth every 6 hours as needed for indigestion., Disp: 355 mL, Rfl: 1    amLODIPine (NORVASC) 5 MG tablet, Take 1 tablet (5 mg) by mouth at bedtime, Disp: 90 tablet, Rfl: 3    aspirin (ASA) 81 MG chewable tablet, Take 1 tablet (81 mg) by mouth daily CHEW AND SWALLOW, Disp: 90 tablet, Rfl: 1    atorvastatin (LIPITOR) 40 MG tablet, Take 1 tablet (40 mg) by mouth daily, Disp: 90 tablet, Rfl: 1    bisacodyl (DULCOLAX) 5 MG EC tablet, Two days prior to exam take two (2) tablets at 4pm. One day prior to exam take two (2) tablets at 4pm, Disp: 4 tablet, Rfl: 0    blood glucose (ACCU-CHEK LAYA PLUS) test strip, Use with  Accucheck Expert meter.  Test blood sugar 6 times daily., Disp: 600 each, Rfl: 3    blood glucose monitoring (ACCU-CHEK FASTCLIX) lancets, Use to test blood sugar 6 times daily or as directed, Disp: 510 each, Rfl: 3    calcium carbonate-vitamin D (OSCAL) 500-5 MG-MCG tablet, Take 1 tablet by mouth 2 times daily, Disp: 180 tablet, Rfl: 1    Continuous Glucose Sensor (FREESTYLE ZORAIDA 3 SENSOR) [facility] 1 each every 14 days Please provide 3 month supply., Disp: 6 each, Rfl: 3    diclofenac (VOLTAREN) 1 % topical gel, Apply 2 g topically 4 times daily. For L shoulder pain, Disp: 100 g, Rfl: 3    dicyclomine (BENTYL) 10 MG capsule, Take 1 capsule (10 mg) by mouth 4 times daily as needed (Abdominal pain/cramping)., Disp: 40 capsule, Rfl: 0    empagliflozin (JARDIANCE) 25 MG TABS tablet, Take 1 tablet (25 mg) by mouth daily for 180 days, Disp: 90 tablet, Rfl: 1    ergocalciferol (ERGOCALCIFEROL) 1.25 MG (40225 UT) capsule, Take 1 capsule (50,000 Units) by mouth once a week For additional refills, please schedule a follow-up appointment at  [phone], Disp: 12 capsule, Rfl: 3    fenofibrate (TRIGLIDE/LOFIBRA) 160 MG tablet, Take 1 tablet (160 mg) by mouth daily, Disp: 90 tablet, Rfl: 3    fish oil-omega-3 fatty acids 1000 MG capsule, TAKE TWO CAPSULES (2 GM) BY MOUTH ONCE DAILY, Disp: 180 capsule, Rfl: 3    gabapentin (NEURONTIN) 100 MG capsule, Take 1 capsule (100 mg) by mouth 3 times daily, Disp: 42 capsule, Rfl: 1    ibuprofen (ADVIL/MOTRIN) 600 MG tablet, Take 1 tablet (600 mg) by mouth every 6 hours as needed for moderate pain, Disp: 120 tablet, Rfl: 1    Injection Device for insulin (INPEN 100-PINK-NOVOLOG-FIASP) DAVID, 1 each continuous, Disp: 1 each, Rfl: 0    insulin aspart (NOVOLOG FLEXPEN) 100 UNIT/ML pen, 1 unit per 5 grams CHO and correction scale of 1/25/125.  Approximate daily use is 100 units., Disp: 30 mL, Rfl: 2    insulin aspart (NOVOLOG PENFILL) 100 UNIT/ML cartridge, Take with each meal and snack: 1 per 3 grams CHO and correction of 1 unit per 20 mg/dL over a target of 120. Average daily dose is 85 units., Disp: 75 mL, Rfl: 3    insulin glargine 100 UNIT/ML pen, 3 month supply.Inject 55 units daily, Disp: 45 mL, Rfl: 3    insulin pen needle (31G X 5 MM) 31G X 5 MM miscellaneous, Use 5 to 6 pen needles daily or as directed.  3 month supply., Disp: 500 each, Rfl: 3    losartan (COZAAR) 100 MG tablet, Take 1 tablet (100 mg) by mouth daily, Disp: 90 tablet, Rfl: 3    omeprazole (PRILOSEC) 40 MG DR capsule, Take 1 capsule (40 mg) by mouth 2 times daily, Disp: 90 capsule, Rfl: 0    omeprazole (PRILOSEC) 40 MG DR capsule, Take 1 capsule (40 mg) by mouth 2 times daily 8-12 weeks, Disp: 90 capsule, Rfl: 0    ondansetron (ZOFRAN ODT) 4 MG ODT tab, Take 1 tablet (4 mg) by mouth every 6 hours as needed for nausea or vomiting, Disp: 20 tablet, Rfl: 0    polyethylene glycol (GOLYTELY) 236 g suspension, Two days before procedure at 5PM fill first container with water. Mix and drink an 8 oz glass every 15 minutes until HALF of the container is  gone. Place the remainder in the refrigerator. One day before procedure at 5PM drink second half of bowel prep. Drink an 8 oz glass every 15 minutes until it is gone. Day of procedure 6 hours before arrival time fill the 2nd container with water. Mix and drink an 8 oz glass every 15 minutes until HALF of the container is gone. Discard the remaining solution., Disp: 8000 mL, Rfl: 0    tirzepatide (MOUNJARO) 10 MG/0.5ML pen, Inject 10 mg subcutaneously every 7 days., Disp: 2 mL, Rfl: 1    tirzepatide (MOUNJARO) 2.5 MG/0.5ML pen, Inject 2.5 mg Subcutaneous every 7 days, Disp: 6 mL, Rfl: 3    tirzepatide (MOUNJARO) 5 MG/0.5ML pen, Inject 5 mg subcutaneously every 7 days, Disp: 2 mL, Rfl: 11    tirzepatide (MOUNJARO) 5 MG/0.5ML pen, Inject 5 mg subcutaneously every 7 days, Disp: 2 mL, Rfl: 1    tirzepatide (MOUNJARO) 7.5 MG/0.5ML pen, Inject 7.5 mg subcutaneously every 7 days, Disp: 2 mL, Rfl: 1    Current Facility-Administered Medications:     hydrocortisone (CORTAID) 1 % cream, , Topical, Once, Mariya Mohamud APRN CNP    hydrocortisone (CORTAID) 1 % cream, , Topical, TID, Mariya Mohamud APRN CNP    hylan (SYNVISC ONE) injection 48 mg, 48 mg, , , Rosas Rodriguez, , 48 mg at 05/09/23 1813    lidocaine (PF) (XYLOCAINE) 1 % injection 4 mL, 4 mL, , , Sebas Bradley MD, 4 mL at 10/05/23 0923    triamcinolone (KENALOG-40) injection 40 mg, 40 mg, , , Sebas Bradley MD, 40 mg at 10/05/23 0923      Physical Exam  Wt Readings from Last 10 Encounters:   10/02/24 77.6 kg (171 lb)   10/02/24 77.6 kg (171 lb)   09/18/24 78 kg (171 lb 14.4 oz)   09/11/24 79.3 kg (174 lb 12.8 oz)   09/06/24 80.7 kg (177 lb 14.4 oz)   08/13/24 82.1 kg (180 lb 14.4 oz)   08/01/24 82.9 kg (182 lb 11.2 oz)   07/19/24 82.5 kg (181 lb 14.4 oz)   07/12/24 83 kg (183 lb)   06/25/24 82.5 kg (181 lb 12.8 oz)     /76   Pulse 81   Wt 78 kg (172 lb)   LMP 02/01/2024 (Approximate)   SpO2 98%   BMI 32.50 kg/m      General  appearance: she is well-developed, well-nourished, and in no distress.  Eyes: conjunctivae and extraocular motions are normal. Pupils are equal, round, and reactive to light. No lid lag, no stare.  HENT: oropharynx clear and moist; neck no JVD, no bruits, no thyromegaly, no palpable nodules  Cardiovascular: regular rhythm, no murmurs, distal pulses palpable, no edema  Respiratory: chest clear, no rales, no rhonchi  Musculoskeletal: normal tone and strength   Neurologic: Unable to elicit knee reflexes, no resting tremor  Psychiatric: affect and judgment normal   Feet: Onychomycosis, sensation preserved to monofilament testing.      RESULTS  Lab Results   Component Value Date    A1C 7.8 (H) 09/11/2024    A1C 8.1 (H) 07/12/2024    A1C 8.7 (H) 04/15/2024    A1C 7.8 (H) 01/04/2024    A1C 7.7 (H) 07/25/2023    A1C 8.7 (H) 06/06/2023    A1C 9.9 (H) 04/04/2023    A1C 11.6 (H) 04/05/2021    A1C 12.4 (H) 10/29/2020    A1C 12.9 (H) 07/08/2020    A1C 8.2 (H) 05/02/2019    A1C 10.3 (H) 10/15/2018    HEMOGLOBINA1 10.0 (A) 01/13/2020    HEMOGLOBINA1 9.9 (A) 10/07/2019    HEMOGLOBINA1 8.1 (A) 04/22/2019    HEMOGLOBINA1 10.4 (A) 01/14/2019    HEMOGLOBINA1 8.7 (A) 03/12/2018       TSH   Date Value Ref Range Status   04/04/2023 1.41 0.30 - 4.20 uIU/mL Final   07/08/2020 1.34 0.40 - 4.00 mU/L Final   05/17/2018 1.84 0.40 - 4.00 mU/L Final   11/27/2017 1.92 0.40 - 4.00 mU/L Final   05/11/2016 1.85 0.40 - 4.00 mU/L Final   02/11/2016 1.14 0.40 - 4.00 mU/L Final       ALT   Date Value Ref Range Status   10/02/2024 28 0 - 50 U/L Final   09/11/2024 29 0 - 50 U/L Final   07/08/2020 29 0 - 50 U/L Final   05/02/2019 43 0 - 50 U/L Final     42 minutes spent on the date of the encounter doing chart review, history and exam, documentation and further activities as noted above.  The longitudinal plan of care for the diagnosis(es)/condition(s) as documented were addressed during this visit. Due to the added complexity in care, I will continue to  support Nayeli in the subsequent management and with ongoing continuity of care.

## 2024-10-08 ENCOUNTER — TELEPHONE (OUTPATIENT)
Dept: GASTROENTEROLOGY | Facility: CLINIC | Age: 44
End: 2024-10-08
Payer: COMMERCIAL

## 2024-10-08 NOTE — TELEPHONE ENCOUNTER
Attempted to contact patient in order to complete pre assessment questions with  ID [id number]    Patient scheduled for Colonoscopy on 10/21/24.     No answer. Left message to return call to [phone] option 2.    Callback required communication sent via codesy.    Sharron Royal RN  Endoscopy Procedure Pre Assessment RN

## 2024-10-08 NOTE — TELEPHONE ENCOUNTER
Reschedule Colonoscopy      Pre visit planning completed.      Procedure details:    Patient scheduled for Colonoscopy on 10/21/24.     Arrival time: 1100. Procedure time 1200    [facility] [address] [place] [address], [address] Floor, [place] [address]. Check in location: 5th Floor.    Sedation type: MAC    Pre op exam needed? No.    Indication for procedure:   Colitis [K52.9]  - Primary      Weight loss [R63.4]      Diarrhea of presumed infectious origin            Chart review:     Electronic implanted devices? No    Recent diagnosis of diverticulitis within the last 6 weeks? No      Medication review:    Diabetic? Yes. Oral diabetic medications: Jardiance (empagliflozin): HOLD  3 days before procedure.  Diabetic injectables: Mounjaro (Tirzepatide).  Weekly dosing of medication.  HOLD 7 days before procedure.  Follow up with managing provider.   Insulin: Consult with managing provider.     Anticoagulants? No    Weight loss medication/injectable? No.    Other medication HOLDING recommendations:  N/A      Prep for procedure:     Bowel prep recommendation: Extended Golytely.     Bowel prep prescription sent to    [place], [place] - [address] [place] [address] [phone]    Due to: CKD noted. , diabetes. , and GLP-1 agonist medication noted in chart.     Prep instructions sent via [employer]         Reshma Nayak RN  Endoscopy Procedure Pre Assessment RN  [phone] option 2

## 2024-10-09 ENCOUNTER — TELEPHONE (OUTPATIENT)
Dept: ORTHOPEDICS | Facility: CLINIC | Age: 44
End: 2024-10-09
Payer: COMMERCIAL

## 2024-10-09 NOTE — TELEPHONE ENCOUNTER
I called Nayeli, who's phone connects us with an , and let her know that we've received her message. I explained that all elective cases are halted at this time due to the IV fluid shortage and our supplier being in [place]. I told her that her team will keep in touch with her and she will be contacted to schedule as soon as we are able. She understood and thanked me for the call.  Renay Montana ATC

## 2024-10-09 NOTE — TELEPHONE ENCOUNTER
Other: Nayeli called she said she has been waiting a week for someone to call her to get surgery scheduled for her carpal tunnel. Please call to assist with scheduling.     Could we send this information to you in [employer] network or would you prefer to receive a phone call?:   Patient would prefer a phone call   Okay to leave a detailed message?: Yes at Home number on file [phone] (home)

## 2024-10-09 NOTE — TELEPHONE ENCOUNTER
ID [id number]    Pre assessment completed for upcoming procedure.   (Please see previous telephone encounter notes for complete details)    Patient  returned call.       Procedure details:    Arrival time and facility location reviewed.    Pre op exam needed? No.    Designated  policy reviewed. Instructed to have someone stay 24  hours post procedure.       Medication review:    Medications reviewed. Please see supporting documentation below. Holding recommendations discussed (if applicable).       Prep for procedure:     Procedure prep instructions reviewed.        Any additional information needed:  N/A      Patient  verbalized understanding and had no questions or concerns at this time.      Sharron Royal RN  Endoscopy Procedure Pre Assessment   [phone] option 2

## 2024-10-10 ENCOUNTER — OFFICE VISIT (OUTPATIENT)
Dept: SLEEP MEDICINE | Facility: CLINIC | Age: 44
End: 2024-10-10
Attending: NURSE PRACTITIONER
Payer: COMMERCIAL

## 2024-10-10 ENCOUNTER — OFFICE VISIT (OUTPATIENT)
Dept: SLEEP MEDICINE | Facility: CLINIC | Age: 44
End: 2024-10-10
Attending: INTERNAL MEDICINE
Payer: COMMERCIAL

## 2024-10-10 ENCOUNTER — CARE COORDINATION (OUTPATIENT)
Dept: SLEEP MEDICINE | Facility: CLINIC | Age: 44
End: 2024-10-10

## 2024-10-10 VITALS
OXYGEN SATURATION: 98 % | HEIGHT: 61 IN | SYSTOLIC BLOOD PRESSURE: 116 MMHG | WEIGHT: 171.3 LBS | DIASTOLIC BLOOD PRESSURE: 76 MMHG | HEART RATE: 73 BPM | BODY MASS INDEX: 32.34 KG/M2

## 2024-10-10 DIAGNOSIS — R06.9 ABNORMAL BREATHING: ICD-10-CM

## 2024-10-10 DIAGNOSIS — E66.09 CLASS 1 OBESITY DUE TO EXCESS CALORIES WITH BODY MASS INDEX (BMI) OF 32.0 TO 32.9 IN ADULT, UNSPECIFIED WHETHER SERIOUS COMORBIDITY PRESENT: ICD-10-CM

## 2024-10-10 DIAGNOSIS — G47.30 OBSERVED SLEEP APNEA: ICD-10-CM

## 2024-10-10 DIAGNOSIS — E66.811 CLASS 1 OBESITY DUE TO EXCESS CALORIES WITH BODY MASS INDEX (BMI) OF 32.0 TO 32.9 IN ADULT, UNSPECIFIED WHETHER SERIOUS COMORBIDITY PRESENT: ICD-10-CM

## 2024-10-10 DIAGNOSIS — R06.83 SNORING: ICD-10-CM

## 2024-10-10 DIAGNOSIS — R06.9 ABNORMAL BREATHING: Primary | ICD-10-CM

## 2024-10-10 PROCEDURE — 99205 OFFICE O/P NEW HI 60 MIN: CPT | Performed by: INTERNAL MEDICINE

## 2024-10-10 PROCEDURE — G0399 HOME SLEEP TEST/TYPE 3 PORTA: HCPCS | Performed by: INTERNAL MEDICINE

## 2024-10-10 ASSESSMENT — SLEEP AND FATIGUE QUESTIONNAIRES
HOW LIKELY ARE YOU TO NOD OFF OR FALL ASLEEP WHILE SITTING AND READING: WOULD NEVER DOZE
HOW LIKELY ARE YOU TO NOD OFF OR FALL ASLEEP WHILE SITTING AND TALKING TO SOMEONE: WOULD NEVER DOZE
HOW LIKELY ARE YOU TO NOD OFF OR FALL ASLEEP WHILE SITTING QUIETLY AFTER LUNCH WITHOUT ALCOHOL: WOULD NEVER DOZE
HOW LIKELY ARE YOU TO NOD OFF OR FALL ASLEEP WHILE SITTING INACTIVE IN A PUBLIC PLACE: WOULD NEVER DOZE
HOW LIKELY ARE YOU TO NOD OFF OR FALL ASLEEP WHILE LYING DOWN TO REST IN THE AFTERNOON WHEN CIRCUMSTANCES PERMIT: WOULD NEVER DOZE
HOW LIKELY ARE YOU TO NOD OFF OR FALL ASLEEP IN A CAR, WHILE STOPPED FOR A FEW MINUTES IN TRAFFIC: WOULD NEVER DOZE
HOW LIKELY ARE YOU TO NOD OFF OR FALL ASLEEP WHEN YOU ARE A PASSENGER IN A CAR FOR AN HOUR WITHOUT A BREAK: WOULD NEVER DOZE
HOW LIKELY ARE YOU TO NOD OFF OR FALL ASLEEP WHILE WATCHING TV: WOULD NEVER DOZE

## 2024-10-10 NOTE — PATIENT INSTRUCTIONS
"          MY TREATMENT INFORMATION FOR SLEEP APNEA-  Nayeli Caal    DOCTOR : Rosetta Coffey MD    Am I having a sleep study at a sleep center?  --->Due to normal delays, you will be contacted within 2-4 weeks to schedule    Am I having a home sleep study?  --->Watch the video for the device you are using:    -/drop off device-   https://www.SAFCell.com/watch?v=yGGFBdELGhk          Frequently asked questions:  1. What is Obstructive Sleep Apnea (ELISEO)? ELISEO is the most common type of sleep apnea. Apnea means, \"without breath.\"  Apnea is most often caused by narrowing or collapse of the upper airway as muscles relax during sleep.   Almost everyone has occasional apneas. Most people with sleep apnea have had brief interruptions at night frequently for many years.  The severity of sleep apnea is related to how frequent and severe the events are.   2. What are the consequences of ELISEO? Symptoms include: feeling sleepy during the day, snoring loudly, gasping or stopping of breathing, trouble sleeping, and occasionally morning headaches or heartburn at night.  Sleepiness can be serious and even increase the risk of falling asleep while driving. Other health consequences may include development of high blood pressure and other cardiovascular disease in persons who are susceptible. Untreated ELISEO  can contribute to heart disease, stroke and diabetes.   3. What are the treatment options? In most situations, sleep apnea is a lifelong disease that must be managed with daily therapy. Medications are not effective for sleep apnea and surgery is generally not considered until other therapies have been tried. Your treatment is your choice . Continuous Positive Airway (CPAP) works right away and is the therapy that is effective in nearly everyone. An oral device to hold your jaw forward is usually the next most reliable option. Other options include postioning devices (to keep you off your back), weight loss, " and surgery including a tongue pacing device. There is more detail about some of these options below.  4. Are my sleep studies covered by insurance? Although we will request verification of coverage, we advise you also check in advance of the study to ensure there is coverage.    Important tips for those choosing CPAP and similar devices  REMEMBER-IF YOU RECEIVE A CALL FROM  [phone]-->IT IS TO SETUP A DEVICE  For new devices, sign up for device YAMILETH to monitor your device for your followup visits  We encourage you to utilize the [employer] yamileth or website ( https://[employer]/ ) to monitor your therapy progress and share the data with your healthcare team when you discuss your sleep apnea.                                                    Know your equipment:  CPAP is continuous positive airway pressure that prevents obstructive sleep apnea by keeping the throat from collapsing while you are sleeping. In most cases, the device is  smart  and can slowly self-adjusts if your throat collapses and keeps a record every day of how well you are treated-this information is available to you and your care team.  BPAP is bilevel positive airway pressure that keeps your throat open and also assists each breath with a pressure boost to maintain adequate breathing.  Special kinds of BPAP are used in patients who have inadequate breathing from lung or heart disease. In most cases, the device is  smart  and can slowly self-adjusts to assist breathing. Like CPAP, the device keeps a record of how well you are treated.  Your mask is your connection to the device. You get to choose what feels most comfortable and the staff will help to make sure if fits. Here: are some examples of the different masks that are available: Magnetic mask aids may assist with use but there are safety issues that should be addressed when considering with magnets* ( see end of discussion).       Key points to remember on your journey with sleep  apnea:  Sleep study.  PAP devices often need to be adjusted during a sleep study to show that they are effective and adjusted right.  Good tips to remember: Try wearing just the mask during a quiet time during the day so your body adapts to wearing it. A humidifier is recommended for comfort in most cases to prevent drying of your nose and throat. Allergy medication from your provider may help you if you are having nasal congestion.  Getting settled-in. It takes more than one night for most of us to get used to wearing a mask. Try wearing just the mask during a quiet time during the day so your body adapts to wearing it. A humidifier is recommended for comfort in most cases. Our team will work with you carefully on the first day and will be in contact within 4 days and again at 2 and 4 weeks for advice and remote device adjustments. Your therapy is evaluated by the device each day.   Use it every night. The more you are able to sleep naturally for 7-8 hours, the more likely you will have good sleep and to prevent health risks or symptoms from sleep apnea. Even if you use it 4 hours it helps. Occasionally all of us are unable to use a medical therapy, in sleep apnea, it is not dangerous to miss one night.   Communicate. Call our skilled team on the number provided on the first day if your visit for problems that make it difficult to wear the device. Over 2 out of 3 patients can learn to wear the device long-term with help from our team. Remember to call our team or your sleep providers if you are unable to wear the device as we may have other solutions for those who cannot adapt to mask CPAP therapy. It is recommended that you sleep your sleep provider within the first 3 months and yearly after that if you are not having problems.   Use it for your health. We encourage use of CPAP masks during daytime quiet periods to allow your face and brain to adapt to the sensation of CPAP so that it will be a more natural  sensation to awaken to at night or during naps. This can be very useful during the first few weeks or months of adapting to CPAP though it does not help medically to wear CPAP during wakefulness and  should not be used as a strategy just to meet guidelines.  Take care of your equipment. Make sure you clean your mask and tubing using directions every day and that your filter and mask are replaced as recommended or if they are not working.     *Masks with magnets:  Updated Contraindications  Masks with magnetic components are contraindicated for use by patients where they, or anyone in close physical contact while using the mask, have the following:   Active medical implants that interact with magnets (i.e., pacemakers, implantable cardioverter defibrillators (ICD), neurostimulators, cerebrospinal fluid (CSF) shunts, insulin/infusion pumps)   Metallic implants/objects containing ferromagnetic material (i.e., aneurysm clips/flow disruption devices, embolic coils, stents, valves, electrodes, implants to restore hearing or balance with implanted magnets, ocular implants, metallic splinters in the eye)  Updated Warning  Keep the mask magnets at a safe distance of at least 6 inches (150 mm) away from implants or medical devices that may be adversely affected by magnetic interference. This warning applies to you or anyone in close physical contact with your mask. The magnets are in the frame and lower headgear clips, with a magnetic field strength of up to 400mT. When worn, they connect to secure the mask but may inadvertently detach while asleep.  Implants/medical devices, including those listed within contraindications, may be adversely affected if they change function under external magnetic fields or contain ferromagnetic materials that attract/repel to magnetic fields (some metallic implants, e.g., contact lenses with metal, dental implants, metallic cranial plates, screws, jerel hole covers, and bone substitute  devices). Consult your physician and  of your implant / other medical device for information on the potential adverse effects of magnetic fields.    BESIDES CPAP, WHAT OTHER THERAPIES ARE THERE?    Positioning Device  Positioning devices are generally used when sleep apnea is mild and only occurs on your back.This example shows a pillow that straps around the waist. It may be appropriate for those whose sleep study shows milder sleep apnea that occurs primarily when lying flat on one's back. Preliminary studies have shown benefit but effectiveness at home may need to be verified by a home sleep test. These devices are generally not covered by medical insurance.  Examples of devices that maintain sleeping on the back to prevent snoring and mild sleep apnea.    Belt type body positioner  http://[employer]/    Electronic reminder  http://nightshifttherapy.com/            Oral Appliance  What is oral appliance therapy?  An oral appliance device fits on your teeth at night like a retainer used after having braces. The device is made by a specialized dentist and requires several visits over 1-2 months before a manufactured device is made to fit your teeth and is adjusted to prevent your sleep apnea. Once an oral device is working properly, snoring should be improved. A home sleep test may be recommended at that time if to determine whether the sleep apnea is adequately treated.       Some things to remember:  -Oral devices are often, but not always, covered by your medical insurance. Be sure to check with your insurance provider.   -If you are referred for oral therapy, you will be given a list of specialized dentists to consider or you may choose to visit the Web site of the American Academy of Dental Sleep Medicine  -Oral devices are less likely to work if you have severe sleep apnea or are extremely overweight.     More detailed information  An oral appliance is a small acrylic device that fits over the  upper and lower teeth  (similar to a retainer or a mouth guard). This device slightly moves jaw forward, which moves the base of the tongue forward, opens the airway, improves breathing for effective treat snoring and obstructive sleep apnea in perhaps 7 out of 10 people .  The best working devices are custom-made by a dental device  after a mold is made of the teeth 1, 2, 3.  When is an oral appliance indicated?  Oral appliance therapy is recommended as a first-line treatment for patients with primary snoring, mild sleep apnea, and for patients with moderate sleep apnea who prefer appliance therapy to use of CPAP4, 5. Severity of sleep apnea is determined by sleep testing and is based on the number of respiratory events per hour of sleep.   How successful is oral appliance therapy?  The success rate of oral appliance therapy in patients with mild sleep apnea is 75-80% while in patients with moderate sleep apnea it is 50-70%. The chance of success in patients with severe sleep apnea is 40-50%. The research also shows that oral appliances have a beneficial effect on the cardiovascular health of ELISEO patients at the same magnitude as CPAP therapy7.  Oral appliances should be a second-line treatment in cases of severe sleep apnea, but if not completely successful then a combination therapy utilizing CPAP plus oral appliance therapy may be effective. Oral appliances tend to be effective in a broad range of patients although studies show that the patients who have the highest success are females, younger patients, those with milder disease, and less severe obesity. 3, 6.   Finding a dentist that practices dental sleep medicine  Specific training is available through the American Academy of Dental Sleep Medicine for dentists interested in working in the field of sleep. To find a dentist who is educated in the field of sleep and the use of oral appliances, near you, visit the Web site of the American Academy of  Dental Sleep Medicine.    References  1. Matteo et al. Objectively measured vs self-reported compliance during oral appliance therapy for sleep-disordered breathing. Chest 2013; 144(5): 2898-8451.  2. Muna et al. Objective measurement of compliance during oral appliance therapy for sleep-disordered breathing. Thorax 2013; 68(1): 91-96.  3. Marisela, et al. Mandibular advancement devices in 620 men and women with ELISEO and snoring: tolerability and predictors of treatment success. Chest 2004; 125: 6107-8729.  4. Abimael et al. Oral appliances for snoring and ELISEO: a review. Sleep 2006; 29: 244-262.  5. Ritu et al. Oral appliance treatment for ELISEO: an update. J Clin Sleep Med 2014; 10(2): 215-227.  6. Gagan et al. Predictors of OSAH treatment outcome. J Dent Res 2007; 86: 2134-1830.      Weight Loss:   Your Body mass index is 32.38 kg/m .    Being overweight does not necessarily mean you will have health consequences.  Those who have BMI over 35 or over 27 with existing medical conditions carries greater risk.   Weight loss decreases severity of sleep apnea in most people with obesity. For those with mild obesity who have developed snoring with weight gain, even 15-30 pound weight loss can improve and occasionally milder eliminate sleep apnea.  Structured and life-long dietary and health habits are necessary to lose weight and keep healthier weight levels.     The Comprehensive Weight loss program offers all aspects of weight loss strategies including two Non-Surgical Weight Loss Programs: Medical Weight Management and our 24 Week Healthy Lifestyle Program:    Medical Weight Management: You will meet with a Medical Weight Management Provider, as well as a Registered Dietician. The program may include medication therapy, dietary education, recommended exercise and physical therapy programs, monthly support group meetings, and possible psychological counseling. Follow up visits with the provider or  dietician are scheduled based on your progress and needs.    24 Week Healthy Lifestyle Program: This unique program is designed to give you the support of weekly appointments and activities thru a 24-week period. It may include all of the components of the basic program (above), with the addition of 11 individual Health  Visits, 24-week access to the [employer] website for over 700 online classes, and monthly support group meetings. This program has an out-of-pocket expense of $499 to cover the items that can not be billed to insurance (health coaches and [employer] access), and is non-refundable/non-transferable (you may be able to use a Health Savings Account; ask your HSA provider). There may be an optional meal replacement plan prescribed as well.   Surgical management achieves meaningful long-term weight loss and improvement in health risks in most patients with more severe obesity.      Sleep Apnea Surgery:    Surgery for obstructive sleep apnea is considered generally only when other therapies fail to work. Surgery may be discussed with you if you are having a difficult time tolerating CPAP and or when there is an abnormal structure that requires surgical correction.  Nose and throat surgeries often enlarge the airway to prevent collapse.  Most of these surgeries create pain for 1-2 weeks and up to half of the most common surgeries are not effective throughout life.  You should carefully discuss the benefits and drawbacks to surgery with your sleep provider and surgeon to determine if it is the best solution for you.   More information  Surgery for ELISEO is directed at areas that are responsible for narrowing or complete obstruction of the airway during sleep.  There are a wide range of procedures available to enlarge and/or stabilize the airway to prevent blockage of breathing in the three major areas where it can occur: the palate, tongue, and nasal regions.  Successful surgical treatment depends on the  accurate identification of the factors responsible for obstructive sleep apnea in each person.  A personalized approach is required because there is no single treatment that works well for everyone.  Because of anatomic variation, consultation with an examination by a sleep surgeon is a critical first step in determining what surgical options are best for each patient.  In some cases, examination during sedation may be recommended in order to guide the selection of procedures.  Patients will be counseled about risks and benefits as well as the typical recovery course after surgery. Surgery is typically not a cure for a person s ELISEO.  However, surgery will often significantly improve one s ELISEO severity (termed  success rate ).  Even in the absence of a cure, surgery will decrease the cardiovascular risk associated with OSA7; improve overall quality of life8 (sleepiness, functionality, sleep quality, etc).      Palate Procedures:  Patients with ELISEO often have narrowing of their airway in the region of their tonsils and uvula.  The goals of palate procedures are to widen the airway in this region as well as to help the tissues resist collapse.  Modern palate procedure techniques focus on tissue conservation and soft tissue rearrangement, rather than tissue removal.  Often the uvula is preserved in this procedure. Residual sleep apnea is common in patient after pharyngoplasty with an average reduction in sleep apnea events of 33%2.      Tongue Procedures:  ExamWhile patients are awake, the muscles that surround the throat are active and keep this region open for breathing. These muscles relax during sleep, allowing the tongue and other structures to collapse and block breathing.  There are several different tongue procedures available.  Selection of a tongue base procedure depends on characteristics seen on physical exam.  Generally, procedures are aimed at removing bulky tissues in this area or preventing the back of  the tongue from falling back during sleep.  Success rates for tongue surgery range from 50-62%3.    Hypoglossal Nerve Stimulation:  Hypoglossal nerve stimulation has recently received approval from the [place] Food and Drug Administration for the treatment of obstructive sleep apnea.  This is based on research showing that the system was safe and effective in treating sleep apnea6.  Results showed that the median AHI score decreased 68%, from 29.3 to 9.0. This therapy uses an implant system that senses breathing patterns and delivers mild stimulation to airway muscles, which keeps the airway open during sleep.  The system consists of three fully implanted components: a small generator (similar in size to a pacemaker), a breathing sensor, and a stimulation lead.  Using a small handheld remote, a patient turns the therapy on before bed and off upon awakening.    Candidates for this device must be greater than 18 years of age, have moderate to severe obstructive sleep apnea with less than 25% central events  (AHI between 15-65), BMI less than 35, have tried CPAP/oral appliance for at least 8 weeks without success, and have appropriate upper airway anatomy (determined by a sleep endoscopy performed by Dr. Oren Maloney or Dr. Kiran Page).    Nasal Procedures:  Nasal obstruction can interfere with nasal breathing during the day and night.  Studies have shown that relief of nasal obstruction can improve the ability of some patients to tolerate positive airway pressure therapy for obstructive sleep apnea1.  Treatment options include medications such as nasal saline, topical corticosteroid and antihistamine sprays, and oral medications such as antihistamines or decongestants. Non-surgical treatments can include external nasal dilators for selected patients. If these are not successful by themselves, surgery can improve the nasal airway either alone or in combination with these other options.        Combination  Procedures:  Combination of surgical procedures and other treatments may be recommended, particularly if patients have more than one area of narrowing or persistent positional disease.  The success rate of combination surgery ranges from 66-80%2,3.    References  Tom SÁNCHEZ. The Role of the Nose in Snoring and Obstructive Sleep Apnoea: An Update.  Eur Arch Otorhinolaryngol. 2011; 268: 1365-73.   Gemma SM; Netta JA; Gosia JR; Pallanch JF; Michelle MB; Chuck SG; Anthony MORRIS. Surgical modifications of the upper airway for obstructive sleep apnea in adults: a systematic review and meta-analysis. SLEEP 2010;33(10):8721-4516. Mica FISHMAN. Hypopharyngeal surgery in obstructive sleep apnea: an evidence-based medicine review.  Arch Otolaryngol Head Neck Surg. 2006 Feb;132(2):206-13.  Leonidas YH1, Hank Y, Dawit BRAN. The efficacy of anatomically based multilevel surgery for obstructive sleep apnea. Otolaryngol Head Neck Surg. 2003 Oct;129(4):327-35.  Kezirian E, Goldberg A. Hypopharyngeal Surgery in Obstructive Sleep Apnea: An Evidence-Based Medicine Review. Arch Otolaryngol Head Neck Surg. 2006 Feb;132(2):206-13.  Francis PJ et al. Upper-Airway Stimulation for Obstructive Sleep Apnea.  N Engl J Med. 2014 Jan 9;370(2):139-49.  Jermain Y et al. Increased Incidence of Cardiovascular Disease in Middle-aged Men with Obstructive Sleep Apnea. Am J Respir Crit Care Med; 2002 166: 159-165  Cota EM et al. Studying Life Effects and Effectiveness of Palatopharyngoplasty (SLEEP) study: Subjective Outcomes of Isolated Uvulopalatopharyngoplasty. Otolaryngol Head Neck Surg. 2011; 144: 623-631.        WHAT IF I ONLY HAVE SNORING?    Mandibular advancement devices, lateral sleep positioning, long-term weight loss and treatment of nasal allergies have been shown to improve snoring.  Exercising tongue muscles with a game (https://apps.[employer]/us/yamileth/soundly-reduce-snoring/sy9556299497) or stimulating the tongue during the day with a device  (https://doi.org/10.3390/kix40526251) have improved snoring in some individuals.  https://www.arGEN-X.com/  https://www.sleepfoundation.org/best-anti-snoring-mouthpieces-and-mouthguards    Remember to Drive Safe... Drive Alive     Sleep health profoundly affects your health, mood, and your safety.  Thirty three percent of the population (one in three of us) is not getting enough sleep and many have a sleep disorder. Not getting enough sleep or having an untreated / undertreated sleep condition may make us sleepy without even knowing it. In fact, our driving could be dramatically impaired due to our sleep health. As your provider, here are some things I would like you to know about driving:     Here are some warning signs for impairment and dangerous drowsy driving:              -Having been awake more than 16 hours               -Looking tired               -Eyelid drooping              -Head nodding (it could be too late at this point)              -Driving for more than 30 minutes     Some things you could do to make the driving safer if you are experiencing some drowsiness:              -Stop driving and rest              -Call for transportation              -Make sure your sleep disorder is adequately treated     Some things that have been shown NOT to work when experiencing drowsiness while driving:              -Turning on the radio              -Opening windows              -Eating any  distracting  /  entertaining  foods (e.g., sunflower seeds, candy, or any other)              -Talking on the phone      Your decision may not only impact your life, but also the life of others. Please, remember to drive safe for yourself and all of us.            Instructions: This plan will send the code FBSE to the PM system.  DO NOT or CHANGE the price. Price (Do Not Change): 0.00 Detail Level: Simple

## 2024-10-10 NOTE — PROGRESS NOTES
Pt is completing a home sleep test. Pt was instructed on how to put on the Noxturnal T3 device and associated equipment before going to bed and given the opportunity to practice putting it on before leaving the sleep center. Pt was reminded to bring the home sleep test kit back to the center tomorrow, at the scheduled time for download and reporting. Patient was instructed to complete study using the following treatment?  None  Neck circumference: 41 CM / 16 inches.  Magda Saldivar CMA on 10/10/2024 at 2:50 PM

## 2024-10-10 NOTE — PROGRESS NOTES
Chief complaint: Consultation requested by SABI Bailey CNP for the evaluation of possible apnea    History of Present Illness: 44-year-old female with past medical history notable for visual impairment and congenital deafness, type 2 diabetes, hypertension, obesity.  Over the years she has undergone a few different procedures and on multiple occasions that she reports she has been told about abnormal breathing pattern.  She does not think this is happening at home.  She has been told by her sister who can hear that she will occasionally snore lightly.  She had a sleep study years ago that did not show significant sleep apnea.  She denies waking up with a sense of gasping or choking or shortness of breath.  She feels that her sleep quality is good.  She denies excessive sleepiness at this time.  She tends to go to bed around 7 PM to 9 PM.  Rise time is between 4 AM and 6 or 6:30 AM.  She does not tend to take naps.  No excessive caffeine consumption.  She denies unintentional napping.    No history of restless legs, sleepwalking, sleep talking or dream enactment behavior.  She is not taking any sleep aids.  She only occasionally will drink alcohol socially.  She does have some more procedures coming up.  A colonoscopy in a week and a half.  She also has carpal tunnel release surgery probably in January.    She denies any breathing problems during the day.  She does sometimes have some difficulty sleeping on her side due to some shoulder issues so she thinks she is sleeping more on her back.    [place] Sleepiness Scale   Sitting and reading: Would never doze   Watching TV: Would never doze   Sitting, inactive in a public place (e.g. a theatre or a meeting): Would never doze   As a passenger in a car for an hour without a break: Would never doze   Lying down to rest in the afternoon when circumstances permit: Would never doze   Sitting and talking to someone: Would never doze   Sitting quietly after a lunch  without alcohol: Would never doze   In a car, while stopped for a few minutes in traffic: Would never doze   Total score - [place]: 0     (Less than 10 normal)    Insomnia Severity Scale  MARIAJOSE Total Score: 0  Total score categories:  0-7 = No clinically significant insomnia   8-14 = Subthreshold insomnia   15-21 = Clinical insomnia (moderate severity)  22-28 = Clinical insomnia (severe)    STOP-BANG  Loud Snore   1  Excessively Tired/Sleepy   0  Observed apnea   1  Hypertension   1  BMI> 35 kg/m2   0  Age >50   0  Neck >16 in/40cm   1  Male Gender   0  Total =   4  (0-2 low, 3-4 intermediate, 5-8 high risk of ELISEO)      Past Medical History:   Diagnosis Date    Combined visual and hearing impairment     Deaf     Diabetic retinopathy of both eyes (H) 8/19/2011    Hepatic steatosis 08/19/2011    History of tobacco use     Hyperlipidemia     Hypertension     Hypertriglyceridemia     Migraines     Obesity 8/19/2011     Problem list name updated by automated process. 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 355 mL 1    amLODIPine (NORVASC) 5 MG tablet Take 1 tablet (5 mg) by mouth at bedtime 90 tablet 3    aspirin (ASA) 81 MG chewable tablet Take 1 tablet (81 mg) by mouth daily CHEW AND SWALLOW 90 tablet 1    atorvastatin (LIPITOR) 40 MG tablet Take 1 tablet (40 mg) by mouth daily 90 tablet 1    bisacodyl (DULCOLAX) 5 MG EC tablet Two  days prior to exam take two (2) tablets at 4pm. One day prior to exam take two (2) tablets at 4pm 4 tablet 0    blood glucose (ACCU-CHEK LAYA PLUS) test strip Use with  Accucheck Expert meter.  Test blood sugar 6 times daily. 600 each 3    blood glucose monitoring (ACCU-CHEK FASTCLIX) lancets Use to test blood sugar 6 times daily or as directed 510 each 3    calcium carbonate-vitamin D (OSCAL) 500-5 MG-MCG tablet Take 1 tablet by mouth 2 times daily 180 tablet 1    Continuous Glucose Sensor (FREESTYLE ZORAIDA 3 SENSOR) [facility] 1 each every 14 days Please provide 3 month supply. 6 each 3    diclofenac (VOLTAREN) 1 % topical gel Apply 2 g topically 4 times daily. For L shoulder pain 100 g 3    dicyclomine (BENTYL) 10 MG capsule Take 1 capsule (10 mg) by mouth 4 times daily as needed (Abdominal pain/cramping). 40 capsule 0    empagliflozin (JARDIANCE) 25 MG TABS tablet Take 1 tablet (25 mg) by mouth daily. 90 tablet 1    ergocalciferol (ERGOCALCIFEROL) 1.25 MG (38275 UT) capsule Take 1 capsule (50,000 Units) by mouth once a week For additional refills, please schedule a follow-up appointment at [phone] 12 capsule 3    fenofibrate (TRIGLIDE/LOFIBRA) 160 MG tablet Take 1 tablet (160 mg) by mouth daily 90 tablet 3    fish oil-omega-3 fatty acids 1000 MG capsule TAKE TWO CAPSULES (2 GM) BY MOUTH ONCE DAILY 180 capsule 3    gabapentin (NEURONTIN) 100 MG capsule Take 1 capsule (100 mg) by mouth 3 times daily 42 capsule 1    ibuprofen (ADVIL/MOTRIN) 600 MG tablet Take 1 tablet (600 mg) by mouth every 6 hours as needed for moderate pain 120 tablet 1    Injection Device for insulin (INPEN 100-PINK-NOVOLOG-FIASP) DAVID 1 each continuous 1 each 0    insulin aspart (NOVOLOG PENFILL) 100 UNIT/ML cartridge Take with each meal and snack: 1 per 4 grams CHO and correction of 1 unit per 20 mg/dL over a target of 120. Average daily dose is 40 units. 40 mL 3    insulin glargine 100 UNIT/ML pen 3 month supply.Inject 55 units daily 45 mL 3     insulin pen needle (31G X 5 MM) 31G X 5 MM miscellaneous Use 5 to 6 pen needles daily or as directed.  3 month supply. 500 each 3    losartan (COZAAR) 100 MG tablet Take 1 tablet (100 mg) by mouth daily 90 tablet 3    omeprazole (PRILOSEC) 40 MG DR capsule Take 1 capsule (40 mg) by mouth 2 times daily 90 capsule 0    omeprazole (PRILOSEC) 40 MG DR capsule Take 1 capsule (40 mg) by mouth 2 times daily 8-12 weeks 90 capsule 0    ondansetron (ZOFRAN ODT) 4 MG ODT tab Take 1 tablet (4 mg) by mouth every 6 hours as needed for nausea or vomiting 20 tablet 0    polyethylene glycol (GOLYTELY) 236 g suspension Two days before procedure at 5PM fill first container with water. Mix and drink an 8 oz glass every 15 minutes until HALF of the container is gone. Place the remainder in the refrigerator. One day before procedure at 5PM drink second half of bowel prep. Drink an 8 oz glass every 15 minutes until it is gone. Day of procedure 6 hours before arrival time fill the 2nd container with water. Mix and drink an 8 oz glass every 15 minutes until HALF of the container is gone. Discard the remaining solution. 8000 mL 0    tirzepatide (MOUNJARO) 10 MG/0.5ML pen Inject 10 mg subcutaneously every 7 days. 2 mL 4     Current Facility-Administered Medications   Medication Dose Route Frequency Provider Last Rate Last Admin    hydrocortisone (CORTAID) 1 % cream   Topical Once Mariya Mohamud APRN CNP        hydrocortisone (CORTAID) 1 % cream   Topical TID Mariya Mohamud APRN CNP        hylan (SYNVISC ONE) injection 48 mg  48 mg   Rosas Rodriguez DO   48 mg at 05/09/23 1813    lidocaine (PF) (XYLOCAINE) 1 % injection 4 mL  4 mL   Sebas Bradley MD   4 mL at 10/05/23 0923    triamcinolone (KENALOG-40) injection 40 mg  40 mg   Sebas Bradley MD   40 mg at 10/05/23 0923       Social History     Socioeconomic History    Marital status: Single     Spouse name: Not on file    Number of children: Not on file    Years of  education: Not on file    Highest education level: Not on file   Occupational History    Not on file   Tobacco Use    Smoking status: Former     Current packs/day: 0.00     Types: Cigarettes     Quit date: 2010     Years since quittin.8     Passive exposure: Never    Smokeless tobacco: Never    Tobacco comments:     stopped 10 yrs ago   Substance and Sexual Activity    Alcohol use: Not Currently     Comment: socially    Drug use: Not Currently     Types: Marijuana     Comment: much younger  or earlier    Sexual activity: Not Currently     Partners: Male     Birth control/protection: I.U.D.   Other Topics Concern    Parent/sibling w/ CABG, MI or angioplasty before 65F 55M? Not Asked     Service No    Blood Transfusions No    Caffeine Concern No    Occupational Exposure No    Hobby Hazards No    Sleep Concern No    Stress Concern No    Weight Concern Yes    Special Diet No    Back Care No    Exercise Yes     Comment: 4-5 x a week    Bike Helmet No    Seat Belt Yes    Self-Exams Yes   Social History Narrative    Not on file     Social Determinants of Health     Financial Resource Strain: Low Risk  (2024)    Financial Resource Strain     Within the past 12 months, have you or your family members you live with been unable to get utilities (heat, electricity) when it was really needed?: No   Food Insecurity: Low Risk  (2024)    Food Insecurity     Within the past 12 months, did you worry that your food would run out before you got money to buy more?: No     Within the past 12 months, did the food you bought just not last and you didn t have money to get more?: No   Transportation Needs: Low Risk  (2024)    Transportation Needs     Within the past 12 months, has lack of transportation kept you from medical appointments, getting your medicines, non-medical meetings or appointments, work, or from getting things that you need?: No   Recent Concern: Transportation Needs - High Risk  "(10/30/2023)    Transportation Needs     Within the past 12 months, has lack of transportation kept you from medical appointments, getting your medicines, non-medical meetings or appointments, work, or from getting things that you need?: Yes   Physical Activity: Not on file   Stress: Not on file   Social Connections: Not on file   Interpersonal Safety: Low Risk  (1/4/2024)    Interpersonal Safety     Do you feel physically and emotionally safe where you currently live?: Yes     Within the past 12 months, have you been hit, slapped, kicked or otherwise physically hurt by someone?: No     Within the past 12 months, have you been humiliated or emotionally abused in other ways by your partner or ex-partner?: No   Housing Stability: Low Risk  (1/4/2024)    Housing Stability     Do you have housing? : Yes     Are you worried about losing your housing?: No       Family History   Problem Relation Age of Onset    Unknown/Adopted Other          EXAM:  /76   Pulse 73   Ht 1.549 m (5' 0.98\")   Wt 77.7 kg (171 lb 4.8 oz)   LMP 02/01/2024 (Approximate)   SpO2 98%   BMI 32.38 kg/m    GENERAL: Alert and no distress  EYES: Eyes grossly normal to inspection.  No discharge or erythema, or obvious scleral/conjunctival abnormalities.  Mallampati 2  RESP: Clear to auscultation bilaterally no rales or wheezes noted  Cardiac tones regular rate and rhythm soft systolic murmur loudest at the left upper sternal border  Extremities are perfused without edema  SKIN: Visible skin clear. No significant rash, abnormal pigmentation or lesions.  NEURO: Mentation normal, the visual impairment and hearing impairment  PSYCH: Appropriate affect         Per Dr Madhu Herring:  Her 11/28/2006 a normal sleep efficiency of 86%, normal sleep onset latency to REM of 113 minutes.    Her sleep architecture was fairly normal as well.    She had 11% stage 1 (5%), 49% stage 2 (45-55%), 20% stage 3/4 (16-21%), and 19% stage REM (20-25%).    Her AHI was " 3.9.    Her REM RDI was 21.    She had no desaturations below 93%.    She had periodic limb movements.    Her sleep architecture, although normal in distribution, she did have a lot of sleep stage changes throughout the night.           ASSESSMENT:  44-year-old female with visual and hearing impairment, possible apnea associated with sedation on multiple occasions, overall she does have intermediate risk for obstructive sleep apnea but no clear symptoms at this time.  Given her risk factors and recent observations recommended further evaluation.    PLAN:  Reviewed the pathophysiology of both obstructive and central sleep apnea.  Recommended home sleep apnea testing.  Will try to get this done ASAP.  I will be contacting her with results via ethology when they become available.  See instructions for further details of counseling provided.  She is not very interested in the idea of CPAP at this time.      65 minutes spent by me on the date of the encounter doing chart review, history and exam, documentation and further activities per the note    Rosetta Coffey M.D.  Pulmonary/Critical Care/Sleep Medicine    [facility]   Floor 1, [address]   [address] [place], [place] [address]. [place] [address]   Appointments: [phone]    The above note was dictated using voice recognition software and may include typographical errors. Please contact the author for any clarifications.

## 2024-10-11 ENCOUNTER — DOCUMENTATION ONLY (OUTPATIENT)
Dept: SLEEP MEDICINE | Facility: CLINIC | Age: 44
End: 2024-10-11
Payer: COMMERCIAL

## 2024-10-11 NOTE — PROGRESS NOTES
This HSAT was performed using a Noxturnal T3 device which recorded snore, sound, movement activity, body position, nasal pressure, oronasal thermal airflow, pulse, oximetry and both chest and abdominal respiratory effort. HSAT data was restricted to the time patient states they were in bed.     HSAT was scored using 1B 4% hypopnea rule.     HST AHI (Non-PAT): 2.2  Snoring was reported as mild, moderate, loud, and intermittent.  Time with SpO2 below 89% was 0.1 minutes.   Overall signal quality was good     Pt will follow up with sleep provider to determine appropriate therapy.

## 2024-10-15 NOTE — PROCEDURES
Home Sleep Study Interpretation    Patient: Nayeli Caal  MRN: [id number]  YOB: 1980  Study Date: 10/10/2024  PCP/Referring Provider: Mariya Mohamud;  Ordering Provider: Rosetta Coffey MD    Indications for Home Study: Nayeli is a 44 Female with a history of visual impairment and congenital deafness, type 2 diabetes, hypertension, obesity  who presents with observed apnea around time of procedures.      Weight: 171.0 lbs  BMI: 33.4   [place]: 0  STOP BAN/8      Data: A full night home sleep study was performed recording the standard physiologic parameters including body position, movement, sound, nasal pressure, thermal oral airflow, chest and abdominal movements with respiratory inductance plethysmography, and oxygen saturation by pulse oximetry. Pulse rate was estimated by oximetry recording. This study was considered adequate based on > 4 hours of quality oximetry and respiratory recording. As specified by the AASM Manual for the Scoring of Sleep and Associated events, version 2.3, Rule VIII.D 1B, 4% oxygen desaturation scoring for hypopneas is used as a standard of care on all home sleep apnea testing.    Analysis Time: 529.9 minutes    Respiration:  Sleep Associated Hypoxemia: Sustained hypoxemia was not present. Baseline oxygen saturation was 97. Time with saturation less than 89% was 0.1 minutes. Time with saturation less than 90% was 0.4 minutes. The lowest oxygen saturation was 88.0%.  Snoring: Snoring was present 3% of the time with an average level of 66 dB. Duration of time snoring above 70 dB was 14 minutes.    Respiratory events: The home study revealed a presence of 0 obstructive apneas and 1 mixed and central apneas. There were 18 hypopneas resulting in a combined apnea/hypopnea index [AHI] of 2 events per hour with  2 per hour supine, NA  per hour prone, NA per hour upright, NA  per hour left side, and 1 per hour right side.    Position: Percent of time spent:  Supine 90%, prone 0%, upright 1%, on left 0%, on right 8%.    Heart Rate: By Pulse Oximetry normal rate was noted.    Assessment:  No evidence of obstructive sleep apnea.  Sleep associated hypoxemia was not present.    Recommendations:  Suggest optimizing sleep hygiene and avoiding sleep deprivation  Weight management    Diagnosis Code(s):     Electronically signed by: Rosetta Coffey MD October 15, 2024  Diplomate, American Board of Internal Medicine, Sleep Medicine

## 2024-10-17 ENCOUNTER — TELEPHONE (OUTPATIENT)
Dept: ORTHOPEDICS | Facility: CLINIC | Age: 44
End: 2024-10-17
Payer: COMMERCIAL

## 2024-10-17 PROBLEM — G56.03 SEVERE CARPAL TUNNEL SYNDROME OF BOTH WRISTS: Status: ACTIVE | Noted: 2024-10-02

## 2024-10-17 NOTE — TELEPHONE ENCOUNTER
Phoned patient to schedule surgery with Dr Mcmahon. Patient was unavailable, voicemail message was left for patient with direct number to call back for scheduling. [phone]

## 2024-10-17 NOTE — TELEPHONE ENCOUNTER
Patient is scheduled for surgery with Dr. Mcmahon    Spoke with: Patient via video relay    Date of Surgery: 12/4/24 Right wrist; 1/15/25 Left wrist    Location: [facility]    Post op: 1 week with MD     Pre op with Provider: Complete    H&P: Not indicated - local only    Additional imaging/appointments: N/A    Surgery packet: Received in clinic     Additional comments: N/A        Humera Charlton MA on 10/17/2024 at 1:27 PM

## 2024-10-18 ENCOUNTER — OFFICE VISIT (OUTPATIENT)
Dept: URGENT CARE | Facility: URGENT CARE | Age: 44
End: 2024-10-18
Payer: COMMERCIAL

## 2024-10-18 VITALS
TEMPERATURE: 97.9 F | HEART RATE: 53 BPM | SYSTOLIC BLOOD PRESSURE: 133 MMHG | RESPIRATION RATE: 17 BRPM | BODY MASS INDEX: 33.57 KG/M2 | HEIGHT: 60 IN | WEIGHT: 171 LBS | OXYGEN SATURATION: 97 % | DIASTOLIC BLOOD PRESSURE: 79 MMHG

## 2024-10-18 DIAGNOSIS — R39.9 LOWER URINARY TRACT SYMPTOMS (LUTS): Primary | ICD-10-CM

## 2024-10-18 LAB
ALBUMIN UR-MCNC: NEGATIVE MG/DL
APPEARANCE UR: CLEAR
BACTERIA #/AREA URNS HPF: ABNORMAL /HPF
BILIRUB UR QL STRIP: NEGATIVE
COLOR UR AUTO: YELLOW
GLUCOSE UR STRIP-MCNC: 500 MG/DL
HGB UR QL STRIP: ABNORMAL
KETONES UR STRIP-MCNC: NEGATIVE MG/DL
LEUKOCYTE ESTERASE UR QL STRIP: NEGATIVE
NITRATE UR QL: NEGATIVE
PH UR STRIP: 5.5 [PH] (ref 5–7)
RBC #/AREA URNS AUTO: ABNORMAL /HPF
SP GR UR STRIP: 1.01 (ref 1–1.03)
SQUAMOUS #/AREA URNS AUTO: ABNORMAL /LPF
UROBILINOGEN UR STRIP-ACNC: 0.2 E.U./DL
WBC #/AREA URNS AUTO: ABNORMAL /HPF
WBC CLUMPS #/AREA URNS HPF: PRESENT /HPF

## 2024-10-18 PROCEDURE — 81001 URINALYSIS AUTO W/SCOPE: CPT | Performed by: FAMILY MEDICINE

## 2024-10-18 PROCEDURE — 99213 OFFICE O/P EST LOW 20 MIN: CPT | Performed by: FAMILY MEDICINE

## 2024-10-18 RX ORDER — PHENAZOPYRIDINE HYDROCHLORIDE 95 MG/1
190 TABLET ORAL 3 TIMES DAILY
Qty: 12 TABLET | Refills: 0 | Status: SHIPPED | OUTPATIENT
Start: 2024-10-18 | End: 2024-10-20

## 2024-10-18 RX ORDER — NITROFURANTOIN 25; 75 MG/1; MG/1
100 CAPSULE ORAL 2 TIMES DAILY
Qty: 10 CAPSULE | Refills: 0 | Status: SHIPPED | OUTPATIENT
Start: 2024-10-20 | End: 2024-10-25

## 2024-10-18 NOTE — PROGRESS NOTES
Assessment & Plan     Lower urinary tract symptoms (LUTS)  Acute problem, symptom history suggestive of UTI, currently urinalysis negative for leukocyte esterase and nitrite making UTI is less likely, do not suspect pyelonephritis.  No history of kidney stones. Start azo, if no improvement after 2 days recommend starting nitrofurantoin for clinical suspicion of infection.  Return precautions discussed including flank pain fever, difficulty urinating should return for urgent evaluation.  - UA Macroscopic with reflex to Microscopic and Culture - Clinic Collect  - UA Microscopic with Reflex to Culture  - nitroFURantoin macrocrystal-monohydrate (MACROBID) 100 MG capsule  Dispense: 10 capsule; Refill: 0  - phenazopyridine (AZO URINARY PAIN RELIEF) 95 MG tablet  Dispense: 12 tablet; Refill: 0             Return in about 1 week (around 10/25/2024) for If symptoms do not improve or gets worse..    Norman Ma MD  [facility] [place]    Brenden Tyler is a 44 year old female who presents to clinic today for the following health issues:  Chief Complaint   Patient presents with    Urinary Problem     X2 days of pain when urinating    Urgent Care         10/18/2024    10:16 AM   Additional Questions   Roomed by George Weeks   Accompanied by with friend     HPI    Patient is a 44-year-old female 2-day history of painful urination.  She has increased urinary frequency and urgency, no recent changes to medication, currently not actually active, no new partners.  No history of STIs or urinary tract infections.  Denies any fever flank pain or nausea.  Notably she is on Jardiance for management of diabetes, this is an old medication.        Review of Systems        Objective    /79   Pulse 53   Temp 97.9  F (36.6  C) (Temporal)   Resp 17   Ht 1.524 m (5')   Wt 77.6 kg (171 lb)   LMP 02/01/2024 (Approximate)   SpO2 97%   BMI 33.40 kg/m    Physical Exam  Vitals reviewed.   Constitutional:        Appearance: She is not ill-appearing.   Cardiovascular:      Rate and Rhythm: Normal rate and regular rhythm.   Pulmonary:      Effort: Pulmonary effort is normal.      Breath sounds: Normal breath sounds.   Abdominal:      Tenderness: There is no right CVA tenderness or left CVA tenderness.      Comments: Suprapubic tenderness without guarding.            Results for orders placed or performed in visit on 10/18/24 (from the past 24 hour(s))   UA Macroscopic with reflex to Microscopic and Culture - Clinic Collect    Specimen: Urine, Midstream   Result Value Ref Range    Color Urine Yellow Colorless, Straw, Light Yellow, Yellow    Appearance Urine Clear Clear    Glucose Urine 500 (A) Negative mg/dL    Bilirubin Urine Negative Negative    Ketones Urine Negative Negative mg/dL    Specific Gravity Urine 1.015 1.003 - 1.035    Blood Urine Moderate (A) Negative    pH Urine 5.5 5.0 - 7.0    Protein Albumin Urine Negative Negative mg/dL    Urobilinogen Urine 0.2 0.2, 1.0 E.U./dL    Nitrite Urine Negative Negative    Leukocyte Esterase Urine Negative Negative   UA Microscopic with Reflex to Culture   Result Value Ref Range    Bacteria Urine Moderate (A) None Seen /HPF    RBC Urine 5-10 (A) 0-2 /HPF /HPF    WBC Urine 5-10 (A) 0-5 /HPF /HPF    Squamous Epithelials Urine Few (A) None Seen /LPF    WBC Clumps Urine Present (A) None Seen /HPF    Narrative    Urine Culture not indicated     *Note: Due to a large number of results and/or encounters for the requested time period, some results have not been displayed. A complete set of results can be found in Results Review.

## 2024-10-18 NOTE — PROGRESS NOTES
Pre-procedure note:     43 yo F with a PMH of HTN, DM II, visual impairment and congenital deafness, who was referred for elective colonoscopy for evaluations of diarrhea and lower abdominal pain.     Ref: Kirsten Conn PA-C

## 2024-10-20 ENCOUNTER — ANESTHESIA EVENT (OUTPATIENT)
Dept: SURGERY | Facility: AMBULATORY SURGERY CENTER | Age: 44
End: 2024-10-20
Payer: COMMERCIAL

## 2024-10-20 ASSESSMENT — LIFESTYLE VARIABLES: TOBACCO_USE: 1

## 2024-10-20 NOTE — ANESTHESIA PREPROCEDURE EVALUATION
Anesthesia Pre-Procedure Evaluation    Patient: Nayeli Caal   MRN: [id number] : 1980        Procedure : Procedure(s):  Colonoscopy          Past Medical History:   Diagnosis Date    Combined visual and hearing impairment     Deaf     Diabetic retinopathy of both eyes (H) 2011    Hepatic steatosis 2011    History of tobacco use     Hyperlipidemia     Hypertension     Hypertriglyceridemia     Migraines     Obesity 2011     Problem list name updated by automated process. Provider to review    Uncontrolled type 2 diabetes mellitus with hyperglycemia, with long-term current use of insulin (H) 5/15/2017    Usher Syndrome: congenital deafness, retinitis pigmentosa 2011      Past Surgical History:   Procedure Laterality Date    DAVINCI HYSTERECTOMY TOTAL, SALPINGECTOMY BILATERAL Bilateral 2024    Procedure: HYSTERECTOMY, TOTAL, ROBOT-ASSISTED, WITH BILATERAL SALPINGECTOMY, CYSTOSCOPY;  Surgeon: Vonnie Cowan MD;  [facility]: [facility]    ESOPHAGOSCOPY, GASTROSCOPY, DUODENOSCOPY (EGD), COMBINED N/A 2024    Procedure: ESOPHAGOGASTRODUODENOSCOPY, WITH BIOPSY;  Surgeon: Nora Brice MD;  Location: [facility] OR    LAPAROSCOPIC CHOLECYSTECTOMY N/A 3/10/2018    Procedure: LAPAROSCOPIC CHOLECYSTECTOMY;  Laparoscopic Cholecystectomy ;  Surgeon: Kuldeep Sigala MD;  Location: [facility]    RELEASE TRIGGER FINGER Right 2019    Procedure: Right Thumb Trigger Release;  Surgeon: Greg Streeter MD;  [facility]    RELEASE TRIGGER FINGER Left 2019    Procedure: Left Ring Trigger Finger Release.  Ganglion cyst excision.;  Surgeon: Greg Streeter MD;  [facility]    RELEASE TRIGGER FINGER Right 2023    Procedure: RELEASE, RIGHT TRIGGER FINGER, INDEX AND MIDDLE;  Surgeon: Miracle Carlin MD;  Location: [facility] OR    RELEASE TRIGGER FINGER Left 2023    Procedure: RELEASE, LEFT TRIGGER FINGER, MIDDLE;  Surgeon: Miracle Carlin MD;  Location:  [facility] OR      No Known Allergies   Social History     Tobacco Use    Smoking status: Former     Current packs/day: 0.00     Types: Cigarettes     Quit date: 2010     Years since quittin.8     Passive exposure: Never    Smokeless tobacco: Never    Tobacco comments:     stopped 10 yrs ago   Substance Use Topics    Alcohol use: Not Currently     Comment: socially      Wt Readings from Last 1 Encounters:   10/18/24 77.6 kg (171 lb)        Anesthesia Evaluation            ROS/MED HX  ENT/Pulmonary: Comment: Deaf (h/o usher syndrome: congenital deafness, retinitis pigmentosa)    (+)     ELISEO risk factors,  hypertension,         tobacco use, Past use,                       Neurologic:       Cardiovascular:     (+) Dyslipidemia hypertension- -   -  - -                                 Previous cardiac testing   Echo: Date: Results:    Stress Test:  Date: Results:    ECG Reviewed:  Date: 24 Results:  NSR  Cath:  Date: Results:      METS/Exercise Tolerance: >4 METS    Hematologic:       Musculoskeletal: Comment: S/p multiple trigger finger releases.      GI/Hepatic:  - neg GI/hepatic ROS   (+)             liver disease,       Renal/Genitourinary:       Endo:     (+)  type II DM, Last HgA1c: 7.8, date: 24, Using insulin,    Diabetic complications: retinopathy.      Obesity,       Psychiatric/Substance Use:       Infectious Disease:  - neg infectious disease ROS     Malignancy:  - neg malignancy ROS     Other:            Physical Exam    Airway  airway exam normal      Mallampati: II   TM distance: > 3 FB   Neck ROM: full   Mouth opening: > 3 cm    Respiratory Devices and Support         Dental       (+) Completely normal teeth      Cardiovascular   cardiovascular exam normal          Pulmonary   pulmonary exam normal                OUTSIDE LABS:  CBC:   Lab Results   Component Value Date    WBC 13.3 (H) 10/02/2024    WBC 11.3 (H) 2024    HGB 12.3 10/02/2024    HGB 12.8 2024    HCT 39.0 10/02/2024     HCT 39.0 09/19/2024     10/02/2024     09/19/2024     BMP:   Lab Results   Component Value Date     10/02/2024     09/19/2024    POTASSIUM 3.9 10/02/2024    POTASSIUM 4.2 09/19/2024    CHLORIDE 103 10/02/2024    CHLORIDE 102 09/19/2024    CO2 20 (L) 10/02/2024    CO2 28 09/19/2024    BUN 20.8 (H) 10/02/2024    BUN 15.0 09/19/2024    CR 0.89 10/02/2024    CR 0.83 09/19/2024    GLC 83 10/02/2024     (H) 09/19/2024     COAGS:   Lab Results   Component Value Date    INR 1.03 06/18/2010     POC:   Lab Results   Component Value Date     (H) 05/30/2019    HCG Negative 08/31/2021    HCGS Negative 09/04/2024     HEPATIC:   Lab Results   Component Value Date    ALBUMIN 4.2 10/02/2024    PROTTOTAL 7.3 10/02/2024    ALT 28 10/02/2024    AST 21 10/02/2024    ALKPHOS 56 10/02/2024    BILITOTAL 0.2 10/02/2024     OTHER:   Lab Results   Component Value Date    PH 7.39 08/31/2021    LACT 0.8 08/31/2021    A1C 7.8 (H) 09/11/2024    VERO 8.8 10/02/2024    PHOS 4.2 03/12/2010    MAG 2.2 07/13/2017    LIPASE 25 10/02/2024    AMYLASE 16 (L) 05/15/2017    TSH 1.41 04/04/2023    CRP 12.0 (H) 03/24/2016    SED 19 03/24/2016       Anesthesia Plan    ASA Status:  2    NPO Status:  NPO Appropriate    Anesthesia Type: MAC.     - Reason for MAC: straight local not clinically adequate   Induction: Intravenous, Propofol.   Maintenance: TIVA.        Consents    Anesthesia Plan(s) and associated risks, benefits, and realistic alternatives discussed. Questions answered and patient/representative(s) expressed understanding.     - Discussed: Risks, Benefits and Alternatives for BOTH SEDATION and the PROCEDURE were discussed     - Discussed with:  Patient,       - Extended Intubation/Ventilatory Support Discussed: No.      - Patient is DNR/DNI Status: No     Use of blood products discussed: No .     Postoperative Care       PONV prophylaxis: Ondansetron (or other 5HT-3), Background Propofol Infusion      Comments:               Richie Richter MD    I have reviewed the pertinent notes and labs in the chart from the past 30 days and (re)examined the patient.  Any updates or changes from those notes are reflected in this note.               # Hypertension: Noted on problem list        # DMII: A1C = 7.8 % (Ref range: <5.7 %) within past 6 months    # Obesity: Estimated body mass index is 33.4 kg/m  as calculated from the following:    Height as of 10/18/24: 1.524 m (5').    Weight as of 10/18/24: 77.6 kg (171 lb).

## 2024-10-21 ENCOUNTER — ANESTHESIA (OUTPATIENT)
Dept: SURGERY | Facility: AMBULATORY SURGERY CENTER | Age: 44
End: 2024-10-21
Payer: COMMERCIAL

## 2024-10-21 ENCOUNTER — HOSPITAL ENCOUNTER (OUTPATIENT)
Facility: AMBULATORY SURGERY CENTER | Age: 44
Discharge: HOME | End: 2024-10-21
Attending: INTERNAL MEDICINE | Admitting: INTERNAL MEDICINE
Payer: COMMERCIAL

## 2024-10-21 ENCOUNTER — OFFICE VISIT (OUTPATIENT)
Dept: INTERPRETER SERVICES | Facility: CLINIC | Age: 44
End: 2024-10-21
Payer: COMMERCIAL

## 2024-10-21 VITALS
WEIGHT: 165 LBS | OXYGEN SATURATION: 97 % | HEIGHT: 61 IN | DIASTOLIC BLOOD PRESSURE: 77 MMHG | RESPIRATION RATE: 16 BRPM | SYSTOLIC BLOOD PRESSURE: 122 MMHG | HEART RATE: 73 BPM | BODY MASS INDEX: 31.15 KG/M2 | TEMPERATURE: 97 F

## 2024-10-21 VITALS — HEART RATE: 79 BPM

## 2024-10-21 LAB
COLONOSCOPY: NORMAL
GLUCOSE BLDC GLUCOMTR-MCNC: 62 MG/DL (ref 70–99)
GLUCOSE BLDC GLUCOMTR-MCNC: 71 MG/DL (ref 70–99)

## 2024-10-21 PROCEDURE — 45380 COLONOSCOPY AND BIOPSY: CPT | Performed by: INTERNAL MEDICINE

## 2024-10-21 PROCEDURE — 45380 COLONOSCOPY AND BIOPSY: CPT | Performed by: STUDENT IN AN ORGANIZED HEALTH CARE EDUCATION/TRAINING PROGRAM

## 2024-10-21 PROCEDURE — T1013 SIGN LANG/ORAL INTERPRETER: HCPCS | Mod: U3

## 2024-10-21 PROCEDURE — 88305 TISSUE EXAM BY PATHOLOGIST: CPT | Mod: TC | Performed by: INTERNAL MEDICINE

## 2024-10-21 PROCEDURE — 82962 GLUCOSE BLOOD TEST: CPT | Performed by: PATHOLOGY

## 2024-10-21 PROCEDURE — 45380 COLONOSCOPY AND BIOPSY: CPT | Performed by: NURSE ANESTHETIST, CERTIFIED REGISTERED

## 2024-10-21 PROCEDURE — 88305 TISSUE EXAM BY PATHOLOGIST: CPT | Mod: 26 | Performed by: PATHOLOGY

## 2024-10-21 RX ORDER — SODIUM CHLORIDE, SODIUM LACTATE, POTASSIUM CHLORIDE, CALCIUM CHLORIDE 600; 310; 30; 20 MG/100ML; MG/100ML; MG/100ML; MG/100ML
INJECTION, SOLUTION INTRAVENOUS CONTINUOUS
Status: DISCONTINUED | OUTPATIENT
Start: 2024-10-21 | End: 2024-10-22 | Stop reason: HOSPADM

## 2024-10-21 RX ORDER — PROPOFOL 10 MG/ML
INJECTION, EMULSION INTRAVENOUS PRN
Status: DISCONTINUED | OUTPATIENT
Start: 2024-10-21 | End: 2024-10-21

## 2024-10-21 RX ORDER — ACETAMINOPHEN 325 MG/1
975 TABLET ORAL ONCE
Status: COMPLETED | OUTPATIENT
Start: 2024-10-21 | End: 2024-10-21

## 2024-10-21 RX ORDER — PROPOFOL 10 MG/ML
INJECTION, EMULSION INTRAVENOUS CONTINUOUS PRN
Status: DISCONTINUED | OUTPATIENT
Start: 2024-10-21 | End: 2024-10-21

## 2024-10-21 RX ORDER — ONDANSETRON 2 MG/ML
4 INJECTION INTRAMUSCULAR; INTRAVENOUS
Status: DISCONTINUED | OUTPATIENT
Start: 2024-10-21 | End: 2024-10-22 | Stop reason: HOSPADM

## 2024-10-21 RX ORDER — LIDOCAINE 40 MG/G
CREAM TOPICAL
Status: DISCONTINUED | OUTPATIENT
Start: 2024-10-21 | End: 2024-10-22 | Stop reason: HOSPADM

## 2024-10-21 RX ADMIN — PROPOFOL 150 MCG/KG/MIN: 10 INJECTION, EMULSION INTRAVENOUS at 12:03

## 2024-10-21 RX ADMIN — ACETAMINOPHEN 975 MG: 325 TABLET ORAL at 12:59

## 2024-10-21 RX ADMIN — PROPOFOL 50 MG: 10 INJECTION, EMULSION INTRAVENOUS at 12:08

## 2024-10-21 RX ADMIN — PROPOFOL 40 MG: 10 INJECTION, EMULSION INTRAVENOUS at 12:21

## 2024-10-21 RX ADMIN — SODIUM CHLORIDE, SODIUM LACTATE, POTASSIUM CHLORIDE, CALCIUM CHLORIDE: 600; 310; 30; 20 INJECTION, SOLUTION INTRAVENOUS at 11:44

## 2024-10-21 RX ADMIN — PROPOFOL 50 MG: 10 INJECTION, EMULSION INTRAVENOUS at 12:30

## 2024-10-21 NOTE — H&P
Gastroenterology Pre-op History and Physical    Nayeli Caal MRN# [id number]   Age: 44 year old YOB: 1980      Date of Surgery: 10/21/24  [facility]      Date of Exam 10/21/2024 Facility Same Day       Primary care provider: Mariya Mohamud         Chief Complaint and/or Reason for Procedure:     45 yo F with a PMH of HTN, DM II, visual impairment and congenital deafness, who was referred for elective colonoscopy for evaluations of diarrhea and lower abdominal pain.          Past Medical and Surgical History:     Past Medical History:   Diagnosis Date    Combined visual and hearing impairment     Deaf     Diabetic retinopathy of both eyes (H) 8/19/2011    Hepatic steatosis 08/19/2011    History of tobacco use     Hyperlipidemia     Hypertension     Hypertriglyceridemia     Migraines     Obesity 8/19/2011     Problem list name updated by automated process. 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     Past Surgical History:   Procedure Laterality Date    DAVINCI HYSTERECTOMY TOTAL, SALPINGECTOMY BILATERAL Bilateral 2/7/2024    Procedure: HYSTERECTOMY, TOTAL, ROBOT-ASSISTED, WITH BILATERAL SALPINGECTOMY, CYSTOSCOPY;  Surgeon: Vonnie Cowan MD;  [facility]    ESOPHAGOSCOPY, GASTROSCOPY, DUODENOSCOPY (EGD), COMBINED N/A 6/13/2024    Procedure: ESOPHAGOGASTRODUODENOSCOPY, WITH BIOPSY;  Surgeon: Nora Brice MD;  Location: [facility] OR    LAPAROSCOPIC CHOLECYSTECTOMY N/A 3/10/2018    Procedure: LAPAROSCOPIC CHOLECYSTECTOMY;  Laparoscopic Cholecystectomy ;  Surgeon: Kuldeep Sigala MD;  [facility]: [facility]    RELEASE TRIGGER FINGER Right 5/2/2019    Procedure: Right Thumb Trigger Release;  Surgeon: Greg Streeter MD;  [facility]    RELEASE TRIGGER FINGER Left  "5/30/2019    Procedure: Left Ring Trigger Finger Release.  Ganglion cyst excision.;  Surgeon: Greg Streeter MD;  [facility]    RELEASE TRIGGER FINGER Right 6/13/2023    Procedure: RELEASE, RIGHT TRIGGER FINGER, INDEX AND MIDDLE;  Surgeon: Miracle Carlin MD;  Location: [facility] OR    RELEASE TRIGGER FINGER Left 8/1/2023    Procedure: RELEASE, LEFT TRIGGER FINGER, MIDDLE;  Surgeon: Miracle Carlin MD;  Location: [facility] OR            Medications (include herbals and vitamins):        (Not in a hospital admission)            Allergies:    No Known Allergies            Physical Exam:   All vitals have been reviewed  Patient Vitals for the past 8 hrs:   BP Temp Temp src Pulse Resp SpO2 Height Weight   10/21/24 1115 138/75 97  F (36.1  C) Temporal 71 18 97 % 1.549 m (5' 1\") 74.8 kg (165 lb)     No intake/output data recorded.    General: Lying in bed, in NAD  ENT: Normocephalic, atraumatic   Lungs: Normal work of breathing   Cardiovascular: Normal rate, regular rhythm   Abdomen: Soft, non-tender             Anesthetic risk and/or ASA classification:   ASA: 2    Peggy Miguel MD       "

## 2024-10-21 NOTE — LETTER
"October 28, 2024      Nayeli Caal  [address]   [place] [place] [address]        Dear Ms.Wezel Caal,    The biopsies from your colon were normal and showed no evidence of inflammation or infection. Please follow with your primary provider for further management.     Resulted Orders   Surgical Pathology Exam   Result Value Ref Range    Case Report       Surgical Pathology Report                         Case: [id number]                                  Authorizing Provider:  Peggy Miguel MD      Collected:           10/21/2024 12:21 PM          Ordering Location:     [facility] OR  Received:            10/21/2024 02:06 PM                                 [place]                                                                  Pathologist:           Noam Sotomayor DO                                                            Specimens:   A) - Large Intestine, Colon, Right Colon Biopsies                                                   B) - Large Intestine, Colon, Left Colon Biopsies                                           Final Diagnosis       A.  COLON, RIGHT, BIOPSY:  - Unremarkable colonic mucosa  - No significant acute cryptitis, chronic changes, or microscopic colitis identified    B.  COLON, LEFT, BIOPSY:  - Unremarkable colonic mucosa  - No significant acute cryptitis, chronic changes, or microscopic colitis identified        Clinical Information       Colitis      Gross Description       A(1). Large Intestine, Colon, Right Colon Biopsies:  The specimen is received in formalin with proper patient identification, labeled \"right colon biopsies\".  The specimen consists of 3 pieces of tan-white soft tissue ranging from 0.2 to 0.6 cm in greatest dimension.  Submitted in A1.   B(2). Large Intestine, Colon, Left Colon Biopsies:  The specimen is received in formalin with proper patient identification, labeled \"left colon biopsies\".  The specimen consists of 7 pieces of " tan-white soft tissue ranging from 0.2 to 0.3 cm in greatest dimension.  Submitted in B1.       Microscopic Description       Microscopic examination performed.        Performing Labs       The technical component of this testing was completed at [facility].    Stain controls for all stains resulted within this report have been reviewed and show appropriate reactivity.       Case Images         If you have any questions or concerns, please call the clinic at the number listed above.       Sincerely,      Peggy Miguel MD

## 2024-10-21 NOTE — ANESTHESIA POSTPROCEDURE EVALUATION
Patient: Nayeli Caal    Procedure: Procedure(s):  COLONOSCOPY, WITH BIOPSIES       Anesthesia Type:  MAC    Note:  Disposition: Outpatient   Postop Pain Control: Uneventful            Sign Out: Well controlled pain   PONV: No   Neuro/Psych: Uneventful            Sign Out: Acceptable/Baseline neuro status   Airway/Respiratory: Uneventful            Sign Out: Acceptable/Baseline resp. status   CV/Hemodynamics: Uneventful            Sign Out: Acceptable CV status; No obvious hypovolemia; No obvious fluid overload   Other NRE:    DID A NON-ROUTINE EVENT OCCUR?            Last vitals:  Vitals Value Taken Time   /77 10/21/24 1300   Temp 36.1  C (97  F) 10/21/24 1300   Pulse 73 10/21/24 1300   Resp 16 10/21/24 1300   SpO2 97 % 10/21/24 1300       Electronically Signed By: Richie Richter MD  October 21, 2024  2:00 PM

## 2024-10-21 NOTE — ANESTHESIA CARE TRANSFER NOTE
Patient: Nayeli Caal    Procedure: Procedure(s):  COLONOSCOPY, WITH BIOPSIES       Diagnosis: Colitis [K52.9]  Weight loss [R63.4]  Diarrhea of presumed infectious origin [R19.7]  Diagnosis Additional Information: No value filed.    Anesthesia Type:   MAC     Note:    Oropharynx: spontaneously breathing  Level of Consciousness: awake  Oxygen Supplementation: room air    Independent Airway: airway patency satisfactory and stable  Dentition: dentition unchanged  Vital Signs Stable: post-procedure vital signs reviewed and stable  Report to RN Given: handoff report given  Patient transferred to: Phase II    Handoff Report: Identifed the Patient, Identified the Reponsible Provider, Reviewed the pertinent medical history, Discussed the surgical course, Reviewed Intra-OP anesthesia mangement and issues during anesthesia, Set expectations for post-procedure period and Allowed opportunity for questions and acknowledgement of understanding  Vitals:  Vitals Value Taken Time   BP     Temp     Pulse 79 10/21/24 1230   Resp     SpO2         Electronically Signed By: SABI Pascual CRNA  October 21, 2024  12:36 PM

## 2024-10-21 NOTE — PROGRESS NOTES
Blood sugar 62.  Patient states she feels fine. Dr. Richter notifed-will monitor and recheck BS in recovery.

## 2024-10-23 ENCOUNTER — TELEPHONE (OUTPATIENT)
Dept: GASTROENTEROLOGY | Facility: CLINIC | Age: 44
End: 2024-10-23

## 2024-10-23 ENCOUNTER — LAB (OUTPATIENT)
Dept: LAB | Facility: CLINIC | Age: 44
End: 2024-10-23
Payer: COMMERCIAL

## 2024-10-23 ENCOUNTER — ALLIED HEALTH/NURSE VISIT (OUTPATIENT)
Dept: INTERNAL MEDICINE | Facility: CLINIC | Age: 44
End: 2024-10-23
Payer: COMMERCIAL

## 2024-10-23 ENCOUNTER — OFFICE VISIT (OUTPATIENT)
Dept: GASTROENTEROLOGY | Facility: CLINIC | Age: 44
End: 2024-10-23
Attending: PHYSICIAN ASSISTANT
Payer: COMMERCIAL

## 2024-10-23 VITALS
OXYGEN SATURATION: 97 % | DIASTOLIC BLOOD PRESSURE: 85 MMHG | HEART RATE: 91 BPM | BODY MASS INDEX: 32 KG/M2 | HEIGHT: 61 IN | SYSTOLIC BLOOD PRESSURE: 130 MMHG | WEIGHT: 169.5 LBS

## 2024-10-23 DIAGNOSIS — R63.4 WEIGHT LOSS: ICD-10-CM

## 2024-10-23 DIAGNOSIS — R11.0 NAUSEA: ICD-10-CM

## 2024-10-23 DIAGNOSIS — R10.9 ABDOMINAL CRAMPING: Primary | ICD-10-CM

## 2024-10-23 DIAGNOSIS — R19.7 DIARRHEA, UNSPECIFIED TYPE: ICD-10-CM

## 2024-10-23 DIAGNOSIS — R14.0 BLOATING: ICD-10-CM

## 2024-10-23 DIAGNOSIS — R10.32 LLQ ABDOMINAL PAIN: ICD-10-CM

## 2024-10-23 DIAGNOSIS — Z23 NEED FOR VACCINATION: Primary | ICD-10-CM

## 2024-10-23 DIAGNOSIS — R10.31 ABDOMINAL PAIN, RIGHT LOWER QUADRANT: ICD-10-CM

## 2024-10-23 LAB
ANION GAP SERPL CALCULATED.3IONS-SCNC: 12 MMOL/L (ref 7–15)
BASOPHILS # BLD AUTO: 0.1 10E3/UL (ref 0–0.2)
BASOPHILS NFR BLD AUTO: 1 %
BUN SERPL-MCNC: 12 MG/DL (ref 6–20)
CALCIUM SERPL-MCNC: 9.6 MG/DL (ref 8.8–10.4)
CHLORIDE SERPL-SCNC: 105 MMOL/L (ref 98–107)
CREAT SERPL-MCNC: 0.79 MG/DL (ref 0.51–0.95)
EGFRCR SERPLBLD CKD-EPI 2021: >90 ML/MIN/1.73M2
EOSINOPHIL # BLD AUTO: 0.3 10E3/UL (ref 0–0.7)
EOSINOPHIL NFR BLD AUTO: 3 %
ERYTHROCYTE [DISTWIDTH] IN BLOOD BY AUTOMATED COUNT: 13.2 % (ref 10–15)
GLUCOSE SERPL-MCNC: 162 MG/DL (ref 70–99)
HCO3 SERPL-SCNC: 23 MMOL/L (ref 22–29)
HCT VFR BLD AUTO: 38.6 % (ref 35–47)
HGB BLD-MCNC: 12.7 G/DL (ref 11.7–15.7)
IMM GRANULOCYTES # BLD: 0 10E3/UL
IMM GRANULOCYTES NFR BLD: 0 %
LYMPHOCYTES # BLD AUTO: 2.5 10E3/UL (ref 0.8–5.3)
LYMPHOCYTES NFR BLD AUTO: 26 %
MCH RBC QN AUTO: 28 PG (ref 26.5–33)
MCHC RBC AUTO-ENTMCNC: 32.9 G/DL (ref 31.5–36.5)
MCV RBC AUTO: 85 FL (ref 78–100)
MONOCYTES # BLD AUTO: 0.6 10E3/UL (ref 0–1.3)
MONOCYTES NFR BLD AUTO: 6 %
NEUTROPHILS # BLD AUTO: 6.3 10E3/UL (ref 1.6–8.3)
NEUTROPHILS NFR BLD AUTO: 65 %
NRBC # BLD AUTO: 0 10E3/UL
NRBC BLD AUTO-RTO: 0 /100
PLATELET # BLD AUTO: 440 10E3/UL (ref 150–450)
POTASSIUM SERPL-SCNC: 4.7 MMOL/L (ref 3.4–5.3)
RBC # BLD AUTO: 4.53 10E6/UL (ref 3.8–5.2)
SODIUM SERPL-SCNC: 140 MMOL/L (ref 135–145)
WBC # BLD AUTO: 9.8 10E3/UL (ref 4–11)

## 2024-10-23 PROCEDURE — T1013 SIGN LANG/ORAL INTERPRETER: HCPCS | Mod: U3

## 2024-10-23 PROCEDURE — 99207 PR NO CHARGE NURSE ONLY: CPT

## 2024-10-23 PROCEDURE — 99215 OFFICE O/P EST HI 40 MIN: CPT | Performed by: PHYSICIAN ASSISTANT

## 2024-10-23 PROCEDURE — 80048 BASIC METABOLIC PNL TOTAL CA: CPT | Performed by: PATHOLOGY

## 2024-10-23 PROCEDURE — 90480 ADMN SARSCOV2 VAC 1/ONLY CMP: CPT

## 2024-10-23 PROCEDURE — 90471 IMMUNIZATION ADMIN: CPT

## 2024-10-23 PROCEDURE — 83993 ASSAY FOR CALPROTECTIN FECAL: CPT | Performed by: PHYSICIAN ASSISTANT

## 2024-10-23 PROCEDURE — 90656 IIV3 VACC NO PRSV 0.5 ML IM: CPT

## 2024-10-23 PROCEDURE — 85025 COMPLETE CBC W/AUTO DIFF WBC: CPT | Performed by: PATHOLOGY

## 2024-10-23 PROCEDURE — 36415 COLL VENOUS BLD VENIPUNCTURE: CPT | Performed by: PATHOLOGY

## 2024-10-23 PROCEDURE — 91320 SARSCV2 VAC 30MCG TRS-SUC IM: CPT

## 2024-10-23 PROCEDURE — 99000 SPECIMEN HANDLING OFFICE-LAB: CPT | Performed by: PATHOLOGY

## 2024-10-23 ASSESSMENT — PAIN SCALES - GENERAL: PAINLEVEL_OUTOF10: NO PAIN (0)

## 2024-10-23 NOTE — TELEPHONE ENCOUNTER
Patient confirmed scheduled appointment:  Date: 10/23/24  Time: 4 pm  Visit type: labs  Provider: Kirsten Conn  Location: [facility] [place] - 1st floor  Testing/imaging: n/a  Additional notes: n/a    "Nothing"

## 2024-10-23 NOTE — PROGRESS NOTES
GI CLINIC VISIT  ASSESSMENT/PLAN:    44 year old female with PMH of type 2 diabetes on insulin presenting to GI clinic for diarrhea and abdominal pain follow-up. Here with professional .     # Diarrhea  # Abdominal pain, cramping   9/4/24 CT abdomen pelvis with contrast showing mild colitis starting at mid transverse extending to rectum that was thought to be infectious (enteric/c.diff NEG). Repeat CTAP W contrast 10/2/2024 showed a resolved colitis. Subsequent colonoscopy 10/21/24 with BBPS and TI intubation with normal macro colon. Random colon biopsies were unremarkable. Basic labs have been stable on trend including mild leukocytosis without L shift and she has been overall maintaining oral intake. Fecal calpro (9/11/24) was in the 300s congruent with colitis seen on CTAP; repeat study pending completion. C.diff stool study is also pending completion. She is nontoxic appearing and hemodynamically stable. HR with some elevation at 91 on intake. She displayed some tenderness to palpation of upper abdomen on exam.     She had been doing better until yesterday when she developed nausea with some abdominal cramping. No fevers, emesis or bloody stools. Her last BM was this AM (looser consistency) and she has not had any trouble passing flatus. Her CGM read 144 during the visit and she had self-decreased her glargine from 30 to 25 units this AM due to decreased oral intake yesterday. She plans to follow this up with a discussion with her diabetic educator. We will plan to obtain basic labs today (CMP active in sytem from another provider) and I asked she update me tomorrow. I instructed she stop drinking soda and start scheduled Gas-X. ER precautions were reviewed today.     Plan  -start scheduled gas-X. She has zofran at home, encouraged she take a dose when she get home  -obtain basic labs  -dietary consult given weight loss with this GI illness (starting early Sept)   -stop soda, encouraged good  hydration with broths and oral intake as tolerated   -mychart update to me tomorrow with strict ER precautions today     RTC - 1 mo    Thank you for this consultation. It was a pleasure to participate in the care of this patient; please contact us with any further questions.    Kirsten Conn PA-C    Follow up: As planned above. Today, I personally spent 25 minutes in direct face to face time with the patient, of which greater than 50% of the time was spent in patient education and counseling as described above. Approximately 15 minutes were spent on indirect care associated with the patient's consultation including but not limited to review of: patient medical records to date, clinic visits, hospital records, lab results, imaging studies, procedural documentation, and coordinating care with other providers. The findings from this review are summarized in the above note. All of the above accounted for a cumulative time of 40 minutes and was performed on the date of service.     HPI: 44 year old female with PMH of type 2 diabetes on insulin presenting to GI clinic for diarrhea/abd pain.      She presents for short term follow up for acute on chronic GI symptoms following an episode of colitis seen 9/4 on CTAP at the ED. Repeat CTAP 10/2/24 showed a resolved colitis. Trended labs have been stable CBC with wbc 11-12s but no L shift. Renal function with slight bump but still within normal range.     Today 10/23/24  Here with professional    Did cscope 10/21 and did well with it.   Feels nauseated w/o emesis that started yesterday. No fever, chills. Hydrating OK.   Also developed some abd cramping as well.   BS was 80 yesterday, her CGM read 144 in clinic. Took zofran x 1 yesterday and she thinks it may have helped. Did not take zofran today.   Weight is  up some which is good, lowest was 165#, today at 169#. Not to her baseline yet.    No dizziness, syncope.   No heartburn - still on omeprazole  BM - are now  "\"fine.\" Yesterday she had a BM, \"normal\" but thin. This AM she had a looser stool w/o blood or black. No trouble with flatus, had some this AM.   She had better oral intake leading up to colonoscopy then yesterday, started with depressed appetite. Drinking soda.     Wt Readings from Last 10 Encounters:   10/23/24 76.9 kg (169 lb 8 oz)   10/21/24 74.8 kg (165 lb)   10/18/24 77.6 kg (171 lb)   10/10/24 77.7 kg (171 lb 4.8 oz)   10/07/24 78 kg (172 lb)   10/02/24 77.6 kg (171 lb)   10/02/24 77.6 kg (171 lb)   09/18/24 78 kg (171 lb 14.4 oz)   09/11/24 79.3 kg (174 lb 12.8 oz)   09/06/24 80.7 kg (177 lb 14.4 oz)     10/2/24  Here with professional    She reports doing well all of last week with no diarrhea, some weight gain and no abdominal pain however for unclear reasons had started with abdominal pain, nausea w/o emesis and diarrhea again on Monday 9/30.  She had been taking Imodium the last 2 days but still had 4-5 loose bowel movements today.  No bloody or black stools  She is reporting abdominal pain again.  Historically she had some pelvic surgery in February of this year and had some residual right lower quadrant abdominal pain.  That eventually went away.  On Monday, this RLQ abd pain came back.  She is not taking Tylenol  She denies fevers, chills, nausea or vomiting  Her blood sugar levels have been stable for her.  She does meet with a diabetic educator and states her Mounjaro was recently increased.  She had been on this medication for the past 5 months.  She continues with Basaglar insulin.   Appetite remains depressed.  Her home weight was 164 this morning.  In clinic today we measured her at 171 pounds.  No f/c/n/v/bloody stools/black stools. No dizziness/syncope  Scheduled for csocpe 10/21 with Dr Miguel    Stooling Journal   9/18 - appt with me   9/23-27 - no BM for a few days after our appt, then 3 days of normal formed stools, then no BM until 9/30. Did not take imodium last week. "   9/30 Monday - normal BM, developed stomach cramping, had a normal BM, then had liquid diarrhea. Slept OK but from 2-3 AM she woke up due to stomach cramping.    Took Imodium 4 mg in AM, 4 mg in afternoon    9/18/24  Here with professional    She feels  unwell still  On sat developed dental pain and went to dental ER on Sunday. Ultimately had dental extraction with some improvement in this pain. No abx. She feels this contributed to not being able to eat well.   Appetite still somewhat depressed. Down to 171.9# today, lost a few more lb from last week.   No longer having abdominal pain  Had a drop to 70-80s but has since since worked with her DM educator and dose of insulin has been changed. Most recently BS have been normal for her   No f/c/n/v/bloody stools/black stools. No dizziness/syncope    Stooling journal   9/12 Thur - loose stools. 2 imoidum AM and 1 PM  9/13 Friday - loose stools. no imodium. Had a more normal BM   9/14 Saturday - Had a more normal BM. no imodium.   9/15 Ben - had 2 normal BM then a loose stool in evening. No imodium.   9/16 Monday - had some diarrhea again - 3-4 loose stools. She took 2 tabs imodium in AM and her after BM was normal   9/17 Tue - normal stool  9/18 Wed (today) - 1 loose stool at 6 am. No imodium. No further loose stools either.     9/11/24 - our initial appt following ER evaluation 9/4  Starting September 2, she started feeling unwell with abdominal discomfort.  Later that day she then developed loose stools and had lower abdominal pain. She thinks this started after eating bad eggs. The diarrhea persisted throughout the week -she was seen at the emergency room 9 4.  Labs showed a white count to the 12's,  a bump in her BUN and with CRT to 0.76 (baseline 0.5-0.76). no anion gap. CTAP mild colitis starting from mid transverse all the way to rectum.  Subsequent stool studies were negative for infection to include enteric panel and C. difficile.  Her  symptoms improved that weekend but flared up again on 9/9.  It continues today.     She is reporting lower abdominal pain that she only notices with defecation or if she presses on the abdomen.  Sitting upright in a chair she denies any pain in her abdomen.  She reports frequent loose stools, unable to quantify the amount, consistency watery oatmeal.  She denies bloody or black stools.    She tried Maalox without improvement in the symptoms.  She took Pepto-Bismol once at its onset, it did not help.    Never had fevers, chills, nausea or vomiting.    She is scared to eat as she does not want to prompt diarrhea.  She is avoiding coffee and heavily seasoned foods.  She would like some guidance on dietary intake.  She is still going to work, and is requesting for work accommodations.  She is in office Tuesday Wednesday Thursday, work from home on Mondays and Fridays.  She is a .     She is a type II diabetic on insulin -continues with Basaglar 55 units daily.  She does have rapid aspart insulin that she takes as needed.  Her fasting sugars have been her baseline in the 90s to 120s.  She denies hypoglycemic episodes with this recent illness.      She denies dizziness lightheadedness, syncope.    She lives with a roommate at home.    PAST MEDICAL HISTORY:  Past Medical History:   Diagnosis Date    Combined visual and hearing impairment     Deaf     Diabetic retinopathy of both eyes (H) 8/19/2011    Hepatic steatosis 08/19/2011    History of tobacco use     Hyperlipidemia     Hypertension     Hypertriglyceridemia     Migraines     Obesity 8/19/2011     Problem list name updated by automated process. 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       PREVIOUS ABDOMINAL/GYNECOLOGIC SURGERIES:  Past Surgical History:   Procedure Laterality Date    COLONOSCOPY  N/A 10/21/2024    Procedure: COLONOSCOPY, WITH BIOPSIES;  Surgeon: Peggy Miguel MD;  Location: [facility] OR    DAVINCI HYSTERECTOMY TOTAL, SALPINGECTOMY BILATERAL Bilateral 2/7/2024    Procedure: HYSTERECTOMY, TOTAL, ROBOT-ASSISTED, WITH BILATERAL SALPINGECTOMY, CYSTOSCOPY;  Surgeon: Vonnie Cowan MD;  Location: [facility] OR    ESOPHAGOSCOPY, GASTROSCOPY, DUODENOSCOPY (EGD), COMBINED N/A 6/13/2024    Procedure: ESOPHAGOGASTRODUODENOSCOPY, WITH BIOPSY;  Surgeon: Nora Brice MD;  Location: [facility] OR    LAPAROSCOPIC CHOLECYSTECTOMY N/A 3/10/2018    Procedure: LAPAROSCOPIC CHOLECYSTECTOMY;  Laparoscopic Cholecystectomy ;  Surgeon: Kuldeep Sigala MD;  Location: [facility] OR    RELEASE TRIGGER FINGER Right 5/2/2019    Procedure: Right Thumb Trigger Release;  Surgeon: Greg Streeter MD;  [facility]    RELEASE TRIGGER FINGER Left 5/30/2019    Procedure: Left Ring Trigger Finger Release.  Ganglion cyst excision.;  Surgeon: Greg Streeter MD;  [facility]    RELEASE TRIGGER FINGER Right 6/13/2023    Procedure: RELEASE, RIGHT TRIGGER FINGER, INDEX AND MIDDLE;  Surgeon: Miracle Carlin MD;  Location: [facility] OR    RELEASE TRIGGER FINGER Left 8/1/2023    Procedure: RELEASE, LEFT TRIGGER FINGER, MIDDLE;  Surgeon: Miracle Carlin MD;  Location: [facility] OR         PERTINENT MEDICATIONS:  Current Outpatient Medications   Medication Sig Dispense Refill    acetaminophen (TYLENOL) 500 MG tablet Take 2 tablets (1,000 mg) by mouth every 8 hours as needed for mild pain 100 tablet 3    Alcohol Swabs (ALCOHOL PREP PAD) 70 % PADS 1 each 3 times daily 100 each 3    alum & mag hydroxide-simethicone (MAALOX) 200-200-20 MG/5ML SUSP suspension Take 10 mLs by mouth every 6 hours as needed for indigestion. 355 mL 1    amLODIPine (NORVASC) 5 MG tablet Take 1 tablet (5 mg) by mouth at bedtime 90 tablet 3    aspirin (ASA) 81 MG chewable tablet Take 1 tablet (81 mg) by mouth daily CHEW AND SWALLOW 90 tablet 1     atorvastatin (LIPITOR) 40 MG tablet Take 1 tablet (40 mg) by mouth daily 90 tablet 1    bisacodyl (DULCOLAX) 5 MG EC tablet Two days prior to exam take two (2) tablets at 4pm. One day prior to exam take two (2) tablets at 4pm 4 tablet 0    blood glucose (ACCU-CHEK LAYA PLUS) test strip Use with  Accucheck Expert meter.  Test blood sugar 6 times daily. 600 each 3    blood glucose monitoring (ACCU-CHEK FASTCLIX) lancets Use to test blood sugar 6 times daily or as directed 510 each 3    calcium carbonate-vitamin D (OSCAL) 500-5 MG-MCG tablet Take 1 tablet by mouth 2 times daily 180 tablet 1    Continuous Glucose Sensor (FREESTYLE ZORAIDA 3 SENSOR) [facility] 1 each every 14 days Please provide 3 month supply. 6 each 3    diclofenac (VOLTAREN) 1 % topical gel Apply 2 g topically 4 times daily. For L shoulder pain 100 g 3    dicyclomine (BENTYL) 10 MG capsule Take 1 capsule (10 mg) by mouth 4 times daily as needed (Abdominal pain/cramping). 40 capsule 0    empagliflozin (JARDIANCE) 25 MG TABS tablet Take 1 tablet (25 mg) by mouth daily. 90 tablet 1    ergocalciferol (ERGOCALCIFEROL) 1.25 MG (18979 UT) capsule Take 1 capsule (50,000 Units) by mouth once a week For additional refills, please schedule a follow-up appointment at [phone] 12 capsule 3    fenofibrate (TRIGLIDE/LOFIBRA) 160 MG tablet Take 1 tablet (160 mg) by mouth daily 90 tablet 3    fish oil-omega-3 fatty acids 1000 MG capsule TAKE TWO CAPSULES (2 GM) BY MOUTH ONCE DAILY 180 capsule 3    gabapentin (NEURONTIN) 100 MG capsule Take 1 capsule (100 mg) by mouth 3 times daily 42 capsule 1    ibuprofen (ADVIL/MOTRIN) 600 MG tablet Take 1 tablet (600 mg) by mouth every 6 hours as needed for moderate pain 120 tablet 1    Injection Device for insulin (INPEN 100-PINK-NOVOLOG-FIASP) DAVID 1 each continuous 1 each 0    insulin aspart (NOVOLOG PENFILL) 100 UNIT/ML cartridge Take with each meal and snack: 1 per 4 grams CHO and correction of 1 unit per 20 mg/dL over a target  of 120. Average daily dose is 40 units. 40 mL 3    insulin glargine 100 UNIT/ML pen 3 month supply.Inject 55 units daily 45 mL 3    insulin pen needle (31G X 5 MM) 31G X 5 MM miscellaneous Use 5 to 6 pen needles daily or as directed.  3 month supply. 500 each 3    losartan (COZAAR) 100 MG tablet Take 1 tablet (100 mg) by mouth daily 90 tablet 3    nitroFURantoin macrocrystal-monohydrate (MACROBID) 100 MG capsule Take 1 capsule (100 mg) by mouth 2 times daily for 5 days. Do NOT fill before 10/20/2024 10 capsule 0    omeprazole (PRILOSEC) 40 MG DR capsule Take 1 capsule (40 mg) by mouth 2 times daily 90 capsule 0    omeprazole (PRILOSEC) 40 MG DR capsule Take 1 capsule (40 mg) by mouth 2 times daily 8-12 weeks 90 capsule 0    ondansetron (ZOFRAN ODT) 4 MG ODT tab Take 1 tablet (4 mg) by mouth every 6 hours as needed for nausea or vomiting 20 tablet 0    polyethylene glycol (GOLYTELY) 236 g suspension Two days before procedure at 5PM fill first container with water. Mix and drink an 8 oz glass every 15 minutes until HALF of the container is gone. Place the remainder in the refrigerator. One day before procedure at 5PM drink second half of bowel prep. Drink an 8 oz glass every 15 minutes until it is gone. Day of procedure 6 hours before arrival time fill the 2nd container with water. Mix and drink an 8 oz glass every 15 minutes until HALF of the container is gone. Discard the remaining solution. 8000 mL 0    tirzepatide (MOUNJARO) 10 MG/0.5ML pen Inject 10 mg subcutaneously every 7 days. 2 mL 4         SOCIAL HISTORY:  Social History     Socioeconomic History    Marital status: Single     Spouse name: Not on file    Number of children: Not on file    Years of education: Not on file    Highest education level: Not on file   Occupational History    Not on file   Tobacco Use    Smoking status: Former     Current packs/day: 0.00     Types: Cigarettes     Quit date: 2010     Years since quittin.9     Passive  exposure: Never    Smokeless tobacco: Never    Tobacco comments:     stopped 10 yrs ago   Substance and Sexual Activity    Alcohol use: Not Currently     Comment: socially    Drug use: Not Currently     Types: Marijuana     Comment: much younger 2009 or earlier    Sexual activity: Not Currently     Partners: Male     Birth control/protection: I.U.D.   Other Topics Concern    Parent/sibling w/ CABG, MI or angioplasty before 65F 55M? Not Asked     Service No    Blood Transfusions No    Caffeine Concern No    Occupational Exposure No    Hobby Hazards No    Sleep Concern No    Stress Concern No    Weight Concern Yes    Special Diet No    Back Care No    Exercise Yes     Comment: 4-5 x a week    Bike Helmet No    Seat Belt Yes    Self-Exams Yes   Social History Narrative    Not on file     Social Drivers of Health     Financial Resource Strain: Low Risk  (1/4/2024)    Financial Resource Strain     Within the past 12 months, have you or your family members you live with been unable to get utilities (heat, electricity) when it was really needed?: No   Food Insecurity: Low Risk  (1/4/2024)    Food Insecurity     Within the past 12 months, did you worry that your food would run out before you got money to buy more?: No     Within the past 12 months, did the food you bought just not last and you didn t have money to get more?: No   Transportation Needs: Low Risk  (1/4/2024)    Transportation Needs     Within the past 12 months, has lack of transportation kept you from medical appointments, getting your medicines, non-medical meetings or appointments, work, or from getting things that you need?: No   Recent Concern: Transportation Needs - High Risk (10/30/2023)    Transportation Needs     Within the past 12 months, has lack of transportation kept you from medical appointments, getting your medicines, non-medical meetings or appointments, work, or from getting things that you need?: Yes   Physical Activity: Not on file  "  Stress: Not on file   Social Connections: Not on file   Interpersonal Safety: Low Risk  (10/21/2024)    Interpersonal Safety     Do you feel physically and emotionally safe where you currently live?: Yes     Within the past 12 months, have you been hit, slapped, kicked or otherwise physically hurt by someone?: No     Within the past 12 months, have you been humiliated or emotionally abused in other ways by your partner or ex-partner?: No   Housing Stability: Low Risk  (1/4/2024)    Housing Stability     Do you have housing? : Yes     Are you worried about losing your housing?: No       FAMILY HISTORY:  Family History   Adopted: Yes   Problem Relation Age of Onset    Unknown/Adopted Other        PHYSICAL EXAMINATION:  Vitals reviewed: /85   Pulse 91   Ht 1.549 m (5' 1\")   Wt 76.9 kg (169 lb 8 oz)   LMP 02/01/2024 (Approximate)   SpO2 97%   BMI 32.03 kg/m    Wt:   Wt Readings from Last 2 Encounters:   10/23/24 76.9 kg (169 lb 8 oz)   10/21/24 74.8 kg (165 lb)      Constitutional: aaox3, cooperative, pleasant, not dyspneic/diaphoretic, no acute distress  Eyes: Sclera anicteric/injected  Neck: supple, active ROM w/o limitation or pain   CV: No edema  Respiratory: Unlabored breathing  Abd:  tenderness to palpation of upper abdomen. Able to palpate other parts of abdomen. Sitting comfortably in chair, moving without issues.   Skin: warm, perfused, no jaundice  Psych: Normal affect  MSK: Normal gait     PERTINENT STUDIES - Reviewed in EMR     Lab Results   Component Value Date    WBC 13.3 (H) 10/02/2024    WBC 11.3 (H) 09/19/2024    WBC 12.1 (H) 09/11/2024    HGB 12.3 10/02/2024    HGB 12.8 09/19/2024    HGB 12.3 09/11/2024     10/02/2024     09/19/2024     09/11/2024    CHOL 183 01/04/2024    CHOL 221 (H) 04/04/2023    CHOL 194 07/19/2021    TRIG 350 (H) 01/04/2024    TRIG 298 (H) 04/04/2023    TRIG 133 07/19/2021    HDL 37 (L) 01/04/2024    HDL 38 (L) 04/04/2023    HDL 43 (L) 07/19/2021 "    ALT 28 10/02/2024    ALT 29 09/11/2024    ALT 42 09/04/2024    AST 21 10/02/2024    AST 18 09/11/2024    AST 25 09/04/2024     10/02/2024     09/19/2024     09/11/2024    BUN 20.8 (H) 10/02/2024    BUN 15.0 09/19/2024    BUN 23.5 (H) 09/11/2024    CO2 20 (L) 10/02/2024    CO2 28 09/19/2024    CO2 21 (L) 09/11/2024    TSH 1.41 04/04/2023    TSH 1.34 07/08/2020    TSH 1.84 05/17/2018    INR 1.03 06/18/2010    INR 0.92 06/18/2010        Liver Function Studies -   Recent Labs   Lab Test 09/04/24  1224   PROTTOTAL 7.6   ALBUMIN 4.4   BILITOTAL 0.2   ALKPHOS 62   AST 25   ALT 42        PREVIOUS ENDOSCOPY

## 2024-10-23 NOTE — NURSING NOTE
"Chief Complaint   Patient presents with    Follow Up       Vitals:    10/23/24 0659   BP: 130/85   Pulse: 91   SpO2: 97%   Weight: 76.9 kg (169 lb 8 oz)   Height: 1.549 m (5' 1\")       Body mass index is 32.03 kg/m .    Miri Parker MA    "

## 2024-10-23 NOTE — TELEPHONE ENCOUNTER
Patient confirmed scheduled appointment:  Date: 11/22/24  Time: 10:45 am  Visit type: RET GI  Provider: Kirsten Conn  [place] [facility] [place] [facility]  Testing/imaging: n/a  Additional notes: n/a

## 2024-10-23 NOTE — TELEPHONE ENCOUNTER
Patient confirmed scheduled appointment:  Date: 11/25/24  Time: 3:30 pm  Visit type: NEW GI Nutrition  Provider: Latricia Alex  Location: [facility] 4th floor  Testing/imaging: n/a  Additional notes: n/a

## 2024-10-23 NOTE — PROGRESS NOTES
Nayeli Caal received the Covid-19 and Influenza vaccines today in clinic at the request of Mariya Mohamud CNP. The immunization was given under the supervision of Dr. Velazquez if assistance was needed. The patient does not report a history of adverse reactions associated with vaccine administration. The immunization site was cleaned with an alcohol prep wipe. The immunization was given without incident--see immunization list for administration details. No swelling or redness was observed at the site of injection after the immunization was given. The patient was advised to remain in [facility] for fifteen minutes after the injection in case of an adverse reaction.     Chasity Lopez, EMT at 3:15 PM on 10/23/2024

## 2024-10-23 NOTE — PATIENT INSTRUCTIONS
It was a pleasure taking care of you today.  I've included a brief summary of our discussion and care plan from today's visit below.  Please review this information with your primary care provider.  _______________________________________________________________________    My recommendations are summarized as follows:    Blood work   Start gas-X today - send a [employer] message to me in tomorrow   Dietary consult  Reach out to diabetes educator on insulin     Please call our clinic or send a [employer] message to us if you have any questions or concerns. [employer] messages are answered by your nurse or doctor typically within 24 hours.  Please allow extra time on weekends and holidays    Return to GI Clinic in ~1 months to review your progress.    _______________________________________________________________________    How do I schedule labs, imaging studies, or procedures that were ordered in clinic today?      Labs: To schedule lab appointment at [facility], use my chart or call [phone]. If you have a [facility] lab closer to home where you are regularly seen you can give them a call.      Procedures: If a colonoscopy, upper endoscopy, breath test, esophageal manometry, or pH impedence was ordered today, our endoscopy team will call you to schedule this. If you have not heard from our endoscopy team within a week, please call [phone] option 2 to schedule.      Imaging Studies: If you were scheduled for a CT scan, X-ray, MRI, ultrasound, HIDA scan, EKG or other imaging study, please call [phone] to have this scheduled.      Referral: If a referral to another specialty was ordered, expect a phone call or follow instructions above. If you have not heard from anyone regarding your referral in a week, please call our clinic to check the status.      Who do I call with any questions after my visit?  Please be in touch if there are any further questions that arise following today's visit.   There are multiple ways to contact your gastroenterology care team.       During business hours, you may reach a Gastroenterology nurse at [phone]     To schedule or reschedule an appointment, please call [phone].      You can always send a secure message through Hello[employer]ax.  HelloFax messages are answered by your nurse or doctor typically within 24 hours.  Please allow extra time on weekends and holidays.       For urgent/emergent questions after business hours, you may reach the on-call GI Fellow by contacting the [facility]  at [phone].     How will I get the results of any tests ordered?    You will receive all of your results.  If you have signed up for [employer], any tests ordered at your visit will be available to you after your physician reviews them.  Typically this takes 1-2 weeks.  If there are urgent results that require a change in your care plan, your physician or nurse will call you to discuss the next steps.       What is HelloFax?  HelloFax is a secure way for you to access all of your healthcare records from the [facility].  It is a web based computer program, so you can sign on to it from any location.  It also allows you to send secure messages to your care team.  I recommend signing up for HelloFax access if you have not already done so and are comfortable with using a computer.       How to I schedule a follow-up visit?  If you did not schedule a follow-up visit today, please call [phone] to schedule a follow-up office visit.      Sincerely,    Kirsten Conn PA-C  Gastroenterology

## 2024-10-23 NOTE — LETTER
10/23/2024      Nayeli Caal  [address]  [facility] [address]      Dear Colleague,    Thank you for referring your patient, Nayeli Caal, to the [facility] [place]. Please see a copy of my visit note below.      GI CLINIC VISIT  ASSESSMENT/PLAN:    44 year old female with PMH of type 2 diabetes on insulin presenting to GI clinic for diarrhea and abdominal pain follow-up. Here with professional .     # Diarrhea  # Abdominal pain, cramping   9/4/24 CT abdomen pelvis with contrast showing mild colitis starting at mid transverse extending to rectum that was thought to be infectious (enteric/c.diff NEG). Repeat CTAP W contrast 10/2/2024 showed a resolved colitis. Subsequent colonoscopy 10/21/24 with BBPS and TI intubation with normal macro colon. Random colon biopsies were unremarkable. Basic labs have been stable on trend including mild leukocytosis without L shift and she has been overall maintaining oral intake. Fecal calpro (9/11/24) was in the 300s congruent with colitis seen on CTAP; repeat study pending completion. C.diff stool study is also pending completion. She is nontoxic appearing and hemodynamically stable. HR with some elevation at 91 on intake. She displayed some tenderness to palpation of upper abdomen on exam.     She had been doing better until yesterday when she developed nausea with some abdominal cramping. No fevers, emesis or bloody stools. Her last BM was this AM (looser consistency) and she has not had any trouble passing flatus. Her CGM read 144 during the visit and she had self-decreased her glargine from 30 to 25 units this AM due to decreased oral intake yesterday. She plans to follow this up with a discussion with her diabetic educator. We will plan to obtain basic labs today (CMP active in sytem from another provider) and I asked she update me tomorrow. I instructed she stop drinking soda and start scheduled  Gas-X. ER precautions were reviewed today.     Plan  -start scheduled gas-X. She has zofran at home, encouraged she take a dose when she get home  -obtain basic labs  -dietary consult given weight loss with this GI illness (starting early Sept)   -stop soda, encouraged good hydration with broths and oral intake as tolerated   -mychart update to me tomorrow with strict ER precautions today     RTC - 1 mo    Thank you for this consultation. It was a pleasure to participate in the care of this patient; please contact us with any further questions.    Kirsten Conn PA-C    Follow up: As planned above. Today, I personally spent 25 minutes in direct face to face time with the patient, of which greater than 50% of the time was spent in patient education and counseling as described above. Approximately 15 minutes were spent on indirect care associated with the patient's consultation including but not limited to review of: patient medical records to date, clinic visits, hospital records, lab results, imaging studies, procedural documentation, and coordinating care with other providers. The findings from this review are summarized in the above note. All of the above accounted for a cumulative time of 40 minutes and was performed on the date of service.     HPI: 44 year old female with PMH of type 2 diabetes on insulin presenting to GI clinic for diarrhea/abd pain.      She presents for short term follow up for acute on chronic GI symptoms following an episode of colitis seen 9/4 on CTAP at the ED. Repeat CTAP 10/2/24 showed a resolved colitis. Trended labs have been stable CBC with wbc 11-12s but no L shift. Renal function with slight bump but still within normal range.     Today 10/23/24  Here with professional    Did cscope 10/21 and did well with it.   Feels nauseated w/o emesis that started yesterday. No fever, chills. Hydrating OK.   Also developed some abd cramping as well.   BS was 80 yesterday, her CGM  "read 144 in clinic. Took zofran x 1 yesterday and she thinks it may have helped. Did not take zofran today.   Weight is  up some which is good, lowest was 165#, today at 169#. Not to her baseline yet.    No dizziness, syncope.   No heartburn - still on omeprazole  BM - are now \"fine.\" Yesterday she had a BM, \"normal\" but thin. This AM she had a looser stool w/o blood or black. No trouble with flatus, had some this AM.   She had better oral intake leading up to colonoscopy then yesterday, started with depressed appetite. Drinking soda.     Wt Readings from Last 10 Encounters:   10/23/24 76.9 kg (169 lb 8 oz)   10/21/24 74.8 kg (165 lb)   10/18/24 77.6 kg (171 lb)   10/10/24 77.7 kg (171 lb 4.8 oz)   10/07/24 78 kg (172 lb)   10/02/24 77.6 kg (171 lb)   10/02/24 77.6 kg (171 lb)   09/18/24 78 kg (171 lb 14.4 oz)   09/11/24 79.3 kg (174 lb 12.8 oz)   09/06/24 80.7 kg (177 lb 14.4 oz)     10/2/24  Here with professional    She reports doing well all of last week with no diarrhea, some weight gain and no abdominal pain however for unclear reasons had started with abdominal pain, nausea w/o emesis and diarrhea again on Monday 9/30.  She had been taking Imodium the last 2 days but still had 4-5 loose bowel movements today.  No bloody or black stools  She is reporting abdominal pain again.  Historically she had some pelvic surgery in February of this year and had some residual right lower quadrant abdominal pain.  That eventually went away.  On Monday, this RLQ abd pain came back.  She is not taking Tylenol  She denies fevers, chills, nausea or vomiting  Her blood sugar levels have been stable for her.  She does meet with a diabetic educator and states her Mounjaro was recently increased.  She had been on this medication for the past 5 months.  She continues with Basaglar insulin.   Appetite remains depressed.  Her home weight was 164 this morning.  In clinic today we measured her at 171 pounds.  No " f/c/n/v/bloody stools/black stools. No dizziness/syncope  Scheduled for csocpe 10/21 with Dr Lamont Cline Journal   9/18 - appt with me   9/23-27 - no BM for a few days after our appt, then 3 days of normal formed stools, then no BM until 9/30. Did not take imodium last week.   9/30 Monday - normal BM, developed stomach cramping, had a normal BM, then had liquid diarrhea. Slept OK but from 2-3 AM she woke up due to stomach cramping.    Took Imodium 4 mg in AM, 4 mg in afternoon    9/18/24  Here with professional    She feels  unwell still  On sat developed dental pain and went to dental ER on Sunday. Ultimately had dental extraction with some improvement in this pain. No abx. She feels this contributed to not being able to eat well.   Appetite still somewhat depressed. Down to 171.9# today, lost a few more lb from last week.   No longer having abdominal pain  Had a drop to 70-80s but has since since worked with her DM educator and dose of insulin has been changed. Most recently BS have been normal for her   No f/c/n/v/bloody stools/black stools. No dizziness/syncope    Stooling journal   9/12 Thur - loose stools. 2 imoidum AM and 1 PM  9/13 Friday - loose stools. no imodium. Had a more normal BM   9/14 Saturday - Had a more normal BM. no imodium.   9/15 Ben - had 2 normal BM then a loose stool in evening. No imodium.   9/16 Monday - had some diarrhea again - 3-4 loose stools. She took 2 tabs imodium in AM and her after BM was normal   9/17 Tue - normal stool  9/18 Wed (today) - 1 loose stool at 6 am. No imodium. No further loose stools either.     9/11/24 - our initial appt following ER evaluation 9/4  Starting September 2, she started feeling unwell with abdominal discomfort.  Later that day she then developed loose stools and had lower abdominal pain. She thinks this started after eating bad eggs. The diarrhea persisted throughout the week -she was seen at the emergency room 9 4.  Labs  showed a white count to the 12's,  a bump in her BUN and with CRT to 0.76 (baseline 0.5-0.76). no anion gap. CTAP mild colitis starting from mid transverse all the way to rectum.  Subsequent stool studies were negative for infection to include enteric panel and C. difficile.  Her symptoms improved that weekend but flared up again on 9/9.  It continues today.     She is reporting lower abdominal pain that she only notices with defecation or if she presses on the abdomen.  Sitting upright in a chair she denies any pain in her abdomen.  She reports frequent loose stools, unable to quantify the amount, consistency watery oatmeal.  She denies bloody or black stools.    She tried Maalox without improvement in the symptoms.  She took Pepto-Bismol once at its onset, it did not help.    Never had fevers, chills, nausea or vomiting.    She is scared to eat as she does not want to prompt diarrhea.  She is avoiding coffee and heavily seasoned foods.  She would like some guidance on dietary intake.  She is still going to work, and is requesting for work accommodations.  She is in office Tuesday Wednesday Thursday, work from home on Mondays and Fridays.  She is a .     She is a type II diabetic on insulin -continues with Basaglar 55 units daily.  She does have rapid aspart insulin that she takes as needed.  Her fasting sugars have been her baseline in the 90s to 120s.  She denies hypoglycemic episodes with this recent illness.      She denies dizziness lightheadedness, syncope.    She lives with a roommate at home.    PAST MEDICAL HISTORY:  Past Medical History:   Diagnosis Date     Combined visual and hearing impairment      Deaf      Diabetic retinopathy of both eyes (H) 8/19/2011     Hepatic steatosis 08/19/2011     History of tobacco use      Hyperlipidemia      Hypertension      Hypertriglyceridemia      Migraines      Obesity 8/19/2011     Problem list name updated by automated process. 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       PREVIOUS ABDOMINAL/GYNECOLOGIC SURGERIES:  Past Surgical History:   Procedure Laterality Date     COLONOSCOPY N/A 10/21/2024    Procedure: COLONOSCOPY, WITH BIOPSIES;  Surgeon: Peggy Miguel MD;  Location: [facility] OR     DAVINCI HYSTERECTOMY TOTAL, SALPINGECTOMY BILATERAL Bilateral 2/7/2024    Procedure: HYSTERECTOMY, TOTAL, ROBOT-ASSISTED, WITH BILATERAL SALPINGECTOMY, CYSTOSCOPY;  Surgeon: Vonnie Cowan MD;  Location: [facility] OR     ESOPHAGOSCOPY, GASTROSCOPY, DUODENOSCOPY (EGD), COMBINED N/A 6/13/2024    Procedure: ESOPHAGOGASTRODUODENOSCOPY, WITH BIOPSY;  Surgeon: Nora Brice MD;  Location: [facility] OR     LAPAROSCOPIC CHOLECYSTECTOMY N/A 3/10/2018    Procedure: LAPAROSCOPIC CHOLECYSTECTOMY;  Laparoscopic Cholecystectomy ;  Surgeon: Kuldeep Sigala MD;  Location: [facility] OR     RELEASE TRIGGER FINGER Right 5/2/2019    Procedure: Right Thumb Trigger Release;  Surgeon: Greg Streeter MD;  Location: [facility]     RELEASE TRIGGER FINGER Left 5/30/2019    Procedure: Left Ring Trigger Finger Release.  Ganglion cyst excision.;  Surgeon: Greg Streeter MD;  Location: [facility] OR     RELEASE TRIGGER FINGER Right 6/13/2023    Procedure: RELEASE, RIGHT TRIGGER FINGER, INDEX AND MIDDLE;  Surgeon: Miracle Carlin MD;  Location: [facility] OR     RELEASE TRIGGER FINGER Left 8/1/2023    Procedure: RELEASE, LEFT TRIGGER FINGER, MIDDLE;  Surgeon: Miracle Carlin MD;  Location: [facility] OR         PERTINENT MEDICATIONS:  Current Outpatient Medications   Medication Sig Dispense Refill     acetaminophen (TYLENOL) 500 MG tablet Take 2 tablets (1,000 mg) by mouth every 8 hours as needed for mild pain 100 tablet 3     Alcohol Swabs (ALCOHOL PREP PAD) 70 % PADS 1 each 3 times daily 100 each 3     alum & mag hydroxide-simethicone  (MAALOX) 200-200-20 MG/5ML SUSP suspension Take 10 mLs by mouth every 6 hours as needed for indigestion. 355 mL 1     amLODIPine (NORVASC) 5 MG tablet Take 1 tablet (5 mg) by mouth at bedtime 90 tablet 3     aspirin (ASA) 81 MG chewable tablet Take 1 tablet (81 mg) by mouth daily CHEW AND SWALLOW 90 tablet 1     atorvastatin (LIPITOR) 40 MG tablet Take 1 tablet (40 mg) by mouth daily 90 tablet 1     bisacodyl (DULCOLAX) 5 MG EC tablet Two days prior to exam take two (2) tablets at 4pm. One day prior to exam take two (2) tablets at 4pm 4 tablet 0     blood glucose (ACCU-CHEK LAYA PLUS) test strip Use with  Accucheck Expert meter.  Test blood sugar 6 times daily. 600 each 3     blood glucose monitoring (ACCU-CHEK FASTCLIX) lancets Use to test blood sugar 6 times daily or as directed 510 each 3     calcium carbonate-vitamin D (OSCAL) 500-5 MG-MCG tablet Take 1 tablet by mouth 2 times daily 180 tablet 1     Continuous Glucose Sensor (FREESTYLE ZORAIDA 3 SENSOR) [employer] 1 each every 14 days Please provide 3 month supply. 6 each 3     diclofenac (VOLTAREN) 1 % topical gel Apply 2 g topically 4 times daily. For L shoulder pain 100 g 3     dicyclomine (BENTYL) 10 MG capsule Take 1 capsule (10 mg) by mouth 4 times daily as needed (Abdominal pain/cramping). 40 capsule 0     empagliflozin (JARDIANCE) 25 MG TABS tablet Take 1 tablet (25 mg) by mouth daily. 90 tablet 1     ergocalciferol (ERGOCALCIFEROL) 1.25 MG (02550 UT) capsule Take 1 capsule (50,000 Units) by mouth once a week For additional refills, please schedule a follow-up appointment at [phone] 12 capsule 3     fenofibrate (TRIGLIDE/LOFIBRA) 160 MG tablet Take 1 tablet (160 mg) by mouth daily 90 tablet 3     fish oil-omega-3 fatty acids 1000 MG capsule TAKE TWO CAPSULES (2 GM) BY MOUTH ONCE DAILY 180 capsule 3     gabapentin (NEURONTIN) 100 MG capsule Take 1 capsule (100 mg) by mouth 3 times daily 42 capsule 1     ibuprofen (ADVIL/MOTRIN) 600 MG tablet Take 1 tablet  (600 mg) by mouth every 6 hours as needed for moderate pain 120 tablet 1     Injection Device for insulin (INPEN 100-PINK-NOVOLOG-FIASP) DAVID 1 each continuous 1 each 0     insulin aspart (NOVOLOG PENFILL) 100 UNIT/ML cartridge Take with each meal and snack: 1 per 4 grams CHO and correction of 1 unit per 20 mg/dL over a target of 120. Average daily dose is 40 units. 40 mL 3     insulin glargine 100 UNIT/ML pen 3 month supply.Inject 55 units daily 45 mL 3     insulin pen needle (31G X 5 MM) 31G X 5 MM miscellaneous Use 5 to 6 pen needles daily or as directed.  3 month supply. 500 each 3     losartan (COZAAR) 100 MG tablet Take 1 tablet (100 mg) by mouth daily 90 tablet 3     nitroFURantoin macrocrystal-monohydrate (MACROBID) 100 MG capsule Take 1 capsule (100 mg) by mouth 2 times daily for 5 days. Do NOT fill before 10/20/2024 10 capsule 0     omeprazole (PRILOSEC) 40 MG DR capsule Take 1 capsule (40 mg) by mouth 2 times daily 90 capsule 0     omeprazole (PRILOSEC) 40 MG DR capsule Take 1 capsule (40 mg) by mouth 2 times daily 8-12 weeks 90 capsule 0     ondansetron (ZOFRAN ODT) 4 MG ODT tab Take 1 tablet (4 mg) by mouth every 6 hours as needed for nausea or vomiting 20 tablet 0     polyethylene glycol (GOLYTELY) 236 g suspension Two days before procedure at 5PM fill first container with water. Mix and drink an 8 oz glass every 15 minutes until HALF of the container is gone. Place the remainder in the refrigerator. One day before procedure at 5PM drink second half of bowel prep. Drink an 8 oz glass every 15 minutes until it is gone. Day of procedure 6 hours before arrival time fill the 2nd container with water. Mix and drink an 8 oz glass every 15 minutes until HALF of the container is gone. Discard the remaining solution. 8000 mL 0     tirzepatide (MOUNJARO) 10 MG/0.5ML pen Inject 10 mg subcutaneously every 7 days. 2 mL 4         SOCIAL HISTORY:  Social History     Socioeconomic History     Marital status: Single      Spouse name: Not on file     Number of children: Not on file     Years of education: Not on file     Highest education level: Not on file   Occupational History     Not on file   Tobacco Use     Smoking status: Former     Current packs/day: 0.00     Types: Cigarettes     Quit date: 2010     Years since quittin.9     Passive exposure: Never     Smokeless tobacco: Never     Tobacco comments:     stopped 10 yrs ago   Substance and Sexual Activity     Alcohol use: Not Currently     Comment: socially     Drug use: Not Currently     Types: Marijuana     Comment: much younger  or earlier     Sexual activity: Not Currently     Partners: Male     Birth control/protection: I.U.D.   Other Topics Concern     Parent/sibling w/ CABG, MI or angioplasty before 65F 55M? Not Asked      Service No     Blood Transfusions No     Caffeine Concern No     Occupational Exposure No     Hobby Hazards No     Sleep Concern No     Stress Concern No     Weight Concern Yes     Special Diet No     Back Care No     Exercise Yes     Comment: 4-5 x a week     Bike Helmet No     Seat Belt Yes     Self-Exams Yes   Social History Narrative     Not on file     Social Drivers of Health     Financial Resource Strain: Low Risk  (2024)    Financial Resource Strain      Within the past 12 months, have you or your family members you live with been unable to get utilities (heat, electricity) when it was really needed?: No   Food Insecurity: Low Risk  (2024)    Food Insecurity      Within the past 12 months, did you worry that your food would run out before you got money to buy more?: No      Within the past 12 months, did the food you bought just not last and you didn t have money to get more?: No   Transportation Needs: Low Risk  (2024)    Transportation Needs      Within the past 12 months, has lack of transportation kept you from medical appointments, getting your medicines, non-medical meetings or appointments, work, or  "from getting things that you need?: No   Recent Concern: Transportation Needs - High Risk (10/30/2023)    Transportation Needs      Within the past 12 months, has lack of transportation kept you from medical appointments, getting your medicines, non-medical meetings or appointments, work, or from getting things that you need?: Yes   Physical Activity: Not on file   Stress: Not on file   Social Connections: Not on file   Interpersonal Safety: Low Risk  (10/21/2024)    Interpersonal Safety      Do you feel physically and emotionally safe where you currently live?: Yes      Within the past 12 months, have you been hit, slapped, kicked or otherwise physically hurt by someone?: No      Within the past 12 months, have you been humiliated or emotionally abused in other ways by your partner or ex-partner?: No   Housing Stability: Low Risk  (1/4/2024)    Housing Stability      Do you have housing? : Yes      Are you worried about losing your housing?: No       FAMILY HISTORY:  Family History   Adopted: Yes   Problem Relation Age of Onset     Unknown/Adopted Other        PHYSICAL EXAMINATION:  Vitals reviewed: /85   Pulse 91   Ht 1.549 m (5' 1\")   Wt 76.9 kg (169 lb 8 oz)   LMP 02/01/2024 (Approximate)   SpO2 97%   BMI 32.03 kg/m    Wt:   Wt Readings from Last 2 Encounters:   10/23/24 76.9 kg (169 lb 8 oz)   10/21/24 74.8 kg (165 lb)      Constitutional: aaox3, cooperative, pleasant, not dyspneic/diaphoretic, no acute distress  Eyes: Sclera anicteric/injected  Neck: supple, active ROM w/o limitation or pain   CV: No edema  Respiratory: Unlabored breathing  Abd:  tenderness to palpation of upper abdomen. Able to palpate other parts of abdomen. Sitting comfortably in chair, moving without issues.   Skin: warm, perfused, no jaundice  Psych: Normal affect  MSK: Normal gait     PERTINENT STUDIES - Reviewed in EMR     Lab Results   Component Value Date    WBC 13.3 (H) 10/02/2024    WBC 11.3 (H) 09/19/2024    WBC 12.1 " (H) 09/11/2024    HGB 12.3 10/02/2024    HGB 12.8 09/19/2024    HGB 12.3 09/11/2024     10/02/2024     09/19/2024     09/11/2024    CHOL 183 01/04/2024    CHOL 221 (H) 04/04/2023    CHOL 194 07/19/2021    TRIG 350 (H) 01/04/2024    TRIG 298 (H) 04/04/2023    TRIG 133 07/19/2021    HDL 37 (L) 01/04/2024    HDL 38 (L) 04/04/2023    HDL 43 (L) 07/19/2021    ALT 28 10/02/2024    ALT 29 09/11/2024    ALT 42 09/04/2024    AST 21 10/02/2024    AST 18 09/11/2024    AST 25 09/04/2024     10/02/2024     09/19/2024     09/11/2024    BUN 20.8 (H) 10/02/2024    BUN 15.0 09/19/2024    BUN 23.5 (H) 09/11/2024    CO2 20 (L) 10/02/2024    CO2 28 09/19/2024    CO2 21 (L) 09/11/2024    TSH 1.41 04/04/2023    TSH 1.34 07/08/2020    TSH 1.84 05/17/2018    INR 1.03 06/18/2010    INR 0.92 06/18/2010        Liver Function Studies -   Recent Labs   Lab Test 09/04/24  1224   PROTTOTAL 7.6   ALBUMIN 4.4   BILITOTAL 0.2   ALKPHOS 62   AST 25   ALT 42        PREVIOUS ENDOSCOPY      Again, thank you for allowing me to participate in the care of your patient.        Sincerely,        Kirsten Conn PA-C

## 2024-10-25 ENCOUNTER — APPOINTMENT (OUTPATIENT)
Dept: CT IMAGING | Facility: CLINIC | Age: 44
End: 2024-10-25
Attending: EMERGENCY MEDICINE
Payer: COMMERCIAL

## 2024-10-25 ENCOUNTER — HOSPITAL ENCOUNTER (EMERGENCY)
Facility: CLINIC | Age: 44
Discharge: HOME | End: 2024-10-25
Attending: EMERGENCY MEDICINE | Admitting: EMERGENCY MEDICINE
Payer: COMMERCIAL

## 2024-10-25 ENCOUNTER — OFFICE VISIT (OUTPATIENT)
Dept: INTERPRETER SERVICES | Facility: CLINIC | Age: 44
End: 2024-10-25

## 2024-10-25 VITALS
HEIGHT: 61 IN | WEIGHT: 165 LBS | HEART RATE: 82 BPM | DIASTOLIC BLOOD PRESSURE: 53 MMHG | SYSTOLIC BLOOD PRESSURE: 127 MMHG | BODY MASS INDEX: 31.15 KG/M2 | RESPIRATION RATE: 16 BRPM | OXYGEN SATURATION: 98 % | TEMPERATURE: 97.1 F

## 2024-10-25 DIAGNOSIS — R10.10 UPPER ABDOMINAL PAIN: ICD-10-CM

## 2024-10-25 DIAGNOSIS — K29.70 GASTRITIS WITHOUT BLEEDING, UNSPECIFIED CHRONICITY, UNSPECIFIED GASTRITIS TYPE: ICD-10-CM

## 2024-10-25 LAB
ALBUMIN SERPL BCG-MCNC: 4.1 G/DL (ref 3.5–5.2)
ALP SERPL-CCNC: 74 U/L (ref 40–150)
ALT SERPL W P-5'-P-CCNC: 24 U/L (ref 0–50)
ANION GAP SERPL CALCULATED.3IONS-SCNC: 9 MMOL/L (ref 7–15)
AST SERPL W P-5'-P-CCNC: 20 U/L (ref 0–45)
BASOPHILS # BLD AUTO: 0 10E3/UL (ref 0–0.2)
BASOPHILS NFR BLD AUTO: 1 %
BILIRUB DIRECT SERPL-MCNC: <0.2 MG/DL (ref 0–0.3)
BILIRUB SERPL-MCNC: 0.2 MG/DL
BUN SERPL-MCNC: 15.4 MG/DL (ref 6–20)
CALCIUM SERPL-MCNC: 8.6 MG/DL (ref 8.8–10.4)
CHLORIDE SERPL-SCNC: 104 MMOL/L (ref 98–107)
CREAT SERPL-MCNC: 0.82 MG/DL (ref 0.51–0.95)
EGFRCR SERPLBLD CKD-EPI 2021: 90 ML/MIN/1.73M2
EOSINOPHIL # BLD AUTO: 0.2 10E3/UL (ref 0–0.7)
EOSINOPHIL NFR BLD AUTO: 3 %
ERYTHROCYTE [DISTWIDTH] IN BLOOD BY AUTOMATED COUNT: 13.1 % (ref 10–15)
GLUCOSE SERPL-MCNC: 185 MG/DL (ref 70–99)
HCO3 SERPL-SCNC: 24 MMOL/L (ref 22–29)
HCT VFR BLD AUTO: 37.6 % (ref 35–47)
HGB BLD-MCNC: 12.2 G/DL (ref 11.7–15.7)
HOLD SPECIMEN: NORMAL
HOLD SPECIMEN: NORMAL
IMM GRANULOCYTES # BLD: 0 10E3/UL
IMM GRANULOCYTES NFR BLD: 0 %
LIPASE SERPL-CCNC: 32 U/L (ref 13–60)
LYMPHOCYTES # BLD AUTO: 2 10E3/UL (ref 0.8–5.3)
LYMPHOCYTES NFR BLD AUTO: 25 %
MCH RBC QN AUTO: 27.6 PG (ref 26.5–33)
MCHC RBC AUTO-ENTMCNC: 32.4 G/DL (ref 31.5–36.5)
MCV RBC AUTO: 85 FL (ref 78–100)
MONOCYTES # BLD AUTO: 0.6 10E3/UL (ref 0–1.3)
MONOCYTES NFR BLD AUTO: 8 %
NEUTROPHILS # BLD AUTO: 5.1 10E3/UL (ref 1.6–8.3)
NEUTROPHILS NFR BLD AUTO: 64 %
NRBC # BLD AUTO: 0 10E3/UL
NRBC BLD AUTO-RTO: 0 /100
PLATELET # BLD AUTO: 426 10E3/UL (ref 150–450)
POTASSIUM SERPL-SCNC: 3.7 MMOL/L (ref 3.4–5.3)
PROT SERPL-MCNC: 7.2 G/DL (ref 6.4–8.3)
RBC # BLD AUTO: 4.42 10E6/UL (ref 3.8–5.2)
SODIUM SERPL-SCNC: 137 MMOL/L (ref 135–145)
WBC # BLD AUTO: 8 10E3/UL (ref 4–11)

## 2024-10-25 PROCEDURE — 96374 THER/PROPH/DIAG INJ IV PUSH: CPT | Mod: 59

## 2024-10-25 PROCEDURE — 250N000011 HC RX IP 250 OP 636: Performed by: EMERGENCY MEDICINE

## 2024-10-25 PROCEDURE — 96375 TX/PRO/DX INJ NEW DRUG ADDON: CPT

## 2024-10-25 PROCEDURE — 83690 ASSAY OF LIPASE: CPT | Performed by: EMERGENCY MEDICINE

## 2024-10-25 PROCEDURE — 74177 CT ABD & PELVIS W/CONTRAST: CPT

## 2024-10-25 PROCEDURE — 36415 COLL VENOUS BLD VENIPUNCTURE: CPT | Performed by: EMERGENCY MEDICINE

## 2024-10-25 PROCEDURE — 85004 AUTOMATED DIFF WBC COUNT: CPT | Performed by: EMERGENCY MEDICINE

## 2024-10-25 PROCEDURE — T1013 SIGN LANG/ORAL INTERPRETER: HCPCS | Mod: U3

## 2024-10-25 PROCEDURE — 250N000009 HC RX 250: Performed by: EMERGENCY MEDICINE

## 2024-10-25 PROCEDURE — 99285 EMERGENCY DEPT VISIT HI MDM: CPT | Mod: 25

## 2024-10-25 PROCEDURE — 80053 COMPREHEN METABOLIC PANEL: CPT | Performed by: EMERGENCY MEDICINE

## 2024-10-25 PROCEDURE — 82248 BILIRUBIN DIRECT: CPT | Performed by: EMERGENCY MEDICINE

## 2024-10-25 RX ORDER — PANTOPRAZOLE SODIUM 40 MG/1
40 TABLET, DELAYED RELEASE ORAL DAILY
Qty: 30 TABLET | Refills: 0 | Status: SHIPPED | OUTPATIENT
Start: 2024-10-25 | End: 2024-11-12

## 2024-10-25 RX ORDER — SUCRALFATE ORAL 1 G/10ML
1 SUSPENSION ORAL 4 TIMES DAILY
Qty: 414 ML | Refills: 0 | Status: SHIPPED | OUTPATIENT
Start: 2024-10-25

## 2024-10-25 RX ORDER — IOPAMIDOL 755 MG/ML
83 INJECTION, SOLUTION INTRAVASCULAR ONCE
Status: COMPLETED | OUTPATIENT
Start: 2024-10-25 | End: 2024-10-25

## 2024-10-25 RX ORDER — KETOROLAC TROMETHAMINE 15 MG/ML
15 INJECTION, SOLUTION INTRAMUSCULAR; INTRAVENOUS ONCE
Status: COMPLETED | OUTPATIENT
Start: 2024-10-25 | End: 2024-10-25

## 2024-10-25 RX ORDER — METOCLOPRAMIDE 10 MG/1
10 TABLET ORAL 4 TIMES DAILY PRN
Qty: 14 TABLET | Refills: 0 | Status: SHIPPED | OUTPATIENT
Start: 2024-10-25

## 2024-10-25 RX ADMIN — IOPAMIDOL 83 ML: 755 INJECTION, SOLUTION INTRAVENOUS at 12:26

## 2024-10-25 RX ADMIN — KETOROLAC TROMETHAMINE 15 MG: 15 INJECTION, SOLUTION INTRAMUSCULAR; INTRAVENOUS at 11:18

## 2024-10-25 RX ADMIN — PANTOPRAZOLE SODIUM 40 MG: 40 INJECTION, POWDER, FOR SOLUTION INTRAVENOUS at 11:18

## 2024-10-25 RX ADMIN — SODIUM CHLORIDE 64 ML: 9 INJECTION, SOLUTION INTRAVENOUS at 12:27

## 2024-10-25 ASSESSMENT — ACTIVITIES OF DAILY LIVING (ADL)
ADLS_ACUITY_SCORE: 0

## 2024-10-25 NOTE — ED NOTES
Patient went to the bathroom with assistance of staff due to low vision and troubles finding the bathroom and cups in the bathroom. ASL and staff there to help.

## 2024-10-25 NOTE — ED PROVIDER NOTES
Emergency Department Note      History of Present Illness     Chief Complaint   Abdominal Pain      HPI   Nayeli Caal is a 44 year old female history of hearing impairment/deaf, diabetic retinopathy, hepatic steatosis, hyperlipidemia, migraines, diabetes presents with upper abdominal pain that is been ongoing since recent colonoscopy on Monday.  She notes colonoscopy on Monday with no findings and no polyp removal.  She noted nausea and abdominal pain and went in on Wednesday for follow-up with GI and noted that she was having pain and they suggested taking Gas-X.  She has taken Gas-X and has helped slightly but still having a fair amount of pain.  She had diarrhea on Wednesday and Thursday so took some Imodium.  She notes some degree of chronic diarrhea.  Denies any fevers or chills.  She has had a hysterectomy in the past.    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       Medications   metoclopramide (REGLAN) 10 MG tablet  pantoprazole (PROTONIX) 40 MG EC tablet  sucralfate (CARAFATE) 1 GM/10ML suspension  acetaminophen (TYLENOL) 500 MG tablet  Alcohol Swabs (ALCOHOL PREP PAD) 70 % PADS  alum & mag hydroxide-simethicone (MAALOX) 200-200-20 MG/5ML SUSP suspension  amLODIPine (NORVASC) 5 MG tablet  aspirin (ASA) 81 MG chewable tablet  atorvastatin (LIPITOR) 40 MG tablet  bisacodyl (DULCOLAX) 5 MG EC tablet  blood glucose (ACCU-CHEK LAYA  PLUS) test strip  blood glucose monitoring (ACCU-CHEK FASTCLIX) lancets  calcium carbonate-vitamin D (OSCAL) 500-5 MG-MCG tablet  Continuous Glucose Sensor (FREESTYLE ZORAIDA 3 SENSOR) MISC  diclofenac (VOLTAREN) 1 % topical gel  dicyclomine (BENTYL) 10 MG capsule  empagliflozin (JARDIANCE) 25 MG TABS tablet  ergocalciferol (ERGOCALCIFEROL) 1.25 MG (52505 UT) capsule  fenofibrate (TRIGLIDE/LOFIBRA) 160 MG tablet  fish oil-omega-3 fatty acids 1000 MG capsule  gabapentin (NEURONTIN) 100 MG capsule  ibuprofen (ADVIL/MOTRIN) 600 MG tablet  Injection Device for insulin (INPEN 100-PINK-NOVOLOG-FIASP) DAVID  insulin aspart (NOVOLOG PENFILL) 100 UNIT/ML cartridge  insulin glargine 100 UNIT/ML pen  insulin pen needle (31G X 5 MM) 31G X 5 MM miscellaneous  losartan (COZAAR) 100 MG tablet  omeprazole (PRILOSEC) 40 MG DR capsule  omeprazole (PRILOSEC) 40 MG DR capsule  ondansetron (ZOFRAN ODT) 4 MG ODT tab  polyethylene glycol (GOLYTELY) 236 g suspension  tirzepatide (MOUNJARO) 10 MG/0.5ML pen        Surgical History   Past Surgical History:   Procedure Laterality Date    COLONOSCOPY N/A 10/21/2024    Procedure: COLONOSCOPY, WITH BIOPSIES;  Surgeon: Peggy Miguel MD;  [place] [facility] [place]    DAVINCI HYSTERECTOMY TOTAL, SALPINGECTOMY BILATERAL Bilateral 2/7/2024    Procedure: HYSTERECTOMY, TOTAL, ROBOT-ASSISTED, WITH BILATERAL SALPINGECTOMY, CYSTOSCOPY;  Surgeon: Vonnie Cowan MD;  Location: [facility] OR    ESOPHAGOSCOPY, GASTROSCOPY, DUODENOSCOPY (EGD), COMBINED N/A 6/13/2024    Procedure: ESOPHAGOGASTRODUODENOSCOPY, WITH BIOPSY;  Surgeon: Nora Brice MD;  Location: [facility] OR    LAPAROSCOPIC CHOLECYSTECTOMY N/A 3/10/2018    Procedure: LAPAROSCOPIC CHOLECYSTECTOMY;  Laparoscopic Cholecystectomy ;  Surgeon: Kuldeep Sigala MD;  Location: [facility] OR    RELEASE TRIGGER FINGER Right 5/2/2019    Procedure: Right Thumb Trigger Release;  Surgeon: Greg Streeter MD;  Location: [facility] OR    RELEASE TRIGGER FINGER Left  "5/30/2019    Procedure: Left Ring Trigger Finger Release.  Ganglion cyst excision.;  Surgeon: Greg Streeter MD;  Location: [facility] OR    RELEASE TRIGGER FINGER Right 6/13/2023    Procedure: RELEASE, RIGHT TRIGGER FINGER, INDEX AND MIDDLE;  Surgeon: Miracle Carlin MD;  Location: [facility] OR    RELEASE TRIGGER FINGER Left 8/1/2023    Procedure: RELEASE, LEFT TRIGGER FINGER, MIDDLE;  Surgeon: Miracle Carlin MD;  Location: [facility] [place]       Physical Exam     Patient Vitals for the past 24 hrs:   BP Temp Temp src Pulse Resp SpO2 Height Weight   10/25/24 1113 -- -- -- -- -- 98 % -- --   10/25/24 1058 127/53 -- -- 82 -- 96 % -- --   10/25/24 0913 109/61 97.1  F (36.2  C) Temporal 84 16 99 % 1.549 m (5' 1\") 74.8 kg (165 lb)     Physical Exam  GENERAL: well developed, pleasant  HEAD: atraumatic  EYES: pupils reactive, extraocular muscles intact, conjunctivae normal  ENT:  mucus membranes moist  NECK:  trachea midline, normal range of motion  RESPIRATORY: no tachypnea, breath sounds clear to auscultation   CVS: normal S1/S2, no murmurs, intact distal pulses  ABDOMEN:upper abdominal pain, no focal tenderness  MUSCULOSKELETAL: no deformities  SKIN: warm and dry, no acute rashes or ulceration  NEURO: GCS 15, cranial nerves intact, alert and oriented x3  PSYCH:  Mood/affect normal      Diagnostics     Lab Results   Labs Ordered and Resulted from Time of ED Arrival to Time of ED Departure   COMPREHENSIVE METABOLIC PANEL - Abnormal       Result Value    Sodium 137      Potassium 3.7      Carbon Dioxide (CO2) 24      Anion Gap 9      Urea Nitrogen 15.4      Creatinine 0.82      GFR Estimate 90      Calcium 8.6 (*)     Chloride 104      Glucose 185 (*)     Alkaline Phosphatase 74      AST 20      ALT 24      Protein Total 7.2      Albumin 4.1      Bilirubin Total 0.2     LIPASE - Normal    Lipase 32     BILIRUBIN DIRECT - Normal    Bilirubin Direct <0.20     CBC WITH PLATELETS AND DIFFERENTIAL    WBC Count 8.0      RBC Count 4.42   "    Hemoglobin 12.2      Hematocrit 37.6      MCV 85      MCH 27.6      MCHC 32.4      RDW 13.1      Platelet Count 426      % Neutrophils 64      % Lymphocytes 25      % Monocytes 8      % Eosinophils 3      % Basophils 1      % Immature Granulocytes 0      NRBCs per 100 WBC 0      Absolute Neutrophils 5.1      Absolute Lymphocytes 2.0      Absolute Monocytes 0.6      Absolute Eosinophils 0.2      Absolute Basophils 0.0      Absolute Immature Granulocytes 0.0      Absolute NRBCs 0.0         Imaging   CT Abdomen Pelvis w Contrast   Final Result   IMPRESSION:    1.  No acute abnormality in the abdomen or pelvis. No cause for upper   abdominal pain is identified.   2.  Hepatic steatosis.      AURA PARNELL MD            SYSTEM ID:  [id number]              Independent Interpretation   None    ED Course      Medications Administered   Medications   ketorolac (TORADOL) injection 15 mg (15 mg Intravenous $Given 10/25/24 1118)   pantoprazole (PROTONIX) IV push injection 40 mg (40 mg Intravenous $Given 10/25/24 1118)   iopamidol (ISOVUE-370) solution 83 mL (83 mLs Intravenous $Given 10/25/24 1226)   Saline Flush (64 mLs Intravenous $Given 10/25/24 1227)       Procedures   Procedures     Discussion of Management   None    ED Course        Additional Documentation  None    Medical Decision Making / Diagnosis     CMS Diagnoses: None    MIPS       None    MDM   Nayeli MIXON Ingrid Caal is a 44 year old female presents with upper abdominal pain.   is used to gather history and give information.  Certainly gastritis seems most likely, hepatobiliary kidney stone pancreatitis or complication from recent colonoscopy.  CT is negative for complications or acute process.  Discussed with her negative workup and treatment.  She notes ongoing nausea.  Suspect some underlying GI issues given the fact that she had a colonoscopy.  Can try course of Reglan as she notes little benefit from Zofran Protonix and  follow-up.    Disposition   The patient was discharged.     Diagnosis     ICD-10-CM    1. Upper abdominal pain  R10.10       2. Gastritis without bleeding, unspecified chronicity, unspecified gastritis type  K29.70            Discharge Medications   Discharge Medication List as of 10/25/2024  1:07 PM        START taking these medications    Details   metoclopramide (REGLAN) 10 MG tablet Take 1 tablet (10 mg) by mouth 4 times daily as needed (nausea)., Disp-14 tablet, R-0, E-Prescribe      pantoprazole (PROTONIX) 40 MG EC tablet Take 1 tablet (40 mg) by mouth daily for 30 doses., Disp-30 tablet, R-0, E-Prescribe      sucralfate (CARAFATE) 1 GM/10ML suspension Take 10 mLs (1 g) by mouth 4 times daily., Disp-414 mL, R-0, E-Prescribe               MD Karson Khan Shaun L, MD  10/26/24 0704

## 2024-10-25 NOTE — ED TRIAGE NOTES
Pt c/o epigastric pain since tuesday     Triage Assessment (Adult)       Row Name 10/25/24 2597          Triage Assessment    Airway WDL WDL        Respiratory WDL    Respiratory WDL WDL        Skin Circulation/Temperature WDL    Skin Circulation/Temperature WDL WDL        Cardiac WDL    Cardiac WDL WDL        Peripheral/Neurovascular WDL    Peripheral Neurovascular WDL WDL        Cognitive/Neuro/Behavioral WDL    Cognitive/Neuro/Behavioral WDL WDL

## 2024-10-28 ENCOUNTER — MYC MEDICAL ADVICE (OUTPATIENT)
Dept: GASTROENTEROLOGY | Facility: CLINIC | Age: 44
End: 2024-10-28
Payer: COMMERCIAL

## 2024-10-28 ENCOUNTER — MYC MEDICAL ADVICE (OUTPATIENT)
Dept: INTERNAL MEDICINE | Facility: CLINIC | Age: 44
End: 2024-10-28
Payer: COMMERCIAL

## 2024-10-28 DIAGNOSIS — R10.31 ABDOMINAL PAIN, RIGHT LOWER QUADRANT: Primary | ICD-10-CM

## 2024-10-28 DIAGNOSIS — R19.5 LOOSE STOOLS: ICD-10-CM

## 2024-10-28 LAB — CALPROTECTIN STL-MCNT: 74.6 MG/KG (ref 0–49.9)

## 2024-10-29 ENCOUNTER — PATIENT OUTREACH (OUTPATIENT)
Dept: GASTROENTEROLOGY | Facility: CLINIC | Age: 44
End: 2024-10-29
Payer: COMMERCIAL

## 2024-10-29 NOTE — PROGRESS NOTES
Patient requested referral to be faxed to [facility]   Provider updated   Referral faxed to [facility]   
no

## 2024-10-31 NOTE — TELEPHONE ENCOUNTER
This is thought to be an irritable bowel picture / hypervisceral sensitivity at this time. I have spoken to her about this at our last visit.

## 2024-11-12 ENCOUNTER — OFFICE VISIT (OUTPATIENT)
Dept: INTERNAL MEDICINE | Facility: CLINIC | Age: 44
End: 2024-11-12
Payer: COMMERCIAL

## 2024-11-12 ENCOUNTER — MYC MEDICAL ADVICE (OUTPATIENT)
Dept: EDUCATION SERVICES | Facility: CLINIC | Age: 44
End: 2024-11-12

## 2024-11-12 VITALS
SYSTOLIC BLOOD PRESSURE: 120 MMHG | WEIGHT: 167.6 LBS | DIASTOLIC BLOOD PRESSURE: 79 MMHG | HEART RATE: 86 BPM | RESPIRATION RATE: 16 BRPM | BODY MASS INDEX: 31.67 KG/M2 | OXYGEN SATURATION: 97 %

## 2024-11-12 DIAGNOSIS — E10.8 TYPE 1 DIABETES MELLITUS WITH COMPLICATIONS (H): ICD-10-CM

## 2024-11-12 DIAGNOSIS — E11.65 UNCONTROLLED TYPE 2 DIABETES MELLITUS WITH HYPERGLYCEMIA, WITH LONG-TERM CURRENT USE OF INSULIN (H): Primary | ICD-10-CM

## 2024-11-12 DIAGNOSIS — Z12.31 ENCOUNTER FOR SCREENING MAMMOGRAM FOR BREAST CANCER: ICD-10-CM

## 2024-11-12 DIAGNOSIS — R10.9 GASTRIC PAIN: Primary | ICD-10-CM

## 2024-11-12 DIAGNOSIS — Z79.4 UNCONTROLLED TYPE 2 DIABETES MELLITUS WITH HYPERGLYCEMIA, WITH LONG-TERM CURRENT USE OF INSULIN (H): Primary | ICD-10-CM

## 2024-11-12 RX ORDER — PANTOPRAZOLE SODIUM 40 MG/1
40 TABLET, DELAYED RELEASE ORAL DAILY
Qty: 90 TABLET | Refills: 1 | Status: SHIPPED | OUTPATIENT
Start: 2024-11-12

## 2024-11-12 RX ORDER — ATORVASTATIN CALCIUM 40 MG/1
40 TABLET, FILM COATED ORAL DAILY
Qty: 90 TABLET | Refills: 1 | Status: SHIPPED | OUTPATIENT
Start: 2024-11-12

## 2024-11-12 RX ORDER — TIRZEPATIDE 12.5 MG/.5ML
12.5 INJECTION, SOLUTION SUBCUTANEOUS
Qty: 2 ML | Refills: 2 | Status: SHIPPED | OUTPATIENT
Start: 2024-11-12

## 2024-11-12 NOTE — TELEPHONE ENCOUNTER
Order for Mounjaro 12.5 mg sent to patient's preferred pharmacy.      Leigh Ann Escoto, JUSTICEN, RN, [facility]  Certified Diabetes Care and   [facility] Endocrinology and Diabetes  [facility]  Phone [phone]  Hours:  Tuesday:       In Clinic and Virtual Visits  8am to 4pm              Wednesday:  In Clinic and Virtual Visits  8am to 4pm               Thursday:     Virtural  Visits only             8am to 4pm

## 2024-11-12 NOTE — PROGRESS NOTES
Assessment & Plan     Gastric pain  - pantoprazole (PROTONIX) 40 MG EC tablet; Take 1 tablet (40 mg) by mouth daily.    I spent a total of      minutes in the care of this pt during today's office visit. This time includes reviewing the patient's chart and prior history, obtaining a history, performing an examination and evaluation and counseling the patient. This time also includes ordering medications or tests necessary in addition to communication to other member's of the patient's health care team. Time spent in documentation and care coordination is included.       Brenden Tyler is a 44 year old, presenting for the following health issues:  Follow Up (Patient experiencing Stomach issue since September )      11/12/2024     2:41 PM   Additional Questions   Roomed by KASIE MIXON Ingrid Caal is a 44 year old female seen with an .  She states her abdominal pain has improved some since she is being very careful regarding her diet.  She was given Protonix when seen in the ER. She states she will feel better for awhile and then feel worse again and then feel better again.  She is planning to visit family in [place] in December for a few weeks but is concerned about recurrence of abdominal pain.  She has an appointment in January at [facility].     History of Present Illness       Reason for visit:  Danica   She is taking medications regularly.     Patient Active Problem List   Diagnosis    Essential hypertension    Other chronic nonalcoholic liver disease    Diabetic retinopathy of both eyes (H)    Obesity    Usher Syndrome: congenital deafness, retinitis pigmentosa    Macular degeneration, age related, nonexudative    Benign neoplasm of iris    Dry eye syndrome    Pemphigus erythematosus (H)    Chondromalacia of patella, right, steroid injection 6/12/2015    Uncontrolled type 2 diabetes mellitus with hyperglycemia, with long-term current use of insulin (H)    Chronic  kidney disease, stage 1    Trigger middle finger of left hand    Adenomyosis of uterus    S/P hysterectomy    Pain in both hands    Severe carpal tunnel syndrome of both wrists               Objective    /79 (BP Location: Right arm, Patient Position: Sitting, Cuff Size: Adult Regular)   Pulse 86   Resp 16   Wt 76 kg (167 lb 9.6 oz)   LMP 02/01/2024 (Approximate)   SpO2 97%   BMI 31.67 kg/m    Body mass index is 31.67 kg/m .  Physical Exam               Signed Electronically by: SABI Morrison CNP

## 2024-11-25 ENCOUNTER — MYC MEDICAL ADVICE (OUTPATIENT)
Dept: INTERNAL MEDICINE | Facility: CLINIC | Age: 44
End: 2024-11-25

## 2024-11-25 ENCOUNTER — OFFICE VISIT (OUTPATIENT)
Dept: GASTROENTEROLOGY | Facility: CLINIC | Age: 44
End: 2024-11-25
Attending: PHYSICIAN ASSISTANT
Payer: COMMERCIAL

## 2024-11-25 DIAGNOSIS — R19.7 DIARRHEA, UNSPECIFIED TYPE: ICD-10-CM

## 2024-11-25 DIAGNOSIS — E10.8 TYPE 1 DIABETES MELLITUS WITH COMPLICATIONS (H): ICD-10-CM

## 2024-11-25 DIAGNOSIS — R12 HEARTBURN: ICD-10-CM

## 2024-11-25 DIAGNOSIS — K58.8 OTHER IRRITABLE BOWEL SYNDROME: ICD-10-CM

## 2024-11-25 DIAGNOSIS — R10.9 ABDOMINAL CRAMPING: ICD-10-CM

## 2024-11-25 DIAGNOSIS — G89.29 CHRONIC PAIN OF RIGHT KNEE: ICD-10-CM

## 2024-11-25 DIAGNOSIS — R10.9 GASTRIC PAIN: ICD-10-CM

## 2024-11-25 DIAGNOSIS — R11.0 NAUSEA: ICD-10-CM

## 2024-11-25 DIAGNOSIS — M25.561 CHRONIC PAIN OF RIGHT KNEE: ICD-10-CM

## 2024-11-25 PROCEDURE — 97802 MEDICAL NUTRITION INDIV IN: CPT | Performed by: DIETITIAN, REGISTERED

## 2024-11-25 PROCEDURE — 99207 PR NO CHARGE LOS: CPT | Performed by: DIETITIAN, REGISTERED

## 2024-11-25 NOTE — LETTER
11/25/2024      Nayeli Caal  [address]  [facility] [id number]      Dear Colleague,    Thank you for referring your patient, Nayeli Caal, to the [facility] [place]. Please see a copy of my visit note below.    [facility] Nutrition Therapy      Time Spent:  75 minutes  Session Type:  Initial   Referring Physician:  Kirsten Conn PA-C and Mariya Mohamud CNP  Reason for RD Visit:   nausea     Nutrition Assessment:  Patient is a 44 year old female with history that IBS, dyspepsia, includes type 2 diabetes, colitis, s/p hysterectomy, hypertension, nonalcoholic liver disease, nausea, abdominal pain, RLQ abdominal pain, loose stools.  She is accompanied today with  at visit.  She stated that she is been feeling better lately because she has been paying more attention to her diet and trying to eat better.  She previously was having a lot of loose stools/diarrhea and stomach pain as well as reflux.  She stated that she had a hysterectomy last February 2024 and since then she has felt her digestive system has been different.  She made some changes to her diet and has been trying to eat out less.  She previously would eat out 3 times per week, but now only having a takeout or eat out meal once a week.  She is also making more meals at home and using fresh or frozen vegetables and discontinued canned vegetables because she is also trying to watch her sodium intake.  She used to drink 3 to 4 cups of coffee a day, but now limits to 1 cup/day with a nondairy milk.  She has not been able to tolerate dairy since high school.  She feels that may be some sauces such as ketchup or barbecue sauce might be triggers for symptoms.  She is also careful about some spices as these may cause some symptoms for her as well.  She stated that she has had many tests that were normal including a food sensitivity test that she purchased  "over-the-counter and did not find any triggers.  She had an endoscopy and was told she has reflux, so currently on omeprazole.  She discontinued ibuprofen and has not been drinking alcohol lately as she was told these could contribute to symptoms.  She stated that she had a couple of ED visits due to pain.  She previously took Gas-X for a little bit but has not needed to take it recently.  She will be visiting her family for few weeks on vacation out of state so she is trying to make sure that she has everything in place to prevent having significant symptoms while away.  She generally eats 3 meals per day and will have 1 or 2 snacks.  She likes candy and sweets, but trying to limit those.  She stated that her blood sugars have been stable so did have some decrease in medications.      Patient Active Problem List   Diagnosis     Essential hypertension     Other chronic nonalcoholic liver disease     Diabetic retinopathy of both eyes (H)     Obesity     Usher Syndrome: congenital deafness, retinitis pigmentosa     Macular degeneration, age related, nonexudative     Benign neoplasm of iris     Dry eye syndrome     Pemphigus erythematosus (H)     Chondromalacia of patella, right, steroid injection 6/12/2015     Uncontrolled type 2 diabetes mellitus with hyperglycemia, with long-term current use of insulin (H)     Chronic kidney disease, stage 1     Trigger middle finger of left hand     Adenomyosis of uterus     S/P hysterectomy     Pain in both hands     Severe carpal tunnel syndrome of both wrists     Height:   Ht Readings from Last 1 Encounters:   11/22/24 1.549 m (5' 1\")     Weight:  Wt Readings from Last 10 Encounters:   11/22/24 76.2 kg (168 lb)   11/12/24 76 kg (167 lb 9.6 oz)   10/25/24 74.8 kg (165 lb)   10/23/24 76.9 kg (169 lb 8 oz)   10/21/24 74.8 kg (165 lb)   10/18/24 77.6 kg (171 lb)   10/10/24 77.7 kg (171 lb 4.8 oz)   10/07/24 78 kg (172 lb)   10/02/24 77.6 kg (171 lb)   10/02/24 77.6 kg (171 lb) " "    BMI: Estimated body mass index is 31.74 kg/m  as calculated from the following:    Height as of 11/22/24: 1.549 m (5' 1\").    Weight as of 11/22/24: 76.2 kg (168 lb).    Diet Recall:  (some usual/recent meals/snacks/beverages): easts 3 meal per day, sometimes a snack. Cannot tolerate dairy since HS. Trying to eat less red meat. Had hysterectomy in February 2024 and since then had more GI issues since then.  Meal Food    Breakfast Oatmeal with br sugar, raisin or eggs and toast   Lunch Lefotvers or out to eat: mexican or pizza or african foods   Dinner Protein (meat, fish, puiltry), veg, small amt of starch (potato/tator tot/rice). Or Veggies, italian food, baked chickn    Snacks Cookie or crackers or nuts or candy   Beverages 1 c/day Coffee with soy/oat/almond milk creamer, 64 oz water, 1-2x/week SF/zero pop   Alcohol Intake Decreased/not drinking since having symptoms.     Frequency of eating/taking out meals: trying to decrease eating/taking out especially with issieus. Used to be 3x/week and now 1x/week     Labs:    Last Comprehensive Metabolic Panel:  Sodium   Date Value Ref Range Status   10/25/2024 137 135 - 145 mmol/L Final   07/08/2020 136 133 - 144 mmol/L Final     Potassium   Date Value Ref Range Status   10/25/2024 3.7 3.4 - 5.3 mmol/L Final   08/17/2022 4.7 3.4 - 5.3 mmol/L Final   07/08/2020 4.0 3.4 - 5.3 mmol/L Final     Chloride   Date Value Ref Range Status   10/25/2024 104 98 - 107 mmol/L Final   08/17/2022 104 94 - 109 mmol/L Final   07/08/2020 101 94 - 109 mmol/L Final     Carbon Dioxide   Date Value Ref Range Status   07/08/2020 30 20 - 32 mmol/L Final     Carbon Dioxide (CO2)   Date Value Ref Range Status   10/25/2024 24 22 - 29 mmol/L Final   08/17/2022 29 20 - 32 mmol/L Final     Anion Gap   Date Value Ref Range Status   10/25/2024 9 7 - 15 mmol/L Final   08/17/2022 3 3 - 14 mmol/L Final   07/08/2020 5 3 - 14 mmol/L Final     Glucose   Date Value Ref Range Status   10/25/2024 185 (H) 70 - " 99 mg/dL Final   08/17/2022 291 (H) 70 - 99 mg/dL Final   07/08/2020 417 (H) 70 - 99 mg/dL Final     GLUCOSE BY METER POCT   Date Value Ref Range Status   10/21/2024 71 70 - 99 mg/dL Final     Urea Nitrogen   Date Value Ref Range Status   10/25/2024 15.4 6.0 - 20.0 mg/dL Final   08/17/2022 13 7 - 30 mg/dL Final   07/08/2020 13 7 - 30 mg/dL Final     Creatinine   Date Value Ref Range Status   10/25/2024 0.82 0.51 - 0.95 mg/dL Final   07/08/2020 0.74 0.52 - 1.04 mg/dL Final     GFR Estimate   Date Value Ref Range Status   10/25/2024 90 >60 mL/min/1.73m2 Final     Comment:     eGFR calculated using 2021 CKD-EPI equation.   07/08/2020 >90 >60 mL/min/[1.73_m2] Final     Comment:     Non  GFR Calc  Starting 12/18/2018, serum creatinine based estimated GFR (eGFR) will be   calculated using the Chronic Kidney Disease Epidemiology Collaboration   (CKD-EPI) equation.       Calcium   Date Value Ref Range Status   10/25/2024 8.6 (L) 8.8 - 10.4 mg/dL Final     Comment:     Reference intervals for this test were updated on 7/16/2024 to reflect our healthy population more accurately. There may be differences in the flagging of prior results with similar values performed with this method. Those prior results can be interpreted in the context of the updated reference intervals.   07/08/2020 8.6 8.5 - 10.1 mg/dL Final     CBC RESULTS:   Recent Labs   Lab Test 10/25/24  1027   WBC 8.0   RBC 4.42   HGB 12.2   HCT 37.6   MCV 85   MCH 27.6   MCHC 32.4   RDW 13.1          Pertinent Medications/vitamin and mineral supplements:      Current Outpatient Medications   Medication Sig Dispense Refill     acetaminophen (TYLENOL) 500 MG tablet Take 2 tablets (1,000 mg) by mouth every 8 hours as needed for mild pain 100 tablet 3     Alcohol Swabs (ALCOHOL PREP PAD) 70 % PADS 1 each 3 times daily 100 each 3     alum & mag hydroxide-simethicone (MAALOX) 200-200-20 MG/5ML SUSP suspension Take 10 mLs by mouth every 6 hours as  needed for indigestion. 355 mL 1     amLODIPine (NORVASC) 5 MG tablet Take 1 tablet (5 mg) by mouth at bedtime 90 tablet 3     aspirin (ASA) 81 MG chewable tablet Take 1 tablet (81 mg) by mouth daily CHEW AND SWALLOW 90 tablet 1     atorvastatin (LIPITOR) 40 MG tablet Take 1 tablet (40 mg) by mouth daily. 90 tablet 1     blood glucose (ACCU-CHEK LAYA PLUS) test strip Use with  Accucheck Expert meter.  Test blood sugar 6 times daily. 600 each 3     blood glucose monitoring (ACCU-CHEK FASTCLIX) lancets Use to test blood sugar 6 times daily or as directed 510 each 3     calcium carbonate-vitamin D (OSCAL) 500-5 MG-MCG tablet Take 1 tablet by mouth 2 times daily. 180 tablet 0     Continuous Glucose Sensor (FREESTYLE ZORAIDA 3 SENSOR) MISC 1 each every 14 days Please provide 3 month supply. 6 each 3     diclofenac (VOLTAREN) 1 % topical gel Apply 2 g topically 4 times daily. For L shoulder pain 100 g 3     dicyclomine (BENTYL) 10 MG capsule Take 1 capsule (10 mg) by mouth 4 times daily as needed (Abdominal pain/cramping). 40 capsule 0     empagliflozin (JARDIANCE) 25 MG TABS tablet Take 1 tablet (25 mg) by mouth daily. 90 tablet 1     ergocalciferol (ERGOCALCIFEROL) 1.25 MG (00201 UT) capsule Take 1 capsule (50,000 Units) by mouth once a week For additional refills, please schedule a follow-up appointment at [phone] 12 capsule 3     fenofibrate (TRIGLIDE/LOFIBRA) 160 MG tablet Take 1 tablet (160 mg) by mouth daily 90 tablet 3     fish oil-omega-3 fatty acids 1000 MG capsule TAKE TWO CAPSULES (2 GM) BY MOUTH ONCE DAILY 180 capsule 3     gabapentin (NEURONTIN) 100 MG capsule Take 1 capsule (100 mg) by mouth 3 times daily 42 capsule 1     ibuprofen (ADVIL/MOTRIN) 600 MG tablet Take 1 tablet (600 mg) by mouth every 6 hours as needed for moderate pain 120 tablet 1     Injection Device for insulin (INPEN 100-PINK-NOVOLOG-FIASP) DAVID 1 each continuous 1 each 0     insulin aspart (NOVOLOG PENFILL) 100 UNIT/ML cartridge Take  with each meal and snack: 1 per 4 grams CHO and correction of 1 unit per 20 mg/dL over a target of 120. Average daily dose is 40 units. 40 mL 3     insulin glargine 100 UNIT/ML pen 3 month supply.Inject 55 units daily 45 mL 3     insulin pen needle (31G X 5 MM) 31G X 5 MM miscellaneous Use 5 to 6 pen needles daily or as directed.  3 month supply. 500 each 3     losartan (COZAAR) 100 MG tablet Take 1 tablet (100 mg) by mouth daily 90 tablet 3     metoclopramide (REGLAN) 10 MG tablet Take 1 tablet (10 mg) by mouth 4 times daily as needed (nausea). 14 tablet 0     MOUNJARO 12.5 MG/0.5ML SOAJ Inject 0.5 mLs (12.5 mg) subcutaneously every 7 days. 2 mL 2     omeprazole (PRILOSEC) 40 MG DR capsule Take 1 capsule (40 mg) by mouth 2 times daily 90 capsule 0     omeprazole (PRILOSEC) 40 MG DR capsule Take 1 capsule (40 mg) by mouth 2 times daily 8-12 weeks 90 capsule 0     ondansetron (ZOFRAN ODT) 4 MG ODT tab Take 1 tablet (4 mg) by mouth every 6 hours as needed for nausea or vomiting 20 tablet 0     pantoprazole (PROTONIX) 40 MG EC tablet Take 1 tablet (40 mg) by mouth daily. 90 tablet 1     polyethylene glycol (GOLYTELY) 236 g suspension Two days before procedure at 5PM fill first container with water. Mix and drink an 8 oz glass every 15 minutes until HALF of the container is gone. Place the remainder in the refrigerator. One day before procedure at 5PM drink second half of bowel prep. Drink an 8 oz glass every 15 minutes until it is gone. Day of procedure 6 hours before arrival time fill the 2nd container with water. Mix and drink an 8 oz glass every 15 minutes until HALF of the container is gone. Discard the remaining solution. 8000 mL 0     sucralfate (CARAFATE) 1 GM/10ML suspension Take 10 mLs (1 g) by mouth 4 times daily. 414 mL 0     tirzepatide (MOUNJARO) 10 MG/0.5ML pen Inject 10 mg subcutaneously every 7 days. 2 mL 4     Current Facility-Administered Medications   Medication Dose Route Frequency Provider Last  Rate Last Admin     hydrocortisone (CORTAID) 1 % cream   Topical Once OverAdriana benavidezSABI Delong CNP         hydrocortisone (CORTAID) 1 % cream   Topical TID CarolMariya cullen APRN CNP         hylan (SYNVISC ONE) injection 48 mg  48 mg   Rosas Rodriguez DO   48 mg at 05/09/23 1813     lidocaine (PF) (XYLOCAINE) 1 % injection 4 mL  4 mL   Sebas Bradley MD   4 mL at 10/05/23 0923     triamcinolone (KENALOG-40) injection 40 mg  40 mg   Sebas Bradley MD   40 mg at 10/05/23 0923       Food Allergies:  NKFA     MALNUTRITION:  % Weight Loss:  no significant weight loss  % Intake:  No decreased intake noted  Subcutaneous Fat Loss:  None observed  Muscle Loss:  None observed  Fluid Retention:  None noted    Malnutrition Diagnosis: Patient does not meet two of the above criteria necessary for diagnosing malnutrition    Nutrition Prescription: Nutrition Education     Nutrition Intervention      Provided diet education for reflux, abdominal pain, IBS, loose stool history.  Discussed some specific tips and ideas including some potential food triggers and those that may be better tolerated.  Briefly discussed low FODMAP diet but since he has been feeling better will be traveling soon, recommended focusing on goals below first and possibly at future visit could consider low FODMAP if return of symptoms or worsening of symptoms.    Answered patient's questions. Patient verbalized understanding of education provided. See Goals below.     Educational Materials Provided: NCM: GERD nutrition therapy and low FODMAP nutrition therapy and low FODMAP food list    Goals:    1. Eating multiple smaller meals spread out the day such as 4-6 smaller meals per day are usually better tolerated.    --Some potential food and beverage triggers for reflux include caffeine, carbonated beverages, alcoholic beverages, chocolate, peppermint, spearmint, black pepper, heavily spiced foods, high fats, eating large portions/large  meals.    --High fructose corn syrup, agave syrup, honey are not digested as well so may lead to some looser stools and reflux.    2. Eat slowly and take at least 20 minutes to eat a meal. Do not overeat or eat to the point of feeling full at a meal.     3.  Drink 48 oz-64 oz fluids per day. (ideally from mostly non caloric, non caffeinated, non carbonated fluids).     4. Continue to wait at least 3 hours after eating before lying down and going to sleep.    5. Include some good sources of soluble fiber which may be helpful with giving more form to stools. Some good sources include oats/oatmeal, barley, sweet potato, potato, broccoli, brussels sprouts, beans/legumes, apples, oranges, banana, avocado, figs.    6.  Continue drinking at least 64 oz or more water per day.    7. Increase exercise. Restart walking. Could aim for trying to get at least 3 walks in a day.    8. Continue to limit eating/taking out meal and sticking to only 1 time per week.    Nutrition Monitoring and Evaluation: Will monitor adherence to nutrition recommendations at future RD visits.     Further Medical Nutrition Therapy:  Follow-up January 20, 2025 at 2:30 PM    Patient was encouraged to contact RD with any further questions.    Latricia Alex MS, RD, LD    Note: this note was partially completed using voice dictation while checked for errors some grammatical/spelling errors may still be present.          Again, thank you for allowing me to participate in the care of your patient.        Sincerely,        Latricia Alex RD

## 2024-11-25 NOTE — PATIENT INSTRUCTIONS
It was nice meeting you today. Below are the nutrition recommendations we discussed at your visit.    Please let me know if you have any additional questions.    Nutrition Recommendations    1. Eating multiple smaller meals spread out the day such as 4-6 smaller meals per day are usually better tolerated.    --Some potential food and beverage triggers for reflux include caffeine, carbonated beverages, alcoholic beverages, chocolate, peppermint, spearmint, black pepper, heavily spiced foods, high fats, eating large portions/large meals.    --High fructose corn syrup, agave syrup, honey are not digested as well so may lead to some looser stools and reflux.    2. Eat slowly and take at least 20 minutes to eat a meal. Do not overeat or eat to the point of feeling full at a meal.     3.  Drink 48 oz-64 oz fluids per day. (ideally from mostly non caloric, non caffeinated, non carbonated fluids).     4. Continue to wait at least 3 hours after eating before lying down and going to sleep.    5. Include some good sources of soluble fiber which may be helpful with giving more form to stools. Some good sources include oats/oatmeal, barley, sweet potato, potato, broccoli, brussels sprouts, beans/legumes, apples, oranges, banana, avocado, figs.    6.  Continue drinking at least 64 oz or more water per day.    7. Increase exercise. Restart walking. Could aim for trying to get at least 3 walks in a day.    8. Continue to limit eating/taking out meal and sticking to only 1 time per week.    Your follow up in-person appointment is scheduled for January 20, 2025 at 2:30 PM. If you need to make any changes to your appointment, please call [phone].    If you would like to schedule a follow up appointment, please call [phone].    Thank you,    Latricia Alex, MS, RD, LD

## 2024-11-25 NOTE — PROGRESS NOTES
[facility] Outpatient Medical Nutrition Therapy      Time Spent:  75 minutes  Session Type:  Initial   Referring Physician:  Kirsten Conn PA-C and Mariya Mohamud CNP  Reason for RD Visit:   nausea     Nutrition Assessment:  Patient is a 44 year old female with history that IBS, dyspepsia, includes type 2 diabetes, colitis, s/p hysterectomy, hypertension, nonalcoholic liver disease, nausea, abdominal pain, RLQ abdominal pain, loose stools.  She is accompanied today with  at visit.  She stated that she is been feeling better lately because she has been paying more attention to her diet and trying to eat better.  She previously was having a lot of loose stools/diarrhea and stomach pain as well as reflux.  She stated that she had a hysterectomy last February 2024 and since then she has felt her digestive system has been different.  She made some changes to her diet and has been trying to eat out less.  She previously would eat out 3 times per week, but now only having a takeout or eat out meal once a week.  She is also making more meals at home and using fresh or frozen vegetables and discontinued canned vegetables because she is also trying to watch her sodium intake.  She used to drink 3 to 4 cups of coffee a day, but now limits to 1 cup/day with a nondairy milk.  She has not been able to tolerate dairy since high school.  She feels that may be some sauces such as ketchup or barbecue sauce might be triggers for symptoms.  She is also careful about some spices as these may cause some symptoms for her as well.  She stated that she has had many tests that were normal including a food sensitivity test that she purchased over-the-counter and did not find any triggers.  She had an endoscopy and was told she has reflux, so currently on omeprazole.  She discontinued ibuprofen and has not been drinking alcohol lately as she was told these could contribute to symptoms.  She stated that she had a couple of  "ED visits due to pain.  She previously took Gas-X for a little bit but has not needed to take it recently.  She will be visiting her family for few weeks on vacation out of state so she is trying to make sure that she has everything in place to prevent having significant symptoms while away.  She generally eats 3 meals per day and will have 1 or 2 snacks.  She likes candy and sweets, but trying to limit those.  She stated that her blood sugars have been stable so did have some decrease in medications.      Patient Active Problem List   Diagnosis    Essential hypertension    Other chronic nonalcoholic liver disease    Diabetic retinopathy of both eyes (H)    Obesity    Usher Syndrome: congenital deafness, retinitis pigmentosa    Macular degeneration, age related, nonexudative    Benign neoplasm of iris    Dry eye syndrome    Pemphigus erythematosus (H)    Chondromalacia of patella, right, steroid injection 6/12/2015    Uncontrolled type 2 diabetes mellitus with hyperglycemia, with long-term current use of insulin (H)    Chronic kidney disease, stage 1    Trigger middle finger of left hand    Adenomyosis of uterus    S/P hysterectomy    Pain in both hands    Severe carpal tunnel syndrome of both wrists     Height:   Ht Readings from Last 1 Encounters:   11/22/24 1.549 m (5' 1\")     Weight:  Wt Readings from Last 10 Encounters:   11/22/24 76.2 kg (168 lb)   11/12/24 76 kg (167 lb 9.6 oz)   10/25/24 74.8 kg (165 lb)   10/23/24 76.9 kg (169 lb 8 oz)   10/21/24 74.8 kg (165 lb)   10/18/24 77.6 kg (171 lb)   10/10/24 77.7 kg (171 lb 4.8 oz)   10/07/24 78 kg (172 lb)   10/02/24 77.6 kg (171 lb)   10/02/24 77.6 kg (171 lb)     BMI: Estimated body mass index is 31.74 kg/m  as calculated from the following:    Height as of 11/22/24: 1.549 m (5' 1\").    Weight as of 11/22/24: 76.2 kg (168 lb).    Diet Recall:  (some usual/recent meals/snacks/beverages): easts 3 meal per day, sometimes a snack. Cannot tolerate dairy since HS. " Trying to eat less red meat. Had hysterectomy in February 2024 and since then had more GI issues since then.  Meal Food    Breakfast Oatmeal with br sugar, raisin or eggs and toast   Lunch Lefotvers or out to eat: mexican or pizza or african foods   Dinner Protein (meat, fish, puiltry), veg, small amt of starch (potato/tator tot/rice). Or Veggies, italian food, baked chickn    Snacks Cookie or crackers or nuts or candy   Beverages 1 c/day Coffee with soy/oat/almond milk creamer, 64 oz water, 1-2x/week SF/zero pop   Alcohol Intake Decreased/not drinking since having symptoms.     Frequency of eating/taking out meals: trying to decrease eating/taking out especially with issieus. Used to be 3x/week and now 1x/week     Labs:    Last Comprehensive Metabolic Panel:  Sodium   Date Value Ref Range Status   10/25/2024 137 135 - 145 mmol/L Final   07/08/2020 136 133 - 144 mmol/L Final     Potassium   Date Value Ref Range Status   10/25/2024 3.7 3.4 - 5.3 mmol/L Final   08/17/2022 4.7 3.4 - 5.3 mmol/L Final   07/08/2020 4.0 3.4 - 5.3 mmol/L Final     Chloride   Date Value Ref Range Status   10/25/2024 104 98 - 107 mmol/L Final   08/17/2022 104 94 - 109 mmol/L Final   07/08/2020 101 94 - 109 mmol/L Final     Carbon Dioxide   Date Value Ref Range Status   07/08/2020 30 20 - 32 mmol/L Final     Carbon Dioxide (CO2)   Date Value Ref Range Status   10/25/2024 24 22 - 29 mmol/L Final   08/17/2022 29 20 - 32 mmol/L Final     Anion Gap   Date Value Ref Range Status   10/25/2024 9 7 - 15 mmol/L Final   08/17/2022 3 3 - 14 mmol/L Final   07/08/2020 5 3 - 14 mmol/L Final     Glucose   Date Value Ref Range Status   10/25/2024 185 (H) 70 - 99 mg/dL Final   08/17/2022 291 (H) 70 - 99 mg/dL Final   07/08/2020 417 (H) 70 - 99 mg/dL Final     GLUCOSE BY METER POCT   Date Value Ref Range Status   10/21/2024 71 70 - 99 mg/dL Final     Urea Nitrogen   Date Value Ref Range Status   10/25/2024 15.4 6.0 - 20.0 mg/dL Final   08/17/2022 13 7 - 30  mg/dL Final   07/08/2020 13 7 - 30 mg/dL Final     Creatinine   Date Value Ref Range Status   10/25/2024 0.82 0.51 - 0.95 mg/dL Final   07/08/2020 0.74 0.52 - 1.04 mg/dL Final     GFR Estimate   Date Value Ref Range Status   10/25/2024 90 >60 mL/min/1.73m2 Final     Comment:     eGFR calculated using 2021 CKD-EPI equation.   07/08/2020 >90 >60 mL/min/[1.73_m2] Final     Comment:     Non  GFR Calc  Starting 12/18/2018, serum creatinine based estimated GFR (eGFR) will be   calculated using the Chronic Kidney Disease Epidemiology Collaboration   (CKD-EPI) equation.       Calcium   Date Value Ref Range Status   10/25/2024 8.6 (L) 8.8 - 10.4 mg/dL Final     Comment:     Reference intervals for this test were updated on 7/16/2024 to reflect our healthy population more accurately. There may be differences in the flagging of prior results with similar values performed with this method. Those prior results can be interpreted in the context of the updated reference intervals.   07/08/2020 8.6 8.5 - 10.1 mg/dL Final     CBC RESULTS:   Recent Labs   Lab Test 10/25/24  1027   WBC 8.0   RBC 4.42   HGB 12.2   HCT 37.6   MCV 85   MCH 27.6   MCHC 32.4   RDW 13.1          Pertinent Medications/vitamin and mineral supplements:      Current Outpatient Medications   Medication Sig Dispense Refill    acetaminophen (TYLENOL) 500 MG tablet Take 2 tablets (1,000 mg) by mouth every 8 hours as needed for mild pain 100 tablet 3    Alcohol Swabs (ALCOHOL PREP PAD) 70 % PADS 1 each 3 times daily 100 each 3    alum & mag hydroxide-simethicone (MAALOX) 200-200-20 MG/5ML SUSP suspension Take 10 mLs by mouth every 6 hours as needed for indigestion. 355 mL 1    amLODIPine (NORVASC) 5 MG tablet Take 1 tablet (5 mg) by mouth at bedtime 90 tablet 3    aspirin (ASA) 81 MG chewable tablet Take 1 tablet (81 mg) by mouth daily CHEW AND SWALLOW 90 tablet 1    atorvastatin (LIPITOR) 40 MG tablet Take 1 tablet (40 mg) by mouth daily. 90  tablet 1    blood glucose (ACCU-CHEK LAYA PLUS) test strip Use with  Accucheck Expert meter.  Test blood sugar 6 times daily. 600 each 3    blood glucose monitoring (ACCU-CHEK FASTCLIX) lancets Use to test blood sugar 6 times daily or as directed 510 each 3    calcium carbonate-vitamin D (OSCAL) 500-5 MG-MCG tablet Take 1 tablet by mouth 2 times daily. 180 tablet 0    Continuous Glucose Sensor (FREESTYLE ZORAIDA 3 SENSOR) MISC 1 each every 14 days Please provide 3 month supply. 6 each 3    diclofenac (VOLTAREN) 1 % topical gel Apply 2 g topically 4 times daily. For L shoulder pain 100 g 3    dicyclomine (BENTYL) 10 MG capsule Take 1 capsule (10 mg) by mouth 4 times daily as needed (Abdominal pain/cramping). 40 capsule 0    empagliflozin (JARDIANCE) 25 MG TABS tablet Take 1 tablet (25 mg) by mouth daily. 90 tablet 1    ergocalciferol (ERGOCALCIFEROL) 1.25 MG (36659 UT) capsule Take 1 capsule (50,000 Units) by mouth once a week For additional refills, please schedule a follow-up appointment at [phone] 12 capsule 3    fenofibrate (TRIGLIDE/LOFIBRA) 160 MG tablet Take 1 tablet (160 mg) by mouth daily 90 tablet 3    fish oil-omega-3 fatty acids 1000 MG capsule TAKE TWO CAPSULES (2 GM) BY MOUTH ONCE DAILY 180 capsule 3    gabapentin (NEURONTIN) 100 MG capsule Take 1 capsule (100 mg) by mouth 3 times daily 42 capsule 1    ibuprofen (ADVIL/MOTRIN) 600 MG tablet Take 1 tablet (600 mg) by mouth every 6 hours as needed for moderate pain 120 tablet 1    Injection Device for insulin (INPEN 100-PINK-NOVOLOG-FIASP) DAVID 1 each continuous 1 each 0    insulin aspart (NOVOLOG PENFILL) 100 UNIT/ML cartridge Take with each meal and snack: 1 per 4 grams CHO and correction of 1 unit per 20 mg/dL over a target of 120. Average daily dose is 40 units. 40 mL 3    insulin glargine 100 UNIT/ML pen 3 month supply.Inject 55 units daily 45 mL 3    insulin pen needle (31G X 5 MM) 31G X 5 MM miscellaneous Use 5 to 6 pen needles daily or as  directed.  3 month supply. 500 each 3    losartan (COZAAR) 100 MG tablet Take 1 tablet (100 mg) by mouth daily 90 tablet 3    metoclopramide (REGLAN) 10 MG tablet Take 1 tablet (10 mg) by mouth 4 times daily as needed (nausea). 14 tablet 0    MOUNJARO 12.5 MG/0.5ML SOAJ Inject 0.5 mLs (12.5 mg) subcutaneously every 7 days. 2 mL 2    omeprazole (PRILOSEC) 40 MG DR capsule Take 1 capsule (40 mg) by mouth 2 times daily 90 capsule 0    omeprazole (PRILOSEC) 40 MG DR capsule Take 1 capsule (40 mg) by mouth 2 times daily 8-12 weeks 90 capsule 0    ondansetron (ZOFRAN ODT) 4 MG ODT tab Take 1 tablet (4 mg) by mouth every 6 hours as needed for nausea or vomiting 20 tablet 0    pantoprazole (PROTONIX) 40 MG EC tablet Take 1 tablet (40 mg) by mouth daily. 90 tablet 1    polyethylene glycol (GOLYTELY) 236 g suspension Two days before procedure at 5PM fill first container with water. Mix and drink an 8 oz glass every 15 minutes until HALF of the container is gone. Place the remainder in the refrigerator. One day before procedure at 5PM drink second half of bowel prep. Drink an 8 oz glass every 15 minutes until it is gone. Day of procedure 6 hours before arrival time fill the 2nd container with water. Mix and drink an 8 oz glass every 15 minutes until HALF of the container is gone. Discard the remaining solution. 8000 mL 0    sucralfate (CARAFATE) 1 GM/10ML suspension Take 10 mLs (1 g) by mouth 4 times daily. 414 mL 0    tirzepatide (MOUNJARO) 10 MG/0.5ML pen Inject 10 mg subcutaneously every 7 days. 2 mL 4     Current Facility-Administered Medications   Medication Dose Route Frequency Provider Last Rate Last Admin    hydrocortisone (CORTAID) 1 % cream   Topical Once Mariya Mohamud APRN CNP        hydrocortisone (CORTAID) 1 % cream   Topical TID Mariya Mohamud APRN CNP        hylan (SYNVISC ONE) injection 48 mg  48 mg   Rosas Rodriguez DO   48 mg at 05/09/23 1813    lidocaine (PF) (XYLOCAINE) 1 % injection 4 mL  4 mL    Sebas Bradley MD   4 mL at 10/05/23 0923    triamcinolone (KENALOG-40) injection 40 mg  40 mg   Sebas Bradley MD   40 mg at 10/05/23 0923       Food Allergies:  NKFA     MALNUTRITION:  % Weight Loss:  no significant weight loss  % Intake:  No decreased intake noted  Subcutaneous Fat Loss:  None observed  Muscle Loss:  None observed  Fluid Retention:  None noted    Malnutrition Diagnosis: Patient does not meet two of the above criteria necessary for diagnosing malnutrition    Nutrition Prescription: Nutrition Education     Nutrition Intervention      Provided diet education for reflux, abdominal pain, IBS, loose stool history.  Discussed some specific tips and ideas including some potential food triggers and those that may be better tolerated.  Briefly discussed low FODMAP diet but since he has been feeling better will be traveling soon, recommended focusing on goals below first and possibly at future visit could consider low FODMAP if return of symptoms or worsening of symptoms.    Answered patient's questions. Patient verbalized understanding of education provided. See Goals below.     Educational Materials Provided: NCM: GERD nutrition therapy and low FODMAP nutrition therapy and low FODMAP food list    Goals:    1. Eating multiple smaller meals spread out the day such as 4-6 smaller meals per day are usually better tolerated.    --Some potential food and beverage triggers for reflux include caffeine, carbonated beverages, alcoholic beverages, chocolate, peppermint, spearmint, black pepper, heavily spiced foods, high fats, eating large portions/large meals.    --High fructose corn syrup, agave syrup, honey are not digested as well so may lead to some looser stools and reflux.    2. Eat slowly and take at least 20 minutes to eat a meal. Do not overeat or eat to the point of feeling full at a meal.     3.  Drink 48 oz-64 oz fluids per day. (ideally from mostly non caloric, non caffeinated, non  carbonated fluids).     4. Continue to wait at least 3 hours after eating before lying down and going to sleep.    5. Include some good sources of soluble fiber which may be helpful with giving more form to stools. Some good sources include oats/oatmeal, barley, sweet potato, potato, broccoli, brussels sprouts, beans/legumes, apples, oranges, banana, avocado, figs.    6.  Continue drinking at least 64 oz or more water per day.    7. Increase exercise. Restart walking. Could aim for trying to get at least 3 walks in a day.    8. Continue to limit eating/taking out meal and sticking to only 1 time per week.    Nutrition Monitoring and Evaluation: Will monitor adherence to nutrition recommendations at future RD visits.     Further Medical Nutrition Therapy:  Follow-up January 20, 2025 at 2:30 PM    Patient was encouraged to contact RD with any further questions.    Latricia Alex MS, RD, LD    Note: this note was partially completed using voice dictation while checked for errors some grammatical/spelling errors may still be present.

## 2024-11-26 ENCOUNTER — ALLIED HEALTH/NURSE VISIT (OUTPATIENT)
Dept: EDUCATION SERVICES | Facility: CLINIC | Age: 44
End: 2024-11-26
Payer: COMMERCIAL

## 2024-11-26 DIAGNOSIS — E11.65 UNCONTROLLED TYPE 2 DIABETES MELLITUS WITH HYPERGLYCEMIA, WITH LONG-TERM CURRENT USE OF INSULIN (H): Primary | ICD-10-CM

## 2024-11-26 DIAGNOSIS — Z79.4 UNCONTROLLED TYPE 2 DIABETES MELLITUS WITH HYPERGLYCEMIA, WITH LONG-TERM CURRENT USE OF INSULIN (H): Primary | ICD-10-CM

## 2024-11-26 RX ORDER — PANTOPRAZOLE SODIUM 40 MG/1
40 TABLET, DELAYED RELEASE ORAL DAILY
Qty: 90 TABLET | Refills: 1 | Status: SHIPPED | OUTPATIENT
Start: 2024-11-26

## 2024-11-26 RX ORDER — ASPIRIN 81 MG/1
81 TABLET, CHEWABLE ORAL DAILY
Qty: 90 TABLET | Refills: 3 | Status: SHIPPED | OUTPATIENT
Start: 2024-11-26

## 2024-11-27 VITALS — BODY MASS INDEX: 31.55 KG/M2 | WEIGHT: 167 LBS

## 2024-11-27 NOTE — PROGRESS NOTES
Diabetes Self-Management Education & Support    Nayeli Caal presents today for education related to Type 2 diabetes    Patient is being treated with:  Oral Agents, Diet, MDI Insulin, and GLP-1 Agonist  She is accompanied by self and     PATIENT CONCERNS/REASON FOR REFERRAL Nayeli has been taking the 12.5 mg dose of Mounjaro for the past couple of weeks and her insulin requirements have dropped considerably.  She has so far lost about 14 lbs.   She is here to review her diabetes control.     ASSESSMENT:    Taking Medication:     Current Diabetes Management per Patient:  Taking diabetes medications? yes:     Diabetes Medication(s)       Insulin       insulin aspart (NOVOLOG PENFILL) 100 UNIT/ML cartridge Take with each meal and snack: 1 per 4 grams CHO and correction of 1 unit per 20 mg/dL over a target of 120. Average daily dose is 40 units.     insulin glargine 100 UNIT/ML pen 3 month supply.Inject 55 units daily       Sodium-Glucose Co-Transporter 2 (SGLT2) Inhibitors       empagliflozin (JARDIANCE) 25 MG TABS tablet Take 1 tablet (25 mg) by mouth daily.       Incretin Mimetic Agents       MOUNJARO 12.5 MG/0.5ML SOAJ Inject 0.5 mLs (12.5 mg) subcutaneously every 7 days.     tirzepatide (MOUNJARO) 10 MG/0.5ML pen Inject 10 mg subcutaneously every 7 days.            Monitoring    Patient glucose self monitoring as follows: continuously using a continuous glucose monitor (CGM)  CGM: Freestyle Evelia 3  BG results: Libreview report appears below:          Patient's most recent   Lab Results   Component Value Date    A1C 7.8 09/11/2024    A1C 11.6 04/05/2021      Patient's A1C goal: <7.0    Activity: no regular exercise program    Healthy Eating:   She has been working with the dietitian at [facility] to improve the quality of her diet.  She is currently focused on reducing or eliminating fructose from her diet.      Problem Solving:      Patient is at risk of hypoglycemia?: YES and Frequency is less  "than once a month  Hospitalizations for hyper or hypoglycemia: No    EDUCATION and INSTRUCTION PROVIDED AT THIS VISIT:       Nayeli had been having some severe abdominal pain and diarrhea which she had worked up at [facility].    Colonoscopy basically WNL and markers for inflammation have declined.  She states that she has been feeling \"much better.\"    She is currently tolerating the 12.5 mg dose of Mounjaro without nausea, vomiting or pain.  She would like to increase the dose to 15 mg.    She has had a moderate amount of weight loss.  Her blood glucose control is the best I have seen in the 5 years I have been working with her.      Currently taking 25 units of Basaglar.   She is using the In-Pen yamileth to help her dose Novolog:   Current settings:   Start time ICR  1 unit/gram CHO Insulin Sensitivity Factor Target BG    06:00 AM 4 15 110   11:00 AM 4 15 110   05:00 PM 4 15 110   09:30 PM 4 20 130         Active Insulin Time:  3 hours  Maximum Bolus:  20    She has been intermittantly covering lunch.    Generally covers breakfast and dinner.   She will be traveling over the [place] holiday.  With increasing her dose to 15 mg weekly of Mounjaro, we slightly reduced her insulin to carb ratio from 1 unit per 4 grams CHO to 1 unit per 5 grams CHO  and reduced her correction factor at bedtime from 15 to 20.      Made follow up appointment in January.    Order sent to [facility].        Patient-stated goal written and given to Nayeli MIXON Ingrid Cordovaon.  Verbalized and demonstrated understanding of instructions.     PLAN:    See patient instructions  AVS printed and given to patient    FOLLOW-UP:        Time spent with patient at today's visit was 45 minutes.        Any diabetes medication initiation or dose changes were [facility] per the patient's referring provider. A copy of this encounter was shared with the provider.    "

## 2024-12-04 ENCOUNTER — OFFICE VISIT (OUTPATIENT)
Dept: INTERPRETER SERVICES | Facility: CLINIC | Age: 44
End: 2024-12-04

## 2024-12-04 ENCOUNTER — HOSPITAL ENCOUNTER (OUTPATIENT)
Facility: AMBULATORY SURGERY CENTER | Age: 44
Discharge: HOME | End: 2024-12-04
Attending: ORTHOPAEDIC SURGERY | Admitting: ORTHOPAEDIC SURGERY
Payer: COMMERCIAL

## 2024-12-04 VITALS
WEIGHT: 167 LBS | DIASTOLIC BLOOD PRESSURE: 69 MMHG | SYSTOLIC BLOOD PRESSURE: 128 MMHG | TEMPERATURE: 97.3 F | HEIGHT: 61 IN | OXYGEN SATURATION: 97 % | HEART RATE: 91 BPM | RESPIRATION RATE: 16 BRPM | BODY MASS INDEX: 31.53 KG/M2

## 2024-12-04 DIAGNOSIS — G56.03 SEVERE CARPAL TUNNEL SYNDROME OF BOTH WRISTS: Primary | ICD-10-CM

## 2024-12-04 PROCEDURE — 64721 CARPAL TUNNEL SURGERY: CPT | Mod: RT

## 2024-12-04 PROCEDURE — 64721 CARPAL TUNNEL SURGERY: CPT | Mod: GC | Performed by: ORTHOPAEDIC SURGERY

## 2024-12-04 RX ORDER — LIDOCAINE HYDROCHLORIDE AND EPINEPHRINE 10; 10 MG/ML; UG/ML
10 INJECTION, SOLUTION INFILTRATION; PERINEURAL ONCE
Status: COMPLETED | OUTPATIENT
Start: 2024-12-04 | End: 2024-12-04

## 2024-12-04 RX ORDER — OXYCODONE HYDROCHLORIDE 5 MG/1
5 TABLET ORAL EVERY 6 HOURS PRN
Qty: 3 TABLET | Refills: 0 | Status: SHIPPED | OUTPATIENT
Start: 2024-12-04 | End: 2024-12-07

## 2024-12-04 RX ADMIN — LIDOCAINE HYDROCHLORIDE AND EPINEPHRINE 10 ML: 10; 10 INJECTION, SOLUTION INFILTRATION; PERINEURAL at 14:35

## 2024-12-04 NOTE — DISCHARGE INSTRUCTIONS
[facility]  Home Care Following Your Procedure    Post-operative plan: Soft dressings x 3 days.  Then okay to remove and transition to a simple bandage.  Okay to start washing hand normally after 3 days, no scrubbing the incision.  Use of the hand normally for basic activities of daily living.  No heavy lifting until the wound is healed. Return to the clinic in 14 days for wound check and suture removal as wound healing allows.   Call a doctor if you have signs of infection (fever, growing tenderness at the surgery site, a large amount of drainage or bleeding, severe pain, foul-smelling drainage, redness, swelling).             Tylenol/Acetaminophen Consumption    If you feel your pain relief is insufficient, you may take Tylenol/Acetaminophen in addition to your narcotic pain medication.   Be careful not to exceed 4,000 mg of Tylenol/Acetaminophen in a 24 hour period from all sources.  If you are taking extra strength Tylenol/acetaminophen (500 mg), the maximum dose is 8 tablets in 24 hours.  If you are taking regular strength acetaminophen (325 mg), the maximum dose is 12 tablets in 24 hours.      Your doctor is:  Dr. Madhu Mcmahon, Orthopaedics: [phone]                  Or dial [phone] and ask for the resident on call for:  Orthopaedics  For emergency care, call the:  [facility]: [phone] (TTY for hearing impaired: [phone])

## 2024-12-04 NOTE — OP NOTE
Pre-operative Diagnosis:  right carpal tunnel syndrome    Post-operative Diagnosis: same    Procedures performed:   right carpal tunnel release    Surgeon: Madhu Mcmahon MD    Assistant: Tesfaye Shoemaker MD -- orthopedic surgery resident    Anesthesia:  local anesthetic     EBL: minimal; <2cc    Specimens: none    Drains: none    Complications: none known    Findings: No residual compression after release of the transverse carpal ligament.    Procedure: The patient was identified in the holding area and the correct surgical site, the right wrist was identified and marked.  The area over the base of the palm was anesthetized with local anesthetic containing epinephrine. The right arm was prepped and draped in the usual sterile fashion.  A multidisciplinary timeout was performed, confirming the correct patient, the correct extremity and the correct procedure.     A longitudinal incision was made over the base of the palm at the area of the transverse carpal ligament and continued down through the skin and subcutaneous tissue. The palmar fascia was visualized and sharply incised. The transverse carpal ligament was then visualized. It was sharply incised. The release was continued distally until perivascular fat could be seen in the wound and there was no residual compression at the distal portion of the median nerve. The proximal margin of the transverse carpal ligament along with a confluent flexor-pronator fascia was then bluntly dissected from the overlying soft tissues and the underlying nerve. The proximal margin of the transverse carpal ligament was then released with dissecting scissors into the forearm. Careful inspection of the nerve showed no evidence of residual compression.  No anomalous masses were seen. The wound was thoroughly irrigated with saline.  The skin was closed with 4-0 nylon suture in interrupted fashion.  Sterile dressings were applied consisting of bacitracin, adaptic, 4 x 4's, cling gauze and  coban.   Sponge, needle and instrument counts were noted to be correct at the conclusion of the procedure, which was performed under loupe magnification.    Dr. Mcmahon was scrubbed and present for all critical portions of the procedure.     Post-operative plan: Soft dressings x 3 days.  Then okay to remove and transition to a simple bandage.  Okay to start washing hand normally after 3 days, no scrubbing the incision.  Use of the hand normally for basic activities of daily living.  No heavy lifting until the wound is healed.  Return to the clinic in 14 days for wound check and suture removal as wound healing allows.    Tesfaye Shoemaker MD   Orthopedic surgery resident

## 2024-12-11 ENCOUNTER — TELEPHONE (OUTPATIENT)
Dept: ORTHOPEDICS | Facility: CLINIC | Age: 44
End: 2024-12-11

## 2024-12-11 ENCOUNTER — OFFICE VISIT (OUTPATIENT)
Dept: ORTHOPEDICS | Facility: CLINIC | Age: 44
End: 2024-12-11
Payer: COMMERCIAL

## 2024-12-11 DIAGNOSIS — G56.03 SEVERE CARPAL TUNNEL SYNDROME OF BOTH WRISTS: Primary | ICD-10-CM

## 2024-12-11 DIAGNOSIS — Z98.890 POST-OPERATIVE STATE: ICD-10-CM

## 2024-12-11 PROCEDURE — T1013 SIGN LANG/ORAL INTERPRETER: HCPCS | Mod: U3

## 2024-12-11 PROCEDURE — 99024 POSTOP FOLLOW-UP VISIT: CPT | Performed by: PHYSICIAN ASSISTANT

## 2024-12-11 NOTE — LETTER
12/11/2024      Nayeli Caal  [address]  [facility] [address]      Dear Colleague,    Thank you for referring your patient, Nayeli Caal, to the [facility] [place]. Please see a copy of my visit note below.    Date of Service: Dec 11, 2024    Reason for visit: Postoperative follow-up    Date of Surgery: 12/4/24    Procedure Performed: right open carpal tunnel release    Interval events: Nayeli Caal comes to see me in follow-up today. Numbness and tingling present preoperatively IS improved. No fevers or chills. No longer taking pain medication. No other issues to report.    An [employer]  was utilized throughout the visit for the history and exam.      Physical examination:  Well-developed, well-nourished and in no acute distress.  Alert and oriented to surroundings.  On examination of the right wrist, incision is well-approximated. There is no erythema, drainage, or dehiscence. Sensation is intact in median, radial and ulnar nerve distributions. Thumb opposition is intact. Patient can actively flex and extend all digits and thumb. Interosseous muscles, EPL, and FDP-2 fire. Fingers are warm and well-perfused.     Assessment: Nayeli Caal, is a 44-year-old female with bilateral carpal tunnel syndrome, now status post right open carpal tunnel release with Dr. Madhu Mcmahon.     Plan:  Patient should continue to wash wound with soap and water daily and pat dry.  No immersing the wound in water for prolonged periods such as tub baths or dishwashing without gloves until wound has healed. Do not lift anything heavier than a coffee cup or do forceful gripping with your operative hand until the wound has healed as this may split the wound open. This will typically take 1-2 more weeks. You can use the hand for light activities like eating, shaving, typing, and brushing your teeth. After the wound has completley healed, you  may progress your activity gradually according to your comfort level, but heavy lifting (> 10 pounds),  forceful gripping, and weight bearing directly on the palm (such as pushups) are discouraged for the first 6 weeks after surgery to give the area time to heal. Painful activities should be avoided.  The patient is scheduled for a left open carpal tunnel release in January.  We will see the patient for follow-up after that.  Knows to contact us in the interim with any questions or concerns.    LESLEY STARK PA-C  Orthopaedic Surgery       Again, thank you for allowing me to participate in the care of your patient.        Sincerely,        Lesley Stark PA-C

## 2024-12-11 NOTE — NURSING NOTE
Reason For Visit:   Chief Complaint   Patient presents with    Surgical Followup     Post op Release, Right Carpal Tunnel - Right. DOS: 12/4/24       Primary MD: Mariya Mohamud  Ref. MD: Bg    Age: 44 year old    ?  Yes, specify language: American Sign Language      [facility] 02/01/2024 (Approximate)       Pain Assessment  Patient Currently in Pain: Yes  0-10 Pain Scale: 4  Primary Pain Location: Wrist (Right)  Pain Descriptors: Throbbing, Constant    Hand Dominance Evaluation  Hand Dominance: Left          QuickDASH Assessment      10/1/2024     6:37 AM   QuickDASH Main   1. Open a tight or new jar Moderate difficulty   2. Do heavy household chores (e.g., wash walls, floors) Mild difficulty   3. Carry a shopping bag or briefcase Mild difficulty   4. Wash your back No difficulty   5. Use a knife to cut food Mild difficulty   6. Recreational activities in which you take some force or impact through your arm, shoulder or hand (e.g., golf, hammering, tennis, etc.) Moderate difficulty   7. During the past week, to what extent has your arm, shoulder or hand problem interfered with your normal social activities with family, friends, neighbours or groups Slightly   8. During the past week, were you limited in your work or other regular daily activities as a result of your arm, shoulder or hand problem Slightly limited   9. Arm, shoulder or hand pain Mild   10.Tingling (pins and needles) in your arm,shoulder or hand Moderate   11. During the past week, how much difficulty have you had sleeping because of the pain in your arm, shoulder or hand Moderate difficulty   Quickdash Ability Score 31.82          Current Outpatient Medications   Medication Sig Dispense Refill    acetaminophen (TYLENOL) 500 MG tablet Take 2 tablets (1,000 mg) by mouth every 8 hours as needed for mild pain 100 tablet 3    Alcohol Swabs (ALCOHOL PREP PAD) 70 % PADS 1 each 3 times daily 100 each 3    amLODIPine (NORVASC) 5 MG tablet Take 1  tablet (5 mg) by mouth at bedtime 90 tablet 3    aspirin (ASA) 81 MG chewable tablet Take 1 tablet (81 mg) by mouth daily. CHEW AND SWALLOW 90 tablet 3    atorvastatin (LIPITOR) 40 MG tablet Take 1 tablet (40 mg) by mouth daily. 90 tablet 1    blood glucose (ACCU-CHEK LAYA PLUS) test strip Use with  Accucheck Expert meter.  Test blood sugar 6 times daily. 600 each 3    blood glucose monitoring (ACCU-CHEK FASTCLIX) lancets Use to test blood sugar 6 times daily or as directed 510 each 3    calcium carbonate-vitamin D (OSCAL) 500-5 MG-MCG tablet Take 1 tablet by mouth 2 times daily. 180 tablet 0    Continuous Glucose Sensor (FREESTYLE ZORAIDA 3 SENSOR) MISC 1 each every 14 days Please provide 3 month supply. 6 each 3    diclofenac (VOLTAREN) 1 % topical gel Apply 2 g topically 4 times daily. For L shoulder pain 100 g 3    dicyclomine (BENTYL) 10 MG capsule Take 1 capsule (10 mg) by mouth 4 times daily as needed (Abdominal pain/cramping). 40 capsule 0    empagliflozin (JARDIANCE) 25 MG TABS tablet Take 1 tablet (25 mg) by mouth daily. 90 tablet 1    ergocalciferol (ERGOCALCIFEROL) 1.25 MG (15159 UT) capsule Take 1 capsule (50,000 Units) by mouth once a week For additional refills, please schedule a follow-up appointment at [phone] 12 capsule 3    fenofibrate (TRIGLIDE/LOFIBRA) 160 MG tablet Take 1 tablet (160 mg) by mouth daily 90 tablet 3    fish oil-omega-3 fatty acids 1000 MG capsule TAKE TWO CAPSULES (2 GM) BY MOUTH ONCE DAILY 180 capsule 3    gabapentin (NEURONTIN) 100 MG capsule Take 1 capsule (100 mg) by mouth 3 times daily 42 capsule 1    ibuprofen (ADVIL/MOTRIN) 600 MG tablet Take 1 tablet (600 mg) by mouth every 6 hours as needed for moderate pain 120 tablet 1    Injection Device for insulin (INPEN 100-PINK-NOVOLOG-FIASP) DAVID 1 each continuous 1 each 0    insulin aspart (NOVOLOG PENFILL) 100 UNIT/ML cartridge Take with each meal and snack: 1 per 4 grams CHO and correction of 1 unit per 20 mg/dL over a  target of 120. Average daily dose is 40 units. 40 mL 3    insulin glargine 100 UNIT/ML pen 3 month supply.Inject 55 units daily 45 mL 3    insulin pen needle (31G X 5 MM) 31G X 5 MM miscellaneous Use 5 to 6 pen needles daily or as directed.  3 month supply. 500 each 3    losartan (COZAAR) 100 MG tablet Take 1 tablet (100 mg) by mouth daily 90 tablet 3    metoclopramide (REGLAN) 10 MG tablet Take 1 tablet (10 mg) by mouth 4 times daily as needed (nausea). 14 tablet 0    omeprazole (PRILOSEC) 40 MG DR capsule Take 1 capsule (40 mg) by mouth 2 times daily 90 capsule 0    ondansetron (ZOFRAN ODT) 4 MG ODT tab Take 1 tablet (4 mg) by mouth every 6 hours as needed for nausea or vomiting 20 tablet 0    pantoprazole (PROTONIX) 40 MG EC tablet Take 1 tablet (40 mg) by mouth daily. 90 tablet 1    sucralfate (CARAFATE) 1 GM/10ML suspension Take 10 mLs (1 g) by mouth 4 times daily. 414 mL 0    Tirzepatide 15 MG/0.5ML SOAJ Inject 0.5 mLs (15 mg) subcutaneously every 7 days. 2 mL 5       No Known Allergies    Jesika Woodard, ATC

## 2024-12-11 NOTE — PROGRESS NOTES
Date of Service: Dec 11, 2024    Reason for visit: Postoperative follow-up    Date of Surgery: 12/4/24    Procedure Performed: right open carpal tunnel release    Interval events: Nayeli Caal comes to see me in follow-up today. Numbness and tingling present preoperatively IS improved. No fevers or chills. No longer taking pain medication. No other issues to report.    An [employer]  was utilized throughout the visit for the history and exam.      Physical examination:  Well-developed, well-nourished and in no acute distress.  Alert and oriented to surroundings.  On examination of the right wrist, incision is well-approximated. There is no erythema, drainage, or dehiscence. Sensation is intact in median, radial and ulnar nerve distributions. Thumb opposition is intact. Patient can actively flex and extend all digits and thumb. Interosseous muscles, EPL, and FDP-2 fire. Fingers are warm and well-perfused.     Assessment: Nayeli Caal, is a 44-year-old female with bilateral carpal tunnel syndrome, now status post right open carpal tunnel release with Dr. Madhu Mcmahon.     Plan:  Patient should continue to wash wound with soap and water daily and pat dry.  No immersing the wound in water for prolonged periods such as tub baths or dishwashing without gloves until wound has healed. Do not lift anything heavier than a coffee cup or do forceful gripping with your operative hand until the wound has healed as this may split the wound open. This will typically take 1-2 more weeks. You can use the hand for light activities like eating, shaving, typing, and brushing your teeth. After the wound has completley healed, you may progress your activity gradually according to your comfort level, but heavy lifting (> 10 pounds),  forceful gripping, and weight bearing directly on the palm (such as pushups) are discouraged for the first 6 weeks after surgery to give the area time to heal. Painful  activities should be avoided.  The patient is scheduled for a left open carpal tunnel release in January.  We will see the patient for follow-up after that.  Knows to contact us in the interim with any questions or concerns.    OLIVER STARK PA-C  Orthopaedic Surgery

## 2024-12-13 ENCOUNTER — LAB (OUTPATIENT)
Dept: LAB | Facility: CLINIC | Age: 44
End: 2024-12-13
Payer: COMMERCIAL

## 2024-12-13 DIAGNOSIS — E11.9 WELL CONTROLLED TYPE 2 DIABETES MELLITUS (H): ICD-10-CM

## 2024-12-13 LAB
ALBUMIN UR-MCNC: 30 MG/DL
APPEARANCE UR: CLEAR
BACTERIA #/AREA URNS HPF: ABNORMAL /HPF
BILIRUB UR QL STRIP: NEGATIVE
COLOR UR AUTO: ABNORMAL
EST. AVERAGE GLUCOSE BLD GHB EST-MCNC: 171 MG/DL
GLUCOSE UR STRIP-MCNC: 500 MG/DL
HBA1C MFR BLD: 7.6 %
HGB UR QL STRIP: NEGATIVE
KETONES UR STRIP-MCNC: NEGATIVE MG/DL
LEUKOCYTE ESTERASE UR QL STRIP: ABNORMAL
MUCOUS THREADS #/AREA URNS LPF: PRESENT /LPF
NITRATE UR QL: NEGATIVE
PH UR STRIP: 6 [PH] (ref 5–7)
RBC URINE: 4 /HPF
SP GR UR STRIP: 1.02 (ref 1–1.03)
SQUAMOUS EPITHELIAL: 3 /HPF
UROBILINOGEN UR STRIP-MCNC: NORMAL MG/DL
WBC URINE: 36 /HPF

## 2024-12-13 PROCEDURE — 83036 HEMOGLOBIN GLYCOSYLATED A1C: CPT | Performed by: PHYSICIAN ASSISTANT

## 2024-12-13 PROCEDURE — 87186 SC STD MICRODIL/AGAR DIL: CPT | Performed by: INTERNAL MEDICINE

## 2024-12-13 PROCEDURE — 36415 COLL VENOUS BLD VENIPUNCTURE: CPT | Performed by: PATHOLOGY

## 2024-12-13 PROCEDURE — 81001 URINALYSIS AUTO W/SCOPE: CPT | Performed by: PATHOLOGY

## 2024-12-13 PROCEDURE — 87086 URINE CULTURE/COLONY COUNT: CPT | Performed by: INTERNAL MEDICINE

## 2024-12-13 PROCEDURE — 99000 SPECIMEN HANDLING OFFICE-LAB: CPT | Performed by: PATHOLOGY

## 2024-12-14 LAB — BACTERIA UR CULT: ABNORMAL

## 2024-12-18 ENCOUNTER — TELEPHONE (OUTPATIENT)
Dept: NEPHROLOGY | Facility: CLINIC | Age: 44
End: 2024-12-18
Payer: COMMERCIAL

## 2024-12-18 NOTE — TELEPHONE ENCOUNTER
I called Nayeli today and was able to speak with her through a third-party on the phone.  The reason for the call was to see whether she has any symptoms suggestive of a urine infection given that her urine analysis was positive in her urine culture grew E. coli.  Nayeli denied any dysuria or gross hematuria or difficulty urination.  She denies any fevers and chills.  She is currently on Jardiance and she has been tolerating it very well.  Given the absence of symptoms, I will not start any antibiotics for now.  I wonder whether this is a colonization.    Sylvain Mayfield MD   [facility]   Department of Medicine   Division of Renal Disease and Hypertension

## 2024-12-21 ENCOUNTER — HEALTH MAINTENANCE LETTER (OUTPATIENT)
Age: 44
End: 2024-12-21

## 2025-01-07 ENCOUNTER — OFFICE VISIT (OUTPATIENT)
Dept: INTERNAL MEDICINE | Facility: CLINIC | Age: 45
End: 2025-01-07
Payer: COMMERCIAL

## 2025-01-07 VITALS
HEIGHT: 61 IN | TEMPERATURE: 98 F | HEART RATE: 83 BPM | OXYGEN SATURATION: 99 % | BODY MASS INDEX: 31.17 KG/M2 | RESPIRATION RATE: 15 BRPM | DIASTOLIC BLOOD PRESSURE: 73 MMHG | WEIGHT: 165.1 LBS | SYSTOLIC BLOOD PRESSURE: 130 MMHG

## 2025-01-07 DIAGNOSIS — Z79.4 UNCONTROLLED TYPE 2 DIABETES MELLITUS WITH HYPERGLYCEMIA, WITH LONG-TERM CURRENT USE OF INSULIN (H): ICD-10-CM

## 2025-01-07 DIAGNOSIS — E11.65 UNCONTROLLED TYPE 2 DIABETES MELLITUS WITH HYPERGLYCEMIA, WITH LONG-TERM CURRENT USE OF INSULIN (H): ICD-10-CM

## 2025-01-07 DIAGNOSIS — E10.8 TYPE 1 DIABETES MELLITUS WITH COMPLICATIONS (H): ICD-10-CM

## 2025-01-07 DIAGNOSIS — L65.9 HAIR LOSS: Primary | ICD-10-CM

## 2025-01-07 DIAGNOSIS — E11.9 WELL CONTROLLED TYPE 2 DIABETES MELLITUS (H): ICD-10-CM

## 2025-01-07 PROBLEM — N18.1 CKD (CHRONIC KIDNEY DISEASE) STAGE 1, GFR 90 ML/MIN OR GREATER: Status: ACTIVE | Noted: 2022-08-15

## 2025-01-07 PROCEDURE — 99215 OFFICE O/P EST HI 40 MIN: CPT | Mod: 24 | Performed by: NURSE PRACTITIONER

## 2025-01-07 RX ORDER — LOSARTAN POTASSIUM 100 MG/1
100 TABLET ORAL DAILY
Qty: 90 TABLET | Refills: 3 | Status: SHIPPED | OUTPATIENT
Start: 2025-01-07

## 2025-01-07 RX ORDER — ATORVASTATIN CALCIUM 40 MG/1
40 TABLET, FILM COATED ORAL DAILY
Qty: 90 TABLET | Refills: 3 | Status: SHIPPED | OUTPATIENT
Start: 2025-01-07

## 2025-01-07 NOTE — PROGRESS NOTES
Preventive Care Visit  [facility]  SABI Morrison CNP, Internal Medicine  Jan 7, 2025      Assessment & Plan     Type 1 diabetes mellitus with complications (H)  - atorvastatin (LIPITOR) 40 MG tablet; Take 1 tablet (40 mg) by mouth daily.    Uncontrolled type 2 diabetes mellitus with hyperglycemia, with long-term current use of insulin (H)  - Tirzepatide 15 MG/0.5ML SOAJ; Inject 0.5 mLs (15 mg) subcutaneously every 7 days for 12 doses.  - losartan (COZAAR) 100 MG tablet; Take 1 tablet (100 mg) by mouth daily.    Well controlled type 2 diabetes mellitus (H)  - empagliflozin (JARDIANCE) 25 MG TABS tablet; Take 1 tablet (25 mg) by mouth daily.    Hair loss  - Adult Dermatology  Referral; Future    I spent a total of  45  minutes in the care of this pt during today's office visit. This time includes reviewing the patient's chart and prior history, obtaining a history, performing an examination and evaluation and counseling the patient. This time also includes ordering medications or tests necessary in addition to communication to other member's of the patient's health care team. Time spent in documentation and care coordination is included.     Mariya CELESTIN, CNP        Subjective   Nayeli is a 44 year old, presenting for the following:  Follow Up          1/7/2025     2:01 PM   Additional Questions   Roomed by KASIE MIXON Stantonjo ann Ten is a 44 year old female accompanied by .  She went to [place] for 3 weeks and had some gastric symptoms intermittently.  She has an appt. Scheduled at [place] in January where she also wants to discuss her right lower pelvic discomfort which was not relieved after the hysterectomy.    She had a right carpal tunnel release performed 12/4/24 and still has sensitivity over the surgery site. She is scheduled for the left carpal tunnel surgery later this month.    She has had 14-6  "pounds of weight loss since starting Mounjaro.  Her insulin needs have decreased from 60 units of glargine to 25.She has a diabetes appt 1/13/25.     While in [place] she received a phone call from her nephrologist asking her if she had symptoms of UTI and she responded no, but now states she has a \"sensation\" of tightness at the end of her stream.     She also describes increased sensitivity in her legs especially and other parts of her body. Difficult to describe but doesn't want to be touched. She thinks this might have started before she started Mounjaro.    She c/o hair loss for > one year. Hair is everywhere: shower, furniture, etc. She is requesting a Dermatology referral.         Patient Active Problem List   Diagnosis    Essential hypertension    Other chronic nonalcoholic liver disease    Diabetic retinopathy of both eyes (H)    Obesity    Usher Syndrome: congenital deafness, retinitis pigmentosa    Macular degeneration, age related, nonexudative    Benign neoplasm of iris    Dry eye syndrome    Pemphigus erythematosus (H)    Chondromalacia of patella, right, steroid injection 6/12/2015    Uncontrolled type 2 diabetes mellitus with hyperglycemia, with long-term current use of insulin (H)    Chronic kidney disease, stage 1    Trigger middle finger of left hand    Adenomyosis of uterus    S/P hysterectomy    Pain in both hands    Severe carpal tunnel syndrome of both wrists               1/4/2024   Social Factors   Worry food won't last until get money to buy more No   Food not last or not have enough money for food? No   Do you have housing? (Housing is defined as stable permanent housing and does not include staying ouside in a car, in a tent, in an abandoned building, in an overnight shelter, or couch-surfing.) Yes   Are you worried about losing your housing? No   Lack of transportation? No   Unable to get utilities (heat,electricity)? No       Today's PHQ-2 Score:       1/7/2025     2:04 PM   PHQ-2 " (  [employer])   Q1: Little interest or pleasure in doing things 0   Q2: Feeling down, depressed or hopeless 0   PHQ-2 Score 0    Q1: Little interest or pleasure in doing things Not at all   Q2: Feeling down, depressed or hopeless Not at all   PHQ-2 Score 0       Patient-reported       Social History     Tobacco Use    Smoking status: Former     Current packs/day: 0.00     Types: Cigarettes     Quit date: 2010     Years since quittin.1     Passive exposure: Never    Smokeless tobacco: Never    Tobacco comments:     stopped 10 yrs ago   Substance Use Topics    Alcohol use: Not Currently     Comment: socially    Drug use: Not Currently     Types: Marijuana     Comment: much younger  or earlier           2023   LAST FHS-7 RESULTS   1st degree relative breast or ovarian cancer Unknown   Any relative bilateral breast cancer Unknown   Any male have breast cancer Unknown   Any ONE woman have BOTH breast AND ovarian cancer Unknown   Any woman with breast cancer before 50yrs Unknown   2 or more relatives with breast AND/OR ovarian cancer Unknown   2 or more relatives with breast AND/OR bowel cancer Unknown                  Latest Ref Rng & Units 2021     4:08 PM 2021     4:06 PM 2016     9:50 AM   PAP / HPV   PAP (Historical)   NIL     HPV 16 DNA NEG^Negative Negative   Negative    HPV 18 DNA NEG^Negative Negative   Negative    Other HR HPV NEG^Negative Negative   Negative      ASCVD Risk   The 10-year ASCVD risk score (Riki EARL, et al., 2019) is: 3.3%    Values used to calculate the score:      Age: 44 years      Sex: Female      Is Non- : No      Diabetic: Yes      Tobacco smoker: No      Systolic Blood Pressure: 130 mmHg      Is BP treated: Yes      HDL Cholesterol: 37 mg/dL      Total Cholesterol: 183 mg/dL       Reviewed and updated as needed this visit by Provider                    Past Medical History:   Diagnosis Date    Combined visual and hearing  impairment     Deaf     Diabetic retinopathy of both eyes (H) 8/19/2011    Hepatic steatosis 08/19/2011    History of tobacco use     Hyperlipidemia     Hypertension     Hypertriglyceridemia     Migraines     Obesity 8/19/2011     Problem list name updated by automated process. 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     Past Surgical History:   Procedure Laterality Date    COLONOSCOPY N/A 10/21/2024    Procedure: COLONOSCOPY, WITH BIOPSIES;  Surgeon: Peggy Miguel MD;  Location: [facility] OR    DAVINCI HYSTERECTOMY TOTAL, SALPINGECTOMY BILATERAL Bilateral 2/7/2024    Procedure: HYSTERECTOMY, TOTAL, ROBOT-ASSISTED, WITH BILATERAL SALPINGECTOMY, CYSTOSCOPY;  Surgeon: Vonnie Cowan MD;  Location: [facility] OR    ESOPHAGOSCOPY, GASTROSCOPY, DUODENOSCOPY (EGD), COMBINED N/A 6/13/2024    Procedure: ESOPHAGOGASTRODUODENOSCOPY, WITH BIOPSY;  Surgeon: Nora Brice MD;  Location: [facility] OR    LAPAROSCOPIC CHOLECYSTECTOMY N/A 3/10/2018    Procedure: LAPAROSCOPIC CHOLECYSTECTOMY;  Laparoscopic Cholecystectomy ;  Surgeon: Kuldeep Sigala MD;  Location: [facility] OR    RELEASE CARPAL TUNNEL Right 12/4/2024    Procedure: RELEASE, RIGHT CARPAL TUNNEL;  Surgeon: Madhu Mcmahon MD;  Location: [facility] OR    RELEASE TRIGGER FINGER Right 5/2/2019    Procedure: Right Thumb Trigger Release;  Surgeon: Greg Streeter MD;  [facility]: [facility]    RELEASE TRIGGER FINGER Left 5/30/2019    Procedure: Left Ring Trigger Finger Release.  Ganglion cyst excision.;  Surgeon: Greg Streeter MD;  [facility]: [facility]    RELEASE TRIGGER FINGER Right 6/13/2023    Procedure: RELEASE, RIGHT TRIGGER FINGER, INDEX AND MIDDLE;  Surgeon: Miracle Carlin MD;  Location: [facility] OR    RELEASE TRIGGER FINGER Left 8/1/2023    Procedure: RELEASE, LEFT TRIGGER FINGER, MIDDLE;   "Surgeon: Miracle Carlin MD;  Location: [facility] OR            Objective    Exam  /73 (BP Location: Left arm, Patient Position: Sitting, Cuff Size: Adult Regular)   Pulse 83   Temp 98  F (36.7  C) (Oral)   Resp 15   Ht 1.549 m (5' 0.98\")   Wt 74.9 kg (165 lb 1.6 oz)   LMP 2024 (Approximate)   SpO2 99%   BMI 31.21 kg/m     Estimated body mass index is 31.21 kg/m  as calculated from the following:    Height as of this encounter: 1.549 m (5' 0.98\").    Weight as of this encounter: 74.9 kg (165 lb 1.6 oz).    Physical Exam  GENERAL: alert and no distress  RESP: lungs clear to auscultation - no rales, rhonchi or wheezes  CV: regular rate and rhythm, normal S1 S2, no S3 or S4, no murmur, click or rub, no peripheral edema  MS: no gross musculoskeletal defects noted, no edema  NEURO: Normal strength and tone, mentation intact and speech normal  PSYCH: mentation appears normal, affect normal/bright  SCALP: no balding spots on the scalp. Scalp looks WNL.       Signed Electronically by: SABI Morrison CNP  Nayeli is a 44 year old that presents in clinic today for the followin/7/2025     2:01 PM   Additional Questions   Roomed by RH     Screenings as of today     PHQ-2 Total Score (Adult) - Positive if 3 or more points; Administer   PHQ-9 if positive  0        SABI Morrison CNP at 2:15 PM on 2025    "

## 2025-01-07 NOTE — PROGRESS NOTES
"      Brenden Tyler is a 44 year old, presenting for the following health issues:  Follow Up      1/7/2025     2:01 PM   Additional Questions   Roomed by      HPI                   Objective    /73 (BP Location: Left arm, Patient Position: Sitting, Cuff Size: Adult Regular)   Pulse 83   Temp 98  F (36.7  C) (Oral)   Resp 15   Ht 1.549 m (5' 0.98\")   Wt 74.9 kg (165 lb 1.6 oz)   LMP 02/01/2024 (Approximate)   SpO2 99%   BMI 31.21 kg/m    Body mass index is 31.21 kg/m .  Physical Exam               Signed Electronically by: SABI Morrison CNP    "

## 2025-01-08 ENCOUNTER — LAB (OUTPATIENT)
Dept: LAB | Facility: CLINIC | Age: 45
End: 2025-01-08
Attending: INTERNAL MEDICINE
Payer: COMMERCIAL

## 2025-01-08 ENCOUNTER — OFFICE VISIT (OUTPATIENT)
Dept: NEPHROLOGY | Facility: CLINIC | Age: 45
End: 2025-01-08
Attending: INTERNAL MEDICINE
Payer: COMMERCIAL

## 2025-01-08 VITALS
BODY MASS INDEX: 31.58 KG/M2 | HEART RATE: 82 BPM | OXYGEN SATURATION: 98 % | WEIGHT: 167.25 LBS | SYSTOLIC BLOOD PRESSURE: 142 MMHG | DIASTOLIC BLOOD PRESSURE: 84 MMHG | HEIGHT: 61 IN

## 2025-01-08 DIAGNOSIS — N30.00 ACUTE CYSTITIS WITHOUT HEMATURIA: ICD-10-CM

## 2025-01-08 DIAGNOSIS — N30.00 ACUTE CYSTITIS WITHOUT HEMATURIA: Primary | ICD-10-CM

## 2025-01-08 DIAGNOSIS — R80.9 ALBUMINURIA: ICD-10-CM

## 2025-01-08 LAB
ALBUMIN MFR UR ELPH: 38.6 MG/DL
ALBUMIN SERPL BCG-MCNC: 4.2 G/DL (ref 3.5–5.2)
ALBUMIN UR-MCNC: 20 MG/DL
ANION GAP SERPL CALCULATED.3IONS-SCNC: 9 MMOL/L (ref 7–15)
APPEARANCE UR: CLEAR
BACTERIA #/AREA URNS HPF: ABNORMAL /HPF
BILIRUB UR QL STRIP: NEGATIVE
BUN SERPL-MCNC: 15.6 MG/DL (ref 6–20)
CALCIUM SERPL-MCNC: 9.1 MG/DL (ref 8.8–10.4)
CHLORIDE SERPL-SCNC: 106 MMOL/L (ref 98–107)
COLOR UR AUTO: ABNORMAL
CREAT SERPL-MCNC: 0.76 MG/DL (ref 0.51–0.95)
CREAT UR-MCNC: 105 MG/DL
CREAT UR-MCNC: 105 MG/DL
EGFRCR SERPLBLD CKD-EPI 2021: >90 ML/MIN/1.73M2
ERYTHROCYTE [DISTWIDTH] IN BLOOD BY AUTOMATED COUNT: 12.7 % (ref 10–15)
GLUCOSE SERPL-MCNC: 106 MG/DL (ref 70–99)
GLUCOSE UR STRIP-MCNC: 70 MG/DL
HCO3 SERPL-SCNC: 26 MMOL/L (ref 22–29)
HCT VFR BLD AUTO: 37.1 % (ref 35–47)
HGB BLD-MCNC: 12.1 G/DL (ref 11.7–15.7)
HGB UR QL STRIP: NEGATIVE
HYALINE CASTS: 1 /LPF
KETONES UR STRIP-MCNC: NEGATIVE MG/DL
LEUKOCYTE ESTERASE UR QL STRIP: ABNORMAL
MCH RBC QN AUTO: 27.6 PG (ref 26.5–33)
MCHC RBC AUTO-ENTMCNC: 32.6 G/DL (ref 31.5–36.5)
MCV RBC AUTO: 85 FL (ref 78–100)
MICROALBUMIN UR-MCNC: 174 MG/L
MICROALBUMIN/CREAT UR: 165.71 MG/G CR (ref 0–25)
MUCOUS THREADS #/AREA URNS LPF: PRESENT /LPF
NITRATE UR QL: POSITIVE
PH UR STRIP: 6 [PH] (ref 5–7)
PHOSPHATE SERPL-MCNC: 3.6 MG/DL (ref 2.5–4.5)
PLATELET # BLD AUTO: 405 10E3/UL (ref 150–450)
POTASSIUM SERPL-SCNC: 4.7 MMOL/L (ref 3.4–5.3)
PROT/CREAT 24H UR: 0.37 MG/MG CR (ref 0–0.2)
RBC # BLD AUTO: 4.38 10E6/UL (ref 3.8–5.2)
RBC URINE: 3 /HPF
SODIUM SERPL-SCNC: 141 MMOL/L (ref 135–145)
SP GR UR STRIP: 1.03 (ref 1–1.03)
SQUAMOUS EPITHELIAL: <1 /HPF
UROBILINOGEN UR STRIP-MCNC: NORMAL MG/DL
WBC # BLD AUTO: 9.6 10E3/UL (ref 4–11)
WBC URINE: 37 /HPF

## 2025-01-08 PROCEDURE — 81001 URINALYSIS AUTO W/SCOPE: CPT | Performed by: PATHOLOGY

## 2025-01-08 PROCEDURE — 80069 RENAL FUNCTION PANEL: CPT | Performed by: PATHOLOGY

## 2025-01-08 PROCEDURE — T1013 SIGN LANG/ORAL INTERPRETER: HCPCS | Mod: U3

## 2025-01-08 PROCEDURE — 87186 SC STD MICRODIL/AGAR DIL: CPT | Performed by: INTERNAL MEDICINE

## 2025-01-08 PROCEDURE — 85027 COMPLETE CBC AUTOMATED: CPT | Performed by: PATHOLOGY

## 2025-01-08 PROCEDURE — 82043 UR ALBUMIN QUANTITATIVE: CPT | Performed by: INTERNAL MEDICINE

## 2025-01-08 PROCEDURE — 84156 ASSAY OF PROTEIN URINE: CPT | Performed by: PATHOLOGY

## 2025-01-08 PROCEDURE — 99000 SPECIMEN HANDLING OFFICE-LAB: CPT | Performed by: PATHOLOGY

## 2025-01-08 PROCEDURE — 87086 URINE CULTURE/COLONY COUNT: CPT | Performed by: INTERNAL MEDICINE

## 2025-01-08 PROCEDURE — 36415 COLL VENOUS BLD VENIPUNCTURE: CPT | Performed by: PATHOLOGY

## 2025-01-08 ASSESSMENT — PAIN SCALES - GENERAL: PAINLEVEL_OUTOF10: NO PAIN (0)

## 2025-01-08 NOTE — NURSING NOTE
"Chief Complaint   Patient presents with    Consult    RECHECK     8 month follow up      BP (!) 142/84 (BP Location: Right arm, Cuff Size: Adult Regular)   Pulse 82   Ht 1.549 m (5' 1\")   Wt 75.9 kg (167 lb 4 oz)   LMP 02/01/2024 (Approximate)   SpO2 98%   BMI 31.60 kg/m    Rita Martinez, CMA on 1/8/2025 at 3:15 PM    "

## 2025-01-08 NOTE — LETTER
1/8/2025       RE: Nayeli Caal  [address]  [facility] [address]     Dear Colleague,    Thank you for referring your patient, Nayeli Caal, to the [facility] [place] at [facility]. Please see a copy of my visit note below.      Reason for Visit:  Nayeli Caal is a 43 year old female with CKD from likely from diabetic nephropthay, who presents for to establish CKD care and follow up.    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   DM2 since 2000, currently receieving MDI Insulin  and mountjaro 5 mg weekly. Blodd sugar control was not optimal, HBA1C persistently over target. Unable to tolerate many of  antidiabetic medicines,metformin discontinued due to diarrhea, ozempic resulted in serious GI upset , and had yeast infection to jardiance. Diabtes complicated by retinopathy and peripheral neuropathy.  Regarding hypertension ,she is currently receiving losartan 50 mg po and amlodipine 5 mg. Has not been monitoring BP at home however office BP mostly ranges ' and DBP in 80-90.  Regarding her kidneys ,creatinine and cystatin c has been stable with GFR > 90 thus far however noted to have albuminuria since 2005 ,which gradually increased to UACR 1286 mg / gm on 4/1/24. Urine microscopy is bland and renal imaging without obstruction. No lower leg swelling ,shortness of breathing. She uses two -thrice per week NSAID over  last couple of years. No joint pain , skin rases or recurrent oral ulcers. No recurrent oral ulcers. No history of recurrent UTI. No family history of kidney diseases.      [place]-Some symptoms; repeat test; no infection    Cramping; heavy sensation-tight after she goes to the bathroom  Different odor;  everyday  Nov  Urgency no  No noctoria  Incontinence everyone ec in a while    GI issues that exacerbate;     Vagina    GYN?  Sexually active-no months  Itching? Yeast infection/otc    Jardiance; not sure-6 months     2 months;     BP Readings from Last 6 Encounters:   01/08/25 (!) 142/84   01/07/25 130/73   12/04/24 128/69   11/22/24 93/54   11/12/24 120/79   10/25/24 127/53                     Assessment and Plan:    # CKD stage G1A2  DM2 since 2000, complicated by retinopathy and mild nueropathy. Noted to have albuminuria since 2005,was fluctuating at the beginning. becomes persistent and has been increasing gradually since 2012 to most recent 1/4/2024 UACR 1286 mg/ gm. Creatinine normal throughout. Urine microscopy is bland. Imaging without hydronephrosis.Given the presence of other microvascular complication ( diabetic retinopathy and neuropathy), CKD is likely from DKD. Absence of active urine sediment makes glomerulonephritis less likely. She is on max dose ARB/ losartan 100 mg po daily. She had received jardiance in the past, discontinued because of yeast infection. Recommend better sugar control and given its anti proteinuric effects, she would like to re-start Jardiance.     - Re-start Jardiance 25 mg po daily ,If sign/symptoms of yeast infection stop jardiance  and report to either your PCP or send us my chart message.  - Will consider add on finerenone therapy if no significant improvement in proteinuria on next visit  - Recommend tighter blood sugar  and BP control   - Avoid Ibuprofen ( Advil ,Motrin)   - Healthy life measures : regular exercise, maintaining body weight and healtyh diet.  - Follow up in six months    # Hypertension  Current regimen : losartan 100 mg and amlodipine 5 mg. Has not been monitoring BP at home regularly. Office BP today 136/86.  - Encourage home BP monitoring, keep a Blood Pressure log    # DM 2  On MDI Insulin and Mountjaro 5 mg weekly. Could not tolerate Metformin because  of diarrhea and Ozempic due to GI upset ,abdominal pain ,nausea and vomiting.  Most recent HBAIC  7.8. Not yet in target.  - Continue follow up with Endo and Primary     Other medical problems   # Dyslipidemia     # Usher syndrome     Assessment and plan was discussed with patient and she voiced her understanding and agreement.    Consult:  Nayeli Quintero Ten was seen  for CKD management. Evaluated via ASL.          ROS:   A comprehensive review of systems was obtained and negative, except as noted in the HPI or PMH.    Active Medical Problems:  Patient Active Problem List   Diagnosis     Essential hypertension     Other chronic nonalcoholic liver disease     Diabetic retinopathy of both eyes (H)     Obesity     Usher Syndrome: congenital deafness, retinitis pigmentosa     Macular degeneration, age related, nonexudative     Benign neoplasm of iris     Dry eye syndrome     Pemphigus erythematosus (H)     Chondromalacia of patella, right, steroid injection 6/12/2015     CKD (chronic kidney disease) stage 1, GFR 90 ml/min or greater     Trigger middle finger of left hand     Adenomyosis of uterus     S/P hysterectomy     Pain in both hands     Severe carpal tunnel syndrome of both wrists     PMH:   Medical record was reviewed and PMH was discussed with patient and noted below.  Past Medical History:   Diagnosis Date     Combined visual and hearing impairment      Deaf      Diabetic retinopathy of both eyes (H) 8/19/2011     Hepatic steatosis 08/19/2011     History of tobacco use      Hyperlipidemia      Hypertension      Hypertriglyceridemia      Migraines      Obesity 8/19/2011     Problem list name updated by automated process. 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     PSH:   Past Surgical History:   Procedure Laterality Date     COLONOSCOPY N/A  10/21/2024    Procedure: COLONOSCOPY, WITH BIOPSIES;  Surgeon: Peggy Miguel MD;  Location: [facility] OR     DAVINCI HYSTERECTOMY TOTAL, SALPINGECTOMY BILATERAL Bilateral 2024    Procedure: HYSTERECTOMY, TOTAL, ROBOT-ASSISTED, WITH BILATERAL SALPINGECTOMY, CYSTOSCOPY;  Surgeon: Vonnie Cowan MD;  Location: [facility] OR     ESOPHAGOSCOPY, GASTROSCOPY, DUODENOSCOPY (EGD), COMBINED N/A 2024    Procedure: ESOPHAGOGASTRODUODENOSCOPY, WITH BIOPSY;  Surgeon: Nora Brice MD;  Location: [facility] OR     LAPAROSCOPIC CHOLECYSTECTOMY N/A 3/10/2018    Procedure: LAPAROSCOPIC CHOLECYSTECTOMY;  Laparoscopic Cholecystectomy ;  Surgeon: Kuldeep Sigala MD;  Location: [facility] OR     RELEASE CARPAL TUNNEL Right 2024    Procedure: RELEASE, RIGHT CARPAL TUNNEL;  Surgeon: Madhu Mcmahon MD;  Location: [facility] OR     RELEASE TRIGGER FINGER Right 2019    Procedure: Right Thumb Trigger Release;  Surgeon: Greg Streeter MD;  Location: [facility]     RELEASE TRIGGER FINGER Left 2019    Procedure: Left Ring Trigger Finger Release.  Ganglion cyst excision.;  Surgeon: Greg Streeter MD;  Location: [facility]     RELEASE TRIGGER FINGER Right 2023    Procedure: RELEASE, RIGHT TRIGGER FINGER, INDEX AND MIDDLE;  Surgeon: Miracle Carlin MD;  Location: [facility] OR     RELEASE TRIGGER FINGER Left 2023    Procedure: RELEASE, LEFT TRIGGER FINGER, MIDDLE;  Surgeon: Miracle Carlin MD;  Location: [facility] OR       Family Hx:   Family History   Adopted: Yes   Problem Relation Age of Onset     Unknown/Adopted Other      Personal Hx:   Social History     Tobacco Use     Smoking status: Former     Current packs/day: 0.00     Types: Cigarettes     Quit date: 2010     Years since quittin.1     Passive exposure: Never     Smokeless tobacco: Never     Tobacco comments:     stopped 10 yrs ago   Substance Use Topics     Alcohol use: Not Currently     Comment: socially       Allergies:  No Active  Allergies    Medications:  Current Outpatient Medications   Medication Sig Dispense Refill     acetaminophen (TYLENOL) 500 MG tablet Take 2 tablets (1,000 mg) by mouth every 8 hours as needed for mild pain 100 tablet 3     Alcohol Swabs (ALCOHOL PREP PAD) 70 % PADS 1 each 3 times daily 100 each 3     amLODIPine (NORVASC) 5 MG tablet Take 1 tablet (5 mg) by mouth at bedtime 90 tablet 3     aspirin (ASA) 81 MG chewable tablet Take 1 tablet (81 mg) by mouth daily. CHEW AND SWALLOW 90 tablet 3     atorvastatin (LIPITOR) 40 MG tablet Take 1 tablet (40 mg) by mouth daily. 90 tablet 3     blood glucose (ACCU-CHEK LAYA PLUS) test strip Use with  Accucheck Expert meter.  Test blood sugar 6 times daily. 600 each 3     blood glucose monitoring (ACCU-CHEK FASTCLIX) lancets Use to test blood sugar 6 times daily or as directed 510 each 3     calcium carbonate-vitamin D (OSCAL) 500-5 MG-MCG tablet Take 1 tablet by mouth 2 times daily. 180 tablet 0     Continuous Glucose Sensor (FREESTYLE ZORAIDA 3 SENSOR) [facility] 1 each every 14 days Please provide 3 month supply. 6 each 3     diclofenac (VOLTAREN) 1 % topical gel Apply 2 g topically 4 times daily. For L shoulder pain 100 g 3     dicyclomine (BENTYL) 10 MG capsule Take 1 capsule (10 mg) by mouth 4 times daily as needed (Abdominal pain/cramping). 40 capsule 0     empagliflozin (JARDIANCE) 25 MG TABS tablet Take 1 tablet (25 mg) by mouth daily. 90 tablet 3     ergocalciferol (ERGOCALCIFEROL) 1.25 MG (46731 UT) capsule Take 1 capsule (50,000 Units) by mouth once a week For additional refills, please schedule a follow-up appointment at [phone] 12 capsule 3     fenofibrate (TRIGLIDE/LOFIBRA) 160 MG tablet Take 1 tablet (160 mg) by mouth daily 90 tablet 3     fish oil-omega-3 fatty acids 1000 MG capsule TAKE TWO CAPSULES (2 GM) BY MOUTH ONCE DAILY 180 capsule 3     gabapentin (NEURONTIN) 100 MG capsule Take 1 capsule (100 mg) by mouth 3 times daily 42 capsule 1     ibuprofen  (ADVIL/MOTRIN) 600 MG tablet Take 1 tablet (600 mg) by mouth every 6 hours as needed for moderate pain 120 tablet 1     Injection Device for insulin (INPEN 100-PINK-NOVOLOG-FIASP) DAVID 1 each continuous 1 each 0     insulin aspart (NOVOLOG PENFILL) 100 UNIT/ML cartridge Take with each meal and snack: 1 per 4 grams CHO and correction of 1 unit per 20 mg/dL over a target of 120. Average daily dose is 40 units. 40 mL 3     insulin glargine 100 UNIT/ML pen 3 month supply.Inject 55 units daily 45 mL 3     insulin pen needle (31G X 5 MM) 31G X 5 MM miscellaneous Use 5 to 6 pen needles daily or as directed.  3 month supply. 500 each 3     losartan (COZAAR) 100 MG tablet Take 1 tablet (100 mg) by mouth daily. 90 tablet 3     metoclopramide (REGLAN) 10 MG tablet Take 1 tablet (10 mg) by mouth 4 times daily as needed (nausea). 14 tablet 0     omeprazole (PRILOSEC) 40 MG DR capsule Take 1 capsule (40 mg) by mouth 2 times daily 90 capsule 0     ondansetron (ZOFRAN ODT) 4 MG ODT tab Take 1 tablet (4 mg) by mouth every 6 hours as needed for nausea or vomiting 20 tablet 0     pantoprazole (PROTONIX) 40 MG EC tablet Take 1 tablet (40 mg) by mouth daily. 90 tablet 1     sucralfate (CARAFATE) 1 GM/10ML suspension Take 10 mLs (1 g) by mouth 4 times daily. 414 mL 0     Tirzepatide 15 MG/0.5ML SOAJ Inject 0.5 mLs (15 mg) subcutaneously every 7 days for 12 doses. 6 mL 0     Current Facility-Administered Medications   Medication Dose Route Frequency Provider Last Rate Last Admin     hydrocortisone (CORTAID) 1 % cream   Topical Once Mariya Mohamud APRN CNP         hydrocortisone (CORTAID) 1 % cream   Topical TID Mariya Mohamud APRN CNP         hylan (SYNVISC ONE) injection 48 mg  48 mg   Rosas Rodriguez DO   48 mg at 05/09/23 1813     lidocaine (PF) (XYLOCAINE) 1 % injection 4 mL  4 mL   Sebas Bradley MD   4 mL at 10/05/23 0923     triamcinolone (KENALOG-40) injection 40 mg  40 mg   Sebas Bradley MD   40 mg at  "10/05/23 0923      Vitals:  BP (!) 142/84 (BP Location: Right arm, Cuff Size: Adult Regular)   Pulse 82   Ht 1.549 m (5' 1\")   Wt 75.9 kg (167 lb 4 oz)   LMP 02/01/2024 (Approximate)   SpO2 98%   BMI 31.60 kg/m    BP Readings from Last 6 Encounters:   01/08/25 (!) 142/84   01/07/25 130/73   12/04/24 128/69   11/22/24 93/54   11/12/24 120/79   10/25/24 127/53     Wt Readings from Last 4 Encounters:   01/08/25 75.9 kg (167 lb 4 oz)   01/07/25 74.9 kg (165 lb 1.6 oz)   12/04/24 75.8 kg (167 lb)   11/27/24 75.8 kg (167 lb)     Exam:  GENERAL APPEARANCE: alert and no distress  HENT: mouth without ulcers or lesions  RESP: lungs clear to auscultation - no rales, rhonchi or wheezes  CV: regular rhythm, normal rate, no rub, no murmur  EDEMA: no LE edema bilaterally  ABDOMEN: soft, nondistended, nontender, bowel sounds normal  MS: extremities normal - no gross deformities noted, no evidence of inflammation in joints, no muscle tenderness  SKIN: no rash    LABS:             Again, thank you for allowing me to participate in the care of your patient.      Sincerely,    KIMBERLI CUNHA MD    "

## 2025-01-08 NOTE — PROGRESS NOTES
Reason for Visit:  Nayeli Caal is a 43 year old female with CKD from likely from diabetic nephropthay, who presents for to establish CKD care and follow up.    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   DM2 since 2000, currently receieving MDI Insulin  and mountjaro 5 mg weekly. Blodd sugar control was not optimal, HBA1C persistently over target. Unable to tolerate many of  antidiabetic medicines,metformin discontinued due to diarrhea, ozempic resulted in serious GI upset , and had yeast infection to jardiance. Diabtes complicated by retinopathy and peripheral neuropathy.  Regarding hypertension ,she is currently receiving losartan 50 mg po and amlodipine 5 mg. Has not been monitoring BP at home however office BP mostly ranges ' and DBP in 80-90.  Regarding her kidneys ,creatinine and cystatin c has been stable with GFR > 90 thus far however noted to have albuminuria since 2005 ,which gradually increased to UACR 1286 mg / gm on 4/1/24. Urine microscopy is bland and renal imaging without obstruction. No lower leg swelling ,shortness of breathing. She uses two -thrice per week NSAID over  last couple of years. No joint pain , skin rases or recurrent oral ulcers. No recurrent oral ulcers. No history of recurrent UTI. No family history of kidney diseases.      [place]-Some symptoms; repeat test; no infection    Cramping; heavy sensation-tight after she goes to the bathroom  Different odor; everyday  Nov  Urgency no  No noctoria  Incontinence everyone ec in a while    GI issues that exacerbate;     Vagina    GYN?  Sexually active-no months  Itching? Yeast infection/otc    Jardiance; not sure-6 months     2 months;     BP Readings from Last 6 Encounters:   01/08/25 (!) 142/84   01/07/25 130/73   12/04/24 128/69   11/22/24 93/54    11/12/24 120/79   10/25/24 127/53                     Assessment and Plan:    # CKD stage G1A2  DM2 since 2000, complicated by retinopathy and mild nueropathy. Noted to have albuminuria since 2005,was fluctuating at the beginning. becomes persistent and has been increasing gradually since 2012 to most recent 1/4/2024 UACR 1286 mg/ gm. Creatinine normal throughout. Urine microscopy is bland. Imaging without hydronephrosis.Given the presence of other microvascular complication ( diabetic retinopathy and neuropathy), CKD is likely from DKD. Absence of active urine sediment makes glomerulonephritis less likely. She is on max dose ARB/ losartan 100 mg po daily. She had received jardiance in the past, discontinued because of yeast infection. Recommend better sugar control and given its anti proteinuric effects, she would like to re-start Jardiance.     - Re-start Jardiance 25 mg po daily ,If sign/symptoms of yeast infection stop jardiance  and report to either your PCP or send us my chart message.  - Will consider add on finerenone therapy if no significant improvement in proteinuria on next visit  - Recommend tighter blood sugar  and BP control   - Avoid Ibuprofen ( Advil ,Motrin)   - Healthy life measures : regular exercise, maintaining body weight and healtyh diet.  - Follow up in six months    # Hypertension  Current regimen : losartan 100 mg and amlodipine 5 mg. Has not been monitoring BP at home regularly. Office BP today 136/86.  - Encourage home BP monitoring, keep a Blood Pressure log    # DM 2  On MDI Insulin and Mountjaro 5 mg weekly. Could not tolerate Metformin because of diarrhea and Ozempic due to GI upset ,abdominal pain ,nausea and vomiting.  Most recent HBAIC  7.8. Not yet in target.  - Continue follow up with Endo and Primary     Other medical problems   # Dyslipidemia     # Usher syndrome     Assessment and plan was discussed with patient and she voiced her understanding and  agreement.    Consult:  Nayeli Quintero Ten was seen  for CKD management. Evaluated via ASL.          ROS:   A comprehensive review of systems was obtained and negative, except as noted in the HPI or PMH.    Active Medical Problems:  Patient Active Problem List   Diagnosis    Essential hypertension    Other chronic nonalcoholic liver disease    Diabetic retinopathy of both eyes (H)    Obesity    Usher Syndrome: congenital deafness, retinitis pigmentosa    Macular degeneration, age related, nonexudative    Benign neoplasm of iris    Dry eye syndrome    Pemphigus erythematosus (H)    Chondromalacia of patella, right, steroid injection 6/12/2015    CKD (chronic kidney disease) stage 1, GFR 90 ml/min or greater    Trigger middle finger of left hand    Adenomyosis of uterus    S/P hysterectomy    Pain in both hands    Severe carpal tunnel syndrome of both wrists     PMH:   Medical record was reviewed and PMH was discussed with patient and noted below.  Past Medical History:   Diagnosis Date    Combined visual and hearing impairment     Deaf     Diabetic retinopathy of both eyes (H) 8/19/2011    Hepatic steatosis 08/19/2011    History of tobacco use     Hyperlipidemia     Hypertension     Hypertriglyceridemia     Migraines     Obesity 8/19/2011     Problem list name updated by automated process. 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     PSH:   Past Surgical History:   Procedure Laterality Date    COLONOSCOPY N/A 10/21/2024    Procedure: COLONOSCOPY, WITH BIOPSIES;  Surgeon: Peggy Miguel MD;  [place] [facility] [place]    DAVINC HYSTERECTOMY TOTAL, SALPINGECTOMY BILATERAL Bilateral 2/7/2024    Procedure: HYSTERECTOMY, TOTAL, ROBOT-ASSISTED, WITH BILATERAL SALPINGECTOMY, CYSTOSCOPY;  Surgeon: Vonnie Cowan MD;  Location: [facility] OR    ESOPHAGOSCOPY,  GASTROSCOPY, DUODENOSCOPY (EGD), COMBINED N/A 2024    Procedure: ESOPHAGOGASTRODUODENOSCOPY, WITH BIOPSY;  Surgeon: Nora Brice MD;  Location: [facility] OR    LAPAROSCOPIC CHOLECYSTECTOMY N/A 3/10/2018    Procedure: LAPAROSCOPIC CHOLECYSTECTOMY;  Laparoscopic Cholecystectomy ;  Surgeon: Kuldeep Sigala MD;  Location: [facility] OR    RELEASE CARPAL TUNNEL Right 2024    Procedure: RELEASE, RIGHT CARPAL TUNNEL;  Surgeon: Madhu Mcmahon MD;  Location: [facility] OR    RELEASE TRIGGER FINGER Right 2019    Procedure: Right Thumb Trigger Release;  Surgeon: Greg Streeter MD;  Location: [facility] OR    RELEASE TRIGGER FINGER Left 2019    Procedure: Left Ring Trigger Finger Release.  Ganglion cyst excision.;  Surgeon: Greg Streeter MD;  Location: [facility] OR    RELEASE TRIGGER FINGER Right 2023    Procedure: RELEASE, RIGHT TRIGGER FINGER, INDEX AND MIDDLE;  Surgeon: Miracle Carlin MD;  Location: [facility] OR    RELEASE TRIGGER FINGER Left 2023    Procedure: RELEASE, LEFT TRIGGER FINGER, MIDDLE;  Surgeon: Miracle Carlin MD;  Location: [facility] OR       Family Hx:   Family History   Adopted: Yes   Problem Relation Age of Onset    Unknown/Adopted Other      Personal Hx:   Social History     Tobacco Use    Smoking status: Former     Current packs/day: 0.00     Types: Cigarettes     Quit date: 2010     Years since quittin.1     Passive exposure: Never    Smokeless tobacco: Never    Tobacco comments:     stopped 10 yrs ago   Substance Use Topics    Alcohol use: Not Currently     Comment: socially       Allergies:  No Active Allergies    Medications:  Current Outpatient Medications   Medication Sig Dispense Refill    acetaminophen (TYLENOL) 500 MG tablet Take 2 tablets (1,000 mg) by mouth every 8 hours as needed for mild pain 100 tablet 3    Alcohol Swabs (ALCOHOL PREP PAD) 70 % PADS 1 each 3 times daily 100 each 3    amLODIPine (NORVASC) 5 MG tablet Take 1 tablet (5 mg) by mouth at  bedtime 90 tablet 3    aspirin (ASA) 81 MG chewable tablet Take 1 tablet (81 mg) by mouth daily. CHEW AND SWALLOW 90 tablet 3    atorvastatin (LIPITOR) 40 MG tablet Take 1 tablet (40 mg) by mouth daily. 90 tablet 3    blood glucose (ACCU-CHEK LAYA PLUS) test strip Use with  Accucheck Expert meter.  Test blood sugar 6 times daily. 600 each 3    blood glucose monitoring (ACCU-CHEK FASTCLIX) lancets Use to test blood sugar 6 times daily or as directed 510 each 3    calcium carbonate-vitamin D (OSCAL) 500-5 MG-MCG tablet Take 1 tablet by mouth 2 times daily. 180 tablet 0    Continuous Glucose Sensor (FREESTYLE ZORAIDA 3 SENSOR) MISC 1 each every 14 days Please provide 3 month supply. 6 each 3    diclofenac (VOLTAREN) 1 % topical gel Apply 2 g topically 4 times daily. For L shoulder pain 100 g 3    dicyclomine (BENTYL) 10 MG capsule Take 1 capsule (10 mg) by mouth 4 times daily as needed (Abdominal pain/cramping). 40 capsule 0    empagliflozin (JARDIANCE) 25 MG TABS tablet Take 1 tablet (25 mg) by mouth daily. 90 tablet 3    ergocalciferol (ERGOCALCIFEROL) 1.25 MG (92425 UT) capsule Take 1 capsule (50,000 Units) by mouth once a week For additional refills, please schedule a follow-up appointment at [phone] 12 capsule 3    fenofibrate (TRIGLIDE/LOFIBRA) 160 MG tablet Take 1 tablet (160 mg) by mouth daily 90 tablet 3    fish oil-omega-3 fatty acids 1000 MG capsule TAKE TWO CAPSULES (2 GM) BY MOUTH ONCE DAILY 180 capsule 3    gabapentin (NEURONTIN) 100 MG capsule Take 1 capsule (100 mg) by mouth 3 times daily 42 capsule 1    ibuprofen (ADVIL/MOTRIN) 600 MG tablet Take 1 tablet (600 mg) by mouth every 6 hours as needed for moderate pain 120 tablet 1    Injection Device for insulin (INPEN 100-PINK-NOVOLOG-FIASP) DAVID 1 each continuous 1 each 0    insulin aspart (NOVOLOG PENFILL) 100 UNIT/ML cartridge Take with each meal and snack: 1 per 4 grams CHO and correction of 1 unit per 20 mg/dL over a target of 120. Average daily  "dose is 40 units. 40 mL 3    insulin glargine 100 UNIT/ML pen 3 month supply.Inject 55 units daily 45 mL 3    insulin pen needle (31G X 5 MM) 31G X 5 MM miscellaneous Use 5 to 6 pen needles daily or as directed.  3 month supply. 500 each 3    losartan (COZAAR) 100 MG tablet Take 1 tablet (100 mg) by mouth daily. 90 tablet 3    metoclopramide (REGLAN) 10 MG tablet Take 1 tablet (10 mg) by mouth 4 times daily as needed (nausea). 14 tablet 0    omeprazole (PRILOSEC) 40 MG DR capsule Take 1 capsule (40 mg) by mouth 2 times daily 90 capsule 0    ondansetron (ZOFRAN ODT) 4 MG ODT tab Take 1 tablet (4 mg) by mouth every 6 hours as needed for nausea or vomiting 20 tablet 0    pantoprazole (PROTONIX) 40 MG EC tablet Take 1 tablet (40 mg) by mouth daily. 90 tablet 1    sucralfate (CARAFATE) 1 GM/10ML suspension Take 10 mLs (1 g) by mouth 4 times daily. 414 mL 0    Tirzepatide 15 MG/0.5ML SOAJ Inject 0.5 mLs (15 mg) subcutaneously every 7 days for 12 doses. 6 mL 0     Current Facility-Administered Medications   Medication Dose Route Frequency Provider Last Rate Last Admin    hydrocortisone (CORTAID) 1 % cream   Topical Once Mariya Mohamud APRN CNP        hydrocortisone (CORTAID) 1 % cream   Topical TID Mariya Mohamud APRN CNP        hylan (SYNVISC ONE) injection 48 mg  48 mg   Rosas Rodriguez DO   48 mg at 05/09/23 1813    lidocaine (PF) (XYLOCAINE) 1 % injection 4 mL  4 mL   Sebas Bradley MD   4 mL at 10/05/23 0923    triamcinolone (KENALOG-40) injection 40 mg  40 mg   Sebas Bradley MD   40 mg at 10/05/23 0923      Vitals:  BP (!) 142/84 (BP Location: Right arm, Cuff Size: Adult Regular)   Pulse 82   Ht 1.549 m (5' 1\")   Wt 75.9 kg (167 lb 4 oz)   LMP 02/01/2024 (Approximate)   SpO2 98%   BMI 31.60 kg/m    BP Readings from Last 6 Encounters:   01/08/25 (!) 142/84   01/07/25 130/73   12/04/24 128/69   11/22/24 93/54   11/12/24 120/79   10/25/24 127/53     Wt Readings from Last 4 Encounters: "   01/08/25 75.9 kg (167 lb 4 oz)   01/07/25 74.9 kg (165 lb 1.6 oz)   12/04/24 75.8 kg (167 lb)   11/27/24 75.8 kg (167 lb)     Exam:  GENERAL APPEARANCE: alert and no distress  HENT: mouth without ulcers or lesions  RESP: lungs clear to auscultation - no rales, rhonchi or wheezes  CV: regular rhythm, normal rate, no rub, no murmur  EDEMA: no LE edema bilaterally  ABDOMEN: soft, nondistended, nontender, bowel sounds normal  MS: extremities normal - no gross deformities noted, no evidence of inflammation in joints, no muscle tenderness  SKIN: no rash    LABS:

## 2025-01-08 NOTE — PATIENT INSTRUCTIONS
It was a pleasure taking care of you today.  I've included a brief summary of our discussion and care plan from today's visit below.  Please review this information with your primary care provider.     My recommendations are summarized as follows:  -Please check my chart because I will be sending you a message about your urine culture test result    Who do I call with any questions after my visit?  Please be in touch if there are any further questions that arise following today's visit.  There are multiple ways to contact your nephrology care team.       During business hours, you may reach your Nephrology Care Team or schedule or reschedule an appointment or lab at [phone].       If you need to schedule imaging, please call [phone].   To schedule a COVID test, please call [phone].     You can always send a secure message through [employer]. [employer] messages are answered by your nurse or doctor typically within 24-48 hours. Please allow extra time on weekends and holidays.       For urgent/emergent questions after business hours, you may reach the on-call Nephrology Fellow by contacting the [facility]  at [phone].     How will I get the results of any tests ordered?    You will receive all of your results.  If you have signed up for [employer], any tests ordered at your visit will be available to you once resulted on Jump [employer]. Typically the physician reviews them and may or may not make further recommendations. If there are urgent results that require a change in your care plan, your physician or nurse will call you to discuss the next steps. If you are not on [employer], a letter may be generated and mailed to you with your results.

## 2025-01-09 LAB — BACTERIA UR CULT: ABNORMAL

## 2025-01-10 PROBLEM — M79.642 PAIN IN BOTH HANDS: Status: RESOLVED | Noted: 2024-08-05 | Resolved: 2025-01-10

## 2025-01-10 PROBLEM — M79.641 PAIN IN BOTH HANDS: Status: RESOLVED | Noted: 2024-08-05 | Resolved: 2025-01-10

## 2025-01-10 NOTE — PROGRESS NOTES
"  HPI:   Nayeil is a pleasant 45 yo woman here with a  for follow up of type 2 diabetes since the early 2000's.  She also sees Dr. Bragg and Leigh Ann Escoto. She started on insulin in 2003 after failing Metformin treatment.  She has struggled with high blood sugars for many years.   She has been working hard on improving her diabetes control.  She is currently on Mounjaro 15  mg weekly.  This has been going well.  Blood sugars are better.   Lowered lantus from 60 units to 50 units to 25 units with the titration of mounjaro.  She is pleased with weight loss and lower insulin requirements. She has been following closely with Leigh Ann Escoto in DM education.      Less appetite.  Eating smaller portions.  Still having some digestive issues- GERD and stomach pains.  Going to [place] next week to get things checked out.  Not feeling 100% with her health yet.   Blood sugars being in control has helped.  Stomach things happened around labor day- had to go the ER.  Found a lot of inflammation in her gut- lots of issues with diarrhea.  Gotten better. Diarrhea issues. Went to urgent care- had UTI.  Feeling better, but still has a slightly abnormal sensation. Saw nephrology last week. On antibotics.  Was frustrated that her urine albumin was high, when she thought the jardiance was supposed to help.xxxxxxx    Typical day:   Two schedules. Works from home M/F.  Other days she goes to office.  Gets up at different times.  She has been taking meds for GERD.  Waits an hour before she eats.  Gets up at 4 on days she goes to the office.   Breakfast- Egg sandwich or scrambled eggs with veggies.   Lunch- leftovers, sometimes sandwich or salad  Dinner- varies, mostly more vegetables, trying to cut back on carbs.  Loves starchy foods- cutting back on that.  Used to eat a \"pile\" of food. Now cutting back.   Snacks- not very often. Xxxxxxxxxxxxxxxxxxxxxxxxxxxxxxxxxx  Involved in non-profit for deaf community.  Very " busy.  Has not taken the time to be active.     Occasionally low.  Sometimes more activity.    Will be having surgery on her left hand.     IN PEN SETTINGS:  Start time ICR  1 unit/gm Insulin Sensitivity Factor Target BG    06:00 AM 5 15 110   11:00 AM 5 15 110   05:00 PM 5 15 110   09:30 PM 5 15 130             Active Insulin Time:  3  Maximum Bolus:  30 units       Her current regimen is:   Jardiance 25 mg daily  Mounjaro 15 mg weekly  Basaglar 25 units daily.  Novolog (dosing on In-pen):  Carb ratio: 1/5g   Sensitivity: 15    Previous treatments:   empagliflozin 25 mg daily- stopped 2023 due to recurrent yeast infections.  Metformin- severe diarrhea. Even the low dose caused intense diarrhea to the point where she could not go out of her home.   Ozempic 2 mg- severe nausea after switching from mounjaro 5 mg    Her overall average glucose is 157 mg/dL (CV 27%), over the past 2 weeks.  Her recent glucose is as follows:               She has no other concerns today.       Past Medical History:   Diagnosis Date    Combined visual and hearing impairment     Deaf     Diabetic retinopathy of both eyes (H) 8/19/2011    Hepatic steatosis 08/19/2011    History of tobacco use     Hyperlipidemia     Hypertension     Hypertriglyceridemia     Migraines     Obesity 8/19/2011     Problem list name updated by automated process. 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       Past Surgical History:   Procedure Laterality Date    COLONOSCOPY N/A 10/21/2024    Procedure: COLONOSCOPY, WITH BIOPSIES;  Surgeon: Peggy Miguel MD;  Location: [facility] OR    [facility] HYSTERECTOMY TOTAL, SALPINGECTOMY BILATERAL Bilateral 2/7/2024    Procedure: HYSTERECTOMY, TOTAL, ROBOT-ASSISTED, WITH BILATERAL SALPINGECTOMY, CYSTOSCOPY;  Surgeon: Vonnie Cowan MD;  Location:  OR     ESOPHAGOSCOPY, GASTROSCOPY, DUODENOSCOPY (EGD), COMBINED N/A 6/13/2024    Procedure: ESOPHAGOGASTRODUODENOSCOPY, WITH BIOPSY;  Surgeon: Nora Brice MD;  Location: [facility] OR    LAPAROSCOPIC CHOLECYSTECTOMY N/A 3/10/2018    Procedure: LAPAROSCOPIC CHOLECYSTECTOMY;  Laparoscopic Cholecystectomy ;  Surgeon: Kuldeep Sigala MD;  Location: [facility] OR    RELEASE CARPAL TUNNEL Right 12/4/2024    Procedure: RELEASE, RIGHT CARPAL TUNNEL;  Surgeon: Madhu Mcmahon MD;  Location: [facility] OR    RELEASE TRIGGER FINGER Right 5/2/2019    Procedure: Right Thumb Trigger Release;  Surgeon: Greg Streeter MD;  [facility]    RELEASE TRIGGER FINGER Left 5/30/2019    Procedure: Left Ring Trigger Finger Release.  Ganglion cyst excision.;  Surgeon: Greg Streeter MD;  [facility]: [facility] OR    RELEASE TRIGGER FINGER Right 6/13/2023    Procedure: RELEASE, RIGHT TRIGGER FINGER, INDEX AND MIDDLE;  Surgeon: Miracle Carlin MD;  Location: [facility] OR    RELEASE TRIGGER FINGER Left 8/1/2023    Procedure: RELEASE, LEFT TRIGGER FINGER, MIDDLE;  Surgeon: Miracle Carlin MD;  Location: [facility] OR       Family History   Adopted: Yes   Problem Relation Age of Onset    Unknown/Adopted Other        Social History     Social History    Marital status: Single     Spouse name: N/A    Number of children: N/A    Years of education: N/A     Social History Main Topics    Smoking status: Former Smoker     Types: Cigarettes     Quit date: 12/1/2010    Smokeless tobacco: Never Used      Comment: stopped 2 yrs ago    Alcohol use No    Drug use: No    Sexual activity: Not Currently     Partners: Male     Other Topics Concern     Service No    Blood Transfusions No    Caffeine Concern No    Occupational Exposure No    Hobby Hazards No    Sleep Concern No    Stress Concern No    Weight Concern Yes    Special Diet No    Back Care No    Exercise Yes     4-5 x a week    Bike Helmet No    Seat Belt Yes    Self-Exams Yes     Social  History Narrative   Social Hx: Lives in a condo.  Boyfriend is in [place]. She is adopted.  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     Current Outpatient Medications   Medication Sig Dispense Refill    acetaminophen (TYLENOL) 500 MG tablet Take 2 tablets (1,000 mg) by mouth every 8 hours as needed for mild pain 100 tablet 3    Alcohol Swabs (ALCOHOL PREP PAD) 70 % PADS 1 each 3 times daily 100 each 3    amLODIPine (NORVASC) 5 MG tablet Take 1 tablet (5 mg) by mouth at bedtime 90 tablet 3    aspirin (ASA) 81 MG chewable tablet Take 1 tablet (81 mg) by mouth daily. CHEW AND SWALLOW 90 tablet 3    atorvastatin (LIPITOR) 40 MG tablet Take 1 tablet (40 mg) by mouth daily. 90 tablet 3    blood glucose (ACCU-CHEK LAYA PLUS) test strip Use with  Accucheck Expert meter.  Test blood sugar 6 times daily. 600 each 3    blood glucose monitoring (ACCU-CHEK FASTCLIX) lancets Use to test blood sugar 6 times daily or as directed 510 each 3    calcium carbonate-vitamin D (OSCAL) 500-5 MG-MCG tablet Take 1 tablet by mouth 2 times daily. 180 tablet 0    Continuous Glucose Sensor (FREESTYLE ZORAIDA 3 SENSOR) MISC 1 each every 14 days Please provide 3 month supply. 6 each 3    diclofenac (VOLTAREN) 1 % topical gel Apply 2 g topically 4 times daily. For L shoulder pain 100 g 3    dicyclomine (BENTYL) 10 MG capsule Take 1 capsule (10 mg) by mouth 4 times daily as needed (Abdominal pain/cramping). 40 capsule 0    empagliflozin (JARDIANCE) 25 MG TABS tablet Take 1 tablet (25 mg) by mouth daily. 90 tablet 3    ergocalciferol (ERGOCALCIFEROL) 1.25 MG (53181 UT) capsule Take 1 capsule (50,000 Units) by mouth once a week For additional refills, please schedule a follow-up appointment at [phone] 12 capsule 3    fenofibrate (TRIGLIDE/LOFIBRA) 160 MG tablet Take 1 tablet (160 mg) by mouth daily 90 tablet 3    fish oil-omega-3 fatty acids 1000 MG capsule TAKE TWO CAPSULES (2 GM) BY MOUTH ONCE DAILY 180 capsule 3    gabapentin  (NEURONTIN) 100 MG capsule Take 1 capsule (100 mg) by mouth 3 times daily 42 capsule 1    ibuprofen (ADVIL/MOTRIN) 600 MG tablet Take 1 tablet (600 mg) by mouth every 6 hours as needed for moderate pain 120 tablet 1    Injection Device for insulin (INPEN 100-PINK-NOVOLOG-FIASP) DAVID 1 each continuous 1 each 0    insulin aspart (NOVOLOG PENFILL) 100 UNIT/ML cartridge Take with each meal and snack: 1 per 4 grams CHO and correction of 1 unit per 20 mg/dL over a target of 120. Average daily dose is 40 units. 40 mL 3    insulin glargine 100 UNIT/ML pen 3 month supply.Inject 55 units daily 45 mL 3    insulin pen needle (31G X 5 MM) 31G X 5 MM miscellaneous Use 5 to 6 pen needles daily or as directed.  3 month supply. 500 each 3    losartan (COZAAR) 100 MG tablet Take 1 tablet (100 mg) by mouth daily. 90 tablet 3    metoclopramide (REGLAN) 10 MG tablet Take 1 tablet (10 mg) by mouth 4 times daily as needed (nausea). 14 tablet 0    nitroFURantoin macrocrystal-monohydrate (MACROBID) 100 MG capsule Take 1 capsule (100 mg) by mouth 2 times daily. 14 capsule 0    omeprazole (PRILOSEC) 40 MG DR capsule Take 1 capsule (40 mg) by mouth 2 times daily 90 capsule 0    ondansetron (ZOFRAN ODT) 4 MG ODT tab Take 1 tablet (4 mg) by mouth every 6 hours as needed for nausea or vomiting 20 tablet 0    pantoprazole (PROTONIX) 40 MG EC tablet Take 1 tablet (40 mg) by mouth daily. 90 tablet 1    sucralfate (CARAFATE) 1 GM/10ML suspension Take 10 mLs (1 g) by mouth 4 times daily. 414 mL 0    Tirzepatide 15 MG/0.5ML SOAJ Inject 0.5 mLs (15 mg) subcutaneously every 7 days for 12 doses. 6 mL 0     Current Facility-Administered Medications   Medication Dose Route Frequency Provider Last Rate Last Admin    hydrocortisone (CORTAID) 1 % cream   Topical Once Mariya Mohamud APRN CNP        hydrocortisone (CORTAID) 1 % cream   Topical TID Mariya Mohamud APRN CNP        hylan (SYNVISC ONE) injection 48 mg  48 mg   Rosas Rodriguez DO   48 mg at  05/09/23 1813    lidocaine (PF) (XYLOCAINE) 1 % injection 4 mL  4 mL   Sebas Bradley MD   4 mL at 10/05/23 0923    triamcinolone (KENALOG-40) injection 40 mg  40 mg   Sebas Bradley MD   40 mg at 10/05/23 0923        No Known Allergies    Physical Exam  LMP 02/01/2024 (Approximate)   GENERAL:  Alert and oriented X3, NAD, well dressed, answering questions appropriately, appears stated age.  HEENT: OP clear, no lymphadenopathy, no thyromegaly, non-tender, no exophthalmus, no proptosis, EOMI, no lid lag, no retraction  EXTREMITIES: no edema, +pulses, no rashes, +onychomycosis  NEUROLOGY: + monofilament, +vibratory sensation, + DTR upper and lower extremity  MSK: grossly intact    RESULTS  Lab Results   Component Value Date    A1C 7.6 (H) 12/13/2024    A1C 7.8 (H) 09/11/2024    A1C 8.1 (H) 07/12/2024    A1C 8.7 (H) 04/15/2024    A1C 7.8 (H) 01/04/2024    A1C 7.7 (H) 07/25/2023    A1C 8.7 (H) 06/06/2023    A1C 11.6 (H) 04/05/2021    A1C 12.4 (H) 10/29/2020    A1C 12.9 (H) 07/08/2020    A1C 8.2 (H) 05/02/2019    A1C 10.3 (H) 10/15/2018    HEMOGLOBINA1 10.0 (A) 01/13/2020    HEMOGLOBINA1 9.9 (A) 10/07/2019    HEMOGLOBINA1 8.1 (A) 04/22/2019    HEMOGLOBINA1 10.4 (A) 01/14/2019    HEMOGLOBINA1 8.7 (A) 03/12/2018       TSH   Date Value Ref Range Status   04/04/2023 1.41 0.30 - 4.20 uIU/mL Final   07/08/2020 1.34 0.40 - 4.00 mU/L Final   05/17/2018 1.84 0.40 - 4.00 mU/L Final   11/27/2017 1.92 0.40 - 4.00 mU/L Final   05/11/2016 1.85 0.40 - 4.00 mU/L Final   02/11/2016 1.14 0.40 - 4.00 mU/L Final       ALT   Date Value Ref Range Status   10/25/2024 24 0 - 50 U/L Final   10/02/2024 28 0 - 50 U/L Final   07/08/2020 29 0 - 50 U/L Final   05/02/2019 43 0 - 50 U/L Final   ]    Recent Labs   Lab Test 01/04/24  1657 04/04/23  1445   CHOL 183 221*   HDL 37* 38*   LDL 76 123*   TRIG 350* 298*       Lab Results   Component Value Date     01/08/2025     07/08/2020      Lab Results   Component Value Date     POTASSIUM 4.7 01/08/2025    POTASSIUM 4.7 08/17/2022    POTASSIUM 4.0 07/08/2020     Lab Results   Component Value Date    CHLORIDE 106 01/08/2025    CHLORIDE 104 08/17/2022    CHLORIDE 101 07/08/2020     Lab Results   Component Value Date    VERO 9.1 01/08/2025    VERO 8.6 07/08/2020     Lab Results   Component Value Date    CO2 26 01/08/2025    CO2 29 08/17/2022    CO2 30 07/08/2020     Lab Results   Component Value Date    BUN 15.6 01/08/2025    BUN 13 08/17/2022    BUN 13 07/08/2020     Lab Results   Component Value Date    CR 0.76 01/08/2025    CR 0.74 07/08/2020       GFR Estimate   Date Value Ref Range Status   01/08/2025 >90 >60 mL/min/1.73m2 Final     Comment:     eGFR calculated using 2021 CKD-EPI equation.   10/25/2024 90 >60 mL/min/1.73m2 Final     Comment:     eGFR calculated using 2021 CKD-EPI equation.   10/23/2024 >90 >60 mL/min/1.73m2 Final     Comment:     eGFR calculated using 2021 CKD-EPI equation.   07/08/2020 >90 >60 mL/min/[1.73_m2] Final     Comment:     Non  GFR Calc  Starting 12/18/2018, serum creatinine based estimated GFR (eGFR) will be   calculated using the Chronic Kidney Disease Epidemiology Collaboration   (CKD-EPI) equation.     05/02/2019 >90 >60 mL/min/[1.73_m2] Final     Comment:     Non  GFR Calc  Starting 12/18/2018, serum creatinine based estimated GFR (eGFR) will be   calculated using the Chronic Kidney Disease Epidemiology Collaboration   (CKD-EPI) equation.     05/17/2018 >90 >60 mL/min/1.7m2 Final     Comment:     Non  GFR Calc     GFR Estimate If Black   Date Value Ref Range Status   07/08/2020 >90 >60 mL/min/[1.73_m2] Final     Comment:      GFR Calc  Starting 12/18/2018, serum creatinine based estimated GFR (eGFR) will be   calculated using the Chronic Kidney Disease Epidemiology Collaboration   (CKD-EPI) equation.     05/02/2019 >90 >60 mL/min/[1.73_m2] Final     Comment:      GFR  "Calc  Starting 12/18/2018, serum creatinine based estimated GFR (eGFR) will be   calculated using the Chronic Kidney Disease Epidemiology Collaboration   (CKD-EPI) equation.     05/17/2018 >90 >60 mL/min/1.7m2 Final     Comment:      GFR Calc       Lab Results   Component Value Date    MICROL 174.0 01/08/2025    MICROL 115 03/07/2022    MICROL 31 07/20/2020     No results found for: \"MICROALBUMIN\"  Lab Results   Component Value Date    CPEPT 1.3 03/25/2005       Vitamin B12   Date Value Ref Range Status   06/06/2023 776 232 - 1,245 pg/mL Final   ]    Tissue Transglutaminase Antibody IgA   Date Value Ref Range Status   05/08/2024 0.8 <7.0 U/mL Final     Comment:     Negative- The tTG-IgA assay has limited utility for patients with decreased levels of IgA. Screening for celiac disease should include IgA testing to rule out selective IgA deficiency and to guide selection and interpretation of serological testing. tTG-IgG testing may be positive in celiac disease patients with IgA deficiency.   05/18/2007 <0.5 U/mL Final     Comment:     Interpretation:  Negative     Tissue Transglutaminase Babs IgG   Date Value Ref Range Status   05/18/2007 <0.5 U/mL Final     Comment:     Interpretation:  Negative     Tissue Transglutaminase Antibody IgG   Date Value Ref Range Status   05/08/2024 <0.6 <7.0 U/mL Final     Comment:     Negative         Most recent eye exam date: : Not Found     FIB-4 Calculation: 0.42 at 10/25/2024 10:27 AM  Calculated from:  SGOT/AST: 20 U/L at 10/25/2024 10:27 AM  SGPT/ALT: 24 U/L at 10/25/2024 10:27 AM  Platelets: 426 10e3/uL at 10/25/2024 10:27 AM  Age: 44 years  A value of FIB-4 below 1.30 is considered as low risk for advanced fibrosis; a value of FIB-4 over 2.67 is considered as high risk for advanced fibrosis; and FIB-4 values between 1.30 and 2.67 are considered as intermediate risk of advanced fibrosis.    Assessment/Plan:     1.  Type 2 diabetes-  Nayeli's glucose control has " improved significantly.  A1c downb to 7.6%, which is the lowest I have seen it recently. No changes today, but if her urinary symptoms persist, may need to do a trial off of jardiance and increase insulin.  Likewise, the mounjaro may be worsening her GI symptoms, however she is very pleased with the mounjaro and prefers to stay on for now. We made the following plan today (instructions given to patient):      Continue jardiance for now.  Let me know if the urinary symptoms continue.  We may do a trial off of jardiance for 1 week to see if it improves.      New urine for albumin in 3 months.     Please have your lab tests done.  Please contact us to schedule at any of our [facility] lab locations  Call [phone] ([phone]), select option 1 or schedule via [facility].   Please let me know if you are having low blood sugars less than 70 or over 250 mg/dL.      If you have concerns, please send me a Peak message M-Th, call the clinic at [phone], or call [phone] after hours/weekends and ask to speak with the endocrinologist on call.      2.  Risk factors-     Retinopathy:  No.  Had eye exam within last year. More sensitive to light- Usher's syndrome.    Nephropathy:  BP well controlled. +new albuminuria despite improved glucose control. Now back on jardiance, but MA worsened.  I did tell her that having a UTI may be increasing this value, so I would suggest rechecking in 3 mos.  Creatinine stable. Seeing nephrology.  Neuropathy: No.    Feet: OK, no ulcers.   Taking ASA: yes  Lipids:  LDL now in target on statin and fibrate.   LIver: Normal Fib 4 score.  Low risk for fibrosis.     3.  F/U in 3 months with Dr. Bragg, in 1 mo with diabetes education, in 6 mos with me.  We will follow with her closely, however she does strongly prefer in-person appointments.      32 minutes spent on the date of the encounter doing chart review, review of test results, patient visit and documentation,  counseling/coordination of care, and discussion of follow up plan for worsening hyper and hypoglycemia.  The patient understood and is satisfied with today's visit.        Anne Marie Medina PA-C, MPAS   [facility]  Department of Medicine

## 2025-01-13 ENCOUNTER — OFFICE VISIT (OUTPATIENT)
Dept: ENDOCRINOLOGY | Facility: CLINIC | Age: 45
End: 2025-01-13
Payer: COMMERCIAL

## 2025-01-13 VITALS
DIASTOLIC BLOOD PRESSURE: 84 MMHG | WEIGHT: 168 LBS | BODY MASS INDEX: 31.74 KG/M2 | SYSTOLIC BLOOD PRESSURE: 134 MMHG | HEART RATE: 83 BPM | OXYGEN SATURATION: 98 %

## 2025-01-13 DIAGNOSIS — E11.65 UNCONTROLLED TYPE 2 DIABETES MELLITUS WITH HYPERGLYCEMIA, WITH LONG-TERM CURRENT USE OF INSULIN (H): ICD-10-CM

## 2025-01-13 DIAGNOSIS — Z79.4 UNCONTROLLED TYPE 2 DIABETES MELLITUS WITH HYPERGLYCEMIA, WITH LONG-TERM CURRENT USE OF INSULIN (H): ICD-10-CM

## 2025-01-13 PROCEDURE — T1013 SIGN LANG/ORAL INTERPRETER: HCPCS | Mod: U3

## 2025-01-13 PROCEDURE — 99214 OFFICE O/P EST MOD 30 MIN: CPT | Mod: 24 | Performed by: PHYSICIAN ASSISTANT

## 2025-01-13 RX ORDER — INSULIN GLARGINE 100 [IU]/ML
INJECTION, SOLUTION SUBCUTANEOUS
Qty: 30 ML | Refills: 3 | Status: SHIPPED | OUTPATIENT
Start: 2025-01-13

## 2025-01-13 ASSESSMENT — PAIN SCALES - GENERAL: PAINLEVEL_OUTOF10: NO PAIN (0)

## 2025-01-13 NOTE — LETTER
1/13/2025       RE: Nayeli Caal  [address]  [facility] [address]     Dear Colleague,    Thank you for referring your patient, Nayeli Caal, to the [facility] ENDOCRINOLOG[facility] [place] at [facility]. Please see a copy of my visit note below.      HPI:   Nayeli is a pleasant 43 yo woman here with a  for follow up of type 2 diabetes since the early 2000's.  She also sees Dr. Bragg and Leigh Ann Escoto. She started on insulin in 2003 after failing Metformin treatment.  She has struggled with high blood sugars for many years.   She has been working hard on improving her diabetes control.  She is currently on Mounjaro 15  mg weekly.  This has been going well.  Blood sugars are better.   Lowered lantus from 60 units to 50 units to 25 units with the titration of mounjaro.  She is pleased with weight loss and lower insulin requirements. She has been following closely with Leigh Ann Escoto in DM education.      Less appetite.  Eating smaller portions.  Still having some digestive issues- GERD and stomach pains.  Going to [place] next week to get things checked out.  Not feeling 100% with her health yet.   Blood sugars being in control has helped.  Stomach things happened around labor day- had to go the ER.  Found a lot of inflammation in her gut- lots of issues with diarrhea.  Gotten better. Diarrhea issues. Went to urgent care- had UTI.  Feeling better, but still has a slightly abnormal sensation. Saw nephrology last week. On antibotics.  Was frustrated that her urine albumin was high, when she thought the jardiance was supposed to help.xxxxxxx    Typical day:   Two schedules. Works from home M/F.  Other days she goes to office.  Gets up at different times.  She has been taking meds for GERD.  Waits an hour before she eats.  Gets up at 4 on days she goes to the office.   Breakfast- Egg sandwich or scrambled  "eggs with veggies.   Lunch- leftovers, sometimes sandwich or salad  Dinner- varies, mostly more vegetables, trying to cut back on carbs.  Loves starchy foods- cutting back on that.  Used to eat a \"pile\" of food. Now cutting back.   Snacks- not very often. Xxxxxxxxxxxxxxxxxxxxxxxxxxxxxxxxxx  Involved in non-profit for deaf community.  Very busy.  Has not taken the time to be active.     Occasionally low.  Sometimes more activity.    Will be having surgery on her left hand.     IN PEN SETTINGS:  Start time ICR  1 unit/gm Insulin Sensitivity Factor Target BG    06:00 AM 5 15 110   11:00 AM 5 15 110   05:00 PM 5 15 110   09:30 PM 5 15 130             Active Insulin Time:  3  Maximum Bolus:  30 units       Her current regimen is:   Jardiance 25 mg daily  Mounjaro 15 mg weekly  Basaglar 25 units daily.  Novolog (dosing on In-pen):  Carb ratio: 1/5g   Sensitivity: 15    Previous treatments:   empagliflozin 25 mg daily- stopped 2023 due to recurrent yeast infections.  Metformin- severe diarrhea. Even the low dose caused intense diarrhea to the point where she could not go out of her home.   Ozempic 2 mg- severe nausea after switching from mounjaro 5 mg    Her overall average glucose is 157 mg/dL (CV 27%), over the past 2 weeks.  Her recent glucose is as follows:               She has no other concerns today.       Past Medical History:   Diagnosis Date     Combined visual and hearing impairment      Deaf      Diabetic retinopathy of both eyes (H) 8/19/2011     Hepatic steatosis 08/19/2011     History of tobacco use      Hyperlipidemia      Hypertension      Hypertriglyceridemia      Migraines      Obesity 8/19/2011     Problem list name updated by automated process. 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       Past Surgical History:   Procedure Laterality Date "     COLONOSCOPY N/A 10/21/2024    Procedure: COLONOSCOPY, WITH BIOPSIES;  Surgeon: Peggy Miguel MD;  Location: [facility] OR     DAVINCI HYSTERECTOMY TOTAL, SALPINGECTOMY BILATERAL Bilateral 2/7/2024    Procedure: HYSTERECTOMY, TOTAL, ROBOT-ASSISTED, WITH BILATERAL SALPINGECTOMY, CYSTOSCOPY;  Surgeon: Vonnie Cowan MD;  Location: [facility] OR     ESOPHAGOSCOPY, GASTROSCOPY, DUODENOSCOPY (EGD), COMBINED N/A 6/13/2024    Procedure: ESOPHAGOGASTRODUODENOSCOPY, WITH BIOPSY;  Surgeon: Nora Brice MD;  Location: [facility] OR     LAPAROSCOPIC CHOLECYSTECTOMY N/A 3/10/2018    Procedure: LAPAROSCOPIC CHOLECYSTECTOMY;  Laparoscopic Cholecystectomy ;  Surgeon: Kuldeep Sigala MD;  Location: [facility] OR     RELEASE CARPAL TUNNEL Right 12/4/2024    Procedure: RELEASE, RIGHT CARPAL TUNNEL;  Surgeon: Madhu Mcmahon MD;  Location: [facility] OR     RELEASE TRIGGER FINGER Right 5/2/2019    Procedure: Right Thumb Trigger Release;  Surgeon: Greg Streeter MD;  [facility]     RELEASE TRIGGER FINGER Left 5/30/2019    Procedure: Left Ring Trigger Finger Release.  Ganglion cyst excision.;  Surgeon: Greg Streeter MD;  [facility]     RELEASE TRIGGER FINGER Right 6/13/2023    Procedure: RELEASE, RIGHT TRIGGER FINGER, INDEX AND MIDDLE;  Surgeon: Miracle Carlin MD;  Location: [facility] OR     RELEASE TRIGGER FINGER Left 8/1/2023    Procedure: RELEASE, LEFT TRIGGER FINGER, MIDDLE;  Surgeon: Miracle Carlin MD;  Location: [facility] OR       Family History   Adopted: Yes   Problem Relation Age of Onset     Unknown/Adopted Other        Social History     Social History     Marital status: Single     Spouse name: N/A     Number of children: N/A     Years of education: N/A     Social History Main Topics     Smoking status: Former Smoker     Types: Cigarettes     Quit date: 12/1/2010     Smokeless tobacco: Never Used      Comment: stopped 2 yrs ago     Alcohol use No     Drug use: No     Sexual activity: Not  Currently     Partners: Male     Other Topics Concern      Service No     Blood Transfusions No     Caffeine Concern No     Occupational Exposure No     Hobby Hazards No     Sleep Concern No     Stress Concern No     Weight Concern Yes     Special Diet No     Back Care No     Exercise Yes     4-5 x a week     Bike Helmet No     Seat Belt Yes     Self-Exams Yes     Social History Narrative   Social Hx: Lives in a condo.  Boyfriend is in [place]. She is adopted.  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     Current Outpatient Medications   Medication Sig Dispense Refill     acetaminophen (TYLENOL) 500 MG tablet Take 2 tablets (1,000 mg) by mouth every 8 hours as needed for mild pain 100 tablet 3     Alcohol Swabs (ALCOHOL PREP PAD) 70 % PADS 1 each 3 times daily 100 each 3     amLODIPine (NORVASC) 5 MG tablet Take 1 tablet (5 mg) by mouth at bedtime 90 tablet 3     aspirin (ASA) 81 MG chewable tablet Take 1 tablet (81 mg) by mouth daily. CHEW AND SWALLOW 90 tablet 3     atorvastatin (LIPITOR) 40 MG tablet Take 1 tablet (40 mg) by mouth daily. 90 tablet 3     blood glucose (ACCU-CHEK LAYA PLUS) test strip Use with  Accucheck Expert meter.  Test blood sugar 6 times daily. 600 each 3     blood glucose monitoring (ACCU-CHEK FASTCLIX) lancets Use to test blood sugar 6 times daily or as directed 510 each 3     calcium carbonate-vitamin D (OSCAL) 500-5 MG-MCG tablet Take 1 tablet by mouth 2 times daily. 180 tablet 0     Continuous Glucose Sensor (FREESTYLE ZORAIDA 3 SENSOR) MISC 1 each every 14 days Please provide 3 month supply. 6 each 3     diclofenac (VOLTAREN) 1 % topical gel Apply 2 g topically 4 times daily. For L shoulder pain 100 g 3     dicyclomine (BENTYL) 10 MG capsule Take 1 capsule (10 mg) by mouth 4 times daily as needed (Abdominal pain/cramping). 40 capsule 0     empagliflozin (JARDIANCE) 25 MG TABS tablet Take 1 tablet (25 mg) by mouth daily. 90 tablet 3     ergocalciferol (ERGOCALCIFEROL)  1.25 MG (53114 UT) capsule Take 1 capsule (50,000 Units) by mouth once a week For additional refills, please schedule a follow-up appointment at [phone] 12 capsule 3     fenofibrate (TRIGLIDE/LOFIBRA) 160 MG tablet Take 1 tablet (160 mg) by mouth daily 90 tablet 3     fish oil-omega-3 fatty acids 1000 MG capsule TAKE TWO CAPSULES (2 GM) BY MOUTH ONCE DAILY 180 capsule 3     gabapentin (NEURONTIN) 100 MG capsule Take 1 capsule (100 mg) by mouth 3 times daily 42 capsule 1     ibuprofen (ADVIL/MOTRIN) 600 MG tablet Take 1 tablet (600 mg) by mouth every 6 hours as needed for moderate pain 120 tablet 1     Injection Device for insulin (INPEN 100-PINK-NOVOLOG-FIASP) DAVID 1 each continuous 1 each 0     insulin aspart (NOVOLOG PENFILL) 100 UNIT/ML cartridge Take with each meal and snack: 1 per 4 grams CHO and correction of 1 unit per 20 mg/dL over a target of 120. Average daily dose is 40 units. 40 mL 3     insulin glargine 100 UNIT/ML pen 3 month supply.Inject 55 units daily 45 mL 3     insulin pen needle (31G X 5 MM) 31G X 5 MM miscellaneous Use 5 to 6 pen needles daily or as directed.  3 month supply. 500 each 3     losartan (COZAAR) 100 MG tablet Take 1 tablet (100 mg) by mouth daily. 90 tablet 3     metoclopramide (REGLAN) 10 MG tablet Take 1 tablet (10 mg) by mouth 4 times daily as needed (nausea). 14 tablet 0     nitroFURantoin macrocrystal-monohydrate (MACROBID) 100 MG capsule Take 1 capsule (100 mg) by mouth 2 times daily. 14 capsule 0     omeprazole (PRILOSEC) 40 MG DR capsule Take 1 capsule (40 mg) by mouth 2 times daily 90 capsule 0     ondansetron (ZOFRAN ODT) 4 MG ODT tab Take 1 tablet (4 mg) by mouth every 6 hours as needed for nausea or vomiting 20 tablet 0     pantoprazole (PROTONIX) 40 MG EC tablet Take 1 tablet (40 mg) by mouth daily. 90 tablet 1     sucralfate (CARAFATE) 1 GM/10ML suspension Take 10 mLs (1 g) by mouth 4 times daily. 414 mL 0     Tirzepatide 15 MG/0.5ML SOAJ Inject 0.5 mLs (15 mg)  subcutaneously every 7 days for 12 doses. 6 mL 0     Current Facility-Administered Medications   Medication Dose Route Frequency Provider Last Rate Last Admin     hydrocortisone (CORTAID) 1 % cream   Topical Once Mariya Mohamud APRN CNP         hydrocortisone (CORTAID) 1 % cream   Topical TID OverMariya benavidez APRN CNP         hylan (SYNVISC ONE) injection 48 mg  48 mg   Rosas Rodriguez DO   48 mg at 05/09/23 1813     lidocaine (PF) (XYLOCAINE) 1 % injection 4 mL  4 mL   Sebas Bradley MD   4 mL at 10/05/23 0923     triamcinolone (KENALOG-40) injection 40 mg  40 mg   Sebas Bradley MD   40 mg at 10/05/23 0923        No Known Allergies    Physical Exam  LMP 02/01/2024 (Approximate)   GENERAL:  Alert and oriented X3, NAD, well dressed, answering questions appropriately, appears stated age.  HEENT: OP clear, no lymphadenopathy, no thyromegaly, non-tender, no exophthalmus, no proptosis, EOMI, no lid lag, no retraction  EXTREMITIES: no edema, +pulses, no rashes, +onychomycosis  NEUROLOGY: + monofilament, +vibratory sensation, + DTR upper and lower extremity  MSK: grossly intact    RESULTS  Lab Results   Component Value Date    A1C 7.6 (H) 12/13/2024    A1C 7.8 (H) 09/11/2024    A1C 8.1 (H) 07/12/2024    A1C 8.7 (H) 04/15/2024    A1C 7.8 (H) 01/04/2024    A1C 7.7 (H) 07/25/2023    A1C 8.7 (H) 06/06/2023    A1C 11.6 (H) 04/05/2021    A1C 12.4 (H) 10/29/2020    A1C 12.9 (H) 07/08/2020    A1C 8.2 (H) 05/02/2019    A1C 10.3 (H) 10/15/2018    HEMOGLOBINA1 10.0 (A) 01/13/2020    HEMOGLOBINA1 9.9 (A) 10/07/2019    HEMOGLOBINA1 8.1 (A) 04/22/2019    HEMOGLOBINA1 10.4 (A) 01/14/2019    HEMOGLOBINA1 8.7 (A) 03/12/2018       TSH   Date Value Ref Range Status   04/04/2023 1.41 0.30 - 4.20 uIU/mL Final   07/08/2020 1.34 0.40 - 4.00 mU/L Final   05/17/2018 1.84 0.40 - 4.00 mU/L Final   11/27/2017 1.92 0.40 - 4.00 mU/L Final   05/11/2016 1.85 0.40 - 4.00 mU/L Final   02/11/2016 1.14 0.40 - 4.00 mU/L Final       ALT    Date Value Ref Range Status   10/25/2024 24 0 - 50 U/L Final   10/02/2024 28 0 - 50 U/L Final   07/08/2020 29 0 - 50 U/L Final   05/02/2019 43 0 - 50 U/L Final   ]    Recent Labs   Lab Test 01/04/24  1657 04/04/23  1445   CHOL 183 221*   HDL 37* 38*   LDL 76 123*   TRIG 350* 298*       Lab Results   Component Value Date     01/08/2025     07/08/2020      Lab Results   Component Value Date    POTASSIUM 4.7 01/08/2025    POTASSIUM 4.7 08/17/2022    POTASSIUM 4.0 07/08/2020     Lab Results   Component Value Date    CHLORIDE 106 01/08/2025    CHLORIDE 104 08/17/2022    CHLORIDE 101 07/08/2020     Lab Results   Component Value Date    VERO 9.1 01/08/2025    VERO 8.6 07/08/2020     Lab Results   Component Value Date    CO2 26 01/08/2025    CO2 29 08/17/2022    CO2 30 07/08/2020     Lab Results   Component Value Date    BUN 15.6 01/08/2025    BUN 13 08/17/2022    BUN 13 07/08/2020     Lab Results   Component Value Date    CR 0.76 01/08/2025    CR 0.74 07/08/2020       GFR Estimate   Date Value Ref Range Status   01/08/2025 >90 >60 mL/min/1.73m2 Final     Comment:     eGFR calculated using 2021 CKD-EPI equation.   10/25/2024 90 >60 mL/min/1.73m2 Final     Comment:     eGFR calculated using 2021 CKD-EPI equation.   10/23/2024 >90 >60 mL/min/1.73m2 Final     Comment:     eGFR calculated using 2021 CKD-EPI equation.   07/08/2020 >90 >60 mL/min/[1.73_m2] Final     Comment:     Non  GFR Calc  Starting 12/18/2018, serum creatinine based estimated GFR (eGFR) will be   calculated using the Chronic Kidney Disease Epidemiology Collaboration   (CKD-EPI) equation.     05/02/2019 >90 >60 mL/min/[1.73_m2] Final     Comment:     Non  GFR Calc  Starting 12/18/2018, serum creatinine based estimated GFR (eGFR) will be   calculated using the Chronic Kidney Disease Epidemiology Collaboration   (CKD-EPI) equation.     05/17/2018 >90 >60 mL/min/1.7m2 Final     Comment:     Non  GFR Calc  "    GFR Estimate If Black   Date Value Ref Range Status   07/08/2020 >90 >60 mL/min/[1.73_m2] Final     Comment:      GFR Calc  Starting 12/18/2018, serum creatinine based estimated GFR (eGFR) will be   calculated using the Chronic Kidney Disease Epidemiology Collaboration   (CKD-EPI) equation.     05/02/2019 >90 >60 mL/min/[1.73_m2] Final     Comment:      GFR Calc  Starting 12/18/2018, serum creatinine based estimated GFR (eGFR) will be   calculated using the Chronic Kidney Disease Epidemiology Collaboration   (CKD-EPI) equation.     05/17/2018 >90 >60 mL/min/1.7m2 Final     Comment:      GFR Calc       Lab Results   Component Value Date    MICROL 174.0 01/08/2025    MICROL 115 03/07/2022    MICROL 31 07/20/2020     No results found for: \"MICROALBUMIN\"  Lab Results   Component Value Date    CPEPT 1.3 03/25/2005       Vitamin B12   Date Value Ref Range Status   06/06/2023 776 232 - 1,245 pg/mL Final   ]    Tissue Transglutaminase Antibody IgA   Date Value Ref Range Status   05/08/2024 0.8 <7.0 U/mL Final     Comment:     Negative- The tTG-IgA assay has limited utility for patients with decreased levels of IgA. Screening for celiac disease should include IgA testing to rule out selective IgA deficiency and to guide selection and interpretation of serological testing. tTG-IgG testing may be positive in celiac disease patients with IgA deficiency.   05/18/2007 <0.5 U/mL Final     Comment:     Interpretation:  Negative     Tissue Transglutaminase Babs IgG   Date Value Ref Range Status   05/18/2007 <0.5 U/mL Final     Comment:     Interpretation:  Negative     Tissue Transglutaminase Antibody IgG   Date Value Ref Range Status   05/08/2024 <0.6 <7.0 U/mL Final     Comment:     Negative         Most recent eye exam date: : Not Found     FIB-4 Calculation: 0.42 at 10/25/2024 10:27 AM  Calculated from:  SGOT/AST: 20 U/L at 10/25/2024 10:27 AM  SGPT/ALT: 24 U/L at 10/25/2024 " 10:27 AM  Platelets: 426 10e3/uL at 10/25/2024 10:27 AM  Age: 44 years  A value of FIB-4 below 1.30 is considered as low risk for advanced fibrosis; a value of FIB-4 over 2.67 is considered as high risk for advanced fibrosis; and FIB-4 values between 1.30 and 2.67 are considered as intermediate risk of advanced fibrosis.    Assessment/Plan:     1.  Type 2 diabetes-  Nayeli's glucose control has improved significantly.  A1c downb to 7.6%, which is the lowest I have seen it recently. No changes today, but if her urinary symptoms persist, may need to do a trial off of jardiance and increase insulin.  Likewise, the mounjaro may be worsening her GI symptoms, however she is very pleased with the mounjaro and prefers to stay on for now. We made the following plan today (instructions given to patient):      Continue jardiance for now.  Let me know if the urinary symptoms continue.  We may do a trial off of jardiance for 1 week to see if it improves.      New urine for albumin in 3 months.     Please have your lab tests done.  Please contact us to schedule at any of our [place] lab locations  Call [phone] ([phone]), select option 1 or schedule via [employer].   Please let me know if you are having low blood sugars less than 70 or over 250 mg/dL.      If you have concerns, please send me a [employer] message M-Th, call the clinic at [phone], or call [phone] after hours/weekends and ask to speak with the endocrinologist on call.      2.  Risk factors-     Retinopathy:  No.  Had eye exam within last year. More sensitive to light- Usher's syndrome.    Nephropathy:  BP well controlled. +new albuminuria despite improved glucose control. Now back on jardiance, but MA worsened.  I did tell her that having a UTI may be increasing this value, so I would suggest rechecking in 3 mos.  Creatinine stable. Seeing nephrology.  Neuropathy: No.    Feet: OK, no ulcers.   Taking ASA: yes  Lipids:  LDL now in target on statin  and fibrate.   LIver: Normal Fib 4 score.  Low risk for fibrosis.     3.  F/U in 3 months with Dr. Bragg, in 1 mo with diabetes education, in 6 mos with me.  We will follow with her closely, however she does strongly prefer in-person appointments.      32 minutes spent on the date of the encounter doing chart review, review of test results, patient visit and documentation, counseling/coordination of care, and discussion of follow up plan for worsening hyper and hypoglycemia.  The patient understood and is satisfied with today's visit.        Anne Marie Medina PA-C, MPAS   [facility]  Department of Medicine                                                                                      Again, thank you for allowing me to participate in the care of your patient.      Sincerely,    Anne Marie Medina PA-C

## 2025-01-13 NOTE — NURSING NOTE
"Chief Complaint   Patient presents with    Diabetes   Vital signs:      BP: 134/84 Pulse: 83     SpO2: 98 %       Weight: 76.2 kg (168 lb)  Estimated body mass index is 31.74 kg/m  as calculated from the following:    Height as of 1/8/25: 1.549 m (5' 1\").    Weight as of this encounter: 76.2 kg (168 lb).        "

## 2025-01-13 NOTE — PATIENT INSTRUCTIONS
Continue jardiance for now.  Let me know if the urinary symptoms continue.  We may do a trial off of jardiance for 1 week to see if it improves.      New urine for albumin in 3 months.     Please have your lab tests done.  Please contact us to schedule at any of our [place] lab locations  Call [phone] ([phone]), select option 1 or schedule via [employer].   Please let me know if you are having low blood sugars less than 70 or over 250 mg/dL.      If you have concerns, please send me a [employer] message M-Th, call the clinic at [phone], or call [phone] after hours/weekends and ask to speak with the endocrinologist on call.

## 2025-01-15 ENCOUNTER — HOSPITAL ENCOUNTER (OUTPATIENT)
Facility: AMBULATORY SURGERY CENTER | Age: 45
Discharge: HOME | End: 2025-01-15
Attending: ORTHOPAEDIC SURGERY
Payer: COMMERCIAL

## 2025-01-15 VITALS
BODY MASS INDEX: 31.72 KG/M2 | HEART RATE: 90 BPM | TEMPERATURE: 97.8 F | OXYGEN SATURATION: 98 % | WEIGHT: 168 LBS | SYSTOLIC BLOOD PRESSURE: 130 MMHG | DIASTOLIC BLOOD PRESSURE: 63 MMHG | HEIGHT: 61 IN | RESPIRATION RATE: 16 BRPM

## 2025-01-15 DIAGNOSIS — G56.03 SEVERE CARPAL TUNNEL SYNDROME OF BOTH WRISTS: Primary | ICD-10-CM

## 2025-01-15 RX ORDER — LIDOCAINE HYDROCHLORIDE AND EPINEPHRINE 10; 10 MG/ML; UG/ML
10 INJECTION, SOLUTION INFILTRATION; PERINEURAL ONCE
Status: COMPLETED | OUTPATIENT
Start: 2025-01-15 | End: 2025-01-15

## 2025-01-15 RX ORDER — OXYCODONE HYDROCHLORIDE 5 MG/1
5 TABLET ORAL EVERY 6 HOURS PRN
Qty: 3 TABLET | Refills: 0 | Status: SHIPPED | OUTPATIENT
Start: 2025-01-15

## 2025-01-15 RX ORDER — MAGNESIUM HYDROXIDE 1200 MG/15ML
LIQUID ORAL PRN
Status: DISCONTINUED | OUTPATIENT
Start: 2025-01-15 | End: 2025-01-15 | Stop reason: HOSPADM

## 2025-01-15 RX ADMIN — LIDOCAINE HYDROCHLORIDE AND EPINEPHRINE 10 ML: 10; 10 INJECTION, SOLUTION INFILTRATION; PERINEURAL at 12:08

## 2025-01-15 NOTE — DISCHARGE INSTRUCTIONS
Attending Surgeon: Dr. Mcmahon  Date: 1/15/2025    DIAGNOSIS  1. Severe carpal tunnel syndrome of both wrists        MEDICATIONS   Resume all home medications as directed unless otherwise instructed during this hospitalization. If there is any question, double check with your primary care provider.  Start new discharge medications as directed.    Take 1-2 tablets of extra strength Tylenol 500 mg every 6 hours. You may also take 400-600 mg of ibuprofen every 6 hours.    For breakthrough pain use narcotic pain medication as prescribed.    Do not drive or operate machinery while taking narcotic pain medications.   If you are taking other Tylenol containing medicines at home, be sure NOT to exceed 4 gram's (4000 milligrams) of Tylenol per day.   Do NOT exceed 2400 mg of ibuprofen per day.  If you are taking pain medications, be sure to take Colace (docusate sodium) as well to prevent constipation. If constipated, try adding another cathartic or enema.  If nausea and vomiting, call the hospital or seek medical attention.    ACTIVITY   Weight bearin lb coffee cup weight bearing to operative extremity    DIET  Resume same diet prior to your hospital admission.    WOUND   Leave dressing on until at least POD2  Watch for signs and symptoms of infection of your wounds including; pain, redness, swelling, drainage or fever.  If you notice any of these symptoms please call or seek medical attention.    Keep wound clean, dry, and intact.  Do not submerge wounds in water until they are healed. No baths, soaking, swimming, or prolonged water exposure for 4 weeks after surgery.    RETURN   Follow-up with Orthopedic Clinic as directed.     Future Appointments   Date Time Provider Department Center   2025  2:15 PM Latricia Alex RD [facility]   2025  8:05 AM Madhu Mcmahon MD DOV [facility]   2025  2:30 PM Leigh Ann Escoto RN [facility]   3/18/2025  6:30 AM [facility]   2025  8:30 AM  "Jimena Bragg MD [facility]   5/20/2025  9:30 AM Ankita Gregg PA-C [facility]   6/9/2025  7:30 AM Anne Marie Medina PA-C [facility]       Call the [facility] at [phone] during business hours for any symptoms such as:    * Fevers with Temperature greater than 101.5 degrees.   * Pus drainage from wound site.   * Severe pain, not controlled by medication.   * Persistent nausea, vomiting and inablility to tolerate fluids.        FOR URGENT PROBLEMS ONLY, after hours or on weekends call the hospital  at [phone] and ask to speak with the orthopedic resident on call.   [facility]  Home Care Following Your Procedure  Call a doctor if you have signs of infection (fever, growing tenderness at the surgery site, a large amount of drainage or bleeding, severe pain, foul-smelling drainage, redness, swelling).  Today you received a Marcaine or bupivacaine block to numb the nerves near your surgery site.  This is a block using local anesthetic or \"numbing\" medication injected around the nerves to anesthetize or \"numb\" the area supplied by those nerves.  This block is injected into the muscle layer near your surgical site.  The medication may numb the location where you had surgery for 6-18 hours, but may last up to 24 hours.  If your surgical site is an arm or leg you should be careful with your affected limb, since it is possible to injure your limb without being aware of it due to the numbing.  Until full feeling returns, you should guard against bumping or hitting your limb, and avoid extreme hot or cold temperatures on the skin.  As the block wears off, the feeling will return as a tingling or prickly sensation near your surgical site.  You will experience more discomfort from your incision as the feeling returns.  You may want to take a pain pill (a narcotic or Tylenol if this was prescribed by your surgeon) when you start to " experience mild pain before the pain becomes more severe.  If your pain medications do not control your pain you should notifiy your surgeon.        Tylenol/Acetaminophen Consumption    If you feel your pain relief is insufficient, you may take Tylenol/Acetaminophen in addition to your narcotic pain medication.   Be careful not to exceed 4,000 mg of Tylenol/Acetaminophen in a 24 hour period from all sources.  If you are taking extra strength Tylenol/acetaminophen (500 mg), the maximum dose is 8 tablets in 24 hours.  If you are taking regular strength acetaminophen (325 mg), the maximum dose is 12 tablets in 24 hours.      Your doctor is:  Dr. Madhu Mcmahon, Orthopaedics: [phone]                  Or dial [phone] and ask for the resident on call for:  Orthopaedics  For emergency care, call the:  [place]:  [phone] (TTY for hearing impaired: [phone])

## 2025-01-15 NOTE — OP NOTE
PATIENT NAME: Nayeli Caal  MRN: [id number]    DATE: 1/15/2025    PREOPERATIVE DIAGNOSIS:   1. Left wrist carpal tunnel syndrome.    POSTOPERATIVE DIAGNOSIS:   1. Left wrist carpal tunnel syndrome.     OPERATION:   1. Left wrist carpal tunnel release.     SURGEON: Madhu Mcmahon MD     ASSISTANT(S): Isaiah Short MD PGY 4      ANESTHESIA:  Local    IMPLANT(S): * No implants in log *    SPECIMEN: * No specimens in log *    ESTIMATED BLOOD LOSS: < 5 mL.    FLUIDS: See anesthesia records.     TOURNIQUET TIME: 0 minutes.    COMPLICATIONS: None.     INDICATIONS: Nayeli Caal is a 44 year old female who has developed carpal tunnel syndrome.  It has not responded to conservative management and was therefore indicated for operative intervention.  We are proceeding with a right carpal tunnel release.  The risks, benefits, and alternatives were discussed with her.  She verbalized understanding of the treatment plan and an informed consent was signed.     DESCRIPTION OF PROCEDURE: The patient was taken to the operating room and placed in supine position on the operating table.  The operative hand was anesthetized over the carpal tunnel in the preoperative holding area with 1% lidocaine with epinephrine.  The patient was then taken to the OR and placed supine on the OR table.  A timeout was performed with all OR staff.  The patient name, MRN, operative extremity, procedure, allergies, antibiotics, DVT prophylaxis, and fire precautions/plan were reviewed, and all were in agreement.  The operative upper extremity was prepped and draped in standard sterile fashion.    A 3 cm longitudinal incision was made in the palm in-line with the 4th ray, just radial to the hook of hamate starting from Nguyen's cardinal line. The skin and the subcutaneous tissue were sharply incised.  Dissection was carried down to the palmar fascia, which was incised followed by release of the transverse carpal ligament.  Distally  the sentinel fat pad and superficial arch were identified and proximally the dissection was carried out under direct visualization to divide the antebrachial fascia and remainder of the transverse carpal ligament. There was significant compression of the median nerve at the level of the transverse carpal ligament.  A complete release was confirmed and exploration of the carpal canal revealed no masses. The median nerve and its branches were intact.  The wound was irrigated.  Closure was performed with 5-0 Monocryl subcuticular.  Sterile dressings were applied to the hand.  Capillary refill was intact < 2 seconds.      The patient was taken to the recovery room in stable condition.      All counts were correct at the end of the case.    There were no complications.    Dr. Mcmahon was scrubbed in for the entirety of the procedure      --  Isaiah Short MD  Orthopedic Surgery PGY-4

## 2025-01-22 ENCOUNTER — OFFICE VISIT (OUTPATIENT)
Dept: ORTHOPEDICS | Facility: CLINIC | Age: 45
End: 2025-01-22
Payer: COMMERCIAL

## 2025-01-22 DIAGNOSIS — G56.03 SEVERE CARPAL TUNNEL SYNDROME OF BOTH WRISTS: Primary | ICD-10-CM

## 2025-01-22 PROCEDURE — 99024 POSTOP FOLLOW-UP VISIT: CPT | Performed by: ORTHOPAEDIC SURGERY

## 2025-01-22 PROCEDURE — T1013 SIGN LANG/ORAL INTERPRETER: HCPCS | Mod: U3

## 2025-01-22 NOTE — LETTER
1/22/2025      Nayeli Caal  [address]  [place] [address]      Dear Colleague,    Thank you for referring your patient, Nayeli Caal, to the [facility] [place]. Please see a copy of my visit note below.    Date of Service: Jan 22, 2025    Chief Complaint:   Chief Complaint   Patient presents with     Surgical Followup     Post op Release, Left Carpal Tunnel - Left. DOS: 1/15/25       Interval events: Nayeli Caal is a 44 year old female who presents today in follow-up for follow-up of her left carpal tunnel release last week.   was used for this encounter.  She feels that her hand is significantly better than preop and her carpal tunnel syndromes have resolved.  She denies any numbness or tingling in her fingers and is happy with her result thus far.  She admits to some soreness and tenderness at the surgical site but is otherwise doing well    Physical examination:  Focused examination of the left upper extremity demonstrates healing incision without any erythema or drainage.  Sensation intact in median, ulnar, radial nerve distributions without any paresthesias.       Assessment: 44 year old female with approximately 1 week s/p left carpal tunnel release    Steri-Strips and soft dressing reapplied.  She is instructed to leave the Steri-Strips on until they fall off but can remove Ace wrap as needed.  She is doing quite well and has complete resolution of her carpal tunnel symptoms.    Plan:    Follow-up as needed      --  Isaiah Short MD  Orthopedic Surgery PGY-4                 Again, thank you for allowing me to participate in the care of your patient.        Sincerely,        Madhu Mcmahon MD    Electronically signed

## 2025-01-22 NOTE — PROGRESS NOTES
Date of Service: Jan 22, 2025    Chief Complaint:   Chief Complaint   Patient presents with    Surgical Followup     Post op Release, Left Carpal Tunnel - Left. DOS: 1/15/25       Interval events: Nayeli Caal is a 44 year old female who presents today in follow-up for follow-up of her left carpal tunnel release last week.   was used for this encounter.  She feels that her hand is significantly better than preop and her carpal tunnel syndromes have resolved.  She denies any numbness or tingling in her fingers and is happy with her result thus far.  She admits to some soreness and tenderness at the surgical site but is otherwise doing well    Physical examination:  Focused examination of the left upper extremity demonstrates healing incision without any erythema or drainage.  Sensation intact in median, ulnar, radial nerve distributions without any paresthesias.       Assessment: 44 year old female with approximately 1 week s/p left carpal tunnel release    Steri-Strips and soft dressing reapplied.  She is instructed to leave the Steri-Strips on until they fall off but can remove Ace wrap as needed.  She is doing quite well and has complete resolution of her carpal tunnel symptoms.    Plan:    Follow-up as needed      --  Isaiah Short MD  Orthopedic Surgery PGY-4

## 2025-01-22 NOTE — NURSING NOTE
Reason For Visit:   Chief Complaint   Patient presents with    Surgical Followup     Post op Release, Left Carpal Tunnel - Left. DOS: 1/15/25       Primary MD: Mariya Mohamud  Ref. MD: Bg    Age: 44 year old    ?  No      LMP 02/01/2024 (Approximate)       Pain Assessment  Patient Currently in Pain: Yes  0-10 Pain Scale: 4  Primary Pain Location: Hand (Left hand/wrist)  Pain Descriptors: Aching, Sore (Pain with some ROM)    Hand Dominance Evaluation  Hand Dominance: Left          QuickDASH Assessment      10/1/2024     6:37 AM   QuickDASH Main   1. Open a tight or new jar Moderate difficulty   2. Do heavy household chores (e.g., wash walls, floors) Mild difficulty   3. Carry a shopping bag or briefcase Mild difficulty   4. Wash your back No difficulty   5. Use a knife to cut food Mild difficulty   6. Recreational activities in which you take some force or impact through your arm, shoulder or hand (e.g., golf, hammering, tennis, etc.) Moderate difficulty   7. During the past week, to what extent has your arm, shoulder or hand problem interfered with your normal social activities with family, friends, neighbours or groups Slightly   8. During the past week, were you limited in your work or other regular daily activities as a result of your arm, shoulder or hand problem Slightly limited   9. Arm, shoulder or hand pain Mild   10.Tingling (pins and needles) in your arm,shoulder or hand Moderate   11. During the past week, how much difficulty have you had sleeping because of the pain in your arm, shoulder or hand Moderate difficulty   Quickdash Ability Score 31.82          Current Outpatient Medications   Medication Sig Dispense Refill    acetaminophen (TYLENOL) 500 MG tablet Take 2 tablets (1,000 mg) by mouth every 8 hours as needed for mild pain 100 tablet 3    Alcohol Swabs (ALCOHOL PREP PAD) 70 % PADS 1 each 3 times daily 100 each 3    amLODIPine (NORVASC) 5 MG tablet Take 1 tablet (5 mg) by mouth at  bedtime 90 tablet 3    aspirin (ASA) 81 MG chewable tablet Take 1 tablet (81 mg) by mouth daily. CHEW AND SWALLOW 90 tablet 3    atorvastatin (LIPITOR) 40 MG tablet Take 1 tablet (40 mg) by mouth daily. 90 tablet 3    blood glucose (ACCU-CHEK LAYA PLUS) test strip Use with  Accucheck Expert meter.  Test blood sugar 6 times daily. 600 each 3    blood glucose monitoring (ACCU-CHEK FASTCLIX) lancets Use to test blood sugar 6 times daily or as directed 510 each 3    calcium carbonate-vitamin D (OSCAL) 500-5 MG-MCG tablet Take 1 tablet by mouth 2 times daily. 180 tablet 0    Continuous Glucose Sensor (FREESTYLE ZORAIDA 3 SENSOR) MISC 1 each every 14 days Please provide 3 month supply. 6 each 3    diclofenac (VOLTAREN) 1 % topical gel Apply 2 g topically 4 times daily. For L shoulder pain 100 g 3    dicyclomine (BENTYL) 10 MG capsule Take 1 capsule (10 mg) by mouth 4 times daily as needed (Abdominal pain/cramping). 40 capsule 0    empagliflozin (JARDIANCE) 25 MG TABS tablet Take 1 tablet (25 mg) by mouth daily. 90 tablet 3    ergocalciferol (ERGOCALCIFEROL) 1.25 MG (68487 UT) capsule Take 1 capsule (50,000 Units) by mouth once a week For additional refills, please schedule a follow-up appointment at [phone] 12 capsule 3    fenofibrate (TRIGLIDE/LOFIBRA) 160 MG tablet Take 1 tablet (160 mg) by mouth daily 90 tablet 3    fish oil-omega-3 fatty acids 1000 MG capsule TAKE TWO CAPSULES (2 GM) BY MOUTH ONCE DAILY 180 capsule 3    gabapentin (NEURONTIN) 100 MG capsule Take 1 capsule (100 mg) by mouth 3 times daily 42 capsule 1    ibuprofen (ADVIL/MOTRIN) 600 MG tablet Take 1 tablet (600 mg) by mouth every 6 hours as needed for moderate pain 120 tablet 1    Injection Device for insulin (INPEN 100-PINK-NOVOLOG-FIASP) DAVID 1 each continuous 1 each 0    insulin aspart (NOVOLOG PENFILL) 100 UNIT/ML cartridge Take with each meal and snack: 1 per 4 grams CHO and correction of 1 unit per 20 mg/dL over a target of 120. Average daily  dose is 40 units. 40 mL 3    insulin glargine 100 UNIT/ML pen 3 month supply.Inject 25 units daily 30 mL 3    insulin pen needle (31G X 5 MM) 31G X 5 MM miscellaneous Use 5 to 6 pen needles daily or as directed.  3 month supply. 500 each 3    losartan (COZAAR) 100 MG tablet Take 1 tablet (100 mg) by mouth daily. 90 tablet 3    metoclopramide (REGLAN) 10 MG tablet Take 1 tablet (10 mg) by mouth 4 times daily as needed (nausea). 14 tablet 0    nitroFURantoin macrocrystal-monohydrate (MACROBID) 100 MG capsule Take 1 capsule (100 mg) by mouth 2 times daily. 14 capsule 0    omeprazole (PRILOSEC) 40 MG DR capsule Take 1 capsule (40 mg) by mouth 2 times daily 90 capsule 0    ondansetron (ZOFRAN ODT) 4 MG ODT tab Take 1 tablet (4 mg) by mouth every 6 hours as needed for nausea or vomiting 20 tablet 0    oxyCODONE (ROXICODONE) 5 MG tablet Take 1 tablet (5 mg) by mouth every 6 hours as needed for pain. 3 tablet 0    pantoprazole (PROTONIX) 40 MG EC tablet Take 1 tablet (40 mg) by mouth daily. 90 tablet 1    sucralfate (CARAFATE) 1 GM/10ML suspension Take 10 mLs (1 g) by mouth 4 times daily. 414 mL 0    Tirzepatide 15 MG/0.5ML SOAJ Inject 0.5 mLs (15 mg) subcutaneously every 7 days. 6 mL 3       No Known Allergies    Jesika Woodard, ATC

## 2025-01-27 NOTE — ED PROVIDER NOTES
Emergency Department Note      History of Present Illness     Chief Complaint  Abdominal Pain    HPI  History was obtained using an [facility] .  Nayeli Caal is a 43 year old female with a history of type 2 diabetes mellitus, cholecystectomy, hysterectomy, chronic nonalcoholic liver disease, hypertension, hearing loss who presents to the emergency department with a coworker complaining of generalized abdominal pain since Sunday.  She reports that the pain is worse after eating food with associated nausea and vomiting.  She feels abdominal bloating as well.  She reports that she recently restarted taking Ozempic for her diabetes melitis since she was unable to get the other medication her doctor prescribed for her diabetes.  She denies any fevers or chills, diarrhea, blood in her stool or emesis, melena, chest pain, shortness of breath, back pain, UTI symptoms.    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       Medications  ondansetron (ZOFRAN ODT) 4 MG ODT tab  acetaminophen (TYLENOL) 500 MG tablet  Alcohol Swabs (ALCOHOL PREP PAD) 70 % PADS  amLODIPine (NORVASC) 5 MG tablet  aspirin (ASA) 81 MG chewable tablet  atorvastatin (LIPITOR) 40 MG tablet  blood glucose (ACCU-CHEK LAYA PLUS) test strip  blood glucose monitoring (ACCU-CHEK FASTCLIX) lancets  calcium carbonate-vitamin D (OSCAL) 500-5 MG-MCG tablet  Continuous Blood Gluc Sensor  (DEXCOM G7 SENSOR) MISC  ergocalciferol (ERGOCALCIFEROL) 1.25 MG (43111 UT) capsule  famotidine (PEPCID) 20 MG tablet  fenofibrate (TRIGLIDE/LOFIBRA) 160 MG tablet  fish oil-omega-3 fatty acids 1000 MG capsule  hydrocortisone (CORTAID) 1 % external cream  ibuprofen (ADVIL/MOTRIN) 600 MG tablet  Injection Device for insulin (INPEN 100-PINK-NOVOLOG-FIASP) DAVID  insulin aspart (NOVOLOG FLEXPEN) 100 UNIT/ML pen  insulin aspart (NOVOLOG PENFILL) 100 UNIT/ML cartridge  insulin glargine (BASAGLAR KWIKPEN) 100 UNIT/ML pen  insulin pen needle (31G X 5 MM) 31G X 5 MM miscellaneous  losartan (COZAAR) 100 MG tablet  naproxen (NAPROSYN) 375 MG tablet  Ostomy Supplies (ADHESIVE REMOVER WIPES) MISC  Ostomy Supplies (SKIN TAC ADHESIVE BARRIER WIPE) MISC  Semaglutide, 2 MG/DOSE, (OZEMPIC) 8 MG/3ML pen  senna-docusate (SENOKOT-S/PERICOLACE) 8.6-50 MG tablet  sucralfate (CARAFATE) 1 GM tablet  tirzepatide (MOUNJARO) 7.5 MG/0.5ML pen  triamcinolone (KENALOG) 0.1 % external ointment        Surgical History   Past Surgical History:   Procedure Laterality Date    DAVINCI HYSTERECTOMY TOTAL, SALPINGECTOMY BILATERAL Bilateral 2/7/2024    Procedure: HYSTERECTOMY, TOTAL, ROBOT-ASSISTED, WITH BILATERAL SALPINGECTOMY, CYSTOSCOPY;  Surgeon: Vonnie Cowan MD;  Location: [facility] OR    LAPAROSCOPIC CHOLECYSTECTOMY N/A 3/10/2018    Procedure: LAPAROSCOPIC CHOLECYSTECTOMY;  Laparoscopic Cholecystectomy ;  Surgeon: Kuldeep Sigala MD;  Location: [facility] OR    RELEASE TRIGGER FINGER Right 5/2/2019    Procedure: Right Thumb Trigger Release;  Surgeon: Greg tSreeter MD;  [facility]    RELEASE TRIGGER FINGER Left 5/30/2019    Procedure: Left Ring Trigger Finger Release.  Ganglion cyst excision.;  Surgeon: Greg Streeter MD;  [facility]    RELEASE TRIGGER FINGER Right 6/13/2023    Procedure: RELEASE, RIGHT TRIGGER FINGER, INDEX AND MIDDLE;  Surgeon: Miracle Carlin MD;  Location: [facility] OR    RELEASE TRIGGER FINGER Left 8/1/2023     Procedure: RELEASE, LEFT TRIGGER FINGER, MIDDLE;  Surgeon: Miracle Carlin MD;  Location: [facility] [place] [place] OR     Physical Exam   Patient Vitals for the past 24 hrs:   BP Temp Temp src Pulse Resp SpO2   05/08/24 1800 (!) 142/86 -- -- 94 -- 99 %   05/08/24 1502 (!) 140/74 97.5  F (36.4  C) Temporal 84 18 97 %     Physical Exam  Nursing note and vitals reviewed.  Constitutional:  Appears well-developed and well-nourished.  She does not appear in any acute distress.  HENT:   Head:    Atraumatic.   Mouth/Throat:   Oropharynx is clear and moist. No oropharyngeal exudate.   Eyes:    Pupils are equal, round, and reactive to light.   Neck:    Normal range of motion. Neck supple.      No tracheal deviation present. No thyromegaly present.   Cardiovascular:  Normal rate, regular rhythm, no murmur   Pulmonary/Chest: Breath sounds are clear and equal without wheezes or crackles.  Abdominal:   Soft. Bowel sounds are normal. Exhibits no distension and      no mass. There is mild diffuse tenderness.      There is no rebound and no guarding.   Musculoskeletal:  Exhibits no edema.   Lymphadenopathy:  No cervical adenopathy.   Neurological:   Alert and oriented to person, place, and time.   Skin:    Skin is warm and dry. No rash noted. No pallor.     Diagnostics   Lab Results   Labs Ordered and Resulted from Time of ED Arrival to Time of ED Departure   COMPREHENSIVE METABOLIC PANEL - Abnormal       Result Value    Sodium 139      Potassium 4.1      Carbon Dioxide (CO2) 24      Anion Gap 11      Urea Nitrogen 12.7      Creatinine 0.76      GFR Estimate >90      Calcium 8.9      Chloride 104      Glucose 126 (*)     Alkaline Phosphatase 79      AST 27      ALT 45      Protein Total 7.4      Albumin 4.2      Bilirubin Total 0.2     CBC WITH PLATELETS AND DIFFERENTIAL - Abnormal    WBC Count 12.9 (*)     RBC Count 4.64      Hemoglobin 13.0      Hematocrit 40.3      MCV 87      MCH 28.0      MCHC 32.3      RDW 12.9      Platelet Count 413      %  Neutrophils 67      % Lymphocytes 25      % Monocytes 5      % Eosinophils 3      % Basophils 0      % Immature Granulocytes 0      NRBCs per 100 WBC 0      Absolute Neutrophils 8.7 (*)     Absolute Lymphocytes 3.2      Absolute Monocytes 0.6      Absolute Eosinophils 0.3      Absolute Basophils 0.1      Absolute Immature Granulocytes 0.0      Absolute NRBCs 0.0     ROUTINE UA WITH MICROSCOPIC REFLEX TO CULTURE - Abnormal    Color Urine Light Yellow      Appearance Urine Clear      Glucose Urine Negative      Bilirubin Urine Negative      Ketones Urine Negative      Specific Gravity Urine 1.021      Blood Urine Negative      pH Urine 6.5      Protein Albumin Urine 70 (*)     Urobilinogen Urine Normal      Nitrite Urine Negative      Leukocyte Esterase Urine Negative      Bacteria Urine Few (*)     Mucus Urine Present (*)     RBC Urine 2      WBC Urine 2      Squamous Epithelials Urine <1     HCG QUALITATIVE PREGNANCY - Normal    hCG Serum Qualitative Negative     LIPASE - Normal    Lipase 32         Imaging  CT Abdomen Pelvis w Contrast   Final Result   IMPRESSION:    1.  No abnormalities are seen to explain symptoms.   2.  Specifically, no renal calculi, obstruction or any inflammatory changes.   3.  There are a few tiny fecalith at the base of the appendix but no evidence of appendicitis.   4.  Hepatic steatosis.   5.  Prior cholecystectomy and hysterectomy.   6.  Other noncritical findings as noted above.          Independent Interpretation  None  ED Course    Medications Administered  Medications   sodium chloride 0.9% BOLUS 1,000 mL (0 mLs Intravenous Stopped 5/8/24 1912)   ondansetron (ZOFRAN) injection 4 mg (4 mg Intravenous $Given 5/8/24 1709)   ketorolac (TORADOL) injection 15 mg (15 mg Intravenous $Given 5/8/24 1708)   iopamidol (ISOVUE-370) solution 92 mL (92 mLs Intravenous $Given 5/8/24 1738)   Saline flush (66 mLs Intravenous $Given 5/8/24 1737)   diphenhydrAMINE (BENADRYL) injection 50 mg (50 mg  To get better and follow your care plan as instructed. Intravenous $Given 5/8/24 1802)       Procedures  Procedures     Discussion of Management  None    Social Determinants of Health adding to complexity of care  Healthcare Access/Compliance    ED Course     Medical Decision Making / Diagnosis     MILAGROS Caal is a 43 year old female who arrives to the emergency department due to generalized abdominal pain, nausea and vomiting after starting Ozempic.  I feel that likely her symptoms are due to the Ozempic so she was told to stop using the Ozempic until she discusses this with her primary care doctor follows up in clinic.  CT scan of her abdomen pelvis did not show any cause of her abdominal pain.  She has already had her gallbladder removed so there is no concern for cholecystitis.  There was no sign of appendicitis, small bowel obstruction, pancreatitis.  Did not feel that there was any cardiac problem.  She does not have any GI bleeding.  She is feeling improved after the above treatments and I felt she could be safely discharged home.  She was prescribed Zofran and told to return if her symptoms worsen.  She was told to follow-up in clinic in 1 to 2 days.    Disposition  The patient was discharged.     ICD-10 Codes:    ICD-10-CM    1. Generalized abdominal pain  R10.84       2. Nausea and vomiting, unspecified vomiting type  R11.2            Discharge Medications  Discharge Medication List as of 5/8/2024  7:46 PM            Xochitl Felton MD    [employer]        Xochitl Felton MD  05/09/24 0329

## 2025-01-28 ENCOUNTER — ALLIED HEALTH/NURSE VISIT (OUTPATIENT)
Dept: EDUCATION SERVICES | Facility: CLINIC | Age: 45
End: 2025-01-28
Payer: COMMERCIAL

## 2025-01-28 VITALS — WEIGHT: 152.2 LBS | BODY MASS INDEX: 28.77 KG/M2

## 2025-01-28 DIAGNOSIS — E11.8 TYPE 2 DIABETES MELLITUS WITH COMPLICATION, WITH LONG-TERM CURRENT USE OF INSULIN (H): Primary | ICD-10-CM

## 2025-01-28 DIAGNOSIS — Z79.4 TYPE 2 DIABETES MELLITUS WITH COMPLICATION, WITH LONG-TERM CURRENT USE OF INSULIN (H): Primary | ICD-10-CM

## 2025-01-28 NOTE — PROGRESS NOTES
Diabetes Self-Management Education & Support    Nayeli Caal presents today for education related to Type 2 diabetes    Patient is being treated with:  Oral Agents, Diet, MDI Insulin, and GLP-1 Agonist  She is accompanied by self and     PATIENT CONCERNS/REASON FOR REFERRAL Nayeli has been taking the 15 mg dose of Mounjaro for the past 2 months.  She is concerned about stomach issues she has been having.      ASSESSMENT:    Taking Medication:     Current Diabetes Management per Patient:  Taking diabetes medications? yes:     Diabetes Medication(s)       Insulin       insulin aspart (NOVOLOG PENFILL) 100 UNIT/ML cartridge Take with each meal and snack: 1 per 4 grams CHO and correction of 1 unit per 20 mg/dL over a target of 120. Average daily dose is 40 units.     insulin glargine 100 UNIT/ML pen 3 month supply.Inject 25 units daily       Sodium-Glucose Co-Transporter 2 (SGLT2) Inhibitors       empagliflozin (JARDIANCE) 25 MG TABS tablet Take 1 tablet (25 mg) by mouth daily.       Incretin Mimetic Agents       Tirzepatide 15 MG/0.5ML SOAJ Inject 0.5 mLs (15 mg) subcutaneously every 7 days.          Monitoring    Patient glucose self monitoring as follows: continuously using a continuous glucose monitor (CGM)  CGM: Freestyle Evelia 3  BG results: [employer] report appears below:            Patient's most recent   Lab Results   Component Value Date    A1C 7.8 09/11/2024    A1C 11.6 04/05/2021      Patient's A1C goal: <7.0    Activity: no regular exercise program    Healthy Eating:   She has been working with the dietitian at [facility] to improve the quality of her diet.  She is currently focused on reducing or eliminating fructose from her diet.  She is also trying to improve her hydration, although she states that drinking water can bother her stomach as well.     Problem Solving:      Patient is at risk of hypoglycemia?: YES and Frequency is less than once a month  Hospitalizations for hyper or  hypoglycemia: No    EDUCATION and INSTRUCTION PROVIDED AT THIS VISIT:       Nayeli had been having some severe abdominal pain and diarrhea which she had worked up at [facility].    Colonoscopy basically WNL and markers for inflammation have declined.  She states that since increasing the Mounjaro dose to 15 mg, she has had more stomach pain.  It sounds like this is aggravated by certain foods, but she states that she isn't sure what foods are causing problems, exactly.  I encouraged her to journal around this, to get a better idea of what foods are causing problems.  When she was at [facility], they did a colonoscopy, but not an upper endoscopy. She is concerned because of her lack of appetite.  Discussed that this is a desired and expected effect of the medication, and it is important to stop eating just as soon as she starts to feel full.  Reviewed all of the precautions to take to prevent side effects, including not eating before going to bed,  remaining upright after eating for at least 30 minutes.  I looked back at my last note, and I noted that at the 12.5 mg dose, she was not experiencing this stomach pain, and had no nausea.    Currently taking 25 units of Basaglar.   She is using the In-Pen yamileth to help her dose Novolog:   Current settings:   Start time ICR  1 unit/gram CHO Insulin Sensitivity Factor Target BG    06:00 AM 5 15 110   11:00 AM 5 15 110   05:00 PM 5 15 110   09:30 PM 5 20 130         Active Insulin Time:  3 hours  Maximum Bolus:  20    She has been skipping lunch, sometimes has a few crackers.    Generally covers breakfast and dinner.     She was treated for a UTI a few weeks ago.  Discussed need to watch this, and Jardiance dose may need to be reduced if she continues to have more UTI's.      Made follow up appointment in March     Patient-stated goal written and given to Nayeli Caal.  Verbalized and demonstrated understanding of instructions.     PLAN:    Nayeli is going to keep a record  of what she is eating and her stomach symptoms.    Discussed again the need to eat small amounts more frequently, and choose foods that are a little easier to digest.  She tends to eat a lot of cheese.  She also includes tomato sauces and soups, which may not be the best to prevent GERD symptoms.      FOLLOW-UP:    March 28 at 2:45pm.     Time spent with patient at today's visit was 30 minutes.        Any diabetes medication initiation or dose changes were made via the [facility] Standing Orders per the patient's referring provider. A copy of this encounter was shared with the provider.

## 2025-03-18 ENCOUNTER — LAB (OUTPATIENT)
Dept: LAB | Facility: CLINIC | Age: 45
End: 2025-03-18
Payer: COMMERCIAL

## 2025-03-18 DIAGNOSIS — E11.9 WELL CONTROLLED TYPE 2 DIABETES MELLITUS (H): ICD-10-CM

## 2025-03-18 LAB
EST. AVERAGE GLUCOSE BLD GHB EST-MCNC: 169 MG/DL
HBA1C MFR BLD: 7.5 %

## 2025-03-18 PROCEDURE — 36415 COLL VENOUS BLD VENIPUNCTURE: CPT | Performed by: PATHOLOGY

## 2025-03-18 PROCEDURE — 83036 HEMOGLOBIN GLYCOSYLATED A1C: CPT | Performed by: PHYSICIAN ASSISTANT

## 2025-03-18 PROCEDURE — 99000 SPECIMEN HANDLING OFFICE-LAB: CPT | Performed by: PATHOLOGY

## 2025-03-25 NOTE — PROGRESS NOTES
03/25/25 10:39 AM  PATIENT LAB/IMAGING STATUS : MyChart sent to patient to complete standing/future orders   04/10/25 1:36 PM  PATIENT LAB/IMAGING STATUS : Orders completed / No further action needed

## 2025-03-28 ENCOUNTER — ALLIED HEALTH/NURSE VISIT (OUTPATIENT)
Dept: EDUCATION SERVICES | Facility: CLINIC | Age: 45
End: 2025-03-28
Payer: COMMERCIAL

## 2025-03-28 DIAGNOSIS — Z79.4 TYPE 2 DIABETES MELLITUS WITH COMPLICATION, WITH LONG-TERM CURRENT USE OF INSULIN (H): Primary | ICD-10-CM

## 2025-03-28 DIAGNOSIS — E11.8 TYPE 2 DIABETES MELLITUS WITH COMPLICATION, WITH LONG-TERM CURRENT USE OF INSULIN (H): Primary | ICD-10-CM

## 2025-03-28 PROCEDURE — 99207 PR NO CHARGE LOS: CPT | Performed by: REGISTERED NURSE

## 2025-03-29 VITALS — WEIGHT: 162 LBS | BODY MASS INDEX: 30.63 KG/M2

## 2025-03-29 NOTE — PROGRESS NOTES
Diabetes Self-Management Education & Support    Nayeli Caal presents today for education related to Type 2 diabetes    Patient is being treated with:  Oral Agents and MDI Insulin  She is accompanied by self and     Year of diagnosis: 2013 or 2014  Referring provider:  Anne Marie Medina PA-C  Living Situation: Lives with a room mate  Employment: Administrative work for [employer]    PATIENT CONCERNS/REASON FOR REFERRAL :  Follow up       ASSESSMENT:    Taking Medication:     Current Diabetes Management per Patient:  Taking diabetes medications? yes:     Diabetes Medication(s)       Insulin       insulin aspart (NOVOLOG PENFILL) 100 UNIT/ML cartridge Take with each meal and snack: 1 per 4 grams CHO and correction of 1 unit per 20 mg/dL over a target of 120. Average daily dose is 40 units.     insulin glargine 100 UNIT/ML pen 3 month supply.Inject 25 units daily       Sodium-Glucose Co-Transporter 2 (SGLT2) Inhibitors       empagliflozin (JARDIANCE) 25 MG TABS tablet Take 1 tablet (25 mg) by mouth daily.       Incretin Mimetic Agents       Tirzepatide 15 MG/0.5ML SOAJ Inject 0.5 mLs (15 mg) subcutaneously every 7 days.            Currently taking 15 units of Basaglar.   She is using the In-Pen yamileth to help her dose Novolog:   Current settings:   Start time ICR  1 unit/gram CHO Insulin Sensitivity Factor Target BG    06:00 AM 5 15 110   11:00 AM 5 15 110   05:00 PM 5 15 110   09:30 PM 5 20 130             Active Insulin Time:  3 hours  Maximum Bolus:  20      Monitoring    Patient glucose self monitoring as follows: continuously using a continuous glucose monitor (CGM)  BG meter: Unknown  CGM: Freestyle Evelia 3  BG results: Evelia report appears below             Patient's most recent   Lab Results   Component Value Date    A1C 7.5 03/18/2025    A1C 11.6 04/05/2021      Patient's A1C goal: <7.0    Activity: Doesn't work out on a regular basis, but stays fairly active.      Healthy Eating:    Eats 3 meals a day, but much smaller amounts than before starting the Mounjaro.   She is an accurate carb counter.      Problem Solving:      Patient is at risk of hypoglycemia?: Yes, and she is well versed in how to treat.  This happens very infrequently  Hospitalizations for hyper or hypoglycemia: None.    EDUCATION and INSTRUCTION PROVIDED AT THIS VISIT:      Reviewed Nayeli's report with her and given a lot of positive feedback for remarkable improvement in her glucose control and also some significant weight loss.  During our discussion, she mentioned that her glucoses were quite high while she was taking Omeprazole to treat the GERD she was experiencing, and when she stopped taking it in January, her glucoses came down.  She has lost about 20 lbs since she started taking the Mounjaro.      Nayeli is pleased with the progress she has made, but wondering if Mounjaro will be a life-long medication or if it will be tapered off at some point.  Discussed this at length and I shared with her what is currently known about discontinuing GLP-1 agonists, which almost universally result in reversal of glucose control and a re-gaining of the weight lost.  She verbalized understanding and committed to staying on Mounjaro long term, as long as she can afford it.      She is currently at the top dose of Mounjaro, and glucoses are stable.  Encouraged her to reach out to either myself or Anne Marie Medina so we can adjust her insulin.  Explained that our goal would be to try to eliminate the meal time insulin she is using and hopefully get to taking one injection of long acting insulin and a weekly injection of Mounjaro in addition to the Jardiance she is already taking.      She isn't exercising on a regular basis right now, but is motivated to get her A1C under 7% so she can have a dental implant done.  Encouraged her to start working on a regular exercise program to expedite this.  Pointed out that if the next couple of months  are like the last two weeks her next A1C would likely be under 7%.      Set up another appointment about a month after her appointment with Anne Marie Medina in April.      Patient-stated goal written and given to Nayeli Caal.  Verbalized and demonstrated understanding of instructions.     PLAN:    See patient instructions  AVS printed and given to patient    FOLLOW-UP:        Time spent with patient at today's visit was less than 30 minutes.        Any diabetes medication initiation or dose [facility] per the patient's referring provider. A copy of this encounter was shared with the provider.

## 2025-04-09 ENCOUNTER — LAB (OUTPATIENT)
Dept: LAB | Facility: CLINIC | Age: 45
End: 2025-04-09
Payer: COMMERCIAL

## 2025-04-09 DIAGNOSIS — Z79.4 UNCONTROLLED TYPE 2 DIABETES MELLITUS WITH HYPERGLYCEMIA, WITH LONG-TERM CURRENT USE OF INSULIN (H): ICD-10-CM

## 2025-04-09 DIAGNOSIS — E11.65 UNCONTROLLED TYPE 2 DIABETES MELLITUS WITH HYPERGLYCEMIA, WITH LONG-TERM CURRENT USE OF INSULIN (H): ICD-10-CM

## 2025-04-09 DIAGNOSIS — E11.9 WELL CONTROLLED TYPE 2 DIABETES MELLITUS (H): ICD-10-CM

## 2025-04-09 LAB
ALBUMIN SERPL BCG-MCNC: 4.2 G/DL (ref 3.5–5.2)
ALP SERPL-CCNC: 79 U/L (ref 40–150)
ALT SERPL W P-5'-P-CCNC: 15 U/L (ref 0–50)
ANION GAP SERPL CALCULATED.3IONS-SCNC: 10 MMOL/L (ref 7–15)
AST SERPL W P-5'-P-CCNC: 16 U/L (ref 0–45)
BILIRUB SERPL-MCNC: 0.2 MG/DL
BUN SERPL-MCNC: 21.1 MG/DL (ref 6–20)
CALCIUM SERPL-MCNC: 8.9 MG/DL (ref 8.8–10.4)
CHLORIDE SERPL-SCNC: 107 MMOL/L (ref 98–107)
CHOLEST SERPL-MCNC: 170 MG/DL
CREAT SERPL-MCNC: 0.78 MG/DL (ref 0.51–0.95)
CREAT UR-MCNC: 96.7 MG/DL
EGFRCR SERPLBLD CKD-EPI 2021: >90 ML/MIN/1.73M2
EST. AVERAGE GLUCOSE BLD GHB EST-MCNC: 154 MG/DL
FASTING STATUS PATIENT QL REPORTED: YES
FASTING STATUS PATIENT QL REPORTED: YES
GLUCOSE SERPL-MCNC: 136 MG/DL (ref 70–99)
HBA1C MFR BLD: 7 %
HCO3 SERPL-SCNC: 22 MMOL/L (ref 22–29)
HCT VFR BLD AUTO: 39 % (ref 35–47)
HDLC SERPL-MCNC: 42 MG/DL
LDLC SERPL CALC-MCNC: 103 MG/DL
MICROALBUMIN UR-MCNC: 63.6 MG/L
MICROALBUMIN/CREAT UR: 65.77 MG/G CR (ref 0–25)
NONHDLC SERPL-MCNC: 128 MG/DL
POTASSIUM SERPL-SCNC: 4.8 MMOL/L (ref 3.4–5.3)
PROT SERPL-MCNC: 7.3 G/DL (ref 6.4–8.3)
SODIUM SERPL-SCNC: 139 MMOL/L (ref 135–145)
TRIGL SERPL-MCNC: 126 MG/DL

## 2025-04-09 PROCEDURE — 80053 COMPREHEN METABOLIC PANEL: CPT | Performed by: PATHOLOGY

## 2025-04-09 PROCEDURE — 36415 COLL VENOUS BLD VENIPUNCTURE: CPT | Performed by: PATHOLOGY

## 2025-04-09 PROCEDURE — 99000 SPECIMEN HANDLING OFFICE-LAB: CPT | Performed by: PATHOLOGY

## 2025-04-09 PROCEDURE — 80061 LIPID PANEL: CPT | Performed by: PATHOLOGY

## 2025-04-09 PROCEDURE — 85014 HEMATOCRIT: CPT | Performed by: PATHOLOGY

## 2025-04-09 PROCEDURE — 83036 HEMOGLOBIN GLYCOSYLATED A1C: CPT | Performed by: INTERNAL MEDICINE

## 2025-04-09 PROCEDURE — 82043 UR ALBUMIN QUANTITATIVE: CPT | Performed by: INTERNAL MEDICINE

## 2025-04-14 ENCOUNTER — TELEPHONE (OUTPATIENT)
Dept: ENDOCRINOLOGY | Facility: CLINIC | Age: 45
End: 2025-04-14
Payer: COMMERCIAL

## 2025-04-14 ENCOUNTER — MEDICAL CORRESPONDENCE (OUTPATIENT)
Dept: HEALTH INFORMATION MANAGEMENT | Facility: CLINIC | Age: 45
End: 2025-04-14
Payer: COMMERCIAL

## 2025-04-17 NOTE — ED TRIAGE NOTES
Awaiting . Friend/coworker here with some minimal ability to help translate. Patient had texted him that she has difficulty breathing and chest pain.     While in triage room patient coughed and made cry/groan sound while holding chest indicating pain after cough.     arrived 3:39 PM:  Patient c/o bad headache that is getting worse since this afternoon  Chest tightness and SOB since this morning.  Hysterectomy about 1 month ago - all has been fine until now     Triage Assessment (Adult)       Row Name 03/08/24 1535          Triage Assessment    Airway WDL WDL        Respiratory WDL    Respiratory WDL X;cough        Skin Circulation/Temperature WDL    Skin Circulation/Temperature WDL WDL        Cardiac WDL    Cardiac WDL X        Peripheral/Neurovascular WDL    Peripheral Neurovascular WDL WDL        Cognitive/Neuro/Behavioral WDL    Cognitive/Neuro/Behavioral WDL WDL                      Medication: Flecainide Acetate 100mg  Last office visit date: 11/01/2024  Medication Refill Protocol Failed.  Failed criteria: echo and stress test. Sent to clinician to review.      Echo: 07/31/2014  Stress: 06/01/2021

## 2025-04-21 ENCOUNTER — OFFICE VISIT (OUTPATIENT)
Dept: ENDOCRINOLOGY | Facility: CLINIC | Age: 45
End: 2025-04-21
Payer: COMMERCIAL

## 2025-04-21 VITALS
BODY MASS INDEX: 30.81 KG/M2 | HEART RATE: 72 BPM | DIASTOLIC BLOOD PRESSURE: 84 MMHG | WEIGHT: 163 LBS | OXYGEN SATURATION: 99 % | SYSTOLIC BLOOD PRESSURE: 124 MMHG

## 2025-04-21 DIAGNOSIS — I10 BENIGN ESSENTIAL HYPERTENSION: ICD-10-CM

## 2025-04-21 DIAGNOSIS — R80.9 ALBUMINURIA: ICD-10-CM

## 2025-04-21 DIAGNOSIS — E78.2 MIXED HYPERLIPIDEMIA: ICD-10-CM

## 2025-04-21 DIAGNOSIS — R30.0 DYSURIA: ICD-10-CM

## 2025-04-21 DIAGNOSIS — E11.9 WELL CONTROLLED TYPE 2 DIABETES MELLITUS (H): Primary | ICD-10-CM

## 2025-04-21 PROCEDURE — 1126F AMNT PAIN NOTED NONE PRSNT: CPT | Performed by: INTERNAL MEDICINE

## 2025-04-21 PROCEDURE — 3074F SYST BP LT 130 MM HG: CPT | Performed by: INTERNAL MEDICINE

## 2025-04-21 PROCEDURE — 95251 CONT GLUC MNTR ANALYSIS I&R: CPT | Performed by: INTERNAL MEDICINE

## 2025-04-21 PROCEDURE — 3079F DIAST BP 80-89 MM HG: CPT | Performed by: INTERNAL MEDICINE

## 2025-04-21 PROCEDURE — 99215 OFFICE O/P EST HI 40 MIN: CPT | Mod: 25 | Performed by: INTERNAL MEDICINE

## 2025-04-21 RX ORDER — TIRZEPATIDE 15 MG/.5ML
15 INJECTION, SOLUTION SUBCUTANEOUS WEEKLY
Qty: 6 ML | Refills: 3 | Status: SHIPPED | OUTPATIENT
Start: 2025-04-21

## 2025-04-21 ASSESSMENT — PAIN SCALES - GENERAL: PAINLEVEL_OUTOF10: NO PAIN (0)

## 2025-04-21 NOTE — PROGRESS NOTES
Assessment and Plan:    1.  Type 2 diabetes, uncontrolled, complicated by nephropathy and mild diabetic neuropathy  Recommendations:  Change sensitivity to 20 around-the-clock  Instructed the patient to change the insulin to carbohydrate ratio from 1 unit per 5 g carbohydrates to 1 unit per 6 g carbohydrates if she continues to experience postprandial hypoglycemia in the context of accurate carbohydrate counting  Continue Mounjaro.  It appears that the medication comes with high copayment since is not approved by [employer] anymore.    2.  Hypertension, controlled    3.  Dyslipidemia  LDL slightly higher, on atorvastatin, fenofibrate and omega-3 fatty acids.  Continue to monitor for now.    4.  Dysuria, history of recent UTIs  Might be related to treatment with Jardiance.  Follow-up UA today.  Consider either decreasing the dose of Jardiance to 10 mg daily or replacing it with Farxiga. She would benefit from tx with an SGLT-2 inhibitor in the context of microalbuminuria.     Orders Placed This Encounter   Procedures    UA Macroscopic with reflex to Microscopic and Culture     =========================================================================================    HPI:   Nayeli is a 44 year old woman here with a  for follow up of type 2 diabetes since the early 2000's.      1.  Type 2 diabetes  Most recent A1c was 7 on 4/9/25.    I reviewed the [facility] records.    In general, her meals are fixed at 45 or 60 g carbohydrates.  Most of the days, she enters 60 g carbs, twice daily.  On the Evelia sensor, average glucose is 126 with a glucose variability of 29%, corresponding to an estimated GMI of 6.3%.  85% of the glucose numbers are within target, 6% are in the mild hypoglycemic range.  Due to nocturnal hypoglycemic episodes, she has decreased the dose of Basaglar to 15 units.  Occasionally, she does experience hypoglycemic episodes which occur within 2 or 3 hours of taking insulin  "for food intake which she attributes to overestimating the carbohydrate intake or not finishing her meals. If she eats out, she has a harder time figuring the amount of carbs.    Her current regimen is:   Jardiance, 25 mg daily   Mountjaro, 15 mg weekly. Started in January 2024.  Glargine 15 units daily, at 5-6 am.  Novolog (dosing on [facility]):  Carb ratio: 1 unit per 5 g carbohydrates  Sensitivity 15 from 8 am to 10 pm and 20 for the rest of the day.   Target 130 from 9:30 pm to 6 am and 110 for the rest of the day.   Insulin duration 2 hrs     Today, BG at the time of the visit was 67 and the patient reports not finishing her her breakfast.    Previous treatments:   Empagliflozin 25 mg daily-transiently stopped in 2023 due to recurrent yeast infections.  She describes experiencing a tightness sensation in the bladder area present after she urinates.  Recently, a couple of urine analysis were positive and she reports being treated with antibiotics.  Metformin- severe diarrhea.   Ozempic - started in 4/2023 and replaced by Mountjaro in 1/24    Her weight in 1/2024 was 180 lbs. Current weight is 163 pounds.   The GI evaluation was unremarkable.  Prior diagnosis was IBS.  She stopped taking protonix and prilosec as her symptoms improved with dietary changes.  Denies experiencing significant constipation or diarrhea.    Diabetes complications:  Retinopathy: last eye exam - 8/2024. 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  Nephropathy: h/o proteinuria; most recent urine microalbumin positive in 1/15 and 4/9/25 Normal GFR. On 100 mg losartan daily (tx with lisinopril was associated with a dry cough).   Neuropathy: some tingling present intermittently. No pain. Feet feel \"sensitive\". On 100 mg gabapentin TID.   Most recent lipid panel: 4/9/25: LDL cholesterol 103, HDL cholesterol 42, . On 40 mg atorvastatin, 160 mg fenofibrate daily and 2 gm omega 3 FA BID.    2. HTN   Current Tx: Losartan, 100 mg daily and amlodipine, 5 mg " daily.    Past Medical History:   Diagnosis Date    Combined visual and hearing impairment     Deaf     Diabetic retinopathy of both eyes (H) 8/19/2011    Hepatic steatosis 08/19/2011    History of tobacco use     Hyperlipidemia     Hypertension     Hypertriglyceridemia     Migraines     Obesity 8/19/2011     Problem list name updated by automated process. 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   IBS    Past Surgical History:   Procedure Laterality Date    COLONOSCOPY N/A 10/21/2024    Procedure: COLONOSCOPY, WITH BIOPSIES;  Surgeon: Peggy Miguel MD;  Location: [facility] OR    DAVINCI HYSTERECTOMY TOTAL, SALPINGECTOMY BILATERAL Bilateral 2/7/2024    Procedure: HYSTERECTOMY, TOTAL, ROBOT-ASSISTED, WITH BILATERAL SALPINGECTOMY, CYSTOSCOPY;  Surgeon: Vonnie Cowan MD;  Location: [facility]    ESOPHAGOSCOPY, GASTROSCOPY, DUODENOSCOPY (EGD), COMBINED N/A 6/13/2024    Procedure: ESOPHAGOGASTRODUODENOSCOPY, WITH BIOPSY;  Surgeon: Nora Brice MD;  Location: [facility] OR    LAPAROSCOPIC CHOLECYSTECTOMY N/A 3/10/2018    Procedure: LAPAROSCOPIC CHOLECYSTECTOMY;  Laparoscopic Cholecystectomy ;  Surgeon: Kuldeep Sigala MD;  Location: [facility] OR    RELEASE CARPAL TUNNEL Right 12/4/2024    Procedure: RELEASE, RIGHT CARPAL TUNNEL;  Surgeon: Madhu Mcmahon MD;  Location: [facility] OR    RELEASE CARPAL TUNNEL Left 1/15/2025    Procedure: RELEASE, LEFT CARPAL TUNNEL;  Surgeon: Madhu Mcmahon MD;  Location: [facility] OR    RELEASE TRIGGER FINGER Right 5/2/2019    Procedure: Right Thumb Trigger Release;  Surgeon: Greg Streeter MD;  [facility]    RELEASE TRIGGER FINGER Left 5/30/2019    Procedure: Left Ring Trigger Finger Release.  Ganglion cyst excision.;  Surgeon: Greg Streeter MD;  Location: [facility]    RELEASE TRIGGER FINGER Right  6/13/2023    Procedure: RELEASE, RIGHT TRIGGER FINGER, INDEX AND MIDDLE;  Surgeon: Miracle Carlin MD;  Location: [facility] OR    RELEASE TRIGGER FINGER Left 8/1/2023    Procedure: RELEASE, LEFT TRIGGER FINGER, MIDDLE;  Surgeon: Miracle Carlin MD;  Location: [facility] OR       Family History   Adopted: Yes   Problem Relation Age of Onset    Unknown/Adopted Other      Social Hx: Lives in a [facility].  Boyfriend is in [place]. She is adopted.  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       Current Outpatient Medications:     acetaminophen (TYLENOL) 500 MG tablet, Take 2 tablets (1,000 mg) by mouth every 8 hours as needed for mild pain., Disp: 100 tablet, Rfl: 2    Alcohol Swabs (ALCOHOL PREP PAD) 70 % PADS, 1 each 3 times daily, Disp: 100 each, Rfl: 3    amLODIPine (NORVASC) 5 MG tablet, Take 1 tablet (5 mg) by mouth at bedtime., Disp: 90 tablet, Rfl: 2    aspirin (ASA) 81 MG chewable tablet, Take 1 tablet (81 mg) by mouth daily. CHEW AND SWALLOW, Disp: 90 tablet, Rfl: 3    atorvastatin (LIPITOR) 40 MG tablet, Take 1 tablet (40 mg) by mouth daily., Disp: 90 tablet, Rfl: 2    blood glucose (ACCU-CHEK LAYA PLUS) test strip, Use with  Accucheck Expert meter.  Test blood sugar 6 times daily., Disp: 600 each, Rfl: 3    blood glucose monitoring (ACCU-CHEK FASTCLIX) lancets, Use to test blood sugar 6 times daily or as directed, Disp: 510 each, Rfl: 3    Continuous Glucose Sensor (FREESTYLE ZORAIDA 3 SENSOR) [facility] 1 each every 14 days Please provide 3 month supply., Disp: 6 each, Rfl: 3    diclofenac (VOLTAREN) 1 % topical gel, Apply 2 g topically 4 times daily. For L shoulder pain, Disp: 100 g, Rfl: 3    dicyclomine (BENTYL) 10 MG capsule, Take 1 capsule (10 mg) by mouth 4 times daily as needed (Abdominal pain/cramping)., Disp: 40 capsule, Rfl: 0    empagliflozin (JARDIANCE) 25 MG TABS tablet, Take 1 tablet (25 mg) by mouth daily., Disp: 90 tablet, Rfl: 3    ergocalciferol (ERGOCALCIFEROL) 1.25 MG (13010 UT) capsule, Take 1  capsule (50,000 Units) by mouth once a week For additional refills, please schedule a follow-up appointment at [phone], Disp: 12 capsule, Rfl: 3    fenofibrate (TRIGLIDE/LOFIBRA) 160 MG tablet, Take 1 tablet (160 mg) by mouth daily., Disp: 90 tablet, Rfl: 2    fish oil-omega-3 fatty acids 1000 MG capsule, TAKE TWO CAPSULES (2 GM) BY MOUTH ONCE DAILY, Disp: 180 capsule, Rfl: 3    gabapentin (NEURONTIN) 100 MG capsule, Take 1 capsule (100 mg) by mouth 3 times daily, Disp: 42 capsule, Rfl: 1    ibuprofen (ADVIL/MOTRIN) 600 MG tablet, Take 1 tablet (600 mg) by mouth every 6 hours as needed for moderate pain, Disp: 120 tablet, Rfl: 1    Injection Device for insulin (INPEN 100-PINK-NOVOLOG-FIASP) DAVID, 1 each continuous, Disp: 1 each, Rfl: 0    insulin aspart (NOVOLOG PENFILL) 100 UNIT/ML cartridge, Take with each meal and snack: 1 per 4 grams CHO and correction of 1 unit per 20 mg/dL over a target of 120. Average daily dose is 40 units., Disp: 40 mL, Rfl: 3    insulin glargine 100 UNIT/ML pen, 3 month supply.Inject 25 units daily, Disp: 30 mL, Rfl: 3    insulin pen needle (31G X 5 MM) 31G X 5 MM miscellaneous, Use 5 to 6 pen needles daily or as directed.  3 month supply., Disp: 500 each, Rfl: 3    losartan (COZAAR) 100 MG tablet, Take 1 tablet (100 mg) by mouth daily., Disp: 90 tablet, Rfl: 3    metoclopramide (REGLAN) 10 MG tablet, Take 1 tablet (10 mg) by mouth 4 times daily as needed (nausea)., Disp: 14 tablet, Rfl: 0    nitroFURantoin macrocrystal-monohydrate (MACROBID) 100 MG capsule, Take 1 capsule (100 mg) by mouth 2 times daily., Disp: 14 capsule, Rfl: 0    omeprazole (PRILOSEC) 40 MG DR capsule, Take 1 capsule (40 mg) by mouth 2 times daily, Disp: 90 capsule, Rfl: 0    ondansetron (ZOFRAN ODT) 4 MG ODT tab, Take 1 tablet (4 mg) by mouth every 6 hours as needed for nausea or vomiting, Disp: 20 tablet, Rfl: 0    oxyCODONE (ROXICODONE) 5 MG tablet, Take 1 tablet (5 mg) by mouth every 6 hours as needed for  pain., Disp: 3 tablet, Rfl: 0    pantoprazole (PROTONIX) 40 MG EC tablet, Take 1 tablet (40 mg) by mouth daily., Disp: 90 tablet, Rfl: 1    sucralfate (CARAFATE) 1 GM/10ML suspension, Take 10 mLs (1 g) by mouth 4 times daily., Disp: 414 mL, Rfl: 0    Tirzepatide 15 MG/0.5ML SOAJ, Inject 0.5 mLs (15 mg) subcutaneously every 7 days., Disp: 6 mL, Rfl: 3    Current Facility-Administered Medications:     hydrocortisone (CORTAID) 1 % cream, , Topical, Once, Mariya Mohamud APRN CNP    hydrocortisone (CORTAID) 1 % cream, , Topical, TID, Mariya Mohamud APRN CNP      Physical Exam  Wt Readings from Last 10 Encounters:   03/29/25 73.5 kg (162 lb)   01/28/25 69 kg (152 lb 3.2 oz)   01/15/25 76.2 kg (168 lb)   01/13/25 76.2 kg (168 lb)   01/08/25 75.9 kg (167 lb 4 oz)   01/07/25 74.9 kg (165 lb 1.6 oz)   12/04/24 75.8 kg (167 lb)   11/27/24 75.8 kg (167 lb)   11/22/24 76.2 kg (168 lb)   11/12/24 76 kg (167 lb 9.6 oz)     /84   Pulse 72   Wt 73.9 kg (163 lb)   LMP 02/01/2024 (Approximate)   SpO2 99%   BMI 30.81 kg/m      General appearance: she is well-developed, well-nourished, and in no distress.    RESULTS  Lab Results   Component Value Date    A1C 7.0 (H) 04/09/2025    A1C 7.5 (H) 03/18/2025    A1C 7.6 (H) 12/13/2024    A1C 7.8 (H) 09/11/2024    A1C 8.1 (H) 07/12/2024    A1C 8.7 (H) 04/15/2024    A1C 7.8 (H) 01/04/2024    A1C 11.6 (H) 04/05/2021    A1C 12.4 (H) 10/29/2020    A1C 12.9 (H) 07/08/2020    A1C 8.2 (H) 05/02/2019    A1C 10.3 (H) 10/15/2018    HEMOGLOBINA1 10.0 (A) 01/13/2020    HEMOGLOBINA1 9.9 (A) 10/07/2019    HEMOGLOBINA1 8.1 (A) 04/22/2019    HEMOGLOBINA1 10.4 (A) 01/14/2019    HEMOGLOBINA1 8.7 (A) 03/12/2018       TSH   Date Value Ref Range Status   04/04/2023 1.41 0.30 - 4.20 uIU/mL Final   07/08/2020 1.34 0.40 - 4.00 mU/L Final   05/17/2018 1.84 0.40 - 4.00 mU/L Final   11/27/2017 1.92 0.40 - 4.00 mU/L Final   05/11/2016 1.85 0.40 - 4.00 mU/L Final   02/11/2016 1.14 0.40 - 4.00 mU/L  Final       ALT   Date Value Ref Range Status   04/09/2025 15 0 - 50 U/L Final   10/25/2024 24 0 - 50 U/L Final   07/08/2020 29 0 - 50 U/L Final   05/02/2019 43 0 - 50 U/L Final     42 minutes spent on the date of the encounter doing chart review, history and exam, coordinating care and documenting in the EHR, as detailed above, exclusive of CGM time.

## 2025-04-21 NOTE — PATIENT INSTRUCTIONS
If you develop low BG numbers within 2-3 hrs after taking insulin for food intake, change the insulin to carb ratio to 1 U per 6 grams carbohydrates.

## 2025-04-21 NOTE — NURSING NOTE
"Chief Complaint   Patient presents with    Diabetes     Vital signs:      BP: 124/84 Pulse: 72     SpO2: 99 %       Weight: 73.9 kg (163 lb)  Estimated body mass index is 30.81 kg/m  as calculated from the following:    Height as of 1/15/25: 1.549 m (5' 0.98\").    Weight as of this encounter: 73.9 kg (163 lb).        "

## 2025-04-21 NOTE — LETTER
4/21/2025       RE: Nayeli Caal  [address]  [facility] [address]     Dear Colleague,    Thank you for referring your patient, Nayeli Caal, to the [facility]. Please see a copy of my visit note below.    Assessment and Plan:    1.  Type 2 diabetes, uncontrolled, complicated by nephropathy and mild diabetic neuropathy  Recommendations:  Change sensitivity to 20 around-the-clock  Instructed the patient to change the insulin to carbohydrate ratio from 1 unit per 5 g carbohydrates to 1 unit per 6 g carbohydrates if she continues to experience postprandial hypoglycemia in the context of accurate carbohydrate counting  Continue Mounjaro.  It appears that the medication comes with high copayment since is not approved by [employer] anymore.    2.  Hypertension, controlled    3.  Dyslipidemia  LDL slightly higher, on atorvastatin, fenofibrate and omega-3 fatty acids.  Continue to monitor for now.    4.  Dysuria, history of recent UTIs  Might be related to treatment with Jardiance.  Follow-up UA today.  Consider either decreasing the dose of Jardiance to 10 mg daily or replacing it with Farxiga. She would benefit from tx with an SGLT-2 inhibitor in the context of microalbuminuria.     Orders Placed This Encounter   Procedures     UA Macroscopic with reflex to Microscopic and Culture     =========================================================================================    HPI:   Nayeli is a 44 year old woman here with a  for follow up of type 2 diabetes since the early 2000's.      1.  Type 2 diabetes  Most recent A1c was 7 on 4/9/25.    I reviewed the InPen records.    In general, her meals are fixed at 45 or 60 g carbohydrates.  Most of the days, she enters 60 g carbs, twice daily.  On the Evelia sensor, average glucose is 126 with a glucose  variability of 29%, corresponding to an estimated GMI of 6.3%.  85% of the glucose numbers are within target, 6% are in the mild hypoglycemic range.  Due to nocturnal hypoglycemic episodes, she has decreased the dose of Basaglar to 15 units.  Occasionally, she does experience hypoglycemic episodes which occur within 2 or 3 hours of taking insulin for food intake which she attributes to overestimating the carbohydrate intake or not finishing her meals. If she eats out, she has a harder time figuring the amount of carbs.    Her current regimen is:   Jardiance, 25 mg daily   Mountjaro, 15 mg weekly. Started in January 2024.  Glargine 15 units daily, at 5-6 am.  Novolog (dosing on In-pen):  Carb ratio: 1 unit per 5 g carbohydrates  Sensitivity 15 from 8 am to 10 pm and 20 for the rest of the day.   Target 130 from 9:30 pm to 6 am and 110 for the rest of the day.   Insulin duration 2 hrs     Today, BG at the time of the visit was 67 and the patient reports not finishing her her breakfast.    Previous treatments:   Empagliflozin 25 mg daily-transiently stopped in 2023 due to recurrent yeast infections.  She describes experiencing a tightness sensation in the bladder area present after she urinates.  Recently, a couple of urine analysis were positive and she reports being treated with antibiotics.  Metformin- severe diarrhea.   Ozempic - started in 4/2023 and replaced by Mountjaro in 1/24    Her weight in 1/2024 was 180 lbs. Current weight is 163 pounds.   The GI evaluation was unremarkable.  Prior diagnosis was IBS.  She stopped taking protonix and prilosec as her symptoms improved with dietary changes.  Denies experiencing significant constipation or diarrhea.    Diabetes complications:  Retinopathy: last eye exam - 8/2024. 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  Nephropathy: h/o proteinuria; most recent urine microalbumin positive in 1/15 and 4/9/25 Normal GFR. On 100 mg losartan daily (tx with lisinopril was associated with a dry  "cough).   Neuropathy: some tingling present intermittently. No pain. Feet feel \"sensitive\". On 100 mg gabapentin TID.   Most recent lipid panel: 4/9/25: LDL cholesterol 103, HDL cholesterol 42, . On 40 mg atorvastatin, 160 mg fenofibrate daily and 2 gm omega 3 FA BID.    2. HTN   Current Tx: Losartan, 100 mg daily and amlodipine, 5 mg daily.    Past Medical History:   Diagnosis Date     Combined visual and hearing impairment      Deaf      Diabetic retinopathy of both eyes (H) 8/19/2011     Hepatic steatosis 08/19/2011     History of tobacco use      Hyperlipidemia      Hypertension      Hypertriglyceridemia      Migraines      Obesity 8/19/2011     Problem list name updated by automated process. 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   IBS    Past Surgical History:   Procedure Laterality Date     COLONOSCOPY N/A 10/21/2024    Procedure: COLONOSCOPY, WITH BIOPSIES;  Surgeon: Peggy Miguel MD;  Location: [facility] OR     DAVINCI HYSTERECTOMY TOTAL, SALPINGECTOMY BILATERAL Bilateral 2/7/2024    Procedure: HYSTERECTOMY, TOTAL, ROBOT-ASSISTED, WITH BILATERAL SALPINGECTOMY, CYSTOSCOPY;  Surgeon: Vonnie Cowan MD;  Location: [facility] OR     ESOPHAGOSCOPY, GASTROSCOPY, DUODENOSCOPY (EGD), COMBINED N/A 6/13/2024    Procedure: ESOPHAGOGASTRODUODENOSCOPY, WITH BIOPSY;  Surgeon: Nora Brice MD;  Location: [facility] OR     LAPAROSCOPIC CHOLECYSTECTOMY N/A 3/10/2018    Procedure: LAPAROSCOPIC CHOLECYSTECTOMY;  Laparoscopic Cholecystectomy ;  Surgeon: Kuldeep Sigala MD;  Location: [facility] OR     RELEASE CARPAL TUNNEL Right 12/4/2024    Procedure: RELEASE, RIGHT CARPAL TUNNEL;  Surgeon: Madhu Mcmahon MD;  Location: [facility] OR     RELEASE CARPAL TUNNEL Left 1/15/2025    Procedure: RELEASE, LEFT CARPAL TUNNEL;  Surgeon: Madhu Mcmahon, " MD;  Location: [facility] OR     RELEASE TRIGGER FINGER Right 5/2/2019    Procedure: Right Thumb Trigger Release;  Surgeon: Greg Streeter MD;  Location: [facility] OR     RELEASE TRIGGER FINGER Left 5/30/2019    Procedure: Left Ring Trigger Finger Release.  Ganglion cyst excision.;  Surgeon: Greg Streeter MD;  Location: [facility] OR     RELEASE TRIGGER FINGER Right 6/13/2023    Procedure: RELEASE, RIGHT TRIGGER FINGER, INDEX AND MIDDLE;  Surgeon: Miracle Carlin MD;  Location: [facility] OR     RELEASE TRIGGER FINGER Left 8/1/2023    Procedure: RELEASE, LEFT TRIGGER FINGER, MIDDLE;  Surgeon: iMracle Carlin MD;  Location: [facility] OR       Family History   Adopted: Yes   Problem Relation Age of Onset     Unknown/Adopted Other      Social Hx: Lives in a [facility].  Boyfriend is in [place]. She is adopted.  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       Current Outpatient Medications:      acetaminophen (TYLENOL) 500 MG tablet, Take 2 tablets (1,000 mg) by mouth every 8 hours as needed for mild pain., Disp: 100 tablet, Rfl: 2     Alcohol Swabs (ALCOHOL PREP PAD) 70 % PADS, 1 each 3 times daily, Disp: 100 each, Rfl: 3     amLODIPine (NORVASC) 5 MG tablet, Take 1 tablet (5 mg) by mouth at bedtime., Disp: 90 tablet, Rfl: 2     aspirin (ASA) 81 MG chewable tablet, Take 1 tablet (81 mg) by mouth daily. CHEW AND SWALLOW, Disp: 90 tablet, Rfl: 3     atorvastatin (LIPITOR) 40 MG tablet, Take 1 tablet (40 mg) by mouth daily., Disp: 90 tablet, Rfl: 2     blood glucose (ACCU-CHEK LAYA PLUS) test strip, Use with  Accucheck Expert meter.  Test blood sugar 6 times daily., Disp: 600 each, Rfl: 3     blood glucose monitoring (ACCU-CHEK FASTCLIX) lancets, Use to test blood sugar 6 times daily or as directed, Disp: 510 each, Rfl: 3     Continuous Glucose Sensor (FREESTYLE ZORAIDA 3 SENSOR) MISC, 1 each every 14 days Please provide 3 month supply., Disp: 6 each, Rfl: 3     diclofenac (VOLTAREN) 1 % topical gel, Apply 2 g topically 4 times  daily. For L shoulder pain, Disp: 100 g, Rfl: 3     dicyclomine (BENTYL) 10 MG capsule, Take 1 capsule (10 mg) by mouth 4 times daily as needed (Abdominal pain/cramping)., Disp: 40 capsule, Rfl: 0     empagliflozin (JARDIANCE) 25 MG TABS tablet, Take 1 tablet (25 mg) by mouth daily., Disp: 90 tablet, Rfl: 3     ergocalciferol (ERGOCALCIFEROL) 1.25 MG (76203 UT) capsule, Take 1 capsule (50,000 Units) by mouth once a week For additional refills, please schedule a follow-up appointment at [phone], Disp: 12 capsule, Rfl: 3     fenofibrate (TRIGLIDE/LOFIBRA) 160 MG tablet, Take 1 tablet (160 mg) by mouth daily., Disp: 90 tablet, Rfl: 2     fish oil-omega-3 fatty acids 1000 MG capsule, TAKE TWO CAPSULES (2 GM) BY MOUTH ONCE DAILY, Disp: 180 capsule, Rfl: 3     gabapentin (NEURONTIN) 100 MG capsule, Take 1 capsule (100 mg) by mouth 3 times daily, Disp: 42 capsule, Rfl: 1     ibuprofen (ADVIL/MOTRIN) 600 MG tablet, Take 1 tablet (600 mg) by mouth every 6 hours as needed for moderate pain, Disp: 120 tablet, Rfl: 1     Injection Device for insulin (INPEN 100-PINK-NOVOLOG-FIASP) DAVID, 1 each continuous, Disp: 1 each, Rfl: 0     insulin aspart (NOVOLOG PENFILL) 100 UNIT/ML cartridge, Take with each meal and snack: 1 per 4 grams CHO and correction of 1 unit per 20 mg/dL over a target of 120. Average daily dose is 40 units., Disp: 40 mL, Rfl: 3     insulin glargine 100 UNIT/ML pen, 3 month supply.Inject 25 units daily, Disp: 30 mL, Rfl: 3     insulin pen needle (31G X 5 MM) 31G X 5 MM miscellaneous, Use 5 to 6 pen needles daily or as directed.  3 month supply., Disp: 500 each, Rfl: 3     losartan (COZAAR) 100 MG tablet, Take 1 tablet (100 mg) by mouth daily., Disp: 90 tablet, Rfl: 3     metoclopramide (REGLAN) 10 MG tablet, Take 1 tablet (10 mg) by mouth 4 times daily as needed (nausea)., Disp: 14 tablet, Rfl: 0     nitroFURantoin macrocrystal-monohydrate (MACROBID) 100 MG capsule, Take 1 capsule (100 mg) by mouth 2 times  daily., Disp: 14 capsule, Rfl: 0     omeprazole (PRILOSEC) 40 MG DR capsule, Take 1 capsule (40 mg) by mouth 2 times daily, Disp: 90 capsule, Rfl: 0     ondansetron (ZOFRAN ODT) 4 MG ODT tab, Take 1 tablet (4 mg) by mouth every 6 hours as needed for nausea or vomiting, Disp: 20 tablet, Rfl: 0     oxyCODONE (ROXICODONE) 5 MG tablet, Take 1 tablet (5 mg) by mouth every 6 hours as needed for pain., Disp: 3 tablet, Rfl: 0     pantoprazole (PROTONIX) 40 MG EC tablet, Take 1 tablet (40 mg) by mouth daily., Disp: 90 tablet, Rfl: 1     sucralfate (CARAFATE) 1 GM/10ML suspension, Take 10 mLs (1 g) by mouth 4 times daily., Disp: 414 mL, Rfl: 0     Tirzepatide 15 MG/0.5ML SOAJ, Inject 0.5 mLs (15 mg) subcutaneously every 7 days., Disp: 6 mL, Rfl: 3    Current Facility-Administered Medications:      hydrocortisone (CORTAID) 1 % cream, , Topical, Once, Mariya Mohamud APRN CNP     hydrocortisone (CORTAID) 1 % cream, , Topical, TID, Mariya Mohamud APRN CNP      Physical Exam  Wt Readings from Last 10 Encounters:   03/29/25 73.5 kg (162 lb)   01/28/25 69 kg (152 lb 3.2 oz)   01/15/25 76.2 kg (168 lb)   01/13/25 76.2 kg (168 lb)   01/08/25 75.9 kg (167 lb 4 oz)   01/07/25 74.9 kg (165 lb 1.6 oz)   12/04/24 75.8 kg (167 lb)   11/27/24 75.8 kg (167 lb)   11/22/24 76.2 kg (168 lb)   11/12/24 76 kg (167 lb 9.6 oz)     /84   Pulse 72   Wt 73.9 kg (163 lb)   LMP 02/01/2024 (Approximate)   SpO2 99%   BMI 30.81 kg/m      General appearance: she is well-developed, well-nourished, and in no distress.    RESULTS  Lab Results   Component Value Date    A1C 7.0 (H) 04/09/2025    A1C 7.5 (H) 03/18/2025    A1C 7.6 (H) 12/13/2024    A1C 7.8 (H) 09/11/2024    A1C 8.1 (H) 07/12/2024    A1C 8.7 (H) 04/15/2024    A1C 7.8 (H) 01/04/2024    A1C 11.6 (H) 04/05/2021    A1C 12.4 (H) 10/29/2020    A1C 12.9 (H) 07/08/2020    A1C 8.2 (H) 05/02/2019    A1C 10.3 (H) 10/15/2018    HEMOGLOBINA1 10.0 (A) 01/13/2020    HEMOGLOBINA1 9.9 (A)  10/07/2019    HEMOGLOBINA1 8.1 (A) 04/22/2019    HEMOGLOBINA1 10.4 (A) 01/14/2019    HEMOGLOBINA1 8.7 (A) 03/12/2018       TSH   Date Value Ref Range Status   04/04/2023 1.41 0.30 - 4.20 uIU/mL Final   07/08/2020 1.34 0.40 - 4.00 mU/L Final   05/17/2018 1.84 0.40 - 4.00 mU/L Final   11/27/2017 1.92 0.40 - 4.00 mU/L Final   05/11/2016 1.85 0.40 - 4.00 mU/L Final   02/11/2016 1.14 0.40 - 4.00 mU/L Final       ALT   Date Value Ref Range Status   04/09/2025 15 0 - 50 U/L Final   10/25/2024 24 0 - 50 U/L Final   07/08/2020 29 0 - 50 U/L Final   05/02/2019 43 0 - 50 U/L Final     42 minutes spent on the date of the encounter doing chart review, history and exam, coordinating care and documenting in the EHR, as detailed above, exclusive of CGM time.                        03/25/25 10:39 AM  PATIENT LAB/IMAGING STATUS : MyChart sent to patient to complete standing/future orders   04/10/25 1:36 PM  PATIENT LAB/IMAGING STATUS : Orders completed / No further action needed                           Again, thank you for allowing me to participate in the care of your patient.      Sincerely,    Jimena Bragg MD

## 2025-04-22 ENCOUNTER — LAB (OUTPATIENT)
Dept: LAB | Facility: CLINIC | Age: 45
End: 2025-04-22
Payer: COMMERCIAL

## 2025-04-22 DIAGNOSIS — R30.0 DYSURIA: ICD-10-CM

## 2025-04-22 DIAGNOSIS — N30.00 ACUTE CYSTITIS WITHOUT HEMATURIA: Primary | ICD-10-CM

## 2025-04-22 LAB
ALBUMIN UR-MCNC: NEGATIVE MG/DL
APPEARANCE UR: CLEAR
BACTERIA #/AREA URNS HPF: ABNORMAL /HPF
BILIRUB UR QL STRIP: NEGATIVE
COLOR UR AUTO: ABNORMAL
GLUCOSE UR STRIP-MCNC: >=1000 MG/DL
HGB UR QL STRIP: NEGATIVE
KETONES UR STRIP-MCNC: NEGATIVE MG/DL
LEUKOCYTE ESTERASE UR QL STRIP: ABNORMAL
MUCOUS THREADS #/AREA URNS LPF: PRESENT /LPF
NITRATE UR QL: POSITIVE
PH UR STRIP: 5.5 [PH] (ref 5–7)
RBC URINE: 2 /HPF
SP GR UR STRIP: 1.02 (ref 1–1.03)
SQUAMOUS EPITHELIAL: <1 /HPF
UROBILINOGEN UR STRIP-MCNC: NORMAL MG/DL
WBC CLUMPS #/AREA URNS HPF: PRESENT /HPF
WBC URINE: 60 /HPF

## 2025-04-22 PROCEDURE — 81001 URINALYSIS AUTO W/SCOPE: CPT | Performed by: PATHOLOGY

## 2025-04-22 PROCEDURE — 87186 SC STD MICRODIL/AGAR DIL: CPT | Performed by: INTERNAL MEDICINE

## 2025-04-22 PROCEDURE — 99000 SPECIMEN HANDLING OFFICE-LAB: CPT | Performed by: PATHOLOGY

## 2025-04-22 RX ORDER — LEVOFLOXACIN 250 MG/1
250 TABLET, FILM COATED ORAL DAILY
Qty: 3 TABLET | Refills: 0 | Status: SHIPPED | OUTPATIENT
Start: 2025-04-22

## 2025-04-22 NOTE — RESULT ENCOUNTER NOTE
The urine analysis is positive for infection.  I recommend to discontinue the Jardiance.  I placed a prescription for levofloxacin, 250 mg daily for 3 weeks.  Please follow-up with your primary care provider to confirm the urinary tract infection resolves.  In the absence of Jardiance, the blood sugar numbers might increase a little and you might need to have the insulin dosages adjusted.  I recommend to have the sensor and the InPen records downloaded in our clinic in a couple of weeks.

## 2025-04-23 LAB — BACTERIA UR CULT: ABNORMAL

## 2025-05-15 ENCOUNTER — LAB (OUTPATIENT)
Dept: LAB | Facility: CLINIC | Age: 45
End: 2025-05-15
Payer: COMMERCIAL

## 2025-05-15 DIAGNOSIS — R10.2 PELVIC PAIN IN FEMALE: ICD-10-CM

## 2025-05-15 LAB
ALBUMIN UR-MCNC: 20 MG/DL
APPEARANCE UR: CLEAR
BILIRUB UR QL STRIP: NEGATIVE
COLOR UR AUTO: ABNORMAL
GLUCOSE UR STRIP-MCNC: NEGATIVE MG/DL
HGB UR QL STRIP: NEGATIVE
KETONES UR STRIP-MCNC: NEGATIVE MG/DL
LEUKOCYTE ESTERASE UR QL STRIP: NEGATIVE
MUCOUS THREADS #/AREA URNS LPF: PRESENT /LPF
NITRATE UR QL: NEGATIVE
PH UR STRIP: 7 [PH] (ref 5–7)
RBC URINE: 1 /HPF
SP GR UR STRIP: 1.01 (ref 1–1.03)
SQUAMOUS EPITHELIAL: <1 /HPF
UROBILINOGEN UR STRIP-MCNC: NORMAL MG/DL
WBC URINE: 1 /HPF

## 2025-05-23 ENCOUNTER — ANCILLARY PROCEDURE (OUTPATIENT)
Dept: CT IMAGING | Facility: CLINIC | Age: 45
End: 2025-05-23
Attending: NURSE PRACTITIONER
Payer: COMMERCIAL

## 2025-05-23 DIAGNOSIS — R10.2 PELVIC PAIN IN FEMALE: ICD-10-CM

## 2025-05-23 DIAGNOSIS — T78.40XA ALLERGIC REACTION, INITIAL ENCOUNTER: Primary | ICD-10-CM

## 2025-05-23 LAB
CREAT BLD-MCNC: 0.7 MG/DL (ref 0.5–1)
EGFRCR SERPLBLD CKD-EPI 2021: >60 ML/MIN/1.73M2

## 2025-05-23 PROCEDURE — 82565 ASSAY OF CREATININE: CPT | Performed by: PATHOLOGY

## 2025-05-23 PROCEDURE — T1013 SIGN LANG/ORAL INTERPRETER: HCPCS | Mod: U3

## 2025-05-23 PROCEDURE — 72193 CT PELVIS W/DYE: CPT | Performed by: RADIOLOGY

## 2025-05-23 RX ORDER — DIPHENHYDRAMINE HCL 25 MG
50 CAPSULE ORAL ONCE
Status: COMPLETED | OUTPATIENT
Start: 2025-05-23 | End: 2025-05-23

## 2025-05-23 RX ORDER — IOPAMIDOL 755 MG/ML
86 INJECTION, SOLUTION INTRAVASCULAR ONCE
Status: COMPLETED | OUTPATIENT
Start: 2025-05-23 | End: 2025-05-23

## 2025-05-23 RX ADMIN — Medication 50 MG: at 08:15

## 2025-05-23 RX ADMIN — IOPAMIDOL 86 ML: 755 INJECTION, SOLUTION INTRAVASCULAR at 07:49

## 2025-05-23 NOTE — PROGRESS NOTES
Nayeli was seen today for a CT scan with contrast.  She had no listed allergies and denied any allergy to contrast.  About 15 minutes after scan she developed itching and a few hives on her lower legs.  MD called to assess and ordered Benadryl 50mgPO.  Itching and hives resolved in 45 minutes and she was discharged.  Isovu 370 added to her allergy list.

## 2025-05-23 NOTE — DISCHARGE INSTRUCTIONS

## 2025-05-27 ENCOUNTER — RESULTS FOLLOW-UP (OUTPATIENT)
Dept: INTERNAL MEDICINE | Facility: CLINIC | Age: 45
End: 2025-05-27

## 2025-05-29 ENCOUNTER — MYC MEDICAL ADVICE (OUTPATIENT)
Dept: INTERNAL MEDICINE | Facility: CLINIC | Age: 45
End: 2025-05-29
Payer: COMMERCIAL

## 2025-05-29 ENCOUNTER — TELEPHONE (OUTPATIENT)
Dept: INTERNAL MEDICINE | Facility: CLINIC | Age: 45
End: 2025-05-29
Payer: COMMERCIAL

## 2025-05-29 NOTE — TELEPHONE ENCOUNTER
[facility] Call Center    Phone Message    May a detailed message be left on voicemail: yes     Reason for Call: Other: Pt's sister is calling in to request a CT or a referral to Gyn-Oncology for the pt. Michelle states the lump is still bothering the pt and they are starting to get concerned about it being possibly cancer. Please review and call Michelle to discuss and/ or message the pt on [facility].      Action Taken: Other: PCC    Travel Screening: Not Applicable     Date of Service:

## 2025-06-09 ENCOUNTER — PATIENT OUTREACH (OUTPATIENT)
Dept: CARE COORDINATION | Facility: CLINIC | Age: 45
End: 2025-06-09

## 2025-06-11 ENCOUNTER — TELEPHONE (OUTPATIENT)
Dept: ANESTHESIOLOGY | Facility: CLINIC | Age: 45
End: 2025-06-11

## 2025-06-11 ENCOUNTER — ANCILLARY PROCEDURE (OUTPATIENT)
Dept: GENERAL RADIOLOGY | Facility: CLINIC | Age: 45
End: 2025-06-11
Attending: INTERNAL MEDICINE
Payer: COMMERCIAL

## 2025-06-11 ENCOUNTER — PATIENT OUTREACH (OUTPATIENT)
Dept: CARE COORDINATION | Facility: CLINIC | Age: 45
End: 2025-06-11

## 2025-06-11 ENCOUNTER — TELEPHONE (OUTPATIENT)
Dept: ORTHOPEDICS | Facility: CLINIC | Age: 45
End: 2025-06-11

## 2025-06-11 DIAGNOSIS — M25.551 HIP PAIN, RIGHT: ICD-10-CM

## 2025-06-11 PROCEDURE — 73502 X-RAY EXAM HIP UNI 2-3 VIEWS: CPT | Mod: RT | Performed by: RADIOLOGY

## 2025-06-11 NOTE — TELEPHONE ENCOUNTER
DIAGNOSIS: Hip pain, right\ Randee Koch MD in [facility]\ bcbs\ ortho con    APPOINTMENT DATE: 6/16/25   NOTES STATUS DETAILS   OFFICE NOTE from referring provider Internal 6/6/25 - Randee Koch MD    OFFICE NOTE from other specialist Internal 5/15/25 - Mariya Mohamud APRN CNP   MEDICATION LIST Internal    IMAGES     CT SCAN PACS 5/23/25 - CT Pelvis Soft Tissue  7/26/16-10/25/24 - CT Abd Pelvis   XRAYS (IMAGES & REPORTS) Scheduled 6/11 XR Hip Right

## 2025-06-11 NOTE — TELEPHONE ENCOUNTER
Left Voicemail (1st Attempt) and Sent Mychart (1st Attempt) for the patient to call back and schedule the following:    Appointment type: NEW HIP  Provider: [facility]  Return date: Next Available  Specialty phone number: [phone]

## 2025-06-11 NOTE — TELEPHONE ENCOUNTER
Left Voicemail (1st Attempt) and Sent Mychart (1st Attempt) for the patient to call back and schedule the following:    Appointment type: New Patient  Provider: Angeles Daley, Dr. Lucille Dallas, Dr. Jakob Son, Dr. Cholo Valle, Dr. Johnathon Wharton, or Dr. Doni Delgado  Visit mode: In Person  Return date: Next available  Specialty phone number: [phone]    Additional Notes: Comprehensive Pain Evaluation; referred by Dr. Randee Koch

## 2025-06-12 ENCOUNTER — RESULTS FOLLOW-UP (OUTPATIENT)
Dept: INTERNAL MEDICINE | Facility: CLINIC | Age: 45
End: 2025-06-12

## 2025-06-16 ENCOUNTER — PRE VISIT (OUTPATIENT)
Dept: ORTHOPEDICS | Facility: CLINIC | Age: 45
End: 2025-06-16

## 2025-06-16 ENCOUNTER — OFFICE VISIT (OUTPATIENT)
Dept: ORTHOPEDICS | Facility: CLINIC | Age: 45
End: 2025-06-16
Attending: INTERNAL MEDICINE
Payer: COMMERCIAL

## 2025-06-16 DIAGNOSIS — M25.551 HIP PAIN, RIGHT: ICD-10-CM

## 2025-06-16 NOTE — LETTER
6/16/2025      RE: Nayeli Caal  [address]  [facility] [address]     Dear Colleague,    Thank you for referring your patient, Nayeli Caal, to the [facility] [place]. Please see a copy of my visit note below.    [facility]  Sports Medicine Clinic  Clinics and Surgery Center           SUBJECTIVE       Nayeli Caal is a 44 year old female presenting to clinic today.    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    ROS:  As noted above otherwise negative.    Patient Active Problem List   Diagnosis     Essential hypertension     Other chronic nonalcoholic liver disease     Diabetic retinopathy of both eyes (H)     Obesity     Usher Syndrome: congenital deafness, retinitis pigmentosa     Macular degeneration, age related, nonexudative     Benign neoplasm of iris     Dry eye syndrome     Pemphigus erythematosus (H)     Chondromalacia of patella, right, steroid injection 6/12/2015     CKD (chronic kidney disease) stage 1, GFR 90 ml/min or greater     Trigger middle finger of left hand     Adenomyosis of uterus     S/P hysterectomy     Severe carpal tunnel syndrome of both wrists       Current Outpatient Medications   Medication Sig Dispense Refill     acetaminophen (TYLENOL) 500 MG tablet Take 2 tablets (1,000 mg) by mouth every 8 hours as  needed for mild pain. 100 tablet 2     Alcohol Swabs (ALCOHOL PREP PAD) 70 % PADS 1 each 3 times daily 100 each 3     amLODIPine (NORVASC) 5 MG tablet Take 1 tablet (5 mg) by mouth at bedtime. 90 tablet 2     aspirin (ASA) 81 MG chewable tablet Take 1 tablet (81 mg) by mouth daily. CHEW AND SWALLOW 90 tablet 3     atorvastatin (LIPITOR) 40 MG tablet Take 1 tablet (40 mg) by mouth daily. 90 tablet 2     blood glucose (ACCU-CHEK LAYA PLUS) test strip Use with  Accucheck Expert meter.  Test blood sugar 6 times daily. 600 each 3     blood glucose monitoring (ACCU-CHEK FASTCLIX) lancets Use to test blood sugar 6 times daily or as directed 510 each 3     Continuous Glucose Sensor (FREESTYLE ZORAIDA 3 SENSOR) MISC 1 each every 14 days Please provide 3 month supply. 6 each 3     diclofenac (VOLTAREN) 1 % topical gel Apply 2 g topically 4 times daily. For L shoulder pain 100 g 3     dicyclomine (BENTYL) 10 MG capsule Take 1 capsule (10 mg) by mouth 4 times daily as needed (Abdominal pain/cramping). 40 capsule 0     ergocalciferol (ERGOCALCIFEROL) 1.25 MG (38573 UT) capsule Take 1 capsule (50,000 Units) by mouth once a week For additional refills, please schedule a follow-up appointment at [phone] 12 capsule 3     fenofibrate (TRIGLIDE/LOFIBRA) 160 MG tablet Take 1 tablet (160 mg) by mouth daily. 90 tablet 2     fish oil-omega-3 fatty acids 1000 MG capsule TAKE TWO CAPSULES (2 GM) BY MOUTH ONCE DAILY 180 capsule 3     gabapentin (NEURONTIN) 100 MG capsule Take 1 capsule (100 mg) by mouth 3 times daily 42 capsule 1     ibuprofen (ADVIL/MOTRIN) 600 MG tablet Take 1 tablet (600 mg) by mouth every 6 hours as needed for moderate pain 120 tablet 1     Injection Device for insulin (INPEN 100-PINK-NOVOLOG-FIASP) DAVID 1 each continuous 1 each 0     insulin aspart (NOVOLOG PENFILL) 100 UNIT/ML cartridge Take with each meal and snack: 1 per 4 grams CHO and correction of 1 unit per 20 mg/dL over a target of 120. Average daily dose is  40 units. 40 mL 3     insulin glargine 100 UNIT/ML pen 3 month supply.Inject 15 units daily       insulin pen needle (31G X 5 MM) 31G X 5 MM miscellaneous Use 5 to 6 pen needles daily or as directed.  3 month supply. 500 each 3     losartan (COZAAR) 100 MG tablet Take 1 tablet (100 mg) by mouth daily. 90 tablet 3     metoclopramide (REGLAN) 10 MG tablet Take 1 tablet (10 mg) by mouth 4 times daily as needed (nausea). 14 tablet 0     MOUNJARO 15 MG/0.5ML SOAJ auto-injector pen Inject 0.5 mLs (15 mg) subcutaneously once a week. 6 mL 3     omeprazole (PRILOSEC) 40 MG DR capsule Take 1 capsule (40 mg) by mouth 2 times daily 90 capsule 0     ondansetron (ZOFRAN ODT) 4 MG ODT tab Take 1 tablet (4 mg) by mouth every 6 hours as needed for nausea or vomiting 20 tablet 0     oxyCODONE (ROXICODONE) 5 MG tablet Take 1 tablet (5 mg) by mouth every 6 hours as needed for pain. 3 tablet 0     pantoprazole (PROTONIX) 40 MG EC tablet Take 1 tablet (40 mg) by mouth daily. 90 tablet 1     sucralfate (CARAFATE) 1 GM/10ML suspension Take 10 mLs (1 g) by mouth 4 times daily. 414 mL 0            OBJECTIVE:       Vitals: There were no vitals filed for this visit.  BMI: There is no height or weight on file to calculate BMI.    Gen:  Well nourished and in no acute distress  HEENT: Extraocular movement intact  Neck: Supple  Pulm:  Breathing Comfortably. No increased respiratory effort.  Psych: Euthymic. Appropriately answers questions    MSK: Nonantalgic gait.  No leg length discrepancy noticed on inspection.  Tenderness to palpation of the suprapelvis region of the lateral hip, correlating with the bony proliferation of the anterior superior iliac spine on x-rays.  No other soft tissue tenderness.  Full range of motion of the hip, although pain in the inguinal crease with full flexion beyond 90 degrees.  No internal or external rotation deficits, but pain with internal rotation.  Positive logroll and FADIR.  Negative ESDRAS.  Negative Yandel  testing.  Negative straight leg raise.        Study Result    Narrative & Impression   2 views right hip radiographs 6/11/2025 5:00 PM     History: Hip pain, right     Comparison: CT abdomen and pelvis 5/23/2025, 5/8/2024     Findings:     AP, frog leg lateral views of the right hip were obtained.      No acute osseous abnormality.       Bony proliferation at the anterior superior iliac spine, similar to  prior studies and consistent with sequelae of remote trauma.     Prominent lateral acetabular osteophyte with relative preservation of  joint space.     Others:     Enthesopathic changes of pelvis and trochanters. Right iliac bone  sclerosis, likely bone island. Degenerative changes of visualized  lower lumbar spine particularly facet arthropathy.     Vascular calcifications.                                                                      Impression:  1. No acute osseous abnormality.  2. No substantial hip joint degenerative change.                ASSESSMENT and PLAN:     Nayeli was seen today for pain.    Diagnoses and all orders for this visit:    Hip pain, right  -     Orthopedic  Referral  -     Physical Therapy  Referral; Future      Nayeli is a 44-year-old female, accompanied by an  today, with chronic right inguinal pain.  Patient did have a hysterectomy previously, and thought some of her pain could be resultant from that.  She has consulted with her obstetrician gynecologist who did not think that her pain was causing this.  She also saw her primary care physician, who got x-rays, and there was evidence of some enthesopathic changes of the pelvis and trochanters, as well as a likely bone island with bony proliferation in the anterior superior iliac spine.  On examination the patient also has some mild tenderness there.  However she also has signs symptoms suggestive of intra-articular pathology, likely the labrum.  She was in gymnastics at the age of 12 and had a  traumatic injury to the pelvis, but does not remember any dislocations.  She has had an injection at the [facility], it appears to be a trigger point injection into the abdominal wall, which did not provide any relief.  As of today, we have discussed options for management.  I do think placing the patient in physical therapy would be ideal.  Patient has contraindications to anti-inflammatories, so she will be maximizing Tylenol, 1000 mg every 6 hours as needed with no more than 4000 mg daily.  We will see the patient back in 6 weeks, if no improvement, will schedule and plan on doing an ultrasound-guided injection into the right hip.  I do think this would help tease out the bony island pain on the anterior superior iliac spine versus intra-articular pathology.  If no improvement from this, would recommend MRI.  All questions answered peer return precautions advised.    Options for treatment and/or follow-up care were reviewed with the patient was actively involved in the decision making process. Patient verbalized understanding and was in agreement with the plan.      Disclaimer:  This note consists of symbols derived from keyboarding, dictation, and/or voice recognition software. As a result, although I do diligently check for accuracy, there may be errors in the script that have gone undetected. Please consider this when interpreting information found in this chart    Abdi Horan DO  , Sports Medicine  Department of Family Medicine and [facility]      Again, thank you for allowing me to participate in the care of your patient.      Sincerely,    Abdi Horan DO

## 2025-06-16 NOTE — PROGRESS NOTES
[facility]  Sports Medicine Clinic  Clinics and Surgery Center           SUBJECTIVE       Nayeli Caal is a 44 year old female presenting to clinic today.    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    ROS:  As noted above otherwise negative.    Patient Active Problem List   Diagnosis    Essential hypertension    Other chronic nonalcoholic liver disease    Diabetic retinopathy of both eyes (H)    Obesity    Usher Syndrome: congenital deafness, retinitis pigmentosa    Macular degeneration, age related, nonexudative    Benign neoplasm of iris    Dry eye syndrome    Pemphigus erythematosus (H)    Chondromalacia of patella, right, steroid injection 6/12/2015    CKD (chronic kidney disease) stage 1, GFR 90 ml/min or greater    Trigger middle finger of left hand    Adenomyosis of uterus    S/P hysterectomy    Severe carpal tunnel syndrome of both wrists       Current Outpatient Medications   Medication Sig Dispense Refill    acetaminophen (TYLENOL) 500 MG tablet Take 2 tablets (1,000 mg) by mouth every 8 hours as needed for mild pain. 100 tablet 2    Alcohol Swabs (ALCOHOL PREP PAD) 70 % PADS 1 each 3 times daily 100 each 3    amLODIPine (NORVASC) 5 MG tablet Take 1 tablet (5 mg) by mouth at bedtime. 90 tablet 2    aspirin (ASA) 81 MG chewable tablet Take 1 tablet (81 mg) by mouth daily. CHEW AND SWALLOW 90  tablet 3    atorvastatin (LIPITOR) 40 MG tablet Take 1 tablet (40 mg) by mouth daily. 90 tablet 2    blood glucose (ACCU-CHEK LAYA PLUS) test strip Use with  Accucheck Expert meter.  Test blood sugar 6 times daily. 600 each 3    blood glucose monitoring (ACCU-CHEK FASTCLIX) lancets Use to test blood sugar 6 times daily or as directed 510 each 3    Continuous Glucose Sensor (FREESTYLE ZORAIDA 3 SENSOR) MISC 1 each every 14 days Please provide 3 month supply. 6 each 3    diclofenac (VOLTAREN) 1 % topical gel Apply 2 g topically 4 times daily. For L shoulder pain 100 g 3    dicyclomine (BENTYL) 10 MG capsule Take 1 capsule (10 mg) by mouth 4 times daily as needed (Abdominal pain/cramping). 40 capsule 0    ergocalciferol (ERGOCALCIFEROL) 1.25 MG (38041 UT) capsule Take 1 capsule (50,000 Units) by mouth once a week For additional refills, please schedule a follow-up appointment at [phone] 12 capsule 3    fenofibrate (TRIGLIDE/LOFIBRA) 160 MG tablet Take 1 tablet (160 mg) by mouth daily. 90 tablet 2    fish oil-omega-3 fatty acids 1000 MG capsule TAKE TWO CAPSULES (2 GM) BY MOUTH ONCE DAILY 180 capsule 3    gabapentin (NEURONTIN) 100 MG capsule Take 1 capsule (100 mg) by mouth 3 times daily 42 capsule 1    ibuprofen (ADVIL/MOTRIN) 600 MG tablet Take 1 tablet (600 mg) by mouth every 6 hours as needed for moderate pain 120 tablet 1    Injection Device for insulin (INPEN 100-PINK-NOVOLOG-FIASP) DAVID 1 each continuous 1 each 0    insulin aspart (NOVOLOG PENFILL) 100 UNIT/ML cartridge Take with each meal and snack: 1 per 4 grams CHO and correction of 1 unit per 20 mg/dL over a target of 120. Average daily dose is 40 units. 40 mL 3    insulin glargine 100 UNIT/ML pen 3 month supply.Inject 15 units daily      insulin pen needle (31G X 5 MM) 31G X 5 MM miscellaneous Use 5 to 6 pen needles daily or as directed.  3 month supply. 500 each 3    losartan (COZAAR) 100 MG tablet Take 1 tablet (100 mg) by mouth daily. 90 tablet 3     metoclopramide (REGLAN) 10 MG tablet Take 1 tablet (10 mg) by mouth 4 times daily as needed (nausea). 14 tablet 0    MOUNJARO 15 MG/0.5ML SOAJ auto-injector pen Inject 0.5 mLs (15 mg) subcutaneously once a week. 6 mL 3    omeprazole (PRILOSEC) 40 MG DR capsule Take 1 capsule (40 mg) by mouth 2 times daily 90 capsule 0    ondansetron (ZOFRAN ODT) 4 MG ODT tab Take 1 tablet (4 mg) by mouth every 6 hours as needed for nausea or vomiting 20 tablet 0    oxyCODONE (ROXICODONE) 5 MG tablet Take 1 tablet (5 mg) by mouth every 6 hours as needed for pain. 3 tablet 0    pantoprazole (PROTONIX) 40 MG EC tablet Take 1 tablet (40 mg) by mouth daily. 90 tablet 1    sucralfate (CARAFATE) 1 GM/10ML suspension Take 10 mLs (1 g) by mouth 4 times daily. 414 mL 0            OBJECTIVE:       Vitals: There were no vitals filed for this visit.  BMI: There is no height or weight on file to calculate BMI.    Gen:  Well nourished and in no acute distress  HEENT: Extraocular movement intact  Neck: Supple  Pulm:  Breathing Comfortably. No increased respiratory effort.  Psych: Euthymic. Appropriately answers questions    MSK: Nonantalgic gait.  No leg length discrepancy noticed on inspection.  Tenderness to palpation of the suprapelvis region of the lateral hip, correlating with the bony proliferation of the anterior superior iliac spine on x-rays.  No other soft tissue tenderness.  Full range of motion of the hip, although pain in the inguinal crease with full flexion beyond 90 degrees.  No internal or external rotation deficits, but pain with internal rotation.  Positive logroll and FADIR.  Negative ESDRAS.  Negative Yandel testing.  Negative straight leg raise.        Study Result    Narrative & Impression   2 views right hip radiographs 6/11/2025 5:00 PM     History: Hip pain, right     Comparison: CT abdomen and pelvis 5/23/2025, 5/8/2024     Findings:     AP, frog leg lateral views of the right hip were obtained.      No acute osseous  abnormality.       Bony proliferation at the anterior superior iliac spine, similar to  prior studies and consistent with sequelae of remote trauma.     Prominent lateral acetabular osteophyte with relative preservation of  joint space.     Others:     Enthesopathic changes of pelvis and trochanters. Right iliac bone  sclerosis, likely bone island. Degenerative changes of visualized  lower lumbar spine particularly facet arthropathy.     Vascular calcifications.                                                                      Impression:  1. No acute osseous abnormality.  2. No substantial hip joint degenerative change.                ASSESSMENT and PLAN:     Nayeli was seen today for pain.    Diagnoses and all orders for this visit:    Hip pain, right  -     Orthopedic  Referral  -     Physical Therapy  Referral; Future      Nayeli is a 44-year-old female, accompanied by an  today, with chronic right inguinal pain.  Patient did have a hysterectomy previously, and thought some of her pain could be resultant from that.  She has consulted with her obstetrician gynecologist who did not think that her pain was causing this.  She also saw her primary care physician, who got x-rays, and there was evidence of some enthesopathic changes of the pelvis and trochanters, as well as a likely bone island with bony proliferation in the anterior superior iliac spine.  On examination the patient also has some mild tenderness there.  However she also has signs symptoms suggestive of intra-articular pathology, likely the labrum.  She was in gymnastics at the age of 12 and had a traumatic injury to the pelvis, but does not remember any dislocations.  She has had an injection at the [facility], it appears to be a trigger point injection into the abdominal wall, which did not provide any relief.  As of today, we have discussed options for management.  I do think placing the patient in physical therapy  would be ideal.  Patient has contraindications to anti-inflammatories, so she will be maximizing Tylenol, 1000 mg every 6 hours as needed with no more than 4000 mg daily.  We will see the patient back in 6 weeks, if no improvement, will schedule and plan on doing an ultrasound-guided injection into the right hip.  I do think this would help tease out the bony island pain on the anterior superior iliac spine versus intra-articular pathology.  If no improvement from this, would recommend MRI.  All questions answered peer return precautions advised.    Options for treatment and/or follow-up care were reviewed with the patient was actively involved in the decision making process. Patient verbalized understanding and was in agreement with the plan.      Disclaimer:  This note consists of symbols derived from keyboarding, dictation, and/or voice recognition software. As a result, although I do diligently check for accuracy, there may be errors in the script that have gone undetected. Please consider this when interpreting information found in this chart    Abdi Horan DO  , Sports Medicine  Department of Family Medicine and [facility]

## 2025-06-23 ENCOUNTER — THERAPY VISIT (OUTPATIENT)
Dept: PHYSICAL THERAPY | Facility: CLINIC | Age: 45
End: 2025-06-23
Payer: COMMERCIAL

## 2025-06-23 DIAGNOSIS — M25.551 HIP PAIN, RIGHT: ICD-10-CM

## 2025-06-23 PROCEDURE — 97112 NEUROMUSCULAR REEDUCATION: CPT | Mod: GP | Performed by: PHYSICAL THERAPIST

## 2025-06-23 PROCEDURE — 97162 PT EVAL MOD COMPLEX 30 MIN: CPT | Mod: GP | Performed by: PHYSICAL THERAPIST

## 2025-06-24 PROBLEM — M25.551 HIP PAIN, RIGHT: Status: ACTIVE | Noted: 2025-06-24

## 2025-06-25 ENCOUNTER — ALLIED HEALTH/NURSE VISIT (OUTPATIENT)
Dept: EDUCATION SERVICES | Facility: CLINIC | Age: 45
End: 2025-06-25
Payer: COMMERCIAL

## 2025-06-25 DIAGNOSIS — E11.9 INSULIN-REQUIRING OR DEPENDENT TYPE II DIABETES MELLITUS (H): Primary | ICD-10-CM

## 2025-06-25 DIAGNOSIS — Z79.4 INSULIN-REQUIRING OR DEPENDENT TYPE II DIABETES MELLITUS (H): Primary | ICD-10-CM

## 2025-06-25 PROCEDURE — 99207 PR NO CHARGE LOS: CPT | Performed by: REGISTERED NURSE

## 2025-06-26 VITALS — WEIGHT: 168.1 LBS | BODY MASS INDEX: 31.78 KG/M2

## 2025-06-26 NOTE — PROGRESS NOTES
Diabetes Self-Management Education & Support    Nayeli Caal presents today for education related to Type 2 diabetes    Patient is being treated with:  Oral Agents and MDI Insulin  She is accompanied by self and , Maria C.     Year of diagnosis: 2013 or 2014  Referring provider:  Anne Marie Medina PA-C  Living Situation: Lives with a room mate  Employment: Administrative work for [employer]    PATIENT CONCERNS/REASON FOR REFERRAL :  Follow up       ASSESSMENT:    Taking Medication:     Current Diabetes Management per Patient:  Taking diabetes medications? yes:     Diabetes Medication(s)       Insulin       insulin aspart (NOVOLOG PENFILL) 100 UNIT/ML cartridge Take with each meal and snack: 1 per 4 grams CHO and correction of 1 unit per 20 mg/dL over a target of 120. Average daily dose is 40 units.     insulin glargine 100 UNIT/ML pen 3 month supply.Inject 15 units daily       Incretin Mimetic Agents       MOUNJARO 15 MG/0.5ML SOAJ auto-injector pen Inject 0.5 mLs (15 mg) subcutaneously once a week.            Currently taking 15 units of Basaglar.   She is using the In-Pen yamileth to help her dose Novolog:   Current settings:   Start time ICR  1 unit/gram CHO Insulin Sensitivity Factor Target BG    06:00 AM 4 20 110   11:00 AM 4 20 110   05:00 PM 4 20 110   09:30 PM 4 20 130             Active Insulin Time:  3 hours  Maximum Bolus:  20      Monitoring    Patient glucose self monitoring as follows: continuously using a continuous glucose monitor (CGM)  BG meter: Unknown  CGM: Freestyle Evelia 3  BG results: Evelia report appears below                           Patient's most recent   Lab Results   Component Value Date    A1C 7.5 03/18/2025    A1C 11.6 04/05/2021      Patient's A1C goal: <7.0    Activity: Doesn't work out on a regular basis, but stays fairly active.      Healthy Eating:   Eats 3 meals a day, but much smaller amounts than before starting the Mounjaro.   She is an accurate carb  counter.      Problem Solving:      Patient is at risk of hypoglycemia?: Yes, and she is well versed in how to treat.  As can be seen on the above report, she is having a significant amount of hypoglycemia and in the past two days, some hyperglycemia.        EDUCATION and INSTRUCTION PROVIDED AT THIS VISIT:      Nayeli had been on Jardiance as well as insulin and Mounjaro, but she had a urinary tract infection that may have contributed to some of the hyperglycemia she was experiencing a few weeks back.  Her settings were changed by Dr. Bragg at her last visit in April 2025.  Nayeli states that she is currently taking 18 units of Basaglar with the above noted settings on her In-Pen.      Several weeks ago Nayeli contacted me because of concern for high glucoses.  She states that she has been taking her Mounjaro weekly, but Jardiance was discontinued by Dr. Bragg after Nayeli developed a UTI.  Nayeli has taken canagliflozin in the past and this was discontinued because of vaginal yeast infections.      Her report reviewed with her.  She is having a significant amount of hypoglcyemia (14%), which appears to be happening overnight and randomly throughout the day.  Nayeli states that she doesn't feel the symptoms of hypoglycemia, and doesn't wear her I-watch at night, so is not being alerted to lows.  I asked her to please keep her watch near her at night (or her phone) so that she gets low alerts.  Some of the recorded glucoses are in the 50's.  Asked Nayeli if she double-checks any of these glucoses and she states that she has lost her meter (an Accucheck Expert, which hasn't been manufactured for years).  I sent a prescription for a new meter, strips and lancets to her designated pharmacy.  Advised Nayeli to double check her glucoses when low, as the Evelia sensor has a tendency to read lower than lab or blood glucose results.  I also instructed her to drop her Basaglar dose to 16 units daily.  In addition, we changed  her correction factor back to 15, as it appears that she doesn't really return to baseline when she does a correction, which is infrequent.      FOLLOW-UP:    Made a follow up appointment in 2 weeks to re-check.        Time spent with patient at today's visit was less than 30 minutes.        Any diabetes medication initiation or dose changes were made via the [facility] Standing Orders per the patient's referring provider. A copy of this encounter was shared with the provider.

## 2025-06-30 ENCOUNTER — OFFICE VISIT (OUTPATIENT)
Dept: PALLIATIVE MEDICINE | Facility: CLINIC | Age: 45
End: 2025-06-30
Attending: INTERNAL MEDICINE
Payer: COMMERCIAL

## 2025-06-30 ENCOUNTER — ANCILLARY PROCEDURE (OUTPATIENT)
Dept: GENERAL RADIOLOGY | Facility: CLINIC | Age: 45
End: 2025-06-30
Attending: NURSE PRACTITIONER
Payer: COMMERCIAL

## 2025-06-30 VITALS — SYSTOLIC BLOOD PRESSURE: 150 MMHG | DIASTOLIC BLOOD PRESSURE: 87 MMHG | HEART RATE: 74 BPM

## 2025-06-30 DIAGNOSIS — G89.29 CHRONIC BILATERAL LOW BACK PAIN WITHOUT SCIATICA: Primary | ICD-10-CM

## 2025-06-30 DIAGNOSIS — M54.50 CHRONIC BILATERAL LOW BACK PAIN WITHOUT SCIATICA: Primary | ICD-10-CM

## 2025-06-30 DIAGNOSIS — M54.50 CHRONIC BILATERAL LOW BACK PAIN WITHOUT SCIATICA: ICD-10-CM

## 2025-06-30 DIAGNOSIS — G89.29 CHRONIC BILATERAL LOW BACK PAIN WITHOUT SCIATICA: ICD-10-CM

## 2025-06-30 DIAGNOSIS — R10.2 PELVIC PAIN IN FEMALE: ICD-10-CM

## 2025-06-30 PROCEDURE — 3077F SYST BP >= 140 MM HG: CPT | Performed by: NURSE PRACTITIONER

## 2025-06-30 PROCEDURE — 1125F AMNT PAIN NOTED PAIN PRSNT: CPT | Performed by: NURSE PRACTITIONER

## 2025-06-30 PROCEDURE — 72100 X-RAY EXAM L-S SPINE 2/3 VWS: CPT | Mod: TC | Performed by: RADIOLOGY

## 2025-06-30 PROCEDURE — G2211 COMPLEX E/M VISIT ADD ON: HCPCS | Performed by: NURSE PRACTITIONER

## 2025-06-30 PROCEDURE — 99205 OFFICE O/P NEW HI 60 MIN: CPT | Performed by: NURSE PRACTITIONER

## 2025-06-30 PROCEDURE — 3079F DIAST BP 80-89 MM HG: CPT | Performed by: NURSE PRACTITIONER

## 2025-06-30 ASSESSMENT — PAIN SCALES - GENERAL: PAINLEVEL_OUTOF10: MODERATE PAIN (5)

## 2025-06-30 NOTE — PROGRESS NOTES
"[facility] Pain Management   Comprehensive Pain Evaluation    Date of Visit: Jun 30, 2025     CHIEF COMPLAINT:    Chief Complaint   Patient presents with    Pain          REASON FOR VISIT:    Nayeli Caal is a 45 year old female who is seen today at the request of Randee Koch MD (Internal Medicine) for evaluation of her pain issues and recommendations for management; with specific emphasis on:  Pelvic Pain     Patient is accompanied by:          Subjective    HISTORY OF PRESENT ILLNESS:  Onset/Progression:   Nayeli Caal is a 45 year old female with history of hysterectomy in 2024 with ongoing right sided groin pain.   Pain started one month after surgery.   Surgeon states pain was \"normal\" after surgery and it would get better.   Continues with constant pain; \"comes and go\".  Pain varies by day.   Not worse with bowel movements.   Started PT last week; for right hip/pelvis pain.   Denies numbness/tingling sensations into leg.   Had \"stomach problems\" after surgery; but resolved, started last Fall 2024.     Pain makes it difficult to get comfortable to sleep.   Sleeps on back.     Pain quality: Aching   Pain timing: Constant    Pain score today: Moderate Pain (5)   Pain rating: intensity ranges from 5/10 to 9/10, and averages 5/10 on a 0-10 scale.  Aggravating factors include: laying down, standing, sitting, walking, urination  Relieving factors include: nothing         Past Pain Treatments:   Pain Clinic:   No    Physical therapy: Yes    Psychologist: No   Chiropractor: No   Acupuncture: No   Pharmacotherapy:    Opioids: No     Non-opioids:  Yes  TENs Unit/electric stim: No   Heat/Cold: No       Injections/clinic procedures: Yes   Steroid in right hip 6/16/25      Surgeries related to pain: Yes   Total hysterectomy 2/07/24        Current Pain Relevant Medications:    Acetaminophen 500mg - 2 tabs (1000mg) BID   Diclofenac 1%       Other Relevant Medications: "   None       Previous Pain Relevant Medications: (H--helped; HI--Helped initially; [facility]--Somewhat helpful; NH--No help; W--worse; SE--side effects; A--allergy; ?--Unsure if helpful)   Opioids: oxycodone (?H)    NSAIDs: Avoid due to GI irritation    Muscle relaxants: cyclobenzaprine (?H)   Neuroleptics: Gabapentin (H for carpal tunnel)    Pain Antidepressants/ Anxiolytics: has not tried   Sleep Aids: has not tried   Migraine Medications: has not tried    Topical: diclofenac gel    Adjuvant medications: Acetaminophen         Review of [place] Prescription Monitoring Program ():  reviewed on 6/30/25. No concern for abuse or misuse of controlled medications based on this report. Controlled substances prescribed in the past 6-12 months include: (Oxycodone 5mg)     Current MME: 0 / day    Current PDMP reportable controlled substance medications, if any, are being prescribed by: Ortho Surgeon    Primary Care Provider is Mariya Mohamud           Past Medical History   She  has a past medical history of Combined visual and hearing impairment, Deaf, Diabetic retinopathy of both eyes (H) (8/19/2011), Hepatic steatosis (08/19/2011), History of tobacco use, Hyperlipidemia, Hypertension, Hypertriglyceridemia, Migraines, Obesity (8/19/2011), Uncontrolled type 2 diabetes mellitus with hyperglycemia, with long-term current use of insulin (H) (5/15/2017), and Usher Syndrome: congenital deafness, 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   Past Surgical History   She  has a past surgical history that includes Laparoscopic cholecystectomy (N/A, 03/10/2018); Release trigger finger (Right, 05/02/2019); Release trigger finger (Left, 05/30/2019); Release trigger finger (Right, 06/13/2023); Release trigger finger (Left, 08/01/2023); Davinci Hysterectomy Total, Salpingectomy Bilateral (Bilateral, 02/07/2024); Esophagoscopy, gastroscopy, duodenoscopy (EGD), combined (N/A, 06/13/2024); Colonoscopy (N/A, 10/21/2024); Release carpal tunnel  (Right, 2024); Release carpal tunnel (Left, 01/15/2025); VASCULAR SURGERY PROCEDURE UNLIST; and STOMACH SURGERY PROCEDURE UNLISTED.   Social History   Social History     Tobacco Use    Smoking status: Former     Current packs/day: 0.00     Types: Cigarettes     Quit date: 2010     Years since quittin.5     Passive exposure: Never    Smokeless tobacco: Never    Tobacco comments:     stopped 10 yrs ago   Substance Use Topics    Alcohol use: Not Currently     Comment: socially    Drug use: Not Currently     Types: Marijuana     Comment: much younger  or earlier      Social History     Social History Narrative    Not on file        Current Outpatient Medications   Medication Sig Dispense Refill    acetaminophen (TYLENOL) 500 MG tablet Take 2 tablets (1,000 mg) by mouth every 8 hours as needed for mild pain. 100 tablet 2    Alcohol Swabs (ALCOHOL PREP PAD) 70 % PADS 1 each 3 times daily 100 each 3    amLODIPine (NORVASC) 5 MG tablet Take 1 tablet (5 mg) by mouth at bedtime. 90 tablet 2    aspirin (ASA) 81 MG chewable tablet Take 1 tablet (81 mg) by mouth daily. CHEW AND SWALLOW 90 tablet 3    atorvastatin (LIPITOR) 40 MG tablet Take 1 tablet (40 mg) by mouth daily. 90 tablet 2    blood glucose (ACCU-CHEK LAYA PLUS) test strip Use with  Accucheck Expert meter.  Test blood sugar 6 times daily. 600 each 3    blood glucose (NO BRAND SPECIFIED) lancets standard Use to test blood sugar before each meal and bedtime when sensor not working. 100 each 5    blood glucose (NO BRAND SPECIFIED) test strip Use to test blood sugar before each meal and at bedtime when sensor not working. 100 strip 5    blood glucose monitoring (ACCU-CHEK FASTCLIX) lancets Use to test blood sugar 6 times daily or as directed 510 each 3    blood glucose monitoring (NO BRAND SPECIFIED) meter device kit Use to test blood sugar before each meal and bedtime when sensor not working. 1 kit 0    Continuous Glucose Sensor (FREESTYLE ZORAIDA 3  SENSOR) MISC 1 each every 14 days Please provide 3 month supply. 6 each 3    fenofibrate (TRIGLIDE/LOFIBRA) 160 MG tablet Take 1 tablet (160 mg) by mouth daily. 90 tablet 2    fish oil-omega-3 fatty acids 1000 MG capsule TAKE TWO CAPSULES (2 GM) BY MOUTH ONCE DAILY 180 capsule 3    Injection Device for insulin (INPEN 100-PINK-NOVOLOG-FIASP) DAVID 1 each continuous 1 each 0    insulin aspart (NOVOLOG PENFILL) 100 UNIT/ML cartridge Take with each meal and snack: 1 per 4 grams CHO and correction of 1 unit per 20 mg/dL over a target of 120. Average daily dose is 40 units. 40 mL 3    insulin glargine 100 UNIT/ML pen 3 month supply.Inject 15 units daily      insulin pen needle (31G X 5 MM) 31G X 5 MM miscellaneous Use 5 to 6 pen needles daily or as directed.  3 month supply. 500 each 3    losartan (COZAAR) 100 MG tablet Take 1 tablet (100 mg) by mouth daily. 90 tablet 3    metoclopramide (REGLAN) 10 MG tablet Take 1 tablet (10 mg) by mouth 4 times daily as needed (nausea). 14 tablet 0    MOUNJARO 15 MG/0.5ML SOAJ auto-injector pen Inject 0.5 mLs (15 mg) subcutaneously once a week. 6 mL 3    diclofenac (VOLTAREN) 1 % topical gel Apply 2 g topically 4 times daily. For L shoulder pain (Patient not taking: Reported on 6/30/2025) 100 g 3    dicyclomine (BENTYL) 10 MG capsule Take 1 capsule (10 mg) by mouth 4 times daily as needed (Abdominal pain/cramping). (Patient not taking: Reported on 6/30/2025) 40 capsule 0    ergocalciferol (ERGOCALCIFEROL) 1.25 MG (28331 UT) capsule Take 1 capsule (50,000 Units) by mouth once a week For additional refills, please schedule a follow-up appointment at [phone] (Patient not taking: Reported on 6/30/2025) 12 capsule 3    gabapentin (NEURONTIN) 100 MG capsule Take 1 capsule (100 mg) by mouth 3 times daily (Patient not taking: Reported on 6/30/2025) 42 capsule 1    ibuprofen (ADVIL/MOTRIN) 600 MG tablet Take 1 tablet (600 mg) by mouth every 6 hours as needed for moderate pain (Patient not  taking: Reported on 6/30/2025) 120 tablet 1    omeprazole (PRILOSEC) 40 MG DR capsule Take 1 capsule (40 mg) by mouth 2 times daily (Patient not taking: Reported on 6/30/2025) 90 capsule 0    ondansetron (ZOFRAN ODT) 4 MG ODT tab Take 1 tablet (4 mg) by mouth every 6 hours as needed for nausea or vomiting (Patient not taking: Reported on 6/30/2025) 20 tablet 0    oxyCODONE (ROXICODONE) 5 MG tablet Take 1 tablet (5 mg) by mouth every 6 hours as needed for pain. (Patient not taking: Reported on 6/30/2025) 3 tablet 0    pantoprazole (PROTONIX) 40 MG EC tablet Take 1 tablet (40 mg) by mouth daily. (Patient not taking: Reported on 6/30/2025) 90 tablet 1    sucralfate (CARAFATE) 1 GM/10ML suspension Take 10 mLs (1 g) by mouth 4 times daily. (Patient not taking: Reported on 6/30/2025) 414 mL 0     Current Facility-Administered Medications   Medication Dose Route Frequency Provider Last Rate Last Admin    hydrocortisone (CORTAID) 1 % cream   Topical Once Mariya Mohamud APRN CNP         Allergies   Allergen Reactions    Isovue-370 [Iopamidol] Hives and Itching     Given Benadryl  50mg PO post scan.  Must be pre-medicated .        Medications and Allergies reviewed. Yes        REVIEW OF SYSTEMS:   ROS: 10 point ROS neg other than the symptoms noted above in the HPI.     The patient otherwise denies red flag symptoms, such as: thunderclap headache, bowel or bladder incontinence, parasthesias, weakness, saddle anesthesia, unintentional weight loss, or fever/chills/sweats.     Objective   OBJECTIVE    Vitals:    06/30/25 1314   BP: (!) 150/87   Pulse: 74         Appearance:   A&O. Patient is appropriate. Patient is in NAD.      HHENT:  Normocephalic, atraumatic. Eyes without conjunctival injection or jaundice. Neck supple.     Pulmonary:  Normal effort; no cough or audible wheeze.       Skin: No obvious rash, lesions, or petechiae of exposed skin.    Psych:  Alert, without lethargy or stupor. Speech fluent. Mood normal.  Able to follow commands without difficulty. Appropriate judgement and behavior.    Tearful or anxious affect: NO       MSK:    Gait pattern:  Patient has an antalgic gait pattern:YES  Gait favors the neither side.  Mobility and/or assistive devices? NO      Able to walk on the heels and toes with significant pain and difficulty.      Strength:   Knee ext: R: 5/5  L: 5/5  Knee flex: R: 5/5  L: 5/5  Dorsiflexion: R: 5/5  L: 5/5  Plantarflexion: R: 5/5  L: 5/5      Neurological:   Deep Tendon Reflex exam:   Patella:  R:  0/4   L: 1/4      Sensory exam:  (Lower extremities)   Light touch: normal    Vibration: abnormal - right knee amplified    Allodynia: absent   Dysethesia: absent    Hyperalgesia: absent     Cervical spine:  Tenderness in the cervical spine at midline. No  Tenderness in the cervical paraspinal muscles. No    Thoracic spine:   Kyphosis. No   Tenderness in the thoracic spine at midline. No  Tenderness in the thoracic paraspinal muscles. No    Lumbar/Sacral spine:  Range of motion by visual estimation   Forward Flexion:  50 degrees (0 - 60); painful    Ext: 10 degrees (0 - 25); painful   Rotation/ext to right: painful   Rotation/ext to left: painful   Lordosis. No  Tenderness in the lumbar spine at midline. No  Tenderness in the lumbar paraspinal muscles.No  Straight leg exam:  Right: positive    Left:  negative  Neural Slump test:  Right: positive    Left:  negative  Gia/Sebas's test:     Right: positive    Left:  negative  Passive internal rotation:   Right: positive    Left: negative   Active external rotation:    Right: positive    Left: negative  Tenderness over SI joint:     Right: negative    Left:  negative  Tenderness over Trochanteric Bursa:    Right: positive    Left: positive         Diagnostic Testing:  Labs:      Lab Results   Component Value Date    CR 0.7 05/23/2025    GFRESTIMATED >60 05/23/2025    ALKPHOS 79 04/09/2025    AST 16 04/09/2025    ALT 15 04/09/2025               Imaging:     XR Right Hip 6/11/25  Findings:     AP, frog leg lateral views of the right hip were obtained.      No acute osseous abnormality.       Bony proliferation at the anterior superior iliac spine, similar to  prior studies and consistent with sequelae of remote trauma.     Prominent lateral acetabular osteophyte with relative preservation of  joint space.     Others:     Enthesopathic changes of pelvis and trochanters. Right iliac bone  sclerosis, likely bone island. Degenerative changes of visualized  lower lumbar spine particularly facet arthropathy.     Vascular calcifications.                                                                    Impression:  1. No acute osseous abnormality.  2. No substantial hip joint degenerative change.        Assessment & Plan      VISIT DIAGNOSIS:   1. Chronic bilateral low back pain without sciatica (Primary)  - XR Lumbar Spine 2/3 Views; Future  - Adult Pain Clinic Follow-Up Order; Future    2. Pelvic pain in female  - Pain Management  Referral  - XR Lumbar Spine 2/3 Views; Future  - Adult Pain Clinic Follow-Up Order; Future        ASSESSMENT:    Visit discussion: Nayeli Caal is a 45 year old female with a past medical history significant for chronic right groin pain after total hysterectomy surgery in February 2024; who presents with complaints of right-sided groin pain at anterior hip fold.  Patient has recently started physical therapy and communicates pain is present in both hips with pressure or palpation.  On physical exam, patient does have radicular low back symptoms that trigger her pain location in the right groin region.  I have high suspicion there is a radicular lumbar dermatome contributing to right hip pain.  Through , patient was educated on the overlapping symptoms of hip pain versus low back pain.  Lumbar x-ray was ordered today as initial step in addressing low back pain.  Patient will continue with  physical therapy for 6 to 8 weeks and then return for additional evaluation.  If no improvement in low back and radicular right hip pain at that time, we will move forward with lumbar MRI.  Patient is advised to continue with acetaminophen dosing as indicated below.  Patient's questions were answered through 's assistance.  Patient expresses understanding and agreement with plan.  Follow-up in 6 to 8 weeks; [facility] [place] [facility] is okay for follow-up.      PLAN:    Medication Management:   - CONTINUE taking Acetaminophen 500mg - take 1-2 tabs (500mg-1000mg) by mouth every 6 hours as needed for pain; max 4000mg per 24 hours.       Continue Current Treatment Plan with:   - Physical Therapy       New Orders:   - Diagnostic Imaging:  Lumbar X-Ray  at [facility].  Please call [phone] to schedule.      Return to Clinic:   -  30-minute In-Person Appointment with Marisol Del Rosario DNP, RAISSA CELESTIN in 6-8 weeks, or sooner if needed      Future Considerations:   Okay to follow up in [facility].     ___________________________________________________________________    BILLING TIME DOCUMENTATION:   TOTAL TIME includes:   Time spent preparing to see the patient: 5 minutes (reviewing records and tests)  Time spend face to face with the patient: 51 minutes  Time spent ordering tests, medications, procedures and referrals: 0 minutes  Time spent Referring and communicating with other healthcare professionals: 0 minutes  Documenting clinical information in Epic: 6 minutes    The total TIME spent on this patient on the day of the appointment was 62 minutes.   ___________________________________________________________________    Review of Electronic Chart, Relevant Records/History: Today I have also reviewed available medical information in the patient's medical record at [facility] ([facility]) and Care Everywhere (if available), including relevant provider notes, laboratory work, and  imaging. Please see the [facility] health questionnaire which the patient completed and reviewed with me in detail.     SABI Wong, AL, FNP-C   [facility]

## 2025-06-30 NOTE — PATIENT INSTRUCTIONS
PATIENT INSTRUCTIONS:     I am recommending a multidisciplinary treatment plan to help this patient better manage her pain. The following recommendations were given to the patient. Diagnosis, treatment options, risks, benefits, and alternatives were discussed; all questions were answered. Self-care instructions were given. The patient expressed understanding of the plan for management.   Medication Management:   - CONTINUE taking Acetaminophen 500mg - take 1-2 tabs (500mg-1000mg) by mouth every 6 hours as needed for pain; max 4000mg per 24 hours.       Continue Current Treatment Plan with:   - Physical Therapy       New Orders:   - Diagnostic Imaging:  Lumbar X-Ray  at [facility].  Please call [phone] to schedule.      Return to Clinic:   -  30-minute In-Person Appointment with Marisol Del Rosario, AL, SABI, RAISSA in 6-8 weeks, or sooner if needed      Future Considerations:   Okay to follow up in [facility].     ----------------------------------------------------------------  Clinic Number:  [phone]   Call with any questions about your care and for scheduling assistance.   Calls are returned Monday through Friday between 8 AM and 4:30 PM. We usually get back to you within 2 business days depending on the issue/request.    If we are prescribing your medications:  For opioid medication refills, call the clinic or send a InNetwork message 7 days in advance.  Please include:  Name of requested medication  Name of the pharmacy.  For non-opioid medications, call your pharmacy directly to request a refill. Please allow 3-4 days to be processed.   Per [place] State Law:  All controlled substance prescriptions must be filled within 30 days of being written.    For those controlled substances allowing refills, pickup must occur within 30 days of last fill.      We believe regular attendance is key to your success in our program!    Any time you are unable to keep your appointment we ask that you  call us at least 24 hours in advance to cancel.This will allow us to offer the appointment time to another patient.   Multiple missed appointments may lead to dismissal from the clinic.

## 2025-07-01 ENCOUNTER — RESULTS FOLLOW-UP (OUTPATIENT)
Dept: PALLIATIVE MEDICINE | Facility: CLINIC | Age: 45
End: 2025-07-01

## 2025-07-03 DIAGNOSIS — H35.52 RETINITIS PIGMENTOSA: Primary | ICD-10-CM

## 2025-07-08 ENCOUNTER — LAB (OUTPATIENT)
Dept: LAB | Facility: CLINIC | Age: 45
End: 2025-07-08
Payer: COMMERCIAL

## 2025-07-08 ENCOUNTER — OFFICE VISIT (OUTPATIENT)
Dept: INTERNAL MEDICINE | Facility: CLINIC | Age: 45
End: 2025-07-08
Payer: COMMERCIAL

## 2025-07-08 VITALS
SYSTOLIC BLOOD PRESSURE: 122 MMHG | RESPIRATION RATE: 15 BRPM | OXYGEN SATURATION: 98 % | DIASTOLIC BLOOD PRESSURE: 80 MMHG | TEMPERATURE: 97.4 F | WEIGHT: 168.8 LBS | BODY MASS INDEX: 31.87 KG/M2 | HEART RATE: 78 BPM | HEIGHT: 61 IN

## 2025-07-08 DIAGNOSIS — M25.551 PAIN OF BOTH HIP JOINTS: ICD-10-CM

## 2025-07-08 DIAGNOSIS — M25.552 PAIN OF BOTH HIP JOINTS: ICD-10-CM

## 2025-07-08 DIAGNOSIS — Z13.0 SCREENING FOR DEFICIENCY ANEMIA: ICD-10-CM

## 2025-07-08 DIAGNOSIS — E83.52 SERUM CALCIUM ELEVATED: ICD-10-CM

## 2025-07-08 DIAGNOSIS — E10.319 DIABETIC RETINOPATHY OF BOTH EYES ASSOCIATED WITH TYPE 1 DIABETES MELLITUS, MACULAR EDEMA PRESENCE UNSPECIFIED, UNSPECIFIED RETINOPATHY SEVERITY (H): Primary | ICD-10-CM

## 2025-07-08 DIAGNOSIS — E11.319 DIABETIC RETINOPATHY OF BOTH EYES (H): ICD-10-CM

## 2025-07-08 DIAGNOSIS — Z86.39 HISTORY OF VITAMIN D DEFICIENCY: ICD-10-CM

## 2025-07-08 DIAGNOSIS — E11.69 TYPE 2 DIABETES MELLITUS WITH OTHER SPECIFIED COMPLICATION, WITHOUT LONG-TERM CURRENT USE OF INSULIN (H): ICD-10-CM

## 2025-07-08 LAB
ANION GAP SERPL CALCULATED.3IONS-SCNC: 11 MMOL/L (ref 7–15)
BUN SERPL-MCNC: 14.1 MG/DL (ref 6–20)
CALCIUM SERPL-MCNC: 12.3 MG/DL (ref 8.8–10.4)
CHLORIDE SERPL-SCNC: 104 MMOL/L (ref 98–107)
CREAT SERPL-MCNC: 0.66 MG/DL (ref 0.51–0.95)
CRP SERPL-MCNC: 3.23 MG/L
EGFRCR SERPLBLD CKD-EPI 2021: >90 ML/MIN/1.73M2
EST. AVERAGE GLUCOSE BLD GHB EST-MCNC: 192 MG/DL
GLUCOSE SERPL-MCNC: 214 MG/DL (ref 70–99)
HBA1C MFR BLD: 8.3 %
HCO3 SERPL-SCNC: 23 MMOL/L (ref 22–29)
IRON BINDING CAPACITY (ROCHE): 217 UG/DL (ref 240–430)
IRON SATN MFR SERPL: 26 % (ref 15–46)
IRON SERPL-MCNC: 56 UG/DL (ref 37–145)
POTASSIUM SERPL-SCNC: 4.6 MMOL/L (ref 3.4–5.3)
SODIUM SERPL-SCNC: 138 MMOL/L (ref 135–145)
VIT D+METAB SERPL-MCNC: 24 NG/ML (ref 20–50)

## 2025-07-08 PROCEDURE — 3052F HG A1C>EQUAL 8.0%<EQUAL 9.0%: CPT | Performed by: NURSE PRACTITIONER

## 2025-07-08 PROCEDURE — 83036 HEMOGLOBIN GLYCOSYLATED A1C: CPT | Performed by: NURSE PRACTITIONER

## 2025-07-08 PROCEDURE — 99000 SPECIMEN HANDLING OFFICE-LAB: CPT | Performed by: PATHOLOGY

## 2025-07-08 PROCEDURE — 86140 C-REACTIVE PROTEIN: CPT | Performed by: PATHOLOGY

## 2025-07-08 PROCEDURE — 36415 COLL VENOUS BLD VENIPUNCTURE: CPT | Performed by: PATHOLOGY

## 2025-07-08 PROCEDURE — 3074F SYST BP LT 130 MM HG: CPT | Performed by: NURSE PRACTITIONER

## 2025-07-08 PROCEDURE — T1013 SIGN LANG/ORAL INTERPRETER: HCPCS | Mod: U3

## 2025-07-08 PROCEDURE — 83540 ASSAY OF IRON: CPT | Performed by: PATHOLOGY

## 2025-07-08 PROCEDURE — 99215 OFFICE O/P EST HI 40 MIN: CPT | Performed by: NURSE PRACTITIONER

## 2025-07-08 PROCEDURE — 80048 BASIC METABOLIC PNL TOTAL CA: CPT | Performed by: PATHOLOGY

## 2025-07-08 PROCEDURE — 3079F DIAST BP 80-89 MM HG: CPT | Performed by: NURSE PRACTITIONER

## 2025-07-08 PROCEDURE — 83550 IRON BINDING TEST: CPT | Performed by: PATHOLOGY

## 2025-07-08 PROCEDURE — 84100 ASSAY OF PHOSPHORUS: CPT | Performed by: NURSE PRACTITIONER

## 2025-07-08 PROCEDURE — 82306 VITAMIN D 25 HYDROXY: CPT | Performed by: NURSE PRACTITIONER

## 2025-07-08 NOTE — PATIENT INSTRUCTIONS
To prevent osteopenia work on taking in 8460-8405 mg of calcium  In doses no more than 500 mg at a time, with vitamin D 7941-7659 units daily with food. They should also be getting daily weight bearing exercise (walking works). Continue to take you calcium with vitamin D supplement and continue weight bearing exercise. I would refer you to the website: www.nof.org for further information. Your Dexa scan should be repeated in the future.

## 2025-07-08 NOTE — PROGRESS NOTES
Assessment & Plan     Diabetic retinopathy of both eyes (H)  - HEMOGLOBIN A1C; Future  Encouraged to discuss Mounjaro with Endo tomorrow.    History of vitamin D deficiency  - Vitamin D Deficiency; Future. If deficient will supplement again until Vit D is in normal range, then she will supplement daily with OTC vitamin D.     Type 2 diabetes mellitus with other specified complication, without long-term current use of insulin (H)  - Basic metabolic panel  (Ca, Cl, CO2, Creat, Gluc, K, Na, BUN); Future    Pain of both hip joints  - CRP, inflammation; Future  She will continue with PT and follow-up in [facility]. With Dr. Rafiq Horan.    Screening for deficiency anemia  - Iron and iron binding capacity; Future  - we discussed if she is eating a normal diet with veges, fruit and protein she should not have any deficiencies.    Constipation while travelling.  We discussed management.    RTC in 3 months.      I spent a total of  45  minutes in the care of this pt during today's office visit. This time includes reviewing the patient's chart and prior history, obtaining a history, performing an examination and evaluation and counseling the patient. This time also includes ordering medications or tests necessary in addition to communication to other member's of the patient's health care team. Time spent in documentation and care coordination is included.     LINETTE Sawyer is a 45 year old, presenting for the following health issues:  Follow Up      7/8/2025     4:23 PM   Additional Questions   Roomed by KASIE MIXON Ingrid Caal is a 45 year old female who presents for follow-up after appt with Sports Med for inguinal/pelvic pain S/P hysterectomy. She reports ongoing pain in the right hip and low back area with radiation to both outer hips as well as the SI area. . She was referred to PT which she began 7/3/25. She was told she had arthritis in the low back. She has  difficulty walking any distances and pronounced weakness and discomfort, especially when walking or applying pressure to the pelvis or bone.  She denies any falls or injuries..    She was in [place] last weekend visiting her sister and experienced constipation.  This resolved after returning home yesterday.    Her blood sugars have been more labile without clear explanation despite consistent use of Mounjaro.  She has an appt. With Endo tomorrow.    She requests vitamin and iron testing.She tried to refill her vitamin D 50.000 units and the pharmacy explained it needed to be ordered by a provider. She eats a normal diet and is not vegetarian. She forgot that the Vitamin 50,000 was limited treatment and was meant to be maintained with OTC vitamin D 1-2000 units a day.     History of Present Illness       Reason for visit:  Follow up with Ortho, pain managmeent, others    She eats 2-3 servings of fruits and vegetables daily.She consumes 1 sweetened beverage(s) daily.She exercises with enough effort to increase her heart rate 9 or less minutes per day.  She exercises with enough effort to increase her heart rate 3 or less days per week. She is missing 1 dose(s) of medications per week.  She is not taking prescribed medications regularly due to remembering to take.           Patient Active Problem List   Diagnosis    Essential hypertension    Other chronic nonalcoholic liver disease    Diabetic retinopathy of both eyes (H)    Obesity    Usher Syndrome: congenital deafness, retinitis pigmentosa    Macular degeneration, age related, nonexudative    Benign neoplasm of iris    Dry eye syndrome    Pemphigus erythematosus (H)    Chondromalacia of patella, right, steroid injection 6/12/2015    CKD (chronic kidney disease) stage 1, GFR 90 ml/min or greater    Trigger middle finger of left hand    Adenomyosis of uterus    S/P hysterectomy    Severe carpal tunnel syndrome of both wrists    Hip pain, right           Objective   "  /80 (BP Location: Right arm, Patient Position: Sitting, Cuff Size: Adult Regular)   Pulse 78   Temp 97.4  F (36.3  C) (Temporal)   Resp 15   Ht 1.549 m (5' 0.98\")   Wt 76.6 kg (168 lb 12.8 oz)   LMP 02/01/2024 (Approximate)   SpO2 98%   BMI 31.91 kg/m    Body mass index is 31.91 kg/m .  Physical Exam   GENERAL: alert and no distress  RESP: no distress  CV: see VS  MS: no gross musculoskeletal defects noted. She has bilateral discomfort to palpation over the femoral head bilaterally.    {Diagnostic Test Results (Optional):030484}        Signed Electronically by: SABI Morrison CNP  {Email feedback regarding this note to [email or web address]   :[id number]}  "

## 2025-07-09 DIAGNOSIS — E83.52 SERUM CALCIUM ELEVATED: Primary | ICD-10-CM

## 2025-07-09 LAB — PHOSPHATE SERPL-MCNC: 3.9 MG/DL (ref 2.5–4.5)

## 2025-07-14 ENCOUNTER — LAB (OUTPATIENT)
Dept: LAB | Facility: CLINIC | Age: 45
End: 2025-07-14
Payer: COMMERCIAL

## 2025-07-14 DIAGNOSIS — E83.52 SERUM CALCIUM ELEVATED: ICD-10-CM

## 2025-07-14 LAB
CA-I BLD-MCNC: 4.7 MG/DL (ref 4.4–5.2)
PTH-INTACT SERPL-MCNC: 30 PG/ML (ref 15–65)

## 2025-07-14 PROCEDURE — 82330 ASSAY OF CALCIUM: CPT | Performed by: PATHOLOGY

## 2025-07-14 PROCEDURE — 83970 ASSAY OF PARATHORMONE: CPT | Performed by: PATHOLOGY

## 2025-07-14 PROCEDURE — 36415 COLL VENOUS BLD VENIPUNCTURE: CPT | Performed by: PATHOLOGY

## 2025-07-15 DIAGNOSIS — E83.52 SERUM CALCIUM ELEVATED: Primary | ICD-10-CM

## 2025-07-18 NOTE — PROGRESS NOTES
Assessment and Plan:    1.  Type 2 diabetes, uncontrolled, complicated by nephropathy and mild diabetic neuropathy  The patient has been experiencing fasting hyperglycemia and intermittent mild hypoglycemic episodes during daytime, mostly postprandially.  She has not been counting carbohydrates and she enters a fixed amount of carbs of 45 or 60 g.  Recommendations:  Transition to Tresiba, 15 units daily, as Lantus might wear off and this explains fasting hyperglycemia.  In the event Tresiba is not covered by her insurance, I recommended to switch the administration of Lantus from morning to bedtime.  Use either phone apps or [employer] platforms like [employer] or [employer] GPT for carb counting.  This was reviewed in detail with the patient at the time of the visit.  Discussed about the importance of avoiding entering a fixed amount of carbs, as this can result in unexpected high or low blood glucose numbers.  Continue current pump settings for now.  We might need to increase the dose of Tresiba in the future.  Follow-up urine microalbumin at her next visit    2.  Hypertension, uncontrolled  Recommended to increase the dose of amlodipine to 10 mg daily.  Counseled on peripheral leg edema, common side effects.    3.  Dyslipidemia, fairly well-controlled  On atorvastatin, fenofibrate and omega-3 fatty acids.      4.  Weight gain/inability to lose weight  Reassurance provided.  Counseled the patient on the importance of restricting the overall caloric/carbohydrate intake.  Reflex TSH was added to her current labs.     Orders Placed This Encounter   Procedures    Continuous Glucose Monitoring >=72 hours PHYS INTERP    TSH with free T4 reflex     =========================================================================================    HPI:   Nayeli is a 45 year old woman here with a  for follow up.       1.  Type 2 diabetes, diagnosed around 2000s.   Most recent A1c was 8.3 on 7/8/25. It was 7% on  "4/9/25.    Nayeli feels frustrated by her inability to lose weight, despite being on a maximum dose of Mounjaro.  She states her food intake and physical activity level have not changed.  According to our records, her weight is up 5 pounds in the last month.  She complains of \"stomach pain\" in the last couple of weeks.  Prior GI evaluation was unremarkable.    I reviewed the [facility] records.    In general, her meals are fixed at 45 or 60 g carbohydrates.  On the [facility], average total daily dose is 72 units, of which 22% is basal insulin.  In general, she enters carbohydrates 4 times daily.  Most of the meals are 60 g in carb content.  She reports administering insulin prior to eating.  The hypoglycemic episodes are generally noticed after eating.  Lantus is administered at 4:45 AM.    On the Evelia sensor, average glucose is 153 with a glucose variability of 31%, corresponding to an estimated GMI of 7%.  68% of the glucose numbers are within target, 1% are above 250, 3% of the glucose numbers are in the hypoglycemic range, with 1% below 54.  At times, the fasting morning blood sugar is elevated, in the 200s.  It looks like the blood sugar spontaneously starts to increase around 1 to 2 AM.  The hypoglycemic episodes tend to occur with no identifiable pattern, during daytime.  Most of them are in the 60s.    Her current regimen is:   Mountjaro, 15 mg weekly. Started in January 2024. She has been on 15 mg weekly since end of last year.   Glargine 15 units daily, at 5 am.  Novolog (dosing on In-[facility]):  Carb ratio: 1 unit per 5 g carbohydrates  Sensitivity 20   Target 130 from 9:30 pm to 6 am and 110 for the rest of the day.   Insulin duration 2 hrs     Previous treatments:   Empagliflozin 25 mg daily-transiently stopped in 2023 due to recurrent yeast infections.  She describes experiencing a tightness sensation in the bladder area present after she urinates.  Recently, a couple of urine analysis were positive and she reports " "being treated with antibiotics.  Metformin- severe diarrhea.   Ozempic - started in 4/2023 and replaced by Mountjaro in 1/24    Her weight in 1/2024 was 180 lbs. Current weight is 173 pounds.     Diabetes complications:  Retinopathy: last eye exam - 7/2025. 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 Note reviewed.  Nephropathy: h/o proteinuria; most recent urine microalbumin positive in 1/15 and 4/9/25 Normal GFR. On 100 mg losartan daily (tx with lisinopril was associated with a dry cough).   Neuropathy: some tingling present intermittently. No pain. Feet feel \"sensitive\". On 100 mg gabapentin TID. - not taking   Most recent lipid panel: 4/9/25: LDL cholesterol 103, HDL cholesterol 42, . On 40 mg atorvastatin, 160 mg fenofibrate daily and 2 gm omega 3 FA BID.    2. HTN   Current Tx: Losartan, 100 mg daily and amlodipine, 5 mg daily.    3.  Hypercalcemia  Calcium level was elevated at 12.3 on 7/8/2025.  Prior calcium levels have been normal.  The patient denies overdosing on calcium supplements.  Phosphorus was normal at 3.9 and vitamin D level was 24, on 7/8/2025.  The calcium was rechecked as ionized calcium on 7/14 and 7/21 and the levels were normal.    Past Medical History:   Diagnosis Date    Combined visual and hearing impairment     Deaf     Diabetic retinopathy of both eyes (H) 8/19/2011    Hepatic steatosis 08/19/2011    History of tobacco use     Hyperlipidemia     Hypertension     Hypertriglyceridemia     Migraines     Obesity 8/19/2011     Problem list name updated by automated process. 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   IBS    Past Surgical History:   Procedure Laterality Date    COLONOSCOPY N/A 10/21/2024    Procedure: COLONOSCOPY, WITH BIOPSIES;  Surgeon: Peggy Miguel MD;  Location: [facility] OR    [facility] HYSTERECTOMY TOTAL, SALPINGECTOMY " BILATERAL Bilateral 02/07/2024    Procedure: HYSTERECTOMY, TOTAL, ROBOT-ASSISTED, WITH BILATERAL SALPINGECTOMY, CYSTOSCOPY;  Surgeon: Vonnie Cowan MD;  Location: [facility] OR    ESOPHAGOSCOPY, GASTROSCOPY, DUODENOSCOPY (EGD), COMBINED N/A 06/13/2024    Procedure: ESOPHAGOGASTRODUODENOSCOPY, WITH BIOPSY;  Surgeon: Nora Brice MD;  Location: [facility] OR    LAPAROSCOPIC CHOLECYSTECTOMY N/A 03/10/2018    Procedure: LAPAROSCOPIC CHOLECYSTECTOMY;  Laparoscopic Cholecystectomy ;  Surgeon: Kuldeep Sigala MD;  Location: [facility] OR    SC STOMACH SURGERY PROCEDURE UNLISTED      SC VASCULAR SURGERY PROCEDURE UNLIST      RELEASE CARPAL TUNNEL Right 12/04/2024    Procedure: RELEASE, RIGHT CARPAL TUNNEL;  Surgeon: Madhu Mcmahon MD;  Location: [facility] OR    RELEASE CARPAL TUNNEL Left 01/15/2025    Procedure: RELEASE, LEFT CARPAL TUNNEL;  Surgeon: Madhu Mcmahon MD;  Location: [facility] OR    RELEASE TRIGGER FINGER Right 05/02/2019    Procedure: Right Thumb Trigger Release;  Surgeon: Greg Streeter MD;  Location: [facility] OR    RELEASE TRIGGER FINGER Left 05/30/2019    Procedure: Left Ring Trigger Finger Release.  Ganglion cyst excision.;  Surgeon: Greg Streeter MD;  Location: [facility] OR    RELEASE TRIGGER FINGER Right 06/13/2023    Procedure: RELEASE, RIGHT TRIGGER FINGER, INDEX AND MIDDLE;  Surgeon: Miracle Carlin MD;  Location: [facility] OR    RELEASE TRIGGER FINGER Left 08/01/2023    Procedure: RELEASE, LEFT TRIGGER FINGER, MIDDLE;  Surgeon: Miracle Carlin MD;  Location: [facility] OR       Family History   Adopted: Yes   Problem Relation Age of Onset    Unknown/Adopted Other      Social Hx: Lives in a [facility].  Boyfriend is in [place]. She is adopted.  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       Current Outpatient Medications:     acetaminophen (TYLENOL) 500 MG tablet, Take 2 tablets (1,000 mg) by mouth every 8 hours as needed for mild pain., Disp: 100 tablet, Rfl: 2    Alcohol Swabs  (ALCOHOL PREP PAD) 70 % PADS, 1 each 3 times daily, Disp: 100 each, Rfl: 3    amLODIPine (NORVASC) 5 MG tablet, Take 1 tablet (5 mg) by mouth at bedtime., Disp: 90 tablet, Rfl: 2    aspirin (ASA) 81 MG chewable tablet, Take 1 tablet (81 mg) by mouth daily. CHEW AND SWALLOW, Disp: 90 tablet, Rfl: 3    atorvastatin (LIPITOR) 40 MG tablet, Take 1 tablet (40 mg) by mouth daily., Disp: 90 tablet, Rfl: 2    blood glucose (ACCU-CHEK LAYA PLUS) test strip, Use with  Accucheck Expert meter.  Test blood sugar 6 times daily., Disp: 600 each, Rfl: 3    blood glucose (NO BRAND SPECIFIED) lancets standard, Use to test blood sugar before each meal and bedtime when sensor not working., Disp: 100 each, Rfl: 5    blood glucose (NO BRAND SPECIFIED) test strip, Use to test blood sugar before each meal and at bedtime when sensor not working., Disp: 100 strip, Rfl: 5    blood glucose monitoring (ACCU-CHEK FASTCLIX) lancets, Use to test blood sugar 6 times daily or as directed, Disp: 510 each, Rfl: 3    blood glucose monitoring (NO BRAND SPECIFIED) meter device kit, Use to test blood sugar before each meal and bedtime when sensor not working., Disp: 1 kit, Rfl: 0    Continuous Glucose Sensor (FREESTYLE ZORAIDA 3 SENSOR) [facility], 1 each every 14 days Please provide 3 month supply., Disp: 6 each, Rfl: 3    diclofenac (VOLTAREN) 1 % topical gel, Apply 2 g topically 4 times daily. For L shoulder pain, Disp: 100 g, Rfl: 3    fenofibrate (TRIGLIDE/LOFIBRA) 160 MG tablet, Take 1 tablet (160 mg) by mouth daily., Disp: 90 tablet, Rfl: 2    fish oil-omega-3 fatty acids 1000 MG capsule, TAKE TWO CAPSULES (2 GM) BY MOUTH ONCE DAILY, Disp: 180 capsule, Rfl: 3    Injection Device for insulin (INPEN 100-PINK-NOVOLOG-FIASP) DAVID, 1 each continuous, Disp: 1 each, Rfl: 0    insulin aspart (NOVOLOG PENFILL) 100 UNIT/ML cartridge, Take with each meal and snack: 1 per 4 grams CHO and correction of 1 unit per 20 mg/dL over a target of 120. Average daily dose is 40  units., Disp: 40 mL, Rfl: 3    insulin glargine 100 UNIT/ML pen, 3 month supply.Inject 15 units daily, Disp: 30 mL, Rfl: 1    insulin pen needle (31G X 5 MM) 31G X 5 MM miscellaneous, Use 5 to 6 pen needles daily or as directed.  3 month supply., Disp: 500 each, Rfl: 3    losartan (COZAAR) 100 MG tablet, Take 1 tablet (100 mg) by mouth daily., Disp: 90 tablet, Rfl: 3    MOUNJARO 15 MG/0.5ML SOAJ auto-injector pen, Inject 0.5 mLs (15 mg) subcutaneously once a week., Disp: 6 mL, Rfl: 3    Current Facility-Administered Medications:     hydrocortisone (CORTAID) 1 % cream, , Topical, Once, Mariya Mohamud, SABI CNP      Physical Exam  Wt Readings from Last 10 Encounters:   07/21/25 78.5 kg (173 lb)   07/08/25 76.6 kg (168 lb 12.8 oz)   06/26/25 76.2 kg (168 lb 1.6 oz)   06/06/25 77.5 kg (170 lb 14.4 oz)   05/15/25 75.6 kg (166 lb 9.6 oz)   04/21/25 73.9 kg (163 lb)   03/29/25 73.5 kg (162 lb)   01/28/25 69 kg (152 lb 3.2 oz)   01/15/25 76.2 kg (168 lb)   01/13/25 76.2 kg (168 lb)     BP (!) 147/90   Pulse 98   Wt 78.5 kg (173 lb)   LMP 02/01/2024 (Approximate)   SpO2 96%   BMI 32.71 kg/m      General appearance: she is well-developed, well-nourished, and in no distress.  Eyes: conjunctivae and extraocular motions are normal. Pupils are equal, round, and reactive to light. No lid lag, no stare.  HENT: oropharynx clear and moist; neck no JVD, no bruits, no thyromegaly, no palpable nodules  Cardiovascular: regular rhythm, no murmurs, distal pulses palpable, no edema  Respiratory: chest clear, no rales, no rhonchi  Musculoskeletal: normal tone and strength   Neurologic: Unable to elicit knee reflexes, no resting tremor  Psychiatric: affect and judgment normal   Feet: Onychomycosis, sensation preserved to monofilament testing.      RESULTS  Lab Results   Component Value Date    A1C 8.3 (H) 07/08/2025    A1C 7.0 (H) 04/09/2025    A1C 7.5 (H) 03/18/2025    A1C 7.6 (H) 12/13/2024    A1C 7.8 (H) 09/11/2024    A1C 11.6 (H)  04/05/2021    A1C 12.4 (H) 10/29/2020    A1C 12.9 (H) 07/08/2020    A1C 8.2 (H) 05/02/2019    A1C 10.3 (H) 10/15/2018    HEMOGLOBINA1 10.0 (A) 01/13/2020    HEMOGLOBINA1 9.9 (A) 10/07/2019    HEMOGLOBINA1 8.1 (A) 04/22/2019    HEMOGLOBINA1 10.4 (A) 01/14/2019    HEMOGLOBINA1 8.7 (A) 03/12/2018       TSH   Date Value Ref Range Status   04/04/2023 1.41 0.30 - 4.20 uIU/mL Final   07/08/2020 1.34 0.40 - 4.00 mU/L Final   05/17/2018 1.84 0.40 - 4.00 mU/L Final   11/27/2017 1.92 0.40 - 4.00 mU/L Final   05/11/2016 1.85 0.40 - 4.00 mU/L Final   02/11/2016 1.14 0.40 - 4.00 mU/L Final       ALT   Date Value Ref Range Status   04/09/2025 15 0 - 50 U/L Final   10/25/2024 24 0 - 50 U/L Final   07/08/2020 29 0 - 50 U/L Final   05/02/2019 43 0 - 50 U/L Final     42 minutes spent on the date of the encounter doing chart review, history and exam, coordinating care and documenting in the EHR, as detailed above, exclusive of CGM time.    07/18/25 11:19 AM : Appointment reminder phone call made to patient. No Answer. LVM Advising pt to arrive 15 mins early

## 2025-07-21 ENCOUNTER — LAB (OUTPATIENT)
Dept: LAB | Facility: CLINIC | Age: 45
End: 2025-07-21
Payer: COMMERCIAL

## 2025-07-21 ENCOUNTER — OFFICE VISIT (OUTPATIENT)
Dept: ENDOCRINOLOGY | Facility: CLINIC | Age: 45
End: 2025-07-21
Payer: COMMERCIAL

## 2025-07-21 VITALS
WEIGHT: 173 LBS | OXYGEN SATURATION: 96 % | BODY MASS INDEX: 32.71 KG/M2 | HEART RATE: 98 BPM | DIASTOLIC BLOOD PRESSURE: 90 MMHG | SYSTOLIC BLOOD PRESSURE: 147 MMHG

## 2025-07-21 DIAGNOSIS — Z79.4 TYPE 2 DIABETES MELLITUS TREATED WITH INSULIN (H): Primary | ICD-10-CM

## 2025-07-21 DIAGNOSIS — E11.9 TYPE 2 DIABETES MELLITUS TREATED WITH INSULIN (H): ICD-10-CM

## 2025-07-21 DIAGNOSIS — E83.52 SERUM CALCIUM ELEVATED: ICD-10-CM

## 2025-07-21 DIAGNOSIS — E11.9 TYPE 2 DIABETES MELLITUS TREATED WITH INSULIN (H): Primary | ICD-10-CM

## 2025-07-21 DIAGNOSIS — R80.9 ALBUMINURIA: ICD-10-CM

## 2025-07-21 DIAGNOSIS — Z79.4 TYPE 2 DIABETES MELLITUS TREATED WITH INSULIN (H): ICD-10-CM

## 2025-07-21 DIAGNOSIS — E78.2 MIXED HYPERLIPIDEMIA: ICD-10-CM

## 2025-07-21 DIAGNOSIS — I10 BENIGN ESSENTIAL HYPERTENSION: ICD-10-CM

## 2025-07-21 LAB
CA-I BLD-MCNC: 4.5 MG/DL (ref 4.4–5.2)
CALCIUM SERPL-MCNC: 8.5 MG/DL (ref 8.8–10.4)
PTH-INTACT SERPL-MCNC: 29 PG/ML (ref 15–65)
TSH SERPL DL<=0.005 MIU/L-ACNC: 3.08 UIU/ML (ref 0.3–4.2)

## 2025-07-21 PROCEDURE — 95251 CONT GLUC MNTR ANALYSIS I&R: CPT | Performed by: INTERNAL MEDICINE

## 2025-07-21 PROCEDURE — 3080F DIAST BP >= 90 MM HG: CPT | Performed by: INTERNAL MEDICINE

## 2025-07-21 PROCEDURE — 83970 ASSAY OF PARATHORMONE: CPT | Performed by: PATHOLOGY

## 2025-07-21 PROCEDURE — 1126F AMNT PAIN NOTED NONE PRSNT: CPT | Performed by: INTERNAL MEDICINE

## 2025-07-21 PROCEDURE — 99215 OFFICE O/P EST HI 40 MIN: CPT | Mod: 25 | Performed by: INTERNAL MEDICINE

## 2025-07-21 PROCEDURE — T1013 SIGN LANG/ORAL INTERPRETER: HCPCS | Mod: U3

## 2025-07-21 PROCEDURE — 3077F SYST BP >= 140 MM HG: CPT | Performed by: INTERNAL MEDICINE

## 2025-07-21 PROCEDURE — 84443 ASSAY THYROID STIM HORMONE: CPT | Performed by: PATHOLOGY

## 2025-07-21 PROCEDURE — 36415 COLL VENOUS BLD VENIPUNCTURE: CPT | Performed by: PATHOLOGY

## 2025-07-21 PROCEDURE — 82310 ASSAY OF CALCIUM: CPT | Performed by: PATHOLOGY

## 2025-07-21 PROCEDURE — 82330 ASSAY OF CALCIUM: CPT | Performed by: PATHOLOGY

## 2025-07-21 RX ORDER — INSULIN DEGLUDEC 100 U/ML
15 INJECTION, SOLUTION SUBCUTANEOUS DAILY
Qty: 15 ML | Refills: 3 | Status: SHIPPED | OUTPATIENT
Start: 2025-07-21

## 2025-07-21 RX ORDER — AMLODIPINE BESYLATE 10 MG/1
10 TABLET ORAL DAILY
Qty: 90 TABLET | Refills: 3 | Status: SHIPPED | OUTPATIENT
Start: 2025-07-21

## 2025-07-21 RX ORDER — INSULIN ASPART 100 [IU]/ML
INJECTION, SOLUTION INTRAVENOUS; SUBCUTANEOUS
Qty: 40 ML | Refills: 3 | Status: SHIPPED | OUTPATIENT
Start: 2025-07-21

## 2025-07-21 ASSESSMENT — PAIN SCALES - GENERAL: PAINLEVEL_OUTOF10: NO PAIN (0)

## 2025-07-21 NOTE — PATIENT INSTRUCTIONS
Try to use [employer] GPT and [employer] for carb counting  If your insurance approves Tresiba, discontinue  Lantus and take Tresiba at 15 units daily (time of the day not important but has to be roughly the same every day)   Administer Novolog 10-15 minutes prior to eating   Increase the dose of amlodipine to 10 mg daily

## 2025-07-21 NOTE — NURSING NOTE
Chief Complaint   Patient presents with    Diabetes     BP (!) 147/90   Pulse 98   Wt 78.5 kg (173 lb)   LMP 02/01/2024 (Approximate)   SpO2 96%   BMI 32.71 kg/m

## 2025-07-21 NOTE — LETTER
7/21/2025       RE: Nayeli Caal  [address]  [facility] [address]     Dear Colleague,    Thank you for referring your patient, Nayeli Caal, to the [facility] [place] [facility]. Please see a copy of my visit note below.    Assessment and Plan:    1.  Type 2 diabetes, uncontrolled, complicated by nephropathy and mild diabetic neuropathy  The patient has been experiencing fasting hyperglycemia and intermittent mild hypoglycemic episodes during daytime, mostly postprandially.  She has not been counting carbohydrates and she enters a fixed amount of carbs of 45 or 60 g.  Recommendations:  Transition to Tresiba, 15 units daily, as Lantus might wear off and this explains fasting hyperglycemia.  In the event Tresiba is not covered by her insurance, I recommended to switch the administration of Lantus from morning to bedtime.  Use either phone apps or [employer] platforms like [employer] or [employer] for carb counting.  This was reviewed in detail with the patient at the time of the visit.  Discussed about the importance of avoiding entering a fixed amount of carbs, as this can result in unexpected high or low blood glucose numbers.  Continue current pump settings for now.  We might need to increase the dose of Tresiba in the future.  Follow-up urine microalbumin at her next visit    2.  Hypertension, uncontrolled  Recommended to increase the dose of amlodipine to 10 mg daily.  Counseled on peripheral leg edema, common side effects.    3.  Dyslipidemia, fairly well-controlled  On atorvastatin, fenofibrate and omega-3 fatty acids.      4.  Weight gain/inability to lose weight  Reassurance provided.  Counseled the patient on the importance of restricting the overall caloric/carbohydrate intake.  Reflex TSH was added to her current labs.     Orders Placed This Encounter   Procedures     Continuous Glucose Monitoring >=72  "hours PHYS INTERP     TSH with free T4 reflex     =========================================================================================    HPI:   Nayeli is a 45 year old woman here with a  for follow up.       1.  Type 2 diabetes, diagnosed around 2000s.   Most recent A1c was 8.3 on 7/8/25. It was 7% on 4/9/25.    Nayeli feels frustrated by her inability to lose weight, despite being on a maximum dose of Mounjaro.  She states her food intake and physical activity level have not changed.  According to our records, her weight is up 5 pounds in the last month.  She complains of \"stomach pain\" in the last couple of weeks.  Prior GI evaluation was unremarkable.    I reviewed the [facility] records.    In general, her meals are fixed at 45 or 60 g carbohydrates.  On the InPen, average total daily dose is 72 units, of which 22% is basal insulin.  In general, she enters carbohydrates 4 times daily.  Most of the meals are 60 g in carb content.  She reports administering insulin prior to eating.  The hypoglycemic episodes are generally noticed after eating.  Lantus is administered at 4:45 AM.    On the Evelia sensor, average glucose is 153 with a glucose variability of 31%, corresponding to an estimated GMI of 7%.  68% of the glucose numbers are within target, 1% are above 250, 3% of the glucose numbers are in the hypoglycemic range, with 1% below 54.  At times, the fasting morning blood sugar is elevated, in the 200s.  It looks like the blood sugar spontaneously starts to increase around 1 to 2 AM.  The hypoglycemic episodes tend to occur with no identifiable pattern, during daytime.  Most of them are in the 60s.    Her current regimen is:   Mountjaro, 15 mg weekly. Started in January 2024. She has been on 15 mg weekly since end of last year.   Glargine 15 units daily, at 5 am.  Novolog (dosing on In-pen):  Carb ratio: 1 unit per 5 g carbohydrates  Sensitivity 20   Target 130 from 9:30 pm to 6 am and 110 " "for the rest of the day.   Insulin duration 2 hrs     Previous treatments:   Empagliflozin 25 mg daily-transiently stopped in 2023 due to recurrent yeast infections.  She describes experiencing a tightness sensation in the bladder area present after she urinates.  Recently, a couple of urine analysis were positive and she reports being treated with antibiotics.  Metformin- severe diarrhea.   Ozempic - started in 4/2023 and replaced by Mountjaro in 1/24    Her weight in 1/2024 was 180 lbs. Current weight is 173 pounds.     Diabetes complications:  Retinopathy: last eye exam - 7/2025. 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 Note reviewed.  Nephropathy: h/o proteinuria; most recent urine microalbumin positive in 1/15 and 4/9/25 Normal GFR. On 100 mg losartan daily (tx with lisinopril was associated with a dry cough).   Neuropathy: some tingling present intermittently. No pain. Feet feel \"sensitive\". On 100 mg gabapentin TID. - not taking   Most recent lipid panel: 4/9/25: LDL cholesterol 103, HDL cholesterol 42, . On 40 mg atorvastatin, 160 mg fenofibrate daily and 2 gm omega 3 FA BID.    2. HTN   Current Tx: Losartan, 100 mg daily and amlodipine, 5 mg daily.    3.  Hypercalcemia  Calcium level was elevated at 12.3 on 7/8/2025.  Prior calcium levels have been normal.  The patient denies overdosing on calcium supplements.  Phosphorus was normal at 3.9 and vitamin D level was 24, on 7/8/2025.  The calcium was rechecked as ionized calcium on 7/14 and 7/21 and the levels were normal.    Past Medical History:   Diagnosis Date     Combined visual and hearing impairment      Deaf      Diabetic retinopathy of both eyes (H) 8/19/2011     Hepatic steatosis 08/19/2011     History of tobacco use      Hyperlipidemia      Hypertension      Hypertriglyceridemia      Migraines      Obesity 8/19/2011     Problem list name updated by automated process. 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   IBS    Past Surgical History:   Procedure Laterality Date     COLONOSCOPY N/A 10/21/2024    Procedure: COLONOSCOPY, WITH BIOPSIES;  Surgeon: Peggy Miguel MD;  Location: [facility] OR     DAVINCI HYSTERECTOMY TOTAL, SALPINGECTOMY BILATERAL Bilateral 02/07/2024    Procedure: HYSTERECTOMY, TOTAL, ROBOT-ASSISTED, WITH BILATERAL SALPINGECTOMY, CYSTOSCOPY;  Surgeon: Vonnie Cowan MD;  Location: [facility] OR     ESOPHAGOSCOPY, GASTROSCOPY, DUODENOSCOPY (EGD), COMBINED N/A 06/13/2024    Procedure: ESOPHAGOGASTRODUODENOSCOPY, WITH BIOPSY;  Surgeon: Nora Brice MD;  Location: [facility] OR     LAPAROSCOPIC CHOLECYSTECTOMY N/A 03/10/2018    Procedure: LAPAROSCOPIC CHOLECYSTECTOMY;  Laparoscopic Cholecystectomy ;  Surgeon: Kuldeep Sigala MD;  Location: [facility] OR     MD STOMACH SURGERY PROCEDURE UNLISTED       MD VASCULAR SURGERY PROCEDURE UNLIST       RELEASE CARPAL TUNNEL Right 12/04/2024    Procedure: RELEASE, RIGHT CARPAL TUNNEL;  Surgeon: Madhu Mcmahon MD;  Location: [facility] OR     RELEASE CARPAL TUNNEL Left 01/15/2025    Procedure: RELEASE, LEFT CARPAL TUNNEL;  Surgeon: Madhu Mcmahon MD;  Location: [facility] OR     RELEASE TRIGGER FINGER Right 05/02/2019    Procedure: Right Thumb Trigger Release;  Surgeon: Greg Streeter MD;  [facility]: [facility]     RELEASE TRIGGER FINGER Left 05/30/2019    Procedure: Left Ring Trigger Finger Release.  Ganglion cyst excision.;  Surgeon: Greg Streeter MD;  Location: [facility]     RELEASE TRIGGER FINGER Right 06/13/2023    Procedure: RELEASE, RIGHT TRIGGER FINGER, INDEX AND MIDDLE;  Surgeon: Miracle Carlin MD;  Location: [facility] OR     RELEASE TRIGGER FINGER Left 08/01/2023    Procedure: RELEASE, LEFT TRIGGER FINGER, MIDDLE;  Surgeon: Miracle Carlin MD;  Location: [facility] OR       Family History   Adopted: Yes   Problem Relation Age of Onset      Unknown/Adopted Other      Social Hx: Lives in a condo.  Boyfriend is in [place]. She is adopted.  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       Current Outpatient Medications:      acetaminophen (TYLENOL) 500 MG tablet, Take 2 tablets (1,000 mg) by mouth every 8 hours as needed for mild pain., Disp: 100 tablet, Rfl: 2     Alcohol Swabs (ALCOHOL PREP PAD) 70 % PADS, 1 each 3 times daily, Disp: 100 each, Rfl: 3     amLODIPine (NORVASC) 5 MG tablet, Take 1 tablet (5 mg) by mouth at bedtime., Disp: 90 tablet, Rfl: 2     aspirin (ASA) 81 MG chewable tablet, Take 1 tablet (81 mg) by mouth daily. CHEW AND SWALLOW, Disp: 90 tablet, Rfl: 3     atorvastatin (LIPITOR) 40 MG tablet, Take 1 tablet (40 mg) by mouth daily., Disp: 90 tablet, Rfl: 2     blood glucose (ACCU-CHEK LAYA PLUS) test strip, Use with  Accucheck Expert meter.  Test blood sugar 6 times daily., Disp: 600 each, Rfl: 3     blood glucose (NO BRAND SPECIFIED) lancets standard, Use to test blood sugar before each meal and bedtime when sensor not working., Disp: 100 each, Rfl: 5     blood glucose (NO BRAND SPECIFIED) test strip, Use to test blood sugar before each meal and at bedtime when sensor not working., Disp: 100 strip, Rfl: 5     blood glucose monitoring (ACCU-CHEK FASTCLIX) lancets, Use to test blood sugar 6 times daily or as directed, Disp: 510 each, Rfl: 3     blood glucose monitoring (NO BRAND SPECIFIED) meter device kit, Use to test blood sugar before each meal and bedtime when sensor not working., Disp: 1 kit, Rfl: 0     Continuous Glucose Sensor (FREESTYLE ZORAIDA 3 SENSOR) [place], 1 each every 14 days Please provide 3 month supply., Disp: 6 each, Rfl: 3     diclofenac (VOLTAREN) 1 % topical gel, Apply 2 g topically 4 times daily. For L shoulder pain, Disp: 100 g, Rfl: 3     fenofibrate (TRIGLIDE/LOFIBRA) 160 MG tablet, Take 1 tablet (160 mg) by mouth daily., Disp: 90 tablet, Rfl: 2     fish oil-omega-3 fatty acids 1000 MG capsule, TAKE  TWO CAPSULES (2 GM) BY MOUTH ONCE DAILY, Disp: 180 capsule, Rfl: 3     Injection Device for insulin (INPEN 100-PINK-NOVOLOG-FIASP) DAVID, 1 each continuous, Disp: 1 each, Rfl: 0     insulin aspart (NOVOLOG PENFILL) 100 UNIT/ML cartridge, Take with each meal and snack: 1 per 4 grams CHO and correction of 1 unit per 20 mg/dL over a target of 120. Average daily dose is 40 units., Disp: 40 mL, Rfl: 3     insulin glargine 100 UNIT/ML pen, 3 month supply.Inject 15 units daily, Disp: 30 mL, Rfl: 1     insulin pen needle (31G X 5 MM) 31G X 5 MM miscellaneous, Use 5 to 6 pen needles daily or as directed.  3 month supply., Disp: 500 each, Rfl: 3     losartan (COZAAR) 100 MG tablet, Take 1 tablet (100 mg) by mouth daily., Disp: 90 tablet, Rfl: 3     MOUNJARO 15 MG/0.5ML SOAJ auto-injector pen, Inject 0.5 mLs (15 mg) subcutaneously once a week., Disp: 6 mL, Rfl: 3    Current Facility-Administered Medications:      hydrocortisone (CORTAID) 1 % cream, , Topical, Once, Mariya Mohamud, APRN CNP      Physical Exam  Wt Readings from Last 10 Encounters:   07/21/25 78.5 kg (173 lb)   07/08/25 76.6 kg (168 lb 12.8 oz)   06/26/25 76.2 kg (168 lb 1.6 oz)   06/06/25 77.5 kg (170 lb 14.4 oz)   05/15/25 75.6 kg (166 lb 9.6 oz)   04/21/25 73.9 kg (163 lb)   03/29/25 73.5 kg (162 lb)   01/28/25 69 kg (152 lb 3.2 oz)   01/15/25 76.2 kg (168 lb)   01/13/25 76.2 kg (168 lb)     BP (!) 147/90   Pulse 98   Wt 78.5 kg (173 lb)   LMP 02/01/2024 (Approximate)   SpO2 96%   BMI 32.71 kg/m      General appearance: she is well-developed, well-nourished, and in no distress.  Eyes: conjunctivae and extraocular motions are normal. Pupils are equal, round, and reactive to light. No lid lag, no stare.  HENT: oropharynx clear and moist; neck no JVD, no bruits, no thyromegaly, no palpable nodules  Cardiovascular: regular rhythm, no murmurs, distal pulses palpable, no edema  Respiratory: chest clear, no rales, no rhonchi  Musculoskeletal: normal tone and  strength   Neurologic: Unable to elicit knee reflexes, no resting tremor  Psychiatric: affect and judgment normal   Feet: Onychomycosis, sensation preserved to monofilament testing.      RESULTS  Lab Results   Component Value Date    A1C 8.3 (H) 07/08/2025    A1C 7.0 (H) 04/09/2025    A1C 7.5 (H) 03/18/2025    A1C 7.6 (H) 12/13/2024    A1C 7.8 (H) 09/11/2024    A1C 11.6 (H) 04/05/2021    A1C 12.4 (H) 10/29/2020    A1C 12.9 (H) 07/08/2020    A1C 8.2 (H) 05/02/2019    A1C 10.3 (H) 10/15/2018    HEMOGLOBINA1 10.0 (A) 01/13/2020    HEMOGLOBINA1 9.9 (A) 10/07/2019    HEMOGLOBINA1 8.1 (A) 04/22/2019    HEMOGLOBINA1 10.4 (A) 01/14/2019    HEMOGLOBINA1 8.7 (A) 03/12/2018       TSH   Date Value Ref Range Status   04/04/2023 1.41 0.30 - 4.20 uIU/mL Final   07/08/2020 1.34 0.40 - 4.00 mU/L Final   05/17/2018 1.84 0.40 - 4.00 mU/L Final   11/27/2017 1.92 0.40 - 4.00 mU/L Final   05/11/2016 1.85 0.40 - 4.00 mU/L Final   02/11/2016 1.14 0.40 - 4.00 mU/L Final       ALT   Date Value Ref Range Status   04/09/2025 15 0 - 50 U/L Final   10/25/2024 24 0 - 50 U/L Final   07/08/2020 29 0 - 50 U/L Final   05/02/2019 43 0 - 50 U/L Final     42 minutes spent on the date of the encounter doing chart review, history and exam, coordinating care and documenting in the EHR, as detailed above, exclusive of CGM time.    07/18/25 11:19 AM : Appointment reminder phone call made to patient. No Answer. LVM Advising pt to arrive 15 mins early                            Again, thank you for allowing me to participate in the care of your patient.      Sincerely,    Jimena Bragg MD

## 2025-07-29 ENCOUNTER — THERAPY VISIT (OUTPATIENT)
Dept: PHYSICAL THERAPY | Facility: CLINIC | Age: 45
End: 2025-07-29
Payer: COMMERCIAL

## 2025-07-29 DIAGNOSIS — M25.551 HIP PAIN, RIGHT: Primary | ICD-10-CM

## 2025-07-29 PROCEDURE — 97112 NEUROMUSCULAR REEDUCATION: CPT | Mod: GP

## 2025-07-29 PROCEDURE — 97110 THERAPEUTIC EXERCISES: CPT | Mod: GP

## 2025-07-29 PROCEDURE — T1013 SIGN LANG/ORAL INTERPRETER: HCPCS | Mod: U3

## 2025-08-06 ENCOUNTER — ALLIED HEALTH/NURSE VISIT (OUTPATIENT)
Dept: EDUCATION SERVICES | Facility: CLINIC | Age: 45
End: 2025-08-06
Payer: COMMERCIAL

## 2025-08-06 DIAGNOSIS — E11.8 TYPE 2 DIABETES MELLITUS WITH COMPLICATIONS (H): Primary | ICD-10-CM

## 2025-08-07 ENCOUNTER — OFFICE VISIT (OUTPATIENT)
Dept: ORTHOPEDICS | Facility: CLINIC | Age: 45
End: 2025-08-07
Payer: COMMERCIAL

## 2025-08-07 VITALS — BODY MASS INDEX: 33.27 KG/M2 | WEIGHT: 176 LBS

## 2025-08-07 DIAGNOSIS — M25.551 HIP PAIN, RIGHT: Primary | ICD-10-CM

## (undated) DEVICE — CAST PADDING 3" STERILE 9043S

## (undated) DEVICE — TUBING C02 INSUFFLATION LAP FILTER HEATER 6198

## (undated) DEVICE — LINEN ORTHO PACK 5446

## (undated) DEVICE — SU MONOCRYL 4-0 PS-2 18" UND Y496G

## (undated) DEVICE — BLADE KNIFE SURG 11 371111

## (undated) DEVICE — ENDO POUCH UNIVERSAL RETRIEVAL SYSTEM INZII 5MM CD003

## (undated) DEVICE — GLOVE PROTEXIS BLUE W/NEU-THERA 6.5  2D73EB65

## (undated) DEVICE — PREP CHLORAPREP 26ML TINTED ORANGE  260815

## (undated) DEVICE — DRAPE LAVH/LAPAROSCOPY W/POUCH 29474

## (undated) DEVICE — PACK HAND CUSTOM ASC

## (undated) DEVICE — BNDG KLING 2" 2231

## (undated) DEVICE — SU ETHILON 4-0 PS-2 18" BLACK 1667H

## (undated) DEVICE — SUCTION MANIFOLD NEPTUNE 2 SYS 1 PORT 702-025-000

## (undated) DEVICE — DRSG STERI STRIP 1/2X4" R1547

## (undated) DEVICE — LINEN TOWEL PACK X30 5481

## (undated) DEVICE — COVER CAMERA IN-LIGHT DISP LT-C02

## (undated) DEVICE — BLADE CLIPPER SGL USE 9680

## (undated) DEVICE — LINEN GOWN X4 5410

## (undated) DEVICE — ENDO TROCAR BLUNT 100MM W/THRD ANCHOR BLUNTPORT BPT12STS

## (undated) DEVICE — SOL NACL 0.9% IRRIG 500ML BOTTLE 2F7123

## (undated) DEVICE — SYR 30ML SLIP TIP W/O NDL 302833

## (undated) DEVICE — GLOVE BIOGEL PI MICRO INDICATOR UNDERGLOVE SZ 6.5 48965

## (undated) DEVICE — LINEN TOWEL PACK X6 WHITE 5487

## (undated) DEVICE — DRAPE POUCH INSTRUMENT 1018

## (undated) DEVICE — SOL WATER IRRIG 500ML BOTTLE 2F7113

## (undated) DEVICE — KIT ENDO TURNOVER/PROCEDURE CARRY-ON 101822

## (undated) DEVICE — SU VICRYL 0 CT-2 27" J334H

## (undated) DEVICE — SPECIMEN CONTAINER 3OZ W/FORMALIN 59901

## (undated) DEVICE — DAVINCI XI SEAL UNIVERSAL 5-8MM 470361

## (undated) DEVICE — GLOVE BIOGEL PI MICRO INDICATOR UNDERGLOVE SZ 6.0 48960

## (undated) DEVICE — SOL NACL 0.9% INJ 1000ML BAG 2B1324X

## (undated) DEVICE — ENDO TROCAR CONMED AIRSEAL BLADELESS 05X120MM IAS5-120LP

## (undated) DEVICE — TUBING IRR TUR Y TYPE 2C4041

## (undated) DEVICE — ENDO BITE BLOCK ADULT OMNI-BLOC

## (undated) DEVICE — DAVINCI XI ESU FORCE BIPOLAR 8MM 471405

## (undated) DEVICE — GOWN IMPERVIOUS 2XL BLUE

## (undated) DEVICE — ENDO TROCAR 05MM VERSASTEP VS101005

## (undated) DEVICE — DAVINCI HOT SHEARS TIP COVER  400180

## (undated) DEVICE — PREP SKIN SCRUB TRAY 4461A

## (undated) DEVICE — GLOVE PROTEXIS POWDER FREE SMT 6.5  2D72PT65X

## (undated) DEVICE — GLOVE BIOGEL PI SZ 8.0 40880

## (undated) DEVICE — PACK MINOR HAND CUSTOM ASC 37-0097A

## (undated) DEVICE — Device

## (undated) DEVICE — SYR 50ML SLIP TIP W/O NDL 309654

## (undated) DEVICE — ANTIFOG SOLUTION SEE SHARP 150M TROCAR SWABS 30978

## (undated) DEVICE — ESU GROUND PAD ADULT W/CORD E7507

## (undated) DEVICE — PREP CHLORAPREP 26ML TINTED HI-LITE ORANGE 930815

## (undated) DEVICE — DRAPE STERI TOWEL LG 1010

## (undated) DEVICE — PAD CHUX UNDERPAD 30X36" P3036C

## (undated) DEVICE — DAVINCI XI MONOPOLAR SCISSORS HOT SHEARS 8MM 470179

## (undated) DEVICE — KIT ENDO FIRST STEP DISINFECTANT 200ML W/POUCH EP-4

## (undated) DEVICE — DAVINCI XI DRAPE ARM 470015

## (undated) DEVICE — ENDO FORCEP BX CAPTURA PRO SPIKE G50696

## (undated) DEVICE — PREP DYNA-HEX 4% CHG SCRUB 4OZ BOTTLE MDS098710

## (undated) DEVICE — DAVINCI XI OBTURATOR BLADELESS 8MM 470359

## (undated) DEVICE — LINEN TOWEL PACK X5 5464

## (undated) DEVICE — SU MONOCRYL 5-0 P-3 18" UND Y493G

## (undated) DEVICE — SYR 50ML LL W/O NDL 309653

## (undated) DEVICE — TUBING FILTER TRI-LUMEN AIRSEAL ASC-EVAC1

## (undated) DEVICE — KOH COLPOTOMIZER OCCLUDER  CPO-6

## (undated) DEVICE — ENDO SHEARS 5MM 176643

## (undated) DEVICE — SYR 10ML LL W/O NDL 302995

## (undated) DEVICE — DAVINCI XI DRAPE COLUMN 470341

## (undated) DEVICE — SU DERMABOND ADVANCED .7ML DNX12

## (undated) DEVICE — PREP CHLORHEXIDINE 4% 4OZ (HIBICLENS) 57504

## (undated) DEVICE — ANTIFOG SOLUTION W/FOAM PAD 31142527

## (undated) DEVICE — GLOVE PROTEXIS POWDER FREE SMT 7.5  2D72PT75X

## (undated) DEVICE — CATH TRAY FOLEY SURESTEP 16FR WDRAIN BAG STLK LATEX A300316A

## (undated) DEVICE — DRSG PRIMAPORE 02X3" 7133

## (undated) DEVICE — KIT POSITIONER PINK PAD XL ADVANCED 40588

## (undated) DEVICE — DRAPE STOCKINETTE 4" 8544

## (undated) DEVICE — BLADE SWITCH SCISSORS TIP 5MM 89-5100B

## (undated) DEVICE — SU PLAIN 4-0 FS-2 27" H821H

## (undated) DEVICE — SU STRATAFIX PDS PLUS 0 CT-1 30CM SXPP1B450

## (undated) DEVICE — SOL WATER IRRIG 1000ML BOTTLE 2F7114

## (undated) DEVICE — BRUSH SURGICAL SCRUB W/4% CHG SOL 25ML 371073

## (undated) DEVICE — RETR ELEV / UTERINE MANIPULATOR V-CARE LG CUP 60-6085-202A

## (undated) DEVICE — TUBING SUCTION MEDI-VAC 1/4"X20' N620A

## (undated) DEVICE — SU MONOCRYL 4-0 PS-2 27" UND Y426H

## (undated) DEVICE — GLOVE BIOGEL PI ULTRATOUCH SZ 6.5 41165

## (undated) DEVICE — TUBING SUCTION 10'X3/16" N510

## (undated) DEVICE — TUBING IRRIG CYSTO/BLADDER SET 81" LF 2C4040

## (undated) DEVICE — PAD PERI INDIV WRAP 11" 2022A

## (undated) DEVICE — DAVINCI XI DRIVER NDL LARGE 8MM EXT 471006

## (undated) DEVICE — LIGHT HANDLE X1 31140133

## (undated) DEVICE — SUCTION MANIFOLD NEPTUNE 2 SYS 4 PORT 0702-020-000

## (undated) DEVICE — NDL INSUFFLATION 14GA STEP S100000

## (undated) DEVICE — WIPES FOLEY CARE SURESTEP PROVON DFC100

## (undated) DEVICE — LINEN GOWN XLG 5407

## (undated) DEVICE — SUCTION MANIFOLD DORNOCH ULTRA CART UL-CL500

## (undated) DEVICE — CLIP APPLIER ENDO 05MM MED/LG 176630

## (undated) DEVICE — SU ENDO POLYSORB 0 3" LOOP 21" EL-21-L

## (undated) RX ORDER — BUPIVACAINE HYDROCHLORIDE 5 MG/ML
INJECTION, SOLUTION EPIDURAL; INTRACAUDAL
Status: DISPENSED
Start: 2019-05-02

## (undated) RX ORDER — METOPROLOL TARTRATE 1 MG/ML
INJECTION, SOLUTION INTRAVENOUS
Status: DISPENSED
Start: 2018-03-10

## (undated) RX ORDER — FENTANYL CITRATE 50 UG/ML
INJECTION, SOLUTION INTRAMUSCULAR; INTRAVENOUS
Status: DISPENSED
Start: 2019-05-02

## (undated) RX ORDER — CEFAZOLIN SODIUM 2 G/50ML
SOLUTION INTRAVENOUS
Status: DISPENSED
Start: 2023-06-13

## (undated) RX ORDER — PROPOFOL 10 MG/ML
INJECTION, EMULSION INTRAVENOUS
Status: DISPENSED
Start: 2019-05-30

## (undated) RX ORDER — LIDOCAINE HYDROCHLORIDE 10 MG/ML
INJECTION, SOLUTION EPIDURAL; INFILTRATION; INTRACAUDAL; PERINEURAL
Status: DISPENSED
Start: 2019-12-30

## (undated) RX ORDER — METRONIDAZOLE 500 MG/100ML
INJECTION, SOLUTION INTRAVENOUS
Status: DISPENSED
Start: 2024-02-07

## (undated) RX ORDER — LIDOCAINE HYDROCHLORIDE AND EPINEPHRINE 10; 10 MG/ML; UG/ML
INJECTION, SOLUTION INFILTRATION; PERINEURAL
Status: DISPENSED
Start: 2025-01-15

## (undated) RX ORDER — ACETAMINOPHEN 325 MG/1
TABLET ORAL
Status: DISPENSED
Start: 2024-02-07

## (undated) RX ORDER — HYDROMORPHONE HYDROCHLORIDE 1 MG/ML
INJECTION, SOLUTION INTRAMUSCULAR; INTRAVENOUS; SUBCUTANEOUS
Status: DISPENSED
Start: 2024-02-07

## (undated) RX ORDER — ACETAMINOPHEN 325 MG/1
TABLET ORAL
Status: DISPENSED
Start: 2024-10-21

## (undated) RX ORDER — LIDOCAINE HYDROCHLORIDE AND EPINEPHRINE 10; 10 MG/ML; UG/ML
INJECTION, SOLUTION INFILTRATION; PERINEURAL
Status: DISPENSED
Start: 2023-08-01

## (undated) RX ORDER — LIDOCAINE HYDROCHLORIDE 10 MG/ML
INJECTION, SOLUTION EPIDURAL; INFILTRATION; INTRACAUDAL; PERINEURAL
Status: DISPENSED
Start: 2019-05-02

## (undated) RX ORDER — ACETAMINOPHEN 325 MG/1
TABLET ORAL
Status: DISPENSED
Start: 2019-05-02

## (undated) RX ORDER — LIDOCAINE HYDROCHLORIDE 20 MG/ML
INJECTION, SOLUTION EPIDURAL; INFILTRATION; INTRACAUDAL; PERINEURAL
Status: DISPENSED
Start: 2019-05-30

## (undated) RX ORDER — OXYCODONE HYDROCHLORIDE 5 MG/1
TABLET ORAL
Status: DISPENSED
Start: 2024-02-07

## (undated) RX ORDER — LIDOCAINE HYDROCHLORIDE AND EPINEPHRINE 10; 10 MG/ML; UG/ML
INJECTION, SOLUTION INFILTRATION; PERINEURAL
Status: DISPENSED
Start: 2019-05-30

## (undated) RX ORDER — GABAPENTIN 300 MG/1
CAPSULE ORAL
Status: DISPENSED
Start: 2019-05-30

## (undated) RX ORDER — ONDANSETRON 2 MG/ML
INJECTION INTRAMUSCULAR; INTRAVENOUS
Status: DISPENSED
Start: 2024-02-07

## (undated) RX ORDER — CEFAZOLIN SODIUM 2 G/50ML
SOLUTION INTRAVENOUS
Status: DISPENSED
Start: 2023-08-01

## (undated) RX ORDER — ACETAMINOPHEN 10 MG/ML
INJECTION, SOLUTION INTRAVENOUS
Status: DISPENSED
Start: 2018-03-10

## (undated) RX ORDER — LIDOCAINE HYDROCHLORIDE AND EPINEPHRINE 10; 10 MG/ML; UG/ML
INJECTION, SOLUTION INFILTRATION; PERINEURAL
Status: DISPENSED
Start: 2019-05-02

## (undated) RX ORDER — FENTANYL CITRATE 50 UG/ML
INJECTION, SOLUTION INTRAMUSCULAR; INTRAVENOUS
Status: DISPENSED
Start: 2018-03-10

## (undated) RX ORDER — EPHEDRINE SULFATE 50 MG/ML
INJECTION, SOLUTION INTRAMUSCULAR; INTRAVENOUS; SUBCUTANEOUS
Status: DISPENSED
Start: 2019-05-30

## (undated) RX ORDER — BETAMETHASONE SODIUM PHOSPHATE AND BETAMETHASONE ACETATE 3; 3 MG/ML; MG/ML
INJECTION, SUSPENSION INTRA-ARTICULAR; INTRALESIONAL; INTRAMUSCULAR; SOFT TISSUE
Status: DISPENSED
Start: 2020-07-20

## (undated) RX ORDER — FENTANYL CITRATE 50 UG/ML
INJECTION, SOLUTION INTRAMUSCULAR; INTRAVENOUS
Status: DISPENSED
Start: 2024-02-07

## (undated) RX ORDER — TRIAMCINOLONE ACETONIDE 40 MG/ML
INJECTION, SUSPENSION INTRA-ARTICULAR; INTRAMUSCULAR
Status: DISPENSED
Start: 2023-10-05

## (undated) RX ORDER — CEFAZOLIN SODIUM 2 G/50ML
SOLUTION INTRAVENOUS
Status: DISPENSED
Start: 2019-05-30

## (undated) RX ORDER — LIDOCAINE HYDROCHLORIDE 20 MG/ML
INJECTION, SOLUTION EPIDURAL; INFILTRATION; INTRACAUDAL; PERINEURAL
Status: DISPENSED
Start: 2018-03-10

## (undated) RX ORDER — LIDOCAINE HYDROCHLORIDE 10 MG/ML
INJECTION, SOLUTION EPIDURAL; INFILTRATION; INTRACAUDAL; PERINEURAL
Status: DISPENSED
Start: 2020-07-20

## (undated) RX ORDER — ACETAMINOPHEN 325 MG/1
TABLET ORAL
Status: DISPENSED
Start: 2019-05-30

## (undated) RX ORDER — GABAPENTIN 300 MG/1
CAPSULE ORAL
Status: DISPENSED
Start: 2019-05-02

## (undated) RX ORDER — KETOROLAC TROMETHAMINE 30 MG/ML
INJECTION, SOLUTION INTRAMUSCULAR; INTRAVENOUS
Status: DISPENSED
Start: 2019-05-30

## (undated) RX ORDER — TRIAMCINOLONE ACETONIDE 40 MG/ML
INJECTION, SUSPENSION INTRA-ARTICULAR; INTRAMUSCULAR
Status: DISPENSED
Start: 2017-06-05

## (undated) RX ORDER — DEXAMETHASONE SODIUM PHOSPHATE 4 MG/ML
INJECTION, SOLUTION INTRA-ARTICULAR; INTRALESIONAL; INTRAMUSCULAR; INTRAVENOUS; SOFT TISSUE
Status: DISPENSED
Start: 2019-05-30

## (undated) RX ORDER — LIDOCAINE HYDROCHLORIDE AND EPINEPHRINE 10; 10 MG/ML; UG/ML
INJECTION, SOLUTION INFILTRATION; PERINEURAL
Status: DISPENSED
Start: 2024-12-04

## (undated) RX ORDER — ONDANSETRON 2 MG/ML
INJECTION INTRAMUSCULAR; INTRAVENOUS
Status: DISPENSED
Start: 2019-05-30

## (undated) RX ORDER — ROCURONIUM BROMIDE 50 MG/5 ML
SYRINGE (ML) INTRAVENOUS
Status: DISPENSED
Start: 2018-03-10

## (undated) RX ORDER — IBUPROFEN 200 MG
TABLET ORAL
Status: DISPENSED
Start: 2024-02-07

## (undated) RX ORDER — ONDANSETRON 2 MG/ML
INJECTION INTRAMUSCULAR; INTRAVENOUS
Status: DISPENSED
Start: 2024-06-13

## (undated) RX ORDER — PROPOFOL 10 MG/ML
INJECTION, EMULSION INTRAVENOUS
Status: DISPENSED
Start: 2019-05-02

## (undated) RX ORDER — CEFAZOLIN SODIUM/WATER 2 G/20 ML
SYRINGE (ML) INTRAVENOUS
Status: DISPENSED
Start: 2024-02-07

## (undated) RX ORDER — LIDOCAINE HYDROCHLORIDE 10 MG/ML
INJECTION, SOLUTION EPIDURAL; INFILTRATION; INTRACAUDAL; PERINEURAL
Status: DISPENSED
Start: 2023-10-05

## (undated) RX ORDER — LIDOCAINE HYDROCHLORIDE AND EPINEPHRINE 10; 10 MG/ML; UG/ML
INJECTION, SOLUTION INFILTRATION; PERINEURAL
Status: DISPENSED
Start: 2023-06-13

## (undated) RX ORDER — PHENAZOPYRIDINE HYDROCHLORIDE 200 MG/1
TABLET, FILM COATED ORAL
Status: DISPENSED
Start: 2024-02-07

## (undated) RX ORDER — BETAMETHASONE SODIUM PHOSPHATE AND BETAMETHASONE ACETATE 3; 3 MG/ML; MG/ML
INJECTION, SUSPENSION INTRA-ARTICULAR; INTRALESIONAL; INTRAMUSCULAR; SOFT TISSUE
Status: DISPENSED
Start: 2019-12-30

## (undated) RX ORDER — LIDOCAINE HYDROCHLORIDE 10 MG/ML
INJECTION, SOLUTION EPIDURAL; INFILTRATION; INTRACAUDAL; PERINEURAL
Status: DISPENSED
Start: 2020-07-08

## (undated) RX ORDER — BETAMETHASONE SODIUM PHOSPHATE AND BETAMETHASONE ACETATE 3; 3 MG/ML; MG/ML
INJECTION, SUSPENSION INTRA-ARTICULAR; INTRALESIONAL; INTRAMUSCULAR; SOFT TISSUE
Status: DISPENSED
Start: 2020-07-08

## (undated) RX ORDER — LIDOCAINE HYDROCHLORIDE 10 MG/ML
INJECTION, SOLUTION INFILTRATION; PERINEURAL
Status: DISPENSED
Start: 2017-06-05

## (undated) RX ORDER — PHENYLEPHRINE HCL IN 0.9% NACL 1 MG/10 ML
SYRINGE (ML) INTRAVENOUS
Status: DISPENSED
Start: 2018-03-10

## (undated) RX ORDER — IBUPROFEN 200 MG
TABLET ORAL
Status: DISPENSED
Start: 2017-01-24

## (undated) RX ORDER — EPHEDRINE SULFATE 50 MG/ML
INJECTION, SOLUTION INTRAMUSCULAR; INTRAVENOUS; SUBCUTANEOUS
Status: DISPENSED
Start: 2018-03-10